# Patient Record
Sex: FEMALE | Race: WHITE | NOT HISPANIC OR LATINO | Employment: OTHER | URBAN - METROPOLITAN AREA
[De-identification: names, ages, dates, MRNs, and addresses within clinical notes are randomized per-mention and may not be internally consistent; named-entity substitution may affect disease eponyms.]

---

## 2018-09-24 ENCOUNTER — APPOINTMENT (OUTPATIENT)
Dept: LAB | Facility: CLINIC | Age: 73
End: 2018-09-24
Payer: COMMERCIAL

## 2018-09-24 ENCOUNTER — TRANSCRIBE ORDERS (OUTPATIENT)
Dept: LAB | Facility: CLINIC | Age: 73
End: 2018-09-24

## 2018-09-24 DIAGNOSIS — E78.5 HYPERLIPIDEMIA, UNSPECIFIED HYPERLIPIDEMIA TYPE: Primary | ICD-10-CM

## 2018-09-24 DIAGNOSIS — Z79.899 NEED FOR PROPHYLACTIC CHEMOTHERAPY: ICD-10-CM

## 2018-09-24 DIAGNOSIS — E55.9 AVITAMINOSIS D: ICD-10-CM

## 2018-09-24 DIAGNOSIS — E78.5 HYPERLIPIDEMIA, UNSPECIFIED HYPERLIPIDEMIA TYPE: ICD-10-CM

## 2018-09-24 DIAGNOSIS — Z11.59 SCREENING EXAMINATION FOR POLIOMYELITIS: ICD-10-CM

## 2018-09-24 DIAGNOSIS — I10 ESSENTIAL HYPERTENSION, MALIGNANT: ICD-10-CM

## 2018-09-24 LAB
25(OH)D3 SERPL-MCNC: 22.8 NG/ML (ref 30–100)
ALBUMIN SERPL BCP-MCNC: 4 G/DL (ref 3.5–5)
ALP SERPL-CCNC: 133 U/L (ref 46–116)
ALT SERPL W P-5'-P-CCNC: 25 U/L (ref 12–78)
ANION GAP SERPL CALCULATED.3IONS-SCNC: 9 MMOL/L (ref 4–13)
AST SERPL W P-5'-P-CCNC: 14 U/L (ref 5–45)
BACTERIA UR QL AUTO: ABNORMAL /HPF
BASOPHILS # BLD AUTO: 0.04 THOUSANDS/ΜL (ref 0–0.1)
BASOPHILS NFR BLD AUTO: 0 % (ref 0–1)
BILIRUB SERPL-MCNC: 0.3 MG/DL (ref 0.2–1)
BILIRUB UR QL STRIP: NEGATIVE
BUN SERPL-MCNC: 13 MG/DL (ref 5–25)
CALCIUM SERPL-MCNC: 9.7 MG/DL (ref 8.3–10.1)
CHLORIDE SERPL-SCNC: 99 MMOL/L (ref 100–108)
CHOLEST SERPL-MCNC: 217 MG/DL (ref 50–200)
CLARITY UR: CLEAR
CO2 SERPL-SCNC: 30 MMOL/L (ref 21–32)
COLOR UR: YELLOW
CREAT SERPL-MCNC: 0.84 MG/DL (ref 0.6–1.3)
EOSINOPHIL # BLD AUTO: 0.2 THOUSAND/ΜL (ref 0–0.61)
EOSINOPHIL NFR BLD AUTO: 2 % (ref 0–6)
ERYTHROCYTE [DISTWIDTH] IN BLOOD BY AUTOMATED COUNT: 14.5 % (ref 11.6–15.1)
GFR SERPL CREATININE-BSD FRML MDRD: 70 ML/MIN/1.73SQ M
GLUCOSE P FAST SERPL-MCNC: 125 MG/DL (ref 65–99)
GLUCOSE UR STRIP-MCNC: NEGATIVE MG/DL
HCT VFR BLD AUTO: 41.4 % (ref 34.8–46.1)
HDLC SERPL-MCNC: 70 MG/DL (ref 40–60)
HGB BLD-MCNC: 13.3 G/DL (ref 11.5–15.4)
HGB UR QL STRIP.AUTO: NEGATIVE
IMM GRANULOCYTES # BLD AUTO: 0.03 THOUSAND/UL (ref 0–0.2)
IMM GRANULOCYTES NFR BLD AUTO: 0 % (ref 0–2)
KETONES UR STRIP-MCNC: NEGATIVE MG/DL
LDLC SERPL CALC-MCNC: 127 MG/DL (ref 0–100)
LDLC SERPL DIRECT ASSAY-MCNC: 128 MG/DL (ref 0–100)
LEUKOCYTE ESTERASE UR QL STRIP: NEGATIVE
LYMPHOCYTES # BLD AUTO: 2.35 THOUSANDS/ΜL (ref 0.6–4.47)
LYMPHOCYTES NFR BLD AUTO: 23 % (ref 14–44)
MCH RBC QN AUTO: 28.3 PG (ref 26.8–34.3)
MCHC RBC AUTO-ENTMCNC: 32.1 G/DL (ref 31.4–37.4)
MCV RBC AUTO: 88 FL (ref 82–98)
MONOCYTES # BLD AUTO: 0.79 THOUSAND/ΜL (ref 0.17–1.22)
MONOCYTES NFR BLD AUTO: 8 % (ref 4–12)
NEUTROPHILS # BLD AUTO: 6.76 THOUSANDS/ΜL (ref 1.85–7.62)
NEUTS SEG NFR BLD AUTO: 67 % (ref 43–75)
NITRITE UR QL STRIP: NEGATIVE
NON-SQ EPI CELLS URNS QL MICRO: ABNORMAL /HPF
NONHDLC SERPL-MCNC: 147 MG/DL
NRBC BLD AUTO-RTO: 0 /100 WBCS
PH UR STRIP.AUTO: 6.5 [PH] (ref 4.5–8)
PLATELET # BLD AUTO: 402 THOUSANDS/UL (ref 149–390)
PMV BLD AUTO: 10.9 FL (ref 8.9–12.7)
POTASSIUM SERPL-SCNC: 3.7 MMOL/L (ref 3.5–5.3)
PROT SERPL-MCNC: 8.1 G/DL (ref 6.4–8.2)
PROT UR STRIP-MCNC: NEGATIVE MG/DL
RBC # BLD AUTO: 4.7 MILLION/UL (ref 3.81–5.12)
RBC #/AREA URNS AUTO: ABNORMAL /HPF
SODIUM SERPL-SCNC: 138 MMOL/L (ref 136–145)
SP GR UR STRIP.AUTO: <=1.005 (ref 1–1.03)
TRIGL SERPL-MCNC: 102 MG/DL
TSH SERPL DL<=0.05 MIU/L-ACNC: 1.34 UIU/ML (ref 0.36–3.74)
UROBILINOGEN UR QL STRIP.AUTO: 0.2 E.U./DL
WBC # BLD AUTO: 10.17 THOUSAND/UL (ref 4.31–10.16)
WBC #/AREA URNS AUTO: ABNORMAL /HPF

## 2018-09-24 PROCEDURE — 83721 ASSAY OF BLOOD LIPOPROTEIN: CPT

## 2018-09-24 PROCEDURE — 80053 COMPREHEN METABOLIC PANEL: CPT

## 2018-09-24 PROCEDURE — 81001 URINALYSIS AUTO W/SCOPE: CPT

## 2018-09-24 PROCEDURE — 36415 COLL VENOUS BLD VENIPUNCTURE: CPT

## 2018-09-24 PROCEDURE — 80061 LIPID PANEL: CPT

## 2018-09-24 PROCEDURE — 86803 HEPATITIS C AB TEST: CPT

## 2018-09-24 PROCEDURE — 84443 ASSAY THYROID STIM HORMONE: CPT

## 2018-09-24 PROCEDURE — 82306 VITAMIN D 25 HYDROXY: CPT

## 2018-09-24 PROCEDURE — 85025 COMPLETE CBC W/AUTO DIFF WBC: CPT

## 2018-09-25 LAB — HCV AB SER QL: NORMAL

## 2020-10-22 ENCOUNTER — TRANSCRIBE ORDERS (OUTPATIENT)
Dept: VASCULAR SURGERY | Facility: CLINIC | Age: 75
End: 2020-10-22

## 2020-10-22 DIAGNOSIS — I83.893 VARICOSE VEINS OF LEG WITH SWELLING, BILATERAL: Primary | ICD-10-CM

## 2020-12-02 ENCOUNTER — TELEPHONE (OUTPATIENT)
Dept: VASCULAR SURGERY | Facility: CLINIC | Age: 75
End: 2020-12-02

## 2020-12-09 ENCOUNTER — TELEPHONE (OUTPATIENT)
Dept: VASCULAR SURGERY | Facility: CLINIC | Age: 75
End: 2020-12-09

## 2020-12-21 ENCOUNTER — TELEPHONE (OUTPATIENT)
Dept: VASCULAR SURGERY | Facility: CLINIC | Age: 75
End: 2020-12-21

## 2020-12-22 ENCOUNTER — OFFICE VISIT (OUTPATIENT)
Dept: VASCULAR SURGERY | Facility: CLINIC | Age: 75
End: 2020-12-22
Payer: COMMERCIAL

## 2020-12-22 VITALS
SYSTOLIC BLOOD PRESSURE: 158 MMHG | WEIGHT: 240 LBS | BODY MASS INDEX: 44.16 KG/M2 | HEIGHT: 62 IN | HEART RATE: 99 BPM | DIASTOLIC BLOOD PRESSURE: 88 MMHG | TEMPERATURE: 99 F

## 2020-12-22 DIAGNOSIS — I83.893 VARICOSE VEINS OF LEG WITH SWELLING, BILATERAL: ICD-10-CM

## 2020-12-22 DIAGNOSIS — R60.0 BILATERAL EDEMA OF LOWER EXTREMITY: Primary | ICD-10-CM

## 2020-12-22 PROCEDURE — 99203 OFFICE O/P NEW LOW 30 MIN: CPT | Performed by: SURGERY

## 2020-12-22 RX ORDER — TIZANIDINE 4 MG/1
TABLET ORAL
COMMUNITY
End: 2022-07-30

## 2020-12-22 RX ORDER — SULFAMETHOXAZOLE AND TRIMETHOPRIM 800; 160 MG/1; MG/1
1 TABLET ORAL EVERY 12 HOURS SCHEDULED
Qty: 28 TABLET | Refills: 0 | Status: SHIPPED | OUTPATIENT
Start: 2020-12-22 | End: 2021-01-05

## 2020-12-22 RX ORDER — IBUPROFEN 600 MG/1
TABLET ORAL
COMMUNITY
End: 2021-01-14 | Stop reason: ALTCHOICE

## 2020-12-22 RX ORDER — ZOLPIDEM TARTRATE 10 MG/1
TABLET ORAL
COMMUNITY
End: 2021-12-16 | Stop reason: CLARIF

## 2020-12-22 RX ORDER — OXYCODONE HCL 20 MG/1
TABLET, FILM COATED, EXTENDED RELEASE ORAL
COMMUNITY
End: 2022-01-06 | Stop reason: SDUPTHER

## 2020-12-22 RX ORDER — FLUTICASONE PROPIONATE 50 MCG
SPRAY, SUSPENSION (ML) NASAL
COMMUNITY
End: 2022-01-06 | Stop reason: HOSPADM

## 2020-12-22 RX ORDER — AMLODIPINE BESYLATE 5 MG/1
TABLET ORAL
COMMUNITY
End: 2022-01-27 | Stop reason: ALTCHOICE

## 2020-12-22 RX ORDER — ALPRAZOLAM 0.25 MG/1
TABLET ORAL
COMMUNITY
End: 2022-01-10 | Stop reason: SDUPTHER

## 2020-12-22 RX ORDER — BUTALBITAL, ACETAMINOPHEN AND CAFFEINE 50; 325; 40 MG/1; MG/1; MG/1
TABLET ORAL
COMMUNITY
End: 2021-12-16

## 2020-12-22 RX ORDER — OXYCODONE AND ACETAMINOPHEN 10; 325 MG/1; MG/1
TABLET ORAL
COMMUNITY
End: 2022-01-06 | Stop reason: SDUPTHER

## 2020-12-22 RX ORDER — MULTIVIT-MIN/IRON/FOLIC ACID/K 18-600-40
CAPSULE ORAL
COMMUNITY
End: 2022-07-30

## 2020-12-22 RX ORDER — IRBESARTAN AND HYDROCHLOROTHIAZIDE 300; 12.5 MG/1; MG/1
TABLET, FILM COATED ORAL
COMMUNITY
End: 2021-11-17 | Stop reason: HOSPADM

## 2021-01-04 ENCOUNTER — TELEPHONE (OUTPATIENT)
Dept: VASCULAR SURGERY | Facility: CLINIC | Age: 76
End: 2021-01-04

## 2021-01-04 DIAGNOSIS — I83.893 VARICOSE VEINS OF LEG WITH SWELLING, BILATERAL: Primary | ICD-10-CM

## 2021-01-04 DIAGNOSIS — R60.0 BILATERAL EDEMA OF LOWER EXTREMITY: Primary | ICD-10-CM

## 2021-01-04 RX ORDER — CEPHALEXIN 500 MG/1
500 CAPSULE ORAL EVERY 12 HOURS SCHEDULED
Qty: 10 CAPSULE | Refills: 0 | Status: SHIPPED | OUTPATIENT
Start: 2021-01-04 | End: 2021-01-09

## 2021-01-04 NOTE — TELEPHONE ENCOUNTER
Pt's chart reviewed   Recommend discontinuing Bactrim, and can call in keflex 500mg BID x5 days as well as  bactroban 2% to apply to affected area TID x 1 week

## 2021-01-04 NOTE — TELEPHONE ENCOUNTER
Patient called re extreme nausea on Bactrim  She is taking with food and drinking plenty of water, but the nausea was so severe, she stopped taking the bactrim on 12/30 so she has approximately 6 days of medication left  Meanwhile , she admits the redness and rash was definitely improving on her legs , but she just could not tolerate the side effect of the nausea     Will send to [facility] to advise

## 2021-01-05 NOTE — TELEPHONE ENCOUNTER
Patient called back today  She went to pharmacy and picked up Keflex, but the Bactroban was sent over  Order placed in chart if you could please approve

## 2021-01-13 ENCOUNTER — TELEPHONE (OUTPATIENT)
Dept: VASCULAR SURGERY | Facility: CLINIC | Age: 76
End: 2021-01-13

## 2021-01-13 NOTE — TELEPHONE ENCOUNTER
COVID Pre-Visit Screening     1  Is this a family member screening? No  2  Have you traveled outside of your state in the past 2 weeks? No  3  Do you presently have a fever or flu-like symptoms? No  4  Do you have symptoms of an upper respiratory infection like runny nose, sore throat, or cough? No  5  Are you suffering from new headache that you have not had in the past?  No  6  Do you have/have you experienced any new shortness of breath recently? No  7  Do you have any new diarrhea, nausea or vomiting? No  8  Have you been in contact with anyone who has been sick or diagnosed with COVID-19? No  9  Do you have any new loss of taste or smell? No  10  Are you able to wear a mask without a valve for the entire visit?  Yes       Patient coming in with , no to all screening questions

## 2021-01-14 ENCOUNTER — HOSPITAL ENCOUNTER (OUTPATIENT)
Dept: NON INVASIVE DIAGNOSTICS | Facility: CLINIC | Age: 76
Discharge: HOME | End: 2021-01-14
Payer: COMMERCIAL

## 2021-01-14 ENCOUNTER — OFFICE VISIT (OUTPATIENT)
Dept: VASCULAR SURGERY | Facility: CLINIC | Age: 76
End: 2021-01-14
Payer: COMMERCIAL

## 2021-01-14 VITALS
TEMPERATURE: 98.6 F | DIASTOLIC BLOOD PRESSURE: 80 MMHG | HEART RATE: 82 BPM | BODY MASS INDEX: 43.9 KG/M2 | HEIGHT: 62 IN | SYSTOLIC BLOOD PRESSURE: 142 MMHG

## 2021-01-14 DIAGNOSIS — I87.2 CHRONIC VENOUS INSUFFICIENCY: Primary | ICD-10-CM

## 2021-01-14 DIAGNOSIS — R60.0 BILATERAL EDEMA OF LOWER EXTREMITY: ICD-10-CM

## 2021-01-14 PROCEDURE — 93970 EXTREMITY STUDY: CPT

## 2021-01-14 PROCEDURE — 99213 OFFICE O/P EST LOW 20 MIN: CPT | Performed by: SURGERY

## 2021-01-14 PROCEDURE — 93970 EXTREMITY STUDY: CPT | Performed by: SURGERY

## 2021-01-14 RX ORDER — SULFAMETHOXAZOLE AND TRIMETHOPRIM 800; 160 MG/1; MG/1
TABLET ORAL
COMMUNITY
End: 2021-01-14 | Stop reason: ALTCHOICE

## 2021-01-14 RX ORDER — CEPHALEXIN 500 MG/1
CAPSULE ORAL
COMMUNITY
End: 2021-01-14 | Stop reason: ALTCHOICE

## 2021-01-14 NOTE — PATIENT INSTRUCTIONS
Duplex imaging showed no evidence of deep vein thrombosis  She does have chronic venous insufficiency with recurrent cellulitis which is resolved at this time  I would like to start her on a venous compression pump for daily 1 hour treatments  And see her back in the office in 3 months  Plan:  Venous compression pump 1 hour a day at 30 millimeters of mercury  I am using Tubigrip to cover her skin

## 2021-01-14 NOTE — PROGRESS NOTES
Assessment/Plan:    Duplex imaging showed no evidence of deep vein thrombosis  She does have chronic venous insufficiency with recurrent cellulitis which is resolved at this time  I would like to start her on a venous compression pump for daily 1 hour treatments  And see her back in the office in 3 months  Plan:  Venous compression pump 1 hour a day at 30 millimeters of mercury  I am using Tubigrip to cover her skin  Diagnoses and all orders for this visit:    Chronic venous insufficiency  -     Pneumatic compression pumps    Other orders  -     Discontinue: cephalexin (KEFLEX) 500 mg capsule; cephalexin 500 mg capsule  -     Discontinue: sulfamethoxazole-trimethoprim (BACTRIM DS) 800-160 mg per tablet; sulfamethoxazole 800 mg-trimethoprim 160 mg tablet        Subjective:      Patient ID: Candy Gordon is a 77 y o  female  Patient w/ BLE swelling and weeping drainage, recently being treated for cellulitis  Bactrim completed  Drainage has now resolved  Presents in office today to review LEV and discuss further recommendations  HPI    The following portions of the patient's history were reviewed and updated as appropriate: allergies, current medications, past family history, past medical history, past social history, past surgical history and problem list     Review of Systems   Constitutional: Negative  HENT: Negative  Eyes: Negative  Respiratory: Negative  Cardiovascular: Positive for leg swelling  Gastrointestinal: Negative  Endocrine: Negative  Genitourinary: Negative  Musculoskeletal: Positive for gait problem and joint swelling  Skin: Positive for color change  Allergic/Immunologic: Negative  Hematological: Negative  Psychiatric/Behavioral: Negative  Objective: There were no vitals taken for this visit           Physical Exam  bilateral lower extremity with improved swelling decrease cellulitis and exudate, bilateral lower extremity swelling which is somewhat improved but will require a venous compression pump  Left leg: Ankle -27 centimeters,, calf -47 centimeters, thigh -65 centimeters  Right leg: Ankle -28 centimeters, calf -49 centimeters, thigh -66 centimeters  I have reviewed and made appropriate changes to the review of systems input by the medical assistant  Vitals:    01/14/21 1555   BP: 142/80   BP Location: Right arm   Patient Position: Sitting   Pulse: 82   Temp: 98 6 °F (37 °C)   TempSrc: Tympanic   Height: 5' 2" (1 575 m)       Patient Active Problem List   Diagnosis    Bilateral edema of lower extremity    Chronic venous insufficiency       Past Surgical History:   Procedure Laterality Date    KNEE CARTILAGE SURGERY      REPLACEMENT TOTAL KNEE BILATERAL         History reviewed  No pertinent family history      Social History     Socioeconomic History    Marital status: /Civil Union     Spouse name: Not on file    Number of children: Not on file    Years of education: Not on file    Highest education level: Not on file   Occupational History    Not on file   Social Needs    Financial resource strain: Not on file    Food insecurity     Worry: Not on file     Inability: Not on file    Transportation needs     Medical: Not on file     Non-medical: Not on file   Tobacco Use    Smoking status: Former Smoker    Smokeless tobacco: Never Used   Substance and Sexual Activity    Alcohol use: Not on file    Drug use: Not on file    Sexual activity: Not on file   Lifestyle    Physical activity     Days per week: Not on file     Minutes per session: Not on file    Stress: Not on file   Relationships    Social connections     Talks on phone: Not on file     Gets together: Not on file     Attends Christian service: Not on file     Active member of club or organization: Not on file     Attends meetings of clubs or organizations: Not on file     Relationship status: Not on file    Intimate partner violence Fear of current or ex partner: Not on file     Emotionally abused: Not on file     Physically abused: Not on file     Forced sexual activity: Not on file   Other Topics Concern    Not on file   Social History Narrative    Not on file       No Known Allergies      Current Outpatient Medications:     ALPRAZolam (XANAX) 0 25 mg tablet, alprazolam 0 25 mg tablet  TAKE 1 TABLET BY MOUTH TWICE A DAY AS NEEDED, Disp: , Rfl:     amLODIPine (NORVASC) 5 mg tablet, amlodipine 5 mg tablet  Take 1 tablet twice a day by oral route , Disp: , Rfl:     butalbital-acetaminophen-caffeine (FIORICET,ESGIC) -40 mg per tablet, butalbital-acetaminophen-caffeine 50 mg-325 mg-40 mg tablet  Take 1 tablet every 6 hours by oral route as needed  , Disp: , Rfl:     Diclofenac Sodium (Voltaren) 1 %, Voltaren 1 % topical gel, Disp: , Rfl:     fluticasone (FLONASE) 50 mcg/act nasal spray, fluticasone propionate 50 mcg/actuation nasal spray,suspension  Spray 1 spray every day by intranasal route , Disp: , Rfl:     hydrocortisone (WESTCORT) 0 2 % cream, , Disp: , Rfl:     irbesartan-hydrochlorothiazide (AVALIDE) 300-12 5 MG per tablet, irbesartan 300 mg-hydrochlorothiazide 12 5 mg tablet  TAKE ONE TABLET BY MOUTH ONCE DAILY, Disp: , Rfl:     oxyCODONE (OxyCONTIN) 20 mg 12 hr tablet, OxyContin 20 mg tablet,crush resistant,extended release  TAKE ONE TABLET BY MOUTH THREE TIMES DAILY AS NEEDED FOR PAIN, Disp: , Rfl:     oxyCODONE-acetaminophen (PERCOCET)  mg per tablet, oxycodone-acetaminophen 10 mg-325 mg tablet  TAKE ONE TABLET BY MOUTH EVERY 4 HOURS AS NEEDED FOR PAIN max DAILY AMOUNT SIX tablets, Disp: , Rfl:     tiZANidine (ZANAFLEX) 4 mg tablet, tizanidine 4 mg tablet  TAKE ONE TABLET BY MOUTH EVERY 8 HOURS AS NEEDED FOR spasm, Disp: , Rfl:     Vitamin D, Cholecalciferol, 25 MCG (1000 UT) TABS, Vitamin D3 25 mcg (1,000 unit) capsule  one PO QD, Disp: , Rfl:     mupirocin (BACTROBAN) 2 % ointment, Apply topically 3 (three) times a day For 1 week (Patient not taking: Reported on 1/14/2021), Disp: 22 g, Rfl: 0    zolpidem (AMBIEN) 10 mg tablet, zolpidem 10 mg tablet, Disp: , Rfl:

## 2021-01-14 NOTE — LETTER
January 14, 2021     [address] [facility] [address]    Patient: Izzy Boss   YOB: 1945   Date of Visit: 1/14/2021       Dear Dr Coats Lean:    Thank you for referring Hansen Jackelyn to me for evaluation  Below are my notes for this consultation  If you have questions, please do not hesitate to call me  I look forward to following your patient along with you           Sincerely,        Andrew Ford MD        CC: No Recipients

## 2021-01-29 ENCOUNTER — TELEPHONE (OUTPATIENT)
Dept: ADMINISTRATIVE | Facility: HOSPITAL | Age: 76
End: 2021-01-29

## 2021-01-29 NOTE — TELEPHONE ENCOUNTER
Received a message that patient had called and asked for me by name  Called her back and had to leave a voice mail

## 2021-01-29 NOTE — TELEPHONE ENCOUNTER
Spoke with Sharyle Mars at [facility], compression pumps do not need insurance auth  Customer care will be calling her to set up delivery  He tried to transfer me to one of the reps but I had to leave a voicemail with them as well  Instructed them to call the patient directly as soon as possible

## 2021-02-15 ENCOUNTER — IMMUNIZATIONS (OUTPATIENT)
Dept: FAMILY MEDICINE CLINIC | Facility: HOSPITAL | Age: 76
End: 2021-02-15

## 2021-02-15 DIAGNOSIS — Z23 ENCOUNTER FOR IMMUNIZATION: Primary | ICD-10-CM

## 2021-02-15 PROCEDURE — 0001A SARS-COV-2 / COVID-19 MRNA VACCINE (PFIZER-BIONTECH) 30 MCG: CPT

## 2021-02-15 PROCEDURE — 91300 SARS-COV-2 / COVID-19 MRNA VACCINE (PFIZER-BIONTECH) 30 MCG: CPT

## 2021-03-10 ENCOUNTER — IMMUNIZATIONS (OUTPATIENT)
Dept: FAMILY MEDICINE CLINIC | Facility: HOSPITAL | Age: 76
End: 2021-03-10

## 2021-03-10 DIAGNOSIS — Z23 ENCOUNTER FOR IMMUNIZATION: Primary | ICD-10-CM

## 2021-03-10 PROCEDURE — 91300 SARS-COV-2 / COVID-19 MRNA VACCINE (PFIZER-BIONTECH) 30 MCG: CPT

## 2021-03-10 PROCEDURE — 0002A SARS-COV-2 / COVID-19 MRNA VACCINE (PFIZER-BIONTECH) 30 MCG: CPT

## 2021-04-12 ENCOUNTER — TRANSCRIBE ORDERS (OUTPATIENT)
Dept: LAB | Facility: CLINIC | Age: 76
End: 2021-04-12

## 2021-04-12 ENCOUNTER — APPOINTMENT (OUTPATIENT)
Dept: LAB | Facility: CLINIC | Age: 76
End: 2021-04-12
Payer: COMMERCIAL

## 2021-04-12 DIAGNOSIS — I10 ESSENTIAL HYPERTENSION, MALIGNANT: ICD-10-CM

## 2021-04-12 DIAGNOSIS — R73.01 IMPAIRED FASTING GLUCOSE: Primary | ICD-10-CM

## 2021-04-12 DIAGNOSIS — R73.01 IMPAIRED FASTING GLUCOSE: ICD-10-CM

## 2021-04-12 DIAGNOSIS — Z79.899 ENCOUNTER FOR LONG-TERM (CURRENT) USE OF OTHER MEDICATIONS: ICD-10-CM

## 2021-04-12 DIAGNOSIS — R63.4 WEIGHT LOSS: ICD-10-CM

## 2021-04-12 DIAGNOSIS — E55.9 AVITAMINOSIS D: ICD-10-CM

## 2021-04-12 LAB
25(OH)D3 SERPL-MCNC: 26.3 NG/ML (ref 30–100)
ALBUMIN SERPL BCP-MCNC: 3.9 G/DL (ref 3.5–5)
ALP SERPL-CCNC: 126 U/L (ref 46–116)
ALT SERPL W P-5'-P-CCNC: 18 U/L (ref 12–78)
ANION GAP SERPL CALCULATED.3IONS-SCNC: 10 MMOL/L (ref 4–13)
AST SERPL W P-5'-P-CCNC: 13 U/L (ref 5–45)
BACTERIA UR QL AUTO: ABNORMAL /HPF
BASOPHILS # BLD AUTO: 0.04 THOUSANDS/ΜL (ref 0–0.1)
BASOPHILS NFR BLD AUTO: 1 % (ref 0–1)
BILIRUB SERPL-MCNC: 0.34 MG/DL (ref 0.2–1)
BILIRUB UR QL STRIP: NEGATIVE
BUN SERPL-MCNC: 19 MG/DL (ref 5–25)
CALCIUM SERPL-MCNC: 9.5 MG/DL (ref 8.3–10.1)
CHLORIDE SERPL-SCNC: 100 MMOL/L (ref 100–108)
CHOLEST SERPL-MCNC: 196 MG/DL (ref 50–200)
CLARITY UR: CLEAR
CO2 SERPL-SCNC: 31 MMOL/L (ref 21–32)
COLOR UR: YELLOW
CREAT SERPL-MCNC: 0.94 MG/DL (ref 0.6–1.3)
EOSINOPHIL # BLD AUTO: 0.34 THOUSAND/ΜL (ref 0–0.61)
EOSINOPHIL NFR BLD AUTO: 5 % (ref 0–6)
ERYTHROCYTE [DISTWIDTH] IN BLOOD BY AUTOMATED COUNT: 14.3 % (ref 11.6–15.1)
EST. AVERAGE GLUCOSE BLD GHB EST-MCNC: 123 MG/DL
GFR SERPL CREATININE-BSD FRML MDRD: 60 ML/MIN/1.73SQ M
GLUCOSE P FAST SERPL-MCNC: 113 MG/DL (ref 65–99)
GLUCOSE UR STRIP-MCNC: NEGATIVE MG/DL
HBA1C MFR BLD: 5.9 %
HCT VFR BLD AUTO: 38.3 % (ref 34.8–46.1)
HDLC SERPL-MCNC: 59 MG/DL
HGB BLD-MCNC: 11.9 G/DL (ref 11.5–15.4)
HGB UR QL STRIP.AUTO: NEGATIVE
IMM GRANULOCYTES # BLD AUTO: 0.02 THOUSAND/UL (ref 0–0.2)
IMM GRANULOCYTES NFR BLD AUTO: 0 % (ref 0–2)
KETONES UR STRIP-MCNC: NEGATIVE MG/DL
LDLC SERPL CALC-MCNC: 113 MG/DL (ref 0–100)
LDLC SERPL DIRECT ASSAY-MCNC: 109 MG/DL (ref 0–100)
LEUKOCYTE ESTERASE UR QL STRIP: NEGATIVE
LYMPHOCYTES # BLD AUTO: 1.91 THOUSANDS/ΜL (ref 0.6–4.47)
LYMPHOCYTES NFR BLD AUTO: 26 % (ref 14–44)
MCH RBC QN AUTO: 28.4 PG (ref 26.8–34.3)
MCHC RBC AUTO-ENTMCNC: 31.1 G/DL (ref 31.4–37.4)
MCV RBC AUTO: 91 FL (ref 82–98)
MONOCYTES # BLD AUTO: 0.69 THOUSAND/ΜL (ref 0.17–1.22)
MONOCYTES NFR BLD AUTO: 10 % (ref 4–12)
NEUTROPHILS # BLD AUTO: 4.27 THOUSANDS/ΜL (ref 1.85–7.62)
NEUTS SEG NFR BLD AUTO: 58 % (ref 43–75)
NITRITE UR QL STRIP: NEGATIVE
NON-SQ EPI CELLS URNS QL MICRO: ABNORMAL /HPF
NONHDLC SERPL-MCNC: 137 MG/DL
NRBC BLD AUTO-RTO: 0 /100 WBCS
PH UR STRIP.AUTO: 5.5 [PH]
PLATELET # BLD AUTO: 352 THOUSANDS/UL (ref 149–390)
PMV BLD AUTO: 10.4 FL (ref 8.9–12.7)
POTASSIUM SERPL-SCNC: 4.6 MMOL/L (ref 3.5–5.3)
PROT SERPL-MCNC: 7.5 G/DL (ref 6.4–8.2)
PROT UR STRIP-MCNC: NEGATIVE MG/DL
RBC # BLD AUTO: 4.19 MILLION/UL (ref 3.81–5.12)
RBC #/AREA URNS AUTO: ABNORMAL /HPF
SODIUM SERPL-SCNC: 141 MMOL/L (ref 136–145)
SP GR UR STRIP.AUTO: 1.02 (ref 1–1.03)
TRIGL SERPL-MCNC: 120 MG/DL
TSH SERPL DL<=0.05 MIU/L-ACNC: 3.02 UIU/ML (ref 0.36–3.74)
UROBILINOGEN UR QL STRIP.AUTO: 0.2 E.U./DL
WBC # BLD AUTO: 7.27 THOUSAND/UL (ref 4.31–10.16)
WBC #/AREA URNS AUTO: ABNORMAL /HPF

## 2021-04-12 PROCEDURE — 82306 VITAMIN D 25 HYDROXY: CPT

## 2021-04-12 PROCEDURE — 80061 LIPID PANEL: CPT

## 2021-04-12 PROCEDURE — 85025 COMPLETE CBC W/AUTO DIFF WBC: CPT

## 2021-04-12 PROCEDURE — 83721 ASSAY OF BLOOD LIPOPROTEIN: CPT

## 2021-04-12 PROCEDURE — 83036 HEMOGLOBIN GLYCOSYLATED A1C: CPT

## 2021-04-12 PROCEDURE — 80053 COMPREHEN METABOLIC PANEL: CPT

## 2021-04-12 PROCEDURE — 84443 ASSAY THYROID STIM HORMONE: CPT

## 2021-04-12 PROCEDURE — 81001 URINALYSIS AUTO W/SCOPE: CPT

## 2021-04-12 PROCEDURE — 36415 COLL VENOUS BLD VENIPUNCTURE: CPT

## 2021-11-12 ENCOUNTER — APPOINTMENT (EMERGENCY)
Dept: CT IMAGING | Facility: HOSPITAL | Age: 76
DRG: 292 | End: 2021-11-12
Payer: COMMERCIAL

## 2021-11-12 ENCOUNTER — HOSPITAL ENCOUNTER (INPATIENT)
Facility: HOSPITAL | Age: 76
LOS: 5 days | Discharge: HOME | DRG: 292 | End: 2021-11-17
Attending: EMERGENCY MEDICINE | Admitting: INTERNAL MEDICINE
Payer: COMMERCIAL

## 2021-11-12 ENCOUNTER — APPOINTMENT (EMERGENCY)
Dept: RADIOLOGY | Facility: HOSPITAL | Age: 76
DRG: 292 | End: 2021-11-12
Payer: COMMERCIAL

## 2021-11-12 DIAGNOSIS — R06.02 SHORTNESS OF BREATH: ICD-10-CM

## 2021-11-12 DIAGNOSIS — I50.31 ACUTE DIASTOLIC CHF (CONGESTIVE HEART FAILURE) (HCC): ICD-10-CM

## 2021-11-12 DIAGNOSIS — R21 RASH OF BACK: ICD-10-CM

## 2021-11-12 DIAGNOSIS — L53.9 ERYTHEMA OF LOWER EXTREMITY: ICD-10-CM

## 2021-11-12 DIAGNOSIS — R60.0 BILATERAL LOWER EXTREMITY EDEMA: ICD-10-CM

## 2021-11-12 DIAGNOSIS — I50.9 CHF (CONGESTIVE HEART FAILURE) (HCC): Primary | ICD-10-CM

## 2021-11-12 DIAGNOSIS — I10 HYPERTENSION: ICD-10-CM

## 2021-11-12 DIAGNOSIS — I48.91 NEW ONSET ATRIAL FIBRILLATION (HCC): ICD-10-CM

## 2021-11-12 PROBLEM — G89.29 CHRONIC LOW BACK PAIN WITH SCIATICA: Status: ACTIVE | Noted: 2021-11-12

## 2021-11-12 PROBLEM — J96.01 ACUTE RESPIRATORY FAILURE WITH HYPOXIA (HCC): Status: ACTIVE | Noted: 2021-11-12

## 2021-11-12 PROBLEM — I89.0 LYMPHEDEMA OF BOTH LOWER EXTREMITIES: Status: ACTIVE | Noted: 2020-12-22

## 2021-11-12 PROBLEM — M54.40 CHRONIC LOW BACK PAIN WITH SCIATICA: Status: ACTIVE | Noted: 2021-11-12

## 2021-11-12 LAB
2HR DELTA HS TROPONIN: 1 NG/L
4HR DELTA HS TROPONIN: -1 NG/L
ALBUMIN SERPL BCP-MCNC: 3.7 G/DL (ref 3.5–5)
ALP SERPL-CCNC: 128 U/L (ref 46–116)
ALT SERPL W P-5'-P-CCNC: 18 U/L (ref 12–78)
ANION GAP SERPL CALCULATED.3IONS-SCNC: 7 MMOL/L (ref 4–13)
APTT PPP: 28 SECONDS (ref 23–37)
AST SERPL W P-5'-P-CCNC: 17 U/L (ref 5–45)
BASOPHILS # BLD AUTO: 0.06 THOUSANDS/ΜL (ref 0–0.1)
BASOPHILS NFR BLD AUTO: 1 % (ref 0–1)
BILIRUB SERPL-MCNC: 0.2 MG/DL (ref 0.2–1)
BUN SERPL-MCNC: 9 MG/DL (ref 5–25)
CALCIUM SERPL-MCNC: 8.9 MG/DL (ref 8.3–10.1)
CARDIAC TROPONIN I PNL SERPL HS: 18 NG/L
CARDIAC TROPONIN I PNL SERPL HS: 19 NG/L
CARDIAC TROPONIN I PNL SERPL HS: 20 NG/L
CHLORIDE SERPL-SCNC: 100 MMOL/L (ref 100–108)
CO2 SERPL-SCNC: 33 MMOL/L (ref 21–32)
CREAT SERPL-MCNC: 0.85 MG/DL (ref 0.6–1.3)
D DIMER PPP FEU-MCNC: 2.76 UG/ML FEU
EOSINOPHIL # BLD AUTO: 1.16 THOUSAND/ΜL (ref 0–0.61)
EOSINOPHIL NFR BLD AUTO: 11 % (ref 0–6)
ERYTHROCYTE [DISTWIDTH] IN BLOOD BY AUTOMATED COUNT: 14.4 % (ref 11.6–15.1)
GFR SERPL CREATININE-BSD FRML MDRD: 67 ML/MIN/1.73SQ M
GLUCOSE SERPL-MCNC: 133 MG/DL (ref 65–140)
HCT VFR BLD AUTO: 39.3 % (ref 34.8–46.1)
HGB BLD-MCNC: 11.7 G/DL (ref 11.5–15.4)
IMM GRANULOCYTES # BLD AUTO: 0.05 THOUSAND/UL (ref 0–0.2)
IMM GRANULOCYTES NFR BLD AUTO: 1 % (ref 0–2)
INR PPP: 0.94 (ref 0.84–1.19)
LYMPHOCYTES # BLD AUTO: 1.41 THOUSANDS/ΜL (ref 0.6–4.47)
LYMPHOCYTES NFR BLD AUTO: 14 % (ref 14–44)
MAGNESIUM SERPL-MCNC: 2.2 MG/DL (ref 1.6–2.6)
MCH RBC QN AUTO: 27.9 PG (ref 26.8–34.3)
MCHC RBC AUTO-ENTMCNC: 29.8 G/DL (ref 31.4–37.4)
MCV RBC AUTO: 94 FL (ref 82–98)
MONOCYTES # BLD AUTO: 1.25 THOUSAND/ΜL (ref 0.17–1.22)
MONOCYTES NFR BLD AUTO: 12 % (ref 4–12)
NEUTROPHILS # BLD AUTO: 6.53 THOUSANDS/ΜL (ref 1.85–7.62)
NEUTS SEG NFR BLD AUTO: 61 % (ref 43–75)
NRBC BLD AUTO-RTO: 0 /100 WBCS
NT-PROBNP SERPL-MCNC: 473 PG/ML
PLATELET # BLD AUTO: 370 THOUSANDS/UL (ref 149–390)
PMV BLD AUTO: 10.8 FL (ref 8.9–12.7)
POTASSIUM SERPL-SCNC: 3.5 MMOL/L (ref 3.5–5.3)
PROT SERPL-MCNC: 7.4 G/DL (ref 6.4–8.2)
PROTHROMBIN TIME: 12.6 SECONDS (ref 11.6–14.5)
RBC # BLD AUTO: 4.2 MILLION/UL (ref 3.81–5.12)
SODIUM SERPL-SCNC: 140 MMOL/L (ref 136–145)
WBC # BLD AUTO: 10.46 THOUSAND/UL (ref 4.31–10.16)

## 2021-11-12 PROCEDURE — 71275 CT ANGIOGRAPHY CHEST: CPT

## 2021-11-12 PROCEDURE — 99285 EMERGENCY DEPT VISIT HI MDM: CPT | Performed by: PHYSICIAN ASSISTANT

## 2021-11-12 PROCEDURE — 80053 COMPREHEN METABOLIC PANEL: CPT | Performed by: PHYSICIAN ASSISTANT

## 2021-11-12 PROCEDURE — 84484 ASSAY OF TROPONIN QUANT: CPT | Performed by: PHYSICIAN ASSISTANT

## 2021-11-12 PROCEDURE — 93005 ELECTROCARDIOGRAM TRACING: CPT

## 2021-11-12 PROCEDURE — 99285 EMERGENCY DEPT VISIT HI MDM: CPT

## 2021-11-12 PROCEDURE — 85379 FIBRIN DEGRADATION QUANT: CPT | Performed by: EMERGENCY MEDICINE

## 2021-11-12 PROCEDURE — 85025 COMPLETE CBC W/AUTO DIFF WBC: CPT | Performed by: PHYSICIAN ASSISTANT

## 2021-11-12 PROCEDURE — 99223 1ST HOSP IP/OBS HIGH 75: CPT | Performed by: PHYSICIAN ASSISTANT

## 2021-11-12 PROCEDURE — 71045 X-RAY EXAM CHEST 1 VIEW: CPT

## 2021-11-12 PROCEDURE — 85610 PROTHROMBIN TIME: CPT | Performed by: PHYSICIAN ASSISTANT

## 2021-11-12 PROCEDURE — 36415 COLL VENOUS BLD VENIPUNCTURE: CPT | Performed by: PHYSICIAN ASSISTANT

## 2021-11-12 PROCEDURE — 85730 THROMBOPLASTIN TIME PARTIAL: CPT | Performed by: EMERGENCY MEDICINE

## 2021-11-12 PROCEDURE — 83735 ASSAY OF MAGNESIUM: CPT | Performed by: PHYSICIAN ASSISTANT

## 2021-11-12 PROCEDURE — 83880 ASSAY OF NATRIURETIC PEPTIDE: CPT | Performed by: PHYSICIAN ASSISTANT

## 2021-11-12 PROCEDURE — 96374 THER/PROPH/DIAG INJ IV PUSH: CPT

## 2021-11-12 PROCEDURE — 85730 THROMBOPLASTIN TIME PARTIAL: CPT | Performed by: PHYSICIAN ASSISTANT

## 2021-11-12 PROCEDURE — 85610 PROTHROMBIN TIME: CPT | Performed by: EMERGENCY MEDICINE

## 2021-11-12 RX ORDER — HEPARIN SODIUM 1000 [USP'U]/ML
2000 INJECTION, SOLUTION INTRAVENOUS; SUBCUTANEOUS
Status: DISCONTINUED | OUTPATIENT
Start: 2021-11-12 | End: 2021-11-17 | Stop reason: HOSPADM

## 2021-11-12 RX ORDER — LOSARTAN POTASSIUM 50 MG/1
100 TABLET ORAL DAILY
Status: DISCONTINUED | OUTPATIENT
Start: 2021-11-13 | End: 2021-11-17 | Stop reason: HOSPADM

## 2021-11-12 RX ORDER — HYDROCHLOROTHIAZIDE 12.5 MG/1
12.5 TABLET ORAL DAILY
Status: DISCONTINUED | OUTPATIENT
Start: 2021-11-13 | End: 2021-11-12

## 2021-11-12 RX ORDER — ACETAMINOPHEN 325 MG/1
650 TABLET ORAL EVERY 6 HOURS PRN
Status: DISCONTINUED | OUTPATIENT
Start: 2021-11-12 | End: 2021-11-17 | Stop reason: HOSPADM

## 2021-11-12 RX ORDER — LOSARTAN POTASSIUM 50 MG/1
100 TABLET ORAL ONCE
Status: COMPLETED | OUTPATIENT
Start: 2021-11-12 | End: 2021-11-12

## 2021-11-12 RX ORDER — FLUTICASONE PROPIONATE 50 MCG
1 SPRAY, SUSPENSION (ML) NASAL DAILY
Status: DISCONTINUED | OUTPATIENT
Start: 2021-11-13 | End: 2021-11-17 | Stop reason: HOSPADM

## 2021-11-12 RX ORDER — HEPARIN SODIUM 1000 [USP'U]/ML
4000 INJECTION, SOLUTION INTRAVENOUS; SUBCUTANEOUS ONCE
Status: COMPLETED | OUTPATIENT
Start: 2021-11-12 | End: 2021-11-13

## 2021-11-12 RX ORDER — HEPARIN SODIUM 1000 [USP'U]/ML
4000 INJECTION, SOLUTION INTRAVENOUS; SUBCUTANEOUS
Status: DISCONTINUED | OUTPATIENT
Start: 2021-11-12 | End: 2021-11-17 | Stop reason: HOSPADM

## 2021-11-12 RX ORDER — OXYCODONE HCL 20 MG/1
20 TABLET, FILM COATED, EXTENDED RELEASE ORAL EVERY 8 HOURS SCHEDULED
Status: DISCONTINUED | OUTPATIENT
Start: 2021-11-12 | End: 2021-11-17 | Stop reason: HOSPADM

## 2021-11-12 RX ORDER — TIZANIDINE 2 MG/1
4 TABLET ORAL EVERY 8 HOURS PRN
Status: DISCONTINUED | OUTPATIENT
Start: 2021-11-12 | End: 2021-11-17 | Stop reason: HOSPADM

## 2021-11-12 RX ORDER — AMLODIPINE BESYLATE 5 MG/1
10 TABLET ORAL ONCE
Status: COMPLETED | OUTPATIENT
Start: 2021-11-12 | End: 2021-11-12

## 2021-11-12 RX ORDER — CEFAZOLIN SODIUM 2 G/50ML
2000 SOLUTION INTRAVENOUS EVERY 8 HOURS
Status: DISCONTINUED | OUTPATIENT
Start: 2021-11-12 | End: 2021-11-17 | Stop reason: HOSPADM

## 2021-11-12 RX ORDER — FUROSEMIDE 10 MG/ML
40 INJECTION INTRAMUSCULAR; INTRAVENOUS 2 TIMES DAILY
Status: DISCONTINUED | OUTPATIENT
Start: 2021-11-13 | End: 2021-11-16

## 2021-11-12 RX ORDER — OXYCODONE HYDROCHLORIDE 10 MG/1
10 TABLET ORAL EVERY 6 HOURS PRN
Status: DISCONTINUED | OUTPATIENT
Start: 2021-11-12 | End: 2021-11-17 | Stop reason: HOSPADM

## 2021-11-12 RX ORDER — OXYCODONE HYDROCHLORIDE 10 MG/1
10 TABLET ORAL ONCE
Status: COMPLETED | OUTPATIENT
Start: 2021-11-12 | End: 2021-11-12

## 2021-11-12 RX ORDER — HEPARIN SODIUM 10000 [USP'U]/100ML
3-20 INJECTION, SOLUTION INTRAVENOUS
Status: DISCONTINUED | OUTPATIENT
Start: 2021-11-12 | End: 2021-11-17 | Stop reason: HOSPADM

## 2021-11-12 RX ORDER — AMLODIPINE BESYLATE 5 MG/1
5 TABLET ORAL DAILY
Status: DISCONTINUED | OUTPATIENT
Start: 2021-11-13 | End: 2021-11-14

## 2021-11-12 RX ORDER — FUROSEMIDE 10 MG/ML
20 INJECTION INTRAMUSCULAR; INTRAVENOUS ONCE
Status: COMPLETED | OUTPATIENT
Start: 2021-11-12 | End: 2021-11-12

## 2021-11-12 RX ADMIN — LOSARTAN POTASSIUM 100 MG: 50 TABLET, FILM COATED ORAL at 18:52

## 2021-11-12 RX ADMIN — FUROSEMIDE 20 MG: 10 INJECTION, SOLUTION INTRAVENOUS at 18:51

## 2021-11-12 RX ADMIN — AMLODIPINE BESYLATE 10 MG: 5 TABLET ORAL at 18:51

## 2021-11-12 RX ADMIN — IOHEXOL 85 ML: 350 INJECTION, SOLUTION INTRAVENOUS at 20:38

## 2021-11-12 RX ADMIN — NITROGLYCERIN 1 INCH: 20 OINTMENT TOPICAL at 18:52

## 2021-11-12 RX ADMIN — OXYCODONE HYDROCHLORIDE 10 MG: 10 TABLET ORAL at 18:51

## 2021-11-12 NOTE — ED PROVIDER NOTES
History  Chief Complaint   Patient presents with    Leg Swelling     pt states leg swelling getting worse a week ago,complains of SOB getting worse today has hx of edema was told by PCP to come to ER     Patient is a 71-year-old female with a past medical history of chronic lower extremity edema, venous insufficiency and hypertension, presenting to the ED for evaluation worsening lower extremity edema and shortness of breath  Patient states that she has chronic lower extremity edema that has been worsening over the past week  She has not been able to lay flat at night to sleep secondary to shortness of breath and has been sleeping in a recliner  She also reports worsening shortness of breath with exertion  Patient denies a history of CHF  She says that she was prescribed Lasix by her PCP a while ago but has not taken this for a long time because she does not like how frequently it makes her urinate  She denies any headache, dizziness, chest pain, paroxysmal nocturnal dyspnea or palpitations  She was told by her PCP to come to the ED for further evaluation  Prior to Admission Medications   Prescriptions Last Dose Informant Patient Reported? Taking? ALPRAZolam (XANAX) 0 25 mg tablet  Self Yes No   Sig: alprazolam 0 25 mg tablet   TAKE 1 TABLET BY MOUTH TWICE A DAY AS NEEDED   Diclofenac Sodium (Voltaren) 1 %  Self Yes No   Sig: Voltaren 1 % topical gel   Vitamin D, Cholecalciferol, 25 MCG (1000 UT) TABS  Self Yes No   Sig: Vitamin D3 25 mcg (1,000 unit) capsule   one PO QD   amLODIPine (NORVASC) 5 mg tablet  Self Yes No   Sig: amlodipine 5 mg tablet   Take 1 tablet twice a day by oral route  butalbital-acetaminophen-caffeine (FIORICET,ESGIC) -40 mg per tablet  Self Yes No   Sig: butalbital-acetaminophen-caffeine 50 mg-325 mg-40 mg tablet   Take 1 tablet every 6 hours by oral route as needed     fluticasone (FLONASE) 50 mcg/act nasal spray  Self Yes No   Sig: fluticasone propionate 50 mcg/actuation nasal spray,suspension   Spray 1 spray every day by intranasal route  hydrocortisone (WESTCORT) 0 2 % cream  Self Yes No   irbesartan-hydrochlorothiazide (AVALIDE) 300-12 5 MG per tablet  Self Yes No   Sig: irbesartan 300 mg-hydrochlorothiazide 12 5 mg tablet   TAKE ONE TABLET BY MOUTH ONCE DAILY   mupirocin (BACTROBAN) 2 % ointment  Self No No   Sig: Apply topically 3 (three) times a day For 1 week   Patient not taking: Reported on 1/14/2021   oxyCODONE (OxyCONTIN) 20 mg 12 hr tablet  Self Yes No   Sig: OxyContin 20 mg tablet,crush resistant,extended release   TAKE ONE TABLET BY MOUTH THREE TIMES DAILY AS NEEDED FOR PAIN   oxyCODONE-acetaminophen (PERCOCET)  mg per tablet  Self Yes No   Sig: oxycodone-acetaminophen 10 mg-325 mg tablet   TAKE ONE TABLET BY MOUTH EVERY 4 HOURS AS NEEDED FOR PAIN max DAILY AMOUNT SIX tablets   tiZANidine (ZANAFLEX) 4 mg tablet  Self Yes No   Sig: tizanidine 4 mg tablet   TAKE ONE TABLET BY MOUTH EVERY 8 HOURS AS NEEDED FOR spasm   zolpidem (AMBIEN) 10 mg tablet  Self Yes No   Sig: zolpidem 10 mg tablet      Facility-Administered Medications: None       Past Medical History:   Diagnosis Date    Arthritis     Cellulitis     Edema of both lower extremities due to peripheral venous insufficiency        Past Surgical History:   Procedure Laterality Date    KNEE CARTILAGE SURGERY      REPLACEMENT TOTAL KNEE BILATERAL         History reviewed  No pertinent family history  I have reviewed and agree with the history as documented  E-Cigarette/Vaping    E-Cigarette Use Never User      E-Cigarette/Vaping Substances     Social History     Tobacco Use    Smoking status: Former Smoker    Smokeless tobacco: Never Used   Vaping Use    Vaping Use: Never used   Substance Use Topics    Alcohol use: Not on file    Drug use: Not on file       Review of Systems   Constitutional: Negative for appetite change, chills, fatigue and fever     HENT: Negative for congestion, rhinorrhea, sinus pressure, sinus pain and sore throat  Eyes: Negative for photophobia and visual disturbance  Respiratory: Positive for shortness of breath  Negative for cough and wheezing  Cardiovascular: Positive for leg swelling  Negative for chest pain and palpitations  Gastrointestinal: Negative for abdominal pain, blood in stool, constipation, diarrhea, nausea and vomiting  Genitourinary: Negative for difficulty urinating, dysuria, flank pain, frequency, hematuria and urgency  Musculoskeletal: Negative for arthralgias, back pain, joint swelling, myalgias and neck pain  Skin: Negative for color change, pallor and rash  Neurological: Negative for dizziness, syncope, weakness, light-headedness and headaches  All other systems reviewed and are negative  Physical Exam  Physical Exam  Vitals and nursing note reviewed  Constitutional:       General: She is awake  Appearance: Normal appearance  She is well-developed  She is not toxic-appearing or diaphoretic  HENT:      Head: Normocephalic and atraumatic  Right Ear: External ear normal       Left Ear: External ear normal       Nose: Nose normal       Mouth/Throat:      Lips: Pink  Mouth: Mucous membranes are moist    Eyes:      General: Lids are normal  No scleral icterus  Conjunctiva/sclera: Conjunctivae normal       Pupils: Pupils are equal, round, and reactive to light  Cardiovascular:      Rate and Rhythm: Normal rate and regular rhythm  Pulses: Normal pulses  Radial pulses are 2+ on the right side and 2+ on the left side  Heart sounds: Normal heart sounds, S1 normal and S2 normal    Pulmonary:      Effort: Pulmonary effort is normal  No accessory muscle usage  Breath sounds: No stridor  Examination of the right-upper field reveals wheezing  Examination of the left-upper field reveals wheezing  Examination of the right-middle field reveals wheezing   Examination of the left-middle field reveals wheezing  Examination of the right-lower field reveals rales  Examination of the left-lower field reveals rales  Wheezing (end-expiratory) and rales present  No rhonchi  Abdominal:      General: Abdomen is flat  Bowel sounds are normal  There is no distension  Palpations: Abdomen is soft  Tenderness: There is no abdominal tenderness  There is no right CVA tenderness, left CVA tenderness, guarding or rebound  Musculoskeletal:      Cervical back: Full passive range of motion without pain and neck supple  No signs of trauma  No pain with movement  Right lower le+ Pitting Edema present  Left lower le+ Pitting Edema present  Lymphadenopathy:      Cervical: No cervical adenopathy  Skin:     General: Skin is warm and dry  Capillary Refill: Capillary refill takes less than 2 seconds  Coloration: Skin is not cyanotic, jaundiced or pale  Neurological:      Mental Status: She is alert and oriented to person, place, and time  GCS: GCS eye subscore is 4  GCS verbal subscore is 5  GCS motor subscore is 6  Gait: Gait normal    Psychiatric:         Mood and Affect: Mood normal          Speech: Speech normal          Behavior: Behavior is cooperative           Vital Signs  ED Triage Vitals   Temperature Pulse Respirations Blood Pressure SpO2   21 1648 21 1648 21 1648 21 1648 21 1648   98 3 °F (36 8 °C) 103 16 144/96 (!) 89 %      Temp Source Heart Rate Source Patient Position - Orthostatic VS BP Location FiO2 (%)   21 1648 21 1648 21 1648 21 1648 --   Oral Monitor Sitting Right arm       Pain Score       21 1830       No Pain           Vitals:    21 1648 21 1800 21 1830 21 1945   BP: 144/96 (!) 219/89 (!) 188/93 (!) 188/82   Pulse: 103 100 98 88   Patient Position - Orthostatic VS: Sitting Lying Lying Lying         Visual Acuity      ED Medications  Medications   oxyCODONE (ROXICODONE) immediate release tablet 10 mg (10 mg Oral Given 11/12/21 1851)   furosemide (LASIX) injection 20 mg (20 mg Intravenous Given 11/12/21 1851)   losartan (COZAAR) tablet 100 mg (100 mg Oral Given 11/12/21 1852)   nitroglycerin (NITRO-BID) 2 % TD ointment 1 inch (1 inch Topical Given 11/12/21 1852)   amLODIPine (NORVASC) tablet 10 mg (10 mg Oral Given 11/12/21 1851)   iohexol (OMNIPAQUE) 350 MG/ML injection (SINGLE-DOSE) 100 mL (85 mL Intravenous Given 11/12/21 2038)       Diagnostic Studies  Results Reviewed     Procedure Component Value Units Date/Time    HS Troponin I 2hr [[id number]] Collected: 11/12/21 2019    Lab Status: Final result Specimen: Blood from Arm, Right Updated: 11/12/21 2057     hs TnI 2hr 20 ng/L      Delta 2hr hsTnI 1 ng/L     D-dimer, quantitative [[id number]]  (Abnormal) Collected: 11/12/21 1932    Lab Status: Final result Specimen: Blood from Arm, Right Updated: 11/12/21 2020     D-Dimer, Quant 2 76 ug/ml FEU     D-dimer, quantitative [[id number]]     Lab Status: No result Specimen: Blood     HS Troponin I 4hr [[id number]]     Lab Status: No result Specimen: Blood     APTT [[id number]]  (Normal) Collected: 11/12/21 1932    Lab Status: Final result Specimen: Blood from Arm, Right Updated: 11/12/21 1959     PTT 28 seconds     Protime-INR [[id number]]  (Normal) Collected: 11/12/21 1932    Lab Status: Final result Specimen: Blood from Arm, Right Updated: 11/12/21 1959     Protime 12 6 seconds      INR 0 94    Magnesium [[id number]]  (Normal) Collected: 11/12/21 1825    Lab Status: Final result Specimen: Blood from Arm, Right Updated: 11/12/21 1929     Magnesium 2 2 mg/dL     HS Troponin 0hr (reflex protocol) [[id number]]  (Normal) Collected: 11/12/21 1825    Lab Status: Final result Specimen: Blood from Arm, Right Updated: 11/12/21 1929     hs TnI 0hr 19 ng/L     NT-BNP PRO [[id number]]  (Abnormal) Collected: 11/12/21 1825    Lab Status: Final result Specimen: Blood from Arm, Right Updated: 11/12/21 1929 NT-proBNP 473 pg/mL     Protime-INR [[id number]] Collected: 11/12/21 1825    Lab Status: Edited Result - FINAL Specimen: Blood from Arm, Right Updated: 11/12/21 1926    Narrative: The previously reported component Protime is no longer being reported  The previously reported component INR is no longer being reported  APTT [[id number]] Collected: 11/12/21 1825    Lab Status: Edited Result - FINAL Specimen: Blood from Arm, Right Updated: 11/12/21 1926    Narrative: The previously reported component aPTT is no longer being reported      Comprehensive metabolic panel [[id number]]  (Abnormal) Collected: 11/12/21 1825    Lab Status: Final result Specimen: Blood from Arm, Right Updated: 11/12/21 1922     Sodium 140 mmol/L      Potassium 3 5 mmol/L      Chloride 100 mmol/L      CO2 33 mmol/L      ANION GAP 7 mmol/L      BUN 9 mg/dL      Creatinine 0 85 mg/dL      Glucose 133 mg/dL      Calcium 8 9 mg/dL      AST 17 U/L      ALT 18 U/L      Alkaline Phosphatase 128 U/L      Total Protein 7 4 g/dL      Albumin 3 7 g/dL      Total Bilirubin 0 20 mg/dL      eGFR 67 ml/min/1 73sq m     Narrative:      [place] guidelines for Chronic Kidney Disease (CKD):     Stage 1 with normal or high GFR (GFR > 90 mL/min/1 73 square meters)    Stage 2 Mild CKD (GFR = 60-89 mL/min/1 73 square meters)    Stage 3A Moderate CKD (GFR = 45-59 mL/min/1 73 square meters)    Stage 3B Moderate CKD (GFR = 30-44 mL/min/1 73 square meters)    Stage 4 Severe CKD (GFR = 15-29 mL/min/1 73 square meters)    Stage 5 End Stage CKD (GFR <15 mL/min/1 73 square meters)  Note: GFR calculation is accurate only with a steady state creatinine    CBC and differential [[id number]]  (Abnormal) Collected: 11/12/21 1825    Lab Status: Final result Specimen: Blood from Arm, Right Updated: 11/12/21 1903     WBC 10 46 Thousand/uL      RBC 4 20 Million/uL      Hemoglobin 11 7 g/dL      Hematocrit 39 3 %      MCV 94 fL      MCH 27 9 pg MCHC 29 8 g/dL      RDW 14 4 %      MPV 10 8 fL      Platelets 284 Thousands/uL      nRBC 0 /100 WBCs      Neutrophils Relative 61 %      Immat GRANS % 1 %      Lymphocytes Relative 14 %      Monocytes Relative 12 %      Eosinophils Relative 11 %      Basophils Relative 1 %      Neutrophils Absolute 6 53 Thousands/µL      Immature Grans Absolute 0 05 Thousand/uL      Lymphocytes Absolute 1 41 Thousands/µL      Monocytes Absolute 1 25 Thousand/µL      Eosinophils Absolute 1 16 Thousand/µL      Basophils Absolute 0 06 Thousands/µL                  CTA ED chest PE study   Final Result by Francesco Caraballo MD (11/12 2115)         1  No central or segmental pulmonary emboli  There is suboptimal opacification of the subsegmental branches  2   Enlargement of the pulmonary trunk, and main right and left pulmonary arteries suggestive of pulmonary hypertension  Workstation performed: [id number]         XR chest 1 view portable    (Results Pending)              Procedures  ECG 12 Lead Documentation Only    Date/Time: 11/12/2021 6:13 PM  Performed by: Marychuy Skinner PA-C  Authorized by: Marychuy Skinner PA-C     Indications / Diagnosis:  Sob  ECG reviewed by me, the ED Provider: yes    Patient location:  ED  Previous ECG:     Previous ECG:  Compared to current    Comparison ECG info:  2/10/04    Similarity:  Changes noted    Comparison to cardiac monitor: Yes    Interpretation:     Interpretation: abnormal    Rate:     ECG rate:  93    ECG rate assessment: normal    Rhythm:     Rhythm: atrial fibrillation    Ectopy:     Ectopy: PVCs      PVCs:  Infrequent  QRS:     QRS axis:  Right  ST segments:     ST segments:  Normal  Comments:      No STEMI  Atrial fibrillation with occasional PVCs  Right axis deviation    QTc 411             ED Course       [id number] SCORE      Most Recent Value   [id number]    Age 2 Filed at: 11/12/2021 1812   Sex 1 Filed at: 11/12/2021 1812   CHF History 0 Filed at: 11/12/2021 1812   HTN History 1 Filed at: 11/12/2021 1812   Stroke or TIA Symptoms Previously 0 Filed at: 11/12/2021 1812   Vascular Disease History 0 Filed at: 11/12/2021 1812   Diabetes History 0 Filed at: 11/12/2021 1812   [id number] Score 4 Filed at: 11/12/2021 1812            HEART Risk Score      Most Recent Value   Heart Score Risk Calculator    History 0 Filed at: 11/12/2021 1812   ECG 1 Filed at: 11/12/2021 1812   Age 2 Filed at: 11/12/2021 1812   Risk Factors 2 Filed at: 11/12/2021 1812   Troponin 1 Filed at: 11/12/2021 1812   HEART Score 6 Filed at: 11/12/2021 [address] 78 for PE      Most Recent Value   PERC Rule for PE    Age >=50 1 Filed at: 11/12/2021 2125   HR >=100 1 Filed at: 11/12/2021 2125   O2 Sat on room air < 95% 1 Filed at: 11/12/2021 2125   History of PE or DVT 0 Filed at: 11/12/2021 2125   Recent trauma or surgery 0 Filed at: 11/12/2021 2125   Hemoptysis 0 Filed at: 11/12/2021 2125   Exogenous estrogen 0 Filed at: 11/12/2021 2125   Unilateral leg swelling 0 Filed at: 11/12/2021 2125   PERC Rule for PE Results 3 Filed at: 11/12/2021 2125              SBIRT 20yo+      Most Recent Value   SBIRT (25 yo +)    In order to provide better care to our patients, we are screening all of our patients for alcohol and drug use  Would it be okay to ask you these screening questions? Yes Filed at: 11/12/2021 1821   Initial Alcohol Screen: US AUDIT-C     1  How often do you have a drink containing alcohol? 0 Filed at: 11/12/2021 1821   2  How many drinks containing alcohol do you have on a typical day you are drinking? 0 Filed at: 11/12/2021 1821   3b  FEMALE Any Age, or MALE 65+: How often do you have 4 or more drinks on one occassion? 0 Filed at: 11/12/2021 1821   Audit-C Score 0 Filed at: 11/12/2021 1821   ANA: How many times in the past year have you    Used an illegal drug or used a prescription medication for non-medical reasons?  Never Filed at: 11/12/2021 3000 I-35' Criteria for PE      Most Recent Value   Wells' Criteria for PE    Clinical signs and symptoms of DVT 0 Filed at: 11/12/2021 2125   PE is primary diagnosis or equally likely 0 Filed at: 11/12/2021 2125   HR >100 1 5 Filed at: 11/12/2021 2125   Immobilization at least 3 days or Surgery in the previous 4 weeks 0 Filed at: 11/12/2021 2125   Previous, objectively diagnosed PE or DVT 0 Filed at: 11/12/2021 2125   Hemoptysis 0 Filed at: 11/12/2021 2125   Malignancy with treatment within 6 months or palliative 0 Filed at: 11/12/2021 2125   Raymond' Criteria Total 1 5 Filed at: 11/12/2021 2125                [facility]  Number of Diagnoses or Management Options  Bilateral lower extremity edema  CHF (congestive heart failure) (HCC)  Shortness of breath  Diagnosis management comments: Patient is a 71-year-old female with a past medical history of chronic lower extremity edema, venous insufficiency and hypertension, presenting to the ED for evaluation worsening lower extremity edema and shortness of breath  Chest x-ray shows mild bilateral pulmonary vascular congestion and patient has bilateral lower extremity edema on exam   BNP elevated  Patient given nitro paste and IV Lasix with significant improvement of symptoms  Patient found to be in new onset AFib but is rate controlled in the 90s  Initial O2 of 89-90% improved on 2 L of oxygen  CTA chest negative for PE  Discussed with slim and patient will be admitted for further evaluation and management         Amount and/or Complexity of Data Reviewed  Clinical lab tests: ordered and reviewed  Tests in the radiology section of CPT®: ordered and reviewed  Discuss the patient with other providers: yes (Dr Andres Mireles)    Patient Progress  Patient progress: stable        Disposition  Final diagnoses:   CHF (congestive heart failure) ([address] )   Bilateral lower extremity edema   Shortness of breath     Time reflects when diagnosis was documented in both MDM as applicable and the Disposition within this note     Time User Action Codes Description Comment    11/12/2021  9:25 PM Azucena Root [I50 9] CHF (congestive heart failure) ([facility] [address] )     11/12/2021  9:25 PM Jessy Wills [R60 0] Bilateral lower extremity edema     11/12/2021  9:25 PM Jessy Wills [R06 02] Shortness of breath     11/12/2021  9:26 PM Azucena Root [R09 89] Pulmonary congestion     11/12/2021  9:26 PM Angel Auguste [R09 89] Pulmonary congestion       ED Disposition     ED Disposition Condition Date/Time Comment    Admit Stable Fri Nov 12, 2021  9:39 PM Case was discussed with SHERRI and the patient's admission status was agreed to be Admission Status: inpatient status to the service of Dr Saeed Romero  Follow-up Information    None         Patient's Medications   Discharge Prescriptions    No medications on file     No discharge procedures on file      PDMP Review     None          ED Provider  Electronically Signed by           Gabi Reed PA-C  11/12/21 8743

## 2021-11-12 NOTE — Clinical Note
Case was discussed with SHERRI and the patient's admission status was agreed to be Admission Status: inpatient status to the service of Dr Bone

## 2021-11-13 ENCOUNTER — APPOINTMENT (INPATIENT)
Dept: VASCULAR ULTRASOUND | Facility: HOSPITAL | Age: 76
DRG: 292 | End: 2021-11-13
Payer: COMMERCIAL

## 2021-11-13 LAB
ANION GAP SERPL CALCULATED.3IONS-SCNC: 5 MMOL/L (ref 4–13)
APTT PPP: 30 SECONDS (ref 23–37)
APTT PPP: 40 SECONDS (ref 23–37)
APTT PPP: 65 SECONDS (ref 23–37)
APTT PPP: 69 SECONDS (ref 23–37)
ATRIAL RATE: 117 BPM
BASOPHILS # BLD AUTO: 0.04 THOUSANDS/ΜL (ref 0–0.1)
BASOPHILS NFR BLD AUTO: 1 % (ref 0–1)
BUN SERPL-MCNC: 7 MG/DL (ref 5–25)
CALCIUM SERPL-MCNC: 8.7 MG/DL (ref 8.3–10.1)
CHLORIDE SERPL-SCNC: 103 MMOL/L (ref 100–108)
CHOLEST SERPL-MCNC: 161 MG/DL (ref 50–200)
CO2 SERPL-SCNC: 32 MMOL/L (ref 21–32)
CREAT SERPL-MCNC: 0.72 MG/DL (ref 0.6–1.3)
EOSINOPHIL # BLD AUTO: 1.02 THOUSAND/ΜL (ref 0–0.61)
EOSINOPHIL NFR BLD AUTO: 12 % (ref 0–6)
ERYTHROCYTE [DISTWIDTH] IN BLOOD BY AUTOMATED COUNT: 14.3 % (ref 11.6–15.1)
ERYTHROCYTE [DISTWIDTH] IN BLOOD BY AUTOMATED COUNT: 14.4 % (ref 11.6–15.1)
GFR SERPL CREATININE-BSD FRML MDRD: 82 ML/MIN/1.73SQ M
GLUCOSE SERPL-MCNC: 117 MG/DL (ref 65–140)
HCT VFR BLD AUTO: 38.5 % (ref 34.8–46.1)
HCT VFR BLD AUTO: 39.4 % (ref 34.8–46.1)
HDLC SERPL-MCNC: 50 MG/DL
HGB BLD-MCNC: 11.4 G/DL (ref 11.5–15.4)
HGB BLD-MCNC: 11.7 G/DL (ref 11.5–15.4)
IMM GRANULOCYTES # BLD AUTO: 0.04 THOUSAND/UL (ref 0–0.2)
IMM GRANULOCYTES NFR BLD AUTO: 1 % (ref 0–2)
INR PPP: 1 (ref 0.84–1.19)
LDLC SERPL CALC-MCNC: 93 MG/DL (ref 0–100)
LYMPHOCYTES # BLD AUTO: 1.64 THOUSANDS/ΜL (ref 0.6–4.47)
LYMPHOCYTES NFR BLD AUTO: 19 % (ref 14–44)
MCH RBC QN AUTO: 27.7 PG (ref 26.8–34.3)
MCH RBC QN AUTO: 28 PG (ref 26.8–34.3)
MCHC RBC AUTO-ENTMCNC: 29.6 G/DL (ref 31.4–37.4)
MCHC RBC AUTO-ENTMCNC: 29.7 G/DL (ref 31.4–37.4)
MCV RBC AUTO: 93 FL (ref 82–98)
MCV RBC AUTO: 95 FL (ref 82–98)
MONOCYTES # BLD AUTO: 1.03 THOUSAND/ΜL (ref 0.17–1.22)
MONOCYTES NFR BLD AUTO: 12 % (ref 4–12)
NEUTROPHILS # BLD AUTO: 4.99 THOUSANDS/ΜL (ref 1.85–7.62)
NEUTS SEG NFR BLD AUTO: 55 % (ref 43–75)
NONHDLC SERPL-MCNC: 111 MG/DL
NRBC BLD AUTO-RTO: 0 /100 WBCS
PLATELET # BLD AUTO: 372 THOUSANDS/UL (ref 149–390)
PLATELET # BLD AUTO: 426 THOUSANDS/UL (ref 149–390)
PMV BLD AUTO: 10.5 FL (ref 8.9–12.7)
PMV BLD AUTO: 10.6 FL (ref 8.9–12.7)
POTASSIUM SERPL-SCNC: 3.7 MMOL/L (ref 3.5–5.3)
PROTHROMBIN TIME: 13.2 SECONDS (ref 11.6–14.5)
QRS AXIS: 115 DEGREES
QRSD INTERVAL: 62 MS
QT INTERVAL: 330 MS
QTC INTERVAL: 411 MS
RBC # BLD AUTO: 4.07 MILLION/UL (ref 3.81–5.12)
RBC # BLD AUTO: 4.23 MILLION/UL (ref 3.81–5.12)
SODIUM SERPL-SCNC: 140 MMOL/L (ref 136–145)
T WAVE AXIS: 88 DEGREES
TRIGL SERPL-MCNC: 89 MG/DL
TSH SERPL DL<=0.05 MIU/L-ACNC: 1.17 UIU/ML (ref 0.36–3.74)
VENTRICULAR RATE: 93 BPM
WBC # BLD AUTO: 10.16 THOUSAND/UL (ref 4.31–10.16)
WBC # BLD AUTO: 8.76 THOUSAND/UL (ref 4.31–10.16)

## 2021-11-13 PROCEDURE — 80061 LIPID PANEL: CPT | Performed by: PHYSICIAN ASSISTANT

## 2021-11-13 PROCEDURE — 93970 EXTREMITY STUDY: CPT | Performed by: SURGERY

## 2021-11-13 PROCEDURE — 84443 ASSAY THYROID STIM HORMONE: CPT | Performed by: PHYSICIAN ASSISTANT

## 2021-11-13 PROCEDURE — 85730 THROMBOPLASTIN TIME PARTIAL: CPT | Performed by: PHYSICIAN ASSISTANT

## 2021-11-13 PROCEDURE — 93010 ELECTROCARDIOGRAM REPORT: CPT | Performed by: INTERNAL MEDICINE

## 2021-11-13 PROCEDURE — 85025 COMPLETE CBC W/AUTO DIFF WBC: CPT | Performed by: PHYSICIAN ASSISTANT

## 2021-11-13 PROCEDURE — 99232 SBSQ HOSP IP/OBS MODERATE 35: CPT | Performed by: INTERNAL MEDICINE

## 2021-11-13 PROCEDURE — 93970 EXTREMITY STUDY: CPT

## 2021-11-13 PROCEDURE — 85610 PROTHROMBIN TIME: CPT | Performed by: PHYSICIAN ASSISTANT

## 2021-11-13 PROCEDURE — 80048 BASIC METABOLIC PNL TOTAL CA: CPT | Performed by: PHYSICIAN ASSISTANT

## 2021-11-13 PROCEDURE — 85027 COMPLETE CBC AUTOMATED: CPT | Performed by: PHYSICIAN ASSISTANT

## 2021-11-13 RX ORDER — ONDANSETRON 2 MG/ML
4 INJECTION INTRAMUSCULAR; INTRAVENOUS EVERY 4 HOURS PRN
Status: DISCONTINUED | OUTPATIENT
Start: 2021-11-13 | End: 2021-11-17 | Stop reason: HOSPADM

## 2021-11-13 RX ADMIN — CEFAZOLIN SODIUM 2000 MG: 2 SOLUTION INTRAVENOUS at 09:24

## 2021-11-13 RX ADMIN — FUROSEMIDE 40 MG: 10 INJECTION, SOLUTION INTRAMUSCULAR; INTRAVENOUS at 17:01

## 2021-11-13 RX ADMIN — CEFAZOLIN SODIUM 2000 MG: 2 SOLUTION INTRAVENOUS at 16:59

## 2021-11-13 RX ADMIN — FLUTICASONE PROPIONATE 1 SPRAY: 50 SPRAY, METERED NASAL at 09:24

## 2021-11-13 RX ADMIN — HEPARIN SODIUM 11.1 UNITS/KG/HR: 10000 INJECTION, SOLUTION INTRAVENOUS at 00:26

## 2021-11-13 RX ADMIN — AMLODIPINE BESYLATE 5 MG: 5 TABLET ORAL at 09:24

## 2021-11-13 RX ADMIN — ONDANSETRON 4 MG: 2 INJECTION INTRAMUSCULAR; INTRAVENOUS at 10:10

## 2021-11-13 RX ADMIN — FUROSEMIDE 40 MG: 10 INJECTION, SOLUTION INTRAMUSCULAR; INTRAVENOUS at 09:24

## 2021-11-13 RX ADMIN — HEPARIN SODIUM 4000 UNITS: 1000 INJECTION INTRAVENOUS; SUBCUTANEOUS at 09:23

## 2021-11-13 RX ADMIN — HEPARIN SODIUM 15 UNITS/KG/HR: 10000 INJECTION, SOLUTION INTRAVENOUS at 21:56

## 2021-11-13 RX ADMIN — LOSARTAN POTASSIUM 100 MG: 50 TABLET, FILM COATED ORAL at 09:24

## 2021-11-13 RX ADMIN — OXYCODONE HYDROCHLORIDE 20 MG: 20 TABLET, FILM COATED, EXTENDED RELEASE ORAL at 00:19

## 2021-11-13 RX ADMIN — CEFAZOLIN SODIUM 2000 MG: 2 SOLUTION INTRAVENOUS at 00:44

## 2021-11-13 RX ADMIN — HEPARIN SODIUM 4000 UNITS: 1000 INJECTION INTRAVENOUS; SUBCUTANEOUS at 00:26

## 2021-11-13 RX ADMIN — OXYCODONE HYDROCHLORIDE 20 MG: 20 TABLET, FILM COATED, EXTENDED RELEASE ORAL at 05:18

## 2021-11-13 RX ADMIN — OXYCODONE HYDROCHLORIDE 20 MG: 20 TABLET, FILM COATED, EXTENDED RELEASE ORAL at 16:59

## 2021-11-13 NOTE — ASSESSMENT & PLAN NOTE
Wt Readings from Last 3 Encounters:   11/13/21 109 kg (240 lb 4 8 oz)   12/22/20 109 kg (240 lb)     · Presented with SOB and increased lower extremity edema  · No prior history of CHF noted  No prior echo for review  · Suspected acute CHF in the setting of new onset a-fib and uncontrolled HTN  ·   · CXR with mild vascular congestion  CTA chest negative for PE, "enlargement of the pulmonary trunk, and main right and left pulmonary arteries suggestive of pulmonary hypertension "  · Cardiology consulted  · Obtain echocardiogram    · Received 1 dose of IV Lasix 20 mg in the ED  · Home dose of Lasix was 20 mg three times a week up until she discontinued taking 1 week ago  · Will continue IV Lasix at 40 mg BID  · Monitor intake/output and daily weights  · 2g Na diet and fluid restrict to 1500 mL/ day

## 2021-11-13 NOTE — ASSESSMENT & PLAN NOTE
· Mild, POA, with pulse ox 88-89% on RA on arrival    · In the setting of suspected acute CHF, new onset a-fib  · Diuresis as per above  · Pulse ox stable with 2L NC oxygen in place  · Does not use oxygen at baseline  · Wean O2 as tolerated  Ambulatory pulse ox prior to discharge

## 2021-11-13 NOTE — UTILIZATION REVIEW
Initial Clinical Review    Admission: Date/Time/Statement:   Admission Orders (From admission, onward)     Ordered        11/12/21 2137  Inpatient Admission  Once                      Orders Placed This Encounter   Procedures    Inpatient Admission     Standing Status:   Standing     Number of Occurrences:   1     Order Specific Question:   Level of Care     Answer:   Med Surg [16]     Order Specific Question:   Estimated length of stay     Answer:   More than 2 Midnights     Order Specific Question:   Certification     Answer:   I certify that inpatient services are medically necessary for this patient for a duration of greater than two midnights  See H&P and MD Progress Notes for additional information about the patient's course of treatment  ED Arrival Information     Expected Arrival Acuity    - 11/12/2021 15:46 Emergent         Means of arrival Escorted by Service Admission type    Wheelchair Spouse Hospitalist Emergency         Arrival complaint    LEG SWELLING        Chief Complaint   Patient presents with    Leg Swelling     pt states leg swelling getting worse a week ago,complains of SOB getting worse today has hx of edema was told by PCP to come to ER       Initial Presentation:  76 yo female 43 95 KG PMH chronic lower extremity edema, venous insufficiency and hypertensionto ED from home presents with leg swelling & SOB  Reports worsening LE edema over past wekk, inability to lay flat at night secondary to SOB resting in recliner  IN ED exam breath sounds w wheezing, rales  LE edema +2 bilat, CTA suggestive of pulmonary HTN, CXr CHF w cardiomegaly  Labs w elevated BNP  EKG reveals afib  POX in room air= 89% & NC 2L begun  Admit Inpatient due to acute CHF, acute respiratory failure w hypoxia, new onset atrial fib, HTN, chronic low back pain w sciatica     PLAN: consult cardiology, obtain ECHO, IV Lasix 40 BID, I/O, 2 GM NA diet, NC 2 L- no O2 use at baseline- ambulatory POX prior to DC, [id number]: 5- cont IV Heparin, PRN IV metoprolol if HR > 120, tele  PRN pain meds  Date: 11/13   Day 2:   Provider  ON 2 L NC, Cont w rales, shonda LE swelling, cont IV diuresis BID   Cont SOB, improving,  HR rhythm regular , obtain ECHO   11/14/2021 Cardiology Day 3  Acute HF: Acute heart failure, NYHA III, AHA class C, cont IV Lasix 40 mg BID, replete & monitor electrolytes, echo pending  suspect HF , POA atrial fib w RVR likely du eto vol overload, on IV Heparin   Has converted to NSR, [id number] is at least 4 would benefit from [facility]  Tele   Uncontrolled BPs discuss alternative therapies  Weaned to room air  ED Triage Vitals   Temperature Pulse Respirations Blood Pressure SpO2   11/12/21 1648 11/12/21 1648 11/12/21 1648 11/12/21 1648 11/12/21 1648   98 3 °F (36 8 °C) 103 16 144/96 (!) 89 %      Temp Source Heart Rate Source Patient Position - Orthostatic VS BP Location FiO2 (%)   11/12/21 1648 11/12/21 1648 11/12/21 1648 11/12/21 1648 --   Oral Monitor Sitting Right arm       Pain Score       11/12/21 1830       No Pain          Wt Readings from Last 1 Encounters:   11/14/21 114 kg (251 lb)     Additional Vital Signs:   Date/Time Temp Pulse Resp BP MAP (mmHg) SpO2 Calculated FIO2 (%) - Nasal Cannula Nasal Cannula O2 Flow Rate (L/min) O2 Device Patient Position - Orthostatic VS   11/14/21 1117 -- 70 -- 172/68 Abnormal  -- -- -- -- -- --   11/14/21 0802 98 2 °F (36 8 °C) 70 18 172/68 Abnormal  -- 94 % -- -- None (Room air) Lying   11/13/21 2100 98 4 °F (36 9 °C) 87 16 170/73 105 94 % 28 2 L/min Nasal cannula Lying       Date/Time Temp Pulse Resp BP MAP (mmHg) SpO2 Calculated FIO2 (%) - Nasal Cannula Nasal Cannula O2 Flow Rate (L/min) O2 Device Patient Position - Orthostatic VS   11/13/21 1416 97 9 °F (36 6 °C) 90 18 153/75 -- 97 % -- -- Nasal cannula Sitting   11/13/21 0800 -- -- -- -- -- -- -- -- Nasal cannula --   11/13/21 0724 97 8 °F (36 6 °C) 87 18 146/66 95 97 % -- -- None (Room air) Lying   11/13/21 0013 97 7 °F (36 5 °C) 89 18 155/72 104 90 % -- -- None (Room air) Sitting   11/13/21 0000 -- -- -- -- -- -- 28 2 L/min -- --   11/12/21 2335 97 9 °F (36 6 °C) 98 18 139/64 92 92 % 28 2 L/min Nasal cannula Lying   11/12/21 2230 -- 100 20 161/61 97 99 % 28 2 L/min None (Room air) Lying   11/12/21 2200 98 5 °F (36 9 °C) 88 18 144/71 101 98 % 28 2 L/min Nasal cannula Lying   11/12/21 1945 -- 88 18 188/82 Abnormal  118 98 % 28 2 L/min Nasal cannula Lying   11/12/21 1830 -- 98 -- 188/93 Abnormal  129 99 % 28 2 L/min Nasal cannula Lying   11/12/21 1821 -- -- -- -- -- -- -- -- None (Room air) --   11/12/21 1800 -- 100 22 219/89 Abnormal  128 93 % -- -- Nasal cannula Lying   11/12/21 1650 -- -- -- -- -- 90 % -- -- None (Room air) --   11/12/21 1648 98 3 °F (36 8 °C) 103 16 144/96 -- 89 % Abnormal  -- -- None (Room air) Sitting       Weights (last 14 days)    Date/Time Weight Weight Method Height   11/13/21 1319 109 kg (240 lb 4 8 oz) -- 5' 2" (1 575 m)   11/12/21 2200 109 kg (240 lb 4 8 oz) Bed scale 5' 2" (1 575 m)   11/12/21 1648 -- -- 5' 2" (1 575 m)       Pertinent Labs/Diagnostic Test Results:       Results from last 7 days   Lab Units 11/14/21  0504 11/13/21  0715 11/13/21  0012 11/12/21  1825 11/12/21  1825   WBC Thousand/uL 8 72 8 76 10 16   < > 10 46*   HEMOGLOBIN g/dL 10 7* 11 4* 11 7  --  11 7   HEMATOCRIT % 37 3 38 5 39 4  --  39 3   PLATELETS Thousands/uL 383 372 426*   < > 370   NEUTROS ABS Thousands/µL 4 84 4 99  --   --  6 53    < > = values in this interval not displayed           Results from last 7 days   Lab Units 11/14/21  0504 11/13/21  0746 11/12/21  1825   SODIUM mmol/L 139 140 140   POTASSIUM mmol/L 4 0 3 7 3 5   CHLORIDE mmol/L 101 103 100   CO2 mmol/L 35* 32 33*   ANION GAP mmol/L 3* 5 7   BUN mg/dL 6 7 9   CREATININE mg/dL 0 77 0 72 0 85   EGFR ml/min/1 73sq m 76 82 67   CALCIUM mg/dL 8 9 8 7 8 9   MAGNESIUM mg/dL  --   --  2 2     Results from last 7 days   Lab Units 11/12/21  1825   AST U/L 17   ALT U/L 18   ALK PHOS U/L 128*   TOTAL PROTEIN g/dL 7 4   ALBUMIN g/dL 3 7   TOTAL BILIRUBIN mg/dL 0 20         Results from last 7 days   Lab Units 11/14/21  0504 11/13/21  0746 11/12/21  1825   GLUCOSE RANDOM mg/dL 118 117 133             No results found for: BETA-HYDROXYBUTYRATE                   Results from last 7 days   Lab Units 11/12/21  2235 11/12/21  2019 11/12/21  1825   HS TNI 0HR ng/L  --   --  19   HS TNI 2HR ng/L  --  20  --    HSTNI D2 ng/L  --  1  --    HS TNI 4HR ng/L 18  --   --    HSTNI D4 ng/L -1  --   --      Results from last 7 days   Lab Units 11/12/21  1932   D-DIMER QUANTITATIVE ug/ml FEU 2 76*     Results from last 7 days   Lab Units 11/14/21  0504 11/13/21  2232 11/13/21  1604 11/13/21  0715 11/13/21  0012 11/12/21  1932 11/12/21  1932   PROTIME seconds  --   --   --   --  13 2  --  12 6   INR   --   --   --   --  1 00  --  0 94   PTT seconds 67* 65* 69*   < > 30   < > 28    < > = values in this interval not displayed  Results from last 7 days   Lab Units 11/13/21  0746   TSH 3RD GENERATON uIU/mL 1 168     Results from last 7 days   Lab Units 11/14/21  0504   PROCALCITONIN ng/ml <0 05                 Results from last 7 days   Lab Units 11/12/21  1825   NT-PRO BNP pg/mL 473*     CTA ED chest PE study   Final Result by, MD (11/12 2115)         1  No central or segmental pulmonary emboli  There is suboptimal opacification of the subsegmental branches  2   Enlargement of the pulmonary trunk, and main right and left pulmonary arteries suggestive of pulmonary hypertension  XR chest 1 view portable   Final Result by , MD (11/13 0803)      Cardiomegaly  Mild CHF  VAS lower limb venous duplex study, complete bilateral     CONCLUSION:  RIGHT LOWER LIMB:  No evidence of acute or chronic deep vein thrombosis  No evidence of superficial thrombophlebitis noted  Doppler evaluation shows a normal response to augmentation maneuvers    Popliteal, posterior tibial and anterior tibial arterial Doppler waveforms are  triphasic  LEFT LOWER LIMB:  No evidence of acute or chronic deep vein thrombosis  No evidence of superficial thrombophlebitis noted  Doppler evaluation shows a normal response to augmentation maneuvers  Popliteal, posterior tibial and anterior tibial arterial Doppler waveforms are  triphasic      ekg  Rhythm: atrial fibrillation    Ectopy:     Ectopy: PVCs      PVCs:  Infrequent  QRS:     QRS axis:  Right  ST segments:     ST segments:  Normal  Comments:      No STEMI  Atrial fibrillation with occasional PVCs  Right axis deviation    QTc 411    ED Treatment:   Medication Administration from 11/12/2021 1546 to 11/12/2021 2243       Date/Time Order Dose Route Action     11/12/2021 1851 oxyCODONE (ROXICODONE) immediate release tablet 10 mg 10 mg Oral Given     11/12/2021 1851 furosemide (LASIX) injection 20 mg 20 mg Intravenous Given     11/12/2021 1852 losartan (COZAAR) tablet 100 mg 100 mg Oral Given     11/12/2021 1852 nitroglycerin (NITRO-BID) 2 % TD ointment 1 inch 1 inch Topical Given     11/12/2021 1851 amLODIPine (NORVASC) tablet 10 mg 10 mg Oral Given        Past Medical History:   Diagnosis Date    Arthritis     Cellulitis     Edema of both lower extremities due to peripheral venous insufficiency          Admitting Diagnosis: Shortness of breath [R06 02]  CHF (congestive heart failure) (HCC) [I50 9]  Leg swelling [M79 89]  Bilateral lower extremity edema [R60 0]  Age/Sex: 77 y o  female  Admission Orders:  Tele  Cardiac diet 1500 ml fluid restrict   Echo  Daily wt  I/O  Up w assist  Scheduled Medications:  amLODIPine, 5 mg, Oral, Daily  cefazolin, 2,000 mg, Intravenous, Q8H  fluticasone, 1 spray, Each Nare, Daily  furosemide, 40 mg, Intravenous, BID  losartan, 100 mg, Oral, Daily  oxyCODONE, 20 mg, Oral, Q8H [facility]    Continuous IV Infusions:  heparin (porcine), 3-20 Units/kg/hr (Order-Specific), Intravenous, Titrated      PRN Meds:  acetaminophen, 650 mg, Oral, Q6H PRN  heparin (porcine), 2,000 Units, Intravenous, Q1H PRN  heparin (porcine), 4,000 Units, Intravenous, Q1H PRN  ondansetron, 4 mg, Intravenous, Q4H PRN  oxyCODONE, 10 mg, Oral, Q6H PRN  tiZANidine, 4 mg, Oral, Q8H PRN      IP CONSULT TO NUTRITION SERVICES  Network Utilization Review Department  ATTENTION: Please call with any questions or concerns to [phone] and carefully listen to the prompts so that you are directed to the right person  All voicemails are confidential   [place] [facility] all requests for admission clinical reviews, approved or denied determinations and any other requests to dedicated fax number below belonging to the campus where the patient is receiving treatment   List of dedicated fax numbers for the Facilities:  [address] DENIALS (Administrative/Medical Necessity) [phone]   [address] (Maternity/NICU/Pediatrics) [phone]   [address]  [address] [address] [place] [address] [address] [facility] [facility] [phone]

## 2021-11-13 NOTE — MALNUTRITION/BMI
This medical record reflects one or more clinical indicators suggestive of morbid obesity  BMI Findings:  Adult BMI Classifications: Morbid Obesity 40-44 9     Body mass index is 43 95 kg/m²  See Nutrition note dated 11/13/21 for additional details  Completed nutrition assessment is viewable in the nutrition documentation

## 2021-11-13 NOTE — ED NOTES
Per Lab, redraw for PTT and PT/INR required D/T specimen being clotted and results "accidentally" being released    Primary RN and MD updated     Olimpia Amezquita RN  11/12/21 6696

## 2021-11-13 NOTE — ASSESSMENT & PLAN NOTE
· Continue outpatient pain medication regimen with OxyContin 20 mg q8h (PDMP verified)  · Patient also prescribed prn Percocet  mg q4h prn; substitute prn oxycodone 10 mg and prn Tylenol while inpatient  · Continue prn tizanidine

## 2021-11-13 NOTE — ASSESSMENT & PLAN NOTE
· In the setting of suspected acute CHF  · Patient noted to be in atrial fibrillation on EKG on presentation  Uncertain of any prior history of a-fib but notes being told she had an abnormal heart rhythm several years ago  · [id number]: 5  · Started  IV heparin  · Cardiology consulted  · Pending Echo  · TSH wnl   · HR currently in the 90s  · Continue to monitor on telemetry     · PRN IV metoprolol if HR > 120 bpm

## 2021-11-13 NOTE — ASSESSMENT & PLAN NOTE
Wt Readings from Last 3 Encounters:   11/12/21 109 kg (240 lb 4 8 oz)   12/22/20 109 kg (240 lb)     · Presented with SOB and increased lower extremity edema  · No prior history of CHF noted  No prior echo for review  · Suspected acute CHF in the setting of new onset a-fib and uncontrolled HTN  ·   · CXR with mild vascular congestion  CTA chest negative for PE, "enlargement of the pulmonary trunk, and main right and left pulmonary arteries suggestive of pulmonary hypertension "  · Cardiology consulted  · Obtain echocardiogram    · Received 1 dose of IV Lasix 20 mg in the ED  · Home dose of Lasix was 20 mg three times a week up until she discontinued taking 1 week ago  · Will continue IV Lasix at 40 mg BID  · Monitor intake/output and daily weights  · 2g Na diet and fluid restrict to 1500 mL/ day

## 2021-11-13 NOTE — NURSING NOTE
Patient alert and oriented and able to make needs known, offered no complaints, scheduled pain relief given,  visiting at bedside, pt currently resting in bed with call bell in reach and bed alarmed and in lowest and locked position 
70

## 2021-11-13 NOTE — CASE MANAGEMENT
Case Management Assessment & Discharge Planning Note    Patient name Noelle Garcia  Location S /S -01 MRN [id number]  : 1945 Date 2021       Current Admission Date: 2021  Current Admission Diagnosis:Acute CHF (congestive heart failure) [facility])   Patient Active Problem List    Diagnosis Date Noted    [facility]    New onset atrial fibrillation [facility] ) 2021    Acute respiratory failure with hypoxia ([facility] ) 2021    Hypertension 2021    Erythema of lower extremity 2021    Chronic low back pain with sciatica 2021    Chronic venous insufficiency 2021    Bilateral lower extremity edema 2020      LOS (days): 1  Geometric Mean LOS (GMLOS) (days):   Days to GMLOS:     OBJECTIVE:    Risk of Unplanned Readmission Score: 11         Current admission status: Inpatient       Preferred Pharmacy:   [address] [place] [address]  Phone: [phone] Fax: [phone]    Primary Care Provider: Tomás Mejia MD    Primary Insurance: [address] [place] [address]  Secondary Insurance:     ASSESSMENT:  Active Health Care Agents    There are no active Health Care Agents on file  Patient Information  Admitted from[de-identified] Home  Mental Status: Alert  During Assessment patient was accompanied by: Spouse  Assessment information provided by[de-identified] Patient  Primary Caregiver: Self  Support Systems: Self,Spouse/significant other  Home entry access options   Select all that apply : Stairs  Number of steps to enter home : 2  Do the steps have railings?: Yes  Type of Current Residence: 2 story home  Upon entering residence, is there a bedroom on the main floor (no further steps)?: No  A bedroom is located on the following floor levels of residence (select all that apply):: 2nd Floor  Upon entering residence, is there a bathroom on the main floor (no further steps)?: Yes  Number of steps to 2nd floor from main floor: One Flight  Living Arrangements: Lives w/ Spouse/significant other    Activities of Daily Living Prior to Admission  Functional Status: Independent  Completes ADLs independently?: Yes  Ambulates independently?: Yes  Does patient use assisted devices?: Yes  Assisted Devices (DME) used: [facility]  Does patient currently own DME?: Yes  What DME does the patient currently own?: [employer]  Does patient have a history of Outpatient Therapy (PT/OT)?: No  Does the patient have a history of Short-Term Rehab?: No  Does patient have a history of HHC?: No  Does patient currently have [address]?: No                   Means of Transportation  Means of Transport to Appts[de-identified] Family transport  In the past 12 months, has lack of transportation kept you from medical appointments or from getting medications?: No  In the past 12 months, has lack of transportation kept you from meetings, work, or from getting things needed for daily living?: No  Was application for public transport provided?: No        DISCHARGE DETAILS:    Discharge planning discussed with[de-identified] patient  Freedom of Choice: Yes  Comments - Freedom of Choice: no hx of rehab or hhc                               Other Referral/Resources/Interventions Provided:  Referral Comments: patient admitted due to SOB, CHF, edema  Pt currently on IV lasix, IV abx, heparin drip  Met w/ pt and spouse at bedside to discuss assessment and d/c planning  Patient lives with spouse in town home, 2 steps to enter and 13 steps up to second floor  Patient is independent of ADLs and utilizes a cane as needed to ambulate, also owns walker  No hx of rehab or hhc, has attended out patient therapy for her knees  Confirmed PCP Elroy Salvador) and preferred pharmacy ([facility])  If d/c plan is to home, spouse would provide transport  Will continue to follow      Would you like to participate in our [address] service program?  : No - Declined                                        IMM Given (Date):: 11/12/21

## 2021-11-13 NOTE — ASSESSMENT & PLAN NOTE
· In the setting of suspected acute CHF  · Patient noted to be in atrial fibrillation on EKG on presentation  Uncertain of any prior history of a-fib but notes being told she had an abnormal heart rhythm several years ago  · [id number]: 5  · Start IV heparin  · Cardiology consulted  · Obtain echo  · Check TSH level  · HR currently in the 90s  · Continue to monitor on telemetry     · PRN IV metoprolol if HR > 120 bpm

## 2021-11-13 NOTE — ASSESSMENT & PLAN NOTE
· With hypertensive urgency POA  · SBP up to 219/89 on presentation  · BP has improved after receiving home medications which include amlodipine 10 mg and losartan 100 mg (substituted for irbesartan)  Also received nitro past and Lasix 20 mg IV while in the ED with most recent /82  · Continue to monitor BP closely  · Continue amlodipine and ARB  Hold HCTZ while receiving IV Lasix  · PRN metoprolol for SBP > 180

## 2021-11-13 NOTE — ASSESSMENT & PLAN NOTE
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

## 2021-11-13 NOTE — PROGRESS NOTES
[facility]  Progress Note - [facility] 1945, 77 y o  female MRN: [id number]  Unit/Bed#: S -01 Encounter: [id number]  Primary Care Provider: Donna Suarez MD   Date and time admitted to hospital: 11/12/2021  5:29 PM    * Acute CHF (congestive heart failure) ([address] )  Assessment & Plan  Wt Readings from Last 3 Encounters:   11/13/21 109 kg (240 lb 4 8 oz)   12/22/20 109 kg (240 lb)     · Presented with SOB and increased lower extremity edema  · No prior history of CHF noted  No prior echo for review  · Suspected acute CHF in the setting of new onset a-fib and uncontrolled HTN  ·   · CXR with mild vascular congestion  CTA chest negative for PE, "enlargement of the pulmonary trunk, and main right and left pulmonary arteries suggestive of pulmonary hypertension "  · Cardiology consulted  · Obtain echocardiogram    · Received 1 dose of IV Lasix 20 mg in the ED  · Home dose of Lasix was 20 mg three times a week up until she discontinued taking 1 week ago  · Will continue IV Lasix at 40 mg BID  · Monitor intake/output and daily weights  · 2g Na diet and fluid restrict to 1500 mL/ day  Acute respiratory failure with hypoxia (HCC)  Assessment & Plan  · Mild, POA, with pulse ox 88-89% on RA on arrival    · In the setting of suspected acute CHF, new onset a-fib  · Diuresis as per above  · Pulse ox stable with 2L NC oxygen in place  · Does not use oxygen at baseline  · Wean O2 as tolerated  Ambulatory pulse ox prior to discharge  Erythema of lower extremity  Assessment & Plan  · On exam, patient with significant erythema and edema of both lower extremities with fluid weeping from lateral aspect of right lower leg  · Patient with history of chronic bilateral lower extremity edema and chronic venous insufficiency   Per review of records, patient also has a history of recurrent cellulitis and 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  · Obtain venous dopplers  · Patient denies recent fevers/ chills and has only mild leukocytosis on presentation but given erythema, drainage started on IV Ancef for suspected cellulitis  · Likely primarily due to edema  Edema control with IV diuresis as per above  New onset atrial fibrillation [facility])  Assessment & Plan  · In the setting of suspected acute CHF  · Patient noted to be in atrial fibrillation on EKG on presentation  Uncertain of any prior history of a-fib but notes being told she had an abnormal heart rhythm several years ago  · [id number]: 5  · Started  IV heparin  · Cardiology consulted  · Pending Echo  · TSH wnl   · HR currently in the 90s  · Continue to monitor on telemetry  · PRN IV metoprolol if HR > 120 bpm     Chronic low back pain with sciatica  Assessment & Plan  · Continue outpatient pain medication regimen with OxyContin 20 mg q8h (PDMP verified)  · Patient also prescribed prn Percocet  mg q4h prn; substitute prn oxycodone 10 mg and prn Tylenol while inpatient  · Continue prn tizanidine  VTE Pharmacologic Prophylaxis:   Pharmacologic: Heparin Drip  Mechanical VTE Prophylaxis in Place: Yes    Patient Centered Rounds:     Discussions with Specialists or Other Care Team Provider:     Education and Discussions with Family / Patient:     Time Spent for Care: 20 minutes  More than 50% of total time spent on counseling and coordination of care as described above  Current Length of Stay: 1 day(s)    Current Patient Status: Inpatient   Certification Statement: The patient will continue to require additional inpatient hospital stay due to IV diuresis/Echo pending    Discharge Plan: > 48 hours when stable    Code Status: Level 1 - Full Code      Subjective:   Improved SOB   Denies chest pain     Objective:     Vitals:   Temp (24hrs), Av °F (36 7 °C), Min:97 7 °F (36 5 °C), Max:98 5 °F (36 9 °C)    Temp:  [97 7 °F (36 5 °C)-98 5 °F (36 9 °C)] 97 9 °F (36 6 °C)  HR:  [] 90  Resp:  [18-22] 18  BP: (139-219)/(61-93) 153/75  SpO2:  [90 %-99 %] 97 %  Body mass index is 43 95 kg/m²  Input and Output Summary (last 24 hours): Intake/Output Summary (Last 24 hours) at 11/13/2021 1715  Last data filed at 11/13/2021 1605  Gross per 24 hour   Intake 120 ml   Output 3250 ml   Net -3130 ml       Physical Exam:     Physical Exam  Constitutional:       General: She is not in acute distress  Appearance: She is not ill-appearing, toxic-appearing or diaphoretic  Eyes:      General:         Right eye: No discharge  Left eye: No discharge  Cardiovascular:      Rate and Rhythm: Rhythm irregular  Pulses: Normal pulses  Heart sounds: No murmur heard  Pulmonary:      Effort: Pulmonary effort is normal  No respiratory distress  Breath sounds: Rales present  No wheezing  Abdominal:      General: Abdomen is flat  Bowel sounds are normal  There is no distension  Palpations: Abdomen is soft  Tenderness: There is no abdominal tenderness  Musculoskeletal:         General: Swelling present  Skin:     General: Skin is warm  Neurological:      Mental Status: She is oriented to person, place, and time  Psychiatric:         Mood and Affect: Mood normal          Behavior: Behavior normal          Thought Content:  Thought content normal          Additional Data:     Labs:    Results from last 7 days   Lab Units 11/13/21  0715   WBC Thousand/uL 8 76   HEMOGLOBIN g/dL 11 4*   HEMATOCRIT % 38 5   PLATELETS Thousands/uL 372   NEUTROS PCT % 55   LYMPHS PCT % 19   MONOS PCT % 12   EOS PCT % 12*     Results from last 7 days   Lab Units 11/13/21  0746 11/12/21  1825 11/12/21  1825   POTASSIUM mmol/L 3 7   < > 3 5   CHLORIDE mmol/L 103   < > 100   CO2 mmol/L 32   < > 33*   BUN mg/dL 7   < > 9   CREATININE mg/dL 0 72   < > 0 85   CALCIUM mg/dL 8 7   < > 8 9   ALK PHOS U/L  --   --  128*   ALT U/L  --   --  18 AST U/L  --   --  17    < > = values in this interval not displayed  Results from last 7 days   Lab Units 11/13/21  0012   INR  1 00         Recent Cultures (last 7 days):           Last 24 Hours Medication List:   Current Facility-Administered Medications   Medication Dose Route Frequency Provider Last Rate    acetaminophen  650 mg Oral Q6H PRN Gerarda Pulse, PA-C      amLODIPine  5 mg Oral Daily Gerarda Pulse, PA-C      cefazolin  2,000 mg Intravenous Q8H Candida Arzate, PA-C 2,000 mg (11/13/21 1659)    fluticasone  1 spray Each Nare Daily Gerarda Pulse, PA-C      furosemide  40 mg Intravenous BID Gerarda Pulse, PA-C      heparin (porcine)  3-20 Units/kg/hr (Order-Specific) Intravenous Titrated Gerarda Pulse, PA-C 15 Units/kg/hr (11/13/21 0918)    heparin (porcine)  2,000 Units Intravenous Q1H PRN Gerarda Pulse, PA-C      heparin (porcine)  4,000 Units Intravenous Q1H PRN Gerarda Pulse, PA-C      losartan  100 mg Oral Daily Gerarda Pulse, PA-C      ondansetron  4 mg Intravenous Q4H PRN George Hale MD      oxyCODONE  20 mg Oral [facility] Gerarda Pulse, [place]      oxyCODONE  10 mg Oral Q6H PRN Gerarda Pulse, PA-C      tiZANidine  4 mg Oral Q8H PRN Gerarda Pulse, PA-C          Today, Patient Was Seen By: George Hale MD    ** Please Note: Dictation voice to text software may have been used in the creation of this document   **

## 2021-11-13 NOTE — ASSESSMENT & PLAN NOTE
· On exam, patient with significant erythema and edema of both lower extremities with fluid weeping from lateral aspect of right lower leg  · Patient with history of chronic bilateral lower extremity edema and chronic venous insufficiency  Per review of records, patient also has a history of recurrent cellulitis and 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  · Obtain venous dopplers  · Patient denies recent fevers/ chills and has only mild leukocytosis on presentation but given erythema, drainage will start on IV Ancef for suspected cellulitis  · Elevate extremities  · IV diuresis as per above

## 2021-11-13 NOTE — H&P
[facility]&P- [facility] 1945, 77 y o  female MRN: [id number]  Unit/Bed#: S -01 Encounter: [id number]  Primary Care Provider: Joseluis Saucedo MD   Date and time admitted to hospital: 11/12/2021  5:29 PM    * Acute CHF (congestive heart failure) ([address] )  Assessment & Plan  Wt Readings from Last 3 Encounters:   11/12/21 109 kg (240 lb 4 8 oz)   12/22/20 109 kg (240 lb)     · Presented with SOB and increased lower extremity edema  · No prior history of CHF noted  No prior echo for review  · Suspected acute CHF in the setting of new onset a-fib and uncontrolled HTN  ·   · CXR with mild vascular congestion  CTA chest negative for PE, "enlargement of the pulmonary trunk, and main right and left pulmonary arteries suggestive of pulmonary hypertension "  · Cardiology consulted  · Obtain echocardiogram    · Received 1 dose of IV Lasix 20 mg in the ED  · Home dose of Lasix was 20 mg three times a week up until she discontinued taking 1 week ago  · Will continue IV Lasix at 40 mg BID  · Monitor intake/output and daily weights  · 2g Na diet and fluid restrict to 1500 mL/ day  Acute respiratory failure with hypoxia (HCC)  Assessment & Plan  · Mild, POA, with pulse ox 88-89% on RA on arrival    · In the setting of suspected acute CHF, new onset a-fib  · Diuresis as per above  · Pulse ox stable with 2L NC oxygen in place  · Does not use oxygen at baseline  · Wean O2 as tolerated  Ambulatory pulse ox prior to discharge  New onset atrial fibrillation [facility])  Assessment & Plan  · In the setting of suspected acute CHF  · Patient noted to be in atrial fibrillation on EKG on presentation  Uncertain of any prior history of a-fib but notes being told she had an abnormal heart rhythm several years ago  · [id number]: 5  · Start IV heparin  · Cardiology consulted  · Obtain echo  · Check TSH level  · HR currently in the 90s     · Continue to monitor on telemetry  · PRN IV metoprolol if HR > 120 bpm     Erythema of lower extremity  Assessment & Plan  · On exam, patient with significant erythema and edema of both lower extremities with fluid weeping from lateral aspect of right lower leg  · Patient with history of chronic bilateral lower extremity edema and chronic venous insufficiency  Per review of records, patient also has a history of recurrent cellulitis and 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  · Obtain venous dopplers  · Patient denies recent fevers/ chills and has only mild leukocytosis on presentation but given erythema, drainage will start on IV Ancef for suspected cellulitis  · Elevate extremities  · IV diuresis as per above  Hypertension  Assessment & Plan  · With hypertensive urgency POA  · SBP up to 219/89 on presentation  · BP has improved after receiving home medications which include amlodipine 10 mg and losartan 100 mg (substituted for irbesartan)  Also received nitro past and Lasix 20 mg IV while in the ED with most recent /82  · Continue to monitor BP closely  · Continue amlodipine and ARB  Hold HCTZ while receiving IV Lasix  · PRN metoprolol for SBP > 180  Chronic low back pain with sciatica  Assessment & Plan  · Continue outpatient pain medication regimen with OxyContin 20 mg q8h (PDMP verified)  · Patient also prescribed prn Percocet  mg q4h prn; substitute prn oxycodone 10 mg and prn Tylenol while inpatient  · Continue prn tizanidine  VTE Pharmacologic Prophylaxis: VTE Score: 6 High Risk (Score >/= 5) - Pharmacological DVT Prophylaxis Ordered: heparin drip  Sequential Compression Devices Ordered  Code Status: Level 1 - Full Code   Discussion with family: Updated  () at bedside      Anticipated Length of Stay: Patient will be admitted on an inpatient basis with an anticipated length of stay of greater than 2 midnights secondary to CHF, new onset a-fib, need for cardiology eval, IV diuresis and echocardiogram       Total Time for Visit, including Counseling / Coordination of Care: 70 minutes Greater than 50% of this total time spent on direct patient counseling and coordination of care  Chief Complaint: SOB and lower extremity edema  History of Present Illness:  Jaylyn Bell is a 77 y o  female with a PMH of chronic lower extremity edema/ lymphedema, venous insufficiency, HTN, HLD, Sjogren's syndrome, chronic back pain, fibromylagia who presents with SOB and worsening lower extremity edema  Patient reports worsening bilateral lower extremity edema for the past 1 week  She also reports increased SOB over the past few days especially with exertion and admits to abdominal bloating  She reports that she believes that she has gained weight but is uncertain how much as she does not weight herself regularly  She notes that up until 1 week ago she had been taking Lasix 20 mg three times a week but stopped taking this because it caused her to urinate frequently  She denies recent fevers/ chills, chest pain, cough, abdominal pain, n/v/d or urinary complaints  She admits to noticing drainage from her RLE and reports that the erythema of her lower extremities comes and goes  Review of Systems:  Review of Systems   Constitutional: Positive for unexpected weight change (suspected)  Negative for chills and fever  Respiratory: Positive for shortness of breath  Negative for cough  Cardiovascular: Positive for leg swelling  Negative for chest pain and palpitations  Gastrointestinal: Negative for abdominal pain, blood in stool, constipation, diarrhea, nausea and vomiting  Genitourinary: Negative for difficulty urinating  Musculoskeletal: Positive for back pain (chronic)  Skin: Positive for color change and wound  Neurological: Positive for dizziness (occasional)  Negative for syncope     All other systems reviewed and are negative  Past Medical and Surgical History:   Past Medical History:   Diagnosis Date    Arthritis     Cellulitis     Edema of both lower extremities due to peripheral venous insufficiency        Past Surgical History:   Procedure Laterality Date    KNEE CARTILAGE SURGERY      REPLACEMENT TOTAL KNEE BILATERAL         Meds/Allergies:  Prior to Admission medications    Medication Sig Start Date End Date Taking? Authorizing Provider   ALPRAZolam Mayichris May) 0 25 mg tablet alprazolam 0 25 mg tablet   TAKE 1 TABLET BY MOUTH TWICE A DAY AS NEEDED    Historical Provider, MD   amLODIPine (NORVASC) 5 mg tablet amlodipine 5 mg tablet   Take 1 tablet twice a day by oral route  Historical Provider, MD   butalbital-acetaminophen-caffeine (FIORICET,ESGIC) -40 mg per tablet butalbital-acetaminophen-caffeine 50 mg-325 mg-40 mg tablet   Take 1 tablet every 6 hours by oral route as needed  Historical Provider, MD   Diclofenac Sodium (Voltaren) 1 % Voltaren 1 % topical gel    Historical Provider, MD   fluticasone (FLONASE) 50 mcg/act nasal spray fluticasone propionate 50 mcg/actuation nasal spray,suspension   Spray 1 spray every day by intranasal route      Historical Provider, MD   hydrocortisone (WESTCORT) 0 2 % cream  10/15/20   Historical Provider, MD   irbesartan-hydrochlorothiazide (AVALIDE) 300-12 5 MG per tablet irbesartan 300 mg-hydrochlorothiazide 12 5 mg tablet   TAKE ONE TABLET BY MOUTH ONCE DAILY    Historical Provider, MD   mupirocin (BACTROBAN) 2 % ointment Apply topically 3 (three) times a day For 1 week  Patient not taking: Reported on 1/14/2021 1/5/21   Tim Loredo PA-C   oxyCODONE (OxyCONTIN) 20 mg 12 hr tablet OxyContin 20 mg tablet,crush resistant,extended release   TAKE ONE TABLET BY MOUTH THREE TIMES DAILY AS NEEDED FOR PAIN    Historical Provider, MD   oxyCODONE-acetaminophen (PERCOCET)  mg per tablet oxycodone-acetaminophen 10 mg-325 mg tablet   TAKE ONE TABLET BY MOUTH EVERY 4 HOURS AS NEEDED FOR PAIN max DAILY AMOUNT SIX tablets    Historical Provider, MD   tiZANidine (ZANAFLEX) 4 mg tablet tizanidine 4 mg tablet   TAKE ONE TABLET BY MOUTH EVERY 8 HOURS AS NEEDED FOR spasm    Historical Provider, MD   Vitamin D, Cholecalciferol, 25 MCG (1000 UT) TABS Vitamin D3 25 mcg (1,000 unit) capsule   one PO QD    Historical Provider, MD   zolpidem (AMBIEN) 10 mg tablet zolpidem 10 mg tablet    Historical Provider, MD     I have reviewed home medications with a medical source (PCP, Pharmacy, other)  Allergies: No Known Allergies    Social History:  Marital Status: /Civil Union   Occupation:   Patient Pre-hospital Living Situation: Home  Patient Pre-hospital Level of Mobility: walks  Patient Pre-hospital Diet Restrictions:   Substance Use History:   Social History     Substance and Sexual Activity   Alcohol Use None     Social History     Tobacco Use   Smoking Status Former Smoker   Smokeless Tobacco Never Used     Social History     Substance and Sexual Activity   Drug Use Not on file       Family History:  History reviewed  No pertinent family history  Physical Exam:     Vitals:   Blood Pressure: 139/64 (11/12/21 2335)  Pulse: 98 (11/12/21 2335)  Temperature: 97 9 °F (36 6 °C) (11/12/21 2335)  Temp Source: Oral (11/12/21 2335)  Respirations: 18 (11/12/21 2335)  Height: 5' 2" (157 5 cm) (11/12/21 2200)  Weight - Scale: 109 kg (240 lb 4 8 oz) (11/12/21 2200)  SpO2: 92 % (11/12/21 2335)    Physical Exam  Vitals and nursing note reviewed  Constitutional:       General: She is not in acute distress  Appearance: She is obese  She is not diaphoretic  Interventions: Nasal cannula in place  HENT:      Head: Normocephalic and atraumatic  Eyes:      Conjunctiva/sclera: Conjunctivae normal    Cardiovascular:      Rate and Rhythm: Normal rate  Rhythm irregular  Pulmonary:      Effort: Pulmonary effort is normal  No respiratory distress        Breath sounds: Examination of the right-upper field reveals wheezing  Examination of the left-upper field reveals wheezing  Decreased breath sounds (throughout) and wheezing present  Abdominal:      General: Bowel sounds are normal  There is distension  Palpations: Abdomen is soft  Tenderness: There is no abdominal tenderness  Musculoskeletal:      Right lower leg: Edema (2-3+) present  Left lower leg: Edema (2-3+) present  Skin:     General: Skin is warm and dry  Coloration: Skin is not pale  Findings: Erythema (bilateral lower extremities with erythema extending from feet to upper thighs with multiple scabbed wounds on extremities  ) present  Neurological:      Mental Status: She is alert and oriented to person, place, and time  Psychiatric:         Mood and Affect: Mood normal           Additional Data:     Lab Results:  Results from last 7 days   Lab Units 11/12/21  1825   WBC Thousand/uL 10 46*   HEMOGLOBIN g/dL 11 7   HEMATOCRIT % 39 3   PLATELETS Thousands/uL 370   NEUTROS PCT % 61   LYMPHS PCT % 14   MONOS PCT % 12   EOS PCT % 11*     Results from last 7 days   Lab Units 11/12/21  1825   SODIUM mmol/L 140   POTASSIUM mmol/L 3 5   CHLORIDE mmol/L 100   CO2 mmol/L 33*   BUN mg/dL 9   CREATININE mg/dL 0 85   ANION GAP mmol/L 7   CALCIUM mg/dL 8 9   ALBUMIN g/dL 3 7   TOTAL BILIRUBIN mg/dL 0 20   ALK PHOS U/L 128*   ALT U/L 18   AST U/L 17   GLUCOSE RANDOM mg/dL 133     Results from last 7 days   Lab Units 11/12/21  1932   INR  0 94                   Imaging: Reviewed radiology reports from this admission including: chest CT scan  CTA ED chest PE study   Final Result by John Shirley MD (11/12 2115)         1  No central or segmental pulmonary emboli  There is suboptimal opacification of the subsegmental branches  2   Enlargement of the pulmonary trunk, and main right and left pulmonary arteries suggestive of pulmonary hypertension                    Workstation performed: [id number] XR chest 1 view portable    (Results Pending)   VAS lower limb venous duplex study, complete bilateral    (Results Pending)       EKG and Other Studies Reviewed on Admission:   · EKG: Atrial fibrillation  HR 93  occasional PVCs       ** Please Note: This note has been constructed using a voice recognition system   **

## 2021-11-14 ENCOUNTER — APPOINTMENT (INPATIENT)
Dept: NON INVASIVE DIAGNOSTICS | Facility: HOSPITAL | Age: 76
DRG: 292 | End: 2021-11-14
Payer: COMMERCIAL

## 2021-11-14 LAB
ANION GAP SERPL CALCULATED.3IONS-SCNC: 3 MMOL/L (ref 4–13)
AORTIC ROOT: 2.9 CM
APICAL FOUR CHAMBER EJECTION FRACTION: 51 %
APTT PPP: 67 SECONDS (ref 23–37)
ASCENDING AORTA: 3.5 CM
BASOPHILS # BLD AUTO: 0.06 THOUSANDS/ΜL (ref 0–0.1)
BASOPHILS NFR BLD AUTO: 1 % (ref 0–1)
BUN SERPL-MCNC: 6 MG/DL (ref 5–25)
CALCIUM SERPL-MCNC: 8.9 MG/DL (ref 8.3–10.1)
CHLORIDE SERPL-SCNC: 101 MMOL/L (ref 100–108)
CO2 SERPL-SCNC: 35 MMOL/L (ref 21–32)
CREAT SERPL-MCNC: 0.77 MG/DL (ref 0.6–1.3)
E WAVE DECELERATION TIME: 194 MS
EOSINOPHIL # BLD AUTO: 0.84 THOUSAND/ΜL (ref 0–0.61)
EOSINOPHIL NFR BLD AUTO: 10 % (ref 0–6)
ERYTHROCYTE [DISTWIDTH] IN BLOOD BY AUTOMATED COUNT: 14.4 % (ref 11.6–15.1)
FRACTIONAL SHORTENING: 43 % (ref 28–44)
GFR SERPL CREATININE-BSD FRML MDRD: 76 ML/MIN/1.73SQ M
GLUCOSE SERPL-MCNC: 118 MG/DL (ref 65–140)
HCT VFR BLD AUTO: 37.3 % (ref 34.8–46.1)
HGB BLD-MCNC: 10.7 G/DL (ref 11.5–15.4)
IMM GRANULOCYTES # BLD AUTO: 0.02 THOUSAND/UL (ref 0–0.2)
IMM GRANULOCYTES NFR BLD AUTO: 0 % (ref 0–2)
INTERVENTRICULAR SEPTUM IN DIASTOLE (PARASTERNAL SHORT AXIS VIEW): 1.3 CM
LEFT ATRIUM AREA SYSTOLE SINGLE PLANE A4C: 22.2 CM2
LEFT INTERNAL DIMENSION IN SYSTOLE: 2.6 CM (ref 2.1–4)
LEFT VENTRICULAR INTERNAL DIMENSION IN DIASTOLE: 4.6 CM (ref 7.3–10.88)
LEFT VENTRICULAR POSTERIOR WALL IN END DIASTOLE: 1.4 CM
LEFT VENTRICULAR STROKE VOLUME: 71 ML
LYMPHOCYTES # BLD AUTO: 1.94 THOUSANDS/ΜL (ref 0.6–4.47)
LYMPHOCYTES NFR BLD AUTO: 22 % (ref 14–44)
MCH RBC QN AUTO: 27 PG (ref 26.8–34.3)
MCHC RBC AUTO-ENTMCNC: 28.7 G/DL (ref 31.4–37.4)
MCV RBC AUTO: 94 FL (ref 82–98)
MONOCYTES # BLD AUTO: 1.02 THOUSAND/ΜL (ref 0.17–1.22)
MONOCYTES NFR BLD AUTO: 12 % (ref 4–12)
MV E'TISSUE VEL-SEP: 8 CM/S
MV PEAK A VEL: 1.1 M/S
MV PEAK E VEL: 92 CM/S
MV STENOSIS PRESSURE HALF TIME: 0 MS
NEUTROPHILS # BLD AUTO: 4.84 THOUSANDS/ΜL (ref 1.85–7.62)
NEUTS SEG NFR BLD AUTO: 55 % (ref 43–75)
NRBC BLD AUTO-RTO: 0 /100 WBCS
PLATELET # BLD AUTO: 383 THOUSANDS/UL (ref 149–390)
PMV BLD AUTO: 11.1 FL (ref 8.9–12.7)
POTASSIUM SERPL-SCNC: 4 MMOL/L (ref 3.5–5.3)
PROCALCITONIN SERPL-MCNC: <0.05 NG/ML
RBC # BLD AUTO: 3.97 MILLION/UL (ref 3.81–5.12)
RIGHT ATRIUM AREA SYSTOLE A4C: 13.8 CM2
RIGHT VENTRICLE ID DIMENSION: 4 CM
SL CV LV EF: 65
SL CV PED ECHO LEFT VENTRICLE DIASTOLIC VOLUME (MOD BIPLANE) 2D: 96 ML
SL CV PED ECHO LEFT VENTRICLE SYSTOLIC VOLUME (MOD BIPLANE) 2D: 25 ML
SODIUM SERPL-SCNC: 139 MMOL/L (ref 136–145)
TRICUSPID VALVE S': 0.9 CM/S
WBC # BLD AUTO: 8.72 THOUSAND/UL (ref 4.31–10.16)
Z-SCORE OF LEFT VENTRICULAR DIMENSION IN END SYSTOLE: -6.61

## 2021-11-14 PROCEDURE — 93306 TTE W/DOPPLER COMPLETE: CPT | Performed by: INTERNAL MEDICINE

## 2021-11-14 PROCEDURE — 85730 THROMBOPLASTIN TIME PARTIAL: CPT | Performed by: PHYSICIAN ASSISTANT

## 2021-11-14 PROCEDURE — 85025 COMPLETE CBC W/AUTO DIFF WBC: CPT | Performed by: INTERNAL MEDICINE

## 2021-11-14 PROCEDURE — 99232 SBSQ HOSP IP/OBS MODERATE 35: CPT | Performed by: INTERNAL MEDICINE

## 2021-11-14 PROCEDURE — 80048 BASIC METABOLIC PNL TOTAL CA: CPT | Performed by: INTERNAL MEDICINE

## 2021-11-14 PROCEDURE — 93306 TTE W/DOPPLER COMPLETE: CPT

## 2021-11-14 PROCEDURE — 84145 PROCALCITONIN (PCT): CPT | Performed by: INTERNAL MEDICINE

## 2021-11-14 PROCEDURE — 99223 1ST HOSP IP/OBS HIGH 75: CPT | Performed by: INTERNAL MEDICINE

## 2021-11-14 RX ORDER — POTASSIUM CHLORIDE 20 MEQ/1
40 TABLET, EXTENDED RELEASE ORAL DAILY
Status: DISCONTINUED | OUTPATIENT
Start: 2021-11-14 | End: 2021-11-17 | Stop reason: HOSPADM

## 2021-11-14 RX ORDER — DIPHENHYDRAMINE HCL 25 MG
25 TABLET ORAL ONCE
Status: COMPLETED | OUTPATIENT
Start: 2021-11-14 | End: 2021-11-14

## 2021-11-14 RX ORDER — DIPHENHYDRAMINE HCL 25 MG
25 TABLET ORAL EVERY 6 HOURS PRN
Status: DISCONTINUED | OUTPATIENT
Start: 2021-11-14 | End: 2021-11-17 | Stop reason: HOSPADM

## 2021-11-14 RX ADMIN — CEFAZOLIN SODIUM 2000 MG: 2 SOLUTION INTRAVENOUS at 00:00

## 2021-11-14 RX ADMIN — OXYCODONE HYDROCHLORIDE 10 MG: 10 TABLET ORAL at 14:57

## 2021-11-14 RX ADMIN — OXYCODONE HYDROCHLORIDE 20 MG: 20 TABLET, FILM COATED, EXTENDED RELEASE ORAL at 13:00

## 2021-11-14 RX ADMIN — LOSARTAN POTASSIUM 100 MG: 50 TABLET, FILM COATED ORAL at 08:10

## 2021-11-14 RX ADMIN — ACETAMINOPHEN 650 MG: 325 TABLET, FILM COATED ORAL at 12:59

## 2021-11-14 RX ADMIN — FUROSEMIDE 40 MG: 10 INJECTION, SOLUTION INTRAMUSCULAR; INTRAVENOUS at 17:24

## 2021-11-14 RX ADMIN — ACETAMINOPHEN 650 MG: 325 TABLET, FILM COATED ORAL at 21:13

## 2021-11-14 RX ADMIN — OXYCODONE HYDROCHLORIDE 20 MG: 20 TABLET, FILM COATED, EXTENDED RELEASE ORAL at 00:01

## 2021-11-14 RX ADMIN — TIZANIDINE 4 MG: 2 TABLET ORAL at 17:32

## 2021-11-14 RX ADMIN — CEFAZOLIN SODIUM 2000 MG: 2 SOLUTION INTRAVENOUS at 08:11

## 2021-11-14 RX ADMIN — DIPHENHYDRAMINE HCL 25 MG: 25 TABLET, COATED ORAL at 02:14

## 2021-11-14 RX ADMIN — OXYCODONE HYDROCHLORIDE 20 MG: 20 TABLET, FILM COATED, EXTENDED RELEASE ORAL at 08:11

## 2021-11-14 RX ADMIN — OXYCODONE HYDROCHLORIDE 20 MG: 20 TABLET, FILM COATED, EXTENDED RELEASE ORAL at 21:13

## 2021-11-14 RX ADMIN — POTASSIUM CHLORIDE 40 MEQ: 1500 TABLET, EXTENDED RELEASE ORAL at 14:47

## 2021-11-14 RX ADMIN — AMLODIPINE BESYLATE 5 MG: 5 TABLET ORAL at 08:11

## 2021-11-14 RX ADMIN — FUROSEMIDE 40 MG: 10 INJECTION, SOLUTION INTRAMUSCULAR; INTRAVENOUS at 08:11

## 2021-11-14 RX ADMIN — HEPARIN SODIUM 15 UNITS/KG/HR: 10000 INJECTION, SOLUTION INTRAVENOUS at 21:33

## 2021-11-14 RX ADMIN — FLUTICASONE PROPIONATE 1 SPRAY: 50 SPRAY, METERED NASAL at 08:11

## 2021-11-14 RX ADMIN — CEFAZOLIN SODIUM 2000 MG: 2 SOLUTION INTRAVENOUS at 14:47

## 2021-11-14 RX ADMIN — DIPHENHYDRAMINE HCL 25 MG: 25 TABLET, COATED ORAL at 21:13

## 2021-11-14 RX ADMIN — CEFAZOLIN SODIUM 2000 MG: 2 SOLUTION INTRAVENOUS at 23:40

## 2021-11-14 NOTE — CONSULTS
Consultation - Electrophysiology-Cardiology (EP)   Caroline Palm 77 y o  female MRN: [id number]  Unit/Bed#: S -01 Encounter: [id number]      Inpatient consult to Cardiology  Consult performed by: Quique Stewart PA-C  Consult ordered by: Rodney Wetzel MD          Assessment/Plan   Primary diagnosis:   1  Acute heart failure, NYHA III, AHA class C   * patient presented to [facility] ED on 11-12 due to concerns of LE edema, cough, weight gain and SOB  Cardiology consulted for further management of acute HF and new afib diagnosis   * initiated on IV lasix 40mg BID since admission, thus far is net negative 6 552L, she is lasix naive as she was not taking any diuretic pre hospitalization , continue this dose   * electrolytes WNL  ; due to rapid diuresis will supplement K+   * continue daily weights   * echo pending but suspect diastolic HF    2  Paroxysmal atrial fibrillation, symptomatic    * on admission she was in atrial fibrillation with CVR, she has since converted to NSR spontaneously    * likely in afib due to volume overload not afib causing volume overload, she is on IV heparin or AC, consider switching to PO agent at this time    * her [id number] is at least 4 (HTN, Age >76, female sex category) would benefit from continued [facility], [place], no contraindications that I can see reviewing her chart    * echo pending    * continue tele monitoring     3  HTN    * BP has been uncontrolled since admission   * on losartan and amlodipine, discuss alternative therapies to amlodipine and further anti HTN management with attending    * likely also contributing to #1 and #2     Secondary diagnosis:   1  Obesity   2  Sjoren's syndrome   3  Chronic venous insufficiency       Cardiac Studies/Imaging:     Imaging: I have personally reviewed pertinent reports  ECHO: No results found for this or any previous visit        CATH/STRESS TEST:  None       EKG:             History of Present Illness   Physician Requesting Consult: Judd Burroughs MD  Reason for Consult / Principal Problem: afib and CHF     HPI: Noelle Garcia is a 77y o  year old female with a history as above who presented to [facility] on 11-12 due to concerns of LE edema and SOB  Cardiology consulted for HF and afib management  For the past several months patient has had increasing LE edema with her legs almost weighing 40lbs each! This severely limited her mobility with her  having her set up on the second floor of their house so she did not have to use the stairs  She was evaluated by her PCP who prescribed lasix 20mg MWF however patient never began taking the medication  Over the past few weeks she noted a new intermittent dry cough, worsening LE edema and wheezing  Her  urged her to seek medical evaluation in the ED prompting visit two days ago  On arrival she was diagnosed with acute HF exacerbation as well as newly found atrial fibrillation with RVR  She was initiated with IV diuresis and cardiology consulted  Currently she is extremely concerned about her LE edema as her legs have never been this large  She does not recall having a diagnosis of atrial fibrillation in the past   admits her daily diet consists of fast food for lunch and microwave dinners  Review of Systems  ROS as noted above, otherwise 12 point review of systems was performed and is negative         Historical Information   Past Medical History:   Diagnosis Date    Arthritis     Cellulitis     Edema of both lower extremities due to peripheral venous insufficiency      Past Surgical History:   Procedure Laterality Date    KNEE CARTILAGE SURGERY      REPLACEMENT TOTAL KNEE BILATERAL       Social History     Substance and Sexual Activity   Alcohol Use Not Currently     Social History     Substance and Sexual Activity   Drug Use Never     Social History     Tobacco Use   Smoking Status Former Smoker   Smokeless Tobacco Never Used     Family History: non-contributory    Meds/Allergies   Hospital Medications:   Current Facility-Administered Medications   Medication Dose Route Frequency    acetaminophen (TYLENOL) tablet 650 mg  650 mg Oral Q6H PRN    amLODIPine (NORVASC) tablet 5 mg  5 mg Oral Daily    ceFAZolin (ANCEF) IVPB (premix in dextrose) 2,000 mg 50 mL  2,000 mg Intravenous Q8H    fluticasone (FLONASE) 50 mcg/act nasal spray 1 spray  1 spray Each Nare Daily    furosemide (LASIX) injection 40 mg  40 mg Intravenous BID    heparin (porcine) 25,000 units in 0 45% NaCl 250 mL infusion (premix)  3-20 Units/kg/hr (Order-Specific) Intravenous Titrated    heparin (porcine) injection 2,000 Units  2,000 Units Intravenous Q1H PRN    heparin (porcine) injection 4,000 Units  4,000 Units Intravenous Q1H PRN    losartan (COZAAR) tablet 100 mg  100 mg Oral Daily    ondansetron (ZOFRAN) injection 4 mg  4 mg Intravenous Q4H PRN    oxyCODONE (OxyCONTIN) 12 hr tablet 20 mg  20 mg Oral Q8H [facility]    oxyCODONE (ROXICODONE) immediate release tablet 10 mg  10 mg Oral Q6H PRN    tiZANidine (ZANAFLEX) tablet 4 mg  4 mg Oral Q8H PRN     Home Medications:   Medications Prior to Admission   Medication    ALPRAZolam (XANAX) 0 25 mg tablet    amLODIPine (NORVASC) 5 mg tablet    butalbital-acetaminophen-caffeine (FIORICET,ESGIC) -40 mg per tablet    Diclofenac Sodium (Voltaren) 1 %    fluticasone (FLONASE) 50 mcg/act nasal spray    hydrocortisone (WESTCORT) 0 2 % cream    irbesartan-hydrochlorothiazide (AVALIDE) 300-12 5 MG per tablet    mupirocin (BACTROBAN) 2 % ointment    oxyCODONE (OxyCONTIN) 20 mg 12 hr tablet    oxyCODONE-acetaminophen (PERCOCET)  mg per tablet    tiZANidine (ZANAFLEX) 4 mg tablet    Vitamin D, Cholecalciferol, 25 MCG (1000 UT) TABS    zolpidem (AMBIEN) 10 mg tablet       No Known Allergies    Objective   Vitals: Blood pressure (!) 172/68, pulse 70, temperature 98 2 °F (36 8 °C), temperature source Oral, resp   rate 18, height 5' 2" (1 575 m), weight 114 kg (251 lb), SpO2 94 %  Orthostatic Blood Pressures      Most Recent Value   Blood Pressure 172/68 filed at 2021 1117   Patient Position - Orthostatic VS Lying filed at 2021 0802            Intake/Output Summary (Last 24 hours) at 2021 1126  Last data filed at 2021 0911  Gross per 24 hour   Intake 300 ml   Output 3702 ml   Net -3402 ml       Invasive Devices  Report    Peripheral Intravenous Line            Peripheral IV 21 Right;Dorsal (posterior) Forearm 1 day    Peripheral IV 21 Right Antecubital 1 day                Physical Exam  Constitutional:       Appearance: She is well-developed  HENT:      Head: Normocephalic and atraumatic  Eyes:      Pupils: Pupils are equal, round, and reactive to light  Cardiovascular:      Rate and Rhythm: Normal rate and regular rhythm  Pulmonary:      Effort: Pulmonary effort is normal       Breath sounds: Examination of the right-middle field reveals wheezing  Examination of the left-middle field reveals wheezing  Examination of the right-lower field reveals wheezing  Examination of the left-lower field reveals wheezing  Wheezing present  Comments: Inspiratory wheezing   Abdominal:      General: Bowel sounds are normal       Palpations: Abdomen is soft  Musculoskeletal:         General: Normal range of motion  Cervical back: Normal range of motion and neck supple  Right lower le+ Edema present  Left lower le+ Edema present  Skin:     General: Skin is warm and dry  Neurological:      Mental Status: She is alert and oriented to person, place, and time  Lab Results: I have personally reviewed pertinent lab results      Results from last 7 days   Lab Units 21  0504 21  0715 21  0012   WBC Thousand/uL 8 72 8 76 10 16   HEMOGLOBIN g/dL 10 7* 11 4* 11 7   HEMATOCRIT % 37 3 38 5 39 4   PLATELETS Thousands/uL 383 372 426*     Results from last 7 days   Lab Units 11/14/21  0504 11/13/21  0746 11/12/21  1825   POTASSIUM mmol/L 4 0 3 7 3 5   CHLORIDE mmol/L 101 103 100   CO2 mmol/L 35* 32 33*   BUN mg/dL 6 7 9   CREATININE mg/dL 0 77 0 72 0 85   CALCIUM mg/dL 8 9 8 7 8 9     Results from last 7 days   Lab Units 11/14/21  0504 11/13/21  2232 11/13/21  1604 11/13/21  0715 11/13/21  0012 11/12/21  1932 11/12/21 1932   INR   --   --   --   --  1 00  --  0 94   PTT seconds 67* 65* 69*   < > 30   < > 28    < > = values in this interval not displayed       Results from last 7 days   Lab Units 11/12/21  1825   MAGNESIUM mg/dL 2 2

## 2021-11-14 NOTE — ASSESSMENT & PLAN NOTE
· In the setting of suspected acute CHF  · Patient noted to be in atrial fibrillation on EKG on presentation  Uncertain of any prior history of a-fib but notes being told she had an abnormal heart rhythm several years ago  · [id number]: 5  · Converted to sinus     · Will do Eliquis price check

## 2021-11-14 NOTE — PLAN OF CARE
Problem: Potential for Falls  Goal: Patient will remain free of falls  Description: INTERVENTIONS:  - Educate patient/family on patient safety including physical limitations  - Instruct patient to call for assistance with activity   - Consult OT/PT to assist with strengthening/mobility   - Keep Call bell within reach  - Keep bed low and locked with side rails adjusted as appropriate  - Keep care items and personal belongings within reach  - Initiate and maintain comfort rounds  - Make Fall Risk Sign visible to staff  - Offer Toileting every Hours, in advance of need  - Initiate/Maintain alarm  - Obtain necessary fall risk management equipment:   - Apply yellow socks and bracelet for high fall risk patients  - Consider moving patient to room near nurses station  Outcome: Progressing     Problem: MOBILITY - ADULT  Goal: Maintain or return to baseline ADL function  Description: INTERVENTIONS:  -  Assess patient's ability to carry out ADLs; assess patient's baseline for ADL function and identify physical deficits which impact ability to perform ADLs (bathing, care of mouth/teeth, toileting, grooming, dressing, etc )  - Assess/evaluate cause of self-care deficits   - Assess range of motion  - Assess patient's mobility; develop plan if impaired  - Assess patient's need for assistive devices and provide as appropriate  - Encourage maximum independence but intervene and supervise when necessary  - Involve family in performance of ADLs  - Assess for home care needs following discharge   - Consider OT consult to assist with ADL evaluation and planning for discharge  - Provide patient education as appropriate  Outcome: Progressing  Goal: Maintains/Returns to pre admission functional level  Description: INTERVENTIONS:  - Perform BMAT or MOVE assessment daily    - Set and communicate daily mobility goal to care team and patient/family/caregiver     - Collaborate with rehabilitation services on mobility goals if consulted  - Perform Range of Motion times a day  - Reposition patient every hours  - Dangle patient times a day  - Stand patient times a day  - Ambulate patient times a day  - Out of bed to chair times a day   - Out of bed for meals  times a day  - Out of bed for toileting  - Record patient progress and toleration of activity level   Outcome: Progressing     Problem: Prexisting or High Potential for Compromised Skin Integrity  Goal: Skin integrity is maintained or improved  Description: INTERVENTIONS:  - Identify patients at risk for skin breakdown  - Assess and monitor skin integrity  - Assess and monitor nutrition and hydration status  - Monitor labs   - Assess for incontinence   - Turn and reposition patient  - Assist with mobility/ambulation  - Relieve pressure over bony prominences  - Avoid friction and shearing  - Provide appropriate hygiene as needed including keeping skin clean and dry  - Evaluate need for skin moisturizer/barrier cream  - Collaborate with interdisciplinary team   - Patient/family teaching  - Consider wound care consult   Outcome: Progressing     Problem: Nutrition/Hydration-ADULT  Goal: Nutrient/Hydration intake appropriate for improving, restoring or maintaining nutritional needs  Description: Monitor and assess patient's nutrition/hydration status for malnutrition  Collaborate with interdisciplinary team and initiate plan and interventions as ordered  Monitor patient's weight and dietary intake as ordered or per policy  Utilize nutrition screening tool and intervene as necessary  Determine patient's food preferences and provide high-protein, high-caloric foods as appropriate       INTERVENTIONS:  - Monitor oral intake, urinary output, labs, and treatment plans  - Assess nutrition and hydration status and recommend course of action  - Evaluate amount of meals eaten  - Assist patient with eating if necessary   - Allow adequate time for meals  - Recommend/ encourage appropriate diets, oral nutritional supplements, and vitamin/mineral supplements  - Order, calculate, and assess calorie counts as needed  - Recommend, monitor, and adjust tube feedings and TPN/PPN based on assessed needs  - Assess need for intravenous fluids  - Provide specific nutrition/hydration education as appropriate  - Include patient/family/caregiver in decisions related to nutrition  Outcome: Progressing     Problem: PAIN - ADULT  Goal: Verbalizes/displays adequate comfort level or baseline comfort level  Description: Interventions:  - Encourage patient to monitor pain and request assistance  - Assess pain using appropriate pain scale  - Administer analgesics based on type and severity of pain and evaluate response  - Implement non-pharmacological measures as appropriate and evaluate response  - Consider cultural and social influences on pain and pain management  - Notify physician/advanced practitioner if interventions unsuccessful or patient reports new pain  Outcome: Progressing     Problem: INFECTION - ADULT  Goal: Absence or prevention of progression during hospitalization  Description: INTERVENTIONS:  - Assess and monitor for signs and symptoms of infection  - Monitor lab/diagnostic results  - Monitor all insertion sites, i e  indwelling lines, tubes, and drains  - Monitor endotracheal if appropriate and nasal secretions for changes in amount and color  - [place] appropriate cooling/warming therapies per order  - Administer medications as ordered  - Instruct and encourage patient and family to use good hand hygiene technique  - Identify and instruct in appropriate isolation precautions for identified infection/condition  Outcome: Progressing  Goal: Absence of fever/infection during neutropenic period  Description: INTERVENTIONS:  - Monitor WBC    Outcome: Progressing     Problem: SAFETY ADULT  Goal: Patient will remain free of falls  Description: INTERVENTIONS:  - Educate patient/family on patient safety including physical limitations  - Instruct patient to call for assistance with activity   - Consult OT/PT to assist with strengthening/mobility   - Keep Call bell within reach  - Keep bed low and locked with side rails adjusted as appropriate  - Keep care items and personal belongings within reach  - Initiate and maintain comfort rounds  - Make Fall Risk Sign visible to staff  - Offer Toileting every Hours, in advance of need  - Initiate/Maintain alarm  - Obtain necessary fall risk management equipment:   - Apply yellow socks and bracelet for high fall risk patients  - Consider moving patient to room near nurses station  Outcome: Progressing  Goal: Maintain or return to baseline ADL function  Description: INTERVENTIONS:  -  Assess patient's ability to carry out ADLs; assess patient's baseline for ADL function and identify physical deficits which impact ability to perform ADLs (bathing, care of mouth/teeth, toileting, grooming, dressing, etc )  - Assess/evaluate cause of self-care deficits   - Assess range of motion  - Assess patient's mobility; develop plan if impaired  - Assess patient's need for assistive devices and provide as appropriate  - Encourage maximum independence but intervene and supervise when necessary  - Involve family in performance of ADLs  - Assess for home care needs following discharge   - Consider OT consult to assist with ADL evaluation and planning for discharge  - Provide patient education as appropriate  Outcome: Progressing  Goal: Maintains/Returns to pre admission functional level  Description: INTERVENTIONS:  - Perform BMAT or MOVE assessment daily    - Set and communicate daily mobility goal to care team and patient/family/caregiver  - Collaborate with rehabilitation services on mobility goals if consulted  - Perform Range of Motion times a day  - Reposition patient every  hours    - Dangle patient times a day  - Stand patient  times a day  - Ambulate patient times a day  - Out of bed to chair  times a day   - Out of bed for meals times a day  - Out of bed for toileting  - Record patient progress and toleration of activity level   Outcome: Progressing     Problem: DISCHARGE PLANNING  Goal: Discharge to home or other facility with appropriate resources  Description: INTERVENTIONS:  - Identify barriers to discharge w/patient and caregiver  - Arrange for needed discharge resources and transportation as appropriate  - Identify discharge learning needs (meds, wound care, etc )  - Arrange for interpretive services to assist at discharge as needed  - Refer to Case Management Department for coordinating discharge planning if the patient needs post-hospital services based on physician/advanced practitioner order or complex needs related to functional status, cognitive ability, or social support system  Outcome: Progressing     Problem: Knowledge Deficit  Goal: Patient/family/caregiver demonstrates understanding of disease process, treatment plan, medications, and discharge instructions  Description: Complete learning assessment and assess knowledge base    Interventions:  - Provide teaching at level of understanding  - Provide teaching via preferred learning methods  Outcome: Progressing

## 2021-11-14 NOTE — PROGRESS NOTES
No changes in prior RN assessment  Pt currently offers no complaints  Will continue to closely monitor

## 2021-11-14 NOTE — ASSESSMENT & PLAN NOTE
Wt Readings from Last 3 Encounters:   11/14/21 114 kg (251 lb)   12/22/20 109 kg (240 lb)     · Presented with SOB and increased lower extremity edema  · No prior history of CHF noted  No prior echo for review  · Suspected acute CHF in the setting of new onset a-fib and uncontrolled HTN  ·   · CXR with mild vascular congestion  CTA chest negative for PE, "enlargement of the pulmonary trunk, and main right and left pulmonary arteries suggestive of pulmonary hypertension "  · Cardiology evaluation pending  · Echo   EF 65 percent Grade 1 abnormal relaxation  · Continue IV Lasix  Diuresing well    Monitor BMP and electrolytes

## 2021-11-14 NOTE — PROGRESS NOTES
[facility]  Progress Note - [facility] 1945, 77 y o  female MRN: [id number]  Unit/Bed#: S -01 Encounter: [id number]  Primary Care Provider: Sandra Vargas MD   Date and time admitted to hospital: 11/12/2021  5:29 PM    * Acute CHF (congestive heart failure) ([address] )  Assessment & Plan  Wt Readings from Last 3 Encounters:   11/14/21 114 kg (251 lb)   12/22/20 109 kg (240 lb)     · Presented with SOB and increased lower extremity edema  · No prior history of CHF noted  No prior echo for review  · Suspected acute CHF in the setting of new onset a-fib and uncontrolled HTN  ·   · CXR with mild vascular congestion  CTA chest negative for PE, "enlargement of the pulmonary trunk, and main right and left pulmonary arteries suggestive of pulmonary hypertension "  · Cardiology evaluation pending  · Echo   EF 65 percent Grade 1 abnormal relaxation  · Continue IV Lasix  Diuresing well  Monitor BMP and electrolytes    Acute respiratory failure with hypoxia (HCC)  Assessment & Plan  · Mild, POA, with pulse ox 88-89% on RA on arrival    · In the setting of suspected acute CHF, new onset a-fib  · Diuresis as per above  · Pulse ox stable with 2L NC oxygen in place  · Does not use oxygen at baseline  · Wean O2 as tolerated  Ambulatory pulse ox prior to discharge  Erythema of lower extremity  Assessment & Plan  · On exam, patient with significant erythema and edema of both lower extremities with fluid weeping from lateral aspect of right lower leg  · Patient with history of chronic bilateral lower extremity edema and chronic venous insufficiency  Per review of records, patient also has a history of recurrent cellulitis and 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     · Venous doppler negative for DVT   · Patient denies recent fevers/ chills and has only mild leukocytosis on presentation but given erythema, drainage started on IV Ancef for suspected cellulitis  · Likely primarily due to edema  Edema control with IV diuresis as per above  New onset atrial fibrillation [facility])  Assessment & Plan  · In the setting of suspected acute CHF  · Patient noted to be in atrial fibrillation on EKG on presentation  Uncertain of any prior history of a-fib but notes being told she had an abnormal heart rhythm several years ago  · [id number]: 5  · Converted to sinus  · Will do Eliquis price check       VTE Pharmacologic Prophylaxis:   Pharmacologic: Heparin Drip  Mechanical VTE Prophylaxis in Place: Yes    Patient Centered Rounds:   Discussions with Specialists or Other Care Team Provider: Card    Education and Discussions with Family / Patient: Spoke with Pt's     Time Spent for Care: 20 minutes  More than 50% of total time spent on counseling and coordination of care as described above  Current Length of Stay: 2 day(s)    Current Patient Status: Inpatient   Certification Statement: The patient will continue to require additional inpatient hospital stay due to requiring IV diuresis    Discharge Plan: When transitioned to PO meds    Code Status: Level 1 - Full Code      Subjective:   Mild headache this morning  Breathing has improved    Objective:     Vitals:   Temp (24hrs), Av 3 °F (36 8 °C), Min:98 2 °F (36 8 °C), Max:98 4 °F (36 9 °C)    Temp:  [98 2 °F (36 8 °C)-98 4 °F (36 9 °C)] 98 2 °F (36 8 °C)  HR:  [70-87] 70  Resp:  [16-18] 18  BP: (170-172)/(68-73) 172/68  SpO2:  [94 %] 94 %  Body mass index is 45 91 kg/m²  Input and Output Summary (last 24 hours): Intake/Output Summary (Last 24 hours) at 2021 1532  Last data filed at 2021 1517  Gross per 24 hour   Intake 880 ml   Output 5152 ml   Net -4272 ml       Physical Exam:     Physical Exam  Constitutional:       General: She is not in acute distress  Appearance: She is not ill-appearing, toxic-appearing or diaphoretic     Eyes: General:         Right eye: No discharge  Left eye: No discharge  Cardiovascular:      Rate and Rhythm: Normal rate  Rhythm irregular  Pulses: Normal pulses  Heart sounds: Murmur heard  Pulmonary:      Effort: Pulmonary effort is normal  No respiratory distress  Breath sounds: Rales present  No wheezing  Abdominal:      General: Abdomen is flat  Bowel sounds are normal  There is no distension  Palpations: Abdomen is soft  Musculoskeletal:         General: Swelling present  Skin:     General: Skin is warm  Neurological:      Mental Status: She is oriented to person, place, and time  Psychiatric:         Mood and Affect: Mood normal          Behavior: Behavior normal          Additional Data:     Labs:    Results from last 7 days   Lab Units 11/14/21  0504   WBC Thousand/uL 8 72   HEMOGLOBIN g/dL 10 7*   HEMATOCRIT % 37 3   PLATELETS Thousands/uL 383   NEUTROS PCT % 55   LYMPHS PCT % 22   MONOS PCT % 12   EOS PCT % 10*     Results from last 7 days   Lab Units 11/14/21  0504 11/13/21  0746 11/12/21  1825   POTASSIUM mmol/L 4 0   < > 3 5   CHLORIDE mmol/L 101   < > 100   CO2 mmol/L 35*   < > 33*   BUN mg/dL 6   < > 9   CREATININE mg/dL 0 77   < > 0 85   CALCIUM mg/dL 8 9   < > 8 9   ALK PHOS U/L  --   --  128*   ALT U/L  --   --  18   AST U/L  --   --  17    < > = values in this interval not displayed       Results from last 7 days   Lab Units 11/13/21  0012   INR  1 00       Recent Cultures (last 7 days):           Last 24 Hours Medication List:   Current Facility-Administered Medications   Medication Dose Route Frequency Provider Last Rate    acetaminophen  650 mg Oral Q6H PRN Leesa Smith PA-C      cefazolin  2,000 mg Intravenous Q8H aCndida Arzate PA-C 2,000 mg (11/14/21 1447)    fluticasone  1 spray Each Nare Daily Leesa Smith PA-C      furosemide  40 mg Intravenous BID Leesa Smith PA-C      heparin (porcine)  3-20 Units/kg/hr (Order-Specific) Intravenous Titrated Mariah Marte PA-C 15 Units/kg/hr (11/13/21 9780)    heparin (porcine)  2,000 Units Intravenous Q1H PRN Emogene SAM Marte-JAIR      heparin (porcine)  4,000 Units Intravenous Q1H PRN Mariah Marte PA-C      losartan  100 mg Oral Daily Mariah Marte PA-C      ondansetron  4 mg Intravenous Q4H PRN Jena Zhang MD      oxyCODONE  20 mg Oral [facility] Mariah Marte [place]      oxyCODONE  10 mg Oral Q6H PRN Mariah Marte PA-C      potassium chloride  40 mEq Oral Daily Alexander Valverde PA-C      tiZANidine  4 mg Oral Q8H PRN Mariah Marte PA-C          Today, Patient Was Seen By: Jena Zhang MD    ** Please Note: Dictation voice to text software may have been used in the creation of this document   **

## 2021-11-14 NOTE — PLAN OF CARE
Problem: Potential for Falls  Goal: Patient will remain free of falls  Description: INTERVENTIONS:  - Educate patient/family on patient safety including physical limitations  - Instruct patient to call for assistance with activity   - Consult OT/PT to assist with strengthening/mobility   - Keep Call bell within reach  - Keep bed low and locked with side rails adjusted as appropriate  - Keep care items and personal belongings within reach  - Initiate and maintain comfort rounds  - Make Fall Risk Sign visible to staff    - Apply yellow socks and bracelet for high fall risk patients  - Consider moving patient to room near nurses station  Outcome: Progressing     Problem: MOBILITY - ADULT  Goal: Maintain or return to baseline ADL function  Description: INTERVENTIONS:  -  Assess patient's ability to carry out ADLs; assess patient's baseline for ADL function and identify physical deficits which impact ability to perform ADLs (bathing, care of mouth/teeth, toileting, grooming, dressing, etc )  - Assess/evaluate cause of self-care deficits   - Assess range of motion  - Assess patient's mobility; develop plan if impaired  - Assess patient's need for assistive devices and provide as appropriate  - Encourage maximum independence but intervene and supervise when necessary  - Involve family in performance of ADLs  - Assess for home care needs following discharge   - Consider OT consult to assist with ADL evaluation and planning for discharge  - Provide patient education as appropriate  Outcome: Progressing  Goal: Maintains/Returns to pre admission functional level  Description: INTERVENTIONS:  - Perform BMAT or MOVE assessment daily    - Set and communicate daily mobility goal to care team and patient/family/caregiver     - Collaborate with rehabilitation services on mobility goals if consulted    - Out of bed for toileting  - Record patient progress and toleration of activity level   Outcome: Progressing     Problem: Prexisting or High Potential for Compromised Skin Integrity  Goal: Skin integrity is maintained or improved  Description: INTERVENTIONS:  - Identify patients at risk for skin breakdown  - Assess and monitor skin integrity  - Assess and monitor nutrition and hydration status  - Monitor labs   - Assess for incontinence   - Turn and reposition patient  - Assist with mobility/ambulation  - Relieve pressure over bony prominences  - Avoid friction and shearing  - Provide appropriate hygiene as needed including keeping skin clean and dry  - Evaluate need for skin moisturizer/barrier cream  - Collaborate with interdisciplinary team   - Patient/family teaching  - Consider wound care consult   Outcome: Progressing     Problem: Nutrition/Hydration-ADULT  Goal: Nutrient/Hydration intake appropriate for improving, restoring or maintaining nutritional needs  Description: Monitor and assess patient's nutrition/hydration status for malnutrition  Collaborate with interdisciplinary team and initiate plan and interventions as ordered  Monitor patient's weight and dietary intake as ordered or per policy  Utilize nutrition screening tool and intervene as necessary  Determine patient's food preferences and provide high-protein, high-caloric foods as appropriate       INTERVENTIONS:  - Monitor oral intake, urinary output, labs, and treatment plans  - Assess nutrition and hydration status and recommend course of action  - Evaluate amount of meals eaten  - Assist patient with eating if necessary   - Allow adequate time for meals  - Recommend/ encourage appropriate diets, oral nutritional supplements, and vitamin/mineral supplements  - Order, calculate, and assess calorie counts as needed  - Recommend, monitor, and adjust tube feedings and TPN/PPN based on assessed needs  - Assess need for intravenous fluids  - Provide specific nutrition/hydration education as appropriate  - Include patient/family/caregiver in decisions related to nutrition  Outcome: Progressing     Problem: PAIN - ADULT  Goal: Verbalizes/displays adequate comfort level or baseline comfort level  Description: Interventions:  - Encourage patient to monitor pain and request assistance  - Assess pain using appropriate pain scale  - Administer analgesics based on type and severity of pain and evaluate response  - Implement non-pharmacological measures as appropriate and evaluate response  - Consider cultural and social influences on pain and pain management  - Notify physician/advanced practitioner if interventions unsuccessful or patient reports new pain  Outcome: Progressing     Problem: INFECTION - ADULT  Goal: Absence or prevention of progression during hospitalization  Description: INTERVENTIONS:  - Assess and monitor for signs and symptoms of infection  - Monitor lab/diagnostic results  - Monitor all insertion sites, i e  indwelling lines, tubes, and drains  - Monitor endotracheal if appropriate and nasal secretions for changes in amount and color  - [place] appropriate cooling/warming therapies per order  - Administer medications as ordered  - Instruct and encourage patient and family to use good hand hygiene technique  - Identify and instruct in appropriate isolation precautions for identified infection/condition  Outcome: Progressing  Goal: Absence of fever/infection during neutropenic period  Description: INTERVENTIONS:  - Monitor WBC    Outcome: Progressing     Problem: SAFETY ADULT  Goal: Patient will remain free of falls  Description: INTERVENTIONS:  - Educate patient/family on patient safety including physical limitations  - Instruct patient to call for assistance with activity   - Consult OT/PT to assist with strengthening/mobility   - Keep Call bell within reach  - Keep bed low and locked with side rails adjusted as appropriate  - Keep care items and personal belongings within reach  - Initiate and maintain comfort rounds  - Make Fall Risk Sign visible to staff    - Apply yellow socks and bracelet for high fall risk patients  - Consider moving patient to room near nurses station  Outcome: Progressing  Goal: Maintain or return to baseline ADL function  Description: INTERVENTIONS:  -  Assess patient's ability to carry out ADLs; assess patient's baseline for ADL function and identify physical deficits which impact ability to perform ADLs (bathing, care of mouth/teeth, toileting, grooming, dressing, etc )  - Assess/evaluate cause of self-care deficits   - Assess range of motion  - Assess patient's mobility; develop plan if impaired  - Assess patient's need for assistive devices and provide as appropriate  - Encourage maximum independence but intervene and supervise when necessary  - Involve family in performance of ADLs  - Assess for home care needs following discharge   - Consider OT consult to assist with ADL evaluation and planning for discharge  - Provide patient education as appropriate  Outcome: Progressing  Goal: Maintains/Returns to pre admission functional level  Description: INTERVENTIONS:  - Perform BMAT or MOVE assessment daily    - Set and communicate daily mobility goal to care team and patient/family/caregiver     - Collaborate with rehabilitation services on mobility goals if consulted    - Out of bed for toileting  - Record patient progress and toleration of activity level   Outcome: Progressing     Problem: DISCHARGE PLANNING  Goal: Discharge to home or other facility with appropriate resources  Description: INTERVENTIONS:  - Identify barriers to discharge w/patient and caregiver  - Arrange for needed discharge resources and transportation as appropriate  - Identify discharge learning needs (meds, wound care, etc )  - Arrange for interpretive services to assist at discharge as needed  - Refer to Case Management Department for coordinating discharge planning if the patient needs post-hospital services based on physician/advanced practitioner order or complex needs related to functional status, cognitive ability, or social support system  Outcome: Progressing     Problem: Knowledge Deficit  Goal: Patient/family/caregiver demonstrates understanding of disease process, treatment plan, medications, and discharge instructions  Description: Complete learning assessment and assess knowledge base    Interventions:  - Provide teaching at level of understanding  - Provide teaching via preferred learning methods  Outcome: Progressing

## 2021-11-14 NOTE — ASSESSMENT & PLAN NOTE
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

## 2021-11-15 PROBLEM — I50.31 ACUTE DIASTOLIC CHF (CONGESTIVE HEART FAILURE) (HCC): Status: ACTIVE | Noted: 2021-11-12

## 2021-11-15 LAB
ANION GAP SERPL CALCULATED.3IONS-SCNC: 4 MMOL/L (ref 4–13)
APTT PPP: 73 SECONDS (ref 23–37)
BASOPHILS # BLD AUTO: 0.05 THOUSANDS/ΜL (ref 0–0.1)
BASOPHILS NFR BLD AUTO: 1 % (ref 0–1)
BUN SERPL-MCNC: 10 MG/DL (ref 5–25)
CALCIUM SERPL-MCNC: 9 MG/DL (ref 8.3–10.1)
CHLORIDE SERPL-SCNC: 100 MMOL/L (ref 100–108)
CO2 SERPL-SCNC: 34 MMOL/L (ref 21–32)
CREAT SERPL-MCNC: 0.82 MG/DL (ref 0.6–1.3)
EOSINOPHIL # BLD AUTO: 1.12 THOUSAND/ΜL (ref 0–0.61)
EOSINOPHIL NFR BLD AUTO: 14 % (ref 0–6)
ERYTHROCYTE [DISTWIDTH] IN BLOOD BY AUTOMATED COUNT: 14.4 % (ref 11.6–15.1)
GFR SERPL CREATININE-BSD FRML MDRD: 70 ML/MIN/1.73SQ M
GLUCOSE SERPL-MCNC: 118 MG/DL (ref 65–140)
HCT VFR BLD AUTO: 39.5 % (ref 34.8–46.1)
HGB BLD-MCNC: 11.6 G/DL (ref 11.5–15.4)
IMM GRANULOCYTES # BLD AUTO: 0.04 THOUSAND/UL (ref 0–0.2)
IMM GRANULOCYTES NFR BLD AUTO: 1 % (ref 0–2)
LYMPHOCYTES # BLD AUTO: 1.69 THOUSANDS/ΜL (ref 0.6–4.47)
LYMPHOCYTES NFR BLD AUTO: 21 % (ref 14–44)
MCH RBC QN AUTO: 27.2 PG (ref 26.8–34.3)
MCHC RBC AUTO-ENTMCNC: 29.4 G/DL (ref 31.4–37.4)
MCV RBC AUTO: 93 FL (ref 82–98)
MONOCYTES # BLD AUTO: 0.97 THOUSAND/ΜL (ref 0.17–1.22)
MONOCYTES NFR BLD AUTO: 12 % (ref 4–12)
NEUTROPHILS # BLD AUTO: 4.2 THOUSANDS/ΜL (ref 1.85–7.62)
NEUTS SEG NFR BLD AUTO: 51 % (ref 43–75)
NRBC BLD AUTO-RTO: 0 /100 WBCS
PLATELET # BLD AUTO: 391 THOUSANDS/UL (ref 149–390)
PMV BLD AUTO: 10.5 FL (ref 8.9–12.7)
POTASSIUM SERPL-SCNC: 3.8 MMOL/L (ref 3.5–5.3)
PROCALCITONIN SERPL-MCNC: <0.05 NG/ML
RBC # BLD AUTO: 4.26 MILLION/UL (ref 3.81–5.12)
SODIUM SERPL-SCNC: 138 MMOL/L (ref 136–145)
WBC # BLD AUTO: 8.07 THOUSAND/UL (ref 4.31–10.16)

## 2021-11-15 PROCEDURE — 99232 SBSQ HOSP IP/OBS MODERATE 35: CPT | Performed by: INTERNAL MEDICINE

## 2021-11-15 PROCEDURE — 85025 COMPLETE CBC W/AUTO DIFF WBC: CPT | Performed by: INTERNAL MEDICINE

## 2021-11-15 PROCEDURE — 80048 BASIC METABOLIC PNL TOTAL CA: CPT | Performed by: INTERNAL MEDICINE

## 2021-11-15 PROCEDURE — 84145 PROCALCITONIN (PCT): CPT | Performed by: INTERNAL MEDICINE

## 2021-11-15 PROCEDURE — 85730 THROMBOPLASTIN TIME PARTIAL: CPT | Performed by: INTERNAL MEDICINE

## 2021-11-15 RX ADMIN — CEFAZOLIN SODIUM 2000 MG: 2 SOLUTION INTRAVENOUS at 08:41

## 2021-11-15 RX ADMIN — DIPHENHYDRAMINE HCL 25 MG: 25 TABLET, COATED ORAL at 03:00

## 2021-11-15 RX ADMIN — FUROSEMIDE 40 MG: 10 INJECTION, SOLUTION INTRAMUSCULAR; INTRAVENOUS at 17:32

## 2021-11-15 RX ADMIN — HEPARIN SODIUM 15 UNITS/KG/HR: 10000 INJECTION, SOLUTION INTRAVENOUS at 15:13

## 2021-11-15 RX ADMIN — FUROSEMIDE 40 MG: 10 INJECTION, SOLUTION INTRAMUSCULAR; INTRAVENOUS at 08:41

## 2021-11-15 RX ADMIN — ACETAMINOPHEN 650 MG: 325 TABLET, FILM COATED ORAL at 19:49

## 2021-11-15 RX ADMIN — OXYCODONE HYDROCHLORIDE 20 MG: 20 TABLET, FILM COATED, EXTENDED RELEASE ORAL at 05:09

## 2021-11-15 RX ADMIN — CEFAZOLIN SODIUM 2000 MG: 2 SOLUTION INTRAVENOUS at 17:32

## 2021-11-15 RX ADMIN — OXYCODONE HYDROCHLORIDE 20 MG: 20 TABLET, FILM COATED, EXTENDED RELEASE ORAL at 15:00

## 2021-11-15 RX ADMIN — DIPHENHYDRAMINE HCL 25 MG: 25 TABLET, COATED ORAL at 17:34

## 2021-11-15 RX ADMIN — DIPHENHYDRAMINE HCL 25 MG: 25 TABLET, COATED ORAL at 09:52

## 2021-11-15 RX ADMIN — FLUTICASONE PROPIONATE 1 SPRAY: 50 SPRAY, METERED NASAL at 08:49

## 2021-11-15 RX ADMIN — LOSARTAN POTASSIUM 100 MG: 50 TABLET, FILM COATED ORAL at 08:41

## 2021-11-15 RX ADMIN — OXYCODONE HYDROCHLORIDE 20 MG: 20 TABLET, FILM COATED, EXTENDED RELEASE ORAL at 21:34

## 2021-11-15 RX ADMIN — POTASSIUM CHLORIDE 40 MEQ: 1500 TABLET, EXTENDED RELEASE ORAL at 08:41

## 2021-11-15 RX ADMIN — ACETAMINOPHEN 650 MG: 325 TABLET, FILM COATED ORAL at 08:48

## 2021-11-15 NOTE — ASSESSMENT & PLAN NOTE
· In the setting of suspected acute CHF  · Patient noted to be in atrial fibrillation on EKG on presentation  Uncertain of any prior history of a-fib but notes being told she had an abnormal heart rhythm several years ago  · [id number]: 5  · Converted to sinus  · TSH wnl  · Will do Eliquis price check today   May transition from heparin drip to PO AC afterwards

## 2021-11-15 NOTE — ASSESSMENT & PLAN NOTE
Wt Readings from Last 3 Encounters:   11/14/21 114 kg (251 lb)   12/22/20 109 kg (240 lb)     · Presented with SOB and increased lower extremity edema  · No prior history of CHF noted  No prior echo for review  · Suspected acute CHF in the setting of new onset a-fib and uncontrolled HTN  ·   · CXR with mild vascular congestion  CTA chest negative for PE, "enlargement of the pulmonary trunk, and main right and left pulmonary arteries suggestive of pulmonary hypertension "  · Echo   EF 65 percent Grade 1 abnormal relaxation  · Continue IV Lasix  Diuresing well    Monitor BMP and electrolytes  · Will request  dietary for CHF instruction

## 2021-11-15 NOTE — PLAN OF CARE
Problem: Potential for Falls  Goal: Patient will remain free of falls  Description: INTERVENTIONS:  - Educate patient/family on patient safety including physical limitations  - Instruct patient to call for assistance with activity   - Consult OT/PT to assist with strengthening/mobility   - Keep Call bell within reach  - Keep bed low and locked with side rails adjusted as appropriate  - Keep care items and personal belongings within reach  - Initiate and maintain comfort rounds  - Make Fall Risk Sign visible to staff  - Offer Toileting every 2 Hours, in advance of need  - Initiate/Maintain bed alarm  - Obtain necessary fall risk management equipment: bed alarm  - Apply yellow socks and bracelet for high fall risk patients  - Consider moving patient to room near nurses station  Outcome: Progressing     Problem: MOBILITY - ADULT  Goal: Maintain or return to baseline ADL function  Description: INTERVENTIONS:  -  Assess patient's ability to carry out ADLs; assess patient's baseline for ADL function and identify physical deficits which impact ability to perform ADLs (bathing, care of mouth/teeth, toileting, grooming, dressing, etc )  - Assess/evaluate cause of self-care deficits   - Assess range of motion  - Assess patient's mobility; develop plan if impaired  - Assess patient's need for assistive devices and provide as appropriate  - Encourage maximum independence but intervene and supervise when necessary  - Involve family in performance of ADLs  - Assess for home care needs following discharge   - Consider OT consult to assist with ADL evaluation and planning for discharge  - Provide patient education as appropriate  Outcome: Progressing  Goal: Maintains/Returns to pre admission functional level  Description: INTERVENTIONS:  - Perform BMAT or MOVE assessment daily    - Set and communicate daily mobility goal to care team and patient/family/caregiver     - Collaborate with rehabilitation services on mobility goals if consulted  - Perform Range of Motion 2 times a day  - Reposition patient every 2 hours  - Dangle patient 2 times a day  - Stand patient 2 times a day  - Ambulate patient 2 times a day  - Out of bed to chair 2 times a day   - Out of bed for meals 2 times a day  - Out of bed for toileting  - Record patient progress and toleration of activity level   Outcome: Progressing     Problem: Prexisting or High Potential for Compromised Skin Integrity  Goal: Skin integrity is maintained or improved  Description: INTERVENTIONS:  - Identify patients at risk for skin breakdown  - Assess and monitor skin integrity  - Assess and monitor nutrition and hydration status  - Monitor labs   - Assess for incontinence   - Turn and reposition patient  - Assist with mobility/ambulation  - Relieve pressure over bony prominences  - Avoid friction and shearing  - Provide appropriate hygiene as needed including keeping skin clean and dry  - Evaluate need for skin moisturizer/barrier cream  - Collaborate with interdisciplinary team   - Patient/family teaching  - Consider wound care consult   Outcome: Progressing     Problem: Nutrition/Hydration-ADULT  Goal: Nutrient/Hydration intake appropriate for improving, restoring or maintaining nutritional needs  Description: Monitor and assess patient's nutrition/hydration status for malnutrition  Collaborate with interdisciplinary team and initiate plan and interventions as ordered  Monitor patient's weight and dietary intake as ordered or per policy  Utilize nutrition screening tool and intervene as necessary  Determine patient's food preferences and provide high-protein, high-caloric foods as appropriate       INTERVENTIONS:  - Monitor oral intake, urinary output, labs, and treatment plans  - Assess nutrition and hydration status and recommend course of action  - Evaluate amount of meals eaten  - Assist patient with eating if necessary   - Allow adequate time for meals  - Recommend/ encourage appropriate diets, oral nutritional supplements, and vitamin/mineral supplements  - Order, calculate, and assess calorie counts as needed  - Recommend, monitor, and adjust tube feedings and TPN/PPN based on assessed needs  - Assess need for intravenous fluids  - Provide specific nutrition/hydration education as appropriate  - Include patient/family/caregiver in decisions related to nutrition  Outcome: Progressing     Problem: PAIN - ADULT  Goal: Verbalizes/displays adequate comfort level or baseline comfort level  Description: Interventions:  - Encourage patient to monitor pain and request assistance  - Assess pain using appropriate pain scale  - Administer analgesics based on type and severity of pain and evaluate response  - Implement non-pharmacological measures as appropriate and evaluate response  - Consider cultural and social influences on pain and pain management  - Notify physician/advanced practitioner if interventions unsuccessful or patient reports new pain  Outcome: Progressing

## 2021-11-15 NOTE — PLAN OF CARE
Problem: Potential for Falls  Goal: Patient will remain free of falls  Description: INTERVENTIONS:  - Educate patient/family on patient safety including physical limitations  - Instruct patient to call for assistance with activity   - Consult OT/PT to assist with strengthening/mobility   - Keep Call bell within reach  - Keep bed low and locked with side rails adjusted as appropriate  - Keep care items and personal belongings within reach  - Initiate and maintain comfort rounds  - Make Fall Risk Sign visible to staff  - Offer Toileting every  Hours, in advance of need  - Initiate/Maintain alarm  - Obtain necessary fall risk management equipment:   - Apply yellow socks and bracelet for high fall risk patients  - Consider moving patient to room near nurses station  Outcome: Progressing     Problem: MOBILITY - ADULT  Goal: Maintain or return to baseline ADL function  Description: INTERVENTIONS:  -  Assess patient's ability to carry out ADLs; assess patient's baseline for ADL function and identify physical deficits which impact ability to perform ADLs (bathing, care of mouth/teeth, toileting, grooming, dressing, etc )  - Assess/evaluate cause of self-care deficits   - Assess range of motion  - Assess patient's mobility; develop plan if impaired  - Assess patient's need for assistive devices and provide as appropriate  - Encourage maximum independence but intervene and supervise when necessary  - Involve family in performance of ADLs  - Assess for home care needs following discharge   - Consider OT consult to assist with ADL evaluation and planning for discharge  - Provide patient education as appropriate  Outcome: Progressing  Goal: Maintains/Returns to pre admission functional level  Description: INTERVENTIONS:  - Perform BMAT or MOVE assessment daily    - Set and communicate daily mobility goal to care team and patient/family/caregiver     - Collaborate with rehabilitation services on mobility goals if consulted  - Perform Range of Motion  times a day  - Reposition patient every  hours  - Dangle patient  times a day  - Stand patient  times a day  - Ambulate patient  times a day  - Out of bed to chair  times a day   - Out of bed for meals  times a day  - Out of bed for toileting  - Record patient progress and toleration of activity level   Outcome: Progressing     Problem: Prexisting or High Potential for Compromised Skin Integrity  Goal: Skin integrity is maintained or improved  Description: INTERVENTIONS:  - Identify patients at risk for skin breakdown  - Assess and monitor skin integrity  - Assess and monitor nutrition and hydration status  - Monitor labs   - Assess for incontinence   - Turn and reposition patient  - Assist with mobility/ambulation  - Relieve pressure over bony prominences  - Avoid friction and shearing  - Provide appropriate hygiene as needed including keeping skin clean and dry  - Evaluate need for skin moisturizer/barrier cream  - Collaborate with interdisciplinary team   - Patient/family teaching  - Consider wound care consult   Outcome: Progressing     Problem: Nutrition/Hydration-ADULT  Goal: Nutrient/Hydration intake appropriate for improving, restoring or maintaining nutritional needs  Description: Monitor and assess patient's nutrition/hydration status for malnutrition  Collaborate with interdisciplinary team and initiate plan and interventions as ordered  Monitor patient's weight and dietary intake as ordered or per policy  Utilize nutrition screening tool and intervene as necessary  Determine patient's food preferences and provide high-protein, high-caloric foods as appropriate       INTERVENTIONS:  - Monitor oral intake, urinary output, labs, and treatment plans  - Assess nutrition and hydration status and recommend course of action  - Evaluate amount of meals eaten  - Assist patient with eating if necessary   - Allow adequate time for meals  - Recommend/ encourage appropriate diets, oral nutritional supplements, and vitamin/mineral supplements  - Order, calculate, and assess calorie counts as needed  - Recommend, monitor, and adjust tube feedings and TPN/PPN based on assessed needs  - Assess need for intravenous fluids  - Provide specific nutrition/hydration education as appropriate  - Include patient/family/caregiver in decisions related to nutrition  Outcome: Progressing     Problem: PAIN - ADULT  Goal: Verbalizes/displays adequate comfort level or baseline comfort level  Description: Interventions:  - Encourage patient to monitor pain and request assistance  - Assess pain using appropriate pain scale  - Administer analgesics based on type and severity of pain and evaluate response  - Implement non-pharmacological measures as appropriate and evaluate response  - Consider cultural and social influences on pain and pain management  - Notify physician/advanced practitioner if interventions unsuccessful or patient reports new pain  Outcome: Progressing     Problem: INFECTION - ADULT  Goal: Absence or prevention of progression during hospitalization  Description: INTERVENTIONS:  - Assess and monitor for signs and symptoms of infection  - Monitor lab/diagnostic results  - Monitor all insertion sites, i e  indwelling lines, tubes, and drains  - Monitor endotracheal if appropriate and nasal secretions for changes in amount and color  - [place] appropriate cooling/warming therapies per order  - Administer medications as ordered  - Instruct and encourage patient and family to use good hand hygiene technique  - Identify and instruct in appropriate isolation precautions for identified infection/condition  Outcome: Progressing  Goal: Absence of fever/infection during neutropenic period  Description: INTERVENTIONS:  - Monitor WBC    Outcome: Progressing     Problem: SAFETY ADULT  Goal: Patient will remain free of falls  Description: INTERVENTIONS:  - Educate patient/family on patient safety including physical limitations  - Instruct patient to call for assistance with activity   - Consult OT/PT to assist with strengthening/mobility   - Keep Call bell within reach  - Keep bed low and locked with side rails adjusted as appropriate  - Keep care items and personal belongings within reach  - Initiate and maintain comfort rounds  - Make Fall Risk Sign visible to staff  - Offer Toileting every  Hours, in advance of need  - Initiate/Maintain alarm  - Obtain necessary fall risk management equipment:   - Apply yellow socks and bracelet for high fall risk patients  - Consider moving patient to room near nurses station  Outcome: Progressing  Goal: Maintain or return to baseline ADL function  Description: INTERVENTIONS:  -  Assess patient's ability to carry out ADLs; assess patient's baseline for ADL function and identify physical deficits which impact ability to perform ADLs (bathing, care of mouth/teeth, toileting, grooming, dressing, etc )  - Assess/evaluate cause of self-care deficits   - Assess range of motion  - Assess patient's mobility; develop plan if impaired  - Assess patient's need for assistive devices and provide as appropriate  - Encourage maximum independence but intervene and supervise when necessary  - Involve family in performance of ADLs  - Assess for home care needs following discharge   - Consider OT consult to assist with ADL evaluation and planning for discharge  - Provide patient education as appropriate  Outcome: Progressing  Goal: Maintains/Returns to pre admission functional level  Description: INTERVENTIONS:  - Perform BMAT or MOVE assessment daily    - Set and communicate daily mobility goal to care team and patient/family/caregiver  - Collaborate with rehabilitation services on mobility goals if consulted  - Perform Range of Motion  times a day  - Reposition patient every  hours    - Dangle patient  times a day  - Stand patient  times a day  - Ambulate patient  times a day  - Out of bed to chair times a day   - Out of bed for meals times a day  - Out of bed for toileting  - Record patient progress and toleration of activity level   Outcome: Progressing     Problem: DISCHARGE PLANNING  Goal: Discharge to home or other facility with appropriate resources  Description: INTERVENTIONS:  - Identify barriers to discharge w/patient and caregiver  - Arrange for needed discharge resources and transportation as appropriate  - Identify discharge learning needs (meds, wound care, etc )  - Arrange for interpretive services to assist at discharge as needed  - Refer to Case Management Department for coordinating discharge planning if the patient needs post-hospital services based on physician/advanced practitioner order or complex needs related to functional status, cognitive ability, or social support system  Outcome: Progressing     Problem: Knowledge Deficit  Goal: Patient/family/caregiver demonstrates understanding of disease process, treatment plan, medications, and discharge instructions  Description: Complete learning assessment and assess knowledge base    Interventions:  - Provide teaching at level of understanding  - Provide teaching via preferred learning methods  Outcome: Progressing

## 2021-11-15 NOTE — PROGRESS NOTES
Progress Note - Cardiology   Carito Arreola 77 y o  female MRN: [id number]  Unit/Bed#: S -01 Encounter: [id number]        Principal Problem:    Acute CHF (congestive heart failure) ([address] )  Active Problems:    New onset atrial fibrillation (HCC)    Acute respiratory failure with hypoxia (HCC)    Hypertension    Erythema of lower extremity    Chronic low back pain with sciatica      Assessment/Plan    1  Acute respiratory failure- suspect component of diastolic dysfunction  Patient with evidence of volume overload-likely multifactorial due to diastolic dysfunction/venous insufficiency/high sodium intake/ immobility    TTE-LVEF 65% with grade 1 diastolic dysfunction  Mild AI and mild MR mild TR  Lasix IV 40 mg b i d  24 hour net -27 30  Continue diuresis  CT of the chest-no central or segmental PE  CHF teaching, ongoing  2  Paroxysmal atrial fibrillation-symptomatic  New diagnosis   Presented with controlled heart rates spontaneously converted to sinus rhythm  In the he setting of volume overload  [id number]=5/5, currently on IV heparin  Recommend transition oral AC  3  HTN-labile  -170  Monitor with diuresis  2 g sodium restriction-patient needs dietary education  On losartan 100  PTA amlodipine 5 mg b i d  And HCTZ 12 5 on hold      Subjective/Objective   Chief Complaint/Subjective  Patient without complaints of chest pain or shortness of breath   at bedside  Reviewed sodium and fluid restriction          Vitals: /70 (BP Location: Left arm)   Pulse 93   Temp 98 7 °F (37 1 °C) (Oral)   Resp 18   Ht 5' 2" (1 575 m)   Wt 114 kg (251 lb)   SpO2 92%   BMI 45 91 kg/m²     Vitals:    11/14/21 0502 11/14/21 1117   Weight: 114 kg (251 lb 5 2 oz) 114 kg (251 lb)     Orthostatic Blood Pressures      Most Recent Value   Blood Pressure 170/70 filed at 11/15/2021 3796   Patient Position - Orthostatic VS Lying filed at 11/14/2021 2019            Intake/Output Summary (Last 24 hours) at 11/15/2021 1119  Last data filed at 11/15/2021 0509  Gross per 24 hour   Intake 720 ml   Output 3450 ml   Net -2730 ml       Invasive Devices  Report    Peripheral Intravenous Line            Peripheral IV 11/12/21 Right;Dorsal (posterior) Forearm 2 days    Peripheral IV 11/13/21 Right Antecubital 2 days                Current Facility-Administered Medications   Medication Dose Route Frequency    acetaminophen (TYLENOL) tablet 650 mg  650 mg Oral Q6H PRN    ceFAZolin (ANCEF) IVPB (premix in dextrose) 2,000 mg 50 mL  2,000 mg Intravenous Q8H    diphenhydrAMINE (BENADRYL) tablet 25 mg  25 mg Oral Q6H PRN    fluticasone (FLONASE) 50 mcg/act nasal spray 1 spray  1 spray Each Nare Daily    furosemide (LASIX) injection 40 mg  40 mg Intravenous BID    heparin (porcine) 25,000 units in 0 45% NaCl 250 mL infusion (premix)  3-20 Units/kg/hr (Order-Specific) Intravenous Titrated    heparin (porcine) injection 2,000 Units  2,000 Units Intravenous Q1H PRN    heparin (porcine) injection 4,000 Units  4,000 Units Intravenous Q1H PRN    losartan (COZAAR) tablet 100 mg  100 mg Oral Daily    ondansetron (ZOFRAN) injection 4 mg  4 mg Intravenous Q4H PRN    oxyCODONE (OxyCONTIN) 12 hr tablet 20 mg  20 mg Oral Q8H SHANNAN    oxyCODONE (ROXICODONE) immediate release tablet 10 mg  10 mg Oral Q6H PRN    potassium chloride (K-DUR,KLOR-CON) CR tablet 40 mEq  40 mEq Oral Daily    tiZANidine (ZANAFLEX) tablet 4 mg  4 mg Oral Q8H PRN         Physical Exam: /70 (BP Location: Left arm)   Pulse 93   Temp 98 7 °F (37 1 °C) (Oral)   Resp 18   Ht 5' 2" (1 575 m)   Wt 114 kg (251 lb)   SpO2 92%   BMI 45 91 kg/m²     General Appearance:    Alert, cooperative, no distress, appears stated age    Morbidly obese   Head:    Normocephalic, no scleral icterus   Eyes:    PERRL   Nose:   Nares normal, septum midline, no drainage    Throat:   Lips, mucosa, and tongue normal   Neck:   Supple, symmetrical, trachea midline, Lungs:     Clear to auscultation bilaterally, respirations unlabored        Heart:    Regular rate and rhythm, S1 and S2 normal, no murmur   Abdomen:     Soft, non-tender   Extremities:   Extremities normal, atraumatic, no cyanosis + edema          Skin:   Skin warm    Neurologic:   Alert and oriented to person place and time, no focal deficits                 Lab Results:   Recent Results (from the past 72 hour(s))   ECG 12 lead    Collection Time: 11/12/21  6:05 PM   Result Value Ref Range    Ventricular Rate 93 BPM    Atrial Rate 117 BPM    PA Interval  ms    QRSD Interval 62 ms    QT Interval 330 ms    QTC Interval 411 ms    P Axis  degrees    QRS Axis 115 degrees    T Wave Axis 88 degrees   CBC and differential    Collection Time: 11/12/21  6:25 PM   Result Value Ref Range    WBC 10 46 (H) 4 31 - 10 16 Thousand/uL    RBC 4 20 3 81 - 5 12 Million/uL    Hemoglobin 11 7 11 5 - 15 4 g/dL    Hematocrit 39 3 34 8 - 46 1 %    MCV 94 82 - 98 fL    MCH 27 9 26 8 - 34 3 pg    MCHC 29 8 (L) 31 4 - 37 4 g/dL    RDW 14 4 11 6 - 15 1 %    MPV 10 8 8 9 - 12 7 fL    Platelets 093 970 - 202 Thousands/uL    nRBC 0 /100 WBCs    Neutrophils Relative 61 43 - 75 %    Immat GRANS % 1 0 - 2 %    Lymphocytes Relative 14 14 - 44 %    Monocytes Relative 12 4 - 12 %    Eosinophils Relative 11 (H) 0 - 6 %    Basophils Relative 1 0 - 1 %    Neutrophils Absolute 6 53 1 85 - 7 62 Thousands/µL    Immature Grans Absolute 0 05 0 00 - 0 20 Thousand/uL    Lymphocytes Absolute 1 41 0 60 - 4 47 Thousands/µL    Monocytes Absolute 1 25 (H) 0 17 - 1 22 Thousand/µL    Eosinophils Absolute 1 16 (H) 0 00 - 0 61 Thousand/µL    Basophils Absolute 0 06 0 00 - 0 10 Thousands/µL   Comprehensive metabolic panel    Collection Time: 11/12/21  6:25 PM   Result Value Ref Range    Sodium 140 136 - 145 mmol/L    Potassium 3 5 3 5 - 5 3 mmol/L    Chloride 100 100 - 108 mmol/L    CO2 33 (H) 21 - 32 mmol/L    ANION GAP 7 4 - 13 mmol/L    BUN 9 5 - 25 mg/dL Creatinine 0 85 0 60 - 1 30 mg/dL    Glucose 133 65 - 140 mg/dL    Calcium 8 9 8 3 - 10 1 mg/dL    AST 17 5 - 45 U/L    ALT 18 12 - 78 U/L    Alkaline Phosphatase 128 (H) 46 - 116 U/L    Total Protein 7 4 6 4 - 8 2 g/dL    Albumin 3 7 3 5 - 5 0 g/dL    Total Bilirubin 0 20 0 20 - 1 00 mg/dL    eGFR 67 ml/min/1 73sq m   Magnesium    Collection Time: 11/12/21  6:25 PM   Result Value Ref Range    Magnesium 2 2 1 6 - 2 6 mg/dL   HS Troponin 0hr (reflex protocol)    Collection Time: 11/12/21  6:25 PM   Result Value Ref Range    hs TnI 0hr 19 "Refer to ACS Flowchart"- see link ng/L   NT-BNP PRO    Collection Time: 11/12/21  6:25 PM   Result Value Ref Range    NT-proBNP 473 (H) <450 pg/mL   APTT    Collection Time: 11/12/21  7:32 PM   Result Value Ref Range    PTT 28 23 - 37 seconds   Protime-INR    Collection Time: 11/12/21  7:32 PM   Result Value Ref Range    Protime 12 6 11 6 - 14 5 seconds    INR 0 94 0 84 - 1 19   D-dimer, quantitative    Collection Time: 11/12/21  7:32 PM   Result Value Ref Range    D-Dimer, Quant 2 76 (H) <0 50 ug/ml FEU   HS Troponin I 2hr    Collection Time: 11/12/21  8:19 PM   Result Value Ref Range    hs TnI 2hr 20 "Refer to ACS Flowchart"- see link ng/L    Delta 2hr hsTnI 1 ng/L   HS Troponin I 4hr    Collection Time: 11/12/21 10:35 PM   Result Value Ref Range    hs TnI 4hr 18 "Refer to ACS Flowchart"- see link ng/L    Delta 4hr hsTnI -1 ng/L   APTT six (6) hours after Heparin bolus/drip initiation or dosing change    Collection Time: 11/13/21 12:12 AM   Result Value Ref Range    PTT 30 23 - 37 seconds   CBC    Collection Time: 11/13/21 12:12 AM   Result Value Ref Range    WBC 10 16 4 31 - 10 16 Thousand/uL    RBC 4 23 3 81 - 5 12 Million/uL    Hemoglobin 11 7 11 5 - 15 4 g/dL    Hematocrit 39 4 34 8 - 46 1 %    MCV 93 82 - 98 fL    MCH 27 7 26 8 - 34 3 pg    MCHC 29 7 (L) 31 4 - 37 4 g/dL    RDW 14 3 11 6 - 15 1 %    Platelets 985 (H) 925 - 390 Thousands/uL    MPV 10 6 8 9 - 12 7 fL Protime-INR    Collection Time: 11/13/21 12:12 AM   Result Value Ref Range    Protime 13 2 11 6 - 14 5 seconds    INR 1 00 0 84 - 1 19   APTT six (6) hours after Heparin bolus/drip initiation or dosing change    Collection Time: 11/13/21  7:15 AM   Result Value Ref Range    PTT 40 (H) 23 - 37 seconds   CBC and differential    Collection Time: 11/13/21  7:15 AM   Result Value Ref Range    WBC 8 76 4 31 - 10 16 Thousand/uL    RBC 4 07 3 81 - 5 12 Million/uL    Hemoglobin 11 4 (L) 11 5 - 15 4 g/dL    Hematocrit 38 5 34 8 - 46 1 %    MCV 95 82 - 98 fL    MCH 28 0 26 8 - 34 3 pg    MCHC 29 6 (L) 31 4 - 37 4 g/dL    RDW 14 4 11 6 - 15 1 %    MPV 10 5 8 9 - 12 7 fL    Platelets 380 486 - 329 Thousands/uL    nRBC 0 /100 WBCs    Neutrophils Relative 55 43 - 75 %    Immat GRANS % 1 0 - 2 %    Lymphocytes Relative 19 14 - 44 %    Monocytes Relative 12 4 - 12 %    Eosinophils Relative 12 (H) 0 - 6 %    Basophils Relative 1 0 - 1 %    Neutrophils Absolute 4 99 1 85 - 7 62 Thousands/µL    Immature Grans Absolute 0 04 0 00 - 0 20 Thousand/uL    Lymphocytes Absolute 1 64 0 60 - 4 47 Thousands/µL    Monocytes Absolute 1 03 0 17 - 1 22 Thousand/µL    Eosinophils Absolute 1 02 (H) 0 00 - 0 61 Thousand/µL    Basophils Absolute 0 04 0 00 - 0 10 Thousands/µL   Basic metabolic panel    Collection Time: 11/13/21  7:46 AM   Result Value Ref Range    Sodium 140 136 - 145 mmol/L    Potassium 3 7 3 5 - 5 3 mmol/L    Chloride 103 100 - 108 mmol/L    CO2 32 21 - 32 mmol/L    ANION GAP 5 4 - 13 mmol/L    BUN 7 5 - 25 mg/dL    Creatinine 0 72 0 60 - 1 30 mg/dL    Glucose 117 65 - 140 mg/dL    Calcium 8 7 8 3 - 10 1 mg/dL    eGFR 82 ml/min/1 73sq m   TSH, 3rd generation    Collection Time: 11/13/21  7:46 AM   Result Value Ref Range    TSH 3RD GENERATON 1 168 0 358 - 3 740 uIU/mL   Lipid panel    Collection Time: 11/13/21  7:46 AM   Result Value Ref Range    Cholesterol 161 50 - 200 mg/dL    Triglycerides 89 <=150 mg/dL    HDL, Direct 50 >=40 mg/dL    LDL Calculated 93 0 - 100 mg/dL    Non-HDL-Chol (CHOL-HDL) 111 mg/dl   APTT six (6) hours after Heparin bolus/drip initiation or dosing change    Collection Time: 11/13/21  4:04 PM   Result Value Ref Range    PTT 69 (H) 23 - 37 seconds   APTT six (6) hours after Heparin bolus/drip initiation or dosing change    Collection Time: 11/13/21 10:32 PM   Result Value Ref Range    PTT 65 (H) 23 - 37 seconds   APTT six (6) hours after Heparin bolus/drip initiation or dosing change    Collection Time: 11/14/21  5:04 AM   Result Value Ref Range    PTT 67 (H) 23 - 37 seconds   Procalcitonin    Collection Time: 11/14/21  5:04 AM   Result Value Ref Range    Procalcitonin <0 05 <=0 25 ng/ml   CBC and differential    Collection Time: 11/14/21  5:04 AM   Result Value Ref Range    WBC 8 72 4 31 - 10 16 Thousand/uL    RBC 3 97 3 81 - 5 12 Million/uL    Hemoglobin 10 7 (L) 11 5 - 15 4 g/dL    Hematocrit 37 3 34 8 - 46 1 %    MCV 94 82 - 98 fL    MCH 27 0 26 8 - 34 3 pg    MCHC 28 7 (L) 31 4 - 37 4 g/dL    RDW 14 4 11 6 - 15 1 %    MPV 11 1 8 9 - 12 7 fL    Platelets 717 013 - 267 Thousands/uL    nRBC 0 /100 WBCs    Neutrophils Relative 55 43 - 75 %    Immat GRANS % 0 0 - 2 %    Lymphocytes Relative 22 14 - 44 %    Monocytes Relative 12 4 - 12 %    Eosinophils Relative 10 (H) 0 - 6 %    Basophils Relative 1 0 - 1 %    Neutrophils Absolute 4 84 1 85 - 7 62 Thousands/µL    Immature Grans Absolute 0 02 0 00 - 0 20 Thousand/uL    Lymphocytes Absolute 1 94 0 60 - 4 47 Thousands/µL    Monocytes Absolute 1 02 0 17 - 1 22 Thousand/µL    Eosinophils Absolute 0 84 (H) 0 00 - 0 61 Thousand/µL    Basophils Absolute 0 06 0 00 - 0 10 Thousands/µL   Basic metabolic panel    Collection Time: 11/14/21  5:04 AM   Result Value Ref Range    Sodium 139 136 - 145 mmol/L    Potassium 4 0 3 5 - 5 3 mmol/L    Chloride 101 100 - 108 mmol/L    CO2 35 (H) 21 - 32 mmol/L    ANION GAP 3 (L) 4 - 13 mmol/L    BUN 6 5 - 25 mg/dL    Creatinine 0 77 0 60 - 1 30 mg/dL    Glucose 118 65 - 140 mg/dL    Calcium 8 9 8 3 - 10 1 mg/dL    eGFR 76 ml/min/1 73sq m   Echo complete    Collection Time: 11/14/21 11:20 AM   Result Value Ref Range    TV S' 0 9 cm/s    LVPWd 1 40 cm    MV E' Tissue Velocity Septal 8 cm/s    IVSd 3 32 cm    LV DIASTOLIC VOLUME (MOD BIPLANE) 2D 96 mL    LEFT VENTRICLE SYSTOLIC VOLUME (MOD BIPLANE) 2D 25 mL    Left ventricular stroke volume (2D) 71 00 mL    A4C EF 51 %    LVIDd 4 60 7 30 - 10 88 cm    LVIDS 2 60 2 1 - 4 0 cm    FS 43 28 - 44 %    Asc Ao 3 5 cm    Ao root 2 90 cm    RVID d 4 0 cm    E wave deceleration time 194 ms    MV Peak E Kian 92 cm/s    MV Peak A Kian 1 1 m/s    ROBERTO A4C 22 2 cm2    RAA A4C 13 8 cm2    MV stenosis pressure 1/2 time 0 ms    ZLVIDS -6 61     LV EF 65    APTT    Collection Time: 11/15/21  6:09 AM   Result Value Ref Range    PTT 73 (H) 23 - 37 seconds   CBC and differential    Collection Time: 11/15/21  6:09 AM   Result Value Ref Range    WBC 8 07 4 31 - 10 16 Thousand/uL    RBC 4 26 3 81 - 5 12 Million/uL    Hemoglobin 11 6 11 5 - 15 4 g/dL    Hematocrit 39 5 34 8 - 46 1 %    MCV 93 82 - 98 fL    MCH 27 2 26 8 - 34 3 pg    MCHC 29 4 (L) 31 4 - 37 4 g/dL    RDW 14 4 11 6 - 15 1 %    MPV 10 5 8 9 - 12 7 fL    Platelets 345 (H) 815 - 390 Thousands/uL    nRBC 0 /100 WBCs    Neutrophils Relative 51 43 - 75 %    Immat GRANS % 1 0 - 2 %    Lymphocytes Relative 21 14 - 44 %    Monocytes Relative 12 4 - 12 %    Eosinophils Relative 14 (H) 0 - 6 %    Basophils Relative 1 0 - 1 %    Neutrophils Absolute 4 20 1 85 - 7 62 Thousands/µL    Immature Grans Absolute 0 04 0 00 - 0 20 Thousand/uL    Lymphocytes Absolute 1 69 0 60 - 4 47 Thousands/µL    Monocytes Absolute 0 97 0 17 - 1 22 Thousand/µL    Eosinophils Absolute 1 12 (H) 0 00 - 0 61 Thousand/µL    Basophils Absolute 0 05 0 00 - 0 10 Thousands/µL   Basic metabolic panel    Collection Time: 11/15/21  6:09 AM   Result Value Ref Range    Sodium 138 136 - 145 mmol/L    Potassium 3 8 3 5 - 5 3 mmol/L    Chloride 100 100 - 108 mmol/L    CO2 34 (H) 21 - 32 mmol/L    ANION GAP 4 4 - 13 mmol/L    BUN 10 5 - 25 mg/dL    Creatinine 0 82 0 60 - 1 30 mg/dL    Glucose 118 65 - 140 mg/dL    Calcium 9 0 8 3 - 10 1 mg/dL    eGFR 70 ml/min/1 73sq m     Left Ventricle Left ventricular cavity size is normal  Wall thickness is mildly increased  There is mild concentric hypertrophy  The left ventricular ejection fraction is 65%  Systolic function is normal   Wall motion is normal  Diastolic function is mildly abnormal, consistent with grade I (abnormal) relaxation  Right Ventricle Right ventricular cavity size is normal  Systolic function is normal  Wall thickness is normal    Left Atrium The atrium is mildly dilated  Right Atrium The atrium is normal in size  Aortic Valve The aortic valve is trileaflet  The leaflets are not thickened  The leaflets are not calcified  The leaflets exhibit normal mobility  There is mild regurgitation  There is no evidence of stenosis  Mitral Valve The mitral valve has normal structure and function  There is mild regurgitation  There is no evidence of stenosis  Tricuspid Valve Tricuspid valve structure is normal  There is mild regurgitation  There is no evidence of stenosis  Pulmonic Valve Pulmonic valve structure is normal  There is no evidence of regurgitation  There is no evidence of stenosis  Ascending Aorta The aortic root is normal in size  IVC/SVC The inferior vena cava is normal in size  Pericardium There is no pericardial effusion  The pericardium is normal in appearance  Imaging: I have personally reviewed pertinent reports  Tele- nsr  VTE Pharmacologic Prophylaxis: Heparin      Counseling / Coordination of Care  Total time spent today 20 minutes  Greater than 50% of total time was spent with the patient and / or family counseling and / or coordination of care

## 2021-11-15 NOTE — PROGRESS NOTES
[facility]  Progress Note - [facility] 1945, 77 y o  female MRN: [id number]  Unit/Bed#: S -01 Encounter: [id number]  Primary Care Provider: Jenne Councilman, MD   Date and time admitted to hospital: 11/12/2021  5:29 PM    * Acute diastolic CHF (congestive heart failure) ([address] )  Assessment & Plan  Wt Readings from Last 3 Encounters:   11/14/21 114 kg (251 lb)   12/22/20 109 kg (240 lb)     · Presented with SOB and increased lower extremity edema  · No prior history of CHF noted  No prior echo for review  · Suspected acute CHF in the setting of new onset a-fib and uncontrolled HTN  ·   · CXR with mild vascular congestion  CTA chest negative for PE, "enlargement of the pulmonary trunk, and main right and left pulmonary arteries suggestive of pulmonary hypertension "  · Echo   EF 65 percent Grade 1 abnormal relaxation  · Continue IV Lasix  Diuresing well  Monitor BMP and electrolytes  · Will request  dietary for CHF instruction    Acute respiratory failure with hypoxia (HCC)  Assessment & Plan  · Mild, POA, with pulse ox 88-89% on RA on arrival    · In the setting of suspected acute CHF, new onset a-fib  · Diuresis as per above  · Pulse ox stable with 2L NC oxygen in place  · Does not use oxygen at baseline  · Wean O2 as tolerated  Ambulatory pulse ox prior to discharge  New onset atrial fibrillation [facility])  Assessment & Plan  · In the setting of suspected acute CHF  · Patient noted to be in atrial fibrillation on EKG on presentation  Uncertain of any prior history of a-fib but notes being told she had an abnormal heart rhythm several years ago  · [id number]: 5  · Converted to sinus  · TSH wnl  · Will do Eliquis price check today   May transition from heparin drip to PO AC afterwards    Erythema of lower extremity  Assessment & Plan  · On exam, patient with significant erythema and edema of both lower extremities with fluid weeping from lateral aspect of right lower leg  · Patient with history of chronic bilateral lower extremity edema and chronic venous insufficiency  Per review of records, patient also has a history of recurrent cellulitis and 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  · Venous doppler negative for DVT   · Patient denies recent fevers/ chills and has only mild leukocytosis on presentation but given erythema, drainage started on IV Ancef day 3 for suspected cellulitis   · Likely primarily due to edema  Edema control with IV diuresis as per above  · Will refer to lymphedema clinic outpt  Pt agrees    Chronic low back pain with sciatica  Assessment & Plan  · Continue outpatient pain medication regimen with OxyContin 20 mg q8h (PDMP verified)  · Patient also prescribed prn Percocet  mg q4h prn; substitute prn oxycodone 10 mg and prn Tylenol while inpatient  · Continue prn tizanidine  VTE Pharmacologic Prophylaxis:   Pharmacologic: Heparin Drip  Mechanical VTE Prophylaxis in Place: Yes    Patient Centered Rounds:    Discussions with Specialists or Other Care Team Provider:     Education and Discussions with Family / Patient:     Time Spent for Care: 20 minutes  More than 50% of total time spent on counseling and coordination of care as described above  Current Length of Stay: 3 day(s)    Current Patient Status: Inpatient   Certification Statement: The patient will continue to require additional inpatient hospital stay due to Requring IV lasix    Discharge Plan: CM to do Eliquis price check  Home when transitions to Po meds    Code Status: Level 1 - Full Code      Subjective:   Pt seen and examined  Breathing better  Maintained in sinus       Objective:     Vitals:   Temp (24hrs), Av 1 °F (36 7 °C), Min:97 7 °F (36 5 °C), Max:98 7 °F (37 1 °C)    Temp:  [97 7 °F (36 5 °C)-98 7 °F (37 1 °C)] 98 7 °F (37 1 °C)  HR:  [77-93] 93  Resp:  [18] 18  BP: (118-170)/(56-70) 170/70  SpO2:  [90 %-97 %] 92 %  Body mass index is 45 91 kg/m²  Input and Output Summary (last 24 hours): Intake/Output Summary (Last 24 hours) at 11/15/2021 1336  Last data filed at 11/15/2021 0509  Gross per 24 hour   Intake 720 ml   Output 2350 ml   Net -1630 ml       Physical Exam:     Physical Exam  Constitutional:       General: She is not in acute distress  Appearance: She is not ill-appearing, toxic-appearing or diaphoretic  Eyes:      General:         Right eye: No discharge  Left eye: No discharge  Cardiovascular:      Rate and Rhythm: Normal rate and regular rhythm  Pulses: Normal pulses  Heart sounds: No murmur heard  Pulmonary:      Effort: Pulmonary effort is normal  No respiratory distress  Breath sounds: Rales present  No wheezing  Abdominal:      General: Bowel sounds are normal  There is no distension  Palpations: Abdomen is soft  Tenderness: There is no abdominal tenderness  There is no guarding  Musculoskeletal:         General: Swelling present  Skin:     General: Skin is warm  Neurological:      General: No focal deficit present  Mental Status: She is oriented to person, place, and time  Psychiatric:         Mood and Affect: Mood normal          Behavior: Behavior normal          Thought Content:  Thought content normal          Additional Data:     Labs:    Results from last 7 days   Lab Units 11/15/21  0609   WBC Thousand/uL 8 07   HEMOGLOBIN g/dL 11 6   HEMATOCRIT % 39 5   PLATELETS Thousands/uL 391*   NEUTROS PCT % 51   LYMPHS PCT % 21   MONOS PCT % 12   EOS PCT % 14*     Results from last 7 days   Lab Units 11/15/21  0609 11/13/21  0746 11/12/21  1825   POTASSIUM mmol/L 3 8   < > 3 5   CHLORIDE mmol/L 100   < > 100   CO2 mmol/L 34*   < > 33*   BUN mg/dL 10   < > 9   CREATININE mg/dL 0 82   < > 0 85   CALCIUM mg/dL 9 0   < > 8 9   ALK PHOS U/L  --   --  128*   ALT U/L  --   --  18   AST U/L  --   --  17 < > = values in this interval not displayed  Results from last 7 days   Lab Units 11/13/21  0012   INR  1 00         Recent Cultures (last 7 days):           Last 24 Hours Medication List:   Current Facility-Administered Medications   Medication Dose Route Frequency Provider Last Rate    acetaminophen  650 mg Oral Q6H PRN Brendolyn Holden, PA-C      cefazolin  2,000 mg Intravenous Q8H Candida Arzate, PA-C 2,000 mg (11/15/21 0841)    diphenhydrAMINE  25 mg Oral Q6H PRN Obey Bravo MD      fluticasone  1 spray Each Nare Daily Brendolyn Holden, PA-C      furosemide  40 mg Intravenous BID Brendolyn Holden, PA-C      heparin (porcine)  3-20 Units/kg/hr (Order-Specific) Intravenous Titrated Brendolyn Holden, PA-C 15 Units/kg/hr (11/15/21 0744)    heparin (porcine)  2,000 Units Intravenous Q1H PRN Brendolyn Holden, PA-C      heparin (porcine)  4,000 Units Intravenous Q1H PRN Brendolyn Holden, PA-C      losartan  100 mg Oral Daily Brendolyn Holden, PA-C      ondansetron  4 mg Intravenous Q4H PRN Mikhail Lundy MD      oxyCODONE  20 mg Oral [facility] DanielolyAnnetta Corley      oxyCODONE  10 mg Oral Q6H PRN Brendolyn Holden, PA-C      potassium chloride  40 mEq Oral Daily Alexander Valverde PA-C      tiZANidine  4 mg Oral Q8H PRN Brendolyshaunna Hatch PA-C          Today, Patient Was Seen By: Mikhail Lundy MD    ** Please Note: Dictation voice to text software may have been used in the creation of this document   **

## 2021-11-15 NOTE — QUICK NOTE
Spoke with CM regarding to price check  Eliquis $ 120 a month and Xarelto $190 per month both price prohibitive for the Pt  Will discuss with card for alternative [facility] options such as coumadin

## 2021-11-16 PROBLEM — R21 RASH OF BACK: Status: ACTIVE | Noted: 2021-11-16

## 2021-11-16 LAB
APTT PPP: 51 SECONDS (ref 23–37)
APTT PPP: 56 SECONDS (ref 23–37)
INR PPP: 0.96 (ref 0.84–1.19)
PROTHROMBIN TIME: 12.8 SECONDS (ref 11.6–14.5)

## 2021-11-16 PROCEDURE — 85730 THROMBOPLASTIN TIME PARTIAL: CPT | Performed by: INTERNAL MEDICINE

## 2021-11-16 PROCEDURE — 85610 PROTHROMBIN TIME: CPT | Performed by: INTERNAL MEDICINE

## 2021-11-16 PROCEDURE — 99232 SBSQ HOSP IP/OBS MODERATE 35: CPT | Performed by: INTERNAL MEDICINE

## 2021-11-16 RX ORDER — DIAPER,BRIEF,INFANT-TODD,DISP
EACH MISCELLANEOUS 4 TIMES DAILY PRN
Status: DISCONTINUED | OUTPATIENT
Start: 2021-11-16 | End: 2021-11-17 | Stop reason: HOSPADM

## 2021-11-16 RX ORDER — FUROSEMIDE 10 MG/ML
80 INJECTION INTRAMUSCULAR; INTRAVENOUS
Status: DISCONTINUED | OUTPATIENT
Start: 2021-11-16 | End: 2021-11-17

## 2021-11-16 RX ORDER — WARFARIN SODIUM 5 MG/1
10 TABLET ORAL
Status: DISCONTINUED | OUTPATIENT
Start: 2021-11-16 | End: 2021-11-17 | Stop reason: HOSPADM

## 2021-11-16 RX ORDER — FUROSEMIDE 40 MG/1
40 TABLET ORAL
Status: DISCONTINUED | OUTPATIENT
Start: 2021-11-16 | End: 2021-11-16

## 2021-11-16 RX ORDER — CARVEDILOL 3.12 MG/1
3.12 TABLET ORAL 2 TIMES DAILY WITH MEALS
Status: DISCONTINUED | OUTPATIENT
Start: 2021-11-16 | End: 2021-11-17

## 2021-11-16 RX ORDER — FUROSEMIDE 10 MG/ML
40 INJECTION INTRAMUSCULAR; INTRAVENOUS ONCE
Status: COMPLETED | OUTPATIENT
Start: 2021-11-16 | End: 2021-11-16

## 2021-11-16 RX ADMIN — HEPARIN SODIUM 18.89 UNITS/KG/HR: 10000 INJECTION, SOLUTION INTRAVENOUS at 09:58

## 2021-11-16 RX ADMIN — CEFAZOLIN SODIUM 2000 MG: 2 SOLUTION INTRAVENOUS at 14:50

## 2021-11-16 RX ADMIN — CEFAZOLIN SODIUM 2000 MG: 2 SOLUTION INTRAVENOUS at 00:50

## 2021-11-16 RX ADMIN — HEPARIN SODIUM 2000 UNITS: 1000 INJECTION INTRAVENOUS; SUBCUTANEOUS at 06:44

## 2021-11-16 RX ADMIN — OXYCODONE HYDROCHLORIDE 20 MG: 20 TABLET, FILM COATED, EXTENDED RELEASE ORAL at 14:50

## 2021-11-16 RX ADMIN — HYDROCORTISONE: 1 CREAM TOPICAL at 19:25

## 2021-11-16 RX ADMIN — HEPARIN SODIUM 2000 UNITS: 1000 INJECTION INTRAVENOUS; SUBCUTANEOUS at 14:16

## 2021-11-16 RX ADMIN — DIPHENHYDRAMINE HCL 25 MG: 25 TABLET, COATED ORAL at 04:49

## 2021-11-16 RX ADMIN — WARFARIN SODIUM 10 MG: 5 TABLET ORAL at 20:03

## 2021-11-16 RX ADMIN — FLUTICASONE PROPIONATE 1 SPRAY: 50 SPRAY, METERED NASAL at 09:36

## 2021-11-16 RX ADMIN — FUROSEMIDE 40 MG: 10 INJECTION, SOLUTION INTRAMUSCULAR; INTRAVENOUS at 09:35

## 2021-11-16 RX ADMIN — OXYCODONE HYDROCHLORIDE 20 MG: 20 TABLET, FILM COATED, EXTENDED RELEASE ORAL at 04:49

## 2021-11-16 RX ADMIN — CEFAZOLIN SODIUM 2000 MG: 2 SOLUTION INTRAVENOUS at 22:47

## 2021-11-16 RX ADMIN — CARVEDILOL 3.12 MG: 3.12 TABLET, FILM COATED ORAL at 17:44

## 2021-11-16 RX ADMIN — CEFAZOLIN SODIUM 2000 MG: 2 SOLUTION INTRAVENOUS at 09:36

## 2021-11-16 RX ADMIN — DIPHENHYDRAMINE HCL 25 MG: 25 TABLET, COATED ORAL at 17:44

## 2021-11-16 RX ADMIN — FUROSEMIDE 40 MG: 10 INJECTION, SOLUTION INTRAMUSCULAR; INTRAVENOUS at 12:47

## 2021-11-16 RX ADMIN — OXYCODONE HYDROCHLORIDE 20 MG: 20 TABLET, FILM COATED, EXTENDED RELEASE ORAL at 22:28

## 2021-11-16 RX ADMIN — DIPHENHYDRAMINE HCL 25 MG: 25 TABLET, COATED ORAL at 12:46

## 2021-11-16 RX ADMIN — LOSARTAN POTASSIUM 100 MG: 50 TABLET, FILM COATED ORAL at 09:35

## 2021-11-16 RX ADMIN — POTASSIUM CHLORIDE 40 MEQ: 1500 TABLET, EXTENDED RELEASE ORAL at 09:35

## 2021-11-16 RX ADMIN — FUROSEMIDE 80 MG: 10 INJECTION, SOLUTION INTRAVENOUS at 17:43

## 2021-11-16 NOTE — ASSESSMENT & PLAN NOTE
· Mild, POA, with pulse ox 88-89% on RA on arrival    · In the setting of suspected acute CHF, new onset a-fib  · Diuresis as per above

## 2021-11-16 NOTE — ASSESSMENT & PLAN NOTE
· With hypertensive urgency POA  · Now improved  · Continue Coreg 3 125 b i d , losartan 100 mg daily, Lasix 80 mg IV b i d  For now

## 2021-11-16 NOTE — ASSESSMENT & PLAN NOTE
· In the setting of suspected acute CHF  · Patient noted to be in atrial fibrillation on EKG on presentation  Uncertain of any prior history of a-fib but notes being told she had an abnormal heart rhythm several years ago  · [id number]: 5  · Converted to sinus  · TSH wnl  · Coreg 3 125 b i d  Viridiana Palacios · Patient unable to afford Eliquis or sore although  Discussed with patient  She is okay with being on Coumadin as she has been on many years ago  · Will start Coumadin  Will bridge with heparin since this is new for patient

## 2021-11-16 NOTE — PLAN OF CARE
Problem: Potential for Falls  Goal: Patient will remain free of falls  Description: INTERVENTIONS:  - Educate patient/family on patient safety including physical limitations  - Instruct patient to call for assistance with activity   - Consult OT/PT to assist with strengthening/mobility   - Keep Call bell within reach  - Keep bed low and locked with side rails adjusted as appropriate  - Keep care items and personal belongings within reach  - Initiate and maintain comfort rounds  - Make Fall Risk Sign visible to staff  - Offer Toileting every  Hours, in advance of need  - Initiate/Maintain alarm  - Obtain necessary fall risk management equipment:   - Apply yellow socks and bracelet for high fall risk patients  - Consider moving patient to room near nurses station  Outcome: Progressing     Problem: MOBILITY - ADULT  Goal: Maintain or return to baseline ADL function  Description: INTERVENTIONS:  -  Assess patient's ability to carry out ADLs; assess patient's baseline for ADL function and identify physical deficits which impact ability to perform ADLs (bathing, care of mouth/teeth, toileting, grooming, dressing, etc )  - Assess/evaluate cause of self-care deficits   - Assess range of motion  - Assess patient's mobility; develop plan if impaired  - Assess patient's need for assistive devices and provide as appropriate  - Encourage maximum independence but intervene and supervise when necessary  - Involve family in performance of ADLs  - Assess for home care needs following discharge   - Consider OT consult to assist with ADL evaluation and planning for discharge  - Provide patient education as appropriate  Outcome: Progressing  Goal: Maintains/Returns to pre admission functional level  Description: INTERVENTIONS:  - Perform BMAT or MOVE assessment daily    - Set and communicate daily mobility goal to care team and patient/family/caregiver     - Collaborate with rehabilitation services on mobility goals if consulted  - Perform Range of Motion  times a day  - Reposition patient every  hours  - Dangle patient  times a day  - Stand patient  times a day  - Ambulate patient  times a day  - Out of bed to chair  times a day   - Out of bed for meals  times a day  - Out of bed for toileting  - Record patient progress and toleration of activity level   Outcome: Progressing     Problem: Prexisting or High Potential for Compromised Skin Integrity  Goal: Skin integrity is maintained or improved  Description: INTERVENTIONS:  - Identify patients at risk for skin breakdown  - Assess and monitor skin integrity  - Assess and monitor nutrition and hydration status  - Monitor labs   - Assess for incontinence   - Turn and reposition patient  - Assist with mobility/ambulation  - Relieve pressure over bony prominences  - Avoid friction and shearing  - Provide appropriate hygiene as needed including keeping skin clean and dry  - Evaluate need for skin moisturizer/barrier cream  - Collaborate with interdisciplinary team   - Patient/family teaching  - Consider wound care consult   Outcome: Progressing     Problem: Nutrition/Hydration-ADULT  Goal: Nutrient/Hydration intake appropriate for improving, restoring or maintaining nutritional needs  Description: Monitor and assess patient's nutrition/hydration status for malnutrition  Collaborate with interdisciplinary team and initiate plan and interventions as ordered  Monitor patient's weight and dietary intake as ordered or per policy  Utilize nutrition screening tool and intervene as necessary  Determine patient's food preferences and provide high-protein, high-caloric foods as appropriate       INTERVENTIONS:  - Monitor oral intake, urinary output, labs, and treatment plans  - Assess nutrition and hydration status and recommend course of action  - Evaluate amount of meals eaten  - Assist patient with eating if necessary   - Allow adequate time for meals  - Recommend/ encourage appropriate diets, oral nutritional supplements, and vitamin/mineral supplements  - Order, calculate, and assess calorie counts as needed  - Recommend, monitor, and adjust tube feedings and TPN/PPN based on assessed needs  - Assess need for intravenous fluids  - Provide specific nutrition/hydration education as appropriate  - Include patient/family/caregiver in decisions related to nutrition  Outcome: Progressing     Problem: PAIN - ADULT  Goal: Verbalizes/displays adequate comfort level or baseline comfort level  Description: Interventions:  - Encourage patient to monitor pain and request assistance  - Assess pain using appropriate pain scale  - Administer analgesics based on type and severity of pain and evaluate response  - Implement non-pharmacological measures as appropriate and evaluate response  - Consider cultural and social influences on pain and pain management  - Notify physician/advanced practitioner if interventions unsuccessful or patient reports new pain  Outcome: Progressing     Problem: INFECTION - ADULT  Goal: Absence or prevention of progression during hospitalization  Description: INTERVENTIONS:  - Assess and monitor for signs and symptoms of infection  - Monitor lab/diagnostic results  - Monitor all insertion sites, i e  indwelling lines, tubes, and drains  - Monitor endotracheal if appropriate and nasal secretions for changes in amount and color  - [place] appropriate cooling/warming therapies per order  - Administer medications as ordered  - Instruct and encourage patient and family to use good hand hygiene technique  - Identify and instruct in appropriate isolation precautions for identified infection/condition  Outcome: Progressing  Goal: Absence of fever/infection during neutropenic period  Description: INTERVENTIONS:  - Monitor WBC    Outcome: Progressing     Problem: SAFETY ADULT  Goal: Patient will remain free of falls  Description: INTERVENTIONS:  - Educate patient/family on patient safety including physical limitations  - Instruct patient to call for assistance with activity   - Consult OT/PT to assist with strengthening/mobility   - Keep Call bell within reach  - Keep bed low and locked with side rails adjusted as appropriate  - Keep care items and personal belongings within reach  - Initiate and maintain comfort rounds  - Make Fall Risk Sign visible to staff  - Offer Toileting every  Hours, in advance of need  - Initiate/Maintain larm  - Obtain necessary fall risk management equipment: - Apply yellow socks and bracelet for high fall risk patients  - Consider moving patient to room near nurses station  Outcome: Progressing  Goal: Maintain or return to baseline ADL function  Description: INTERVENTIONS:  -  Assess patient's ability to carry out ADLs; assess patient's baseline for ADL function and identify physical deficits which impact ability to perform ADLs (bathing, care of mouth/teeth, toileting, grooming, dressing, etc )  - Assess/evaluate cause of self-care deficits   - Assess range of motion  - Assess patient's mobility; develop plan if impaired  - Assess patient's need for assistive devices and provide as appropriate  - Encourage maximum independence but intervene and supervise when necessary  - Involve family in performance of ADLs  - Assess for home care needs following discharge   - Consider OT consult to assist with ADL evaluation and planning for discharge  - Provide patient education as appropriate  Outcome: Progressing  Goal: Maintains/Returns to pre admission functional level  Description: INTERVENTIONS:  - Perform BMAT or MOVE assessment daily    - Set and communicate daily mobility goal to care team and patient/family/caregiver  - Collaborate with rehabilitation services on mobility goals if consulted  - Perform Range of Motion  times a day  - Reposition patient every  hours    - Dangle patient  times a day  - Stand patient  times a day  - Ambulate patient  times a day  - Out of bed to chair  times a day   - Out of bed for meals  times a day  - Out of bed for toileting  - Record patient progress and toleration of activity level   Outcome: Progressing     Problem: Knowledge Deficit  Goal: Patient/family/caregiver demonstrates understanding of disease process, treatment plan, medications, and discharge instructions  Description: Complete learning assessment and assess knowledge base    Interventions:  - Provide teaching at level of understanding  - Provide teaching via preferred learning methods  Outcome: Progressing     Problem: DISCHARGE PLANNING  Goal: Discharge to home or other facility with appropriate resources  Description: INTERVENTIONS:  - Identify barriers to discharge w/patient and caregiver  - Arrange for needed discharge resources and transportation as appropriate  - Identify discharge learning needs (meds, wound care, etc )  - Arrange for interpretive services to assist at discharge as needed  - Refer to Case Management Department for coordinating discharge planning if the patient needs post-hospital services based on physician/advanced practitioner order or complex needs related to functional status, cognitive ability, or social support system  Outcome: Progressing

## 2021-11-16 NOTE — PROGRESS NOTES
[facility]  [facility] 1945[facility] 77 [facility]  [facility] MRN: [id number]  Unit/Bed#: S -01 Encounter: [id number]  Primary Care Provider: Courtney Yin MD   Date and time admitted to hospital: 11/12/2021  5:29 PM    * Acute diastolic CHF (congestive heart failure) ([facility] [address] )  Assessment & Plan  Wt Readings from Last 3 Encounters:   11/14/21 114 kg (251 lb)   12/22/20 109 kg (240 lb)     · Presented with SOB and increased lower extremity edema  · No prior history of CHF noted  No prior echo for review  · Suspected acute CHF in the setting of new onset a-fib and uncontrolled HTN  ·   · CXR with mild vascular congestion  CTA chest negative for PE, "enlargement of the pulmonary trunk, and main right and left pulmonary arteries suggestive of pulmonary hypertension "  · Echo   EF 65 percent Grade 1 abnormal relaxation  · Continue IV Lasix  Diuresing well  Monitor BMP and electrolytes  Possibly switch to p o  Tomorrow    New onset atrial fibrillation [facility])  Assessment & Plan  · In the setting of suspected acute CHF  · Patient noted to be in atrial fibrillation on EKG on presentation  Uncertain of any prior history of a-fib but notes being told she had an abnormal heart rhythm several years ago  · [id number]: 5  · Converted to sinus  · TSH wnl  · Coreg 3 125 b i d  Rohan Muñoz · Patient unable to afford Eliquis or sore although  Discussed with patient  She is okay with being on Coumadin as she has been on many years ago  · Will start Coumadin  Will bridge with heparin since this is new for patient  Rash of back  Assessment & Plan  Patient said that she has been having intermittent itching for almost 2 weeks prior to her coming to the hospital   It has not gotten worse but she continues to have itching  She was taking p r n  Benadryl at home  Currently getting Benadryl but not helping much  Suspect most likely allergic dermatitis    Will order topical cortisone  Hypertension  Assessment & Plan  · With hypertensive urgency POA  · Now improved  · Continue Coreg 3 125 b i d , losartan 100 mg daily, Lasix 80 mg IV b i d  For now  Acute respiratory failure with hypoxia (HCC)  Assessment & Plan  · Mild, POA, with pulse ox 88-89% on RA on arrival    · In the setting of suspected acute CHF, new onset a-fib  · Diuresis as per above  VTE Pharmacologic Prophylaxis:   Pharmacologic: Heparin Drip  Mechanical VTE Prophylaxis in Place: Yes    Patient Centered Rounds: I have performed bedside rounds with nursing staff today  Discussions with Specialists or Other Care Team Provider:     Education and Discussions with Family / Patient: pt  She said she will call her     Time Spent for Care: 20 minutes  More than 50% of total time spent on counseling and coordination of care as described above  Current Length of Stay: 4 day(s)    Current Patient Status: Inpatient   Certification Statement: The patient will continue to require additional inpatient hospital stay due to above    Discharge Plan: 2-3 days    Code Status: Level 1 - Full Code      Subjective:   Pt seen and examined by me this morning  Pt complain of itching on her back  No other complaints  Objective:     Vitals:   Temp (24hrs), Av 4 °F (36 9 °C), Min:97 5 °F (36 4 °C), Max:99 3 °F (37 4 °C)    Temp:  [97 5 °F (36 4 °C)-99 3 °F (37 4 °C)] 99 3 °F (37 4 °C)  HR:  [80-86] 86  Resp:  [18] 18  BP: (140-177)/(76-88) 140/88  SpO2:  [92 %-96 %] 92 %  Body mass index is 45 91 kg/m²  Input and Output Summary (last 24 hours): Intake/Output Summary (Last 24 hours) at 2021 1651  Last data filed at 2021 1254  Gross per 24 hour   Intake 960 ml   Output 1550 ml   Net -590 ml       Physical Exam:     Physical Exam    Constitutional: Pt appears comfortable  Not in any acute distress  Cardiovascular: Normal rate, regular rhythm, normal heart sounds    No murmur heard   Pulmonary/Chest: Effort normal, air entry b/l equal  No respiratory distress  Pt has no wheezes or crackles  Abdominal: Soft  Non-distended, Non-tender  Bowel sounds are normal    Skin:  Faint macular rash on the back  Neurological: awake, alert  Moving all extremities spontaneously  Psychiatric: normal mood and affect  Additional Data:     Labs:    Results from last 7 days   Lab Units 11/15/21  0609   WBC Thousand/uL 8 07   HEMOGLOBIN g/dL 11 6   HEMATOCRIT % 39 5   PLATELETS Thousands/uL 391*   NEUTROS PCT % 51   LYMPHS PCT % 21   MONOS PCT % 12   EOS PCT % 14*     Results from last 7 days   Lab Units 11/15/21  0609 11/13/21  0746 11/12/21  1825   SODIUM mmol/L 138   < > 140   POTASSIUM mmol/L 3 8   < > 3 5   CHLORIDE mmol/L 100   < > 100   CO2 mmol/L 34*   < > 33*   BUN mg/dL 10   < > 9   CREATININE mg/dL 0 82   < > 0 85   ANION GAP mmol/L 4   < > 7   CALCIUM mg/dL 9 0   < > 8 9   ALBUMIN g/dL  --   --  3 7   TOTAL BILIRUBIN mg/dL  --   --  0 20   ALK PHOS U/L  --   --  128*   ALT U/L  --   --  18   AST U/L  --   --  17   GLUCOSE RANDOM mg/dL 118   < > 133    < > = values in this interval not displayed  Results from last 7 days   Lab Units 11/13/21  0012   INR  1 00             Results from last 7 days   Lab Units 11/15/21  0609 11/14/21  0504   PROCALCITONIN ng/ml <0 05 <0 05           * I Have Reviewed All Lab Data Listed Above  * Additional Pertinent Lab Tests Reviewed:  [address] 67 Admission Reviewed    Imaging:    Imaging Reports Reviewed Today Include:   Imaging Personally Reviewed by Myself Includes:      Recent Cultures (last 7 days):           Last 24 Hours Medication List:   Current Facility-Administered Medications   Medication Dose Route Frequency Provider Last Rate    acetaminophen  650 mg Oral Q6H PRN Talon Cunningham PA-C      carvedilol  3 125 mg Oral BID With Meals SHAHIDA Mota      cefazolin  2,000 mg Intravenous Q8H Candida Arzate PA-C 2,000 mg (11/16/21 1450)    diphenhydrAMINE  25 mg Oral Q6H PRN Karen Ramos MD      fluticasone  1 spray Each Nare Daily Wendy Hartman PA-C      furosemide  80 mg Intravenous BID (diuretic) Raquel Daigle MD      heparin (porcine)  3-20 Units/kg/hr (Order-Specific) Intravenous Titrated SAM Escobar-C 68 716 Units/kg/hr (11/16/21 0958)    heparin (porcine)  2,000 Units Intravenous Q1H PRN SAM Escobar-JAIR      heparin (porcine)  4,000 Units Intravenous Q1H PRN Wendy Hartman PA-C      hydrocortisone   Topical 4x Daily PRN Huy Soto MD      losartan  100 mg Oral Daily Wendy Hartman PA-C      ondansetron  4 mg Intravenous Q4H PRN Anurag Purvis MD      oxyCODONE  20 mg Oral [facility] Wendy Hartman [place]      oxyCODONE  10 mg Oral Q6H PRN Wendy Hartman PA-C      potassium chloride  40 mEq Oral Daily Alexander Tuttle [place]      tiZANidine  4 mg Oral Q8H PRN Wendy Hartman PA-C      warfarin  10 mg Oral Daily (warfarin) Huy Soto MD          Today, Patient Was Seen By: Huy Soto MD    ** Please Note: Dictation voice to text software may have been used in the creation of this document   **

## 2021-11-16 NOTE — ASSESSMENT & PLAN NOTE
Patient said that she has been having intermittent itching for almost 2 weeks prior to her coming to the hospital   It has not gotten worse but she continues to have itching  She was taking p r n  Benadryl at home  Currently getting Benadryl but not helping much  Suspect most likely allergic dermatitis  Will order topical cortisone

## 2021-11-16 NOTE — DISCHARGE INSTRUCTIONS
Take your medications as directed, and keep your follow up appointments  Adhere to a heart healthy lifestyle, maintaining a low sodium diet  Daily weight and record    If your weight increases 2-3 lbs in one day, or 5 lbs in 2 days, you are short of breath or have lower extremity swelling, please call Nurse Julio Cesar Rodriguez at  [place] [address] office  at [phone]

## 2021-11-16 NOTE — ASSESSMENT & PLAN NOTE
Wt Readings from Last 3 Encounters:   11/14/21 114 kg (251 lb)   12/22/20 109 kg (240 lb)     · Presented with SOB and increased lower extremity edema  · No prior history of CHF noted  No prior echo for review  · Suspected acute CHF in the setting of new onset a-fib and uncontrolled HTN  ·   · CXR with mild vascular congestion  CTA chest negative for PE, "enlargement of the pulmonary trunk, and main right and left pulmonary arteries suggestive of pulmonary hypertension "  · Echo   EF 65 percent Grade 1 abnormal relaxation  · Continue IV Lasix  Diuresing well  Monitor BMP and electrolytes  Possibly switch to p o   Tomorrow

## 2021-11-16 NOTE — PROGRESS NOTES
Progress Note - Cardiology   Lew Klein 77 y o  female MRN: [id number]  Unit/Bed#: S -01 Encounter: [id number]        Principal Problem:    Acute diastolic CHF (congestive heart failure) (HCC)  Active Problems:    New onset atrial fibrillation (HCC)    Acute respiratory failure with hypoxia (HCC)    Hypertension    Erythema of lower extremity    Chronic low back pain with sciatica           Assessment/Plan     1  Acute respiratory failure- suspect component of diastolic dysfunction  Patient with evidence of volume overload-likely multifactorial due to diastolic dysfunction/venous insufficiency/high sodium intake/ immobility      TTE-LVEF 65% with grade 1 diastolic dysfunction  Mild AI and mild MR mild TR  Lasix IV 40 mg b i d  24 hour net -1190   Patient and  want to go home tomorrow  Will transition to oral diuretic- lasix 40mg PO BID  CT of the chest-no central or segmental PE  CHF teaching, ongoing  Patient describes her breathing is significantly improved  Currently without her oxygen  Will check pulse ox and likely discontinue oxygen       2  Paroxysmal atrial fibrillation-symptomatic  New diagnosis   Presented with controlled heart rates spontaneously converted to sinus rhythm  In the he setting of volume overload  [id number]=9/5, currently on IV heparin  Recommend transition oral AC  SLIM price checking  Coumadin may be only affordable option       3  HTN-labile  -170 primarily on the higher end  Monitor with diuresis  2 g sodium restriction-patient needs dietary education  On losartan 100  PTA amlodipine 5 mg b i d  ( Presumably voiding due to edema ) And HCTZ 12 5 on hold  Will  Not resume HCTZ , will be on loop diuretic    Start carvedilol 3 125 b i d     4  Venous insufficiency/lymphedema-recommend outpatient vascular follow-up     Subjective/Objective   Chief Complaint/Subjective  Breathing feels back to normal   Has really not done much in the way of ambulation  No chest pain or pressure  She says her legs feel significantly improved        Vitals: BP (!) 177/76 (BP Location: Left arm)   Pulse 80   Temp 97 5 °F (36 4 °C) (Oral)   Resp 18   Ht 5' 2" (1 575 m)   Wt 114 kg (251 lb)   SpO2 96%   BMI 45 91 kg/m²     Vitals:    11/14/21 0502 11/14/21 1117   Weight: 114 kg (251 lb 5 2 oz) 114 kg (251 lb)     Orthostatic Blood Pressures      Most Recent Value   Blood Pressure 177/76 filed at 11/16/2021 0705   Patient Position - Orthostatic VS Sitting filed at 11/16/2021 0705            Intake/Output Summary (Last 24 hours) at 11/16/2021 1132  Last data filed at 11/15/2021 1949  Gross per 24 hour   Intake 360 ml   Output 1550 ml   Net -1190 ml       Invasive Devices  Report    Peripheral Intravenous Line            Peripheral IV 11/12/21 Right;Dorsal (posterior) Forearm 3 days    Peripheral IV 11/13/21 Right Antecubital 3 days          Drain            External Urinary Catheter -- days                Current Facility-Administered Medications   Medication Dose Route Frequency    acetaminophen (TYLENOL) tablet 650 mg  650 mg Oral Q6H PRN    ceFAZolin (ANCEF) IVPB (premix in dextrose) 2,000 mg 50 mL  2,000 mg Intravenous Q8H    diphenhydrAMINE (BENADRYL) tablet 25 mg  25 mg Oral Q6H PRN    fluticasone (FLONASE) 50 mcg/act nasal spray 1 spray  1 spray Each Nare Daily    furosemide (LASIX) injection 40 mg  40 mg Intravenous BID    heparin (porcine) 25,000 units in 0 45% NaCl 250 mL infusion (premix)  3-20 Units/kg/hr (Order-Specific) Intravenous Titrated    heparin (porcine) injection 2,000 Units  2,000 Units Intravenous Q1H PRN    heparin (porcine) injection 4,000 Units  4,000 Units Intravenous Q1H PRN    losartan (COZAAR) tablet 100 mg  100 mg Oral Daily    ondansetron (ZOFRAN) injection 4 mg  4 mg Intravenous Q4H PRN    oxyCODONE (OxyCONTIN) 12 hr tablet 20 mg  20 mg Oral Q8H [facility]    oxyCODONE (ROXICODONE) immediate release tablet 10 mg  10 mg Oral Q6H PRN    potassium chloride (K-DUR,KLOR-CON) CR tablet 40 mEq  40 mEq Oral Daily    tiZANidine (ZANAFLEX) tablet 4 mg  4 mg Oral Q8H PRN         Physical Exam: BP (!) 177/76 (BP Location: Left arm)   Pulse 80   Temp 97 5 °F (36 4 °C) (Oral)   Resp 18   Ht 5' 2" (1 575 m)   Wt 114 kg (251 lb)   SpO2 96%   BMI 45 91 kg/m²     General Appearance:    Alert, cooperative, no distress, appears stated age  Mildly obese   Head:    Normocephalic, no scleral icterus   Eyes:    PERRL   Nose:   Nares normal, septum midline, no drainage    Throat:   Lips, mucosa, and tongue normal   Neck:   Supple, symmetrical, trachea midline,              Lungs:     Clear to auscultation bilaterally, respirations unlabored        Heart:    Regular rate and rhythm, S1 and S2 normal, no murmur, rub   or gallop   Abdomen:     Soft, large     Extremities:   Extremities normal, atraumatic, no cyanosis , chronic but improved edema       Skin:   Skin warm   Neurologic:   Alert and oriented to person place and time, no focal deficits                 Lab Results:   Recent Results (from the past 72 hour(s))   APTT six (6) hours after Heparin bolus/drip initiation or dosing change    Collection Time: 11/13/21  4:04 PM   Result Value Ref Range    PTT 69 (H) 23 - 37 seconds   APTT six (6) hours after Heparin bolus/drip initiation or dosing change    Collection Time: 11/13/21 10:32 PM   Result Value Ref Range    PTT 65 (H) 23 - 37 seconds   APTT six (6) hours after Heparin bolus/drip initiation or dosing change    Collection Time: 11/14/21  5:04 AM   Result Value Ref Range    PTT 67 (H) 23 - 37 seconds   Procalcitonin    Collection Time: 11/14/21  5:04 AM   Result Value Ref Range    Procalcitonin <0 05 <=0 25 ng/ml   CBC and differential    Collection Time: 11/14/21  5:04 AM   Result Value Ref Range    WBC 8 72 4 31 - 10 16 Thousand/uL    RBC 3 97 3 81 - 5 12 Million/uL    Hemoglobin 10 7 (L) 11 5 - 15 4 g/dL    Hematocrit 37 3 34 8 - 46 1 %    MCV 94 82 - 98 fL    MCH 27 0 26 8 - 34 3 pg    MCHC 28 7 (L) 31 4 - 37 4 g/dL    RDW 14 4 11 6 - 15 1 %    MPV 11 1 8 9 - 12 7 fL    Platelets 508 162 - 004 Thousands/uL    nRBC 0 /100 WBCs    Neutrophils Relative 55 43 - 75 %    Immat GRANS % 0 0 - 2 %    Lymphocytes Relative 22 14 - 44 %    Monocytes Relative 12 4 - 12 %    Eosinophils Relative 10 (H) 0 - 6 %    Basophils Relative 1 0 - 1 %    Neutrophils Absolute 4 84 1 85 - 7 62 Thousands/µL    Immature Grans Absolute 0 02 0 00 - 0 20 Thousand/uL    Lymphocytes Absolute 1 94 0 60 - 4 47 Thousands/µL    Monocytes Absolute 1 02 0 17 - 1 22 Thousand/µL    Eosinophils Absolute 0 84 (H) 0 00 - 0 61 Thousand/µL    Basophils Absolute 0 06 0 00 - 0 10 Thousands/µL   Basic metabolic panel    Collection Time: 11/14/21  5:04 AM   Result Value Ref Range    Sodium 139 136 - 145 mmol/L    Potassium 4 0 3 5 - 5 3 mmol/L    Chloride 101 100 - 108 mmol/L    CO2 35 (H) 21 - 32 mmol/L    ANION GAP 3 (L) 4 - 13 mmol/L    BUN 6 5 - 25 mg/dL    Creatinine 0 77 0 60 - 1 30 mg/dL    Glucose 118 65 - 140 mg/dL    Calcium 8 9 8 3 - 10 1 mg/dL    eGFR 76 ml/min/1 73sq m   Echo complete    Collection Time: 11/14/21 11:20 AM   Result Value Ref Range    TV S' 0 9 cm/s    LVPWd 1 40 cm    MV E' Tissue Velocity Septal 8 cm/s    IVSd 9 16 cm    LV DIASTOLIC VOLUME (MOD BIPLANE) 2D 96 mL    LEFT VENTRICLE SYSTOLIC VOLUME (MOD BIPLANE) 2D 25 mL    Left ventricular stroke volume (2D) 71 00 mL    A4C EF 51 %    LVIDd 4 60 7 30 - 10 88 cm    LVIDS 2 60 2 1 - 4 0 cm    FS 43 28 - 44 %    Asc Ao 3 5 cm    Ao root 2 90 cm    RVID d 4 0 cm    E wave deceleration time 194 ms    MV Peak E Kian 92 cm/s    MV Peak A Kian 1 1 m/s    ROBERTO A4C 22 2 cm2    RAA A4C 13 8 cm2    MV stenosis pressure 1/2 time 0 ms    ZLVIDS -6 61     LV EF 65    Procalcitonin Reflex    Collection Time: 11/15/21  6:09 AM   Result Value Ref Range    Procalcitonin <0 05 <=0 25 ng/ml   APTT    Collection Time: 11/15/21  6:09 AM   Result Value Ref Range    PTT 73 (H) 23 - 37 seconds   CBC and differential    Collection Time: 11/15/21  6:09 AM   Result Value Ref Range    WBC 8 07 4 31 - 10 16 Thousand/uL    RBC 4 26 3 81 - 5 12 Million/uL    Hemoglobin 11 6 11 5 - 15 4 g/dL    Hematocrit 39 5 34 8 - 46 1 %    MCV 93 82 - 98 fL    MCH 27 2 26 8 - 34 3 pg    MCHC 29 4 (L) 31 4 - 37 4 g/dL    RDW 14 4 11 6 - 15 1 %    MPV 10 5 8 9 - 12 7 fL    Platelets 970 (H) 574 - 390 Thousands/uL    nRBC 0 /100 WBCs    Neutrophils Relative 51 43 - 75 %    Immat GRANS % 1 0 - 2 %    Lymphocytes Relative 21 14 - 44 %    Monocytes Relative 12 4 - 12 %    Eosinophils Relative 14 (H) 0 - 6 %    Basophils Relative 1 0 - 1 %    Neutrophils Absolute 4 20 1 85 - 7 62 Thousands/µL    Immature Grans Absolute 0 04 0 00 - 0 20 Thousand/uL    Lymphocytes Absolute 1 69 0 60 - 4 47 Thousands/µL    Monocytes Absolute 0 97 0 17 - 1 22 Thousand/µL    Eosinophils Absolute 1 12 (H) 0 00 - 0 61 Thousand/µL    Basophils Absolute 0 05 0 00 - 0 10 Thousands/µL   Basic metabolic panel    Collection Time: 11/15/21  6:09 AM   Result Value Ref Range    Sodium 138 136 - 145 mmol/L    Potassium 3 8 3 5 - 5 3 mmol/L    Chloride 100 100 - 108 mmol/L    CO2 34 (H) 21 - 32 mmol/L    ANION GAP 4 4 - 13 mmol/L    BUN 10 5 - 25 mg/dL    Creatinine 0 82 0 60 - 1 30 mg/dL    Glucose 118 65 - 140 mg/dL    Calcium 9 0 8 3 - 10 1 mg/dL    eGFR 70 ml/min/1 73sq m   APTT    Collection Time: 11/16/21  5:13 AM   Result Value Ref Range    PTT 51 (H) 23 - 37 seconds     Imaging: I have personally reviewed pertinent reports  Findings    Left Ventricle Left ventricular cavity size is normal  Wall thickness is mildly increased  There is mild concentric hypertrophy  The left ventricular ejection fraction is 65%  Systolic function is normal   Wall motion is normal  Diastolic function is mildly abnormal, consistent with grade I (abnormal) relaxation     Right Ventricle Right ventricular cavity size is normal  Systolic function is normal  Wall thickness is normal    Left Atrium The atrium is mildly dilated  Right Atrium The atrium is normal in size  Aortic Valve The aortic valve is trileaflet  The leaflets are not thickened  The leaflets are not calcified  The leaflets exhibit normal mobility  There is mild regurgitation  There is no evidence of stenosis  Mitral Valve The mitral valve has normal structure and function  There is mild regurgitation  There is no evidence of stenosis  Tricuspid Valve Tricuspid valve structure is normal  There is mild regurgitation  There is no evidence of stenosis  Pulmonic Valve Pulmonic valve structure is normal  There is no evidence of regurgitation  There is no evidence of stenosis  Ascending Aorta The aortic root is normal in size  IVC/SVC The inferior vena cava is normal in size  Pericardium There is no pericardial effusion  The pericardium is normal in appearance  Counseling / Coordination of Care  Total time spent today 30 minutes  Greater than 50% of total time was spent with the patient and / or family counseling and / or coordination of care

## 2021-11-17 ENCOUNTER — TELEPHONE (OUTPATIENT)
Dept: INTERNAL MEDICINE CLINIC | Facility: CLINIC | Age: 76
End: 2021-11-17

## 2021-11-17 VITALS
HEIGHT: 62 IN | RESPIRATION RATE: 20 BRPM | TEMPERATURE: 98.9 F | OXYGEN SATURATION: 91 % | SYSTOLIC BLOOD PRESSURE: 161 MMHG | HEART RATE: 78 BPM | BODY MASS INDEX: 46.05 KG/M2 | WEIGHT: 250.22 LBS | DIASTOLIC BLOOD PRESSURE: 73 MMHG

## 2021-11-17 LAB
ANION GAP SERPL CALCULATED.3IONS-SCNC: 6 MMOL/L (ref 4–13)
APTT PPP: 76 SECONDS (ref 23–37)
APTT PPP: 92 SECONDS (ref 23–37)
BUN SERPL-MCNC: 14 MG/DL (ref 5–25)
CALCIUM SERPL-MCNC: 9.7 MG/DL (ref 8.3–10.1)
CHLORIDE SERPL-SCNC: 95 MMOL/L (ref 100–108)
CO2 SERPL-SCNC: 35 MMOL/L (ref 21–32)
CREAT SERPL-MCNC: 1.06 MG/DL (ref 0.6–1.3)
GFR SERPL CREATININE-BSD FRML MDRD: 51 ML/MIN/1.73SQ M
GLUCOSE SERPL-MCNC: 149 MG/DL (ref 65–140)
INR PPP: 1.12 (ref 0.84–1.19)
POTASSIUM SERPL-SCNC: 3.8 MMOL/L (ref 3.5–5.3)
PROTHROMBIN TIME: 14.4 SECONDS (ref 11.6–14.5)
SODIUM SERPL-SCNC: 136 MMOL/L (ref 136–145)

## 2021-11-17 PROCEDURE — 99239 HOSP IP/OBS DSCHRG MGMT >30: CPT | Performed by: PHYSICIAN ASSISTANT

## 2021-11-17 PROCEDURE — 80048 BASIC METABOLIC PNL TOTAL CA: CPT | Performed by: INTERNAL MEDICINE

## 2021-11-17 PROCEDURE — 97163 PT EVAL HIGH COMPLEX 45 MIN: CPT

## 2021-11-17 PROCEDURE — 99232 SBSQ HOSP IP/OBS MODERATE 35: CPT | Performed by: INTERNAL MEDICINE

## 2021-11-17 PROCEDURE — 85730 THROMBOPLASTIN TIME PARTIAL: CPT | Performed by: INTERNAL MEDICINE

## 2021-11-17 PROCEDURE — 85610 PROTHROMBIN TIME: CPT | Performed by: INTERNAL MEDICINE

## 2021-11-17 RX ORDER — TORSEMIDE 20 MG/1
40 TABLET ORAL DAILY
Qty: 60 TABLET | Refills: 0 | Status: SHIPPED | OUTPATIENT
Start: 2021-11-18 | End: 2021-11-17

## 2021-11-17 RX ORDER — POTASSIUM CHLORIDE 20 MEQ/1
40 TABLET, EXTENDED RELEASE ORAL DAILY
Qty: 60 TABLET | Refills: 0 | Status: SHIPPED | OUTPATIENT
Start: 2021-11-18 | End: 2021-11-17

## 2021-11-17 RX ORDER — CEPHALEXIN 500 MG/1
500 CAPSULE ORAL EVERY 6 HOURS SCHEDULED
Qty: 12 CAPSULE | Refills: 0 | Status: SHIPPED | OUTPATIENT
Start: 2021-11-17 | End: 2021-11-17 | Stop reason: SDUPTHER

## 2021-11-17 RX ORDER — CARVEDILOL 6.25 MG/1
6.25 TABLET ORAL 2 TIMES DAILY WITH MEALS
Qty: 60 TABLET | Refills: 0 | Status: SHIPPED | OUTPATIENT
Start: 2021-11-17 | End: 2021-12-02

## 2021-11-17 RX ORDER — DIAPER,BRIEF,INFANT-TODD,DISP
EACH MISCELLANEOUS 4 TIMES DAILY PRN
Qty: 30 G | Refills: 0 | Status: SHIPPED | OUTPATIENT
Start: 2021-11-17 | End: 2022-02-12 | Stop reason: HOSPADM

## 2021-11-17 RX ORDER — LOSARTAN POTASSIUM 100 MG/1
100 TABLET ORAL DAILY
Qty: 30 TABLET | Refills: 0 | Status: SHIPPED | OUTPATIENT
Start: 2021-11-18 | End: 2022-01-27 | Stop reason: SDUPTHER

## 2021-11-17 RX ORDER — POTASSIUM CHLORIDE 20 MEQ/1
40 TABLET, EXTENDED RELEASE ORAL DAILY
Qty: 60 TABLET | Refills: 0 | Status: SHIPPED | OUTPATIENT
Start: 2021-11-18 | End: 2022-02-14

## 2021-11-17 RX ORDER — LOSARTAN POTASSIUM 100 MG/1
100 TABLET ORAL DAILY
Qty: 30 TABLET | Refills: 0 | Status: SHIPPED | OUTPATIENT
Start: 2021-11-18 | End: 2021-11-17

## 2021-11-17 RX ORDER — CARVEDILOL 6.25 MG/1
6.25 TABLET ORAL 2 TIMES DAILY WITH MEALS
Status: DISCONTINUED | OUTPATIENT
Start: 2021-11-17 | End: 2021-11-17 | Stop reason: HOSPADM

## 2021-11-17 RX ORDER — TORSEMIDE 20 MG/1
40 TABLET ORAL DAILY
Qty: 60 TABLET | Refills: 0 | Status: SHIPPED | OUTPATIENT
Start: 2021-11-18 | End: 2022-01-24 | Stop reason: SDUPTHER

## 2021-11-17 RX ORDER — CARVEDILOL 6.25 MG/1
6.25 TABLET ORAL 2 TIMES DAILY WITH MEALS
Qty: 60 TABLET | Refills: 0 | Status: SHIPPED | OUTPATIENT
Start: 2021-11-17 | End: 2021-11-17

## 2021-11-17 RX ORDER — WARFARIN SODIUM 5 MG/1
10 TABLET ORAL
Qty: 60 TABLET | Refills: 0 | Status: SHIPPED | OUTPATIENT
Start: 2021-11-17 | End: 2021-11-17

## 2021-11-17 RX ORDER — WARFARIN SODIUM 5 MG/1
10 TABLET ORAL
Qty: 60 TABLET | Refills: 0 | Status: ON HOLD | OUTPATIENT
Start: 2021-11-17 | End: 2022-01-06

## 2021-11-17 RX ORDER — CEPHALEXIN 500 MG/1
500 CAPSULE ORAL EVERY 6 HOURS SCHEDULED
Qty: 12 CAPSULE | Refills: 0 | Status: SHIPPED | OUTPATIENT
Start: 2021-11-17 | End: 2021-11-20

## 2021-11-17 RX ORDER — TORSEMIDE 20 MG/1
40 TABLET ORAL DAILY
Status: DISCONTINUED | OUTPATIENT
Start: 2021-11-18 | End: 2021-11-17 | Stop reason: HOSPADM

## 2021-11-17 RX ADMIN — FUROSEMIDE 80 MG: 10 INJECTION, SOLUTION INTRAVENOUS at 08:10

## 2021-11-17 RX ADMIN — HEPARIN SODIUM 17 UNITS/KG/HR: 10000 INJECTION, SOLUTION INTRAVENOUS at 04:06

## 2021-11-17 RX ADMIN — POTASSIUM CHLORIDE 40 MEQ: 1500 TABLET, EXTENDED RELEASE ORAL at 08:10

## 2021-11-17 RX ADMIN — CEFAZOLIN SODIUM 2000 MG: 2 SOLUTION INTRAVENOUS at 08:10

## 2021-11-17 RX ADMIN — LOSARTAN POTASSIUM 100 MG: 50 TABLET, FILM COATED ORAL at 08:09

## 2021-11-17 RX ADMIN — FLUTICASONE PROPIONATE 1 SPRAY: 50 SPRAY, METERED NASAL at 08:10

## 2021-11-17 RX ADMIN — OXYCODONE HYDROCHLORIDE 20 MG: 20 TABLET, FILM COATED, EXTENDED RELEASE ORAL at 05:12

## 2021-11-17 RX ADMIN — CARVEDILOL 3.12 MG: 3.12 TABLET, FILM COATED ORAL at 08:10

## 2021-11-17 NOTE — PROGRESS NOTES
[facility]  [facility] [place] [facility] 1945[facility] 77 [facility]  [facility] MRN: [id number]  Unit/Bed#: S -01 Encounter: [id number]  Primary Care Provider: Kaley Herrera MD   Date and time admitted to hospital: 11/12/2021  5:29 PM    Rash of back  Assessment & Plan  · Patient states she has been having intermittent itching for almost 3 weeks prior to her coming to the hospital  · It has not gotten worse but she continues to have itching  She was taking p r n  Benadryl at home  · Currently getting Benadryl but not helping much  · Suspect most likely allergic dermatitis  · Pt given hydrocortisone 1% cream which has decreased the itch      Chronic low back pain with sciatica  Assessment & Plan  · Continue outpatient pain medication regimen with OxyContin 20 mg q8h (PDMP verified)  · Patient also prescribed prn Percocet  mg q4h prn; substitute prn oxycodone 10 mg and prn Tylenol while inpatient  · Continue prn tizanidine  Hypertension  Assessment & Plan  · With hypertensive urgency POA  · Now improved  · Continue Coreg 6 25 b i d , losartan 100 mg daily, amlodipine 5 mg b i d  · Lasix 80 mg IV b i d  for now, will transition to PO 40 mg torsemide on discharge  · 2g sodium restriction diet    Acute respiratory failure with hypoxia (HCC)  Assessment & Plan  · Mild, POA, with pulse ox 88-89% on RA on arrival    · In the setting of suspected acute CHF, new onset a-fib  · Diuresis as per above  · Improved- 91% SpO2 on room air on 11/17/21      New onset atrial fibrillation [facility])  Assessment & Plan  · In the setting of suspected acute CHF  · Patient noted to be in atrial fibrillation on EKG on presentation  Uncertain of any prior history of a-fib but notes being told she had an abnormal heart rhythm several years ago  · [id number]: 5  · Converted to sinus  · TSH wnl  · Coreg 6 25mg BID  · Patient unable to afford Eliquis, started on Coumadin    Coumadin started, no strong indication for IV heparin bridging per Cardiology    * Acute diastolic CHF (congestive heart failure) (HCC)  Assessment & Plan  Wt Readings from Last 3 Encounters:   11/17/21 113 kg (250 lb 3 6 oz)   12/22/20 109 kg (240 lb)     · Presented with SOB and increased lower extremity edema   · No prior history of CHF noted  No prior echo for review   · Suspected acute CHF in the setting of new onset a-fib and uncontrolled HTN  ·  on 11/12/21  · CXR with mild vascular congestion  CTA chest negative for PE, "enlargement of the pulmonary trunk, and main right and left pulmonary arteries suggestive of pulmonary hypertension "  · Echo  EF 65 percent Grade 1 abnormal relaxation  · Continue IV Lasix  Diuresing well  Monitor BMP and electrolytes  Will transition to torsemide likely soon for discharge per Cardiology    Discharging Physician / Practitioner: Ashok Hashimoto  PCP: Luis Felipe Yanez MD  Admission Date:   Admission Orders (From admission, onward)     Ordered        11/12/21 2137  Inpatient Admission  Once                      Discharge Date: 11/17/21    Medical Problems             Resolved Problems  Date Reviewed: 11/12/2021    None                Consultations During Hospital Stay:  · Cardiology    Procedures Performed:   · ECG 12 Lead on 11/12/21: Atrial fibrillation with premature ventricular or aberrantly conducted complexes with right axis deviation  · Chest XR on 11/12/21: Cardiomediastinal silhouette appears enlarged, cardiomegaly with mild CHF  · CTA on 11/12/21: No central or segmental pulmonary emboli, there is suboptimal opacification of the subsegmental branches, enlargement of the pulmonary trunk, and main right and left pulmonary arteries suggestive of pulmonary hypertension  · VAS lower limb venous duplex study on 11/13/21: No evidence of acute or chronic deep vein thrombosis or superficial thrombophlebitis  · TTE on 11/14/21: Left ventricular ejection fraction is 65%   Systolic function is normal  Wall motion is normal  Diastolic function is mildly abnormal consistent with grade I relaxation  Wall thickness is mildly increased  There is mild concentric hypertrophy  Left atrium showed mild dilation, aortic valve, mitral valve and tricuspid valve showed mild regurgitation  ·     Significant Findings / Test Results:   · See above    Incidental Findings:   · See above    Test Results Pending at Discharge (will require follow up):   · ***     Outpatient Tests Requested:  · ***    Complications:  ***    Reason for Admission: SOB and lower extremity edema    Hospital Course:     Mitra Stanley is a 77 y o  female patient with a PMH significant for lower extremity edema/lymphedema, venous insufficiency, HTN, HLD, Sjogren's syndrome, chronic back pain and fibromyalgia who originally presented to the hospital on 11/12/2021 due to SOB and worsening lower extremity edema  She stopped taking her Lasix 20mg TID because it caused her to urinate frequently  She had drainage from her RLE and erythema upon admission  Her BNP was 476 on admission  {Complete this smartphrase if the patient is an inpatient and being discharged earlier than 2 midnights  If this does not apply, please delete this line  :[id number]}    Please see above list of diagnoses and related plan for additional information  Condition at Discharge: stable     Discharge Day Visit / Exam:     Subjective: MA is a 73year old female with a PMH significant for lower extremity edema/ly,phedema, venous insufficency, HTN, HLD, Sjogren's syndrome, chronic back pain and fibromyalgia on hospital day 5 admitted for leg swelling and SOB  Today patient feels well  She state her back pain is well controlled  She states that her SOB and cough are improving  She denies PND, dizziness, palpations, chest pain   She says the itching on the   Vitals: Blood Pressure: 161/73 (11/17/21 0700)  Pulse: 78 (11/17/21 0700)  Temperature: 98 9 °F (37 2 °C) (11/17/21 0700)  Temp Source: Oral (11/17/21 0700)  Respirations: 20 (11/17/21 0700)  Height: 5' 2" (157 5 cm) (11/14/21 1117)  Weight - Scale: 113 kg (250 lb 3 6 oz) (11/17/21 0416)  SpO2: 91 % (11/17/21 0700)  Exam:   Physical Exam  ( *** Be Sure to Include Physical Exam: Delete this entire line when you have entered your exam)  Discussion with Family: ***    Discharge instructions/Information to patient and family:   See after visit summary for information provided to patient and family  Provisions for Follow-Up Care:  See after visit summary for information related to follow-up care and any pertinent home health orders  Disposition:     {Disposition at Discharge:69448}    For Discharges to [facility]:   · {Affiliated SNF Contact List:[id number]} - {Affiliated SNF Phone Call:[phone]}    Planned Readmission: ***     Discharge Statement:  I spent *** minutes discharging the patient  This time was spent on the day of discharge  I had direct contact with the patient on the day of discharge  Greater than 50% of the total time was spent examining patient, answering all patient questions, arranging and discussing plan of care with patient as well as directly providing post-discharge instructions  Additional time then spent on discharge activities  Discharge Medications:  See after visit summary for reconciled discharge medications provided to patient and family        ** Please Note: This note has been constructed using a voice recognition system **    {[facility] progress note templates:87328}

## 2021-11-17 NOTE — PROGRESS NOTES
Progress Note - Cardiology   Jm Marinelli 77 y o  female MRN: [id number]  Unit/Bed#: S -01 Encounter: [id number]        Principal Problem:    Acute diastolic CHF (congestive heart failure) (HCC)  Active Problems:    New onset atrial fibrillation (HCC)    Acute respiratory failure with hypoxia (HCC)    Hypertension    Erythema of lower extremity    Chronic low back pain with sciatica    Rash of back        Assessment/Plan     1  Acute respiratory failure  Patient with evidence of volume overload-likely multifactorial due to diastolic dysfunction/venous insufficiency/high sodium intake/ immobility      TTE-LVEF 65% with grade 1 diastolic dysfunction   Mild AI and mild MR mild TR  Lasix IV 40 mg b i d  Increased to 80 b i d  And has had 2 doses-reports increased diuresis although not recorded as such  Will transition to torsemide for discharge, likely soon  No BMP x48 hours  Difficult stick  Finally able to get  Will check  CT of the chest-no central or segmental PE  CHF teaching, ongoing     On r/a      2  Paroxysmal atrial fibrillation-symptomatic  New diagnosis   Presented with controlled heart rates spontaneously converted to sinus rhythm   In the he setting of volume overload  [id number]=0/5, currently on IV heparin   Recommend transition oral AC  SLIM price checking  Coumadin started  Patient had converted to sinus rhythm thus no strong indication for IV heparin bridging     3  HTN-labile   -170  Monitor with diuresis     2 g sodium restriction-patient needs dietary education  On losartan 100  PTA amlodipine 5 mg b i d  ( Presumably voiding due to edema ) And HCTZ 12 5 on hold  Will  Not resume HCTZ , will be on loop diuretic  Started carvedilol 3 125 b i d , increase to 6 25mg BID     4  Venous insufficiency/lymphedema-recommend outpatient vascular follow-up    Subjective/Objective   Chief Complaint/Subjective  Patient without complaints of chest pain or shortness of breath  Repeat reports increased diuresis from yesterday        Vitals: /73 (BP Location: Left arm)   Pulse 78   Temp 98 9 °F (37 2 °C) (Oral)   Resp 20   Ht 5' 2" (1 575 m)   Wt 113 kg (250 lb 3 6 oz)   SpO2 91%   BMI 45 77 kg/m²     Vitals:    11/14/21 1117 11/17/21 0416   Weight: 114 kg (251 lb) 113 kg (250 lb 3 6 oz)     Orthostatic Blood Pressures      Most Recent Value   Blood Pressure 161/73 filed at 11/17/2021 0700   Patient Position - Orthostatic VS Sitting filed at 11/17/2021 0700            Intake/Output Summary (Last 24 hours) at 11/17/2021 1014  Last data filed at 11/17/2021 0700  Gross per 24 hour   Intake 600 ml   Output 1450 ml   Net -850 ml       Invasive Devices  Report    Peripheral Intravenous Line            Peripheral IV 11/12/21 Right;Dorsal (posterior) Forearm 4 days          Drain            External Urinary Catheter -- days                Current Facility-Administered Medications   Medication Dose Route Frequency    acetaminophen (TYLENOL) tablet 650 mg  650 mg Oral Q6H PRN    carvedilol (COREG) tablet 3 125 mg  3 125 mg Oral BID With Meals    ceFAZolin (ANCEF) IVPB (premix in dextrose) 2,000 mg 50 mL  2,000 mg Intravenous Q8H    diphenhydrAMINE (BENADRYL) tablet 25 mg  25 mg Oral Q6H PRN    fluticasone (FLONASE) 50 mcg/act nasal spray 1 spray  1 spray Each Nare Daily    furosemide (LASIX) injection 80 mg  80 mg Intravenous BID (diuretic)    heparin (porcine) 25,000 units in 0 45% NaCl 250 mL infusion (premix)  3-20 Units/kg/hr (Order-Specific) Intravenous Titrated    heparin (porcine) injection 2,000 Units  2,000 Units Intravenous Q1H PRN    heparin (porcine) injection 4,000 Units  4,000 Units Intravenous Q1H PRN    hydrocortisone 1 % cream   Topical 4x Daily PRN    losartan (COZAAR) tablet 100 mg  100 mg Oral Daily    ondansetron (ZOFRAN) injection 4 mg  4 mg Intravenous Q4H PRN    oxyCODONE (OxyCONTIN) 12 hr tablet 20 mg  20 mg Oral Q8H [facility]    oxyCODONE (ROXICODONE) immediate release tablet 10 mg  10 mg Oral Q6H PRN    potassium chloride (K-DUR,KLOR-CON) CR tablet 40 mEq  40 mEq Oral Daily    tiZANidine (ZANAFLEX) tablet 4 mg  4 mg Oral Q8H PRN    warfarin (COUMADIN) tablet 10 mg  10 mg Oral Daily (warfarin)         Physical Exam: /73 (BP Location: Left arm)   Pulse 78   Temp 98 9 °F (37 2 °C) (Oral)   Resp 20   Ht 5' 2" (1 575 m)   Wt 113 kg (250 lb 3 6 oz)   SpO2 91%   BMI 45 77 kg/m²     General Appearance:    Alert, cooperative, no distress, appears stated age   Head:    Normocephalic, no scleral icterus   Eyes:    PERRL   Nose:   Nares normal, septum midline, no drainage    Throat:   Lips, mucosa, and tongue normal   Neck:   Supple, symmetrical, trachea midline,              Lungs:     Clear to auscultation bilaterally, respirations unlabored   Chest Wall:    No tenderness or deformity    Heart:    Regular rate and rhythm, S1 and S2 normal, no murmur   Abdomen:     Soft, large    Extremities:   Extremities normal, atraumatic, no cyanosis , appears residual chronic edema       Skin:   Skin warm   Neurologic:   Alert and oriented to person place and time                   Lab Results:   Recent Results (from the past 72 hour(s))   Echo complete    Collection Time: 11/14/21 11:20 AM   Result Value Ref Range    TV S' 0 9 cm/s    LVPWd 1 40 cm    MV E' Tissue Velocity Septal 8 cm/s    IVSd 8 33 cm    LV DIASTOLIC VOLUME (MOD BIPLANE) 2D 96 mL    LEFT VENTRICLE SYSTOLIC VOLUME (MOD BIPLANE) 2D 25 mL    Left ventricular stroke volume (2D) 71 00 mL    A4C EF 51 %    LVIDd 4 60 7 30 - 10 88 cm    LVIDS 2 60 2 1 - 4 0 cm    FS 43 28 - 44 %    Asc Ao 3 5 cm    Ao root 2 90 cm    RVID d 4 0 cm    E wave deceleration time 194 ms    MV Peak E Kian 92 cm/s    MV Peak A Kian 1 1 m/s    ROBERTO A4C 22 2 cm2    RAA A4C 13 8 cm2    MV stenosis pressure 1/2 time 0 ms    ZLVIDS -6 61     LV EF 65    Procalcitonin Reflex    Collection Time: 11/15/21  6:09 AM   Result Value Ref Range Procalcitonin <0 05 <=0 25 ng/ml   APTT    Collection Time: 11/15/21  6:09 AM   Result Value Ref Range    PTT 73 (H) 23 - 37 seconds   CBC and differential    Collection Time: 11/15/21  6:09 AM   Result Value Ref Range    WBC 8 07 4 31 - 10 16 Thousand/uL    RBC 4 26 3 81 - 5 12 Million/uL    Hemoglobin 11 6 11 5 - 15 4 g/dL    Hematocrit 39 5 34 8 - 46 1 %    MCV 93 82 - 98 fL    MCH 27 2 26 8 - 34 3 pg    MCHC 29 4 (L) 31 4 - 37 4 g/dL    RDW 14 4 11 6 - 15 1 %    MPV 10 5 8 9 - 12 7 fL    Platelets 755 (H) 619 - 390 Thousands/uL    nRBC 0 /100 WBCs    Neutrophils Relative 51 43 - 75 %    Immat GRANS % 1 0 - 2 %    Lymphocytes Relative 21 14 - 44 %    Monocytes Relative 12 4 - 12 %    Eosinophils Relative 14 (H) 0 - 6 %    Basophils Relative 1 0 - 1 %    Neutrophils Absolute 4 20 1 85 - 7 62 Thousands/µL    Immature Grans Absolute 0 04 0 00 - 0 20 Thousand/uL    Lymphocytes Absolute 1 69 0 60 - 4 47 Thousands/µL    Monocytes Absolute 0 97 0 17 - 1 22 Thousand/µL    Eosinophils Absolute 1 12 (H) 0 00 - 0 61 Thousand/µL    Basophils Absolute 0 05 0 00 - 0 10 Thousands/µL   Basic metabolic panel    Collection Time: 11/15/21  6:09 AM   Result Value Ref Range    Sodium 138 136 - 145 mmol/L    Potassium 3 8 3 5 - 5 3 mmol/L    Chloride 100 100 - 108 mmol/L    CO2 34 (H) 21 - 32 mmol/L    ANION GAP 4 4 - 13 mmol/L    BUN 10 5 - 25 mg/dL    Creatinine 0 82 0 60 - 1 30 mg/dL    Glucose 118 65 - 140 mg/dL    Calcium 9 0 8 3 - 10 1 mg/dL    eGFR 70 ml/min/1 73sq m   APTT    Collection Time: 11/16/21  5:13 AM   Result Value Ref Range    PTT 51 (H) 23 - 37 seconds   APTT    Collection Time: 11/16/21  1:12 PM   Result Value Ref Range    PTT 56 (H) 23 - 37 seconds   STAT INR    Collection Time: 11/16/21  6:23 PM   Result Value Ref Range    Protime 12 8 11 6 - 14 5 seconds    INR 0 96 0 84 - 1 19   APTT    Collection Time: 11/16/21 11:23 PM   Result Value Ref Range    PTT 92 (H) 23 - 37 seconds     Imaging: I have personally reviewed pertinent reports  Counseling / Coordination of Care  Total time spent today 20 minutes  Greater than 50% of total time was spent with the patient and / or family counseling and / or coordination of care

## 2021-11-17 NOTE — ASSESSMENT & PLAN NOTE
· Mild, POA, with pulse ox 88-89% on RA on arrival    · In the setting of suspected acute CHF, new onset a-fib     · Diuresis as per above  · Improved- 91% SpO2 on room air on 11/17/21

## 2021-11-17 NOTE — PHYSICAL THERAPY NOTE
Physical Therapy Evaluation    Patient's Name: [facility]    Admitting Diagnosis  Shortness of breath [R06 02]  CHF (congestive heart failure) ([facility]) [I50 9]  Leg swelling [M79 89]  Bilateral lower extremity edema [R60 0]    Problem List  Patient Active Problem List   Diagnosis    Bilateral lower extremity edema    Chronic venous insufficiency    [facility] diastolic [facility]congestive [facility] failure) [facility] [address] )    [facility] onset [facility] fibrillation ([facility] )    Acute respiratory failure with hypoxia ([facility])    Hypertension    Erythema of lower extremity    Chronic low back pain with sciatica    Rash of back       Past Medical History  Past Medical History:   Diagnosis Date    Arthritis     Cellulitis     Edema of both lower extremities due to peripheral venous insufficiency        Past Surgical History  Past Surgical History:   Procedure Laterality Date    KNEE CARTILAGE SURGERY      REPLACEMENT TOTAL KNEE BILATERAL            11/17/21 0838   PT Last Visit   PT Visit Date 11/17/21   Note Type   Note type Evaluation   Pain Assessment   Pain Assessment Tool 0-10   Pain Score 8   Pain Location/Orientation Location: Back   Home Living   Type of Home House  ([facility] home)   Home Layout Two level;Bed/bath upstairs   Bathroom Shower/Tub Tub/shower unit   Bathroom Toilet Raised   Bathroom Equipment Grab bars in shower;Grab bars around toilet   [address] Cane;Walker   Prior Function   Level of [place] Needs assistance with IADLs   Lives With Spouse   Receives Help From Family   ADL Assistance Independent   IADLs Needs assistance   Falls in the last 6 months 0   Vocational Retired   Comments pt drives,    Restrictions/Precautions   Other Precautions Chair Alarm; Bed Alarm;Cognitive;Telemetry;Multiple lines   General   Family/Caregiver Present No   Cognition   Orientation Level Oriented X4   Following Commands Follows one step commands inconsistently   Comments pt ID by wristband name and    RLE Assessment   RLE Assessment WFL  (grossly 3+/5)   LLE Assessment   LLE Assessment WFL  (grossly 3+/5)   Bed Mobility   Additional Comments unable to assess pt OOB in recliner at start and end evaluation    Transfers   Sit to Stand 5  Supervision   Additional items Assist x 1; Increased time required;Armrests   Stand to Sit 5  Supervision   Additional items Assist x 1; Increased time required   Additional Comments 30 STS 6 repetitions   Ambulation/Elevation   Gait pattern Narrow AGUSTIN; Forward Flexion; Excessively slow; Short stride; Inconsistent devon  (increased forward head when walking)   Gait Assistance 5  Supervision   Additional items Assist x 1   Assistive Device Small base quad cane  (hurry cane)   Distance 50 ft x 2  (further distance defered by fatigue)   Stair Management Assistance Not tested  (pt defers due to fatigue)   Balance   Static Sitting Fair +   Static Standing Fair   Ambulatory Fair -  (hurry cane)   Activity Tolerance   Activity Tolerance Patient limited by fatigue   Nurse Made Aware cleared by RN prior to evaluation   Assessment   Prognosis Fair   Problem List Decreased strength;Decreased range of motion;Decreased endurance; Impaired balance;Decreased mobility; Decreased safety awareness;Decreased cognition;Obesity; Decreased skin integrity;Pain   Assessment Pt is a 77 y o  female seen for PT evaluation s/p admit to [address] [place] [address] on 2021  Pt was admitted with a primary dx of: shortness of breath, CHF, leg swelling, bilateral lower extremity edema  PT now consulted for assessment of mobility and d/c needs  Pt with Up with assistance orders  Pts current comorbidities effecting treatment include: BMI, chronic venous insufficiency, HUTN arthritis, history of bilateral total knee arthroplasty   Pts current clinical presentation is Unstable/ Unpredictable (high complexity) due to Ongoing medical management for primary dx, Decreased activity tolerance compared to baseline, Fall risk, Increased assistance needed from caregiver at current time, Cog status  Prior to admission, pt was indepdnent at home with her , however recently transitioned to using a RW and living on second floor  Upon evaluation, pt currently ; supervision for transfers and supervision for ambulation 50 ft w/ NBQC  Pt presents at PT eval functioning below baseline and currently w/ overall mobility deficits 2* to: BLE weakness, decreased ROM, impaired balance, decreased endurance, decreased activity tolerance compared to baseline, decreased functional mobility tolerance compared to baseline, decreased safety awareness, impaired judgement, fall risk, decreased cognition  Pt currently at a fall risk 2* to impairments listed above  Pt will continue to benefit from skilled acute PT interventions to address stated impairments; to maximize functional mobility; for ongoing pt/ family training; and DME needs  At conclusion of PT session chair alarm engaged, all needs in reach, RN notified of session findings/recommendations and pt returned back in recliner chair with phone and call bell within reach  Pt denies any further questions at this time  Recommend home with home health PT pending performance of stairs upon hospital D/C  Goals   Patient Goals to go home   STG Expiration Date 11/27/21   Short Term Goal #1 In 10 days pt will be able to: 1  Demonstrate ability to perform all aspects of bed mobility with indepdnently to increase functional independence  2  Perform functional transfers independently to facilitate safe return to previous living environment  3   Ambulate 100 ft with LRAD and independently with stable vitals to improve safety with household distances and reduce fall risk  4  Improve LE strength grades by 1 to increase ease of functional mobility with transfers and gait  5  Pt will demonstrate improved balance by one grade in order to decrease risk of falls   6  Climb 2 steps with supervision and unilateral HR to simulate entrance to home  7  Pt will improve Barthel Index Score to at least 80 for decreased caregiver burden and increased functional independence  PT Treatment Day 0   Plan   Treatment/Interventions Functional transfer training;LE strengthening/ROM; Therapeutic exercise;Patient/family training;Equipment eval/education; Bed mobility;Gait training;Spoke to nursing   PT Frequency Other (Comment)  (3-5x/wk)   Recommendation   PT Discharge Recommendation Home with home health rehabilitation  (vs post actue rehab pending stairs)   AM-PAC Basic Mobility Inpatient   Turning in Bed Without Bedrails 3   Lying on Back to Sitting on Edge of Flat Bed 3   Moving Bed to Chair 3   Standing Up From Chair 4   Walk in Room 3   Climb 3-5 Stairs 2   Basic Mobility Inpatient Raw Score 18   Basic Mobility Standardized Score 41 05   Barthel Index   Feeding 10   Bathing 5   Grooming Score 0   Dressing Score 5   Bladder Score 5   Bowels Score 10   Toilet Use Score 5   Transfers (Bed/Chair) Score 10   Mobility (Level Surface) Score 0   Stairs Score 5   Barthel Index Score 55   The patient's AM-PAC Basic Mobility Inpatient Short Form Raw Score is 18  A Raw score of greater than 16 suggests the patient may benefit from discharge to home  Please also refer to the recommendation of the Physical Therapist for safe discharge planning            Alan Akhtar, PT, DPT

## 2021-11-17 NOTE — ASSESSMENT & PLAN NOTE
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

## 2021-11-17 NOTE — CASE MANAGEMENT
Case Management Discharge Planning Note    Patient name Pineda Race  Location S /S -01 MRN [id number]  : 1945 Date 2021       Current Admission Date: 2021  Current Admission Diagnosis:Acute diastolic CHF (congestive heart failure) [facility])   Patient Active Problem List    Diagnosis Date Noted    Rash of back 2021    [facility] diastolic [facility]congestive heart failure) [facility] [address] ) 2021    New onset atrial fibrillation ([facility] ) 2021    Acute respiratory failure with hypoxia ([facility] [address] ) 2021    Hypertension 2021    Erythema of lower extremity 2021    Chronic low back pain with sciatica 2021    Chronic venous insufficiency 2021    Bilateral lower extremity edema 2020      LOS (days): 5  Geometric Mean LOS (GMLOS) (days): 3 80  Days to GMLOS:-0 8     OBJECTIVE:  Risk of Unplanned Readmission Score: 12         Current admission status: Inpatient   Preferred Pharmacy:   [address] [place] [address] [address] [facility]  [facility]  Phone: [phone] Fax: [phone] [facility], [address] [place] [address]  Phone: [phone] Fax: [phone]    Primary Care Provider: Luis Felipe Yanez MD    Primary Insurance: [address] [place] [address]  Secondary Insurance:     DISCHARGE DETAILS:    Discharge planning discussed with[de-identified] patient  Freedom of Choice: Yes  Comments - Freedom of Choice: Discussed PT recommendation for c   CM contacted family/caregiver?: Yes  Were Treatment Team discharge recommendations reviewed with patient/caregiver?: Yes  Did patient/caregiver verbalize understanding of patient care needs?: Yes  Were patient/caregiver advised of the risks associated with not following Treatment Team discharge recommendations?: Yes    Contacts  Patient Contacts: Moni Raymond  Relationship to Patient[de-identified] Family  Contact Method: Phone  Phone Number: [phone]  Reason/Outcome: Emergency [address]         Is the patient interested in Nel Coello at discharge?: No    DME Referral Provided  Referral made for DME?: No    Other Referral/Resources/Interventions Provided:  Interventions: HHC  Referral Comments: Patient admitted due to SOB, CHF, edema  Pt ready for d/c today  Discussed PT recommendation for hhc, patient refused as she relayed she feels she is well enough and both herself and spouse can meet all of her health needs  Contacted spouse via phone spouse confirmed he is able to assist pt with any needs she may have  IMM form also reviewed with patient, pt denies any concerns for d/c today, copy provided      Would you like to participate in our [address] [place] [address] service program?  : No - Declined    Treatment Team Recommendation: Home with [facility]  Discharge Destination Plan[de-identified] Home                                IMM Given (Date):: 11/17/21  IMM Given to[de-identified] Patient

## 2021-11-17 NOTE — ASSESSMENT & PLAN NOTE
· Patient states she has been having intermittent itching for almost 3 weeks prior to her coming to the hospital  · It has not gotten worse but she continues to have itching  She was taking p r n   Benadryl at home  · Currently getting Benadryl but not helping much  · Suspect most likely allergic dermatitis  · Pt given hydrocortisone 1% cream

## 2021-11-17 NOTE — ASSESSMENT & PLAN NOTE
· With hypertensive urgency POA  · Now improved    · Continue Coreg 6 25 b i d , losartan 100 mg daily, amlodipine 5 mg b i d   · Start torsemide 40 mg QD  · Stop HCTZ  · 2g sodium restriction diet

## 2021-11-17 NOTE — PLAN OF CARE
Problem: PHYSICAL THERAPY ADULT  Goal: Performs mobility at highest level of function for planned discharge setting  See evaluation for individualized goals  Description: Treatment/Interventions: Functional transfer training,LE strengthening/ROM,Therapeutic exercise,Patient/family training,Equipment eval/education,Bed mobility,Gait training,Spoke to nursing          See flowsheet documentation for full assessment, interventions and recommendations  Outcome: Progressing  Note: Prognosis: Fair  Problem List: Decreased strength,Decreased range of motion,Decreased endurance,Impaired balance,Decreased mobility,Decreased safety awareness,Decreased cognition,Obesity,Decreased skin integrity,Pain  Assessment: Pt is a 77 y o  female seen for PT evaluation s/p admit to [address] [place] [address] on 11/12/2021  Pt was admitted with a primary dx of: shortness of breath, CHF, leg swelling, bilateral lower extremity edema  PT now consulted for assessment of mobility and d/c needs  Pt with Up with assistance orders  Pts current comorbidities effecting treatment include: BMI, chronic venous insufficiency, HUTN arthritis, history of bilateral total knee arthroplasty  Pts current clinical presentation is Unstable/ Unpredictable (high complexity) due to Ongoing medical management for primary dx, Decreased activity tolerance compared to baseline, Fall risk, Increased assistance needed from caregiver at current time, Cog status  Prior to admission, pt was indepdnent at home with her , however recently transitioned to using a RW and living on second floor  Upon evaluation, pt currently ; supervision for transfers and supervision for ambulation 50 ft w/ NBQC   Pt presents at PT eval functioning below baseline and currently w/ overall mobility deficits 2* to: BLE weakness, decreased ROM, impaired balance, decreased endurance, decreased activity tolerance compared to baseline, decreased functional mobility tolerance compared to baseline, decreased safety awareness, impaired judgement, fall risk, decreased cognition  Pt currently at a fall risk 2* to impairments listed above  Pt will continue to benefit from skilled acute PT interventions to address stated impairments; to maximize functional mobility; for ongoing pt/ family training; and DME needs  At conclusion of PT session chair alarm engaged, all needs in reach, RN notified of session findings/recommendations and pt returned back in recliner chair with phone and call bell within reach  Pt denies any further questions at this time  Recommend home with home health PT pending performance of stairs upon hospital D/C  PT Discharge Recommendation: Home with home health rehabilitation (vs post actue rehab pending stairs)          See flowsheet documentation for full assessment

## 2021-11-17 NOTE — ASSESSMENT & PLAN NOTE
Wt Readings from Last 3 Encounters:   11/17/21 113 kg (250 lb 3 6 oz)   12/22/20 109 kg (240 lb)     · Presented with SOB and increased lower extremity edema   · No prior history of CHF noted  No prior echo for review   · Suspected acute CHF in the setting of new onset a-fib and uncontrolled HTN  ·  on 11/12/21  · CXR with mild vascular congestion   CTA chest negative for PE, "enlargement of the pulmonary trunk, and main right and left pulmonary arteries suggestive of pulmonary hypertension "  · Echo  EF 65 percent Grade 1 abnormal relaxation  · Was diuresed with IV Lasix 80 mg b i d , transition to torsemide 40 mg daily upon discharge  · Outpatient follow-up in 2 weeks with Cardiology

## 2021-11-17 NOTE — DISCHARGE SUMMARY
[facility]  Discharge- Lisa Pizano 1945, 77 y o  female MRN: [id number]  Unit/Bed#: S -01 Encounter: [id number]  Primary Care Provider: Earl Quinonez MD   Date and time admitted to hospital: 11/12/2021  5:29 PM    * Acute diastolic CHF (congestive heart failure) ([facility] [address] )  Assessment & Plan  Wt Readings from Last 3 Encounters:   11/17/21 113 kg (250 lb 3 6 oz)   12/22/20 109 kg (240 lb)     · Presented with SOB and increased lower extremity edema   · No prior history of CHF noted  No prior echo for review   · Suspected acute CHF in the setting of new onset a-fib and uncontrolled HTN  ·  on 11/12/21  · CXR with mild vascular congestion  CTA chest negative for PE, "enlargement of the pulmonary trunk, and main right and left pulmonary arteries suggestive of pulmonary hypertension "  · Echo  EF 65 percent Grade 1 abnormal relaxation  · Was diuresed with IV Lasix 80 mg b i d , transition to torsemide 40 mg daily upon discharge  · Outpatient follow-up in 2 weeks with Cardiology    Erythema of lower extremity  Assessment & Plan  · On exam, patient with significant erythema and edema of both lower extremities with fluid weeping from lateral aspect of right lower leg  · Patient with history of chronic bilateral lower extremity edema and chronic venous insufficiency  Per review of records, patient also has a history of recurrent cellulitis and 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  · Venous doppler negative for DVT   · Patient denies recent fevers/ chills and has only mild leukocytosis on presentation but given erythema, drainage started on IV Ancef day 4 for suspected cellulitis   · Likely primarily due to edema  Edema control with IV diuresis as per above  · Will refer to lymphedema clinic outpt   Pt agrees  · Keflex for 3 days to complete abx     New onset atrial fibrillation [facility])  Assessment & Plan  · In the setting of suspected acute CHF  · Patient noted to be in atrial fibrillation on EKG on presentation  Uncertain of any prior history of a-fib but notes being told she had an abnormal heart rhythm several years ago  · [id number]: 5  · Converted to sinus  · TSH wnl  · Coreg 6 25mg BID  · Patient unable to afford Eliquis, started on Coumadin  Coumadin started, no strong indication for IV heparin bridging per Cardiology  · INR check Friday, called PCP who will follow-up in office next week    Rash of back  Assessment & Plan  · Patient states she has been having intermittent itching for almost 3 weeks prior to her coming to the hospital  · It has not gotten worse but she continues to have itching  She was taking p r n  Benadryl at home  · Currently getting Benadryl but not helping much  · Suspect most likely allergic dermatitis  · Pt given hydrocortisone 1% cream    Chronic low back pain with sciatica  Assessment & Plan  · Continue outpatient pain medication regimen with OxyContin 20 mg q8h (PDMP verified)  · Patient also prescribed prn Percocet  mg q4h prn; substitute prn oxycodone 10 mg and prn Tylenol while inpatient  · Continue prn tizanidine  Hypertension  Assessment & Plan  · With hypertensive urgency POA  · Now improved  · Continue Coreg 6 25 b i d , losartan 100 mg daily, amlodipine 5 mg b i d   · Start torsemide 40 mg QD  · Stop HCTZ  · 2g sodium restriction diet    Acute respiratory failure with hypoxia (HCC)  Assessment & Plan  · Mild, POA, with pulse ox 88-89% on RA on arrival    · In the setting of suspected acute CHF, new onset a-fib     · Diuresis as per above  · Improved- 91% SpO2 on room air on 11/17/21    Discharging Physician / Practitioner: Waleska Louis PA-C  PCP: Jenne Councilman, MD  Admission Date: 11/12/2021      Admission Orders (From admission, onward)              Ordered          11/12/21 2137   Inpatient Admission  Once                          Discharge Date: 11/17/21         Medical Problems                        Resolved Problems  Date Reviewed: 11/12/2021           None                     Consultations During Hospital Stay:  · Cardiology     Procedures Performed:   · ECG 12 Lead on 11/12/21: Atrial fibrillation with premature ventricular or aberrantly conducted complexes with right axis deviation  · Chest XR on 11/12/21: Cardiomediastinal silhouette appears enlarged, cardiomegaly with mild CHF  · CTA on 11/12/21: No central or segmental pulmonary emboli, there is suboptimal opacification of the subsegmental branches, enlargement of the pulmonary trunk, and main right and left pulmonary arteries suggestive of pulmonary hypertension  · VAS lower limb venous duplex study on 11/13/21: No evidence of acute or chronic deep vein thrombosis or superficial thrombophlebitis  · TTE on 11/14/21: Left ventricular ejection fraction is 65%  Systolic function is normal  Wall motion is normal  Diastolic function is mildly abnormal consistent with grade I relaxation  Wall thickness is mildly increased  There is mild concentric hypertrophy  Left atrium showed mild dilation, aortic valve, mitral valve and tricuspid valve showed mild regurgitation  ·       Significant Findings / Test Results:   · See above     Incidental Findings:   · See above     Test Results Pending at Discharge (will require follow up): · None      Outpatient Tests Requested:  · PT/INR checks     Complications:  none     Reason for Admission: SOB and lower extremity edema     Hospital Course:      Prabhjot Edwards is a 77 y o  female patient with a PMH significant for lower extremity edema/lymphedema, venous insufficency, HTN, HLD, Sjogren's syndrome, chronic back pain and fibromyalgia who originally presented to the hospital on 11/12/2021 due to SOB and worsening lower extremity edema  She stopped taking her Lasix 20mg TID because it caused her to urinate frequently   She had drainage from her RLE and erythema upon admission  Her BNP was 476 on admission  ECHO on admission showed left ventricular ejection fraction of 65%  Her diastolic function was mildly abnormal consistent with grade I relaxation  ECG on admission showed atrial fibrillation which converted to sinus  CTA and VAS lower limb venous duplex studies WNL  She was placed on IV Lasix for diuresis and supplemental oxygen  Her urine output has been good  Her weight was 109kg (240lbs 4 8oz) on admission and 113kg (250lbs 3 6oz) today  She had episodes of hypertension in which she was on losartan and amlodipine and started on carvedilol  She was placed on Coumadin for stroke prevention and should f/u on INRs outpatient  She is cleared for home as per cardiology and is recommended outpatient vascular follow-up due to venous insufficiency/lymphedema and cardiology follow-up      Please see above list of diagnoses and related plan for additional information       Condition at Discharge: stable      Discharge Day Visit / Exam: 11/17/21     Subjective: Today patient feels well  She denies PND, dizziness, palpations, chest pain, SOB, and cough  She says the rash on her back is itching less with use of 1% hydrocortisone cream  Today, she got up and walked around for the first time and had a little bit of GONZALEZ, otherwise no complaints or overnight changes  Vitals: Blood Pressure: 161/73 (11/17/21 0700)  Pulse: 78 (11/17/21 0700)  Temperature: 98 9 °F (37 2 °C) (11/17/21 0700)  Temp Source: Oral (11/17/21 0700)  Respirations: 20 (11/17/21 0700)  Height: 5' 2" (157 5 cm) (11/14/21 1117)  Weight - Scale: 113 kg (250 lb 3 6 oz) (11/17/21 0416)  SpO2: 91 % (11/17/21 0700)  Exam:   Physical Exam  Vitals and nursing note reviewed  Constitutional:       General: She is not in acute distress  Appearance: Normal appearance  HENT:      Head: Normocephalic  Mouth/Throat:      Mouth: Mucous membranes are moist    Eyes:      General: No scleral icterus  Pupils: Pupils are equal, round, and reactive to light  Cardiovascular:      Rate and Rhythm: Normal rate and regular rhythm  Heart sounds: No murmur heard  Pulmonary:      Effort: Pulmonary effort is normal  No respiratory distress  Breath sounds: Normal breath sounds  No wheezing, rhonchi or rales  Abdominal:      General: Bowel sounds are normal  There is no distension  Palpations: Abdomen is soft  Tenderness: There is no abdominal tenderness  Musculoskeletal:         General: No swelling  Right lower leg: Edema present  Left lower leg: Edema present  Skin:     Capillary Refill: Capillary refill takes less than 2 seconds  Neurological:      General: No focal deficit present  Mental Status: She is alert and oriented to person, place, and time  Mental status is at baseline  Discussion with Family: Called      Discharge instructions/Information to patient and family:   See after visit summary for information provided to patient and family        Provisions for Follow-Up Care:  See after visit summary for information related to follow-up care and any pertinent home health orders        Disposition:      Home     For Discharges to [facility] SNF:   · Not Applicable to this Patient - Not Applicable to this Patient     Planned Readmission: none     Discharge Statement:  I spent 45 minutes discharging the patient  This time was spent on the day of discharge  I had direct contact with the patient on the day of discharge  Greater than 50% of the total time was spent examining patient, answering all patient questions, arranging and discussing plan of care with patient as well as directly providing post-discharge instructions    Additional time then spent on discharge activities      Discharge Medications:  See after visit summary for reconciled discharge medications provided to patient and family        ** Please Note: This note has been constructed using a voice recognition system **

## 2021-11-17 NOTE — DISCHARGE INSTR - AVS FIRST PAGE
Dear Pineda Landers,     It was our pleasure to care for you here at [facility] [employer]  It is our hope that we were always able to exceed the expected standards for your care during your stay  You were hospitalized due to CHF  You were cared for on the 3rd floor by Erick Gutiérrez PA-C under the service of Ethelyn Angelucci, MD with the [facility] who covers for your primary care physician (PCP), Luis Felipe Yanez MD, while you were hospitalized  If you have any questions or concerns related to this hospitalization, you may contact us at [phone]  For follow up as well as any medication refills, we recommend that you follow up with your primary care physician  A registered nurse will reach out to you by phone within a few days after your discharge to answer any additional questions that you may have after going home  However, at this time we provide for you here, the most important instructions / recommendations at discharge:     Notable Medication Adjustments -   Please start taking Coumadin, 10 mg daily  Please start taking Coreg 6 25 mg twice per day  Please stop taking hydrochlorothiazide and irbesartan, instead you will start losartan 100 mg daily and torsemide 40 mg daily  Testing Required after Discharge -   As we discussed you need repeat blood work on Friday or Saturday  I have contacted a primary care office who will schedule an appointment with you next week to resume blood work  Important follow up information -   Is important follow-up with Cardiology as below as well as your primary care provider  Other Instructions -   Return to the emergency department if you have lower extremity swelling, shortness of breath, chest pain, blood in her stool    Please review this entire after visit summary as additional general instructions including medication list, appointments, activity, diet, any pertinent wound care, and other additional recommendations from your care team that may be provided for you        Sincerely,     Olu Wall PA-C

## 2021-11-17 NOTE — ASSESSMENT & PLAN NOTE
· On exam, patient with significant erythema and edema of both lower extremities with fluid weeping from lateral aspect of right lower leg  · Patient with history of chronic bilateral lower extremity edema and chronic venous insufficiency  Per review of records, patient also has a history of recurrent cellulitis and 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  · Venous doppler negative for DVT   · Patient denies recent fevers/ chills and has only mild leukocytosis on presentation but given erythema, drainage started on IV Ancef day 4 for suspected cellulitis   · Likely primarily due to edema  Edema control with IV diuresis as per above  · Will refer to lymphedema clinic outpt   Pt agrees  · Keflex for 3 days to complete abx

## 2021-11-17 NOTE — ASSESSMENT & PLAN NOTE
· In the setting of suspected acute CHF  · Patient noted to be in atrial fibrillation on EKG on presentation  Uncertain of any prior history of a-fib but notes being told she had an abnormal heart rhythm several years ago  · [id number]: 5  · Converted to sinus  · TSH wnl  · Coreg 6 25mg BID  · Patient unable to afford Eliquis, started on Coumadin    Coumadin started, no strong indication for IV heparin bridging per Cardiology  · INR check Friday, called PCP who will follow-up in office next week

## 2021-11-18 NOTE — UTILIZATION REVIEW
Notification of Discharge   This is a Notification of Discharge from our facility [address]  Please be advised that this patient has been discharge from our facility  Below you will find the admission and discharge date and time including the patients disposition  UTILIZATION REVIEW CONTACT:  Deborah Gonzalez  Utilization   Network Utilization Review Department  Phone: [phone] x carefully listen to the prompts  All voicemails are confidential   Email: [email or web address]     PHYSICIAN ADVISORY SERVICES:  FOR VERP-LM-QPOG REVIEW - MEDICAL NECESSITY DENIAL  Phone: [phone]  Fax: [phone]  Email: [email or web address]     PRESENTATION DATE: 11/12/2021  5:29 PM  OBERVATION ADMISSION DATE:   INPATIENT ADMISSION DATE: 11/12/21  9:37 PM   DISCHARGE DATE: 11/17/2021  3:58 PM  DISPOSITION: Home with [place] with [address] [place] [address] Road INFORMATION:  Send all requests for admission clinical reviews, approved or denied determinations and any other requests to dedicated fax number below belonging to the campus where the patient is receiving treatment   List of dedicated fax numbers:  [address] DENIALS (Administrative/Medical Necessity) [phone]   [address] [place] [address] (Maternity/NICU/Pediatrics) [phone]   [place]  [phone]   [address] [phone]   [facility] [place]  [phone]   Dick Pérez [facility] [address] [place] [address] [phone]   [facility]  [phone]   [address] [facility] [address]  [address] [facility] [address] [facility] [phone]

## 2021-11-19 ENCOUNTER — APPOINTMENT (OUTPATIENT)
Dept: LAB | Facility: CLINIC | Age: 76
End: 2021-11-19
Payer: COMMERCIAL

## 2021-11-19 DIAGNOSIS — I50.9 CHF (CONGESTIVE HEART FAILURE) (HCC): ICD-10-CM

## 2021-11-19 DIAGNOSIS — I48.91 NEW ONSET ATRIAL FIBRILLATION (HCC): ICD-10-CM

## 2021-11-19 LAB
ANION GAP SERPL CALCULATED.3IONS-SCNC: 7 MMOL/L (ref 4–13)
BUN SERPL-MCNC: 36 MG/DL (ref 5–25)
CALCIUM SERPL-MCNC: 8.9 MG/DL (ref 8.3–10.1)
CHLORIDE SERPL-SCNC: 94 MMOL/L (ref 100–108)
CO2 SERPL-SCNC: 31 MMOL/L (ref 21–32)
CREAT SERPL-MCNC: 1.66 MG/DL (ref 0.6–1.3)
GFR SERPL CREATININE-BSD FRML MDRD: 30 ML/MIN/1.73SQ M
GLUCOSE SERPL-MCNC: 105 MG/DL (ref 65–140)
INR PPP: 2.38 (ref 0.84–1.19)
POTASSIUM SERPL-SCNC: 5 MMOL/L (ref 3.5–5.3)
PROTHROMBIN TIME: 25.6 SECONDS (ref 11.6–14.5)
SODIUM SERPL-SCNC: 132 MMOL/L (ref 136–145)

## 2021-11-19 PROCEDURE — 85610 PROTHROMBIN TIME: CPT

## 2021-11-19 PROCEDURE — 36415 COLL VENOUS BLD VENIPUNCTURE: CPT

## 2021-11-19 PROCEDURE — 80048 BASIC METABOLIC PNL TOTAL CA: CPT

## 2021-11-22 PROBLEM — N18.2 CKD (CHRONIC KIDNEY DISEASE) STAGE 2, GFR 60-89 ML/MIN: Status: ACTIVE | Noted: 2021-11-22

## 2021-11-22 PROBLEM — N17.9 AKI (ACUTE KIDNEY INJURY) (HCC): Status: ACTIVE | Noted: 2021-11-22

## 2021-11-22 PROBLEM — I50.33 ACUTE ON CHRONIC DIASTOLIC CHF (CONGESTIVE HEART FAILURE) (HCC): Status: ACTIVE | Noted: 2021-11-12

## 2021-12-02 ENCOUNTER — OFFICE VISIT (OUTPATIENT)
Dept: INTERNAL MEDICINE CLINIC | Facility: CLINIC | Age: 76
End: 2021-12-02
Payer: COMMERCIAL

## 2021-12-02 VITALS
HEART RATE: 92 BPM | WEIGHT: 260 LBS | BODY MASS INDEX: 47.84 KG/M2 | DIASTOLIC BLOOD PRESSURE: 84 MMHG | HEIGHT: 62 IN | TEMPERATURE: 98.8 F | OXYGEN SATURATION: 95 % | SYSTOLIC BLOOD PRESSURE: 160 MMHG

## 2021-12-02 DIAGNOSIS — I50.31 ACUTE DIASTOLIC CHF (CONGESTIVE HEART FAILURE) (HCC): ICD-10-CM

## 2021-12-02 DIAGNOSIS — I87.2 CHRONIC VENOUS INSUFFICIENCY: ICD-10-CM

## 2021-12-02 DIAGNOSIS — I10 ESSENTIAL HYPERTENSION: ICD-10-CM

## 2021-12-02 DIAGNOSIS — N18.2 CKD (CHRONIC KIDNEY DISEASE) STAGE 2, GFR 60-89 ML/MIN: ICD-10-CM

## 2021-12-02 DIAGNOSIS — L85.3 XEROSIS OF SKIN: ICD-10-CM

## 2021-12-02 DIAGNOSIS — I10 HYPERTENSION, UNSPECIFIED TYPE: ICD-10-CM

## 2021-12-02 DIAGNOSIS — N17.9 AKI (ACUTE KIDNEY INJURY) (HCC): Primary | ICD-10-CM

## 2021-12-02 DIAGNOSIS — I48.91 NEW ONSET ATRIAL FIBRILLATION (HCC): ICD-10-CM

## 2021-12-02 DIAGNOSIS — I48.0 PAF (PAROXYSMAL ATRIAL FIBRILLATION) (HCC): ICD-10-CM

## 2021-12-02 PROBLEM — Z13.31 SCREENING FOR DEPRESSION: Status: RESOLVED | Noted: 2020-09-10 | Resolved: 2021-12-02

## 2021-12-02 PROBLEM — M17.9 OSTEOARTHRITIS OF KNEE: Status: ACTIVE | Noted: 2021-12-02

## 2021-12-02 PROBLEM — Z13.31 SCREENING FOR DEPRESSION: Status: ACTIVE | Noted: 2020-09-10

## 2021-12-02 PROBLEM — M17.10 OSTEOARTHRITIS OF KNEE: Status: ACTIVE | Noted: 2021-12-02

## 2021-12-02 PROBLEM — J96.01 ACUTE RESPIRATORY FAILURE WITH HYPOXIA (HCC): Status: RESOLVED | Noted: 2021-11-12 | Resolved: 2021-12-02

## 2021-12-02 PROBLEM — L53.9 ERYTHEMA OF LOWER EXTREMITY: Status: RESOLVED | Noted: 2021-11-12 | Resolved: 2021-12-02

## 2021-12-02 PROBLEM — E66.9 OBESITY: Status: ACTIVE | Noted: 2021-12-02

## 2021-12-02 PROBLEM — E78.2 MIXED HYPERLIPIDEMIA: Status: ACTIVE | Noted: 2021-12-02

## 2021-12-02 PROCEDURE — 1101F PT FALLS ASSESS-DOCD LE1/YR: CPT | Performed by: INTERNAL MEDICINE

## 2021-12-02 PROCEDURE — 99214 OFFICE O/P EST MOD 30 MIN: CPT | Performed by: INTERNAL MEDICINE

## 2021-12-02 PROCEDURE — 3725F SCREEN DEPRESSION PERFORMED: CPT | Performed by: INTERNAL MEDICINE

## 2021-12-02 PROCEDURE — 1036F TOBACCO NON-USER: CPT | Performed by: INTERNAL MEDICINE

## 2021-12-02 PROCEDURE — 1160F RVW MEDS BY RX/DR IN RCRD: CPT | Performed by: INTERNAL MEDICINE

## 2021-12-02 RX ORDER — APIXABAN 5 MG/1
TABLET, FILM COATED ORAL
COMMUNITY
Start: 2021-11-15 | End: 2021-12-02 | Stop reason: ALTCHOICE

## 2021-12-02 RX ORDER — AMMONIUM LACTATE 12 G/100G
CREAM TOPICAL 2 TIMES DAILY
Qty: 385 G | Refills: 0 | Status: SHIPPED | OUTPATIENT
Start: 2021-12-02 | End: 2022-03-21 | Stop reason: SDUPTHER

## 2021-12-02 RX ORDER — OXYCODONE HCL 40 MG/1
TABLET, FILM COATED, EXTENDED RELEASE ORAL
COMMUNITY
End: 2021-12-02 | Stop reason: ALTCHOICE

## 2021-12-02 RX ORDER — CARVEDILOL 25 MG/1
25 TABLET ORAL 2 TIMES DAILY WITH MEALS
Qty: 60 TABLET | Refills: 0 | Status: SHIPPED | OUTPATIENT
Start: 2021-12-02 | End: 2022-01-27

## 2021-12-02 RX ORDER — ONDANSETRON 4 MG/1
TABLET, ORALLY DISINTEGRATING ORAL
COMMUNITY
Start: 2021-11-09 | End: 2022-02-12 | Stop reason: HOSPADM

## 2021-12-02 RX ORDER — BLOOD PRESSURE TEST KIT
KIT MISCELLANEOUS DAILY
Qty: 1 KIT | Refills: 0 | Status: SHIPPED | OUTPATIENT
Start: 2021-12-02 | End: 2022-07-06 | Stop reason: CLARIF

## 2021-12-02 RX ORDER — PENICILLIN V POTASSIUM 500 MG/1
TABLET ORAL
COMMUNITY
End: 2022-01-06 | Stop reason: HOSPADM

## 2021-12-03 ENCOUNTER — TELEPHONE (OUTPATIENT)
Dept: FAMILY MEDICINE CLINIC | Facility: CLINIC | Age: 76
End: 2021-12-03

## 2021-12-03 DIAGNOSIS — I48.91 NEW ONSET ATRIAL FIBRILLATION (HCC): Primary | ICD-10-CM

## 2021-12-04 ENCOUNTER — APPOINTMENT (OUTPATIENT)
Dept: LAB | Facility: CLINIC | Age: 76
End: 2021-12-04
Payer: COMMERCIAL

## 2021-12-04 DIAGNOSIS — I48.0 PAF (PAROXYSMAL ATRIAL FIBRILLATION) (HCC): ICD-10-CM

## 2021-12-04 DIAGNOSIS — N17.9 AKI (ACUTE KIDNEY INJURY) (HCC): ICD-10-CM

## 2021-12-04 LAB
ANION GAP SERPL CALCULATED.3IONS-SCNC: 6 MMOL/L (ref 4–13)
BUN SERPL-MCNC: 18 MG/DL (ref 5–25)
CALCIUM SERPL-MCNC: 9.2 MG/DL (ref 8.3–10.1)
CHLORIDE SERPL-SCNC: 99 MMOL/L (ref 100–108)
CO2 SERPL-SCNC: 32 MMOL/L (ref 21–32)
CREAT SERPL-MCNC: 1.03 MG/DL (ref 0.6–1.3)
GFR SERPL CREATININE-BSD FRML MDRD: 53 ML/MIN/1.73SQ M
GLUCOSE SERPL-MCNC: 130 MG/DL (ref 65–140)
INR PPP: 1.06 (ref 0.84–1.19)
POTASSIUM SERPL-SCNC: 3.6 MMOL/L (ref 3.5–5.3)
PROTHROMBIN TIME: 13.8 SECONDS (ref 11.6–14.5)
SODIUM SERPL-SCNC: 137 MMOL/L (ref 136–145)

## 2021-12-04 PROCEDURE — 36415 COLL VENOUS BLD VENIPUNCTURE: CPT

## 2021-12-04 PROCEDURE — 80048 BASIC METABOLIC PNL TOTAL CA: CPT

## 2021-12-04 PROCEDURE — 85610 PROTHROMBIN TIME: CPT

## 2021-12-06 ENCOUNTER — TELEPHONE (OUTPATIENT)
Dept: ICU | Facility: HOSPITAL | Age: 76
End: 2021-12-06

## 2021-12-06 ENCOUNTER — ANTICOAG VISIT (OUTPATIENT)
Dept: INTERNAL MEDICINE CLINIC | Facility: CLINIC | Age: 76
End: 2021-12-06

## 2021-12-06 DIAGNOSIS — R79.1 SUBTHERAPEUTIC INTERNATIONAL NORMALIZED RATIO (INR): ICD-10-CM

## 2021-12-06 DIAGNOSIS — I48.91 NEW ONSET ATRIAL FIBRILLATION (HCC): Primary | ICD-10-CM

## 2021-12-08 ENCOUNTER — APPOINTMENT (OUTPATIENT)
Dept: LAB | Facility: CLINIC | Age: 76
End: 2021-12-08
Payer: COMMERCIAL

## 2021-12-08 DIAGNOSIS — I48.91 NEW ONSET ATRIAL FIBRILLATION (HCC): ICD-10-CM

## 2021-12-08 DIAGNOSIS — R79.1 SUBTHERAPEUTIC INTERNATIONAL NORMALIZED RATIO (INR): ICD-10-CM

## 2021-12-08 LAB
INR PPP: 1.62 (ref 0.84–1.19)
PROTHROMBIN TIME: 19 SECONDS (ref 11.6–14.5)

## 2021-12-08 PROCEDURE — 85610 PROTHROMBIN TIME: CPT

## 2021-12-08 PROCEDURE — 36415 COLL VENOUS BLD VENIPUNCTURE: CPT

## 2021-12-09 ENCOUNTER — TELEPHONE (OUTPATIENT)
Dept: CCU | Facility: HOSPITAL | Age: 76
End: 2021-12-09

## 2021-12-09 DIAGNOSIS — I48.91 NEW ONSET ATRIAL FIBRILLATION (HCC): Primary | ICD-10-CM

## 2021-12-09 DIAGNOSIS — R79.1 SUBTHERAPEUTIC INTERNATIONAL NORMALIZED RATIO (INR): ICD-10-CM

## 2021-12-11 ENCOUNTER — APPOINTMENT (OUTPATIENT)
Dept: LAB | Facility: CLINIC | Age: 76
End: 2021-12-11
Payer: COMMERCIAL

## 2021-12-11 DIAGNOSIS — R79.1 SUBTHERAPEUTIC INTERNATIONAL NORMALIZED RATIO (INR): ICD-10-CM

## 2021-12-11 DIAGNOSIS — I48.91 NEW ONSET ATRIAL FIBRILLATION (HCC): ICD-10-CM

## 2021-12-11 LAB
INR PPP: 2.6 (ref 0.84–1.19)
PROTHROMBIN TIME: 27.4 SECONDS (ref 11.6–14.5)

## 2021-12-11 PROCEDURE — 85610 PROTHROMBIN TIME: CPT

## 2021-12-11 PROCEDURE — 36415 COLL VENOUS BLD VENIPUNCTURE: CPT

## 2021-12-13 ENCOUNTER — TELEPHONE (OUTPATIENT)
Dept: CCU | Facility: HOSPITAL | Age: 76
End: 2021-12-13

## 2021-12-13 ENCOUNTER — ANTICOAG VISIT (OUTPATIENT)
Dept: INTERNAL MEDICINE CLINIC | Facility: CLINIC | Age: 76
End: 2021-12-13

## 2021-12-14 PROBLEM — I50.31 ACUTE DIASTOLIC CHF (CONGESTIVE HEART FAILURE) (HCC): Status: RESOLVED | Noted: 2021-11-12 | Resolved: 2021-12-14

## 2021-12-16 ENCOUNTER — OFFICE VISIT (OUTPATIENT)
Dept: INTERNAL MEDICINE CLINIC | Facility: CLINIC | Age: 76
End: 2021-12-16
Payer: COMMERCIAL

## 2021-12-16 VITALS
SYSTOLIC BLOOD PRESSURE: 142 MMHG | WEIGHT: 264.2 LBS | BODY MASS INDEX: 48.62 KG/M2 | HEIGHT: 62 IN | DIASTOLIC BLOOD PRESSURE: 82 MMHG | OXYGEN SATURATION: 92 % | TEMPERATURE: 97.4 F | HEART RATE: 86 BPM

## 2021-12-16 DIAGNOSIS — Z00.00 MEDICARE ANNUAL WELLNESS VISIT, SUBSEQUENT: Primary | ICD-10-CM

## 2021-12-16 DIAGNOSIS — Z12.11 COLON CANCER SCREENING: ICD-10-CM

## 2021-12-16 DIAGNOSIS — Z13.820 SCREENING FOR OSTEOPOROSIS: ICD-10-CM

## 2021-12-16 DIAGNOSIS — E66.01 OBESITY, MORBID (HCC): ICD-10-CM

## 2021-12-16 PROCEDURE — 3008F BODY MASS INDEX DOCD: CPT | Performed by: INTERNAL MEDICINE

## 2021-12-16 PROCEDURE — G0439 PPPS, SUBSEQ VISIT: HCPCS | Performed by: INTERNAL MEDICINE

## 2021-12-16 RX ORDER — BUTALBITAL, ACETAMINOPHEN AND CAFFEINE 50; 325; 40 MG/1; MG/1; MG/1
TABLET ORAL
Qty: 30 TABLET | Status: CANCELLED | OUTPATIENT
Start: 2021-12-16

## 2021-12-20 ENCOUNTER — TELEPHONE (OUTPATIENT)
Dept: INTERNAL MEDICINE CLINIC | Facility: CLINIC | Age: 76
End: 2021-12-20

## 2021-12-20 DIAGNOSIS — I48.91 NEW ONSET ATRIAL FIBRILLATION (HCC): Primary | ICD-10-CM

## 2021-12-21 ENCOUNTER — TELEPHONE (OUTPATIENT)
Dept: LAB | Facility: HOSPITAL | Age: 76
End: 2021-12-21

## 2021-12-23 ENCOUNTER — APPOINTMENT (OUTPATIENT)
Dept: LAB | Facility: HOSPITAL | Age: 76
End: 2021-12-23
Attending: INTERNAL MEDICINE
Payer: COMMERCIAL

## 2021-12-23 ENCOUNTER — ANTICOAG VISIT (OUTPATIENT)
Dept: INTERNAL MEDICINE CLINIC | Facility: CLINIC | Age: 76
End: 2021-12-23

## 2021-12-23 ENCOUNTER — TELEPHONE (OUTPATIENT)
Dept: LAB | Facility: HOSPITAL | Age: 76
End: 2021-12-23

## 2021-12-23 LAB
INR PPP: 8.66 (ref 0.84–1.19)
PROTHROMBIN TIME: 66.5 SECONDS (ref 11.6–14.5)

## 2021-12-23 PROCEDURE — 85610 PROTHROMBIN TIME: CPT

## 2021-12-23 PROCEDURE — 36415 COLL VENOUS BLD VENIPUNCTURE: CPT

## 2021-12-23 NOTE — TELEPHONE ENCOUNTER
[place] lives in [place] which are scheduled with mobile lab on Thurs and Fri, unable to schedule patient for Monday   Patient will try to go to the lab

## 2021-12-27 ENCOUNTER — TELEPHONE (OUTPATIENT)
Dept: INTERNAL MEDICINE CLINIC | Facility: CLINIC | Age: 76
End: 2021-12-27

## 2021-12-27 NOTE — TELEPHONE ENCOUNTER
Karlo Esquivel has called the IM office in regards to [employer]  Wyline Phalen states Brain Anthony is having issues with walking  Wyline Phalen states she is unable to go to the bathroom and needs to be taken care of by Wyline Phalen often  Wyline Phalen states he has concerns he would like to speak with a doctor about  Informed Wyline Phalen a message can be sent to the provider pool to discuss his concerns, which a doctor would call to speak with him  Wyline Phalen stated he would prefer to speak to Dr Domi ramirez, stating he was the only provider to address the issue with her legs

## 2021-12-27 NOTE — TELEPHONE ENCOUNTER
Spoke to [employer]  and informed him to take Сергей Renee to the ER  Magda Face verbally stated he understood the information and would let her know

## 2021-12-27 NOTE — TELEPHONE ENCOUNTER
This patient needs to come into the office or go to the hospital   I am very concerned and it is not appropriate to address these concerns over the phone, she needs to be seen    Her INR was also over 8 when checked on 12/23 and it looks like she has not had repeat INR done so even further reason to come in to the office to be seen or go to the hospital

## 2021-12-28 ENCOUNTER — TELEPHONE (OUTPATIENT)
Dept: INTERNAL MEDICINE CLINIC | Facility: CLINIC | Age: 76
End: 2021-12-28

## 2021-12-28 DIAGNOSIS — N18.2 CKD (CHRONIC KIDNEY DISEASE) STAGE 2, GFR 60-89 ML/MIN: ICD-10-CM

## 2021-12-28 DIAGNOSIS — I48.91 NEW ONSET ATRIAL FIBRILLATION (HCC): Primary | ICD-10-CM

## 2021-12-28 DIAGNOSIS — M17.10 PRIMARY OSTEOARTHRITIS OF KNEE, UNSPECIFIED LATERALITY: ICD-10-CM

## 2021-12-28 DIAGNOSIS — I50.9 CONGESTIVE HEART FAILURE, UNSPECIFIED HF CHRONICITY, UNSPECIFIED HEART FAILURE TYPE (HCC): ICD-10-CM

## 2021-12-28 DIAGNOSIS — I87.2 CHRONIC VENOUS INSUFFICIENCY: ICD-10-CM

## 2021-12-28 NOTE — TELEPHONE ENCOUNTER
Unfortunately, I do not know this patient very well  I have only seen her once in the office as she just established at the practice  It is hard for me to make recommendations when I have not assessed her leg and back pain as this was not brought up at the last visit  I agree with your recommendation at this time that if as severe as  states, they should probably go to the ED for evaluation or they can make an appointment at our office so we can further evaluate these concerns  I do know that patient was eval by PT on last hospitalization and there was a recommendation for [facility] however patient declined  I could order [facility] if they are amenable as well

## 2021-12-28 NOTE — TELEPHONE ENCOUNTER
Patient  came to the office today and he is concerned wife unable to ambulate independently swelling is getting worse and worse  Last night it took 4o minutes to get in to move from  To the chair with her walker  Her condition is deteriorating according to the  she is also forgetting how to move needs increased verbal ques  She has a lot of back and leg pain which is worsening   I suggested ER due to uncontrolled pain with movement but they are reluctant given the current state of pandemic  I am going to reach out to social work to see if there is any option to get patient a different level of care possible rehab  She was seen at our office on 12/16  Dr Gena May please review and provide your thoughts   Thank you

## 2021-12-29 ENCOUNTER — APPOINTMENT (EMERGENCY)
Dept: RADIOLOGY | Facility: HOSPITAL | Age: 76
DRG: 291 | End: 2021-12-29
Payer: COMMERCIAL

## 2021-12-29 ENCOUNTER — TELEPHONE (OUTPATIENT)
Dept: LAB | Facility: HOSPITAL | Age: 76
End: 2021-12-29

## 2021-12-29 ENCOUNTER — HOSPITAL ENCOUNTER (INPATIENT)
Facility: HOSPITAL | Age: 76
LOS: 7 days | Discharge: NURSING, REHAB OR LONG-TERM CARE | DRG: 291 | End: 2022-01-06
Attending: EMERGENCY MEDICINE | Admitting: INTERNAL MEDICINE
Payer: COMMERCIAL

## 2021-12-29 DIAGNOSIS — F11.20 UNCOMPLICATED OPIOID DEPENDENCE (HCC): ICD-10-CM

## 2021-12-29 DIAGNOSIS — N17.9 AKI (ACUTE KIDNEY INJURY) (HCC): ICD-10-CM

## 2021-12-29 DIAGNOSIS — I50.9 CHF EXACERBATION (HCC): ICD-10-CM

## 2021-12-29 DIAGNOSIS — G89.29 CHRONIC MIDLINE LOW BACK PAIN WITHOUT SCIATICA: ICD-10-CM

## 2021-12-29 DIAGNOSIS — J96.01 ACUTE RESPIRATORY FAILURE WITH HYPOXIA (HCC): Primary | ICD-10-CM

## 2021-12-29 DIAGNOSIS — I48.91 NEW ONSET ATRIAL FIBRILLATION (HCC): ICD-10-CM

## 2021-12-29 DIAGNOSIS — L03.116 BILATERAL LOWER LEG CELLULITIS: ICD-10-CM

## 2021-12-29 DIAGNOSIS — M54.50 CHRONIC MIDLINE LOW BACK PAIN WITHOUT SCIATICA: ICD-10-CM

## 2021-12-29 DIAGNOSIS — L03.115 BILATERAL LOWER LEG CELLULITIS: ICD-10-CM

## 2021-12-29 LAB
2HR DELTA HS TROPONIN: 0 NG/L
ALBUMIN SERPL BCP-MCNC: 3.4 G/DL (ref 3.5–5)
ALP SERPL-CCNC: 128 U/L (ref 46–116)
ALT SERPL W P-5'-P-CCNC: 24 U/L (ref 12–78)
ANION GAP SERPL CALCULATED.3IONS-SCNC: 10 MMOL/L (ref 4–13)
AST SERPL W P-5'-P-CCNC: 29 U/L (ref 5–45)
BASE EXCESS BLDA CALC-SCNC: 3 MMOL/L (ref -2–3)
BASOPHILS # BLD AUTO: 0.02 THOUSANDS/ΜL (ref 0–0.1)
BASOPHILS NFR BLD AUTO: 0 % (ref 0–1)
BILIRUB SERPL-MCNC: 0.42 MG/DL (ref 0.2–1)
BUN SERPL-MCNC: 62 MG/DL (ref 5–25)
CALCIUM ALBUM COR SERPL-MCNC: 10.1 MG/DL (ref 8.3–10.1)
CALCIUM SERPL-MCNC: 9.6 MG/DL (ref 8.3–10.1)
CARDIAC TROPONIN I PNL SERPL HS: 3 NG/L
CARDIAC TROPONIN I PNL SERPL HS: 3 NG/L
CHLORIDE SERPL-SCNC: 97 MMOL/L (ref 100–108)
CO2 SERPL-SCNC: 27 MMOL/L (ref 21–32)
CREAT SERPL-MCNC: 1.97 MG/DL (ref 0.6–1.3)
D DIMER PPP FEU-MCNC: 0.66 UG/ML FEU
EOSINOPHIL # BLD AUTO: 0.53 THOUSAND/ΜL (ref 0–0.61)
EOSINOPHIL NFR BLD AUTO: 5 % (ref 0–6)
ERYTHROCYTE [DISTWIDTH] IN BLOOD BY AUTOMATED COUNT: 14.6 % (ref 11.6–15.1)
FIO2 GAS DIL.REBREATH: 50 L
FLUAV RNA RESP QL NAA+PROBE: NEGATIVE
FLUBV RNA RESP QL NAA+PROBE: NEGATIVE
GFR SERPL CREATININE-BSD FRML MDRD: 24 ML/MIN/1.73SQ M
GLUCOSE SERPL-MCNC: 122 MG/DL (ref 65–140)
GLUCOSE SERPL-MCNC: 129 MG/DL (ref 65–140)
HCO3 BLDA-SCNC: 26.8 MMOL/L (ref 22–28)
HCT VFR BLD AUTO: 31.9 % (ref 34.8–46.1)
HCT VFR BLD CALC: 31 % (ref 34.8–46.1)
HGB BLD-MCNC: 10 G/DL (ref 11.5–15.4)
HGB BLDA-MCNC: 10.5 G/DL (ref 11.5–15.4)
IMM GRANULOCYTES # BLD AUTO: 0.06 THOUSAND/UL (ref 0–0.2)
IMM GRANULOCYTES NFR BLD AUTO: 1 % (ref 0–2)
LYMPHOCYTES # BLD AUTO: 0.76 THOUSANDS/ΜL (ref 0.6–4.47)
LYMPHOCYTES NFR BLD AUTO: 7 % (ref 14–44)
MCH RBC QN AUTO: 27.5 PG (ref 26.8–34.3)
MCHC RBC AUTO-ENTMCNC: 31.3 G/DL (ref 31.4–37.4)
MCV RBC AUTO: 88 FL (ref 82–98)
MONOCYTES # BLD AUTO: 1.03 THOUSAND/ΜL (ref 0.17–1.22)
MONOCYTES NFR BLD AUTO: 9 % (ref 4–12)
NEUTROPHILS # BLD AUTO: 8.79 THOUSANDS/ΜL (ref 1.85–7.62)
NEUTS SEG NFR BLD AUTO: 78 % (ref 43–75)
NRBC BLD AUTO-RTO: 0 /100 WBCS
NT-PROBNP SERPL-MCNC: 1860 PG/ML
PCO2 BLD: 28 MMOL/L (ref 21–32)
PCO2 BLD: 38.3 MM HG (ref 36–44)
PH BLD: 7.45 [PH] (ref 7.35–7.45)
PLATELET # BLD AUTO: 331 THOUSANDS/UL (ref 149–390)
PMV BLD AUTO: 11.5 FL (ref 8.9–12.7)
PO2 BLD: 164 MM HG (ref 75–129)
POTASSIUM BLD-SCNC: 5.1 MMOL/L (ref 3.5–5.3)
POTASSIUM SERPL-SCNC: 5.2 MMOL/L (ref 3.5–5.3)
PROT SERPL-MCNC: 7.6 G/DL (ref 6.4–8.2)
RBC # BLD AUTO: 3.64 MILLION/UL (ref 3.81–5.12)
RSV RNA RESP QL NAA+PROBE: NEGATIVE
SAO2 % BLD FROM PO2: 100 % (ref 60–85)
SARS-COV-2 RNA RESP QL NAA+PROBE: NEGATIVE
SODIUM BLD-SCNC: 133 MMOL/L (ref 136–145)
SODIUM SERPL-SCNC: 134 MMOL/L (ref 136–145)
SPECIMEN SOURCE: ABNORMAL
WBC # BLD AUTO: 11.19 THOUSAND/UL (ref 4.31–10.16)

## 2021-12-29 PROCEDURE — 82803 BLOOD GASES ANY COMBINATION: CPT

## 2021-12-29 PROCEDURE — 84484 ASSAY OF TROPONIN QUANT: CPT | Performed by: EMERGENCY MEDICINE

## 2021-12-29 PROCEDURE — 93005 ELECTROCARDIOGRAM TRACING: CPT

## 2021-12-29 PROCEDURE — 84132 ASSAY OF SERUM POTASSIUM: CPT

## 2021-12-29 PROCEDURE — 71045 X-RAY EXAM CHEST 1 VIEW: CPT

## 2021-12-29 PROCEDURE — 85379 FIBRIN DEGRADATION QUANT: CPT | Performed by: EMERGENCY MEDICINE

## 2021-12-29 PROCEDURE — 85025 COMPLETE CBC W/AUTO DIFF WBC: CPT | Performed by: EMERGENCY MEDICINE

## 2021-12-29 PROCEDURE — 96376 TX/PRO/DX INJ SAME DRUG ADON: CPT

## 2021-12-29 PROCEDURE — 36415 COLL VENOUS BLD VENIPUNCTURE: CPT | Performed by: EMERGENCY MEDICINE

## 2021-12-29 PROCEDURE — 83735 ASSAY OF MAGNESIUM: CPT | Performed by: INTERNAL MEDICINE

## 2021-12-29 PROCEDURE — 96374 THER/PROPH/DIAG INJ IV PUSH: CPT

## 2021-12-29 PROCEDURE — 83880 ASSAY OF NATRIURETIC PEPTIDE: CPT | Performed by: EMERGENCY MEDICINE

## 2021-12-29 PROCEDURE — 94002 VENT MGMT INPAT INIT DAY: CPT

## 2021-12-29 PROCEDURE — 99285 EMERGENCY DEPT VISIT HI MDM: CPT

## 2021-12-29 PROCEDURE — 0241U HB NFCT DS VIR RESP RNA 4 TRGT: CPT | Performed by: EMERGENCY MEDICINE

## 2021-12-29 PROCEDURE — 82947 ASSAY GLUCOSE BLOOD QUANT: CPT

## 2021-12-29 PROCEDURE — 36600 WITHDRAWAL OF ARTERIAL BLOOD: CPT

## 2021-12-29 PROCEDURE — 84295 ASSAY OF SERUM SODIUM: CPT

## 2021-12-29 PROCEDURE — 84443 ASSAY THYROID STIM HORMONE: CPT | Performed by: INTERNAL MEDICINE

## 2021-12-29 PROCEDURE — 85014 HEMATOCRIT: CPT

## 2021-12-29 PROCEDURE — 99285 EMERGENCY DEPT VISIT HI MDM: CPT | Performed by: EMERGENCY MEDICINE

## 2021-12-29 PROCEDURE — 80053 COMPREHEN METABOLIC PANEL: CPT | Performed by: EMERGENCY MEDICINE

## 2021-12-29 PROCEDURE — 94760 N-INVAS EAR/PLS OXIMETRY 1: CPT

## 2021-12-29 RX ORDER — FUROSEMIDE 10 MG/ML
40 INJECTION INTRAMUSCULAR; INTRAVENOUS ONCE
Status: COMPLETED | OUTPATIENT
Start: 2021-12-29 | End: 2021-12-29

## 2021-12-29 RX ADMIN — FUROSEMIDE 40 MG: 10 INJECTION, SOLUTION INTRAMUSCULAR; INTRAVENOUS at 22:34

## 2021-12-29 RX ADMIN — FUROSEMIDE 40 MG: 10 INJECTION, SOLUTION INTRAMUSCULAR; INTRAVENOUS at 23:53

## 2021-12-29 NOTE — LETTER
Thank you for allowing us to participate in the care of your patient, Julissa Ma, who was hospitalized from 12/29/2021 through 1/6/2022 with the admitting diagnosis of exacerbation of diastolic congestive heart failure  Patient arrived with worsening shortness of breath and lower extremity edema  Patient was managed with IV Lasix diuresis, and O2 supplementation  During hospital course, INR level was noted to be supratherapeutic and home Coumadin was held  Over hospital course, patient's respiratory status and lower extremity swelling improved, and was transitioned to her home regimen of torsemide 40 mg daily which she will continue at discharge  Patient instructed to restart Coumadin on 01/08/2022 and perform INR level lab test on Monday 01/10/2022 for monitoring  If you have any additional questions or would like to discuss further, please feel free to contact me      Henry Ramos MD  [place] [address] Internal Medicine, Hospitalist  [phone]

## 2021-12-30 PROBLEM — N17.9 AKI (ACUTE KIDNEY INJURY) (HCC): Status: ACTIVE | Noted: 2021-12-30

## 2021-12-30 PROBLEM — G89.29 CHRONIC BACK PAIN: Status: ACTIVE | Noted: 2021-12-30

## 2021-12-30 PROBLEM — M54.9 CHRONIC BACK PAIN: Status: ACTIVE | Noted: 2021-12-30

## 2021-12-30 PROBLEM — I48.91 ATRIAL FIBRILLATION (HCC): Status: ACTIVE | Noted: 2021-12-30

## 2021-12-30 PROBLEM — I50.9 CHF EXACERBATION (HCC): Status: ACTIVE | Noted: 2021-12-30

## 2021-12-30 PROBLEM — L03.90 CELLULITIS: Status: ACTIVE | Noted: 2021-12-30

## 2021-12-30 LAB
ANION GAP SERPL CALCULATED.3IONS-SCNC: 10 MMOL/L (ref 4–13)
APTT PPP: 63 SECONDS (ref 23–37)
BUN SERPL-MCNC: 60 MG/DL (ref 5–25)
CALCIUM SERPL-MCNC: 9.5 MG/DL (ref 8.3–10.1)
CHLORIDE SERPL-SCNC: 99 MMOL/L (ref 100–108)
CO2 SERPL-SCNC: 28 MMOL/L (ref 21–32)
CREAT SERPL-MCNC: 1.67 MG/DL (ref 0.6–1.3)
ERYTHROCYTE [DISTWIDTH] IN BLOOD BY AUTOMATED COUNT: 14.6 % (ref 11.6–15.1)
GFR SERPL CREATININE-BSD FRML MDRD: 29 ML/MIN/1.73SQ M
GLUCOSE SERPL-MCNC: 122 MG/DL (ref 65–140)
HCT VFR BLD AUTO: 31.3 % (ref 34.8–46.1)
HGB BLD-MCNC: 9.5 G/DL (ref 11.5–15.4)
INR PPP: 2.45 (ref 0.84–1.19)
LACTATE SERPL-SCNC: 0.9 MMOL/L (ref 0.5–2)
MAGNESIUM SERPL-MCNC: 2.3 MG/DL (ref 1.6–2.6)
MCH RBC QN AUTO: 26.7 PG (ref 26.8–34.3)
MCHC RBC AUTO-ENTMCNC: 30.4 G/DL (ref 31.4–37.4)
MCV RBC AUTO: 88 FL (ref 82–98)
PLATELET # BLD AUTO: 343 THOUSANDS/UL (ref 149–390)
PMV BLD AUTO: 11.3 FL (ref 8.9–12.7)
POTASSIUM SERPL-SCNC: 4.6 MMOL/L (ref 3.5–5.3)
PROCALCITONIN SERPL-MCNC: <0.05 NG/ML
PROTHROMBIN TIME: 26.1 SECONDS (ref 11.6–14.5)
RBC # BLD AUTO: 3.56 MILLION/UL (ref 3.81–5.12)
SODIUM SERPL-SCNC: 137 MMOL/L (ref 136–145)
TSH SERPL DL<=0.05 MIU/L-ACNC: 1.69 UIU/ML (ref 0.36–3.74)
WBC # BLD AUTO: 9.68 THOUSAND/UL (ref 4.31–10.16)

## 2021-12-30 PROCEDURE — 94003 VENT MGMT INPAT SUBQ DAY: CPT

## 2021-12-30 PROCEDURE — 85027 COMPLETE CBC AUTOMATED: CPT | Performed by: INTERNAL MEDICINE

## 2021-12-30 PROCEDURE — 99222 1ST HOSP IP/OBS MODERATE 55: CPT | Performed by: INTERNAL MEDICINE

## 2021-12-30 PROCEDURE — 80048 BASIC METABOLIC PNL TOTAL CA: CPT | Performed by: INTERNAL MEDICINE

## 2021-12-30 PROCEDURE — 94760 N-INVAS EAR/PLS OXIMETRY 1: CPT

## 2021-12-30 PROCEDURE — 84145 PROCALCITONIN (PCT): CPT

## 2021-12-30 PROCEDURE — 36415 COLL VENOUS BLD VENIPUNCTURE: CPT | Performed by: EMERGENCY MEDICINE

## 2021-12-30 PROCEDURE — 87070 CULTURE OTHR SPECIMN AEROBIC: CPT

## 2021-12-30 PROCEDURE — 99223 1ST HOSP IP/OBS HIGH 75: CPT | Performed by: HOSPITALIST

## 2021-12-30 PROCEDURE — 83605 ASSAY OF LACTIC ACID: CPT | Performed by: EMERGENCY MEDICINE

## 2021-12-30 PROCEDURE — 87205 SMEAR GRAM STAIN: CPT

## 2021-12-30 PROCEDURE — 85610 PROTHROMBIN TIME: CPT | Performed by: EMERGENCY MEDICINE

## 2021-12-30 PROCEDURE — 87077 CULTURE AEROBIC IDENTIFY: CPT

## 2021-12-30 PROCEDURE — 87040 BLOOD CULTURE FOR BACTERIA: CPT | Performed by: EMERGENCY MEDICINE

## 2021-12-30 PROCEDURE — 87186 SC STD MICRODIL/AGAR DIL: CPT

## 2021-12-30 PROCEDURE — 85730 THROMBOPLASTIN TIME PARTIAL: CPT | Performed by: EMERGENCY MEDICINE

## 2021-12-30 PROCEDURE — 96375 TX/PRO/DX INJ NEW DRUG ADDON: CPT

## 2021-12-30 RX ORDER — AMLODIPINE BESYLATE 5 MG/1
5 TABLET ORAL DAILY
Status: CANCELLED | OUTPATIENT
Start: 2021-12-30

## 2021-12-30 RX ORDER — AMMONIUM LACTATE 12 G/100G
CREAM TOPICAL 2 TIMES DAILY
Status: CANCELLED | OUTPATIENT
Start: 2021-12-30

## 2021-12-30 RX ORDER — POTASSIUM CHLORIDE 20 MEQ/1
40 TABLET, EXTENDED RELEASE ORAL DAILY
Status: DISCONTINUED | OUTPATIENT
Start: 2021-12-30 | End: 2021-12-30

## 2021-12-30 RX ORDER — OXYCODONE HCL 20 MG/1
20 TABLET, FILM COATED, EXTENDED RELEASE ORAL EVERY 8 HOURS SCHEDULED
Status: CANCELLED | OUTPATIENT
Start: 2021-12-30

## 2021-12-30 RX ORDER — FUROSEMIDE 10 MG/ML
80 INJECTION INTRAMUSCULAR; INTRAVENOUS
Status: DISCONTINUED | OUTPATIENT
Start: 2021-12-30 | End: 2022-01-02

## 2021-12-30 RX ORDER — ALPRAZOLAM 0.25 MG/1
0.25 TABLET ORAL 2 TIMES DAILY PRN
Status: CANCELLED | OUTPATIENT
Start: 2021-12-30

## 2021-12-30 RX ORDER — SENNOSIDES 8.6 MG
1 TABLET ORAL DAILY
Status: DISCONTINUED | OUTPATIENT
Start: 2021-12-30 | End: 2022-01-06 | Stop reason: HOSPADM

## 2021-12-30 RX ORDER — OXYCODONE HYDROCHLORIDE AND ACETAMINOPHEN 5; 325 MG/1; MG/1
1 TABLET ORAL EVERY 4 HOURS PRN
Status: CANCELLED | OUTPATIENT
Start: 2021-12-30

## 2021-12-30 RX ORDER — AMLODIPINE BESYLATE 5 MG/1
5 TABLET ORAL 2 TIMES DAILY
Status: DISCONTINUED | OUTPATIENT
Start: 2021-12-30 | End: 2022-01-06 | Stop reason: HOSPADM

## 2021-12-30 RX ORDER — HEPARIN SODIUM 5000 [USP'U]/ML
5000 INJECTION, SOLUTION INTRAVENOUS; SUBCUTANEOUS EVERY 8 HOURS SCHEDULED
Status: DISCONTINUED | OUTPATIENT
Start: 2021-12-30 | End: 2021-12-30

## 2021-12-30 RX ORDER — CARVEDILOL 12.5 MG/1
25 TABLET ORAL 2 TIMES DAILY WITH MEALS
Status: CANCELLED | OUTPATIENT
Start: 2021-12-30

## 2021-12-30 RX ORDER — WARFARIN SODIUM 2.5 MG/1
5 TABLET ORAL
Status: COMPLETED | OUTPATIENT
Start: 2021-12-30 | End: 2021-12-30

## 2021-12-30 RX ORDER — ONDANSETRON 2 MG/ML
4 INJECTION INTRAMUSCULAR; INTRAVENOUS EVERY 6 HOURS PRN
Status: DISCONTINUED | OUTPATIENT
Start: 2021-12-30 | End: 2022-01-06 | Stop reason: HOSPADM

## 2021-12-30 RX ORDER — OXYCODONE HCL 20 MG/1
20 TABLET, FILM COATED, EXTENDED RELEASE ORAL EVERY 8 HOURS SCHEDULED
Status: DISCONTINUED | OUTPATIENT
Start: 2021-12-30 | End: 2022-01-06 | Stop reason: HOSPADM

## 2021-12-30 RX ORDER — ACETAMINOPHEN 325 MG/1
650 TABLET ORAL EVERY 6 HOURS PRN
Status: DISCONTINUED | OUTPATIENT
Start: 2021-12-30 | End: 2022-01-06 | Stop reason: HOSPADM

## 2021-12-30 RX ORDER — CEFAZOLIN SODIUM 2 G/50ML
2000 SOLUTION INTRAVENOUS EVERY 8 HOURS
Status: DISCONTINUED | OUTPATIENT
Start: 2021-12-30 | End: 2022-01-06

## 2021-12-30 RX ORDER — OXYCODONE HYDROCHLORIDE AND ACETAMINOPHEN 5; 325 MG/1; MG/1
1 TABLET ORAL EVERY 4 HOURS PRN
Status: DISCONTINUED | OUTPATIENT
Start: 2021-12-30 | End: 2022-01-06 | Stop reason: HOSPADM

## 2021-12-30 RX ORDER — CARVEDILOL 12.5 MG/1
25 TABLET ORAL 2 TIMES DAILY WITH MEALS
Status: DISCONTINUED | OUTPATIENT
Start: 2021-12-30 | End: 2022-01-06 | Stop reason: HOSPADM

## 2021-12-30 RX ADMIN — WARFARIN SODIUM 5 MG: 2.5 TABLET ORAL at 17:17

## 2021-12-30 RX ADMIN — STANDARDIZED SENNA CONCENTRATE 8.6 MG: 8.6 TABLET ORAL at 08:16

## 2021-12-30 RX ADMIN — CEFAZOLIN SODIUM 2000 MG: 2 SOLUTION INTRAVENOUS at 13:46

## 2021-12-30 RX ADMIN — FUROSEMIDE 80 MG: 10 INJECTION, SOLUTION INTRAMUSCULAR; INTRAVENOUS at 17:18

## 2021-12-30 RX ADMIN — CEFEPIME HYDROCHLORIDE 2000 MG: 2 INJECTION, POWDER, FOR SOLUTION INTRAVENOUS at 01:59

## 2021-12-30 RX ADMIN — CARVEDILOL 25 MG: 12.5 TABLET, FILM COATED ORAL at 08:15

## 2021-12-30 RX ADMIN — OXYCODONE HYDROCHLORIDE AND ACETAMINOPHEN 1 TABLET: 5; 325 TABLET ORAL at 11:22

## 2021-12-30 RX ADMIN — FUROSEMIDE 80 MG: 10 INJECTION, SOLUTION INTRAMUSCULAR; INTRAVENOUS at 08:21

## 2021-12-30 RX ADMIN — OXYCODONE HYDROCHLORIDE 20 MG: 20 TABLET, FILM COATED, EXTENDED RELEASE ORAL at 23:21

## 2021-12-30 RX ADMIN — CEFAZOLIN SODIUM 2000 MG: 2 SOLUTION INTRAVENOUS at 21:55

## 2021-12-30 RX ADMIN — CARVEDILOL 25 MG: 12.5 TABLET, FILM COATED ORAL at 17:17

## 2021-12-30 RX ADMIN — OXYCODONE HYDROCHLORIDE 20 MG: 20 TABLET, FILM COATED, EXTENDED RELEASE ORAL at 17:24

## 2021-12-30 RX ADMIN — OXYCODONE HYDROCHLORIDE 20 MG: 20 TABLET, FILM COATED, EXTENDED RELEASE ORAL at 09:35

## 2021-12-30 RX ADMIN — AMLODIPINE BESYLATE 5 MG: 5 TABLET ORAL at 08:15

## 2021-12-30 RX ADMIN — AMLODIPINE BESYLATE 5 MG: 5 TABLET ORAL at 21:55

## 2021-12-30 NOTE — ED PROVIDER NOTES
History  No chief complaint on file  HPI     67-year-old female presenting to the emergency department with worsening shortness of breath, bilateral leg swelling for the past several week  Past medical history significant for atrial fibrillation on Coumadin, hypertension, edema in lower extremities  Patient reports she has had swelling and shortness of breath since discharge from the hospital when she was admitted last month, however it has acutely gotten worse  Denies fever, headache, visual changes, neck pain, chest pain, nausea, vomiting, abdominal pain, urinary symptoms, changes in stool  Of note, patient's INR was found to be 8 66 on 12/23  Prior to Admission Medications   Prescriptions Last Dose Informant Patient Reported? Taking? ALPRAZolam (XANAX) 0 25 mg tablet  Self Yes No   Sig: alprazolam 0 25 mg tablet   TAKE 1 TABLET BY MOUTH TWICE A DAY AS NEEDED   Blood Pressure KIT  Self No No   Sig: Use daily   Diclofenac Sodium (Voltaren) 1 %  Self Yes No   Sig: Voltaren 1 % topical gel   Vitamin D, Cholecalciferol, 25 MCG (1000 UT) TABS  Self Yes No   Sig: Vitamin D3 25 mcg (1,000 unit) capsule   one PO QD   amLODIPine (NORVASC) 5 mg tablet  Self Yes No   Sig: amlodipine 5 mg tablet   Take 1 tablet twice a day by oral route  ammonium lactate (LAC-HYDRIN) 12 % cream  Self No No   Sig: Apply topically 2 (two) times a day   carvedilol (COREG) 25 mg tablet  Self No No   Sig: Take 1 tablet (25 mg total) by mouth 2 (two) times a day with meals   fluticasone (FLONASE) 50 mcg/act nasal spray  Self Yes No   Sig: fluticasone propionate 50 mcg/actuation nasal spray,suspension   Spray 1 spray every day by intranasal route     hydrocortisone 1 % cream  Self No No   Sig: Apply topically 4 (four) times a day as needed for rash   losartan (COZAAR) 100 MG tablet  Self No No   Sig: Take 1 tablet (100 mg total) by mouth daily   ondansetron (ZOFRAN-ODT) 4 mg disintegrating tablet  Self Yes No   oxyCODONE (OxyCONTIN) 20 mg 12 hr tablet  Self Yes No   Sig: OxyContin 20 mg tablet,crush resistant,extended release   TAKE ONE TABLET BY MOUTH THREE TIMES DAILY AS NEEDED FOR PAIN   oxyCODONE-acetaminophen (PERCOCET)  mg per tablet  Self Yes No   Sig: oxycodone-acetaminophen 10 mg-325 mg tablet   TAKE ONE TABLET BY MOUTH EVERY 4 HOURS AS NEEDED FOR PAIN max DAILY AMOUNT SIX tablets   penicillin V potassium (VEETID) 500 mg tablet  Self Yes No   Sig: penicillin V potassium 500 mg tablet   potassium chloride (K-DUR,KLOR-CON) 20 mEq tablet  Self No No   Sig: Take 2 tablets (40 mEq total) by mouth daily   tiZANidine (ZANAFLEX) 4 mg tablet  Self Yes No   Sig: tizanidine 4 mg tablet   TAKE ONE TABLET BY MOUTH EVERY 8 HOURS AS NEEDED FOR spasm   torsemide (DEMADEX) 20 mg tablet  Self No No   Sig: Take 2 tablets (40 mg total) by mouth daily   warfarin (COUMADIN) 5 mg tablet  Self No No   Sig: Take 2 tablets (10 mg total) by mouth daily      Facility-Administered Medications: None       Past Medical History:   Diagnosis Date    Arthritis     Cellulitis     Edema of both lower extremities due to peripheral venous insufficiency     Erythema of lower extremity 11/12/2021       Past Surgical History:   Procedure Laterality Date    KNEE CARTILAGE SURGERY      REPLACEMENT TOTAL KNEE BILATERAL         Family History   Problem Relation Age of Onset    Heart disease Mother      I have reviewed and agree with the history as documented  E-Cigarette/Vaping    E-Cigarette Use Never User      E-Cigarette/Vaping Substances     Social History     Tobacco Use    Smoking status: Former Smoker    Smokeless tobacco: Never Used   Vaping Use    Vaping Use: Never used   Substance Use Topics    Alcohol use: Not Currently    Drug use: Never       Review of Systems   Constitutional: Positive for fatigue  Negative for chills and fever  Respiratory: Positive for shortness of breath   Negative for apnea, cough, choking, chest tightness, wheezing and stridor  Cardiovascular: Positive for leg swelling  Negative for chest pain  Gastrointestinal: Negative for abdominal distention, abdominal pain, constipation, diarrhea, nausea, rectal pain and vomiting  Genitourinary: Negative for dysuria and hematuria  Musculoskeletal: Negative for back pain, gait problem and neck pain  Skin: Negative for color change, pallor, rash and wound  Neurological: Negative for dizziness, tremors, seizures, syncope, facial asymmetry, speech difficulty, weakness, light-headedness, numbness and headaches  Physical Exam  Physical Exam  Vitals and nursing note reviewed  Constitutional:       General: She is not in acute distress  Appearance: She is well-developed  She is ill-appearing  She is not toxic-appearing or diaphoretic  Comments: BMI > 30   HENT:      Head: Normocephalic and atraumatic  Right Ear: External ear normal       Left Ear: External ear normal       Nose: Nose normal    Eyes:      General:         Right eye: No discharge  Left eye: No discharge  Extraocular Movements: Extraocular movements intact  Conjunctiva/sclera: Conjunctivae normal       Pupils: Pupils are equal, round, and reactive to light  Cardiovascular:      Rate and Rhythm: Regular rhythm  Bradycardia present  Heart sounds: Normal heart sounds  No murmur heard  No friction rub  No gallop  Pulmonary:      Effort: Tachypnea and respiratory distress (SOB at rest) present  Breath sounds: Normal breath sounds  No stridor  No decreased breath sounds, wheezing, rhonchi or rales  Comments: Speaks 4-5 word sentences before becoming SOB  Chest:      Chest wall: No tenderness  Abdominal:      General: Bowel sounds are normal  There is no distension  Palpations: Abdomen is soft  There is no mass  Tenderness: There is no abdominal tenderness  There is no guarding or rebound  Hernia: No hernia is present     Musculoskeletal:         General: No tenderness or deformity  Normal range of motion  Cervical back: Normal range of motion and neck supple  Right lower leg: Edema present  Left lower leg: Edema present  Comments: Weeping bilateral lower extremities   Skin:     General: Skin is warm and dry  Capillary Refill: Capillary refill takes less than 2 seconds  Findings: Ecchymosis (Arms, legs) present  Neurological:      Mental Status: She is alert and oriented to person, place, and time  Vital Signs  ED Triage Vitals   Temp Pulse Resp BP SpO2   -- -- -- -- --      Temp src Heart Rate Source Patient Position - Orthostatic VS BP Location FiO2 (%)   -- -- -- -- --      Pain Score       --           There were no vitals filed for this visit  Visual Acuity      ED Medications  Medications - No data to display    Diagnostic Studies  Results Reviewed     Procedure Component Value Units Date/Time    COVID/FLU/RSV - 2 hour TAT [[id number]]     Lab Status: No result Specimen: Nares     CBC and differential [[id number]]     Lab Status: No result Specimen: Blood     Comprehensive metabolic panel [[id number]]     Lab Status: No result Specimen: Blood     D-Dimer [[id number]]     Lab Status: No result Specimen: Blood     NT-BNP PRO [[id number]]     Lab Status: No result Specimen: Blood     HS Troponin 0hr (reflex protocol) [[id number]]     Lab Status: No result Specimen: Blood                  XR chest 1 view portable    (Results Pending)              Procedures  Procedures         ED Course  ED Course as of 12/30/21 2000   Wed Dec 29, 2021   2105 SpO2(!): 80 %   2105 Respirations(!): 30   2105 Blood Pressure: 99/54   2105 Pulse: 55   2105 Temperature(!): 97 4 °F (36 3 °C)   2138 SpO2: 98 %  On O2     2247 ABG ordered  BiPAP started  Patient mentation has returned to slight lethargy  Patient is still able to converse, however is tired  Critical care to evaluate     2339 Critical care requests additional 40 mg IV Lasix and will re-evaluate to determine whether or not the patient [facility]   0001 Patient signed out to Dr Joe Majano for follow up critical reeval and final dispo  MDM    Disposition  Final diagnoses:   None     ED Disposition     None      Follow-up Information    None         Patient's Medications   Discharge Prescriptions    No medications on file       No discharge procedures on file      PDMP Review       Value Time User    PDMP Reviewed  Yes 11/12/2021 11:00 PM Colt Westbrook PA-C          ED Provider  Electronically Signed by           Alex Mills MD  12/30/21 2000

## 2021-12-30 NOTE — H&P
[facility]  [facility] 1945[facility] 77 [facility]  [facility] MRN: [id number]  Unit/Bed#: ED 14 Encounter: [id number]  Primary Care Provider: Micah Gooden MD   Date and time admitted to hospital: 12/29/2021  8:10 PM    * Acute respiratory failure with hypoxia ([facility] [address] )  Assessment & Plan  Patient's O2 saturation at 80% POA  Patient complains of worsening shortness of breath both at rest and on exertion  Not on oxygen at home  Recent hospitalization for similar complaint  Plan  -BiPAP ordered, however currently weaned to 4 L O2 nasal cannula  Continue to wean as tolerated  -maintain oxygen saturation at minimum 88%  -monitor vitals      CHF exacerbation [facility])  Assessment & Plan  Wt Readings from Last 3 Encounters:   12/29/21 122 kg (270 lb)   12/16/21 120 kg (264 lb 3 2 oz)   12/02/21 118 kg (260 lb)     BNP elevated at 1,860 POA  LEXY POA likely prerenal    Patient appears volume overloaded on exam   Chronic and worsening bilateral lower extremity edema  Dyspnea at rest and on exertion  Patient on torsemide 20 mg b i d  at home  Patient leads increasingly sedentary lifestyle which she attributes to her current state of health  Plan  -Lasix 80 mg b i d   -monitor I/O, monitor weight  -fluid restriction 1800 mL daily, salt restriction 2 g daily  -continue telemetry      Cellulitis of right lower extremity  Assessment & Plan  Green purulent discharge from posterior right lower extremity with erythema  Not painful  Patient believes infection of right lower extremity began roughly 1 week ago, but is unsure  Leukocytosis POA  One dose of IV cefepime provided in ED    Plan  -IV cefepime   -monitor right lower extremity  -wound culture ordered  -follow-up blood cultures    LE[facility] ([facility]) ([facility]  Assessment & Plan  Creatinine at 1 97 POA  Baseline appears to be around 1 0     Likely prerenal in setting of CHF exacerbation    Plan  -continue diuresis (see plans above)  -bladder scan  -monitor BMP    Atrial fibrillation [facility])  Assessment & Plan  Recently diagnosed atrial fibrillation on warfarin  Most recent INR supratherapeutic at 8 66  INR 2 45 POA  Patient on warfarin 5 mg b i d  At home  Patient instructed to stop warfarin in setting of supratherapeutic INR  Plan  -reintroduce warfarin 5 mg once daily  -repeat INR tomorrow    Chronic back pain  Assessment & Plan  Patient complains of chronic back pain  Because back pain, patient unable to lay flat so she sleeps in a recliner at home  Sees pain management  Plan  -continue home pain medications      VTE Pharmacologic Prophylaxis: VTE Score: 12 High Risk (Score >/= 5) - Pharmacological DVT Prophylaxis Ordered: warfarin (Coumadin)  Sequential Compression Devices Ordered  Code Status: Level 1 - Full Code   Discussion with family: Patient declined call to   Anticipated Length of Stay: Patient will be admitted on an inpatient basis with an anticipated length of stay of greater than 2 midnights secondary to Respiratory failure in setting of CHF exacerbation  Chief Complaint:  Worsening SOB    History of Present Illness:  Jacquelin Sifuentes is a 77 y o  female with a PMH of morbid obesity, chronic venous insufficiency, AFib on Coumadin, and lymphedema who presents with worsening shortness of breath and worsening bilateral lower extremity edema  Patient states that she recently has become short of breath over the past week, however did not want to seek medical attention  Patient was recently hospitalized for similar presentation  Patient is a poor historian, but notes that her shortness of breath has been gradually worsening and her bilateral lower extremity edema has been worsening as well  She does have chronic lymphedema, but does not follow up with lymphedema clinic  Patient attributes her current developed to her increasingly sedentary lifestyle    Patient states that due to her back pain and leg pain, she is experiencing gait dysfunction  Patient is unable to walk up/down stairs  She now is short of breath both at rest and on exertion  Patient's O2 saturation at 80% POA, initially started on BiPAP but weaned to 4 L O2 nasal cannula  During my encounter, patient was resting with slight shortness of breath on 4 L O2  She was exhibiting conversational dyspnea with O2 saturations decreasing to upper 80s  Based on recent notes, patient was noted to have a 10 lb weight gain since discharge from most recent hospitalization  Patient's  had been managing her medications, and she seems to be unfamiliar with most of the medications should be taking   urged her to seek medical attention  Review of Systems:  Review of Systems   Constitutional: Positive for activity change and fatigue  Negative for chills and fever  HENT: Negative for rhinorrhea, sore throat and trouble swallowing  Eyes: Negative for photophobia and visual disturbance  Respiratory: Positive for shortness of breath and wheezing  Negative for cough  Cardiovascular: Positive for leg swelling  Negative for chest pain and palpitations  Gastrointestinal: Negative for abdominal pain, constipation, diarrhea, nausea and vomiting  Genitourinary: Negative for dysuria and hematuria  Musculoskeletal: Positive for arthralgias, back pain and gait problem  Skin:        Swelling and erythema of bilateral lower extremities   Neurological: Negative for dizziness, weakness, light-headedness, numbness and headaches  Psychiatric/Behavioral: Negative for confusion and hallucinations         Past Medical and Surgical History:   Past Medical History:   Diagnosis Date    A-fib ([address] ) 12/29/2021    Arthritis     Cellulitis     Edema of both lower extremities due to peripheral venous insufficiency     Erythema of lower extremity 11/12/2021       Past Surgical History:   Procedure Laterality Date    KNEE CARTILAGE SURGERY      REPLACEMENT TOTAL KNEE BILATERAL         Meds/Allergies:  Prior to Admission medications    Medication Sig Start Date End Date Taking? Authorizing Provider   ALPRAZolam Murriel Agustin) 0 25 mg tablet alprazolam 0 25 mg tablet   TAKE 1 TABLET BY MOUTH TWICE A DAY AS NEEDED   Yes Historical Provider, MD   amLODIPine (NORVASC) 5 mg tablet amlodipine 5 mg tablet   Take 1 tablet twice a day by oral route  Yes Historical Provider, MD   ammonium lactate (LAC-HYDRIN) 12 % cream Apply topically 2 (two) times a day 12/2/21  Yes Antonio Esteban MD   carvedilol (COREG) 25 mg tablet Take 1 tablet (25 mg total) by mouth 2 (two) times a day with meals 12/2/21 1/1/22 Yes Antonio Esteban MD   Diclofenac Sodium (Voltaren) 1 % Voltaren 1 % topical gel   Yes Historical Provider, MD   hydrocortisone 1 % cream Apply topically 4 (four) times a day as needed for rash 11/17/21  Yes Faustino Razo PA-C   ondansetron (ZOFRAN-ODT) 4 mg disintegrating tablet  11/9/21  Yes Historical Provider, MD   oxyCODONE (OxyCONTIN) 20 mg 12 hr tablet OxyContin 20 mg tablet,crush resistant,extended release   TAKE ONE TABLET BY MOUTH THREE TIMES DAILY AS NEEDED FOR PAIN   Yes Historical Provider, MD   oxyCODONE-acetaminophen (PERCOCET)  mg per tablet oxycodone-acetaminophen 10 mg-325 mg tablet   TAKE ONE TABLET BY MOUTH EVERY 4 HOURS AS NEEDED FOR PAIN max DAILY AMOUNT SIX tablets   Yes Historical Provider, MD   tiZANidine (ZANAFLEX) 4 mg tablet tizanidine 4 mg tablet   TAKE ONE TABLET BY MOUTH EVERY 8 HOURS AS NEEDED FOR spasm   Yes Historical Provider, MD   Vitamin D, Cholecalciferol, 25 MCG (1000 UT) TABS Vitamin D3 25 mcg (1,000 unit) capsule   one PO QD   Yes Historical Provider, MD   Blood Pressure KIT Use daily 12/2/21   Antonio Esteban MD   fluticasone (FLONASE) 50 mcg/act nasal spray fluticasone propionate 50 mcg/actuation nasal spray,suspension   Spray 1 spray every day by intranasal route    Patient not taking: Reported on 12/30/2021    Historical Provider, MD   losartan (COZAAR) 100 MG tablet Take 1 tablet (100 mg total) by mouth daily 11/18/21 12/18/21  Asuncion Goltz, PA-C   penicillin V potassium (VEETID) 500 mg tablet penicillin V potassium 500 mg tablet  Patient not taking: Reported on 12/30/2021    Historical Provider, MD   potassium chloride (K-DUR,KLOR-CON) 20 mEq tablet Take 2 tablets (40 mEq total) by mouth daily 11/18/21 12/18/21  Asuncion Goltz, PA-C   torsemide [facility]) 20 mg tablet Take 2 tablets (40 mg total) by mouth daily 11/18/21 12/18/21  Asuncion Goltz, PA-C   warfarin (COUMADIN) 5 mg tablet Take 2 tablets (10 mg total) by mouth daily 11/17/21 12/17/21  Asuncion Goltz, PA-C     I have reviewed home medications with patient personally  Allergies: No Known Allergies    Social History:  Marital Status: /Civil Union   Occupation:  Unknown  Patient Pre-hospital Living Situation: Home  Patient Pre-hospital Level of Mobility: walks with cane  Patient Pre-hospital Diet Restrictions:  Unknown  Substance Use History:   Social History     Substance and Sexual Activity   Alcohol Use Not Currently     Social History     Tobacco Use   Smoking Status Former Smoker   Smokeless Tobacco Never Used     Social History     Substance and Sexual Activity   Drug Use Never       Family History:  Family History   Problem Relation Age of Onset    Heart disease Mother        Physical Exam:     Vitals:   Blood Pressure: 130/59 (12/30/21 0000)  Pulse: 60 (12/30/21 0000)  Temperature: (!) 97 4 °F (36 3 °C) (12/29/21 2035)  Temp Source: Oral (12/29/21 2035)  Respirations: (!) 24 (12/30/21 0000)  Height: 5' 2" (157 5 cm) (12/29/21 2035)  Weight - Scale: 122 kg (270 lb) (12/29/21 2035)  SpO2: 94 % (12/30/21 0001)    Physical Exam  Vitals reviewed  Constitutional:       General: She is in acute distress  Appearance: She is ill-appearing  She is not toxic-appearing  HENT:      Head: Normocephalic and atraumatic  Eyes:      Extraocular Movements: Extraocular movements intact  Conjunctiva/sclera: Conjunctivae normal    Cardiovascular:      Rate and Rhythm: Normal rate  Rhythm irregular  Pulses: Normal pulses  Heart sounds: Normal heart sounds  Pulmonary:      Effort: Respiratory distress present  Breath sounds: Wheezing (Bilateral basilar expiratory wheezes) present  No rales  Comments: On 4 L O2 nasal cannula   Abdominal:      General: Bowel sounds are normal       Palpations: Abdomen is soft  Musculoskeletal:      Right lower leg: Edema (Nonpitting lymphedema) present  Left lower leg: Edema (Nonpitting lymphedema) present  Skin:     Findings: Erythema (Bilateral lower extremities) present  Comments: Green purulent discharge from posterior aspect of right lower extremity   Neurological:      Mental Status: She is alert and oriented to person, place, and time  Mental status is at baseline     Psychiatric:         Mood and Affect: Mood normal          Behavior: Behavior normal           Additional Data:     Lab Results:  Results from last 7 days   Lab Units 12/29/21 2313 12/29/21 2040   WBC Thousand/uL  --  11 19*   HEMOGLOBIN g/dL  --  10 0*   I STAT HEMOGLOBIN g/dl 10 5*  --    HEMATOCRIT %  --  31 9*   HEMATOCRIT, ISTAT % 31*  --    PLATELETS Thousands/uL  --  331   NEUTROS PCT %  --  78*   LYMPHS PCT %  --  7*   MONOS PCT %  --  9   EOS PCT %  --  5     Results from last 7 days   Lab Units 12/29/21 2313 12/29/21 2040   SODIUM mmol/L  --  134*   POTASSIUM mmol/L  --  5 2   CHLORIDE mmol/L  --  97*   CO2 mmol/L  --  27   CO2, I-STAT mmol/L 28  --    BUN mg/dL  --  62*   CREATININE mg/dL  --  1 97*   ANION GAP mmol/L  --  10   CALCIUM mg/dL  --  9 6   ALBUMIN g/dL  --  3 4*   TOTAL BILIRUBIN mg/dL  --  0 42   ALK PHOS U/L  --  128*   ALT U/L  --  24   AST U/L  --  29   GLUCOSE RANDOM mg/dL  --  122     Results from last 7 days   Lab Units 12/30/21  0159   INR  2 45* Results from last 7 days   Lab Units 12/30/21  0159   LACTIC ACID mmol/L 0 9       Imaging: Reviewed radiology reports from this admission including: chest xray  XR chest 1 view portable    (Results Pending)       EKG and Other Studies Reviewed on Admission:   · EKG: Atrial fibrillation with heart rate in 50-60s  ** Please Note: This note has been constructed using a voice recognition system   **

## 2021-12-30 NOTE — CONSULTS
Consultation - Cardiology Team One  Shawna Trevino 77 y o  female MRN: [id number]  Unit/Bed#: ED 14 Encounter: [id number]    Inpatient consult to Cardiology  Consult performed by: SHAHIDA Cee  Consult ordered by: Nayeli Cleveland MD      Physician Requesting Consult: Nayeli Cleveland MD  Reason for Consult / Principal Problem: CHF       Assessment/ Plan    1  Acute on chronic diastolic heart failure  Pro BNP 1,860  Chest xray reviewed showing CHF   On furosemide 80 mg IV BID, consider titrating to TID  Will d/w attending   On torsemide 40 mg PO daily at home  Will need higher dose at discharge   Monitor I/Os  Daily weights   On 2 gram Na diet with 1800 ml fluid restriction     2  Acute hypoxic respiratory failure   O2 saturation was 80% on admission   Now on 6 L NC and O2 saturation 94%    3  LEXY   Creatinine 1 97 on admission   Creatinine 1 67 today   Baseline creatinine: 0 7-1 0    4  Paroxysmal atrial fibrillation   Reviewed telemetry and ECG  Patient in atrial fibrillation   Asymptomatic  No palpitations or chest pain   On coumadin for OAC   INR 2 45    5  Hypertension   BP stable: 122/63  On coreg 25 mg PO BID and amlodipine 5 mg PO BID       History of Present Illness   HPI: Shawna Trevino is a 77y o  year old female who has chronic diastolic heart failure, paroxysmal atrial fibrillation, hypertension, obesity, chronic venous insufficiency and Sjogren's syndrome  She is scheduled to [facility] up [facility]  She presents to [facility] ER 12/29/2021 with complaint of SOB and lower extremity edema  Pro BNP was 1,860 and chest xray showed CHF  She was started on IV diuretics  She was recently admitted 11/2021 with acute diastolic heart failure  She was diuresed with IV diuretics and discharged home on torsemide 40 mg PO daily  She reports she has been taking her medication as prescribed but since discharge she has been progressively filling up with fluid   She reports increased swelling in her legs, abdominal bloating, SOB and orthopnea  She denies fever or chills  Diet reviewed with patient  Last hospitalization it was noted she consumed fast food daily for lunch and frozen dinners in the evening  She reports she has abstained from fast food but continues to eat frozen dinners but tries to get ones with low Na  Patient lives at home with her   She denies tobacco or alcohol use  She lives a sedentary lifestyle and ambulates with a walker  Echocardiogram 11/14/2021 showed EF 63%, grade I diastolic dysfunction, LA mildly dilated, mild AI, mild TR and mild MR      EKG reviewed personally:  Atrial fibrillation with slow ventricular rate   Ventricular rate 52 bpm  QRSD 80 ms  QT interval 412 ms  QTc interval 383 ms     Telemetry reviewed personally:  Atrial fibrillation HR 70s    Review of Systems   Constitutional: Negative for chills and fever  HENT: Negative for congestion  Cardiovascular: Positive for dyspnea on exertion, leg swelling and orthopnea  Negative for chest pain and palpitations  Respiratory: Positive for shortness of breath  Musculoskeletal: Negative for falls  Gastrointestinal: Positive for bloating  Negative for nausea and vomiting  Neurological: Negative for dizziness and light-headedness  Psychiatric/Behavioral: Negative for altered mental status  All other systems reviewed and are negative      Historical Information   Past Medical History:   Diagnosis Date    A-fib ([address] ) 12/29/2021    Arthritis     Cellulitis     Edema of both lower extremities due to peripheral venous insufficiency     Erythema of lower extremity 11/12/2021     Past Surgical History:   Procedure Laterality Date    KNEE CARTILAGE SURGERY      REPLACEMENT TOTAL KNEE BILATERAL       Social History     Substance and Sexual Activity   Alcohol Use Not Currently     Social History     Substance and Sexual Activity   Drug Use Never     Social History     Tobacco Use   Smoking Status Former Smoker Smokeless Tobacco Never Used     Family History:   Family History   Problem Relation Age of Onset    Heart disease Mother        Meds/Allergies   all current active meds have been reviewed and current meds:   Current Facility-Administered Medications   Medication Dose Route Frequency    acetaminophen (TYLENOL) tablet 650 mg  650 mg Oral Q6H PRN    amLODIPine (NORVASC) tablet 5 mg  5 mg Oral BID    carvedilol (COREG) tablet 25 mg  25 mg Oral BID With Meals    furosemide (LASIX) injection 80 mg  80 mg Intravenous BID (diuretic)    ondansetron (ZOFRAN) injection 4 mg  4 mg Intravenous Q6H PRN    oxyCODONE (OxyCONTIN) 12 hr tablet 20 mg  20 mg Oral Q8H [address]    oxyCODONE-acetaminophen (PERCOCET) 5-325 mg per tablet 1 tablet  1 tablet Oral Q4H PRN    senna (SENOKOT) tablet 8 6 mg  1 tablet Oral Daily    warfarin (COUMADIN) tablet 5 mg  5 mg Oral Once (warfarin)     No Known Allergies    Objective   Vitals: Blood pressure 137/67, pulse 76, temperature (!) 97 4 °F (36 3 °C), temperature source Oral, resp  rate (!) 24, height 5' 2" (1 575 m), weight 122 kg (270 lb), SpO2 94 %  ,     Body mass index is 49 38 kg/m²  ,     Systolic (43JOQ), VHC:155 , Min:99 , OKO:995     Diastolic (48YTZ), OWC:25, Min:54, Max:70      Intake/Output Summary (Last 24 hours) at 12/30/2021 1132  Last data filed at 12/30/2021 0734  Gross per 24 hour   Intake --   Output 1200 ml   Net -1200 ml     Weight (last 2 days)     Date/Time Weight    12/29/21 2035 122 (270)        Invasive Devices  Report    Peripheral Intravenous Line            Peripheral IV 12/29/21 Dorsal (posterior); Left Forearm <1 day    Peripheral IV 12/30/21 Right Antecubital <1 day          Drain            External Urinary Catheter -- days              Physical Exam  Constitutional:       General: She is not in acute distress  Appearance: She is obese  HENT:      Head: Normocephalic        Mouth/Throat:      Mouth: Mucous membranes are moist    Cardiovascular:      Rate and Rhythm: Normal rate  Rhythm irregular  Pulses: Normal pulses  Heart sounds: No murmur heard  Pulmonary:      Effort: Pulmonary effort is normal  No respiratory distress  Abdominal:      General: Bowel sounds are normal       Palpations: Abdomen is soft  Musculoskeletal:         General: Swelling present  Normal range of motion  Cervical back: Neck supple  Comments: Bilateral LE edema + 2/3    Skin:     General: Skin is warm and dry  Capillary Refill: Capillary refill takes less than 2 seconds  Neurological:      General: No focal deficit present  Mental Status: She is alert and oriented to person, place, and time     Psychiatric:         Mood and Affect: Mood normal        LABORATORY RESULTS:      CBC with diff:   Results from last 7 days   Lab Units 12/30/21 0609 12/29/21 2313 12/29/21 2040   WBC Thousand/uL 9 68  --  11 19*   HEMOGLOBIN g/dL 9 5*  --  10 0*   I STAT HEMOGLOBIN g/dl  --  10 5*  --    HEMATOCRIT % 31 3*  --  31 9*   HEMATOCRIT, ISTAT %  --  31*  --    MCV fL 88  --  88   PLATELETS Thousands/uL 343  --  331   MCH pg 26 7*  --  27 5   MCHC g/dL 30 4*  --  31 3*   RDW % 14 6  --  14 6   MPV fL 11 3  --  11 5   NRBC AUTO /100 WBCs  --   --  0     CMP:  Results from last 7 days   Lab Units 12/30/21 0609 12/29/21 2313 12/29/21  2040   POTASSIUM mmol/L 4 6  --  5 2   CHLORIDE mmol/L 99*  --  97*   CO2 mmol/L 28  --  27   CO2, I-STAT mmol/L  --  28  --    BUN mg/dL 60*  --  62*   CREATININE mg/dL 1 67*  --  1 97*   GLUCOSE, ISTAT mg/dl  --  129  --    CALCIUM mg/dL 9 5  --  9 6   AST U/L  --   --  29   ALT U/L  --   --  24   ALK PHOS U/L  --   --  128*   EGFR ml/min/1 73sq m 29  --  24     BMP:  Results from last 7 days   Lab Units 12/30/21 0609 12/29/21 2313 12/29/21  2040   POTASSIUM mmol/L 4 6  --  5 2   CHLORIDE mmol/L 99*  --  97*   CO2 mmol/L 28  --  27   CO2, I-STAT mmol/L  --  28  --    BUN mg/dL 60*  --  62*   CREATININE mg/dL 1 67*  --  1 97* GLUCOSE, ISTAT mg/dl  --  129  --    CALCIUM mg/dL 9 5  --  9 6     Lab Results   Component Value Date    NTBNP 1,860 (H) 12/29/2021    NTBNP 473 (H) 11/12/2021     Results from last 7 days   Lab Units 12/29/21  2040   MAGNESIUM mg/dL 2 3       Results from last 7 days   Lab Units 12/29/21  2040   TSH 3RD GENERATON uIU/mL 1 693       Results from last 7 days   Lab Units 12/30/21  0159 12/29/21  2040   INR  2 45* 2 27*     Lipid Profile:   Lab Results   Component Value Date    CHOL 169 09/23/2014     Lab Results   Component Value Date    HDL 50 11/13/2021    HDL 59 04/12/2021    HDL 70 (H) 09/24/2018     Lab Results   Component Value Date    LDLCALC 93 11/13/2021    LDLCALC 113 (H) 04/12/2021    LDLCALC 127 (H) 09/24/2018     Lab Results   Component Value Date    TRIG 89 11/13/2021    TRIG 120 04/12/2021    TRIG 102 09/24/2018         Cardiac testing:   No results found for this or any previous visit  No results found for this or any previous visit  No valid procedures specified  No results found for this or any previous visit  Imaging: I have personally reviewed pertinent reports  XR chest 1 view portable    Result Date: 12/30/2021  Narrative: CHEST INDICATION:   severe SOB  COMPARISON:  November 12, 2021 EXAM PERFORMED/VIEWS:  XR CHEST PORTABLE FINDINGS: Heart shadow is enlarged but unchanged from prior exam  Low lung volumes  Silhouetting of the left diaphragm with increased opacity at the right lung base  The findings are consistent with low lung volumes and/or subsegmental atelectasis  Underlying pneumonia is not excluded  Osseous structures appear within normal limits for patient age  Impression: Bilateral lower lobe consolidations consistent with atelectasis and/or pneumonia  Workstation performed: [id number]     Thank you for allowing us to participate in this patient's care  Counseling / Coordination of Care  Total floor / unit time spent today 45 minutes    Greater than 50% of total time was spent with the patient and / or family counseling and / or coordination of care  A description of the counseling / coordination of care: Review of history, current assessment, development of a plan  Code Status: Level 1 - Full Code    ** Please Note: Dragon 360 Dictation voice to text software may have been used in the creation of this document   **

## 2021-12-30 NOTE — OCCUPATIONAL THERAPY NOTE
Occupational Therapy Cancellation     Patient Name: Prabhjot Edwards  HKIMG'R Date: 12/30/2021  Problem List  Principal Problem:    Acute respiratory failure with hypoxia ([facility] )  Active Problems:    CHF exacerbation ([facility] )    LEXY (acute kidney injury) ([facility] )    Atrial fibrillation ([facility] )    Cellulitis of right lower extremity    Chronic back pain    Past Medical History  Past Medical History:   Diagnosis Date    A-fib ([place] [address] ) 12/29/2021    Arthritis     Cellulitis     Edema of both lower extremities due to peripheral venous insufficiency     Erythema of lower extremity 11/12/2021     Past Surgical History  Past Surgical History:   Procedure Laterality Date    KNEE CARTILAGE SURGERY      REPLACEMENT TOTAL KNEE BILATERAL          12/30/21 1149   OT Last Visit   OT Visit Date 12/30/21  (Thursday)   Note Type   Note type Evaluation   Cancel Reasons Refusal   Additional Comments OT orders received and chart review completed  Matt w/ PTFrancisca  Pt declined participation due to pain and faitgue  PT reports that pt is agreeable to threapy returning tomorrow as appropriate   Will continue to follow   Ed Cosme, OTR/L

## 2021-12-30 NOTE — ED NOTES
Pt noted to be more tachypneic and more abdominal muscle use while sleeping  Respiratory called to place pt back on BiPAP  Pt alert & oriented on arousal  Provider, Laurie Jon made aware        Mikayla Zavala RN  12/30/21 3673

## 2021-12-30 NOTE — PHYSICAL THERAPY NOTE
Physical Therapy Cancellation Note       12/30/21 1132   PT Last Visit   PT Visit Date 12/30/21   Note Type   Note type Cancelled Session   Cancel Reasons Refusal   Additional Comments PT orders noted and chart reviewed  Pt currently declining all mobility due to pain and fatigue at this time  Reports she would be agreeable to PT coming back tomorrow  Will continue to follow and evaluate as appropriate        Bishop Calvo, PT,DPT

## 2021-12-30 NOTE — ED CARE HANDOFF
Emergency Department Sign Out Note        Sign out and transfer of care from Dr Lucia Salmeron  See Separate Emergency Department note  The patient, Makeda Serrano, was evaluated by the previous provider for acute respiratory failure  Workup Completed:  Labs, EKG, CXR    ED Course / Workup Pending (followup):                                    ED Course as of 12/30/21 0203   Thu Dec 30, 2021   0020 Reassessed patient  Breathing about 19-20 times per minute on BiPAP and seems more comfortable  Noticed active greenish/purulent discharge from her right medial lower leg  Both legs are significantly edematous, erythematous, warm, tender  Patient is unable to tell me how long she has had the discharge  The area was covered with multiple Band-Aids  Leg dressed  Plan antibiotics  4936 Patient taken off BiPAP, tolerating 4 L nasal cannula  Procedures  MDM        Disposition  Final diagnoses:   Acute respiratory failure with hypoxia ([address] )   Bilateral lower leg cellulitis   LEXY (acute kidney injury) ([facility] )     Time reflects when diagnosis was documented in both MDM as applicable and the Disposition within this note     Time User Action Codes Description Comment    12/30/2021 12:23 AM Zahra CAMACHO Add [J96 01] Acute respiratory failure with hypoxia ([address] )     12/30/2021 12:24 AM Eual Sessions Add [L03 116,  L03 115] Bilateral lower leg cellulitis     12/30/2021  1:54 AM Zahra CAMACHO Add [N17 9] LEXY (acute kidney injury) [facility])       ED Disposition     ED Disposition Condition Date/Time Comment    Admit Stable Thu Dec 30, 2021  2:01 AM Case was discussed with [facility] resident and the patient's admission status was agreed to be Admission Status: inpatient status to the service of Dr Carolee Castillo   Follow-up Information    None       Patient's Medications   Discharge Prescriptions    No medications on file     No discharge procedures on file         ED Provider  Electronically Signed by     Cory Stone Gila Verma MD  12/30/21 5351

## 2021-12-30 NOTE — QUICK NOTE
Progress Note - Triage Asssessment   Latrice Rodríguez 77 y o  female MRN: [id number]    Time Called ( Time): 5102  Date Called: 12/30/21  Room#: ED 15  Person requesting evaluation: Dr Juliette Mejia    Situation:    Patient with acute on chronic HFpEF on torsemide admitted with 2-3 progressive worsening shortness of breath  Denies CP, abd pain, N/V/D  CXR with evidence of volume overload  Patient admits to eating salty foods including soup  Was placed on BiPAP and given Lasix 40 mg  Patient on BiPAP < 5 min at time of initial CC eval     After additional lasix 40 mg and 2 hours on BiPAP patient now appears more comfortable  She is alert and able to speak full sentences  Placed patient on NC 4 L with SpO2 95%  Interventions:   BiPAP as needed  continue diuresis, telemetry  Triage Assessment:     Patient can be admitted to med-surg level of care    Recommendations discussed with Dr Anais Kumari

## 2021-12-31 LAB
ANION GAP SERPL CALCULATED.3IONS-SCNC: 5 MMOL/L (ref 4–13)
ATRIAL RATE: 192 BPM
BASOPHILS # BLD AUTO: 0.02 THOUSANDS/ΜL (ref 0–0.1)
BASOPHILS NFR BLD AUTO: 0 % (ref 0–1)
BUN SERPL-MCNC: 44 MG/DL (ref 5–25)
CALCIUM SERPL-MCNC: 9.3 MG/DL (ref 8.3–10.1)
CHLORIDE SERPL-SCNC: 101 MMOL/L (ref 100–108)
CO2 SERPL-SCNC: 35 MMOL/L (ref 21–32)
CREAT SERPL-MCNC: 1.23 MG/DL (ref 0.6–1.3)
EOSINOPHIL # BLD AUTO: 0.53 THOUSAND/ΜL (ref 0–0.61)
EOSINOPHIL NFR BLD AUTO: 6 % (ref 0–6)
ERYTHROCYTE [DISTWIDTH] IN BLOOD BY AUTOMATED COUNT: 14.9 % (ref 11.6–15.1)
GFR SERPL CREATININE-BSD FRML MDRD: 43 ML/MIN/1.73SQ M
GLUCOSE SERPL-MCNC: 106 MG/DL (ref 65–140)
HCT VFR BLD AUTO: 32 % (ref 34.8–46.1)
HGB BLD-MCNC: 9.8 G/DL (ref 11.5–15.4)
IMM GRANULOCYTES # BLD AUTO: 0.02 THOUSAND/UL (ref 0–0.2)
IMM GRANULOCYTES NFR BLD AUTO: 0 % (ref 0–2)
INR PPP: 2.68 (ref 0.84–1.19)
LYMPHOCYTES # BLD AUTO: 0.96 THOUSANDS/ΜL (ref 0.6–4.47)
LYMPHOCYTES NFR BLD AUTO: 10 % (ref 14–44)
MCH RBC QN AUTO: 27.1 PG (ref 26.8–34.3)
MCHC RBC AUTO-ENTMCNC: 30.6 G/DL (ref 31.4–37.4)
MCV RBC AUTO: 89 FL (ref 82–98)
MONOCYTES # BLD AUTO: 1.18 THOUSAND/ΜL (ref 0.17–1.22)
MONOCYTES NFR BLD AUTO: 13 % (ref 4–12)
NEUTROPHILS # BLD AUTO: 6.75 THOUSANDS/ΜL (ref 1.85–7.62)
NEUTS SEG NFR BLD AUTO: 71 % (ref 43–75)
NRBC BLD AUTO-RTO: 0 /100 WBCS
PLATELET # BLD AUTO: 353 THOUSANDS/UL (ref 149–390)
PMV BLD AUTO: 10.8 FL (ref 8.9–12.7)
POTASSIUM SERPL-SCNC: 3.8 MMOL/L (ref 3.5–5.3)
PROCALCITONIN SERPL-MCNC: <0.05 NG/ML
PROTHROMBIN TIME: 28 SECONDS (ref 11.6–14.5)
QRS AXIS: 88 DEGREES
QRSD INTERVAL: 80 MS
QT INTERVAL: 412 MS
QTC INTERVAL: 383 MS
RBC # BLD AUTO: 3.61 MILLION/UL (ref 3.81–5.12)
SODIUM SERPL-SCNC: 141 MMOL/L (ref 136–145)
T WAVE AXIS: 18 DEGREES
VENTRICULAR RATE: 52 BPM
WBC # BLD AUTO: 9.46 THOUSAND/UL (ref 4.31–10.16)

## 2021-12-31 PROCEDURE — 97163 PT EVAL HIGH COMPLEX 45 MIN: CPT

## 2021-12-31 PROCEDURE — 99232 SBSQ HOSP IP/OBS MODERATE 35: CPT | Performed by: HOSPITALIST

## 2021-12-31 PROCEDURE — 85025 COMPLETE CBC W/AUTO DIFF WBC: CPT

## 2021-12-31 PROCEDURE — 99232 SBSQ HOSP IP/OBS MODERATE 35: CPT | Performed by: INTERNAL MEDICINE

## 2021-12-31 PROCEDURE — 85610 PROTHROMBIN TIME: CPT

## 2021-12-31 PROCEDURE — 80048 BASIC METABOLIC PNL TOTAL CA: CPT

## 2021-12-31 PROCEDURE — 93010 ELECTROCARDIOGRAM REPORT: CPT | Performed by: INTERNAL MEDICINE

## 2021-12-31 PROCEDURE — 84145 PROCALCITONIN (PCT): CPT

## 2021-12-31 RX ORDER — POTASSIUM CHLORIDE 20 MEQ/1
40 TABLET, EXTENDED RELEASE ORAL ONCE
Status: COMPLETED | OUTPATIENT
Start: 2021-12-31 | End: 2021-12-31

## 2021-12-31 RX ORDER — WARFARIN SODIUM 5 MG/1
5 TABLET ORAL
Status: DISCONTINUED | OUTPATIENT
Start: 2021-12-31 | End: 2022-01-01

## 2021-12-31 RX ORDER — NYSTATIN 100000 [USP'U]/G
POWDER TOPICAL 2 TIMES DAILY
Status: DISCONTINUED | OUTPATIENT
Start: 2021-12-31 | End: 2022-01-06 | Stop reason: HOSPADM

## 2021-12-31 RX ADMIN — STANDARDIZED SENNA CONCENTRATE 8.6 MG: 8.6 TABLET ORAL at 08:53

## 2021-12-31 RX ADMIN — CARVEDILOL 25 MG: 12.5 TABLET, FILM COATED ORAL at 15:46

## 2021-12-31 RX ADMIN — AMLODIPINE BESYLATE 5 MG: 5 TABLET ORAL at 08:52

## 2021-12-31 RX ADMIN — CEFAZOLIN SODIUM 2000 MG: 2 SOLUTION INTRAVENOUS at 05:10

## 2021-12-31 RX ADMIN — OXYCODONE HYDROCHLORIDE 20 MG: 20 TABLET, FILM COATED, EXTENDED RELEASE ORAL at 21:44

## 2021-12-31 RX ADMIN — FUROSEMIDE 80 MG: 10 INJECTION, SOLUTION INTRAMUSCULAR; INTRAVENOUS at 15:46

## 2021-12-31 RX ADMIN — OXYCODONE HYDROCHLORIDE 20 MG: 20 TABLET, FILM COATED, EXTENDED RELEASE ORAL at 05:10

## 2021-12-31 RX ADMIN — POTASSIUM CHLORIDE 40 MEQ: 1500 TABLET, EXTENDED RELEASE ORAL at 16:17

## 2021-12-31 RX ADMIN — OXYCODONE HYDROCHLORIDE 20 MG: 20 TABLET, FILM COATED, EXTENDED RELEASE ORAL at 16:12

## 2021-12-31 RX ADMIN — WARFARIN SODIUM 5 MG: 5 TABLET ORAL at 18:00

## 2021-12-31 RX ADMIN — FUROSEMIDE 80 MG: 10 INJECTION, SOLUTION INTRAMUSCULAR; INTRAVENOUS at 08:52

## 2021-12-31 RX ADMIN — CEFAZOLIN SODIUM 2000 MG: 2 SOLUTION INTRAVENOUS at 21:44

## 2021-12-31 RX ADMIN — CEFAZOLIN SODIUM 2000 MG: 2 SOLUTION INTRAVENOUS at 16:13

## 2021-12-31 RX ADMIN — CARVEDILOL 25 MG: 12.5 TABLET, FILM COATED ORAL at 08:52

## 2021-12-31 NOTE — PROGRESS NOTES
General Cardiology Progress Note   Parth Cain 77 y o  female MRN: [id number]  Unit/Bed#: W -01 Encounter: [id number]      Assessment:  Principal Problem:    Acute respiratory failure with hypoxia ([facility] )  Active Problems:    CHF exacerbation (HCC)    LEXY (acute kidney injury) ([facility] )    Atrial fibrillation (HCC)    Cellulitis of right lower extremity    Chronic back pain      Impression:     Acute on chronic heart failure with preserved ejection fraction  o Due to dietary indiscretion  o Failed torsemide 40 mg p o  Daily  o 3 L urine output overnight  o No weights being assessed, he 1 month ago she was you to 160 lb, currently 270 family   Acute kidney injury/improving   Paroxysmal AFib-in atrial fibrillation currently, rate controlled    Plan:     Continue IV diuresis    Subjective:   Patient seen and examined  Short of breath, cough on occasion, leg swelling, a little bit better compared to yesterday    Review of Systems   Constitutional: Negative for diaphoresis, fever, malaise/fatigue and night sweats  Cardiovascular: Positive for leg swelling  Negative for chest pain, dyspnea on exertion, orthopnea, palpitations and syncope  Respiratory: Positive for cough and shortness of breath  Negative for sputum production and wheezing  Skin: Negative for itching and rash  Gastrointestinal: Negative for abdominal pain, change in bowel habit and diarrhea  Neurological: Negative for dizziness, focal weakness, light-headedness and weakness  Objective   Vitals: Blood pressure 94/68, pulse 72, temperature 98 2 °F (36 8 °C), resp  rate 18, height 5' 2" (1 575 m), weight 122 kg (270 lb), SpO2 92 %  , Body mass index is 49 38 kg/m² , I/O last 3 completed shifts:   In: 48 [IV Piggyback:50]  Out: 6465 [[facility]:7371]  I/O this shift:  In: 240 [P O :240]  Out: 800 [Urine:800]  Wt Readings from Last 3 Encounters:   12/29/21 122 kg (270 lb)   12/16/21 120 kg (264 lb 3 2 oz)   12/02/21 118 kg (260 lb) Intake/Output Summary (Last 24 hours) at 12/31/2021 1405  Last data filed at 12/31/2021 1005  Gross per 24 hour   Intake 290 ml   Output 2000 ml   Net -1710 ml     I/O last 3 completed shifts: In: 48 [IV Piggyback:50]  Out: 0 [Urine:3250]      Physical Exam  Constitutional:       General: She is not in acute distress  Appearance: She is not diaphoretic  HENT:      Head: Normocephalic  Eyes:      Conjunctiva/sclera: Conjunctivae normal    Neck:      Vascular: JVD present  Cardiovascular:      Rate and Rhythm: Normal rate and regular rhythm  Heart sounds: Normal heart sounds  No murmur heard  No gallop  Pulmonary:      Effort: Pulmonary effort is normal  No respiratory distress  Breath sounds: Wheezing and rales present  Abdominal:      General: Bowel sounds are normal  There is no distension  Palpations: Abdomen is soft  Tenderness: There is no abdominal tenderness  Musculoskeletal:         General: Normal range of motion  Cervical back: Normal range of motion and neck supple  Right lower leg: Edema present  Left lower leg: Edema present  Skin:     General: Skin is warm and dry  Neurological:      Mental Status: She is alert and oriented to person, place, and time           Meds/Allergies   No Known Allergies    Current Facility-Administered Medications:     acetaminophen (TYLENOL) tablet 650 mg, 650 mg, Oral, Q6H PRN, Jovani Flores MD    amLODIPine (NORVASC) tablet 5 mg, 5 mg, Oral, BID, Camryn Hughes, , 5 mg at 12/31/21 4511    carvedilol (COREG) tablet 25 mg, 25 mg, Oral, BID With Meals, Camryn Hughes DO, 25 mg at 12/31/21 1444    ceFAZolin (ANCEF) IVPB (premix in dextrose) 2,000 mg 50 mL, 2,000 mg, Intravenous, Q8H, Yosef Taylor MD, Last Rate: 100 mL/hr at 12/31/21 0510, 2,000 mg at 12/31/21 0510    furosemide (LASIX) injection 80 mg, 80 mg, Intravenous, BID (diuretic), Camryn Hughes DO, 80 mg at 12/31/21 7804   ondansetron (ZOFRAN) injection 4 mg, 4 mg, Intravenous, Q6H PRN, Demarco Benitez MD    oxyCODONE (OxyCONTIN) 12 hr tablet 20 mg, 20 mg, Oral, Q8H [facility], Anita Hughes DO, 20 mg at 12/31/21 0510    oxyCODONE-acetaminophen (PERCOCET) 5-325 mg per tablet 1 tablet, 1 tablet, Oral, Q4H PRN, Anita Hughes , 1 tablet at 12/30/21 1122    potassium chloride (K-DUR,KLOR-CON) CR tablet 40 mEq, 40 mEq, Oral, Once, Payam Hanna MD    [facility]) tablet 8 6 mg, 1 tablet, Oral, Daily, Demarco Benitez MD, 8 6 mg at 12/31/21 7143    warfarin (COUMADIN) tablet 5 mg, 5 mg, Oral, Daily (warfarin), Payam Hanna MD    Laboratory Results:        CBC with diff:   Results from last 7 days   Lab Units 12/31/21  0509 12/30/21  0609 12/29/21 2313 12/29/21  2040   WBC Thousand/uL 9 46 9 68  --  11 19*   HEMOGLOBIN g/dL 9 8* 9 5*  --  10 0*   I STAT HEMOGLOBIN g/dl  --   --  10 5*  --    HEMATOCRIT % 32 0* 31 3*  --  31 9*   HEMATOCRIT, ISTAT %  --   --  31*  --    MCV fL 89 88  --  88   PLATELETS Thousands/uL 353 343  --  331   MCH pg 27 1 26 7*  --  27 5   MCHC g/dL 30 6* 30 4*  --  31 3*   RDW % 14 9 14 6  --  14 6   MPV fL 10 8 11 3  --  11 5   NRBC AUTO /100 WBCs 0  --   --  0       CMP:  Results from last 7 days   Lab Units 12/31/21  0509 12/30/21  0609 12/29/21 2313 12/29/21  2040   SODIUM mmol/L 141 137  --  134*   POTASSIUM mmol/L 3 8 4 6  --  5 2   CHLORIDE mmol/L 101 99*  --  97*   CO2 mmol/L 35* 28  --  27   CO2, I-STAT mmol/L  --   --  28  --    BUN mg/dL 44* 60*  --  62*   CREATININE mg/dL 1 23 1 67*  --  1 97*   GLUCOSE, ISTAT mg/dl  --   --  129  --    CALCIUM mg/dL 9 3 9 5  --  9 6   AST U/L  --   --   --  29   ALT U/L  --   --   --  24   ALK PHOS U/L  --   --   --  128*   EGFR ml/min/1 73sq m 43 29  --  24       BMP:  Results from last 7 days   Lab Units 12/31/21  0509 12/30/21  0609 12/29/21  2313 12/29/21  2040   SODIUM mmol/L 141 137  --  134*   POTASSIUM mmol/L 3 8 4 6  --  5 2   CHLORIDE mmol/L 101 99*  --  97*   CO2 mmol/L 35* 28  --  27   CO2, I-STAT mmol/L  --   --  28  --    BUN mg/dL 44* 60*  --  62*   CREATININE mg/dL 1 23 1 67*  --  1 97*   GLUCOSE, ISTAT mg/dl  --   --  129  --    CALCIUM mg/dL 9 3 9 5  --  9 6       NT-proBNP:   Recent Labs     12/29/21 2040   NTBNP 1,860*        Magnesium:   Results from last 7 days   Lab Units 12/29/21  2040   MAGNESIUM mg/dL 2 3       Coags:   Results from last 7 days   Lab Units 12/31/21  0509 12/30/21  0159 12/29/21  2040   PTT seconds  --  63*  --    INR  2 68* 2 45* 2 27*       TSH:    Results from last 7 days   Lab Units 12/29/21 2040   TSH 3RD GENERATON uIU/mL 1 693       Hemoglobin A1C )      Lipid Profile:   Lab Results   Component Value Date    CHOL 169 09/23/2014     Lab Results   Component Value Date    HDL 50 11/13/2021    HDL 59 04/12/2021    HDL 70 (H) 09/24/2018     Lab Results   Component Value Date    LDLCALC 93 11/13/2021    LDLCALC 113 (H) 04/12/2021    LDLCALC 127 (H) 09/24/2018     Lab Results   Component Value Date    LDLDIRECT 109 (H) 04/12/2021    LDLDIRECT 128 (H) 09/24/2018    LDLDIRECT 95 09/23/2014     Lab Results   Component Value Date    TRIG 89 11/13/2021    TRIG 120 04/12/2021    TRIG 102 09/24/2018       Cardiac testing:   EKG personally reviewed by Compa Perez MD      No results found for this or any previous visit  No results found for this or any previous visit  No results found for this or any previous visit  No results found for this or any previous visit  No results found for this or any previous visit  No results found for this or any previous visit  Compa Perez MD    Portions of the record may have been created with voice recognition software  Occasional wrong word or "sound a like" substitutions may have occurred due to the inherent limitations of voice recognition software  Read the chart carefully and recognize, using context, where substitutions have occurred

## 2021-12-31 NOTE — WOUND OSTOMY CARE
Consult Note - Wound   Boby Winadipane 77 y o  female MRN: [id number]  Unit/Bed#: W -01 Encounter: [id number]        History and Present Illness:  Patient admitted with acute respiratory failure  Wound care consulted for RLE wound  Patient history significant for a-fib, chronic venous insufficiency, HTN, CKD stage 2, obesity and Sjogren's syndrome  Assessment Findings:   Patient OOB in the recliner with legs elevated, cushion in place beneath her  She states that she had seen a vascular doctor who ordered compression pumps for her lower leg edema which she had been using but has not used in "quite a while"  She was able to stand to the walker with minimal assistance  She has a purewick in place for urinary incontinence, she is not documented as incontinent of stool  1  Bilateral heels scaly and blanchable  2  Buttocks and sacrum blanchable and intact  3  Right and left lateral abdominal fold and right breast fold with satellite lesions and fungal odor  Cleansed and Maxorb placed in fold  Requested Nystatin powder order from provider  4  Right lower medial leg, partial thickness wound, scattered, scaly dried yellow center with scattered areas of beefy red dried skin  Moderate amount of serous drainage beneath her leg when assessed  Leg was cleansed, Xeroform, DSD, and ABD place on leg and wrapped with nirmal, patient tolerated well    Skin and Wound Care Plan:   1  Apply skin nourishing cream to the skin daily  2  Ehob offloading cushion to the chair when OOB  3  Elevate heels off of bed with pillows to offload  4  Apply Hydraguard to b/l heels, buttocks and sacrum BID and PRN  5  Turn patient Q 2hours  6  Cleanse right and left lower leg with soap and water, pat dry, apply Hydraguard to the lower legs and periwound area on the right leg, avoid wound bed   Place Xeroform on wound bed, cover with DSD and ABD pad, wrap with nirmal, change daily and PRN with drainage or dislodgement    Wounds:  Wound 12/31/21 Pretibial Right (Active)   Wound Image   12/31/21 1345   Wound Description Beefy red;Yellow 12/31/21 1345   Ольга-wound Assessment Dry;Scaly 12/31/21 1345   Wound Length (cm) 8 cm 12/31/21 1345   Wound Width (cm) 10 cm 12/31/21 1345   Wound Depth (cm) 0 1 cm 12/31/21 1345   Wound Surface Area (cm^2) 80 cm^2 12/31/21 1345   Wound Volume (cm^3) 8 cm^3 12/31/21 1345   Calculated Wound Volume (cm^3) 8 cm^3 12/31/21 1345   Tunneling 0 cm 12/31/21 1345   Undermining 0 12/31/21 1345   Drainage Amount Moderate 12/31/21 1345   Drainage Description Serous; Yellow 12/31/21 1345   Non-staged Wound Description Partial thickness 12/31/21 1345   Treatments Cleansed;Irrigation with NSS 12/31/21 1345   Dressing ABD;Dry dressing;Gauze; Xeroform 12/31/21 1345   Wound packed?  No 12/31/21 1345   Patient Tolerance Tolerated well 12/31/21 1345     Reviewed plan of care with primary RN  Recommendations written as orders  Wound care team to follow weekly while admitted  Questions or concerns Berna MORENON, RN, [facility], [facility]

## 2021-12-31 NOTE — PLAN OF CARE
Problem: PHYSICAL THERAPY ADULT  Goal: Performs mobility at highest level of function for planned discharge setting  See evaluation for individualized goals  Description: Treatment/Interventions: Functional transfer training,LE strengthening/ROM,Therapeutic exercise,Endurance training,Patient/family training,Equipment eval/education,Bed mobility,Gait training  Equipment Recommended: Eliz Jain       See flowsheet documentation for full assessment, interventions and recommendations  12/31/2021 0955 by Gatito Pinno PT  Note: Prognosis: Fair  Problem List: Decreased strength,Decreased endurance,Impaired balance,Decreased mobility,Decreased cognition,Impaired sensation,Obesity,Decreased skin integrity,Pain  Assessment: Prabhjot Edwards is a 77 y o  Female who [facility] on 12/29/2021 from home w/ c/o SOB, LE swelling and diagnosis of acute respiratory failure w/ hypoxia  Orders for PT eval and treat received, w/ activity orders of up and OOB as tolerated  Pt presents w/ comorbidities of arthritis, LE edema, a-fib, HTN and personal factors including: inaccessible home environment, living in 2 story house, mobilizing w/ assistive device, stair(s) to enter home, decreased cognition and inability to perform IADLs  At baseline, pt mobilizes independently w/ SPC, and reports 0 falls in the last 6 months  Upon evaluation, pt presents w/ the following deficits: weakness, altered sensation, edema of extremities, impaired balance, decreased endurance, pain limiting functional mobility and gait deviations  Pt requires Mod Ax1 for bed mobility, Min-Mod Ax1 for transfers, and Min Ax1 for gait   Pt's clinical presentation is unstable/unpredictable due to need for assist w/ all phases of mobility when usually mobilizing independently, pain impacting overall mobility status, need for supplemental oxygen in order to maintain oxygen saturation, need for input for task focus, need for input for mobility technique/safety, ongoing medical monitoring/management, and recent drastic decline in mobility compared to baseline  Pt is at an increased risk of falls due to LE weakness, impaired balance  Given the above findings, discharge recommendation is for Post-acute inpatient rehabilitation  During this admission, pt would benefit from continued skilled inpatient PT in the acute care setting in order to address the abovementioned deficits to maximize function and mobility before DC from acute care  Barriers to Discharge: Inaccessible home environment        PT Discharge Recommendation: Post acute rehabilitation services          See flowsheet documentation for full assessment

## 2021-12-31 NOTE — PROGRESS NOTES
[facility]  Progress Note [facility] 1945[facility] 77 [facility]  [facility] MRN: [id number]  Unit/Bed#: W -01 Encounter: [id number]  Primary Care Provider: Aris Galeazzi, MD   Date and time admitted to hospital: 12/29/2021  8:10 PM    * Acute respiratory failure with hypoxia (HCC)  Assessment & Plan  Patient's O2 saturation at 80% POA  Patient complains of worsening shortness of breath both at rest and on exertion  Not on oxygen at home  Recent hospitalization for similar complaint  Plan  -BiPAP ordered, however currently weaned to 4 L O2 nasal cannula  -Continue to wean as tolerated  -maintain oxygen saturation at minimum 88%  -monitor vitals      Cellulitis of right lower extremity  Assessment & Plan  Green purulent discharge from posterior right lower extremity with erythema  Not painful  Patient believes infection of right lower extremity began roughly 1 week ago, but is unsure  Leukocytosis POA  One dose of IV cefepime provided in ED    Plan  -IV cefazolin  -monitor right lower extremity  -follow-up blood/wound cultures    Atrial fibrillation [facility])  Assessment & Plan  Recently diagnosed atrial fibrillation on warfarin  Most recent INR supratherapeutic at 8 66  INR 2 45 POA  Patient on warfarin 5 mg b i d  At home  Patient instructed to stop warfarin in setting of supratherapeutic INR  Plan  -Warfarin 5 mg once daily  -Monitor INR     LEXY (acute kidney injury) ([facility] [address] )  Assessment & Plan  Creatinine at 1 97 POA  Baseline appears to be around 1 0  Likely prerenal in setting of CHF exacerbation    Plan  -continue diuresis as above    CHF exacerbation [facility])  Assessment & Plan  Wt Readings from Last 3 Encounters:   12/29/21 122 kg (270 lb)   12/16/21 120 kg (264 lb 3 2 oz)   12/02/21 118 kg (260 lb)     BNP elevated at 1,860 POA  LEXY POA likely prerenal    Patient appears volume overloaded on exam   Chronic and worsening bilateral lower extremity edema  Dyspnea at rest and on exertion  Patient on torsemide 20 mg b i d  at home  Patient leads increasingly sedentary lifestyle which she attributes to her current state of health  Plan  -Lasix 80 mg b i d   -monitor I/O, monitor weight  -fluid restriction 1800 mL daily, salt restriction 2 g daily  -continue telemetry      Chronic back pain  Assessment & Plan  Patient complains of chronic back pain  Because back pain, patient unable to lay flat so she sleeps in a recliner at home  Sees pain management  Plan  -Continue home pain medications        VTE Pharmacologic Prophylaxis: VTE Score: 12 High Risk (Score >/= 5) - Pharmacological DVT Prophylaxis Ordered: warfarin (Coumadin)  Sequential Compression Devices Ordered  Patient Centered Rounds: I performed bedside rounds with nursing staff today  Discussions with Specialists or Other Care Team Provider: cardiology    Education and Discussions with Family / Patient: Updated  () via phone  Current Length of Stay: 1 day(s)  Current Patient Status: Inpatient   Discharge Plan: TBD    Code Status: Level 1 - Full Code    Subjective:   No events overnight  Patient reports SOB but is able to converse comfortably  Reports good appetite  Currently denies other symptoms  No questions at this time  Objective:     Vitals:   Temp (24hrs), Av 8 °F (36 6 °C), Min:97 5 °F (36 4 °C), Max:98 2 °F (36 8 °C)    Temp:  [97 5 °F (36 4 °C)-98 2 °F (36 8 °C)] 98 2 °F (36 8 °C)  HR:  [62-76] 72  Resp:  [18-20] 18  BP: ()/(56-68) 94/68  SpO2:  [92 %-97 %] 92 %  Body mass index is 49 38 kg/m²  Input and Output Summary (last 24 hours): Intake/Output Summary (Last 24 hours) at 2021 1332  Last data filed at 2021 1005  Gross per 24 hour   Intake 290 ml   Output 2000 ml   Net -1710 ml       Physical Exam:   Physical Exam  Vitals reviewed  Constitutional:       General: She is not in acute distress       Appearance: She is not ill-appearing or toxic-appearing  HENT:      Head: Normocephalic and atraumatic  Eyes:      Extraocular Movements: Extraocular movements intact  Conjunctiva/sclera: Conjunctivae normal    Cardiovascular:      Rate and Rhythm: Normal rate  Rhythm irregular  Pulses: Normal pulses  Heart sounds: Normal heart sounds  Pulmonary:      Effort: No respiratory distress  Breath sounds: Wheezing (Bilateral basilar expiratory wheezes) present  No rales  Comments: On 4 L O2 nasal cannula, diminished breath sounds bilaterally   Abdominal:      General: Bowel sounds are normal       Palpations: Abdomen is soft  Musculoskeletal:      Right lower leg: Edema (Nonpitting lymphedema) present  Left lower leg: Edema (Nonpitting lymphedema) present  Skin:     Findings: Erythema (Bilateral lower extremities) present  Comments: Green purulent discharge from posterior aspect of right lower extremity   Neurological:      Mental Status: She is alert and oriented to person, place, and time  Mental status is at baseline     Psychiatric:         Mood and Affect: Mood normal          Behavior: Behavior normal           Additional Data:     Labs:  Results from last 7 days   Lab Units 12/31/21  0509   WBC Thousand/uL 9 46   HEMOGLOBIN g/dL 9 8*   HEMATOCRIT % 32 0*   PLATELETS Thousands/uL 353   NEUTROS PCT % 71   LYMPHS PCT % 10*   MONOS PCT % 13*   EOS PCT % 6     Results from last 7 days   Lab Units 12/31/21  0509 12/29/21  2313 12/29/21  2040   SODIUM mmol/L 141   < > 134*   POTASSIUM mmol/L 3 8   < > 5 2   CHLORIDE mmol/L 101   < > 97*   CO2 mmol/L 35*   < > 27   CO2, I-STAT   --    < >  --    BUN mg/dL 44*   < > 62*   CREATININE mg/dL 1 23   < > 1 97*   ANION GAP mmol/L 5   < > 10   CALCIUM mg/dL 9 3   < > 9 6   ALBUMIN g/dL  --   --  3 4*   TOTAL BILIRUBIN mg/dL  --   --  0 42   ALK PHOS U/L  --   --  128*   ALT U/L  --   --  24   AST U/L  --   --  29   GLUCOSE RANDOM mg/dL 106   < > 122    < > = values in this interval not displayed  Results from last 7 days   Lab Units 12/31/21  0509   INR  2 68*             Results from last 7 days   Lab Units 12/30/21  1440 12/30/21  0159   LACTIC ACID mmol/L  --  0 9   PROCALCITONIN ng/ml <0 05  --        Lines/Drains:  Invasive Devices  Report    Peripheral Intravenous Line            Peripheral IV 12/29/21 Dorsal (posterior); Left Forearm 1 day    Peripheral IV 12/30/21 Right Antecubital 1 day          Drain            External Urinary Catheter -- days                      Imaging: Reviewed radiology reports from this admission including: chest xray    Recent Cultures (last 7 days):   Results from last 7 days   Lab Units 12/30/21  0837 12/30/21  0159   BLOOD CULTURE   --  No Growth at 24 hrs  No Growth at 24 hrs     GRAM STAIN RESULT  No polys seen*  1+ Gram negative rods*  Rare Gram positive cocci in pairs*  --    WOUND CULTURE  3+ Growth of Proteus species*  3+ Growth of Enterococcus species*  3+ Growth of   --        Last 24 Hours Medication List:   Current Facility-Administered Medications   Medication Dose Route Frequency Provider Last Rate    acetaminophen  650 mg Oral Q6H PRN Eliud Goyal MD      amLODIPine  5 mg Oral BID Barr Meter Ellen, DO      carvedilol  25 mg Oral BID With Meals Barr Meter [place], DO      cefazolin  2,000 mg Intravenous Q8H Josee Del Rio MD 2,000 mg (12/31/21 0510)    furosemide  80 mg Intravenous BID (diuretic) Barr Meter Ellen, DO      ondansetron  4 mg Intravenous Q6H PRN Eliud Goyal MD      oxyCODONE  20 mg Oral [facility] [place], [place]      oxyCODONE-acetaminophen  1 tablet Oral Q4H PRN Barr Meter Ellen, DO      potassium chloride  40 mEq Oral Once Iliana Gallagher MD      senna  1 tablet Oral Daily Eliud Goyal MD      warfarin  5 mg Oral Daily (warfarin) Iliana Gallagher MD          Today, Patient Was Seen By: Iliana Gallagher MD    **Please Note: This note may have been constructed using a voice recognition system  **

## 2021-12-31 NOTE — UTILIZATION REVIEW
Initial Clinical Review    Admission: Date/Time/Statement:   Admission Orders (From admission, onward)     Ordered        12/30/21 0203  INPATIENT ADMISSION  Once                      Orders Placed This Encounter   Procedures    INPATIENT ADMISSION     Standing Status:   Standing     Number of Occurrences:   1     Order Specific Question:   Level of Care     Answer:   Med Surg [16]     Order Specific Question:   Estimated length of stay     Answer:   More than 2 Midnights     Order Specific Question:   Certification     Answer:   I certify that inpatient services are medically necessary for this patient for a duration of greater than two midnights  See H&P and MD Progress Notes for additional information about the patient's course of treatment  ED Arrival Information     Expected Arrival Acuity    - 12/29/2021 19:43 Emergent         Means of arrival Escorted by Service Admission type    Ambulance [facility] Emergency         Arrival complaint    Weakness        Chief Complaint   Patient presents with    Shortness of Breath     Pt c/o worsening SOB and bilateral lower leg swelling x2 weeks  Initial Presentation: this is a 77year old female from home to ED to ED via ems admitted inpatient due to  Acute on chronic CHF/Celluliits of RLE  Presented due to worsening shortness of breath, bilateral leg swelling starting since discharge in November  On exam BMI > 30  Bradycardia  Tachypnea, respiratory distress  4 to 5 word sentences then short of breath  Weeping bilateral LE extremities  Ecchymosis arms and legs  Sat 80% and placed on Bipap  Slight lethargy  NT-proBNP 1860  Bun 62  Creatinine 1 97 with baseline of 1  D dimer 0 66  Wbc 11 19  In the ED given IV Lasix twice   Consult cardiology, IV diuresis  Transition from cefepime to Ancef  Wean bipap as able  Fluid and salt restriction  Telemetry    Monitor bmp    12/30/21 per cardiology - patient has acute on chronic diastolic heart failure/LEXY/Cardiorenal syndrome  Seems to be improving with IV lasix and will continue  Atrial fib is controlled and continue beta blocker and warfarin  Date: 12/30/21    Day 2:  Patient fatigued, has pain left heel  Oxygen demands to 5 liters  With mobility sat to 73%  On exam crackling redness RLE  Lungs diminished breath sounds with expiratory wheezes  Diuresis, oxygen and antibiotics continue       ED Triage Vitals   Temperature Pulse Respirations Blood Pressure SpO2   12/29/21 2035 12/29/21 2035 12/29/21 2035 12/29/21 2035 12/29/21 2035   (!) 97 4 °F (36 3 °C) 55 (!) 30 99/54 (!) 80 %      Temp Source Heart Rate Source Patient Position - Orthostatic VS BP Location FiO2 (%)   12/29/21 2035 12/29/21 2035 12/29/21 2035 12/29/21 2035 12/30/21 0629   Oral Monitor Lying Right arm 45      Pain Score       12/30/21 0826       No Pain          Wt Readings from Last 1 Encounters:   12/29/21 122 kg (270 lb)     Additional Vital Signs:   12/31/21 10:05:28 98 2 °F (36 8 °C) 72 18 94/68 77 92 % -- -- -- -- -- --   12/30/21 21:53:19 97 5 °F (36 4 °C) 65 18 123/61 82 95 % -- -- -- -- -- --   12/30/21 17:53:58 97 6 °F (36 4 °C) 76 20 103/68 80 97 % -- 40 5 L/min Nasal cannula -- Lying   12/30/21 1733 -- 64 -- 140/56 -- 97 % -- 40 5 L/min Nasal cannula -- Lying   12/30/21 1700 -- 62 -- -- -- 97 % -- -- -- -- -- --   12/30/21 1342 -- 73 -- 124/57 -- 97 % -- 38 4 5 L/min Nasal cannula -- Lying   12/30/21 1255 -- -- -- -- -- 98 % -- 44 6 L/min Nasal cannula -- --   12/30/21 0842 -- -- -- -- -- 94 % -- 44 6 L/min Nasal cannula -- --   12/30/21 0826 -- 76 24 Abnormal  137/67 -- 97 % -- 44 6 L/min Nasal cannula -- Lying   12/30/21 0815 -- -- -- 137/67 -- -- -- -- -- -- -- --   12/30/21 0629 -- -- -- -- -- 95 % 45 -- -- BiPAP Full face mask --   12/30/21 0001 -- -- -- -- -- 94 % -- -- -- -- Full face mask --   12/30/21 0000 -- 60 24 Abnormal  130/59 84 97 % -- -- -- BiPAP -- Lying   12/29/21 2330 -- 56 24 Abnormal  135/60 87 97 % -- -- -- BiPAP -- Lying   12/29/21 2305 -- -- -- -- -- 98 % -- -- -- -- Face mask --   12/29/21 2230 -- 54 Abnormal  30 Abnormal  122/66 85 93 % -- 44 6 L/min Nasal cannula -- Lying     Pertinent Labs/Diagnostic Test Results:   12/29/21 CxR - Bilateral lower lobe consolidations consistent with atelectasis and/or pneumonia  12/29/21 ecg Atrial fibrillation with slow ventricular response  Low voltage QRS  Abnormal ECG  When compared with ECG of 12-NOV-2021 18:05,  Vent   rate has decreased BY  41 BPM  QT has shortened     Results from last 7 days   Lab Units 12/29/21 2040   SARS-COV-2  Negative     Results from last 7 days   Lab Units 12/31/21  0509 12/30/21  0609 12/29/21 2313 12/29/21 2040   WBC Thousand/uL 9 46 9 68  --  11 19*   HEMOGLOBIN g/dL 9 8* 9 5*  --  10 0*   I STAT HEMOGLOBIN g/dl  --   --  10 5*  --    HEMATOCRIT % 32 0* 31 3*  --  31 9*   HEMATOCRIT, ISTAT %  --   --  31*  --    PLATELETS Thousands/uL 353 343  --  331   NEUTROS ABS Thousands/µL 6 75  --   --  8 79*     Results from last 7 days   Lab Units 12/31/21  0509 12/30/21  0609 12/29/21  2313 12/29/21  2040   SODIUM mmol/L 141 137  --  134*   POTASSIUM mmol/L 3 8 4 6  --  5 2   CHLORIDE mmol/L 101 99*  --  97*   CO2 mmol/L 35* 28  --  27   CO2, I-STAT mmol/L  --   --  28  --    ANION GAP mmol/L 5 10  --  10   BUN mg/dL 44* 60*  --  62*   CREATININE mg/dL 1 23 1 67*  --  1 97*   EGFR ml/min/1 73sq m 43 29  --  24   CALCIUM mg/dL 9 3 9 5  --  9 6   MAGNESIUM mg/dL  --   --   --  2 3     Results from last 7 days   Lab Units 12/29/21 2040   AST U/L 29   ALT U/L 24   ALK PHOS U/L 128*   TOTAL PROTEIN g/dL 7 6   ALBUMIN g/dL 3 4*   TOTAL BILIRUBIN mg/dL 0 42     Results from last 7 days   Lab Units 12/31/21  0509 12/30/21  0609 12/29/21  2040   GLUCOSE RANDOM mg/dL 106 122 122     Results from last 7 days   Lab Units 12/29/21  2313   I STAT BASE EXC mmol/L 3   I STAT O2 SAT % 100*   ISTAT PH ART  7 454*   I STAT ART PCO2 mm HG 38 3   I STAT ART PO2 mm  0*   I STAT ART HCO3 mmol/L 26 8     Results from last 7 days   Lab Units 12/29/21  2233 12/29/21 2040   HS TNI 0HR ng/L  --  3   HS TNI 2HR ng/L 3  --    HSTNI D2 ng/L 0  --      Results from last 7 days   Lab Units 12/29/21  2040   D-DIMER QUANTITATIVE ug/ml FEU 0 66*     Results from last 7 days   Lab Units 12/31/21  0509 12/30/21  0159 12/29/21  2040   PROTIME seconds 28 0* 26 1* 24 7*   INR  2 68* 2 45* 2 27*   PTT seconds  --  63*  --      Results from last 7 days   Lab Units 12/29/21  2040   TSH 3RD GENERATON uIU/mL 1 693     Results from last 7 days   Lab Units 12/30/21  1440   PROCALCITONIN ng/ml <0 05     Results from last 7 days   Lab Units 12/30/21  0159   LACTIC ACID mmol/L 0 9     Results from last 7 days   Lab Units 12/29/21  2040   NT-PRO BNP pg/mL 1,860*     Results from last 7 days   Lab Units 12/29/21  2040   INFLUENZA A PCR  Negative   INFLUENZA B PCR  Negative   RSV PCR  Negative     Results from last 7 days   Lab Units 12/30/21  0837 12/30/21  0159   BLOOD CULTURE   --  No Growth at 24 hrs  No Growth at 24 hrs     GRAM STAIN RESULT  No polys seen*  1+ Gram negative rods*  Rare Gram positive cocci in pairs*  --        ED Treatment:   Medication Administration from 12/29/2021 1940 to 12/30/2021 1749       Date/Time Order Dose Route Action Comments     12/29/2021 2234 furosemide (LASIX) injection 40 mg 40 mg Intravenous Given      12/29/2021 2353 furosemide (LASIX) injection 40 mg 40 mg Intravenous Given      12/30/2021 0159 cefepime (MAXIPIME) 2 g/50 mL dextrose IVPB 2,000 mg Intravenous New Bag      12/30/2021 0816 senna (SENOKOT) tablet 8 6 mg 8 6 mg Oral Given      12/30/2021 0815 amLODIPine (NORVASC) tablet 5 mg 5 mg Oral Given      12/30/2021 1717 carvedilol (COREG) tablet 25 mg 25 mg Oral Given      12/30/2021 0815 carvedilol (COREG) tablet 25 mg 25 mg Oral Given      12/30/2021 1724 oxyCODONE (OxyCONTIN) 12 hr tablet 20 mg 20 mg Oral Given 12/30/2021 0935 oxyCODONE (OxyCONTIN) 12 hr tablet 20 mg 20 mg Oral Given      12/30/2021 1122 oxyCODONE-acetaminophen (PERCOCET) 5-325 mg per tablet 1 tablet 1 tablet Oral Given      12/30/2021 1717 warfarin (COUMADIN) tablet 5 mg 5 mg Oral Given      12/30/2021 1718 furosemide (LASIX) injection 80 mg 80 mg Intravenous Given      12/30/2021 0821 furosemide (LASIX) injection 80 mg 80 mg Intravenous Given      12/30/2021 1346 ceFAZolin (ANCEF) IVPB (premix in dextrose) 2,000 mg 50 mL 2,000 mg Intravenous New Bag         Past Medical History:   Diagnosis Date    A-fib ([place] [address] ) 12/29/2021    Arthritis     Cellulitis     Edema of both lower extremities due to peripheral venous insufficiency     Erythema of lower extremity 11/12/2021     Present on Admission:  **None**      Admitting Diagnosis: Weakness [R53 1]  CHF exacerbation ([facility]) [I50 9]  LEXY (acute kidney injury) ([facility] ) [N17 9]  Acute respiratory failure with hypoxia ([facility]) [J96 01]  Bilateral lower leg cellulitis [L03 116, L03 115]  Age/Sex: 77 y o  female  Admission Orders:  12/30/21 0203 inpatient   Scheduled Medications:  amLODIPine, 5 mg, Oral, BID  carvedilol, 25 mg, Oral, BID With Meals  cefazolin, 2,000 mg, Intravenous, Q8H  furosemide, 80 mg, Intravenous, BID (diuretic)  oxyCODONE, 20 mg, Oral, Q8H SHANNAN  potassium chloride, 40 mEq, Oral, Once  senna, 1 tablet, Oral, Daily  warfarin, 5 mg, Oral, Daily (warfarin)    Continuous IV Infusions: none      PRN Meds:  acetaminophen, 650 mg, Oral, Q6H PRN  ondansetron, 4 mg, Intravenous, Q6H PRN  oxyCODONE-acetaminophen, 1 tablet, Oral, Q4H PRN - used x 1 12/30    Titrate oxygen to maintain sat > 88%    IP CONSULT TO CARDIOLOGY    Network Utilization Review Department  ATTENTION: Please call with any questions or concerns to [phone] and carefully listen to the prompts so that you are directed to the right person   All voicemails are confidential   Eamon Posada all requests for admission clinical reviews, approved or denied determinations and any other requests to dedicated fax number below belonging to the campus where the patient is receiving treatment   List of dedicated fax numbers for the Facilities:  [address] [place] [address] DENIALS (Administrative/Medical Necessity) [phone]   [address] (Maternity/NICU/Pediatrics) [phone]   [address] [place] [address]  [address] [facility] [place] [address] [id number] [place] [address] [address] [facility] [place] [address] [phone]

## 2021-12-31 NOTE — PHYSICAL THERAPY NOTE
PHYSICAL THERAPY EVALUATION NOTE          Patient Name: Jaylyn Bell  XYUPC'V Date: 2021     AGE:   77 y o  Mrn:   [id number]  ADMIT DX:  Weakness [R53 1]  CHF exacerbation ([place] [address] ) [I50 9]  LEXY (acute kidney injury) ([place] [address] ) [N17 9]  Acute respiratory failure with hypoxia ([place] [address] ) [J96 01]  Bilateral lower leg cellulitis [L03 116, F91 523]    Past Medical History:   Diagnosis Date    A-fib ([place] [address] ) 2021    Arthritis     Cellulitis     Edema of both lower extremities due to peripheral venous insufficiency     Erythema of lower extremity 2021     Length Of Stay: 1  PHYSICAL THERAPY EVALUATION :   Patient's identity confirmed via 2 patient identifiers (full name and ) at start of session       21 0850   PT Last Visit   PT Visit Date 21   Note Type   Note type Evaluation   Pain Assessment   Pain Assessment Tool 0-10   Pain Score 4  (pre-mobility: 0/10; post mobility 4/10)   Pain Location/Orientation Orientation: Left  (heel)   Pain Onset/Description Onset: Ongoing;Frequency: Intermittent  (pt and spouse report approximately 8 months)   Effect of Pain on Daily Activities limits pt's tolerance to functional mobility   Hospital Pain Intervention(s) Repositioned; Ambulation/increased activity; Emotional support  (B/L heels offloaded, Resident notified)   Restrictions/Precautions   Weight Bearing Precautions Per Order No   Other Precautions Chair Alarm; Bed Alarm;O2;Fall Risk;Cognitive;Pain  (5L O2 via NC)   Home Living   Type of Home House  ([facility])   Home Layout Two level;Stairs to enter with rails;1/2 bath on main level  (3 GLORIA; 13 steps to 2nd floor)   Bathroom Shower/Tub Tub/shower unit   [employer] Grab bars in [address] [employer] bars; Walker;Cane   Prior Function   Level of [place] Independent with ADLs and functional mobility   Lives With Spouse   Receives Help From Family   ADL Assistance Independent IADLs Needs assistance   Falls in the last 6 months 0   Comments Pt reports recent use of SPC for ambulation  Pt sleeps in recliner at baseline   General   Additional Pertinent History Pt's SpO2 >88% prior to mobility, dropped to 73% during mobility to EOB, pt educated on breathing technique w/ O2 recovering to >88% w/in 30 seconds at EOB prior to further mobility; at end of session w/ pt OOB in chair: 89%   Family/Caregiver Present No   Cognition   Overall Cognitive Status Impaired   Arousal/Participation Cooperative   Orientation Level Oriented X4   Memory Decreased recall of recent events;Decreased recall of precautions   Following Commands Follows one step commands with increased time or repetition   Comments Pt ID via name and ; pt anxious throughout, requiring re-directing/focusing to task at hand   Subjective   Subjective "I was completely fine at home"   Strength RLE   R Ankle Dorsiflexion 3+/5   R Ankle Plantar Flexion 3+/5   Strength LLE   L Ankle Dorsiflexion 3+/5   L Ankle Plantar Flexion 3+/5   Vision-Basic Assessment   Current Vision Wears glasses all the time   Light Touch   RLE Light Touch Grossly intact   LLE Light Touch Impaired   LLE Light Touch Comments Pt reports chronic, intermittent pins/needles sensation to L foot   Bed Mobility   Supine to Sit 3  Moderate assistance   Additional items Assist x 1;HOB elevated; Bedrails; Increased time required;Verbal cues;LE management   Sit to Supine Unable to assess   Additional items   (pt OOB in recliner chair at end of session)   Additional Comments Pt able to maintain sitting balance at EOB w/ supervision  Breathing education provided prior to mobility   Transfers   Sit to Stand 3  Moderate assistance   Additional items Assist x 1; Increased time required;Verbal cues   Stand to Sit 4  Minimal assistance   Additional items Assist x 1; Armrests; Increased time required;Verbal cues   Additional Comments VC for hand placement   Ambulation/Elevation   Gait pattern Forward Flexion; Wide AGUSTIN; Decreased foot clearance; Short stride   Gait Assistance 4  Minimal assist   Additional items Assist x 1;Verbal cues   Assistive Device Rolling walker  (wide RW)   Distance 3'  (to recliner chair)   Balance   Static Sitting Fair   Static Standing Poor +  (w/ RW)   Dynamic Standing Poor +  (w/ RW)   Ambulatory Poor +  (w/ RW)   Endurance Deficit   Endurance Deficit Yes   Endurance Deficit Description pt w/ limited tolerance to functional mobility and physical activity   Activity Tolerance   Activity Tolerance Patient limited by fatigue;Patient limited by pain   Medical Staff Made Aware TT Resident Wyvonne Epley w/ updates of pt performance, O2, and reports of pain   Nurse Made Aware RN Mark   Assessment   Prognosis Fair   Problem List Decreased strength;Decreased endurance; Impaired balance;Decreased mobility; Decreased cognition; Impaired sensation;Obesity; Decreased skin integrity;Pain   Assessment Desi Krishna is a 77 y o  Female who [facility] on [facility]2021 from home w/ c/o SOB, LE swelling and diagnosis of acute respiratory failure w/ hypoxia  Orders for PT eval and treat received, w/ activity orders of up and OOB as tolerated  Pt presents w/ comorbidities of arthritis, LE edema, a-fib, HTN and personal factors including: inaccessible home environment, living in 2 story house, mobilizing w/ assistive device, stair(s) to enter home, decreased cognition and inability to perform IADLs  At baseline, pt mobilizes independently w/ SPC, and reports 0 falls in the last 6 months  Upon evaluation, pt presents w/ the following deficits: weakness, altered sensation, edema of extremities, impaired balance, decreased endurance, pain limiting functional mobility and gait deviations  Pt requires Mod Ax1 for bed mobility, Min-Mod Ax1 for transfers, and Min Ax1 for gait   Pt's clinical presentation is unstable/unpredictable due to need for assist w/ all phases of mobility when usually mobilizing independently, pain impacting overall mobility status, need for supplemental oxygen in order to maintain oxygen saturation, need for input for task focus, need for input for mobility technique/safety, ongoing medical monitoring/management, and recent drastic decline in mobility compared to baseline  Pt is at an increased risk of falls due to LE weakness, impaired balance  Given the above findings, discharge recommendation is for Post-acute inpatient rehabilitation  During this admission, pt would benefit from continued skilled inpatient PT in the acute care setting in order to address the abovementioned deficits to maximize function and mobility before DC from acute care  Barriers to Discharge Inaccessible home environment   Goals   Patient Goals Pt stated she wants to go home   STG Expiration Date 01/10/22   Short Term Goal #1 Pt will: perform bed mobility independently to decrease burden of care; perform transfers independently to increase OOB mobility; ambulate at least 75' w/ LRAD and Mod I to increase pt's ambulatory endurance; increase LE strength by 1 grade to increase pt's tolerance to physical activity; increase all balance ratings by 1 grade to decrease pt's risk of falls; PT to see for step negotiation   PT Treatment Day 0   Plan   Treatment/Interventions Functional transfer training;LE strengthening/ROM; Therapeutic exercise; Endurance training;Patient/family training;Equipment eval/education; Bed mobility;Gait training   PT Frequency 3-5x/wk   Recommendation   PT Discharge Recommendation Post acute rehabilitation services   Equipment Recommended [facility] Recommended Wheeled walker   Change/add to Wix?  No   AM-PAC Basic Mobility Inpatient   Turning in Bed Without Bedrails 2   Lying on Back to Sitting on Edge of Flat Bed 2   Moving Bed to Chair 2   Standing Up From Chair 2   Walk in Room 3   Climb 3-5 Stairs 1   Basic Mobility Inpatient Raw Score 12   Basic Mobility Standardized Score 32 23   Highest Level Of Mobility   -[facility] Goal 4: Move to chair/commode   [facility] Highest Level of Mobility 4: Move to chair/commode   -HL Goal Achieved Yes   End of Consult   Patient Position at End of Consult Bedside chair;Bed/Chair alarm activated; All needs within reach  (LE elevated w/ B/L heels offloaded)   End of Consult Comments SpO2: 89%       The patient's AM-PAC Basic Mobility Inpatient Short Form Raw Score is 12  A Raw score of less than or equal to 16 suggests the patient may benefit from discharge to post-acute rehabilitation services  Please also refer to the recommendation of the Physical Therapist for safe discharge planning      Pt would benefit from skilled inpatient PT during this admission in order to facilitate progress towards goals to maximize functional independence    DC rec: post acute rehab      Ilene Berg, PT, DPT  12/31/21

## 2021-12-31 NOTE — PLAN OF CARE
Problem: MOBILITY - ADULT  Goal: Maintain or return to baseline ADL function  Description: INTERVENTIONS:  -  Assess patient's ability to carry out ADLs; assess patient's baseline for ADL function and identify physical deficits which impact ability to perform ADLs (bathing, care of mouth/teeth, toileting, grooming, dressing, etc )  - Assess/evaluate cause of self-care deficits   - Assess range of motion  - Assess patient's mobility; develop plan if impaired  - Assess patient's need for assistive devices and provide as appropriate  - Encourage maximum independence but intervene and supervise when necessary  - Involve family in performance of ADLs  - Assess for home care needs following discharge   - Consider OT consult to assist with ADL evaluation and planning for discharge  - Provide patient education as appropriate  Outcome: Progressing  Goal: Maintains/Returns to pre admission functional level  Description: INTERVENTIONS:  - Perform BMAT or MOVE assessment daily    - Set and communicate daily mobility goal to care team and patient/family/caregiver  - Collaborate with rehabilitation services on mobility goals if consulted  - Perform Range of Motion  times a day  - Reposition patient every  hours    - Dangle patient  times a day  - Stand patient  times a day  - Ambulate patient times a day  - Out of bed to chair  times a day   - Out of bed for meals  times a day  - Out of bed for toileting  - Record patient progress and toleration of activity level   Outcome: Progressing     Problem: Potential for Falls  Goal: Patient will remain free of falls  Description: INTERVENTIONS:  - Educate patient/family on patient safety including physical limitations  - Instruct patient to call for assistance with activity   - Consult OT/PT to assist with strengthening/mobility   - Keep Call bell within reach  - Keep bed low and locked with side rails adjusted as appropriate  - Keep care items and personal belongings within reach  - Initiate and maintain comfort rounds  - Make Fall Risk Sign visible to staff  - Offer Toileting every  Hours, in advance of need  - Initiate/Maintain alarm  - Obtain necessary fall risk management equipment:   - Apply yellow socks and bracelet for high fall risk patients  - Consider moving patient to room near nurses station  Outcome: Progressing     Problem: Prexisting or High Potential for Compromised Skin Integrity  Goal: Skin integrity is maintained or improved  Description: INTERVENTIONS:  - Identify patients at risk for skin breakdown  - Assess and monitor skin integrity  - Assess and monitor nutrition and hydration status  - Monitor labs   - Assess for incontinence   - Turn and reposition patient  - Assist with mobility/ambulation  - Relieve pressure over bony prominences  - Avoid friction and shearing  - Provide appropriate hygiene as needed including keeping skin clean and dry  - Evaluate need for skin moisturizer/barrier cream  - Collaborate with interdisciplinary team   - Patient/family teaching  - Consider wound care consult   Outcome: Progressing     Problem: PAIN - ADULT  Goal: Verbalizes/displays adequate comfort level or baseline comfort level  Description: Interventions:  - Encourage patient to monitor pain and request assistance  - Assess pain using appropriate pain scale  - Administer analgesics based on type and severity of pain and evaluate response  - Implement non-pharmacological measures as appropriate and evaluate response  - Consider cultural and social influences on pain and pain management  - Notify physician/advanced practitioner if interventions unsuccessful or patient reports new pain  Outcome: Progressing     Problem: INFECTION - ADULT  Goal: Absence or prevention of progression during hospitalization  Description: INTERVENTIONS:  - Assess and monitor for signs and symptoms of infection  - Monitor lab/diagnostic results  - Monitor all insertion sites, i e  indwelling lines, tubes, and drains  - Monitor endotracheal if appropriate and nasal secretions for changes in amount and color  - [place] appropriate cooling/warming therapies per order  - Administer medications as ordered  - Instruct and encourage patient and family to use good hand hygiene technique  - Identify and instruct in appropriate isolation precautions for identified infection/condition  Outcome: Progressing  Goal: Absence of fever/infection during neutropenic period  Description: INTERVENTIONS:  - Monitor WBC    Outcome: Progressing     Problem: SAFETY ADULT  Goal: Maintain or return to baseline ADL function  Description: INTERVENTIONS:  -  Assess patient's ability to carry out ADLs; assess patient's baseline for ADL function and identify physical deficits which impact ability to perform ADLs (bathing, care of mouth/teeth, toileting, grooming, dressing, etc )  - Assess/evaluate cause of self-care deficits   - Assess range of motion  - Assess patient's mobility; develop plan if impaired  - Assess patient's need for assistive devices and provide as appropriate  - Encourage maximum independence but intervene and supervise when necessary  - Involve family in performance of ADLs  - Assess for home care needs following discharge   - Consider OT consult to assist with ADL evaluation and planning for discharge  - Provide patient education as appropriate  Outcome: Progressing  Goal: Maintains/Returns to pre admission functional level  Description: INTERVENTIONS:  - Perform BMAT or MOVE assessment daily    - Set and communicate daily mobility goal to care team and patient/family/caregiver  - Collaborate with rehabilitation services on mobility goals if consulted  - Perform Range of Motion  times a day  - Reposition patient every  hours    - Dangle patient  times a day  - Stand patient  times a day  - Ambulate patient  times a day  - Out of bed to chair  times a day   - Out of bed for meals  times a day  - Out of bed for toileting  - Record patient progress and toleration of activity level   Outcome: Progressing  Goal: Patient will remain free of falls  Description: INTERVENTIONS:  - Educate patient/family on patient safety including physical limitations  - Instruct patient to call for assistance with activity   - Consult OT/PT to assist with strengthening/mobility   - Keep Call bell within reach  - Keep bed low and locked with side rails adjusted as appropriate  - Keep care items and personal belongings within reach  - Initiate and maintain comfort rounds  - Make Fall Risk Sign visible to staff  - Offer Toileting every  Hours, in advance of need  - Initiate/Maintain alarm  - Obtain necessary fall risk management equipment:   - Apply yellow socks and bracelet for high fall risk patients  - Consider moving patient to room near nurses station  Outcome: Progressing     Problem: DISCHARGE PLANNING  Goal: Discharge to home or other facility with appropriate resources  Description: INTERVENTIONS:  - Identify barriers to discharge w/patient and caregiver  - Arrange for needed discharge resources and transportation as appropriate  - Identify discharge learning needs (meds, wound care, etc )  - Arrange for interpretive services to assist at discharge as needed  - Refer to Case Management Department for coordinating discharge planning if the patient needs post-hospital services based on physician/advanced practitioner order or complex needs related to functional status, cognitive ability, or social support system  Outcome: Progressing     Problem: Knowledge Deficit  Goal: Patient/family/caregiver demonstrates understanding of disease process, treatment plan, medications, and discharge instructions  Description: Complete learning assessment and assess knowledge base    Interventions:  - Provide teaching at level of understanding  - Provide teaching via preferred learning methods  Outcome: Progressing

## 2021-12-31 NOTE — CASE MANAGEMENT
Case Management Assessment & Discharge Planning Note    Patient name Bonny Dash  Location W MS 65/W -01 MRN [id number]  : 1945 Date 2021       Current Admission Date: 2021  Current Admission Diagnosis:Acute respiratory failure with hypoxia [facility])   Patient Active Problem List    Diagnosis Date Noted    CHF exacerbation ([address] ) 2021    LEXY (acute kidney injury) ([address] ) 2021    Atrial fibrillation ([address] ) 2021    Cellulitis of right lower extremity 2021    Chronic back pain 2021    Mixed hyperlipidemia 2021    Obesity 2021    Osteoarthritis of knee 2021     LXEY on CKD (chronic kidney disease) stage 2, GFR 60-89 ml/min 2021    Rash of back 2021    New onset atrial fibrillation ([address] ) 2021    Acute respiratory failure with hypoxia ([facility] ) 2021    Chronic low back pain with sciatica 2021    Chronic venous insufficiency 2021    Essential hypertension 2017    Sjogren's syndrome ([address] ) 2014      LOS (days): 1  Geometric Mean LOS (GMLOS) (days): 4 30  Days to GMLOS:2 9     OBJECTIVE:    Risk of Unplanned Readmission Score: 17         Current admission status: Inpatient       Preferred Pharmacy:   [address] [place] [address], [address] [facility] [address] [facility]  [address] [facility] [address]  Phone: [phone] Fax: [address], [address] [place] [address]  Phone: [phone] Fax: [phone]    Primary Care Provider: Melva Yancey MD    Primary Insurance: [facility]  Secondary Insurance:     ASSESSMENT:  [address] - Spouse   Primary Phone: [phone] (Mobile)  Home Phone: [phone]               Advance Directives  Does patient have a [address] [place] [address] Avenue?: Yes  Does patient have Advance Directives?: Yes  Advance Directives: Living will,Power of  for health care  Primary Contact: [address]         Readmission Root Cause  30 Day Readmission: No    Patient Information  Admitted from[de-identified] Home  Mental Status: Alert  During Assessment patient was accompanied by: Spouse  Assessment information provided by[de-identified] Patient,Spouse  Primary Caregiver: [facility] of Residence: [address] [place] [address] do you live in?: [place] entry access options   Select all that apply : Stairs  Number of steps to enter home : 2  Do the steps have railings?: Yes  Type of Current Residence: [address] [place] home  Upon entering residence, is there a bedroom on the main floor (no further steps)?: No  A bedroom is located on the following floor levels of residence (select all that apply):: 2nd Floor  Upon entering residence, is there a bathroom on the main floor (no further steps)?: Yes  Number of steps to 2nd floor from main floor: One Flight    Activities of Daily Living Prior to Admission  Functional Status: Independent  Completes ADLs independently?: No  Level of ADL dependence: Assistance  Ambulates independently?: No  Level of ambulatory dependence: Assistance  Does patient use assisted devices?: Yes  Assisted Devices (DME) used: Lynn Barajas  Does patient currently own DME?: Yes  What DME does the patient currently own?: Lynn Barajas  Does patient have a history of Outpatient Therapy (PT/OT)?: No  Does the patient have a history of Short-Term Rehab?: No  Does patient have a history of HHC?: No  Does patient currently have [address]?: No         Patient Information Continued  Income Source: Unemployed  Does patient have prescription coverage?: Yes  Does patient receive dialysis treatments?: No  Does patient have a history of substance abuse?: No  Does patient have a history of Mental Health Diagnosis?: No    PHQ 2/9 Screening   Reviewed PHQ 2/9 Depression Screening Score?: No    Means of Transportation  Means of Transport to Appts[de-identified] Family transport  In the past 12 months, has lack of transportation kept you from medical appointments or from getting medications?: No  In the past 12 months, has lack of transportation kept you from meetings, work, or from getting things needed for daily living?: No        DISCHARGE DETAILS:    Discharge planning discussed with[de-identified] Patient & [place]-spouse  Freedom of Choice: Yes  Comments - Freedom of Choice: CM discussed the recommendation of SNF with Pt and spouse  Anastasiya Montgomery reported being agreeable but the Pt reported not being sure if she wanted to go into a SNF  CM discussed freedom of choice, the referral process and financial responsibility of SNF  Pt reported being agreeable to a blanket SNF referral while she discuss it with her spouse  CM made the requested blanket referral   CM contacted family/caregiver?: Yes  Were Treatment Team discharge recommendations reviewed with patient/caregiver?: Yes  Did patient/caregiver verbalize understanding of patient care needs?: Yes  Were patient/caregiver advised of the risks associated with not following Treatment Team discharge recommendations?: Yes    Contacts  Patient Contacts: Anastasiya Montgomery  Relationship to Patient[de-identified] Family  Contact Method:  In Person  Reason/Outcome: Discharge [address]         Is the patient interested in [place] [address] at discharge?: No    DME Referral Provided  Referral made for DME?: No    Other Referral/Resources/Interventions Provided:  Interventions: Short Term Rehab  Referral Comments: [place] referral  made         Treatment Team Recommendation: Short Term Rehab  Discharge Destination Plan[de-identified] Short Term Rehab  Transport at Discharge : BLS Ambulance

## 2021-12-31 NOTE — DISCHARGE INSTR - OTHER ORDERS
Skin and Wound Care Plan:   1  Apply skin nourishing cream to the skin daily  2  Ehob offloading cushion to the chair when OOB  3  Elevate heels off of bed with pillows to offload  4  Apply Hydraguard to b/l heels, buttocks and sacrum BID and PRN  5  Turn patient Q 2hours  6  Cleanse right and left lower leg with soap and water, pat dry, apply Hydraguard to the lower legs and periwound area on the right leg, avoid wound bed   Place Xeroform on wound bed, cover with DSD and ABD pad, wrap with nirmal, change daily and PRN with drainage or dislodgement

## 2022-01-01 LAB
ANION GAP SERPL CALCULATED.3IONS-SCNC: 5 MMOL/L (ref 4–13)
BUN SERPL-MCNC: 23 MG/DL (ref 5–25)
CALCIUM SERPL-MCNC: 9 MG/DL (ref 8.3–10.1)
CHLORIDE SERPL-SCNC: 102 MMOL/L (ref 100–108)
CO2 SERPL-SCNC: 37 MMOL/L (ref 21–32)
CREAT SERPL-MCNC: 0.93 MG/DL (ref 0.6–1.3)
ERYTHROCYTE [DISTWIDTH] IN BLOOD BY AUTOMATED COUNT: 15.1 % (ref 11.6–15.1)
GFR SERPL CREATININE-BSD FRML MDRD: 60 ML/MIN/1.73SQ M
GLUCOSE SERPL-MCNC: 116 MG/DL (ref 65–140)
HCT VFR BLD AUTO: 33.3 % (ref 34.8–46.1)
HGB BLD-MCNC: 9.9 G/DL (ref 11.5–15.4)
INR PPP: 3.02 (ref 0.84–1.19)
MCH RBC QN AUTO: 26.6 PG (ref 26.8–34.3)
MCHC RBC AUTO-ENTMCNC: 29.7 G/DL (ref 31.4–37.4)
MCV RBC AUTO: 90 FL (ref 82–98)
PLATELET # BLD AUTO: 370 THOUSANDS/UL (ref 149–390)
PMV BLD AUTO: 10.6 FL (ref 8.9–12.7)
POTASSIUM SERPL-SCNC: 3.7 MMOL/L (ref 3.5–5.3)
PROTHROMBIN TIME: 30.7 SECONDS (ref 11.6–14.5)
RBC # BLD AUTO: 3.72 MILLION/UL (ref 3.81–5.12)
SODIUM SERPL-SCNC: 144 MMOL/L (ref 136–145)
WBC # BLD AUTO: 9.53 THOUSAND/UL (ref 4.31–10.16)

## 2022-01-01 PROCEDURE — 80048 BASIC METABOLIC PNL TOTAL CA: CPT

## 2022-01-01 PROCEDURE — 85610 PROTHROMBIN TIME: CPT

## 2022-01-01 PROCEDURE — 85027 COMPLETE CBC AUTOMATED: CPT

## 2022-01-01 PROCEDURE — 99232 SBSQ HOSP IP/OBS MODERATE 35: CPT | Performed by: INTERNAL MEDICINE

## 2022-01-01 PROCEDURE — 99232 SBSQ HOSP IP/OBS MODERATE 35: CPT | Performed by: HOSPITALIST

## 2022-01-01 PROCEDURE — 93005 ELECTROCARDIOGRAM TRACING: CPT

## 2022-01-01 RX ORDER — WARFARIN SODIUM 2.5 MG/1
2.5 TABLET ORAL
Status: DISCONTINUED | OUTPATIENT
Start: 2022-01-01 | End: 2022-01-02

## 2022-01-01 RX ADMIN — CEFAZOLIN SODIUM 2000 MG: 2 SOLUTION INTRAVENOUS at 13:06

## 2022-01-01 RX ADMIN — CEFAZOLIN SODIUM 2000 MG: 2 SOLUTION INTRAVENOUS at 22:17

## 2022-01-01 RX ADMIN — FUROSEMIDE 80 MG: 10 INJECTION, SOLUTION INTRAMUSCULAR; INTRAVENOUS at 15:55

## 2022-01-01 RX ADMIN — OXYCODONE HYDROCHLORIDE 20 MG: 20 TABLET, FILM COATED, EXTENDED RELEASE ORAL at 05:55

## 2022-01-01 RX ADMIN — STANDARDIZED SENNA CONCENTRATE 8.6 MG: 8.6 TABLET ORAL at 08:01

## 2022-01-01 RX ADMIN — OXYCODONE HYDROCHLORIDE AND ACETAMINOPHEN 1 TABLET: 5; 325 TABLET ORAL at 17:54

## 2022-01-01 RX ADMIN — NYSTATIN: 100000 POWDER TOPICAL at 08:02

## 2022-01-01 RX ADMIN — WARFARIN SODIUM 2.5 MG: 2.5 TABLET ORAL at 17:43

## 2022-01-01 RX ADMIN — AMLODIPINE BESYLATE 5 MG: 5 TABLET ORAL at 08:46

## 2022-01-01 RX ADMIN — CARVEDILOL 25 MG: 12.5 TABLET, FILM COATED ORAL at 15:55

## 2022-01-01 RX ADMIN — AMLODIPINE BESYLATE 5 MG: 5 TABLET ORAL at 22:17

## 2022-01-01 RX ADMIN — OXYCODONE HYDROCHLORIDE 20 MG: 20 TABLET, FILM COATED, EXTENDED RELEASE ORAL at 13:05

## 2022-01-01 RX ADMIN — CEFAZOLIN SODIUM 2000 MG: 2 SOLUTION INTRAVENOUS at 05:55

## 2022-01-01 RX ADMIN — NYSTATIN: 100000 POWDER TOPICAL at 17:43

## 2022-01-01 RX ADMIN — FUROSEMIDE 80 MG: 10 INJECTION, SOLUTION INTRAMUSCULAR; INTRAVENOUS at 07:58

## 2022-01-01 RX ADMIN — OXYCODONE HYDROCHLORIDE 20 MG: 20 TABLET, FILM COATED, EXTENDED RELEASE ORAL at 22:17

## 2022-01-01 RX ADMIN — CARVEDILOL 25 MG: 12.5 TABLET, FILM COATED ORAL at 07:58

## 2022-01-01 NOTE — PLAN OF CARE
Problem: MOBILITY - ADULT  Goal: Maintain or return to baseline ADL function  Description: INTERVENTIONS:  -  Assess patient's ability to carry out ADLs; assess patient's baseline for ADL function and identify physical deficits which impact ability to perform ADLs (bathing, care of mouth/teeth, toileting, grooming, dressing, etc )  - Assess/evaluate cause of self-care deficits   - Assess range of motion  - Assess patient's mobility; develop plan if impaired  - Assess patient's need for assistive devices and provide as appropriate  - Encourage maximum independence but intervene and supervise when necessary  - Involve family in performance of ADLs  - Assess for home care needs following discharge   - Consider OT consult to assist with ADL evaluation and planning for discharge  - Provide patient education as appropriate  Outcome: Progressing  Goal: Maintains/Returns to pre admission functional level  Description: INTERVENTIONS:  - Perform BMAT or MOVE assessment daily    - Set and communicate daily mobility goal to care team and patient/family/caregiver     - Out of bed for toileting  - Record patient progress and toleration of activity level   Outcome: Progressing     Problem: Potential for Falls  Goal: Patient will remain free of falls  Description: INTERVENTIONS:  - Educate patient/family on patient safety including physical limitations  - Instruct patient to call for assistance with activity   - Consult OT/PT to assist with strengthening/mobility   - Keep Call bell within reach  - Keep bed low and locked with side rails adjusted as appropriate  - Keep care items and personal belongings within reach  - Initiate and maintain comfort rounds  - Apply yellow socks and bracelet for high fall risk patients  - Consider moving patient to room near nurses station  Outcome: Progressing     Problem: Prexisting or High Potential for Compromised Skin Integrity  Goal: Skin integrity is maintained or improved  Description: INTERVENTIONS:  - Identify patients at risk for skin breakdown  - Assess and monitor skin integrity  - Assess and monitor nutrition and hydration status  - Monitor labs   - Assess for incontinence   - Turn and reposition patient  - Assist with mobility/ambulation  - Relieve pressure over bony prominences  - Avoid friction and shearing  - Provide appropriate hygiene as needed including keeping skin clean and dry  - Evaluate need for skin moisturizer/barrier cream  - Collaborate with interdisciplinary team   - Patient/family teaching  - Consider wound care consult   Outcome: Progressing     Problem: PAIN - ADULT  Goal: Verbalizes/displays adequate comfort level or baseline comfort level  Description: Interventions:  - Encourage patient to monitor pain and request assistance  - Assess pain using appropriate pain scale  - Administer analgesics based on type and severity of pain and evaluate response  - Implement non-pharmacological measures as appropriate and evaluate response  - Consider cultural and social influences on pain and pain management  - Notify physician/advanced practitioner if interventions unsuccessful or patient reports new pain  Outcome: Progressing     Problem: INFECTION - ADULT  Goal: Absence or prevention of progression during hospitalization  Description: INTERVENTIONS:  - Assess and monitor for signs and symptoms of infection  - Monitor lab/diagnostic results  - Monitor all insertion sites, i e  indwelling lines, tubes, and drains  - Monitor endotracheal if appropriate and nasal secretions for changes in amount and color  - Crawford appropriate cooling/warming therapies per order  - Administer medications as ordered  - Instruct and encourage patient and family to use good hand hygiene technique  - Identify and instruct in appropriate isolation precautions for identified infection/condition  Outcome: Progressing  Goal: Absence of fever/infection during neutropenic period  Description: INTERVENTIONS:  - Monitor WBC    Outcome: Progressing     Problem: SAFETY ADULT  Goal: Maintain or return to baseline ADL function  Description: INTERVENTIONS:  -  Assess patient's ability to carry out ADLs; assess patient's baseline for ADL function and identify physical deficits which impact ability to perform ADLs (bathing, care of mouth/teeth, toileting, grooming, dressing, etc )  - Assess/evaluate cause of self-care deficits   - Assess range of motion  - Assess patient's mobility; develop plan if impaired  - Assess patient's need for assistive devices and provide as appropriate  - Encourage maximum independence but intervene and supervise when necessary  - Involve family in performance of ADLs  - Assess for home care needs following discharge   - Consider OT consult to assist with ADL evaluation and planning for discharge  - Provide patient education as appropriate  Outcome: Progressing  Goal: Maintains/Returns to pre admission functional level  Description: INTERVENTIONS:  - Perform BMAT or MOVE assessment daily    - Set and communicate daily mobility goal to care team and patient/family/caregiver     - Out of bed for toileting  - Record patient progress and toleration of activity level   Outcome: Progressing  Goal: Patient will remain free of falls  Description: INTERVENTIONS:  - Educate patient/family on patient safety including physical limitations  - Instruct patient to call for assistance with activity   - Consult OT/PT to assist with strengthening/mobility   - Keep Call bell within reach  - Keep bed low and locked with side rails adjusted as appropriate  - Keep care items and personal belongings within reach  - Initiate and maintain comfort rounds  - Apply yellow socks and bracelet for high fall risk patients  - Consider moving patient to room near nurses station  Outcome: Progressing     Problem: DISCHARGE PLANNING  Goal: Discharge to home or other facility with appropriate resources  Description: INTERVENTIONS:  - Identify barriers to discharge w/patient and caregiver  - Arrange for needed discharge resources and transportation as appropriate  - Identify discharge learning needs (meds, wound care, etc )  - Arrange for interpretive services to assist at discharge as needed  - Refer to Case Management Department for coordinating discharge planning if the patient needs post-hospital services based on physician/advanced practitioner order or complex needs related to functional status, cognitive ability, or social support system  Outcome: Progressing     Problem: Knowledge Deficit  Goal: Patient/family/caregiver demonstrates understanding of disease process, treatment plan, medications, and discharge instructions  Description: Complete learning assessment and assess knowledge base  Interventions:  - Provide teaching at level of understanding  - Provide teaching via preferred learning methods  Outcome: Progressing     Problem: Nutrition/Hydration-ADULT  Goal: Nutrient/Hydration intake appropriate for improving, restoring or maintaining nutritional needs  Description: Monitor and assess patient's nutrition/hydration status for malnutrition  Collaborate with interdisciplinary team and initiate plan and interventions as ordered  Monitor patient's weight and dietary intake as ordered or per policy  Utilize nutrition screening tool and intervene as necessary  Determine patient's food preferences and provide high-protein, high-caloric foods as appropriate       INTERVENTIONS:  - Monitor oral intake, urinary output, labs, and treatment plans  - Assess nutrition and hydration status and recommend course of action  - Evaluate amount of meals eaten  - Assist patient with eating if necessary   - Allow adequate time for meals  - Recommend/ encourage appropriate diets, oral nutritional supplements, and vitamin/mineral supplements  - Order, calculate, and assess calorie counts as needed  - Recommend, monitor, and adjust tube feedings and TPN/PPN based on assessed needs  - Assess need for intravenous fluids  - Provide specific nutrition/hydration education as appropriate  - Include patient/family/caregiver in decisions related to nutrition  Outcome: Progressing

## 2022-01-01 NOTE — PROGRESS NOTES
General Cardiology Progress Note   Izell Race 77 y o  female MRN: [id number]  Unit/Bed#: W -01 Encounter: [id number]      Assessment:  Principal Problem:    Acute respiratory failure with hypoxia ([facility] )  Active Problems:    CHF exacerbation (HCC)    LEXY (acute kidney injury) ([facility] )    Atrial fibrillation (HCC)    Cellulitis of right lower extremity    Chronic back pain      Impression:     Acute on chronic heart failure with preserved ejection fraction  o Due to dietary indiscretion  o Failed torsemide 40 mg p o  Daily  o 3 3L UOP o/n  o 6 2L neg so far this hospital stay  o Weights are not being documented   Acute kidney injury  o Due to CHF - resolved, Creatinine 0 93 now   Paroxysmal AFib-in rate controlled afib    Plan:     Continue aggressive IV diuresis   Would defer to primary team for workup of possible stridor, she seems to have difficulty with expiration, , may still be a manifestation of volume overload, but seems possibly more symptomatic from extra may wheezing they can be explained by her volume overload   Will continue diuresing and reassess improvement   Lengthy discussion with patient and her  today about the need for rehab, that she is incapable of going home, that she is unable to climb the 13 steps in her home and basically became bed-bound in the last couple months, having spent almost a year on the up stairs floor for home because of worsening edema, dyspnea, never bring herself to medical attention   Overall insight is poor    Subjective:   Patient seen and examined  Occasional coughing, still significant expiratory wheezing her her notation, no chest pain, AFib rates are well controlled, blood pressure stable, diuresing really well, last 3 L yesterday    Review of Systems   Constitutional: Negative for diaphoresis, fever, malaise/fatigue and night sweats  HENT: Positive for stridor      Cardiovascular: Positive for dyspnea on exertion, leg swelling and orthopnea  Negative for chest pain, palpitations and syncope  Respiratory: Positive for shortness of breath and wheezing  Negative for cough and sputum production  Skin: Negative for itching and rash  Gastrointestinal: Negative for abdominal pain, change in bowel habit and diarrhea  Neurological: Negative for dizziness, focal weakness, light-headedness and weakness  All other systems reviewed and are negative  Objective   Vitals: Blood pressure 141/65, pulse 74, temperature 98 4 °F (36 9 °C), resp  rate 18, height 5' 2" (1 575 m), weight 122 kg (270 lb), SpO2 90 %  , Body mass index is 49 38 kg/m² , I/O last 3 completed shifts: In: 12 [P O :360; IV Piggyback:200]  Out: 4858 [Urine:4550]  I/O this shift: In: 770 [P O :720; IV Piggyback:50]  Out: 1000 [Urine:1000]  Wt Readings from Last 3 Encounters:   12/29/21 122 kg (270 lb)   12/16/21 120 kg (264 lb 3 2 oz)   12/02/21 118 kg (260 lb)       Intake/Output Summary (Last 24 hours) at 1/1/2022 1432  Last data filed at 1/1/2022 1400  Gross per 24 hour   Intake 1040 ml   Output 3550 ml   Net -2510 ml     I/O last 3 completed shifts: In: 12 [P O :360; IV Piggyback:200]  Out: [place] [Urine:4550]      Physical Exam  Vitals reviewed  Constitutional:       General: She is not in acute distress  Appearance: She is well-developed  She is obese  She is not diaphoretic  HENT:      Head: Normocephalic and atraumatic  Nose: Nose normal    Eyes:      General: No scleral icterus  Conjunctiva/sclera: Conjunctivae normal       Pupils: Pupils are equal, round, and reactive to light  Neck:      Thyroid: No thyromegaly  Vascular: JVD present  Cardiovascular:      Rate and Rhythm: Normal rate  Rhythm irregular  Heart sounds: Normal heart sounds  No murmur heard  No gallop  Pulmonary:      Effort: Pulmonary effort is normal  No respiratory distress  Breath sounds: Wheezing and rales present     Abdominal:      General: Bowel sounds are normal  There is no distension  Palpations: Abdomen is soft  Tenderness: There is no abdominal tenderness  Musculoskeletal:         General: No tenderness or deformity  Normal range of motion  Cervical back: Normal range of motion and neck supple  Right lower leg: Edema present  Left lower leg: Edema present  Skin:     General: Skin is warm and dry  Capillary Refill: Capillary refill takes less than 2 seconds  Findings: No erythema or rash  Neurological:      General: No focal deficit present  Mental Status: She is alert and oriented to person, place, and time  Cranial Nerves: No cranial nerve deficit        Coordination: Coordination normal    Psychiatric:         Behavior: Behavior normal          Judgment: Judgment normal          Meds/Allergies   No Known Allergies    Current Facility-Administered Medications:     acetaminophen (TYLENOL) tablet 650 mg, 650 mg, Oral, Q6H PRN, Alexa Trimble MD    amLODIPine (NORVASC) tablet 5 mg, 5 mg, Oral, BID, Tanda Puls Ellen, DO, 5 mg at 01/01/22 0846    carvedilol (COREG) tablet 25 mg, 25 mg, Oral, BID With Meals, Tanrich Hughes, DO, 25 mg at 01/01/22 0758    ceFAZolin (ANCEF) IVPB (premix in dextrose) 2,000 mg 50 mL, 2,000 mg, Intravenous, Q8H, Izabela Shukla MD, Stopped at 01/01/22 1336    furosemide (LASIX) injection 80 mg, 80 mg, Intravenous, BID (diuretic), Tanrich Hughes, DO, 80 mg at 01/01/22 7386    nystatin (MYCOSTATIN) powder, , Topical, BID, Gregorio High MD, Given at 01/01/22 0802    ondansetron (ZOFRAN) injection 4 mg, 4 mg, Intravenous, Q6H PRN, Alexa Trimble MD    oxyCODONE (OxyCONTIN) 12 hr tablet 20 mg, 20 mg, Oral, Q8H [address], Tanda Puls Ellen, DO, 20 mg at 01/01/22 1305    oxyCODONE-acetaminophen (PERCOCET) 5-325 mg per tablet 1 tablet, 1 tablet, Oral, Q4H PRN, Violet Hughes, DO, 1 tablet at 12/30/21 1122    senna (SENOKOT) tablet 8 6 mg, 1 tablet, Oral, Daily, Radha Murray MD, 8 6 mg at 01/01/22 0801    warfarin (COUMADIN) tablet 2 5 mg, 2 5 mg, Oral, Daily (warfarin), Judie Perez MD    Laboratory Results:        CBC with diff:   Results from last 7 days   Lab Units 01/01/22  0558 12/31/21  0509 12/30/21  0609 12/29/21  2313 12/29/21  2040   WBC Thousand/uL 9 53 9 46 9 68  --  11 19*   HEMOGLOBIN g/dL 9 9* 9 8* 9 5*  --  10 0*   I STAT HEMOGLOBIN g/dl  --   --   --  10 5*  --    HEMATOCRIT % 33 3* 32 0* 31 3*  --  31 9*   HEMATOCRIT, ISTAT %  --   --   --  31*  --    MCV fL 90 89 88  --  88   PLATELETS Thousands/uL 370 353 343  --  331   MCH pg 26 6* 27 1 26 7*  --  27 5   MCHC g/dL 29 7* 30 6* 30 4*  --  31 3*   RDW % 15 1 14 9 14 6  --  14 6   MPV fL 10 6 10 8 11 3  --  11 5   NRBC AUTO /100 WBCs  --  0  --   --  0       CMP:  Results from last 7 days   Lab Units 01/01/22  0558 12/31/21  0509 12/30/21  0609 12/29/21 2313 12/29/21  2040   SODIUM mmol/L 144 141 137  --  134*   POTASSIUM mmol/L 3 7 3 8 4 6  --  5 2   CHLORIDE mmol/L 102 101 99*  --  97*   CO2 mmol/L 37* 35* 28  --  27   CO2, I-STAT mmol/L  --   --   --  28  --    BUN mg/dL 23 44* 60*  --  62*   CREATININE mg/dL 0 93 1 23 1 67*  --  1 97*   GLUCOSE, ISTAT mg/dl  --   --   --  129  --    CALCIUM mg/dL 9 0 9 3 9 5  --  9 6   AST U/L  --   --   --   --  29   ALT U/L  --   --   --   --  24   ALK PHOS U/L  --   --   --   --  128*   EGFR ml/min/1 73sq m 60 43 29  --  24       BMP:  Results from last 7 days   Lab Units 01/01/22  0558 12/31/21  0509 12/30/21  0609 12/29/21  2313 12/29/21 2040   SODIUM mmol/L 144 141 137  --  134*   POTASSIUM mmol/L 3 7 3 8 4 6  --  5 2   CHLORIDE mmol/L 102 101 99*  --  97*   CO2 mmol/L 37* 35* 28  --  27   CO2, I-STAT mmol/L  --   --   --  28  --    BUN mg/dL 23 44* 60*  --  62*   CREATININE mg/dL 0 93 1 23 1 67*  --  1 97*   GLUCOSE, ISTAT mg/dl  --   --   --  129  --    CALCIUM mg/dL 9 0 9 3 9 5  --  9 6       NT-proBNP:   Recent Labs     12/29/21 2040   NTBNP 1,860* Magnesium:   Results from last 7 days   Lab Units 12/29/21 2040   MAGNESIUM mg/dL 2 3       Coags:   Results from last 7 days   Lab Units 01/01/22  0558 12/31/21  0509 12/30/21  0159 12/29/21 2040   PTT seconds  --   --  63*  --    INR  3 02* 2 68* 2 45* 2 27*       TSH:    Results from last 7 days   Lab Units 12/29/21 2040   TSH 3RD GENERATON uIU/mL 1 693       Hemoglobin A1C )      Lipid Profile:   Lab Results   Component Value Date    CHOL 169 09/23/2014     Lab Results   Component Value Date    HDL 50 11/13/2021    HDL 59 04/12/2021    HDL 70 (H) 09/24/2018     Lab Results   Component Value Date    LDLCALC 93 11/13/2021    LDLCALC 113 (H) 04/12/2021    LDLCALC 127 (H) 09/24/2018     Lab Results   Component Value Date    LDLDIRECT 109 (H) 04/12/2021    LDLDIRECT 128 (H) 09/24/2018    LDLDIRECT 95 09/23/2014     Lab Results   Component Value Date    TRIG 89 11/13/2021    TRIG 120 04/12/2021    TRIG 102 09/24/2018       Cardiac testing:   EKG personally reviewed by Stas García MD      No results found for this or any previous visit  No results found for this or any previous visit  No results found for this or any previous visit  No results found for this or any previous visit  No results found for this or any previous visit  No results found for this or any previous visit  Stas García MD    Portions of the record may have been created with voice recognition software  Occasional wrong word or "sound a like" substitutions may have occurred due to the inherent limitations of voice recognition software  Read the chart carefully and recognize, using context, where substitutions have occurred

## 2022-01-01 NOTE — PROGRESS NOTES
[facility]  Progress Note [facility] 1945[facility] 77 [facility]  [facility] MRN: [id number]  Unit/Bed#: W -01 Encounter: [id number]  Primary Care Provider: Krista Alonzo MD   Date and time admitted to hospital: 12/29/2021  8:10 PM    * Acute respiratory failure with hypoxia (HCC)  Assessment & Plan  Patient's O2 saturation at 80% POA  Patient complains of worsening shortness of breath both at rest and on exertion  Not on oxygen at home  Recent hospitalization for similar complaint  Plan  -BiPAP ordered, however currently weaned to 4 L O2 nasal cannula  -Continue to wean as tolerated  -maintain oxygen saturation at minimum 88%  -monitor vitals      Cellulitis of right lower extremity  Assessment & Plan  Green purulent discharge from posterior right lower extremity with erythema  Not painful  Patient believes infection of right lower extremity began roughly 1 week ago, but is unsure  Leukocytosis POA  One dose of IV cefepime provided in ED    Plan  -IV cefazolin  -monitor right lower extremity  -follow-up blood/wound cultures    Atrial fibrillation [facility])  Assessment & Plan  Recently diagnosed atrial fibrillation on warfarin  Most recent INR supratherapeutic at 8 66  INR 2 45 POA  Patient on warfarin 5 mg b i d  At home  Patient instructed to stop warfarin in setting of supratherapeutic INR  Lab Results   Component Value Date    INR 3 02 (H) 01/01/2022    INR 2 68 (H) 12/31/2021    INR 2 45 (H) 12/30/2021    PROTIME 30 7 (H) 01/01/2022    PROTIME 28 0 (H) 12/31/2021    PROTIME 26 1 (H) 12/30/2021       Plan  -Warfarin 5 mg once daily  -Monitor INR     LEXY (acute kidney injury) ([facility] [address] )  Assessment & Plan  Creatinine at 1 97 POA  Baseline appears to be around 1 0     Likely prerenal in setting of CHF exacerbation    Plan  -continue diuresis as above    CHF exacerbation (HCC)  Assessment & Plan  Wt Readings from Last 3 Encounters:   12/29/21 122 kg (270 lb) 21 120 kg (264 lb 3 2 oz)   21 118 kg (260 lb)     BNP elevated at 1,860 POA  LEXY POA likely prerenal    Patient appears volume overloaded on exam   Chronic and worsening bilateral lower extremity edema  Dyspnea at rest and on exertion  Patient on torsemide 20 mg b i d  at home  Patient leads increasingly sedentary lifestyle which she attributes to her current state of health  Plan  -Lasix 80 mg b i d   -monitor I/O, monitor weight  -fluid restriction 1800 mL daily, salt restriction 2 g daily  -continue telemetry            VTE Pharmacologic Prophylaxis: VTE Score: 12 High Risk (Score >/= 5) - Pharmacological DVT Prophylaxis Ordered: warfarin (Coumadin)  Sequential Compression Devices Ordered  Patient Centered Rounds: I performed bedside rounds with nursing staff today  Discussions with Specialists or Other Care Team Provider: Cardiology     Education and Discussions with Family / Patient: Updated  (daughter) via phone  Current Length of Stay: 2 day(s)  Current Patient Status: Inpatient   Discharge Plan: Anticipate discharge in 48-72 hrs to rehab facility  Code Status: Level 1 - Full Code    Subjective:   No acute events overnight  At 0930 this morning patient reported chest pain that subsequently migrated to her abdomen  Patient describes pain to be 'moving around' EKG showed a-fib with no acute ischemic changes  Currently down-titrating AC in setting of supra-therapeutic INR  Low clinical suspicion for ACS  Will continue to monitor closely  Objective:     Vitals:   Temp (24hrs), Av 3 °F (36 8 °C), Min:98 2 °F (36 8 °C), Max:98 4 °F (36 9 °C)    Temp:  [98 2 °F (36 8 °C)-98 4 °F (36 9 °C)] 98 4 °F (36 9 °C)  HR:  [64-74] 74  Resp:  [18-19] 18  BP: ()/(50-72) 141/65  SpO2:  [90 %-96 %] 90 %  Body mass index is 49 38 kg/m²  Input and Output Summary (last 24 hours):      Intake/Output Summary (Last 24 hours) at 2022 0858  Last data filed at 2022 0801  Gross per 24 hour   Intake 990 ml   Output 3950 ml   Net -2960 ml       Physical Exam:   Physical Exam  Vitals reviewed  Constitutional:       General: She is not in acute distress  Appearance: She is not ill-appearing or toxic-appearing  HENT:      Head: Normocephalic and atraumatic  Eyes:      Extraocular Movements: Extraocular movements intact  Conjunctiva/sclera: Conjunctivae normal    Cardiovascular:      Rate and Rhythm: Normal rate  Rhythm irregular  Pulses: Normal pulses  Heart sounds: Normal heart sounds  Pulmonary:      Effort: No respiratory distress  Breath sounds: Wheezing (Bilateral basilar expiratory wheezes) present  No rales  Comments: On 4 L O2 nasal cannula, diminished breath sounds bilaterally   Abdominal:      General: Bowel sounds are normal       Palpations: Abdomen is soft  Musculoskeletal:      Right lower leg: Edema (Nonpitting lymphedema) present  Left lower leg: Edema (Nonpitting lymphedema) present  Skin:     Findings: Erythema (Bilateral lower extremities) present  Comments: Green purulent discharge from posterior aspect of right lower extremity   Neurological:      Mental Status: She is alert and oriented to person, place, and time  Mental status is at baseline  Psychiatric:         Mood and Affect: Mood normal          Behavior: Behavior normal           Additional Data:     Labs:  Results from last 7 days   Lab Units 01/01/22  0558 12/31/21  0509 12/31/21  0509   WBC Thousand/uL 9 53   < > 9 46   HEMOGLOBIN g/dL 9 9*   < > 9 8*   HEMATOCRIT % 33 3*   < > 32 0*   PLATELETS Thousands/uL 370   < > 353   NEUTROS PCT %  --   --  71   LYMPHS PCT %  --   --  10*   MONOS PCT %  --   --  13*   EOS PCT %  --   --  6    < > = values in this interval not displayed       Results from last 7 days   Lab Units 01/01/22  0558 12/29/21  2313 12/29/21  2040   SODIUM mmol/L 144   < > 134*   POTASSIUM mmol/L 3 7   < > 5 2   CHLORIDE mmol/L 102   < > 97*   CO2 mmol/L 37*   < > 27   CO2, I-STAT   --    < >  --    BUN mg/dL 23   < > 62*   CREATININE mg/dL 0 93   < > 1 97*   ANION GAP mmol/L 5   < > 10   CALCIUM mg/dL 9 0   < > 9 6   ALBUMIN g/dL  --   --  3 4*   TOTAL BILIRUBIN mg/dL  --   --  0 42   ALK PHOS U/L  --   --  128*   ALT U/L  --   --  24   AST U/L  --   --  29   GLUCOSE RANDOM mg/dL 116   < > 122    < > = values in this interval not displayed  Results from last 7 days   Lab Units 01/01/22  0558   INR  3 02*             Results from last 7 days   Lab Units 12/31/21  0509 12/30/21  1440 12/30/21  0159   LACTIC ACID mmol/L  --   --  0 9   PROCALCITONIN ng/ml <0 05 <0 05  --        Lines/Drains:  Invasive Devices  Report    Peripheral Intravenous Line            Peripheral IV 12/29/21 Dorsal (posterior); Left Forearm 2 days          Drain            External Urinary Catheter -- days                      Imaging: Reviewed radiology reports from this admission including: xray(s)    Recent Cultures (last 7 days):   Results from last 7 days   Lab Units 12/30/21  0837 12/30/21  0159   BLOOD CULTURE   --  No Growth at 48 hrs  No Growth at 48 hrs     GRAM STAIN RESULT  No polys seen*  1+ Gram negative rods*  Rare Gram positive cocci in pairs*  --    WOUND CULTURE  3+ Growth of Proteus mirabilis*  3+ Growth of Enterococcus faecalis*  3+ Growth of   --        Last 24 Hours Medication List:   Current Facility-Administered Medications   Medication Dose Route Frequency Provider Last Rate    acetaminophen  650 mg Oral Q6H PRN Raina Chavez MD      amLODIPine  5 mg Oral BID Mariza Hughes DO      carvedilol  25 mg Oral BID With Meals Mariza Hughes DO      cefazolin  2,000 mg Intravenous Jyothi Petty MD Stopped (01/01/22 1872)    furosemide  80 mg Intravenous BID (diuretic) Rosanne Swift DO      nystatin   Topical BID Cass Adams MD      ondansetron  4 mg Intravenous Q6H PRN Raina Chavez MD      oxyCODONE  20 mg Oral Q8H [facility] Velna Harlingen, DO      oxyCODONE-acetaminophen  1 tablet Oral Q4H PRN Velna Harlingen, DO      senna  1 tablet Oral Daily Shaye Hayden MD      warfarin  5 mg Oral Daily (warfarin) Aster Parmar MD          Today, Patient Was Seen By: Aster Parmar MD    **Please Note: This note may have been constructed using a voice recognition system  **

## 2022-01-01 NOTE — PLAN OF CARE
Problem: MOBILITY - ADULT  Goal: Maintain or return to baseline ADL function  Description: INTERVENTIONS:  -  Assess patient's ability to carry out ADLs; assess patient's baseline for ADL function and identify physical deficits which impact ability to perform ADLs (bathing, care of mouth/teeth, toileting, grooming, dressing, etc )  - Assess/evaluate cause of self-care deficits   - Assess range of motion  - Assess patient's mobility; develop plan if impaired  - Assess patient's need for assistive devices and provide as appropriate  - Encourage maximum independence but intervene and supervise when necessary  - Involve family in performance of ADLs  - Assess for home care needs following discharge   - Consider OT consult to assist with ADL evaluation and planning for discharge  - Provide patient education as appropriate  Outcome: Progressing  Goal: Maintains/Returns to pre admission functional level  Description: INTERVENTIONS:  - Perform BMAT or MOVE assessment daily    - Set and communicate daily mobility goal to care team and patient/family/caregiver  - Collaborate with rehabilitation services on mobility goals if consulted  - Perform Range of Motion  times a day  - Reposition patient every  hours    - Dangle patient  times a day  - Stand patient  times a day  - Ambulate patient  times a day  - Out of bed to chair  times a day   - Out of bed for meals  times a day  - Out of bed for toileting  - Record patient progress and toleration of activity level   Outcome: Progressing     Problem: Potential for Falls  Goal: Patient will remain free of falls  Description: INTERVENTIONS:  - Educate patient/family on patient safety including physical limitations  - Instruct patient to call for assistance with activity   - Consult OT/PT to assist with strengthening/mobility   - Keep Call bell within reach  - Keep bed low and locked with side rails adjusted as appropriate  - Keep care items and personal belongings within reach  - Initiate and maintain comfort rounds  - Make Fall Risk Sign visible to staff  - Offer Toileting every  Hours, in advance of need  - Initiate/Maintain alarm  - Obtain necessary fall risk management equipment:   - Apply yellow socks and bracelet for high fall risk patients  - Consider moving patient to room near nurses station  Outcome: Progressing     Problem: Prexisting or High Potential for Compromised Skin Integrity  Goal: Skin integrity is maintained or improved  Description: INTERVENTIONS:  - Identify patients at risk for skin breakdown  - Assess and monitor skin integrity  - Assess and monitor nutrition and hydration status  - Monitor labs   - Assess for incontinence   - Turn and reposition patient  - Assist with mobility/ambulation  - Relieve pressure over bony prominences  - Avoid friction and shearing  - Provide appropriate hygiene as needed including keeping skin clean and dry  - Evaluate need for skin moisturizer/barrier cream  - Collaborate with interdisciplinary team   - Patient/family teaching  - Consider wound care consult   Outcome: Progressing     Problem: PAIN - ADULT  Goal: Verbalizes/displays adequate comfort level or baseline comfort level  Description: Interventions:  - Encourage patient to monitor pain and request assistance  - Assess pain using appropriate pain scale  - Administer analgesics based on type and severity of pain and evaluate response  - Implement non-pharmacological measures as appropriate and evaluate response  - Consider cultural and social influences on pain and pain management  - Notify physician/advanced practitioner if interventions unsuccessful or patient reports new pain  Outcome: Progressing     Problem: INFECTION - ADULT  Goal: Absence or prevention of progression during hospitalization  Description: INTERVENTIONS:  - Assess and monitor for signs and symptoms of infection  - Monitor lab/diagnostic results  - Monitor all insertion sites, i e  indwelling lines, tubes, and drains  - Monitor endotracheal if appropriate and nasal secretions for changes in amount and color  - [place] appropriate cooling/warming therapies per order  - Administer medications as ordered  - Instruct and encourage patient and family to use good hand hygiene technique  - Identify and instruct in appropriate isolation precautions for identified infection/condition  Outcome: Progressing  Goal: Absence of fever/infection during neutropenic period  Description: INTERVENTIONS:  - Monitor WBC    Outcome: Progressing     Problem: SAFETY ADULT  Goal: Maintain or return to baseline ADL function  Description: INTERVENTIONS:  -  Assess patient's ability to carry out ADLs; assess patient's baseline for ADL function and identify physical deficits which impact ability to perform ADLs (bathing, care of mouth/teeth, toileting, grooming, dressing, etc )  - Assess/evaluate cause of self-care deficits   - Assess range of motion  - Assess patient's mobility; develop plan if impaired  - Assess patient's need for assistive devices and provide as appropriate  - Encourage maximum independence but intervene and supervise when necessary  - Involve family in performance of ADLs  - Assess for home care needs following discharge   - Consider OT consult to assist with ADL evaluation and planning for discharge  - Provide patient education as appropriate  Outcome: Progressing  Goal: Maintains/Returns to pre admission functional level  Description: INTERVENTIONS:  - Perform BMAT or MOVE assessment daily    - Set and communicate daily mobility goal to care team and patient/family/caregiver  - Collaborate with rehabilitation services on mobility goals if consulted  - Perform Range of Motion  times a day  - Reposition patient every  hours    - Dangle patient  times a day  - Stand patient  times a day  - Ambulate patient  times a day  - Out of bed to chair  times a day   - Out of bed for meals  times a day  - Out of bed for toileting  - Record patient progress and toleration of activity level   Outcome: Progressing  Goal: Patient will remain free of falls  Description: INTERVENTIONS:  - Educate patient/family on patient safety including physical limitations  - Instruct patient to call for assistance with activity   - Consult OT/PT to assist with strengthening/mobility   - Keep Call bell within reach  - Keep bed low and locked with side rails adjusted as appropriate  - Keep care items and personal belongings within reach  - Initiate and maintain comfort rounds  - Make Fall Risk Sign visible to staff  - Offer Toileting every  Hours, in advance of need  - Initiate/Maintain alarm  - Obtain necessary fall risk management equipment  - Apply yellow socks and bracelet for high fall risk patients  - Consider moving patient to room near nurses station  Outcome: Progressing     Problem: DISCHARGE PLANNING  Goal: Discharge to home or other facility with appropriate resources  Description: INTERVENTIONS:  - Identify barriers to discharge w/patient and caregiver  - Arrange for needed discharge resources and transportation as appropriate  - Identify discharge learning needs (meds, wound care, etc )  - Arrange for interpretive services to assist at discharge as needed  - Refer to Case Management Department for coordinating discharge planning if the patient needs post-hospital services based on physician/advanced practitioner order or complex needs related to functional status, cognitive ability, or social support system  Outcome: Progressing     Problem: Knowledge Deficit  Goal: Patient/family/caregiver demonstrates understanding of disease process, treatment plan, medications, and discharge instructions  Description: Complete learning assessment and assess knowledge base    Interventions:  - Provide teaching at level of understanding  - Provide teaching via preferred learning methods  Outcome: Progressing     Problem: Nutrition/Hydration-ADULT  Goal: Nutrient/Hydration intake appropriate for improving, restoring or maintaining nutritional needs  Description: Monitor and assess patient's nutrition/hydration status for malnutrition  Collaborate with interdisciplinary team and initiate plan and interventions as ordered  Monitor patient's weight and dietary intake as ordered or per policy  Utilize nutrition screening tool and intervene as necessary  Determine patient's food preferences and provide high-protein, high-caloric foods as appropriate       INTERVENTIONS:  - Monitor oral intake, urinary output, labs, and treatment plans  - Assess nutrition and hydration status and recommend course of action  - Evaluate amount of meals eaten  - Assist patient with eating if necessary   - Allow adequate time for meals  - Recommend/ encourage appropriate diets, oral nutritional supplements, and vitamin/mineral supplements  - Order, calculate, and assess calorie counts as needed  - Recommend, monitor, and adjust tube feedings and TPN/PPN based on assessed needs  - Assess need for intravenous fluids  - Provide specific nutrition/hydration education as appropriate  - Include patient/family/caregiver in decisions related to nutrition  Outcome: Progressing

## 2022-01-02 LAB
ANION GAP SERPL CALCULATED.3IONS-SCNC: 2 MMOL/L (ref 4–13)
ATRIAL RATE: 441 BPM
BUN SERPL-MCNC: 14 MG/DL (ref 5–25)
CALCIUM SERPL-MCNC: 9 MG/DL (ref 8.3–10.1)
CHLORIDE SERPL-SCNC: 100 MMOL/L (ref 100–108)
CO2 SERPL-SCNC: 42 MMOL/L (ref 21–32)
CREAT SERPL-MCNC: 0.83 MG/DL (ref 0.6–1.3)
ERYTHROCYTE [DISTWIDTH] IN BLOOD BY AUTOMATED COUNT: 15 % (ref 11.6–15.1)
GFR SERPL CREATININE-BSD FRML MDRD: 69 ML/MIN/1.73SQ M
GLUCOSE SERPL-MCNC: 123 MG/DL (ref 65–140)
HCT VFR BLD AUTO: 33.7 % (ref 34.8–46.1)
HGB BLD-MCNC: 10.1 G/DL (ref 11.5–15.4)
INR PPP: 3.39 (ref 0.84–1.19)
MCH RBC QN AUTO: 27.4 PG (ref 26.8–34.3)
MCHC RBC AUTO-ENTMCNC: 30 G/DL (ref 31.4–37.4)
MCV RBC AUTO: 91 FL (ref 82–98)
PLATELET # BLD AUTO: 365 THOUSANDS/UL (ref 149–390)
PMV BLD AUTO: 10.5 FL (ref 8.9–12.7)
POTASSIUM SERPL-SCNC: 3.6 MMOL/L (ref 3.5–5.3)
PROTHROMBIN TIME: 33.5 SECONDS (ref 11.6–14.5)
QRS AXIS: 84 DEGREES
QRSD INTERVAL: 88 MS
QT INTERVAL: 402 MS
QTC INTERVAL: 454 MS
RBC # BLD AUTO: 3.69 MILLION/UL (ref 3.81–5.12)
SODIUM SERPL-SCNC: 144 MMOL/L (ref 136–145)
T WAVE AXIS: 43 DEGREES
VENTRICULAR RATE: 77 BPM
WBC # BLD AUTO: 9.77 THOUSAND/UL (ref 4.31–10.16)

## 2022-01-02 PROCEDURE — 80048 BASIC METABOLIC PNL TOTAL CA: CPT

## 2022-01-02 PROCEDURE — 99232 SBSQ HOSP IP/OBS MODERATE 35: CPT | Performed by: HOSPITALIST

## 2022-01-02 PROCEDURE — 99232 SBSQ HOSP IP/OBS MODERATE 35: CPT | Performed by: INTERNAL MEDICINE

## 2022-01-02 PROCEDURE — 85027 COMPLETE CBC AUTOMATED: CPT

## 2022-01-02 PROCEDURE — 93010 ELECTROCARDIOGRAM REPORT: CPT | Performed by: INTERNAL MEDICINE

## 2022-01-02 PROCEDURE — 85610 PROTHROMBIN TIME: CPT

## 2022-01-02 RX ORDER — ACETAZOLAMIDE 500 MG/5ML
500 INJECTION, POWDER, LYOPHILIZED, FOR SOLUTION INTRAVENOUS EVERY 6 HOURS SCHEDULED
Status: COMPLETED | OUTPATIENT
Start: 2022-01-02 | End: 2022-01-02

## 2022-01-02 RX ORDER — WARFARIN SODIUM 2.5 MG/1
2.5 TABLET ORAL
Status: DISCONTINUED | OUTPATIENT
Start: 2022-01-03 | End: 2022-01-03

## 2022-01-02 RX ADMIN — OXYCODONE HYDROCHLORIDE 20 MG: 20 TABLET, FILM COATED, EXTENDED RELEASE ORAL at 05:29

## 2022-01-02 RX ADMIN — WATER 5 ML: 1 INJECTION INTRAMUSCULAR; INTRAVENOUS; SUBCUTANEOUS at 19:02

## 2022-01-02 RX ADMIN — ACETAZOLAMIDE 500 MG: 500 INJECTION, POWDER, LYOPHILIZED, FOR SOLUTION INTRAVENOUS at 19:01

## 2022-01-02 RX ADMIN — FUROSEMIDE 80 MG: 10 INJECTION, SOLUTION INTRAMUSCULAR; INTRAVENOUS at 10:06

## 2022-01-02 RX ADMIN — CEFAZOLIN SODIUM 2000 MG: 2 SOLUTION INTRAVENOUS at 05:29

## 2022-01-02 RX ADMIN — NYSTATIN: 100000 POWDER TOPICAL at 10:04

## 2022-01-02 RX ADMIN — CEFAZOLIN SODIUM 2000 MG: 2 SOLUTION INTRAVENOUS at 14:14

## 2022-01-02 RX ADMIN — AMLODIPINE BESYLATE 5 MG: 5 TABLET ORAL at 21:39

## 2022-01-02 RX ADMIN — CEFAZOLIN SODIUM 2000 MG: 2 SOLUTION INTRAVENOUS at 21:39

## 2022-01-02 RX ADMIN — WATER: 1 INJECTION INTRAMUSCULAR; INTRAVENOUS; SUBCUTANEOUS at 14:18

## 2022-01-02 RX ADMIN — OXYCODONE HYDROCHLORIDE 20 MG: 20 TABLET, FILM COATED, EXTENDED RELEASE ORAL at 21:39

## 2022-01-02 RX ADMIN — OXYCODONE HYDROCHLORIDE 20 MG: 20 TABLET, FILM COATED, EXTENDED RELEASE ORAL at 14:13

## 2022-01-02 RX ADMIN — ACETAZOLAMIDE 500 MG: 500 INJECTION, POWDER, LYOPHILIZED, FOR SOLUTION INTRAVENOUS at 14:13

## 2022-01-02 RX ADMIN — AMLODIPINE BESYLATE 5 MG: 5 TABLET ORAL at 10:04

## 2022-01-02 RX ADMIN — NYSTATIN: 100000 POWDER TOPICAL at 19:02

## 2022-01-02 RX ADMIN — CARVEDILOL 25 MG: 12.5 TABLET, FILM COATED ORAL at 19:02

## 2022-01-02 RX ADMIN — CARVEDILOL 25 MG: 12.5 TABLET, FILM COATED ORAL at 10:06

## 2022-01-02 RX ADMIN — STANDARDIZED SENNA CONCENTRATE 8.6 MG: 8.6 TABLET ORAL at 10:04

## 2022-01-02 NOTE — PLAN OF CARE
Problem: MOBILITY - ADULT  Goal: Maintain or return to baseline ADL function  Description: INTERVENTIONS:  -  Assess patient's ability to carry out ADLs; assess patient's baseline for ADL function and identify physical deficits which impact ability to perform ADLs (bathing, care of mouth/teeth, toileting, grooming, dressing, etc )  - Assess/evaluate cause of self-care deficits   - Assess range of motion  - Assess patient's mobility; develop plan if impaired  - Assess patient's need for assistive devices and provide as appropriate  - Encourage maximum independence but intervene and supervise when necessary  - Involve family in performance of ADLs  - Assess for home care needs following discharge   - Consider OT consult to assist with ADL evaluation and planning for discharge  - Provide patient education as appropriate  Outcome: Progressing       Problem: Potential for Falls     Problem: SAFETY ADULT  Goal: Maintain or return to baseline ADL function  Description: INTERVENTIONS:  -  Assess patient's ability to carry out ADLs; assess patient's baseline for ADL function and identify physical deficits which impact ability to perform ADLs (bathing, care of mouth/teeth, toileting, grooming, dressing, etc )  - Assess/evaluate cause of self-care deficits   - Assess range of motion  - Assess patient's mobility; develop plan if impaired  - Assess patient's need for assistive devices and provide as appropriate  - Encourage maximum independence but intervene and supervise when necessary  - Involve family in performance of ADLs  - Assess for home care needs following discharge   - Consider OT consult to assist with ADL evaluation and planning for discharge  - Provide patient education as appropriate  Outcome: Progressing  Goal: Maintains/Returns to pre admission functional level  Description: INTERVENTIONS:  - Perform BMAT or MOVE assessment daily    - Set and communicate daily mobility goal to care team and patient/family/caregiver  - Collaborate with rehabilitation services on mobility goals if consulted  - Perform Range of Motion 2 times a day  - Reposition patient every 2 hours    - Dangle patient 5 times a day  - Stand patient 2 times a day  - Ambulate patient 2 times a day  - Out of bed to chair 2 times a day   - Out of bed for meals 3 times a day  - Out of bed for toileting  - Record patient progress and toleration of activity level   Outcome: Progressing  Goal: Patient will remain free of falls  Description: INTERVENTIONS:  - Educate patient/family on patient safety including physical limitations  - Instruct patient to call for assistance with activity   - Consult OT/PT to assist with strengthening/mobility   - Keep Call bell within reach  - Keep bed low and locked with side rails adjusted as appropriate  - Keep care items and personal belongings within reach  - Initiate and maintain comfort rounds  - Make Fall Risk Sign visible to staff  - Offer Toileting every 2 Hours, in advance of need  - Initiate/Maintain bed alarm  - Apply yellow socks and bracelet for high fall risk patients  - Consider moving patient to room near nurses station  Outcome: Progressing

## 2022-01-02 NOTE — PROGRESS NOTES
General Cardiology Progress Note   Izell Race 77 y o  female MRN: [id number]  Unit/Bed#: W -01 Encounter: [id number]      Assessment:  Principal Problem:    Acute respiratory failure with hypoxia ([facility] )  Active Problems:    CHF exacerbation (HCC)    LEXY (acute kidney injury) ([facility] )    Atrial fibrillation (HCC)    Cellulitis of right lower extremity    Chronic back pain      Impression:     Acute on chronic heart failure with preserved ejection fraction  o Due to dietary indiscretion  o Failed torsemide 40 mg p o  Daily  o 2 6L UOP o/n  o -7L overall, down 20lbs   Acute kidney injury  o Due to CHF  o Resolved, Creatinine down to 0 83   Paroxysmal AFib-in rate controlled afib   Expiratory wheeze  o Partially improving with diuresis but patient still appears ot have significantly prolonged expiratory phase that may be unrelated to volume overload    Plan:     Continue IV diuresis but due to contraction alkalosis, give 2 doses diamox today and hold lasix  No diuretic tomorrow until labs and exam re-evaluated   Follow BMP daily    Subjective:   Patient seen and examined  Slightly improved again, 2 5L UOP  Labs stable    Review of Systems   Constitutional: Negative for diaphoresis, fever, malaise/fatigue and night sweats  Cardiovascular: Positive for leg swelling  Negative for chest pain, dyspnea on exertion, orthopnea, palpitations and syncope  Respiratory: Positive for cough and shortness of breath  Negative for sputum production and wheezing  Skin: Negative for itching and rash  Gastrointestinal: Negative for abdominal pain, change in bowel habit and diarrhea  Neurological: Negative for dizziness, focal weakness, light-headedness and weakness  Objective   Vitals: Blood pressure 135/60, pulse (!) 53, temperature 98 2 °F (36 8 °C), resp  rate 18, height 5' 2" (1 575 m), weight 114 kg (251 lb 5 2 oz), SpO2 94 %  , Body mass index is 45 97 kg/m² , I/O last 3 completed shifts:   In: 5 [P O :1560; IV Piggyback:150]  Out: 4692 [Urine:3224]  No intake/output data recorded  Wt Readings from Last 3 Encounters:   01/02/22 114 kg (251 lb 5 2 oz)   12/16/21 120 kg (264 lb 3 2 oz)   12/02/21 118 kg (260 lb)       Intake/Output Summary (Last 24 hours) at 1/2/2022 0932  Last data filed at 1/1/2022 1805  Gross per 24 hour   Intake 890 ml   Output 2024 ml   Net -1134 ml     I/O last 3 completed shifts: In: 5 [P O :1560; IV Piggyback:150]  Out: 6386 [EBCGM:4743]      Physical Exam  Constitutional:       General: She is not in acute distress  Appearance: She is not diaphoretic  HENT:      Head: Normocephalic  Eyes:      Conjunctiva/sclera: Conjunctivae normal    Neck:      Vascular: No JVD  Cardiovascular:      Rate and Rhythm: Normal rate  Rhythm irregular  Heart sounds: Normal heart sounds  No murmur heard  No gallop  Pulmonary:      Effort: Pulmonary effort is normal  No respiratory distress  Breath sounds: Wheezing and rales present  Abdominal:      General: Bowel sounds are normal  There is no distension  Palpations: Abdomen is soft  Tenderness: There is no abdominal tenderness  Musculoskeletal:         General: Normal range of motion  Cervical back: Normal range of motion and neck supple  Right lower leg: Edema present  Left lower leg: Edema present  Skin:     General: Skin is warm and dry  Neurological:      Mental Status: She is alert and oriented to person, place, and time           Meds/Allergies   No Known Allergies    Current Facility-Administered Medications:     acetaminophen (TYLENOL) tablet 650 mg, 650 mg, Oral, Q6H PRN, Shan Yanes MD    amLODIPine (NORVASC) tablet 5 mg, 5 mg, Oral, BID, Payam Hughes DO, 5 mg at 01/01/22 2217    carvedilol (COREG) tablet 25 mg, 25 mg, Oral, BID With Meals, Payam Hughes DO, 25 mg at 01/01/22 1555    ceFAZolin (ANCEF) IVPB (premix in dextrose) 2,000 mg 50 mL, 2,000 mg, Intravenous, Q8H, Dustin Crawford MD, Last Rate: 100 mL/hr at 01/02/22 0529, 2,000 mg at 01/02/22 0529    furosemide (LASIX) injection 80 mg, 80 mg, Intravenous, BID (diuretic), Dakota Hughes DO, 80 mg at 01/01/22 1555    nystatin (MYCOSTATIN) powder, , Topical, BID, Paris Obrien MD, Given at 01/01/22 1743    ondansetron (ZOFRAN) injection 4 mg, 4 mg, Intravenous, Q6H PRN, Virgie Barroso MD    oxyCODONE (OxyCONTIN) 12 hr tablet 20 mg, 20 mg, Oral, Q8H [address], Dakota Hughes DO, 20 mg at 01/02/22 0529    oxyCODONE-acetaminophen (PERCOCET) 5-325 mg per tablet 1 tablet, 1 tablet, Oral, Q4H PRN, Dakota Hughes DO, 1 tablet at 01/01/22 1754    senna (SENOKOT) tablet 8 6 mg, 1 tablet, Oral, Daily, Virgie Barroso MD, 8 6 mg at 01/01/22 0801    warfarin (COUMADIN) tablet 2 5 mg, 2 5 mg, Oral, Daily (warfarin), Arvind Beltran MD, 2 5 mg at 01/01/22 1743    Laboratory Results:        CBC with diff:   Results from last 7 days   Lab Units 01/02/22  0553 01/01/22  0558 12/31/21  0509 12/30/21  0609 12/29/21  2313 12/29/21  2040   WBC Thousand/uL 9 77 9 53 9 46 9 68  --  11 19*   HEMOGLOBIN g/dL 10 1* 9 9* 9 8* 9 5*  --  10 0*   I STAT HEMOGLOBIN g/dl  --   --   --   --  10 5*  --    HEMATOCRIT % 33 7* 33 3* 32 0* 31 3*  --  31 9*   HEMATOCRIT, ISTAT %  --   --   --   --  31*  --    MCV fL 91 90 89 88  --  88   PLATELETS Thousands/uL 365 370 353 343  --  331   MCH pg 27 4 26 6* 27 1 26 7*  --  27 5   MCHC g/dL 30 0* 29 7* 30 6* 30 4*  --  31 3*   RDW % 15 0 15 1 14 9 14 6  --  14 6   MPV fL 10 5 10 6 10 8 11 3  --  11 5   NRBC AUTO /100 WBCs  --   --  0  --   --  0       CMP:  Results from last 7 days   Lab Units 01/02/22  0553 01/01/22  0558 12/31/21  0509 12/30/21  0609 12/29/21  2313 12/29/21  2040   SODIUM mmol/L 144 144 141 137  --  134*   POTASSIUM mmol/L 3 6 3 7 3 8 4 6  --  5 2   CHLORIDE mmol/L 100 102 101 99*  --  97*   CO2 mmol/L 42* 37* 35* 28  --  27   CO2, I-STAT mmol/L  --   --   --   --  28 --    BUN mg/dL 14 23 44* 60*  --  62*   CREATININE mg/dL 0 83 0 93 1 23 1 67*  --  1 97*   GLUCOSE, ISTAT mg/dl  --   --   --   --  129  --    CALCIUM mg/dL 9 0 9 0 9 3 9 5  --  9 6   AST U/L  --   --   --   --   --  29   ALT U/L  --   --   --   --   --  24   ALK PHOS U/L  --   --   --   --   --  128*   EGFR ml/min/1 73sq m 69 60 43 29  --  24       BMP:  Results from last 7 days   Lab Units 01/02/22  0553 01/01/22  0558 12/31/21  0509 12/30/21  0609 12/29/21  2313 12/29/21 2040   SODIUM mmol/L 144 144 141 137  --  134*   POTASSIUM mmol/L 3 6 3 7 3 8 4 6  --  5 2   CHLORIDE mmol/L 100 102 101 99*  --  97*   CO2 mmol/L 42* 37* 35* 28  --  27   CO2, I-STAT mmol/L  --   --   --   --  28  --    BUN mg/dL 14 23 44* 60*  --  62*   CREATININE mg/dL 0 83 0 93 1 23 1 67*  --  1 97*   GLUCOSE, ISTAT mg/dl  --   --   --   --  129  --    CALCIUM mg/dL 9 0 9 0 9 3 9 5  --  9 6       NT-proBNP:   No results for input(s): NTBNP in the last 72 hours       Magnesium:   Results from last 7 days   Lab Units 12/29/21 2040   MAGNESIUM mg/dL 2 3       Coags:   Results from last 7 days   Lab Units 01/02/22  0553 01/01/22  0558 12/31/21  0509 12/30/21  0159 12/29/21 2040   PTT seconds  --   --   --  63*  --    INR  3 39* 3 02* 2 68* 2 45* 2 27*       TSH:    Results from last 7 days   Lab Units 12/29/21 2040   TSH 3RD GENERATON uIU/mL 1 693       Hemoglobin A1C )      Lipid Profile:   Lab Results   Component Value Date    CHOL 169 09/23/2014     Lab Results   Component Value Date    HDL 50 11/13/2021    HDL 59 04/12/2021    HDL 70 (H) 09/24/2018     Lab Results   Component Value Date    LDLCALC 93 11/13/2021    LDLCALC 113 (H) 04/12/2021    LDLCALC 127 (H) 09/24/2018     Lab Results   Component Value Date    LDLDIRECT 109 (H) 04/12/2021    LDLDIRECT 128 (H) 09/24/2018    LDLDIRECT 95 09/23/2014     Lab Results   Component Value Date    TRIG 89 11/13/2021    TRIG 120 04/12/2021    TRIG 102 09/24/2018       Cardiac testing:   EKG personally reviewed by Inga Miranda MD      No results found for this or any previous visit  No results found for this or any previous visit  No results found for this or any previous visit  No results found for this or any previous visit  No results found for this or any previous visit  No results found for this or any previous visit  Inga Miranda MD    Portions of the record may have been created with voice recognition software  Occasional wrong word or "sound a like" substitutions may have occurred due to the inherent limitations of voice recognition software  Read the chart carefully and recognize, using context, where substitutions have occurred

## 2022-01-02 NOTE — PLAN OF CARE
Problem: MOBILITY - ADULT  Goal: Maintain or return to baseline ADL function  Description: INTERVENTIONS:  -  Assess patient's ability to carry out ADLs; assess patient's baseline for ADL function and identify physical deficits which impact ability to perform ADLs (bathing, care of mouth/teeth, toileting, grooming, dressing, etc )  - Assess/evaluate cause of self-care deficits   - Assess range of motion  - Assess patient's mobility; develop plan if impaired  - Assess patient's need for assistive devices and provide as appropriate  - Encourage maximum independence but intervene and supervise when necessary  - Involve family in performance of ADLs  - Assess for home care needs following discharge   - Consider OT consult to assist with ADL evaluation and planning for discharge  - Provide patient education as appropriate  Outcome: Progressing  Goal: Maintains/Returns to pre admission functional level  Description: INTERVENTIONS:  - Perform BMAT or MOVE assessment daily    - Set and communicate daily mobility goal to care team and patient/family/caregiver  - Collaborate with rehabilitation services on mobility goals if consulted  - Perform Range of Motion times a day  - Reposition patient every hours    - Dangle patient times a day  - Stand patient times a day  - Ambulate patient times a day  - Out of bed to chair times a day   - Out of bed for meals times a day  - Out of bed for toileting  - Record patient progress and toleration of activity level   Outcome: Progressing

## 2022-01-03 ENCOUNTER — APPOINTMENT (INPATIENT)
Dept: RADIOLOGY | Facility: HOSPITAL | Age: 77
DRG: 291 | End: 2022-01-03
Payer: COMMERCIAL

## 2022-01-03 PROBLEM — F11.20 OPIOID DEPENDENCE (HCC): Status: ACTIVE | Noted: 2022-01-03

## 2022-01-03 PROBLEM — E87.6 HYPOKALEMIA: Status: ACTIVE | Noted: 2022-01-03

## 2022-01-03 PROBLEM — N17.9 AKI (ACUTE KIDNEY INJURY) (HCC): Status: RESOLVED | Noted: 2021-12-30 | Resolved: 2022-01-03

## 2022-01-03 LAB
ALBUMIN SERPL BCP-MCNC: 3 G/DL (ref 3.5–5)
ALP SERPL-CCNC: 119 U/L (ref 46–116)
ALT SERPL W P-5'-P-CCNC: 14 U/L (ref 12–78)
ANION GAP SERPL CALCULATED.3IONS-SCNC: 4 MMOL/L (ref 4–13)
AST SERPL W P-5'-P-CCNC: 17 U/L (ref 5–45)
BACTERIA WND AEROBE CULT: ABNORMAL
BILIRUB SERPL-MCNC: 0.24 MG/DL (ref 0.2–1)
BUN SERPL-MCNC: 11 MG/DL (ref 5–25)
CALCIUM ALBUM COR SERPL-MCNC: 10.2 MG/DL (ref 8.3–10.1)
CALCIUM SERPL-MCNC: 9.4 MG/DL (ref 8.3–10.1)
CHLORIDE SERPL-SCNC: 100 MMOL/L (ref 100–108)
CO2 SERPL-SCNC: 39 MMOL/L (ref 21–32)
CREAT SERPL-MCNC: 0.79 MG/DL (ref 0.6–1.3)
ERYTHROCYTE [DISTWIDTH] IN BLOOD BY AUTOMATED COUNT: 14.8 % (ref 11.6–15.1)
GFR SERPL CREATININE-BSD FRML MDRD: 73 ML/MIN/1.73SQ M
GLUCOSE SERPL-MCNC: 117 MG/DL (ref 65–140)
GRAM STN SPEC: ABNORMAL
HCT VFR BLD AUTO: 35.5 % (ref 34.8–46.1)
HGB BLD-MCNC: 10.2 G/DL (ref 11.5–15.4)
INR PPP: 3.63 (ref 0.84–1.19)
MCH RBC QN AUTO: 26.4 PG (ref 26.8–34.3)
MCHC RBC AUTO-ENTMCNC: 28.7 G/DL (ref 31.4–37.4)
MCV RBC AUTO: 92 FL (ref 82–98)
PLATELET # BLD AUTO: 358 THOUSANDS/UL (ref 149–390)
PMV BLD AUTO: 10.4 FL (ref 8.9–12.7)
POTASSIUM SERPL-SCNC: 3 MMOL/L (ref 3.5–5.3)
POTASSIUM SERPL-SCNC: 3.2 MMOL/L (ref 3.5–5.3)
PROT SERPL-MCNC: 7.3 G/DL (ref 6.4–8.2)
PROTHROMBIN TIME: 35.3 SECONDS (ref 11.6–14.5)
RBC # BLD AUTO: 3.86 MILLION/UL (ref 3.81–5.12)
SODIUM SERPL-SCNC: 143 MMOL/L (ref 136–145)
WBC # BLD AUTO: 10.34 THOUSAND/UL (ref 4.31–10.16)

## 2022-01-03 PROCEDURE — 99232 SBSQ HOSP IP/OBS MODERATE 35: CPT | Performed by: INTERNAL MEDICINE

## 2022-01-03 PROCEDURE — 71045 X-RAY EXAM CHEST 1 VIEW: CPT

## 2022-01-03 PROCEDURE — 94760 N-INVAS EAR/PLS OXIMETRY 1: CPT

## 2022-01-03 PROCEDURE — 85027 COMPLETE CBC AUTOMATED: CPT

## 2022-01-03 PROCEDURE — 85610 PROTHROMBIN TIME: CPT

## 2022-01-03 PROCEDURE — 80053 COMPREHEN METABOLIC PANEL: CPT | Performed by: INTERNAL MEDICINE

## 2022-01-03 PROCEDURE — 97110 THERAPEUTIC EXERCISES: CPT

## 2022-01-03 PROCEDURE — 84132 ASSAY OF SERUM POTASSIUM: CPT | Performed by: NURSE PRACTITIONER

## 2022-01-03 PROCEDURE — 99232 SBSQ HOSP IP/OBS MODERATE 35: CPT | Performed by: HOSPITALIST

## 2022-01-03 PROCEDURE — 97116 GAIT TRAINING THERAPY: CPT

## 2022-01-03 PROCEDURE — 94640 AIRWAY INHALATION TREATMENT: CPT

## 2022-01-03 PROCEDURE — 97167 OT EVAL HIGH COMPLEX 60 MIN: CPT

## 2022-01-03 RX ORDER — POTASSIUM CHLORIDE 20 MEQ/1
40 TABLET, EXTENDED RELEASE ORAL ONCE
Status: COMPLETED | OUTPATIENT
Start: 2022-01-03 | End: 2022-01-03

## 2022-01-03 RX ORDER — LEVALBUTEROL 1.25 MG/.5ML
1.25 SOLUTION, CONCENTRATE RESPIRATORY (INHALATION) ONCE
Status: COMPLETED | OUTPATIENT
Start: 2022-01-03 | End: 2022-01-03

## 2022-01-03 RX ORDER — ALBUTEROL SULFATE 90 UG/1
2 AEROSOL, METERED RESPIRATORY (INHALATION) EVERY 4 HOURS PRN
Status: DISCONTINUED | OUTPATIENT
Start: 2022-01-03 | End: 2022-01-06 | Stop reason: HOSPADM

## 2022-01-03 RX ORDER — POTASSIUM CHLORIDE 20 MEQ/1
20 TABLET, EXTENDED RELEASE ORAL ONCE
Status: COMPLETED | OUTPATIENT
Start: 2022-01-03 | End: 2022-01-03

## 2022-01-03 RX ADMIN — OXYCODONE HYDROCHLORIDE 20 MG: 20 TABLET, FILM COATED, EXTENDED RELEASE ORAL at 05:20

## 2022-01-03 RX ADMIN — IPRATROPIUM BROMIDE 0.5 MG: 0.5 SOLUTION RESPIRATORY (INHALATION) at 13:02

## 2022-01-03 RX ADMIN — POTASSIUM CHLORIDE 40 MEQ: 1500 TABLET, EXTENDED RELEASE ORAL at 09:31

## 2022-01-03 RX ADMIN — OXYCODONE HYDROCHLORIDE 20 MG: 20 TABLET, FILM COATED, EXTENDED RELEASE ORAL at 21:07

## 2022-01-03 RX ADMIN — NYSTATIN 1 APPLICATION: 100000 POWDER TOPICAL at 18:21

## 2022-01-03 RX ADMIN — CEFAZOLIN SODIUM 2000 MG: 2 SOLUTION INTRAVENOUS at 12:41

## 2022-01-03 RX ADMIN — NYSTATIN: 100000 POWDER TOPICAL at 08:44

## 2022-01-03 RX ADMIN — AMLODIPINE BESYLATE 5 MG: 5 TABLET ORAL at 08:44

## 2022-01-03 RX ADMIN — CARVEDILOL 25 MG: 12.5 TABLET, FILM COATED ORAL at 08:44

## 2022-01-03 RX ADMIN — CEFAZOLIN SODIUM 2000 MG: 2 SOLUTION INTRAVENOUS at 05:17

## 2022-01-03 RX ADMIN — STANDARDIZED SENNA CONCENTRATE 8.6 MG: 8.6 TABLET ORAL at 08:44

## 2022-01-03 RX ADMIN — CARVEDILOL 25 MG: 12.5 TABLET, FILM COATED ORAL at 16:33

## 2022-01-03 RX ADMIN — LEVALBUTEROL HYDROCHLORIDE 1.25 MG: 1.25 SOLUTION, CONCENTRATE RESPIRATORY (INHALATION) at 13:02

## 2022-01-03 RX ADMIN — OXYCODONE HYDROCHLORIDE 20 MG: 20 TABLET, FILM COATED, EXTENDED RELEASE ORAL at 13:13

## 2022-01-03 RX ADMIN — OXYCODONE HYDROCHLORIDE AND ACETAMINOPHEN 1 TABLET: 5; 325 TABLET ORAL at 16:40

## 2022-01-03 RX ADMIN — POTASSIUM CHLORIDE 20 MEQ: 1500 TABLET, EXTENDED RELEASE ORAL at 12:41

## 2022-01-03 RX ADMIN — AMLODIPINE BESYLATE 5 MG: 5 TABLET ORAL at 21:07

## 2022-01-03 RX ADMIN — CEFAZOLIN SODIUM 2000 MG: 2 SOLUTION INTRAVENOUS at 21:07

## 2022-01-03 NOTE — PLAN OF CARE
Problem: MOBILITY - ADULT  Goal: Maintain or return to baseline ADL function  Description: INTERVENTIONS:  -  Assess patient's ability to carry out ADLs; assess patient's baseline for ADL function and identify physical deficits which impact ability to perform ADLs (bathing, care of mouth/teeth, toileting, grooming, dressing, etc )  - Assess/evaluate cause of self-care deficits   - Assess range of motion  - Assess patient's mobility; develop plan if impaired  - Assess patient's need for assistive devices and provide as appropriate  - Encourage maximum independence but intervene and supervise when necessary  - Involve family in performance of ADLs  - Assess for home care needs following discharge   - Consider OT consult to assist with ADL evaluation and planning for discharge  - Provide patient education as appropriate  Outcome: Progressing  Goal: Maintains/Returns to pre admission functional level  Description: INTERVENTIONS:  - Perform BMAT or MOVE assessment daily    - Set and communicate daily mobility goal to care team and patient/family/caregiver  - Collaborate with rehabilitation services on mobility goals if consulted  - Perform Range of Motion  times a day  - Reposition patient every  hours    - Dangle patient  times a day  - Stand patient  times a day  - Ambulate patient  times a day  - Out of bed to chair  times a day   - Out of bed for meals  times a day  - Out of bed for toileting  - Record patient progress and toleration of activity level   Outcome: Progressing     Problem: Potential for Falls  Goal: Patient will remain free of falls  Description: INTERVENTIONS:  -  Assess patient's ability to carry out ADLs; assess patient's baseline for ADL function and identify physical deficits which impact ability to perform ADLs (bathing, care of mouth/teeth, toileting, grooming, dressing, etc )  - Assess/evaluate cause of self-care deficits   - Assess range of motion  - Assess patient's mobility; develop plan if impaired  - Assess patient's need for assistive devices and provide as appropriate  - Encourage maximum independence but intervene and supervise when necessary  - Involve family in performance of ADLs  - Assess for home care needs following discharge   - Consider OT consult to assist with ADL evaluation and planning for discharge  - Provide patient education as appropriate  Outcome: Progressing     Problem: Prexisting or High Potential for Compromised Skin Integrity  Goal: Skin integrity is maintained or improved  Description: INTERVENTIONS:  - Identify patients at risk for skin breakdown  - Assess and monitor skin integrity  - Assess and monitor nutrition and hydration status  - Monitor labs   - Assess for incontinence   - Turn and reposition patient  - Assist with mobility/ambulation  - Relieve pressure over bony prominences  - Avoid friction and shearing  - Provide appropriate hygiene as needed including keeping skin clean and dry  - Evaluate need for skin moisturizer/barrier cream  - Collaborate with interdisciplinary team   - Patient/family teaching  - Consider wound care consult   Outcome: Progressing     Problem: PAIN - ADULT  Goal: Verbalizes/displays adequate comfort level or baseline comfort level  Description: Interventions:  - Encourage patient to monitor pain and request assistance  - Assess pain using appropriate pain scale  - Administer analgesics based on type and severity of pain and evaluate response  - Implement non-pharmacological measures as appropriate and evaluate response  - Consider cultural and social influences on pain and pain management  - Notify physician/advanced practitioner if interventions unsuccessful or patient reports new pain  Outcome: Progressing     Problem: INFECTION - ADULT  Goal: Absence or prevention of progression during hospitalization  Description: INTERVENTIONS:  - Assess and monitor for signs and symptoms of infection  - Monitor lab/diagnostic results  - Monitor all insertion sites, i e  indwelling lines, tubes, and drains  - Monitor endotracheal if appropriate and nasal secretions for changes in amount and color  - [place] appropriate cooling/warming therapies per order  - Administer medications as ordered  - Instruct and encourage patient and family to use good hand hygiene technique  - Identify and instruct in appropriate isolation precautions for identified infection/condition  Outcome: Progressing  Goal: Absence of fever/infection during neutropenic period  Description: INTERVENTIONS:  - Monitor WBC    Outcome: Progressing     Problem: SAFETY ADULT  Goal: Maintain or return to baseline ADL function  Description: INTERVENTIONS:  -  Assess patient's ability to carry out ADLs; assess patient's baseline for ADL function and identify physical deficits which impact ability to perform ADLs (bathing, care of mouth/teeth, toileting, grooming, dressing, etc )  - Assess/evaluate cause of self-care deficits   - Assess range of motion  - Assess patient's mobility; develop plan if impaired  - Assess patient's need for assistive devices and provide as appropriate  - Encourage maximum independence but intervene and supervise when necessary  - Involve family in performance of ADLs  - Assess for home care needs following discharge   - Consider OT consult to assist with ADL evaluation and planning for discharge  - Provide patient education as appropriate  Outcome: Progressing  Goal: Maintains/Returns to pre admission functional level  Description: INTERVENTIONS:  - Perform BMAT or MOVE assessment daily    - Set and communicate daily mobility goal to care team and patient/family/caregiver  - Collaborate with rehabilitation services on mobility goals if consulted  - Perform Range of Motion  times a day  - Reposition patient every  hours    - Dangle patient  times a day  - Stand patient  times a day  - Ambulate patient  times a day  - Out of bed to chair  times a day   - Out of bed for meals  times a day  - Out of bed for toileting  - Record patient progress and toleration of activity level   Outcome: Progressing  Goal: Patient will remain free of falls  Description: INTERVENTIONS:  -  Assess patient's ability to carry out ADLs; assess patient's baseline for ADL function and identify physical deficits which impact ability to perform ADLs (bathing, care of mouth/teeth, toileting, grooming, dressing, etc )  - Assess/evaluate cause of self-care deficits   - Assess range of motion  - Assess patient's mobility; develop plan if impaired  - Assess patient's need for assistive devices and provide as appropriate  - Encourage maximum independence but intervene and supervise when necessary  - Involve family in performance of ADLs  - Assess for home care needs following discharge   - Consider OT consult to assist with ADL evaluation and planning for discharge  - Provide patient education as appropriate  Outcome: Progressing     Problem: DISCHARGE PLANNING  Goal: Discharge to home or other facility with appropriate resources  Description: INTERVENTIONS:  - Identify barriers to discharge w/patient and caregiver  - Arrange for needed discharge resources and transportation as appropriate  - Identify discharge learning needs (meds, wound care, etc )  - Arrange for interpretive services to assist at discharge as needed  - Refer to Case Management Department for coordinating discharge planning if the patient needs post-hospital services based on physician/advanced practitioner order or complex needs related to functional status, cognitive ability, or social support system  Outcome: Progressing     Problem: Knowledge Deficit  Goal: Patient/family/caregiver demonstrates understanding of disease process, treatment plan, medications, and discharge instructions  Description: Complete learning assessment and assess knowledge base    Interventions:  - Provide teaching at level of understanding  - Provide teaching via preferred learning methods  Outcome: Progressing     Problem: Nutrition/Hydration-ADULT  Goal: Nutrient/Hydration intake appropriate for improving, restoring or maintaining nutritional needs  Description: Monitor and assess patient's nutrition/hydration status for malnutrition  Collaborate with interdisciplinary team and initiate plan and interventions as ordered  Monitor patient's weight and dietary intake as ordered or per policy  Utilize nutrition screening tool and intervene as necessary  Determine patient's food preferences and provide high-protein, high-caloric foods as appropriate       INTERVENTIONS:  - Monitor oral intake, urinary output, labs, and treatment plans  - Assess nutrition and hydration status and recommend course of action  - Evaluate amount of meals eaten  - Assist patient with eating if necessary   - Allow adequate time for meals  - Recommend/ encourage appropriate diets, oral nutritional supplements, and vitamin/mineral supplements  - Order, calculate, and assess calorie counts as needed  - Recommend, monitor, and adjust tube feedings and TPN/PPN based on assessed needs  - Assess need for intravenous fluids  - Provide specific nutrition/hydration education as appropriate  - Include patient/family/caregiver in decisions related to nutrition  Outcome: Progressing

## 2022-01-03 NOTE — CASE MANAGEMENT
Case Management Discharge Planning Note    Patient name Chad Vidal  Location W MS 65/W -01 MRN [id number]  : 1945 Date 1/3/2022       Current Admission Date: 2021  Current Admission Diagnosis:Acute respiratory failure with hypoxia [facility])   Patient Active Problem List    Diagnosis Date Noted    Opioid dependence due to Chronic back pain  2022    Hypokalemia 2022    CHF exacerbation ([address] ) 2021    Atrial fibrillation ([address] ) 2021    Cellulitis of right lower extremity 2021    Chronic back pain 2021    Mixed hyperlipidemia 2021    Obesity 2021    Osteoarthritis of knee 2021     LEXY on CKD (chronic kidney disease) stage 2, GFR 60-89 ml/min 2021    Rash of back 2021    New onset atrial fibrillation ([facility] ) 2021    Acute respiratory failure with hypoxia ([facility] ) 2021    Chronic low back pain with sciatica 2021    Chronic venous insufficiency 2021    Essential hypertension 2017    Sjogren's syndrome ([facility] ) 2014      LOS (days): 4  Geometric Mean LOS (GMLOS) (days): 4 30  Days to GMLOS:-0 2     OBJECTIVE:  Risk of Unplanned Readmission Score: 20      Current admission status: Inpatient   Preferred Pharmacy:   [address] [place], [place] [address]   [facility]  Phone: [phone] Fax: [facility], [address] [place] [address]  Phone: [phone] Fax: [phone]    Primary Care Provider: Micah Gooden MD    Primary Insurance: [address] [place] [address]  Secondary Insurance:     DISCHARGE DETAILS:    Discharge planning discussed with[de-identified] patient  Freedom of Choice: Yes     CM contacted family/caregiver?: Yes  Were Treatment Team discharge recommendations reviewed with patient/caregiver?: Yes  Did patient/caregiver verbalize understanding of patient care needs?: Yes  Were patient/caregiver advised of the risks associated with not following Treatment Team discharge recommendations?: Yes    [address]         Is the patient interested in [facility] at discharge?: No    DME Referral Provided  Referral made for DME?: No    Other Referral/Resources/Interventions Provided:  Referral Comments: CM sent updated blanket referrals to inpatient rehab facilities, waiting for options  Patient is Covid vaccinated    Treatment Team Recommendation: Short Term Rehab  Discharge Destination Plan[de-identified] Short Term Rehab     CM spoke with patient at the bedside  CM introduced self and role  CM reviewed with patient at the bedside per PT/OT, the recommendation at discharge is inpatient rehab  Patient spoke with her  re: inpatient rehab  Patient and  requesting inpatient rehab at discharge  Patient is Covid vaccinated  Patient does not wear home oxygen  Patient updated CM will f/u with referrals for rehab  CM will f/u with patient and  re:rehab options  All questions/concerns answered at this time  CM sent updated blanket referral  Waiting for rehab options

## 2022-01-03 NOTE — PHYSICAL THERAPY NOTE
PHYSICAL THERAPY TREATMENT NOTE          Patient Name: Candy Gordon  [id number] Date: 1/3/2022       Time: 9375-9278: 30 min; 2392-3091: 12 min  Total time: 42 min         22 0839   PT Last Visit   PT Visit Date 22   Note Type   Note Type Treatment   Pain Assessment   Pain Assessment Tool 0-10   Pain Score 6   Pain Location/Orientation Location: Back;Orientation: Lower   Pain Onset/Description Onset: Ongoing   Effect of Pain on Daily Activities limits pt's tolerance to functional mobility   Hospital Pain Intervention(s) Repositioned; Ambulation/increased activity   Restrictions/Precautions   Weight Bearing Precautions Per Order No   Other Precautions Cognitive; Chair Alarm; Bed Alarm;Multiple lines; Fall Risk;O2;Pain  (3L O2 via NC)   General   Chart Reviewed Yes   Additional Pertinent History Pt's SpO2 prior to mobility: 96%, dropped to 88%, 89% post amb trials; 91% at end of session   Response to Previous Treatment Patient with no complaints from previous session  Family/Caregiver Present No   Cognition   Overall Cognitive Status Impaired   Arousal/Participation Cooperative   Attention Within functional limits   Orientation Level Oriented to person;Oriented to place;Oriented to time;Disoriented to situation   Memory Decreased recall of recent events;Decreased short term memory;Decreased recall of precautions   Following Commands Follows one step commands with increased time or repetition   Comments Pt ID via name and ; pt agreeable to PT tx, requires re-directing throughout   Pt w/ limited insight into current deficits    Subjective   Subjective "I was fine before this"   Bed Mobility   Supine to Sit Unable to assess   Additional items   (Pt OOB in recliner chair upon arrival)   Sit to Supine Unable to assess   Additional items   (pt OOB in chair at end of session)   Transfers   Sit to Stand 4  Minimal assistance   Additional items Assist x 1; Armrests; Increased time required;Verbal cues   Stand to Sit 4  Minimal assistance   Additional items Assist x 1; Armrests; Increased time required;Verbal cues  (CGA)   Additional Comments VC for hand placement   Ambulation/Elevation   Gait pattern Forward Flexion; Wide AGUSTIN; Decreased foot clearance; Inconsistent devon; Short stride; Excessively slow   Gait Assistance 4  Minimal assist   Additional items Assist x 1;Verbal cues  (progressing to CGA)   Assistive Device Rolling walker  (wide RW)   Distance 6'+10'x2   Stair Management Assistance Not tested   Ambulation/Elevation Additional Comments Min Ax1 progressing to CGA   Balance   Static Sitting Fair +   Static Standing Fair -   Ambulatory Poor +   Endurance Deficit   Endurance Deficit Yes   Endurance Deficit Description pt w/ limited tolerance to functional mobility   Activity Tolerance   Activity Tolerance Patient limited by fatigue   Nurse Made Aware RN Manasa   Exercises   Hip Abduction Sitting;Bilateral;AAROM  (1x10)   Knee AROM [employer]; Bilateral  (1x10)   Ankle Pumps Sitting;AROM; Bilateral  (2x15)   Assessment   Prognosis Fair   Problem List Decreased strength;Decreased endurance; Impaired balance;Decreased mobility; Decreased cognition; Impaired sensation;Obesity; Decreased skin integrity;Pain   Assessment Pt seen today for PT intervention w/ pt agreeable to participate  Upon arrival, pt OOB in recliner chair  Pt performed multiple STS transfer trials in/out of recliner chair w/ Min Ax1 progressing to [facility]  Pt continues to require VC for hand placement  Pt progressed from [address] [place] [address] to [place] [address] for transfers  Compared to previous session, pt w/ increased ambulatory endurance  Pt ambulated multiple trials w/ RW progressing form Min Ax1 to [facility] for ambulatory balance  VC for RW management while negotiating turns  Pt able to perform seated BLE therapeutic exercises to increase pt's LE strength and overall tolerance to physical activity   Pt will continue to benefit from skilled PT intervention to increase pt's independence w/ functional mobility  DC rec: post acute rehab   Barriers to Discharge Inaccessible home environment   Goals   Patient Goals to be back to normal   STG Expiration Date 01/10/22   Short Term Goal #1 Pt will: perform bed mobility independently to decrease burden of care; perform transfers independently to increase OOB mobility; ambulate at least 75' w/ LRAD and Mod I to increase pt's ambulatory endurance; increase LE strength by 1 grade to increase pt's tolerance to physical activity; increase all balance ratings by 1 grade to decrease pt's risk of falls; PT to see for step negotiation   PT Treatment Day 1   Plan   Treatment/Interventions Functional transfer training;LE strengthening/ROM; Therapeutic exercise; Endurance training;Patient/family training;Equipment eval/education; Bed mobility;Gait training   Progress Progressing toward goals   PT Frequency 3-5x/wk   Recommendation   PT Discharge Recommendation Post acute rehabilitation services   Equipment Recommended [facility] Recommended Wheeled walker   Change/add to [facility]? No   AM-PAC Basic Mobility Inpatient   Turning in Bed Without Bedrails 2   Lying on Back to Sitting on Edge of Flat Bed 2   Moving Bed to Chair 3   Standing Up From Chair 3   Walk in Room 3   Climb 3-5 Stairs 1   Basic Mobility Inpatient Raw Score 14   Basic Mobility Standardized Score 35 55   Highest Level Of Mobility   -HL Goal 4: Move to chair/commode   -[facility] Highest Level of Mobility 6: Walk 10 steps or more   -HLM Goal Achieved Yes   End of Consult   Patient Position at End of Consult Seated edge of bed;Bed/Chair alarm activated; All needs within reach  (LE elevated, heels offloaded on pillow)       The patient's AM-PAC Basic Mobility Inpatient Short Form Raw Score is 14   A Raw score of less than or equal to 16 suggests the patient may benefit from discharge to post-acute rehabilitation services  Please also refer to the recommendation of the Physical Therapist for safe discharge planning      DC rec: post acute rehab      Anita Urbina PT, DPT  01/03/22

## 2022-01-03 NOTE — OCCUPATIONAL THERAPY NOTE
Occupational Therapy Evaluation     Patient Name: Chad OSEIU Date: 1/3/2022  Problem List  Principal Problem:    Acute respiratory failure with hypoxia ([facility] )  Active Problems:    CHF exacerbation ([facility] )    LEXY (acute kidney injury) ([facility] )    Atrial fibrillation ([facility] )    Cellulitis of right lower extremity    Chronic back pain    Past Medical History  Past Medical History:   Diagnosis Date    [facility] 12/29/2021    Arthritis     Cellulitis     Edema of both lower extremities due to peripheral venous insufficiency     Erythema of lower extremity 11/12/2021     Past Surgical History  Past Surgical History:   Procedure Laterality Date    KNEE CARTILAGE SURGERY      REPLACEMENT TOTAL KNEE BILATERAL               01/03/22 0807   OT Last Visit   OT Visit Date 01/03/22   Note Type   Note type Evaluation   Restrictions/Precautions   Other Precautions Cognitive; Chair Alarm; Bed Alarm;O2  (3L O2)   Pain Assessment   Pain Assessment Tool 0-10   Pain Score 4   Pain Location/Orientation Orientation: Right;Location: Foot   Home Living   Type of Home House  ([facility])   Home Layout Two level;1/2 bath on main level  (3 GLORIA)   Bathroom Shower/Tub Tub/shower unit   Bathroom Equipment Grab bars in shower   [address] Walker;Cane   Additional Comments no use of AD at baseline, intermittent use of SPC when needed   Prior Function   Lives With Albert Help From Family   ADL Assistance Independent   IADLs Needs assistance   Falls in the last 6 months 0   Vocational Retired   Comments sleeps in [facility] PTA pt living with  in [facility], pt (I) with ADLs and shares IADLs, (-)falls, (-)drives, no use of AD at baseline   Reciprocal Relationships supportive    Service to Others retired   Intrinsic Gratification likes to read, watch tv, talk to friends   ADL   Eating Assistance 7  Independent   Grooming Assistance 5  Supervision/Setup Grooming Deficit Setup   UB Bathing Assistance 5  Supervision/Setup   LB Bathing Assistance 3  Moderate Assistance   UB Dressing Assistance 5  Supervision/Setup    [address] 2  Maximal Assistance   LB Dressing Deficit Increased time to complete;Verbal cueing; Don/doff R sock; Don/doff L sock   Toileting Assistance  2  Maximal Assistance   Bed Mobility   Supine to Sit 3  Moderate assistance   Additional items Assist x 1; Increased time required;Verbal cues;LE management   Additional Comments OOB and in chair at end of session   Transfers   Sit to Stand 4  Minimal assistance   Additional items Assist x 1; Increased time required;Verbal cues   Stand to Sit 4  Minimal assistance   Additional items Assist x 1; Increased time required;Verbal cues   Additional Comments use of RW    Functional Mobility   Functional Mobility 4  Minimal assistance  (CGA)   Additional Comments Ax1, able to walk bed to chair, limited distance   Additional items Rolling walker   Balance   Static Sitting Good   Dynamic Sitting Fair +   Static Standing Poor +   Dynamic Standing Poor +   Ambulatory Poor +   Activity Tolerance   Activity Tolerance Patient tolerated treatment well;Patient limited by fatigue;Patient limited by pain   Medical Staff Made Aware KITTY ALLEN Assessment   RUE Assessment WFL   LUE Assessment   LUE Assessment WFL   Hand Function   Gross Motor Coordination Functional   Fine Motor Coordination Functional   Vision-Basic Assessment   Current Vision Wears glasses all the time   Vision - Complex Assessment   Acuity Able to read employee name badge without difficulty   Cognition   Overall Cognitive Status Impaired   Arousal/Participation Alert; Cooperative   Attention Within functional limits   Orientation Level Oriented X4   Memory Decreased short term memory   Following Commands Follows one step commands with increased time or repetition   Comments verbose and requiring min VC for redirection and attention to task  May benefit from formal cognitive assessment   Assessment   Limitation Decreased ADL status; Decreased Safe judgement during ADL;Decreased cognition;Decreased endurance;Decreased self-care trans;Decreased high-level ADLs   Prognosis Good   Assessment Pt is a 77 y o  female seen for OT evaluation s/p admission to [address] [place] [address] on 12/29/2021 due to SOB and LE edema  Pt diagnosed with Acute respiratory failure with hypoxia ([facility] )  Pt with 1 recent admissions in the last 2 months  Pt has a significant PMH impacting occupational performance including: CHF, LEXY, afib, cellulitis, chronic back pain  Pt with active OT evaluation and treatment orders and activity orders for Up and OOB as tolerated   PTA pt living with  in [facility], pt (I) with ADLs and shares IADLs, (-)falls, (-)drives, no use of AD at baseline  Pt is motivated to return to home with her   Personal and environmental factors supporting pt at time of IE include social support  Personal and environmental factors inhibiting engagement in occupations include lifestyle patterns, inaccessible home environment, inaccessible bathroom environment, difficulty completing ADLs and difficulty completing IADLs  During evaluation pt performed as is outlined above in flowsheet  Pt demonstrating good sitting balance, motivation to participate  Pt required VC for safety, VC for attention to task and PLB education  Standardized assessments used to assist in identifying performance deficits include AMPAC 6-Clicks and Barthel ADL Index  Performance deficits that affect the pts occupational performance during the initial evaluation include impaired balance, functional mobility, endurance, activity tolerance, functional standing tolerance and overall strength, attention to task, safety awareness and insight into deficits   Based on pts functional performance and deficits the following occupations will be addressed in OT treatments in order to maximize pts independence and overall occupational performance: grooming, bathing/showering, toileting and toilet hygiene, dressing, functional mobility and medication management  Goals are listed below  Upon discharge from acute care setting recommend d/c to post-acute rehabilitation services  This evaluation required an extensive review of medical and/or therapy records and additional review of physical, cognitive and psychosocial history related to functional performance  Based upon functional performance deficits and assessments, this evaluation has been identified as a high complexity evaluation  Goals   Patient Goals to go home   LTG Time Frame 10-14   Long Term Goal see goals listed below   Plan   Treatment Interventions ADL retraining;Functional transfer training; Endurance training;Cognitive reorientation;Patient/family training;Equipment evaluation/education; Compensatory technique education; Activityengagement; Energy conservation   Goal Expiration Date 01/13/22   OT Treatment Day 0   OT Frequency 3-5x/wk   Recommendation   OT Discharge Recommendation Post acute rehabilitation services   AM-PAC Daily Activity Inpatient   Lower Body Dressing 2   Bathing 3   Toileting 2   Upper Body Dressing 3   Grooming 3   Eating 4   Daily Activity Raw Score 17   Daily Activity Standardized Score (Calc for Raw Score >=11) 37 26   AM-PAC Applied Cognition Inpatient   Following a Speech/Presentation 3   Understanding Ordinary Conversation 4   Taking Medications 3   Remembering Where Things Are Placed or Put Away 2   Remembering List of 4-5 Errands 2   Taking Care of Complicated Tasks 2   Applied Cognition Raw Score 16   Applied Cognition Standardized Score 35 03   Barthel Index   Feeding 10   Bathing 0   Grooming Score 5   Dressing Score 5   Bladder Score 0   Bowels Score 10   Toilet Use Score 5   Transfers (Bed/Chair) Score 10   Mobility (Level Surface) Score 0   Stairs Score 0   Barthel Index Score 45 GOALS:       -Patient will be min A for bathing tasks while sitting at the sink in order to increase (I) with self care    -Patient will be mod I with UB dressing while sitting EOB in order to  increased (I) with self care    -Patient will be (S) with LB dressing while in sitting in order to increase (I) with self care    -Patient will be min A with all toileting tasks including clothing management and hygiene    -Patient will demonstrate (S) with bed mobility for ability to manage own comfort and initiate OOB tasks      -Patient will be (S) with functional mobility while retrieving items from closet/dresser in order to reduce caregiver burden    -Patient will demonstrate (S) with toilet transfer     -Patient will demonstrate standing for 3 min with mod I in order to increase active participation in functional activities    -Patient will engage in ongoing cognitive assessment in order to assist with safe discharge planning/recommendations     -Patient will follow multi-step instructions with no VC in order to safely complete functional tasks    -Patient will be mod I with medication management        The patient's raw score on the -PAC Daily Activity inpatient short form is 17, standardized score is 37 26, less than 39 4  Patients at this level are likely to benefit from discharge to post-acute rehabilitation services  Please refer to the recommendation of the Occupational Therapist for safe discharge planning        At the end of the session, all needs met and pt seated in bedside chair, chair alarm activated, LEs elevated  and call bell within reach    JANEEN Spivey/DENISE

## 2022-01-03 NOTE — ASSESSMENT & PLAN NOTE
Creatinine at 1 97 POA  Baseline appears to be around 1 0     Likely prerenal in setting of CHF exacerbation    Plan  -continue diuresis (see plans above)  -bladder scan  -monitor BMP
Creatinine at 1 97 POA  Baseline appears to be around 1 0     Likely prerenal in setting of CHF exacerbation    Plan  -continue diuresis as above
Creatinine at 1 97 POA  Baseline appears to be around 1 0  Likely prerenal in setting of CHF exacerbation  Resolved  Plan  - monitor a mari    BMP
Green purulent discharge from posterior right lower extremity with erythema  Not painful  Patient believes infection of right lower extremity began roughly 1 week ago, but is unsure     Leukocytosis POA  One dose of IV cefepime provided in ED    Plan  -IV cefazolin  -monitor right lower extremity  -follow-up blood/wound cultures
Green purulent discharge from posterior right lower extremity with erythema  Not painful  Patient believes infection of right lower extremity began roughly 1 week ago, but is unsure     Leukocytosis POA  One dose of IV cefepime provided in ED    Plan  -IV cefepime   -monitor right lower extremity  -wound culture ordered  -follow-up blood cultures
History of chronic back pain  Patient at home uses OxyContin 20 t i d  p r n  and Percocet  q4 p r n      Plan   - Continue home regimen with oxycodone scheduled   -referral sent for outpatient [facility]
POA, 5 2      Resolved    Plan   -continue 40 mEq b i d    -  BMP as outpatient for PCP
POA, Green purulent discharge from posterior right lower extremity with erythema  Not painful  Patient believes infection of right lower extremity began roughly 1 week ago, but is unsure     POA, Leukocytosis   One dose of IV cefepime provided in ED  Wound culture - Final result growing Proteus mirabilis, Enterococcus faecalis, Pseudomonas aeruginosa  Blood culture - final result negative after 5 days     Plan  -patient completed IV cefazolin total of 8 days antibiotic treatment during hospital stay  -monitor right lower extremity  -continue wound care -  follow wound care instructions
POA, O2 saturation at 80%   Patient complains of worsening shortness of breath both at rest and on exertion  Not on oxygen at home  Recent hospitalization for similar complaint  CXR -  B/L  Lower lobe consolidations,  Atelectasis VS PNA  Repeat CXR - persistent right-sided pneumonia  On eval, patient on room air at upper 90s  Ambulatory pulse ox resulted at 90% on room air      Plan  -follow-up with outpatient PCP
Patient complains of chronic back pain  Because back pain, patient unable to lay flat so she sleeps in a recliner at home  Sees pain management       Plan  -Continue home pain medications
Patient complains of chronic back pain  Patient unable to lay flat and sleeps on a recliner at home  Sees pain management  Home regimen includes OxyContin 20 t i d  p r n  and Percocet  q4 p r n  Jose Antonio Chavira     Plan  -Continue home pain medications   -referral sent for outpatient [facility] - patient to schedule appointment
Patient's O2 saturation at 80% POA  Patient complains of worsening shortness of breath both at rest and on exertion  Not on oxygen at home  Recent hospitalization for similar complaint  Plan  -BiPAP ordered, however currently weaned to 4 L O2 nasal cannula  -Continue to wean as tolerated     -maintain oxygen saturation at minimum 88%  -monitor vitals
Patient's O2 saturation at 80% POA  Patient complains of worsening shortness of breath both at rest and on exertion  Not on oxygen at home  Recent hospitalization for similar complaint  Plan  -BiPAP ordered, however currently weaned to 4 L O2 nasal cannula  Continue to wean as tolerated     -maintain oxygen saturation at minimum 88%  -monitor vitals
Recently diagnosed atrial fibrillation on warfarin  Most recent INR supratherapeutic at 8 66  INR 2 45 POA  Patient on warfarin 5 mg b i d  At home  Patient instructed to stop warfarin in setting of supratherapeutic INR         Plan  -Warfarin 5 mg once daily  -Monitor INR
Recently diagnosed atrial fibrillation on warfarin  Most recent INR supratherapeutic at 8 66  INR 2 45 POA  Patient on warfarin 5 mg b i d  At home  Patient instructed to stop warfarin in setting of supratherapeutic INR       Lab Results   Component Value Date    INR 3 02 (H) 01/01/2022    INR 2 68 (H) 12/31/2021    INR 2 45 (H) 12/30/2021    PROTIME 30 7 (H) 01/01/2022    PROTIME 28 0 (H) 12/31/2021    PROTIME 26 1 (H) 12/30/2021       Plan  -Held warfarin at 2 5 mg once today in setting of supra-therapeutic INR  -F/u AM INR, ordered to restart tomorrow  -Monitor INR
Recently diagnosed atrial fibrillation on warfarin  Most recent INR supratherapeutic at 8 66  INR 2 45 POA  Patient on warfarin 5 mg b i d  At home  Patient instructed to stop warfarin in setting of supratherapeutic INR       Lab Results   Component Value Date    INR 3 02 (H) 01/01/2022    INR 2 68 (H) 12/31/2021    INR 2 45 (H) 12/30/2021    PROTIME 30 7 (H) 01/01/2022    PROTIME 28 0 (H) 12/31/2021    PROTIME 26 1 (H) 12/30/2021       Plan  -Warfarin 5 mg once daily  -Monitor INR
Recently diagnosed atrial fibrillation on warfarin  Most recent INR supratherapeutic at 8 66  INR 2 45 POA  Patient on warfarin 5 mg b i d  At home  Patient instructed to stop warfarin in setting of supratherapeutic INR       Plan  -reintroduce warfarin 5 mg once daily  -repeat INR tomorrow
Recently diagnosed atrial fibrillation on warfarin  POA, INR 2 45   Patient on warfarin 10 mg daily at home  Patient instructed to stop warfarin in setting of supratherapeutic INR  Lab Results   Component Value Date    INR 2 67 (H) 01/06/2022    INR 2 63 (H) 01/05/2022    INR 3 09 (H) 01/04/2022    INR supratherapeutic  Goal is lower limit of 2-3  Improving       Plan  -Continue to hold warfarin for 2 days  -restart Coumadin on 01/08/2022   -F/u INR Monday 01/10/2022 - call PCP with results  -F/U PCP outpatient within 1 week
Wt Readings from Last 3 Encounters:   01/04/22 114 kg (251 lb 5 2 oz)   12/16/21 120 kg (264 lb 3 2 oz)   12/02/21 118 kg (260 lb)     BNP elevated at 1,860 POA  LEXY POA likely prerenal    Patient appears volume overloaded on exam   Chronic and worsening bilateral lower extremity edema  Dyspnea at rest and on exertion  Patient on torsemide 20 mg b i d  at home  Patient leads increasingly sedentary lifestyle which she attributes to her current state of health     Echo 11/04/2021 -  LVEF 65%, no regional wall abnormality, G1 DD    In setting of contraction alkalosis, 2 doses diamox received 1/2/2022 per cardiology    Plan   - continue Daily Torsemide 40 mg   - follow with outpatient PCP  - follow-up with outpatient cardiology  - continue fluid restriction 1800 mL daily, salt restriction 2 g daily at discharge
Wt Readings from Last 3 Encounters:   12/29/21 122 kg (270 lb)   12/16/21 120 kg (264 lb 3 2 oz)   12/02/21 118 kg (260 lb)     BNP elevated at 1,860 POA  LEXY POA likely prerenal    Patient appears volume overloaded on exam   Chronic and worsening bilateral lower extremity edema  Dyspnea at rest and on exertion  Patient on torsemide 20 mg b i d  at home  Patient leads increasingly sedentary lifestyle which she attributes to her current state of health       Plan  -Lasix 80 mg b i d   -In setting of contraction alkalosis, 2 doses diamox ordered for today per cardiology    -Hold diuretic tomorrow pending AM labs  -monitor I/O, monitor weight  -fluid restriction 1800 mL daily, salt restriction 2 g daily  -continue telemetry
Wt Readings from Last 3 Encounters:   12/29/21 122 kg (270 lb)   12/16/21 120 kg (264 lb 3 2 oz)   12/02/21 118 kg (260 lb)     BNP elevated at 1,860 POA  LEXY POA likely prerenal    Patient appears volume overloaded on exam   Chronic and worsening bilateral lower extremity edema  Dyspnea at rest and on exertion  Patient on torsemide 20 mg b i d  at home  Patient leads increasingly sedentary lifestyle which she attributes to her current state of health       Plan  -Lasix 80 mg b i d   -monitor I/O, monitor weight  -fluid restriction 1800 mL daily, salt restriction 2 g daily  -continue telemetry
Spouse

## 2022-01-03 NOTE — PROGRESS NOTES
Cardiology Progress Note - Team 1  Jake Rodríguez 77 y o  female MRN: [id number]  Unit/Bed#: W -01 Encounter: [id number]      Assessment/Plan:  1  Acute on chronic HFpEF  - secondary to dietary indescretion  - NT pro BNP 1860  - CXR 12/29 - B/L LL consolidations consistent with atelectasis or PNA; repeat pending  - echo 11/4/21 - LVEF 65%, no RWMA, grade 1 DD, Mild AR/MR//TR  - home diuretic regimen - torsemide 40 mg p o  daily which she has failed  - weight on admission 270 lb (stated) > 251 lb yesterday (bed scale); no am weight   - IV lasix currently on hold secondary to contraction alkalosis - now s/p 2 doses of diamox  - recommend continuing to hold loop diuretic and dose diamox this afternoon as patient is still volume up on exam - on 3L NC saturating 94% and above  - will check ABG in the morning  - currently net - 1 1L/24 hours - net -9L since admission  - continue carvedilol 25 mg po daily  - daily weights, strict I/Os, salt/fluid restriction    2  LEXY  - secondary to HF exacerbation  - creatinine on arrival 1 97 > 0 79; resolved  - continue to monitor urine output    3  Paroxysmal AF  - heart rates controlled in the 50s-60s  - on Coumadin for [facility] - on hold secondary to supratherapuetic INR (3 63)  - continue carvedilol for rate control    4  Hypertension  - last documented /60; stable  - continue amlodipine 5 mg po daily, carvedilol    5  Hypokalemia  - am K 3 0 s/p 60 meq po  - recheck K now   - maintain K > 4 - replete as necarssary    Subjective:   Patient seen and examined  No significant events overnight  Patient feels better overall  States that her legs feel better, but they are now painful to touch which is new for her  Patient denies wearing oxygen at home and is currently on 3L NC, but states that her breathing feels fine  She does complain of orthopnea and GONZALEZ  Objective:   Vitals: Blood pressure 128/60, pulse 59, temperature 97 7 °F (36 5 °C), resp   rate 17, height 5' 2" (1 575 m), weight 114 kg (251 lb 5 2 oz), SpO2 96 %  , Body mass index is 45 97 kg/m² ,   Orthostatic Blood Pressures      Most Recent Value   Blood Pressure 128/60 filed at 01/03/2022 0638   Patient Position - Orthostatic VS Lying filed at 01/01/2022 2052          Intake/Output Summary (Last 24 hours) at 1/3/2022 1159  Last data filed at 1/3/2022 1101  Gross per 24 hour   Intake 1636 ml   Output 2800 ml   Net -1164 ml     Physical Exam:  GEN: Hector Lockhart appears well, alert and oriented x 3, pleasant and cooperative   HEENT: pupils equal, round, and reactive to light; extraocular muscles intact  NECK: supple, no carotid bruits, no JVD  HEART: regular rhythm, normal S1 and S2, no murmurs, clicks, gallops or rubs   LUNGS: Diminished breath sounds bilateral bases; no wheezes, rales, or rhonchi, on 3 L NC  ABDOMEN: normal bowel sounds, soft, no tenderness, no distention, obese  EXTREMITIES: peripheral pulses normal; no clubbing, cyanosis, +1 b/l lower extremity edema/scailing/redness    Medications:    Current Facility-Administered Medications:     acetaminophen (TYLENOL) tablet 650 mg, 650 mg, Oral, Q6H PRN, Levy Karimi MD    albuterol (PROVENTIL HFA,VENTOLIN HFA) inhaler 2 puff, 2 puff, Inhalation, Q4H PRN, Messi Fernando MD    amLODIPine (NORVASC) tablet 5 mg, 5 mg, Oral, BID, Jg Bob Ellen, DO, 5 mg at 01/03/22 0844    carvedilol (COREG) tablet 25 mg, 25 mg, Oral, BID With Meals, Jg Bob Ellen, DO, 25 mg at 01/03/22 0844    ceFAZolin (ANCEF) IVPB (premix in dextrose) 2,000 mg 50 mL, 2,000 mg, Intravenous, Q8H, Lelo Heart MD, Stopped at 01/03/22 0198    ipratropium (ATROVENT) 0 02 % inhalation solution 0 5 mg, 0 5 mg, Nebulization, Once, Messi Fernando MD    [facility]) inhalation solution 1 25 mg, 1 25 mg, Nebulization, Once, Messi Fernando MD    nystatin (MYCOSTATIN) powder, , Topical, BID, Delmy Harrison MD, Given at 01/03/22 0839    ondansetron (ZOFRAN) injection 4 mg, 4 mg, Intravenous, Q6H PRN, Virgie Barroso MD    oxyCODONE (OxyCONTIN) 12 hr tablet 20 mg, 20 mg, Oral, Q8H [address], Dakota Hughes DO, 20 mg at 01/03/22 0520    oxyCODONE-acetaminophen (PERCOCET) 5-325 mg per tablet 1 tablet, 1 tablet, Oral, Q4H PRN, Dakota Hughes DO, 1 tablet at 01/01/22 1754    potassium chloride (K-DUR,KLOR-CON) CR tablet 20 mEq, 20 mEq, Oral, Once, Le Combs MD    [facility]) tablet 8 6 mg, 1 tablet, Oral, Daily, Virgie Barroso MD, 8 6 mg at 01/03/22 0844     Labs & Results:      Results from last 7 days   Lab Units 01/03/22  0534 01/02/22  0553 01/01/22  0558   WBC Thousand/uL 10 34* 9 77 9 53   HEMOGLOBIN g/dL 10 2* 10 1* 9 9*   HEMATOCRIT % 35 5 33 7* 33 3*   PLATELETS Thousands/uL 358 365 370         Results from last 7 days   Lab Units 01/03/22  0534 01/02/22  0553 01/01/22  0558 12/30/21  0609 12/29/21  2313 12/29/21  2040   POTASSIUM mmol/L 3 0* 3 6 3 7   < >  --  5 2   CHLORIDE mmol/L 100 100 102   < >  --  97*   CO2 mmol/L 39* 42* 37*   < >  --  27   CO2, I-STAT mmol/L  --   --   --   --  28  --    BUN mg/dL 11 14 23   < >  --  62*   CREATININE mg/dL 0 79 0 83 0 93   < >  --  1 97*   CALCIUM mg/dL 9 4 9 0 9 0   < >  --  9 6   ALK PHOS U/L 119*  --   --   --   --  128*   ALT U/L 14  --   --   --   --  24   AST U/L 17  --   --   --   --  29   GLUCOSE, ISTAT mg/dl  --   --   --   --  129  --     < > = values in this interval not displayed  Results from last 7 days   Lab Units 01/03/22  0534 01/02/22  0553 01/01/22  0558 12/31/21  0509 12/30/21  0159   INR  3 63* 3 39* 3 02*   < > 2 45*   PTT seconds  --   --   --   --  63*    < > = values in this interval not displayed       Results from last 7 days   Lab Units 12/29/21  2040   MAGNESIUM mg/dL 2 3

## 2022-01-03 NOTE — PLAN OF CARE
Problem: PHYSICAL THERAPY ADULT  Goal: Performs mobility at highest level of function for planned discharge setting  See evaluation for individualized goals  Description: Treatment/Interventions: Functional transfer training,LE strengthening/ROM,Therapeutic exercise,Endurance training,Patient/family training,Equipment eval/education,Bed mobility,Gait training  Equipment Recommended: Karlie Lockhart       See flowsheet documentation for full assessment, interventions and recommendations  Outcome: Progressing  Note: Prognosis: Fair  Problem List: Decreased strength,Decreased endurance,Impaired balance,Decreased mobility,Decreased cognition,Impaired sensation,Obesity,Decreased skin integrity,Pain  Assessment: Pt seen today for PT intervention w/ pt agreeable to participate  Upon arrival, pt OOB in recliner chair  Pt performed multiple STS transfer trials in/out of recliner chair w/ [facility]  Pt continues to require VC for hand placement  Pt progressed from [address] [place] [address] to [place] [address] for transfers  Compared to previous session, pt w/ increased ambulatory endurance  Pt ambulated multiple trials w/ RW progressing form Min Ax1 to CGA for ambulatory balance  VC for RW management while negotiating turns  Pt able to perform seated BLE therapeutic exercises to increase pt's LE strength and overall tolerance to physical activity  Pt will continue to benefit from skilled PT intervention to increase pt's independence w/ functional mobility  DC rec: post acute rehab  Barriers to Discharge: Inaccessible home environment        PT Discharge Recommendation: Post acute rehabilitation services          See flowsheet documentation for full assessment

## 2022-01-03 NOTE — PLAN OF CARE
Problem: OCCUPATIONAL THERAPY ADULT  Goal: Performs self-care activities at highest level of function for planned discharge setting  See evaluation for individualized goals  Description: Treatment Interventions: ADL retraining,Functional transfer training,Endurance training,Cognitive reorientation,Patient/family training,Equipment evaluation/education,Compensatory technique education,Activityengagement,Energy conservation          See flowsheet documentation for full assessment, interventions and recommendations  Outcome: Progressing  Note: Limitation: Decreased ADL status,Decreased Safe judgement during ADL,Decreased cognition,Decreased endurance,Decreased self-care trans,Decreased high-level ADLs  Prognosis: Good  Assessment: Pt is a 77 y o  female seen for OT evaluation s/p admission to [address] [place] [address] on 12/29/2021 due to SOB and LE edema  Pt diagnosed with Acute respiratory failure with hypoxia ([facility] [address] )  Pt with 1 recent admissions in the last 2 months  Pt has a significant PMH impacting occupational performance including: CHF, LEXY, afib, cellulitis, chronic back pain  Pt with active OT evaluation and treatment orders and activity orders for Up and OOB as tolerated   PTA pt living with  in [facility], pt (I) with ADLs and shares IADLs, (-)falls, (-)drives, no use of AD at baseline  Pt is motivated to return to home with her   Personal and environmental factors supporting pt at time of IE include social support  Personal and environmental factors inhibiting engagement in occupations include lifestyle patterns, inaccessible home environment, inaccessible bathroom environment, difficulty completing ADLs and difficulty completing IADLs  During evaluation pt performed as is outlined above in flowsheet  Pt demonstrating good sitting balance, motivation to participate  Pt required VC for safety, VC for attention to task and PLB education   Standardized assessments used to assist in identifying performance deficits include AMPAC 6-Clicks and Barthel ADL Index  Performance deficits that affect the pts occupational performance during the initial evaluation include impaired balance, functional mobility, endurance, activity tolerance, functional standing tolerance and overall strength, attention to task, safety awareness and insight into deficits  Based on pts functional performance and deficits the following occupations will be addressed in OT treatments in order to maximize pts independence and overall occupational performance: grooming, bathing/showering, toileting and toilet hygiene, dressing, functional mobility and medication management  Goals are listed below  Upon discharge from acute care setting recommend d/c to post-acute rehabilitation services  This evaluation required an extensive review of medical and/or therapy records and additional review of physical, cognitive and psychosocial history related to functional performance  Based upon functional performance deficits and assessments, this evaluation has been identified as a high complexity evaluation       OT Discharge Recommendation: Post acute rehabilitation services

## 2022-01-03 NOTE — PROGRESS NOTES
[facility]  Progress Note - [facility] [place] 1945, 77 y o  female MRN: [id number]  Unit/Bed#: W -Ashok Encounter: [id number]  Primary Care Provider: Krista Alonzo MD   Date and time admitted to hospital: 12/29/2021  8:10 PM    * Acute respiratory failure with hypoxia (HCC)  Assessment & Plan  POA, O2 saturation at 80%   Patient complains of worsening shortness of breath both at rest and on exertion  Not on oxygen at home  Recent hospitalization for similar complaint  CXR -  B/L  Lower lobe consolidations,  Atelectasis VS PNA  On eval, on 3 L NC sating at 85% during OT session  Plan  -Continue to wean as tolerated  -maintain oxygen saturation at minimum 88%  -monitor vitals  -DuoNeb Atrovent and Xopenex once today  -repeat CXR      Hypokalemia  Assessment & Plan  POA, 5 2  Today 3 0    Plan   - replete as needed  - repleted with 40 mEq in a m  and 20 mEq at noon  - a m  BMP    Opioid dependence due to Chronic back pain   Assessment & Plan  History of chronic back pain  Patient at home uses OxyContin 20 t i d  p r n  and Percocet  q4 p r n  Plan   - Continue home regimen with oxycodone scheduled    Chronic back pain  Assessment & Plan  Patient complains of chronic back pain  Patient unable to lay flat and sleeps on a recliner at home  Sees pain management  Home regimen includes OxyContin 20 t i d  p r n  and Percocet  q4 p r n  Tyrell Olivarez Plan  -Continue home pain medications -   With oxycodone scheduled    Cellulitis of right lower extremity  Assessment & Plan  POA, Green purulent discharge from posterior right lower extremity with erythema  Not painful  Patient believes infection of right lower extremity began roughly 1 week ago, but is unsure     POA, Leukocytosis   One dose of IV cefepime provided in ED  Wound culture - Final result growing Proteus mirabilis, Enterococcus faecalis, Pseudomonas aeruginosa  Blood culture - preliminary negative after 4 days     Plan  -IV cefazolin  -monitor right lower extremity  - a m  CBC  - wound care -  Daily cleaning with soap and water, hydr guard lower legs and periwound area of right leg  Covering with DSD and ABD pad, changing daily and p r n  with drainage or dislodgement  Atrial fibrillation [facility])  Assessment & Plan  Recently diagnosed atrial fibrillation on warfarin  POA, INR 2 45   Patient on warfarin 10 mg daily at home  Patient instructed to stop warfarin in setting of supratherapeutic INR  Lab Results   Component Value Date    INR 3 63 (H) 01/03/2022    INR 3 39 (H) 01/02/2022    INR 3 02 (H) 01/01/2022    INR supratherapeutic     Plan  -Continue to hold warfarin   -F/u AM INR     CHF exacerbation (HCC)  Assessment & Plan  Wt Readings from Last 3 Encounters:   01/02/22 114 kg (251 lb 5 2 oz)   12/16/21 120 kg (264 lb 3 2 oz)   12/02/21 118 kg (260 lb)     BNP elevated at 1,860 POA  LEXY POA likely prerenal    Patient appears volume overloaded on exam   Chronic and worsening bilateral lower extremity edema  Dyspnea at rest and on exertion  Patient on torsemide 20 mg b i d  at home  Patient leads increasingly sedentary lifestyle which she attributes to her current state of health  Echo 11/04/2021 -  LVEF 65%, no regional wall abnormality, G1 DD    In setting of contraction alkalosis, 2 doses diamox received 1/2/2022 per cardiology  Patient is net -9 L since admission    Plan  -Lasix currently held due to contraction alkalosis  - follow a m  labs  -monitor I/O, monitor weight  -fluid restriction 1800 mL daily, salt restriction 2 g daily    LEXY (acute kidney injury) (HCC)-resolved as of 1/3/2022  Assessment & Plan  Creatinine at 1 97 POA  Baseline appears to be around 1 0  Likely prerenal in setting of CHF exacerbation    Plan  - monitor a m  BMP         VTE Pharmacologic Prophylaxis: VTE Score: 12 High Risk (Score >/= 5) - Pharmacological DVT Prophylaxis Ordered: warfarin (Coumadin)  Sequential Compression Devices Ordered  Patient Centered Rounds: I performed bedside rounds with nursing staff today  Discussions with Specialists or Other Care Team Provider:  Cardiology    Education and Discussions with Family / Patient: Updated  () via phone  Current Length of Stay: 4 day(s)  Current Patient Status: Inpatient   Discharge Plan: Anticipate discharge in 24-48 hrs to rehab facility  Code Status: Level 1 - Full Code    Subjective:   Nursing team reported no events overnight  Patient reports right lower extremity pain, shortness of breath, cough is dry, and decreased appetite  Patient denied any headache, fever, abdominal pain  Patient was found on 3 L of oxygen and was saturating around 85  This was during occupational therapy session during which time she became more short of breath and saturation would decrease  Objective:     Vitals:   Temp (24hrs), Av 8 °F (36 6 °C), Min:97 7 °F (36 5 °C), Max:97 9 °F (36 6 °C)    Temp:  [97 7 °F (36 5 °C)-97 9 °F (36 6 °C)] 97 7 °F (36 5 °C)  HR:  [54-67] 59  Resp:  [17-18] 17  BP: (127-138)/(57-63) 128/60  SpO2:  [95 %-96 %] 96 %  Body mass index is 45 97 kg/m²  Input and Output Summary (last 24 hours): Intake/Output Summary (Last 24 hours) at 1/3/2022 1447  Last data filed at 1/3/2022 1306  Gross per 24 hour   Intake 1566 ml   Output 3000 ml   Net -1434 ml       Physical Exam:   Physical Exam  Vitals and nursing note reviewed  Constitutional:       General: She is not in acute distress  Appearance: She is morbidly obese  She is ill-appearing  She is not toxic-appearing or diaphoretic  HENT:      Head: Normocephalic and atraumatic  Mouth/Throat:      Mouth: Mucous membranes are moist       Pharynx: Oropharynx is clear  Eyes:      General: No scleral icterus  Conjunctiva/sclera: Conjunctivae normal       Pupils: Pupils are equal, round, and reactive to light     Cardiovascular:      Rate and Rhythm: Normal rate  Rhythm irregular  Pulses: Normal pulses  Heart sounds: Normal heart sounds  No murmur heard  No gallop  Pulmonary:      Effort: Pulmonary effort is normal  No respiratory distress  Breath sounds: Decreased breath sounds and wheezing (Bilateral basilar expiratory wheezes L>R ) present  No rales  Comments: On 3 L O2 nasal cannula  Abdominal:      General: Bowel sounds are normal  There is no distension  Palpations: Abdomen is soft  There is no mass  Tenderness: There is no abdominal tenderness  Musculoskeletal:         General: Normal range of motion  Cervical back: Normal range of motion and neck supple  Right lower leg: Edema (Nonpitting lymphedema) present  Left lower leg: Edema (Nonpitting lymphedema) present  Skin:     General: Skin is warm and dry  Capillary Refill: Capillary refill takes less than 2 seconds  Coloration: Skin is not jaundiced  Findings: Erythema (Bilateral lower extremities) present  Comments: Wound with gauze at posterior aspect of right lower extremity  Non-draining  Gauze sticking to wound  Tender to touch  Neurological:      General: No focal deficit present  Mental Status: She is alert and oriented to person, place, and time  Mental status is at baseline  Sensory: No sensory deficit  Psychiatric:         Mood and Affect: Mood normal          Behavior: Behavior normal          Thought Content: Thought content normal          Judgment: Judgment normal           Additional Data:     Labs:  Results from last 7 days   Lab Units 01/03/22  0534 01/01/22  0558 12/31/21  0509   WBC Thousand/uL 10 34*   < > 9 46   HEMOGLOBIN g/dL 10 2*   < > 9 8*   HEMATOCRIT % 35 5   < > 32 0*   PLATELETS Thousands/uL 358   < > 353   NEUTROS PCT %  --   --  71   LYMPHS PCT %  --   --  10*   MONOS PCT %  --   --  13*   EOS PCT %  --   --  6    < > = values in this interval not displayed       Results from last 7 days   Lab Units 01/03/22  0534   SODIUM mmol/L 143   POTASSIUM mmol/L 3 0*   CHLORIDE mmol/L 100   CO2 mmol/L 39*   BUN mg/dL 11   CREATININE mg/dL 0 79   ANION GAP mmol/L 4   CALCIUM mg/dL 9 4   ALBUMIN g/dL 3 0*   TOTAL BILIRUBIN mg/dL 0 24   ALK PHOS U/L 119*   ALT U/L 14   AST U/L 17   GLUCOSE RANDOM mg/dL 117     Results from last 7 days   Lab Units 01/03/22  0534   INR  3 63*             Results from last 7 days   Lab Units 12/31/21  0509 12/30/21  1440 12/30/21  0159   LACTIC ACID mmol/L  --   --  0 9   PROCALCITONIN ng/ml <0 05 <0 05  --        Lines/Drains:  Invasive Devices  Report    Peripheral Intravenous Line            Peripheral IV 01/02/22 Right;Ventral (anterior) Forearm 1 day          Drain            External Urinary Catheter -- days                      Imaging: Reviewed radiology reports from this admission including: chest xray    Recent Cultures (last 7 days):   Results from last 7 days   Lab Units 12/30/21  0837 12/30/21  0159   BLOOD CULTURE   --  No Growth After 4 Days  No Growth After 4 Days     GRAM STAIN RESULT  No polys seen*  1+ Gram negative rods*  Rare Gram positive cocci in pairs*  --    WOUND CULTURE  3+ Growth of Proteus mirabilis*  3+ Growth of Enterococcus faecalis*  2+ Growth of Pseudomonas aeruginosa*  3+ Growth of   --        Last 24 Hours Medication List:   Current Facility-Administered Medications   Medication Dose Route Frequency Provider Last Rate    acetaminophen  650 mg Oral Q6H PRN Danyel Elam MD      albuterol  2 puff Inhalation Q4H PRN Mo Lynne MD      amLODIPine  5 mg Oral BID Luke Hughes,       carvedilol  25 mg Oral BID With Meals Luke Hughes DO      cefazolin  2,000 mg Intravenous Q8H Juan Troy MD Stopped (01/03/22 1306)    nystatin   Topical BID Cortez Daniel MD      ondansetron  4 mg Intravenous Q6H PRN Danyel Elam MD      oxyCODONE  20 mg Oral [facility] Veto Fair, DO      oxyCODONE-acetaminophen 1 tablet Oral Q4H PRN DO Coretta Deal  1 tablet Oral Daily Kelley Palacio MD          Today, Patient Was Seen By: Jarad Aldana MD    **Please Note: This note may have been constructed using a voice recognition system  **

## 2022-01-03 NOTE — NURSING NOTE
Approached patient in order to assess pain, as patient called for pain medication  Asked patient for pain level, and then asked patient for baseline level of pain  Attempted to educate patient 3 different ways about baseline level of pain, patient still didn't understand the question  She expressed that as being "frustrated with the questioning  No one has ever questioned this before "      Went back as ordered in order to re-assess pain, pt stated "I am fine "  I advised patient that I need a number from 1-10, and patient stated, "not this again!"  Advised patient that pain is subjective and "fine" is not something that can be documented  Will continue to monitor

## 2022-01-04 LAB
ANION GAP SERPL CALCULATED.3IONS-SCNC: 8 MMOL/L (ref 4–13)
BACTERIA BLD CULT: NORMAL
BACTERIA BLD CULT: NORMAL
BUN SERPL-MCNC: 10 MG/DL (ref 5–25)
CALCIUM SERPL-MCNC: 9.5 MG/DL (ref 8.3–10.1)
CHLORIDE SERPL-SCNC: 102 MMOL/L (ref 100–108)
CO2 SERPL-SCNC: 33 MMOL/L (ref 21–32)
CREAT SERPL-MCNC: 0.76 MG/DL (ref 0.6–1.3)
ERYTHROCYTE [DISTWIDTH] IN BLOOD BY AUTOMATED COUNT: 14.8 % (ref 11.6–15.1)
GFR SERPL CREATININE-BSD FRML MDRD: 76 ML/MIN/1.73SQ M
GLUCOSE SERPL-MCNC: 105 MG/DL (ref 65–140)
HCT VFR BLD AUTO: 36.4 % (ref 34.8–46.1)
HGB BLD-MCNC: 10.5 G/DL (ref 11.5–15.4)
INR PPP: 3.09 (ref 0.84–1.19)
MAGNESIUM SERPL-MCNC: 2 MG/DL (ref 1.6–2.6)
MCH RBC QN AUTO: 26.4 PG (ref 26.8–34.3)
MCHC RBC AUTO-ENTMCNC: 28.8 G/DL (ref 31.4–37.4)
MCV RBC AUTO: 92 FL (ref 82–98)
PLATELET # BLD AUTO: 369 THOUSANDS/UL (ref 149–390)
PMV BLD AUTO: 10.6 FL (ref 8.9–12.7)
POTASSIUM SERPL-SCNC: 3.4 MMOL/L (ref 3.5–5.3)
PROTHROMBIN TIME: 31.2 SECONDS (ref 11.6–14.5)
RBC # BLD AUTO: 3.98 MILLION/UL (ref 3.81–5.12)
SODIUM SERPL-SCNC: 143 MMOL/L (ref 136–145)
WBC # BLD AUTO: 8.62 THOUSAND/UL (ref 4.31–10.16)

## 2022-01-04 PROCEDURE — 99232 SBSQ HOSP IP/OBS MODERATE 35: CPT | Performed by: HOSPITALIST

## 2022-01-04 PROCEDURE — 99231 SBSQ HOSP IP/OBS SF/LOW 25: CPT | Performed by: INTERNAL MEDICINE

## 2022-01-04 PROCEDURE — 80048 BASIC METABOLIC PNL TOTAL CA: CPT

## 2022-01-04 PROCEDURE — 85610 PROTHROMBIN TIME: CPT

## 2022-01-04 PROCEDURE — 85027 COMPLETE CBC AUTOMATED: CPT

## 2022-01-04 PROCEDURE — 83735 ASSAY OF MAGNESIUM: CPT

## 2022-01-04 RX ORDER — TORSEMIDE 20 MG/1
40 TABLET ORAL ONCE
Status: COMPLETED | OUTPATIENT
Start: 2022-01-04 | End: 2022-01-04

## 2022-01-04 RX ORDER — POTASSIUM CHLORIDE 20 MEQ/1
20 TABLET, EXTENDED RELEASE ORAL ONCE
Status: COMPLETED | OUTPATIENT
Start: 2022-01-04 | End: 2022-01-04

## 2022-01-04 RX ADMIN — CEFAZOLIN SODIUM 2000 MG: 2 SOLUTION INTRAVENOUS at 05:13

## 2022-01-04 RX ADMIN — AMLODIPINE BESYLATE 5 MG: 5 TABLET ORAL at 09:04

## 2022-01-04 RX ADMIN — OXYCODONE HYDROCHLORIDE 20 MG: 20 TABLET, FILM COATED, EXTENDED RELEASE ORAL at 14:34

## 2022-01-04 RX ADMIN — POTASSIUM CHLORIDE 20 MEQ: 1500 TABLET, EXTENDED RELEASE ORAL at 12:20

## 2022-01-04 RX ADMIN — CEFAZOLIN SODIUM 2000 MG: 2 SOLUTION INTRAVENOUS at 14:37

## 2022-01-04 RX ADMIN — TORSEMIDE 40 MG: 20 TABLET ORAL at 16:56

## 2022-01-04 RX ADMIN — NYSTATIN: 100000 POWDER TOPICAL at 09:03

## 2022-01-04 RX ADMIN — NYSTATIN: 100000 POWDER TOPICAL at 17:03

## 2022-01-04 RX ADMIN — AMLODIPINE BESYLATE 5 MG: 5 TABLET ORAL at 21:29

## 2022-01-04 RX ADMIN — CEFAZOLIN SODIUM 2000 MG: 2 SOLUTION INTRAVENOUS at 21:33

## 2022-01-04 RX ADMIN — CARVEDILOL 25 MG: 12.5 TABLET, FILM COATED ORAL at 16:56

## 2022-01-04 RX ADMIN — OXYCODONE HYDROCHLORIDE 20 MG: 20 TABLET, FILM COATED, EXTENDED RELEASE ORAL at 21:32

## 2022-01-04 RX ADMIN — CARVEDILOL 25 MG: 12.5 TABLET, FILM COATED ORAL at 09:04

## 2022-01-04 RX ADMIN — STANDARDIZED SENNA CONCENTRATE 8.6 MG: 8.6 TABLET ORAL at 09:04

## 2022-01-04 RX ADMIN — OXYCODONE HYDROCHLORIDE 20 MG: 20 TABLET, FILM COATED, EXTENDED RELEASE ORAL at 05:13

## 2022-01-04 NOTE — PLAN OF CARE
Problem: MOBILITY - ADULT  Goal: Maintain or return to baseline ADL function  Description: INTERVENTIONS:  -  Assess patient's ability to carry out ADLs; assess patient's baseline for ADL function and identify physical deficits which impact ability to perform ADLs (bathing, care of mouth/teeth, toileting, grooming, dressing, etc )  - Assess/evaluate cause of self-care deficits   - Assess range of motion  - Assess patient's mobility; develop plan if impaired  - Assess patient's need for assistive devices and provide as appropriate  - Encourage maximum independence but intervene and supervise when necessary  - Involve family in performance of ADLs  - Assess for home care needs following discharge   - Consider OT consult to assist with ADL evaluation and planning for discharge  - Provide patient education as appropriate  Outcome: Progressing  Goal: Maintains/Returns to pre admission functional level  Description: INTERVENTIONS:  - Perform BMAT or MOVE assessment daily    - Set and communicate daily mobility goal to care team and patient/family/caregiver  - Collaborate with rehabilitation services on mobility goals if consulted  - Perform Range of Motion 4 times a day  - Reposition patient every 2 hours    - Dangle patient 4 times a day  - Stand patient 4 times a day    - Out of bed to chair 3 times a day   - Out of bed for meals 3 times a day  - Out of bed for toileting  - Record patient progress and toleration of activity level   Outcome: Progressing     Problem: Potential for Falls  Goal: Patient will remain free of falls  Description: INTERVENTIONS:  -  Assess patient's ability to carry out ADLs; assess patient's baseline for ADL function and identify physical deficits which impact ability to perform ADLs (bathing, care of mouth/teeth, toileting, grooming, dressing, etc )  - Assess/evaluate cause of self-care deficits   - Assess range of motion  - Assess patient's mobility; develop plan if impaired  - Assess patient's need for assistive devices and provide as appropriate  - Encourage maximum independence but intervene and supervise when necessary  - Involve family in performance of ADLs  - Assess for home care needs following discharge   - Consider OT consult to assist with ADL evaluation and planning for discharge  - Provide patient education as appropriate  Outcome: Progressing     Problem: Prexisting or High Potential for Compromised Skin Integrity  Goal: Skin integrity is maintained or improved  Description: INTERVENTIONS:  - Identify patients at risk for skin breakdown  - Assess and monitor skin integrity  - Assess and monitor nutrition and hydration status  - Monitor labs   - Assess for incontinence   - Turn and reposition patient  - Assist with mobility/ambulation  - Relieve pressure over bony prominences  - Avoid friction and shearing  - Provide appropriate hygiene as needed including keeping skin clean and dry  - Evaluate need for skin moisturizer/barrier cream  - Collaborate with interdisciplinary team   - Patient/family teaching  - Consider wound care consult   Outcome: Progressing     Problem: PAIN - ADULT  Goal: Verbalizes/displays adequate comfort level or baseline comfort level  Description: Interventions:  - Encourage patient to monitor pain and request assistance  - Assess pain using appropriate pain scale  - Administer analgesics based on type and severity of pain and evaluate response  - Implement non-pharmacological measures as appropriate and evaluate response  - Consider cultural and social influences on pain and pain management  - Notify physician/advanced practitioner if interventions unsuccessful or patient reports new pain  Outcome: Progressing     Problem: INFECTION - ADULT  Goal: Absence or prevention of progression during hospitalization  Description: INTERVENTIONS:  - Assess and monitor for signs and symptoms of infection  - Monitor lab/diagnostic results  - Monitor all insertion sites, i e  indwelling lines, tubes, and drains  - Monitor endotracheal if appropriate and nasal secretions for changes in amount and color  - [place] appropriate cooling/warming therapies per order  - Administer medications as ordered  - Instruct and encourage patient and family to use good hand hygiene technique  - Identify and instruct in appropriate isolation precautions for identified infection/condition  Outcome: Progressing  Goal: Absence of fever/infection during neutropenic period  Description: INTERVENTIONS:  - Monitor WBC    Outcome: Progressing     Problem: SAFETY ADULT  Goal: Maintain or return to baseline ADL function  Description: INTERVENTIONS:  -  Assess patient's ability to carry out ADLs; assess patient's baseline for ADL function and identify physical deficits which impact ability to perform ADLs (bathing, care of mouth/teeth, toileting, grooming, dressing, etc )  - Assess/evaluate cause of self-care deficits   - Assess range of motion  - Assess patient's mobility; develop plan if impaired  - Assess patient's need for assistive devices and provide as appropriate  - Encourage maximum independence but intervene and supervise when necessary  - Involve family in performance of ADLs  - Assess for home care needs following discharge   - Consider OT consult to assist with ADL evaluation and planning for discharge  - Provide patient education as appropriate  Outcome: Progressing  Goal: Maintains/Returns to pre admission functional level  Description: INTERVENTIONS:  - Perform BMAT or MOVE assessment daily    - Set and communicate daily mobility goal to care team and patient/family/caregiver     - Collaborate with rehabilitation services on mobility goals if consulted    Outcome: Progressing  Goal: Patient will remain free of falls  Description: INTERVENTIONS:  -  Assess patient's ability to carry out ADLs; assess patient's baseline for ADL function and identify physical deficits which impact ability to perform ADLs (bathing, care of mouth/teeth, toileting, grooming, dressing, etc )  - Assess/evaluate cause of self-care deficits   - Assess range of motion  - Assess patient's mobility; develop plan if impaired  - Assess patient's need for assistive devices and provide as appropriate  - Encourage maximum independence but intervene and supervise when necessary  - Involve family in performance of ADLs  - Assess for home care needs following discharge   - Consider OT consult to assist with ADL evaluation and planning for discharge  - Provide patient education as appropriate  Outcome: Progressing     Problem: DISCHARGE PLANNING  Goal: Discharge to home or other facility with appropriate resources  Description: INTERVENTIONS:  - Identify barriers to discharge w/patient and caregiver  - Arrange for needed discharge resources and transportation as appropriate  - Identify discharge learning needs (meds, wound care, etc )  - Arrange for interpretive services to assist at discharge as needed  - Refer to Case Management Department for coordinating discharge planning if the patient needs post-hospital services based on physician/advanced practitioner order or complex needs related to functional status, cognitive ability, or social support system  Outcome: Progressing     Problem: Knowledge Deficit  Goal: Patient/family/caregiver demonstrates understanding of disease process, treatment plan, medications, and discharge instructions  Description: Complete learning assessment and assess knowledge base  Interventions:  - Provide teaching at level of understanding  - Provide teaching via preferred learning methods  Outcome: Progressing     Problem: Nutrition/Hydration-ADULT  Goal: Nutrient/Hydration intake appropriate for improving, restoring or maintaining nutritional needs  Description: Monitor and assess patient's nutrition/hydration status for malnutrition   Collaborate with interdisciplinary team and initiate plan and interventions as ordered  Monitor patient's weight and dietary intake as ordered or per policy  Utilize nutrition screening tool and intervene as necessary  Determine patient's food preferences and provide high-protein, high-caloric foods as appropriate       INTERVENTIONS:  - Monitor oral intake, urinary output, labs, and treatment plans  - Assess nutrition and hydration status and recommend course of action  - Evaluate amount of meals eaten  - Assist patient with eating if necessary   - Allow adequate time for meals  - Recommend/ encourage appropriate diets, oral nutritional supplements, and vitamin/mineral supplements  - Order, calculate, and assess calorie counts as needed  - Recommend, monitor, and adjust tube feedings and TPN/PPN based on assessed needs  - Assess need for intravenous fluids  - Provide specific nutrition/hydration education as appropriate  - Include patient/family/caregiver in decisions related to nutrition  Outcome: Progressing

## 2022-01-04 NOTE — NURSING NOTE
Patient called c/o intermittent dizziness, and generally "not feeling good "  Patient stated that this has happened "a couple of times," during this admission  Patient denies SOB or chest pain  Noteworthy to mention patient was given 1 Percocet 4 hours ago  Checked VS, stable currently  Offers no other complaints, as patient in bed with no risk of falls at this time  Sent TT to [facility] after hours to advise  Will continue to monitor

## 2022-01-04 NOTE — PROGRESS NOTES
[facility]  [facility] 1945[facility] 77 [facility]  female MRN: [id number]  Unit/Bed#: W -Ashok Encounter: [id number]  Primary Care Provider: Aris Galeazzi, MD   Date and time admitted to hospital: 12/29/2021  8:10 PM    * Acute respiratory failure with hypoxia (HCC)  Assessment & Plan  POA, O2 saturation at 80%   Patient complains of worsening shortness of breath both at rest and on exertion  Not on oxygen at home  Recent hospitalization for similar complaint  CXR -  B/L  Lower lobe consolidations,  Atelectasis VS PNA  On eval, on 3 L NC sating at 85% during OT session  Repeat CXR - persistent right-sided pneumonia    Plan  -Continue to wean as tolerated  -maintain oxygen saturation at minimum 88%  -monitor vitals      Hypokalemia  Assessment & Plan  POA, 5 2  Today 3 4    Plan   - replete as needed  - repleted 20 mEq p o   - a m  BMP    Opioid dependence due to Chronic back pain   Assessment & Plan  History of chronic back pain  Patient at home uses OxyContin 20 t i d  p r n  and Percocet  q4 p r n  Plan   - Continue home regimen with oxycodone scheduled     Chronic back pain  Assessment & Plan  Patient complains of chronic back pain  Patient unable to lay flat and sleeps on a recliner at home  Sees pain management  Home regimen includes OxyContin 20 t i d  p r n  and Percocet  q4 p r n  Tiffany Serum Plan  -Continue home pain medications -  With oxycodone scheduled    Cellulitis of right lower extremity  Assessment & Plan  POA, Green purulent discharge from posterior right lower extremity with erythema  Not painful  Patient believes infection of right lower extremity began roughly 1 week ago, but is unsure     POA, Leukocytosis   One dose of IV cefepime provided in ED  Wound culture - Final result growing Proteus mirabilis, Enterococcus faecalis, Pseudomonas aeruginosa  Blood culture - final result negative after 5 days     Plan  -IV cefazolin  -monitor right lower extremity  - a m  CBC  - wound care -  Daily cleaning with soap and water, hydr guard lower legs and periwound area of right leg  Covering with DSD and ABD pad, changing daily and p r n  with drainage or dislodgement  Atrial fibrillation [facility])  Assessment & Plan  Recently diagnosed atrial fibrillation on warfarin  POA, INR 2 45   Patient on warfarin 10 mg daily at home  Patient instructed to stop warfarin in setting of supratherapeutic INR  Lab Results   Component Value Date    INR 3 09 (H) 01/04/2022    INR 3 63 (H) 01/03/2022    INR 3 39 (H) 01/02/2022    INR supratherapeutic  Goal is lower limit of 2-3  Improving  Plan  -Continue to hold warfarin   -F/u AM INR     CHF exacerbation [facility])  Assessment & Plan  Wt Readings from Last 3 Encounters:   01/04/22 114 kg (251 lb 5 2 oz)   12/16/21 120 kg (264 lb 3 2 oz)   12/02/21 118 kg (260 lb)     BNP elevated at 1,860 POA  LEXY POA likely prerenal    Patient appears volume overloaded on exam   Chronic and worsening bilateral lower extremity edema  Dyspnea at rest and on exertion  Patient on torsemide 20 mg b i d  at home  Patient leads increasingly sedentary lifestyle which she attributes to her current state of health  Echo 11/04/2021 -  LVEF 65%, no regional wall abnormality, G1 DD    In setting of contraction alkalosis, 2 doses diamox received 1/2/2022 per cardiology    Plan  - cardiology recommendations appreciated  -trial 1 dose of torsemide today, reassess in a m  For continued torsemide 40 mg daily  - follow a m  labs  -monitor I/O, monitor weight  -fluid restriction 1800 mL daily, salt restriction 2 g daily    LEXY (acute kidney injury) (HCC)-resolved as of 1/3/2022  Assessment & Plan  Creatinine at 1 97 POA  Baseline appears to be around 1 0  Likely prerenal in setting of CHF exacerbation  Resolved  Plan  - monitor a m    BMP         VTE Pharmacologic Prophylaxis: VTE Score: 12 High Risk (Score >/= 5) - Pharmacological DVT Prophylaxis Ordered: warfarin (Coumadin)  Sequential Compression Devices Ordered  Patient Centered Rounds: I performed bedside rounds with nursing staff today  Discussions with Specialists or Other Care Team Provider:  Cardiology    Education and Discussions with Family / Patient: Updated  (daughter) at bedside  Current Length of Stay: 5 day(s)  Current Patient Status: Inpatient   Discharge Plan: Anticipate discharge in 24-48 hrs to rehab facility  Code Status: Level 1 - Full Code    Subjective:   Nursing team reported no events overnight  Patient reports improvement in her right lower extremity pain, shortness of breath, and dry cough  Patient denied any headache, fever, abdominal pain  Patient was found on 3 L of oxygen  Patient reports feeling overall improved  Objective:     Vitals:   Temp (24hrs), Av °F (36 7 °C), Min:97 6 °F (36 4 °C), Max:98 3 °F (36 8 °C)    Temp:  [97 6 °F (36 4 °C)-98 3 °F (36 8 °C)] 98 3 °F (36 8 °C)  HR:  [58-71] 71  Resp:  [16-17] 16  BP: (117-162)/(50-83) 117/50  SpO2:  [93 %-96 %] 96 %  Body mass index is 45 97 kg/m²  Input and Output Summary (last 24 hours): Intake/Output Summary (Last 24 hours) at 2022 1527  Last data filed at 2022 0900  Gross per 24 hour   Intake 230 ml   Output 450 ml   Net -220 ml       Physical Exam:   Physical Exam  Vitals and nursing note reviewed  Constitutional:       General: She is not in acute distress  Appearance: She is morbidly obese  She is ill-appearing  She is not toxic-appearing or diaphoretic  HENT:      Head: Normocephalic and atraumatic  Mouth/Throat:      Mouth: Mucous membranes are moist       Pharynx: Oropharynx is clear  Eyes:      General: No scleral icterus  Conjunctiva/sclera: Conjunctivae normal       Pupils: Pupils are equal, round, and reactive to light  Cardiovascular:      Rate and Rhythm: Normal rate  Rhythm irregular        Pulses: Normal pulses  Heart sounds: Normal heart sounds  No murmur heard  No gallop  Pulmonary:      Effort: Pulmonary effort is normal  No respiratory distress  Breath sounds: Decreased breath sounds and rales (At right base) present  No wheezing  Comments: On 3 L O2 nasal cannula  Abdominal:      General: Bowel sounds are normal  There is no distension  Palpations: Abdomen is soft  There is no mass  Tenderness: There is no abdominal tenderness  Musculoskeletal:         General: Tenderness ( over right LE posteriorly/wound area) present  Normal range of motion  Cervical back: Normal range of motion and neck supple  Right lower leg: Edema (Nonpitting lymphedema) present  Left lower leg: Edema (Nonpitting lymphedema) present  Skin:     General: Skin is warm and dry  Capillary Refill: Capillary refill takes less than 2 seconds  Coloration: Skin is not jaundiced  Findings: Erythema (Bilateral lower extremities) present  Comments: Wound with gauze at posterior aspect of right lower extremity  Non-draining  Neurological:      General: No focal deficit present  Mental Status: She is alert and oriented to person, place, and time  Mental status is at baseline  Sensory: No sensory deficit  Psychiatric:         Mood and Affect: Mood normal          Behavior: Behavior normal          Thought Content: Thought content normal          Judgment: Judgment normal           Additional Data:     Labs:  Results from last 7 days   Lab Units 01/04/22  0515 01/01/22  0558 12/31/21  0509   WBC Thousand/uL 8 62   < > 9 46   HEMOGLOBIN g/dL 10 5*   < > 9 8*   HEMATOCRIT % 36 4   < > 32 0*   PLATELETS Thousands/uL 369   < > 353   NEUTROS PCT %  --   --  71   LYMPHS PCT %  --   --  10*   MONOS PCT %  --   --  13*   EOS PCT %  --   --  6    < > = values in this interval not displayed       Results from last 7 days   Lab Units 01/04/22  0515 01/03/22  1431 01/03/22  0534   SODIUM mmol/L 143  --  143   POTASSIUM mmol/L 3 4*   < > 3 0*   CHLORIDE mmol/L 102  --  100   CO2 mmol/L 33*  --  39*   BUN mg/dL 10  --  11   CREATININE mg/dL 0 76  --  0 79   ANION GAP mmol/L 8  --  4   CALCIUM mg/dL 9 5  --  9 4   ALBUMIN g/dL  --   --  3 0*   TOTAL BILIRUBIN mg/dL  --   --  0 24   ALK PHOS U/L  --   --  119*   ALT U/L  --   --  14   AST U/L  --   --  17   GLUCOSE RANDOM mg/dL 105  --  117    < > = values in this interval not displayed  Results from last 7 days   Lab Units 01/04/22  0515   INR  3 09*             Results from last 7 days   Lab Units 12/31/21  0509 12/30/21  1440 12/30/21  0159   LACTIC ACID mmol/L  --   --  0 9   PROCALCITONIN ng/ml <0 05 <0 05  --        Lines/Drains:  Invasive Devices  Report    Peripheral Intravenous Line            Peripheral IV 01/02/22 Right;Ventral (anterior) Forearm 2 days          Drain            External Urinary Catheter 1 day                      Imaging: Reviewed radiology reports from this admission including: chest xray    Recent Cultures (last 7 days):   Results from last 7 days   Lab Units 12/30/21  0837 12/30/21  0159   BLOOD CULTURE   --  No Growth After 5 Days  No Growth After 5 Days     GRAM STAIN RESULT  No polys seen*  1+ Gram negative rods*  Rare Gram positive cocci in pairs*  --    WOUND CULTURE  3+ Growth of Proteus mirabilis*  3+ Growth of Enterococcus faecalis*  2+ Growth of Pseudomonas aeruginosa*  3+ Growth of   --        Last 24 Hours Medication List:   Current Facility-Administered Medications   Medication Dose Route Frequency Provider Last Rate    acetaminophen  650 mg Oral Q6H PRN Alexa Trimble MD      albuterol  2 puff Inhalation Q4H PRN Dewayne Marsh MD      amLODIPine  5 mg Oral BID Tanda Puls Ellen, DO      carvedilol  25 mg Oral BID With Meals Tanda Puls Yocha Dehe, DO      cefazolin  2,000 mg Intravenous Benjamin Sandoval MD 2,000 mg (01/04/22 8647)    nystatin   Topical BID MD Miryam Man ondansetron  4 mg Intravenous Q6H PRN Shaye Hayden MD      oxyCODONE  20 mg Oral [facility]      oxyCODONE-acetaminophen  1 tablet Oral Q4H PRN Velgagan Frontier DO      senna  1 tablet Oral Daily Shaye Hayden MD      torsemide  40 mg Oral Once SHAHIDA Lewis          Today, Patient Was Seen By: Harry Woody MD    **Please Note: This note may have been constructed using a voice recognition system  **

## 2022-01-05 LAB
ANION GAP SERPL CALCULATED.3IONS-SCNC: 7 MMOL/L (ref 4–13)
BUN SERPL-MCNC: 11 MG/DL (ref 5–25)
CALCIUM SERPL-MCNC: 9.5 MG/DL (ref 8.3–10.1)
CHLORIDE SERPL-SCNC: 102 MMOL/L (ref 100–108)
CO2 SERPL-SCNC: 34 MMOL/L (ref 21–32)
CREAT SERPL-MCNC: 0.81 MG/DL (ref 0.6–1.3)
ERYTHROCYTE [DISTWIDTH] IN BLOOD BY AUTOMATED COUNT: 14.6 % (ref 11.6–15.1)
FLUAV RNA RESP QL NAA+PROBE: NEGATIVE
FLUBV RNA RESP QL NAA+PROBE: NEGATIVE
GFR SERPL CREATININE-BSD FRML MDRD: 71 ML/MIN/1.73SQ M
GLUCOSE SERPL-MCNC: 109 MG/DL (ref 65–140)
HCT VFR BLD AUTO: 34.6 % (ref 34.8–46.1)
HGB BLD-MCNC: 10.3 G/DL (ref 11.5–15.4)
INR PPP: 2.63 (ref 0.84–1.19)
MCH RBC QN AUTO: 26.8 PG (ref 26.8–34.3)
MCHC RBC AUTO-ENTMCNC: 29.8 G/DL (ref 31.4–37.4)
MCV RBC AUTO: 90 FL (ref 82–98)
PLATELET # BLD AUTO: 351 THOUSANDS/UL (ref 149–390)
PMV BLD AUTO: 10.7 FL (ref 8.9–12.7)
POTASSIUM SERPL-SCNC: 3.2 MMOL/L (ref 3.5–5.3)
PROTHROMBIN TIME: 27.7 SECONDS (ref 11.6–14.5)
RBC # BLD AUTO: 3.84 MILLION/UL (ref 3.81–5.12)
RSV RNA RESP QL NAA+PROBE: NEGATIVE
SARS-COV-2 RNA RESP QL NAA+PROBE: NEGATIVE
SODIUM SERPL-SCNC: 143 MMOL/L (ref 136–145)
WBC # BLD AUTO: 9.76 THOUSAND/UL (ref 4.31–10.16)

## 2022-01-05 PROCEDURE — 99232 SBSQ HOSP IP/OBS MODERATE 35: CPT | Performed by: HOSPITALIST

## 2022-01-05 PROCEDURE — 85610 PROTHROMBIN TIME: CPT

## 2022-01-05 PROCEDURE — 80048 BASIC METABOLIC PNL TOTAL CA: CPT

## 2022-01-05 PROCEDURE — 97530 THERAPEUTIC ACTIVITIES: CPT

## 2022-01-05 PROCEDURE — 97110 THERAPEUTIC EXERCISES: CPT

## 2022-01-05 PROCEDURE — 85027 COMPLETE CBC AUTOMATED: CPT

## 2022-01-05 PROCEDURE — 97116 GAIT TRAINING THERAPY: CPT

## 2022-01-05 PROCEDURE — 99232 SBSQ HOSP IP/OBS MODERATE 35: CPT | Performed by: INTERNAL MEDICINE

## 2022-01-05 PROCEDURE — 0241U HB NFCT DS VIR RESP RNA 4 TRGT: CPT

## 2022-01-05 RX ORDER — POTASSIUM CHLORIDE 20 MEQ/1
40 TABLET, EXTENDED RELEASE ORAL ONCE
Status: COMPLETED | OUTPATIENT
Start: 2022-01-05 | End: 2022-01-05

## 2022-01-05 RX ORDER — TORSEMIDE 20 MG/1
40 TABLET ORAL DAILY
Status: DISCONTINUED | OUTPATIENT
Start: 2022-01-05 | End: 2022-01-06 | Stop reason: HOSPADM

## 2022-01-05 RX ORDER — POTASSIUM CHLORIDE 20 MEQ/1
40 TABLET, EXTENDED RELEASE ORAL 2 TIMES DAILY
Status: DISCONTINUED | OUTPATIENT
Start: 2022-01-05 | End: 2022-01-06 | Stop reason: HOSPADM

## 2022-01-05 RX ADMIN — CARVEDILOL 25 MG: 12.5 TABLET, FILM COATED ORAL at 09:44

## 2022-01-05 RX ADMIN — CEFAZOLIN SODIUM 2000 MG: 2 SOLUTION INTRAVENOUS at 22:06

## 2022-01-05 RX ADMIN — TORSEMIDE 40 MG: 20 TABLET ORAL at 09:47

## 2022-01-05 RX ADMIN — NYSTATIN: 100000 POWDER TOPICAL at 17:07

## 2022-01-05 RX ADMIN — NYSTATIN: 100000 POWDER TOPICAL at 09:45

## 2022-01-05 RX ADMIN — CEFAZOLIN SODIUM 2000 MG: 2 SOLUTION INTRAVENOUS at 05:38

## 2022-01-05 RX ADMIN — STANDARDIZED SENNA CONCENTRATE 8.6 MG: 8.6 TABLET ORAL at 09:45

## 2022-01-05 RX ADMIN — CARVEDILOL 25 MG: 12.5 TABLET, FILM COATED ORAL at 17:07

## 2022-01-05 RX ADMIN — OXYCODONE HYDROCHLORIDE 20 MG: 20 TABLET, FILM COATED, EXTENDED RELEASE ORAL at 05:37

## 2022-01-05 RX ADMIN — POTASSIUM CHLORIDE 40 MEQ: 1500 TABLET, EXTENDED RELEASE ORAL at 09:45

## 2022-01-05 RX ADMIN — OXYCODONE HYDROCHLORIDE 20 MG: 20 TABLET, FILM COATED, EXTENDED RELEASE ORAL at 22:06

## 2022-01-05 RX ADMIN — OXYCODONE HYDROCHLORIDE 20 MG: 20 TABLET, FILM COATED, EXTENDED RELEASE ORAL at 15:00

## 2022-01-05 RX ADMIN — AMLODIPINE BESYLATE 5 MG: 5 TABLET ORAL at 09:45

## 2022-01-05 RX ADMIN — CEFAZOLIN SODIUM 2000 MG: 2 SOLUTION INTRAVENOUS at 14:59

## 2022-01-05 RX ADMIN — OXYCODONE HYDROCHLORIDE AND ACETAMINOPHEN 1 TABLET: 5; 325 TABLET ORAL at 20:35

## 2022-01-05 RX ADMIN — AMLODIPINE BESYLATE 5 MG: 5 TABLET ORAL at 20:36

## 2022-01-05 RX ADMIN — POTASSIUM CHLORIDE 40 MEQ: 1500 TABLET, EXTENDED RELEASE ORAL at 17:07

## 2022-01-05 NOTE — CASE MANAGEMENT
Case Management Discharge Planning Note    Patient name Hector Sauer W MS 66/W -01 MRN [id number]  : 1945 Date 2022       Current Admission Date: 2021  Current Admission Diagnosis:Acute respiratory failure with hypoxia [facility])   Patient Active Problem List    Diagnosis Date Noted    Opioid dependence due to Chronic back pain  2022    Hypokalemia     Diastolic CHF exacerbation ([address] ) 2021    Atrial fibrillation ([address] ) 2021    Cellulitis of right lower extremity 2021    Chronic back pain 2021    Mixed hyperlipidemia 2021    Obesity 2021    Osteoarthritis of knee 2021     LEXY on CKD (chronic kidney disease) stage 2, GFR 60-89 ml/min 2021    Rash of back 2021    New onset atrial fibrillation ([address] ) 2021    Acute respiratory failure with hypoxia ([facility] ) 2021    Chronic low back pain with sciatica 2021    Chronic venous insufficiency 2021    Essential hypertension 2017    Sjogren's syndrome ([address] ) 2014      LOS (days): 6  Geometric Mean LOS (GMLOS) (days): 4 30  Days to GMLOS:-2 2     OBJECTIVE:  Risk of Unplanned Readmission Score: 23         Current admission status: Inpatient   Preferred Pharmacy:   [address] [place] [address], [address] [facility] [address] [facility]  [address]  Phone: [phone] Fax: [phone] [facility], [address] [place] [address]  Phone: [phone] Fax: [phone]    Primary Care Provider: Kelly Vila MD    Primary Insurance: [address] [facility] [address] [facility]  Secondary Insurance:     DISCHARGE DETAILS:    Discharge planning discussed with[de-identified] patient, daughter and   Freedom of Choice: Yes     CM contacted family/caregiver?: Yes  Were Treatment Team discharge recommendations reviewed with patient/caregiver?: Yes     Were patient/caregiver advised of the risks associated with not following Treatment Team discharge recommendations?: Yes    Contacts  Patient Contacts: daughter and   Relationship to Patient[de-identified] Family  Contact Method: In Person  Reason/Outcome: Continuity of [address]         Is the patient interested in [facility] at discharge?: No    DME Referral Provided  Referral made for DME?: No    Other Referral/Resources/Interventions Provided:  Interventions: Short Term Rehab  Referral Comments: [facility] is able to accept patient tomorrow 1/6 for inpatient rehab  Treatment Team Recommendation: Short Term Rehab  Discharge Destination Plan[de-identified] Short Term Rehab     CM updated by Opal Yip, facility is able to offer an inpatient rehab bed tomorrow 1/6/2020  CM spoke with patient's daughter and  at the bedside  Patient's family updated [facility] is able to offer an inpatient rehab bed tomorrow  Family requesting bed at rehab facility  Family requesting to notarize patient advanced directive paperwork at bedside  CM contacted Svetlana Amezquita in registration and will be up to room to speak with patient/family  CM updated S with plan of care  [facility] NPI [id number] Attending: Dr Gloria Best NPI [id number]  CM updated SLIM  Covid swab done, negative  CM sent task to CM support to initiate insurance authorization

## 2022-01-05 NOTE — PLAN OF CARE
Problem: Prexisting or High Potential for Compromised Skin Integrity  Goal: Skin integrity is maintained or improved  Description: INTERVENTIONS:  - Identify patients at risk for skin breakdown  - Assess and monitor skin integrity  - Assess and monitor nutrition and hydration status  - Monitor labs   - Assess for incontinence   - Turn and reposition patient  - Assist with mobility/ambulation  - Relieve pressure over bony prominences  - Avoid friction and shearing  - Provide appropriate hygiene as needed including keeping skin clean and dry  - Evaluate need for skin moisturizer/barrier cream  - Collaborate with interdisciplinary team   - Patient/family teaching  - Consider wound care consult   Outcome: Progressing     Problem: PAIN - ADULT  Goal: Verbalizes/displays adequate comfort level or baseline comfort level  Description: Interventions:  - Encourage patient to monitor pain and request assistance  - Assess pain using appropriate pain scale  - Administer analgesics based on type and severity of pain and evaluate response  - Implement non-pharmacological measures as appropriate and evaluate response  - Consider cultural and social influences on pain and pain management  - Notify physician/advanced practitioner if interventions unsuccessful or patient reports new pain  Outcome: Progressing     Problem: INFECTION - ADULT  Goal: Absence or prevention of progression during hospitalization  Description: INTERVENTIONS:  - Assess and monitor for signs and symptoms of infection  - Monitor lab/diagnostic results  - Monitor all insertion sites, i e  indwelling lines, tubes, and drains  - Monitor endotracheal if appropriate and nasal secretions for changes in amount and color  - [place] appropriate cooling/warming therapies per order  - Administer medications as ordered  - Instruct and encourage patient and family to use good hand hygiene technique  - Identify and instruct in appropriate isolation precautions for identified infection/condition  Outcome: Progressing

## 2022-01-05 NOTE — OCCUPATIONAL THERAPY NOTE
Occupational Therapy Progress Note     Patient Name: John Olmedo  [id number] Date: 1/5/2022  Problem List  Principal Problem:    Acute respiratory failure with hypoxia [facility])  Active Problems:    Diastolic CHF exacerbation (HCC)    Atrial fibrillation (HCC)    Cellulitis of right lower extremity    Chronic back pain    Opioid dependence due to Chronic back pain     Hypokalemia            01/05/22 1500   OT Last Visit   OT Visit Date 01/05/22   Note Type   Note Type Treatment   Restrictions/Precautions   Weight Bearing Precautions Per Order No   Other Precautions Chair Alarm; Bed Alarm;Telemetry;O2;Fall Risk;Fluid restriction   Lifestyle   Autonomy PTA pt living with  in [facility], pt (I) with ADLs and shares IADLs, (-)falls, (-)drives, no use of AD at baseline   Reciprocal Relationships supportive    Service to Others retired   Intrinsic Gratification likes to read, watch tv, talk to friends   Pain Assessment   Pain Assessment Tool 0-10   Pain Score 5   Pain Location/Orientation Location: Hip;Orientation: Left   ADL   Where Assessed Chair   Grooming Assistance 5  Supervision/Setup   Grooming Deficit Wash/dry hands   UB Dressing Assistance 3  Moderate Assistance   UB Dressing Deficit Thread RUE; Thread LUE;Pull around back   UB Dressing Comments Pt dons dressing gown wiith mod A   LB Dressing Assistance 2  Maximal Assistance   LB Dressing Deficit Don/doff R sock; Don/doff L sock   Toileting Assistance  4  Minimal Assistance   Toileting Deficit Steadying;Supervison/safety; Increased time to complete   Toileting Comments Pt ambulates to toilet and transfers with min A, urinates and wipes with supervision and assistance to steady and manage clothing  Bed Mobility   Additional Comments Pt recieved OOB to chair      Transfers   Sit to Stand 4  Minimal assistance   Additional items Assist x 1   Stand to Sit 4  Minimal assistance   Additional items Assist x 1   Stand pivot 4  Minimal assistance   Additional items Assist x 1   Toilet transfer 4  Minimal assistance   Additional items Assist x 1   Additional Comments Contact guard for ambulation  O2 drops to low 80s on room air with mobility  Once seated on toilet, recovers to 88%, after returning to chair and Onetha Arianna returned, pt sats 90%+   Functional Mobility   Functional Mobility 4  Minimal assistance   Additional Comments Ax1   Additional items Rolling walker   Cognition   Overall Cognitive Status [facility]   Arousal/Participation Alert; Responsive;Arousable   Attention Within functional limits   Orientation Level Oriented X4   Memory Within functional limits   Following Commands Follows all commands and directions without difficulty   Activity Tolerance   Activity Tolerance Patient limited by fatigue  (Need for O2)   Assessment   Assessment Pt seen this afternoon for treatment session to assess mobility and ADLs, pt limited by endurance deficit and global weakness, min A for mobility with a RW and min A for ADLs  Pt to benefit from continued OT to maximize independence in ADLs and functional mobility  Recommending short term rehab at [place]  Plan   Treatment Interventions ADL retraining;Functional transfer training;UE strengthening/ROM; Endurance training;Patient/family training;Equipment evaluation/education; Compensatory technique education;Continued evaluation   Goal Expiration Date 01/13/22   OT Treatment Day 1   OT Frequency 3-5x/wk   Recommendation   OT Discharge Recommendation Post acute rehabilitation services   AM-PAC Daily Activity Inpatient   Lower Body Dressing 2   Bathing 2   Toileting 3   Upper Body Dressing 3   Grooming 3   Eating 4   Daily Activity Raw Score 17   Daily Activity Standardized Score (Calc for Raw Score >=11) 37 26          MC Davis, OTR/L

## 2022-01-05 NOTE — CASE MANAGEMENT
Case Management Discharge Planning Note    Patient name Nathan MAHONEY MS 66/W -01 MRN [id number]  : 1945 Date 2022       Current Admission Date: 2021  Current Admission Diagnosis:Acute respiratory failure with hypoxia [facility])   Patient Active Problem List    Diagnosis Date Noted    Opioid dependence due to Chronic back pain  2022    Hypokalemia     Diastolic CHF exacerbation ([address] ) 2021    Atrial fibrillation ([facility] ) 2021    Cellulitis of right lower extremity 2021    Chronic back pain 2021    Mixed hyperlipidemia 2021    Obesity 2021    Osteoarthritis of knee 2021     LEXY on CKD (chronic kidney disease) stage 2, GFR 60-89 ml/min 2021    Rash of back 2021    New onset atrial fibrillation ([facility] ) 2021    Acute respiratory failure with hypoxia ([facility] ) 2021    Chronic low back pain with sciatica 2021    Chronic venous insufficiency 2021    Essential hypertension 2017    Sjogren's syndrome ([facility] ) 2014      LOS (days): 6  Geometric Mean LOS (GMLOS) (days): 4 30  Days to GMLOS:-2 2     OBJECTIVE:  Risk of Unplanned Readmission Score: 23         Current admission status: Inpatient   Preferred Pharmacy:   [address] [place] [address]  [address] [facility]  Phone: [phone] Fax: [phone] [facility], [address] [place] [address]  Phone: [phone] Fax: [phone]    Primary Care Provider: Tayo Farr MD    Primary Insurance: [facility]  Secondary Insurance:     DISCHARGE DETAILS:     [address] [place] [address] Number: SNF pending auth request submitted for [facility] Ref# [id number] via [facility] for [employer] NPI [id number] Attending: Dr Mahendra Good NPI [id number] Start of Care 2021 clinicals faxed to Detail Level: Zone [phone]

## 2022-01-05 NOTE — PROGRESS NOTES
Cardiology Progress Note - Team 1  Saúl Rodríguez 77 y o  female MRN: [id number]  Unit/Bed#: W -01 Encounter: [id number]      Assessment/Plan:  1  Acute on chronic HFpEF  - secondary to dietary indescretion  - NT pro BNP 1860  - CXR 1/3 - persistent R PNA  - echo 11/4/21 - LVEF 65%, no RWMA, grade 1 DD, Mild AR/MR//TR  - home diuretic regimen - torsemide 40 mg p o    - weight on admission 270 lb (stated) > 246 lb this morning (bed scale)  - IV lasix held secondary to contraction alkalosis on 1/2 - now s/p 2 doses of diamox  - torsemide 40 mg po x1 given last evening with adequate response - start daily dosing this morning   - remains on 3L NC saturating 96% and above - wean as tolerable  - currently net - 480mL/24 hours - net -6 8L since admission  - continue carvedilol 25 mg po daily  - daily weights, strict I/Os, salt/fluid restriction     2  LEXY  - secondary to HF exacerbation  - creatinine on arrival 1 97 > 0 81; resolved  - continue to monitor urine output     3  Paroxysmal AF  - heart rates controlled in the 60s  - on Coumadin for AC - AM INR 2 63  - continue carvedilol for rate control     4  Hypertension  - last documented /63; stable  - continue amlodipine 5 mg po daily, carvedilol     5  Hypokalemia  - am K 3 2 - replete with 40 meq po now; start standing BID dosing - recommend at discharge as well  - maintain K > 4    Subjective:   Patient seen and examined  No significant events overnight  Overall, patient is feeling better  She is about to work with PT, so she has yet to ambulate to assess how her breathing feels with movement  At rest, patient denies any SOB, CP, palpitations, or diaphoresis  Objective:   Vitals: Blood pressure 135/63, pulse 65, temperature 98 2 °F (36 8 °C), resp  rate 16, height 5' 2" (1 575 m), weight 112 kg (246 lb 14 6 oz), SpO2 94 %  , Body mass index is 45 16 kg/m² ,   Orthostatic Blood Pressures      Most Recent Value   Blood Pressure 135/63 filed at 01/05/2022 0747   Patient Position - Orthostatic VS Lying filed at 01/03/2022 2037          Intake/Output Summary (Last 24 hours) at 1/5/2022 0845  Last data filed at 1/5/2022 3930  Gross per 24 hour   Intake 520 ml   Output 1000 ml   Net -480 ml     Physical Exam:  GEN: Caroline Palm appears well, alert and oriented x 3, pleasant and cooperative   HEENT: pupils equal, round, and reactive to light; extraocular muscles intact  NECK: supple, no carotid bruits   HEART: regular rhythm, normal S1 and S2, no murmurs, clicks, gallops or rubs   LUNGS: Diminished breath sounds b/l bases; no wheezes, rales, or rhonchi, on 3 L NC  ABDOMEN: normal bowel sounds, soft, no tenderness, no distention, obese  EXTREMITIES: peripheral pulses normal; no clubbing, cyanosis, or edema, redness/dryness b/l lower extremities     Medications:    Current Facility-Administered Medications:     acetaminophen (TYLENOL) tablet 650 mg, 650 mg, Oral, Q6H PRN, Walt Herman MD    albuterol (PROVENTIL HFA,VENTOLIN HFA) inhaler 2 puff, 2 puff, Inhalation, Q4H PRN, Basia Sanz MD    amLODIPine (NORVASC) tablet 5 mg, 5 mg, Oral, BID, Kath Hughes DO, 5 mg at 01/04/22 2129    carvedilol (COREG) tablet 25 mg, 25 mg, Oral, BID With Meals, Kath Hughes DO, 25 mg at 01/04/22 1656    ceFAZolin (ANCEF) IVPB (premix in dextrose) 2,000 mg 50 mL, 2,000 mg, Intravenous, Q8H, Dianne Campos MD, Stopped at 01/05/22 0608    nystatin (MYCOSTATIN) powder, , Topical, BID, Otto Esquivel MD, Given at 01/04/22 1703    ondansetron (ZOFRAN) injection 4 mg, 4 mg, Intravenous, Q6H PRN, Walt Herman MD    oxyCODONE (OxyCONTIN) 12 hr tablet 20 mg, 20 mg, Oral, Q8H [address], Kath Hughes DO, 20 mg at 01/05/22 0537    oxyCODONE-acetaminophen (PERCOCET) 5-325 mg per tablet 1 tablet, 1 tablet, Oral, Q4H PRN, Kath Hughes DO, 1 tablet at 01/03/22 1640    potassium chloride (K-DUR,KLOR-CON) CR tablet 40 mEq, 40 mEq, Oral, Once, Amaury Best MD    [facility]) tablet 8 6 mg, 1 tablet, Oral, Daily, Danyel Elam MD, 8 6 mg at 01/04/22 0904     Labs & Results:      Results from last 7 days   Lab Units 01/05/22 0523 01/04/22  0515 01/03/22  0534   WBC Thousand/uL 9 76 8 62 10 34*   HEMOGLOBIN g/dL 10 3* 10 5* 10 2*   HEMATOCRIT % 34 6* 36 4 35 5   PLATELETS Thousands/uL 351 369 358         Results from last 7 days   Lab Units 01/05/22 0523 01/04/22 0515 01/03/22  1431 01/03/22  0534 01/03/22  0534 12/30/21  0609 12/29/21  2313 12/29/21 2040   POTASSIUM mmol/L 3 2* 3 4* 3 2*   < > 3 0*   < >  --  5 2   CHLORIDE mmol/L 102 102  --   --  100   < >  --  97*   CO2 mmol/L 34* 33*  --   --  39*   < >  --  27   CO2, I-STAT mmol/L  --   --   --   --   --   --  28  --    BUN mg/dL 11 10  --   --  11   < >  --  62*   CREATININE mg/dL 0 81 0 76  --   --  0 79   < >  --  1 97*   CALCIUM mg/dL 9 5 9 5  --   --  9 4   < >  --  9 6   ALK PHOS U/L  --   --   --   --  119*  --   --  128*   ALT U/L  --   --   --   --  14  --   --  24   AST U/L  --   --   --   --  17  --   --  29   GLUCOSE, ISTAT mg/dl  --   --   --   --   --   --  129  --     < > = values in this interval not displayed  Results from last 7 days   Lab Units 01/05/22 0523 01/04/22  0515 01/03/22  0534 12/31/21  0509 12/30/21  0159   INR  2 63* 3 09* 3 63*   < > 2 45*   PTT seconds  --   --   --   --  63*    < > = values in this interval not displayed       Results from last 7 days   Lab Units 01/04/22 0515 12/29/21  2040   MAGNESIUM mg/dL 2 0 2 3

## 2022-01-05 NOTE — PROGRESS NOTES
[facility]  [facility] [place] [facility] 1945, 77 [facility] o  female MRN: [id number]  Unit/Bed#: W -01 Encounter: [id number]  Primary Care Provider: Tristin Hawley MD   Date and time admitted to hospital: 12/29/2021  8:10 PM    * Acute respiratory failure with hypoxia (HCC)  Assessment & Plan  POA, O2 saturation at 80%   Patient complains of worsening shortness of breath both at rest and on exertion  Not on oxygen at home  Recent hospitalization for similar complaint  CXR -  B/L  Lower lobe consolidations,  Atelectasis VS PNA  Repeat CXR - persistent right-sided pneumonia  On eval, on 2 L NC sating at 95%    Plan  -Continue to wean as tolerated  -maintain oxygen saturation at minimum 88%  -monitor vitals  -Albuterol prn       Hypokalemia  Assessment & Plan  POA, 5 2  Today 3 2    Plan   - replete as needed  - repleted 40 mEq p o   -40 mEq b i d  scheduled  - a m  BMP    Opioid dependence due to Chronic back pain   Assessment & Plan  History of chronic back pain  Patient at home uses OxyContin 20 t i d  p r n  and Percocet  q4 p r n  Plan   - Continue home regimen with oxycodone scheduled      Chronic back pain  Assessment & Plan  Patient complains of chronic back pain  Patient unable to lay flat and sleeps on a recliner at home  Sees pain management  Home regimen includes OxyContin 20 t i d  p r n  and Percocet  q4 p r n  Shanice Esquivel Plan  -Continue home pain medications -  With oxycodone scheduled     Cellulitis of right lower extremity  Assessment & Plan  POA, Green purulent discharge from posterior right lower extremity with erythema  Not painful  Patient believes infection of right lower extremity began roughly 1 week ago, but is unsure     POA, Leukocytosis   One dose of IV cefepime provided in ED  Wound culture - Final result growing Proteus mirabilis, Enterococcus faecalis, Pseudomonas aeruginosa  Blood culture - final result negative after 5 days Plan  -IV cefazolin  -monitor right lower extremity  -a m  CBC  -wound care -  Daily cleaning with soap and water, hydr guard lower legs and periwound area of right leg  Covering with DSD and ABD pad, changing daily and p r n  with drainage or dislodgement  Atrial fibrillation [facility])  Assessment & Plan  Recently diagnosed atrial fibrillation on warfarin  POA, INR 2 45   Patient on warfarin 10 mg daily at home  Patient instructed to stop warfarin in setting of supratherapeutic INR  Lab Results   Component Value Date    INR 2 63 (H) 01/05/2022    INR 3 09 (H) 01/04/2022    INR 3 63 (H) 01/03/2022    INR supratherapeutic  Goal is lower limit of 2-3  Improving  Plan  -Continue to hold warfarin   -F/u AM INR     Diastolic CHF exacerbation [facility])  Assessment & Plan  Wt Readings from Last 3 Encounters:   01/04/22 114 kg (251 lb 5 2 oz)   12/16/21 120 kg (264 lb 3 2 oz)   12/02/21 118 kg (260 lb)     BNP elevated at 1,860 POA  LEXY POA likely prerenal    Patient appears volume overloaded on exam   Chronic and worsening bilateral lower extremity edema  Dyspnea at rest and on exertion  Patient on torsemide 20 mg b i d  at home  Patient leads increasingly sedentary lifestyle which she attributes to her current state of health  Echo 11/04/2021 -  LVEF 65%, no regional wall abnormality, G1 DD    In setting of contraction alkalosis, 2 doses diamox received 1/2/2022 per cardiology    Plan  - cardiology recommendations appreciated  - Daily  Torsemide 40 mg   - follow a m  labs  -monitor I/O, monitor weight  -fluid restriction 1800 mL daily, salt restriction 2 g daily    LEXY (acute kidney injury) (HCC)-resolved as of 1/3/2022  Assessment & Plan  Creatinine at 1 97 POA  Baseline appears to be around 1 0  Likely prerenal in setting of CHF exacerbation  Resolved  Plan  - monitor a m    BMP         VTE Pharmacologic Prophylaxis: VTE Score: 12 High Risk (Score >/= 5) - Pharmacological DVT Prophylaxis Contraindicated  Sequential Compression Devices Ordered  Patient Centered Rounds: I performed bedside rounds with nursing staff today  Discussions with Specialists or Other Care Team Provider:  Cardiology    Education and Discussions with Family / Patient: Updated  ( and daughter) via phone  Current Length of Stay: 6 day(s)  Current Patient Status: Inpatient   Discharge Plan: Anticipate discharge tomorrow to rehab facility  Code Status: Level 1 - Full Code    Subjective:   Nursing team reported that overnight patient required 3 L O2 because of sating in the [de-identified]  Patient reports improvement in her right lower extremity pain, dyspnea with exertion  Patient denied shortness of breath at rest, dry cough, headache, fever, abdominal pain  Patient was found on 3 L of oxygen  Patient reports feeling overall improved  Patient was found on 3 L, then during rounds weaned to 1L sating at mid 80s  Objective:     Vitals:   Temp (24hrs), Av 2 °F (36 8 °C), Min:98 °F (36 7 °C), Max:98 4 °F (36 9 °C)    Temp:  [98 °F (36 7 °C)-98 4 °F (36 9 °C)] 98 2 °F (36 8 °C)  HR:  [65-71] 65  Resp:  [16] 16  BP: (117-135)/(50-65) 135/63  SpO2:  [94 %-98 %] 94 %  Body mass index is 45 16 kg/m²  Input and Output Summary (last 24 hours): Intake/Output Summary (Last 24 hours) at 2022 1330  Last data filed at 2022 1152  Gross per 24 hour   Intake 580 ml   Output 1500 ml   Net -920 ml       Physical Exam:   Physical Exam  Vitals and nursing note reviewed  Constitutional:       General: She is not in acute distress  Appearance: She is morbidly obese  She is ill-appearing  She is not toxic-appearing or diaphoretic  HENT:      Head: Normocephalic and atraumatic  Mouth/Throat:      Mouth: Mucous membranes are moist       Pharynx: Oropharynx is clear  Eyes:      General: No scleral icterus       Conjunctiva/sclera: Conjunctivae normal       Pupils: Pupils are equal, round, and reactive to light  Cardiovascular:      Rate and Rhythm: Normal rate  Rhythm irregular  Pulses: Normal pulses  Heart sounds: Normal heart sounds  No murmur heard  No gallop  Pulmonary:      Effort: Pulmonary effort is normal  No respiratory distress  Breath sounds: Decreased breath sounds and rales (slightly at B/L bases) present  No wheezing  Comments: On 1 L O2 nasal cannula  Abdominal:      General: Bowel sounds are normal  There is no distension  Palpations: Abdomen is soft  There is no mass  Tenderness: There is no abdominal tenderness  Musculoskeletal:         General: Tenderness ( over right LE posteriorly/wound area) present  Normal range of motion  Cervical back: Normal range of motion and neck supple  Right lower leg: Edema (Nonpitting lymphedema) present  Left lower leg: Edema (Nonpitting lymphedema) present  Comments: B/L LE edema improved   Skin:     General: Skin is warm and dry  Capillary Refill: Capillary refill takes less than 2 seconds  Coloration: Skin is not jaundiced  Findings: Erythema (Bilateral lower extremities) present  Comments: Wound with gauze at posterior aspect of right lower extremity  Non-draining  Neurological:      General: No focal deficit present  Mental Status: She is alert and oriented to person, place, and time  Mental status is at baseline  Sensory: No sensory deficit  Psychiatric:         Mood and Affect: Mood normal          Behavior: Behavior normal          Thought Content:  Thought content normal          Judgment: Judgment normal           Additional Data:     Labs:  Results from last 7 days   Lab Units 01/05/22  0523 01/01/22  0558 12/31/21  0509   WBC Thousand/uL 9 76   < > 9 46   HEMOGLOBIN g/dL 10 3*   < > 9 8*   HEMATOCRIT % 34 6*   < > 32 0*   PLATELETS Thousands/uL 351   < > 353   NEUTROS PCT %  --   --  71   LYMPHS PCT %  --   --  10*   MONOS PCT %  --   --  13*   EOS PCT %  --   --  6    < > = values in this interval not displayed  Results from last 7 days   Lab Units 01/05/22  0523 01/03/22  1431 01/03/22  0534   SODIUM mmol/L 143   < > 143   POTASSIUM mmol/L 3 2*   < > 3 0*   CHLORIDE mmol/L 102   < > 100   CO2 mmol/L 34*   < > 39*   BUN mg/dL 11   < > 11   CREATININE mg/dL 0 81   < > 0 79   ANION GAP mmol/L 7   < > 4   CALCIUM mg/dL 9 5   < > 9 4   ALBUMIN g/dL  --   --  3 0*   TOTAL BILIRUBIN mg/dL  --   --  0 24   ALK PHOS U/L  --   --  119*   ALT U/L  --   --  14   AST U/L  --   --  17   GLUCOSE RANDOM mg/dL 109   < > 117    < > = values in this interval not displayed  Results from last 7 days   Lab Units 01/05/22  0523   INR  2 63*             Results from last 7 days   Lab Units 12/31/21  0509 12/30/21  1440 12/30/21  0159   LACTIC ACID mmol/L  --   --  0 9   PROCALCITONIN ng/ml <0 05 <0 05  --        Lines/Drains:  Invasive Devices  Report    Peripheral Intravenous Line            Peripheral IV 01/04/22 Dorsal (posterior); Left Hand <1 day          Drain            External Urinary Catheter 2 days                      Imaging: Reviewed radiology reports from this admission including: chest xray    Recent Cultures (last 7 days):   Results from last 7 days   Lab Units 12/30/21  0837 12/30/21  0159   BLOOD CULTURE   --  No Growth After 5 Days  No Growth After 5 Days     GRAM STAIN RESULT  No polys seen*  1+ Gram negative rods*  Rare Gram positive cocci in pairs*  --    WOUND CULTURE  3+ Growth of Proteus mirabilis*  3+ Growth of Enterococcus faecalis*  2+ Growth of Pseudomonas aeruginosa*  3+ Growth of   --        Last 24 Hours Medication List:   Current Facility-Administered Medications   Medication Dose Route Frequency Provider Last Rate    acetaminophen  650 mg Oral Q6H PRN Milagro Padilla MD      albuterol  2 puff Inhalation Q4H PRN Wojciech Reich MD      amLODIPine  5 mg Oral BID Mayur Hughes DO      carvedilol  25 mg Oral BID With Meals Karen Fitch DO      cefazolin  2,000 mg Intravenous [address] [place], MD Stopped (01/05/22 1555)    nystatin   Topical BID Gamal Cortez MD      ondansetron  4 mg Intravenous Q6H PRN Serge Tineo MD      oxyCODONE  20 mg Oral [facility]na Castillo [place]      oxyCODONE-acetaminophen  1 tablet Oral Q4H PRN Julianna Hughes DO      potassium chloride  40 mEq Oral BID SHAHIDA Mccarty      senna  1 tablet Oral Daily Serge Tineo MD      torsemide  40 mg Oral Daily SHAHIDA Mccarty          Today, Patient Was Seen By: Joseluis Carr MD    **Please Note: This note may have been constructed using a voice recognition system  **

## 2022-01-05 NOTE — PHYSICAL THERAPY NOTE
PHYSICAL THERAPY NOTE          Patient Name: Pineda Landers  UPSXS'D Date: 22 0850   PT Last Visit   PT Visit Date 22   Note Type   Note Type Treatment   Pain Assessment   Pain Assessment Tool 0-10   Pain Score No Pain   Restrictions/Precautions   Weight Bearing Precautions Per Order No   Other Precautions Chair Alarm; Bed Alarm;Telemetry;O2;Fall Risk;Fluid restriction   General   Chart Reviewed Yes   Additional Pertinent History Pt on 2L O2 at 95%  Family/Caregiver Present No   Cognition   Overall Cognitive Status WFL   Arousal/Participation Alert; Responsive;Arousable   Attention Within functional limits   Orientation Level Oriented X4   Memory Within functional limits   Following Commands Follows all commands and directions without difficulty   Comments ID via name and  on wrist band   Subjective   Subjective "I feel okay today  I havent eated my breakfast yet "   Bed Mobility   Rolling R 7  Independent   Additional items Assist x 1   Rolling L 7  Independent   Additional items Assist x 1   Supine to Sit 4  Minimal assistance   Additional items Assist x 1  (LE)   Additional Comments OOB  to end session in chair   Transfers   Sit to Stand 5  Supervision   Additional items Assist x 1   Stand to Sit 5  Supervision   Additional items Assist x 1   Stand pivot 5  Supervision   Additional items Assist x 1   Ambulation/Elevation   Gait pattern Improper Weight shift; Forward Flexion; Wide AGUSTIN; Short stride   Gait Assistance 5  Supervision   Additional items Assist x 1   Assistive Device Rolling walker   Distance 5' to chair   Balance   Static Sitting Fair +   Dynamic Sitting Fair +   Static Standing Fair   Dynamic Standing Fair   Ambulatory Fair -   Endurance Deficit   Endurance Deficit Yes   Activity Tolerance   Activity Tolerance Patient limited by fatigue   Medical Staff Made Aware KITTY Tristan   Nurse Made Aware KITTY Contreras   Exercises   Hip Flexion Supine;5 reps;AROM; Bilateral;Sitting;10 reps   Ankle Pumps Sitting;25 reps;AROM; Bilateral   Assessment   Prognosis Good   Problem List Decreased strength;Decreased endurance; Impaired balance;Decreased mobility; Decreased cognition; Impaired sensation;Obesity; Decreased skin integrity;Pain   Assessment Pt sitting in bed with breakfast to start session  Pt ID by name and  and plesant for session  Pt complainign she was wet and asking for help  Pt able to complete bed mobility with S  Pt able to tolerate standign for <5 min while pt got washed up  Pt amb 5'with RW and S to chair  completed seated ther ex with extended time due to fatigue  PT seated in recliner chair to end session with call bell in reach and all needs met  Pt will benefit from continued skilled PT to improve over all strength and endurence for further gait distance for a safe return to home  Barriers to Discharge Inaccessible home environment   Goals   Patient Goals I would like to go  home  STG Expiration Date 01/10/22   Short Term Goal #1 Pt will: perform bed mobility independently to decrease burden of care; perform transfers independently to increase OOB mobility; ambulate at least 75' w/ LRAD and Mod I to increase pt's ambulatory endurance; increase LE strength by 1 grade to increase pt's tolerance to physical activity; increase all balance ratings by 1 grade to decrease pt's risk of falls; PT to see for step negotiation   PT Treatment Day 2   Plan   Treatment/Interventions Functional transfer training;LE strengthening/ROM; Elevations; Therapeutic exercise; Endurance training;Bed mobility;Gait training   Progress Progressing toward goals   PT Frequency 3-5x/wk   Recommendation   PT Discharge Recommendation Post acute rehabilitation services   AM-PAC Basic Mobility Inpatient   Turning in Bed Without Bedrails 4   Lying on Back to Sitting on Edge of Flat Bed 3   Moving Bed to Chair 3   Standing Up From Chair 3   Walk in Room 3   Climb 3-5 Stairs 2   Basic Mobility Inpatient Raw Score 18   Basic Mobility Standardized Score 41 05   Highest Level Of Mobility   -HL Goal 6: Walk 10 steps or more   -[facility] Highest Level of Mobility 5: Stand (1 or more minutes)   -HL Goal Achieved No     Metta Million, PTA

## 2022-01-06 ENCOUNTER — TELEPHONE (OUTPATIENT)
Dept: OTHER | Facility: OTHER | Age: 77
End: 2022-01-06

## 2022-01-06 ENCOUNTER — TELEPHONE (OUTPATIENT)
Dept: FAMILY MEDICINE CLINIC | Facility: CLINIC | Age: 77
End: 2022-01-06

## 2022-01-06 VITALS
DIASTOLIC BLOOD PRESSURE: 58 MMHG | WEIGHT: 247.36 LBS | BODY MASS INDEX: 45.52 KG/M2 | OXYGEN SATURATION: 94 % | HEART RATE: 64 BPM | TEMPERATURE: 98.8 F | RESPIRATION RATE: 16 BRPM | SYSTOLIC BLOOD PRESSURE: 127 MMHG | HEIGHT: 62 IN

## 2022-01-06 DIAGNOSIS — M54.40 CHRONIC LOW BACK PAIN WITH SCIATICA, SCIATICA LATERALITY UNSPECIFIED, UNSPECIFIED BACK PAIN LATERALITY: Primary | ICD-10-CM

## 2022-01-06 DIAGNOSIS — G89.29 CHRONIC LOW BACK PAIN WITH SCIATICA, SCIATICA LATERALITY UNSPECIFIED, UNSPECIFIED BACK PAIN LATERALITY: Primary | ICD-10-CM

## 2022-01-06 PROBLEM — E87.6 HYPOKALEMIA: Status: RESOLVED | Noted: 2022-01-03 | Resolved: 2022-01-06

## 2022-01-06 LAB
ANION GAP SERPL CALCULATED.3IONS-SCNC: 7 MMOL/L (ref 4–13)
BUN SERPL-MCNC: 13 MG/DL (ref 5–25)
CALCIUM SERPL-MCNC: 9.2 MG/DL (ref 8.3–10.1)
CHLORIDE SERPL-SCNC: 101 MMOL/L (ref 100–108)
CO2 SERPL-SCNC: 34 MMOL/L (ref 21–32)
CREAT SERPL-MCNC: 0.85 MG/DL (ref 0.6–1.3)
ERYTHROCYTE [DISTWIDTH] IN BLOOD BY AUTOMATED COUNT: 14.7 % (ref 11.6–15.1)
GFR SERPL CREATININE-BSD FRML MDRD: 67 ML/MIN/1.73SQ M
GLUCOSE SERPL-MCNC: 113 MG/DL (ref 65–140)
HCT VFR BLD AUTO: 34.8 % (ref 34.8–46.1)
HGB BLD-MCNC: 10.7 G/DL (ref 11.5–15.4)
INR PPP: 2.67 (ref 0.84–1.19)
MCH RBC QN AUTO: 27.2 PG (ref 26.8–34.3)
MCHC RBC AUTO-ENTMCNC: 30.7 G/DL (ref 31.4–37.4)
MCV RBC AUTO: 89 FL (ref 82–98)
PLATELET # BLD AUTO: 325 THOUSANDS/UL (ref 149–390)
PMV BLD AUTO: 10.9 FL (ref 8.9–12.7)
POTASSIUM SERPL-SCNC: 3.5 MMOL/L (ref 3.5–5.3)
PROTHROMBIN TIME: 27.9 SECONDS (ref 11.6–14.5)
RBC # BLD AUTO: 3.93 MILLION/UL (ref 3.81–5.12)
SODIUM SERPL-SCNC: 142 MMOL/L (ref 136–145)
WBC # BLD AUTO: 8.93 THOUSAND/UL (ref 4.31–10.16)

## 2022-01-06 PROCEDURE — 85027 COMPLETE CBC AUTOMATED: CPT

## 2022-01-06 PROCEDURE — 85610 PROTHROMBIN TIME: CPT

## 2022-01-06 PROCEDURE — 99239 HOSP IP/OBS DSCHRG MGMT >30: CPT | Performed by: HOSPITALIST

## 2022-01-06 PROCEDURE — 80048 BASIC METABOLIC PNL TOTAL CA: CPT

## 2022-01-06 RX ORDER — OXYCODONE AND ACETAMINOPHEN 10; 325 MG/1; MG/1
1 TABLET ORAL EVERY 4 HOURS PRN
Qty: 4 TABLET | Refills: 0 | Status: SHIPPED | OUTPATIENT
Start: 2022-01-06 | End: 2022-04-21 | Stop reason: ALTCHOICE

## 2022-01-06 RX ORDER — WARFARIN SODIUM 5 MG/1
10 TABLET ORAL
Qty: 60 TABLET | Refills: 0 | Status: SHIPPED | OUTPATIENT
Start: 2022-01-08 | End: 2022-01-06 | Stop reason: HOSPADM

## 2022-01-06 RX ORDER — WARFARIN SODIUM 2.5 MG/1
2.5 TABLET ORAL
Qty: 60 TABLET | Refills: 0 | Status: SHIPPED | OUTPATIENT
Start: 2022-01-06 | End: 2022-02-12 | Stop reason: HOSPADM

## 2022-01-06 RX ORDER — OXYCODONE HCL 20 MG/1
20 TABLET, FILM COATED, EXTENDED RELEASE ORAL EVERY 12 HOURS SCHEDULED
Qty: 2 TABLET | Refills: 0 | Status: SHIPPED | OUTPATIENT
Start: 2022-01-06

## 2022-01-06 RX ADMIN — OXYCODONE HYDROCHLORIDE 20 MG: 20 TABLET, FILM COATED, EXTENDED RELEASE ORAL at 05:37

## 2022-01-06 RX ADMIN — CEFAZOLIN SODIUM 2000 MG: 2 SOLUTION INTRAVENOUS at 05:37

## 2022-01-06 RX ADMIN — CARVEDILOL 25 MG: 12.5 TABLET, FILM COATED ORAL at 09:27

## 2022-01-06 RX ADMIN — NYSTATIN: 100000 POWDER TOPICAL at 09:30

## 2022-01-06 RX ADMIN — AMLODIPINE BESYLATE 5 MG: 5 TABLET ORAL at 09:25

## 2022-01-06 RX ADMIN — TORSEMIDE 40 MG: 20 TABLET ORAL at 09:25

## 2022-01-06 RX ADMIN — OXYCODONE HYDROCHLORIDE 20 MG: 20 TABLET, FILM COATED, EXTENDED RELEASE ORAL at 13:48

## 2022-01-06 RX ADMIN — STANDARDIZED SENNA CONCENTRATE 8.6 MG: 8.6 TABLET ORAL at 09:25

## 2022-01-06 RX ADMIN — POTASSIUM CHLORIDE 40 MEQ: 1500 TABLET, EXTENDED RELEASE ORAL at 09:25

## 2022-01-06 NOTE — CASE MANAGEMENT
Case Management Discharge Planning Note    Patient name Chad Vidal  Location W MS 65/W -01 MRN [id number]  : 1945 Date 2022       Current Admission Date: 2021  Current Admission Diagnosis:Acute respiratory failure with hypoxia [facility])   Patient Active Problem List    Diagnosis Date Noted    Opioid dependence due to Chronic back pain  2022    Hypokalemia     Diastolic CHF exacerbation ([address] ) 2021    Atrial fibrillation ([address] ) 2021    Cellulitis of right lower extremity 2021    Chronic back pain 2021    Mixed hyperlipidemia 2021    Obesity 2021    Osteoarthritis of knee 2021     LEXY on CKD (chronic kidney disease) stage 2, GFR 60-89 ml/min 2021    Rash of back 2021    New onset atrial fibrillation ([address] ) 2021    Acute respiratory failure with hypoxia ([facility] ) 2021    Chronic low back pain with sciatica 2021    Chronic venous insufficiency 2021    Essential hypertension 2017    Sjogren's syndrome ([address] ) 2014      LOS (days): 7  Geometric Mean LOS (GMLOS) (days): 4 30  Days to GMLOS:-3     OBJECTIVE:  Risk of Unplanned Readmission Score: 23         Current admission status: Inpatient   Preferred Pharmacy:   [address] [place] [address], [address] [facility] [address] [address]  Phone: [phone] Fax: [facility] [facility], [address] [place] [place] [address]  Phone: [phone] Fax: [phone]    Primary Care Provider: Micah Gooden MD    Primary Insurance: [address] [place] [address] [facility]:     [facility] Number: Maximo Powell Approved auth# [id number] [facility]: 2022 NRD: 1/10/2022 Reviewer: William Baeza Phone# [phone] Fax clinicals to: [phone]

## 2022-01-06 NOTE — DISCHARGE INSTRUCTIONS
Heart Failure   WHAT YOU NEED TO KNOW:   Heart failure is a condition that does not allow your heart to fill or pump properly  Not enough oxygen in your blood gets to your organs and tissues  Fluid may not move through your body properly  Fluid builds up and causes swelling and trouble breathing  This is known as congestive heart failure  Heart failure may start in the left or right ventricle  Heart failure is often caused by damage or injury to your heart  The damage may be caused by other heart problems, diabetes, or high blood pressure  The damage may have also been caused by an infection  Heart failure is a long-term condition that tends to get worse over time  It is important to manage your health to improve your quality of life  DISCHARGE INSTRUCTIONS:   Call your local emergency number (911 in the [address] [facility] [facility]) if:   · You have any of the following signs of a heart attack:      ? Squeezing, pressure, or pain in your chest    ? You may  also have any of the following:     § Discomfort or pain in your back, neck, jaw, stomach, or arm    § Shortness of breath    § Nausea or vomiting    § Lightheadedness or a sudden cold sweat      Call your doctor if:   · Your heartbeat is fast, slow, or uneven all the time  · You have symptoms of worsening heart failure:      ? Shortness of breath at rest, at night, or that is getting worse in any way    ? Weight gain of 3 or more pounds (1 4 kg) in a day, or more than your healthcare provider says is okay    ? More swelling in your legs or ankles    ? Abdominal pain or swelling    ? More coughing    ? Loss of appetite    ? Feeling tired all the time    · You feel hopeless or depressed, or you have lost interest in things you used to enjoy  · You often feel worried or afraid  · You have questions or concerns about your condition or care  Medicines:   · Medicines  may be given to help regulate your heart rhythm and lower your blood pressure   You may also need medicines to help decrease extra fluids  Medicines, such as NSAIDs, may be stopped if they are causing your heart failure to become worse  Do not stop any of your medicines on your own  · Take your medicine as directed  Contact your healthcare provider if you think your medicine is not helping or if you have side effects  Tell him or her if you are allergic to any medicine  Keep a list of the medicines, vitamins, and herbs you take  Include the amounts, and when and why you take them  Bring the list or the pill bottles to follow-up visits  Carry your medicine list with you in case of an emergency  Go to cardiac rehab if directed:  Cardiac rehab is a program run by specialists who will help you safely strengthen your heart  In the program you will learn about exercise, relaxation, stress management, and heart-healthy nutrition  Manage swelling from extra fluid:   · Elevate (raise) your legs above the level of your heart  This will help with fluid that builds up in your legs or ankles  Elevate your legs as often as possible during the day  Prop your legs on pillows or blankets to keep them elevated comfortably  Try not to stand for long periods of time during the day  Move around to keep your blood circulating  · Limit sodium (salt)  Ask how much sodium you can have each day  Your healthcare provider may give you a limit, such as 2,300 milligrams (mg) a day  Your provider or a dietitian can teach you how to read food labels for the number of mg in a food  He or she can also help you find ways to have less salt  For example, if you add salt to food as you cook, do not add more at the table  · Drink liquids as directed  You may need to limit the amount of liquid you drink within 24 hours  Your healthcare provider will tell you how much liquid to have and which liquids are best for you  He or she may tell you to limit liquid to 1 5 to 2 liters in a day   He or she will also tell you how often to drink liquid throughout the day  · Weigh yourself every morning  Use the same scale, in the same spot  Do this after you use the bathroom, but before you eat or drink  Wear the same type of clothing each time  Write down your weight and call your healthcare provider if you have a sudden weight gain  Swelling and weight gain are signs of fluid buildup  Manage heart failure: Your quality of life may improve with treatment and the following:  · Do not smoke  Nicotine and other chemicals in cigarettes and cigars can cause lung and heart damage  Ask your healthcare provider for information if you currently smoke and need help to quit  E-cigarettes or smokeless tobacco still contain nicotine  Talk to your healthcare provider before you use these products  · Do not drink alcohol or use illegal drugs  Alcohol and drugs can increase your risk for high blood pressure, diabetes, and coronary artery disease  · Eat heart-healthy foods  Heart-healthy foods include fruits, vegetables, lean meat (such as beef, chicken, or pork), and low-fat dairy products  Fatty fish such as salmon and tuna are also heart healthy  Other heart-healthy foods include walnuts, whole-grain breads, beans, and cooked beans  Replace butter and margarine with heart-healthy oils such as olive oil or canola oil  Your provider or a dietitian can help you create heart-healthy meal plans  · Manage any chronic health conditions you have  These include high blood pressure, diabetes, obesity, high cholesterol, metabolic syndrome, and COPD  You will have fewer symptoms if you manage these health conditions  Follow your healthcare provider's recommendations and follow up with him or her regularly  · Maintain a healthy weight  Being overweight can increase your risk for high blood pressure, diabetes, and coronary artery disease  These conditions can make your symptoms worse  Ask your healthcare provider how much you should weigh   Ask him or her to help you create a weight loss plan if you are overweight  · Stay active  Activity can help keep your symptoms from getting worse  Walking is a type of physical activity that helps maintain your strength and improve your mood  Physical activity also helps you manage your weight  Work with your healthcare provider to create an exercise plan that is right for you  · Get vaccines as directed  The flu and pneumonia can be severe for a person who has heart failure  Vaccines protect you from these infections  Get a flu shot every year as soon as it is recommended, usually in September or October  You may also need the pneumonia vaccine  Your healthcare provider can tell you if you need other vaccines, and when to get them  Follow up with your doctor within 7 days and as directed: You may need to return for other tests  You may need home health care  A healthcare provider will monitor your vital signs, weight, and make sure your medicines are working  Write down your questions so you remember to ask them during your visits  Join a support group:  Heart failure can be difficult to manage  It may be helpful to talk with others who have heart failure  You may learn how to better manage your condition or get emotional support  For more information:  · [address]  [place] , [place]   Phone: [phone]  Web Address: https://[employer]/  [address] [facility] 2021 Information is for End User's use only and may not be sold, redistributed or otherwise used for commercial purposes  All illustrations and images included in CareNotes® are the copyrighted property of [employer]  or [address] [place], [place] [address]   The above information is an  only  It is not intended as medical advice for individual conditions or treatments   Talk to your doctor, nurse or pharmacist before following any medical regimen to see if it is safe and effective for you       Cellulitis   WHAT YOU NEED TO KNOW:   Cellulitis is a skin infection caused by bacteria  Cellulitis may go away on its own or you may need treatment  Your healthcare provider may draw a [employer] around the outside edges of your cellulitis  If your cellulitis spreads, your healthcare provider will see it outside of the [employer]  DISCHARGE INSTRUCTIONS:   Call 911 if:   · You have sudden trouble breathing or chest pain  Seek care immediately if:   · Your wound gets larger and more painful  · You feel a crackling under your skin when you touch it  · You have purple dots or bumps on your skin, or you see bleeding under your skin  · You have new swelling and pain in your legs  · The red, warm, swollen area gets larger  · You see red streaks coming from the infected area  Contact your healthcare provider if:   · You have a fever  · Your fever or pain does not go away or gets worse  · The area does not get smaller after 2 days of antibiotics  · Your skin is flaking or peeling off  · You have questions or concerns about your condition or care  Medicines:   · Antibiotics  help treat the bacterial infection  · NSAIDs , such as ibuprofen, help decrease swelling, pain, and fever  NSAIDs can cause stomach bleeding or kidney problems in certain people  If you take blood thinner medicine, always ask if NSAIDs are safe for you  Always read the medicine label and follow directions  Do not give these medicines to children under 10months of age without direction from your child's healthcare provider  · Acetaminophen  decreases pain and fever  It is available without a doctor's order  Ask how much to take and how often to take it  Follow directions  Read the labels of all other medicines you are using to see if they also contain acetaminophen, or ask your doctor or pharmacist  Acetaminophen can cause liver damage if not taken correctly   Do not use more than 4 grams (4,000 milligrams) total of acetaminophen in one day  · Take your medicine as directed  Contact your healthcare provider if you think your medicine is not helping or if you have side effects  Tell him or her if you are allergic to any medicine  Keep a list of the medicines, vitamins, and herbs you take  Include the amounts, and when and why you take them  Bring the list or the pill bottles to follow-up visits  Carry your medicine list with you in case of an emergency  Self-care:   · Elevate the area above the level of your heart  as often as you can  This will help decrease swelling and pain  Prop the area on pillows or blankets to keep it elevated comfortably  · Clean the area daily until the wound scabs over  Gently wash the area with soap and water  Pat dry  Use dressings as directed  · Place cool or warm, wet cloths on the area as directed  Use clean cloths and clean water  Leave it on the area until the cloth is room temperature  Pat the area dry with a clean, dry cloth  The cloths may help decrease pain  Prevent cellulitis:   · Do not scratch bug bites or areas of injury  You increase your risk for cellulitis by scratching these areas  · Do not share personal items, such as towels, clothing, and razors  · Clean exercise equipment  with germ-killing  before and after you use it  · Wash your hands often  Use soap and water  Wash your hands after you use the bathroom, change a child's diapers, or sneeze  Wash your hands before you prepare or eat food  Use lotion to prevent dry, cracked skin  · Wear pressure stockings as directed  You may be told to wear the stockings if you have peripheral edema  The stockings improve blood flow and decrease swelling  · Treat athlete's foot  This can help prevent the spread of a bacterial skin infection  Follow up with your healthcare provider within 3 days, or as directed:   Your healthcare provider will check if your cellulitis is getting better  You may need different medicine  Write down your questions so you remember to ask them during your visits  © Copyright [address] Information is for End User's use only and may not be sold, redistributed or otherwise used for commercial purposes  All illustrations and images included in CareNotes® are the copyrighted property of [employer] A M , Inc  or [facility] Irina Zambrano   The above information is an  only  It is not intended as medical advice for individual conditions or treatments  Talk to your doctor, nurse or pharmacist before following any medical regimen to see if it is safe and effective for you

## 2022-01-06 NOTE — DISCHARGE INSTR - AVS FIRST PAGE
Dear Bonny Dash,     It was our pleasure to care for you here at [facility]  It is our hope that we were always able to exceed the expected standards for your care during your stay  You were hospitalized due to exacerbation of diastolic congestive heart failure  You were cared for on the [facility] 4th floor by Kiran Leonard MD under the service of Duglas Bills MD with the [address] [place] [address] Internal Medicine Hospital[facility] who covers for your primary care physician (PCP), Melva Yancey MD, while you were hospitalized  If you have any questions or concerns related to this hospitalization, you may contact us at [phone]  For follow up as well as any medication refills, we recommend that you follow up with your primary care physician  A registered nurse will reach out to you by phone within a few days after your discharge to answer any additional questions that you may have after going home  However, at this time we provide for you here, the most important instructions / recommendations at discharge:     Notable Medication Adjustments -   Currently holding Coumadin due to supra therapeutic INR - goal is between 2-3 and closer to lower limit    Testing Required after Discharge -   Protime/INR level - to be performed next week on Monday on/10/2022 - call your PCP with INR level result  Important follow up information -   Restart Coumadin at 2 5 mg daily in 2 days (on 01/08/2022)  Other Instructions -   Continue diuretic treatment with torsemide 40 mg daily  Schedule a follow-up appointment with PCP for within 1 week  Schedule a follow-up appointment with Cardiology for within 1 week  Referrals sent for [address] [place] [address] outpatient pain management  Please review this entire after visit summary as additional general instructions including medication list, appointments, activity, diet, any pertinent wound care, and other additional recommendations from your care team that may be provided for you        Sincerely,     Ary Fuchs MD

## 2022-01-06 NOTE — UTILIZATION REVIEW
Continued Stay Review    Date: 1/6/22                          Current Patient Class: IP  Current Level of Care:  MS    HPI:76 y o  female initially admitted on 12/30/21 with acute on chronic CHF, cellulitis RLE  Pt on Bipap that was weaned to nc O2  Cardiology following with IV lasix diuresis  ,pt with good UOPand wt decreasing, monitoring creat  IV abx -Ancef  1/2 -pt with contraction alkalosis, give 2 doses diamox today and hold lasix  No diuretic tomorrow until labs and exam re-evaluated  Daily BMP  1/3-  Pt still has lower extremity edema but otherwise her volume status has significantly improved , will plan to resume oral diuretics once her alkalosis improves  Hold loop diuretics again today and reassess in the AM  Cardiology indicates plan for additional dose of Diamox later today  Repeat chest x-ray was obtained  Shows improved vascular congestion , persistent R pneumonia  Attempted dullness to percussion exam however patient's habitus made interpretation difficult  If O2 req't persists, despite diuresis would consider US and then possibly thoracentesis  1/4 -resume home torsemide  remains on 3L NC saturating 96% and above - wean as tolerable  currently net - 370mL/24 hours - net -9 4L since admission  Potassium low and repleted  Pt weak, tired  1/5- Overall net negative since admission  Weight is down to 246 lb, although by bed scale  Continue home torsemide 40 mg daily, had good response to torsemide yesterday Counseled on appropriate salt and fluid restrictions  Pt weaned from 3 L to 1 l o2 with sat mid 90's  rales bilateral bases  Continues with hypokalemia, start standing BID dosing of potassium  PT recommends post acute rehab for pt  Wound culture -Final result growing Proteus mirabilis, Enterococcus faecalis, Pseudomonas aeruginosa  Blood culture neg after 5 days  Wound care -  Daily cleaning with soap and water, hydr guard lower legs and periwound area of right leg    Covering with DSD and ABD pad, changing daily and p r n  with drainage or dislodgement  Pt remains on IV Ancef  Assessment/Plan:1/6  IV Ancef d/c'ed today  Potassium 3 5 today and creat stable  I/O = -11,1678 ml since admission  Wt 112 2 KG lnvbs=624 lbs 5 7 oz, bed scale  Pt off O2 with RA sat 90 % this am Pt remains on po torsemide 40 mg daily   Pt ordered home O2 eval  PT/OT recommending post acute rehab     Vital Signs:   Date/Time Temp Pulse Resp BP MAP (mmHg) SpO2 FiO2 (%) Calculated FIO2 (%) - Nasal Cannula Nasal Cannula O2 Flow Rate (L/min) O2 Device   01/06/22 08:01:01 98 2 °F (36 8 °C) 67 19 124/102 Abnormal  109 90 % -- -- -- --   01/05/22 20:55:12 97 4 °F (36 3 °C) Abnormal  64 17 137/68 91 91 % -- -- -- --   01/05/22 20:35:29 98 8 °F (37 1 °C) 63 19 123/64 84 93 % -- -- -- --   01/05/22 15:26:14 98 °F (36 7 °C) 65 17 125/50 75 91 % -- -- -- --   01/05/22 0955 -- -- -- -- -- -- 45 24 1 L/min Nasal cannula   01/05/22 07:47:08 98 2 °F (36 8 °C) 65 16 135/63 87 94 % -- -- -- --   01/04/22 23:24:01 98 4 °F (36 9 °C) 67 -- 121/57 78 98 % -- -- -- --   01/04/22 21:31:54 98 °F (36 7 °C) 69 -- 130/65 87 98 % -- -- -- --   01/04/22 2129 -- -- -- 130/65 -- -- -- -- -- --   01/04/22 1930 -- -- -- -- -- -- -- -- -- None (Room air)   01/04/22 14:29:08 98 3 °F (36 8 °C) 71 16 117/50 72 96 % -- -- -- --   01/04/22 0845 -- -- -- -- -- -- -- 32 3 L/min Nasal cannula   01/04/22 07:29:12 98 2 °F (36 8 °C) 65 17 126/83 97 96 % -- -- -- --       Pertinent Labs/Diagnostic Results:   1/3 CXR-Persistent right pneumonia          Results from last 7 days   Lab Units 01/05/22  1308   SARS-COV-2  Negative     Results from last 7 days   Lab Units 01/06/22  0548 01/05/22  0523 01/04/22  0515 01/03/22  0534 01/03/22  0534 01/02/22  0553 01/02/22  0553 01/01/22  0558 12/31/21  0509   WBC Thousand/uL 8 93 9 76 8 62   < > 10 34*   < > 9 77   < > 9 46   HEMOGLOBIN g/dL 10 7* 10 3* 10 5*  --  10 2*  --  10 1*   < > 9 8*   HEMATOCRIT % 34 8 34 6* 36 4  --  35 5 --  33 7*   < > 32 0*   PLATELETS Thousands/uL 325 351 369   < > 358   < > 365   < > 353   NEUTROS ABS Thousands/µL  --   --   --   --   --   --   --   --  6 75    < > = values in this interval not displayed  Results from last 7 days   Lab Units 01/06/22  0548 01/05/22  0523 01/04/22  0515 01/03/22  1431 01/03/22  0534 01/02/22  0553 01/02/22  0553   SODIUM mmol/L 142 143 143  --  143  --  144   POTASSIUM mmol/L 3 5 3 2* 3 4* 3 2* 3 0*   < > 3 6   CHLORIDE mmol/L 101 102 102  --  100  --  100   CO2 mmol/L 34* 34* 33*  --  39*  --  42*   ANION GAP mmol/L 7 7 8  --  4  --  2*   BUN mg/dL 13 11 10  --  11  --  14   CREATININE mg/dL 0 85 0 81 0 76  --  0 79  --  0 83   EGFR ml/min/1 73sq m 67 71 76  --  73  --  69   CALCIUM mg/dL 9 2 9 5 9 5  --  9 4  --  9 0   MAGNESIUM mg/dL  --   --  2 0  --   --   --   --     < > = values in this interval not displayed       Results from last 7 days   Lab Units 01/03/22  0534   AST U/L 17   ALT U/L 14   ALK PHOS U/L 119*   TOTAL PROTEIN g/dL 7 3   ALBUMIN g/dL 3 0*   TOTAL BILIRUBIN mg/dL 0 24         Results from last 7 days   Lab Units 01/06/22  0548 01/05/22  0523 01/04/22  0515 01/03/22  0534 01/02/22  0553 01/01/22  0558 12/31/21  0509   GLUCOSE RANDOM mg/dL 113 109 105 117 123 116 106               Results from last 7 days   Lab Units 01/06/22  0548 01/05/22  0523 01/04/22  0515   PROTIME seconds 27 9* 27 7* 31 2*   INR  2 67* 2 63* 3 09*         Results from last 7 days   Lab Units 12/31/21  0509 12/30/21  1440   PROCALCITONIN ng/ml <0 05 <0 05               Results from last 7 days   Lab Units 01/05/22  1308   INFLUENZA A PCR  Negative   INFLUENZA B PCR  Negative   RSV PCR  Negative               Medications:   Scheduled Medications:  amLODIPine, 5 mg, Oral, BID  carvedilol, 25 mg, Oral, BID With Meals  nystatin, , Topical, BID  oxyCODONE, 20 mg, Oral, Q8H SHANNAN  potassium chloride, 40 mEq, Oral, BID  senna, 1 tablet, Oral, Daily  torsemide, 40 mg, Oral, Daily  ceFAZolin (ANCEF) IVPB (premix in dextrose) 2,000 mg 50 mL  Dose: 2,000 mg  Freq: Every 8 hours Route: IV  Last Dose: 2,000 mg (01/06/22 0537)  Start: 12/30/21 1330 End: 01/06/22 1022  furosemide (LASIX) injection 80 mg  Dose: 80 mg  Freq: 2 times daily (diuretic) Route: IV  Start: 12/30/21 0800 End: 01/02/22 1100  Continuous IV Infusions:     PRN Meds:  acetaminophen, 650 mg, Oral, Q6H PRN  albuterol, 2 puff, Inhalation, Q4H PRN  ondansetron, 4 mg, Intravenous, Q6H PRN  oxyCODONE-acetaminophen, 1 tablet, Oral, Q4H PRN x1 1/5        Discharge Plan: D    Network Utilization Review Department  ATTENTION: Please call with any questions or concerns to [phone] and carefully listen to the prompts so that you are directed to the right person  All voicemails are confidential   Jessica Mars all requests for admission clinical reviews, approved or denied determinations and any other requests to dedicated fax number below belonging to the campus where the patient is receiving treatment   List of dedicated fax numbers for the Facilities:  [address] DENIALS (Administrative/Medical Necessity) [phone]   [address] (Maternity/NICU/Pediatrics) [phone]   [address] [place] [address]  [address] [facility] [place] [address] [address] [place] [address] [address] [facility] [phone]

## 2022-01-06 NOTE — CASE MANAGEMENT
Case Management Discharge Planning Note    Patient name Obdulia Becerra  Location W MS 65/W -01 MRN [id number]  : 1945 Date 2022       Current Admission Date: 2021  Current Admission Diagnosis:Acute respiratory failure with hypoxia [facility])   Patient Active Problem List    Diagnosis Date Noted    Opioid dependence due to Chronic back pain  2022    Hypokalemia     Diastolic CHF exacerbation ([address] ) 2021    Atrial fibrillation ([address] ) 2021    Cellulitis of right lower extremity 2021    Chronic back pain 2021    Mixed hyperlipidemia 2021    Obesity 2021    Osteoarthritis of knee 2021     LEXY on CKD (chronic kidney disease) stage 2, GFR 60-89 ml/min 2021    Rash of back 2021    New onset atrial fibrillation ([address] ) 2021    Acute respiratory failure with hypoxia ([facility] ) 2021    Chronic low back pain with sciatica 2021    Chronic venous insufficiency 2021    Essential hypertension 2017    Sjogren's syndrome ([address] ) 2014      LOS (days): 7  Geometric Mean LOS (GMLOS) (days): 4 30  Days to GMLOS:-3 1     OBJECTIVE:  Risk of Unplanned Readmission Score: 23         Current admission status: Inpatient   Preferred Pharmacy:   [address] [place] [address]  [address]  Phone: [phone] Fax: [phone] [facility], [address] [facility] [address] [address]  Phone: [phone] Fax: [phone]    Primary Care Provider: Polina Sosa MD    Primary Insurance: [address] [place] [address]  Secondary Insurance:     DISCHARGE DETAILS:    Discharge planning discussed with[de-identified] patient daughter and   Freedom of Choice: Yes     CM contacted family/caregiver?: Yes  Were Treatment Team discharge recommendations reviewed with patient/caregiver?: Yes  Did patient/caregiver verbalize understanding of patient care needs?: Yes  Were patient/caregiver advised of the risks associated with not following Treatment Team discharge recommendations?: Yes    Contacts  Patient Contacts: daughter and   Relationship to Patient[de-identified] Family  Contact Method: In Person  Reason/Outcome: Continuity of [address]         Is the patient interested in [facility] at discharge?: No    DME Referral Provided  Referral made for DME?: No    Other Referral/Resources/Interventions Provided:  Interventions: Short Term Rehab  Referral Comments: D/C to [facility] today, insurance authorization approved    Treatment Team Recommendation: Short Term Rehab  Discharge Destination Plan[de-identified] Short Term Rehab  Transport at Discharge : Wheelchair van  Dispatcher Contacted: Yes  Number/Name of Dispatcher: PACO     ETA of Transport (Date): 01/06/22     IMM Given (Date):: 01/06/22  IMM Given to[de-identified] Patient  Family notified[de-identified] Family notified of patient's Medicare rights at bedside, copy provided     Accepting Facility Name, [facility] : [address] [place] [address]  Receiving Facility/Agency Phone Number: [phone]  Facility/Agency Fax Number: [phone]      CM updated per [facility], patient's transport time is 1700 with [facility]     CM updated nursing, SLIM and receiving facility with transport time  Nursing updating family

## 2022-01-06 NOTE — CASE MANAGEMENT
Case Management Discharge Planning Note    Patient name Nini Powell  Location W MS 65/W -01 MRN [id number]  : 1945 Date 2022       Current Admission Date: 2021  Current Admission Diagnosis:Acute respiratory failure with hypoxia [facility])   Patient Active Problem List    Diagnosis Date Noted    Opioid dependence due to Chronic back pain  2022    Hypokalemia     Diastolic CHF exacerbation ([address] ) 2021    Atrial fibrillation ([address] ) 2021    Cellulitis of right lower extremity 2021    Chronic back pain 2021    Mixed hyperlipidemia 2021    Obesity 2021    Osteoarthritis of knee 2021     LEXY on CKD (chronic kidney disease) stage 2, GFR 60-89 ml/min 2021    Rash of back 2021    New onset atrial fibrillation ([address] ) 2021    Acute respiratory failure with hypoxia ([address] ) 2021    Chronic low back pain with sciatica 2021    Chronic venous insufficiency 2021    Essential hypertension 2017    Sjogren's syndrome ([address] ) 2014      LOS (days): 7  Geometric Mean LOS (GMLOS) (days): 4 30  Days to GMLOS:-3 1     OBJECTIVE:  Risk of Unplanned Readmission Score: 23         Current admission status: Inpatient   Preferred Pharmacy:   [address] [place] [address]  [facility]  Phone: [phone] Fax: 1657 [facility], [address] [facility] [address] [facility] [address]  [address] [address]  Phone: [phone] Fax: [phone]    Primary Care Provider: Srinivasa Bond MD    Primary Insurance: [address] [place] [address]  Secondary Insurance:     DISCHARGE DETAILS:    Discharge planning discussed with[de-identified] patient daughter and   Freedom of Choice: Yes     CM contacted family/caregiver?: Yes  Were Treatment Team discharge recommendations reviewed with patient/caregiver?: Yes  Did patient/caregiver verbalize understanding of patient care needs?: Yes  Were patient/caregiver advised of the risks associated with not following Treatment Team discharge recommendations?: Yes    Contacts  Patient Contacts: daughter and   Relationship to Patient[de-identified] Family  Contact Method: In Person  Reason/Outcome: Continuity of [address]         Is the patient interested in [facility] at discharge?: No    DME Referral Provided  Referral made for DME?: No    Other Referral/Resources/Interventions Provided:  Interventions: Short Term Rehab  Referral Comments: D/C to [facility] today, insurance authorization approved    Treatment Team Recommendation: Short Term Rehab  Discharge Destination Plan[de-identified] Short Term Rehab  Transport at Discharge : Wheelchair van  Dispatcher Contacted: Yes  Number/Name of Dispatcher: PACO     ETA of Transport (Date): 01/06/22     IMM Given (Date):: 01/06/22  IMM Given to[de-identified] Patient  Family notified[de-identified] Family notified of patient's Medicare rights at bedside, copy provided     CM updated patient's insurance authorization approved  Auth #[id number]  [facility] 1/6/2022  NRD 1/10/2022  Subha #  Fax clinicals to [phone] updated SLIM and receiving facility re:insurance authorization approved  S is able to accept today after 1600  CM spoke with patient and family at the bedside  Patient and family updated insurance authorization approved for inpatient rehab  Facility requesting transport after 1600  CM discussed wcv transport with family  Family updated [facility] is contracted with hospital with no cost to patient/family  Family requesting CM set up wcv with [facility]  CM reviewed with patient and family at the bedside re:IMM  Patient understands her Medicare rights  No other questions/concerns noted at this time  IMM reviewed with patient and caregiver, patient and caregiver agrees with discharge determination      CM sent referral to [facility]  Waiting for transport time  Requested 1600

## 2022-01-06 NOTE — DISCHARGE SUMMARY
[facility]  Discharge- Parth Cain 1945, 77 y o  female MRN: [id number]  Unit/Bed#: W -Ashok Encounter: [id number]  Primary Care Provider: Trey Arzate MD   Date and time admitted to hospital: 12/29/2021  8:10 PM    * Acute respiratory failure with hypoxia (HCC)  Assessment & Plan  POA, O2 saturation at 80%   Patient complains of worsening shortness of breath both at rest and on exertion  Not on oxygen at home  Recent hospitalization for similar complaint  CXR -  B/L  Lower lobe consolidations,  Atelectasis VS PNA  Repeat CXR - persistent right-sided pneumonia  On eval, patient on room air at upper 90s  Ambulatory pulse ox resulted at 90% on room air  Plan  -follow-up with outpatient PCP      Opioid dependence due to Chronic back pain   Assessment & Plan  History of chronic back pain  Patient at home uses OxyContin 20 t i d  p r n  and Percocet  q4 p r n  Plan   - Continue home regimen with oxycodone scheduled   -referral sent for outpatient [facility]     Chronic back pain  Assessment & Plan  Patient complains of chronic back pain  Patient unable to lay flat and sleeps on a recliner at home  Sees pain management  Home regimen includes OxyContin 20 t i d  p r n  and Percocet  q4 p r n  [facility] Plan  -Continue home pain medications   -referral sent for outpatient [facility] - patient to schedule appointment    Cellulitis of right lower extremity  Assessment & Plan  POA, Green purulent discharge from posterior right lower extremity with erythema  Not painful  Patient believes infection of right lower extremity began roughly 1 week ago, but is unsure     POA, Leukocytosis   One dose of IV cefepime provided in ED  Wound culture - Final result growing Proteus mirabilis, Enterococcus faecalis, Pseudomonas aeruginosa  Blood culture - final result negative after 5 days     Plan  -patient completed IV cefazolin total of 8 days antibiotic treatment during hospital stay  -monitor right lower extremity  -continue wound care -  follow wound care instructions      Atrial fibrillation [facility])  Assessment & Plan  Recently diagnosed atrial fibrillation on warfarin  POA, INR 2 45   Patient on warfarin 10 mg daily at home  Patient instructed to stop warfarin in setting of supratherapeutic INR  Lab Results   Component Value Date    INR 2 67 (H) 01/06/2022    INR 2 63 (H) 01/05/2022    INR 3 09 (H) 01/04/2022    INR supratherapeutic  Goal is lower limit of 2-3  Improving  Plan  -Continue to hold warfarin for 2 days  -restart Coumadin on 01/08/2022   -F/u INR Monday 01/10/2022 - call PCP with results  -F/U PCP outpatient within 1 week    Diastolic CHF exacerbation [facility])  Assessment & Plan  Wt Readings from Last 3 Encounters:   01/04/22 114 kg (251 lb 5 2 oz)   12/16/21 120 kg (264 lb 3 2 oz)   12/02/21 118 kg (260 lb)     BNP elevated at 1,860 POA  LEXY POA likely prerenal    Patient appears volume overloaded on exam   Chronic and worsening bilateral lower extremity edema  Dyspnea at rest and on exertion  Patient on torsemide 20 mg b i d  at home  Patient leads increasingly sedentary lifestyle which she attributes to her current state of health  Echo 11/04/2021 -  LVEF 65%, no regional wall abnormality, G1 DD    In setting of contraction alkalosis, 2 doses diamox received 1/2/2022 per cardiology    Plan   - continue Daily Torsemide 40 mg   - follow with outpatient PCP  - follow-up with outpatient cardiology  - continue fluid restriction 1800 mL daily, salt restriction 2 g daily at discharge    Hypokalemia-resolved as of 1/6/2022  Assessment & Plan  POA, 5 2  Resolved    Plan   -continue 40 mEq b i d    -  BMP as outpatient for PCP    LEXY (acute kidney injury) (HCC)-resolved as of 1/3/2022  Assessment & Plan  Creatinine at 1 97 POA  Baseline appears to be around 1 0     Likely prerenal in setting of CHF exacerbation  Resolved  Plan  - monitor a m  [facility]     Discharging Resident: Chaya Nevarez MD  Attending: García Albright MD  PCP: Jayant Dai MD  Admission Date: 12/29/2021  Discharge Date: 01/06/22    Disposition:      Short Term Rehab or SNF at [facility]     For Discharges to [facility] SNF:   · Not Applicable to this Patient - Not Applicable to this Patient    Reason for Admission:  Worsening shortness of breath and lower extremity edema secondary to exacerbation of diastolic CHF    Consultations During Hospital Stay:  · Cardiology    Procedures Performed:     · None    Medication Adjustments and Discharge Medications:  · Summary of Medication Adjustments made as a result of this hospitalization:  Coumadin as below  · Medication Dosing Tapers - Please refer to Discharge Medication List for details on any medication dosing tapers (if applicable to patient)  · Medications being temporarily held (include recommended restart time):    · Coumadin currently held  Patient instructed to restart Coumadin at no dose of 2 5 mg daily on 01/08/2022  · Discharge Medication List: See after visit summary for reconciled discharge medications  Wound Care Recommendations:  When applicable, please see wound care section of After Visit Summary  Diet Recommendations at Discharge:  Diet -        Diet Orders   (From admission, onward)             Start     Ordered    12/30/21 [facility] [facility] 100  Room Service  Once        Question:  Type of Service  Answer:  Room Service - Appropriate with Assistance    12/30/21 7507    12/30/21 0406  Diet Cardiovascular; Cardiac; Sodium 2 GM, Fluid Restriction 1800 ML  Diet effective now        References:    Nutrtion Support Algorithm Enteral vs  Parenteral   Question Answer Comment   Diet Type Cardiovascular    Cardiac Cardiac    Other Restriction(s): Sodium 2 GM    Other Restriction(s): Fluid Restriction 1800 ML    RD to adjust diet per protocol?  Yes 12/30/21 0406              Fluid Restriction - Continue Fluid Restriction as Listed Above at Discharge  Instructions for any Catheters / Lines Present at Discharge (including removal date, if applicable):  None     Significant Findings / Test Results:     XR chest portable    Result Date: 1/3/2022  Impression: Persistent right pneumonia  Workstation performed: [id number]     XR chest 1 view portable    Result Date: 12/30/2021  Impression: Bilateral lower lobe consolidations consistent with atelectasis and/or pneumonia  Workstation performed: [id number]       · No Chest XR results available for this patient  ·     Incidental Findings:   · none     Test Results Pending at Discharge (will require follow up): · None      Outpatient Tests Requested:  · INR level    Complications:  None    Hospital Course:     Isa High is a 77 y o  female patient who originally presented to the hospital on 12/29/2021 due to worsening shortness of breath and lower extremity swelling secondary to diastolic CHF exacerbation  Patient on arrival, required O2 supplementation initially between BiPAP and nasal cannula, then over hospital course requiring nasal cannula  Patient was managed with IV diuresis with Lasix, however patient developed contraction alkalosis and was monitored off of diuresis, with fluid restriction of 1800 mL per day and diet limited to 2 g of salt  Cardiology team was consulted for assistance in management  During hospital course, patient's INR was noted to be supratherapeutic and warfarin was held  Patient's contraction alkalosis improved and was trialed on home dose torsemide 40 mg to which patient responded appropriately  Over hospital course, patient's shortness of breath and lower extremity swelling improved and oxygen requirements decreased to room air  On evaluation, patient was stable and was cleared for discharge with continuation of torsemide daily      Condition at Discharge: stable Discharge Day Visit / Exam:     Subjective:  Nursing team reported no overnight events, and patient was on room air overnight  Patient denied shortness of breath, chest pain, cough, abdominal pain, and addressed noticing improvement in her lower extremity swelling  Patient was found on room air saturating at upper 90s  Vitals: Blood Pressure: 127/58 (01/06/22 1509)  Pulse: 64 (01/06/22 1557)  Temperature: 98 8 °F (37 1 °C) (01/06/22 1509)  Temp Source: Oral (01/06/22 1509)  Respirations: 16 (01/06/22 1509)  Height: 5' 2" (157 5 cm) (12/29/21 2035)  Weight - Scale: 112 kg (247 lb 5 7 oz) (01/06/22 0600)  SpO2: 94 % (01/06/22 1557)  Exam:   Physical Exam  Vitals and nursing note reviewed  Constitutional:       General: She is not in acute distress  Appearance: Normal appearance  She is morbidly obese  She is not ill-appearing or diaphoretic  HENT:      Head: Normocephalic and atraumatic  Mouth/Throat:      Mouth: Mucous membranes are moist       Pharynx: Oropharynx is clear  Eyes:      General: No scleral icterus  Conjunctiva/sclera: Conjunctivae normal    Cardiovascular:      Rate and Rhythm: Normal rate and regular rhythm  Pulses: Normal pulses  Heart sounds: Normal heart sounds  No murmur heard  No gallop  Pulmonary:      Effort: Pulmonary effort is normal  No respiratory distress  Breath sounds: Rales (Right base) present  No wheezing  Abdominal:      General: Bowel sounds are normal  There is no distension  Palpations: Abdomen is soft  There is no mass  Tenderness: There is no abdominal tenderness  Musculoskeletal:         General: No tenderness  Normal range of motion  Cervical back: Normal range of motion and neck supple  Right lower leg: Edema ( chronic lymphedema) present  Left lower leg: Edema ( chronic lymphedema) present  Right foot: No swelling  Left foot: No swelling  Skin:     General: Skin is warm and dry  Capillary Refill: Capillary refill takes less than 2 seconds  Coloration: Skin is not jaundiced  Findings: Wound ( right LE posteriorly - healing, covered with wound care) present  Neurological:      General: No focal deficit present  Mental Status: She is alert and oriented to person, place, and time  Mental status is at baseline  Sensory: No sensory deficit  Psychiatric:         Mood and Affect: Mood normal          Behavior: Behavior normal          Thought Content: Thought content normal          Judgment: Judgment normal          Discussion with Family:  Daughter Edwardo Gómez and  Zack Combs     Goals of Care Discussions:  · Code Status at Discharge: Level 1 - Full Code  · Were there any Goals of Care Discussions during Hospitalization?: No  · Results of any General Goals of Care Discussions:  N/a   · POLST Completed: No   · If POLST Completed, Summary of POLST Agreement Provided Here: n/a   · OK to Rehospitalize if Needed?  not discussed  Discharge instructions/Information to patient and family:   See after visit summary section titled Discharge Instructions for information provided to patient and family        Planned Readmission:  None      ** Please Note: This note has been constructed using a voice recognition system **

## 2022-01-07 NOTE — UTILIZATION REVIEW
Notification of Discharge   This is a Notification of Discharge from our facility [address]  Please be advised that this patient has been discharge from our facility  Below you will find the admission and discharge date and time including the patients disposition  UTILIZATION REVIEW CONTACT:  Dillon Martinez  Utilization   Network Utilization Review Department  Phone: [phone] x carefully listen to the prompts  All voicemails are confidential   Email: [email or web address]     PHYSICIAN ADVISORY SERVICES:  FOR YXLT-AR-ACLN REVIEW - MEDICAL NECESSITY DENIAL  Phone: [phone]  Fax: [phone]  Email: [email or web address]     PRESENTATION DATE: 12/29/2021  8:10 PM  OBERVATION ADMISSION DATE:   INPATIENT ADMISSION DATE: 12/30/21  2:03 AM   DISCHARGE DATE: 1/6/2022  5:30 PM  DISPOSITION: Non [facility] Non [facility]      IMPORTANT INFORMATION:  Send all requests for admission clinical reviews, approved or denied determinations and any other requests to dedicated fax number below belonging to the campus where the patient is receiving treatment   List of dedicated fax numbers:  [address] DENIALS (Administrative/Medical Necessity) [phone]   [address] [place] [address] (Maternity/NICU/Pediatrics) [phone]   [facility] [phone]   [address] [facility] [phone]   [address] [place] [address] [phone]   [facility] [address] [facility] [phone]   [facility]  [phone]   [address] [facility] [address]  [address] [facility] [address] [facility] [phone]

## 2022-01-10 ENCOUNTER — NURSING HOME VISIT (OUTPATIENT)
Dept: GERIATRICS | Facility: OTHER | Age: 77
End: 2022-01-10
Payer: COMMERCIAL

## 2022-01-10 DIAGNOSIS — I87.2 CHRONIC VENOUS INSUFFICIENCY: ICD-10-CM

## 2022-01-10 DIAGNOSIS — L03.119 CELLULITIS OF LOWER EXTREMITY, UNSPECIFIED LATERALITY: ICD-10-CM

## 2022-01-10 DIAGNOSIS — M35.00 SJOGREN'S SYNDROME, WITH UNSPECIFIED ORGAN INVOLVEMENT (HCC): ICD-10-CM

## 2022-01-10 DIAGNOSIS — G89.29 CHRONIC MIDLINE LOW BACK PAIN WITHOUT SCIATICA: ICD-10-CM

## 2022-01-10 DIAGNOSIS — J96.01 ACUTE RESPIRATORY FAILURE WITH HYPOXIA (HCC): ICD-10-CM

## 2022-01-10 DIAGNOSIS — I48.91 ATRIAL FIBRILLATION, UNSPECIFIED TYPE (HCC): ICD-10-CM

## 2022-01-10 DIAGNOSIS — M54.50 CHRONIC MIDLINE LOW BACK PAIN WITHOUT SCIATICA: ICD-10-CM

## 2022-01-10 DIAGNOSIS — I50.33 ACUTE ON CHRONIC DIASTOLIC CONGESTIVE HEART FAILURE (HCC): Primary | ICD-10-CM

## 2022-01-10 DIAGNOSIS — N18.2 CKD (CHRONIC KIDNEY DISEASE) STAGE 2, GFR 60-89 ML/MIN: ICD-10-CM

## 2022-01-10 DIAGNOSIS — I10 ESSENTIAL HYPERTENSION: ICD-10-CM

## 2022-01-10 DIAGNOSIS — F41.9 ANXIETY: ICD-10-CM

## 2022-01-10 PROCEDURE — 99306 1ST NF CARE HIGH MDM 50: CPT | Performed by: INTERNAL MEDICINE

## 2022-01-10 RX ORDER — MULTIVIT WITH MINERALS/LUTEIN
1000 TABLET ORAL DAILY
COMMUNITY
End: 2022-02-12 | Stop reason: HOSPADM

## 2022-01-10 RX ORDER — ALPRAZOLAM 0.25 MG/1
0.25 TABLET ORAL DAILY PRN
Qty: 14 TABLET | Refills: 0 | Status: SHIPPED | OUTPATIENT
Start: 2022-01-10 | End: 2022-04-21 | Stop reason: ALTCHOICE

## 2022-01-10 NOTE — ASSESSMENT & PLAN NOTE
Continue to taper down oxygen to maintain saturation above 90%  Patient nasal cannula oxygen rate has been increased since being at the facility as per daughter  Repeat CXR ordered and extra dose of torsemide ordered    Continue monitoring oxygen requirement   Hospital CXR as per report showed persistent right-sided pneumonia

## 2022-01-10 NOTE — PROGRESS NOTES
Cardiology  Heart Failure   Follow Up Office Visit Note     Marysol Schmitz   77 y o    female   MRN: [id number]  [address]  [address] [place] [id number] [phone]    PCP: Marquis Quentin MD  Cardiologist : will be Dr Kristin Moore    @ [facility]            Summary of Recommendations  -Low-sodium diet, Heart failure education as below  -diagnostic sleep study  She may benefit from this when she is released from rehab  -Referred to lymphedema Clinic/Dr Keven Jorge  She has been seen in the past  -today we focused on education  Her  tells me she is in rehab temporarily  They wewre provided education about atrial fibrillation, sleep apnea, heart failre   -wean oxygen, as able  Follow up will be scheduled with Dr Kristin Moore 6 weeks      Impression/plan  hypoxic respiratory failure secondary to acute on chronic HF with preserved EF and right-sided pneumonia, treated with antibiotics, adm 12/29-1/6/22  Atrial fibrillation, persistent  new onset 11/21  [id number]: 5  Anticoagulated with warfarin, goal INR 2-3 monitored by her PCP 1/7/22: INR 2 1   Eliquis was cost prohibitive  Outpatient sleep study recommended  TSH normal She spontaneously converted to sinus rhythm in the hospital   On carvedilol  Went back into AFib in December 2021 in the setting of acute decompensated heart failure  Sleep study recommended    EKG today:  AFib 63 beats per minute  Chronic diastolic heart failure, adm 11/12-12/7/21 AND  Newly diagnosed, in the setting of sodium dietary indiscretion, new onset atrial fibrillation and suboptimal hypertension control  Wt Readings from Last 3 Encounters:   01/13/22 106 kg (233 lb)   01/06/22 112 kg (247 lb 5 7 oz)   12/16/21 120 kg (264 lb 3 2 oz)     Wt Readings from Last 3 Encounters:   01/13/22 106 kg (233 lb)   01/06/22 112 kg (247 lb 5 7 oz)   12/16/21 120 kg (264 lb 3 2 oz)     --Beta-blocker:  Carvedilol 25 mg b i d   --Diuretic:  Torsemide 40 mg daily plus potassium 40 mEq daily  --ACE/ARB/ARNI:  Losartan 100 mg daily  --2 g sodium diet, 1800 cc fluid restriction  Daily weights, call weight gain 2-3 lb in 1 day or 5 lb in 5 days  Hypertension, essential  /62 On amlodipine 5 mg daily, carvedilol 25 mg b i d  losartan 100 mg daily  Previously on HCTZ this was discontinued in favor of torsemide 40 mg daily  Fmvnuv08/13/21: LDL 93 Non [id number]  ASCVD risk score 20 4%:  High risk  She may benefit from statin therapy  This can be addressed at a future visit  Obesity BMI 47  Chronic venous insufficiency Pneumatic compression pumps, per vascular  She is at the facility now and she has not been wearing this but when she goes home, this is recommended, if it is not currently feasible  Chronic back pain with opioid dependence  Cognitive decline as reported by her   This can be evaluated by primary care  Cardiac testing   TTE 11/14/21  EF 65%  Wall motion is normal  Grade 1 DD  Mild LVH  Mild LAE  Mild AI  Mild MR  Mild TR  HPI:   Cameron Aguirre is a 76 yo female with morbid obesity, chronic venous insufficiency and lymphedema  She has a history of recurrent cellulitis  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  Adm 11/12-11/17/21 (5d)  CC:  Increasing shortness of breath, increasing lower extremity edema  Found to be in AFib with RVR, new onset  Diagnosed and treated for acute diastolic heart failure secondary to rapid AFib in the setting of sodium dietary indiscretion-fast food, microwave meals  In the past she was prescribed Lasix 20 mg 3 times a week but she was not taking it  She was markedly volume overloaded with weeping lower extremities  An echocardiogram showed preserved LV function, normal wall motion  Mild LVH    Mild MR mild TR  CTA showed no evidence of PE, however there was enlargement of the pulmonary trunk the main right and left pulmonary artery suggestive of pulmonary hypertension  She diuresed with Lasix 80 mg IV b i d  Lower extremity Dopplers negative for DVT   Recommended referral to lymphedema Clinic as an outpatient  DCd on coreg  6 25 mg BID  Discharge weight :  250 lb? Admission weight was 240 lb? Discharge creatinine:  1 06  Discharge diuretics: Torsemide 40 mg daily      12/2/21   office visit with her PCP found to be in acute heart failure  Hypertensive  10 lb over discharge weight, at 260 lb  Carvedilol uptitrated  Decision to reassess her renal function before adjusting diuretics    1215/21  Pt canceled cardiology OV      12/23 INR greater than 8    12/28/21 chart notes reviewed  Gait dysfunction   Worsening lower extremity edema  Back pain  leg pain  Urged by Internal Medicine go to the ED  INR not repeated, as  It was requested    ADM 12/29-1/6/22  Chief complaint :worsening shortness of breath and lower extremity swelling   Was hypoxic satting 80% on arrival  Diagnosis: acute hypoxic respiratory failure secondary to acute on chronic HF with preserved EF and right-sided pneumonia, treated with antibiotics  Also with right lower extremity cellulitis treated with antibiotics  Diuresed with IV Lasix  Followed by Cardiology  Patient developed contraction alkalosis secondary to IV diuresis  But has resolved with Diamox  She is back on torsemide 40 mg   Discharged to acute rehab  Discharge weight :  247 lb  Discharge creatinine : 0 85  Discharge diuretic: Torsemide 40 mg daily plus potassium 40 mEq dailya  Discharged to [address] at [facility]     1/10/22: Weight at the facility 227 lb  O2 requirement, however higher, at 90%  Given an extra dose of torsemide 40 mg x 1  BMP ordered in 3 days  Chest x-ray ordered    1/13/22  She comes from the facility, [facility]  She is accompanied by her  who met her here today    He provided some extra history:  He has been concerned about his wife, as she appears to have memory issues  He also reports that he works 2:00 p m  to 10:00 p m , and she was in the room, with a microwave close by  She was consuming mostly microwave foods, extremely high sodium dietary intake  She was extremely sedentary given back issues and moved very little  Today he tells me she is significantly improved, she has lost nearly 50 lb, of fluid  Weights and labs are available for review  Wt Readings from Last 3 Encounters:   01/13/22 106 kg (233 lb)   01/06/22 112 kg (247 lb 5 7 oz)   12/16/21 120 kg (264 lb 3 2 oz)   She weighed 270 lb in December  Labs:  1/10/22 hemoglobin 10 6 hematocrit 33 2 platelet count 727I  Creatinine 0 8 BUN 22 potassium 4 0  ALT 10  On a no added salt diet, 1800 cc fluid restriction  O2 sat 98% on 1 L nasal cannula  /62  Today, she appears euvolemic  She does have chronic lower extremity lymphedema  She was observed falling asleep several times, as her  and I were talking  She has clear breath sounds but diminished, and heart sounds that are distant and irregular irregular but controlled  Agreeable to a sleep study and follow-up with vascular food for lymphedema/ venous compression pump follow-up  Education provided regarding salt restriction, daily weights, adherence to diet meds and office visits, etc     Follow-up with her cardiologist in 6 weeks  I have spent 40 minutes with Patient and family today in which greater than 50% of this time was spent in counseling/coordination of care regarding Intructions for management, Patient and family education, Importance of tx compliance and Risk factor reductions  Assessment  Diagnoses and all orders for this visit:    Hospital discharge follow-up    Atrial fibrillation, unspecified type [facility])  -     Diagnostic Sleep Study; Future  -     POCT ECG    Chronic diastolic heart failure (HCC)  -     Cancel: Basic metabolic panel;  Future  -     Cancel: Basic metabolic panel; Future    Chronic venous insufficiency    Essential hypertension  -     Diagnostic Sleep Study; Future    Mixed hyperlipidemia    Class 3 severe obesity due to excess calories with serious comorbidity and body mass index (BMI) of 45 0 to 49 9 in adult [facility])  -     Diagnostic Sleep Study; Future    Chronic anticoagulation  -     CBC and Platelet    Other orders  -     acetaminophen (TYLENOL) 325 mg tablet; Take 650 mg by mouth every 6 (six) hours as needed for mild pain  -     magnesium hydroxide (Milk of Magnesia) 400 mg/5 mL oral suspension; Take 30 mL by mouth daily as needed for constipation  -     zinc gluconate 50 mg tablet; Take 50 mg by mouth daily        Past Medical History:   Diagnosis Date    A-fib ([place] [address] ) 12/29/2021    Anxiety     Arthritis     Back pain     Cellulitis     Edema of both lower extremities due to peripheral venous insufficiency     Erythema of lower extremity 11/12/2021    Fibromyalgia     Hypertension     Sjogren syndrome, unspecified ([place] [address] )        Review of Systems   Constitutional: Positive for malaise/fatigue and weight loss  Negative for chills  Cardiovascular: Positive for dyspnea on exertion and leg swelling  Negative for chest pain, claudication, cyanosis, irregular heartbeat, near-syncope, orthopnea, palpitations, paroxysmal nocturnal dyspnea and syncope  Respiratory: Positive for shortness of breath, sleep disturbances due to breathing and snoring  Negative for cough  Gastrointestinal: Negative for heartburn and nausea  Neurological: Negative for dizziness, focal weakness, headaches, light-headedness and weakness  Psychiatric/Behavioral: Positive for memory loss  All other systems reviewed and are negative  No Known Allergies        Current Outpatient Medications:     acetaminophen (TYLENOL) 325 mg tablet, Take 650 mg by mouth every 6 (six) hours as needed for mild pain, Disp: , Rfl:     ALPRAZolam (XANAX) 0 25 mg tablet, Take 1 tablet (0 25 mg total) by mouth daily as needed for anxiety for up to 14 days, Disp: 14 tablet, Rfl: 0    amLODIPine (NORVASC) 5 mg tablet, amlodipine 5 mg tablet  Take 1 tablet twice a day by oral route , Disp: , Rfl:     ammonium lactate (LAC-HYDRIN) 12 % cream, Apply topically 2 (two) times a day, Disp: 385 g, Rfl: 0    Ascorbic Acid (vitamin C) 1000 MG tablet, Take 1,000 mg by mouth daily, Disp: , Rfl:     Blood Pressure KIT, Use daily, Disp: 1 kit, Rfl: 0    carvedilol (COREG) 25 mg tablet, Take 1 tablet (25 mg total) by mouth 2 (two) times a day with meals, Disp: 60 tablet, Rfl: 0    Diclofenac Sodium (Voltaren) 1 %, Voltaren 1 % topical gel, Disp: , Rfl:     hydrocortisone 1 % cream, Apply topically 4 (four) times a day as needed for rash, Disp: 30 g, Rfl: 0    losartan (COZAAR) 100 MG tablet, Take 1 tablet (100 mg total) by mouth daily, Disp: 30 tablet, Rfl: 0    magnesium hydroxide (Milk of Magnesia) 400 mg/5 mL oral suspension, Take 30 mL by mouth daily as needed for constipation, Disp: , Rfl:     ondansetron (ZOFRAN-ODT) 4 mg disintegrating tablet, , Disp: , Rfl:     oxyCODONE (OxyCONTIN) 20 mg 12 hr tablet, Take 1 tablet (20 mg total) by mouth every 12 (twelve) hours Max Daily Amount: 40 mg, Disp: 2 tablet, Rfl: 0    oxyCODONE-acetaminophen (PERCOCET)  mg per tablet, Take 1 tablet by mouth every 4 (four) hours as needed for moderate pain Max Daily Amount: 6 tablets, Disp: 4 tablet, Rfl: 0    potassium chloride (K-DUR,KLOR-CON) 20 mEq tablet, Take 2 tablets (40 mEq total) by mouth daily, Disp: 60 tablet, Rfl: 0    tiZANidine (ZANAFLEX) 4 mg tablet, tizanidine 4 mg tablet  TAKE ONE TABLET BY MOUTH EVERY 8 HOURS AS NEEDED FOR spasm, Disp: , Rfl:     torsemide (DEMADEX) 20 mg tablet, Take 2 tablets (40 mg total) by mouth daily, Disp: 60 tablet, Rfl: 0    Vitamin D, Cholecalciferol, 25 MCG (1000 UT) TABS, Vitamin D3 25 mcg (1,000 unit) capsule  one PO QD, Disp: , Rfl:     warfarin (Coumadin) 2 5 mg tablet, Take 1 tablet (2 5 mg total) by mouth daily, Disp: 60 tablet, Rfl: 0    zinc gluconate 50 mg tablet, Take 50 mg by mouth daily, Disp: , Rfl:     Social History     Socioeconomic History    Marital status: /Civil Union     Spouse name: Not on file    Number of children: Not on file    Years of education: Not on file    Highest education level: Not on file   Occupational History    Not on file   Tobacco Use    Smoking status: Former Smoker    Smokeless tobacco: Never Used   Vaping Use    Vaping Use: Never used   Substance and Sexual Activity    Alcohol use: Not Currently    Drug use: Never    Sexual activity: Not on file   Other Topics Concern    Not on file   Social History Narrative    Not on file     Social Determinants of Health     Financial Resource Strain: Not on file   Food Insecurity: Not on file   Transportation Needs: No Transportation Needs    Lack of Transportation (Medical): No    Lack of Transportation (Non-Medical): No   Physical Activity: Not on file   Stress: Not on file   Social Connections: Not on file   Intimate Partner Violence: Not on file   Housing Stability: Not on file       Family History   Problem Relation Age of Onset    Heart disease Mother     Cancer Father        Physical Exam  Vitals and nursing note reviewed  Constitutional:       General: She is not in acute distress  Appearance: She is not diaphoretic  Comments: She is in a wheelchair  She falls asleep easily during the visit  HENT:      Head: Normocephalic and atraumatic  Eyes:      Conjunctiva/sclera: Conjunctivae normal    Cardiovascular:      Rate and Rhythm: Normal rate and regular rhythm  Pulses: Intact distal pulses  Heart sounds: Normal heart sounds  Pulmonary:      Effort: Pulmonary effort is normal       Breath sounds: Normal breath sounds  Abdominal:      General: Bowel sounds are normal       Palpations: Abdomen is soft     Musculoskeletal:         General: Normal range of motion  Cervical back: Normal range of motion and neck supple  Right lower leg: Edema present  Left lower leg: Edema present  Skin:     General: Skin is warm and dry  Neurological:      Mental Status: She is alert  Mental status is at baseline  Vitals: Blood pressure 104/62, pulse 56, height 5' 2" (1 575 m), weight 106 kg (233 lb), SpO2 98 %  Wt Readings from Last 3 Encounters:   01/13/22 106 kg (233 lb)   01/06/22 112 kg (247 lb 5 7 oz)   12/16/21 120 kg (264 lb 3 2 oz)         Labs & Results:  Lab Results   Component Value Date    WBC 8 93 01/06/2022    HGB 10 7 (L) 01/06/2022    HCT 34 8 01/06/2022    MCV 89 01/06/2022     01/06/2022     No results found for: BNP  No components found for: CHEM    No results found for this or any previous visit  No results found for this or any previous visit  This note was completed in part utilizing m-[employer] fluency direct voice recognition software  Grammatical errors, random word insertion, spelling mistakes, and incomplete sentences may be an occasional consequence of the system secondary to software limitations, ambient noise and hardware issues  At the time of dictation, efforts were made to edit, clarify and /or correct errors  Please read the chart carefully and recognize, using context, where substitutions have occurred    If you have any questions or concerns about the context, text or information contained within the body of this dictation, please contact myself, the provider, for further clarification

## 2022-01-10 NOTE — ASSESSMENT & PLAN NOTE
Wt Readings from Last 3 Encounters:   01/06/22 112 kg (247 lb 5 7 oz)   12/16/21 120 kg (264 lb 3 2 oz)   12/02/21 118 kg (260 lb)     Weight at the facility is 227 8lbs   Report her oxygen requirement is more since admission  Ordered extra dose of torsemide 40mg po one time  Continue the scheduled torsemide   Continue monitoring weight   Continue to taper oxygen down and maintain saturation at 90%  Also ordered repeat CXR   Echo from the hospital report showed EF of 65% and grade 1 diastolic dysfunction  Continue fluid restriction 1800mL and no added salt diet   Repeat BMP in about 3-4 days

## 2022-01-10 NOTE — ASSESSMENT & PLAN NOTE
Had been taking xanax at home    reports uses as prn   Will start xanax back as he reports she is getting very anxious in the evening   Continue monitoring for worsening symptoms

## 2022-01-10 NOTE — PROGRESS NOTES
[facility] notes  SHORT TERM REHAB       NAME: London Mari  AGE: 77 y o  SEX: female    DATE OF ENCOUNTER: 1/10/2022    Assessment and Plan   Diastolic CHF exacerbation ([facility] [address] )  Wt Readings from Last 3 Encounters:   01/06/22 112 kg (247 lb 5 7 oz)   12/16/21 120 kg (264 lb 3 2 oz)   12/02/21 118 kg (260 lb)     Weight at the facility is 227 8lbs   Report her oxygen requirement is more since admission  Ordered extra dose of torsemide 40mg po one time  Continue the scheduled torsemide   Continue monitoring weight   Continue to taper oxygen down and maintain saturation at 90%  Also ordered repeat CXR   Echo from the hospital report showed EF of 65% and grade 1 diastolic dysfunction  Continue fluid restriction 1800mL and no added salt diet   Repeat BMP in about 3-4 days       LEXY on CKD (chronic kidney disease) stage 2, GFR 60-89 ml/min  Lab Results   Component Value Date    EGFR 67 01/06/2022    EGFR 71 01/05/2022    EGFR 76 01/04/2022    CREATININE 0 85 01/06/2022    CREATININE 0 81 01/05/2022    CREATININE 0 76 01/04/2022     Repeat BMP  Continue current meds    Acute respiratory failure with hypoxia (HCC)  Continue to taper down oxygen to maintain saturation above 90%  Patient nasal cannula oxygen rate has been increased since being at the facility as per daughter  Repeat CXR ordered and extra dose of torsemide ordered  Continue monitoring oxygen requirement   Hospital CXR as per report showed persistent right-sided pneumonia    Cellulitis of right lower extremity  Completed course of antibiotics  Wound culture report showed proteus mirabilis, enterococcus faecalis and pseudomonas aer    Continue monitoring lower extremities   Continue with wound care     Atrial fibrillation ([facility] [address] )  Recent diagnosis  Rate controlled  Continue coumadin  Continue monitoring INR      Anxiety  Had been taking xanax at home    reports uses as prn   Will start xanax back as he reports she is getting very anxious in the evening   Continue monitoring for worsening symptoms    Chronic back pain  Continue current home meds  Continue monitoring symptoms  Sleeps on the recliner at home  Chronic venous insufficiency  Continue current meds  Follows with vascular as per report     Essential hypertension  BP good range today  Continue current meds  Continue monitoring BP    Sjogren's syndrome ([facility] [address] )  Continue monitoring for worsening symptoms             Chief Complaint     No new complaints    History of Present Illness     76 yo female seen for admission to [facility] after a hospitalization  History obtained from from patient initially  As per patient she cannot really recall what happened but thinks she went to the hospital due to shortness of breath  She states she was managed for heart issue which she did not have before  Talked to  who reported she was short of breath, confused, and had leg swelling which resulted him into taking her to the hospital  In the hospital she was found to be in CHF  She was placed on BIPAP and given IV diuretics to get fluids out  She was found to have pneumonia and cellulitis which was treated with IV antibiotics  Her oxygenation improved enough to change her over to nasal cannula from BIPAP  She also became alkalosis and required medication adjustments  Reviewed patient xray reports and labs from hospital records  Answered 's and daughter's questions       PMHx     Past Medical History:   Diagnosis Date    A-fib ([facility] [address] ) 12/29/2021    Anxiety     Arthritis     Back pain     Cellulitis     Edema of both lower extremities due to peripheral venous insufficiency     Erythema of lower extremity 11/12/2021    Fibromyalgia     Hypertension     Sjogren syndrome, unspecified ([facility] [address] )      Past Surgical History:   Procedure Laterality Date    KNEE CARTILAGE SURGERY      REPLACEMENT TOTAL KNEE BILATERAL       Family History   Problem Relation Age of Onset    Heart disease Mother     Cancer Father      Social History     Socioeconomic History    Marital status: /Civil Union     Spouse name: None    Number of children: None    Years of education: None    Highest education level: None   Occupational History    None   Tobacco Use    Smoking status: Former Smoker    Smokeless tobacco: Never Used   Vaping Use    Vaping Use: Never used   Substance and Sexual Activity    Alcohol use: Not Currently    Drug use: Never    Sexual activity: None   Other Topics Concern    None   Social History Narrative    None     Social Determinants of Health     Financial Resource Strain: Not on file   Food Insecurity: Not on file   Transportation Needs: No Transportation Needs    Lack of Transportation (Medical): No    Lack of Transportation (Non-Medical): No   Physical Activity: Not on file   Stress: Not on file   Social Connections: Not on file   Intimate Partner Violence: Not on file   Housing Stability: Not on file     No Known Allergies    Review of Systems     Shortness of breath at times  Did have some nausea when taking all pills at once  Did have some diarrhea   All other review of system negative        Objective   Vital signs:  BP: 133/56  HR: 76  RR: 21  TEMP: 97 7F  SAT : 92% on 3L    Virtual exam with nursing present   PHYSICAL EXAM:  GENERAL: no acute distress  SKIN: no rash, no cyanosis, chronic venous statis changes of the legs  HEENT: normocephalic, atraumatic  LUNGS: expanded equally, no chest tenderness   HEART: no JVD, bilateral lower extremity edema   ABDOMEN: soft non tender non distended , obese  MUSCULOSKELETAL:  moves all extremities, ROM within normal  NEUROLOGY: awake, alert, Ox3, EOMI intact  PSYCH: cooperative, pleasant  Pertinent Laboratory/Diagnostic Studies:  Recent labs and diagnostic tests reviewed in nursing home EMR    Current Medications   Medications reviewed and signed off on nursing home EMR

## 2022-01-10 NOTE — ASSESSMENT & PLAN NOTE
Lab Results   Component Value Date    EGFR 67 01/06/2022    EGFR 71 01/05/2022    EGFR 76 01/04/2022    CREATININE 0 85 01/06/2022    CREATININE 0 81 01/05/2022    CREATININE 0 76 01/04/2022     Repeat BMP  Continue current meds

## 2022-01-10 NOTE — ASSESSMENT & PLAN NOTE
Completed course of antibiotics  Wound culture report showed proteus mirabilis, enterococcus faecalis and pseudomonas aer    Continue monitoring lower extremities   Continue with wound care

## 2022-01-12 ENCOUNTER — NURSING HOME VISIT (OUTPATIENT)
Dept: GERIATRICS | Facility: OTHER | Age: 77
End: 2022-01-12
Payer: COMMERCIAL

## 2022-01-12 DIAGNOSIS — M54.50 CHRONIC MIDLINE LOW BACK PAIN WITHOUT SCIATICA: Primary | ICD-10-CM

## 2022-01-12 DIAGNOSIS — G89.29 CHRONIC MIDLINE LOW BACK PAIN WITHOUT SCIATICA: Primary | ICD-10-CM

## 2022-01-12 DIAGNOSIS — I50.32 CHRONIC DIASTOLIC HEART FAILURE (HCC): ICD-10-CM

## 2022-01-12 DIAGNOSIS — L03.115 CELLULITIS OF RIGHT LOWER EXTREMITY: ICD-10-CM

## 2022-01-12 PROBLEM — I48.91 NEW ONSET ATRIAL FIBRILLATION (HCC): Status: RESOLVED | Noted: 2021-11-12 | Resolved: 2022-01-12

## 2022-01-12 PROBLEM — I50.9 CHF EXACERBATION (HCC): Status: RESOLVED | Noted: 2021-12-30 | Resolved: 2022-01-12

## 2022-01-12 PROCEDURE — 99309 SBSQ NF CARE MODERATE MDM 30: CPT | Performed by: NURSE PRACTITIONER

## 2022-01-12 NOTE — PROGRESS NOTES
[facility]  [address] [place] [address]  [id number]  [phone]  [facility] [place]   [address]  Progress Note        NAME: Marlyn Rodríguez  AGE: 77 y o  SEX: female  :  1945  DATE OF ENCOUNTER: 2022    Chief Complaint   Patient seen and examined for follow up on chronic conditions  History of Present Illness     Marlyn Mazariegos  Is a 77 y o female patient of [facility] with underlying medical conditions of Acute respiratory failure with hypoxia, atrial fibrillation, chronic diastolic heart failure, hypertension, chronic venous insufficiency, LEXY on CKD stage 2, anxiety, chronic back pain, pioid dependence due to chronic back pain, and obesity  The patient is being seen and examined today for follow-up on acute and chronic medical conditions  Upon examination, the patient is sitting in her recliner, alert, cooperative, and in no acute distress  She denies sob, chest pain, abdominal pain, fever, chills, nausea/vomiting, diarrhea/constipation, or dysuria  The patient reports she has a good appetite and is drinking an adequate amount of fluids  The patient's only complaint today is chronic pain in her back  Per PT/OT the patient's ambulatory status is 120 feet with RW and stand by assistance  The patient's mobility status is bed mobility and  Transfers require contact guard  The patient's ADL status is upper body moderate assist, lower body max assist, and stand by assist    The patient scored a 18/30 on the [facility] test       The following portions of the patient's history were reviewed and updated as appropriate: allergies, current medications, past family history, past medical history, past social history, past surgical history and problem list     Review of Systems     A review of systems was performed  All negative, except as per HPI      History     Past Medical History:   Diagnosis Date    A-fib [facility]) 2021    Anxiety     Arthritis     Back pain     Cellulitis  Edema of both lower extremities due to peripheral venous insufficiency     Edema of both lower extremities due to peripheral venous insufficiency     Erythema of lower extremity 11/12/2021    Fibromyalgia     Hypertension     Sjogren syndrome, unspecified ([facility] [address] )      Past Surgical History:   Procedure Laterality Date    KNEE CARTILAGE SURGERY      REPLACEMENT TOTAL KNEE BILATERAL       Family History   Problem Relation Age of Onset    Heart disease Mother     Cancer Father      Social History     Socioeconomic History    Marital status: /Civil Union     Spouse name: Not on file    Number of children: Not on file    Years of education: Not on file    Highest education level: Not on file   Occupational History    Not on file   Tobacco Use    Smoking status: Former Smoker    Smokeless tobacco: Never Used   Vaping Use    Vaping Use: Never used   Substance and Sexual Activity    Alcohol use: Not Currently    Drug use: Never    Sexual activity: Not on file   Other Topics Concern    Not on file   Social History Narrative    Not on file     Social Determinants of Health     Financial Resource Strain: Not on file   Food Insecurity: Not on file   Transportation Needs: No Transportation Needs    Lack of Transportation (Medical): No    Lack of Transportation (Non-Medical):  No   Physical Activity: Not on file   Stress: Not on file   Social Connections: Not on file   Intimate Partner Violence: Not on file   Housing Stability: Not on file     No Known Allergies    Objective     Vital Signs  BP: 126/61     HR:69 T:97 6    RR:20 O2Sat:94% W:229 3  General: NAD, Well Nourished, Well Developed  Oral: Oropharynx Moist and Clear  Neck: Supple, +ROM  CV: S1, S2, normal rate, regular rhythm, no murmur appreciated  Pulmonary: Lung sounds clear to air, no wheezing, rhonchi, rales  Abdominal:BS + x4 in all quadrants, soft, no mass, no tenderness  Extremities: BLE plus +2 edema, +ROM, +Strength  Skin: Warm, Dry, BLE flaky, no lesions, no rash, no erythema present, no ecchymosis present  Neurological: No focal deficits  Psych: Alert and oriented times 3, pleasant mood, no affect, good judgement    Pertinent Laboratory/Diagnostic Studies:  CBC WITH DIFF  HEMOGLOBIN 10 6 g/dL 11 5-14 5 L Final  HEMATOCRIT 33 2 % 35 0-43 0 L Final  WBC 8 3 thou/cmm 4 0-10 0 Final  RBC 3 96 mill/cmm 3 70-4 70 Final  PLATELET COUNT 025 thou/cmm 140-350 Final  MPV 10 4 fL 7 5-11 3 Final  MCV 84 fL  Final  MCH 26 9 pg 26 0-34 0 Final  MCHC 32 1 g/dL 32 0-37 0 Final  RDW 15 6 % 12 0-16 0 Final  DIFFERENTIAL TYPE AUTO Final  ABSOLUTE NEUT 5 3 thou/cmm 1 8-7 8 Final  ABSOLUTE LYMPH 1 5 thou/cmm 1 0-3 0 Final  ABSOLUTE MONO 1 0 thou/cmm 0 3-1 0 Final  ABSOLUTE EOS 0 4 thou/cmm 0 0-0 5 Final  ABSOLUTE BASO 0 1 thou/cmm 0 0-0 1 Final  NEUTROPHILS 63 % Final  LYMPHOCYTES 18 % Final  MONOCYTES 13 % Final  EOSINOPHILS 5 % Final  BASOPHILS 1 % Final  COMP METAB PANEL  GLUCOSE 120 mg/dL 65-99 H Final  BUN 22 mg/dL 7-25 Final  CREATININE 0 85 mg/dL 0 40-1 10 Final  SODIUM 139 mmol/L 135-145 Final  POTASSIUM 4 0 mmol/L 3 5-5 2 Final  CHLORIDE 100 mmol/L 100-109 Final  CARBON DIOXIDE 35 mmol/L 23-31 H Final  CALCIUM 9 6 mg/dL 8 5-10 1 Final  ALKALINE PHOSPHATASE 99 U/L  Final  ALBUMIN 3 1 g/dL 3 5-4 8 L Final  BILIRUBIN,TOTAL 0 5 mg/dL 0 2-1 0 Final  PROTEIN, TOTAL 6 5 g/dL 6 3-8 3 Final  AST 12 U/L <41 Final  ALT 10 U/L <56 Final  ANION GAP 4 3-11 Final  eGFR, NON  AM 67 >60 Final  eGFR,  AMER 78 >60 Final       Current Medications     Current Medications Reviewed and updated in Nursing Home EMR      Assessment and Plan     Chronic back pain  · Back pain is chronic  · Patient is dependent on opioids for pain  · Continue oxycodone and OxyContin regimen  · Continue PT OT services  · Follow-up with pain management    Chronic diastolic heart failure (HCC)  · Current wt 229 3  · Continue to monitor BMP   · Continue Torsemide 40 mg daily  · Continue CHARLOTTE diet  · Restrict fluids to 2000 mL daily            Edema of both lower extremities due to peripheral venous insufficiency  · Patient continues to have plus 2 ble edema  · Continue Compression Stockings/TubiGrip daily AM and off PM  · Encourage elevation while sitting and laying in bed  · Continue CHARLOTTE diet  · Continue Torsemide 40 mg daily      Cellulitis of right lower extremity  · S/p abx therapy no further areas of cellulitis noted  · Continue to monitor ble for s/s of infection  · Encourage elevation and compression stockings/tubigrips      [facility]  Geriatric Medicine  1/12/2022

## 2022-01-13 ENCOUNTER — OFFICE VISIT (OUTPATIENT)
Dept: CARDIOLOGY CLINIC | Facility: CLINIC | Age: 77
End: 2022-01-13
Payer: COMMERCIAL

## 2022-01-13 VITALS
HEART RATE: 56 BPM | WEIGHT: 233 LBS | SYSTOLIC BLOOD PRESSURE: 104 MMHG | BODY MASS INDEX: 42.88 KG/M2 | OXYGEN SATURATION: 98 % | DIASTOLIC BLOOD PRESSURE: 62 MMHG | HEIGHT: 62 IN

## 2022-01-13 DIAGNOSIS — E78.2 MIXED HYPERLIPIDEMIA: ICD-10-CM

## 2022-01-13 DIAGNOSIS — I87.2 CHRONIC VENOUS INSUFFICIENCY: ICD-10-CM

## 2022-01-13 DIAGNOSIS — Z09 HOSPITAL DISCHARGE FOLLOW-UP: Primary | ICD-10-CM

## 2022-01-13 DIAGNOSIS — Z79.01 CHRONIC ANTICOAGULATION: ICD-10-CM

## 2022-01-13 DIAGNOSIS — E66.01 CLASS 3 SEVERE OBESITY DUE TO EXCESS CALORIES WITH SERIOUS COMORBIDITY AND BODY MASS INDEX (BMI) OF 45.0 TO 49.9 IN ADULT (HCC): ICD-10-CM

## 2022-01-13 DIAGNOSIS — I48.91 ATRIAL FIBRILLATION, UNSPECIFIED TYPE (HCC): ICD-10-CM

## 2022-01-13 DIAGNOSIS — I50.32 CHRONIC DIASTOLIC HEART FAILURE (HCC): ICD-10-CM

## 2022-01-13 DIAGNOSIS — I10 ESSENTIAL HYPERTENSION: ICD-10-CM

## 2022-01-13 PROCEDURE — 99215 OFFICE O/P EST HI 40 MIN: CPT | Performed by: NURSE PRACTITIONER

## 2022-01-13 PROCEDURE — 93000 ELECTROCARDIOGRAM COMPLETE: CPT | Performed by: NURSE PRACTITIONER

## 2022-01-13 RX ORDER — ZINC GLUCONATE 50 MG
50 TABLET ORAL DAILY
COMMUNITY
End: 2022-02-12 | Stop reason: HOSPADM

## 2022-01-13 RX ORDER — ACETAMINOPHEN 325 MG/1
650 TABLET ORAL EVERY 6 HOURS PRN
COMMUNITY
End: 2022-08-03

## 2022-01-13 RX ORDER — MAGNESIUM HYDROXIDE 2400 MG/30ML
30 SUSPENSION ORAL DAILY PRN
COMMUNITY
End: 2022-02-12 | Stop reason: HOSPADM

## 2022-01-13 NOTE — LETTER
January 13, 2022     Lennox Pap, MD [id number]    Patient: Freddy Adam   YOB: 1945   Date of Visit: 1/13/2022       Dear Dr Beatriz Rich:    Thank you for referring Mirlande Armando to me for evaluation  Below are my notes for this consultation  If you have questions, please do not hesitate to call me  I look forward to following your patient along with you  Sincerely,        SHAHIDA Mcknight        CC: MD Jacqueline Velez, [facility]  1/13/2022 11:50 AM  Sign when Signing Visit  Cardiology  Heart Failure   Follow Up Office Visit Note     Freddy Adam   77 y o    female   MRN: [id number]  [address]  [address] [facility] [address] [address]  [phone]  [phone]    PCP: Lennox Pap, MD  Cardiologist : will be Dr Zahira Gonzalez    @ [employer]            Summary of Recommendations  -Low-sodium diet, Heart failure education as below  -diagnostic sleep study  She may benefit from this when she is released from rehab  -Referred to lymphedema Clinic/Dr Sandy Garcia  She has been seen in the past  -today we focused on education  Her  tells me she is in rehab temporarily  They wewre provided education about atrial fibrillation, sleep apnea, heart failre   -wean oxygen, as able  Follow up will be scheduled with Dr Zahira Gonzalez 6 weeks      Impression/plan  hypoxic respiratory failure secondary to acute on chronic HF with preserved EF and right-sided pneumonia, treated with antibiotics, adm 12/29-1/6/22  Atrial fibrillation, persistent  new onset 11/21  [id number]: 5  Anticoagulated with warfarin, goal INR 2-3 monitored by her PCP 1/7/22: INR 2 1   Eliquis was cost prohibitive  Outpatient sleep study recommended  TSH normal She spontaneously converted to sinus rhythm in the hospital   On carvedilol    Went back into AFib in December 2021 in the setting of acute decompensated heart failure  Sleep study recommended   EKG today:  AFib 63 beats per minute  Chronic diastolic heart failure, adm 11/12-12/7/21 AND  Newly diagnosed, in the setting of sodium dietary indiscretion, new onset atrial fibrillation and suboptimal hypertension control  Wt Readings from Last 3 Encounters:   01/13/22 106 kg (233 lb)   01/06/22 112 kg (247 lb 5 7 oz)   12/16/21 120 kg (264 lb 3 2 oz)     Wt Readings from Last 3 Encounters:   01/13/22 106 kg (233 lb)   01/06/22 112 kg (247 lb 5 7 oz)   12/16/21 120 kg (264 lb 3 2 oz)     --Beta-blocker:  Carvedilol 25 mg b i d   --Diuretic:  Torsemide 40 mg daily plus potassium 40 mEq daily  --ACE/ARB/ARNI:  Losartan 100 mg daily  --2 g sodium diet, 1800 cc fluid restriction  Daily weights, call weight gain 2-3 lb in 1 day or 5 lb in 5 days  Hypertension, essential  /62 On amlodipine 5 mg daily, carvedilol 25 mg b i d  losartan 100 mg daily  Previously on HCTZ this was discontinued in favor of torsemide 40 mg daily  Wtcwbp90/13/21: LDL 93 Non [id number]  ASCVD risk score 20 4%:  High risk  She may benefit from statin therapy  This can be addressed at a future visit  Obesity BMI 47  Chronic venous insufficiency Pneumatic compression pumps, per vascular  She is at the facility now and she has not been wearing this but when she goes home, this is recommended, if it is not currently feasible  Chronic back pain with opioid dependence  Cognitive decline as reported by her   This can be evaluated by primary care  Cardiac testing   TTE 11/14/21  EF 65%  Wall motion is normal  Grade 1 DD  Mild LVH  Mild LAE  Mild AI  Mild MR  Mild TR  HPI:   Eulalia Madrid is a 78 yo female with morbid obesity, chronic venous insufficiency and lymphedema  She has a history of recurrent cellulitis  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      Adm 11/12-11/17/21 (5d)  CC:  Increasing shortness of breath, increasing lower extremity edema  Found to be in AFib with RVR, new onset  Diagnosed and treated for acute diastolic heart failure secondary to rapid AFib in the setting of sodium dietary indiscretion-fast food, microwave meals  In the past she was prescribed Lasix 20 mg 3 times a week but she was not taking it  She was markedly volume overloaded with weeping lower extremities  An echocardiogram showed preserved LV function, normal wall motion  Mild LVH  Mild MR mild TR  CTA showed no evidence of PE, however there was enlargement of the pulmonary trunk the main right and left pulmonary artery suggestive of pulmonary hypertension  She diuresed with Lasix 80 mg IV b i d  Lower extremity Dopplers negative for DVT   Recommended referral to lymphedema Clinic as an outpatient  DCd on coreg  6 25 mg BID  Discharge weight :  250 lb? Admission weight was 240 lb? Discharge creatinine:  1 06  Discharge diuretics: Torsemide 40 mg daily      12/2/21   office visit with her PCP found to be in acute heart failure  Hypertensive  10 lb over discharge weight, at 260 lb  Carvedilol uptitrated  Decision to reassess her renal function before adjusting diuretics    1215/21  Pt canceled cardiology OV      12/23 INR greater than 8    12/28/21 chart notes reviewed  Gait dysfunction   Worsening lower extremity edema  Back pain  leg pain  Urged by Internal Medicine go to the ED  INR not repeated, as  It was requested    ADM 12/29-1/6/22  Chief complaint :worsening shortness of breath and lower extremity swelling   Was hypoxic satting 80% on arrival  Diagnosis: acute hypoxic respiratory failure secondary to acute on chronic HF with preserved EF and right-sided pneumonia, treated with antibiotics  Also with right lower extremity cellulitis treated with antibiotics  Diuresed with IV Lasix  Followed by Cardiology  Patient developed contraction alkalosis secondary to IV diuresis  But has resolved with Diamox    She is back on torsemide 40 mg   Discharged to acute rehab  Discharge weight :  247 lb  Discharge creatinine : 0 85  Discharge diuretic: Torsemide 40 mg daily plus potassium 40 mEq dailya  Discharged to [address] at [facility]     1/10/22: Weight at the facility 227 lb  O2 requirement, however higher, at 90%  Given an extra dose of torsemide 40 mg x 1  BMP ordered in 3 days  Chest x-ray ordered    1/13/22  She comes from the facility, [facility]  She is accompanied by her  who met her here today  He provided some extra history:  He has been concerned about his wife, as she appears to have memory issues  He also reports that he works 2:00 p m  to 10:00 p m , and she was in the room, with a microwave close by  She was consuming mostly microwave foods, extremely high sodium dietary intake  She was extremely sedentary given back issues and moved very little  Today he tells me she is significantly improved, she has lost nearly 50 lb, of fluid  Weights and labs are available for review  Wt Readings from Last 3 Encounters:   01/13/22 106 kg (233 lb)   01/06/22 112 kg (247 lb 5 7 oz)   12/16/21 120 kg (264 lb 3 2 oz)   She weighed 270 lb in December  Labs:  1/10/22 hemoglobin 10 6 hematocrit 33 2 platelet count 329N  Creatinine 0 8 BUN 22 potassium 4 0  ALT 10  On a no added salt diet, 1800 cc fluid restriction  O2 sat 98% on 1 L nasal cannula  /62  Today, she appears euvolemic  She does have chronic lower extremity lymphedema  She was observed falling asleep several times, as her  and I were talking  She has clear breath sounds but diminished, and heart sounds that are distant and irregular irregular but controlled  Agreeable to a sleep study and follow-up with vascular food for lymphedema/ venous compression pump follow-up  Education provided regarding salt restriction, daily weights, adherence to diet meds and office visits, etc     Follow-up with her cardiologist in 6 weeks        I have spent 40 minutes with Patient and family today in which greater than 50% of this time was spent in counseling/coordination of care regarding Intructions for management, Patient and family education, Importance of tx compliance and Risk factor reductions  Assessment  Diagnoses and all orders for this visit:    Hospital discharge follow-up    Atrial fibrillation, unspecified type [facility])  -     Diagnostic Sleep Study; Future  -     POCT ECG    Chronic diastolic heart failure (HCC)  -     Cancel: Basic metabolic panel; Future  -     Cancel: Basic metabolic panel; Future    Chronic venous insufficiency    Essential hypertension  -     Diagnostic Sleep Study; Future    Mixed hyperlipidemia    Class 3 severe obesity due to excess calories with serious comorbidity and body mass index (BMI) of 45 0 to 49 9 in adult [facility])  -     Diagnostic Sleep Study; Future    Chronic anticoagulation  -     CBC and Platelet    Other orders  -     acetaminophen (TYLENOL) 325 mg tablet; Take 650 mg by mouth every 6 (six) hours as needed for mild pain  -     magnesium hydroxide (Milk of Magnesia) 400 mg/5 mL oral suspension; Take 30 mL by mouth daily as needed for constipation  -     zinc gluconate 50 mg tablet; Take 50 mg by mouth daily        Past Medical History:   Diagnosis Date    A-fib ([facility] ) 12/29/2021    Anxiety     Arthritis     Back pain     Cellulitis     Edema of both lower extremities due to peripheral venous insufficiency     Erythema of lower extremity 11/12/2021    Fibromyalgia     Hypertension     Sjogren syndrome, unspecified ([place] [address] )        Review of Systems   Constitutional: Positive for malaise/fatigue and weight loss  Negative for chills  Cardiovascular: Positive for dyspnea on exertion and leg swelling  Negative for chest pain, claudication, cyanosis, irregular heartbeat, near-syncope, orthopnea, palpitations, paroxysmal nocturnal dyspnea and syncope     Respiratory: Positive for shortness of breath, sleep disturbances due to breathing and snoring  Negative for cough  Gastrointestinal: Negative for heartburn and nausea  Neurological: Negative for dizziness, focal weakness, headaches, light-headedness and weakness  All other systems reviewed and are negative  No Known Allergies        Current Outpatient Medications:     acetaminophen (TYLENOL) 325 mg tablet, Take 650 mg by mouth every 6 (six) hours as needed for mild pain, Disp: , Rfl:     ALPRAZolam (XANAX) 0 25 mg tablet, Take 1 tablet (0 25 mg total) by mouth daily as needed for anxiety for up to 14 days, Disp: 14 tablet, Rfl: 0    amLODIPine (NORVASC) 5 mg tablet, amlodipine 5 mg tablet  Take 1 tablet twice a day by oral route , Disp: , Rfl:     ammonium lactate (LAC-HYDRIN) 12 % cream, Apply topically 2 (two) times a day, Disp: 385 g, Rfl: 0    Ascorbic Acid (vitamin C) 1000 MG tablet, Take 1,000 mg by mouth daily, Disp: , Rfl:     Blood Pressure KIT, Use daily, Disp: 1 kit, Rfl: 0    carvedilol (COREG) 25 mg tablet, Take 1 tablet (25 mg total) by mouth 2 (two) times a day with meals, Disp: 60 tablet, Rfl: 0    Diclofenac Sodium (Voltaren) 1 %, Voltaren 1 % topical gel, Disp: , Rfl:     hydrocortisone 1 % cream, Apply topically 4 (four) times a day as needed for rash, Disp: 30 g, Rfl: 0    losartan (COZAAR) 100 MG tablet, Take 1 tablet (100 mg total) by mouth daily, Disp: 30 tablet, Rfl: 0    magnesium hydroxide (Milk of Magnesia) 400 mg/5 mL oral suspension, Take 30 mL by mouth daily as needed for constipation, Disp: , Rfl:     ondansetron (ZOFRAN-ODT) 4 mg disintegrating tablet, , Disp: , Rfl:     oxyCODONE (OxyCONTIN) 20 mg 12 hr tablet, Take 1 tablet (20 mg total) by mouth every 12 (twelve) hours Max Daily Amount: 40 mg, Disp: 2 tablet, Rfl: 0    oxyCODONE-acetaminophen (PERCOCET)  mg per tablet, Take 1 tablet by mouth every 4 (four) hours as needed for moderate pain Max Daily Amount: 6 tablets, Disp: 4 tablet, Rfl: 0    potassium chloride (K-DUR,KLOR-CON) 20 mEq tablet, Take 2 tablets (40 mEq total) by mouth daily, Disp: 60 tablet, Rfl: 0    tiZANidine (ZANAFLEX) 4 mg tablet, tizanidine 4 mg tablet  TAKE ONE TABLET BY MOUTH EVERY 8 HOURS AS NEEDED FOR spasm, Disp: , Rfl:     torsemide (DEMADEX) 20 mg tablet, Take 2 tablets (40 mg total) by mouth daily, Disp: 60 tablet, Rfl: 0    Vitamin D, Cholecalciferol, 25 MCG (1000 UT) TABS, Vitamin D3 25 mcg (1,000 unit) capsule  one PO QD, Disp: , Rfl:     warfarin (Coumadin) 2 5 mg tablet, Take 1 tablet (2 5 mg total) by mouth daily, Disp: 60 tablet, Rfl: 0    zinc gluconate 50 mg tablet, Take 50 mg by mouth daily, Disp: , Rfl:     Social History     Socioeconomic History    Marital status: /Civil Union     Spouse name: Not on file    Number of children: Not on file    Years of education: Not on file    Highest education level: Not on file   Occupational History    Not on file   Tobacco Use    Smoking status: Former Smoker    Smokeless tobacco: Never Used   Vaping Use    Vaping Use: Never used   Substance and Sexual Activity    Alcohol use: Not Currently    Drug use: Never    Sexual activity: Not on file   Other Topics Concern    Not on file   Social History Narrative    Not on file     Social Determinants of Health     Financial Resource Strain: Not on file   Food Insecurity: Not on file   Transportation Needs: No Transportation Needs    Lack of Transportation (Medical): No    Lack of Transportation (Non-Medical): No   Physical Activity: Not on file   Stress: Not on file   Social Connections: Not on file   Intimate Partner Violence: Not on file   Housing Stability: Not on file       Family History   Problem Relation Age of Onset    Heart disease Mother     Cancer Father        Physical Exam  Vitals and nursing note reviewed  Constitutional:       General: She is not in acute distress  Appearance: She is not diaphoretic  Comments: She is in a wheelchair    She falls asleep easily during the visit  HENT:      Head: Normocephalic and atraumatic  Eyes:      Conjunctiva/sclera: Conjunctivae normal    Cardiovascular:      Rate and Rhythm: Normal rate and regular rhythm  Pulses: Intact distal pulses  Heart sounds: Normal heart sounds  Pulmonary:      Effort: Pulmonary effort is normal       Breath sounds: Normal breath sounds  Abdominal:      General: Bowel sounds are normal       Palpations: Abdomen is soft  Musculoskeletal:         General: Normal range of motion  Cervical back: Normal range of motion and neck supple  Right lower leg: Edema present  Left lower leg: Edema present  Skin:     General: Skin is warm and dry  Neurological:      Mental Status: She is alert and oriented to person, place, and time  Vitals: Blood pressure 104/62, pulse 56, height 5' 2" (1 575 m), weight 106 kg (233 lb), SpO2 98 %  Wt Readings from Last 3 Encounters:   01/13/22 106 kg (233 lb)   01/06/22 112 kg (247 lb 5 7 oz)   12/16/21 120 kg (264 lb 3 2 oz)         Labs & Results:  Lab Results   Component Value Date    WBC 8 93 01/06/2022    HGB 10 7 (L) 01/06/2022    HCT 34 8 01/06/2022    MCV 89 01/06/2022     01/06/2022     No results found for: BNP  No components found for: CHEM    No results found for this or any previous visit  No results found for this or any previous visit  This note was completed in part utilizing m-[employer]arrable direct voice recognition software  Grammatical errors, random word insertion, spelling mistakes, and incomplete sentences may be an occasional consequence of the system secondary to software limitations, ambient noise and hardware issues  At the time of dictation, efforts were made to edit, clarify and /or correct errors  Please read the chart carefully and recognize, using context, where substitutions have occurred    If you have any questions or concerns about the context, text or information contained within the body of this dictation, please contact myself, the provider, for further clarification

## 2022-01-13 NOTE — PATIENT INSTRUCTIONS
Seasoning Without Salt   WHAT YOU NEED TO KNOW:   Seasoning foods without salt during cooking and eating can help decrease the amount of sodium in your diet  Sodium is found in salt and in many other foods  Limit sodium if you have high blood pressure and heart failure  You may also need to limit sodium if you have liver problems or kidney disease  Sodium makes your blood pressure rise if you have high blood pressure  If you have heart failure, eating too much sodium causes extra fluid to build up in your body  This extra body fluid may cause swelling, shortness of breath, or weight gain  People with other health conditions such as diabetes or heart disease may also need to make other diet changes  Ask your healthcare provider if you need to make other diet changes  DISCHARGE INSTRUCTIONS:   High-sodium seasonings and condiments to limit or avoid:   · Denzel sauce, soup, and other packaged sauce mixes  · Barbecue, taco, and steak sauce  · Dry salad dressing mixes  · Garlic, onion, and celery salt  · Imitation coffman bits  · Meat tenderizers and sauces  · Monosodium glutamate (MSG)  MSG may be found in [place] food, soy sauce, and oyster sauce  · Mustard, prepared horseradish sauce, and ketchup  · Pickle relish  · Salt, seasoned salt, kosher salt, and sea salt  · Soy, Worcestershire, and teriyaki sauces  Limit low sodium varieties because they still contain high amounts of sodium  · Tartar, fish, and cocktail sauce  Low-sodium herbs to use:   · Basil with eggs, fish and shellfish, beef, liver, veal, tomato sauce, soups, pasta, green salad, and vegetables  · Bay leaf with beef, white fish, soups, and tomato dishes  · Cilantro, chili powder, and cumin with egg dishes, [place] food, pork, fish, and rice  · Dill weed with breads, chicken, cooked fresh vegetables, cucumbers, fish or shellfish, potato salad, and soup      · Marjoram with beef, lamb, chicken, turkey, pasta, green salad, cream sauce, eggs, soups, and vegetables  · Parsley with stuffing, rice, egg salad, green salad, vegetable salad, baked beans, vegetables, soups, and tomato sauces  · Rosemary and thyme with veal, pork, beef, potatoes, cream or tomato sauce, soups, and vegetables  · Armen with chicken, turkey, fish, pork, veal, soups, onions, stuffing, tomato sauce, and vegetables  · Savory with beef, stuffing, chicken soup, green beans, poultry, red meats, and potatoes  · Tarragon with eggs, fish or shellfish, chicken, turkey, green salad, soups, sauces, and salad dressings  Low-sodium herb blends to use:   · Chili blend: mix black pepper, chili powder, cilantro, cumin, dry mustard, garlic powder, onion powder, oregano, and paprika  · [place] slaw blend: mix celery seed, dill weed, dried onion, sugar, and tarragon  · [facility] food blend:  mix basil, black pepper, garlic powder, ground red pepper, marjoram, oregano, savory, and thyme  · Onion herb blend:  mix basil, black pepper, cumin, dill weed, dried onion flakes, and garlic powder  Low-sodium spices to use:   · Cinnamon in custard and pudding, sweet breads, rolls, fruits, fruit salad, pork, pumpkin, winter squash, and sweet potatoes  · Cloves   in sweet breads, fruit, ham, pork, baked beans, tomatoes, winter squash, and sweet potatoes  · Jaramillo powder with beef, veal, chicken, turkey, and fish or potato soup  · Arianna with baked fish, carrots, pot roast, ham, chicken, turkey, rice, and fruit  · Mace in chicken soup, baked fruit desserts, carrots, cauliflower, custard, fruit jams, lamb, potatoes, and pumpkin  · Nutmeg in sweet breads, fruits, vegetables, and custard  Low-sodium seasonings to use:   · Chives in eggs, pasta, cream or potato soup, corn, potatoes, and salad dressing  · Garlic (minced, powdered, or freshly chopped) with shellfish, lamb, soup, dips and sauces, Italian dishes, meats, and poultry      · Lemon with chicken, fruit salads, grilled or baked fish, shellfish, spinach, and tossed salads  · Onion (dried, powdered, or freshly chopped) with beef, liver, egg salad, green salad, casseroles, pasta dishes, and stews  · Vinegar (such as balsamic, cider, flavored, red wine, or white) with cucumbers, cooked greens, potatoes, salad dressings, spinach, and seafood  How to use food labels to choose seasonings that are low in sodium:  Reading food labels is a good way to learn whether foods contain sodium and how much sodium they contain  The ingredient list on the food label will tell you if the seasoning or food contains sodium  The food contains sodium if an ingredient has Na (symbol for sodium), salt, soda, or sodium in its name  Food labels list the amount of sodium in the food in milligrams (mg)  Following are some words about sodium that may appear on a label and what they mean  Ask your healthcare provider for more information about how to read food labels  · Sodium free or salt free: Less than five mg in each serving  · Very low sodium:  Thirty-five (35) mg of sodium or less in each serving  · Low sodium:  One-hundred and forty (140) mg of sodium or less in each serving  · Reduced or less sodium: At least 25 percent less sodium in each serving  For example, a food may have 800 mg of sodium in each serving  The same food made with reduced sodium would contain 600 mg of sodium  · Light in sodium: Fifty (50) percent less sodium in each serving  For example, a food may have 500 mg of sodium in each serving  The same food that is light in sodium would have 250 mg of sodium  · Unsalted or no added salt: No extra salt is added  Other ways to decrease sodium:   · Check with your healthcare provider before using salt substitutes if you need to limit potassium in your diet  They may be too high in potassium for you to use safely  · Fast food and packaged foods are often high in sodium   Buy low salt or low sodium foods whenever possible  Eat homemade or fresh foods and meals to avoid getting too much sodium  Buy fresh vegetables, frozen vegetables or low sodium or no salt added canned vegetables  · Avoid regular canned soups or soups made from dry mixes  Buy low sodium soups or make your own at home without salt  Use low sodium broth, bouillon, or consommé  · Avoid canned, smoked, or processed poultry (chicken, turkey), fish, or meats  Limit cured meats such as coffman and ham      · Regular cheese contains a medium to high amount of salt  If you eat cheese, buy low sodium kinds as often as possible  Add only one third to one half the amount of cheese listed on recipes  © Copyright [address] 2021 Information is for End User's use only and may not be sold, redistributed or otherwise used for commercial purposes  All illustrations and images included in CareNotes® are the copyrighted property of A D A M , Inc  or [address] [place], [place] [address]malgorzata   The above information is an  only  It is not intended as medical advice for individual conditions or treatments  Talk to your doctor, nurse or pharmacist before following any medical regimen to see if it is safe and effective for you  Low-Sodium Diet   AMBULATORY CARE:   A low-sodium diet  limits foods that are high in sodium (salt)  You will need to follow a low-sodium diet if you have high blood pressure, kidney disease, or heart failure  You may also need to follow this diet if you have a condition that is causing your body to retain (hold) extra fluid  You may need to limit the amount of sodium you eat in a day to 1,500 to 2,000 mg  Ask your healthcare provider how much sodium you can have each day  How to use food labels to choose foods that are low in sodium:  Read food labels to find the amount of sodium they contain  The amount of sodium is listed in milligrams (mg)   The % Daily Value (DV) column tells you how much of your daily needs are met by 1 serving of the food for each nutrient listed  Choose foods that have less than 5% of the DV of sodium  These foods are considered low in sodium  Foods that have 20% or more of the DV of sodium are considered high in sodium  Some food labels may also list any of the following terms that tell you about the sodium content in the food:  · Sodium-free:  Less than 5 mg in each serving    · Very low sodium:  35 mg of sodium or less in each serving    · Low sodium:  140 mg of sodium or less in each serving    · Reduced sodium: At least 25% less sodium in each serving than the regular type    · Light in sodium:  50% less sodium in each serving    · Unsalted or no added salt:  No extra salt is added during processing (the food may still contain sodium)       Foods to avoid:  Salty foods are high in sodium  You should avoid the following:  · Processed foods:      ? Mixes for cornbread, biscuits, cake, and pudding     ? Instant foods, such as potatoes, cereals, noodles, and rice     ? Packaged foods, such as bread stuffing, rice and pasta mixes, snack dip mixes, and macaroni and cheese     ? Canned foods, such as canned vegetables, soups, broths, sauces, and vegetable or tomato juice    ? Snack foods, such as salted chips, popcorn, pretzels, pork rinds, salted crackers, and salted nuts    ? Frozen foods, such as dinners, entrees, vegetables with sauces, and breaded meats    ? Sauerkraut, pickled vegetables, and other foods prepared in brine    · Meats and cheeses:      ? Smoked or cured meat, such as corned beef, coffman, ham, hot dogs, and sausage    ? Canned meats or spreads, such as potted meats, sardines, anchovies, and imitation seafood    ? Deli or lunch meats, such as bologna, ham, turkey, and roast beef    ? Processed cheese, such as American cheese and cheese spreads    · Condiments, sauces, and seasonings:      ? Salt (¼ teaspoon of salt contains 575 mg of sodium)    ?  Seasonings made with salt, such as garlic salt, celery salt, onion salt, and seasoned salt    ? Regular soy sauce, barbecue sauce, teriyaki sauce, steak sauce, Worcestershire sauce, and most flavored vinegars    ? Canned gravy and mixes     ? Regular condiments, such as mustard, ketchup, and salad dressings    ? Pickles and olives    ? Meat tenderizers and monosodium glutamate (MSG)    Foods to include:  Read the food label to find the exact amount of sodium in each serving  · Bread and cereal:  Try to choose breads with less than 80 mg of sodium per serving  ? Bread, roll, zander, tortilla, or unsalted crackers  ? Ready-to-eat cereals with less than 5% DV of sodium (examples include shredded wheat and puffed rice)    ? Pasta    · Vegetables and fruits:      ? Unsalted fresh, frozen, or canned vegetables    ? Fresh, frozen, or canned fruits    ? Fruit juice    · Dairy:  One serving has about 150 mg of sodium  ? Milk, all types    ? Yogurt    ? Hard cheese, such as cheddar, Swiss, [employer], or mozzarella    · Meat and other protein foods:  Some raw meats may have added sodium  ? Plain meats, fish, and poultry     ? Eggs    · Other foods:      ? Homemade pudding    ? Unsalted nuts, popcorn, or pretzels    ? Unsalted butter or margarine    Ways to decrease sodium:   · Add spices and herbs to foods instead of salt during cooking  Use salt-free seasonings to add flavor to foods  Examples include onion powder, garlic powder, basil, casiano powder, paprika, and parsley  Try lemon or lime juice or vinegar to give foods a tart flavor  Use hot peppers, pepper, or cayenne pepper to add a spicy flavor  · Do not keep a salt shaker at your kitchen table  This may help keep you from adding salt to food at the table  A teaspoon of salt has 2,300 mg of sodium  It may take time to get used to enjoying the natural flavor of food instead of adding salt  Talk to your healthcare provider before you use salt substitutes   Some salt substitutes have a high amount of potassium and need to be avoided if you have kidney disease  · Choose low-sodium foods at restaurants  Meals from restaurants are often high in sodium  Some restaurants have nutrition information on the menu that tells you the amount of sodium in their foods  If possible, ask for your food to be prepared with less, or no salt  · Shop for unsalted or low-sodium foods and snacks at the grocery store  Examples include unsalted or low-sodium broths, soups, and canned vegetables  Choose fresh or frozen vegetables instead  Choose unsalted nuts or seeds or fresh fruits or vegetables as snacks  Read food labels and choose salt-free, very low-sodium, or low-sodium foods  © Copyright [employer] 2021 Information is for End User's use only and may not be sold, redistributed or otherwise used for commercial purposes  All illustrations and images included in CareNotes® are the copyrighted property of A [employer]  or [facility] Irina Zambrano   The above information is an  only  It is not intended as medical advice for individual conditions or treatments  Talk to your doctor, nurse or pharmacist before following any medical regimen to see if it is safe and effective for you

## 2022-01-16 NOTE — ASSESSMENT & PLAN NOTE
· S/p abx therapy no further areas of cellulitis noted  · Continue to monitor ble for s/s of infection  · Encourage elevation and compression stockings/tubigrips

## 2022-01-16 NOTE — ASSESSMENT & PLAN NOTE
· Current wt 229 3  · Continue to monitor BMP   · Continue Torsemide 40 mg daily  · Continue [place] diet  · Restrict fluids to 2000 mL daily

## 2022-01-16 NOTE — ASSESSMENT & PLAN NOTE
· Back pain is chronic  · Patient is dependent on opioids for pain  · Continue oxycodone and OxyContin regimen  · Continue PT OT services  · Follow-up with pain management

## 2022-01-16 NOTE — ASSESSMENT & PLAN NOTE
· Patient continues to have plus 2 ble edema  · Continue Compression Stockings/TubiGrip daily AM and off PM  · Encourage elevation while sitting and laying in bed  · Continue [place] diet  · Continue Torsemide 40 mg daily

## 2022-01-17 ENCOUNTER — NURSING HOME VISIT (OUTPATIENT)
Dept: GERIATRICS | Facility: OTHER | Age: 77
End: 2022-01-17
Payer: COMMERCIAL

## 2022-01-17 DIAGNOSIS — G89.29 CHRONIC MIDLINE LOW BACK PAIN WITHOUT SCIATICA: ICD-10-CM

## 2022-01-17 DIAGNOSIS — I87.2 CHRONIC VENOUS INSUFFICIENCY: ICD-10-CM

## 2022-01-17 DIAGNOSIS — J96.01 ACUTE RESPIRATORY FAILURE WITH HYPOXIA (HCC): Primary | ICD-10-CM

## 2022-01-17 DIAGNOSIS — I48.91 ATRIAL FIBRILLATION, UNSPECIFIED TYPE (HCC): ICD-10-CM

## 2022-01-17 DIAGNOSIS — M54.50 CHRONIC MIDLINE LOW BACK PAIN WITHOUT SCIATICA: ICD-10-CM

## 2022-01-17 PROCEDURE — 99309 SBSQ NF CARE MODERATE MDM 30: CPT | Performed by: NURSE PRACTITIONER

## 2022-01-17 NOTE — PROGRESS NOTES
[facility]  [address] [place] [address]  [phone]  [facility]   [address]  Progress Note        NAME: Abbie Rodríguez  AGE: 77 y o  SEX: female  :  1945  DATE OF ENCOUNTER: 2022    Chief Complaint   Patient seen and examined for follow up on chronic conditions  History of Present Illness     Abbie Stoner  Is a 77 y o female patient of [facility] with underlying medical conditions of Acute respiratory failure with hypoxia, atrial fibrillation, chronic diastolic heart failure, hypertension, chronic venous insufficiency, LEXY on CKD stage 2, anxiety, chronic back pain, pioid dependence due to chronic back pain, and obesity      The patient is being seen and examined today for follow-up on acute and chronic medical conditions  Upon examination, the patient is sitting in her recliner, alert, cooperative, and in no acute distress  She denies sob, chest pain, abdominal pain, fever, chills, nausea/vomiting, diarrhea/constipation, or dysuria  The patient reports she has a good appetite and is drinking an adequate amount of fluids  The patient's only complaint today is chronic pain in her back and swelling in her legs  She is noted to not be wearing her tubigrips as instructed during daytime hours      Per PT/OT the patient's ambulatory status is 120 feet with RW and stand by assistance  The patient's mobility status is bed mobility and  Transfers require contact guard  The patient's ADL status is upper body moderate assist, lower body max assist, and stand by assist    The patient scored a 18/30 on the [facility] test     The following portions of the patient's history were reviewed and updated as appropriate: allergies, current medications, past family history, past medical history, past social history, past surgical history and problem list     Review of Systems     A review of systems was performed  All negative, except as per HPI      History     Past Medical History:   Diagnosis Date    A-fib ([facility] ) 12/29/2021    Anxiety     Arthritis     Back pain     Cellulitis     Edema of both lower extremities due to peripheral venous insufficiency     Edema of both lower extremities due to peripheral venous insufficiency     Erythema of lower extremity 11/12/2021    Fibromyalgia     Hypertension     Sjogren syndrome, unspecified ([place] [id number] )      Past Surgical History:   Procedure Laterality Date    KNEE CARTILAGE SURGERY      REPLACEMENT TOTAL KNEE BILATERAL       Family History   Problem Relation Age of Onset    Heart disease Mother     Cancer Father      Social History     Socioeconomic History    Marital status: /Civil Union     Spouse name: Not on file    Number of children: Not on file    Years of education: Not on file    Highest education level: Not on file   Occupational History    Not on file   Tobacco Use    Smoking status: Former Smoker    Smokeless tobacco: Never Used   Vaping Use    Vaping Use: Never used   Substance and Sexual Activity    Alcohol use: Not Currently    Drug use: Never    Sexual activity: Not on file   Other Topics Concern    Not on file   Social History Narrative    Not on file     Social Determinants of Health     Financial Resource Strain: Not on file   Food Insecurity: Not on file   Transportation Needs: No Transportation Needs    Lack of Transportation (Medical): No    Lack of Transportation (Non-Medical):  No   Physical Activity: Not on file   Stress: Not on file   Social Connections: Not on file   Intimate Partner Violence: Not on file   Housing Stability: Not on file     No Known Allergies    Objective     Vital Signs  BP: 115/59     HR:67 T:97 5    RR:20 O2Sat:93% W:238  General: NAD, Well Nourished, Well Developed  Oral: Oropharynx Moist and Clear  Neck: Supple, +ROM  CV: S1, S2, normal rate, regular rhythm, no murmur appreciated  Pulmonary: Lung sounds clear to air, no wheezing, rhonchi, rales  Abdominal:BS + x4 in all quadrants, soft, no mass, no tenderness  Extremities: BLE plus +2 edema, +ROM, +Strength  Skin: Warm, Dry, BLE flaky, no lesions, no rash, no erythema present, no ecchymosis present  Neurological: No focal deficits  Psych: Alert and oriented times 3, pleasant mood, no affect, good judgement    Pertinent Laboratory/Diagnostic Studies:  CBC WITH DIFF  HEMOGLOBIN 11 0 g/dL 11 5-14 5 L Final  HEMATOCRIT 34 3 % 35 0-43 0 L Final  WBC 7 1 thou/cmm 4 0-10 0 Final  RBC 4 10 mill/cmm 3 70-4 70 Final  PLATELET COUNT 258 thou/cmm 140-350 Final  MPV 10 9 fL 7 5-11 3 Final  MCV 84 fL  Final  MCH 26 7 pg 26 0-34 0 Final  MCHC 32 0 g/dL 32 0-37 0 Final  RDW 15 8 % 12 0-16 0 Final  DIFFERENTIAL TYPE AUTO Final  ABSOLUTE NEUT 4 3 thou/cmm 1 8-7 8 Final  ABSOLUTE LYMPH 1 5 thou/cmm 1 0-3 0 Final  ABSOLUTE MONO 0 8 thou/cmm 0 3-1 0 Final  ABSOLUTE EOS 0 4 thou/cmm 0 0-0 5 Final  ABSOLUTE BASO 0 1 thou/cmm 0 0-0 1 Final  NEUTROPHILS 61 % Final  LYMPHOCYTES 20 % Final  MONOCYTES 12 % Final  EOSINOPHILS 6 % Final  BASOPHILS 1 % Final  COMP METAB PANEL  GLUCOSE 130 mg/dL 65-99 H Final  BUN 28 mg/dL 7-25 H Final  CREATININE 0 98 mg/dL 0 40-1 10 Final  SODIUM 139 mmol/L 135-145 Final  POTASSIUM 4 0 mmol/L 3 5-5 2 Final  CHLORIDE 98 mmol/L 100-109 L Final  CARBON DIOXIDE 34 mmol/L 23-31 H Final  CALCIUM 9 5 mg/dL 8 5-10 1 Final  ALKALINE PHOSPHATASE 108 U/L  Final  [facility] ([id number]) Order #: [id number] SourceKey: [id number]  ALKALINE PHOSPHATASE 108 U/L  Final  ALBUMIN 3 4 g/dL 3 5-4 8 L Final  BILIRUBIN,TOTAL 0 5 mg/dL 0 2-1 0 Final  PROTEIN, TOTAL 7 1 g/dL 6 3-8 3 Final  AST 13 U/L <41 Final  ALT 12 U/L <56 Final  ANION GAP 6 3-11 Final  eGFR, NON  AM 57 >60 L Final  eGFR,  AMER 66 >60 Final  PT WITH INR  PT 14 8 sec 12 0-14 6 H Final  PT INR 1 2 Final      Current Medications     Current Medications Reviewed and updated in Nursing Home EMR      Assessment and Plan     Acute respiratory failure with hypoxia ([facility] )  · Patient continues to require 1-2 liters of oxygen daily at rest and with activity  · Continue to trial titration    Atrial fibrillation ([facility] 75 )  · Patient currently on Coumadin regimen  · PT and INR are still and regulated, continue to monitor closely  · Continue to monitor for signs and symptoms of bleeding  · Educate patient on sources of vitamin K to avoid    Chronic back pain  · Continue current pain regimen  · Follow-up with pain management    Chronic venous insufficiency  · Encouraged daily use of Tubigrip on a m  off p m        [facility]  Geriatric Medicine  1/17/2022

## 2022-01-18 NOTE — ASSESSMENT & PLAN NOTE
· Patient continues to require 1-2 liters of oxygen daily at rest and with activity  · Continue to trial titration

## 2022-01-18 NOTE — ASSESSMENT & PLAN NOTE
· Patient currently on Coumadin regimen  · PT and INR are still and regulated, continue to monitor closely  · Continue to monitor for signs and symptoms of bleeding  · Educate patient on sources of vitamin K to avoid

## 2022-01-19 ENCOUNTER — NURSING HOME VISIT (OUTPATIENT)
Dept: GERIATRICS | Facility: OTHER | Age: 77
End: 2022-01-19
Payer: COMMERCIAL

## 2022-01-19 DIAGNOSIS — I87.2 CHRONIC VENOUS INSUFFICIENCY: ICD-10-CM

## 2022-01-19 DIAGNOSIS — I50.32 CHRONIC DIASTOLIC HEART FAILURE (HCC): Primary | ICD-10-CM

## 2022-01-19 DIAGNOSIS — M54.50 CHRONIC MIDLINE LOW BACK PAIN WITHOUT SCIATICA: ICD-10-CM

## 2022-01-19 DIAGNOSIS — I10 ESSENTIAL HYPERTENSION: ICD-10-CM

## 2022-01-19 DIAGNOSIS — G89.29 CHRONIC MIDLINE LOW BACK PAIN WITHOUT SCIATICA: ICD-10-CM

## 2022-01-19 DIAGNOSIS — M35.00 SJOGREN'S SYNDROME, WITH UNSPECIFIED ORGAN INVOLVEMENT (HCC): ICD-10-CM

## 2022-01-19 DIAGNOSIS — I48.91 ATRIAL FIBRILLATION, UNSPECIFIED TYPE (HCC): ICD-10-CM

## 2022-01-19 DIAGNOSIS — F41.9 ANXIETY: ICD-10-CM

## 2022-01-19 DIAGNOSIS — J96.01 ACUTE RESPIRATORY FAILURE WITH HYPOXIA (HCC): ICD-10-CM

## 2022-01-19 PROCEDURE — 99316 NF DSCHRG MGMT 30 MIN+: CPT | Performed by: NURSE PRACTITIONER

## 2022-01-19 NOTE — PROGRESS NOTES
[facility]  [address] [facility] [address]  ([address] [facility]) [place] [address]  Discharge Summary        NAME: Angelica Patel  AGE: 77 y o  SEX: female  :  1945  DATE OF ENCOUNTER: 2022    Chief Complaint   Patient seen and examined for follow up on chronic conditions  History of Present Illness     Colt Tobias Settler  Is a 77 y o female patient of [facility] with underlying medical conditions of Acute respiratory failure with hypoxia, atrial fibrillation, chronic diastolic heart failure, hypertension, chronic venous insufficiency, LEXY on CKD stage 2, anxiety, chronic back pain, pioid dependence due to chronic back pain, and obesity      The patient is being seen and examined today for discharge  Upon examination, the patient is sitting in her recliner, alert, cooperative, and in no acute distress  Her  is also present  She denies sob, chest pain, abdominal pain, fever, chills, nausea/vomiting, diarrhea/constipation, or dysuria  The patient reports she has a good appetite and is drinking an adequate amount of fluids  The patient's only complaint today is chronic pain in her back and swelling in her legs  She is noted to not be wearing her tubigrips as instructed  Once again I instructed her the importance of wearing these during the daytime hours and her  said he will be assisting her at home and making sure she is compliant  Her  also states they have venodyne boots and patient will also be using these when she returns home  Upon discharge, patient will be receiving home health services from [facility]: PT, OT, and nursing  Patient will also require an oxygen concentrator as she has failed to titrate off oxygen      Per PT/OT the patient's ambulatory status is 125 feet with RW  The patient's mobility status is bed mobility and transfers require contact guard    The patient's ADL status is upper body moderate independence, lower body supervision, and toileting stand by assist    The patient scored a 18/30 on the [facility] [place] [facility] test     Patient requires oxygen due to inability to ambulate, transfer or complete ADLs without maintaining oxygen saturation above 88% on room air  Patient gets short of breath and requires rest, oxygen via nasal cannula and recovery to complete these tasks  Below is the follow information:  O2 Level Sitting 93% with removal of O2 s/p 5 min  O2 Level following Activity 86% with removal of O2 s/p 10 min  O2 Level With O2 back on continuing activity 96% s/p 5/10 min check      The following portions of the patient's history were reviewed and updated as appropriate: allergies, current medications, past family history, past medical history, past social history, past surgical history and problem list     Review of Systems     A review of systems was performed  All negative, except as per HPI      History     Past Medical History:   Diagnosis Date    A-fib ([facility] ) 12/29/2021    Anxiety     Arthritis     Back pain     Cellulitis     Edema of both lower extremities due to peripheral venous insufficiency     Edema of both lower extremities due to peripheral venous insufficiency     Erythema of lower extremity 11/12/2021    Fibromyalgia     Hypertension     Sjogren syndrome, unspecified ([facility] )      Past Surgical History:   Procedure Laterality Date    KNEE CARTILAGE SURGERY      REPLACEMENT TOTAL KNEE BILATERAL       Family History   Problem Relation Age of Onset    Heart disease Mother     Cancer Father      Social History     Socioeconomic History    Marital status: /Civil Union     Spouse name: Not on file    Number of children: Not on file    Years of education: Not on file    Highest education level: Not on file   Occupational History    Not on file   Tobacco Use    Smoking status: Former Smoker    Smokeless tobacco: Never Used   Vaping Use    Vaping Use: Never used   Substance and Sexual Activity    Alcohol use: Not Currently    Drug use: Never    Sexual activity: Not on file   Other Topics Concern    Not on file   Social History Narrative    Not on file     Social Determinants of Health     Financial Resource Strain: Not on file   Food Insecurity: Not on file   Transportation Needs: No Transportation Needs    Lack of Transportation (Medical): No    Lack of Transportation (Non-Medical):  No   Physical Activity: Not on file   Stress: Not on file   Social Connections: Not on file   Intimate Partner Violence: Not on file   Housing Stability: Not on file     No Known Allergies    Objective     Vital Signs  BP: 118/46     HR:68   T:97 6     RR:20           O2Sat:93%      W:243 6  General: NAD, Well Nourished, Well Developed  Oral: Oropharynx Moist and Clear  Neck: Supple, +ROM  CV: S1, S2, normal rate, regular rhythm, no murmur appreciated  Pulmonary: Lung sounds clear to air, no wheezing, rhonchi, rales  Abdominal:BS + x4 in all quadrants, soft, no mass, no tenderness  Extremities: BLE plus +2 edema, +ROM, +Strength  Skin: Warm, Dry, BLE flaky, no lesions, no rash, no erythema present, no ecchymosis present  Neurological: No focal deficits  Psych: Alert and oriented times 3, pleasant mood, no affect, good judgement     Pertinent Laboratory/Diagnostic Studies:  CBC WITH DIFF  HEMOGLOBIN 11 0 g/dL 11 5-14 5 L Final  HEMATOCRIT 34 3 % 35 0-43 0 L Final  WBC 7 1 thou/cmm 4 0-10 0 Final  RBC 4 10 mill/cmm 3 70-4 70 Final  PLATELET COUNT 366 thou/cmm 140-350 Final  MPV 10 9 fL 7 5-11 3 Final  MCV 84 fL  Final  MCH 26 7 pg 26 0-34 0 Final  MCHC 32 0 g/dL 32 0-37 0 Final  RDW 15 8 % 12 0-16 0 Final  DIFFERENTIAL TYPE AUTO Final  ABSOLUTE NEUT 4 3 thou/cmm 1 8-7 8 Final  ABSOLUTE LYMPH 1 5 thou/cmm 1 0-3 0 Final  ABSOLUTE MONO 0 8 thou/cmm 0 3-1 0 Final  ABSOLUTE EOS 0 4 thou/cmm 0 0-0 5 Final  ABSOLUTE BASO 0 1 thou/cmm 0 0-0 1 Final  NEUTROPHILS 61 % Final  LYMPHOCYTES 20 % Final  MONOCYTES 12 % Final  EOSINOPHILS 6 % Final  BASOPHILS 1 % Final  COMP METAB PANEL  GLUCOSE 130 mg/dL 65-99 H Final  BUN 28 mg/dL 7-25 H Final  CREATININE 0 98 mg/dL 0 40-1 10 Final  SODIUM 139 mmol/L 135-145 Final  POTASSIUM 4 0 mmol/L 3 5-5 2 Final  CHLORIDE 98 mmol/L 100-109 L Final  CARBON DIOXIDE 34 mmol/L 23-31 H Final  CALCIUM 9 5 mg/dL 8 5-10 1 Final  ALKALINE PHOSPHATASE 108 U/L  Final  Ronel Keyes ([id number]) Order #: [id number] SourceKey: [id number]  ALKALINE PHOSPHATASE 108 U/L  Final  ALBUMIN 3 4 g/dL 3 5-4 8 L Final  BILIRUBIN,TOTAL 0 5 mg/dL 0 2-1 0 Final  PROTEIN, TOTAL 7 1 g/dL 6 3-8 3 Final  AST 13 U/L <41 Final  ALT 12 U/L <56 Final  ANION GAP 6 3-11 Final  eGFR, NON  AM 57 >60 L Final  eGFR,  AMER 66 >60 Final  PT WITH INR  PT 14 8 sec 12 0-14 6 H Final  PT INR 1 2 Final    Current Medications     Current Medications Reviewed and updated in Nursing Home EMR      Assessment and Plan     Chronic diastolic heart failure (HCC)  · Current wt 243 6  · Continue torsemide 40 mg daily  · Continue [place] diet  · Restrict fluids to 2000 mL daily  · Follow-up of Cardiology             LEXY on CKD (chronic kidney disease) stage 2, GFR 60-89 ml/min  · Last Creatinine 0 98 and Gfr 57  · Avoid nephrotoxins and NSAIDS  · Avoid hypotension  · Encourage PO fluid intake limited to 2000 mL daily  · Follow-up with Cardiology      Acute respiratory failure with hypoxia ([facility] )  · Patient continues to require oxygen 1-2 L via NC  · Will write a script for home oxygen and supplies  · Follow up with Cardiology    Atrial fibrillation ([facility] )  · Continue 6 mg of Coumadin through Monday 01/24/2022  · Monitor for signs and symptoms of bleeding  · Repeat PT INR 01/24/2022 with results to PCP for new Coumadin orders  · Follow-up with PCP    Anxiety  · Continue Xanax HS  · Follow-up with PCP    Chronic back pain  · Continue OxyContin oxycodone and Tylenol for pain  · Follow-up with pain management    Chronic venous insufficiency  · Continue use of Tubigrips on a m  Off p m  · May use venodynes at home  · Follow up with vascular    Essential hypertension  · Continue losartan, torsemide  · Follow-up with Cardiology    Sjogren's syndrome ([facility] )  · Stable at this time  · Follow-up with PCP      Discussion with patient/family and further instructions:  -Fall precautions  -Aspiration precautions  -Bleeding precautions  -Monitor for signs/symptoms of infection  -Medication list was reviewed and signed  -DME form was completed     Follow-up Recommendations: Please follow-up with your primary care physician within 7-10 days of discharge to review medication changes and current status  Problem List Follow-up Recommendations:  I have spent 40 minutes with Patient /Family today in which greater than 50% of this time was spent in counseling/coordination of care      [facility]  Geriatric Medicine  1/18/2022

## 2022-01-21 ENCOUNTER — TELEPHONE (OUTPATIENT)
Dept: SLEEP CENTER | Facility: CLINIC | Age: 77
End: 2022-01-21

## 2022-01-21 NOTE — ASSESSMENT & PLAN NOTE
· Last Creatinine 0 98 and Gfr 57  · Avoid nephrotoxins and NSAIDS  · Avoid hypotension  · Encourage PO fluid intake limited to 2000 mL daily  · Follow-up with Cardiology

## 2022-01-21 NOTE — ASSESSMENT & PLAN NOTE
· Current wt 243 6  · Continue torsemide 40 mg daily  · Continue [place] diet  · Restrict fluids to 2000 mL daily  · Follow-up of Cardiology

## 2022-01-21 NOTE — ASSESSMENT & PLAN NOTE
· Patient continues to require oxygen 1-2 L via NC  · Will write a script for home oxygen and supplies  · Follow up with Cardiology

## 2022-01-21 NOTE — ASSESSMENT & PLAN NOTE
· Continue use of Tubigrips on a m  Off p m    · May use venodynes at home  · Follow up with vascular

## 2022-01-21 NOTE — ASSESSMENT & PLAN NOTE
· Continue 6 mg of Coumadin through Monday 01/24/2022  · Monitor for signs and symptoms of bleeding  · Repeat PT INR 01/24/2022 with results to PCP for new Coumadin orders  · Follow-up with PCP

## 2022-01-21 NOTE — TELEPHONE ENCOUNTER
----- Message from Bishnu Sun MD sent at 1/18/2022 10:00 AM EST -----  Approved  ----- Message -----  From: Gabriele Wolff  Sent: 1/14/2022   7:59 AM EST  To: Sleep Medicine Raheel Provider    This sleep study needs approval      If approved please sign and return to clerical pool  If denied please include reasons why  Also provide alternative testing if warranted  Please sign and return to clerical pool

## 2022-01-24 DIAGNOSIS — I50.9 CHF (CONGESTIVE HEART FAILURE) (HCC): ICD-10-CM

## 2022-01-24 RX ORDER — TORSEMIDE 20 MG/1
40 TABLET ORAL DAILY
Qty: 180 TABLET | Refills: 1 | Status: SHIPPED | OUTPATIENT
Start: 2022-01-24 | End: 2022-01-27

## 2022-01-24 NOTE — TELEPHONE ENCOUNTER
Ayala Park has called the IM Office in regards to [employer]  Luca Castro has requested a refill of torsemide (DEMADEX) 20 mg tablet   Refill to be sent to the [facility] on [address] in [place], [place]

## 2022-01-25 ENCOUNTER — DOCUMENTATION (OUTPATIENT)
Dept: SOCIAL WORK | Facility: HOSPITAL | Age: 77
End: 2022-01-25

## 2022-01-25 ENCOUNTER — TELEPHONE (OUTPATIENT)
Dept: SURGERY | Facility: CLINIC | Age: 77
End: 2022-01-25

## 2022-01-25 NOTE — TELEPHONE ENCOUNTER
FRANKY Amezquita RN) called to report her INR - 2 0  Nellie's number is   Currently taking 2 5 mg a day

## 2022-01-26 NOTE — PROGRESS NOTES
Admission Report at [facility] has admitted your patient to [address] [place] [address] service with the following disciplines:      SN, PT, OT and MSW  This report is informational only, no responses is needed  Primary focus of home health care Hypoxia   Patient stated goals of care to get down the stairs  Anticipated visit pattern and next visit date  2w3  Significant clinical findings  Pt depressed  Request for additional disciplines MSW  Potential barriers to goal achievement Depression    Thank you for allowing us to participate in the care of your patient        Delon Gresham RN BSN [facility]ON

## 2022-01-27 ENCOUNTER — OFFICE VISIT (OUTPATIENT)
Dept: INTERNAL MEDICINE CLINIC | Facility: CLINIC | Age: 77
End: 2022-01-27
Payer: COMMERCIAL

## 2022-01-27 VITALS
WEIGHT: 253.4 LBS | DIASTOLIC BLOOD PRESSURE: 68 MMHG | SYSTOLIC BLOOD PRESSURE: 120 MMHG | BODY MASS INDEX: 46.63 KG/M2 | TEMPERATURE: 97.9 F | HEIGHT: 62 IN | OXYGEN SATURATION: 96 % | HEART RATE: 56 BPM

## 2022-01-27 DIAGNOSIS — I87.2 CHRONIC VENOUS INSUFFICIENCY: ICD-10-CM

## 2022-01-27 DIAGNOSIS — I10 HYPERTENSION: ICD-10-CM

## 2022-01-27 DIAGNOSIS — R41.3 MEMORY IMPAIRMENT: Primary | ICD-10-CM

## 2022-01-27 DIAGNOSIS — J96.01 ACUTE RESPIRATORY FAILURE WITH HYPOXIA (HCC): ICD-10-CM

## 2022-01-27 DIAGNOSIS — I50.9 CHF (CONGESTIVE HEART FAILURE) (HCC): ICD-10-CM

## 2022-01-27 DIAGNOSIS — I10 ESSENTIAL HYPERTENSION: ICD-10-CM

## 2022-01-27 DIAGNOSIS — I48.91 ATRIAL FIBRILLATION, UNSPECIFIED TYPE (HCC): ICD-10-CM

## 2022-01-27 DIAGNOSIS — I50.32 CHRONIC DIASTOLIC HEART FAILURE (HCC): ICD-10-CM

## 2022-01-27 DIAGNOSIS — N18.2 CKD (CHRONIC KIDNEY DISEASE) STAGE 2, GFR 60-89 ML/MIN: ICD-10-CM

## 2022-01-27 DIAGNOSIS — I48.0 PAF (PAROXYSMAL ATRIAL FIBRILLATION) (HCC): ICD-10-CM

## 2022-01-27 PROBLEM — L03.90 CELLULITIS: Status: RESOLVED | Noted: 2021-12-30 | Resolved: 2022-01-27

## 2022-01-27 PROBLEM — N18.9 CKD (CHRONIC KIDNEY DISEASE): Status: ACTIVE | Noted: 2021-11-22

## 2022-01-27 PROCEDURE — 3074F SYST BP LT 130 MM HG: CPT | Performed by: INTERNAL MEDICINE

## 2022-01-27 PROCEDURE — 1036F TOBACCO NON-USER: CPT | Performed by: INTERNAL MEDICINE

## 2022-01-27 PROCEDURE — 1160F RVW MEDS BY RX/DR IN RCRD: CPT | Performed by: INTERNAL MEDICINE

## 2022-01-27 PROCEDURE — 3078F DIAST BP <80 MM HG: CPT | Performed by: INTERNAL MEDICINE

## 2022-01-27 PROCEDURE — 99214 OFFICE O/P EST MOD 30 MIN: CPT | Performed by: INTERNAL MEDICINE

## 2022-01-27 RX ORDER — WARFARIN SODIUM 6 MG/1
6 TABLET ORAL
Qty: 90 TABLET | Refills: 0 | Status: SHIPPED | OUTPATIENT
Start: 2022-01-27 | End: 2022-02-12 | Stop reason: HOSPADM

## 2022-01-27 RX ORDER — CARVEDILOL 25 MG/1
25 TABLET ORAL 2 TIMES DAILY WITH MEALS
Qty: 180 TABLET | Refills: 1 | Status: SHIPPED | OUTPATIENT
Start: 2022-01-27 | End: 2022-07-28

## 2022-01-27 RX ORDER — LOSARTAN POTASSIUM 100 MG/1
100 TABLET ORAL DAILY
Qty: 90 TABLET | Refills: 0 | Status: SHIPPED | OUTPATIENT
Start: 2022-01-27 | End: 2022-03-21 | Stop reason: ALTCHOICE

## 2022-01-27 RX ORDER — TORSEMIDE 20 MG/1
TABLET ORAL
Qty: 180 TABLET | Refills: 1 | Status: SHIPPED | OUTPATIENT
Start: 2022-01-27 | End: 2022-03-21

## 2022-01-27 NOTE — ASSESSMENT & PLAN NOTE
Lab Results   Component Value Date    EGFR 67 01/06/2022    EGFR 71 01/05/2022    EGFR 76 01/04/2022    CREATININE 0 85 01/06/2022    CREATININE 0 81 01/05/2022    CREATININE 0 76 01/04/2022   · Renal fxn at baseline  Will repeat renal fxn in 1 week with change in dosing of diuretic therapy

## 2022-01-27 NOTE — ASSESSMENT & PLAN NOTE
Wt Readings from Last 3 Encounters:   01/27/22 115 kg (253 lb 6 4 oz)   01/13/22 106 kg (233 lb)   01/06/22 112 kg (247 lb 5 7 oz)       · Current weight noted to be 253lbs  Weight was 243 on DC from SNF   +10lb weight gain  · She reports compliance with diuretics however there was some confusion regarding fluid and sodium restrictions - discussed 1800ml fluid restriction and 2g Na restriction  DASH diet  · Currently on 20mg torsemide daily  Follows with cardiology  Will ask the patient to increase her dosing to twice daily on Monday, Wednesday, Friday and Sunday and can continue daily dosing on Tuesday, Thursday, Saturday  Patient took daily dose today  · Weigh yourself without clothing at the same time each day  Record your weight  Please call your doctor if you have a sudden weight gain of more than 2-3lbs (1-1 3kg) in 1-2 days or 5lbs (2 3kg) in a week  · Will send message to cardiology regarding diuretic adjustments

## 2022-01-27 NOTE — ASSESSMENT & PLAN NOTE
· INR of 2 reported to office staff drawn by mobile lab on 1/25  · Patient was discharged on 6mg daily of coumadin from SNF  · Continue Coumadin 6mg daily with repeat INR in 1 week

## 2022-01-27 NOTE — ASSESSMENT & PLAN NOTE
· 96% on RA, discharged with oxygen from SNF  Patient currently not on oxygen at rest saturating 96%  Performed 6 minute walk test in the office, patient desaturated to 85% on RA though rapidly recovered to 90% on 1L NC oxygen  · Recommend continue supplemental oxygen 1-2L, titrated to maintain sats >92%  · Unclear etiology  I do not appreciate significant crackles on exam to suggest pulmonary edema  Her LE edema is confounded also by venous stasis  She required oxygen also while she was noted to be euvolemic which is also not suggestive of volume overload  · CXR 1/3 with persistent R-sided pneumonia and bilateral atelectasis though patient does not clinically present with continued pneumonia  CTA in November with enlargement of the pulmonary trunk and main R and L pulmonary arteries suggestive of pulmonary HTN - I suspect due to underlying LAURNE/obesity hypoventilation syndrom  · Use of incentive spirometry encouraged  · Will refer to pulmonary medicine for further evaluation of hypoxia/possible pulm HTN

## 2022-01-27 NOTE — PROGRESS NOTES
INTERNAL MEDICINE FOLLOW-UP OFFICE VISIT  [facility] - [facility] INTERNAL MEDICINE HARSHA    NAME: Maria Rodríguez  AGE: 77 y o  SEX: female  : 1945     DATE: 2022     Assessment and Plan:     Problem List Items Addressed This Visit        Respiratory    Acute respiratory failure with hypoxia (HCC)     · 96% on RA, discharged with oxygen from SNF  Patient currently not on oxygen at rest saturating 96%  Performed 6 minute walk test in the office, patient desaturated to 85% on RA though rapidly recovered to 90% on 1L NC oxygen  · Recommend continue supplemental oxygen 1-2L, titrated to maintain sats >92%  · Unclear etiology  I do not appreciate significant crackles on exam to suggest pulmonary edema  Her LE edema is confounded also by venous stasis  She required oxygen also while she was noted to be euvolemic which is also not suggestive of volume overload  · CXR 1/3 with persistent R-sided pneumonia and bilateral atelectasis though patient does not clinically present with continued pneumonia  CTA in November with enlargement of the pulmonary trunk and main R and L pulmonary arteries suggestive of pulmonary HTN - I suspect due to underlying LAUREN/obesity hypoventilation syndrom  · Use of incentive spirometry encouraged  · Will refer to pulmonary medicine for further evaluation of hypoxia/possible pulm HTN  Relevant Orders    Ambulatory Referral to Pulmonology       Cardiovascular and Mediastinum    Chronic venous insufficiency     · Stressed compliance with pneumatic compression device  Will f/u with vascular surgery         Essential hypertension     · Controlled    Continue torsemide and carvedilol 25mg BID         Relevant Medications    torsemide (DEMADEX) 20 mg tablet    carvedilol (COREG) 25 mg tablet    losartan (COZAAR) 100 MG tablet    Atrial fibrillation (HCC)     · INR of 2 reported to office staff drawn by mobile lab on   · Patient was discharged on 6mg daily of coumadin from SNF  · Continue Coumadin 6mg daily with repeat INR in 1 week  Relevant Medications    carvedilol (COREG) 25 mg tablet    Chronic diastolic heart failure (HCC)     Wt Readings from Last 3 Encounters:   01/27/22 115 kg (253 lb 6 4 oz)   01/13/22 106 kg (233 lb)   01/06/22 112 kg (247 lb 5 7 oz)       · Current weight noted to be 253lbs  Weight was 243 on DC from SNF   +10lb weight gain  · She reports compliance with diuretics however there was some confusion regarding fluid and sodium restrictions - discussed 1800ml fluid restriction and 2g Na restriction  DASH diet  · Currently on 20mg torsemide daily  Follows with cardiology  Will ask the patient to increase her dosing to twice daily on Monday, Wednesday, Friday and Sunday and can continue daily dosing on Tuesday, Thursday, Saturday  Patient took daily dose today  · Weigh yourself without clothing at the same time each day  Record your weight  Please call your doctor if you have a sudden weight gain of more than 2-3lbs (1-1 3kg) in 1-2 days or 5lbs (2 3kg) in a week  · Will send message to cardiology regarding diuretic adjustments  Relevant Medications    carvedilol (COREG) 25 mg tablet    Other Relevant Orders    Basic metabolic panel       Genitourinary    CKD (chronic kidney disease) stage 2, GFR 60-89 ml/min     Lab Results   Component Value Date    EGFR 67 01/06/2022    EGFR 71 01/05/2022    EGFR 76 01/04/2022    CREATININE 0 85 01/06/2022    CREATININE 0 81 01/05/2022    CREATININE 0 76 01/04/2022 ·   Renal fxn at baseline  Will repeat renal fxn in 1 week with change in dosing of diuretic therapy  Relevant Medications    torsemide (DEMADEX) 20 mg tablet       Other    Memory impairment - Primary     · Check B12 and folate levels  TSH recently wnl             Relevant Orders    Ambulatory Referral to [facility]    Vitamin B12    Folate      Other Visit Diagnoses     CHF (congestive heart failure) (HCC)        Relevant Medications    torsemide (DEMADEX) 20 mg tablet    carvedilol (COREG) 25 mg tablet    PAF (paroxysmal atrial fibrillation) (HCC)        Relevant Medications    carvedilol (COREG) 25 mg tablet    warfarin (Coumadin) 6 mg tablet    Hypertension        Relevant Medications    torsemide (DEMADEX) 20 mg tablet    carvedilol (COREG) 25 mg tablet    losartan (COZAAR) 100 MG tablet          Return in about 4 weeks (around 2/24/2022)  Chief Complaint:     Chief Complaint   Patient presents with    Follow-up     hospitalization 12/29-01/06, rehab f/u, weight concerns, covid booster        History of Present Illness:     I had the pleasure of seeing Caren Win in the office today for follow-up  Patient is accompanied by her   She was recently hospitalized from 12/29 - 1/06 for acute on chronic diastolic CHF exacerbation  She was discharged back on her diuretic regimen of torsemide 40mg daily as it was though she had exacerbation due to non-compliance with dietary recommendations  She was discharged to post-acute rehab and continued to require 1L oxygen with ambulation despite appearing clinically euvolemic  Her INR was noted to be 2 reported via mobile lab testing to our office on 1/25  Patient has been taking only 2 5mg coumadin since being discharged from rehab though was receiving 6mg of coumadin at the rehab facility  She has noted approximate 10lb weight gain since 1/19/22  Reports compliance with diuretics though there was some confusion regarding Na and fluid restriction  She has also been wearing pneuamatic compression devices for the last 2 days consistently in the evening  Patient denies any chest pain  She has no cough or fevers  Feels that her dyspnea is at baseline and not any worse than baseline  Denies orthopnea  Her  relays to me concerns regarding patients memory    The patient's children and her  have noticed some labile mood swings as well as difficulty with short term working memory including for example using the remote inappropriately, not remembering names of family members  Her  notes that it is worse in the evening  Patient does not feel that she has a problem with memory leading to some altercations  She really did not want to discuss this topic any further though would be willing to look into it if others feel it is a problem  The following portions of the patient's history were reviewed and updated as appropriate: allergies, current medications, past family history, past medical history, past social history, past surgical history and problem list      Review of Systems:     Review of Systems   Constitutional: Positive for activity change, fatigue and unexpected weight change  Negative for fever  HENT: Negative for congestion  Eyes: Negative for visual disturbance  Respiratory: Negative for cough, shortness of breath and wheezing  Cardiovascular: Positive for leg swelling  Negative for chest pain  Gastrointestinal: Negative for abdominal distention, abdominal pain, blood in stool, constipation, diarrhea, nausea and vomiting  Genitourinary: Negative for difficulty urinating  Musculoskeletal: Positive for arthralgias, back pain, gait problem and myalgias  Neurological: Positive for weakness  Negative for dizziness, syncope, light-headedness and headaches          Problem List:     Patient Active Problem List   Diagnosis    Chronic venous insufficiency    Acute respiratory failure with hypoxia (HCC)    Essential hypertension    Chronic low back pain with sciatica    Rash of back    CKD (chronic kidney disease) stage 2, GFR 60-89 ml/min    Mixed hyperlipidemia    Obesity    Osteoarthritis of knee    Sjogren's syndrome ([address] )    Atrial fibrillation (HCC)    Chronic back pain    Opioid dependence due to Chronic back pain     Anxiety    Chronic diastolic heart failure ([address] )    Memory impairment Objective:     /68 (BP Location: Left arm, Patient Position: Sitting, Cuff Size: Standard)   Pulse 56   Temp 97 9 °F (36 6 °C) (Tympanic)   Ht 5' 2" (1 575 m)   Wt 115 kg (253 lb 6 4 oz)   SpO2 96%   BMI 46 35 kg/m²     Physical Exam  Vitals reviewed  Constitutional:       General: She is not in acute distress  Appearance: She is obese  She is not ill-appearing or toxic-appearing  Cardiovascular:      Rate and Rhythm: Bradycardia present  Pulmonary:      Effort: No respiratory distress  Breath sounds: No wheezing or rhonchi  Abdominal:      General: There is no distension  Palpations: There is no mass  Tenderness: There is no abdominal tenderness  Musculoskeletal:      Right lower leg: Edema present  Left lower leg: Edema present  Skin:     General: Skin is warm and dry  Neurological:      General: No focal deficit present  Mental Status: Mental status is at baseline  Pertinent Laboratory/Diagnostic Studies:    Laboratory Results: I have personally reviewed the pertinent laboratory results/reports     CBC:   Results from Last 12 Months   Lab Units 01/06/22  0548 01/01/22  0558 12/31/21  0509   WBC Thousand/uL 8 93   < > 9 46   RBC Million/uL 3 93   < > 3 61*   HEMOGLOBIN g/dL 10 7*   < > 9 8*   HEMATOCRIT % 34 8   < > 32 0*   MCV fL 89   < > 89   MCH pg 27 2   < > 27 1   MCHC g/dL 30 7*   < > 30 6*   RDW % 14 7   < > 14 9   MPV fL 10 9   < > 10 8   PLATELETS Thousands/uL 325   < > 353   NRBC AUTO /100 WBCs  --   --  0   NEUTROS PCT %  --   --  71   LYMPHS PCT %  --   --  10*   MONOS PCT %  --   --  13*   EOS PCT %  --   --  6   BASOS PCT %  --   --  0   NEUTROS ABS Thousands/µL  --   --  6 75   LYMPHS ABS Thousands/µL  --   --  0 96   MONOS ABS Thousand/µL  --   --  1 18   EOS ABS Thousand/µL  --   --  0 53    < > = values in this interval not displayed       Chemistry Profile:   Results from Last 12 Months   Lab Units 01/06/22  0548 01/05/22  4184 01/04/22  0515 01/03/22  1431 01/03/22  0534 12/30/21  0609 12/29/21  2313 11/12/21  1825 04/12/21  1156   POTASSIUM mmol/L 3 5   < > 3 4*   < > 3 0*   < >  --    < > 4 6   CHLORIDE mmol/L 101   < > 102  --  100   < >  --    < > 100   CO2 mmol/L 34*   < > 33*  --  39*   < >  --    < > 31   CO2, I-STAT mmol/L  --   --   --   --   --   --  28  --   --    BUN mg/dL 13   < > 10  --  11   < >  --    < > 19   CREATININE mg/dL 0 85   < > 0 76  --  0 79   < >  --    < > 0 94   GLUCOSE FASTING mg/dL  --   --   --   --   --   --   --   --  113*   GLUCOSE RANDOM mg/dL 113   < > 105  --  117   < >  --    < >  --    GLUCOSE, ISTAT mg/dl  --   --   --   --   --   --  129  --   --    CALCIUM mg/dL 9 2   < > 9 5  --  9 4   < >  --    < > 9 5   CORRECTED CALCIUM mg/dL  --   --   --   --  10 2*  --   --    < >  --    MAGNESIUM mg/dL  --   --  2 0  --   --   --   --    < >  --    AST U/L  --   --   --   --  17  --   --    < > 13   ALT U/L  --   --   --   --  14  --   --    < > 18   ALK PHOS U/L  --   --   --   --  119*  --   --    < > 126*   EGFR ml/min/1 73sq m 67   < > 76  --  73   < >  --    < > 60    < > = values in this interval not displayed  Radiology/Other Diagnostic Testing Results: I have personally reviewed pertinent reports        Joel Moss MD  [facility] INTERNAL MEDICINE [address] [place] [address]

## 2022-01-31 ENCOUNTER — TELEPHONE (OUTPATIENT)
Dept: INTERNAL MEDICINE CLINIC | Facility: CLINIC | Age: 77
End: 2022-01-31

## 2022-01-31 ENCOUNTER — TELEPHONE (OUTPATIENT)
Dept: LAB | Facility: HOSPITAL | Age: 77
End: 2022-01-31

## 2022-02-02 ENCOUNTER — TELEPHONE (OUTPATIENT)
Dept: INTERNAL MEDICINE CLINIC | Facility: CLINIC | Age: 77
End: 2022-02-02

## 2022-02-02 NOTE — TELEPHONE ENCOUNTER
fyiStart of care 1-, she had a fall and hurt back, and vna has been asked by patient to delay home care until the 2-8-2022

## 2022-02-04 ENCOUNTER — TELEPHONE (OUTPATIENT)
Dept: LAB | Facility: HOSPITAL | Age: 77
End: 2022-02-04

## 2022-02-04 ENCOUNTER — LAB (OUTPATIENT)
Dept: LAB | Facility: HOSPITAL | Age: 77
End: 2022-02-04
Payer: COMMERCIAL

## 2022-02-04 ENCOUNTER — TELEPHONE (OUTPATIENT)
Dept: INTERNAL MEDICINE CLINIC | Facility: CLINIC | Age: 77
End: 2022-02-04

## 2022-02-04 DIAGNOSIS — I50.32 CHRONIC DIASTOLIC HEART FAILURE (HCC): ICD-10-CM

## 2022-02-04 DIAGNOSIS — I48.91 NEW ONSET ATRIAL FIBRILLATION (HCC): ICD-10-CM

## 2022-02-04 DIAGNOSIS — R41.3 MEMORY IMPAIRMENT: ICD-10-CM

## 2022-02-04 LAB
ANION GAP SERPL CALCULATED.3IONS-SCNC: 3 MMOL/L (ref 4–13)
BUN SERPL-MCNC: 47 MG/DL (ref 5–25)
CALCIUM SERPL-MCNC: 9.9 MG/DL (ref 8.3–10.1)
CHLORIDE SERPL-SCNC: 96 MMOL/L (ref 100–108)
CO2 SERPL-SCNC: 32 MMOL/L (ref 21–32)
CREAT SERPL-MCNC: 1.77 MG/DL (ref 0.6–1.3)
ERYTHROCYTE [DISTWIDTH] IN BLOOD BY AUTOMATED COUNT: 15.9 % (ref 11.6–15.1)
FOLATE SERPL-MCNC: 4 NG/ML (ref 3.1–17.5)
GFR SERPL CREATININE-BSD FRML MDRD: 27 ML/MIN/1.73SQ M
GLUCOSE SERPL-MCNC: 96 MG/DL (ref 65–140)
HCT VFR BLD AUTO: 33.8 % (ref 34.8–46.1)
HGB BLD-MCNC: 10.1 G/DL (ref 11.5–15.4)
MCH RBC QN AUTO: 26.6 PG (ref 26.8–34.3)
MCHC RBC AUTO-ENTMCNC: 29.9 G/DL (ref 31.4–37.4)
MCV RBC AUTO: 89 FL (ref 82–98)
PLATELET # BLD AUTO: 273 THOUSANDS/UL (ref 149–390)
PMV BLD AUTO: 11.7 FL (ref 8.9–12.7)
POTASSIUM SERPL-SCNC: 5 MMOL/L (ref 3.5–5.3)
RBC # BLD AUTO: 3.8 MILLION/UL (ref 3.81–5.12)
SODIUM SERPL-SCNC: 131 MMOL/L (ref 136–145)
VIT B12 SERPL-MCNC: 392 PG/ML (ref 100–900)
WBC # BLD AUTO: 6.64 THOUSAND/UL (ref 4.31–10.16)

## 2022-02-04 PROCEDURE — 82607 VITAMIN B-12: CPT

## 2022-02-04 PROCEDURE — 85027 COMPLETE CBC AUTOMATED: CPT | Performed by: NURSE PRACTITIONER

## 2022-02-04 PROCEDURE — 80048 BASIC METABOLIC PNL TOTAL CA: CPT

## 2022-02-04 PROCEDURE — 82746 ASSAY OF FOLIC ACID SERUM: CPT

## 2022-02-04 NOTE — TELEPHONE ENCOUNTER
Lab has called with critical results pt INR is 8 7  Please review and make recommendations   Thank you

## 2022-02-04 NOTE — RESULT ENCOUNTER NOTE
Patient has significant LEXY again  I also saw that she has INR of >8 and Dr Krys Gutiérrez has her holding her coumadin and going for repeat testing  She is not tolerating twice daily dosing of torsemide  I would like her to drop her torsemide dosing back down to once daily and have a repeat BMP in 1 week  Does she continue to gain weight - weight was 253lbs on last visit on 1/27  Thank you

## 2022-02-07 ENCOUNTER — ANTICOAG VISIT (OUTPATIENT)
Dept: INTERNAL MEDICINE CLINIC | Facility: CLINIC | Age: 77
End: 2022-02-07

## 2022-02-08 ENCOUNTER — TELEPHONE (OUTPATIENT)
Dept: GERIATRICS | Age: 77
End: 2022-02-08

## 2022-02-08 NOTE — TELEPHONE ENCOUNTER
Vernon Barajas OT called our office to inform Dr Roach Levo that patient refused OT evaluation because she does not feel like she needs it

## 2022-02-10 ENCOUNTER — APPOINTMENT (EMERGENCY)
Dept: RADIOLOGY | Facility: HOSPITAL | Age: 77
DRG: 813 | End: 2022-02-10
Payer: COMMERCIAL

## 2022-02-10 ENCOUNTER — HOSPITAL ENCOUNTER (INPATIENT)
Facility: HOSPITAL | Age: 77
LOS: 2 days | Discharge: HOME | DRG: 813 | End: 2022-02-12
Attending: EMERGENCY MEDICINE | Admitting: INTERNAL MEDICINE
Payer: COMMERCIAL

## 2022-02-10 DIAGNOSIS — I48.11 LONGSTANDING PERSISTENT ATRIAL FIBRILLATION (HCC): ICD-10-CM

## 2022-02-10 DIAGNOSIS — R79.1 SUPRATHERAPEUTIC INR: ICD-10-CM

## 2022-02-10 DIAGNOSIS — I50.9 CHF (CONGESTIVE HEART FAILURE) (HCC): ICD-10-CM

## 2022-02-10 DIAGNOSIS — N17.9 AKI (ACUTE KIDNEY INJURY) (HCC): ICD-10-CM

## 2022-02-10 DIAGNOSIS — I50.9 ACUTE EXACERBATION OF CHF (CONGESTIVE HEART FAILURE) (HCC): ICD-10-CM

## 2022-02-10 DIAGNOSIS — D64.89 ANEMIA DUE TO OTHER CAUSE, NOT CLASSIFIED: ICD-10-CM

## 2022-02-10 DIAGNOSIS — I48.91 ATRIAL FIBRILLATION, UNSPECIFIED TYPE (HCC): ICD-10-CM

## 2022-02-10 DIAGNOSIS — I50.33 ACUTE ON CHRONIC DIASTOLIC HEART FAILURE (HCC): ICD-10-CM

## 2022-02-10 DIAGNOSIS — N93.9 VAGINAL BLEEDING: Primary | ICD-10-CM

## 2022-02-10 LAB
2HR DELTA HS TROPONIN: -1.9 NG/L
4HR DELTA HS TROPONIN: 0.1 NG/L
ABO GROUP BLD: NORMAL
ABO GROUP BLD: NORMAL
ALBUMIN SERPL BCP-MCNC: 3.6 G/DL (ref 3.5–5)
ALP SERPL-CCNC: 119 U/L (ref 46–116)
ALT SERPL W P-5'-P-CCNC: 24 U/L (ref 12–78)
ANION GAP SERPL CALCULATED.3IONS-SCNC: 8 MMOL/L (ref 4–13)
APTT PPP: 125 SECONDS (ref 23–37)
AST SERPL W P-5'-P-CCNC: 21 U/L (ref 5–45)
ATRIAL RATE: 340 BPM
BASOPHILS # BLD AUTO: 0.03 THOUSANDS/ΜL (ref 0–0.1)
BASOPHILS NFR BLD AUTO: 0 % (ref 0–1)
BILIRUB SERPL-MCNC: 0.49 MG/DL (ref 0.2–1)
BLD GP AB SCN SERPL QL: NEGATIVE
BUN SERPL-MCNC: 42 MG/DL (ref 5–25)
CALCIUM SERPL-MCNC: 9.4 MG/DL (ref 8.3–10.1)
CARDIAC TROPONIN I PNL SERPL HS: 3 NG/L
CARDIAC TROPONIN I PNL SERPL HS: 4.9 NG/L
CARDIAC TROPONIN I PNL SERPL HS: 5 NG/L
CHLORIDE SERPL-SCNC: 97 MMOL/L (ref 100–108)
CO2 SERPL-SCNC: 33 MMOL/L (ref 21–32)
CREAT SERPL-MCNC: 1.82 MG/DL (ref 0.6–1.3)
EOSINOPHIL # BLD AUTO: 0.54 THOUSAND/ΜL (ref 0–0.61)
EOSINOPHIL NFR BLD AUTO: 6 % (ref 0–6)
ERYTHROCYTE [DISTWIDTH] IN BLOOD BY AUTOMATED COUNT: 15.9 % (ref 11.6–15.1)
GFR SERPL CREATININE-BSD FRML MDRD: 26 ML/MIN/1.73SQ M
GLUCOSE SERPL-MCNC: 118 MG/DL (ref 65–140)
HCT VFR BLD AUTO: 31 % (ref 34.8–46.1)
HGB BLD-MCNC: 9.4 G/DL (ref 11.5–15.4)
HOLD SPECIMEN: NORMAL
IMM GRANULOCYTES # BLD AUTO: 0.05 THOUSAND/UL (ref 0–0.2)
IMM GRANULOCYTES NFR BLD AUTO: 1 % (ref 0–2)
INR PPP: 6.9 (ref 0.84–1.19)
LYMPHOCYTES # BLD AUTO: 0.9 THOUSANDS/ΜL (ref 0.6–4.47)
LYMPHOCYTES NFR BLD AUTO: 9 % (ref 14–44)
MCH RBC QN AUTO: 26.6 PG (ref 26.8–34.3)
MCHC RBC AUTO-ENTMCNC: 30.3 G/DL (ref 31.4–37.4)
MCV RBC AUTO: 88 FL (ref 82–98)
MONOCYTES # BLD AUTO: 0.99 THOUSAND/ΜL (ref 0.17–1.22)
MONOCYTES NFR BLD AUTO: 10 % (ref 4–12)
NEUTROPHILS # BLD AUTO: 7.32 THOUSANDS/ΜL (ref 1.85–7.62)
NEUTS SEG NFR BLD AUTO: 74 % (ref 43–75)
NRBC BLD AUTO-RTO: 0 /100 WBCS
NT-PROBNP SERPL-MCNC: 2903 PG/ML
PLATELET # BLD AUTO: 254 THOUSANDS/UL (ref 149–390)
PMV BLD AUTO: 11.4 FL (ref 8.9–12.7)
POTASSIUM SERPL-SCNC: 4.6 MMOL/L (ref 3.5–5.3)
PROT SERPL-MCNC: 7.4 G/DL (ref 6.4–8.2)
PROTHROMBIN TIME: 57.7 SECONDS (ref 11.6–14.5)
QRS AXIS: 84 DEGREES
QRSD INTERVAL: 88 MS
QT INTERVAL: 410 MS
QTC INTERVAL: 416 MS
RBC # BLD AUTO: 3.54 MILLION/UL (ref 3.81–5.12)
RH BLD: NEGATIVE
RH BLD: NEGATIVE
SODIUM SERPL-SCNC: 138 MMOL/L (ref 136–145)
SPECIMEN EXPIRATION DATE: NORMAL
T WAVE AXIS: 27 DEGREES
VENTRICULAR RATE: 62 BPM
WBC # BLD AUTO: 9.83 THOUSAND/UL (ref 4.31–10.16)

## 2022-02-10 PROCEDURE — 99221 1ST HOSP IP/OBS SF/LOW 40: CPT | Performed by: OBSTETRICS & GYNECOLOGY

## 2022-02-10 PROCEDURE — 86850 RBC ANTIBODY SCREEN: CPT | Performed by: EMERGENCY MEDICINE

## 2022-02-10 PROCEDURE — 99223 1ST HOSP IP/OBS HIGH 75: CPT | Performed by: INTERNAL MEDICINE

## 2022-02-10 PROCEDURE — 86901 BLOOD TYPING SEROLOGIC RH(D): CPT | Performed by: EMERGENCY MEDICINE

## 2022-02-10 PROCEDURE — 85025 COMPLETE CBC W/AUTO DIFF WBC: CPT | Performed by: EMERGENCY MEDICINE

## 2022-02-10 PROCEDURE — 80053 COMPREHEN METABOLIC PANEL: CPT | Performed by: EMERGENCY MEDICINE

## 2022-02-10 PROCEDURE — 84484 ASSAY OF TROPONIN QUANT: CPT | Performed by: EMERGENCY MEDICINE

## 2022-02-10 PROCEDURE — 86900 BLOOD TYPING SEROLOGIC ABO: CPT | Performed by: EMERGENCY MEDICINE

## 2022-02-10 PROCEDURE — 85730 THROMBOPLASTIN TIME PARTIAL: CPT | Performed by: EMERGENCY MEDICINE

## 2022-02-10 PROCEDURE — 93005 ELECTROCARDIOGRAM TRACING: CPT

## 2022-02-10 PROCEDURE — 36415 COLL VENOUS BLD VENIPUNCTURE: CPT

## 2022-02-10 PROCEDURE — 99285 EMERGENCY DEPT VISIT HI MDM: CPT | Performed by: EMERGENCY MEDICINE

## 2022-02-10 PROCEDURE — 85610 PROTHROMBIN TIME: CPT | Performed by: EMERGENCY MEDICINE

## 2022-02-10 PROCEDURE — 84484 ASSAY OF TROPONIN QUANT: CPT | Performed by: INTERNAL MEDICINE

## 2022-02-10 PROCEDURE — 99285 EMERGENCY DEPT VISIT HI MDM: CPT

## 2022-02-10 PROCEDURE — 83880 ASSAY OF NATRIURETIC PEPTIDE: CPT | Performed by: EMERGENCY MEDICINE

## 2022-02-10 PROCEDURE — 71045 X-RAY EXAM CHEST 1 VIEW: CPT

## 2022-02-10 PROCEDURE — 93010 ELECTROCARDIOGRAM REPORT: CPT | Performed by: INTERNAL MEDICINE

## 2022-02-10 RX ORDER — IPRATROPIUM BROMIDE AND ALBUTEROL SULFATE 2.5; .5 MG/3ML; MG/3ML
3 SOLUTION RESPIRATORY (INHALATION) EVERY 6 HOURS PRN
Status: DISCONTINUED | OUTPATIENT
Start: 2022-02-10 | End: 2022-02-12 | Stop reason: HOSPADM

## 2022-02-10 RX ORDER — PHYTONADIONE 5 MG/1
5 TABLET ORAL ONCE
Status: DISCONTINUED | OUTPATIENT
Start: 2022-02-10 | End: 2022-02-10

## 2022-02-10 RX ORDER — FUROSEMIDE 10 MG/ML
40 INJECTION INTRAMUSCULAR; INTRAVENOUS 2 TIMES DAILY
Status: DISCONTINUED | OUTPATIENT
Start: 2022-02-10 | End: 2022-02-11

## 2022-02-10 RX ORDER — CARVEDILOL 12.5 MG/1
25 TABLET ORAL 2 TIMES DAILY WITH MEALS
Status: DISCONTINUED | OUTPATIENT
Start: 2022-02-10 | End: 2022-02-12 | Stop reason: HOSPADM

## 2022-02-10 RX ORDER — ALPRAZOLAM 0.25 MG/1
0.25 TABLET ORAL 2 TIMES DAILY PRN
Status: DISCONTINUED | OUTPATIENT
Start: 2022-02-10 | End: 2022-02-12 | Stop reason: HOSPADM

## 2022-02-10 RX ORDER — OXYCODONE HCL 20 MG/1
20 TABLET, FILM COATED, EXTENDED RELEASE ORAL EVERY 12 HOURS SCHEDULED
Status: DISCONTINUED | OUTPATIENT
Start: 2022-02-10 | End: 2022-02-12 | Stop reason: HOSPADM

## 2022-02-10 RX ORDER — POTASSIUM CHLORIDE 20 MEQ/1
40 TABLET, EXTENDED RELEASE ORAL DAILY
Status: DISCONTINUED | OUTPATIENT
Start: 2022-02-11 | End: 2022-02-12 | Stop reason: HOSPADM

## 2022-02-10 RX ORDER — ONDANSETRON 2 MG/ML
4 INJECTION INTRAMUSCULAR; INTRAVENOUS EVERY 6 HOURS PRN
Status: DISCONTINUED | OUTPATIENT
Start: 2022-02-10 | End: 2022-02-12 | Stop reason: HOSPADM

## 2022-02-10 RX ORDER — SENNOSIDES 8.6 MG
1 TABLET ORAL DAILY
Status: DISCONTINUED | OUTPATIENT
Start: 2022-02-11 | End: 2022-02-12 | Stop reason: HOSPADM

## 2022-02-10 RX ORDER — FUROSEMIDE 10 MG/ML
60 INJECTION INTRAMUSCULAR; INTRAVENOUS ONCE
Status: COMPLETED | OUTPATIENT
Start: 2022-02-10 | End: 2022-02-10

## 2022-02-10 RX ORDER — POTASSIUM CHLORIDE 20 MEQ/1
40 TABLET, EXTENDED RELEASE ORAL DAILY
Qty: 60 TABLET | Refills: 2 | OUTPATIENT
Start: 2022-02-10 | End: 2022-03-12

## 2022-02-10 RX ORDER — OXYCODONE HYDROCHLORIDE AND ACETAMINOPHEN 5; 325 MG/1; MG/1
2 TABLET ORAL EVERY 4 HOURS PRN
Status: DISCONTINUED | OUTPATIENT
Start: 2022-02-10 | End: 2022-02-12 | Stop reason: HOSPADM

## 2022-02-10 RX ORDER — MELATONIN
1000 DAILY
Status: DISCONTINUED | OUTPATIENT
Start: 2022-02-11 | End: 2022-02-12 | Stop reason: HOSPADM

## 2022-02-10 RX ADMIN — CARVEDILOL 25 MG: 12.5 TABLET, FILM COATED ORAL at 19:31

## 2022-02-10 RX ADMIN — FUROSEMIDE 40 MG: 10 INJECTION, SOLUTION INTRAMUSCULAR; INTRAVENOUS at 18:43

## 2022-02-10 RX ADMIN — ALPRAZOLAM 0.25 MG: 0.25 TABLET ORAL at 15:43

## 2022-02-10 RX ADMIN — ALPRAZOLAM 0.25 MG: 0.25 TABLET ORAL at 18:41

## 2022-02-10 RX ADMIN — OXYCODONE HYDROCHLORIDE AND ACETAMINOPHEN 2 TABLET: 5; 325 TABLET ORAL at 18:40

## 2022-02-10 RX ADMIN — OXYCODONE HYDROCHLORIDE 20 MG: 20 TABLET, FILM COATED, EXTENDED RELEASE ORAL at 22:15

## 2022-02-10 RX ADMIN — FUROSEMIDE 60 MG: 10 INJECTION, SOLUTION INTRAMUSCULAR; INTRAVENOUS at 14:34

## 2022-02-10 RX ADMIN — IPRATROPIUM BROMIDE AND ALBUTEROL SULFATE 3 ML: 2.5; .5 SOLUTION RESPIRATORY (INHALATION) at 18:41

## 2022-02-10 NOTE — CONSULTS
Consultation - Gynecology  Brii Hernandez 77 y o  female MRN: [id number]  Unit/Bed#: ED 25 Encounter: [id number]      Inpatient consult to Obstetrics / Gynecology  Consult performed by: Kathy Hernandez MD  Consult ordered by: Keith Urbina MD        Assessment/Plan     Assessment:  Patient is a 76 yo with atrial fibrillation and admitted with CHF exacerbation with new onset vaginal bleeding following an accidental consumption of extra Coumadin  Her INR is 6 9 on admission from 8 7 previously and hemoglobin is 9 4 from baseline 10 5  Given that she has never had any vaginal bleeding prior to this episode, bleeding is less likely due to endometrial cancer  However given her age and risk factors of BMI 45, will plan to collect and endometrial biopsy when able  Plan:    Vaginal bleeding   Continue to monitor her bleeding with pad counts  Plan for speculum exam and EMB inpatient when patient has clinically improved   Will follow up AM labs  Rest of treatment per primary team       Chief Complaint   Patient presents with    Vaginal Bleeding     PT presents to ED with vaginal bleed that started this morning, also c/o weakness  After triage,  reports that he gave her "coumadin instead of another medication over a week ago, was stopped, and now needs another INR test"       History of Present Illness   Physician Requesting Consult: Keith Urbina MD  Reason for Consult / Principal Problem: Vaginal bleeding   Subspeciality: General GYN  HPI: Brii Hernandez is a 77 y o  female admitted with a CHF exacerbation, who presents with sudden onset of vaginal bleeding this morning  For the past week, her  accidentally gave her an increased dose of her Coumadin and decreased dose of her CHF medication  Today, she woke up and had a significant amount of bleeding that has since decreased  She denies any abdominal or pelvic pain or cramping   Prior to this episode, she has no had any vaginal bleeding since she went through menopause in her late 42's  She has not been to a gynecologist in many years, however reports that she never had any abnormal pap smears or gyn abnormalities  Her obstetric history is significant for one vaginal and one  delivery  Review of Systems  See above       Historical Information   Past Medical History:   Diagnosis Date    A-fib ([email or web address] ) 2021    Anxiety     Arthritis     Back pain     Cellulitis     [facility]est[facility]) [facility])     Edema of both lower extremities due to peripheral venous insufficiency     Edema of both lower extremities due to peripheral venous insufficiency     Erythema of lower extremity 2021    Fibromyalgia     Hypertension     Sjogren syndrome, unspecified ([place] [address] )      Past Surgical History:   Procedure Laterality Date    KNEE CARTILAGE SURGERY      REPLACEMENT TOTAL KNEE BILATERAL       OB History   No obstetric history on file       Family History   Problem Relation Age of Onset    Heart disease Mother     Cancer Father      Social History   Social History     Substance and Sexual Activity   Alcohol Use Not Currently     Social History     Substance and Sexual Activity   Drug Use Never     Social History     Tobacco Use   Smoking Status Former Smoker    Types: Cigarettes   Smokeless Tobacco Never Used       Meds/Allergies   Current Facility-Administered Medications   Medication Dose Route Frequency    ALPRAZolam (XANAX) tablet 0 25 mg  0 25 mg Oral BID PRN    carvedilol (COREG) tablet 25 mg  25 mg Oral BID With Meals    [START ON 2022] cholecalciferol (VITAMIN D3) tablet 1,000 Units  1,000 Units Oral Daily    Diclofenac Sodium (VOLTAREN) 1 % topical gel 2 g  2 g Topical 4x Daily    furosemide (LASIX) injection 40 mg  40 mg Intravenous BID    ondansetron (ZOFRAN) injection 4 mg  4 mg Intravenous Q6H PRN    oxyCODONE (OxyCONTIN) 12 hr tablet 20 mg  20 mg Oral Q12H [address]    oxyCODONE-acetaminophen (PERCOCET) 5-325 mg per tablet 2 tablet  2 tablet Oral Q4H PRN    [START ON 2/11/2022] potassium chloride (K-DUR,KLOR-CON) CR tablet 40 mEq  40 mEq Oral Daily    [START ON 2/11/2022] senna (SENOKOT) tablet 8 6 mg  1 tablet Oral Daily         No Known Allergies    Objective   Vitals: Blood pressure 130/60, pulse 70, temperature 97 5 °F (36 4 °C), resp  rate (!) 25, height 5' 2" (1 575 m), weight 118 kg (259 lb 11 2 oz), SpO2 97 %  Body mass index is 47 5 kg/m²  Intake/Output Summary (Last 24 hours) at 2/10/2022 1830  Last data filed at 2/10/2022 1801  Gross per 24 hour   Intake --   Output 850 ml   Net -850 ml       Invasive Devices  Report    Peripheral Intravenous Line            Peripheral IV 02/10/22 Right Antecubital <1 day          Drain            External Urinary Catheter 38 days                Physical Exam  Constitutional:       General: She is not in acute distress  Appearance: She is obese  Comments: Patient sitting up in bed with pillows    HENT:      Head: Normocephalic and atraumatic  Pulmonary:      Effort: Respiratory distress present  Breath sounds: No stridor  No wheezing  Comments: Visibly difficulty breathing and completing sentences due to respiratory distress   Genitourinary:     Comments: Pad visualized with signs of old bleeding; no active bleeding   Musculoskeletal:      Right lower leg: Edema present  Left lower leg: Edema present  Skin:     General: Skin is warm and dry  Neurological:      General: No focal deficit present  Mental Status: She is alert and oriented to person, place, and time  Psychiatric:         Mood and Affect: Mood normal          Thought Content:  Thought content normal          Lab Results:   Recent Results (from the past 24 hour(s))   CBC and differential    Collection Time: 02/10/22 10:32 AM   Result Value Ref Range    WBC 9 83 4 31 - 10 16 Thousand/uL    RBC 3 54 (L) 3 81 - 5 12 Million/uL    Hemoglobin 9 4 (L) 11 5 - 15 4 g/dL Hematocrit 31 0 (L) 34 8 - 46 1 %    MCV 88 82 - 98 fL    MCH 26 6 (L) 26 8 - 34 3 pg    MCHC 30 3 (L) 31 4 - 37 4 g/dL    RDW 15 9 (H) 11 6 - 15 1 %    MPV 11 4 8 9 - 12 7 fL    Platelets 184 727 - 913 Thousands/uL    nRBC 0 /100 WBCs    Neutrophils Relative 74 43 - 75 %    Immat GRANS % 1 0 - 2 %    Lymphocytes Relative 9 (L) 14 - 44 %    Monocytes Relative 10 4 - 12 %    Eosinophils Relative 6 0 - 6 %    Basophils Relative 0 0 - 1 %    Neutrophils Absolute 7 32 1 85 - 7 62 Thousands/µL    Immature Grans Absolute 0 05 0 00 - 0 20 Thousand/uL    Lymphocytes Absolute 0 90 0 60 - 4 47 Thousands/µL    Monocytes Absolute 0 99 0 17 - 1 22 Thousand/µL    Eosinophils Absolute 0 54 0 00 - 0 61 Thousand/µL    Basophils Absolute 0 03 0 00 - 0 10 Thousands/µL   Comprehensive metabolic panel    Collection Time: 02/10/22 10:32 AM   Result Value Ref Range    Sodium 138 136 - 145 mmol/L    Potassium 4 6 3 5 - 5 3 mmol/L    Chloride 97 (L) 100 - 108 mmol/L    CO2 33 (H) 21 - 32 mmol/L    ANION GAP 8 4 - 13 mmol/L    BUN 42 (H) 5 - 25 mg/dL    Creatinine 1 82 (H) 0 60 - 1 30 mg/dL    Glucose 118 65 - 140 mg/dL    Calcium 9 4 8 3 - 10 1 mg/dL    AST 21 5 - 45 U/L    ALT 24 12 - 78 U/L    Alkaline Phosphatase 119 (H) 46 - 116 U/L    Total Protein 7 4 6 4 - 8 2 g/dL    Albumin 3 6 3 5 - 5 0 g/dL    Total Bilirubin 0 49 0 20 - 1 00 mg/dL    eGFR 26 ml/min/1 73sq m   Green / Black tube on hold    Collection Time: 02/10/22 10:32 AM   Result Value Ref Range    Extra Tube Hold for add-ons      Protime-INR    Collection Time: 02/10/22 10:32 AM   Result Value Ref Range    Protime 57 7 (H) 11 6 - 14 5 seconds    INR 6 90 (HH) 0 84 - 1 19   APTT    Collection Time: 02/10/22 10:32 AM   Result Value Ref Range     (HH) 23 - 37 seconds   NT-BNP PRO    Collection Time: 02/10/22 10:32 AM   Result Value Ref Range    NT-proBNP 2,903 (H) <450 pg/mL   HS Troponin 0hr (reflex protocol)    Collection Time: 02/10/22 10:32 AM   Result Value Ref Range hs TnI 0hr 4 9 "Refer to ACS Flowchart"- see link ng/L   Type and screen    Collection Time: 02/10/22 12:07 PM   Result Value Ref Range    ABO Grouping O     Rh Factor Negative     Antibody Screen Negative     Specimen Expiration Date [id number]    ECG 12 lead    Collection Time: 02/10/22 12:09 PM   Result Value Ref Range    Ventricular Rate 62 BPM    Atrial Rate 340 BPM    SC Interval  ms    QRSD Interval 88 ms    QT Interval 410 ms    QTC Interval 416 ms    P Axis  degrees    QRS Axis 84 degrees    T Wave Axis 27 degrees   ABORh Recheck - Contact Blood Bank Prior to Collection    Collection Time: 02/10/22  2:33 PM   Result Value Ref Range    ABO Grouping O     Rh Factor Negative    HS Troponin I 2hr    Collection Time: 02/10/22  2:33 PM   Result Value Ref Range    hs TnI 2hr 3 "Refer to ACS Flowchart"- see link ng/L    Delta 2hr hsTnI -1 9 <20 ng/L   HS Troponin I 4hr    Collection Time: 02/10/22  5:24 PM   Result Value Ref Range    hs TnI 4hr 5 "Refer to ACS Flowchart"- see link ng/L    Delta 4hr hsTnI 0 1 <20 ng/L       Imaging:    CXR 2/10/22  IMPRESSION:     Right midlung and bibasilar opacities, still present but somewhat improved from radiographs of 1/3/2022, may represent residual or persistent pneumonia  Continued follow-up recommended  Counseling / Coordination of Care  Total floor / unit time spent today30 minutes  minutes  Greater than 50% of total time was spent with the patient and / or family counseling and / or coordination of care       Justice Honeycutt MD  2/10/2022  6:30 PM

## 2022-02-10 NOTE — H&P
[facility]  [facility] 1945, 77 [facility]  [facility] MRN: [id number]  Unit/Bed#: ED 18 Encounter: [id number]  Primary Care Provider: Dionicio Rowland MD   Date and time admitted to hospital: 2/10/2022 11:30 AM    Supratherapeutic INR  Assessment & Plan  INR of 6 9 on admission  Due to patient and has been confusing her dose adjustments  Will hold Coumadin  Monitor INR    Vaginal bleeding  Assessment & Plan  Likely due to supratherapeutic INR  Ob/gyn consulted in the ED  Appreciate their input  Hemoglobin remained stable    Anxiety  Assessment & Plan  Continue Xanax p r n      Opioid dependence due to Chronic back pain   Assessment & Plan  Will continue home oxycodone    Atrial fibrillation (HCC)  Assessment & Plan  Rate controlled  Continue Coreg  Coumadin being held due to supratherapeutic INR  Maintain INR 2-3    Acute renal failure superimposed on stage 2 chronic kidney disease [facility])  Assessment & Plan  Lab Results   Component Value Date    EGFR 26 02/10/2022    EGFR 27 02/04/2022    EGFR 67 01/06/2022    CREATININE 1 82 (H) 02/10/2022    CREATININE 1 77 (H) 02/04/2022    CREATININE 0 85 01/06/2022   Likely cardiorenal, baseline creatinine around 1, was 1 8 on admission  Will continue with diuresis as above  Monitor BUN/creatinine  Avoid nephrotoxic agents and hypotension    Essential hypertension  Assessment & Plan  Controlled  Continue Coreg  Losartan held due to LEXY    Acute respiratory failure with hypoxia [facility])  Assessment & Plan  Patient noted to have O2 sat of 81% on room air on arrival  Likely due to CHF exacerbation, continue to diurese  Wean oxygen as tolerated, maintain O2 sat greater than 90%    * Acute on chronic diastolic heart failure (HCC)  Assessment & Plan  Wt Readings from Last 3 Encounters:   02/10/22 118 kg (259 lb 11 2 oz)   01/27/22 115 kg (253 lb 6 4 oz)   01/13/22 106 kg (233 lb)     Patient hypervolemic on admission, likely due to accidentally lowering her dose of diuretic  Will hold home torsemide and start on Lasix IV 40 mg b i d   Monitor daily weights, ins and outs, 2 g sodium restricted diet  Echocardiogram November 2021 shows ejection fraction of 65% with grade 1 diastolic dysfunction    VTE Pharmacologic Prophylaxis: VTE Score: 6 High Risk (Score >/= 5) - Pharmacological DVT Prophylaxis Contraindicated  Sequential Compression Devices Ordered  Code Status:  Full code  Discussion with family: Updated  () at bedside  Anticipated Length of Stay: Patient will be admitted on an inpatient basis with an anticipated length of stay of greater than 2 midnights secondary to IV diuresis and monitor of hemoglobin with supratherapeutic INR  Total Time for Visit, including Counseling / Coordination of Care: 60 minutes Greater than 50% of this total time spent on direct patient counseling and coordination of care  Chief Complaint:  Shortness of breath    History of Present Illness:  Briana Gerber is a 77 y o  female with a PMH of diastolic CHF, AFib who presents with shortness of breath  Patient recently had her torsemide and Coumadin dosage is adjusted by her PCP due to elevated INR level and and weight gain  Her  manages her medications and seems to have confused instructions on dosages  Therefore, she has received increased Coumadin and less torsemide, resulting in supratherapeutic INR and pulmonary edema  This morning she awoke and noted she had blood in her underpants and noticed she had vaginal bleeding  She states this is the 1st time this has happened  She denies any lightheadedness, nausea, vomiting, chest pain  In the ED, her INR was noted to be 6 9 and she required increased oxygen supplementation  Upon my examination patient felt very short of breath and was anxious  Review of Systems:  Review of Systems   Constitutional: Negative for activity change, chills, diaphoresis, fatigue and fever     HENT: Negative for congestion, rhinorrhea, sinus pressure and trouble swallowing  Eyes: Negative  Respiratory: Positive for chest tightness and shortness of breath  Negative for cough  Cardiovascular: Positive for leg swelling  Negative for chest pain and palpitations  Gastrointestinal: Negative for abdominal distention, abdominal pain, constipation, diarrhea, nausea and vomiting  Endocrine: Negative  Genitourinary: Positive for vaginal bleeding  Negative for difficulty urinating, frequency, hematuria and urgency  Musculoskeletal: Negative for back pain, joint swelling and neck pain  Skin: Negative  Allergic/Immunologic: Negative  Neurological: Negative for dizziness, syncope, weakness, light-headedness and headaches  Hematological: Negative  Psychiatric/Behavioral: Negative  All other systems reviewed and are negative  Past Medical and Surgical History:   Past Medical History:   Diagnosis Date    A-fib ([place] [id number] ) 12/29/2021    Anxiety     Arthritis     Back pain     Cellulitis     [facility]     Edema of both lower extremities due to peripheral venous insufficiency     Edema of both lower extremities due to peripheral venous insufficiency     Erythema of lower extremity 11/12/2021    Fibromyalgia     Hypertension     Sjogren syndrome, unspecified ([place] [id number] )        Past Surgical History:   Procedure Laterality Date    KNEE CARTILAGE SURGERY      REPLACEMENT TOTAL KNEE BILATERAL         Meds/Allergies:  Prior to Admission medications    Medication Sig Start Date End Date Taking?  Authorizing Provider   ALPRAZolam Bernardino Lions) 0 25 mg tablet Take 1 tablet (0 25 mg total) by mouth daily as needed for anxiety for up to 14 days 1/10/22 2/10/22 Yes Sydnie Fink MD   ammonium lactate (LAC-HYDRIN) 12 % cream Apply topically 2 (two) times a day 12/2/21  Yes Latesha Street MD   carvedilol (COREG) 25 mg tablet Take 1 tablet (25 mg total) by mouth 2 (two) times a day with meals 1/27/22 7/26/22 Yes Rena Leonard MD   Diclofenac Sodium (Voltaren) 1 % Voltaren 1 % topical gel   Yes Historical Provider, MD   hydrocortisone 1 % cream Apply topically 4 (four) times a day as needed for rash 11/17/21  Yes Yakov Kern PA-C   losartan (COZAAR) 100 MG tablet Take 1 tablet (100 mg total) by mouth daily 1/27/22 2/26/22 Yes Rena Leonard MD   ondansetron (ZOFRAN-ODT) 4 mg disintegrating tablet  11/9/21  Yes Historical Provider, MD   oxyCODONE (OxyCONTIN) 20 mg 12 hr tablet Take 1 tablet (20 mg total) by mouth every 12 (twelve) hours Max Daily Amount: 40 mg 1/6/22  Yes SHAHIDA Lopez   oxyCODONE-acetaminophen (PERCOCET)  mg per tablet Take 1 tablet by mouth every 4 (four) hours as needed for moderate pain Max Daily Amount: 6 tablets 1/6/22  Yes SHAHIDA Lopez   potassium chloride (K-DUR,KLOR-CON) 20 mEq tablet Take 2 tablets (40 mEq total) by mouth daily 11/18/21  Yes Yakov Kern PA-C   tiZANidine (ZANAFLEX) 4 mg tablet tizanidine 4 mg tablet   TAKE ONE TABLET BY MOUTH EVERY 8 HOURS AS NEEDED FOR spasm   Yes Historical Provider, MD   torsemide (DEMADEX) 20 mg tablet Take 2 tablets daily On Tuesday, Thursday, and Saturday    Take 2 tablets twice daily Monday, Wednesday, Friday, Sunday 1/27/22  Yes Rena Leonard MD   Vitamin D, Cholecalciferol, 25 MCG (1000 UT) TABS Vitamin D3 25 mcg (1,000 unit) capsule   one PO QD   Yes Historical Provider, MD   warfarin (Coumadin) 6 mg tablet Take 1 tablet (6 mg total) by mouth daily 1/27/22  Yes Rena Leonard MD   acetaminophen (TYLENOL) 325 mg tablet Take 650 mg by mouth every 6 (six) hours as needed for mild pain  Patient not taking: Reported on 1/27/2022     Historical Provider, MD   Ascorbic Acid (vitamin C) 1000 MG tablet Take 1,000 mg by mouth daily  Patient not taking: Reported on 2/10/2022     Historical Provider, MD   Blood Pressure KIT Use daily 12/2/21   Hellen Perez MD   magnesium hydroxide (Milk of Magnesia) 400 mg/5 mL oral suspension Take 30 mL by mouth daily as needed for constipation  Patient not taking: Reported on 2/10/2022     Historical Provider, MD   warfarin (Coumadin) 2 5 mg tablet Take 1 tablet (2 5 mg total) by mouth daily 1/6/22 3/7/22  Tegan Che MD   zinc gluconate 50 mg tablet Take 50 mg by mouth daily  Patient not taking: Reported on 2/10/2022     Historical Provider, MD     I have reviewed home medications with patient family member  Allergies: No Known Allergies    Social History:  Marital Status: /Civil Union   Occupation:  Does not work  Patient Pre-hospital Living Situation: Home  Patient Pre-hospital Level of Mobility: walks  Patient Pre-hospital Diet Restrictions:  Sodium restricted  Substance Use History:   Social History     Substance and Sexual Activity   Alcohol Use Not Currently     Social History     Tobacco Use   Smoking Status Former Smoker    Types: Cigarettes   Smokeless Tobacco Never Used     Social History     Substance and Sexual Activity   Drug Use Never       Family History:  Family History   Problem Relation Age of Onset    Heart disease Mother     Cancer Father        Physical Exam:     Vitals:   Blood Pressure: 152/87 (02/10/22 1442)  Pulse: 71 (02/10/22 1442)  Temperature: 97 5 °F (36 4 °C) (02/10/22 1026)  Respirations: 20 (02/10/22 1442)  SpO2: 99 % (02/10/22 1442)    Physical Exam  Vitals and nursing note reviewed  Constitutional:       Appearance: She is obese  She is ill-appearing  HENT:      Head: Normocephalic and atraumatic  Right Ear: External ear normal       Left Ear: External ear normal       Nose: Nose normal       Mouth/Throat:      Mouth: Mucous membranes are moist       Pharynx: Oropharynx is clear  Eyes:      Conjunctiva/sclera: Conjunctivae normal       Pupils: Pupils are equal, round, and reactive to light  Cardiovascular:      Rate and Rhythm: Normal rate and regular rhythm  Pulses: Normal pulses  Heart sounds: Normal heart sounds  Pulmonary:      Effort: Pulmonary effort is normal       Breath sounds: Rales present  Comments: On 4 L nasal cannula  Abdominal:      General: Abdomen is flat  Bowel sounds are normal       Palpations: Abdomen is soft  Musculoskeletal:         General: No tenderness  Cervical back: Neck supple  No muscular tenderness  Comments: 2+ BLE edema with chronic venous stasis skin changes   Skin:     General: Skin is warm and dry  Capillary Refill: Capillary refill takes less than 2 seconds  Neurological:      General: No focal deficit present  Mental Status: She is alert and oriented to person, place, and time  Mental status is at baseline  Psychiatric:         Behavior: Behavior normal          Thought Content: Thought content normal          Judgment: Judgment normal       Comments: Anxious          Additional Data:     Lab Results:  Results from last 7 days   Lab Units 02/10/22  1032   WBC Thousand/uL 9 83   HEMOGLOBIN g/dL 9 4*   HEMATOCRIT % 31 0*   PLATELETS Thousands/uL 254   NEUTROS PCT % 74   LYMPHS PCT % 9*   MONOS PCT % 10   EOS PCT % 6     Results from last 7 days   Lab Units 02/10/22  1032   SODIUM mmol/L 138   POTASSIUM mmol/L 4 6   CHLORIDE mmol/L 97*   CO2 mmol/L 33*   BUN mg/dL 42*   CREATININE mg/dL 1 82*   ANION GAP mmol/L 8   CALCIUM mg/dL 9 4   ALBUMIN g/dL 3 6   TOTAL BILIRUBIN mg/dL 0 49   ALK PHOS U/L 119*   ALT U/L 24   AST U/L 21   GLUCOSE RANDOM mg/dL 118     Results from last 7 days   Lab Units 02/10/22  1032   INR  6 90*                   Imaging: Reviewed radiology reports from this admission including: chest xray  XR chest 1 view portable    (Results Pending)       EKG and Other Studies Reviewed on Admission:   · EKG: NSR  HR 62     ** Please Note: This note has been constructed using a voice recognition system   **

## 2022-02-10 NOTE — ASSESSMENT & PLAN NOTE
Patient noted to have O2 sat of 81% on room air on arrival  Likely due to CHF exacerbation, continue to diurese  Wean oxygen as tolerated, maintain O2 sat greater than 90%

## 2022-02-10 NOTE — ASSESSMENT & PLAN NOTE
Wt Readings from Last 3 Encounters:   02/10/22 118 kg (259 lb 11 2 oz)   01/27/22 115 kg (253 lb 6 4 oz)   01/13/22 106 kg (233 lb)     Patient hypervolemic on admission, likely due to accidentally lowering her dose of diuretic  Will hold home torsemide and start on Lasix IV 40 mg b i d   Monitor daily weights, ins and outs, 2 g sodium restricted diet  Echocardiogram November 2021 shows ejection fraction of 65% with grade 1 diastolic dysfunction

## 2022-02-10 NOTE — ASSESSMENT & PLAN NOTE
Likely due to supratherapeutic INR  Ob/gyn consulted in the ED  Appreciate their input  Hemoglobin remained stable

## 2022-02-10 NOTE — TELEPHONE ENCOUNTER
She likely needs to be on potassium supplementation with ongoing diuretic treatment but given last K level was 5 on 2/4, we may need to alter her potassium dosage  She has a repeat BMP tomorrow that was ordered by Dr Caraballo Flick  We will wait on the results before making any changes to her treatment regimen   Thank you

## 2022-02-10 NOTE — ASSESSMENT & PLAN NOTE
Lab Results   Component Value Date    EGFR 26 02/10/2022    EGFR 27 02/04/2022    EGFR 67 01/06/2022    CREATININE 1 82 (H) 02/10/2022    CREATININE 1 77 (H) 02/04/2022    CREATININE 0 85 01/06/2022   Likely cardiorenal, baseline creatinine around 1, was 1 8 on admission  Will continue with diuresis as above  Monitor BUN/creatinine  Avoid nephrotoxic agents and hypotension

## 2022-02-10 NOTE — ASSESSMENT & PLAN NOTE
INR of 6 9 on admission  Due to patient and has been confusing her dose adjustments  Will hold Coumadin  Monitor INR

## 2022-02-11 PROBLEM — R58 BLEEDING: Status: ACTIVE | Noted: 2022-02-10

## 2022-02-11 LAB
ANION GAP SERPL CALCULATED.3IONS-SCNC: 6 MMOL/L (ref 4–13)
BUN SERPL-MCNC: 34 MG/DL (ref 5–25)
CALCIUM SERPL-MCNC: 9.5 MG/DL (ref 8.3–10.1)
CHLORIDE SERPL-SCNC: 101 MMOL/L (ref 100–108)
CO2 SERPL-SCNC: 33 MMOL/L (ref 21–32)
CREAT SERPL-MCNC: 1.3 MG/DL (ref 0.6–1.3)
ERYTHROCYTE [DISTWIDTH] IN BLOOD BY AUTOMATED COUNT: 15.9 % (ref 11.6–15.1)
GFR SERPL CREATININE-BSD FRML MDRD: 40 ML/MIN/1.73SQ M
GLUCOSE SERPL-MCNC: 121 MG/DL (ref 65–140)
HCT VFR BLD AUTO: 32.1 % (ref 34.8–46.1)
HGB BLD-MCNC: 9.5 G/DL (ref 11.5–15.4)
INR PPP: 5.41 (ref 0.84–1.19)
MAGNESIUM SERPL-MCNC: 2 MG/DL (ref 1.6–2.6)
MCH RBC QN AUTO: 25.8 PG (ref 26.8–34.3)
MCHC RBC AUTO-ENTMCNC: 29.6 G/DL (ref 31.4–37.4)
MCV RBC AUTO: 87 FL (ref 82–98)
PLATELET # BLD AUTO: 293 THOUSANDS/UL (ref 149–390)
PMV BLD AUTO: 11.5 FL (ref 8.9–12.7)
POTASSIUM SERPL-SCNC: 3.7 MMOL/L (ref 3.5–5.3)
PROTHROMBIN TIME: 47.9 SECONDS (ref 11.6–14.5)
RBC # BLD AUTO: 3.68 MILLION/UL (ref 3.81–5.12)
SODIUM SERPL-SCNC: 140 MMOL/L (ref 136–145)
WBC # BLD AUTO: 10.03 THOUSAND/UL (ref 4.31–10.16)

## 2022-02-11 PROCEDURE — 80048 BASIC METABOLIC PNL TOTAL CA: CPT | Performed by: INTERNAL MEDICINE

## 2022-02-11 PROCEDURE — 85027 COMPLETE CBC AUTOMATED: CPT | Performed by: INTERNAL MEDICINE

## 2022-02-11 PROCEDURE — 85610 PROTHROMBIN TIME: CPT | Performed by: INTERNAL MEDICINE

## 2022-02-11 PROCEDURE — 99232 SBSQ HOSP IP/OBS MODERATE 35: CPT | Performed by: PHYSICIAN ASSISTANT

## 2022-02-11 PROCEDURE — 99231 SBSQ HOSP IP/OBS SF/LOW 25: CPT | Performed by: OBSTETRICS & GYNECOLOGY

## 2022-02-11 PROCEDURE — 83735 ASSAY OF MAGNESIUM: CPT | Performed by: INTERNAL MEDICINE

## 2022-02-11 PROCEDURE — 99232 SBSQ HOSP IP/OBS MODERATE 35: CPT | Performed by: INTERNAL MEDICINE

## 2022-02-11 RX ORDER — FUROSEMIDE 10 MG/ML
40 INJECTION INTRAMUSCULAR; INTRAVENOUS ONCE
Status: COMPLETED | OUTPATIENT
Start: 2022-02-11 | End: 2022-02-11

## 2022-02-11 RX ORDER — PHYTONADIONE 5 MG/1
2.5 TABLET ORAL ONCE
Status: COMPLETED | OUTPATIENT
Start: 2022-02-11 | End: 2022-02-11

## 2022-02-11 RX ORDER — FUROSEMIDE 10 MG/ML
60 INJECTION INTRAMUSCULAR; INTRAVENOUS 2 TIMES DAILY
Status: DISCONTINUED | OUTPATIENT
Start: 2022-02-11 | End: 2022-02-12 | Stop reason: HOSPADM

## 2022-02-11 RX ORDER — ACETAMINOPHEN 325 MG/1
975 TABLET ORAL ONCE
Status: DISCONTINUED | OUTPATIENT
Start: 2022-02-11 | End: 2022-02-12 | Stop reason: HOSPADM

## 2022-02-11 RX ADMIN — CARVEDILOL 25 MG: 12.5 TABLET, FILM COATED ORAL at 08:49

## 2022-02-11 RX ADMIN — PHYTONADIONE 2.5 MG: 5 TABLET ORAL at 14:57

## 2022-02-11 RX ADMIN — CARVEDILOL 25 MG: 12.5 TABLET, FILM COATED ORAL at 15:38

## 2022-02-11 RX ADMIN — OXYCODONE HYDROCHLORIDE 20 MG: 20 TABLET, FILM COATED, EXTENDED RELEASE ORAL at 08:45

## 2022-02-11 RX ADMIN — OXYCODONE HYDROCHLORIDE 20 MG: 20 TABLET, FILM COATED, EXTENDED RELEASE ORAL at 22:01

## 2022-02-11 RX ADMIN — OXYCODONE HYDROCHLORIDE AND ACETAMINOPHEN 2 TABLET: 5; 325 TABLET ORAL at 00:54

## 2022-02-11 RX ADMIN — FUROSEMIDE 40 MG: 10 INJECTION, SOLUTION INTRAMUSCULAR; INTRAVENOUS at 08:45

## 2022-02-11 RX ADMIN — OXYCODONE HYDROCHLORIDE AND ACETAMINOPHEN 2 TABLET: 5; 325 TABLET ORAL at 20:07

## 2022-02-11 RX ADMIN — PHYTONADIONE 2.5 MG: 5 TABLET ORAL at 12:13

## 2022-02-11 RX ADMIN — FUROSEMIDE 60 MG: 10 INJECTION, SOLUTION INTRAMUSCULAR; INTRAVENOUS at 17:30

## 2022-02-11 RX ADMIN — OXYCODONE HYDROCHLORIDE AND ACETAMINOPHEN 2 TABLET: 5; 325 TABLET ORAL at 05:04

## 2022-02-11 RX ADMIN — POTASSIUM CHLORIDE 40 MEQ: 1500 TABLET, EXTENDED RELEASE ORAL at 08:45

## 2022-02-11 RX ADMIN — Medication 1000 UNITS: at 08:45

## 2022-02-11 RX ADMIN — ALPRAZOLAM 0.25 MG: 0.25 TABLET ORAL at 05:05

## 2022-02-11 RX ADMIN — FUROSEMIDE 40 MG: 10 INJECTION, SOLUTION INTRAMUSCULAR; INTRAVENOUS at 12:08

## 2022-02-11 RX ADMIN — OXYCODONE HYDROCHLORIDE AND ACETAMINOPHEN 2 TABLET: 5; 325 TABLET ORAL at 14:58

## 2022-02-11 NOTE — ED PROVIDER NOTES
History  Chief Complaint   Patient presents with    Vaginal Bleeding     PT presents to ED with vaginal bleed that started this morning, also c/o weakness  After triage,  reports that he gave her "coumadin instead of another medication over a week ago, was stopped, and now needs another INR test"       History provided by:  Patient   used: No    Vaginal Bleeding  Associated symptoms: no abdominal pain, no dizziness, no dysuria, no fever and no nausea      Pt is a 77 /yo female presenting to ED with sob and vaginal bleeding  For last week has been taking extra dose of coumadin and missing lasix dose  This has been an accident  No f/c  No cp  No abd pain  Has had vaginal bleeding starting today  No trauma  mdm like chf and bleeding due to elevated inr  Unable to tolerate vaginal exam currently  Will give iv lasix  Admit to hospital for chf  Ob consult pending      Prior to Admission Medications   Prescriptions Last Dose Informant Patient Reported? Taking?    ALPRAZolam (XANAX) 0 25 mg tablet  Self No Yes   Sig: Take 1 tablet (0 25 mg total) by mouth daily as needed for anxiety for up to 14 days   Ascorbic Acid (vitamin C) 1000 MG tablet Not Taking at Unknown time Self Yes No   Sig: Take 1,000 mg by mouth daily   Patient not taking: Reported on 2/10/2022    Blood Pressure KIT  Self No No   Sig: Use daily   Diclofenac Sodium (Voltaren) 1 %  Self Yes Yes   Sig: Voltaren 1 % topical gel   Vitamin D, Cholecalciferol, 25 MCG (1000 UT) TABS 2/9/2022 at Unknown time Self Yes Yes   Sig: Vitamin D3 25 mcg (1,000 unit) capsule   one PO QD   acetaminophen (TYLENOL) 325 mg tablet Not Taking at Unknown time Self Yes No   Sig: Take 650 mg by mouth every 6 (six) hours as needed for mild pain   Patient not taking: Reported on 1/27/2022    ammonium lactate (LAC-HYDRIN) 12 % cream  Self No Yes   Sig: Apply topically 2 (two) times a day   carvedilol (COREG) 25 mg tablet 2/9/2022 at Unknown time  No Yes Sig: Take 1 tablet (25 mg total) by mouth 2 (two) times a day with meals   hydrocortisone 1 % cream  Self No Yes   Sig: Apply topically 4 (four) times a day as needed for rash   losartan (COZAAR) 100 MG tablet 2/9/2022 at Unknown time  No Yes   Sig: Take 1 tablet (100 mg total) by mouth daily   magnesium hydroxide (Milk of Magnesia) 400 mg/5 mL oral suspension Not Taking at Unknown time Self Yes No   Sig: Take 30 mL by mouth daily as needed for constipation   Patient not taking: Reported on 2/10/2022    ondansetron (ZOFRAN-ODT) 4 mg disintegrating tablet  Self Yes Yes   oxyCODONE (OxyCONTIN) 20 mg 12 hr tablet 2/9/2022 at Unknown time Self No Yes   Sig: Take 1 tablet (20 mg total) by mouth every 12 (twelve) hours Max Daily Amount: 40 mg   oxyCODONE-acetaminophen (PERCOCET)  mg per tablet 2/9/2022 at Unknown time Self No Yes   Sig: Take 1 tablet by mouth every 4 (four) hours as needed for moderate pain Max Daily Amount: 6 tablets   potassium chloride (K-DUR,KLOR-CON) 20 mEq tablet 2/9/2022 at Unknown time Self No Yes   Sig: Take 2 tablets (40 mEq total) by mouth daily   tiZANidine (ZANAFLEX) 4 mg tablet 2/9/2022 at Unknown time Self Yes Yes   Sig: tizanidine 4 mg tablet   TAKE ONE TABLET BY MOUTH EVERY 8 HOURS AS NEEDED FOR spasm   torsemide (DEMADEX) 20 mg tablet 2/9/2022 at Unknown time  No Yes   Sig: Take 2 tablets daily On Tuesday, Thursday, and Saturday    Take 2 tablets twice daily Monday, Wednesday, Friday, Sunday   warfarin (Coumadin) 2 5 mg tablet  Self No No   Sig: Take 1 tablet (2 5 mg total) by mouth daily   warfarin (Coumadin) 6 mg tablet 2/9/2022 at Unknown time  No Yes   Sig: Take 1 tablet (6 mg total) by mouth daily   zinc gluconate 50 mg tablet Not Taking at Unknown time Self Yes No   Sig: Take 50 mg by mouth daily   Patient not taking: Reported on 2/10/2022       Facility-Administered Medications: None       Past Medical History:   Diagnosis Date    A-fib [facility]) 12/29/2021    Anxiety     Arthritis     Back pain     Cellulitis     CHF [facility]congestive heart failure) ([facility])     Edema of both lower extremities due to peripheral venous insufficiency     Edema of both lower extremities due to peripheral venous insufficiency     Erythema of lower extremity 11/12/2021    Fibromyalgia     Hypertension     Sjogren syndrome, unspecified ([facility] [address] )        Past Surgical History:   Procedure Laterality Date    KNEE CARTILAGE SURGERY      REPLACEMENT TOTAL KNEE BILATERAL         Family History   Problem Relation Age of Onset    Heart disease Mother     Cancer Father      I have reviewed and agree with the history as documented  E-Cigarette/Vaping    E-Cigarette Use Never User      E-Cigarette/Vaping Substances     Social History     Tobacco Use    Smoking status: Former Smoker     Types: Cigarettes    Smokeless tobacco: Never Used   Vaping Use    Vaping Use: Never used   Substance Use Topics    Alcohol use: Not Currently    Drug use: Never       Review of Systems   Constitutional: Negative for chills, diaphoresis and fever  HENT: Negative for congestion and sore throat  Respiratory: Positive for shortness of breath  Negative for cough, wheezing and stridor  Cardiovascular: Negative for chest pain, palpitations and leg swelling  Gastrointestinal: Negative for abdominal pain, blood in stool, diarrhea, nausea and vomiting  Genitourinary: Positive for vaginal bleeding  Negative for dysuria, frequency and urgency  Musculoskeletal: Negative for neck pain and neck stiffness  Skin: Negative for pallor and rash  Neurological: Negative for dizziness, syncope, weakness, light-headedness and headaches  All other systems reviewed and are negative  Physical Exam  Physical Exam  Vitals reviewed  Constitutional:       Appearance: She is well-developed  She is obese  HENT:      Head: Normocephalic and atraumatic  Eyes:      Extraocular Movements: Extraocular movements intact        Pupils: Pupils are equal, round, and reactive to light  Cardiovascular:      Rate and Rhythm: Normal rate and regular rhythm  Heart sounds: Normal heart sounds  Pulmonary:      Effort: Pulmonary effort is normal  No respiratory distress  Comments: crackles  Abdominal:      General: Bowel sounds are normal       Palpations: Abdomen is soft  Tenderness: There is no abdominal tenderness  Musculoskeletal:         General: No swelling or tenderness  Normal range of motion  Cervical back: Neck supple  Skin:     General: Skin is warm and dry  Capillary Refill: Capillary refill takes less than 2 seconds  Neurological:      General: No focal deficit present  Mental Status: She is alert and oriented to person, place, and time           Vital Signs  ED Triage Vitals   Temperature Pulse Respirations Blood Pressure SpO2   02/10/22 1026 02/10/22 1026 02/10/22 1026 02/10/22 1026 02/10/22 1026   97 5 °F (36 4 °C) 60 18 123/57 91 %      Temp src Heart Rate Source Patient Position - Orthostatic VS BP Location FiO2 (%)   -- 02/10/22 1145 02/10/22 1326 02/10/22 1326 --    Monitor Lying Left arm       Pain Score       02/10/22 1726       No Pain           Vitals:    02/10/22 1326 02/10/22 1442 02/10/22 1726 02/10/22 1800   BP: 152/61 152/87 150/65 130/60   Pulse: 64 71 74 70   Patient Position - Orthostatic VS: Lying Sitting Sitting Sitting         Visual Acuity      ED Medications  Medications   ALPRAZolam (XANAX) tablet 0 25 mg (0 25 mg Oral Given 2/10/22 1841)   carvedilol (COREG) tablet 25 mg (25 mg Oral Given 2/10/22 1931)   Diclofenac Sodium (VOLTAREN) 1 % topical gel 2 g (2 g Topical Not Given 2/10/22 1845)   oxyCODONE (OxyCONTIN) 12 hr tablet 20 mg (has no administration in time range)   oxyCODONE-acetaminophen (PERCOCET) 5-325 mg per tablet 2 tablet (2 tablets Oral Given 2/10/22 1840)   potassium chloride (K-DUR,KLOR-CON) CR tablet 40 mEq (has no administration in time range)   cholecalciferol (VITAMIN D3) tablet 1,000 Units (has no administration in time range)   ondansetron (ZOFRAN) injection 4 mg (has no administration in time range)   senna (SENOKOT) tablet 8 6 mg (has no administration in time range)   furosemide (LASIX) injection 40 mg (40 mg Intravenous Given 2/10/22 1843)   ipratropium-albuterol (DUO-NEB) 0 5-2 5 mg/3 mL inhalation solution 3 mL (3 mL Nebulization Given 2/10/22 1841)   furosemide (LASIX) injection 60 mg (60 mg Intravenous Given 2/10/22 1434)       Diagnostic Studies  Results Reviewed     Procedure Component Value Units Date/Time    HS Troponin I 4hr [[id number]]  (Normal) Collected: 02/10/22 1724    Lab Status: Final result Specimen: Blood from Arm, Right Updated: 02/10/22 1758     hs TnI 4hr 5 ng/L      Delta 4hr hsTnI 0 1 ng/L     HS Troponin I 2hr [[id number]]  (Normal) Collected: 02/10/22 1433    Lab Status: Final result Specimen: Blood from Arm, Right Updated: 02/10/22 1519     hs TnI 2hr 3 ng/L      Delta 2hr hsTnI -1 9 ng/L     Protime-INR [[id number]]  (Abnormal) Collected: 02/10/22 1032    Lab Status: Final result Specimen: Blood from Arm, Right Updated: 02/10/22 1343     Protime 57 7 seconds      INR 6 90    Narrative:      Verified by repeat  APTT [[id number]]  (Abnormal) Collected: 02/10/22 1032    Lab Status: Final result Specimen: Blood from Arm, Right Updated: 02/10/22 1343      seconds     Narrative:      Verified by repeat  HS Troponin 0hr (reflex protocol) [[id number]]  (Normal) Collected: 02/10/22 1032    Lab Status: Final result Specimen: Blood from Arm, Right Updated: 02/10/22 1336     hs TnI 0hr 4 9 ng/L     NT-BNP PRO [[id number]]  (Abnormal) Collected: 02/10/22 1032    Lab Status: Final result Specimen: Blood from Arm, Right Updated: 02/10/22 1237     NT-proBNP 2,903 pg/mL     [place] draw [[id number]] Collected: 02/10/22 1032    Lab Status: In process Specimen: Blood from Arm, Right Updated: 02/10/22 1202    Narrative:       The following orders were created for panel order [place] draw  Procedure                               Abnormality         Status                     ---------                               -----------         ------                     Belkis [place] Top on IOOR[[id number]]                           Final result               Green / Black tube on MBHP[[id number]]                       Final result               Lavender Top 7ml on HBTT[[id number]]                         In process                   Please view results for these tests on the individual orders      Comprehensive metabolic panel [[id number]]  (Abnormal) Collected: 02/10/22 1032    Lab Status: Final result Specimen: Blood from Arm, Right Updated: 02/10/22 1105     Sodium 138 mmol/L      Potassium 4 6 mmol/L      Chloride 97 mmol/L      CO2 33 mmol/L      ANION GAP 8 mmol/L      BUN 42 mg/dL      Creatinine 1 82 mg/dL      Glucose 118 mg/dL      Calcium 9 4 mg/dL      AST 21 U/L      ALT 24 U/L      Alkaline Phosphatase 119 U/L      Total Protein 7 4 g/dL      Albumin 3 6 g/dL      Total Bilirubin 0 49 mg/dL      eGFR 26 ml/min/1 73sq m     Narrative:      [place] guidelines for Chronic Kidney Disease (CKD):     Stage 1 with normal or high GFR (GFR > 90 mL/min/1 73 square meters)    Stage 2 Mild CKD (GFR = 60-89 mL/min/1 73 square meters)    Stage 3A Moderate CKD (GFR = 45-59 mL/min/1 73 square meters)    Stage 3B Moderate CKD (GFR = 30-44 mL/min/1 73 square meters)    Stage 4 Severe CKD (GFR = 15-29 mL/min/1 73 square meters)    Stage 5 End Stage CKD (GFR <15 mL/min/1 73 square meters)  Note: GFR calculation is accurate only with a steady state creatinine    CBC and differential [[id number]]  (Abnormal) Collected: 02/10/22 1032    Lab Status: Final result Specimen: Blood from Arm, Right Updated: 02/10/22 1045     WBC 9 83 Thousand/uL      RBC 3 54 Million/uL      Hemoglobin 9 4 g/dL      Hematocrit 31 0 %      MCV 88 fL      MCH 26 6 pg      MCHC 30 3 g/dL      RDW 15 9 %      MPV 11 4 fL      Platelets 174 Thousands/uL      nRBC 0 /100 WBCs      Neutrophils Relative 74 %      Immat GRANS % 1 %      Lymphocytes Relative 9 %      Monocytes Relative 10 %      Eosinophils Relative 6 %      Basophils Relative 0 %      Neutrophils Absolute 7 32 Thousands/µL      Immature Grans Absolute 0 05 Thousand/uL      Lymphocytes Absolute 0 90 Thousands/µL      Monocytes Absolute 0 99 Thousand/µL      Eosinophils Absolute 0 54 Thousand/µL      Basophils Absolute 0 03 Thousands/µL                  XR chest 1 view portable   Final Result by Meagan Pyle DO (02/10 1604)      Right midlung and bibasilar opacities, still present but somewhat improved from radiographs of 1/3/2022, may represent residual or persistent pneumonia  Continued follow-up recommended                    Workstation performed: [id number]                    Procedures  Procedures         ED Course  ED Course as of 02/10/22 1939   Thu Feb 10, 2022   1215 Called 2nd time to get labs added on    1224 Ecg shows rate                                              MDM    Disposition  Final diagnoses:   Vaginal bleeding   Acute exacerbation of CHF (congestive heart failure) ([facility])   LEXY (acute kidney injury) ([facility] )   Supratherapeutic INR     Time reflects when diagnosis was documented in both MDM as applicable and the Disposition within this note     Time User Action Codes Description Comment    2/10/2022  1:54 PM Tadevosyan, Rachell Add [N93 9] Vaginal bleeding     2/10/2022  1:54 PM Tadevosyan, Rachell Add [I50 9] Acute exacerbation of CHF (congestive heart failure) ([facility] )     2/10/2022  1:54 PM Tadevosyan, Rachell Add [N17 9] LEXY (acute kidney injury) ([facility] )     2/10/2022  1:54 PM Tadevosyan, Rachell Add [R79 1] Supratherapeutic INR     2/10/2022  3:54 PM Juliet Polka Add [I50 33] Acute on chronic diastolic heart failure [facility])       ED Disposition     ED Disposition Condition Date/Time Comment    Admit Stable Thu Feb 10, 2022  1:54 PM Case was discussed with Dr Dominga Cunningham and the patient's admission status was agreed to be Admission Status: inpatient status to the service of Dr Dominga Cunningham   Follow-up Information    None         Patient's Medications   Discharge Prescriptions    No medications on file       No discharge procedures on file      PDMP Review       Value Time User    PDMP Reviewed  Yes 1/10/2022 11:19 AM Awilda Lozano MD          ED Provider  Electronically Signed by           Lilliam Christie MD  02/10/22 [id number]

## 2022-02-11 NOTE — PROGRESS NOTES
Gynecology Progress note   Briana Gerber 77 y o  female MRN: [id number]  Unit/Bed#: S -01 Encounter: [id number]    A/P: Patient is a 78 yo with admitted with CHF exacerbation with initial concern for vaginal bleeding following an accidental consumption of extra Coumadin  Suspected vaginal bleeding  · Per nursing, there were two small, pinhole incisions below her pannus that were bleeding significantly  Once this area was covered with a dressing, no further bleeding was noted   · Hemoglobin stable this AM at 9 5, INR 5 41 from 6 9 on admission   · Will continue to monitor throughout the day, however if truly no vaginal bleeding, inpatient speculum exam will not be warranted         Rest of care per primary team       Subjective:    Briana Gerber was sleeping  Will return later to discuss symptoms  /69 (BP Location: Left arm)   Pulse 77   Temp 98 °F (36 7 °C) (Oral)   Resp 18   Ht 5' 2" (1 575 m)   Wt 118 kg (259 lb 11 2 oz)   SpO2 99%   BMI 47 50 kg/m²     I/O last 3 completed shifts:  In: -   Out: 850 [Urine:850]  I/O this shift: In: 480 [P O :480]  Out: 850 [Urine:850]    Lab Results   Component Value Date    WBC 10 03 02/11/2022    HGB 9 5 (L) 02/11/2022    HCT 32 1 (L) 02/11/2022    MCV 87 02/11/2022     02/11/2022       Lab Results   Component Value Date    GLUCOSE 129 12/29/2021    CALCIUM 9 5 02/11/2022     09/03/2014    K 3 7 02/11/2022    CO2 33 (H) 02/11/2022     02/11/2022    BUN 34 (H) 02/11/2022    CREATININE 1 30 02/11/2022       No results found for: POCGLU      Physical Exam  Constitutional:       Appearance: She is not ill-appearing or toxic-appearing  Comments: Patient sleeping soundly upright in her recliner    HENT:      Head: Normocephalic and atraumatic  Pulmonary:      Effort: No respiratory distress  Breath sounds: No stridor        Comments: Nasal cannula in place          Idris Tariq MD  2/11/2022  6:28 AM

## 2022-02-11 NOTE — PLAN OF CARE
Problem: Prexisting or High Potential for Compromised Skin Integrity  Goal: Skin integrity is maintained or improved  Description: INTERVENTIONS:  - Identify patients at risk for skin breakdown  - Assess and monitor skin integrity  - Assess and monitor nutrition and hydration status  - Monitor labs   - Assess for incontinence   - Turn and reposition patient  - Assist with mobility/ambulation  - Relieve pressure over bony prominences  - Avoid friction and shearing  - Provide appropriate hygiene as needed including keeping skin clean and dry  - Evaluate need for skin moisturizer/barrier cream  - Collaborate with interdisciplinary team   - Patient/family teaching  - Consider wound care consult   Outcome: Progressing     Problem: MOBILITY - ADULT  Goal: Maintain or return to baseline ADL function  Description: INTERVENTIONS:  -  Assess patient's ability to carry out ADLs; assess patient's baseline for ADL function and identify physical deficits which impact ability to perform ADLs (bathing, care of mouth/teeth, toileting, grooming, dressing, etc )  - Assess/evaluate cause of self-care deficits   - Assess range of motion  - Assess patient's mobility; develop plan if impaired  - Assess patient's need for assistive devices and provide as appropriate  - Encourage maximum independence but intervene and supervise when necessary  - Involve family in performance of ADLs  - Assess for home care needs following discharge   - Consider OT consult to assist with ADL evaluation and planning for discharge  - Provide patient education as appropriate  Outcome: Progressing  Goal: Maintains/Returns to pre admission functional level  Description: INTERVENTIONS:  - Perform BMAT or MOVE assessment daily    - Set and communicate daily mobility goal to care team and patient/family/caregiver  - Collaborate with rehabilitation services on mobility goals if consulted  - Perform Range of Motion  times a day    - Reposition patient every hours   - Dangle patient  times a day  - Stand patient  times a day  - Ambulate patient  times a day  - Out of bed to chair  times a day   - Out of bed for meals times a day  - Out of bed for toileting  - Record patient progress and toleration of activity level   Outcome: Progressing

## 2022-02-11 NOTE — QUICK NOTE
Please note per query patient has Coagulopathy due to Coumadin with INR-6 9 on admission resulting in  spontaneous bleeding from superficial suprapubic wound  Coumadin on hold and Vit-K  given

## 2022-02-11 NOTE — UTILIZATION REVIEW
Initial Clinical Review    Admission: Date/Time/Statement:   Admission Orders (From admission, onward)     Ordered        02/10/22 1355  Inpatient Admission  Once                      Orders Placed This Encounter   Procedures    Inpatient Admission     Standing Status:   Standing     Number of Occurrences:   1     Order Specific Question:   Level of Care     Answer:   Med Surg [16]     Order Specific Question:   Estimated length of stay     Answer:   More than 2 Midnights     Order Specific Question:   Certification     Answer:   I certify that inpatient services are medically necessary for this patient for a duration of greater than two midnights  See H&P and MD Progress Notes for additional information about the patient's course of treatment  ED Arrival Information     Expected Arrival Acuity    - 2/10/2022 09:31 Urgent         Means of arrival Escorted by Service Admission type    Wheelchair Spouse Hospitalist Urgent         Arrival complaint    Vaginal bleeding        Chief Complaint   Patient presents with    Vaginal Bleeding     PT presents to ED with vaginal bleed that started this morning, also c/o weakness  After triage,  reports that he gave her "coumadin instead of another medication over a week ago, was stopped, and now needs another INR test"       Initial Presentation: 77year old female to the ED from home with complaints of vaginal bleeding, has been taking extra coumadin accidentally at home  Admitted to inpatient for supratherapeutic INR, acute respiratory failure with hypoxia, acute on chronic CHF  H/O CHf, afib with recent change in torsemide, and coumadin dosages   mixed the meds up and she has been getting more coumadin and less torsemide accidentally  Arrives short of breath with crackles heard in lungs, pulse ox 81% on room air  Placed on NC  Given IV lasix and nebulizer in the ED  INR 8 7 on arrival  HOld coumadin  OB/BYN consult  OB/GYN:  Vaginal bleeding    Plan for speculum exam when clinically improved  Hg 9 4 from baseline 10 5  Continue to monitor  Has not had any vaginal bleeding since menopause  Date: 2/11   Day 2: INitially thought to have vaginal bleeding, however noted that she was bleeding from under her pannus  Give low dose justa K  Discontinue coumadin  Increase Lasix IV  Still visibly dyspneic with wheezing and rales, bilateral lower extremity edema  OB/GYN:  Per nursing, there were 2 small pinhole in incisions below her pannus which were bleeding  COvered with dressing  No further bleeding  Question whether she was vaginal bleeding  Monitor and speculum exam may not be needed       ED Triage Vitals   Temperature Pulse Respirations Blood Pressure SpO2   02/10/22 1026 02/10/22 1026 02/10/22 1026 02/10/22 1026 02/10/22 1026   97 5 °F (36 4 °C) 60 18 123/57 91 %      Temp Source Heart Rate Source Patient Position - Orthostatic VS BP Location FiO2 (%)   02/11/22 0118 02/10/22 1145 02/10/22 1326 02/10/22 1326 --   Oral Monitor Lying Left arm       Pain Score       02/10/22 1726       No Pain          Wt Readings from Last 1 Encounters:   02/10/22 118 kg (259 lb 11 2 oz)     Additional Vital Signs:   Time Temp Pulse Resp BP MAP (mmHg) SpO2 Calculated FIO2 (%) - Nasal Cannula Nasal Cannula O2 Flow Rate (L/min) O2 Device Patient Position - Orthostatic VS   02/11/22 0800 -- -- -- -- -- -- 36 4 L/min Nasal cannula --   02/11/22 0741 97 8 °F (36 6 °C) 73 18 159/63 90 98 % -- -- Nasal cannula Lying   02/11/22 0118 98 °F (36 7 °C) 77 18 138/69 95 99 % -- -- Nasal cannula Lying   02/10/22 2200 -- 58 22 127/64 -- 99 % 36 4 L/min Nasal cannula --   02/10/22 1800 -- 70 25 Abnormal  130/60 86 97 % -- -- None (Room air) Sitting   02/10/22 1726 -- 74 25 Abnormal  150/65 -- 96 % 36 4 L/min -- Sitting   02/10/22 1442 -- 71 20 152/87 -- 99 % 36 4 L/min Nasal cannula Sitting   02/10/22 1326 -- 64 20 152/61 88 98 % 36 4 L/min Nasal cannula Lying   02/10/22 1145 -- 66 -- -- -- 100 % 36 4 L/min Nasal cannula --   02/10/22 1134 -- -- -- -- -- 81 % Abnormal    -- -- None (Room air) --   02/10/22 1026 97 5 °F (36 4 °C) 60 18 123/57 -- 91 % --      /Output Summary (Last 24 hours) at 2/11/2022 1218  Last data filed at 2/10/2022 2301      Gross per 24 hour   Intake 480 ml   Output 1700 ml   Net -1220 ml         Pertinent Labs/Diagnostic Test Results:   2/10 CXR:  Right midlung and bibasilar opacities, still present but somewhat improved from radiographs of 1/3/2022, may represent residual or persistent pneumonia   Continued follow-up recommended         Results from last 7 days   Lab Units 02/11/22  0458 02/10/22  1032 02/04/22  1328   WBC Thousand/uL 10 03 9 83 6 64   HEMOGLOBIN g/dL 9 5* 9 4* 10 1*   HEMATOCRIT % 32 1* 31 0* 33 8*   PLATELETS Thousands/uL 293 254 273   NEUTROS ABS Thousands/µL  --  7 32  --          Results from last 7 days   Lab Units 02/11/22  0458 02/10/22  1032 02/04/22  1328   SODIUM mmol/L 140 138 131*   POTASSIUM mmol/L 3 7 4 6 5 0   CHLORIDE mmol/L 101 97* 96*   CO2 mmol/L 33* 33* 32   ANION GAP mmol/L 6 8 3*   BUN mg/dL 34* 42* 47*   CREATININE mg/dL 1 30 1 82* 1 77*   EGFR ml/min/1 73sq m 40 26 27   CALCIUM mg/dL 9 5 9 4 9 9     Results from last 7 days   Lab Units 02/10/22  1032   AST U/L 21   ALT U/L 24   ALK PHOS U/L 119*   TOTAL PROTEIN g/dL 7 4   ALBUMIN g/dL 3 6   TOTAL BILIRUBIN mg/dL 0 49         Results from last 7 days   Lab Units 02/11/22  0458 02/10/22  1032 02/04/22  1328   GLUCOSE RANDOM mg/dL 121 118 96       Results from last 7 days   Lab Units 02/10/22  1724 02/10/22  1433 02/10/22  1032   HS TNI 0HR ng/L  --   --  4 9   HS TNI 2HR ng/L  --  3  --    HSTNI D2 ng/L  --  -1 9  --    HS TNI 4HR ng/L 5  --   --    HSTNI D4 ng/L 0 1  --   --          Results from last 7 days   Lab Units 02/11/22  0458 02/10/22  1032 02/04/22  1328   PROTIME seconds 47 9* 57 7* 66 7*   INR  5 41* 6 90* 8 70*   PTT seconds  --  125*  --      Results from last 7 days   Lab Units 02/10/22  1032   NT-PRO BNP pg/mL 2,903*     ED Treatment:   Medication Administration from 02/10/2022 0931 to 02/11/2022 0040       Date/Time Order Dose Route Action     02/10/2022 1434 furosemide (LASIX) injection 60 mg 60 mg Intravenous Given     02/10/2022 1841 ALPRAZolam (XANAX) tablet 0 25 mg 0 25 mg Oral Given     02/10/2022 1543 ALPRAZolam (XANAX) tablet 0 25 mg 0 25 mg Oral Given     02/10/2022 1931 carvedilol (COREG) tablet 25 mg 25 mg Oral Given     02/10/2022 2215 oxyCODONE (OxyCONTIN) 12 hr tablet 20 mg 20 mg Oral Given     02/10/2022 1840 oxyCODONE-acetaminophen (PERCOCET) 5-325 mg per tablet 2 tablet 2 tablet Oral Given     02/10/2022 1843 furosemide (LASIX) injection 40 mg 40 mg Intravenous Given     02/10/2022 1841 ipratropium-albuterol (DUO-NEB) 0 5-2 5 mg/3 mL inhalation solution 3 mL 3 mL Nebulization Given        Past Medical History:   Diagnosis Date    A-fib ([place] [address] ) 12/29/2021    Anxiety     Arthritis     Back pain     Cellulitis     [facility]     Edema of both lower extremities due to peripheral venous insufficiency     Edema of both lower extremities due to peripheral venous insufficiency     Erythema of lower extremity 11/12/2021    Fibromyalgia     Hypertension     Sjogren syndrome, unspecified ([place] [address] )        Admitting Diagnosis: Acute on chronic diastolic heart failure ([facility]) [I50 33]  Vaginal bleeding [N93 9]  Acute exacerbation of CHF (congestive heart failure) ([facility]) [I50 9]  LEXY (acute kidney injury) ([facility] ) [N17 9]  Supratherapeutic INR [R79 1]  Age/Sex: 77 y o  female  Admission Orders:  I/O  UP as tolerated  PT/INR  Scheduled Medications:  carvedilol, 25 mg, Oral, BID With Meals  cholecalciferol, 1,000 Units, Oral, Daily  Diclofenac Sodium, 2 g, Topical, 4x Daily  furosemide, 40 mg, Intravenous, BID  oxyCODONE, 20 mg, Oral, Q12H [facility]  potassium chloride, 40 mEq, Oral, Daily  senna, 1 tablet, Oral, Daily      Continuous IV Infusions:     PRN Meds: ALPRAZolam, 0 25 mg, Oral, BID PRN  ipratropium-albuterol, 3 mL, Nebulization, Q6H PRN  ondansetron, 4 mg, Intravenous, Q6H PRN  oxyCODONE-acetaminophen, 2 tablet, Oral, Q4H PRN        IP CONSULT TO OB GYN  IP CONSULT TO NUTRITION SERVICES    Network Utilization Review Department  ATTENTION: Please call with any questions or concerns to [phone] and carefully listen to the prompts so that you are directed to the right person  All voicemails are confidential   Lillian Tilley all requests for admission clinical reviews, approved or denied determinations and any other requests to dedicated fax number below belonging to the campus where the patient is receiving treatment   List of dedicated fax numbers for the Facilities:  [address] DENIALS (Administrative/Medical Necessity) [phone]   [address] (Maternity/NICU/Pediatrics) [phone]   [address]  [address] [facility] [place] [address] [address] [place] [address] [address] [facility] [facility] [phone]

## 2022-02-11 NOTE — ASSESSMENT & PLAN NOTE
· INR of 6 9 on admission with associated bleeding  INR 5 41 this morning  Given ongoing bleeding would provide low-dose of vitamin K   Continue to trend INR  · Moving forward Coumadin will be permanently discontinued, see note below

## 2022-02-11 NOTE — ASSESSMENT & PLAN NOTE
Lab Results   Component Value Date    EGFR 40 02/11/2022    EGFR 26 02/10/2022    EGFR 27 02/04/2022    CREATININE 1 30 02/11/2022    CREATININE 1 82 (H) 02/10/2022    CREATININE 1 77 (H) 02/04/2022   · Likely cardiorenal, baseline creatinine around 1, was 1 82 on admission  Improved to 1 3 today  · Will continue with diuresis as above    · Recheck in am

## 2022-02-11 NOTE — PROGRESS NOTES
[facility]  Progress Note - Seth Parker 1945, 77 y o  female MRN: [id number]  Unit/Bed#: S -01 Encounter: [id number]  Primary Care Provider: Jud Johnson MD   Date and time admitted to hospital: 2/10/2022 11:30 AM    * Spontaneous bleeding from superficial suprapubic wound  Assessment & Plan  · Initially this was thought to be vaginal bleeding as she found blood in her underwear however upon closer inspection it appeared that she actually had 2 pinpoint holes in the suprapubic area under her pannus that were bleeding, likely spontaneously due to her supratherapeutic INR as she denies any trauma  · As such, no vaginal exam is necessary  · Hemoglobin remaining stable--continue to monitor  Supratherapeutic INR  Assessment & Plan  · INR of 6 9 on admission with associated bleeding  INR 5 41 this morning  Given ongoing bleeding would provide low-dose of vitamin K  Continue to trend INR  · Moving forward Coumadin will be permanently discontinued, see note below      Acute on chronic diastolic heart failure (HCC)  Assessment & Plan  · Wt Readings from Last 3 Encounters:   02/10/22 118 kg (259 lb 11 2 oz)   01/27/22 115 kg (253 lb 6 4 oz)   01/13/22 106 kg (233 lb)     · Patient hypervolemic on admission with significant weight gain, leg edema and dyspnea with hypoxic respiratory failure, likely due to  accidentally giving her wrong dose of diuretic  · Will continue to hold home torsemide dose and increase IV lasix to 60 mg IV BID, give additional dose of 40 IV now as well  · Monitor daily weights, ins and outs, 2 g sodium restricted diet  · Echocardiogram November 2021 shows ejection fraction of 65% with grade 1 diastolic dysfunction    Atrial fibrillation (HCC)  Assessment & Plan  · Rate controlled--Continue Coreg  · Coumadin was being held due to supratherapeutic INR and bleeding    I spoke directly with patient's  as I noted in the past Master has been offered by [facility] as an alternative and patient's  asked me to recheck the price on this  After speaking with their pharmacy and noting that it was $45 they wish to proceed with changing her over to Eliquis when appropriate (INR <2) and permanently discontinuing Coumadin    Acute renal failure superimposed on stage 2/3 chronic kidney disease [facility])  Assessment & Plan  Lab Results   Component Value Date    EGFR 40 02/11/2022    EGFR 26 02/10/2022    EGFR 27 02/04/2022    CREATININE 1 30 02/11/2022    CREATININE 1 82 (H) 02/10/2022    CREATININE 1 77 (H) 02/04/2022   · Likely cardiorenal, baseline creatinine around 1, was 1 82 on admission  Improved to 1 3 today  · Will continue with diuresis as above  · Recheck in am    Acute respiratory failure with hypoxia ([facility] )  Assessment & Plan  · Patient noted to have O2 sat of 81% on room air on arrival  Currently 85% on RA and still visibly dyspneic  · Likely due to CHF exacerbation, continue to diurese  · Wean oxygen as tolerated, maintain O2 sat greater than 90%  Note that patient reports having oxygen at home "as needed"    Essential hypertension  Assessment & Plan  · Controlled-Continue Coreg  · Losartan held due to LEXY    Obesity  Assessment & Plan  · BMI 47 50    Opioid dependence due to Chronic back pain   Assessment & Plan  · Will continue home oxycodone      VTE Pharmacologic Prophylaxis: VTE Score: 6 held in the setting of bleeding    Patient Centered Rounds:  Spoke with RN  Discussions with Specialists or Other Care Team Provider:  Spoke with case management; at 's request I also contacted their pharmacy to get a price check on Eliquis    Education and Discussions with Family / Patient:  Spoke with patient's  over the phone      Current Length of Stay: 1 day(s)  Current Patient Status: Inpatient   Discharge Plan:  Anticipate discharge home tomorrow if improvement in volume status    Code Status: Level 1 - Full Code    Subjective:   Patient's nurse reports to me that overnight she was not found have any evidence of vaginal bleeding but that in fact a very tiny pinpoint spot of bleeding was noted in her suprapubic region  A dressing was placed in this area and upon my evaluation I noted that it was fairly saturated with blood again  Patient denies any trauma to this area and is unaware of how this occurred  She reported to me that she was very eager to go home today though she admits that she is still having shortness of breath and wheezing, as well as leg swelling  She does tell me that at home she is compliant with using her lymphedema pumps and wraps    Objective:     Vitals:   Temp (24hrs), Av 9 °F (36 6 °C), Min:97 8 °F (36 6 °C), Max:98 °F (36 7 °C)    Temp:  [97 8 °F (36 6 °C)-98 °F (36 7 °C)] 97 8 °F (36 6 °C)  HR:  [58-77] 73  Resp:  [18-25] 18  BP: (127-159)/(60-87) 159/63  SpO2:  [96 %-99 %] 98 %  Body mass index is 47 5 kg/m²  Input and Output Summary (last 24 hours): Intake/Output Summary (Last 24 hours) at 2022 1218  Last data filed at 2/10/2022 2301  Gross per 24 hour   Intake 480 ml   Output 1700 ml   Net -1220 ml       Physical Exam:   Physical Exam  Vitals reviewed  Constitutional:       General: She is not in acute distress  Appearance: She is obese  She is not ill-appearing, toxic-appearing or diaphoretic  Comments: Sitting up in bedside chair   HENT:      Head: Normocephalic and atraumatic  Eyes:      General: No scleral icterus  Right eye: No discharge  Left eye: No discharge  Conjunctiva/sclera: Conjunctivae normal    Cardiovascular:      Rate and Rhythm: Normal rate and regular rhythm  Heart sounds: No murmur heard  No friction rub  No gallop  Comments: Ha diagnosis of lymphedema since last year  Pulmonary:      Effort: Pulmonary effort is normal       Breath sounds: No stridor  Wheezing and rales present  No rhonchi        Comments: Noted that patient's O2 sat on room air was 85%  She was visibly dyspneic with conversation and had mild rales plus slight expiratory wheezes  Abdominal:      General: Bowel sounds are normal       Tenderness: There is no abdominal tenderness  Musculoskeletal:      Right lower leg: Edema present  Left lower leg: Edema present  Comments: Bilateral lower extremity chronic venous stasis changes with lymphedema/bilateral lower extremity edema   Skin:     General: Skin is warm and dry  Coloration: Skin is not jaundiced or pale  Findings: No bruising, erythema, lesion or rash  Comments: Pinpoint area of bleeding noted in suprapubic under her pannus   Neurological:      General: No focal deficit present  Mental Status: She is alert  Mental status is at baseline  Comments: Awake alert interactive she is a good historian with no evidence of confusion appreciated   Psychiatric:         Mood and Affect: Mood normal          Thought Content: Thought content normal           Additional Data:     Labs:  Results from last 7 days   Lab Units 02/11/22  0458 02/10/22  1032 02/10/22  1032   WBC Thousand/uL 10 03   < > 9 83   HEMOGLOBIN g/dL 9 5*   < > 9 4*   HEMATOCRIT % 32 1*   < > 31 0*   PLATELETS Thousands/uL 293   < > 254   NEUTROS PCT %  --   --  74   LYMPHS PCT %  --   --  9*   MONOS PCT %  --   --  10   EOS PCT %  --   --  6    < > = values in this interval not displayed  Results from last 7 days   Lab Units 02/11/22  0458 02/10/22  1032 02/10/22  1032   SODIUM mmol/L 140   < > 138   POTASSIUM mmol/L 3 7   < > 4 6   CHLORIDE mmol/L 101   < > 97*   CO2 mmol/L 33*   < > 33*   BUN mg/dL 34*   < > 42*   CREATININE mg/dL 1 30   < > 1 82*   ANION GAP mmol/L 6   < > 8   CALCIUM mg/dL 9 5   < > 9 4   ALBUMIN g/dL  --   --  3 6   TOTAL BILIRUBIN mg/dL  --   --  0 49   ALK PHOS U/L  --   --  119*   ALT U/L  --   --  24   AST U/L  --   --  21   GLUCOSE RANDOM mg/dL 121   < > 118    < > = values in this interval not displayed  Results from last 7 days   Lab Units 02/11/22  0458   INR  5 41*                   Lines/Drains:  Invasive Devices  Report    Peripheral Intravenous Line            Peripheral IV 02/10/22 Right Antecubital 1 day          Drain            External Urinary Catheter 39 days                Imaging: Reviewed radiology reports from this admission including: chest xray    Recent Cultures (last 7 days):         Last 24 Hours Medication List:   Current Facility-Administered Medications   Medication Dose Route Frequency Provider Last Rate    ALPRAZolam  0 25 mg Oral BID PRN Marco Luke MD      carvedilol  25 mg Oral BID With Meals Marco Luke MD      cholecalciferol  1,000 Units Oral Daily Marco Luke MD      Diclofenac Sodium  2 g Topical 4x Daily Marco Luke MD      furosemide  60 mg Intravenous BID Jessy Lowery PA-C      ipratropium-albuterol  3 mL Nebulization Q6H PRN Marco Luke MD      ondansetron  4 mg Intravenous Q6H PRN Marco Luke MD      oxyCODONE  20 mg Oral Q12H [address] Marco Luke MD      oxyCODONE-acetaminophen  2 tablet Oral Q4H PRN Marco Luke MD      potassium chloride  40 mEq Oral Daily Marco Luke MD      senna  1 tablet Oral Daily Marco Luke MD          Today, Patient Was Seen By: Obie Prader, PA-C    **Please Note: This note may have been constructed using a voice recognition system  **

## 2022-02-11 NOTE — ASSESSMENT & PLAN NOTE
· Wt Readings from Last 3 Encounters:   02/10/22 118 kg (259 lb 11 2 oz)   01/27/22 115 kg (253 lb 6 4 oz)   01/13/22 106 kg (233 lb)     · Patient hypervolemic on admission with significant weight gain, leg edema and dyspnea with hypoxic respiratory failure, likely due to  accidentally giving her wrong dose of diuretic  · Will continue to hold home torsemide dose and increase IV lasix to 60 mg IV BID, give additional dose of 40 IV now as well  · Monitor daily weights, ins and outs, 2 g sodium restricted diet  · Echocardiogram November 2021 shows ejection fraction of 65% with grade 1 diastolic dysfunction

## 2022-02-11 NOTE — CASE MANAGEMENT
Case Management Assessment & Discharge Planning Note    Patient name Mark Goodpasture Location S /S -62 MRN [id number]  : 1945 Date 2022       Current Admission Date: 2/10/2022  Current Admission Diagnosis:Spontaneous bleeding from superficial suprapubic wound   Patient Active Problem List    Diagnosis Date Noted    Spontaneous bleeding from superficial suprapubic wound 02/10/2022    Supratherapeutic INR 02/10/2022    Memory impairment 2022    Acute on chronic diastolic heart failure ([address] ) 2022    Anxiety     Opioid dependence due to Chronic back pain  2022    LEXY (acute kidney injury) ([address] ) 2021    Atrial fibrillation ([address] ) 2021    Chronic back pain 2021    Mixed hyperlipidemia 2021    Obesity 2021    Osteoarthritis of knee 2021    Acute renal failure superimposed on stage 2/3 chronic kidney disease ([address] ) 2021    Rash of back 2021    Acute respiratory failure with hypoxia ([address] ) 2021    Chronic low back pain with sciatica 2021    Chronic venous insufficiency 2021    Essential hypertension 2017    Sjogren's syndrome ([address] ) 2014      LOS (days): 1  Geometric Mean LOS (GMLOS) (days): 3 80  Days to GMLOS:2 8     OBJECTIVE:    Risk of Unplanned Readmission Score: 28         Current admission status: Inpatient       Preferred Pharmacy:   [place] [id number]  Phone: [phone] Fax: [phone]    Primary Care Provider: King Sundar MD    Primary Insurance: [address] [place] [address]  Secondary Insurance:     ASSESSMENT:  [address] - Spouse   Primary Phone: [phone] (Mobile)  Home Phone: [phone]               Advance Directives  Does patient have a [address] [place] [address]?: Yes  Does patient have Advance Directives?: Yes  Advance Directives: Living will  Primary Contact: [place] - Spouse              Patient Information  Admitted from[de-identified] Home  Mental Status: Other (Comment) (Tried to reach pt by room phone, pt did not answer, called spouse )  During Assessment patient was accompanied by: Other-Comment (Called spouse, who was in the cafeteria at [facility])  Assessment information provided by[de-identified] Spouse  Primary Caregiver: Self  Caregiver's Name[de-identified] Jesus Manuel Eli - spouse  Caregiver's Relationship to Patient[de-identified] Family Member  Caregiver's Telephone Number[de-identified] [phone]  Support Systems: Self,Spouse/significant other  [place] of Residence: [address] [place] [address] do you live in?: [facility] access options   Select all that apply : Stairs  Number of steps to enter home : 2  Do the steps have railings?: Yes  Type of Current Residence: 2 story home  Upon entering residence, is there a bedroom on the main floor (no further steps)?: No  A bedroom is located on the following floor levels of residence (select all that apply):: 2nd Floor  Upon entering residence, is there a bathroom on the main floor (no further steps)?: No  Indicate which floors of current residence have a bathroom (select all the apply):: 2nd Floor  Number of steps to 2nd floor from main floor: One Flight  In the last 12 months, was there a time when you were not able to pay the mortgage or rent on time?: No  In the last 12 months, was there a time when you did not have a steady place to sleep or slept in a shelter (including now)?: No  Homeless/housing insecurity resource given?: N/A  Living Arrangements: Lives w/ Spouse/significant other    Activities of Daily Living Prior to Admission  Functional Status: Independent  Completes ADLs independently?: Yes  Ambulates independently?: Yes  Does patient use assisted devices?: Yes  Assisted Devices (DME) used: Conrad Ybarra  Does patient currently own DME?: Yes  What DME does the patient currently own?: Bedside Commode,Shower Chair,Straight Cane,Walker,Wheelchair  Does patient have a history of Outpatient Therapy (PT/OT)?: No  Does the patient have a history of Short-Term Rehab?: Yes ([facility])  Does patient have a history of HHC?: Yes (Spouse believes it was [facility])  Does patient currently have [address]?: Yes    Current Home Health Care  Type of Current Home Care Services: Home OT,Home PT,,Home health aide  Current Home Health Agency[de-identified] [facility] (Spouse is not sure, but believes it is [facility])  [facility] Provider[de-identified] PCP    Patient Information Continued  Income Source: Pension/long term  Does patient have prescription coverage?: Yes  Within the past 12 months, you worried that your food would run out before you got the money to buy more : Never true  Within the past 12 months, the food you bought just didnt last and you didnt have money to get more : Never true  Food insecurity resource given?: N/A  Does patient have a history of substance abuse?: No  Does patient have a history of Mental Health Diagnosis?: No         Means of Transportation  Means of Transport to Appts[de-identified] Family transport  In the past 12 months, has lack of transportation kept you from medical appointments or from getting medications?: No (Pt has missed appointments d/t health, or not being able to walk, but not d/t transportation )  In the past 12 months, has lack of transportation kept you from meetings, work, or from getting things needed for daily living?: No  Was application for public transport provided?: N/A        DISCHARGE DETAILS:    Discharge planning discussed with[de-identified] Spouse  Freedom of Choice: Yes  Comments - Freedom of Choice: Spouse understands no recommendations have been made at this time, but if STR is recommended he would like for his wife to return to [address] [place] [address]; if [address] is recommended, spouse would like for PADMINI to continue the care of his wife    CM contacted family/caregiver?: Yes             Contacts  Patient Contacts: Spouse  Relationship to Patient[de-identified] Family  Contact Method: Phone  Phone Number: [phone]  Reason/Outcome: Continuity of [address]                   Would you like to participate in our [address] service program?  : No - Declined

## 2022-02-11 NOTE — ED NOTES
Pt assisted to recliner  Requesting to sleep in recline  Purewick in place  Pt denies complaints at this time        Krys Jules, KITTY  02/10/22 0013

## 2022-02-11 NOTE — PLAN OF CARE
Problem: Prexisting or High Potential for Compromised Skin Integrity  Goal: Skin integrity is maintained or improved  Description: INTERVENTIONS:  - Identify patients at risk for skin breakdown  - Assess and monitor skin integrity  - Assess and monitor nutrition and hydration status  - Monitor labs   - Assess for incontinence   - Turn and reposition patient  - Assist with mobility/ambulation  - Relieve pressure over bony prominences  - Avoid friction and shearing  - Provide appropriate hygiene as needed including keeping skin clean and dry  - Evaluate need for skin moisturizer/barrier cream  - Collaborate with interdisciplinary team   - Patient/family teaching  - Consider wound care consult   2/11/2022 0804 by Sulaiman Bolton RN  Outcome: Progressing  2/11/2022 0804 by Sulaiman Bolton RN  Outcome: Progressing     Problem: MOBILITY - ADULT  Goal: Maintain or return to baseline ADL function  Description: INTERVENTIONS:  -  Assess patient's ability to carry out ADLs; assess patient's baseline for ADL function and identify physical deficits which impact ability to perform ADLs (bathing, care of mouth/teeth, toileting, grooming, dressing, etc )  - Assess/evaluate cause of self-care deficits   - Assess range of motion  - Assess patient's mobility; develop plan if impaired  - Assess patient's need for assistive devices and provide as appropriate  - Encourage maximum independence but intervene and supervise when necessary  - Involve family in performance of ADLs  - Assess for home care needs following discharge   - Consider OT consult to assist with ADL evaluation and planning for discharge  - Provide patient education as appropriate  2/11/2022 0804 by Sulaiman Bolton RN  Outcome: Progressing  2/11/2022 0804 by Sulaiman Bolton RN  Outcome: Progressing  Goal: Maintains/Returns to pre admission functional level  Description: INTERVENTIONS:  - Perform BMAT or MOVE assessment daily    - Set and communicate daily mobility goal to care team and patient/family/caregiver  - Collaborate with rehabilitation services on mobility goals if consulted  - Perform Range of Motion  times a day  - Reposition patient every  hours    - Dangle patient  times a day  - Stand patient  times a day  - Ambulate patient  times a day  - Out of bed to chair  times a day   - Out of bed for meals  times a day  - Out of bed for toileting  - Record patient progress and toleration of activity level   2/11/2022 0804 by Karen Price RN  Outcome: Progressing  2/11/2022 0804 by Karen Price RN  Outcome: Progressing     Problem: Potential for Falls  Goal: Patient will remain free of falls  Description: INTERVENTIONS:  - Educate patient/family on patient safety including physical limitations  - Instruct patient to call for assistance with activity   - Consult OT/PT to assist with strengthening/mobility   - Keep Call bell within reach  - Keep bed low and locked with side rails adjusted as appropriate  - Keep care items and personal belongings within reach  - Initiate and maintain comfort rounds  - Make Fall Risk Sign visible to staff  - Offer Toileting every  Hours, in advance of need  - Initiate/Maintain alarm  - Obtain necessary fall risk management equipment:   - Apply yellow socks and bracelet for high fall risk patients  - Consider moving patient to room near nurses station  2/11/2022 0804 by Karen Price RN  Outcome: Progressing  2/11/2022 0804 by Karen Price RN  Outcome: Progressing     Problem: CARDIOVASCULAR - ADULT  Goal: Maintains optimal cardiac output and hemodynamic stability  Description: INTERVENTIONS:  - Monitor I/O, vital signs and rhythm  - Monitor for S/S and trends of decreased cardiac output  - Administer and titrate ordered vasoactive medications to optimize hemodynamic stability  - Assess quality of pulses, skin color and temperature  - Assess for signs of decreased coronary artery perfusion  - Instruct patient to report change in severity of symptoms  Outcome: Progressing  Goal: Absence of cardiac dysrhythmias or at baseline rhythm  Description: INTERVENTIONS:  - Continuous cardiac monitoring, vital signs, obtain 12 lead EKG if ordered  - Administer antiarrhythmic and heart rate control medications as ordered  - Monitor electrolytes and administer replacement therapy as ordered  Outcome: Progressing     Problem: RESPIRATORY - ADULT  Goal: Achieves optimal ventilation and oxygenation  Description: INTERVENTIONS:  - Assess for changes in respiratory status  - Assess for changes in mentation and behavior  - Position to facilitate oxygenation and minimize respiratory effort  - Oxygen administered by appropriate delivery if ordered  - Initiate smoking cessation education as indicated  - Encourage broncho-pulmonary hygiene including cough, deep breathe, Incentive Spirometry  - Assess the need for suctioning and aspirate as needed  - Assess and instruct to report SOB or any respiratory difficulty  - Respiratory Therapy support as indicated  Outcome: Progressing     Problem: METABOLIC, FLUID AND ELECTROLYTES - ADULT  Goal: Electrolytes maintained within normal limits  Description: INTERVENTIONS:  - Monitor labs and assess patient for signs and symptoms of electrolyte imbalances  - Administer electrolyte replacement as ordered  - Monitor response to electrolyte replacements, including repeat lab results as appropriate  - Instruct patient on fluid and nutrition as appropriate  Outcome: Progressing  Goal: Fluid balance maintained  Description: INTERVENTIONS:  - Monitor labs   - Monitor I/O and WT  - Instruct patient on fluid and nutrition as appropriate  - Assess for signs & symptoms of volume excess or deficit  Outcome: Progressing     Problem: SKIN/TISSUE INTEGRITY - ADULT  Goal: Skin Integrity remains intact(Skin Breakdown Prevention)  Description: Assess:  -Perform Fabio assessment every   -Clean and moisturize skin every   -Inspect skin when repositioning, toileting, and assisting with ADLS  -Assess under medical devices such as  every   -Assess extremities for adequate circulation and sensation     Bed Management:  -Have minimal linens on bed & keep smooth, unwrinkled  -Change linens as needed when moist or perspiring  -Avoid sitting or lying in one position for more than  hours while in bed  -Keep HOB at degrees     Toileting:  -Offer bedside commode  -Assess for incontinence every   -Use incontinent care products after each incontinent episode such as     Activity:  -Mobilize patient  times a day  -Encourage activity and walks on unit  -Encourage or provide ROM exercises   -Turn and reposition patient every  Hours  -Use appropriate equipment to lift or move patient in bed  -Instruct/ Assist with weight shifting every  when out of bed in chair  -Consider limitation of chair time  hour intervals    Skin Care:  -Avoid use of baby powder, tape, friction and shearing, hot water or constrictive clothing  -Relieve pressure over bony prominences using   -Do not massage red bony areas    Next Steps:  -Teach patient strategies to minimize risks such as    -Consider consults to  interdisciplinary teams such as   Outcome: Progressing     Problem: HEMATOLOGIC - ADULT  Goal: Maintains hematologic stability  Description: INTERVENTIONS  - Assess for signs and symptoms of bleeding or hemorrhage  - Monitor labs  - Administer supportive blood products/factors as ordered and appropriate  Outcome: Progressing

## 2022-02-11 NOTE — ASSESSMENT & PLAN NOTE
· Patient noted to have O2 sat of 81% on room air on arrival  Currently 85% on RA and still visibly dyspneic  · Likely due to CHF exacerbation, continue to diurese  · Wean oxygen as tolerated, maintain O2 sat greater than 90%   Note that patient reports having oxygen at home "as needed"

## 2022-02-11 NOTE — ASSESSMENT & PLAN NOTE
· Initially this was thought to be vaginal bleeding as she found blood in her underwear however upon closer inspection it appeared that she actually had 2 pinpoint holes in the suprapubic area under her pannus that were bleeding, likely spontaneously due to her supratherapeutic INR as she denies any trauma  · As such, no vaginal exam is necessary  · Hemoglobin remaining stable--continue to monitor

## 2022-02-12 VITALS
OXYGEN SATURATION: 94 % | RESPIRATION RATE: 18 BRPM | HEART RATE: 75 BPM | DIASTOLIC BLOOD PRESSURE: 72 MMHG | SYSTOLIC BLOOD PRESSURE: 142 MMHG | HEIGHT: 62 IN | TEMPERATURE: 97.8 F | WEIGHT: 237 LBS | BODY MASS INDEX: 43.61 KG/M2

## 2022-02-12 LAB
ANION GAP SERPL CALCULATED.3IONS-SCNC: 7 MMOL/L (ref 4–13)
BASOPHILS # BLD AUTO: 0.03 THOUSANDS/ΜL (ref 0–0.1)
BASOPHILS NFR BLD AUTO: 0 % (ref 0–1)
BUN SERPL-MCNC: 21 MG/DL (ref 5–25)
CALCIUM SERPL-MCNC: 9.7 MG/DL (ref 8.3–10.1)
CHLORIDE SERPL-SCNC: 100 MMOL/L (ref 100–108)
CO2 SERPL-SCNC: 36 MMOL/L (ref 21–32)
CREAT SERPL-MCNC: 1.1 MG/DL (ref 0.6–1.3)
EOSINOPHIL # BLD AUTO: 0.51 THOUSAND/ΜL (ref 0–0.61)
EOSINOPHIL NFR BLD AUTO: 6 % (ref 0–6)
ERYTHROCYTE [DISTWIDTH] IN BLOOD BY AUTOMATED COUNT: 15.9 % (ref 11.6–15.1)
GFR SERPL CREATININE-BSD FRML MDRD: 49 ML/MIN/1.73SQ M
GLUCOSE SERPL-MCNC: 114 MG/DL (ref 65–140)
HCT VFR BLD AUTO: 33.1 % (ref 34.8–46.1)
HGB BLD-MCNC: 9.6 G/DL (ref 11.5–15.4)
IMM GRANULOCYTES # BLD AUTO: 0.03 THOUSAND/UL (ref 0–0.2)
IMM GRANULOCYTES NFR BLD AUTO: 0 % (ref 0–2)
INR PPP: 2.22 (ref 0.84–1.19)
LYMPHOCYTES # BLD AUTO: 1.13 THOUSANDS/ΜL (ref 0.6–4.47)
LYMPHOCYTES NFR BLD AUTO: 14 % (ref 14–44)
MCH RBC QN AUTO: 26.1 PG (ref 26.8–34.3)
MCHC RBC AUTO-ENTMCNC: 29 G/DL (ref 31.4–37.4)
MCV RBC AUTO: 90 FL (ref 82–98)
MONOCYTES # BLD AUTO: 1.05 THOUSAND/ΜL (ref 0.17–1.22)
MONOCYTES NFR BLD AUTO: 13 % (ref 4–12)
NEUTROPHILS # BLD AUTO: 5.27 THOUSANDS/ΜL (ref 1.85–7.62)
NEUTS SEG NFR BLD AUTO: 67 % (ref 43–75)
NRBC BLD AUTO-RTO: 0 /100 WBCS
PLATELET # BLD AUTO: 298 THOUSANDS/UL (ref 149–390)
PMV BLD AUTO: 11.8 FL (ref 8.9–12.7)
POTASSIUM SERPL-SCNC: 3.6 MMOL/L (ref 3.5–5.3)
PROTHROMBIN TIME: 24.2 SECONDS (ref 11.6–14.5)
RBC # BLD AUTO: 3.68 MILLION/UL (ref 3.81–5.12)
SODIUM SERPL-SCNC: 143 MMOL/L (ref 136–145)
WBC # BLD AUTO: 8.02 THOUSAND/UL (ref 4.31–10.16)

## 2022-02-12 PROCEDURE — 82728 ASSAY OF FERRITIN: CPT

## 2022-02-12 PROCEDURE — 99238 HOSP IP/OBS DSCHRG MGMT 30/<: CPT | Performed by: INTERNAL MEDICINE

## 2022-02-12 PROCEDURE — 80048 BASIC METABOLIC PNL TOTAL CA: CPT | Performed by: PHYSICIAN ASSISTANT

## 2022-02-12 PROCEDURE — 83550 IRON BINDING TEST: CPT

## 2022-02-12 PROCEDURE — 83540 ASSAY OF IRON: CPT

## 2022-02-12 PROCEDURE — 85025 COMPLETE CBC W/AUTO DIFF WBC: CPT | Performed by: PHYSICIAN ASSISTANT

## 2022-02-12 PROCEDURE — 85610 PROTHROMBIN TIME: CPT | Performed by: INTERNAL MEDICINE

## 2022-02-12 RX ADMIN — OXYCODONE HYDROCHLORIDE 20 MG: 20 TABLET, FILM COATED, EXTENDED RELEASE ORAL at 09:07

## 2022-02-12 RX ADMIN — OXYCODONE HYDROCHLORIDE AND ACETAMINOPHEN 2 TABLET: 5; 325 TABLET ORAL at 07:51

## 2022-02-12 RX ADMIN — POTASSIUM CHLORIDE 40 MEQ: 1500 TABLET, EXTENDED RELEASE ORAL at 09:07

## 2022-02-12 RX ADMIN — FUROSEMIDE 60 MG: 10 INJECTION, SOLUTION INTRAMUSCULAR; INTRAVENOUS at 09:11

## 2022-02-12 RX ADMIN — CARVEDILOL 25 MG: 12.5 TABLET, FILM COATED ORAL at 07:51

## 2022-02-12 RX ADMIN — SENNOSIDES 8.6 MG: 8.6 TABLET, FILM COATED ORAL at 09:07

## 2022-02-12 RX ADMIN — OXYCODONE HYDROCHLORIDE AND ACETAMINOPHEN 2 TABLET: 5; 325 TABLET ORAL at 02:40

## 2022-02-12 NOTE — DISCHARGE INSTRUCTIONS
A-fib (Atrial Fibrillation)   WHAT YOU NEED TO KNOW:   A-fib may come and go, or it may be a long-term condition  A-fib can cause blood clots, stroke, or heart failure  These conditions may become life-threatening  It is important to treat and manage A-fib to help prevent a blood clot, stroke, or heart failure  DISCHARGE INSTRUCTIONS:   Call your local emergency number (911 in the [facility]) or have someone call if:   · You have any of the following signs of a heart attack:      ? Squeezing, pressure, or pain in your chest    ? You may  also have any of the following:     § Discomfort or pain in your back, neck, jaw, stomach, or arm    § Shortness of breath    § Nausea or vomiting    § Lightheadedness or a sudden cold sweat    · You have any of the following signs of a stroke:      ? Numbness or drooping on one side of your face     ? Weakness in an arm or leg    ? Confusion or difficulty speaking    ? Dizziness, a severe headache, or vision loss    Call your doctor or cardiologist if:   · Your arm or leg feels warm, tender, and painful  It may look swollen and red  · Your heart rate is more than 110 beats per minute  · You have new or worsening swelling in your legs, feet, ankles, or abdomen  · You are short of breath, even at rest     · You have questions or concerns about your condition or care  Medicines: You may need any of the following:  · Heart medicines  help control your heart rate or rhythm  You may need more than one medicine to treat your symptoms  · Blood thinners  help prevent blood clots  Clots can cause strokes, heart attacks, and death  The following are general safety guidelines to follow while you are taking a blood thinner:    ? Watch for bleeding and bruising while you take blood thinners  Watch for bleeding from your gums or nose  Watch for blood in your urine and bowel movements  Use a soft washcloth on your skin, and a soft toothbrush to brush your teeth   This can keep your skin and gums from bleeding  If you shave, use an electric shaver  Do not play contact sports  ? Tell your dentist and other healthcare providers that you take a blood thinner  Wear a bracelet or necklace that says you take this medicine  ? Do not start or stop any other medicines unless your healthcare provider tells you to  Many medicines cannot be used with blood thinners  ? Take your blood thinner exactly as prescribed by your healthcare provider  Do not skip does or take less than prescribed  Tell your provider right away if you forget to take your blood thinner, or if you take too much  ? Warfarin  is a blood thinner that you may need to take  The following are things you should be aware of if you take warfarin:     § Foods and medicines can affect the amount of warfarin in your blood  Do not make major changes to your diet while you take warfarin  Warfarin works best when you eat about the same amount of vitamin K every day  Vitamin K is found in green leafy vegetables and certain other foods  Ask for more information about what to eat when you are taking warfarin  § You will need to see your healthcare provider for follow-up visits when you are on warfarin  You will need regular blood tests  These tests are used to decide how much medicine you need  · Antiplatelets , such as aspirin, help prevent blood clots  Take your antiplatelet medicine exactly as directed  These medicines make it more likely for you to bleed or bruise  If you are told to take aspirin, do not take acetaminophen or ibuprofen instead  · Take your medicine as directed  Contact your healthcare provider if you think your medicine is not helping or if you have side effects  Tell him or her if you are allergic to any medicine  Keep a list of the medicines, vitamins, and herbs you take  Include the amounts, and when and why you take them  Bring the list or the pill bottles to follow-up visits   Carry your medicine list with you in case of an emergency  Manage A-fib:   · Know your target heart rate  Learn how to check your pulse and monitor your heart rate  · Know the risks if you choose to drink alcohol  Alcohol can increase your risk for A-fib or make A-fib harder to manage  Ask your healthcare provider if it is okay for you to drink any alcohol  He or she can help you set limits for the number of drinks you have in 24 hours and in a week  A drink of alcohol is 12 ounces of beer, 5 ounces of wine, or 1½ ounces of liquor  · Do not smoke  Nicotine can cause heart damage and make it more difficult to manage your A-fib  Do not use e-cigarettes or smokeless tobacco in place of cigarettes or to help you quit  They still contain nicotine  Ask your healthcare provider for information if you currently smoke and need help quitting  · Eat heart-healthy foods  Heart healthy foods will help keep your cholesterol low  These include fruits, vegetables, whole-grain breads, low-fat dairy products, beans, lean meats, and fish  Replace butter and margarine with heart-healthy oils such as olive oil and canola oil  · Maintain a healthy weight  Ask your healthcare provider what a healthy weight is for you  Ask him or her to help you create a safe weight loss plan if you are overweight  Even a small goal of a 10% weight loss can improve your heart health  · Get regular physical activity  Physical activity helps improve your heart health  Get at least 150 minutes of moderate aerobic physical activity each week  Your healthcare provider can help you create an activity plan  · Manage other health conditions  This includes high blood pressure or cholesterol, sleep apnea, diabetes, and other heart conditions  Take medicine as directed and follow your treatment plan  Your healthcare provider may need to change a medicine you are taking if it is causing your A-fib   Do not  stop taking any medicine unless directed by your provider  Follow up with your doctor or cardiologist as directed: You will need regular blood tests and monitoring  Write down your questions so you remember to ask them during your visits  © Copyright [employer] 2021 Information is for End User's use only and may not be sold, redistributed or otherwise used for commercial purposes  All illustrations and images included in CareNotes® are the copyrighted property of A D A M , Inc  or [facility] Irina Zambrano   The above information is an  only  It is not intended as medical advice for individual conditions or treatments  Talk to your doctor, nurse or pharmacist before following any medical regimen to see if it is safe and effective for you

## 2022-02-12 NOTE — ASSESSMENT & PLAN NOTE
· INR of 6 9 on admission with associated bleeding  INR 5 41 the following day  Given ongoing bleeding  provided low-dose of vitamin K  INR 2 22 today  · Moving forward Coumadin will be permanently discontinued   apixaban on discharge

## 2022-02-12 NOTE — ASSESSMENT & PLAN NOTE
Wt Readings from Last 3 Encounters:   02/12/22 108 kg (237 lb)   01/27/22 115 kg (253 lb 6 4 oz)   01/13/22 106 kg (233 lb)     · Patient hypervolemic on admission with significant weight gain, leg edema and dyspnea with hypoxic respiratory failure, likely due to  accidentally giving her wrong dose of diuretic  · Tolerated IV diuresis, cont home torsemide dosing on discharge  · Monitor daily weights, ins and outs, 2 g sodium restricted diet  · Echocardiogram November 2021 shows ejection fraction of 65% with grade 1 diastolic dysfunction

## 2022-02-12 NOTE — ASSESSMENT & PLAN NOTE
· Rate controlled--Continue Coreg  · Coumadin was being held due to supratherapeutic INR and bleeding  conversation with patient's  and noted in the past Eliquis has been offered by [facility] as an alternative and patient's  asked me to recheck the price on this    After speaking with their pharmacy and noting that it was $45 they wish to proceed with changing her over to Eliquis when appropriate (INR <2) and permanently discontinuing Coumadin

## 2022-02-12 NOTE — DISCHARGE SUMMARY
[facility]  Discharge- London Cord 1945, 77 y o  female MRN: [id number]  Unit/Bed#: S -01 Encounter: [id number]  Primary Care Provider: Stephy Mcduffie MD   Date and time admitted to hospital: 2/10/2022 11:30 AM    Supratherapeutic INR  Assessment & Plan  · INR of 6 9 on admission with associated bleeding  INR 5 41 the following day  Given ongoing bleeding  provided low-dose of vitamin K  INR 2 22 today  · Moving forward Coumadin will be permanently discontinued  apixaban on discharge      Acute on chronic diastolic heart failure (HCC)  Assessment & Plan  Wt Readings from Last 3 Encounters:   02/12/22 108 kg (237 lb)   01/27/22 115 kg (253 lb 6 4 oz)   01/13/22 106 kg (233 lb)     · Patient hypervolemic on admission with significant weight gain, leg edema and dyspnea with hypoxic respiratory failure, likely due to  accidentally giving her wrong dose of diuretic  · Tolerated IV diuresis, cont home torsemide dosing on discharge  · Monitor daily weights, ins and outs, 2 g sodium restricted diet  · Echocardiogram November 2021 shows ejection fraction of 65% with grade 1 diastolic dysfunction    Opioid dependence due to Chronic back pain   Assessment & Plan  · Will continue home oxycodone    Atrial fibrillation (HCC)  Assessment & Plan  · Rate controlled--Continue Coreg  · Coumadin was being held due to supratherapeutic INR and bleeding  conversation with patient's  and noted in the past Eliquis has been offered by [facility] as an alternative and patient's  asked me to recheck the price on this    After speaking with their pharmacy and noting that it was $45 they wish to proceed with changing her over to Eliquis when appropriate (INR <2) and permanently discontinuing Coumadin    Acute renal failure superimposed on stage 2/3 chronic kidney disease [facility])  Assessment & Plan  Lab Results   Component Value Date    EGFR 49 02/12/2022    EGFR 40 02/11/2022    EGFR 26 02/10/2022    CREATININE 1 10 02/12/2022    CREATININE 1 30 02/11/2022    CREATININE 1 82 (H) 02/10/2022   · Likely cardiorenal, baseline creatinine around 1, was 1 82 on admission  Improved to 1 3 today  · Will continue with diuresis as above  · Recheck in am    * Spontaneous bleeding from superficial suprapubic wound  Assessment & Plan  · Initially this was thought to be vaginal bleeding as she found blood in her underwear however upon closer inspection it appeared that she actually had 2 pinpoint holes in the suprapubic area under her pannus that were bleeding, likely spontaneously due to her supratherapeutic INR as she denies any trauma  · As such, no vaginal exam is necessary  · Hemoglobin remaining stable--continue to monitor  Medical Problems             Resolved Problems  Date Reviewed: 2/11/2022    None              Discharging Physician / Practitioner: Chris Dejesus MD  PCP: Tiki Alford MD  Admission Date:   Admission Orders (From admission, onward)     Ordered        02/10/22 1355  Inpatient Admission  Once                      Discharge Date: 02/12/22    Consultations During Hospital Stay:  · none    Procedures Performed:   · none    Significant Findings / Test Results:   · Elevated INR    Incidental Findings:   · none    Test Results Pending at Discharge (will require follow up):   · none     Outpatient Tests Requested:  · CMP in two weeks    Complications:  none    Reason for Admission: spontaneous bleeding and elevated INR    Hospital Course:   Erika Hollis is a 77 y o  female patient who originally presented to the hospital on 2/10/2022 due to elevated INR and bleeding  Please see above list of diagnoses and related plan for additional information  Condition at Discharge: fair    Discharge Day Visit / Exam:   * Please refer to separate progress note for these details *    Discussion with Family: Patient declined call to        Discharge instructions/Information to patient and family:   See after visit summary for information provided to patient and family  Provisions for Follow-Up Care:  See after visit summary for information related to follow-up care and any pertinent home health orders  Disposition:   Home       Discharge Statement:  I spent 30 minutes discharging the patient  This time was spent on the day of discharge  I had direct contact with the patient on the day of discharge  Greater than 50% of the total time was spent examining patient, answering all patient questions, arranging and discussing plan of care with patient as well as directly providing post-discharge instructions  Additional time then spent on discharge activities  Discharge Medications:  See after visit summary for reconciled discharge medications provided to patient and/or family

## 2022-02-12 NOTE — DISCHARGE INSTR - AVS FIRST PAGE
Dear Azucena Tanner,     It was our pleasure to care for you here at [facility]  It is our hope that we were always able to exceed the expected standards for your care during your stay  You were hospitalized due to ***  You were cared for on the *** floor under the service of Sahara Driver MD with the [facility] who covers for your primary care physician (PCP), Rosemary Hanna MD, while you were hospitalized  If you have any questions or concerns related to this hospitalization, you may contact us at [phone]  For follow up as well as medication refills, we recommend that you follow up with your primary care physician  A registered nurse will reach out to you by phone within a few days after your discharge to answer any additional questions that you may have after going home  However, at this time we provide for you here, the most important instructions / recommendations at discharge:     Notable Medication Adjustments -   Start eliquis tomorrow  Discontinue warfarin  Testing Required after Discharge -   Blood work ordered in two weeks (CMP)  Important follow up information -   Check in with your PCP office this week  Other Instructions -   Take good care  Please review this entire after visit summary as additional general instructions including medication list, appointments, activity, diet, any pertinent wound care, and other additional recommendations from your care team that may be provided for you        Sincerely,     Sahara Driver MD

## 2022-02-14 ENCOUNTER — TELEPHONE (OUTPATIENT)
Dept: INTERNAL MEDICINE CLINIC | Facility: CLINIC | Age: 77
End: 2022-02-14

## 2022-02-14 ENCOUNTER — TRANSITIONAL CARE MANAGEMENT (OUTPATIENT)
Dept: INTERNAL MEDICINE CLINIC | Facility: CLINIC | Age: 77
End: 2022-02-14

## 2022-02-14 ENCOUNTER — RA CDI HCC (OUTPATIENT)
Dept: OTHER | Facility: HOSPITAL | Age: 77
End: 2022-02-14

## 2022-02-14 PROBLEM — D64.89 OTHER SPECIFIED ANEMIAS: Status: ACTIVE | Noted: 2022-02-14

## 2022-02-14 PROBLEM — D50.9 MICROCYTIC ANEMIA: Status: ACTIVE | Noted: 2022-02-14

## 2022-02-14 LAB
FERRITIN SERPL-MCNC: 60 NG/ML (ref 8–388)
IRON SATN MFR SERPL: 15 % (ref 15–50)
IRON SERPL-MCNC: 51 UG/DL (ref 50–170)
TIBC SERPL-MCNC: 343 UG/DL (ref 250–450)

## 2022-02-14 NOTE — UTILIZATION REVIEW
Notification of Discharge   This is a Notification of Discharge from our facility [address] [place] [address]  Please be advised that this patient has been discharge from our facility  Below you will find the admission and discharge date and time including the patients disposition  UTILIZATION REVIEW CONTACT:  Ryan Finn  Utilization   Network Utilization Review Department  Phone: [phone] x carefully listen to the prompts  All voicemails are confidential   Email: [email or web address]     PHYSICIAN ADVISORY SERVICES:  FOR [id number] REVIEW - MEDICAL NECESSITY DENIAL  Phone: [phone]  Fax: [phone]  Email: [email or web address]     PRESENTATION DATE: 2/10/2022 11:30 AM  OBERVATION ADMISSION DATE:   INPATIENT ADMISSION DATE: 2/10/22  1:55 PM   DISCHARGE DATE: 2/12/2022 12:21 PM  DISPOSITION: Home/Self Care Home/Self Care      IMPORTANT INFORMATION:  Send all requests for admission clinical reviews, approved or denied determinations and any other requests to dedicated fax number below belonging to the campus where the patient is receiving treatment   List of dedicated fax numbers:  [address] DENIALS (Administrative/Medical Necessity) [phone]   [address] (Maternity/NICU/Pediatrics) [phone]   Shaina Peacock [phone]   [address] [phone]   [address] [place] [address] [phone]   2000 [facility] [address] [place] [address],[address] [phone]   [facility]  [phone]   220[address] [facility], [facility]  [address] [facility] [address] [facility] [phone]

## 2022-02-14 NOTE — TELEPHONE ENCOUNTER
Markie Irvin was discharged from [facility] Saturday, she needs tcm, she has appt 2-,we can move sooner if needed, she also needs refill on potassium, note in chart states we were waiting for lab results form 2-4-2022 and will change accordingly, but she was admitted to hospital

## 2022-02-14 NOTE — TELEPHONE ENCOUNTER
Called to make TCM appointment  L/m to call back to schedule TCM appointment and to answer Tcm questions

## 2022-02-14 NOTE — PROGRESS NOTES
I13 0    [facility] 75  coding opportunities             Chart Reviewed * (Number of) Inbasklizbeth suggestions sent to Provider: 1                  Patients insurance company: [facility] (Medicare Advantage and Commercial)

## 2022-02-15 ENCOUNTER — TELEPHONE (OUTPATIENT)
Dept: LAB | Facility: HOSPITAL | Age: 77
End: 2022-02-15

## 2022-02-15 DIAGNOSIS — I48.91 ATRIAL FIBRILLATION, UNSPECIFIED TYPE (HCC): Primary | ICD-10-CM

## 2022-02-15 NOTE — TELEPHONE ENCOUNTER
Sorry - ignore my last message  She should get one more INR on 2/17 as her last INR was >2 - I will place order for this  If her INR is normal, she will not require further INR testing since she is not on coumadin anymore  Thanks

## 2022-02-15 NOTE — TELEPHONE ENCOUNTER
Patient scheduled INR for 17 Feb with mobile lab  Please provide updated active orders for INR blood work, thank you

## 2022-02-17 ENCOUNTER — TRANSITIONAL CARE MANAGEMENT (OUTPATIENT)
Dept: INTERNAL MEDICINE CLINIC | Facility: CLINIC | Age: 77
End: 2022-02-17

## 2022-02-18 ENCOUNTER — TRANSITIONAL CARE MANAGEMENT (OUTPATIENT)
Dept: INTERNAL MEDICINE CLINIC | Facility: CLINIC | Age: 77
End: 2022-02-18

## 2022-02-18 ENCOUNTER — APPOINTMENT (OUTPATIENT)
Dept: LAB | Facility: HOSPITAL | Age: 77
End: 2022-02-18
Attending: INTERNAL MEDICINE
Payer: COMMERCIAL

## 2022-02-18 DIAGNOSIS — I48.91 ATRIAL FIBRILLATION, UNSPECIFIED TYPE (HCC): ICD-10-CM

## 2022-02-18 DIAGNOSIS — N17.9 AKI (ACUTE KIDNEY INJURY) (HCC): ICD-10-CM

## 2022-02-18 LAB
ALBUMIN SERPL BCP-MCNC: 3.4 G/DL (ref 3.5–5)
ALP SERPL-CCNC: 108 U/L (ref 46–116)
ALT SERPL W P-5'-P-CCNC: 18 U/L (ref 12–78)
ANION GAP SERPL CALCULATED.3IONS-SCNC: 5 MMOL/L (ref 4–13)
AST SERPL W P-5'-P-CCNC: 13 U/L (ref 5–45)
BILIRUB SERPL-MCNC: 0.4 MG/DL (ref 0.2–1)
BUN SERPL-MCNC: 34 MG/DL (ref 5–25)
CALCIUM ALBUM COR SERPL-MCNC: 10.1 MG/DL (ref 8.3–10.1)
CALCIUM SERPL-MCNC: 9.6 MG/DL (ref 8.3–10.1)
CHLORIDE SERPL-SCNC: 98 MMOL/L (ref 100–108)
CO2 SERPL-SCNC: 33 MMOL/L (ref 21–32)
CREAT SERPL-MCNC: 1.31 MG/DL (ref 0.6–1.3)
GFR SERPL CREATININE-BSD FRML MDRD: 39 ML/MIN/1.73SQ M
GLUCOSE SERPL-MCNC: 130 MG/DL (ref 65–140)
INR PPP: 1.31 (ref 0.84–1.19)
POTASSIUM SERPL-SCNC: 4 MMOL/L (ref 3.5–5.3)
PROT SERPL-MCNC: 7.2 G/DL (ref 6.4–8.2)
PROTHROMBIN TIME: 15.7 SECONDS (ref 11.6–14.5)
SODIUM SERPL-SCNC: 136 MMOL/L (ref 136–145)

## 2022-02-18 PROCEDURE — 80053 COMPREHEN METABOLIC PANEL: CPT

## 2022-02-18 PROCEDURE — 85610 PROTHROMBIN TIME: CPT

## 2022-02-18 PROCEDURE — 36415 COLL VENOUS BLD VENIPUNCTURE: CPT

## 2022-02-23 ENCOUNTER — TELEPHONE (OUTPATIENT)
Dept: INTERNAL MEDICINE CLINIC | Facility: CLINIC | Age: 77
End: 2022-02-23

## 2022-02-23 PROBLEM — R21 RASH OF BACK: Status: RESOLVED | Noted: 2021-11-16 | Resolved: 2022-02-23

## 2022-02-23 PROBLEM — R93.89 ABNORMAL CXR: Status: ACTIVE | Noted: 2022-02-23

## 2022-02-23 PROBLEM — N17.9 AKI (ACUTE KIDNEY INJURY) (HCC): Status: RESOLVED | Noted: 2021-12-30 | Resolved: 2022-02-23

## 2022-02-23 PROBLEM — J96.01 ACUTE RESPIRATORY FAILURE WITH HYPOXIA (HCC): Status: RESOLVED | Noted: 2021-11-12 | Resolved: 2022-02-23

## 2022-02-23 PROBLEM — R58 BLEEDING: Status: RESOLVED | Noted: 2022-02-10 | Resolved: 2022-02-23

## 2022-02-23 PROBLEM — R79.1 SUPRATHERAPEUTIC INR: Status: RESOLVED | Noted: 2022-02-10 | Resolved: 2022-02-23

## 2022-02-23 PROBLEM — R73.03 PREDIABETES: Status: ACTIVE | Noted: 2022-02-23

## 2022-02-23 NOTE — TELEPHONE ENCOUNTER
Sharon Tay has called the [facility] office in regards to Luther appointment today, 02/23/2022, at 4:00PM  Yumiko Gale stated Dia Cardoza is currently feeling unwell and is unable to make her appointment  Yumiko Gale wished to reschedule for another time with Dr Sandra Baca  Looked into Dr Alysia Brenner schedule to see next available appointment times, which is the week of 03/14/2022  Asked Yumiko Gale if Alvaresnay Cardoza would be able to see another doctor earlier  Yumiko Gale had then made an appointment for 03/17/2022, and stated he would speak with Dia Cardoza to make an appointment with another provider if she is agreeable  Yumiko Gale ended the call soon after

## 2022-02-23 NOTE — TELEPHONE ENCOUNTER
I called to [facility] and he said Tanna Benitez was just feeling a bit tired today  He states her 02 sat was 94 % pulse 67 and weight was 243 yesterday  He feels her leg swelling is a bit better but she still has a great deal of discomfort in her legs   She is supposed to see Vascular tomorrow and he is trying to be sure she gets in for that appointment tomorrow  They has rescheduled an appt with you on 3/17 and I did instruct him to call sooner for follow up if weight starts increasing again  Haim Manifold Please review I told him if you had more to discuss you or I would call him back   Thank you

## 2022-02-24 NOTE — TELEPHONE ENCOUNTER
Thank you Rosalina Mckay  As long as her weights are stable and it does not appear that she is gaining weight, she can keep her appt for 3/17  He should notify the office if she experiences weight gain of more than 2-3lbs (1-1 3kg) in 1-2 days or 5lbs (2 3kg) in a week and should be seen sooner  Thank you

## 2022-02-25 NOTE — TELEPHONE ENCOUNTER
I spoke to the patient  and he is aware of the need for return soon will continue to monitor weight  I wanted to share and will add to the visit in March he asked about removing the O2 and I thought we may want to do a 6 min walk with her at that time   I will add to the schedule   As a reminder

## 2022-03-03 ENCOUNTER — TELEPHONE (OUTPATIENT)
Dept: PULMONOLOGY | Facility: CLINIC | Age: 77
End: 2022-03-03

## 2022-03-03 ENCOUNTER — OFFICE VISIT (OUTPATIENT)
Dept: CARDIOLOGY CLINIC | Facility: CLINIC | Age: 77
End: 2022-03-03
Payer: COMMERCIAL

## 2022-03-03 VITALS
DIASTOLIC BLOOD PRESSURE: 60 MMHG | OXYGEN SATURATION: 92 % | BODY MASS INDEX: 44.72 KG/M2 | WEIGHT: 243 LBS | HEART RATE: 81 BPM | HEIGHT: 62 IN | SYSTOLIC BLOOD PRESSURE: 104 MMHG

## 2022-03-03 DIAGNOSIS — I50.32 CHRONIC DIASTOLIC HEART FAILURE (HCC): ICD-10-CM

## 2022-03-03 DIAGNOSIS — E78.2 MIXED HYPERLIPIDEMIA: ICD-10-CM

## 2022-03-03 DIAGNOSIS — I10 ESSENTIAL HYPERTENSION: ICD-10-CM

## 2022-03-03 DIAGNOSIS — I48.19 PERSISTENT ATRIAL FIBRILLATION (HCC): Primary | ICD-10-CM

## 2022-03-03 PROCEDURE — 99214 OFFICE O/P EST MOD 30 MIN: CPT | Performed by: INTERNAL MEDICINE

## 2022-03-03 PROCEDURE — 1111F DSCHRG MED/CURRENT MED MERGE: CPT | Performed by: INTERNAL MEDICINE

## 2022-03-03 PROCEDURE — 93000 ELECTROCARDIOGRAM COMPLETE: CPT | Performed by: INTERNAL MEDICINE

## 2022-03-03 NOTE — TELEPHONE ENCOUNTER
called and LM about an appointment with Dr Steen Factor  Called  back and LM for him to call me on my direct line

## 2022-03-03 NOTE — PROGRESS NOTES
Cardiology Follow Up    Alonso Rodríguez  1945  [id number]  [facility]  [address]  [address]  [place] [address] [phone]    1  Persistent atrial fibrillation (HCC)  POCT ECG   2  Chronic diastolic heart failure (HCC)  Basic metabolic panel   3  Essential hypertension     4  Mixed hyperlipidemia         Discussion/Summary:    1  Chronic diastolic CHF - She remains volume overloaded but her weight has improved significantly since her 1st hospitalization  I am going to keep her on torsemide 40 mg daily, but I have asked her to take both pills together  This may help the amount of urinating she has into the evening  We went over following a low-sodium diet and she should watch her weight daily  Blood work will be followed closely  2   Persistent atrial fibrillation - Her heart rates are controlled  She has transition from warfarin to Eliquis for stroke prevention  She is tolerating this well  We will see her back for follow-up in 3 months  Interval History:    Mrs Chapin Bella comes in for follow-up given her history of atrial fibrillation, diastolic and right-sided CHF along with risk factors for cardiovascular disease  I met her during hospitalization in November in which she presented with volume overload and CHF  She always had some degree of lower extremity edema but this was worsening recently  She had not been on loop diuretic therapy, just taking HCTZ at that time for hypertension  She was on a few days of IV diuretics and we transition her over to torsemide 40 mg daily  Amlodipine was also stop for hypertension  Echocardiogram showed normal LV systolic function without significant valve disease  She has a lot of right-sided CHF and lymphedema, contributed to by on diagnosis obstructive sleep apnea    During that hospitalization she was seen to be in atrial fibrillation, but did go back to sinus rhythm  Markie Irvin then went back to the hospital in late December with volume overload and CHF  She required again several days of IV diuretics and then transition back to torsemide 40 mg daily  She remains volume overloaded chronically, but her weight is down close to 40 lb since the original admission  She was found to be back in atrial fibrillation as well and this has persisted  Markie Irvin also has issues with fatigue, shortness of breath and difficulties with her gait  She continues to tried to work on this as she loses weight from a volume standpoint  Her blood pressure has been under good control  She denies chest pain or any symptoms of angina  She is not having any orthopnea but never lies flat because of her back          Patient Active Problem List   Diagnosis    Chronic venous insufficiency    Essential hypertension    Chronic low back pain with sciatica    Acute renal failure superimposed on stage 2/3 chronic kidney disease (HCC)    Mixed hyperlipidemia    Obesity    Osteoarthritis of knee    Sjogren's syndrome ([facility] )    Atrial fibrillation ([place] [address] )    Opioid dependence due to Chronic back pain     Anxiety    Chronic diastolic heart failure ([facility])    Memory impairment    Other specified anemias    Prediabetes    Abnormal CXR     Past Medical History:   Diagnosis Date    A-fib ([place] [address] ) 12/29/2021    Anxiety     Arthritis     Back pain     Cellulitis     CHF (congestive heart failure) ([facility])     Edema of both lower extremities due to peripheral venous insufficiency     Edema of both lower extremities due to peripheral venous insufficiency     Erythema of lower extremity 11/12/2021    Fibromyalgia     Hypertension     Sjogren syndrome, unspecified ([place] [address] )      Social History     Socioeconomic History    Marital status: /Civil Union     Spouse name: Not on file    Number of children: Not on file    Years of education: Not on file    Highest education level: Not on file   Occupational History    Not on file   Tobacco Use    Smoking status: Former Smoker     Types: Cigarettes    Smokeless tobacco: Never Used   Vaping Use    Vaping Use: Never used   Substance and Sexual Activity    Alcohol use: Not Currently    Drug use: Never    Sexual activity: Not on file   Other Topics Concern    Not on file   Social History Narrative    Not on file     Social Determinants of Health     Financial Resource Strain: Not on file   Food Insecurity: No Food Insecurity    Worried About Running Out of Food in the Last Year: Never true    [address] in the Last Year: Never true   Transportation Needs: No Transportation Needs    Lack of Transportation (Medical): No    Lack of Transportation (Non-Medical):  No   Physical Activity: Not on file   Stress: Not on file   Social Connections: Not on file   Intimate Partner Violence: Not on file   Housing Stability: Unknown    Unable to Pay for Housing in the Last Year: No    Number of Places Lived in the Last Year: Not on file    Unstable Housing in the Last Year: No      Family History   Problem Relation Age of Onset    Heart disease Mother     Cancer Father      Past Surgical History:   Procedure Laterality Date    KNEE CARTILAGE SURGERY      REPLACEMENT TOTAL KNEE BILATERAL         Current Outpatient Medications:     ALPRAZolam (XANAX) 0 25 mg tablet, Take 1 tablet (0 25 mg total) by mouth daily as needed for anxiety for up to 14 days, Disp: 14 tablet, Rfl: 0    ammonium lactate (LAC-HYDRIN) 12 % cream, Apply topically 2 (two) times a day, Disp: 385 g, Rfl: 0    apixaban (Eliquis) 5 mg, Take 1 tablet (5 mg total) by mouth 2 (two) times a day, Disp: 60 tablet, Rfl: 0    carvedilol (COREG) 25 mg tablet, Take 1 tablet (25 mg total) by mouth 2 (two) times a day with meals, Disp: 180 tablet, Rfl: 1    Diclofenac Sodium (Voltaren) 1 %, Voltaren 1 % topical gel, Disp: , Rfl:     losartan (COZAAR) 100 MG tablet, Take 1 tablet (100 mg total) by mouth daily, Disp: 90 tablet, Rfl: 0    oxyCODONE (OxyCONTIN) 20 mg 12 hr tablet, Take 1 tablet (20 mg total) by mouth every 12 (twelve) hours Max Daily Amount: 40 mg, Disp: 2 tablet, Rfl: 0    oxyCODONE-acetaminophen (PERCOCET)  mg per tablet, Take 1 tablet by mouth every 4 (four) hours as needed for moderate pain Max Daily Amount: 6 tablets, Disp: 4 tablet, Rfl: 0    potassium chloride (K-DUR,KLOR-CON) 20 mEq tablet, Take 2 tablets (40 mEq total) by mouth daily, Disp: 180 tablet, Rfl: 1    tiZANidine (ZANAFLEX) 4 mg tablet, tizanidine 4 mg tablet  TAKE ONE TABLET BY MOUTH EVERY 8 HOURS AS NEEDED FOR spasm, Disp: , Rfl:     torsemide (DEMADEX) 20 mg tablet, Take 2 tablets daily On Tuesday, Thursday, and Saturday    Take 2 tablets twice daily Monday, Wednesday, Friday, Sunday (Patient taking differently: Take 2 tablets daily, once in AM once in PM ), Disp: 180 tablet, Rfl: 1    Vitamin D, Cholecalciferol, 25 MCG (1000 UT) TABS, Vitamin D3 25 mcg (1,000 unit) capsule  one PO QD, Disp: , Rfl:     acetaminophen (TYLENOL) 325 mg tablet, Take 650 mg by mouth every 6 (six) hours as needed for mild pain (Patient not taking: Reported on 1/27/2022 ), Disp: , Rfl:     Blood Pressure KIT, Use daily (Patient not taking: Reported on 3/3/2022 ), Disp: 1 kit, Rfl: 0  No Known Allergies    Labs:  Lab Results   Component Value Date     09/03/2014    K 4 0 02/18/2022    K 3 6 09/03/2014    CL 98 (L) 02/18/2022     09/03/2014    CO2 33 (H) 02/18/2022    CO2 28 12/29/2021    BUN 34 (H) 02/18/2022    BUN 11 09/03/2014    CREATININE 1 31 (H) 02/18/2022    CREATININE 0 81 09/03/2014    GLUCOSE 129 12/29/2021    GLUCOSE 103 09/03/2014    CALCIUM 9 6 02/18/2022    CALCIUM 9 5 09/03/2014     Lab Results   Component Value Date    WBC 8 02 02/12/2022    WBC 7 10 09/23/2014    HGB 9 6 (L) 02/12/2022    HGB 11 3 (L) 09/23/2014    HCT 33 1 (L) 02/12/2022    HCT 37 4 09/23/2014    MCV 90 02/12/2022    MCV 91 09/23/2014     02/12/2022     09/23/2014     Lab Results   Component Value Date    CHOL 169 09/23/2014    TRIG 89 11/13/2021    TRIG 85 09/23/2014    HDL 50 11/13/2021    HDL 64 09/23/2014    LDLDIRECT 109 (H) 04/12/2021    LDLDIRECT 95 09/23/2014     Imaging:  ECG obtained today demonstrates atrial fibrillation, rightward axis, low voltage and nonspecific ST changes  ECHO:    Left Ventricle: Left ventricular cavity size is normal  The left ventricular ejection fraction is 65%  Systolic function is normal  Wall motion is normal  Diastolic function is mildly abnormal, consistent with grade I (abnormal) relaxation  Wall thickness is mildly increased  There is mild concentric hypertrophy    Left Atrium: The atrium is mildly dilated    Aortic Valve: There is mild regurgitation    Mitral Valve: There is mild regurgitation    Tricuspid Valve: There is mild regurgitation  XR chest 1 view portable  Result Date: 2/10/2022  Narrative: CHEST INDICATION:   sob  COMPARISON:  Multiple priors most recently 1/3/2022 EXAM PERFORMED/VIEWS:  XR CHEST PORTABLE FINDINGS: Cardiac silhouette is enlarged  Right midlung consolidation is decreased but present  Hazy bibasilar opacities  No pneumothorax  Osseous structures appear within normal limits for patient age  Impression: Right midlung and bibasilar opacities, still present but somewhat improved from radiographs of 1/3/2022, may represent residual or persistent pneumonia  Continued follow-up recommended  Workstation performed: [id number]       Review of Systems:  Review of Systems   Constitutional: Positive for fatigue  HENT: Negative  Eyes: Negative  Respiratory: Positive for shortness of breath  Cardiovascular: Positive for leg swelling  Gastrointestinal: Negative  Musculoskeletal: Positive for arthralgias and gait problem  Skin: Negative  Allergic/Immunologic: Negative  Hematological: Negative  Psychiatric/Behavioral: Negative  All other systems reviewed and are negative  Vitals:    03/03/22 1446   BP: 104/60   Pulse: 81   SpO2: 92%   Weight: 110 kg (243 lb)   Height: 5' 2" (1 575 m)     Physical Exam:  Physical Exam  Vitals and nursing note reviewed  Constitutional:       Appearance: She is well-developed  HENT:      Head: Normocephalic and atraumatic  Eyes:      General: No scleral icterus  Right eye: No discharge  Left eye: No discharge  Pupils: Pupils are equal, round, and reactive to light  Neck:      Thyroid: No thyromegaly  Vascular: No JVD  Cardiovascular:      Rate and Rhythm: Normal rate and regular rhythm  No extrasystoles are present  Pulses: Normal pulses  No decreased pulses  Heart sounds: S1 normal and S2 normal  Heart sounds are distant  No murmur heard  No friction rub  No gallop  Pulmonary:      Effort: Pulmonary effort is normal  No respiratory distress  Breath sounds: Normal breath sounds  No wheezing or rales  Abdominal:      General: Bowel sounds are normal  There is no distension  Palpations: Abdomen is soft  Tenderness: There is no abdominal tenderness  Musculoskeletal:         General: No tenderness or deformity  Normal range of motion  Cervical back: Normal range of motion and neck supple  Right lower leg: 3+ Pitting Edema present  Left lower leg: 3+ Pitting Edema present  Skin:     General: Skin is warm and dry  Findings: No rash  Neurological:      Mental Status: She is alert and oriented to person, place, and time  Cranial Nerves: No cranial nerve deficit  Psychiatric:         Thought Content: Thought content normal          Judgment: Judgment normal        Counseling / Coordination of Care  Total office time spent today 25 minutes  Greater than 50% of total time was spent with the patient and / or family counseling and / or coordination of care

## 2022-03-03 NOTE — PATIENT INSTRUCTIONS
Low-Sodium Diet   AMBULATORY CARE:   A low-sodium diet  limits foods that are high in sodium (salt)  You will need to follow a low-sodium diet if you have high blood pressure, kidney disease, or heart failure  You may also need to follow this diet if you have a condition that is causing your body to retain (hold) extra fluid  You may need to limit the amount of sodium you eat in a day to 1,500 to 2,000 mg  Ask your healthcare provider how much sodium you can have each day  How to use food labels to choose foods that are low in sodium:  Read food labels to find the amount of sodium they contain  The amount of sodium is listed in milligrams (mg)  The % Daily Value (DV) column tells you how much of your daily needs are met by 1 serving of the food for each nutrient listed  Choose foods that have less than 5% of the DV of sodium  These foods are considered low in sodium  Foods that have 20% or more of the DV of sodium are considered high in sodium  Some food labels may also list any of the following terms that tell you about the sodium content in the food:  · Sodium-free:  Less than 5 mg in each serving    · Very low sodium:  35 mg of sodium or less in each serving    · Low sodium:  140 mg of sodium or less in each serving    · Reduced sodium: At least 25% less sodium in each serving than the regular type    · Light in sodium:  50% less sodium in each serving    · Unsalted or no added salt:  No extra salt is added during processing (the food may still contain sodium)       Foods to avoid:  Salty foods are high in sodium  You should avoid the following:  · Processed foods:      ? Mixes for cornbread, biscuits, cake, and pudding     ? Instant foods, such as potatoes, cereals, noodles, and rice     ? Packaged foods, such as bread stuffing, rice and pasta mixes, snack dip mixes, and macaroni and cheese     ? Canned foods, such as canned vegetables, soups, broths, sauces, and vegetable or tomato juice    ?  Snack foods, such as salted chips, popcorn, pretzels, pork rinds, salted crackers, and salted nuts    ? Frozen foods, such as dinners, entrees, vegetables with sauces, and breaded meats    ? Sauerkraut, pickled vegetables, and other foods prepared in brine    · Meats and cheeses:      ? Smoked or cured meat, such as corned beef, coffman, ham, hot dogs, and sausage    ? Canned meats or spreads, such as potted meats, sardines, anchovies, and imitation seafood    ? Deli or lunch meats, such as bologna, ham, turkey, and roast beef    ? Processed cheese, such as American cheese and cheese spreads    · Condiments, sauces, and seasonings:      ? Salt (¼ teaspoon of salt contains 575 mg of sodium)    ? Seasonings made with salt, such as garlic salt, celery salt, onion salt, and seasoned salt    ? Regular soy sauce, barbecue sauce, teriyaki sauce, steak sauce, Worcestershire sauce, and most flavored vinegars    ? Canned gravy and mixes     ? Regular condiments, such as mustard, ketchup, and salad dressings    ? Pickles and olives    ? Meat tenderizers and monosodium glutamate (MSG)    Foods to include:  Read the food label to find the exact amount of sodium in each serving  · Bread and cereal:  Try to choose breads with less than 80 mg of sodium per serving  ? Bread, roll, zander, tortilla, or unsalted crackers  ? Ready-to-eat cereals with less than 5% DV of sodium (examples include shredded wheat and puffed rice)    ? Pasta    · Vegetables and fruits:      ? Unsalted fresh, frozen, or canned vegetables    ? Fresh, frozen, or canned fruits    ? Fruit juice    · Dairy:  One serving has about 150 mg of sodium  ? Milk, all types    ? Yogurt    ? Hard cheese, such as cheddar, Swiss, [employer], or mozzarella    · Meat and other protein foods:  Some raw meats may have added sodium  ? Plain meats, fish, and poultry     ? Eggs    · Other foods:      ? Homemade pudding    ? Unsalted nuts, popcorn, or pretzels    ?  Unsalted butter or margarine    Ways to decrease sodium:   · Add spices and herbs to foods instead of salt during cooking  Use salt-free seasonings to add flavor to foods  Examples include onion powder, garlic powder, basil, casiano powder, paprika, and parsley  Try lemon or lime juice or vinegar to give foods a tart flavor  Use hot peppers, pepper, or cayenne pepper to add a spicy flavor  · Do not keep a salt shaker at your kitchen table  This may help keep you from adding salt to food at the table  A teaspoon of salt has 2,300 mg of sodium  It may take time to get used to enjoying the natural flavor of food instead of adding salt  Talk to your healthcare provider before you use salt substitutes  Some salt substitutes have a high amount of potassium and need to be avoided if you have kidney disease  · Choose low-sodium foods at restaurants  Meals from restaurants are often high in sodium  Some restaurants have nutrition information on the menu that tells you the amount of sodium in their foods  If possible, ask for your food to be prepared with less, or no salt  · Shop for unsalted or low-sodium foods and snacks at the grocery store  Examples include unsalted or low-sodium broths, soups, and canned vegetables  Choose fresh or frozen vegetables instead  Choose unsalted nuts or seeds or fresh fruits or vegetables as snacks  Read food labels and choose salt-free, very low-sodium, or low-sodium foods  © Copyright [employer] 2022 Information is for End User's use only and may not be sold, redistributed or otherwise used for commercial purposes  All illustrations and images included in CareNotes® are the copyrighted property of A D A M , Inc  or Andi Zambrano   The above information is an  only  It is not intended as medical advice for individual conditions or treatments  Talk to your doctor, nurse or pharmacist before following any medical regimen to see if it is safe and effective for you

## 2022-03-04 ENCOUNTER — RA CDI HCC (OUTPATIENT)
Dept: OTHER | Facility: HOSPITAL | Age: 77
End: 2022-03-04

## 2022-03-04 NOTE — TELEPHONE ENCOUNTER
called back and we spoke he stated he will keep the appointment with dr Rose Crooks and was confused who he made the appointment

## 2022-03-04 NOTE — PROGRESS NOTES
I13 0    [facility]  coding opportunities             Chart Reviewed * (Number of) Inbasklizbeth suggestions sent to Provider: 1                  Patients insurance company: [facility] (Medicare Advantage and Commercial)

## 2022-03-10 ENCOUNTER — OFFICE VISIT (OUTPATIENT)
Dept: VASCULAR SURGERY | Facility: CLINIC | Age: 77
End: 2022-03-10
Payer: COMMERCIAL

## 2022-03-10 VITALS
OXYGEN SATURATION: 90 % | HEIGHT: 62 IN | WEIGHT: 243 LBS | HEART RATE: 84 BPM | DIASTOLIC BLOOD PRESSURE: 82 MMHG | BODY MASS INDEX: 44.72 KG/M2 | TEMPERATURE: 96.9 F | SYSTOLIC BLOOD PRESSURE: 122 MMHG

## 2022-03-10 DIAGNOSIS — I87.2 CHRONIC VENOUS INSUFFICIENCY: ICD-10-CM

## 2022-03-10 DIAGNOSIS — I50.32 CHRONIC DIASTOLIC HEART FAILURE (HCC): Primary | ICD-10-CM

## 2022-03-10 PROCEDURE — 99213 OFFICE O/P EST LOW 20 MIN: CPT | Performed by: SURGERY

## 2022-03-10 NOTE — PROGRESS NOTES
Assessment/Plan:    Presents with chronic leg swelling mostly due to chronic diastolic heart failure  She complains of some discomfort in bilateral lower extremities and also has a diagnosis of lumbosacral pathology/spinal stenosis  Recent venous Doppler showed no evidence of deep vein thrombosis and otherwise normal peripheral arterial circulation  Plan:  She will continue to use her venous compression pumps as needed  Can be as frequent as daily for 1 hour or less frequency as needed  Should she develop no open sore or redness and pain with increased swelling she should call our office immediately at that time for further evaluation and recommendations       Diagnoses and all orders for this visit:    Chronic diastolic heart failure ([address] )    Chronic venous insufficiency        Subjective:      Patient ID: Aki Franklin is a 77 y o  female  Pt is here to rev LEV done on 11/13/21  Pt has chronic venous insufficiency  Pt reports  There is no improvement from venous pump  Pt denies weeping or open wounds  Pt is currently on Eliquis  HPI    The following portions of the patient's history were reviewed and updated as appropriate: allergies, current medications, past family history, past medical history, past social history, past surgical history and problem list     Review of Systems   Constitutional: Negative  HENT: Negative  Eyes: Negative  Respiratory: Positive for shortness of breath  Cardiovascular: Positive for leg swelling  Gastrointestinal: Negative  Endocrine: Negative  Genitourinary: Negative  Musculoskeletal: Positive for gait problem  Skin: Negative  Allergic/Immunologic: Negative  Hematological: Negative  Psychiatric/Behavioral: Negative  Objective: There were no vitals taken for this visit  Physical Exam      Oriented x3 no evidence of clinical depression      Eyes:  Sclera non-icteric    Skin: normal without evidence of inflammation    Neck is supple carotid pulses equal bilaterally no bruits heard    Chest lungs decreased breath sounds, heart regular rhythm  Bilateral leg chronic edema no evidence of cellulitis or lymphangitis, no oozing or open sores  Neurological exam intact cranial nerves 2-12 grossly intact no gross motor sensory deficits detected  Imaging viewed and reviewed with Patient    I have reviewed and made appropriate changes to the review of systems input by the medical assistant      Vitals:    03/10/22 1533   BP: 122/82   BP Location: Left arm   Patient Position: Sitting   Cuff Size: Standard   Pulse: 84   Temp: (!) 96 9 °F (36 1 °C)   TempSrc: Tympanic   SpO2: 90%   Weight: 110 kg (243 lb)   Height: 5' 2" (1 575 m)       Patient Active Problem List   Diagnosis    Chronic venous insufficiency    Essential hypertension    Chronic low back pain with sciatica    Acute renal failure superimposed on stage 2/3 chronic kidney disease (HCC)    Mixed hyperlipidemia    Obesity    Osteoarthritis of knee    Sjogren's syndrome (HCC)    Atrial fibrillation (HCC)    Opioid dependence due to Chronic back pain     Anxiety    Chronic diastolic heart failure (HCC)    Memory impairment    Other specified anemias    Prediabetes    Abnormal CXR       Past Surgical History:   Procedure Laterality Date    KNEE CARTILAGE SURGERY      REPLACEMENT TOTAL KNEE BILATERAL         Family History   Problem Relation Age of Onset    Heart disease Mother     Cancer Father        Social History     Socioeconomic History    Marital status: /Civil Union     Spouse name: Not on file    Number of children: Not on file    Years of education: Not on file    Highest education level: Not on file   Occupational History    Not on file   Tobacco Use    Smoking status: Former Smoker     Types: Cigarettes    Smokeless tobacco: Never Used   Vaping Use    Vaping Use: Never used   Substance and Sexual Activity    Alcohol use: Not Currently    Drug use: Never    Sexual activity: Not on file   Other Topics Concern    Not on file   Social History Narrative    Not on file     Social Determinants of Health     Financial Resource Strain: Not on file   Food Insecurity: No Food Insecurity    Worried About Running Out of Food in the Last Year: Never true    Sindy of Food in the Last Year: Never true   Transportation Needs: No Transportation Needs    Lack of Transportation (Medical): No    Lack of Transportation (Non-Medical):  No   Physical Activity: Not on file   Stress: Not on file   Social Connections: Not on file   Intimate Partner Violence: Not on file   Housing Stability: Unknown    Unable to Pay for Housing in the Last Year: No    Number of Places Lived in the Last Year: Not on file    Unstable Housing in the Last Year: No       No Known Allergies      Current Outpatient Medications:     acetaminophen (TYLENOL) 325 mg tablet, Take 650 mg by mouth every 6 (six) hours as needed for mild pain  , Disp: , Rfl:     ALPRAZolam (XANAX) 0 25 mg tablet, Take 1 tablet (0 25 mg total) by mouth daily as needed for anxiety for up to 14 days, Disp: 14 tablet, Rfl: 0    ammonium lactate (LAC-HYDRIN) 12 % cream, Apply topically 2 (two) times a day, Disp: 385 g, Rfl: 0    apixaban (Eliquis) 5 mg, Take 1 tablet (5 mg total) by mouth 2 (two) times a day, Disp: 60 tablet, Rfl: 0    Blood Pressure KIT, Use daily, Disp: 1 kit, Rfl: 0    carvedilol (COREG) 25 mg tablet, Take 1 tablet (25 mg total) by mouth 2 (two) times a day with meals, Disp: 180 tablet, Rfl: 1    Diclofenac Sodium (Voltaren) 1 %, Voltaren 1 % topical gel, Disp: , Rfl:     losartan (COZAAR) 100 MG tablet, Take 1 tablet (100 mg total) by mouth daily, Disp: 90 tablet, Rfl: 0    oxyCODONE (OxyCONTIN) 20 mg 12 hr tablet, Take 1 tablet (20 mg total) by mouth every 12 (twelve) hours Max Daily Amount: 40 mg, Disp: 2 tablet, Rfl: 0    oxyCODONE-acetaminophen (PERCOCET)  mg per tablet, Take 1 tablet by mouth every 4 (four) hours as needed for moderate pain Max Daily Amount: 6 tablets, Disp: 4 tablet, Rfl: 0    potassium chloride (K-DUR,KLOR-CON) 20 mEq tablet, Take 2 tablets (40 mEq total) by mouth daily, Disp: 180 tablet, Rfl: 1    tiZANidine (ZANAFLEX) 4 mg tablet, tizanidine 4 mg tablet  TAKE ONE TABLET BY MOUTH EVERY 8 HOURS AS NEEDED FOR spasm, Disp: , Rfl:     torsemide (DEMADEX) 20 mg tablet, Take 2 tablets daily On Tuesday, Thursday, and Saturday    Take 2 tablets twice daily Monday, Wednesday, Friday, Sunday (Patient taking differently: Take 2 tablets daily, once in AM once in PM ), Disp: 180 tablet, Rfl: 1    Vitamin D, Cholecalciferol, 25 MCG (1000 UT) TABS, Vitamin D3 25 mcg (1,000 unit) capsule  one PO QD, Disp: , Rfl:

## 2022-03-10 NOTE — LETTER
March 10, 2022     [phone]    Patient: Elke Gilmore   YOB: 1945   Date of Visit: 3/10/2022       Dear Dr Valarie Solis:    Thank you for referring Jessie Fitch to me for evaluation  Below are my notes for this consultation  If you have questions, please do not hesitate to call me  I look forward to following your patient along with you           Sincerely,        Xiomara Cardenas MD        CC: Referral Self

## 2022-03-10 NOTE — ASSESSMENT & PLAN NOTE
Wt Readings from Last 3 Encounters:   03/10/22 110 kg (243 lb)   03/03/22 110 kg (243 lb)   02/12/22 108 kg (237 lb)         Presents with chronic leg swelling mostly due to chronic diastolic heart failure  She complains of some discomfort in bilateral lower extremities and also has a diagnosis of lumbosacral pathology/spinal stenosis  Recent venous Doppler showed no evidence of deep vein thrombosis and otherwise normal peripheral arterial circulation  Plan:  She will continue to use her venous compression pumps as needed  Can be as frequent as daily for 1 hour or less frequency as needed    Should she develop no open sore or redness and pain with increased swelling she should call our office immediately at that time for further evaluation and recommendations

## 2022-03-10 NOTE — PATIENT INSTRUCTIONS
Presents with chronic leg swelling mostly due to chronic diastolic heart failure  She complains of some discomfort in bilateral lower extremities and also has a diagnosis of lumbosacral pathology/spinal stenosis  Recent venous Doppler showed no evidence of deep vein thrombosis and otherwise normal peripheral arterial circulation  Plan:  She will continue to use her venous compression pumps as needed  Can be as frequent as daily for 1 hour or less frequency as needed    Should she develop no open sore or redness and pain with increased swelling she should call our office immediately at that time for further evaluation and recommendations

## 2022-03-21 ENCOUNTER — OFFICE VISIT (OUTPATIENT)
Dept: INTERNAL MEDICINE CLINIC | Facility: CLINIC | Age: 77
End: 2022-03-21
Payer: COMMERCIAL

## 2022-03-21 VITALS
WEIGHT: 240 LBS | BODY MASS INDEX: 44.16 KG/M2 | SYSTOLIC BLOOD PRESSURE: 142 MMHG | TEMPERATURE: 98.4 F | HEIGHT: 62 IN | OXYGEN SATURATION: 92 % | HEART RATE: 88 BPM | DIASTOLIC BLOOD PRESSURE: 90 MMHG

## 2022-03-21 DIAGNOSIS — I50.32 CHRONIC DIASTOLIC HEART FAILURE (HCC): Primary | ICD-10-CM

## 2022-03-21 DIAGNOSIS — L85.3 XEROSIS OF SKIN: ICD-10-CM

## 2022-03-21 DIAGNOSIS — G89.29 CHRONIC LOW BACK PAIN WITH SCIATICA, SCIATICA LATERALITY UNSPECIFIED, UNSPECIFIED BACK PAIN LATERALITY: ICD-10-CM

## 2022-03-21 DIAGNOSIS — J30.2 SEASONAL ALLERGIES: ICD-10-CM

## 2022-03-21 DIAGNOSIS — I50.9 CHF (CONGESTIVE HEART FAILURE) (HCC): ICD-10-CM

## 2022-03-21 DIAGNOSIS — N18.2 ACUTE RENAL FAILURE SUPERIMPOSED ON STAGE 2 CHRONIC KIDNEY DISEASE, UNSPECIFIED ACUTE RENAL FAILURE TYPE (HCC): ICD-10-CM

## 2022-03-21 DIAGNOSIS — I10 ESSENTIAL HYPERTENSION: ICD-10-CM

## 2022-03-21 DIAGNOSIS — R93.89 ABNORMAL CXR: ICD-10-CM

## 2022-03-21 DIAGNOSIS — N17.9 ACUTE RENAL FAILURE SUPERIMPOSED ON STAGE 2 CHRONIC KIDNEY DISEASE, UNSPECIFIED ACUTE RENAL FAILURE TYPE (HCC): ICD-10-CM

## 2022-03-21 DIAGNOSIS — M54.40 CHRONIC LOW BACK PAIN WITH SCIATICA, SCIATICA LATERALITY UNSPECIFIED, UNSPECIFIED BACK PAIN LATERALITY: ICD-10-CM

## 2022-03-21 DIAGNOSIS — I48.19 PERSISTENT ATRIAL FIBRILLATION (HCC): ICD-10-CM

## 2022-03-21 DIAGNOSIS — Z12.31 ENCOUNTER FOR SCREENING MAMMOGRAM FOR MALIGNANT NEOPLASM OF BREAST: ICD-10-CM

## 2022-03-21 PROCEDURE — 99214 OFFICE O/P EST MOD 30 MIN: CPT | Performed by: INTERNAL MEDICINE

## 2022-03-21 PROCEDURE — 3077F SYST BP >= 140 MM HG: CPT | Performed by: INTERNAL MEDICINE

## 2022-03-21 PROCEDURE — 1160F RVW MEDS BY RX/DR IN RCRD: CPT | Performed by: INTERNAL MEDICINE

## 2022-03-21 PROCEDURE — 3080F DIAST BP >= 90 MM HG: CPT | Performed by: INTERNAL MEDICINE

## 2022-03-21 PROCEDURE — 1036F TOBACCO NON-USER: CPT | Performed by: INTERNAL MEDICINE

## 2022-03-21 RX ORDER — TORSEMIDE 20 MG/1
40 TABLET ORAL DAILY
Qty: 180 TABLET | Refills: 0 | Status: ON HOLD
Start: 2022-03-21 | End: 2022-07-29 | Stop reason: SDUPTHER

## 2022-03-21 RX ORDER — FLUTICASONE PROPIONATE 50 MCG
1 SPRAY, SUSPENSION (ML) NASAL DAILY
Qty: 9.9 ML | Refills: 1 | Status: SHIPPED | OUTPATIENT
Start: 2022-03-21 | End: 2022-08-03

## 2022-03-21 RX ORDER — LIDOCAINE 50 MG/G
PATCH TOPICAL DAILY PRN
COMMUNITY
Start: 2022-03-20

## 2022-03-21 RX ORDER — AMMONIUM LACTATE 12 G/100G
CREAM TOPICAL 2 TIMES DAILY
Qty: 385 G | Refills: 0 | Status: SHIPPED | OUTPATIENT
Start: 2022-03-21 | End: 2022-05-19 | Stop reason: SDUPTHER

## 2022-03-21 NOTE — PATIENT INSTRUCTIONS
Heart Failure   WHAT YOU NEED TO KNOW:   Heart failure is a condition that does not allow your heart to fill or pump properly  Not enough oxygen in your blood gets to your organs and tissues  Fluid may not move through your body properly  Fluid builds up and causes swelling and trouble breathing  This is known as congestive heart failure  Heart failure may start in the left or right ventricle  Heart failure is often caused by damage or injury to your heart  The damage may be caused by other heart problems, diabetes, or high blood pressure  The damage may have also been caused by an infection  Heart failure is a long-term condition that tends to get worse over time  It is important to manage your health to improve your quality of life  DISCHARGE INSTRUCTIONS:   Call your local emergency number (911 in the [address] [facility]) if:   · You have any of the following signs of a heart attack:      ? Squeezing, pressure, or pain in your chest    ? You may  also have any of the following:     § Discomfort or pain in your back, neck, jaw, stomach, or arm    § Shortness of breath    § Nausea or vomiting    § Lightheadedness or a sudden cold sweat      Call your doctor if:   · Your heartbeat is fast, slow, or uneven all the time  · You have symptoms of worsening heart failure:      ? Shortness of breath at rest, at night, or that is getting worse in any way    ? Weight gain of 3 or more pounds (1 4 kg) in a day, or more than your healthcare provider says is okay    ? More swelling in your legs or ankles    ? Abdominal pain or swelling    ? More coughing    ? Loss of appetite    ? Feeling tired all the time    · You feel hopeless or depressed, or you have lost interest in things you used to enjoy  · You often feel worried or afraid  · You have questions or concerns about your condition or care  Medicines:   · Medicines  may be needed to help regulate your heart rhythm   You may also need medicine to lower your blood pressure, and to decrease extra fluids  · Take your medicine as directed  Contact your healthcare provider if you think your medicine is not helping or if you have side effects  Tell him of her if you are allergic to any medicine  Keep a list of the medicines, vitamins, and herbs you take  Include the amounts, and when and why you take them  Bring the list or the pill bottles to follow-up visits  Carry your medicine list with you in case of an emergency  Go to cardiac rehab if directed:  Cardiac rehab is a program run by specialists who will help you safely strengthen your heart  The program includes exercise, relaxation, stress management, and heart-healthy nutrition  Manage swelling from extra fluid:   · Elevate (raise) your legs above the level of your heart  This will help with fluid that builds up in your legs or ankles  Elevate your legs as often as possible during the day  Prop your legs on pillows or blankets to keep them elevated comfortably  Try not to stand for long periods of time during the day  Move around to keep your blood circulating  · Limit sodium (salt)  Ask how much sodium you can have each day  Your healthcare provider may give you a limit, such as 2,300 milligrams (mg) a day  Your provider or a dietitian can teach you how to read food labels for the number of mg in a food  He or she can also help you find ways to have less salt  For example, if you add salt to food as you cook, do not add more at the table  · Drink liquids as directed  You may need to limit the amount of liquid you drink within 24 hours  Your healthcare provider will tell you how much liquid to have and which liquids are best for you  He or she may tell you to limit liquid to 1 5 to 2 liters in a day  He or she will also tell you how often to drink liquid throughout the day  · Weigh yourself every morning  Use the same scale, in the same spot   Do this after you use the bathroom, but before you eat or drink  Wear the same type of clothing each time  Write down your weight and call your healthcare provider if you have a sudden weight gain  Swelling and weight gain are signs of fluid buildup  Manage heart failure: Your quality of life may improve with treatment and the following:  · Do not smoke  Nicotine and other chemicals in cigarettes and cigars can cause lung and heart damage  Ask your healthcare provider for information if you currently smoke and need help to quit  E-cigarettes or smokeless tobacco still contain nicotine  Talk to your healthcare provider before you use these products  · Do not drink alcohol or use illegal drugs  Alcohol and drugs can increase your risk for high blood pressure, diabetes, and coronary artery disease  · Eat heart-healthy foods  Heart-healthy foods include fruits, vegetables, lean meat (such as beef, chicken, or pork), and low-fat dairy products  Fatty fish such as salmon and tuna are also heart healthy  Other heart-healthy foods include walnuts, whole-grain breads, beans, and cooked beans  Replace butter and margarine with heart-healthy oils such as olive oil or canola oil  Your provider or a dietitian can help you create heart-healthy meal plans  · Manage any chronic health conditions you have  These include high blood pressure, diabetes, obesity, high cholesterol, metabolic syndrome, and COPD  You will have fewer symptoms if you manage these health conditions  Follow your healthcare provider's recommendations and follow up with him or her regularly  · Maintain a healthy weight  Being overweight can increase your risk for high blood pressure, diabetes, and coronary artery disease  These conditions can make your symptoms worse  Ask your healthcare provider how much you should weigh  Ask him or her to help you create a weight loss plan if you are overweight  · Stay active  Activity can help keep your symptoms from getting worse   Walking is a type of physical activity that helps maintain your strength and improve your mood  Physical activity also helps you manage your weight  Work with your healthcare provider to create an exercise plan that is right for you  · Get vaccines as directed  The flu and pneumonia can be severe for a person who has heart failure  Vaccines protect you from these infections  Get a flu shot every year as soon as it is recommended, usually in September or October  You may also need the pneumonia vaccine  Your healthcare provider can tell you if you need other vaccines, and when to get them  Follow up with your doctor within 7 days or as directed: You may need to return for other tests  You may need home health care  A healthcare provider will monitor your vital signs, weight, and make sure your medicines are working  Write down your questions so you remember to ask them during your visits  Join a support group:  Heart failure can be difficult to manage  It may be helpful to talk with others who have heart failure  You may learn how to better manage your condition or get emotional support  For more information:  · [address]  [place] , [place]   Phone: [phone]  Web Address: https://[employer]  [address] [place] 2022 Information is for End User's use only and may not be sold, redistributed or otherwise used for commercial purposes  All illustrations and images included in CareNotes® are the copyrighted property of [employer] Inc  or [facility] Irina Zambrano   The above information is an  only  It is not intended as medical advice for individual conditions or treatments  Talk to your doctor, nurse or pharmacist before following any medical regimen to see if it is safe and effective for you  Weigh yourself without clothing at the same time each day  Record your weight    Please call your doctor if you have a sudden weight gain of more than 2-3lbs (1-1 3kg) in 1-2 days or 5lbs (2 3kg) in a week

## 2022-03-21 NOTE — PROGRESS NOTES
INTERNAL MEDICINE INITIAL OFFICE VISIT  [facility] - [facility] INTERNAL MEDICINE [facility]    NAME: Pineda Rodríguez  AGE: 77 y o  SEX: female  : 1945     DATE: 3/21/2022     Assessment and Plan:     Problem List Items Addressed This Visit        Cardiovascular and Mediastinum    Essential hypertension     · Elevated on office visit today, ordered home device monitoring, will revisit following home BP log  · Continue carvedilol   · No longer taking losartan   · Pt reports afrin therapy, counseled on avoidence of medication          Relevant Medications    torsemide (DEMADEX) 20 mg tablet    Blood Pressure Monitoring (Blood Pressure Kit) EULALIA    Atrial fibrillation (HCC)     · Rate controlled on carvedilol   · AC on eliquis          Chronic diastolic heart failure (HCC) - Primary     Wt Readings from Last 3 Encounters:   22 109 kg (240 lb)   03/10/22 110 kg (243 lb)   22 110 kg (243 lb)     · Continue with TEDs and SCDs at home  · Continue with torsemide current dosing, may take full dose in the morning instead of splitting the dose as patient complains of persistent urination   · Continuing to lose weight, though is not yet euvolemic  · Patient instruct to call us or her cardiologist with sudden weight gain of more than 2-3lbs (1-1 3kg) in 1-2 days or 5lbs (2 3kg) in a week                    Nervous and Auditory    Chronic low back pain with sciatica     · Follows with pain management, continue oxycodone for pain management  · Continue with lidocaine patches and voltaren, avoid NSAIDs   · Offered referral to PT which the patient declines at this time, will revisit at later date             Musculoskeletal and Integument    Xerosis of skin     · Continue with ammonium lactate lotion, may use twice daily          Relevant Medications    lidocaine (LIDODERM) 5 %    ammonium lactate (LAC-HYDRIN) 12 % cream       Genitourinary    Acute renal failure superimposed on stage 2/3 chronic kidney disease [facility])     Lab Results   Component Value Date    EGFR 39 02/18/2022    EGFR 49 02/12/2022    EGFR 40 02/11/2022    CREATININE 1 31 (H) 02/18/2022    CREATININE 1 10 02/12/2022    CREATININE 1 30 02/11/2022     · Likely cardiorenal, continue with diuresis   · Avoid NSAIDs   · Repeat renal fxn is pending for next month         Relevant Medications    torsemide (DEMADEX) 20 mg tablet       Other    Abnormal CXR     · Will order CXR for f/u evaluation of persistent bilateral opacities  If persistent on repeat follow-up, will need further evaluation with CT chest/possible bronchoscopy or PET CT          Relevant Orders    XR chest pa & lateral    Encounter for screening mammogram for malignant neoplasm of breast     · Pt would like to continue with mammogram screenings at this time, will order          Relevant Orders    Mammo screening bilateral w 3d & cad    Seasonal allergies     · Counseled patient on avoidance of afrin, will order Flonase for nasal congestion          Relevant Medications    fluticasone (FLONASE) 50 mcg/act nasal spray      Other Visit Diagnoses     CHF (congestive heart failure) ([facility] [address] )        Relevant Medications    torsemide (DEMADEX) 20 mg tablet          Return in about 4 weeks (around 4/18/2022) for Next scheduled follow up  Chief Complaint:     Chief Complaint   Patient presents with    Follow-up     SOB, torsemide discussion, sleep apnea test refusal      History of Present Illness:     I had the pleasure of seeing Markie Irvin today for a 4 week f/u visit  She is accompanied by her  today and has concerns with her Torsemide, frequent urination as well as persistent SOB  She notes precipitating events to her SOB as rushing to get ready and while she is talking  She denies CP at this time  She is continuing to watch her salt and fluid intake and denies any recent change in diet   During her recent Cardiology visit her cardiologist increased her Torsemide dose and she has been consistently losing weight  She feels that her R leg swelling has gone down since then  Her cardiologist had also wanted her to do a sleep study which she was admittedly against, however this could be contributing to her SOB and was encouraged  Upon speaking with her  he has noticed at times while she is sleeping she becomes apneic  She reports she has had difficulty sleeping for many years  The following portions of the patient's history were reviewed and updated as appropriate: allergies, current medications, past family history, past medical history, past social history, past surgical history and problem list      Review of Systems:     Review of Systems   Constitutional: Negative  HENT: Negative  Eyes: Positive for discharge  Negative for pain, redness and itching  Respiratory: Positive for shortness of breath  Negative for cough and chest tightness  Cardiovascular: Positive for leg swelling  Negative for chest pain and palpitations  Gastrointestinal: Negative  Genitourinary: Positive for frequency  Neurological: Negative  Psychiatric/Behavioral: Negative  Past Medical History:     Past Medical History:   Diagnosis Date    A-fib ([place] [id number] ) 12/29/2021    Anxiety     Arthritis     Back pain     Cellulitis     [facility]     Edema of both lower extremities due to peripheral venous insufficiency     Edema of both lower extremities due to peripheral venous insufficiency     Erythema of lower extremity 11/12/2021    Fibromyalgia     Hypertension     Sjogren syndrome, unspecified ([facility] )         Past Surgical History:     Past Surgical History:   Procedure Laterality Date    KNEE CARTILAGE SURGERY      REPLACEMENT TOTAL KNEE BILATERAL          Social History:   She reports that she has quit smoking  Her smoking use included cigarettes  She has never used smokeless tobacco  She reports previous alcohol use   She reports that she does not use drugs       Family History:     Family History   Problem Relation Age of Onset    Heart disease Mother     Cancer Father         Current Medications:     Current Outpatient Medications:     acetaminophen (TYLENOL) 325 mg tablet, Take 650 mg by mouth every 6 (six) hours as needed for mild pain  , Disp: , Rfl:     ammonium lactate (LAC-HYDRIN) 12 % cream, Apply topically 2 (two) times a day, Disp: 385 g, Rfl: 0    apixaban (Eliquis) 5 mg, Take 1 tablet (5 mg total) by mouth 2 (two) times a day, Disp: 60 tablet, Rfl: 0    Blood Pressure KIT, Use daily, Disp: 1 kit, Rfl: 0    carvedilol (COREG) 25 mg tablet, Take 1 tablet (25 mg total) by mouth 2 (two) times a day with meals, Disp: 180 tablet, Rfl: 1    Diclofenac Sodium (Voltaren) 1 %, Voltaren 1 % topical gel, Disp: , Rfl:     lidocaine (LIDODERM) 5 %, , Disp: , Rfl:     oxyCODONE (OxyCONTIN) 20 mg 12 hr tablet, Take 1 tablet (20 mg total) by mouth every 12 (twelve) hours Max Daily Amount: 40 mg, Disp: 2 tablet, Rfl: 0    oxyCODONE-acetaminophen (PERCOCET)  mg per tablet, Take 1 tablet by mouth every 4 (four) hours as needed for moderate pain Max Daily Amount: 6 tablets, Disp: 4 tablet, Rfl: 0    potassium chloride (K-DUR,KLOR-CON) 20 mEq tablet, Take 2 tablets (40 mEq total) by mouth daily, Disp: 180 tablet, Rfl: 1    tiZANidine (ZANAFLEX) 4 mg tablet, tizanidine 4 mg tablet  TAKE ONE TABLET BY MOUTH EVERY 8 HOURS AS NEEDED FOR spasm, Disp: , Rfl:     torsemide (DEMADEX) 20 mg tablet, Take 2 tablets (40 mg total) by mouth daily, Disp: 180 tablet, Rfl: 0    Vitamin D, Cholecalciferol, 25 MCG (1000 UT) TABS, Vitamin D3 25 mcg (1,000 unit) capsule  one PO QD, Disp: , Rfl:     ALPRAZolam (XANAX) 0 25 mg tablet, Take 1 tablet (0 25 mg total) by mouth daily as needed for anxiety for up to 14 days, Disp: 14 tablet, Rfl: 0    Blood Pressure Monitoring (Blood Pressure Kit) EULALIA, Use 1 Device in the morning, Disp: 1 each, Rfl: 0    fluticasone (FLONASE) 50 mcg/act nasal spray, 1 spray into each nostril daily, Disp: 9 9 mL, Rfl: 1     Allergies:   No Known Allergies     Physical Exam:     /90 (BP Location: Left arm, Patient Position: Sitting, Cuff Size: Standard)   Pulse 88   Temp 98 4 °F (36 9 °C) (Tympanic)   Ht 5' 2" (1 575 m)   Wt 109 kg (240 lb)   SpO2 92%   BMI 43 90 kg/m²     Physical Exam  Constitutional:       General: She is not in acute distress  Appearance: Normal appearance  She is obese  She is not ill-appearing or diaphoretic  Eyes:      General:         Right eye: Discharge present  Left eye: Discharge present  Cardiovascular:      Rate and Rhythm: Normal rate  Rhythm irregular  Pulses: Normal pulses  Heart sounds: Normal heart sounds  Pulmonary:      Breath sounds: Examination of the right-lower field reveals decreased breath sounds  Examination of the left-lower field reveals decreased breath sounds  Decreased breath sounds present  Comments: Dyspnea at rest   Abdominal:      General: Bowel sounds are normal       Palpations: Abdomen is soft  Tenderness: There is no abdominal tenderness  There is no guarding  Musculoskeletal:         General: Swelling present  Cervical back: Normal range of motion  Skin:     General: Skin is warm and dry  Neurological:      General: No focal deficit present  Mental Status: She is alert and oriented to person, place, and time  Psychiatric:         Mood and Affect: Mood normal          Behavior: Behavior normal          Thought Content: Thought content normal          Judgment: Judgment normal           Data:     Laboratory Results: I have personally reviewed the pertinent laboratory results/reports   Radiology/Other Diagnostic Testing Results: I have personally reviewed pertinent reports        Rosibel Guevara MD  [facility] INTERNAL MEDICINE [facility]

## 2022-03-21 NOTE — ASSESSMENT & PLAN NOTE
Lab Results   Component Value Date    EGFR 39 02/18/2022    EGFR 49 02/12/2022    EGFR 40 02/11/2022    CREATININE 1 31 (H) 02/18/2022    CREATININE 1 10 02/12/2022    CREATININE 1 30 02/11/2022     · Likely cardiorenal, continue with diuresis   · Avoid NSAIDs   · Repeat renal fxn is pending for next month
Wt Readings from Last 3 Encounters:   03/21/22 109 kg (240 lb)   03/10/22 110 kg (243 lb)   03/03/22 110 kg (243 lb)     · Continue with TEDs and SCDs at home  · Continue with torsemide current dosing, may take full dose in the morning instead of splitting the dose as patient complains of persistent urination   · Continuing to lose weight, though is not yet euvolemic  · Patient instruct to call us or her cardiologist with sudden weight gain of more than 2-3lbs (1-1 3kg) in 1-2 days or 5lbs (2 3kg) in a week 
· Continue with ammonium lactate lotion, may use twice daily
· Counseled patient on avoidance of afrin, will order Flonase for nasal congestion
· Elevated on office visit today, ordered home device monitoring, will revisit following home BP log  · Continue carvedilol   · No longer taking losartan   · Pt reports afrin therapy, counseled on avoidence of medication
· Follows with pain management, continue oxycodone for pain management  · Continue with lidocaine patches and voltaren, avoid NSAIDs   · Offered referral to PT which the patient declines at this time, will revisit at later date
· Pt would like to continue with mammogram screenings at this time, will order
· Rate controlled on carvedilol   · AC on eliquis
· Will order CXR for f/u evaluation of persistent bilateral opacities    If persistent on repeat follow-up, will need further evaluation with CT chest/possible bronchoscopy or PET CT
[FreeTextEntry1] : \par \par No chest pain, dyspnea, palpitations.\par \par Limited walking with cane.\par \par Is off statin at this time.

## 2022-03-22 ENCOUNTER — TELEPHONE (OUTPATIENT)
Dept: INTERNAL MEDICINE CLINIC | Facility: CLINIC | Age: 77
End: 2022-03-22

## 2022-03-22 DIAGNOSIS — R11.2 NAUSEA AND VOMITING, INTRACTABILITY OF VOMITING NOT SPECIFIED, UNSPECIFIED VOMITING TYPE: Primary | ICD-10-CM

## 2022-03-22 RX ORDER — ONDANSETRON 4 MG/1
4 TABLET, ORALLY DISINTEGRATING ORAL EVERY 6 HOURS PRN
Qty: 20 TABLET | Refills: 0 | Status: SHIPPED | OUTPATIENT
Start: 2022-03-22 | End: 2022-05-19 | Stop reason: ALTCHOICE

## 2022-03-22 NOTE — TELEPHONE ENCOUNTER
Denise Valentine has called the IM [employer] in regards to [employer]  Kostaspamela Ayesha stated after Kya's appointment with the IM Office yesterday, 03/21/22, she had been experiencing nausea, alongside pain in her left leg  Joshua Hodge had stated Jaclyn Hamman was unable to sleep last night, as well as not eating or taking her medications  Joshua Hodge stated he tried to take Jaclyn Hamman to the ER, yet she does not wish to go  Informed Joshua Hodge it would be best to call 911 to have an ambulance take her to the hospital  Joshua Hodge was thankful for the information and ended the call

## 2022-03-22 NOTE — TELEPHONE ENCOUNTER
Rodo Barajas has called the IM [employer] in regards to [employer]  Millicent Redmond is requesting nausea medication for Darrol Nail

## 2022-03-22 NOTE — TELEPHONE ENCOUNTER
I agree with patient going to ER for evaluation  I am not sure if patient has cellulitis, infection of the skin of her leg which may be causing the pain  I can prescribe antinausea medicine, though still feel that she needs to be properly evaluated  Will send zofran to her pharmacy  Can she do a virtual appt with video this afternoon with one of the resident's    If she doesn't have video capability I do not think there would by any utility of doing a virtual

## 2022-03-25 ENCOUNTER — TELEPHONE (OUTPATIENT)
Dept: INTERNAL MEDICINE CLINIC | Facility: CLINIC | Age: 77
End: 2022-03-25

## 2022-03-25 NOTE — TELEPHONE ENCOUNTER
Pt s  is here and she still has pain in body and still is nausea, and not eating much, lost 6 pounds since Monday  She has also not taken her medications since Monday as well  She had chicken noodle soup and the nausea   She doesn't want to go to er, but I told him I strongly advise him to take her

## 2022-03-25 NOTE — TELEPHONE ENCOUNTER
I called her to let her know and she said that maybe she will go  I stressed to her the importance of going to get checked out

## 2022-03-25 NOTE — TELEPHONE ENCOUNTER
Patient needs to go to the hospital for blood work, imaging and better evaluation, she was already prescribed medication this week, since no improvement she needs to go to the ER  Thank you!

## 2022-04-11 ENCOUNTER — TELEPHONE (OUTPATIENT)
Dept: INTERNAL MEDICINE CLINIC | Facility: CLINIC | Age: 77
End: 2022-04-11

## 2022-04-11 ENCOUNTER — TELEPHONE (OUTPATIENT)
Dept: GERIATRICS | Age: 77
End: 2022-04-11

## 2022-04-11 DIAGNOSIS — G89.29 CHRONIC LOW BACK PAIN WITH SCIATICA, SCIATICA LATERALITY UNSPECIFIED, UNSPECIFIED BACK PAIN LATERALITY: Primary | ICD-10-CM

## 2022-04-11 DIAGNOSIS — M54.40 CHRONIC LOW BACK PAIN WITH SCIATICA, SCIATICA LATERALITY UNSPECIFIED, UNSPECIFIED BACK PAIN LATERALITY: Primary | ICD-10-CM

## 2022-04-11 NOTE — TELEPHONE ENCOUNTER
Pt is getting a chair lift put in and she needs a walker can we order this for her  [id number] [facility] pharmacy

## 2022-04-11 NOTE — TELEPHONE ENCOUNTER
Left message for patient or care giver to call back so we can complete Katelyn Assessment intake form  Patient will also need a care conference appt for the [facility]

## 2022-04-12 NOTE — TELEPHONE ENCOUNTER
Left message for emergency contact Noa Giang to call back and confirm appt and we need to do Katelyn intake

## 2022-04-19 ENCOUNTER — HOSPITAL ENCOUNTER (OUTPATIENT)
Dept: RADIOLOGY | Facility: HOSPITAL | Age: 77
Discharge: HOME | End: 2022-04-19
Attending: INTERNAL MEDICINE
Payer: COMMERCIAL

## 2022-04-19 DIAGNOSIS — R93.89 ABNORMAL CXR: ICD-10-CM

## 2022-04-19 PROCEDURE — 71046 X-RAY EXAM CHEST 2 VIEWS: CPT

## 2022-04-20 ENCOUNTER — TELEPHONE (OUTPATIENT)
Dept: INTERNAL MEDICINE CLINIC | Facility: CLINIC | Age: 77
End: 2022-04-20

## 2022-04-20 NOTE — TELEPHONE ENCOUNTER
Indra Rivera has faxed documents regarding Carlo Patient walker  Documents have been signed, by Dr Milan Zaragoza, faxed back, and into patient's chart  Will place physical copy in faxed bin in nurse work station

## 2022-04-21 ENCOUNTER — OFFICE VISIT (OUTPATIENT)
Dept: INTERNAL MEDICINE CLINIC | Facility: CLINIC | Age: 77
End: 2022-04-21
Payer: COMMERCIAL

## 2022-04-21 VITALS
BODY MASS INDEX: 44.05 KG/M2 | SYSTOLIC BLOOD PRESSURE: 160 MMHG | DIASTOLIC BLOOD PRESSURE: 92 MMHG | HEART RATE: 79 BPM | OXYGEN SATURATION: 94 % | WEIGHT: 239.4 LBS | HEIGHT: 62 IN | TEMPERATURE: 98.6 F

## 2022-04-21 DIAGNOSIS — R41.3 MEMORY IMPAIRMENT: ICD-10-CM

## 2022-04-21 DIAGNOSIS — I10 ESSENTIAL HYPERTENSION: Primary | ICD-10-CM

## 2022-04-21 DIAGNOSIS — I50.32 CHRONIC DIASTOLIC HEART FAILURE (HCC): ICD-10-CM

## 2022-04-21 DIAGNOSIS — N18.2 ACUTE RENAL FAILURE SUPERIMPOSED ON STAGE 2 CHRONIC KIDNEY DISEASE, UNSPECIFIED ACUTE RENAL FAILURE TYPE (HCC): ICD-10-CM

## 2022-04-21 DIAGNOSIS — R73.03 PREDIABETES: ICD-10-CM

## 2022-04-21 DIAGNOSIS — N17.9 ACUTE RENAL FAILURE SUPERIMPOSED ON STAGE 2 CHRONIC KIDNEY DISEASE, UNSPECIFIED ACUTE RENAL FAILURE TYPE (HCC): ICD-10-CM

## 2022-04-21 DIAGNOSIS — I48.11 LONGSTANDING PERSISTENT ATRIAL FIBRILLATION (HCC): ICD-10-CM

## 2022-04-21 DIAGNOSIS — R93.89 ABNORMAL CXR: ICD-10-CM

## 2022-04-21 DIAGNOSIS — E78.2 MIXED HYPERLIPIDEMIA: ICD-10-CM

## 2022-04-21 DIAGNOSIS — I87.2 CHRONIC VENOUS INSUFFICIENCY: ICD-10-CM

## 2022-04-21 LAB — SL AMB POCT HEMOGLOBIN AIC: 6.3 (ref ?–6.5)

## 2022-04-21 PROCEDURE — 3077F SYST BP >= 140 MM HG: CPT | Performed by: INTERNAL MEDICINE

## 2022-04-21 PROCEDURE — 99213 OFFICE O/P EST LOW 20 MIN: CPT | Performed by: INTERNAL MEDICINE

## 2022-04-21 PROCEDURE — 3080F DIAST BP >= 90 MM HG: CPT | Performed by: INTERNAL MEDICINE

## 2022-04-21 PROCEDURE — 83036 HEMOGLOBIN GLYCOSYLATED A1C: CPT | Performed by: INTERNAL MEDICINE

## 2022-04-21 RX ORDER — HYDROCHLOROTHIAZIDE 25 MG/1
25 TABLET ORAL DAILY
Qty: 30 TABLET | Refills: 0 | Status: SHIPPED | OUTPATIENT
Start: 2022-04-21 | End: 2022-05-17

## 2022-04-21 NOTE — ASSESSMENT & PLAN NOTE
Lab Results   Component Value Date    EGFR 39 02/18/2022    EGFR 49 02/12/2022    EGFR 40 02/11/2022    CREATININE 1 31 (H) 02/18/2022    CREATININE 1 10 02/12/2022    CREATININE 1 30 02/11/2022     · Repeat BMP in 1 week

## 2022-04-21 NOTE — ASSESSMENT & PLAN NOTE
Wt Readings from Last 3 Encounters:   04/21/22 109 kg (239 lb 6 4 oz)   03/21/22 109 kg (240 lb)   03/10/22 110 kg (243 lb)       Weight is down a little from Mid March (4 lb weight loss), relatively stable over the last month though  She does have massive LE edema, though I suspect most of this is venous stasis rather than from her heart failure  Edema is non-pitting  Continue torsemide 40mg once daily  Start HCTZ 25mg daily  Check Pro-BNP levels to help better assess volume status  2g Na restriction/1800ml fluid restriction  Repeat BMP in 1 week to reassess renal fxn  Patient did not obtain labs ordered earlier this month

## 2022-04-21 NOTE — ASSESSMENT & PLAN NOTE
· Elevated 160/92  Was above goal with SBP 140s at her last visit  · Amlodipine stopped prior as it was felt to be possibly contributing to LE edema    · Start HCTZ 25mg daily  · Continue Torsemide 40mg daily  · Continue  Coreg 25mg BID

## 2022-04-21 NOTE — ASSESSMENT & PLAN NOTE
· A1c 6 3 from 5 9 one year ago  · Continue  To promote dietary and lifestyle modification  Carbohydrate restriction and weight loss

## 2022-04-21 NOTE — ASSESSMENT & PLAN NOTE
· Notes improvement in LE edema with her pneumatic compression device  Was advised by vascular sx to do 1 hr a few times a week  Will have patient use daily  Continue leg elevation

## 2022-04-21 NOTE — PATIENT INSTRUCTIONS
· Reschedule your mammogram  · Reschedule follow-up with pulmonary medicine  · Schedule 3-month follow-up with cardiology for June  · Call Back Geriatric Medicine  to schedule consultation appointment    · Go for blood work

## 2022-04-21 NOTE — PROGRESS NOTES
INTERNAL MEDICINE FOLLOW-UP OFFICE VISIT  [facility] INTERNAL MEDICINE [place]    NAME: Veronica Rodríguez  AGE: 77 y o  SEX: female  : 1945     DATE: 2022     Assessment and Plan:     Problem List Items Addressed This Visit        Cardiovascular and Mediastinum    Chronic venous insufficiency     · Notes improvement in LE edema with her pneumatic compression device  Was advised by vascular sx to do 1 hr a few times a week  Will have patient use daily  Continue leg elevation  Essential hypertension - Primary     · Elevated 160/92  Was above goal with SBP 140s at her last visit  · Amlodipine stopped prior as it was felt to be possibly contributing to LE edema  · Start HCTZ 25mg daily  · Continue Torsemide 40mg daily  · Continue  Coreg 25mg BID         Relevant Medications    hydrochlorothiazide (HYDRODIURIL) 25 mg tablet    Atrial fibrillation (HCC)    Relevant Medications    apixaban (Eliquis) 5 mg    Chronic diastolic heart failure (HCC)     Wt Readings from Last 3 Encounters:   22 109 kg (239 lb 6 4 oz)   22 109 kg (240 lb)   03/10/22 110 kg (243 lb)       Weight is down a little from Mid March (4 lb weight loss), relatively stable over the last month though  She does have massive LE edema, though I suspect most of this is venous stasis rather than from her heart failure  Edema is non-pitting  Continue torsemide 40mg once daily  Start HCTZ 25mg daily  Check Pro-BNP levels to help better assess volume status  2g Na restriction/1800ml fluid restriction  Repeat BMP in 1 week to reassess renal fxn  Patient did not obtain labs ordered earlier this month               Relevant Orders    NT-BNP PRO       Genitourinary    Acute renal failure superimposed on stage 2/3 chronic kidney disease [facility])     Lab Results   Component Value Date    EGFR 39 2022    EGFR 49 2022    EGFR 40 2022    CREATININE 1 31 (H) 2022    CREATININE 1 10 02/12/2022    CREATININE 1 30 02/11/2022     · Repeat BMP in 1 week         Relevant Medications    hydrochlorothiazide (HYDRODIURIL) 25 mg tablet       Other    Mixed hyperlipidemia    Relevant Orders    Lipid Panel with Direct LDL reflex    Memory impairment     · Will have office staff reschedule intake with [employer] [facility]         Prediabetes     · A1c 6 3 from 5 9 one year ago  · Continue  To promote dietary and lifestyle modification  Carbohydrate restriction and weight loss  Relevant Orders    POCT hemoglobin A1c (Completed)    Abnormal CXR     · Repeat CXR pending offical read  Infiltrates look much improved  No follow-ups on file  Chief Complaint:     Chief Complaint   Patient presents with    Follow-up     4 week f/u        History of Present Illness:     Ms Chico Rouse is here today accompanied by her significant other  Patient complains of 7/10 LLE burning pain noted in her posterior buttocks and thigh  This is acute on chronic and she follows with [facility]  Planned for hip injection  She reports compliance with her medications  Notes improvement in urination in the evening with once daily torsemide dosing  Her weight is fairly stable  Lost 1 lb since her last visit  She denies any CP or SOB  Has not worn her oxygen since discharge  She had an appointment scheduled with pulmonary medicine for LAUREN eval last month however cancelled this visit  Patient also cancelled Geriatric appt for concerns for cognitive decline  She states she had "forgot" why she was seeing them   continues to report poor short-term memory  Patient exhibits frustration over these concerns  Has difficulty verbalizing her frustrations        The following portions of the patient's history were reviewed and updated as appropriate: allergies, current medications, past family history, past medical history, past social history, past surgical history and problem list      Review of Systems:     Review of Systems   Constitutional: Negative for activity change, chills, fatigue and fever  HENT: Negative for congestion  Eyes: Negative for visual disturbance  Respiratory: Negative for cough, shortness of breath and wheezing  Cardiovascular: Positive for leg swelling  Negative for chest pain and palpitations  Gastrointestinal: Negative for abdominal pain, constipation, diarrhea, nausea and vomiting  Musculoskeletal: Positive for arthralgias, back pain, gait problem and myalgias  Skin: Positive for color change  Neurological: Negative for dizziness, seizures, syncope, weakness and headaches  Psychiatric/Behavioral: Positive for confusion and decreased concentration  Negative for suicidal ideas  The patient is nervous/anxious  Problem List:     Patient Active Problem List   Diagnosis    Chronic venous insufficiency    Essential hypertension    Chronic low back pain with sciatica    Acute renal failure superimposed on stage 2/3 chronic kidney disease (HCC)    Mixed hyperlipidemia    Obesity    Osteoarthritis of knee    [facility]'s [facility]    Atrial fibrillation ([facility]    Opioid dependence due to Chronic back pain     Anxiety    Chronic diastolic heart failure (HCC)    Memory impairment    Other specified anemias    Prediabetes    Abnormal CXR    Encounter for screening mammogram for malignant neoplasm of breast    Seasonal allergies    Xerosis of skin        Objective:     /92 (BP Location: Right arm, Patient Position: Sitting, Cuff Size: Standard)   Pulse 79   Temp 98 6 °F (37 °C) (Tympanic)   Ht 5' 2" (1 575 m)   Wt 109 kg (239 lb 6 4 oz)   SpO2 94%   BMI 43 79 kg/m²     Physical Exam  Constitutional:       General: She is not in acute distress  Appearance: She is obese  She is not ill-appearing or toxic-appearing  Cardiovascular:      Rate and Rhythm: Normal rate  Rhythm irregular        Heart sounds: No murmur heard       Pulmonary:      Effort: No respiratory distress  Breath sounds: No wheezing, rhonchi or rales  Abdominal:      General: There is no distension  Palpations: There is no mass  Tenderness: There is no abdominal tenderness  There is no guarding  Musculoskeletal:         General: Swelling (2+ bilateral LE edema, non-pitting) present  Skin:     General: Skin is warm and dry  Pertinent Laboratory/Diagnostic Studies:    Laboratory Results: I have personally reviewed the pertinent laboratory results/reports     CBC:   Results from Last 12 Months   Lab Units 02/12/22  0501   WBC Thousand/uL 8 02   RBC Million/uL 3 68*   HEMOGLOBIN g/dL 9 6*   HEMATOCRIT % 33 1*   MCV fL 90   MCH pg 26 1*   MCHC g/dL 29 0*   RDW % 15 9*   MPV fL 11 8   PLATELETS Thousands/uL 298   NRBC AUTO /100 WBCs 0   NEUTROS PCT % 67   LYMPHS PCT % 14   MONOS PCT % 13*   EOS PCT % 6   BASOS PCT % 0   NEUTROS ABS Thousands/µL 5 27   LYMPHS ABS Thousands/µL 1 13   MONOS ABS Thousand/µL 1 05   EOS ABS Thousand/µL 0 51     Chemistry Profile:   Results from Last 12 Months   Lab Units 02/18/22  1000 02/12/22  0501 02/11/22 2000 12/30/21  0609 12/29/21  2313   POTASSIUM mmol/L 4 0   < >  --    < >  --    CHLORIDE mmol/L 98*   < >  --    < >  --    CO2 mmol/L 33*   < >  --    < >  --    CO2, I-STAT mmol/L  --   --   --   --  28   BUN mg/dL 34*   < >  --    < >  --    CREATININE mg/dL 1 31*   < >  --    < >  --    GLUCOSE RANDOM mg/dL 130   < >  --    < >  --    GLUCOSE, ISTAT mg/dl  --   --   --   --  129   CALCIUM mg/dL 9 6   < >  --    < >  --    CORRECTED CALCIUM mg/dL 10 1  --   --    < >  --    MAGNESIUM mg/dL  --   --  2 0   < >  --    AST U/L 13  --   --    < >  --    ALT U/L 18  --   --    < >  --    ALK PHOS U/L 108  --   --    < >  --    EGFR ml/min/1 73sq m 39   < >  --    < >  --     < > = values in this interval not displayed         Radiology/Other Diagnostic Testing Results: I have personally reviewed pertinent reports        Elaine Tamez MD  [facility] INTERNAL MEDICINE [place]

## 2022-04-26 ENCOUNTER — TELEPHONE (OUTPATIENT)
Dept: INTERNAL MEDICINE CLINIC | Facility: CLINIC | Age: 77
End: 2022-04-26

## 2022-04-26 ENCOUNTER — TELEMEDICINE (OUTPATIENT)
Dept: INTERNAL MEDICINE CLINIC | Facility: CLINIC | Age: 77
End: 2022-04-26
Payer: COMMERCIAL

## 2022-04-26 DIAGNOSIS — K13.70 MOUTH LESION: Primary | ICD-10-CM

## 2022-04-26 PROCEDURE — 1036F TOBACCO NON-USER: CPT | Performed by: INTERNAL MEDICINE

## 2022-04-26 PROCEDURE — 99213 OFFICE O/P EST LOW 20 MIN: CPT | Performed by: INTERNAL MEDICINE

## 2022-04-26 PROCEDURE — 1160F RVW MEDS BY RX/DR IN RCRD: CPT | Performed by: INTERNAL MEDICINE

## 2022-04-26 RX ORDER — PENICILLIN V POTASSIUM 500 MG/1
500 TABLET ORAL EVERY 8 HOURS SCHEDULED
Qty: 21 TABLET | Refills: 0 | Status: SHIPPED | OUTPATIENT
Start: 2022-04-26 | End: 2022-05-03

## 2022-04-26 NOTE — RESULT ENCOUNTER NOTE
Can you please let the patient know that I reviewed the results of her follow-up CXR  Previous abnormal findings showing a lung opacity has resolved  She has not signs of infections  She does have some enlargement of her pulmonary artery which can be seen in pulmonary hypertension  This is probably from underling obstructed sleep apnea that is not being treated  I see she has an appointment with pulmonogy next month and I would like her to keep this appointment  Thank you      Dr Anamaria Bosch

## 2022-04-26 NOTE — TELEPHONE ENCOUNTER
Attempted to call patient regarding penicillin prescription for  lump in her mouth   Left voicemail for call back regarding request

## 2022-04-26 NOTE — ASSESSMENT & PLAN NOTE
Blister/sore  No ulceration noted  Dentition okay  No Cracked teeth noted  Systemic signs of illness  · Penicillin V 500 Q 8 x 7 days   · If not improved in 3 days have asked patient to call back

## 2022-04-26 NOTE — TELEPHONE ENCOUNTER
Juan Ferguson has called the IM Office in regards to the missed phone call  Stated they would like to be called back at the home phone number, [phone]

## 2022-04-26 NOTE — TELEPHONE ENCOUNTER
Ashwini Elaine has called the [place] [address] office in regards to [employer]  Anastasiya Montgomery has stated Rolanda Collins has a lump in her mouth  Donald Vargas stated for the past few days Rolandadick Collins has a lump in her mouth that has started to become uncomfortable  Anastasiya Montgomery has stated in the past Kya's dentist office would prescribe penicillin for similar issues in the past  Anastasiya Montgomery stated when he had called the dentist office, they would not prescribe penicillin due to Kya's cardiovascular history  Anastasiya Montgomery would like to know if the IM Office would be able to prescribe medication for the lump

## 2022-04-28 ENCOUNTER — RA CDI HCC (OUTPATIENT)
Dept: OTHER | Facility: HOSPITAL | Age: 77
End: 2022-04-28

## 2022-04-28 NOTE — PROGRESS NOTES
I13 0, E66 01  [facility]  coding opportunities          Chart Reviewed number of suggestions sent to Provider: 1     Patients Insurance     Medicare Insurance: [id number] [employer] [facility] Medicare Advantage        (added e66 01 4/28)

## 2022-04-29 ENCOUNTER — TELEPHONE (OUTPATIENT)
Dept: INTERNAL MEDICINE CLINIC | Facility: CLINIC | Age: 77
End: 2022-04-29

## 2022-04-30 ENCOUNTER — LAB (OUTPATIENT)
Dept: LAB | Facility: CLINIC | Age: 77
End: 2022-04-30
Payer: COMMERCIAL

## 2022-04-30 DIAGNOSIS — E78.2 MIXED HYPERLIPIDEMIA: ICD-10-CM

## 2022-04-30 DIAGNOSIS — I50.32 CHRONIC DIASTOLIC HEART FAILURE (HCC): ICD-10-CM

## 2022-04-30 LAB
ANION GAP SERPL CALCULATED.3IONS-SCNC: 8 MMOL/L (ref 4–13)
BUN SERPL-MCNC: 40 MG/DL (ref 5–25)
CALCIUM SERPL-MCNC: 9.4 MG/DL (ref 8.3–10.1)
CHLORIDE SERPL-SCNC: 98 MMOL/L (ref 100–108)
CHOLEST SERPL-MCNC: 142 MG/DL
CO2 SERPL-SCNC: 33 MMOL/L (ref 21–32)
CREAT SERPL-MCNC: 1.75 MG/DL (ref 0.6–1.3)
GFR SERPL CREATININE-BSD FRML MDRD: 28 ML/MIN/1.73SQ M
GLUCOSE P FAST SERPL-MCNC: 111 MG/DL (ref 65–99)
HDLC SERPL-MCNC: 54 MG/DL
LDLC SERPL CALC-MCNC: 76 MG/DL (ref 0–100)
NT-PROBNP SERPL-MCNC: 1615 PG/ML
POTASSIUM SERPL-SCNC: 4.2 MMOL/L (ref 3.5–5.3)
SODIUM SERPL-SCNC: 139 MMOL/L (ref 136–145)
TRIGL SERPL-MCNC: 60 MG/DL

## 2022-04-30 PROCEDURE — 80061 LIPID PANEL: CPT

## 2022-04-30 PROCEDURE — 36415 COLL VENOUS BLD VENIPUNCTURE: CPT

## 2022-04-30 PROCEDURE — 83880 ASSAY OF NATRIURETIC PEPTIDE: CPT

## 2022-04-30 PROCEDURE — 80048 BASIC METABOLIC PNL TOTAL CA: CPT

## 2022-05-05 DIAGNOSIS — N18.2 ACUTE RENAL FAILURE SUPERIMPOSED ON STAGE 2 CHRONIC KIDNEY DISEASE, UNSPECIFIED ACUTE RENAL FAILURE TYPE (HCC): Primary | ICD-10-CM

## 2022-05-05 DIAGNOSIS — N17.9 ACUTE RENAL FAILURE SUPERIMPOSED ON STAGE 2 CHRONIC KIDNEY DISEASE, UNSPECIFIED ACUTE RENAL FAILURE TYPE (HCC): Primary | ICD-10-CM

## 2022-05-05 NOTE — RESULT ENCOUNTER NOTE
Can you please inform the patient that I received the results of her recent blood work  Her lipid panel looks good, cholesterol levels are acceptable  Her NT-proBNP level is decreasing which is good from a volume standpoint  I would recommend that she continue current dosing of diuretics as prescribed unless she is continuing to retain fluid/gain weight  Her kidney function is a little higher than last time and I would expect this with the diuretics  WE may need to accept a higher baseline renal function to keep fluid off of her  I would recommend a repeat BMP in 1 week to reassess her renal function to make sure it is not still rising  Thank you      Dr Anamaria Bosch

## 2022-05-09 ENCOUNTER — TELEPHONE (OUTPATIENT)
Dept: INTERNAL MEDICINE CLINIC | Facility: CLINIC | Age: 77
End: 2022-05-09

## 2022-05-18 ENCOUNTER — APPOINTMENT (OUTPATIENT)
Dept: LAB | Facility: CLINIC | Age: 77
End: 2022-05-18
Payer: COMMERCIAL

## 2022-05-18 DIAGNOSIS — N17.9 ACUTE RENAL FAILURE SUPERIMPOSED ON STAGE 2 CHRONIC KIDNEY DISEASE, UNSPECIFIED ACUTE RENAL FAILURE TYPE (HCC): ICD-10-CM

## 2022-05-18 DIAGNOSIS — N18.2 ACUTE RENAL FAILURE SUPERIMPOSED ON STAGE 2 CHRONIC KIDNEY DISEASE, UNSPECIFIED ACUTE RENAL FAILURE TYPE (HCC): ICD-10-CM

## 2022-05-18 LAB
ANION GAP SERPL CALCULATED.3IONS-SCNC: 7 MMOL/L (ref 4–13)
BUN SERPL-MCNC: 41 MG/DL (ref 5–25)
CALCIUM SERPL-MCNC: 9.4 MG/DL (ref 8.4–10.2)
CHLORIDE SERPL-SCNC: 96 MMOL/L (ref 96–108)
CO2 SERPL-SCNC: 35 MMOL/L (ref 21–32)
CREAT SERPL-MCNC: 1.22 MG/DL (ref 0.6–1.3)
GFR SERPL CREATININE-BSD FRML MDRD: 43 ML/MIN/1.73SQ M
GLUCOSE P FAST SERPL-MCNC: 119 MG/DL (ref 65–99)
POTASSIUM SERPL-SCNC: 3.6 MMOL/L (ref 3.5–5.3)
SODIUM SERPL-SCNC: 138 MMOL/L (ref 135–147)

## 2022-05-18 PROCEDURE — 36415 COLL VENOUS BLD VENIPUNCTURE: CPT

## 2022-05-18 PROCEDURE — 80048 BASIC METABOLIC PNL TOTAL CA: CPT

## 2022-05-19 ENCOUNTER — OFFICE VISIT (OUTPATIENT)
Dept: INTERNAL MEDICINE CLINIC | Facility: CLINIC | Age: 77
End: 2022-05-19
Payer: COMMERCIAL

## 2022-05-19 VITALS
OXYGEN SATURATION: 94 % | RESPIRATION RATE: 20 BRPM | BODY MASS INDEX: 42.69 KG/M2 | HEART RATE: 90 BPM | TEMPERATURE: 98.6 F | SYSTOLIC BLOOD PRESSURE: 142 MMHG | DIASTOLIC BLOOD PRESSURE: 80 MMHG | WEIGHT: 233.4 LBS

## 2022-05-19 DIAGNOSIS — L85.3 XEROSIS OF SKIN: ICD-10-CM

## 2022-05-19 DIAGNOSIS — L50.9 URTICARIA: ICD-10-CM

## 2022-05-19 DIAGNOSIS — I48.19 PERSISTENT ATRIAL FIBRILLATION (HCC): ICD-10-CM

## 2022-05-19 DIAGNOSIS — I10 ESSENTIAL HYPERTENSION: ICD-10-CM

## 2022-05-19 DIAGNOSIS — I50.32 CHRONIC DIASTOLIC HEART FAILURE (HCC): Primary | ICD-10-CM

## 2022-05-19 PROCEDURE — 3077F SYST BP >= 140 MM HG: CPT | Performed by: INTERNAL MEDICINE

## 2022-05-19 PROCEDURE — 3079F DIAST BP 80-89 MM HG: CPT | Performed by: INTERNAL MEDICINE

## 2022-05-19 PROCEDURE — 99214 OFFICE O/P EST MOD 30 MIN: CPT | Performed by: INTERNAL MEDICINE

## 2022-05-19 PROCEDURE — 1036F TOBACCO NON-USER: CPT | Performed by: INTERNAL MEDICINE

## 2022-05-19 PROCEDURE — 1160F RVW MEDS BY RX/DR IN RCRD: CPT | Performed by: INTERNAL MEDICINE

## 2022-05-19 RX ORDER — AMMONIUM LACTATE 12 G/100G
CREAM TOPICAL 2 TIMES DAILY
Qty: 385 G | Refills: 0 | Status: SHIPPED | OUTPATIENT
Start: 2022-05-19 | End: 2022-07-30

## 2022-05-19 RX ORDER — HYDROCHLOROTHIAZIDE 25 MG/1
25 TABLET ORAL DAILY
Qty: 90 TABLET | Refills: 1 | Status: SHIPPED | OUTPATIENT
Start: 2022-05-19 | End: 2022-07-30

## 2022-05-19 RX ORDER — CETIRIZINE HYDROCHLORIDE 10 MG/1
10 TABLET ORAL DAILY
Qty: 30 TABLET | Refills: 0 | Status: SHIPPED | OUTPATIENT
Start: 2022-05-19 | End: 2022-08-03

## 2022-05-19 RX ORDER — LOSARTAN POTASSIUM 100 MG/1
100 TABLET ORAL DAILY
Qty: 90 TABLET | Refills: 1 | Status: SHIPPED | OUTPATIENT
Start: 2022-05-19

## 2022-05-19 RX ORDER — OXYCODONE AND ACETAMINOPHEN 10; 325 MG/1; MG/1
TABLET ORAL
COMMUNITY
Start: 2022-04-27 | End: 2022-07-30

## 2022-05-19 NOTE — PATIENT INSTRUCTIONS
Maintain 1800 ml fluid restriction a day and 2g sodium restriction  Weigh yourself without clothing at the same time each day  Record your weight  Please call your doctor if you have a sudden weight gain of more than 2-3lbs (1-1 3kg) in 1-2 days or 5lbs (2 3kg) in a week  Apply Amlactin (lac-hydrin) once daily after shower to lower legs  Apply hydrocortisone cream twice a day to chest area only  Take cetirizine 10mg daily     You can stop taking hydrocortisone cream and cetirizine when rash goes away

## 2022-05-20 ENCOUNTER — TELEPHONE (OUTPATIENT)
Dept: INTERNAL MEDICINE CLINIC | Facility: CLINIC | Age: 77
End: 2022-05-20

## 2022-05-20 NOTE — TELEPHONE ENCOUNTER
Pt  called was not sure of meds if any changes occurred at visit specifically any change or discontinuation of losarten or torsemide  I told him to keep on what she was as s I did not see any changes but will have a provider review   Thank you

## 2022-05-22 NOTE — ASSESSMENT & PLAN NOTE
Wt Readings from Last 3 Encounters:   05/19/22 106 kg (233 lb 6 4 oz)   04/21/22 109 kg (239 lb 6 4 oz)   03/21/22 109 kg (240 lb)     -6lbs from last visit on 4/2, there is improvement in her LE edema  No orthopnea or PND  Renal fxn is stable on HCTZ and torsemide  Continue torsemide 40mg daily and HCTZ 25mg daily  2g Na and 1500-1800ml fluid restriction  Weigh yourself without clothing at the same time each day  Record your weight  Please call your doctor if you have a sudden weight gain of more than 2-3lbs (1-1 3kg) in 1-2 days or 5lbs (2 3kg) in a week

## 2022-05-22 NOTE — ASSESSMENT & PLAN NOTE
· Continue Coreg 25mg BID for rate control  AC with Eliquis not affordable  Office staff will be looking into medication assistance program   Were able to procure 2 week supply for time being  · Patient would not be a good candidate for coumadin - she was on [facility] with coumadin prior and developed bleeding from supratherapeutic INR

## 2022-05-22 NOTE — PROGRESS NOTES
INTERNAL MEDICINE FOLLOW-UP OFFICE VISIT  [facility] INTERNAL MEDICINE [place]    NAME: Chucho Rodríguez  AGE: 77 y o  SEX: female  : 1945     DATE: 2022     Assessment and Plan:     Problem List Items Addressed This Visit        Cardiovascular and Mediastinum    Essential hypertension     · /90  · Continue Losartan 100mg daily, HCTZ 25mg daily, Norvasc 5mg BID, Coreg 25mg BID, and torsemide 40mg daily           Relevant Medications    hydrochlorothiazide (HYDRODIURIL) 25 mg tablet    losartan (COZAAR) 100 MG tablet    Atrial fibrillation (HCC)     · Continue Coreg 25mg BID for rate control  AC with Eliquis not affordable  Office staff will be looking into medication assistance program   Were able to procure 2 week supply for time being  · Patient would not be a good candidate for coumadin - she was on [facility] with coumadin prior and developed bleeding from supratherapeutic INR  Chronic diastolic heart failure (HCC) - Primary     Wt Readings from Last 3 Encounters:   22 106 kg (233 lb 6 4 oz)   22 109 kg (239 lb 6 4 oz)   22 109 kg (240 lb)     -6lbs from last visit on , there is improvement in her LE edema  No orthopnea or PND  Renal fxn is stable on HCTZ and torsemide  Continue torsemide 40mg daily and HCTZ 25mg daily  2g Na and 1500-1800ml fluid restriction  Weigh yourself without clothing at the same time each day  Record your weight  Please call your doctor if you have a sudden weight gain of more than 2-3lbs (1-1 3kg) in 1-2 days or 5lbs (2 3kg) in a week  Musculoskeletal and Integument    Xerosis of skin    Relevant Medications    ammonium lactate (LAC-HYDRIN) 12 % cream    hydrocortisone 2 5 % cream    Urticaria    Relevant Medications    ammonium lactate (LAC-HYDRIN) 12 % cream    cetirizine (ZyrTEC) 10 mg tablet    hydrocortisone 2 5 % cream          Return in about 1 month (around 2022)  Chief Complaint:     No chief complaint on file  History of Present Illness:     Ms Sadaf Matthews was seen in follow-up today  She is accompanied by her   Patient is doing well since her last visit last month  She maintains compliance with her medication regimen as well as dietary restrictions  She has lost an additional 6lbs and feels that she continues to urinate well with her diuretic regimen  She denies any orthopnea or PND  No chest pains  She did develop an urticarial rash on her breast and upper back, has trialed OTC 1% hydrocortisone without relief  No recent changes in detergent, body lotions, or perfumes  No associated allergies  No fevers  The following portions of the patient's history were reviewed and updated as appropriate: allergies, current medications, past family history, past medical history, past social history, past surgical history and problem list      Review of Systems:     Review of Systems   Constitutional: Positive for fatigue  Negative for activity change, chills and fever  HENT: Negative for congestion  Respiratory: Negative for cough, shortness of breath and wheezing  Cardiovascular: Positive for leg swelling  Negative for chest pain and palpitations  Gastrointestinal: Negative for blood in stool, diarrhea, nausea and vomiting  Genitourinary: Negative for difficulty urinating  Musculoskeletal: Positive for arthralgias, back pain, gait problem and myalgias  Skin: Positive for rash  Neurological: Negative for dizziness, seizures, syncope, weakness and headaches          Problem List:     Patient Active Problem List   Diagnosis    Chronic venous insufficiency    Essential hypertension    Chronic low back pain with sciatica    Acute renal failure superimposed on stage 2/3 chronic kidney disease ([facility]    Mixed hyperlipidemia    Obesity    Osteoarthritis of knee    Sjogren's syndrome ([facility]    [facility]    Opioid dependence due to Chronic back pain     Anxiety    Chronic diastolic heart failure (HCC)    Memory impairment    Other specified anemias    Prediabetes    Abnormal CXR    Encounter for screening mammogram for malignant neoplasm of breast    Seasonal allergies    Xerosis of skin    Mouth lesion    Urticaria        Objective:     /80 (BP Location: Right arm, Patient Position: Sitting, Cuff Size: Large)   Pulse 90   Temp 98 6 °F (37 °C)   Resp 20   Wt 106 kg (233 lb 6 4 oz)   SpO2 94%   BMI 42 69 kg/m²     Physical Exam  Vitals reviewed  Constitutional:       General: She is not in acute distress  Appearance: She is obese  She is not ill-appearing, toxic-appearing or diaphoretic  Cardiovascular:      Rate and Rhythm: Normal rate  Rhythm irregular  Heart sounds: Murmur heard  Pulmonary:      Effort: No respiratory distress  Breath sounds: No wheezing, rhonchi or rales  Abdominal:      General: There is no distension  Palpations: There is no mass  Tenderness: There is no abdominal tenderness  There is no guarding  Musculoskeletal:         General: Swelling present  Right lower leg: Edema present  Left lower leg: Edema present  Skin:     General: Skin is warm and dry  Findings: Rash (on breast and upper back) present  No erythema  Neurological:      Mental Status: Mental status is at baseline           Pertinent Laboratory/Diagnostic Studies:    Laboratory Results: I have personally reviewed the pertinent laboratory results/reports     CBC:   Results from Last 12 Months   Lab Units 02/12/22  0501   WBC Thousand/uL 8 02   RBC Million/uL 3 68*   HEMOGLOBIN g/dL 9 6*   HEMATOCRIT % 33 1*   MCV fL 90   MCH pg 26 1*   MCHC g/dL 29 0*   RDW % 15 9*   MPV fL 11 8   PLATELETS Thousands/uL 298   NRBC AUTO /100 WBCs 0   NEUTROS PCT % 67   LYMPHS PCT % 14   MONOS PCT % 13*   EOS PCT % 6   BASOS PCT % 0   NEUTROS ABS Thousands/µL 5 27   LYMPHS ABS Thousands/µL 1 13 MONOS ABS Thousand/µL 1 05   EOS ABS Thousand/µL 0 51     Chemistry Profile:   Results from Last 12 Months   Lab Units 05/18/22  1307 04/30/22  1036 02/18/22  1000 02/12/22  0501 02/11/22 2000 12/30/21  0609 12/29/21  2313   POTASSIUM mmol/L 3 6   < > 4 0   < >  --    < >  --    CHLORIDE mmol/L 96   < > 98*   < >  --    < >  --    CO2 mmol/L 35*   < > 33*   < >  --    < >  --    CO2, I-STAT mmol/L  --   --   --   --   --   --  28   BUN mg/dL 41*   < > 34*   < >  --    < >  --    CREATININE mg/dL 1 22   < > 1 31*   < >  --    < >  --    GLUCOSE FASTING mg/dL 119*   < >  --   --   --   --   --    GLUCOSE RANDOM mg/dL  --   --  130   < >  --    < >  --    GLUCOSE, ISTAT mg/dl  --   --   --   --   --   --  129   CALCIUM mg/dL 9 4   < > 9 6   < >  --    < >  --    CORRECTED CALCIUM mg/dL  --   --  10 1  --   --    < >  --    MAGNESIUM mg/dL  --   --   --   --  2 0   < >  --    AST U/L  --   --  13  --   --    < >  --    ALT U/L  --   --  18  --   --    < >  --    ALK PHOS U/L  --   --  108  --   --    < >  --    EGFR ml/min/1 73sq m 43   < > 39   < >  --    < >  --     < > = values in this interval not displayed  Radiology/Other Diagnostic Testing Results: I have personally reviewed pertinent reports        Kelly Vila MD  [facility] INTERNAL MEDICINE [place]

## 2022-05-22 NOTE — ASSESSMENT & PLAN NOTE
· /90  · Continue Losartan 100mg daily, HCTZ 25mg daily, Norvasc 5mg BID, Coreg 25mg BID, and torsemide 40mg daily

## 2022-05-23 ENCOUNTER — TELEPHONE (OUTPATIENT)
Dept: PULMONOLOGY | Facility: CLINIC | Age: 77
End: 2022-05-23

## 2022-05-24 NOTE — TELEPHONE ENCOUNTER
called back and I told him about medications, he understood and asked about eliquis  He also asked if [facility]   [facility] also called we can fax the d/c to [phone]

## 2022-05-25 NOTE — TELEPHONE ENCOUNTER
I did a walk with this patient in the office she was only able to walk for 4 minutes she started with a sat of 94 and finished with the low being 89 percent  I am not sure if we are ok to discontiunue the oxygen  Please review Thank you  They said she does not use it at home and would like to have it discontinued   Thank you

## 2022-05-26 NOTE — TELEPHONE ENCOUNTER
I spoke with patient's   Patient has been maintaining O2 saturations above 90% at rest and on ambulation and has not used Oxygen supplements for the last few months  It is ok to discontinue the O2 tank  I advised patient and her  to keep monitoring O2 saturations and to reach out to our office if it drops below 90%

## 2022-06-01 DIAGNOSIS — I48.11 LONGSTANDING PERSISTENT ATRIAL FIBRILLATION (HCC): ICD-10-CM

## 2022-06-08 ENCOUNTER — TELEMEDICINE (OUTPATIENT)
Dept: INTERNAL MEDICINE CLINIC | Facility: CLINIC | Age: 77
End: 2022-06-08
Payer: COMMERCIAL

## 2022-06-08 DIAGNOSIS — G89.29 CHRONIC LOW BACK PAIN WITH SCIATICA, SCIATICA LATERALITY UNSPECIFIED, UNSPECIFIED BACK PAIN LATERALITY: Primary | ICD-10-CM

## 2022-06-08 DIAGNOSIS — M54.40 CHRONIC LOW BACK PAIN WITH SCIATICA, SCIATICA LATERALITY UNSPECIFIED, UNSPECIFIED BACK PAIN LATERALITY: Primary | ICD-10-CM

## 2022-06-08 DIAGNOSIS — I50.32 CHRONIC DIASTOLIC HEART FAILURE (HCC): ICD-10-CM

## 2022-06-08 DIAGNOSIS — I48.19 PERSISTENT ATRIAL FIBRILLATION (HCC): ICD-10-CM

## 2022-06-08 DIAGNOSIS — I10 ESSENTIAL HYPERTENSION: ICD-10-CM

## 2022-06-08 PROCEDURE — 1160F RVW MEDS BY RX/DR IN RCRD: CPT | Performed by: INTERNAL MEDICINE

## 2022-06-08 PROCEDURE — 99213 OFFICE O/P EST LOW 20 MIN: CPT | Performed by: INTERNAL MEDICINE

## 2022-06-08 PROCEDURE — 1036F TOBACCO NON-USER: CPT | Performed by: INTERNAL MEDICINE

## 2022-06-08 NOTE — ASSESSMENT & PLAN NOTE
· Patient reports medication compliance  · Continue Losartan 100 mg daily, HCTZ 25 mg daily, Norvasc 5 mg daily, Coreg 25 mg BID, and Torsemide 40 mg daily

## 2022-06-08 NOTE — PROGRESS NOTES
Virtual Regular Visit    Verification of patient location:    Patient is located in the following state in which I hold an active license [place]      Assessment/Plan:    Problem List Items Addressed This Visit        Cardiovascular and Mediastinum    Essential hypertension     · Patient reports medication compliance  · Continue Losartan 100 mg daily, HCTZ 25 mg daily, Norvasc 5 mg daily, Coreg 25 mg BID, and Torsemide 40 mg daily  Atrial fibrillation ([facility] )     · Patient denies any palpitations recently  · Continue Coreg for rate control  · Continue Eliquis  · Patient reports they are having trouble affording the Eliquis  Will look into medication assistance program            Chronic diastolic heart failure ([facility] )     Wt Readings from Last 3 Encounters:   05/19/22 106 kg (233 lb 6 4 oz)   04/21/22 109 kg (239 lb 6 4 oz)   03/21/22 109 kg (240 lb)     · Patient denies current shortness of breath  · She reports losing weight since starting the Torsemide  · She is compliant with her medications  · Plan:  · Continue Torsemide 40 mg daily and HCTZ 25 mg daily  · Sodium and fluid restriction                  Nervous and Auditory    Chronic low back pain with sciatica - Primary     · Patient reports worsening lower extremity pain, especially on her feet  She states the pain is coming from her lower back and is currently interfering with ambulation  · Referral to comprehensive spine program was offered, however patient declined  · Patient will follow with pain medicine next month  · Follow up with us in 4 weeks  If pain persists, consider starting Gabapentin or Lyrica if patient is agreeable  BMI Counseling: There is no height or weight on file to calculate BMI  Follow-up plan was not completed due to elderly patient (73 years old) where weight reduction/weight gain would complicate underlying health condition such as: illness or physical disability           Reason for visit is Chief Complaint   Patient presents with    Virtual Regular Visit        Encounter provider Shashi Montero MD    Provider located at [address] [facility] Sarah Rose [address] [place] [address]  [phone]      Recent Visits  No visits were found meeting these conditions  Showing recent visits within past 7 days and meeting all other requirements  Today's Visits  Date Type Provider Dept   06/08/22 Matilda Roper MD  Internal Med [facility]   Showing today's visits and meeting all other requirements  Future Appointments  No visits were found meeting these conditions  Showing future appointments within next 150 days and meeting all other requirements       The patient was identified by name and date of birth  Lisa Pizano was informed that this is a telemedicine visit and that the visit is being conducted through Telephone  My office door was closed  No one else was in the room  She acknowledged consent and understanding of privacy and security of the video platform  The patient has agreed to participate and understands they can discontinue the visit at any time  Patient is aware this is a billable service  Subjective  Lisa Pizano is a 77 y o  female       HPI   Patient is a 77year old female with a PMHx of HTN, A-fib on Eliquis, HFpEF, chronic back pain, lymphedema of lower extremities, was contacted for telemedicine visit  Patient states she is overall doing "okay", however she has been experiencing worsening pain in her lower extremities which she attributes to "the spinal problems and not the lymphedema"  She has an appointment with pain medicine next month  She reports being complaint with all her medications         Past Medical History:   Diagnosis Date    A-fib [facility]) 12/29/2021    Anxiety     Arthritis     Back pain     Cellulitis     CHF (congestive heart failure) (HCC)     Edema of both lower extremities due to peripheral venous insufficiency     Edema of both lower extremities due to peripheral venous insufficiency     Erythema of lower extremity 11/12/2021    Fibromyalgia     Hypertension     Sjogren syndrome, unspecified ([facility] )        Past Surgical History:   Procedure Laterality Date    KNEE CARTILAGE SURGERY      REPLACEMENT TOTAL KNEE BILATERAL         Current Outpatient Medications   Medication Sig Dispense Refill    acetaminophen (TYLENOL) 325 mg tablet Take 650 mg by mouth every 6 (six) hours as needed for mild pain   (Patient not taking: Reported on 4/21/2022 )      ammonium lactate (LAC-HYDRIN) 12 % cream Apply topically 2 (two) times a day 385 g 0    apixaban (Eliquis) 5 mg Take 1 tablet (5 mg total) by mouth 2 (two) times a day 60 tablet 0    Blood Pressure KIT Use daily (Patient not taking: Reported on 4/21/2022 ) 1 kit 0    Blood Pressure Monitoring (Blood Pressure Kit) EULALIA Use 1 Device in the morning (Patient not taking: Reported on 4/21/2022 ) 1 each 0    carvedilol (COREG) 25 mg tablet Take 1 tablet (25 mg total) by mouth 2 (two) times a day with meals 180 tablet 1    cetirizine (ZyrTEC) 10 mg tablet Take 1 tablet (10 mg total) by mouth in the morning  30 tablet 0    Diclofenac Sodium (Voltaren) 1 % Voltaren 1 % topical gel      fluticasone (FLONASE) 50 mcg/act nasal spray 1 spray into each nostril daily 9 9 mL 1    hydrochlorothiazide (HYDRODIURIL) 25 mg tablet Take 1 tablet (25 mg total) by mouth in the morning  90 tablet 1    hydrocortisone 2 5 % cream Apply topically 2 (two) times a day 30 g 0    lidocaine (LIDODERM) 5 %       losartan (COZAAR) 100 MG tablet Take 1 tablet (100 mg total) by mouth in the morning   90 tablet 1    oxyCODONE (OxyCONTIN) 20 mg 12 hr tablet Take 1 tablet (20 mg total) by mouth every 12 (twelve) hours Max Daily Amount: 40 mg 2 tablet 0    oxyCODONE-acetaminophen (PERCOCET)  mg per tablet       potassium chloride (K-DUR,KLOR-CON) 20 mEq tablet Take 2 tablets (40 mEq total) by mouth daily 180 tablet 1    tiZANidine (ZANAFLEX) 4 mg tablet tizanidine 4 mg tablet   TAKE ONE TABLET BY MOUTH EVERY 8 HOURS AS NEEDED FOR spasm      torsemide (DEMADEX) 20 mg tablet Take 2 tablets (40 mg total) by mouth daily 180 tablet 0    Vitamin D, Cholecalciferol, 25 MCG (1000 UT) TABS Vitamin D3 25 mcg (1,000 unit) capsule   one PO QD       No current facility-administered medications for this visit  No Known Allergies    Review of Systems   Constitutional: Positive for activity change  Negative for diaphoresis and fever  HENT: Negative  Respiratory: Negative for cough, shortness of breath, wheezing and stridor  Cardiovascular: Positive for leg swelling (chronic  stable  )  Negative for chest pain and palpitations  Gastrointestinal: Negative for abdominal pain, constipation, diarrhea, nausea and vomiting  Musculoskeletal: Positive for arthralgias, back pain, gait problem and myalgias  Neurological: Negative for dizziness and light-headedness  Video Exam    There were no vitals filed for this visit  Physical Exam  Constitutional:       General: She is not in acute distress  Pulmonary:      Comments: Patient able to speak in full sentences  No evidence of respiratory distress during virtual visit  Neurological:      General: No focal deficit present  Mental Status: She is alert and oriented to person, place, and time  Psychiatric:         Mood and Affect: Mood normal             I spent 10 minutes directly with the patient during this visit    VIRTUAL VISIT Marco Antonio 38 verbally agrees to participate in [employer]  Pt is aware that [employer] could be limited without vital signs or the ability to perform a full hands-on physical Alecia Caal understands she or the provider may request at any time to terminate the video visit and request the patient to seek care or treatment in person

## 2022-06-08 NOTE — ASSESSMENT & PLAN NOTE
· Patient reports worsening lower extremity pain, especially on her feet  She states the pain is coming from her lower back and is currently interfering with ambulation  · Referral to comprehensive spine program was offered, however patient declined  · Patient will follow with pain medicine next month  · Follow up with us in 4 weeks  If pain persists, consider starting Gabapentin or Lyrica if patient is agreeable

## 2022-06-08 NOTE — ASSESSMENT & PLAN NOTE
Wt Readings from Last 3 Encounters:   05/19/22 106 kg (233 lb 6 4 oz)   04/21/22 109 kg (239 lb 6 4 oz)   03/21/22 109 kg (240 lb)     · Patient denies current shortness of breath  · She reports losing weight since starting the Torsemide  · She is compliant with her medications  · Plan:  · Continue Torsemide 40 mg daily and HCTZ 25 mg daily    · Sodium and fluid restriction

## 2022-06-08 NOTE — ASSESSMENT & PLAN NOTE
· Patient denies any palpitations recently  · Continue Coreg for rate control  · Continue Eliquis  · Patient reports they are having trouble affording the Eliquis   Will look into medication assistance program

## 2022-07-06 DIAGNOSIS — I48.11 LONGSTANDING PERSISTENT ATRIAL FIBRILLATION (HCC): ICD-10-CM

## 2022-07-11 ENCOUNTER — TELEPHONE (OUTPATIENT)
Dept: CARDIOLOGY CLINIC | Facility: CLINIC | Age: 77
End: 2022-07-11

## 2022-07-11 NOTE — TELEPHONE ENCOUNTER
Patient scheduled for a Transforaminal Epidural Steroid Injection with [facility]    Please advise if ok to hold 2 days prior

## 2022-07-11 NOTE — TELEPHONE ENCOUNTER
Yes it is fine for her to hold the Eliquis for 2 days prior to the procedure, as requested by the surgeon  Thank you

## 2022-07-20 ENCOUNTER — TELEPHONE (OUTPATIENT)
Dept: INTERNAL MEDICINE CLINIC | Facility: CLINIC | Age: 77
End: 2022-07-20

## 2022-07-20 NOTE — TELEPHONE ENCOUNTER
came in very concerned about isaura, he states her ,memory is getting worse, she has gained 20 pounds, she is not watching what she is eating , sodium diet, legs are swollen, difficulty breathing she fell last nite had to call ambulance to pick her up, he is concerned he is going to find her dead  was wondering if she might be able to get a home health aide  Also she has only this week left for her eliquis , can we get her more from company?

## 2022-07-20 NOTE — TELEPHONE ENCOUNTER
Patient needs to come to the office for an evaluation if all that is happening to her, we can help her with all the above but a visit in the office needs to be done to document all the issues she is having and place appropriate referrals as well as optimizing her treatment

## 2022-07-28 ENCOUNTER — APPOINTMENT (EMERGENCY)
Dept: RADIOLOGY | Facility: HOSPITAL | Age: 77
DRG: 291 | End: 2022-07-28
Payer: COMMERCIAL

## 2022-07-28 ENCOUNTER — HOSPITAL ENCOUNTER (INPATIENT)
Facility: HOSPITAL | Age: 77
LOS: 2 days | Discharge: HOME | DRG: 291 | End: 2022-07-30
Attending: EMERGENCY MEDICINE | Admitting: INTERNAL MEDICINE
Payer: COMMERCIAL

## 2022-07-28 DIAGNOSIS — I50.9 CHF (CONGESTIVE HEART FAILURE) (HCC): ICD-10-CM

## 2022-07-28 DIAGNOSIS — R41.3 MEMORY IMPAIRMENT: ICD-10-CM

## 2022-07-28 DIAGNOSIS — I87.2 CHRONIC VENOUS INSUFFICIENCY: ICD-10-CM

## 2022-07-28 DIAGNOSIS — L03.116 BILATERAL CELLULITIS OF LOWER LEG: Primary | ICD-10-CM

## 2022-07-28 DIAGNOSIS — R74.8 ELEVATED CK: ICD-10-CM

## 2022-07-28 DIAGNOSIS — L03.115 BILATERAL CELLULITIS OF LOWER LEG: Primary | ICD-10-CM

## 2022-07-28 DIAGNOSIS — R60.0 BILATERAL LOWER EXTREMITY EDEMA: ICD-10-CM

## 2022-07-28 DIAGNOSIS — L85.3 XEROSIS OF SKIN: ICD-10-CM

## 2022-07-28 DIAGNOSIS — R26.2 AMBULATORY DYSFUNCTION: ICD-10-CM

## 2022-07-28 LAB
2HR DELTA HS TROPONIN: 0 NG/L
4HR DELTA HS TROPONIN: -1 NG/L
ALBUMIN SERPL BCP-MCNC: 3.8 G/DL (ref 3.5–5)
ALP SERPL-CCNC: 76 U/L (ref 34–104)
ALT SERPL W P-5'-P-CCNC: 18 U/L (ref 7–52)
ANION GAP SERPL CALCULATED.3IONS-SCNC: 8 MMOL/L (ref 4–13)
APTT PPP: 29 SECONDS (ref 23–37)
AST SERPL W P-5'-P-CCNC: 38 U/L (ref 13–39)
ATRIAL RATE: 416 BPM
BASOPHILS # BLD AUTO: 0.05 THOUSANDS/ΜL (ref 0–0.1)
BASOPHILS NFR BLD AUTO: 1 % (ref 0–1)
BILIRUB SERPL-MCNC: 0.66 MG/DL (ref 0.2–1)
BNP SERPL-MCNC: 125 PG/ML (ref 0–100)
BUN SERPL-MCNC: 15 MG/DL (ref 5–25)
CALCIUM SERPL-MCNC: 9.7 MG/DL (ref 8.4–10.2)
CARDIAC TROPONIN I PNL SERPL HS: 12 NG/L
CARDIAC TROPONIN I PNL SERPL HS: 13 NG/L
CARDIAC TROPONIN I PNL SERPL HS: 13 NG/L
CHLORIDE SERPL-SCNC: 100 MMOL/L (ref 96–108)
CK MB SERPL-MCNC: 14.7 NG/ML (ref 0.6–6.3)
CK MB SERPL-MCNC: 2 % (ref 0–2.5)
CK SERPL-CCNC: 734 U/L (ref 26–192)
CO2 SERPL-SCNC: 31 MMOL/L (ref 21–32)
CREAT SERPL-MCNC: 0.89 MG/DL (ref 0.6–1.3)
EOSINOPHIL # BLD AUTO: 0.4 THOUSAND/ΜL (ref 0–0.61)
EOSINOPHIL NFR BLD AUTO: 4 % (ref 0–6)
ERYTHROCYTE [DISTWIDTH] IN BLOOD BY AUTOMATED COUNT: 16 % (ref 11.6–15.1)
GFR SERPL CREATININE-BSD FRML MDRD: 63 ML/MIN/1.73SQ M
GLUCOSE SERPL-MCNC: 144 MG/DL (ref 65–140)
HCT VFR BLD AUTO: 33.8 % (ref 34.8–46.1)
HGB BLD-MCNC: 10.4 G/DL (ref 11.5–15.4)
IMM GRANULOCYTES # BLD AUTO: 0.05 THOUSAND/UL (ref 0–0.2)
IMM GRANULOCYTES NFR BLD AUTO: 1 % (ref 0–2)
INR PPP: 1.13 (ref 0.84–1.19)
LACTATE SERPL-SCNC: 0.9 MMOL/L (ref 0.5–2)
LYMPHOCYTES # BLD AUTO: 1.55 THOUSANDS/ΜL (ref 0.6–4.47)
LYMPHOCYTES NFR BLD AUTO: 15 % (ref 14–44)
MCH RBC QN AUTO: 27.8 PG (ref 26.8–34.3)
MCHC RBC AUTO-ENTMCNC: 30.8 G/DL (ref 31.4–37.4)
MCV RBC AUTO: 90 FL (ref 82–98)
MONOCYTES # BLD AUTO: 0.99 THOUSAND/ΜL (ref 0.17–1.22)
MONOCYTES NFR BLD AUTO: 10 % (ref 4–12)
NEUTROPHILS # BLD AUTO: 7.07 THOUSANDS/ΜL (ref 1.85–7.62)
NEUTS SEG NFR BLD AUTO: 69 % (ref 43–75)
NRBC BLD AUTO-RTO: 0 /100 WBCS
PLATELET # BLD AUTO: 355 THOUSANDS/UL (ref 149–390)
PMV BLD AUTO: 10.7 FL (ref 8.9–12.7)
POTASSIUM SERPL-SCNC: 3.4 MMOL/L (ref 3.5–5.3)
PROT SERPL-MCNC: 6.8 G/DL (ref 6.4–8.4)
PROTHROMBIN TIME: 14.5 SECONDS (ref 11.6–14.5)
QRS AXIS: 58 DEGREES
QRSD INTERVAL: 96 MS
QT INTERVAL: 428 MS
QTC INTERVAL: 430 MS
RBC # BLD AUTO: 3.74 MILLION/UL (ref 3.81–5.12)
SODIUM SERPL-SCNC: 139 MMOL/L (ref 135–147)
T WAVE AXIS: 23 DEGREES
TSH SERPL DL<=0.05 MIU/L-ACNC: 1.11 UIU/ML (ref 0.45–4.5)
VENTRICULAR RATE: 61 BPM
WBC # BLD AUTO: 10.11 THOUSAND/UL (ref 4.31–10.16)

## 2022-07-28 PROCEDURE — 82553 CREATINE MB FRACTION: CPT | Performed by: EMERGENCY MEDICINE

## 2022-07-28 PROCEDURE — 87040 BLOOD CULTURE FOR BACTERIA: CPT | Performed by: EMERGENCY MEDICINE

## 2022-07-28 PROCEDURE — 85730 THROMBOPLASTIN TIME PARTIAL: CPT | Performed by: EMERGENCY MEDICINE

## 2022-07-28 PROCEDURE — 96375 TX/PRO/DX INJ NEW DRUG ADDON: CPT

## 2022-07-28 PROCEDURE — 83605 ASSAY OF LACTIC ACID: CPT | Performed by: EMERGENCY MEDICINE

## 2022-07-28 PROCEDURE — 93010 ELECTROCARDIOGRAM REPORT: CPT | Performed by: INTERNAL MEDICINE

## 2022-07-28 PROCEDURE — 73620 X-RAY EXAM OF FOOT: CPT

## 2022-07-28 PROCEDURE — 99285 EMERGENCY DEPT VISIT HI MDM: CPT

## 2022-07-28 PROCEDURE — 99223 1ST HOSP IP/OBS HIGH 75: CPT | Performed by: INTERNAL MEDICINE

## 2022-07-28 PROCEDURE — 99285 EMERGENCY DEPT VISIT HI MDM: CPT | Performed by: EMERGENCY MEDICINE

## 2022-07-28 PROCEDURE — 71045 X-RAY EXAM CHEST 1 VIEW: CPT

## 2022-07-28 PROCEDURE — 93005 ELECTROCARDIOGRAM TRACING: CPT

## 2022-07-28 PROCEDURE — 72170 X-RAY EXAM OF PELVIS: CPT

## 2022-07-28 PROCEDURE — 84443 ASSAY THYROID STIM HORMONE: CPT | Performed by: EMERGENCY MEDICINE

## 2022-07-28 PROCEDURE — 82550 ASSAY OF CK (CPK): CPT | Performed by: EMERGENCY MEDICINE

## 2022-07-28 PROCEDURE — 96365 THER/PROPH/DIAG IV INF INIT: CPT

## 2022-07-28 PROCEDURE — 85025 COMPLETE CBC W/AUTO DIFF WBC: CPT | Performed by: EMERGENCY MEDICINE

## 2022-07-28 PROCEDURE — 80053 COMPREHEN METABOLIC PANEL: CPT | Performed by: EMERGENCY MEDICINE

## 2022-07-28 PROCEDURE — 36415 COLL VENOUS BLD VENIPUNCTURE: CPT | Performed by: EMERGENCY MEDICINE

## 2022-07-28 PROCEDURE — 84484 ASSAY OF TROPONIN QUANT: CPT | Performed by: EMERGENCY MEDICINE

## 2022-07-28 PROCEDURE — 83880 ASSAY OF NATRIURETIC PEPTIDE: CPT | Performed by: EMERGENCY MEDICINE

## 2022-07-28 PROCEDURE — 73590 X-RAY EXAM OF LOWER LEG: CPT

## 2022-07-28 PROCEDURE — 85610 PROTHROMBIN TIME: CPT | Performed by: EMERGENCY MEDICINE

## 2022-07-28 RX ORDER — LORATADINE 10 MG/1
5 TABLET ORAL DAILY
Refills: 0 | Status: DISCONTINUED | OUTPATIENT
Start: 2022-07-28 | End: 2022-07-30 | Stop reason: HOSPADM

## 2022-07-28 RX ORDER — CARVEDILOL 12.5 MG/1
25 TABLET ORAL 2 TIMES DAILY WITH MEALS
Status: DISCONTINUED | OUTPATIENT
Start: 2022-07-28 | End: 2022-07-30 | Stop reason: HOSPADM

## 2022-07-28 RX ORDER — OXYCODONE HCL 20 MG/1
20 TABLET, FILM COATED, EXTENDED RELEASE ORAL EVERY 8 HOURS SCHEDULED
Status: DISCONTINUED | OUTPATIENT
Start: 2022-07-28 | End: 2022-07-30 | Stop reason: HOSPADM

## 2022-07-28 RX ORDER — OXYCODONE HYDROCHLORIDE 5 MG/1
5 TABLET ORAL EVERY 4 HOURS PRN
Status: DISCONTINUED | OUTPATIENT
Start: 2022-07-28 | End: 2022-07-30 | Stop reason: HOSPADM

## 2022-07-28 RX ORDER — LOSARTAN POTASSIUM 50 MG/1
100 TABLET ORAL DAILY
Status: DISCONTINUED | OUTPATIENT
Start: 2022-07-28 | End: 2022-07-30 | Stop reason: HOSPADM

## 2022-07-28 RX ORDER — HYDROCHLOROTHIAZIDE 25 MG/1
25 TABLET ORAL DAILY
Status: DISCONTINUED | OUTPATIENT
Start: 2022-07-28 | End: 2022-07-28 | Stop reason: SDUPTHER

## 2022-07-28 RX ORDER — ACETAMINOPHEN 325 MG/1
650 TABLET ORAL EVERY 6 HOURS PRN
Status: DISCONTINUED | OUTPATIENT
Start: 2022-07-28 | End: 2022-07-30 | Stop reason: HOSPADM

## 2022-07-28 RX ORDER — OXYCODONE HYDROCHLORIDE AND ACETAMINOPHEN 5; 325 MG/1; MG/1
2 TABLET ORAL ONCE
Status: COMPLETED | OUTPATIENT
Start: 2022-07-28 | End: 2022-07-28

## 2022-07-28 RX ORDER — OXYCODONE HCL 20 MG/1
20 TABLET, FILM COATED, EXTENDED RELEASE ORAL ONCE
Status: COMPLETED | OUTPATIENT
Start: 2022-07-28 | End: 2022-07-28

## 2022-07-28 RX ORDER — FUROSEMIDE 10 MG/ML
80 INJECTION INTRAMUSCULAR; INTRAVENOUS
Status: DISCONTINUED | OUTPATIENT
Start: 2022-07-28 | End: 2022-07-30

## 2022-07-28 RX ORDER — OXYCODONE HYDROCHLORIDE 5 MG/1
2.5 TABLET ORAL EVERY 4 HOURS PRN
Status: DISCONTINUED | OUTPATIENT
Start: 2022-07-28 | End: 2022-07-30 | Stop reason: HOSPADM

## 2022-07-28 RX ORDER — FLUTICASONE PROPIONATE 50 MCG
1 SPRAY, SUSPENSION (ML) NASAL DAILY
Status: DISCONTINUED | OUTPATIENT
Start: 2022-07-28 | End: 2022-07-30 | Stop reason: HOSPADM

## 2022-07-28 RX ORDER — POTASSIUM CHLORIDE 20 MEQ/1
40 TABLET, EXTENDED RELEASE ORAL DAILY
Status: DISCONTINUED | OUTPATIENT
Start: 2022-07-28 | End: 2022-07-30 | Stop reason: HOSPADM

## 2022-07-28 RX ADMIN — APIXABAN 5 MG: 5 TABLET, FILM COATED ORAL at 17:07

## 2022-07-28 RX ADMIN — OXYCODONE HYDROCHLORIDE AND ACETAMINOPHEN 2 TABLET: 5; 325 TABLET ORAL at 09:04

## 2022-07-28 RX ADMIN — POTASSIUM CHLORIDE 40 MEQ: 1500 TABLET, EXTENDED RELEASE ORAL at 12:34

## 2022-07-28 RX ADMIN — OXYCODONE HYDROCHLORIDE 20 MG: 20 TABLET, FILM COATED, EXTENDED RELEASE ORAL at 09:04

## 2022-07-28 RX ADMIN — VANCOMYCIN HYDROCHLORIDE 1500 MG: 5 INJECTION, POWDER, LYOPHILIZED, FOR SOLUTION INTRAVENOUS at 06:49

## 2022-07-28 RX ADMIN — CARVEDILOL 25 MG: 12.5 TABLET, FILM COATED ORAL at 17:07

## 2022-07-28 RX ADMIN — LOSARTAN POTASSIUM 100 MG: 50 TABLET, FILM COATED ORAL at 12:34

## 2022-07-28 RX ADMIN — FUROSEMIDE 80 MG: 10 INJECTION, SOLUTION INTRAMUSCULAR; INTRAVENOUS at 17:08

## 2022-07-28 RX ADMIN — APIXABAN 5 MG: 5 TABLET, FILM COATED ORAL at 12:34

## 2022-07-28 RX ADMIN — SODIUM CHLORIDE 3 G: 9 INJECTION, SOLUTION INTRAVENOUS at 06:01

## 2022-07-28 RX ADMIN — OXYCODONE HYDROCHLORIDE 20 MG: 20 TABLET, FILM COATED, EXTENDED RELEASE ORAL at 21:30

## 2022-07-28 NOTE — ASSESSMENT & PLAN NOTE
Patient slid off the chair at home  Was unable to get by herself   also could not get her up  Most likely due to bilateral lower extremity worsening edema, weakness  PT OT evaluation

## 2022-07-28 NOTE — SEPSIS NOTE
Sepsis Note   Sofia Rodríguez 77 y o  female MRN: [id number]  Unit/Bed#: ED 11 Encounter: [id number]       qSOFA     Row Name 07/28/22 5351 07/28/22 0601 07/28/22 0600 07/28/22 0557 07/28/22 0555    Altered mental status GCS < 15 0 -- -- 0 0    Respiratory Rate > / =22 -- -- -- -- --    Systolic BP < / =641 -- 0 0 -- --    Q Sofa Score 0 0 0 0 0    Row Name 07/28/22 0525 07/28/22 0524             Altered mental status GCS < 15 -- --       Respiratory Rate > / =22 -- 0       Systolic BP < / =568 0 --       Q Sofa Score 0 --                  Initial Sepsis Screening     Row Name 07/28/22 0644                Is the patient's history suggestive of a new or worsening infection? Yes (Proceed)  -AO        Suspected source of infection suspect infection, source unknown  -AO        Are two or more of the following signs & symptoms of infection both present and new to the patient? No  -AO        Indicate SIRS criteria --        If the answer is yes to both questions, suspicion of sepsis is present --        If severe sepsis is present AND tissue hypoperfusion perists in the hour after fluid resuscitation or lactate > 4, the patient meets criteria for SEPTIC SHOCK --        Are any of the following organ dysfunction criteria present within 6 hours of suspected infection and SIRS criteria that are NOT considered to be chronic conditions?  --        Organ dysfunction --        Date of presentation of severe sepsis --        Time of presentation of severe sepsis --        Tissue hypoperfusion persists in the hour after crystalloid fluid administration, evidenced, by either: --        Was hypotension present within one hour of the conclusion of crystalloid fluid administration? --        Date of presentation of septic shock --        Time of presentation of septic shock --              User Key  (r) = Recorded By, (t) = Taken By, (c) = Cosigned By    [address] Name Provider Hyacinth Cranker, MD Physician

## 2022-07-28 NOTE — H&P
[facility] 1945, 77 y o  female MRN: [id number]  Unit/Bed#: W -01 Encounter: [id number]  Primary Care Provider: Rosibel Guevara MD   Date and time admitted to hospital: 7/28/2022  5:19 AM    * Fall and Ambulatory dysfunction  Assessment & Plan  Patient slid off the chair at home  Was unable to get by herself   also could not get her up  Most likely due to bilateral lower extremity worsening edema, weakness  PT OT evaluation  Acute on chronic diastolic heart failure (HCC)  Assessment & Plan  Wt Readings from Last 3 Encounters:   07/28/22 109 kg (239 lb 10 2 oz)   05/19/22 106 kg (233 lb 6 4 oz)   04/21/22 109 kg (239 lb 6 4 oz)       Patient does look volume overloaded on exam   Reports progressively worsening bilateral lower extremity edema and mild erythema over last few months to weeks  Also reports increasing shortness of breath and intermittent wheezing for last few weeks which she attributes to allergies  Said she did not take her diuretic for last 2 days as she was scheduled for procedure with pain management  Prior to that reports compliance  No other signs or symptoms of infection at this point  Patient was started on antibiotics in ER because of concern for cellulitis  Low suspicion for cellulitis at this point  Lower extremity edema most likely because of venous congestion  So will hold off on antibiotics for now  Hold patient's torsemide  Start the patient on IV Lasix 80 b i d  Suspect patient might need 24-48 hours of IV diuretics and then transitioned back to her oral torsemide which might have to be increased  A-fib [facility])  Assessment & Plan  Chronic, rate controlled  Continue Coreg  Eliquis for anticoagulation    Essential hypertension  Assessment & Plan  Blood pressure stable  Continue carvedilol, losartan and hydrochlorothiazide          VTE Prophylaxis: Apixaban (Eliquis)  / sequential compression device   Code Status: full  POLST: POLST form is not discussed and not completed at this time  Discussion with family:  at bedside    Anticipated Length of Stay:  Patient will be admitted on an Inpatient basis with an anticipated length of stay of  > 2 midnights  Justification for Hospital Stay: above    Total Time for Visit, including Counseling / Coordination of Care: 60 minutes  Greater than 50% of this total time spent on direct patient counseling and coordination of care  Chief Complaint:   Fall from chair, in ambulated get up due to bilateral lower extremity weakness    History of Present Illness:    Mark Goodpasture is a 77 y o  female who presents after Fall from chair, in ambulated get up due to bilateral lower extremity weakness  Patient said she slid off while trying to get out of the chair  Denies any loss of consciousness or other trauma  She has had progressive worsening bilateral lower extremity edema over last few months  For last 2 days she stopped taking her diuretics as she was scheduled for procedure with pain management and she had difficulty getting out of the chair because of pain  She has also noticed increasing shortness of breath and intermittent wheezing over last 1-2 weeks which she attributes to allergies  Review of Systems:    Review of Systems   Constitutional: Negative for chills, fever and unexpected weight change  HENT: Negative for congestion and sore throat  Respiratory: Positive for shortness of breath  Negative for cough  Cardiovascular: Negative for chest pain and palpitations  Gastrointestinal: Negative for abdominal pain, nausea and vomiting  Genitourinary: Negative for difficulty urinating, dysuria, flank pain, frequency and urgency  Musculoskeletal: Positive for back pain  Negative for arthralgias and myalgias  Skin: Positive for color change  Negative for wound  Neurological: Negative for dizziness and light-headedness  Psychiatric/Behavioral: Negative for agitation, behavioral problems and confusion  Past Medical and Surgical History:     Past Medical History:   Diagnosis Date    A-fib ([facility] ) 12/29/2021    Anxiety     Arthritis     Back pain     Cellulitis     [facility]     Edema of both lower extremities due to peripheral venous insufficiency     Edema of both lower extremities due to peripheral venous insufficiency     Erythema of lower extremity 11/12/2021    Fibromyalgia     Hypertension     Sjogren syndrome, unspecified ([facility] )        Past Surgical History:   Procedure Laterality Date    KNEE CARTILAGE SURGERY      REPLACEMENT TOTAL KNEE BILATERAL         Meds/Allergies:    Prior to Admission medications    Medication Sig Start Date End Date Taking? Authorizing Provider   apixaban (Eliquis) 5 mg Take 1 tablet (5 mg total) by mouth 2 (two) times a day 7/6/22 8/5/22 Yes Rena Leonard MD   carvedilol (COREG) 25 mg tablet Take 1 tablet (25 mg total) by mouth 2 (two) times a day with meals 1/27/22 7/28/22 Yes Rena Leonard MD   cetirizine (ZyrTEC) 10 mg tablet Take 1 tablet (10 mg total) by mouth in the morning  5/19/22  Yes Rena Leonadr MD   hydrochlorothiazide (HYDRODIURIL) 25 mg tablet Take 1 tablet (25 mg total) by mouth in the morning  5/19/22  Yes Rena Leonard MD   hydrocortisone 2 5 % cream Apply topically 2 (two) times a day  Patient taking differently: Apply topically 2 (two) times a day as needed 5/19/22  Yes Rena Leonard MD   lidocaine (LIDODERM) 5 % daily as needed 3/20/22  Yes Historical Provider, MD   losartan (COZAAR) 100 MG tablet Take 1 tablet (100 mg total) by mouth in the morning   5/19/22  Yes Rena Leonard MD   potassium chloride (K-DUR,KLOR-CON) 20 mEq tablet Take 2 tablets (40 mEq total) by mouth daily 2/14/22  Yes Rena Leonard MD   torsemide [facility]) 20 mg tablet Take 2 tablets (40 mg total) by mouth daily  Patient taking differently: Take 40 mg by mouth daily Take two tablets a day tues, thurs, sat  Take two tablets twice a day Monday, wed, Myrl Lennert, domenica 3/21/22  Yes Rosibel Guevara MD   acetaminophen (TYLENOL) 325 mg tablet Take 650 mg by mouth every 6 (six) hours as needed for mild pain    Patient not taking: Reported on 4/21/2022     Historical Provider, MD   ammonium lactate (LAC-HYDRIN) 12 % cream Apply topically 2 (two) times a day  Patient not taking: Reported on 7/28/2022 5/19/22   Rosibel Guevara MD   Blood Pressure Monitoring (Blood Pressure Kit) EULALIA Use 1 Device in the morning  Patient not taking: No sig reported 3/21/22   Rosibel Guevara MD   Diclofenac Sodium (VOLTAREN) 1 % Voltaren 1 % topical gel  Patient not taking: Reported on 7/28/2022    Historical Provider, MD   fluticasone [facility]) 50 mcg/act nasal spray 1 spray into each nostril daily 3/21/22   Rosibel Guevara MD   oxyCODONE (OxyCONTIN) 20 mg 12 hr tablet Take 1 tablet (20 mg total) by mouth every 12 (twelve) hours Max Daily Amount: 40 mg  Patient not taking: Reported on 7/28/2022 1/6/22   SHAHIDA Holden   oxyCODONE-acetaminophen (PERCOCET)  mg per tablet  4/27/22   Historical Provider, MD   tiZANidine (ZANAFLEX) 4 mg tablet tizanidine 4 mg tablet   TAKE ONE TABLET BY MOUTH EVERY 8 HOURS AS NEEDED FOR spasm  Patient not taking: Reported on 7/28/2022    Historical Provider, MD   Vitamin D, Cholecalciferol, 25 MCG (1000 UT) TABS Vitamin D3 25 mcg (1,000 unit) capsule   one PO QD  Patient not taking: Reported on 7/28/2022    Historical Provider, MD     I have reviewed home medications with patient personally      Allergies: No Known Allergies    Social History:     Marital Status: /Civil Union   Occupation:   Patient Pre-hospital Living Situation:   Patient Pre-hospital Level of Mobility:   Patient Pre-hospital Diet Restrictions:   Substance Use History:   Social History     Substance and Sexual Activity   Alcohol Use Not Currently     Social History     Tobacco Use Smoking Status Former Smoker    Types: Cigarettes   Smokeless Tobacco Never Used     Social History     Substance and Sexual Activity   Drug Use Never       Family History:    Family History   Problem Relation Age of Onset    Heart disease Mother     Cancer Father        Physical Exam:     Vitals:   Blood Pressure: 119/77 (07/28/22 0952)  Pulse: 94 (07/28/22 0900)  Temperature: 98 1 °F (36 7 °C) (07/28/22 0952)  Temp Source: Oral (07/28/22 0952)  Respirations: 18 (07/28/22 0952)  Height: 5' 2" (157 5 cm) (07/28/22 0525)  Weight - Scale: 109 kg (239 lb 10 2 oz) (07/28/22 0525)  SpO2: 94 % (07/28/22 0900)    Physical Exam    Constitutional: Pt appears comfortable  Not in any acute distress  HENT:   Head: Normocephalic and atraumatic  Eyes: EOM are normal    Neck: Neck supple  Cardiovascular: Normal rate, regular rhythm, normal heart sounds  No murmur heard  Bilateral lower extremity edema with mild erythema bilaterally  No open wounds  Superficial skin blisters with clear fluid discharge noted on posterior right leg  Pulmonary/Chest: Effort normal, air entry decreased in bilateral lower lobes  No respiratory distress  Pt has no wheezes or crackles  Abdominal: Soft  Non-distended, Non-tender  Bowel sounds are normal    Neurological: awake, alert  Psychiatric: normal mood and affect  Additional Data:     Lab Results: I have personally reviewed pertinent reports        Results from last 7 days   Lab Units 07/28/22  0540   WBC Thousand/uL 10 11   HEMOGLOBIN g/dL 10 4*   HEMATOCRIT % 33 8*   PLATELETS Thousands/uL 355   NEUTROS PCT % 69   LYMPHS PCT % 15   MONOS PCT % 10   EOS PCT % 4     Results from last 7 days   Lab Units 07/28/22  0540   SODIUM mmol/L 139   POTASSIUM mmol/L 3 4*   CHLORIDE mmol/L 100   CO2 mmol/L 31   BUN mg/dL 15   CREATININE mg/dL 0 89   ANION GAP mmol/L 8   CALCIUM mg/dL 9 7   ALBUMIN g/dL 3 8   TOTAL BILIRUBIN mg/dL 0 66   ALK PHOS U/L 76   ALT U/L 18   AST U/L 38   GLUCOSE RANDOM mg/dL 144*     Results from last 7 days   Lab Units 07/28/22  0540   INR  1 13             Results from last 7 days   Lab Units 07/28/22  0540   LACTIC ACID mmol/L 0 9       Imaging: I have personally reviewed pertinent reports  XR chest 1 view portable   ED Interpretation by Vlad Frazier MD (07/28 0645)   Elevated R hemidiaphragm, cardiomegaly read by me  Final Result by Gabriel Gruber MD (07/28 5723)      Mild pulmonary venous congestion with trace right effusion  No acute displaced fracture  Workstation performed: [id number]         XR pelvis ap only 1 or 2 views   ED Interpretation by Vlad Frazier MD (07/28 2230)   DJD and no fx or dislocation read by me  Final Result by Tania Colunga MD (07/28 0436)      No acute osseous abnormality  Workstation performed: [id number]         XR tibia fibula 2 views LEFT   ED Interpretation by Vlad Frazier MD (07/28 2218)   S/p TKR; no bony abnormality read by me  Final Result by Tania Colunga MD (07/28 2096)      No acute osseous abnormality  Workstation performed: [id number]         XR tibia fibula 2 views RIGHT   ED Interpretation by Vlad Frazier MD (07/28 8578)   S/p TKR; no bony abnormality read by me  Final Result by Tania Colunga MD (07/28 6384)      No acute osseous abnormality  Workstation performed: [id number]         XR foot 2 vw left   ED Interpretation by Vlad Frazier MD (07/28 1762)   KARMAD and no fx or dislocation read by me  Final Result by Tania Colunga MD (07/28 5529)      No acute osseous abnormality  Workstation performed: [id number]             EKG, Pathology, and Other Studies Reviewed on Admission:   · EKG:     Allscripts / Epic Records Reviewed: Yes     ** Please Note: This note has been constructed using a voice recognition system   **

## 2022-07-28 NOTE — PLAN OF CARE
Problem: Potential for Falls  Goal: Patient will remain free of falls  Description: INTERVENTIONS:  - Educate patient/family on patient safety including physical limitations  - Instruct patient to call for assistance with activity   - Consult OT/PT to assist with strengthening/mobility   - Keep Call bell within reach  - Keep bed low and locked with side rails adjusted as appropriate  - Keep care items and personal belongings within reach  - Initiate and maintain comfort rounds  - Make Fall Risk Sign visible to staff  - Offer Toileting every 2 Hours, in advance of need  - Initiate/Maintain Bed alarm  - Obtain necessary fall risk management equipment: Alarms  - Apply yellow socks and bracelet for high fall risk patients  - Consider moving patient to room near nurses station  Outcome: Progressing     Problem: MOBILITY - ADULT  Goal: Maintain or return to baseline ADL function  Description: INTERVENTIONS:  -  Assess patient's ability to carry out ADLs; assess patient's baseline for ADL function and identify physical deficits which impact ability to perform ADLs (bathing, care of mouth/teeth, toileting, grooming, dressing, etc )  - Assess/evaluate cause of self-care deficits   - Assess range of motion  - Assess patient's mobility; develop plan if impaired  - Assess patient's need for assistive devices and provide as appropriate  - Encourage maximum independence but intervene and supervise when necessary  - Involve family in performance of ADLs  - Assess for home care needs following discharge   - Consider OT consult to assist with ADL evaluation and planning for discharge  - Provide patient education as appropriate  Outcome: Progressing  Goal: Maintains/Returns to pre admission functional level  Description: INTERVENTIONS:  - Perform BMAT or MOVE assessment daily    - Set and communicate daily mobility goal to care team and patient/family/caregiver     - Collaborate with rehabilitation services on mobility goals if consulted  - Perform Range of Motion 4 times a day  - Reposition patient every 2 hours    - Dangle patient 3 times a day  - Stand patient 3 times a day  - Ambulate patient 3 times a day  - Out of bed to chair 3 times a day   - Out of bed for meals 3 times a day  - Out of bed for toileting  - Record patient progress and toleration of activity level   Outcome: Progressing     Problem: Prexisting or High Potential for Compromised Skin Integrity  Goal: Skin integrity is maintained or improved  Description: INTERVENTIONS:  - Identify patients at risk for skin breakdown  - Assess and monitor skin integrity  - Assess and monitor nutrition and hydration status  - Monitor labs   - Assess for incontinence   - Turn and reposition patient  - Assist with mobility/ambulation  - Relieve pressure over bony prominences  - Avoid friction and shearing  - Provide appropriate hygiene as needed including keeping skin clean and dry  - Evaluate need for skin moisturizer/barrier cream  - Collaborate with interdisciplinary team   - Patient/family teaching  - Consider wound care consult   Outcome: Progressing

## 2022-07-28 NOTE — ASSESSMENT & PLAN NOTE
Wt Readings from Last 3 Encounters:   07/28/22 109 kg (239 lb 10 2 oz)   05/19/22 106 kg (233 lb 6 4 oz)   04/21/22 109 kg (239 lb 6 4 oz)       Patient does look volume overloaded on exam   Reports progressively worsening bilateral lower extremity edema and mild erythema over last few months to weeks  Also reports increasing shortness of breath and intermittent wheezing for last few weeks which she attributes to allergies  Said she did not take her diuretic for last 2 days as she was scheduled for procedure with pain management  Prior to that reports compliance  No other signs or symptoms of infection at this point  Patient was started on antibiotics in ER because of concern for cellulitis  Low suspicion for cellulitis at this point  Lower extremity edema most likely because of venous congestion  So will hold off on antibiotics for now  Hold patient's torsemide  Start the patient on IV Lasix 80 b i d  Suspect patient might need 24-48 hours of IV diuretics and then transitioned back to her oral torsemide which might have to be increased

## 2022-07-28 NOTE — ED PROVIDER NOTES
History  Chief Complaint   Patient presents with    Fall     Pt slid off chair at home, landed on behind, was unable to get her back onto her feet    Leg Swelling     Reports bilateral leg swelling, has not been taking diuretic     Patient is a 77year old female who slid off her chair at home and fell onto her buttocks this AM  States she has not taken her diuretic for past few days  No head injury or LOC  No weakness  No chest pain or sob  No N/V/D  Patient is on eliquis  Was last seen in this ED on 2/10/22 for bleeding from superficial suprapubic wound  [facility] website checked on this patient and last Rxs filled were on 7/25/22 for oxycontin and percocet for 30 days each  Last Tdap was in 2014 as per Epic  (+) leg swelling chronically  No GI bleeding  (+) left foot pain  History provided by:  Patient and EMS personnel   used: No    Fall  Associated symptoms: no chest pain, no nausea and no vomiting        Prior to Admission Medications   Prescriptions Last Dose Informant Patient Reported? Taking?    Blood Pressure Monitoring (Blood Pressure Kit) EULALIA Not Taking at Unknown time Spouse/Significant Other No No   Sig: Use 1 Device in the morning   Patient not taking: No sig reported   Diclofenac Sodium (VOLTAREN) 1 % Not Taking at Unknown time Spouse/Significant Other Yes No   Sig: Voltaren 1 % topical gel   Patient not taking: Reported on 7/28/2022   Vitamin D, Cholecalciferol, 25 MCG (1000 UT) TABS Not Taking at Unknown time Spouse/Significant Other Yes No   Sig: Vitamin D3 25 mcg (1,000 unit) capsule   one PO QD   Patient not taking: Reported on 7/28/2022   acetaminophen (TYLENOL) 325 mg tablet  Spouse/Significant Other Yes No   Sig: Take 650 mg by mouth every 6 (six) hours as needed for mild pain     Patient not taking: Reported on 4/21/2022    ammonium lactate (LAC-HYDRIN) 12 % cream Not Taking at Unknown time  No No   Sig: Apply topically 2 (two) times a day   Patient not taking: Reported on 2022   apixaban (Eliquis) 5 mg   No Yes   Sig: Take 1 tablet (5 mg total) by mouth 2 (two) times a day   carvedilol (COREG) 25 mg tablet  Spouse/Significant Other No Yes   Sig: Take 1 tablet (25 mg total) by mouth 2 (two) times a day with meals   cetirizine (ZyrTEC) 10 mg tablet   No Yes   Sig: Take 1 tablet (10 mg total) by mouth in the morning  fluticasone (FLONASE) 50 mcg/act nasal spray  Spouse/Significant Other No No   Si spray into each nostril daily   hydrochlorothiazide (HYDRODIURIL) 25 mg tablet   No Yes   Sig: Take 1 tablet (25 mg total) by mouth in the morning  hydrocortisone 2 5 % cream   No Yes   Sig: Apply topically 2 (two) times a day   Patient taking differently: Apply topically 2 (two) times a day as needed   lidocaine (LIDODERM) 5 %  Spouse/Significant Other Yes Yes   Sig: daily as needed   losartan (COZAAR) 100 MG tablet   No Yes   Sig: Take 1 tablet (100 mg total) by mouth in the morning  oxyCODONE (OxyCONTIN) 20 mg 12 hr tablet Not Taking at Unknown time Spouse/Significant Other No No   Sig: Take 1 tablet (20 mg total) by mouth every 12 (twelve) hours Max Daily Amount: 40 mg   Patient not taking: Reported on 2022   oxyCODONE-acetaminophen (PERCOCET)  mg per tablet Not Taking at Unknown time  Yes No   Patient not taking: Reported on 2022   potassium chloride (K-DUR,KLOR-CON) 20 mEq tablet  Spouse/Significant Other No Yes   Sig: Take 2 tablets (40 mEq total) by mouth daily   tiZANidine (ZANAFLEX) 4 mg tablet Not Taking at Unknown time Spouse/Significant Other Yes No   Sig: tizanidine 4 mg tablet   TAKE ONE TABLET BY MOUTH EVERY 8 HOURS AS NEEDED FOR spasm   Patient not taking: Reported on 2022   torsemide (DEMADEX) 20 mg tablet  Spouse/Significant Other No Yes   Sig: Take 2 tablets (40 mg total) by mouth daily   Patient taking differently: Take 40 mg by mouth daily Take two tablets a day tues, thurs, sat   Take two tablets twice a day Monday, wed, fri, sunday      Facility-Administered Medications: None       Past Medical History:   Diagnosis Date    A-fib ([place] [address] ) 12/29/2021    Anxiety     Arthritis     Back pain     Cellulitis     [facility]     Edema of both lower extremities due to peripheral venous insufficiency     Edema of both lower extremities due to peripheral venous insufficiency     Erythema of lower extremity 11/12/2021    Fibromyalgia     Hypertension     Sjogren syndrome, unspecified ([place] [address] )        Past Surgical History:   Procedure Laterality Date    KNEE CARTILAGE SURGERY      REPLACEMENT TOTAL KNEE BILATERAL         Family History   Problem Relation Age of Onset    Heart disease Mother     Cancer Father      I have reviewed and agree with the history as documented  E-Cigarette/Vaping    E-Cigarette Use Never User      E-Cigarette/Vaping Substances     Social History     Tobacco Use    Smoking status: Former Smoker     Types: Cigarettes    Smokeless tobacco: Never Used   Vaping Use    Vaping Use: Never used   Substance Use Topics    Alcohol use: Not Currently    Drug use: Never       Review of Systems   Constitutional: Negative for fever  Respiratory: Negative for shortness of breath  Cardiovascular: Positive for leg swelling (chronic)  Negative for chest pain  Gastrointestinal: Negative for blood in stool, diarrhea, nausea and vomiting  Musculoskeletal:        Left foot pain   Neurological: Negative for weakness  All other systems reviewed and are negative  Physical Exam  Physical Exam  Vitals and nursing note reviewed  Constitutional:       General: She is in acute distress (moderate)  HENT:      Head: Normocephalic and atraumatic  Mouth/Throat:      Mouth: Mucous membranes are moist    Eyes:      General: No scleral icterus  Extraocular Movements: Extraocular movements intact  Pupils: Pupils are equal, round, and reactive to light     Cardiovascular:      Rate and Rhythm: Normal rate  Rhythm irregular  Heart sounds: Normal heart sounds  No murmur heard  Pulmonary:      Effort: Pulmonary effort is normal  No respiratory distress  Breath sounds: Normal breath sounds  No stridor  No wheezing, rhonchi or rales  Abdominal:      General: Bowel sounds are normal       Palpations: Abdomen is soft  Tenderness: There is no abdominal tenderness  Musculoskeletal:         General: Tenderness (left foot) present  No deformity  Cervical back: Normal range of motion and neck supple  Right lower leg: Edema (large) present  Left lower leg: Edema (large) present  Skin:     General: Skin is warm and dry  Findings: Erythema (bilateral LE) present  Neurological:      General: No focal deficit present  Mental Status: She is alert and oriented to person, place, and time     Psychiatric:         Mood and Affect: Mood normal          Vital Signs  ED Triage Vitals   Temperature Pulse Respirations Blood Pressure SpO2   07/28/22 0525 07/28/22 0525 07/28/22 0524 07/28/22 0525 07/28/22 0525   97 5 °F (36 4 °C) 65 20 103/59 95 %      Temp Source Heart Rate Source Patient Position - Orthostatic VS BP Location FiO2 (%)   07/28/22 0525 07/28/22 0524 07/28/22 0525 07/28/22 0525 --   Oral Monitor Sitting Right arm       Pain Score       07/28/22 0525       10 - Worst Possible Pain           Vitals:    07/28/22 0525 07/28/22 0600 07/28/22 0601   BP: 103/59 102/51 102/51   Pulse: 65 64    Patient Position - Orthostatic VS: Sitting           Visual Acuity      ED Medications  Medications   vancomycin (VANCOCIN) 1500 mg in sodium chloride 0 9% 250 mL IVPB (1,500 mg Intravenous New Bag 7/28/22 0649)   ampicillin-sulbactam (UNASYN) 3 g in sodium chloride 0 9 % 100 mL IVPB (0 g Intravenous Stopped 7/28/22 0651)       Diagnostic Studies  Results Reviewed     Procedure Component Value Units Date/Time    B-Type Natriuretic Peptide(BNP) AN, CA, EA [facility] Only [[id number]]  (Abnormal) Collected: 07/28/22 0540    Lab Status: Final result Specimen: Blood from Arm, Left Updated: 07/28/22 0636      pg/mL     TSH, 3rd generation with Free T4 reflex [[id number]]  (Normal) Collected: 07/28/22 0540    Lab Status: Final result Specimen: Blood from Arm, Left Updated: 07/28/22 7158     TSH 3RD GENERATON 1 114 uIU/mL     Narrative:      Patients undergoing fluorescein dye angiography may retain small amounts of fluorescein in the body for 48-72 hours post procedure  Samples containing fluorescein can produce falsely depressed TSH values  If the patient had this procedure,a specimen should be resubmitted post fluorescein clearance        HS Troponin I 2hr [[id number]]     Lab Status: No result Specimen: Blood     HS Troponin 0hr (reflex protocol) [[id number]]  (Normal) Collected: 07/28/22 0540    Lab Status: Final result Specimen: Blood from Arm, Left Updated: 07/28/22 0629     hs TnI 0hr 13 ng/L     Comprehensive metabolic panel [[id number]]  (Abnormal) Collected: 07/28/22 0540    Lab Status: Final result Specimen: Blood from Arm, Left Updated: 07/28/22 7280     Sodium 139 mmol/L      Potassium 3 4 mmol/L      Chloride 100 mmol/L      CO2 31 mmol/L      ANION GAP 8 mmol/L      BUN 15 mg/dL      Creatinine 0 89 mg/dL      Glucose 144 mg/dL      Calcium 9 7 mg/dL      AST 38 U/L      ALT 18 U/L      Alkaline Phosphatase 76 U/L      Total Protein 6 8 g/dL      Albumin 3 8 g/dL      Total Bilirubin 0 66 mg/dL      eGFR 63 ml/min/1 73sq m     Narrative:      [facility] guidelines for Chronic Kidney Disease (CKD):     Stage 1 with normal or high GFR (GFR > 90 mL/min/1 73 square meters)    Stage 2 Mild CKD (GFR = 60-89 mL/min/1 73 square meters)    Stage 3A Moderate CKD (GFR = 45-59 mL/min/1 73 square meters)    Stage 3B Moderate CKD (GFR = 30-44 mL/min/1 73 square meters)    Stage 4 Severe CKD (GFR = 15-29 mL/min/1 73 square meters)    Stage 5 End Stage CKD (GFR <15 mL/min/1 73 square meters)  Note: GFR calculation is accurate only with a steady state creatinine    CK Total with Reflex CKMB [[id number]]  (Abnormal) Collected: 07/28/22 0540    Lab Status: Final result Specimen: Blood from Arm, Left Updated: 07/28/22 0627     Total  U/L     Lactic acid [[id number]]  (Normal) Collected: 07/28/22 0540    Lab Status: Final result Specimen: Blood from Arm, Left Updated: 07/28/22 9437     LACTIC ACID 0 9 mmol/L     Narrative:      Result may be elevated if tourniquet was used during collection  CKMB [[id number]] Collected: 07/28/22 0540    Lab Status: In process Specimen: Blood from Arm, Left Updated: 07/28/22 0627    Protime-INR [[id number]]  (Normal) Collected: 07/28/22 0540    Lab Status: Final result Specimen: Blood from Arm, Left Updated: 07/28/22 0608     Protime 14 5 seconds      INR 1 13    APTT [[id number]]  (Normal) Collected: 07/28/22 0540    Lab Status: Final result Specimen: Blood from Arm, Left Updated: 07/28/22 0608     PTT 29 seconds     CBC and differential [[id number]]  (Abnormal) Collected: 07/28/22 0540    Lab Status: Final result Specimen: Blood from Arm, Left Updated: 07/28/22 0607     WBC 10 11 Thousand/uL      RBC 3 74 Million/uL      Hemoglobin 10 4 g/dL      Hematocrit 33 8 %      MCV 90 fL      MCH 27 8 pg      MCHC 30 8 g/dL      RDW 16 0 %      MPV 10 7 fL      Platelets 440 Thousands/uL      nRBC 0 /100 WBCs      Neutrophils Relative 69 %      Immat GRANS % 1 %      Lymphocytes Relative 15 %      Monocytes Relative 10 %      Eosinophils Relative 4 %      Basophils Relative 1 %      Neutrophils Absolute 7 07 Thousands/µL      Immature Grans Absolute 0 05 Thousand/uL      Lymphocytes Absolute 1 55 Thousands/µL      Monocytes Absolute 0 99 Thousand/µL      Eosinophils Absolute 0 40 Thousand/µL      Basophils Absolute 0 05 Thousands/µL     Blood culture #1 [[id number]] Collected: 07/28/22 0542    Lab Status:  In process Specimen: Blood from Arm, Right Updated: 07/28/22 0550    Blood culture #2 [[id number]] Collected: 07/28/22 0540    Lab Status: In process Specimen: Blood from Arm, Left Updated: 07/28/22 0550                 XR chest 1 view portable   ED Interpretation by Teri Solomon MD (07/28 0676)   Elevated R hemidiaphragm, cardiomegaly read by me  XR pelvis ap only 1 or 2 views   ED Interpretation by Teri Solomon MD (07/28 0602)   DJD and no fx or dislocation read by me  XR tibia fibula 2 views LEFT   ED Interpretation by Teri Solomon MD (07/28 6902)   S/p TKR; no bony abnormality read by me  XR tibia fibula 2 views RIGHT   ED Interpretation by Teri Solomon MD (07/28 0793)   S/p TKR; no bony abnormality read by me  XR foot 2 vw left   ED Interpretation by Teri Solomon MD (07/28 6385)   DJD and no fx or dislocation read by me  Procedures  ECG 12 Lead Documentation Only    Date/Time: 7/28/2022 5:35 AM  Performed by: Teri Solomon MD  Authorized by: Teri Solomon MD     Indications / Diagnosis:  Leg swelling  ECG reviewed by me, the ED Provider: yes    Patient location:  ED  Previous ECG:     Previous ECG:  Compared to current    Comparison ECG info:  2/10/22    Similarity:  No change  Quality:     Tracing quality:  Limited by artifact  Rate:     ECG rate:  61    ECG rate assessment: normal    Rhythm:     Rhythm: atrial fibrillation    Ectopy:     Ectopy: none    QRS:     QRS axis:  Normal    QRS intervals:  Normal  ST segments:     ST segments:  Non-specific  T waves:     T waves: non-specific    Other findings:     Other findings: poor R wave progression    Comments:      I do not agree with computer reading of cannot rule out anterior infarct  ED Course  ED Course as of 07/28/22 0654   Thu Jul 28, 2022   1250 Labs and x-rays d/w patient and  with patient's permission   states patient has been unable to ambulate at home                HEART Risk Score    Flowsheet Row Most Recent Value   Heart Score Risk Calculator    History 1 Filed at: 07/28/2022 0643   ECG 1 Filed at: 07/28/2022 3980   Age 2 Filed at: 07/28/2022 4630   Risk Factors 1 Filed at: 07/28/2022 0643   Troponin 1 Filed at: 07/28/2022 4709   HEART Score 6 Filed at: 07/28/2022 9036                     Initial Sepsis Screening     Row Name 07/28/22 0644                Is the patient's history suggestive of a new or worsening infection? Yes (Proceed)  -AO        Suspected source of infection suspect infection, source unknown  -AO        Are two or more of the following signs & symptoms of infection both present and new to the patient? No  -AO        Indicate SIRS criteria --        If the answer is yes to both questions, suspicion of sepsis is present --        If severe sepsis is present AND tissue hypoperfusion perists in the hour after fluid resuscitation or lactate > 4, the patient meets criteria for SEPTIC SHOCK --        Are any of the following organ dysfunction criteria present within 6 hours of suspected infection and SIRS criteria that are NOT considered to be chronic conditions? --        Organ dysfunction --        Date of presentation of severe sepsis --        Time of presentation of severe sepsis --        Tissue hypoperfusion persists in the hour after crystalloid fluid administration, evidenced, by either: --        Was hypotension present within one hour of the conclusion of crystalloid fluid administration? --        Date of presentation of septic shock --        Time of presentation of septic shock --              User Key  (r) = Recorded By, (t) = Taken By, (c) = Cosigned By    [address] Name Provider Kia Romero MD Physician                SBIRT 22yo+    Flowsheet Row Most Recent Value   SBIRT (25 yo +)    In order to provide better care to our patients, we are screening all of our patients for alcohol and drug use   Would it be okay to ask you these screening questions? Yes Filed at: 07/28/2022 0556   Initial Alcohol Screen: US AUDIT-C     1  How often do you have a drink containing alcohol? 0 Filed at: 07/28/2022 0556   2  How many drinks containing alcohol do you have on a typical day you are drinking? 0 Filed at: 07/28/2022 0556   3a  Male UNDER 65: How often do you have five or more drinks on one occasion? 0 Filed at: 07/28/2022 0556   3b  FEMALE Any Age, or MALE 65+: How often do you have 4 or more drinks on one occassion? 0 Filed at: 07/28/2022 0556   Audit-C Score 0 Filed at: 07/28/2022 6815   ANA: How many times in the past year have you    Used an illegal drug or used a prescription medication for non-medical reasons? Never Filed at: 07/28/2022 0556                    MDM  Number of Diagnoses or Management Options  Diagnosis management comments: DDx including but not limited to: metabolic abnormality, renal failure, thyroid disease, CHF, cellulitis, osteomyelitis; doubt rhabdomyolysis, necrotizing fasciitis, abscess, intracranial process, fracture, DVT since patient is on eliquis          Amount and/or Complexity of Data Reviewed  Clinical lab tests: ordered and reviewed  Tests in the radiology section of CPT®: ordered and reviewed  Decide to obtain previous medical records or to obtain history from someone other than the patient: yes  Obtain history from someone other than the patient: yes  Review and summarize past medical records: yes  Discuss the patient with other providers: yes  Independent visualization of images, tracings, or specimens: yes        Disposition  Final diagnoses:   Bilateral lower extremity edema   Bilateral cellulitis of lower leg   Elevated CK   Ambulatory dysfunction     Time reflects when diagnosis was documented in both MDM as applicable and the Disposition within this note     Time User Action Codes Description Comment    7/28/2022  6:27 AM Patti Boss Add [R60 0] Bilateral lower extremity edema     7/28/2022 6: 1440 [facility] [X05 436,  Z41 120] Bilateral cellulitis of lower leg     7/28/2022  6:28 AM Nicolás Mcdonough Modify [R60 0] Bilateral lower extremity edema     7/28/2022  6:28 AM Nicolás Mcdonough Modify [N49 056,  O89 611] Bilateral cellulitis of lower leg     7/28/2022  6:28 AM Nicolás Sharonda Add [R74 8] Elevated CK     7/28/2022  6:50 AM Nicolás Jerome Add [R26 2] Ambulatory dysfunction       ED Disposition     ED Disposition   Admit    Condition   Stable    Date/Time   Thu Jul 28, 2022  6:53 AM    Comment   Case was discussed with SHERRI FREEMAN  and the patient's admission status was agreed to be Admission Status: inpatient status to the service of Dr Razia Durham   Follow-up Information    None         Patient's Medications   Discharge Prescriptions    No medications on file       No discharge procedures on file      PDMP Review       Value Time User    PDMP Reviewed  Yes 7/28/2022  5:24 AM David Pa MD          ED Provider  Electronically Signed by           David Pa MD  07/28/22 5037

## 2022-07-29 DIAGNOSIS — I50.9 CHF (CONGESTIVE HEART FAILURE) (HCC): ICD-10-CM

## 2022-07-29 LAB
ANION GAP SERPL CALCULATED.3IONS-SCNC: 8 MMOL/L (ref 4–13)
BUN SERPL-MCNC: 12 MG/DL (ref 5–25)
CALCIUM SERPL-MCNC: 9.4 MG/DL (ref 8.4–10.2)
CHLORIDE SERPL-SCNC: 102 MMOL/L (ref 96–108)
CO2 SERPL-SCNC: 30 MMOL/L (ref 21–32)
CREAT SERPL-MCNC: 0.73 MG/DL (ref 0.6–1.3)
GFR SERPL CREATININE-BSD FRML MDRD: 80 ML/MIN/1.73SQ M
GLUCOSE SERPL-MCNC: 104 MG/DL (ref 65–140)
POTASSIUM SERPL-SCNC: 4 MMOL/L (ref 3.5–5.3)
SODIUM SERPL-SCNC: 140 MMOL/L (ref 135–147)

## 2022-07-29 PROCEDURE — 97163 PT EVAL HIGH COMPLEX 45 MIN: CPT

## 2022-07-29 PROCEDURE — 97130 THER IVNTJ EA ADDL 15 MIN: CPT

## 2022-07-29 PROCEDURE — 99222 1ST HOSP IP/OBS MODERATE 55: CPT | Performed by: INTERNAL MEDICINE

## 2022-07-29 PROCEDURE — 99232 SBSQ HOSP IP/OBS MODERATE 35: CPT | Performed by: PHYSICIAN ASSISTANT

## 2022-07-29 PROCEDURE — 80048 BASIC METABOLIC PNL TOTAL CA: CPT | Performed by: INTERNAL MEDICINE

## 2022-07-29 PROCEDURE — 99221 1ST HOSP IP/OBS SF/LOW 40: CPT | Performed by: PODIATRIST

## 2022-07-29 PROCEDURE — 97129 THER IVNTJ 1ST 15 MIN: CPT

## 2022-07-29 PROCEDURE — 97167 OT EVAL HIGH COMPLEX 60 MIN: CPT

## 2022-07-29 RX ORDER — TORSEMIDE 20 MG/1
40 TABLET ORAL DAILY
Qty: 180 TABLET | Refills: 1 | Status: SHIPPED | OUTPATIENT
Start: 2022-07-29 | End: 2022-07-30 | Stop reason: SDUPTHER

## 2022-07-29 RX ADMIN — APIXABAN 5 MG: 5 TABLET, FILM COATED ORAL at 17:28

## 2022-07-29 RX ADMIN — FUROSEMIDE 80 MG: 10 INJECTION, SOLUTION INTRAMUSCULAR; INTRAVENOUS at 09:00

## 2022-07-29 RX ADMIN — OXYCODONE HYDROCHLORIDE 2.5 MG: 5 TABLET ORAL at 10:11

## 2022-07-29 RX ADMIN — LORATADINE 5 MG: 10 TABLET ORAL at 08:35

## 2022-07-29 RX ADMIN — CARVEDILOL 25 MG: 12.5 TABLET, FILM COATED ORAL at 08:30

## 2022-07-29 RX ADMIN — CARVEDILOL 25 MG: 12.5 TABLET, FILM COATED ORAL at 16:12

## 2022-07-29 RX ADMIN — FUROSEMIDE 80 MG: 10 INJECTION, SOLUTION INTRAMUSCULAR; INTRAVENOUS at 16:14

## 2022-07-29 RX ADMIN — OXYCODONE HYDROCHLORIDE 20 MG: 20 TABLET, FILM COATED, EXTENDED RELEASE ORAL at 06:24

## 2022-07-29 RX ADMIN — OXYCODONE HYDROCHLORIDE 20 MG: 20 TABLET, FILM COATED, EXTENDED RELEASE ORAL at 21:39

## 2022-07-29 RX ADMIN — APIXABAN 5 MG: 5 TABLET, FILM COATED ORAL at 08:35

## 2022-07-29 RX ADMIN — OXYCODONE HYDROCHLORIDE 20 MG: 20 TABLET, FILM COATED, EXTENDED RELEASE ORAL at 14:08

## 2022-07-29 RX ADMIN — POTASSIUM CHLORIDE 40 MEQ: 1500 TABLET, EXTENDED RELEASE ORAL at 08:35

## 2022-07-29 RX ADMIN — LOSARTAN POTASSIUM 100 MG: 50 TABLET, FILM COATED ORAL at 08:35

## 2022-07-29 NOTE — ASSESSMENT & PLAN NOTE
· Patient slid off the chair at home  Was unable to get by herself   also could not get her up  · Most likely due to bilateral lower extremity worsening edema, weakness  · PT OT evaluation

## 2022-07-29 NOTE — ASSESSMENT & PLAN NOTE
· Patient has been more forgetful of late at home per her     · Scored 11/30 on the [facility]  · Geriatrics eval today

## 2022-07-29 NOTE — CONSULTS
Consultation - Mark Goodpasture 77 y o  female MRN: [id number]    Unit/Bed#: W -01 Encounter: [id number]      Assessment/Plan     Assessment:  Xerosis B/L Lower Extremities  Low grade cellulitis RLE  Healing friction blister plantar left heel    Plan:  - Recommend daily application of ammonium lactate lotion to areas of dry skin B/L LE's  Advised to wear more supportive shoes and addition of a sock layer to reduce friction   - Recommend a 10 day course of PO cephalexin for low grade RLE cellulitis  - If discharge is imminent, recommend follow up with podiatry within 7-10 days after discharge to follow-up on RLE cellulitis  She is also interested in routine foot care on an outpatient basis  History of Present Illness     HPI: Mark Goodpasture is a 77y o  year old female who presents with severe, dry scaling skin to both feet and legs  She is seen sitting in a chair, resting in NAD  She notes some pain in the bottom of her left heel, as well as her lower right leg  She states the left heel pain has been present for a while, the right lower leg pain is new  She was admitted to [facility] [place] [facility] S/P fall from a chair and losing consciousness  Review of Systems   Constitutional: Negative for chills and fever  HENT: Negative for ear pain and sore throat  Eyes: Negative for pain and visual disturbance  Respiratory: Negative for cough and shortness of breath  Cardiovascular: Positive for leg swelling  Negative for chest pain and palpitations  Gastrointestinal: Negative for abdominal pain and vomiting  Musculoskeletal: Negative for arthralgias and back pain  Skin: Negative for color change and rash  Neurological: Negative for seizures and syncope  Psychiatric/Behavioral: Negative  All other systems reviewed and are negative        Historical Information   Past Medical History:   Diagnosis Date    A-fib [facility]) 12/29/2021    Anxiety     Arthritis     Back pain     Cellulitis     CHF (congestive heart failure) (HCC)     Edema of both lower extremities due to peripheral venous insufficiency     Edema of both lower extremities due to peripheral venous insufficiency     Erythema of lower extremity 11/12/2021    Fibromyalgia     Hypertension     Sjogren syndrome, unspecified ([facility] [address] )      Past Surgical History:   Procedure Laterality Date    KNEE CARTILAGE SURGERY      REPLACEMENT TOTAL KNEE BILATERAL       Social History   Social History     Substance and Sexual Activity   Alcohol Use Not Currently     Social History     Substance and Sexual Activity   Drug Use Never     Social History     Tobacco Use   Smoking Status Former Smoker    Types: Cigarettes   Smokeless Tobacco Never Used     E-Cigarette/Vaping    E-Cigarette Use Never User      E-Cigarette/Vaping Substances      Family History: non-contributory    Meds/Allergies   all current active meds have been reviewed  No Known Allergies    Objective   Vitals: Blood pressure 124/50, pulse 94, temperature (!) 97 4 °F (36 3 °C), resp  rate 16, height 5' 2" (1 575 m), weight 109 kg (239 lb 10 2 oz), SpO2 95 %  Intake/Output Summary (Last 24 hours) at 7/29/2022 1744  Last data filed at 7/29/2022 1400  Gross per 24 hour   Intake 300 ml   Output 600 ml   Net -300 ml     Invasive Devices  Report    Peripheral Intravenous Line  Duration           Peripheral IV 07/28/22 Left Antecubital 1 day                Physical Exam  Vitals reviewed  HENT:      Head: Normocephalic and atraumatic  Cardiovascular:      Pulses:           Dorsalis pedis pulses are 1+ on the right side and 1+ on the left side  Posterior tibial pulses are 1+ on the right side and 1+ on the left side  Pulmonary:      Effort: Pulmonary effort is normal    Musculoskeletal:      Right lower leg: Edema present  Left lower leg: Edema present  Right foot: Decreased range of motion  Left foot: Decreased range of motion     Feet:      Right foot:      Skin integrity: Dry skin present  Toenail Condition: Right toenails are abnormally thick and long  Fungal disease present  Left foot:      Skin integrity: Blister and dry skin present  Toenail Condition: Left toenails are abnormally thick and long  Fungal disease present  Comments: (+) B/L LE edema with dry, scaling skin to feet and legs; there is a healing, non-infected friction blister to the plantar heel most likely secondary to ambulating in her worn bedroom slippers  (+) there is mild erythema, edema, and calor to the lower right leg consistent with early, low grade cellulitis; the affected area is tender to palpation     (+) pedal nails are clinically mycotic x 10; no IDS macerations  Skin:     General: Skin is warm  Capillary Refill: Capillary refill takes less than 2 seconds  Neurological:      General: No focal deficit present  Mental Status: She is alert and oriented to person, place, and time  Psychiatric:         Mood and Affect: Mood normal          Behavior: Behavior normal          Thought Content: Thought content normal          Judgment: Judgment normal          Lab Results: I have personally reviewed pertinent reports  Imaging Studies: I have personally reviewed pertinent reports  EKG, Pathology, and Other Studies: I have personally reviewed pertinent reports      VTE Prophylaxis: Sequential compression device (Venodyne)

## 2022-07-29 NOTE — ASSESSMENT & PLAN NOTE
· PDMP reviewed  · Oxycontin ER 20 mg every 8 hours  · Has script for percocet 10 mg but reports she takes only 5 mg of this at a time  · While inpatient, will continue with oxycodone 2 5, 5 mg as needed

## 2022-07-29 NOTE — UTILIZATION REVIEW
Initial Clinical Review    Admission: Date/Time/Statement:   Admission Orders (From admission, onward)     Ordered        07/28/22 0654  INPATIENT ADMISSION  Once                      Orders Placed This Encounter   Procedures    INPATIENT ADMISSION     Standing Status:   Standing     Number of Occurrences:   1     Order Specific Question:   Level of Care     Answer:   Med Surg [16]     Order Specific Question:   Estimated length of stay     Answer:   More than 2 Midnights     Order Specific Question:   Certification     Answer:   I certify that inpatient services are medically necessary for this patient for a duration of greater than two midnights  See H&P and MD Progress Notes for additional information about the patient's course of treatment  ED Arrival Information     Expected   -    Arrival   7/28/2022 05:19    Acuity   Urgent            Means of arrival   Ambulance    Escorted by   [facility]    Service   Hospitalist    Admission type   Urgent            Arrival complaint   edema           Chief Complaint   Patient presents with   Chaitanya Daubs Fall     Pt slid off chair at home, landed on behind, was unable to get her back onto her feet    Leg Swelling     Reports bilateral leg swelling, has not been taking diuretic       Initial Presentation: 77 y o  female with hx A fib on Eliquis, HTN , chronic back painwho presents to ED from home via EMS with fall from chair while trying to get up 2/2 leg weakness/pain  Denies LOC or head strike   Reports progressively worsening BLE edema x several months , increasing SOB and intermittent wheezing over 1-2 weeks  On exam, decreased air entry bilat lower lobes,on RA,  pt awake and alert  BLE edema with mild erythema, superficial blisters with clear fluid drainage on posterior R leg   (Pt didn't take diuretic x 2 days 2/2 procedure with pain mgmt  ) GCS 15   Labs Hgb 10 4, K 3 4,    CXR shows mild pulm venous congestion, trace R effusion    XR BLE and pelvis show nothing acute   Pt given IV abx, po analgesics in ED  Pt admitted as Inpatient with fall, ambulatory dysfunction, acute on chronic diastolic heart failure   Plan - PT/OT eval, hold home torsemide, start IV lasix 80 mg BID, monitor off abx-low suspicion cellulitis BLE, Continue home coreg and Eliquis  Continue carvedilol, losartan   HCTZ on hold   Wt Readings from Last 3 Encounters:   07/28/22 109 kg (239 lb 10 2 oz)   05/19/22 106 kg (233 lb 6 4 oz)   04/21/22 109 kg (239 lb 6 4 oz)       Date:7/29   Day 2:  PT recommends post acute rehab  Per PT-requires min-mod Ax1 for transfers and min Ax1 w/ RW for ambulation  Clinical presentation is unstable/unpredictable due to need for assist w/ all phases of mobility when usually mobilizing independently, tolerance to only 25' feet of ambulation, pain impacting overall mobility status, need for input for task focus and mobility technique, recent drastic decline in mobility compared to baseline and recent history of falls  Medicine-Pt reports her legs are improving, has BLE edema, +2 pitting per nursing   Has pain L leg-ft, knee, on scheduled Oxycontin and prn analgesics   Chronic erythema BLE, denies SOB   Pt continues on Lasix 80- mg IV BID  May need to increase home torsemide to 40 mg BID  Pt forgetful at home per bernardusband, scored 11/30 on MoCa  Await gerontology eval   Per nursing, oriented to person and place this am   GCS 14  Gerontology consult-geriatric pain mgmt  PT/OT   Do not re prescribe HCTZ  Delirium monitoring  Pt able to communicate well and answer questions  Pt states she was nervous during MoCa -consider reassess in a few days        ED Triage Vitals   Temperature Pulse Respirations Blood Pressure SpO2   07/28/22 0525 07/28/22 0525 07/28/22 0524 07/28/22 0525 07/28/22 0525   97 5 °F (36 4 °C) 65 20 103/59 95 %      Temp Source Heart Rate Source Patient Position - Orthostatic VS BP Location FiO2 (%)   07/28/22 4577 07/28/22 0524 07/28/22 6495 07/28/22 0525 --   Oral Monitor Sitting Right arm       Pain Score       07/28/22 0525       10 - Worst Possible Pain          Wt Readings from Last 1 Encounters:   07/28/22 109 kg (239 lb 10 2 oz)     Additional Vital Signs:   Date/Time Temp Pulse Resp BP MAP (mmHg) SpO2 Calculated FIO2 (%) - Nasal Cannula Nasal Cannula O2 Flow Rate (L/min)   07/29/22 06:57:19 99 5 °F (37 5 °C) 93 16 159/69 99 94 % 24 1 L/min nc   07/28/22 21:32:07 -- 89 17 153/59 90 99 % -- --   07/28/22 17:07:17 -- 78 -- 153/54 87 93 % -- --   07/28/22 15:27:30 98 2 °F (36 8 °C) 85 18 143/72 96 94 % -- --   07/28/22 12:37:35 -- 85 -- 136/71 93 95 % -- --   07/28/22 1045 -- -- -- -- -- 94 % -- --   07/28/22 0952 98 1 °F (36 7 °C) -- 18 119/77 -- -- -- --   07/28/22 0900 -- 94 -- 137/60 85 94 % -- --   07/28/22 0800 -- 64 -- 117/69 85 96 % -- --   07/28/22 0601 -- -- -- 102/51 -- -- -- --     Date and Time Eye Opening Best Verbal Response Best Motor Response Erna Coma Scale Score   07/28/22 2320 4 5 6 15   07/28/22 1045 4 5 6 15   07/28/22 0609 4 5 6 15   07/28/22 0557 4 5 6 15   07/28/22 0555 4 5 6 15       Pertinent Labs/Diagnostic Test Results:   7/28 ECG-A fib, V rate 61  XR chest 1 view portable   ED Interpretation by Nehal Barnard MD (07/28 0689)   Elevated R hemidiaphragm, cardiomegaly read by me  Final Result by Lily Madrid MD (07/28 6257)      Mild pulmonary venous congestion with trace right effusion  No acute displaced fracture  Workstation performed: [id number]         XR pelvis ap only 1 or 2 views   ED Interpretation by Nehal Barnard MD (07/28 6233)   DJD and no fx or dislocation read by me  Final Result by Jack Cm MD (07/28 7765)      No acute osseous abnormality  Workstation performed: [id number]         XR tibia fibula 2 views LEFT   ED Interpretation by Nehal Barnard MD (07/28 1837)   S/p TKR; no bony abnormality read by me         Final Result by Kellie Ewing MD (07/28 0187)      No acute osseous abnormality  Workstation performed: [id number]         XR tibia fibula 2 views RIGHT   ED Interpretation by Roberto Farris MD (07/28 6715)   S/p TKR; no bony abnormality read by me  Final Result by Kellie Ewing MD (07/28 6841)      No acute osseous abnormality  Workstation performed: [id number]         XR foot 2 vw left   ED Interpretation by Roberto Farris MD (07/28 6389)   DJD and no fx or dislocation read by me  Final Result by Kellie Ewing MD (07/28 3644)      No acute osseous abnormality              Workstation performed: [id number]               Results from last 7 days   Lab Units 07/28/22  0540   WBC Thousand/uL 10 11   HEMOGLOBIN g/dL 10 4*   HEMATOCRIT % 33 8*   PLATELETS Thousands/uL 355   NEUTROS ABS Thousands/µL 7 07         Results from last 7 days   Lab Units 07/29/22  0515 07/28/22  0540   SODIUM mmol/L 140 139   POTASSIUM mmol/L 4 0 3 4*   CHLORIDE mmol/L 102 100   CO2 mmol/L 30 31   ANION GAP mmol/L 8 8   BUN mg/dL 12 15   CREATININE mg/dL 0 73 0 89   EGFR ml/min/1 73sq m 80 63   CALCIUM mg/dL 9 4 9 7     Results from last 7 days   Lab Units 07/28/22  0540   AST U/L 38   ALT U/L 18   ALK PHOS U/L 76   TOTAL PROTEIN g/dL 6 8   ALBUMIN g/dL 3 8   TOTAL BILIRUBIN mg/dL 0 66         Results from last 7 days   Lab Units 07/29/22  0515 07/28/22  0540   GLUCOSE RANDOM mg/dL 104 144*               Results from last 7 days   Lab Units 07/28/22  0540   CK TOTAL U/L 734*   CK MB INDEX % 2 0   CK MB ng/mL 14 7*     Results from last 7 days   Lab Units 07/28/22  1049 07/28/22  0820 07/28/22  0540   HS TNI 0HR ng/L  --   --  13   HS TNI 2HR ng/L  --  13  --    HSTNI D2 ng/L  --  0  --    HS TNI 4HR ng/L 12  --   --    HSTNI D4 ng/L -1  --   --          Results from last 7 days   Lab Units 07/28/22  0540   PROTIME seconds 14 5   INR  1 13   PTT seconds 29     Results from last 7 days   Lab Units 07/28/22  0540   TSH 3RD GENERATON uIU/mL 1  114         Results from last 7 days   Lab Units 07/28/22  0540   LACTIC ACID mmol/L 0 9             Results from last 7 days   Lab Units 07/28/22  0540   BNP pg/mL 125*               Results from last 7 days   Lab Units 07/28/22  0542 07/28/22  0540   BLOOD CULTURE  Received in Microbiology Lab  Culture in Progress  Received in Microbiology Lab  Culture in Progress                 ED Treatment:   Medication Administration from 07/28/2022 0519 to 07/28/2022 4530       Date/Time Order Dose Route Action     07/28/2022 0601 ampicillin-sulbactam (UNASYN) 3 g in sodium chloride 0 9 % 100 mL IVPB 3 g Intravenous New Bag     07/28/2022 0649 vancomycin (VANCOCIN) 1500 mg in sodium chloride 0 9% 250 mL IVPB 1,500 mg Intravenous New Bag     07/28/2022 0904 oxyCODONE (OxyCONTIN) 12 hr tablet 20 mg 20 mg Oral Given     07/28/2022 0904 oxyCODONE-acetaminophen (PERCOCET) 5-325 mg per tablet 2 tablet 2 tablet Oral Given        Past Medical History:   Diagnosis Date    [facility]fib ([facility] ) 12/29/2021    Anxiety     Arthritis     Back pain     Cellulitis     [facility]) [facility])     Edema of both lower extremities due to peripheral venous insufficiency     Edema of both lower extremities due to peripheral venous insufficiency     Erythema of lower extremity 11/12/2021    Fibromyalgia     Hypertension     Sjogren syndrome, unspecified ([facility] )      Present on Admission:   Essential hypertension   Acute on chronic diastolic heart failure (HCC)   Chronic low back pain with sciatica      Admitting Diagnosis: Leg swelling [M79 89]  Elevated CK [R74 8]  Bilateral lower extremity edema [R60 0]  Bilateral cellulitis of lower leg [L03 116, L03 115]  Ambulatory dysfunction [R26 2]  Age/Sex: 77 y o  female  Admission Orders:  Scheduled Medications:  apixaban, 5 mg, Oral, BID  carvedilol, 25 mg, Oral, BID With Meals  fluticasone, 1 spray, Nasal, Daily  furosemide, 80 mg, Intravenous, BID (diuretic)  loratadine, 5 mg, Oral, Daily  losartan, 100 mg, Oral, Daily  oxyCODONE, 20 mg, Oral, Q8H SHANNAN  potassium chloride, 40 mEq, Oral, Daily      Continuous IV Infusions:     PRN Meds:  acetaminophen, 650 mg, Oral, Q6H PRN  oxyCODONE, 2 5 mg, Oral, Q4H PRN x1 7/29   Or  oxyCODONE, 5 mg, Oral, Q4H PRN    SCD  UP w/ assist    IP CONSULT TO GERONTOLOGY    Network Utilization Review Department  ATTENTION: Please call with any questions or concerns to [phone] and carefully listen to the prompts so that you are directed to the right person  All voicemails are confidential   Ofe Mclean all requests for admission clinical reviews, approved or denied determinations and any other requests to dedicated fax number below belonging to the campus where the patient is receiving treatment   List of dedicated fax numbers for the Facilities:  [address] DENIALS (Administrative/Medical Necessity) [phone]   [address] (Maternity/NICU/Pediatrics) [phone]   [address]  [address] Medical [place] [address] [address] [place] [address] [address] [facility] [place] [address] [phone]

## 2022-07-29 NOTE — PLAN OF CARE
Problem: OCCUPATIONAL THERAPY ADULT  Goal: Performs self-care activities at highest level of function for planned discharge setting  See evaluation for individualized goals  Description: Treatment Interventions: ADL retraining, Functional transfer training, Endurance training, Cognitive reorientation, Patient/family training, Equipment evaluation/education, Compensatory technique education, Energy conservation, Activityengagement          See flowsheet documentation for full assessment, interventions and recommendations  Note: Limitation: Decreased ADL status, Decreased Safe judgement during ADL, Decreased endurance, Decreased cognition, Decreased self-care trans, Decreased high-level ADLs  Prognosis: Good  Assessment: Pt is a 77 y o  female seen for OT evaluation s/p admission to [address] [place] [address] on 7/28/2022 due to fall  Pt diagnosed with Ambulatory dysfunction  Pt has a significant PMH impacting occupational performance including: HTN, chronic low back pain with sciatica, A-fib, CHF, memory impairment, & anxiety  Pt with one recent admission in the last 6 months  Pt with active OT evaluation and treatment orders and activity orders for Up with assistance  Prior to completing the IE, an extensive review of medical and/or therapy records and physical, cognitive, and psychosocial information related to pt functional performance was completed  The pt is considered a high complexity evaluation due to assessments used during IE and observed and listed functional performance deficits  PTA, pt and  report that pt is (A) with ADLs & IADLs, (+) use of AD at baseline, & (-) driving  Pt is motivated to return to home with   Personal and environmental factors supporting pt at time of IE include accessible home environment   Personal and environmental factors inhibiting engagement in occupations include advanced age, current habits and behavioral patterns, limited social support, difficulty completing ADLs and difficulty completing IADLs  During evaluation pt performed as is outlined above in flowsheet  Pt required VC for safety, VC for attention to task and education on use of RW to continue engaging in tasks  Standardized assessments used to assist in identifying performance deficits include AMPAC 6-Clicks, Barthel ADL Index and MoCA  Performance deficits that affect the pts occupational performance during the initial evaluation include impaired balance, functional mobility, endurance, activity tolerance, forward functional reach, functional standing tolerance and overall strength, attention to task, safety awareness, insight into deficits, orientation and problem solving, emotional regulation  Based on pts functional performance and deficits the following occupations will be addressed in OT treatments in order to maximize pts independence and overall occupational performance: grooming, bathing/showering, toileting and toilet hygiene, dressing and functional mobility  Upon discharge from acute care setting, OT recommendation on d/c to Short Term Rehab  Pt goals are listed below       OT Discharge Recommendation: Post acute rehabilitation services

## 2022-07-29 NOTE — PROGRESS NOTES
[facility]  Progress Note - [facility] 1945, 77 y o  female MRN: [id number]  Unit/Bed#: W -01 Encounter: [id number]  Primary Care Provider: Stephy Mcduffie MD   Date and time admitted to hospital: 7/28/2022  5:19 AM    * Fall and Ambulatory dysfunction  Assessment & Plan  · Patient slid off the chair at home  Was unable to get by herself   also could not get her up  · Most likely due to bilateral lower extremity worsening edema, weakness  · PT OT evaluation  Acute on chronic diastolic heart failure (HCC)  Assessment & Plan  Wt Readings from Last 3 Encounters:   07/28/22 109 kg (239 lb 10 2 oz)   05/19/22 106 kg (233 lb 6 4 oz)   04/21/22 109 kg (239 lb 6 4 oz)     · Patient does look volume overloaded on exam   Reports progressively worsening bilateral lower extremity edema and mild erythema over last few months to weeks  Also reports increasing shortness of breath and intermittent wheezing for last few weeks which she attributes to allergies  · Said she did not take her diuretic 2 days prior to admission as she was scheduled for procedure with pain management  Prior to that reports compliance  · No other signs or symptoms of infection at this point  Patient was started on antibiotics in ER because of concern for cellulitis  Low suspicion for cellulitis at this point  Lower extremity edema most likely because of venous congestion  So will hold off on antibiotics for now  · Hold patient's torsemide  Continue IV Lasix 80 b i d  Mary Kate Irving · Suspect we will need to increase her torsemide to 40 mg b i d  Every day    Memory impairment  Assessment & Plan  · Patient has been more forgetful of late at home per her   · Scored 11/30 on the MoCA  · Geriatrics eval today    A-fib [facility])  Assessment & Plan  · Chronic, rate controlled  · Continue Coreg    · Eliquis for anticoagulation    Chronic low back pain with sciatica  Assessment & Plan  · PDMP reviewed  · Oxycontin ER 20 mg every 8 hours  · Has script for percocet 10 mg but reports she takes only 5 mg of this at a time  · While inpatient, will continue with oxycodone 2 5, 5 mg as needed  Essential hypertension  Assessment & Plan  · Blood pressure stable  · Continue carvedilol, losartan and hydrochlorothiazide  VTE Pharmacologic Prophylaxis: VTE Score: 6 High Risk (Score >/= 5) - Pharmacological DVT Prophylaxis Ordered: apixaban (Eliquis)  Sequential Compression Devices Ordered  Patient Centered Rounds: I performed bedside rounds with nursing staff today  Discussions with Specialists or Other Care Team Provider: cm, nursing    Education and Discussions with Family / Patient: Updated  () via phone  Time Spent for Care: 30 minutes  More than 50% of total time spent on counseling and coordination of care as described above  Current Length of Stay: 1 day(s)  Current Patient Status: Inpatient   Certification Statement: The patient will continue to require additional inpatient hospital stay due to awaiting rehab palcement, IV diuretcs for CHF  Discharge Plan: Anticipate discharge in 24-48 hrs to rehab facility  Code Status: Level 1 - Full Code    Subjective:   No overnight events per nursing  Patient reports her legs are improving  Denies any shortness of breath presently  Objective:     Vitals:   Temp (24hrs), Av 9 °F (37 2 °C), Min:98 2 °F (36 8 °C), Max:99 5 °F (37 5 °C)    Temp:  [98 2 °F (36 8 °C)-99 5 °F (37 5 °C)] 99 5 °F (37 5 °C)  HR:  [78-93] 93  Resp:  [16-18] 16  BP: (136-159)/(54-72) 159/69  SpO2:  [93 %-99 %] 94 %  Body mass index is 43 83 kg/m²  Input and Output Summary (last 24 hours): Intake/Output Summary (Last 24 hours) at 2022 0956  Last data filed at 2022 0700  Gross per 24 hour   Intake 60 ml   Output 500 ml   Net -440 ml       Physical Exam:   Physical Exam  Vitals and nursing note reviewed     Constitutional: General: She is not in acute distress  Appearance: Normal appearance  HENT:      Head: Normocephalic  Mouth/Throat:      Mouth: Mucous membranes are moist    Eyes:      General: No scleral icterus  Pupils: Pupils are equal, round, and reactive to light  Cardiovascular:      Rate and Rhythm: Normal rate and regular rhythm  Heart sounds: No murmur heard  Pulmonary:      Effort: Pulmonary effort is normal  No respiratory distress  Breath sounds: Normal breath sounds  No wheezing, rhonchi or rales  Abdominal:      General: Bowel sounds are normal  There is no distension  Palpations: Abdomen is soft  Tenderness: There is no abdominal tenderness  Musculoskeletal:         General: No swelling  Right lower leg: Edema present  Left lower leg: Edema present  Skin:     Capillary Refill: Capillary refill takes less than 2 seconds  Findings: Erythema (b/l LE, chronic) present  Neurological:      General: No focal deficit present  Mental Status: She is alert and oriented to person, place, and time  Mental status is at baseline        Comments: Impaired short term memory         Additional Data:     Labs:  Results from last 7 days   Lab Units 07/28/22  0540   WBC Thousand/uL 10 11   HEMOGLOBIN g/dL 10 4*   HEMATOCRIT % 33 8*   PLATELETS Thousands/uL 355   NEUTROS PCT % 69   LYMPHS PCT % 15   MONOS PCT % 10   EOS PCT % 4     Results from last 7 days   Lab Units 07/29/22  0515 07/28/22  0540   SODIUM mmol/L 140 139   POTASSIUM mmol/L 4 0 3 4*   CHLORIDE mmol/L 102 100   CO2 mmol/L 30 31   BUN mg/dL 12 15   CREATININE mg/dL 0 73 0 89   ANION GAP mmol/L 8 8   CALCIUM mg/dL 9 4 9 7   ALBUMIN g/dL  --  3 8   TOTAL BILIRUBIN mg/dL  --  0 66   ALK PHOS U/L  --  76   ALT U/L  --  18   AST U/L  --  38   GLUCOSE RANDOM mg/dL 104 144*     Results from last 7 days   Lab Units 07/28/22  0540   INR  1 13             Results from last 7 days   Lab Units 07/28/22  0540   LACTIC ACID mmol/L 0 9       Lines/Drains:  Invasive Devices  Report    Peripheral Intravenous Line  Duration           Peripheral IV 07/28/22 Left Antecubital 1 day                      Imaging: No pertinent imaging reviewed  Recent Cultures (last 7 days):   Results from last 7 days   Lab Units 07/28/22  0542 07/28/22  0540   BLOOD CULTURE  Received in Microbiology Lab  Culture in Progress  Received in Microbiology Lab  Culture in Progress  Last 24 Hours Medication List:   Current Facility-Administered Medications   Medication Dose Route Frequency Provider Last Rate    acetaminophen  650 mg Oral Q6H PRN Aguilar Davalos MD      apixaban  5 mg Oral BID Aguilar Davalos MD      carvedilol  25 mg Oral BID With Meals Aguilar Davalos MD      fluticasone  1 spray Nasal Daily Aguilar Davalos MD      furosemide  80 mg Intravenous BID (diuretic) Aguilar Davalos MD      loratadine  5 mg Oral Daily Aguilar Davalos MD      losartan  100 mg Oral Daily Aguilar Davalos MD      oxyCODONE  20 mg Oral [facility] [place], [address]      oxyCODONE  2 5 mg Oral Q4H PRN [place], CRNP      Or    oxyCODONE  5 mg Oral Q4H PRN [place], CRNP      potassium chloride  40 mEq Oral Daily Aguilar Davalos MD          Today, Patient Was Seen By: Lexus Guillen PA-C    **Please Note: This note may have been constructed using a voice recognition system  **

## 2022-07-29 NOTE — CONSULTS
Consultation - Jimmie Rangel [address] 77 y o  female MRN: [id number]  Unit/Bed#: W -01 Encounter: [id number]        Inpatient consult to Gerontology  Consult performed by: Bassam Caldwell MD  Consult ordered by: Dominick Saul PA-C            Assessment/Plan     1 -Fall and ambulatory dysfunction- patient will receive physical and occupational therapy pain management following guidelines per geriatric protocol   -continue pain control per Geriatric pain protocol:  Tylenol 975mg Q8H scheduled  Roxicodone 2 5mg Q4H PRN moderate pain  Roxicodone 5mg Q4H PRN severe pain  Dilaudid 0 2mg Q2H PRN  -continue adjuncts such as Gabapentin 100mg HS and lidocaine patch topically  -encourage addition of non-pharmacologic pain treatment including ice and frequent repositioning  -recommend  bowel regimen to prevent and treat constipation due to increased risk with acute pain and opiate pain medications    2 -Acute on chronic diastolic heart failure   -patient has worsening bilateral lower extremity edema and chronic venous insufficiency  -patient has been taken off of hydrochlorothiazide, please do not re-prescribe  -continue torsemide 20mg 2 tablets by mouth once daily on Tuesday, Thursday, Saturday, 2 tablets twice per day on Monday, Wednesday, Friday, Sunday     3 -Atrial fibrillation  -chronic, rate controlled   -continue Eliquis 5mg b i d    -continue carvedilol 25mg b i d     4 -Essential hypertension  -blood pressure well-controlled   129/60 at 2:30 p m    -continue carvedilol 25mg b i d , losartan 100mg Qd     5 -Chronic low back pain with sciatica  -continue oxycodone 20 mg Q8H     5 -Chronic venous insufficiency, onychomycosis  -bilateral lower extremity erythema, edema, xerosis  -podiatry consult for foot debridement    6 -Sleep apnea  -cardiology and pulmonology referral     7 -Memory impairment  -per , patient has been more forgetful   -MoCA assessment score of 11 today, but patient is able to communicate well and answer questions appropriately   -patient denies feeling depressed or having been prescribed antidepressants in the past   -patient reports feeling nervous during the MoCA  Consider reassessment in a few days  8 -mild cognitive impairment puts patient at higher risk of developing delirium  Would recommend following delirium precautions per geriatric protocol     Delirium precautions  -Patient is high risk of delirium due to cognitive impairment   -Initiate delirium precautions  -maintain normal sleep/wake cycle  -minimize overnight interruptions, group overnight vitals/labs/nursing checks as possible  -dim lights, close blinds and turn off tv to minimize stimulation and encourage sleep environment in evenings  -ensure that pain is well controlled  consider Tylenol 975mg Q8H scheduled if not already ordered   -monitor for fecal and urinary retention which may precipitate delirium  -encourage early mobilization and ambulation  -provide frequent reorientation and redirection  -encourage family and friends at the bedside to help help calm patient if anxious  -avoid medications which may precipitate or worsen delirium such as tramadol, benzodiazepine, anticholinergics, and benadryl  -encourage hydration and nutrition   -redirect unwanted behaviors as first line, avoid physical restraints, use chemical restraint only if all other attempts have been unsuccessful, would consider Seroquel 25 mg, monitor for orthostatic hypotension and QTc prolongation with repeat dosing, recommend lowest dose possible for shortest duration possible     Recommendations  1 -pain management following pain management protocol per geriatrics    2 -please do not re-prescribe hydrochlorothiazide    3 -delirium precautions following geriatric delirium protocol    4 -torsemide has been reordered by her primary care physician Dr Remy Ferrer         History of Present Illness   Physician Requesting Consult: Horacio Nicole, MD  Reason for Consult / Principal Problem: Marlin Malta from a chair   Hx and PE limited by: None   Additional history obtained from:       HPI: Haley Hart is a 77y o  year old female who presents to [address] [place] [address] ED after sustaining a fall from a chair while trying to get out of the chair  Per patient, she was in her chair, close to end of it, and slid off   couldn't get her up  Patient reports that she drifted in and out of consciousness and is uncertain how long she was down for  Next thing she remembers is seeing "a bunch of people"  She does not believe she hit her head  Patient has history of two prior falls  First fall was in the bathroom and she did not hurt herself  She does not recall what happened during the second fall  Patient has history of acute on chronic diastolic heart failure and has experienced progressive worsening of bilateral lower extremity edema over the last few months  Patient has other comorbidities including obesity, heart failure, Sjogren's syndrome, and fibromyalgia  Patient's initial blood work on July 28, 2022 revealed slightly low hemoglobin of 10 4 with a slightly low hematocrit of 33 8  Platelet was within normal range at 355,000  Protime was with normal range at 14 5 and INR was within normal range at 1 13  PTT was within normal range at 29  CMP revealed sodium of 139 and chloride of 100, with slightly low potassium at 3 4 and elevated glucose at 144  Creatinine was 0 89 with a GFR of 63, making her stage II CKD  TSH was within normal range at 1 114  CK was elevated at 734 and BNP was elevated at 125  Initial troponin was 13 and troponin at 2 and 4 hours were normal   Blood culture #1 and 2 were both negative at 24 hours  Lactic acid was within normal range at 0 9  CK-MB was elevated at 14 7  Blood work on July 29, 2022 revealed sodium of 140, potassium of 4 0, chloride of 102, creatinine of 0 73, glucose of 104    GFR increased to 80 from prior day, which is still within stage II CKD  Chest X-ray showed mild cardiomegaly and mild pulmonary venous congestion with trace right effusion  X-ray of the pelvis showed moderate right and severe left hip degenerative changes with joint space narrowing, sclerosis, and subchondral lucencies, but no acute fracture or hip dislocation  X-rays of the left and right tibia and fibula and the right root showed no acute fracture, dislocation, lytic or blastic osseous lesion  12-lead electrocardiogram performed revealed atrial fibrillation  Review of Systems   HENT: Negative for hearing loss  Eyes:        Wears glasses (bifocals)   Respiratory: Negative for shortness of breath  Cardiovascular: Negative for chest pain  Gastrointestinal: Negative for constipation, diarrhea and nausea  Genitourinary: Negative for enuresis  Musculoskeletal: Positive for arthralgias  History of fibromyalgia   Neurological: Negative for speech difficulty and headaches  Memory/Cognitive screenin on MoCA  Per patient, no problem remembering things  Per patient,  has not noted any memory problems with the patient  Able to state correct month, day, year  Mobility:  Needs glider to go up and down stairs safely  Walker for stability  Sometimes uses cane  Falls:  Brought her into ED for current admission   Assistive Devices:  Glider, walker, cane   Fraility: No  Nutrition/weight loss/grocery shopping/meal preparation: Good appetite   Vision impairment: Needs vision checked  Hearing impairment: Good  Incontinence: No   Today, after medication, had some urinary and fecal incontinence where she felt the need to rush to the bathroom  Delirium: Cognitive issues concerning for increased delirium risk   Polypharmacy: Patient's  brought in the patient's current medications, which include:  Torsemide: 2 tablets by mouth daily on Tuesday, Thursday, Saturday, 2 tablets twice per day on Monday, Wednesday, Friday, Sunday  There was a lot of confusion regarding torsemide because pharmacy had orders to stop medication in March  Due to confusion over medication instructions, the patient's  has been administering 2 tablets of Torsemide daily to the patient  Patient and  have been counseled on torsemide administration instructions  Patient was on two diuretics, hydrochlorothiazide and torsemide, so we stopped the patient's hydrochlorothiazide and plan to continue torsemide  Other medications include: Eliquis 5 mg one tablet twice daily, Carvedilol 25 mg one tablet daily, Potassium Chloride 20 mEq 2 tablets daily, Hydrochlorothiazide 25 mg one tablet in the morning, and Losartan 100 mg one tablet in the morning  No current facility-administered medications on file prior to encounter  Current Outpatient Medications on File Prior to Encounter   Medication Sig Dispense Refill    apixaban (Eliquis) 5 mg Take 1 tablet (5 mg total) by mouth 2 (two) times a day 60 tablet 2    carvedilol (COREG) 25 mg tablet Take 1 tablet (25 mg total) by mouth 2 (two) times a day with meals 180 tablet 1    cetirizine (ZyrTEC) 10 mg tablet Take 1 tablet (10 mg total) by mouth in the morning  30 tablet 0    hydrochlorothiazide (HYDRODIURIL) 25 mg tablet Take 1 tablet (25 mg total) by mouth in the morning  90 tablet 1    hydrocortisone 2 5 % cream Apply topically 2 (two) times a day (Patient taking differently: Apply topically 2 (two) times a day as needed) 30 g 0    lidocaine (LIDODERM) 5 % daily as needed      losartan (COZAAR) 100 MG tablet Take 1 tablet (100 mg total) by mouth in the morning  90 tablet 1    potassium chloride (K-DUR,KLOR-CON) 20 mEq tablet Take 2 tablets (40 mEq total) by mouth daily 180 tablet 1    torsemide (DEMADEX) 20 mg tablet Take 2 tablets (40 mg total) by mouth daily (Patient taking differently: Take 40 mg by mouth daily Take two tablets a day tues, thurs, sat  Take two tablets twice a day Monday, wed, fri, sunday) 180 tablet 0    acetaminophen (TYLENOL) 325 mg tablet Take 650 mg by mouth every 6 (six) hours as needed for mild pain   (Patient not taking: Reported on 4/21/2022 )      ammonium lactate (LAC-HYDRIN) 12 % cream Apply topically 2 (two) times a day (Patient not taking: Reported on 7/28/2022) 385 g 0    Blood Pressure Monitoring (Blood Pressure Kit) EULALIA Use 1 Device in the morning (Patient not taking: No sig reported) 1 each 0    Diclofenac Sodium (VOLTAREN) 1 % Voltaren 1 % topical gel (Patient not taking: Reported on 7/28/2022)      fluticasone (FLONASE) 50 mcg/act nasal spray 1 spray into each nostril daily 9 9 mL 1    oxyCODONE (OxyCONTIN) 20 mg 12 hr tablet Take 1 tablet (20 mg total) by mouth every 12 (twelve) hours Max Daily Amount: 40 mg (Patient not taking: Reported on 7/28/2022) 2 tablet 0    oxyCODONE-acetaminophen (PERCOCET)  mg per tablet  (Patient not taking: Reported on 7/28/2022)      tiZANidine (ZANAFLEX) 4 mg tablet tizanidine 4 mg tablet   TAKE ONE TABLET BY MOUTH EVERY 8 HOURS AS NEEDED FOR spasm (Patient not taking: Reported on 7/28/2022)      Vitamin D, Cholecalciferol, 25 MCG (1000 UT) TABS Vitamin D3 25 mcg (1,000 unit) capsule   one PO QD (Patient not taking: Reported on 7/28/2022)         Patients primary residence: Home Lives with: ([place])  iADL's:    has always handled finances  Recently gave up driving  Uses telephone by herself   has always done grocery shopping   Housekeeping is currently done by       ADL's:    Able to clean, dress, and feed herself   Unable to put shoes on by herself  Capable of cooking if she had to  Requires usage of cane and walker       Historical Information     Past medical history:   Past Medical History:   Diagnosis Date    [facility]fib ([facility] ) 12/29/2021    Anxiety     Arthritis     Back pain     Cellulitis     CHF (congestive heart failure) ([facility] )     Edema of both lower extremities due to peripheral venous insufficiency     Edema of both lower extremities due to peripheral venous insufficiency     Erythema of lower extremity 11/12/2021    Fibromyalgia     Hypertension     Sjogren syndrome, unspecified (HCC)      Past surgical history:   Past Surgical History:   Procedure Laterality Date    KNEE CARTILAGE SURGERY      REPLACEMENT TOTAL KNEE BILATERAL       Social history:  Social History     Socioeconomic History    Marital status: /Civil Union     Spouse name: Not on file    Number of children: Not on file    Years of education: Not on file    Highest education level: Not on file   Occupational History    Not on file   Tobacco Use    Smoking status: Former Smoker     Types: Cigarettes    Smokeless tobacco: Never Used   Vaping Use    Vaping Use: Never used   Substance and Sexual Activity    Alcohol use: Not Currently    Drug use: Never    Sexual activity: Not on file   Other Topics Concern    Not on file   Social History Narrative    Not on file     Social Determinants of Health     Financial Resource Strain: Not on file   Food Insecurity: No Food Insecurity    Worried About Running Out of Food in the Last Year: Never true    Sindy of Food in the Last Year: Never true   Transportation Needs: No Transportation Needs    Lack of Transportation (Medical): No    Lack of Transportation (Non-Medical): No   Physical Activity: Not on file   Stress: Not on file   Social Connections: Not on file   Intimate Partner Violence: Not on file   Housing Stability: Unknown    Unable to Pay for Housing in the Last Year: No    Number of Places Lived in the Last Year: Not on file    Unstable Housing in the Last Year: No       Family history:   Family History   Problem Relation Age of Onset    Heart disease Mother     Cancer Father        Meds/Allergies   All current active meds have been reviewed         No Known Allergies    Objective   Vitals:    07/29/22 9842 BP: 159/69   Pulse: 93   Resp: 16   Temp: 99 5 °F (37 5 °C)   SpO2: 94%       Intake/Output Summary (Last 24 hours) at 7/29/2022 1044  Last data filed at 7/29/2022 0700  Gross per 24 hour   Intake 60 ml   Output 500 ml   Net -440 ml     Invasive Devices  Report    Peripheral Intravenous Line  Duration           Peripheral IV 07/28/22 Left Antecubital 1 day                Physical Exam  Constitutional:       Appearance: She is obese  HENT:      Head: Normocephalic and atraumatic  Eyes:      Conjunctiva/sclera: Conjunctivae normal    Cardiovascular:      Rate and Rhythm: Normal rate and regular rhythm  Pulses: Normal pulses  Heart sounds: Normal heart sounds  Pulmonary:      Effort: Pulmonary effort is normal  No respiratory distress  Breath sounds: Normal breath sounds  No wheezing  Abdominal:      General: Bowel sounds are normal  There is no distension  Palpations: There is no mass  Tenderness: There is no abdominal tenderness  There is no guarding or rebound  Hernia: No hernia is present  Musculoskeletal:      Right lower leg: Edema present  Left lower leg: Edema present  Comments: Bilateral lower extremity erythema, lymphedema, xerosis chronic venous stasis, onychomicosis   Neurological:      Mental Status: She is alert  Psychiatric:         Mood and Affect: Mood normal          Behavior: Behavior normal          Lab Results:   I have personally reviewed pertinent lab and imaging results       VTE Prophylaxis: Eliquis     Code Status: Level 1 - Full Code  Advance Directive and Living Will: Yes     Power of :    POLST:      Family and Social Support:   Living Arrangements: Lives w/ Spouse/significant other  Support Systems: Spouse/significant other  Assistance Needed: Family support  Type of Current Residence: Private residence  [address]: No      I, Trini Donnelly 62 medical student, was with Dr Ardeth Burkitt for the evaluation of the patient and documentation of chart review

## 2022-07-29 NOTE — CASE MANAGEMENT
Case Management Assessment & Discharge Planning Note    Patient name Camilo MAHONEY /W -50 MRN [id number]  : 1945 Date 2022       Current Admission Date: 2022  Current Admission Diagnosis:Fall and Ambulatory dysfunction   Patient Active Problem List    Diagnosis Date Noted    Bilateral lower extremity edema 2022    Fall and Ambulatory dysfunction 2022    Urticaria 2022    Mouth lesion 2022    Encounter for screening mammogram for malignant neoplasm of breast 2022    Seasonal allergies 2022    Xerosis of skin 2022    Prediabetes 2022    Abnormal CXR 2022    Other specified anemias 2022    Memory impairment 2022    Acute on chronic diastolic heart failure ([facility] ) 2022    Anxiety     Opioid dependence due to Chronic back pain  2022    A-fib ([facility] ) 2021    Mixed hyperlipidemia 2021    Obesity 2021    Osteoarthritis of knee 2021    Acute renal failure superimposed on stage 2/3 chronic kidney disease ([facility] ) 2021    Chronic low back pain with sciatica 2021    Chronic venous insufficiency 2021    Essential hypertension 2017    Sjogren's syndrome ([facility] ) 2014      LOS (days): 1  Geometric Mean LOS (GMLOS) (days): 2 20  Days to GMLOS:0 8     OBJECTIVE:    Risk of Unplanned Readmission Score: 22 04         Current admission status: Inpatient       Preferred Pharmacy:   [address]  Phone: [phone] Fax: [phone]    Primary Care Provider: Rosibel Guevara MD    Primary Insurance: [address] [place] [address]  Secondary Insurance:     ASSESSMENT:  [facility]      Primary Phone: [phone] (Mobile)  Home Phone: [phone]                              Patient Information  Admitted from[de-identified] Home  Mental Status: Alert  During Assessment patient was accompanied by: Spouse  Assessment information provided by[de-identified] Patient, Spouse  Primary Caregiver: Spouse  Caregiver's Name[de-identified] Jaceymario Andrade, spouse  Caregiver's Relationship to Patient[de-identified] Family Member  Support Systems: Family members, Spouse/significant other  [place] of Residence: [address] [place] [address] do you live in?: [facility] entry access options   Select all that apply : Stairs  Number of steps to enter home : 2  Type of Current Residence: [address] home  In the last 12 months, was there a time when you were not able to pay the mortgage or rent on time?: No  In the last 12 months, how many places have you lived?: 1  In the last 12 months, was there a time when you did not have a steady place to sleep or slept in a shelter (including now)?: No  Homeless/housing insecurity resource given?: N/A  Living Arrangements: Lives w/ Spouse/significant other    Activities of Daily Living Prior to Admission  Functional Status: Assistance (only with getting shoes/socks on)  Completes ADLs independently?: Yes  Ambulates independently?: Yes  Does patient use assisted devices?: Yes  Assisted Devices (DME) used: Bruce Shepherd Chair/[place]  Does patient currently own DME?: Yes  What DME does the patient currently own?: Stair Chair/[place], Walker  Does patient have a history of Outpatient Therapy (PT/OT)?: No  Does the patient have a history of Short-Term Rehab?: Yes [employer])  Does patient have a history of HHC?: Yes  Does patient currently have [facility]?: No         Patient Information Continued  Does patient have prescription coverage?: Yes  Within the past 12 months, you worried that your food would run out before you got the money to buy more : Never true  Within the past 12 months, the food you bought just didn't last and you didn't have money to get more : Never true  Food insecurity resource given?: N/A  Does patient receive dialysis treatments?: No  Does patient have a history of substance abuse?: No  Does patient have a history of Mental Health Diagnosis?: No         Means of Transportation  Means of Transport to Appts[de-identified] Family transport  In the past 12 months, has lack of transportation kept you from medical appointments or from getting medications?: No  In the past 12 months, has lack of transportation kept you from meetings, work, or from getting things needed for daily living?: No  Was application for public transport provided?: N/A        DISCHARGE DETAILS:    Discharge planning discussed with[de-identified] patient and spouse, Wyline Phalen, at bedside  Weed of Choice: Yes (re: rehab and home care services)  Comments - Freedom of Choice: patient and spouse both declining any services at discharge  CM contacted family/caregiver?: Yes (spouse at bedside)  Were Treatment Team discharge recommendations reviewed with patient/caregiver?: Yes  Did patient/caregiver verbalize understanding of patient care needs?: Yes  Were patient/caregiver advised of the risks associated with not following Treatment Team discharge recommendations?: Yes    Contacts  Patient Contacts: Wyline Phalen, spouse  Relationship to Patient[de-identified] Family  Contact Method: In Person  Reason/Outcome: Continuity of Care, Discharge Planning, Emergency Contact, Referral    Requested [address] [facility] [address]         Is the patient interested in [address] at discharge?: No         Other Referral/Resources/Interventions Provided:  Interventions: DME  Referral Comments: Patient admitted following fall at home  PT/OT jesus manuelals complete and recommending rehab  MOCA test done by OT, and patient scored poor  Geriatrics consulted and following  Met with patient and spouse, Wyline Phalen, at bedside to discuss discharge plan  Both aware of recommendation for STR prior to returning home, but patient adamant she does not want to go to rehab again and spouse confirms that he would be able to care for her at home   Both aware of anticipated d/c for tomorrow - discussed home care services as rehab being declined, but spouse also declines offer for home care to be arranged  Spouse reports that patient has had home care in the past, and he knows what exercises she needs to do  Aware that if patient returns home and he decides home care would be helpful, he can contact PCP (Dr Wojciech Peterson) to discuss orders for same  At baseline, patient is independent with ADLs and ambulation  Spouse assists with getting patient's shoes/socks on, but otherwise she does not need any help  Spouse reports home is 2-stories, but the have a stairglide to the second floor  Patient is able to ambulate throughout the house without assistance  Patient has history of home o2 through [facility], but spouse reports that home o2 equipment has been returned as patient had no longer required it  Patient has been on o2 since admission, and walking from bathroom to hospital bed, sats were only at 88-89% on o2  Discussed possible need for home o2 eval with PA; will follow for possible home o2 need  Patient's spouse states they do have a pulse ox at home and have been monitoring patient's sats  Spouse will transport home once medically stable  Will continue to follow      Would you like to participate in our [address] service program?  : No - Declined    Treatment Team Recommendation: Short Term Rehab, SNF  Discharge Destination Plan[de-identified] Home  Transport at Discharge : Family                             IMM Given (Date):: 07/28/22

## 2022-07-29 NOTE — MALNUTRITION/BMI
This medical record reflects one or more clinical indicators suggestive of malnutrition and/or morbid obesity  Malnutrition Findings:                                 BMI Findings:  Adult BMI Classifications: Morbid Obesity 40-44 9        Body mass index is 43 83 kg/m²  See Nutrition note dated 7/29/2022 for additional details  Completed nutrition assessment is viewable in the nutrition documentation

## 2022-07-29 NOTE — ASSESSMENT & PLAN NOTE
Wt Readings from Last 3 Encounters:   07/28/22 109 kg (239 lb 10 2 oz)   05/19/22 106 kg (233 lb 6 4 oz)   04/21/22 109 kg (239 lb 6 4 oz)     · Patient does look volume overloaded on exam   Reports progressively worsening bilateral lower extremity edema and mild erythema over last few months to weeks  Also reports increasing shortness of breath and intermittent wheezing for last few weeks which she attributes to allergies  · Said she did not take her diuretic 2 days prior to admission as she was scheduled for procedure with pain management  Prior to that reports compliance  · No other signs or symptoms of infection at this point  Patient was started on antibiotics in ER because of concern for cellulitis  Low suspicion for cellulitis at this point  Lower extremity edema most likely because of venous congestion  So will hold off on antibiotics for now  · Hold patient's torsemide  Continue IV Lasix 80 b i d  Brittnee Nayak · Suspect we will need to increase her torsemide to 40 mg b i d   Every day

## 2022-07-29 NOTE — OCCUPATIONAL THERAPY NOTE
Occupational Therapy Evaluation + Cognitive Evaluation     Patient Name: Art Dominguez  [id number] Date: 7/29/2022  Problem List  Principal Problem:    Fall and Ambulatory dysfunction  Active Problems:    Essential hypertension    Chronic low back pain with sciatica    A-fib ([facility] )    Acute on chronic diastolic heart failure ([facility] )    Memory impairment    Past Medical History  Past Medical History:   Diagnosis Date    [facility] 12/29/2021    Anxiety     Arthritis     Back pain     Cellulitis     CH[facility]estive [facility])     Edema of both lower extremities due to peripheral venous insufficiency     Edema of both lower extremities due to peripheral venous insufficiency     Erythema of lower extremity 11/12/2021    Fibromyalgia     Hypertension     Sjogren syndrome, unspecified ([place] [address] )      Past Surgical History  Past Surgical History:   Procedure Laterality Date    KNEE CARTILAGE SURGERY      REPLACEMENT TOTAL KNEE BILATERAL          07/29/22 0850   OT Last Visit   OT Visit Date 07/29/22   Note Type   Note type Evaluation   Restrictions/Precautions   Weight Bearing Precautions Per Order No   Other Precautions Cognitive; Chair Alarm; Bed Alarm;Multiple lines; Fall Risk;O2;Pain  (IV, Masimo)   Pain Assessment   Pain Assessment Tool FLACC   Pain Location/Orientation Orientation: Left; Location: Foot  (heel)   Effect of Pain on Daily Activities limits pts activity tolerance   Hospital Pain Intervention(s) Repositioned; Ambulation/increased activity   Pain Rating: FLACC (Rest) - Face 0   Pain Rating: FLACC (Rest) - Legs 0   Pain Rating: FLACC (Rest) - Activity 0   Pain Rating: FLACC (Rest) - Cry 0   Pain Rating: FLACC (Rest) - Consolability 0   Score: FLACC (Rest) 0   Pain Rating: FLACC (Activity) - Face 0   Pain Rating: FLACC (Activity) - Legs 0   Pain Rating: FLACC (Activity) - Activity 0   Pain Rating: FLACC (Activity) - Cry 1   Pain Rating: FLACC (Activity) - Consolability 1   Score: FLACC (Activity) 2   Home Living   Type of Home House  (town house)   Home Layout Stairs to enter with rails  (2 [address]OR[address], 13 steps to 2nd floor with stair lift)   Bathroom Shower/Tub Tub/shower unit   Bathroom Toilet Standard   Bathroom Equipment Grab bars in shower; Shower chair   [address] glide;Walker;Cane;Grab bars   Additional Comments pt &  reporting social hx, as pt is sometimes a poor historian   Prior Function   Lives With Spouse  (, works 2-10 during day, pt alone during these hours)   Receives Help From Family   ADL Assistance Needs assistance  ((A) with donning shoes)   IADLs Needs assistance   Falls in the last 6 months 1 to 4  (two falls from chair, one fall reason for admission)   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[place] [address])   Comments  reports that pt has been using RW lately since last d/c from rehab in Feb 2022   Lifestyle   Autonomy pt and  report that pt is (A) with ADLs & IADLs, (+) use of AD at baseline, & (-) driving   Reciprocal Relationships , daughter   Service to Others retired   Intrinsic Gratification watching tv, shopping   Subjective   Subjective "I didn't even know I had trouble with my memory"   ADL   Eating Assistance 5  Supervision/Setup   Grooming Assistance 5  Supervision/Setup  (washing hands at sink)   Grooming Deficit Setup;Verbal cueing;Supervision/safety; Increased time to complete   UB Bathing Assistance 5  Supervision/Setup   LB Bathing Assistance 2  Maximal Assistance   UB Dressing Assistance 5  Supervision/Setup   LB Dressing Assistance 1  Total Assistance  (doffing shoes/donning socks)   LB Dressing Deficit Setup;Verbal cueing;Supervision/safety; Increased time to complete; Don/doff R sock; Don/doff L sock; Don/doff R shoe;Don/doff L shoe   Toileting Assistance  3  Moderate Assistance   Toileting Deficit Setup;Verbal cueing;Supervison/safety; Increased time to complete;Clothing management down Additional Comments While unable to assess eating, UB bathing, LB bathing and UB dressing at time of evaluation, with use of clinical reasoning, pt's performance throughout evaluation indicates that pt may be able to perform these tasks at the levels listed above   Bed Mobility   Supine to Sit Unable to assess   Sit to Supine Unable to assess   Additional Comments pt seated OOB in recliner at beginning and end of session   Transfers   Sit to Stand 4  Minimal assistance   Additional items Assist x 1; Armrests; Increased time required;Verbal cues   Stand to Sit 4  Minimal assistance   Additional items Assist x 1; Armrests; Increased time required;Verbal cues   Toilet transfer 3  Moderate assistance   Additional items Assist x 1; Increased time required;Verbal cues;Standard toilet  (VCs for grab bar use)   Additional Comments w/ RW, VCs for safety, attention to task, body positioning, and hand placement on grab bars/RW   Functional Mobility   Functional Mobility 4  Minimal assistance   Additional Comments Ax1, pt moving from chair > BR > chair, progressing to CGA while moving from BR > chair, max VCs for attention to task and safety   Additional items Rolling walker   Balance   Static Sitting Fair   Dynamic Sitting Fair -   Static Standing Poor +  (w/ RW)   Dynamic Standing Poor +  (w/ RW)   Ambulatory Poor +  (w/ RW)   Activity Tolerance   Activity Tolerance Patient limited by fatigue;Patient limited by pain   Medical Staff Made Aware Partial care coordination with PT Alexandrea   Nurse Made Aware KITTY ALLEN Assessment   RUE Assessment WFL   LUE Assessment   LUE Assessment WFL   Hand Function   Gross Motor Coordination Functional   Fine Motor Coordination Functional   Vision-Basic Assessment   Current Vision Wears glasses all the time   Cognition   Overall Cognitive Status (S)  Impaired   Arousal/Participation Alert; Cooperative   Attention Attends with cues to redirect   Orientation Level Oriented to person;Oriented to place; Disoriented to time;Disoriented to situation  (pt aware she is in hospital)   Memory Decreased short term memory;Decreased recall of recent events;Decreased recall of precautions   Following Commands Follows one step commands with increased time or repetition   Comments (S)  pt pleasant and cooperative, pt requiring max VCs for attention to task, safety, and problem solving, emotional support to continue engaging in tasks, pt completing MoCA cognitive assessment scoring  which is indicitave of dementia   Cognition Assessment Tools MOCA    Pt completed [place] Cognitive Assessment (MOCA) version 8 1  Pt scored overall   indicating a moderate cognitive deficit  Visuospatial/executive: 2 Namin3 Memory: first trial: , 2nd trial  (worth no points) Attention:  3/6 Language: 1/3 Abstraction: 02 Delayed recall: 0 Orientation:  3/6  Memory Index Score (MIS) 4/15  MoCA Certified Rater ID: [id number]  During assessment pt requiring redirection, emotional support, and repetition of directions  Score 11   Assessment   Limitation Decreased ADL status; Decreased Safe judgement during ADL;Decreased endurance;Decreased cognition;Decreased self-care trans;Decreased high-level ADLs   Prognosis Good   Assessment Pt is a 77 y o  female seen for OT evaluation s/p admission to [address] [place] [address] on 2022 due to fall  Pt diagnosed with Ambulatory dysfunction  Pt has a significant PMH impacting occupational performance including: HTN, chronic low back pain with sciatica, A-fib, CHF, memory impairment, & anxiety  Pt with one recent admission in the last 6 months  Pt with active OT evaluation and treatment orders and activity orders for Up with assistance  Prior to completing the IE, an extensive review of medical and/or therapy records and physical, cognitive, and psychosocial information related to pt functional performance was completed   The pt is considered a high complexity evaluation due to assessments used during IE and observed and listed functional performance deficits  PTA, pt and  report that pt is (A) with ADLs & IADLs, (+) use of AD at baseline, & (-) driving  Pt is motivated to return to home with   Personal and environmental factors supporting pt at time of IE include accessible home environment  Personal and environmental factors inhibiting engagement in occupations include advanced age, current habits and behavioral patterns, limited social support, difficulty completing ADLs and difficulty completing IADLs  During evaluation pt performed as is outlined above in flowsheet  Pt required VC for safety, VC for attention to task and education on use of RW to continue engaging in tasks  Standardized assessments used to assist in identifying performance deficits include AMPAC 6-Clicks, Barthel ADL Index and MoCA  Performance deficits that affect the pts occupational performance during the initial evaluation include impaired balance, functional mobility, endurance, activity tolerance, forward functional reach, functional standing tolerance and overall strength, attention to task, safety awareness, insight into deficits, orientation and problem solving, emotional regulation  Based on pts functional performance and deficits the following occupations will be addressed in OT treatments in order to maximize pts independence and overall occupational performance: grooming, bathing/showering, toileting and toilet hygiene, dressing and functional mobility  Upon discharge from acute care setting, OT recommendation on d/c to Short Term Rehab  Pt goals are listed below  Goals   Patient Goals to get stronger so she can go out to dinner with her    LTG Time Frame 10-14   Long Term Goal Refer to goals below   Plan   Treatment Interventions ADL retraining;Functional transfer training; Endurance training;Cognitive reorientation;Patient/family training;Equipment evaluation/education; Compensatory technique education; Energy conservation; Activityengagement   Goal Expiration Date 08/08/22   OT Treatment Day 1   OT Frequency 3-5x/wk   Additional Treatment Session   Start Time 3533   End Time 0945   Treatment Assessment Pt seen on this date for skilled OT treatment session  At beginning of session, pt seated in bedside recliner  Pt agreeable to receiving OT services and cooperative throughout session  Areas addressed during tx session included assessing cognitive functioning and possible cognitive deficits  Pt completing MoCA cognitive assessment and scoring 11/30 which is indicative of dementia  Pt experiencing most difficulty with visuospatial, executive functioning, language, & STM sections of assessment  Pt requiring repetition of directions, VCs for attention to task, VCs for problem solving, & emotional support to participate in tasks throughout duration of assessment  Pt continues to be limited by decreased STM, attention to task, and emotional regulation  Pt provided with education on occupational therapy and cognitive strategies  Throughout admission, pt would continue to benefit from skilled OT services to help address remaining deficits  OT recommendation for d/c continues to be PAR when medically stable     Additional Treatment Day 1   Recommendation   OT Discharge Recommendation Post acute rehabilitation services   AM-PAC Daily Activity Inpatient   Lower Body Dressing 1   Bathing 2   Toileting 2   Upper Body Dressing 3   Grooming 3   Eating 3   Daily Activity Raw Score 14   Daily Activity Standardized Score (Calc for Raw Score >=11) 33 39   AM-PAC Applied Cognition Inpatient   Following a Speech/Presentation 3   Understanding Ordinary Conversation 3   Taking Medications 2   Remembering Where Things Are Placed or Put Away 2   Remembering List of 4-5 Errands 2   Taking Care of Complicated Tasks 1   Applied Cognition Raw Score 13   Applied Cognition Standardized Score 30 46   Barthel Index   Feeding 5   Bathing 0   Grooming Score 0   Dressing Score 5   Bladder Score 10   Bowels Score 10   Toilet Use Score 5   Transfers (Bed/Chair) Score 10   Mobility (Level Surface) Score 0   Stairs Score 0   Barthel Index Score 45     Goals: Goals are developed to assist pt in reaching goal to go home with her     -Patient will be Min A with toileting tasks with use of AD/AE as needed to decrease caregiver burden     -Patient will be Mod I for UB bathing and dressing with use of AD/AE as needed to increase (I) with ADLs  -Patient will complete LB dressing with Max A and use of AD/AE as needed to improve active ADL participation    -Caregiver/family will provide client with appropriate VCs during functional tasks to facilitate pt's I in ADLs/IADLs     -Patient will complete grooming tasks with no more than 5 VCs to compensate for STM deficits     -Patient will attend to functional activity for 5 min without the need for redirection to improve activity tolerance     -Patient will transfer from bed to chair with use of Mod I to improve activity tolerance     -Patient will demonstrate learned safety precautions/body mechanics to improve functional mobility with use of AD as needed     -Patient will remain standing at sink to complete grooming tasks Mod I for 5 min to increase activity tolerance  The patient's raw score on the AM-PAC Daily Activity inpatient short form is 14, standardized score is 33 39, less than 39 4  Patients at this level are likely to benefit from discharge to post-acute rehabilitation services  Please refer to the recommendation of the Occupational Therapist for safe discharge planning  This session, pt required and most appropriately benefited from partial skilled OT/PT co-eval due to significant regression from functional baseline and decreased activity tolerance  OT and PT goals were addressed separately as seen in documentation       At the end of the session, all needs met and pt seated in bedside chair, chair alarm activated, LEs elevated , and call bell within reach    Ofelia Johnson, OTS

## 2022-07-29 NOTE — PLAN OF CARE
Problem: PHYSICAL THERAPY ADULT  Goal: Performs mobility at highest level of function for planned discharge setting  See evaluation for individualized goals  Description: Treatment/Interventions: Functional transfer training, LE strengthening/ROM, Elevations, Therapeutic exercise, Endurance training, Cognitive reorientation, Patient/family training, Equipment eval/education, Bed mobility, Gait training  Equipment Recommended: Joni Stubbs       See flowsheet documentation for full assessment, interventions and recommendations  7/29/2022 1312 by Nancy Finn PT  Note: Prognosis: Fair  Problem List: Decreased strength, Decreased endurance, Impaired balance, Decreased mobility, Decreased cognition, Decreased safety awareness, Obesity, Orthopedic restrictions, Pain  Assessment: Aki Franklin is a 77 y o  Female who [facility] on 7/28/2022 due to fall and leg swelling and diagnosis of fall and ambulatory dysfunction  Orders for PT eval and treat received, w/ activity orders of up w/ assist  Comorbidities affecting pt at time of evaluation include: anxiety, chronic back pain, CHF, HTN  Personal factors affecting DC include: inaccessible home environment, lives in 2 story house, ambulating w/ assistive device, stairs to enter home, inability to ambulate household distances, inability to navigate level surfaces w/o external assistance, decreased cognition, limited home support, positive fall history, inability to perform IADLs and inability to perform ADLs  PTA pt was mobilizing independently w/ RW, and w/ 2 fall(s) in the previous 6 months  Upon evaluation, pt presents w/ the following deficits: weakness, impaired skin integrity, edema of extremities, impaired balance, decreased endurance, pain limiting functional mobility and gait deviations  Pt currently requires min-mod Ax1 for transfers and min Ax1 w/ RW for ambulation   Pt's clinical presentation is unstable/unpredictable due to need for assist w/ all phases of mobility when usually mobilizing independently, tolerance to only 25' feet of ambulation, pain impacting overall mobility status, need for input for task focus and mobility technique, recent drastic decline in mobility compared to baseline and recent history of falls  Pt is at an increased risk of falls due to impaired cognition, impaired balance, LE weakness  From a PT/mobility standpoint, given the above findings, DC recommendation is: Post-acute inpatient rehabilitation  During current admission, pt will benefit from continued skilled inpatient PT in the acute care setting in order to address the above deficits and to maximize function and mobility prior to DC from acute care  Barriers to Discharge: Inaccessible home environment, Decreased caregiver support     PT Discharge Recommendation: Post acute rehabilitation services    See flowsheet documentation for full assessment

## 2022-07-29 NOTE — PHYSICAL THERAPY NOTE
PHYSICAL THERAPY EVALUATION NOTE          Patient Name: Yuliana Hall  NPVGX'N Date: 2022      AGE:   77 y o  Mrn:   [id number]  ADMIT DX:  Leg swelling [M79 89]  Elevated CK [R74 8]  Bilateral lower extremity edema [R60 0]  Bilateral cellulitis of lower leg [L03 116, X52 792]  Ambulatory dysfunction [R26 2]    Past Medical History:  Past Medical History:   Diagnosis Date    A-fib ([place] [address] ) 2021    Anxiety     Arthritis     Back pain     Cellulitis     CHF (congestive heart failure) ([facility])     Edema of both lower extremities due to peripheral venous insufficiency     Edema of both lower extremities due to peripheral venous insufficiency     Erythema of lower extremity 2021    Fibromyalgia     Hypertension     Sjogren syndrome, unspecified ([place] [address] )        Past Surgical History:  Past Surgical History:   Procedure Laterality Date    KNEE CARTILAGE SURGERY      REPLACEMENT TOTAL KNEE BILATERAL       Length Of Stay: 1        PHYSICAL THERAPY EVALUATION:    Patient's identity confirmed via 2 patient identifiers (full name and ) at start of session       22 0916   PT Last Visit   PT Visit Date 22   Note Type   Note type Evaluation   Pain Assessment   Pain Assessment Tool FLACC   Pain Location/Orientation Orientation: Left; Location: Foot  (heel)   Pain Onset/Description Onset: Ongoing  (for a few months PTA per pt)   Effect of Pain on Daily Activities limits pt's ambulatory tolerance   Hospital Pain Intervention(s) Repositioned; Ambulation/increased activity   Pain Rating: FLACC (Rest) - Face 0   Pain Rating: FLACC (Rest) - Legs 0   Pain Rating: FLACC (Rest) - Activity 0   Pain Rating: FLACC (Rest) - Cry 0   Pain Rating: FLACC (Rest) - Consolability 0   Score: FLACC (Rest) 0   Pain Rating: FLACC (Activity) - Face 0   Pain Rating: FLACC (Activity) - Legs 0   Pain Rating: FLACC (Activity) - Activity 0   Pain Rating: FLACC (Activity) - Cry 1   Pain Rating: FLACC (Activity) - Consolability 1   Score: FLACC (Activity) 2   Restrictions/Precautions   Weight Bearing Precautions Per Order No   Other Precautions Cognitive; Chair Alarm; Bed Alarm;O2;Fall Risk;Pain   Home Living   Type of [address] [place] [address] Two level;Stairs to enter with rails  (2 GLORIA, stairglide to 2nd floor)   Bathroom Shower/Tub Tub/shower unit   Bathroom Toilet Standard   Bathroom Equipment Grab bars in shower; Shower chair   [address] Walker;Cane;Stair glide;Grab bars  (RW, [facility])   Additional Comments Pt lives w/  in a 2 level house w/ 2 GLORIA and stair-glide to 2nd floor bedroom/bathroom   Prior Function   Lives With Spouse  ( works 2-10pm, pt home alone during those hours)   Receives Help From Family   ADL Assistance Needs assistance   IADLs Needs assistance   Falls in the last 6 months 1 to 4  (2 falls out of chair, reason for admission)   Comments PTA, pt ambulating independently w/ RW (since DC from [address] in February), required assist w/ ADLS, IADLs  2 recent falls out of chair   General   Family/Caregiver Present Yes  (pt's )   Cognition   Overall Cognitive Status Impaired   Arousal/Participation Cooperative   Orientation Level Oriented to person;Oriented to place; Disoriented to time;Disoriented to situation  (aware in hospital)   Memory Decreased short term memory;Decreased recall of recent events;Decreased recall of precautions   Following Commands Follows one step commands without difficulty   Comments Pt ID via name and ; pt agreeable to PT eval and mobility   Strength RLE   RLE Overall Strength 3/5  (grossly assessed w/ functional mobility)   Strength LLE   LLE Overall Strength 3/5  (grossly assessed w/ functional mobility)   Bed Mobility   Supine to Sit Unable to assess   Sit to Supine Unable to assess   Additional Comments pt OOB in chair upon arrival and returned to recliner chair at end of session   Transfers Sit to Stand 4  Minimal assistance   Additional items Assist x 1; Armrests; Increased time required;Verbal cues   Stand to Sit 4  Minimal assistance   Additional items Assist x 1; Armrests; Increased time required;Verbal cues   Toilet transfer 3  Moderate assistance   Additional items Assist x 1; Increased time required;Standard toilet  (VC for use of grab bar)   Additional Comments VC for hand placement   Ambulation/Elevation   Gait pattern Improper Weight shift; Wide AGUSTIN; Decreased foot clearance; Short stride; Excessively slow   Gait Assistance 4  Minimal assist   Additional items Assist x 1;Verbal cues  (progressing to CGA/steadying assist)   Assistive Device Rolling walker   Distance 25'x2   Balance   Static Sitting Fair   Dynamic Sitting Fair -   Static Standing Fair -  (w/ RW)   Dynamic Standing Poor +   Ambulatory Poor +  (w/ RW)   Endurance Deficit   Endurance Deficit Yes   Endurance Deficit Description limited ambulatory endurance and decreased overall tolerance to functional mobility   Activity Tolerance   Activity Tolerance Patient limited by fatigue;Patient limited by pain   Medical Staff [place] coordination w/ OT KEL Saleh due to pt's medical complexity, regression from mobility baseline, and cognitive/safety awareness deficits requiring two skilled clinicians, individual items assessed and goals addressed; HENRIETTA Cano   Nurse Made Aware RN Luly   Assessment   Prognosis Fair   Problem List Decreased strength;Decreased endurance; Impaired balance;Decreased mobility; Decreased cognition;Decreased safety awareness; Obesity;Orthopedic restrictions;Pain   Assessment Anurag Romero is a 77 y o  Female who presents to [address] [place] [address] on 7/28/2022 due to fall and leg swelling and diagnosis of fall and ambulatory dysfunction  Orders for PT eval and treat received, w/ activity orders of up w/ assist  Comorbidities affecting pt at time of evaluation include: anxiety, chronic back pain, CHF, HTN   Personal factors affecting DC include: inaccessible home environment, lives in 2 story house, ambulating w/ assistive device, stairs to enter home, inability to ambulate household distances, inability to navigate level surfaces w/o external assistance, decreased cognition, limited home support, positive fall history, inability to perform IADLs and inability to perform ADLs  PTA pt was mobilizing independently w/ RW, and w/ 2 fall(s) in the previous 6 months  Upon evaluation, pt presents w/ the following deficits: weakness, impaired skin integrity, edema of extremities, impaired balance, decreased endurance, pain limiting functional mobility and gait deviations  Pt currently requires min-mod Ax1 for transfers and min Ax1 w/ RW for ambulation  Pt's clinical presentation is unstable/unpredictable due to need for assist w/ all phases of mobility when usually mobilizing independently, tolerance to only 25' feet of ambulation, pain impacting overall mobility status, need for input for task focus and mobility technique, recent drastic decline in mobility compared to baseline and recent history of falls  Pt is at an increased risk of falls due to impaired cognition, impaired balance, LE weakness  From a PT/mobility standpoint, given the above findings, DC recommendation is: Post-acute inpatient rehabilitation  During current admission, pt will benefit from continued skilled inpatient PT in the acute care setting in order to address the above deficits and to maximize function and mobility prior to DC from acute care     Barriers to Discharge Inaccessible home environment;Decreased caregiver support   Goals   Patient Goals to get better   STG Expiration Date 08/08/22   Short Term Goal #1 Pt will: perform bed mobility w/ mod I to decrease pt's burden of care and increase pt's independence w/ repositioning in bed; perform transfers w/ mod I to increase pt's OOB mobility; ambulate at least 250' w/ LRAD and mod I to increase pt's ambulatory endurance/tolerance; negotiate 2 steps w/ UE support and supervision to facilitate pt returning to previous living environment; increase all balance ratings by at least 1 grade to decrease pt's risk of falls   PT Treatment Day 0   Plan   Treatment/Interventions Functional transfer training;LE strengthening/ROM; Elevations; Therapeutic exercise; Endurance training;Cognitive reorientation;Patient/family training;Equipment eval/education; Bed mobility;Gait training   PT Frequency 3-5x/wk   Recommendation   PT Discharge Recommendation Post acute rehabilitation services   Equipment Recommended [facility] Recommended Wheeled walker   Change/add to "[employer]? No   Additional Comments DC rec: post acute rehab; pt may progress to HHPT pending progress w/ functional mobility and increased support/supervision upon DC   AM-PAC Basic Mobility Inpatient   Turning in Bed Without Bedrails 2   Lying on Back to Sitting on Edge of Flat Bed 2   Moving Bed to Chair 3   Standing Up From Chair 3   Walk in Room 3   Climb 3-5 Stairs 1   Basic Mobility Inpatient Raw Score 14   Basic Mobility Standardized Score 35 55   Highest Level Of Mobility   -[facility] Goal 4: Move to chair/commode   -[facility] Achieved 7: Walk 25 feet or more   End of Consult   Patient Position at End of Consult Bedside chair;Bed/Chair alarm activated; All needs within reach  (LE elevated)       The patient's AM-PAC Basic Mobility Inpatient Short Form Raw Score is 14  A Raw score of less than or equal to 16 suggests the patient may benefit from discharge to post-acute rehabilitation services  Please also refer to the recommendation of the Physical Therapist for safe discharge planning      Pt will benefit from skilled inpatient PT during this admission in order to facilitate progress towards goals and to maximize functional independence prior to [address] rec: post acute rehab        Miesha Bloom, PT, DPT  07/29/22

## 2022-07-30 VITALS
RESPIRATION RATE: 16 BRPM | BODY MASS INDEX: 44.1 KG/M2 | OXYGEN SATURATION: 94 % | HEART RATE: 82 BPM | SYSTOLIC BLOOD PRESSURE: 158 MMHG | DIASTOLIC BLOOD PRESSURE: 111 MMHG | HEIGHT: 62 IN | WEIGHT: 239.64 LBS | TEMPERATURE: 98.2 F

## 2022-07-30 PROBLEM — L03.119 CELLULITIS OF LOWER EXTREMITY: Status: ACTIVE | Noted: 2022-07-30

## 2022-07-30 LAB
ANION GAP SERPL CALCULATED.3IONS-SCNC: 7 MMOL/L (ref 4–13)
BUN SERPL-MCNC: 13 MG/DL (ref 5–25)
CALCIUM SERPL-MCNC: 9.1 MG/DL (ref 8.4–10.2)
CHLORIDE SERPL-SCNC: 100 MMOL/L (ref 96–108)
CO2 SERPL-SCNC: 34 MMOL/L (ref 21–32)
CREAT SERPL-MCNC: 0.94 MG/DL (ref 0.6–1.3)
GFR SERPL CREATININE-BSD FRML MDRD: 59 ML/MIN/1.73SQ M
GLUCOSE SERPL-MCNC: 111 MG/DL (ref 65–140)
POTASSIUM SERPL-SCNC: 3.6 MMOL/L (ref 3.5–5.3)
SODIUM SERPL-SCNC: 141 MMOL/L (ref 135–147)

## 2022-07-30 PROCEDURE — 80048 BASIC METABOLIC PNL TOTAL CA: CPT | Performed by: PHYSICIAN ASSISTANT

## 2022-07-30 PROCEDURE — 99239 HOSP IP/OBS DSCHRG MGMT >30: CPT | Performed by: PHYSICIAN ASSISTANT

## 2022-07-30 RX ORDER — TORSEMIDE 20 MG/1
40 TABLET ORAL DAILY
Status: DISCONTINUED | OUTPATIENT
Start: 2022-07-31 | End: 2022-07-30 | Stop reason: HOSPADM

## 2022-07-30 RX ORDER — DIPHENHYDRAMINE HCL 25 MG
12.5 TABLET ORAL EVERY 6 HOURS PRN
Status: DISCONTINUED | OUTPATIENT
Start: 2022-07-30 | End: 2022-07-30 | Stop reason: HOSPADM

## 2022-07-30 RX ORDER — TORSEMIDE 20 MG/1
40 TABLET ORAL DAILY
Qty: 180 TABLET | Refills: 0 | Status: ON HOLD | OUTPATIENT
Start: 2022-07-30 | End: 2022-10-10 | Stop reason: SDUPTHER

## 2022-07-30 RX ORDER — CEPHALEXIN 500 MG/1
500 CAPSULE ORAL EVERY 6 HOURS SCHEDULED
Qty: 40 CAPSULE | Refills: 0 | Status: SHIPPED | OUTPATIENT
Start: 2022-07-30 | End: 2022-08-03

## 2022-07-30 RX ADMIN — CARVEDILOL 25 MG: 12.5 TABLET, FILM COATED ORAL at 08:30

## 2022-07-30 RX ADMIN — LORATADINE 5 MG: 10 TABLET ORAL at 08:33

## 2022-07-30 RX ADMIN — OXYCODONE HYDROCHLORIDE 20 MG: 20 TABLET, FILM COATED, EXTENDED RELEASE ORAL at 05:35

## 2022-07-30 RX ADMIN — LOSARTAN POTASSIUM 100 MG: 50 TABLET, FILM COATED ORAL at 08:33

## 2022-07-30 RX ADMIN — POTASSIUM CHLORIDE 40 MEQ: 1500 TABLET, EXTENDED RELEASE ORAL at 08:33

## 2022-07-30 RX ADMIN — APIXABAN 5 MG: 5 TABLET, FILM COATED ORAL at 08:33

## 2022-07-30 RX ADMIN — DIPHENHYDRAMINE HCL 12.5 MG: 25 TABLET, COATED ORAL at 05:35

## 2022-07-30 RX ADMIN — FUROSEMIDE 80 MG: 10 INJECTION, SOLUTION INTRAMUSCULAR; INTRAVENOUS at 08:40

## 2022-07-30 NOTE — NURSING NOTE
Pt continues to be very itchy all over, despite receiving 12 5mg of benadryl PO  I wiped down the patients back and shoulders and applied a lotion to see if that would help with the itching  I will continue to monitor

## 2022-07-30 NOTE — DISCHARGE SUMMARY
[facility]  Discharge- Reather Alberto 1945, 77 y o  female MRN: [id number]  Unit/Bed#: W -01 Encounter: [id number]  Primary Care Provider: Subha Ying MD   Date and time admitted to hospital: 7/28/2022  5:19 AM    * Fall and Ambulatory dysfunction  Assessment & Plan  · Patient slid off the chair at home  Was unable to get by herself   also could not get her up  · Most likely due to bilateral lower extremity worsening edema, weakness  · PT OT rec for rehab, patient refusing   wants to take her home  [facility]  Acute on chronic diastolic heart failure [facility])  Assessment & Plan  Wt Readings from Last 3 Encounters:   07/28/22 109 kg (239 lb 10 2 oz)   05/19/22 106 kg (233 lb 6 4 oz)   04/21/22 109 kg (239 lb 6 4 oz)     · Patient did look volume overloaded on exam on admission  Reports progressively worsening bilateral lower extremity edema and mild erythema over last few months to weeks  · Said she did not take her diuretic 2 days prior to admission as she was scheduled for procedure with pain management  Prior to that reports compliance  · Responded well to Lasix 80 mg IV BID  · Transition to torsemide 40 mg every day  Cellulitis of lower extremity  Assessment & Plan  · Was seen in consultation by Podiatry  Recommending short course of p o  Keflex  · Will follow-up as an outpatient with Podiatry and Wound Care  Memory impairment  Assessment & Plan  · Patient has been more forgetful of late at home per her   · Scored 11/30 on the [facility]  · Geriatrics eval appreciated    A-fib [facility])  Assessment & Plan  · Chronic, rate controlled  · Continue Coreg  · Eliquis for anticoagulation    Chronic low back pain with sciatica  Assessment & Plan  · PDMP reviewed  · Oxycontin ER 20 mg every 8 hours  · Has script for percocet 10 mg but reports she takes only 5 mg of this at a time       Essential hypertension  Assessment & Plan  · Blood pressure stable  · Continue carvedilol, losartan  · Would dc HCTZ  Medical Problems             Resolved Problems  Date Reviewed: 7/30/2022   None               Discharging Physician / Practitioner: Marty Coughlin PA-C  PCP: Joel Moss MD  Admission Date:   Admission Orders (From admission, onward)     Ordered        07/28/22 0654  INPATIENT ADMISSION  Once                      Discharge Date: 07/30/22    Consultations During Hospital Stay:  · Geriatrics  · Podiatry     Procedures Performed:   · none    Significant Findings / Test Results:   · As above    Incidental Findings:   · none     Test Results Pending at Discharge (will require follow up): · None      Outpatient Tests Requested:  · none    Complications:  none    Reason for Admission: LE swelling, fall, weakness    Hospital Course:   Mariangel Alfaro is a 77 y o  female patient who originally presented to the hospital on 7/28/2022 due to fall at home  She also complained of lower extremity swelling and erythema  She was brought into the ER by her   Reports it is from her  are that she has been increasingly confused as of late  She had a fall at home off of her chair  Legs have been more swollen as she had been holding her torsemide in preparation for procedure prior to hospital admission  Upon admission was found to have bilateral lower extremity swelling and erythema  Got 1 dose of IV antibiotics in the ER  She was aggressively diuresed with IV Lasix 80 mg b i d  And did show adequate response to diuretics as evidenced by improvement of her lower extremity edema  She was seen in consultation by Podiatry who recommended a short course of p o  Keflex  She was seen by Physical and Occupational therapy who did recommend rehab for the patient  She declined this  Her  also declined this and we decided that she would come home with home health care set up  We increased her torsemide to 40 mg every single day  We also discontinued her hydrochlorothiazide  She will follow-up with her PCP on 08/03 for further management and monitoring of her CHF  Please see above list of diagnoses and related plan for additional information  Condition at Discharge: stable    Discharge Day Visit / Exam:   Subjective:  Patient reports improvement of her leg swelling  Denies pain  No overnight events per nursing  Reports she wants to go home and does not want to go to rehab  Vitals: Blood Pressure: (!) 158/111 (07/30/22 0837)  Pulse: 82 (07/30/22 0837)  Temperature: 98 2 °F (36 8 °C) (07/29/22 2015)  Temp Source: Oral (07/29/22 2015)  Respirations: 16 (07/29/22 2015)  Height: 5' 2" (157 5 cm) (07/28/22 0525)  Weight - Scale: 109 kg (239 lb 10 2 oz) (07/28/22 0525)  SpO2: 94 % (07/30/22 0837)  Exam:   Physical Exam  Vitals and nursing note reviewed  Constitutional:       General: She is not in acute distress  Appearance: Normal appearance  HENT:      Head: Normocephalic  Mouth/Throat:      Mouth: Mucous membranes are moist    Eyes:      General: No scleral icterus  Pupils: Pupils are equal, round, and reactive to light  Cardiovascular:      Rate and Rhythm: Normal rate and regular rhythm  Heart sounds: No murmur heard  Pulmonary:      Effort: Pulmonary effort is normal  No respiratory distress  Breath sounds: Normal breath sounds  No wheezing, rhonchi or rales  Abdominal:      General: Bowel sounds are normal  There is no distension  Palpations: Abdomen is soft  Tenderness: There is no abdominal tenderness  Musculoskeletal:         General: No swelling  Right lower leg: Edema present  Left lower leg: Edema present  Skin:     Capillary Refill: Capillary refill takes less than 2 seconds  Findings: Erythema (b/l LE, chronic) present  Neurological:      General: No focal deficit present  Mental Status: She is alert and oriented to person, place, and time   Mental status is at baseline  Comments: Impaired short term memory          Discussion with Family: Updated  () via phone  Discharge instructions/Information to patient and family:   See after visit summary for information provided to patient and family  Provisions for Follow-Up Care:  See after visit summary for information related to follow-up care and any pertinent home health orders  Disposition:   Home with VNA Services (Reminder: Complete face to face encounter)    Planned Readmission: no     Discharge Statement:  I spent 45 minutes discharging the patient  This time was spent on the day of discharge  I had direct contact with the patient on the day of discharge  Greater than 50% of the total time was spent examining patient, answering all patient questions, arranging and discussing plan of care with patient as well as directly providing post-discharge instructions  Additional time then spent on discharge activities  Discharge Medications:  See after visit summary for reconciled discharge medications provided to patient and/or family        **Please Note: This note may have been constructed using a voice recognition system**

## 2022-07-30 NOTE — ASSESSMENT & PLAN NOTE
· PDMP reviewed  · Oxycontin ER 20 mg every 8 hours  · Has script for percocet 10 mg but reports she takes only 5 mg of this at a time

## 2022-07-30 NOTE — DISCHARGE INSTR - AVS FIRST PAGE
Dear Art Dominguez,     It was our pleasure to care for you here at [facility]  It is our hope that we were always able to exceed the expected standards for your care during your stay  You were hospitalized due to CHF  You were cared for on the 4th floor by Martinez Aguilar PA-C under the service of Cindy Guadarrama MD with the [facility] who covers for your primary care physician (PCP), Subha Ying MD, while you were hospitalized  If you have any questions or concerns related to this hospitalization, you may contact us at [phone]  For follow up as well as any medication refills, we recommend that you follow up with your primary care physician  A registered nurse will reach out to you by phone within a few days after your discharge to answer any additional questions that you may have after going home  However, at this time we provide for you here, the most important instructions / recommendations at discharge:     Notable Medication Adjustments -   Take torsemide 40 mg every single day  Stop taking your HCTZ  Take Keflex (antibiotic) as directed  Testing Required after Discharge -   None  Important follow up information -   Please follow up with your PCP and podiatry  Other Instructions -   Return to ED if your symptoms recur  Please review this entire after visit summary as additional general instructions including medication list, appointments, activity, diet, any pertinent wound care, and other additional recommendations from your care team that may be provided for you        Sincerely,     Martinez Aguilar PA-C

## 2022-07-30 NOTE — ASSESSMENT & PLAN NOTE
· Patient has been more forgetful of late at home per her     · Scored 11/30 on the [facility]  · Geriatrics eval appreciated

## 2022-07-30 NOTE — ASSESSMENT & PLAN NOTE
· Patient slid off the chair at home  Was unable to get by herself   also could not get her up  · Most likely due to bilateral lower extremity worsening edema, weakness  · PT OT rec for rehab, patient refusing   wants to take her home  Will set up [facility]

## 2022-07-30 NOTE — ASSESSMENT & PLAN NOTE
· Was seen in consultation by Podiatry  Recommending short course of p o  Keflex  · Will follow-up as an outpatient with Podiatry and Wound Care

## 2022-07-30 NOTE — CONSULTS
Consult Note - Wound   Leafy Frederick Rodríguez 77 y o  female MRN: [id number]  Unit/Bed#: W -01 Encounter: [id number]      Assessment/Plan:   Wound care nurse consult for bilateral lower extremities  Podiatry has been consulted  I will defer care to podiatry and will sign off  Please re-consult if skin erosion develops over a bony prominence

## 2022-07-30 NOTE — ASSESSMENT & PLAN NOTE
Wt Readings from Last 3 Encounters:   07/28/22 109 kg (239 lb 10 2 oz)   05/19/22 106 kg (233 lb 6 4 oz)   04/21/22 109 kg (239 lb 6 4 oz)     · Patient did look volume overloaded on exam on admission  Reports progressively worsening bilateral lower extremity edema and mild erythema over last few months to weeks  · Said she did not take her diuretic 2 days prior to admission as she was scheduled for procedure with pain management  Prior to that reports compliance  · Responded well to Lasix 80 mg IV BID  · Transition to torsemide 40 mg every day

## 2022-08-01 ENCOUNTER — TELEPHONE (OUTPATIENT)
Dept: GERIATRICS | Age: 77
End: 2022-08-01

## 2022-08-01 NOTE — TELEPHONE ENCOUNTER
[facility]  [address] [address], [address] [facility], [facility] [address]    [address] [place] [address],[address]  [place], [address] [place] [address] [place]    [phone]    Telephone Intake: Geriatric Assessment     Referral source: Dr Kaz Cardona, PCP     Caller who is scheduling/relationship to pt: spouse, Mathew May "Mali Rodríguez  Caller's phone number: [phone]    Reason for referral: Patient concerns , Family member concerns and Provider concerns regarding memory concerns and behavior changes/concerns  If there are behavioral concerns, is the pt prescribed medications to manage these? no   If so, how many? none   Has the patient ever had an inpatient psychiatric hospitalization? No,   If the patient is prescribed medications for behavior management or has a history of psychiatric hospitalization, please DO NOT schedule  Please route to provider for review first    What is the goal of the visit? initial assessment & diagnosis; resource; address balance issues  Has the patient been seen by a Neurologist or Geriatrician? Yes, Dr Kierra Caldwell during recent hospital stay  Has the patient ever been diagnosed with dementia? No      Preferred language? English  Highest education level? Some College  Does the patient wear glasses? Yes   Does the patient use hearing aids? No     Is there a living will/healthcare POA in place/If so, who? Yes , Proxy, spouse Ilene Wong    Does the pt/caregiver have access for a virtual visit (computer/smart phone with audio/video)? Yes     Caller was informed: Please make sure the pt is accompanied by someone who knows them well / caregiver / family member to participate in this appointment  Who will accompany the pt (name and relationship)?  Ilene Wong, spouse  Phone number of person accompanying pt: [phone]  If a pt plans to attend alone, please route a message to the assigned provider for approval      Office packet mailed out to: [address] [facility] [id number]    Added to wait list for sooner appointments? Yes     NOTE FOR : If the pt was recently hospitalized, please route intake to assigned provider for chart review

## 2022-08-01 NOTE — UTILIZATION REVIEW
Notification of Discharge   This is a Notification of Discharge from our facility [address]  Please be advised that this patient has been discharge from our facility  Below you will find the admission and discharge date and time including the patients disposition  UTILIZATION REVIEW CONTACT:  Dorie Burton  Utilization   Network Utilization Review Department  Phone: [phone] x carefully listen to the prompts  All voicemails are confidential   Email: [email or web address]     PHYSICIAN ADVISORY SERVICES:  FOR [id number] REVIEW - MEDICAL NECESSITY DENIAL  Phone: [phone]  Fax: [phone]  Email: [email or web address]     PRESENTATION DATE: 7/28/2022  5:19 AM  OBERVATION ADMISSION DATE:   INPATIENT ADMISSION DATE: 7/28/22  6:54 AM   DISCHARGE DATE: 7/30/2022 10:48 AM  DISPOSITION: Home/Self Care Home/Self Care      IMPORTANT INFORMATION:  Send all requests for admission clinical reviews, approved or denied determinations and any other requests to dedicated fax number below belonging to the campus where the patient is receiving treatment   List of dedicated fax numbers:  [address] DENIALS (Administrative/Medical Necessity) [phone]   [address] (Maternity/NICU/Pediatrics) [phone]   [facility] [phone]   [address] [facility] [phone]   [address] [place] [address] [phone]   2000 [facility] [address] [place] [address],[address] [phone]   [facility]  [phone]   [address] [place] [address]  [address] [facility] 1000 [facility] [phone]

## 2022-08-02 LAB
BACTERIA BLD CULT: NORMAL
BACTERIA BLD CULT: NORMAL

## 2022-08-03 ENCOUNTER — OFFICE VISIT (OUTPATIENT)
Dept: INTERNAL MEDICINE CLINIC | Facility: CLINIC | Age: 77
End: 2022-08-03
Payer: COMMERCIAL

## 2022-08-03 VITALS
BODY MASS INDEX: 41.88 KG/M2 | SYSTOLIC BLOOD PRESSURE: 132 MMHG | HEART RATE: 67 BPM | HEIGHT: 62 IN | WEIGHT: 227.6 LBS | TEMPERATURE: 98.6 F | OXYGEN SATURATION: 95 % | DIASTOLIC BLOOD PRESSURE: 80 MMHG

## 2022-08-03 DIAGNOSIS — I48.19 PERSISTENT ATRIAL FIBRILLATION (HCC): ICD-10-CM

## 2022-08-03 DIAGNOSIS — Z12.11 COLON CANCER SCREENING: ICD-10-CM

## 2022-08-03 DIAGNOSIS — L85.3 XEROSIS OF SKIN: ICD-10-CM

## 2022-08-03 DIAGNOSIS — Z91.89 AT RISK FOR OBSTRUCTIVE SLEEP APNEA: ICD-10-CM

## 2022-08-03 DIAGNOSIS — I50.33 ACUTE ON CHRONIC DIASTOLIC HEART FAILURE (HCC): ICD-10-CM

## 2022-08-03 DIAGNOSIS — I10 ESSENTIAL HYPERTENSION: Primary | ICD-10-CM

## 2022-08-03 DIAGNOSIS — R73.03 PREDIABETES: ICD-10-CM

## 2022-08-03 DIAGNOSIS — R41.3 MEMORY IMPAIRMENT: ICD-10-CM

## 2022-08-03 DIAGNOSIS — Z13.820 OSTEOPOROSIS SCREENING: ICD-10-CM

## 2022-08-03 DIAGNOSIS — Z12.31 ENCOUNTER FOR SCREENING MAMMOGRAM FOR MALIGNANT NEOPLASM OF BREAST: ICD-10-CM

## 2022-08-03 DIAGNOSIS — N18.30 STAGE 3 CHRONIC KIDNEY DISEASE, UNSPECIFIED WHETHER STAGE 3A OR 3B CKD (HCC): ICD-10-CM

## 2022-08-03 DIAGNOSIS — L03.115 CELLULITIS OF RIGHT LOWER EXTREMITY: ICD-10-CM

## 2022-08-03 PROBLEM — L50.9 URTICARIA: Status: RESOLVED | Noted: 2022-05-19 | Resolved: 2022-08-03

## 2022-08-03 PROBLEM — I89.0 LYMPHEDEMA: Status: ACTIVE | Noted: 2022-08-03

## 2022-08-03 PROCEDURE — 99214 OFFICE O/P EST MOD 30 MIN: CPT | Performed by: INTERNAL MEDICINE

## 2022-08-03 PROCEDURE — 1160F RVW MEDS BY RX/DR IN RCRD: CPT | Performed by: INTERNAL MEDICINE

## 2022-08-03 PROCEDURE — 1111F DSCHRG MED/CURRENT MED MERGE: CPT | Performed by: INTERNAL MEDICINE

## 2022-08-03 RX ORDER — AMMONIUM LACTATE 12 G/100G
CREAM TOPICAL DAILY
Qty: 385 G | Refills: 0 | Status: SHIPPED | OUTPATIENT
Start: 2022-08-03 | End: 2022-09-07

## 2022-08-03 NOTE — PROGRESS NOTES
INTERNAL MEDICINE FOLLOW-UP OFFICE VISIT  [facility] INTERNAL MEDICINE HARSHA    NAME: Haresh Rdoríguez  AGE: 77 y o  SEX: female  : 1945     DATE: 8/3/2022     Assessment and Plan:     1  Essential hypertension  Assessment & Plan:  · Acceptable  Continue Coreg 25mg BID, losartan 100mg daily      2  Persistent atrial fibrillation (HCC)  Assessment & Plan:  · Continue AC with Eliquis  Rate controlled on Coreg 25mg BID      3  Acute on chronic diastolic heart failure (HCC)  Assessment & Plan:  Wt Readings from Last 3 Encounters:   22 103 kg (227 lb 9 6 oz)   22 109 kg (239 lb 10 2 oz)   22 106 kg (233 lb 6 4 oz)     With 2 pound weight gain since yesterday, I have asked patient to take additional 20mg of torsemide this evening for a total of 40 milligrams this evening she has already taken 20 milligrams this morning, and can continue 20 milligrams twice a day thereafter  Patient instructions -  Weigh yourself without clothing at the same time each day  Record your weight  Please call your doctor if you have a sudden weight gain of more than 2-3lbs (1-1 3kg) in 1-2 days or 5lbs (2 3kg) in a week  Follow-up with cardiology as scheduled  Continue 2 grams sodium/1800 milliliter fluid restriction            4  Memory impairment  Assessment & Plan:  · MOCA 11 in the hospital   Patient will be seeing gerontology        5  Encounter for screening mammogram for malignant neoplasm of breast  -     Mammo screening bilateral w 3d & cad; Future; Expected date: 2022    6  Cellulitis of right lower extremity  -     Ambulatory Referral to Podiatry; Future    7   Stage 3 chronic kidney disease, unspecified whether stage 3a or 3b CKD [facility])  Assessment & Plan:  Lab Results   Component Value Date    EGFR 59 2022    EGFR 80 2022    EGFR 63 2022    CREATININE 0 94 2022    CREATININE 0 73 2022    CREATININE 0 89 2022   · Renal function stable on discharge  Will repeat BMP to assess renal function and electrolytes in 1 week given change in diuretics  Orders:  -     Basic metabolic panel; Future; Expected date: 08/10/2022    8  At risk for obstructive sleep apnea  -     Ambulatory Referral to Pulmonology; Future    9  Xerosis of skin  Assessment & Plan:  · Continue Amlactin daily    Orders:  -     ammonium lactate (LAC-HYDRIN) 12 % cream; Apply topically in the morning    10  Colon cancer screening  -     Occult Bloood,Fecal Immunochemical; Future    11  Osteoporosis screening  -     DXA bone density spine hip and pelvis; Future; Expected date: 08/03/2022    12  Prediabetes  Assessment & Plan:  · A1c acceptable  Continue diet and lifestyle modification        Return in about 4 weeks (around 8/31/2022) for Recheck  Chief Complaint:     Chief Complaint   Patient presents with    Follow-up     Swelling and pain in both legs        History of Present Illness:     I had the pleasure of seeing Ms Rodríguez in the office today for follow-up  Patient was accompanied by her  to appointment  Was recently hospitalized at [address] [place] [address] for acute on chronic diastolic congestive heart failure  She was hospitalized from 7/28/22-7/30/22 and received IV diuresis  Her diuretics were adjusted on discharge in she has been taking torsemide 20 milligrams in the a m  and 20 milligrams in the evening  Her last recorded weight in the hospital was on 07/28 and was 239 pounds  Her  states that yesterday she was 225 pounds, and today in the office she is 2 pounds higher than yesterday at 227 pounds  A report compliance with diet as well as diuretic therapy  She denies any significant dyspnea on exertion or chest pains  She is not using her compression pump for her comorbid lymphedema        The following portions of the patient's history were reviewed and updated as appropriate: allergies, current medications, past family history, past medical history, past social history, past surgical history and problem list      Review of Systems:     Review of Systems   Constitutional: Negative for appetite change, fatigue and fever  Respiratory: Negative for cough and shortness of breath  Cardiovascular: Positive for leg swelling  Negative for chest pain and palpitations  Gastrointestinal: Negative for constipation, diarrhea, nausea and vomiting  Musculoskeletal: Positive for arthralgias, back pain, gait problem and myalgias  Skin: Positive for color change  Neurological: Negative for dizziness, tremors, seizures, syncope, weakness and headaches  Psychiatric/Behavioral: Positive for confusion and decreased concentration  Negative for behavioral problems and suicidal ideas  The patient is nervous/anxious  Problem List:     Patient Active Problem List   Diagnosis    Chronic venous insufficiency    Essential hypertension    Chronic low back pain with sciatica    Stage 3 chronic kidney disease (HCC)    Mixed hyperlipidemia    Obesity    Osteoarthritis of knee    Sjogren's syndrome ([facility] [address] )    A-fib (HCC)    Opioid dependence due to Chronic back pain     Anxiety    Acute on chronic diastolic heart failure (HCC)    Memory impairment    Other specified anemias    Prediabetes    Abnormal CXR    Encounter for screening mammogram for malignant neoplasm of breast    Seasonal allergies    Xerosis of skin    Cellulitis of lower extremity    At risk for obstructive sleep apnea    Colon cancer screening    Osteoporosis screening    Lymphedema        Objective:     /80 (BP Location: Left arm, Patient Position: Sitting, Cuff Size: Standard)   Pulse 67   Temp 98 6 °F (37 °C) (Tympanic)   Ht 5' 2" (1 575 m)   Wt 103 kg (227 lb 9 6 oz)   SpO2 95%   BMI 41 63 kg/m²     Physical Exam  Vitals reviewed  Constitutional:       General: She is not in acute distress  Appearance: She is obese   She is not ill-appearing or toxic-appearing  Cardiovascular:      Rate and Rhythm: Normal rate  Rhythm irregular  Heart sounds: No murmur heard  Pulmonary:      Effort: No respiratory distress  Breath sounds: No wheezing, rhonchi or rales  Abdominal:      General: There is no distension  Palpations: There is no mass  Tenderness: There is no abdominal tenderness  Musculoskeletal:         General: Swelling and tenderness present  Comments: Mild erythema RLE with some weeping  No purulence or warmth   Skin:     General: Skin is dry  Findings: Erythema present  Comments: Xerosis bilateral LE   Neurological:      Mental Status: Mental status is at baseline           Pertinent Laboratory/Diagnostic Studies:      Mady Connell MD  [facility] INTERNAL MEDICINE [facility]

## 2022-08-03 NOTE — PATIENT INSTRUCTIONS
Weigh yourself without clothing at the same time each day  Record your weight  Please call your doctor if you have a sudden weight gain of more than 2-3lbs (1-1 3kg) in 1-2 days or 5lbs (2 3kg) in a week  Go to your cardiology and senior care appointments as scheduled  Schedule your mammogram    Schedule your appointment with sleep medicine to be evaluated for LAUREN    Schedule your appointment with podiatry for ongoing foot care  Continue amlactin as directed        Schedule your DEXA scan for osteoporosis    Do FIT testing for colorectal cancer screening    Recommend you sign up via [employer] to get your COVID booster this fall

## 2022-08-03 NOTE — ASSESSMENT & PLAN NOTE
Wt Readings from Last 3 Encounters:   08/03/22 103 kg (227 lb 9 6 oz)   07/28/22 109 kg (239 lb 10 2 oz)   05/19/22 106 kg (233 lb 6 4 oz)     With 2 pound weight gain since yesterday, I have asked patient to take additional 20mg of torsemide this evening for a total of 40 milligrams this evening she has already taken 20 milligrams this morning, and can continue 20 milligrams twice a day thereafter  Patient instructions -  Weigh yourself without clothing at the same time each day  Record your weight  Please call your doctor if you have a sudden weight gain of more than 2-3lbs (1-1 3kg) in 1-2 days or 5lbs (2 3kg) in a week        Follow-up with cardiology as scheduled  Continue 2 grams sodium/1800 milliliter fluid restriction

## 2022-08-03 NOTE — ASSESSMENT & PLAN NOTE
Lab Results   Component Value Date    EGFR 59 07/30/2022    EGFR 80 07/29/2022    EGFR 63 07/28/2022    CREATININE 0 94 07/30/2022    CREATININE 0 73 07/29/2022    CREATININE 0 89 07/28/2022   · Renal function stable on discharge  Will repeat BMP to assess renal function and electrolytes in 1 week given change in diuretics

## 2022-08-03 NOTE — ASSESSMENT & PLAN NOTE
· This is also contributing to patient's lower extremity edema, not all heart failure  · Would recommend that the patient restart using her compression pumps at home at least 3 times weekly  Additionally, recommend compression with ACE wraps, historically unable to tolerate compression stockings, daily when up and out of bed    Viewed in detail with patient's  on verbalized understanding  · Will reassess in a few weeks

## 2022-08-04 ENCOUNTER — RA CDI HCC (OUTPATIENT)
Dept: OTHER | Facility: HOSPITAL | Age: 77
End: 2022-08-04

## 2022-08-04 NOTE — PROGRESS NOTES
I13 0, e66 01 previous suggestions  [facility]  coding opportunities       Chart reviewed, no opportunity found:   [address]        Patients Insurance     Medicare Insurance: [employer]

## 2022-08-11 ENCOUNTER — TELEPHONE (OUTPATIENT)
Dept: GERIATRICS | Age: 77
End: 2022-08-11

## 2022-08-13 ENCOUNTER — LAB (OUTPATIENT)
Dept: LAB | Facility: CLINIC | Age: 77
End: 2022-08-13
Payer: COMMERCIAL

## 2022-08-13 DIAGNOSIS — N18.30 STAGE 3 CHRONIC KIDNEY DISEASE, UNSPECIFIED WHETHER STAGE 3A OR 3B CKD (HCC): ICD-10-CM

## 2022-08-13 LAB
ANION GAP SERPL CALCULATED.3IONS-SCNC: 6 MMOL/L (ref 4–13)
BUN SERPL-MCNC: 31 MG/DL (ref 5–25)
CALCIUM SERPL-MCNC: 9.7 MG/DL (ref 8.4–10.2)
CHLORIDE SERPL-SCNC: 99 MMOL/L (ref 96–108)
CO2 SERPL-SCNC: 32 MMOL/L (ref 21–32)
CREAT SERPL-MCNC: 1.19 MG/DL (ref 0.6–1.3)
GFR SERPL CREATININE-BSD FRML MDRD: 44 ML/MIN/1.73SQ M
GLUCOSE P FAST SERPL-MCNC: 125 MG/DL (ref 65–99)
POTASSIUM SERPL-SCNC: 4.4 MMOL/L (ref 3.5–5.3)
SODIUM SERPL-SCNC: 137 MMOL/L (ref 135–147)

## 2022-08-13 PROCEDURE — 36415 COLL VENOUS BLD VENIPUNCTURE: CPT

## 2022-08-13 PROCEDURE — 80048 BASIC METABOLIC PNL TOTAL CA: CPT

## 2022-08-16 DIAGNOSIS — L03.115 BILATERAL CELLULITIS OF LOWER LEG: ICD-10-CM

## 2022-08-16 DIAGNOSIS — L03.116 BILATERAL CELLULITIS OF LOWER LEG: ICD-10-CM

## 2022-08-16 DIAGNOSIS — I50.33 ACUTE ON CHRONIC DIASTOLIC HEART FAILURE (HCC): Primary | ICD-10-CM

## 2022-08-16 DIAGNOSIS — N18.30 STAGE 3 CHRONIC KIDNEY DISEASE, UNSPECIFIED WHETHER STAGE 3A OR 3B CKD (HCC): ICD-10-CM

## 2022-08-16 DIAGNOSIS — R60.0 BILATERAL LOWER EXTREMITY EDEMA: ICD-10-CM

## 2022-08-16 NOTE — RESULT ENCOUNTER NOTE
I entered an order for home care will follow up with them tomorrow to see when they can go out Santino Louise was accepting of home care at this time as it is getting very difficult for him to manage this fluid concern with Arnoldo London  I did tell him to use ace wraps but he can't even touch her legs

## 2022-08-18 ENCOUNTER — OFFICE VISIT (OUTPATIENT)
Dept: PODIATRY | Facility: CLINIC | Age: 77
End: 2022-08-18
Payer: COMMERCIAL

## 2022-08-18 ENCOUNTER — HOME HEALTH ADMISSION (OUTPATIENT)
Dept: HOME HEALTH SERVICES | Facility: HOME HEALTHCARE | Age: 77
End: 2022-08-18

## 2022-08-18 VITALS
WEIGHT: 227 LBS | SYSTOLIC BLOOD PRESSURE: 162 MMHG | HEART RATE: 85 BPM | BODY MASS INDEX: 41.52 KG/M2 | DIASTOLIC BLOOD PRESSURE: 70 MMHG

## 2022-08-18 DIAGNOSIS — L03.115 CELLULITIS OF RIGHT LOWER EXTREMITY: ICD-10-CM

## 2022-08-18 PROCEDURE — 99212 OFFICE O/P EST SF 10 MIN: CPT | Performed by: PODIATRIST

## 2022-08-18 PROCEDURE — 1160F RVW MEDS BY RX/DR IN RCRD: CPT | Performed by: PODIATRIST

## 2022-08-18 PROCEDURE — 3078F DIAST BP <80 MM HG: CPT | Performed by: PODIATRIST

## 2022-08-18 PROCEDURE — 3077F SYST BP >= 140 MM HG: CPT | Performed by: PODIATRIST

## 2022-08-18 NOTE — PROGRESS NOTES
Assessment/Plan:     -The pedal nails were sharply debrided x 10 with sterile nail clippers to patient tolerance  - Recommended daily skin hydration with a good skin lotion or cream   - She will follow-up in 2-3 months for routine foot care  Diagnoses and all orders for this visit:    Cellulitis of right lower extremity  -     Ambulatory Referral to Podiatry          Subjective:     Patient ID: Ang Fiore is a 77 y o  female  The patient is known to me from her prior hospitalization at [address] [place] [address], for which she was evaluated for a heel ulcer  She presents now for management of elongated and tender pedal toenails  She is unable to reach her feet and due to the thickness of the nails she is unable to cut them and neither is her   Review of Systems   Constitutional: Negative for chills and fever  HENT: Negative for ear pain and sore throat  Eyes: Negative for pain and visual disturbance  Respiratory: Negative for cough and shortness of breath  Cardiovascular: Positive for leg swelling  Negative for chest pain and palpitations  Gastrointestinal: Negative for abdominal pain and vomiting  Genitourinary: Negative for dysuria and hematuria  Musculoskeletal: Negative for arthralgias and back pain  Skin: Negative for color change and rash  Neurological: Negative for seizures and syncope  Psychiatric/Behavioral: Negative  All other systems reviewed and are negative  Objective:     Physical Exam  Constitutional:       Appearance: Normal appearance  HENT:      Head: Normocephalic and atraumatic  Nose: Nose normal    Cardiovascular:      Pulses:           Dorsalis pedis pulses are 1+ on the right side and 1+ on the left side  Posterior tibial pulses are 0 on the right side and 0 on the left side  Pulmonary:      Effort: Pulmonary effort is normal    Feet:      Right foot:      Skin integrity: Dry skin present        Toenail Condition: Right toenails are abnormally thick and long  Fungal disease present  Left foot:      Skin integrity: Dry skin present  Toenail Condition: Left toenails are abnormally thick and long  Fungal disease present  Comments: There is +3 pitting edema to the lower extremities bilaterally; there is dry scaling skin throughout the lower legs and feet; there are no open lesions to either foot or leg; the pedal nails are clinically mycotic thickened and dystrophic and the hallucal nails are tender to palpation bilaterally  Skin:     General: Skin is warm  Capillary Refill: Capillary refill takes less than 2 seconds  Neurological:      General: No focal deficit present  Mental Status: She is alert and oriented to person, place, and time  Psychiatric:         Mood and Affect: Mood normal          Behavior: Behavior normal          Thought Content:  Thought content normal

## 2022-08-19 ENCOUNTER — HOME CARE VISIT (OUTPATIENT)
Dept: HOME HEALTH SERVICES | Facility: HOME HEALTHCARE | Age: 77
End: 2022-08-19

## 2022-08-19 NOTE — CASE COMMUNICATION
Called pt to set up 8/20 SN visit  Spoke with  Dakota and he stated Tuesday would be a better day for pt and  for SN to come visit  This weekend will not work for couple

## 2022-08-20 ENCOUNTER — APPOINTMENT (INPATIENT)
Dept: CT IMAGING | Facility: HOSPITAL | Age: 77
DRG: 480 | End: 2022-08-20
Payer: COMMERCIAL

## 2022-08-20 ENCOUNTER — APPOINTMENT (OUTPATIENT)
Dept: RADIOLOGY | Facility: HOSPITAL | Age: 77
DRG: 480 | End: 2022-08-20
Payer: COMMERCIAL

## 2022-08-20 ENCOUNTER — ANESTHESIA EVENT (INPATIENT)
Dept: ANESTHESIOLOGY | Facility: HOSPITAL | Age: 77
DRG: 480 | End: 2022-08-20
Payer: COMMERCIAL

## 2022-08-20 ENCOUNTER — ANESTHESIA (INPATIENT)
Dept: ANESTHESIOLOGY | Facility: HOSPITAL | Age: 77
DRG: 480 | End: 2022-08-20
Payer: COMMERCIAL

## 2022-08-20 ENCOUNTER — APPOINTMENT (OUTPATIENT)
Dept: SURGERY | Facility: HOSPITAL | Age: 77
DRG: 480 | End: 2022-08-20
Payer: COMMERCIAL

## 2022-08-20 ENCOUNTER — HOSPITAL ENCOUNTER (INPATIENT)
Facility: HOSPITAL | Age: 77
LOS: 10 days | Discharge: NURSING, REHAB OR LONG-TERM CARE | DRG: 480 | End: 2022-08-30
Attending: EMERGENCY MEDICINE | Admitting: SURGERY
Payer: COMMERCIAL

## 2022-08-20 DIAGNOSIS — K92.1 MELENA: ICD-10-CM

## 2022-08-20 DIAGNOSIS — S72.002A CLOSED FRACTURE OF PROXIMAL END OF LEFT FEMUR, INITIAL ENCOUNTER (HCC): ICD-10-CM

## 2022-08-20 DIAGNOSIS — S72.402A CLOSED FRACTURE OF LEFT DISTAL FEMUR (HCC): Primary | ICD-10-CM

## 2022-08-20 DIAGNOSIS — M97.12XA PERIPROSTHETIC FRACTURE AROUND INTERNAL PROSTHETIC LEFT KNEE JOINT, INITIAL ENCOUNTER: ICD-10-CM

## 2022-08-20 DIAGNOSIS — L03.119 CELLULITIS OF LOWER EXTREMITY, UNSPECIFIED LATERALITY: ICD-10-CM

## 2022-08-20 PROBLEM — G89.11 ACUTE PAIN DUE TO TRAUMA: Status: ACTIVE | Noted: 2022-08-20
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
## 2022-08-20 LAB
ABO GROUP BLD: NORMAL
ALBUMIN SERPL BCP-MCNC: 3.7 G/DL (ref 3.5–5)
ALP SERPL-CCNC: 81 U/L (ref 34–104)
ALT SERPL W P-5'-P-CCNC: 7 U/L (ref 7–52)
ANION GAP SERPL CALCULATED.3IONS-SCNC: 7 MMOL/L (ref 4–13)
APTT PPP: 33 SECONDS (ref 23–37)
AST SERPL W P-5'-P-CCNC: 11 U/L (ref 13–39)
BASOPHILS # BLD AUTO: 0.03 THOUSANDS/ΜL (ref 0–0.1)
BASOPHILS NFR BLD AUTO: 0 % (ref 0–1)
BILIRUB SERPL-MCNC: 0.58 MG/DL (ref 0.2–1)
BLD GP AB SCN SERPL QL: NEGATIVE
BUN SERPL-MCNC: 48 MG/DL (ref 5–25)
CALCIUM SERPL-MCNC: 9.3 MG/DL (ref 8.4–10.2)
CHLORIDE SERPL-SCNC: 100 MMOL/L (ref 96–108)
CO2 SERPL-SCNC: 33 MMOL/L (ref 21–32)
CREAT SERPL-MCNC: 1.03 MG/DL (ref 0.6–1.3)
EOSINOPHIL # BLD AUTO: 0.25 THOUSAND/ΜL (ref 0–0.61)
EOSINOPHIL NFR BLD AUTO: 3 % (ref 0–6)
ERYTHROCYTE [DISTWIDTH] IN BLOOD BY AUTOMATED COUNT: 14.9 % (ref 11.6–15.1)
GFR SERPL CREATININE-BSD FRML MDRD: 53 ML/MIN/1.73SQ M
GLUCOSE SERPL-MCNC: 121 MG/DL (ref 65–140)
HCT VFR BLD AUTO: 23 % (ref 34.8–46.1)
HCT VFR BLD AUTO: 24.4 % (ref 34.8–46.1)
HCT VFR BLD AUTO: 25 % (ref 34.8–46.1)
HCT VFR BLD AUTO: 27.5 % (ref 34.8–46.1)
HGB BLD-MCNC: 7.1 G/DL (ref 11.5–15.4)
HGB BLD-MCNC: 7.5 G/DL (ref 11.5–15.4)
HGB BLD-MCNC: 7.8 G/DL (ref 11.5–15.4)
HGB BLD-MCNC: 8.4 G/DL (ref 11.5–15.4)
IMM GRANULOCYTES # BLD AUTO: 0.05 THOUSAND/UL (ref 0–0.2)
IMM GRANULOCYTES NFR BLD AUTO: 1 % (ref 0–2)
INR PPP: 1.31 (ref 0.84–1.19)
LACTATE SERPL-SCNC: 0.7 MMOL/L (ref 0.5–2)
LYMPHOCYTES # BLD AUTO: 2.02 THOUSANDS/ΜL (ref 0.6–4.47)
LYMPHOCYTES NFR BLD AUTO: 21 % (ref 14–44)
MCH RBC QN AUTO: 28 PG (ref 26.8–34.3)
MCHC RBC AUTO-ENTMCNC: 30.5 G/DL (ref 31.4–37.4)
MCV RBC AUTO: 92 FL (ref 82–98)
MONOCYTES # BLD AUTO: 0.97 THOUSAND/ΜL (ref 0.17–1.22)
MONOCYTES NFR BLD AUTO: 10 % (ref 4–12)
NEUTROPHILS # BLD AUTO: 6.29 THOUSANDS/ΜL (ref 1.85–7.62)
NEUTS SEG NFR BLD AUTO: 65 % (ref 43–75)
NRBC BLD AUTO-RTO: 0 /100 WBCS
PLATELET # BLD AUTO: 334 THOUSANDS/UL (ref 149–390)
PMV BLD AUTO: 10.9 FL (ref 8.9–12.7)
POTASSIUM SERPL-SCNC: 3.5 MMOL/L (ref 3.5–5.3)
PROCALCITONIN SERPL-MCNC: 0.07 NG/ML
PROT SERPL-MCNC: 6.4 G/DL (ref 6.4–8.4)
PROTHROMBIN TIME: 16.5 SECONDS (ref 11.6–14.5)
RBC # BLD AUTO: 3 MILLION/UL (ref 3.81–5.12)
RH BLD: NEGATIVE
SODIUM SERPL-SCNC: 140 MMOL/L (ref 135–147)
SPECIMEN EXPIRATION DATE: NORMAL
WBC # BLD AUTO: 9.61 THOUSAND/UL (ref 4.31–10.16)

## 2022-08-20 PROCEDURE — 83605 ASSAY OF LACTIC ACID: CPT | Performed by: NURSE PRACTITIONER

## 2022-08-20 PROCEDURE — 72170 X-RAY EXAM OF PELVIS: CPT

## 2022-08-20 PROCEDURE — 73701 CT LOWER EXTREMITY W/DYE: CPT

## 2022-08-20 PROCEDURE — 86901 BLOOD TYPING SEROLOGIC RH(D): CPT

## 2022-08-20 PROCEDURE — C9113 INJ PANTOPRAZOLE SODIUM, VIA: HCPCS

## 2022-08-20 PROCEDURE — 99285 EMERGENCY DEPT VISIT HI MDM: CPT

## 2022-08-20 PROCEDURE — 73560 X-RAY EXAM OF KNEE 1 OR 2: CPT

## 2022-08-20 PROCEDURE — 73564 X-RAY EXAM KNEE 4 OR MORE: CPT

## 2022-08-20 PROCEDURE — 99285 EMERGENCY DEPT VISIT HI MDM: CPT | Performed by: EMERGENCY MEDICINE

## 2022-08-20 PROCEDURE — 85018 HEMOGLOBIN: CPT | Performed by: NURSE PRACTITIONER

## 2022-08-20 PROCEDURE — 86923 COMPATIBILITY TEST ELECTRIC: CPT

## 2022-08-20 PROCEDURE — 93005 ELECTROCARDIOGRAM TRACING: CPT

## 2022-08-20 PROCEDURE — 85025 COMPLETE CBC W/AUTO DIFF WBC: CPT

## 2022-08-20 PROCEDURE — 82272 OCCULT BLD FECES 1-3 TESTS: CPT

## 2022-08-20 PROCEDURE — 71045 X-RAY EXAM CHEST 1 VIEW: CPT

## 2022-08-20 PROCEDURE — 99152 MOD SED SAME PHYS/QHP 5/>YRS: CPT | Performed by: EMERGENCY MEDICINE

## 2022-08-20 PROCEDURE — 73552 X-RAY EXAM OF FEMUR 2/>: CPT

## 2022-08-20 PROCEDURE — NC001 PR NO CHARGE: Performed by: PHYSICIAN ASSISTANT

## 2022-08-20 PROCEDURE — G1004 CDSM NDSC: HCPCS

## 2022-08-20 PROCEDURE — 80053 COMPREHEN METABOLIC PANEL: CPT

## 2022-08-20 PROCEDURE — 85014 HEMATOCRIT: CPT | Performed by: NURSE PRACTITIONER

## 2022-08-20 PROCEDURE — 36415 COLL VENOUS BLD VENIPUNCTURE: CPT

## 2022-08-20 PROCEDURE — 85730 THROMBOPLASTIN TIME PARTIAL: CPT

## 2022-08-20 PROCEDURE — 99222 1ST HOSP IP/OBS MODERATE 55: CPT | Performed by: SURGERY

## 2022-08-20 PROCEDURE — 85610 PROTHROMBIN TIME: CPT

## 2022-08-20 PROCEDURE — 86900 BLOOD TYPING SEROLOGIC ABO: CPT

## 2022-08-20 PROCEDURE — 99223 1ST HOSP IP/OBS HIGH 75: CPT | Performed by: ORTHOPAEDIC SURGERY

## 2022-08-20 PROCEDURE — 85014 HEMATOCRIT: CPT | Performed by: PHYSICIAN ASSISTANT

## 2022-08-20 PROCEDURE — 96365 THER/PROPH/DIAG IV INF INIT: CPT

## 2022-08-20 PROCEDURE — 96376 TX/PRO/DX INJ SAME DRUG ADON: CPT

## 2022-08-20 PROCEDURE — 96375 TX/PRO/DX INJ NEW DRUG ADDON: CPT

## 2022-08-20 PROCEDURE — 99223 1ST HOSP IP/OBS HIGH 75: CPT | Performed by: INTERNAL MEDICINE

## 2022-08-20 PROCEDURE — 85018 HEMOGLOBIN: CPT | Performed by: PHYSICIAN ASSISTANT

## 2022-08-20 PROCEDURE — 90715 TDAP VACCINE 7 YRS/> IM: CPT | Performed by: NURSE PRACTITIONER

## 2022-08-20 PROCEDURE — 84145 PROCALCITONIN (PCT): CPT | Performed by: NURSE PRACTITIONER

## 2022-08-20 PROCEDURE — 99222 1ST HOSP IP/OBS MODERATE 55: CPT | Performed by: INTERNAL MEDICINE

## 2022-08-20 PROCEDURE — 86850 RBC ANTIBODY SCREEN: CPT

## 2022-08-20 PROCEDURE — 70450 CT HEAD/BRAIN W/O DYE: CPT

## 2022-08-20 RX ORDER — HEPARIN SODIUM 5000 [USP'U]/ML
5000 INJECTION, SOLUTION INTRAVENOUS; SUBCUTANEOUS EVERY 8 HOURS SCHEDULED
Status: DISCONTINUED | OUTPATIENT
Start: 2022-08-20 | End: 2022-08-20

## 2022-08-20 RX ORDER — GABAPENTIN 100 MG/1
100 CAPSULE ORAL 3 TIMES DAILY
Status: DISCONTINUED | OUTPATIENT
Start: 2022-08-20 | End: 2022-08-24

## 2022-08-20 RX ORDER — HYDROMORPHONE HCL/PF 1 MG/ML
0.5 SYRINGE (ML) INJECTION ONCE
Status: COMPLETED | OUTPATIENT
Start: 2022-08-20 | End: 2022-08-20

## 2022-08-20 RX ORDER — PROPOFOL 10 MG/ML
50 INJECTION, EMULSION INTRAVENOUS ONCE
Status: DISCONTINUED | OUTPATIENT
Start: 2022-08-20 | End: 2022-08-20

## 2022-08-20 RX ORDER — MIDAZOLAM HYDROCHLORIDE 2 MG/2ML
INJECTION, SOLUTION INTRAMUSCULAR; INTRAVENOUS AS NEEDED
Status: DISCONTINUED | OUTPATIENT
Start: 2022-08-20 | End: 2022-08-23 | Stop reason: HOSPADM

## 2022-08-20 RX ORDER — PROPOFOL 10 MG/ML
50 INJECTION, EMULSION INTRAVENOUS ONCE
Status: COMPLETED | OUTPATIENT
Start: 2022-08-20 | End: 2022-08-20

## 2022-08-20 RX ORDER — HYDROMORPHONE HCL/PF 1 MG/ML
0.5 SYRINGE (ML) INJECTION
Status: DISCONTINUED | OUTPATIENT
Start: 2022-08-20 | End: 2022-08-22

## 2022-08-20 RX ORDER — METHOCARBAMOL 500 MG/1
500 TABLET, FILM COATED ORAL EVERY 6 HOURS SCHEDULED
Status: DISCONTINUED | OUTPATIENT
Start: 2022-08-20 | End: 2022-08-21

## 2022-08-20 RX ORDER — CEFTRIAXONE 2 G/50ML
2000 INJECTION, SOLUTION INTRAVENOUS EVERY 24 HOURS
Status: DISCONTINUED | OUTPATIENT
Start: 2022-08-20 | End: 2022-08-21

## 2022-08-20 RX ORDER — OXYCODONE HYDROCHLORIDE 5 MG/1
5 TABLET ORAL
Status: DISCONTINUED | OUTPATIENT
Start: 2022-08-20 | End: 2022-08-24

## 2022-08-20 RX ORDER — SODIUM CHLORIDE, SODIUM GLUCONATE, SODIUM ACETATE, POTASSIUM CHLORIDE, MAGNESIUM CHLORIDE, SODIUM PHOSPHATE, DIBASIC, AND POTASSIUM PHOSPHATE .53; .5; .37; .037; .03; .012; .00082 G/100ML; G/100ML; G/100ML; G/100ML; G/100ML; G/100ML; G/100ML
75 INJECTION, SOLUTION INTRAVENOUS CONTINUOUS
Status: DISCONTINUED | OUTPATIENT
Start: 2022-08-20 | End: 2022-08-23

## 2022-08-20 RX ORDER — ACETAMINOPHEN 325 MG/1
975 TABLET ORAL EVERY 8 HOURS SCHEDULED
Status: DISCONTINUED | OUTPATIENT
Start: 2022-08-20 | End: 2022-08-30 | Stop reason: HOSPADM

## 2022-08-20 RX ORDER — FENTANYL CITRATE 50 UG/ML
50 INJECTION, SOLUTION INTRAMUSCULAR; INTRAVENOUS ONCE
Status: COMPLETED | OUTPATIENT
Start: 2022-08-20 | End: 2022-08-20

## 2022-08-20 RX ORDER — FENTANYL CITRATE 50 UG/ML
INJECTION, SOLUTION INTRAMUSCULAR; INTRAVENOUS
Status: COMPLETED
Start: 2022-08-20 | End: 2022-08-20

## 2022-08-20 RX ORDER — POTASSIUM CHLORIDE 20 MEQ/1
40 TABLET, EXTENDED RELEASE ORAL DAILY
Status: DISCONTINUED | OUTPATIENT
Start: 2022-08-20 | End: 2022-08-21

## 2022-08-20 RX ORDER — PROPOFOL 10 MG/ML
50 INJECTION, EMULSION INTRAVENOUS ONCE
Status: DISCONTINUED | OUTPATIENT
Start: 2022-08-20 | End: 2022-08-20 | Stop reason: SDUPTHER

## 2022-08-20 RX ORDER — OXYCODONE HYDROCHLORIDE 10 MG/1
10 TABLET ORAL EVERY 4 HOURS PRN
Status: DISCONTINUED | OUTPATIENT
Start: 2022-08-20 | End: 2022-08-24

## 2022-08-20 RX ORDER — FENTANYL CITRATE 50 UG/ML
25 INJECTION, SOLUTION INTRAMUSCULAR; INTRAVENOUS ONCE
Status: COMPLETED | OUTPATIENT
Start: 2022-08-20 | End: 2022-08-20

## 2022-08-20 RX ORDER — LOSARTAN POTASSIUM 50 MG/1
100 TABLET ORAL DAILY
Status: DISCONTINUED | OUTPATIENT
Start: 2022-08-20 | End: 2022-08-30 | Stop reason: HOSPADM

## 2022-08-20 RX ORDER — CARVEDILOL 12.5 MG/1
25 TABLET ORAL 2 TIMES DAILY WITH MEALS
Status: DISCONTINUED | OUTPATIENT
Start: 2022-08-20 | End: 2022-08-30 | Stop reason: HOSPADM

## 2022-08-20 RX ORDER — FUROSEMIDE 10 MG/ML
80 INJECTION INTRAMUSCULAR; INTRAVENOUS DAILY
Status: DISCONTINUED | OUTPATIENT
Start: 2022-08-20 | End: 2022-08-24

## 2022-08-20 RX ORDER — PROPOFOL 10 MG/ML
INJECTION, EMULSION INTRAVENOUS AS NEEDED
Status: DISCONTINUED | OUTPATIENT
Start: 2022-08-20 | End: 2022-08-23 | Stop reason: HOSPADM

## 2022-08-20 RX ORDER — ROPIVACAINE HYDROCHLORIDE 5 MG/ML
INJECTION, SOLUTION EPIDURAL; INFILTRATION; PERINEURAL
Status: COMPLETED | OUTPATIENT
Start: 2022-08-20 | End: 2022-08-20

## 2022-08-20 RX ORDER — FENTANYL CITRATE 50 UG/ML
INJECTION, SOLUTION INTRAMUSCULAR; INTRAVENOUS AS NEEDED
Status: DISCONTINUED | OUTPATIENT
Start: 2022-08-20 | End: 2022-08-23 | Stop reason: HOSPADM

## 2022-08-20 RX ORDER — ONDANSETRON 2 MG/ML
4 INJECTION INTRAMUSCULAR; INTRAVENOUS EVERY 4 HOURS PRN
Status: DISCONTINUED | OUTPATIENT
Start: 2022-08-20 | End: 2022-08-30 | Stop reason: HOSPADM

## 2022-08-20 RX ORDER — LIDOCAINE 50 MG/G
1 PATCH TOPICAL DAILY
Status: DISCONTINUED | OUTPATIENT
Start: 2022-08-20 | End: 2022-08-30 | Stop reason: HOSPADM

## 2022-08-20 RX ORDER — AMMONIUM LACTATE 12 G/100G
CREAM TOPICAL DAILY
Status: DISCONTINUED | OUTPATIENT
Start: 2022-08-20 | End: 2022-08-30 | Stop reason: HOSPADM

## 2022-08-20 RX ADMIN — FENTANYL CITRATE 50 MCG: 50 INJECTION, SOLUTION INTRAMUSCULAR; INTRAVENOUS at 14:55

## 2022-08-20 RX ADMIN — IOHEXOL 85 ML: 350 INJECTION, SOLUTION INTRAVENOUS at 10:55

## 2022-08-20 RX ADMIN — ACETAMINOPHEN 975 MG: 325 TABLET, FILM COATED ORAL at 08:18

## 2022-08-20 RX ADMIN — OXYCODONE HYDROCHLORIDE 10 MG: 10 TABLET ORAL at 21:00

## 2022-08-20 RX ADMIN — SODIUM CHLORIDE 8 MG/HR: 9 INJECTION, SOLUTION INTRAVENOUS at 07:06

## 2022-08-20 RX ADMIN — SODIUM CHLORIDE, SODIUM GLUCONATE, SODIUM ACETATE, POTASSIUM CHLORIDE, MAGNESIUM CHLORIDE, SODIUM PHOSPHATE, DIBASIC, AND POTASSIUM PHOSPHATE 75 ML/HR: .53; .5; .37; .037; .03; .012; .00082 INJECTION, SOLUTION INTRAVENOUS at 09:05

## 2022-08-20 RX ADMIN — FENTANYL CITRATE 50 MCG: 50 INJECTION INTRAMUSCULAR; INTRAVENOUS at 09:48

## 2022-08-20 RX ADMIN — SODIUM CHLORIDE, SODIUM GLUCONATE, SODIUM ACETATE, POTASSIUM CHLORIDE, MAGNESIUM CHLORIDE, SODIUM PHOSPHATE, DIBASIC, AND POTASSIUM PHOSPHATE 75 ML/HR: .53; .5; .37; .037; .03; .012; .00082 INJECTION, SOLUTION INTRAVENOUS at 17:37

## 2022-08-20 RX ADMIN — MIDAZOLAM HYDROCHLORIDE 1 MG: 1 INJECTION, SOLUTION INTRAMUSCULAR; INTRAVENOUS at 14:55

## 2022-08-20 RX ADMIN — GABAPENTIN 100 MG: 100 CAPSULE ORAL at 08:18

## 2022-08-20 RX ADMIN — METHOCARBAMOL 500 MG: 500 TABLET ORAL at 08:18

## 2022-08-20 RX ADMIN — FUROSEMIDE 80 MG: 10 INJECTION, SOLUTION INTRAMUSCULAR; INTRAVENOUS at 15:18

## 2022-08-20 RX ADMIN — OXYCODONE HYDROCHLORIDE 10 MG: 10 TABLET ORAL at 17:46

## 2022-08-20 RX ADMIN — PROPOFOL 50 MG: 10 INJECTION, EMULSION INTRAVENOUS at 09:33

## 2022-08-20 RX ADMIN — FENTANYL CITRATE 25 MCG: 50 INJECTION INTRAMUSCULAR; INTRAVENOUS at 09:22

## 2022-08-20 RX ADMIN — METHOCARBAMOL 500 MG: 500 TABLET ORAL at 23:00

## 2022-08-20 RX ADMIN — HYDROMORPHONE HYDROCHLORIDE 0.5 MG: 1 INJECTION, SOLUTION INTRAMUSCULAR; INTRAVENOUS; SUBCUTANEOUS at 20:10

## 2022-08-20 RX ADMIN — HYDROMORPHONE HYDROCHLORIDE 0.5 MG: 1 INJECTION, SOLUTION INTRAMUSCULAR; INTRAVENOUS; SUBCUTANEOUS at 07:40

## 2022-08-20 RX ADMIN — GABAPENTIN 100 MG: 100 CAPSULE ORAL at 21:00

## 2022-08-20 RX ADMIN — TETANUS TOXOID, REDUCED DIPHTHERIA TOXOID AND ACELLULAR PERTUSSIS VACCINE, ADSORBED 0.5 ML: 5; 2.5; 8; 8; 2.5 SUSPENSION INTRAMUSCULAR at 17:50

## 2022-08-20 RX ADMIN — ACETAMINOPHEN 975 MG: 325 TABLET, FILM COATED ORAL at 21:00

## 2022-08-20 RX ADMIN — PROPOFOL 50 MG: 10 INJECTION, EMULSION INTRAVENOUS at 09:24

## 2022-08-20 RX ADMIN — PROPOFOL 50 MG: 10 INJECTION, EMULSION INTRAVENOUS at 09:38

## 2022-08-20 RX ADMIN — PROPOFOL 50 MG: 10 INJECTION, EMULSION INTRAVENOUS at 14:28

## 2022-08-20 RX ADMIN — CEFTRIAXONE 2000 MG: 2 INJECTION, SOLUTION INTRAVENOUS at 08:23

## 2022-08-20 RX ADMIN — FENTANYL CITRATE 25 MCG: 50 INJECTION, SOLUTION INTRAMUSCULAR; INTRAVENOUS at 10:00

## 2022-08-20 RX ADMIN — ROPIVACAINE HYDROCHLORIDE 30 ML: 5 INJECTION, SOLUTION EPIDURAL; INFILTRATION; PERINEURAL at 14:01

## 2022-08-20 RX ADMIN — CARVEDILOL 25 MG: 12.5 TABLET, FILM COATED ORAL at 17:47

## 2022-08-20 RX ADMIN — PROPOFOL 120 MG: 10 INJECTION, EMULSION INTRAVENOUS at 14:21

## 2022-08-20 RX ADMIN — GABAPENTIN 100 MG: 100 CAPSULE ORAL at 17:47

## 2022-08-20 RX ADMIN — FENTANYL CITRATE 50 MCG: 50 INJECTION, SOLUTION INTRAMUSCULAR; INTRAVENOUS at 14:58

## 2022-08-20 RX ADMIN — Medication 2000 UNITS: at 08:57

## 2022-08-20 RX ADMIN — PROPOFOL 30 MG: 10 INJECTION, EMULSION INTRAVENOUS at 14:31

## 2022-08-20 RX ADMIN — SODIUM CHLORIDE 8 MG/HR: 9 INJECTION, SOLUTION INTRAVENOUS at 17:38

## 2022-08-20 RX ADMIN — HYDROMORPHONE HYDROCHLORIDE 0.5 MG: 1 INJECTION, SOLUTION INTRAMUSCULAR; INTRAVENOUS; SUBCUTANEOUS at 06:10

## 2022-08-20 RX ADMIN — LIDOCAINE 5% 1 PATCH: 700 PATCH TOPICAL at 08:27

## 2022-08-20 RX ADMIN — FENTANYL CITRATE 25 MCG: 50 INJECTION INTRAMUSCULAR; INTRAVENOUS at 10:00

## 2022-08-20 RX ADMIN — FENTANYL CITRATE 50 MCG: 50 INJECTION INTRAMUSCULAR; INTRAVENOUS at 09:42

## 2022-08-20 RX ADMIN — METHOCARBAMOL 500 MG: 500 TABLET ORAL at 17:47

## 2022-08-20 RX ADMIN — FENTANYL CITRATE 50 MCG: 50 INJECTION INTRAMUSCULAR; INTRAVENOUS at 09:32

## 2022-08-20 NOTE — ED NOTES
Provider at bedside       Aaron Jin RN  08/20/22 8680
Provider at bedside       Radha Garcia RN  08/20/22 1330
Attending Attestation (For Attendings USE Only)...

## 2022-08-20 NOTE — ED PROVIDER NOTES
History  Chief Complaint   Patient presents with    Fall     PT states she was attempting to use the bathroom and fell on the bathroom floor  Pt denies HS / LOC, c/o L leg pain  Pt also states she is having dark tarry stools that began today  Cali Fredrick is a 78 yo F with a PMHx of afib on Elliquis, chronic venous insufficiency, CKD, cellulitis of LLE on 8/18 without abx presenting to the ED due to mechanical fall while trying to use the bathroom  Patient states she fell directly on her left knee causing pain  States she has also been having dark, black colored stools which started today  No hematemesis  Otherwise feels in her normal state of health  Denies chest pain, dizziness, shortness of breath, head strike, LOC  Prior to Admission Medications   Prescriptions Last Dose Informant Patient Reported? Taking?   ammonium lactate (LAC-HYDRIN) 12 % cream   No No   Sig: Apply topically in the morning   apixaban (Eliquis) 5 mg  Spouse/Significant Other No No   Sig: Take 1 tablet (5 mg total) by mouth 2 (two) times a day   carvedilol (COREG) 25 mg tablet  Spouse/Significant Other No No   Sig: Take 1 tablet (25 mg total) by mouth 2 (two) times a day with meals   hydrocortisone 2 5 % cream  Spouse/Significant Other No No   Sig: Apply topically 2 (two) times a day   Patient taking differently: Apply topically 2 (two) times a day as needed   lidocaine (LIDODERM) 5 %  Spouse/Significant Other Yes No   Sig: daily as needed   losartan (COZAAR) 100 MG tablet  Spouse/Significant Other No No   Sig: Take 1 tablet (100 mg total) by mouth in the morning     oxyCODONE (OxyCONTIN) 20 mg 12 hr tablet   No No   Sig: Take 1 tablet (20 mg total) by mouth every 12 (twelve) hours Max Daily Amount: 40 mg   potassium chloride (K-DUR,KLOR-CON) 20 mEq tablet  Spouse/Significant Other No No   Sig: Take 2 tablets (40 mEq total) by mouth daily   torsemide (DEMADEX) 20 mg tablet  Spouse/Significant Other No No   Sig: Take 2 tablets (40 mg total) by mouth daily      Facility-Administered Medications: None       Past Medical History:   Diagnosis Date    A-fib ([place] [address] ) 12/29/2021    Anxiety     Arthritis     Back pain     Cellulitis     [facility]     Edema of both lower extremities due to peripheral venous insufficiency     Edema of both lower extremities due to peripheral venous insufficiency     Erythema of lower extremity 11/12/2021    Fibromyalgia     Hypertension     Sjogren syndrome, unspecified ([place] [address] )        Past Surgical History:   Procedure Laterality Date    KNEE CARTILAGE SURGERY      REPLACEMENT TOTAL KNEE BILATERAL         Family History   Problem Relation Age of Onset    Heart disease Mother     Cancer Father      I have reviewed and agree with the history as documented  E-Cigarette/Vaping    E-Cigarette Use Never User      E-Cigarette/Vaping Substances     Social History     Tobacco Use    Smoking status: Former Smoker     Types: Cigarettes    Smokeless tobacco: Never Used   Vaping Use    Vaping Use: Never used   Substance Use Topics    Alcohol use: Not Currently    Drug use: Never        Review of Systems   Constitutional: Negative for chills and fever  HENT: Negative for ear pain and sore throat  Eyes: Negative for pain and visual disturbance  Respiratory: Negative for cough and shortness of breath  Cardiovascular: Negative for chest pain and palpitations  Gastrointestinal: Positive for diarrhea  Negative for abdominal pain and vomiting  Dark stools  Genitourinary: Negative for dysuria and hematuria  Musculoskeletal: Negative for arthralgias and back pain  Left knee pain   Skin: Negative for color change and rash  Neurological: Negative for seizures and syncope  All other systems reviewed and are negative        Physical Exam  ED Triage Vitals   Temperature Pulse Respirations Blood Pressure SpO2   08/20/22 0623 08/20/22 0601 08/20/22 0601 08/20/22 0601 08/20/22 0601   98 5 °F (36 9 °C) 83 20 (!) 182/80 98 %      Temp Source Heart Rate Source Patient Position - Orthostatic VS BP Location FiO2 (%)   08/20/22 0623 08/20/22 0601 08/20/22 0601 08/20/22 0601 --   Oral Monitor Lying Right arm       Pain Score       08/20/22 0601       10 - Worst Possible Pain             Orthostatic Vital Signs  Vitals:    08/20/22 0601 08/20/22 0730 08/20/22 0800   BP: (!) 182/80 120/87 (!) 181/79   Pulse: 83 101 98   Patient Position - Orthostatic VS: Lying Lying Lying       Physical Exam  Vitals and nursing note reviewed  Exam conducted with a chaperone present  Constitutional:       General: She is in acute distress  Appearance: She is well-developed  HENT:      Head: Normocephalic and atraumatic  Mouth/Throat:      Mouth: Mucous membranes are moist       Pharynx: Oropharynx is clear  Eyes:      Conjunctiva/sclera: Conjunctivae normal       Pupils: Pupils are equal, round, and reactive to light  Cardiovascular:      Rate and Rhythm: Normal rate and regular rhythm  Pulses: Normal pulses  Radial pulses are 2+ on the right side and 2+ on the left side  Heart sounds: Normal heart sounds  No murmur heard  Comments: Bilateral 3+ pitting edema  Chronic venous insufficiency of lower extremities  Cellulitis of lower extremities  Pulmonary:      Effort: Pulmonary effort is normal  No respiratory distress  Breath sounds: Normal breath sounds and air entry  Abdominal:      General: Abdomen is flat  Palpations: Abdomen is soft  Tenderness: There is no abdominal tenderness  Genitourinary:     Rectum: Guaiac result positive  Comments: Black stools  Heme-positive  Musculoskeletal:      Cervical back: Neck supple  Right lower leg: 3+ Edema present  Left lower leg: 3+ Edema present  Comments: Left lower extremity shortened, externally rotated  Visible deformity above left knee  Left lateral hip tenderness   Decreased range of motion due to pain  Normal sensations, good pulses  Skin:     General: Skin is warm and dry  Capillary Refill: Capillary refill takes less than 2 seconds  Neurological:      Mental Status: She is alert           ED Medications  Medications   pantoprazole (PROTONIX) 80 mg in sodium chloride 0 9 % 100 mL infusion (8 mg/hr Intravenous New Bag 8/20/22 0706)   methocarbamol (ROBAXIN) tablet 500 mg (500 mg Oral Given 8/20/22 0818)   ondansetron (ZOFRAN) injection 4 mg (has no administration in time range)   gabapentin (NEURONTIN) capsule 100 mg (100 mg Oral Given 8/20/22 0818)   acetaminophen (TYLENOL) tablet 975 mg (975 mg Oral Given 8/20/22 0818)   lidocaine (LIDODERM) 5 % patch 1 patch (has no administration in time range)   oxyCODONE (ROXICODONE) IR tablet 5 mg (has no administration in time range)   HYDROmorphone (DILAUDID) injection 0 5 mg (has no administration in time range)   oxyCODONE (ROXICODONE) immediate release tablet 10 mg (has no administration in time range)   cefTRIAXone (ROCEPHIN) IVPB (premix in dextrose) 2,000 mg 50 mL (2,000 mg Intravenous New Bag 8/20/22 0823)   prothrombin complex concentrate (human) (Kcentra) 2,000 Units (has no administration in time range)   HYDROmorphone (DILAUDID) injection 0 5 mg (0 5 mg Intravenous Given 8/20/22 0610)   HYDROmorphone (DILAUDID) injection 0 5 mg (0 5 mg Intravenous Given 8/20/22 0740)       Diagnostic Studies  Results Reviewed     Procedure Component Value Units Date/Time    Hemoglobin and hematocrit, blood [[id number]]     Lab Status: No result Specimen: Blood     Comprehensive metabolic panel [[id number]]  (Abnormal) Collected: 08/20/22 0623    Lab Status: Final result Specimen: Blood from Arm, Left Updated: 08/20/22 1184     Sodium 140 mmol/L      Potassium 3 5 mmol/L      Chloride 100 mmol/L      CO2 33 mmol/L      ANION GAP 7 mmol/L      BUN 48 mg/dL      Creatinine 1 03 mg/dL      Glucose 121 mg/dL      Calcium 9 3 mg/dL      AST 11 U/L ALT 7 U/L      Alkaline Phosphatase 81 U/L      Total Protein 6 4 g/dL      Albumin 3 7 g/dL      Total Bilirubin 0 58 mg/dL      eGFR 53 ml/min/1 73sq m     Narrative:      [place] guidelines for Chronic Kidney Disease (CKD):     Stage 1 with normal or high GFR (GFR > 90 mL/min/1 73 square meters)    Stage 2 Mild CKD (GFR = 60-89 mL/min/1 73 square meters)    Stage 3A Moderate CKD (GFR = 45-59 mL/min/1 73 square meters)    Stage 3B Moderate CKD (GFR = 30-44 mL/min/1 73 square meters)    Stage 4 Severe CKD (GFR = 15-29 mL/min/1 73 square meters)    Stage 5 End Stage CKD (GFR <15 mL/min/1 73 square meters)  Note: GFR calculation is accurate only with a steady state creatinine    APTT [[id number]]  (Normal) Collected: 08/20/22 0623    Lab Status: Final result Specimen: Blood from Arm, Left Updated: 08/20/22 0641     PTT 33 seconds     Protime-INR [[id number]]  (Abnormal) Collected: 08/20/22 0623    Lab Status: Final result Specimen: Blood from Arm, Left Updated: 08/20/22 0641     Protime 16 5 seconds      INR 1 31    CBC and differential [[id number]]  (Abnormal) Collected: 08/20/22 0623    Lab Status: Final result Specimen: Blood from Arm, Left Updated: 08/20/22 0629     WBC 9 61 Thousand/uL      RBC 3 00 Million/uL      Hemoglobin 8 4 g/dL      Hematocrit 27 5 %      MCV 92 fL      MCH 28 0 pg      MCHC 30 5 g/dL      RDW 14 9 %      MPV 10 9 fL      Platelets 887 Thousands/uL      nRBC 0 /100 WBCs      Neutrophils Relative 65 %      Immat GRANS % 1 %      Lymphocytes Relative 21 %      Monocytes Relative 10 %      Eosinophils Relative 3 %      Basophils Relative 0 %      Neutrophils Absolute 6 29 Thousands/µL      Immature Grans Absolute 0 05 Thousand/uL      Lymphocytes Absolute 2 02 Thousands/µL      Monocytes Absolute 0 97 Thousand/µL      Eosinophils Absolute 0 25 Thousand/µL      Basophils Absolute 0 03 Thousands/µL                  XR femur 2 views LEFT   ED Interpretation by Luly Reaves MD (08/20 0549)   L distal femur fracture with posterior dislocation of prosthesis      XR knee 4+ views left injury   ED Interpretation by Luly Reaves MD (08/20 7148)   Distal femur fracture with posterior dislocation of prosthesis  XR pelvis ap only 1 or 2 vw   ED Interpretation by Luly Reaves MD (08/20 7753)   No fractures or dislocations  XR chest 1 view portable   ED Interpretation by Luly Reaves MD (08/20 6333)   No acute cardiopulmonary disease, when compared to CXR from 07/28/2022, no acute changes  CT head wo contrast    (Results Pending)   CT lower extremity w contrast left    (Results Pending)         Procedures  Procedures      ED Course  ED Course as of 08/20/22 0824   Sat Aug 20, 2022   0632 Hemoglobin(!): 8 4  Compared to 10 4 from 3 weeks ago   0646 POCT INR(!): 1 31   0646 PTT wnl   0712 Patient updated about imaging and labs and need for trauma consult and admission  Pt understands/agrees   0715 Trauma attending and Trauma AP texted; they will be down to evaluate the patient                                       MDM  Number of Diagnoses or Management Options  Cellulitis of lower extremity, unspecified laterality  Closed fracture of left distal femur ([address] )  Closed fracture of proximal end of left femur, initial encounter ([facility] [address] )  Melena  Diagnosis management comments: Soon 12year-old female with mechanical trip and fall causing left knee pain, left leg was shortened, externally rotated with visible deformity above left knee  Patient has also had melena which started today  Hb drop of 10 4-->8 4 in 3 weeks  X-ray of left femur revealed distal femur fracture w/ posterior dislocation of prosthesis  Trauma was consulted, patient will be admitted to Trauma as primary service with plans for Medicine and GI consult  Trauma added CT head as pt told trauma she may have hit her head   Denied head strike when I asked therefore I did not order head CT at the time of ED arrival        Amount and/or Complexity of Data Reviewed  Clinical lab tests: ordered and reviewed  Tests in the radiology section of CPT®: ordered and reviewed  Tests in the medicine section of CPT®: ordered and reviewed  Review and summarize past medical records: yes  Discuss the patient with other providers: yes  Independent visualization of images, tracings, or specimens: yes    Risk of Complications, Morbidity, and/or Mortality  Presenting problems: moderate  Diagnostic procedures: moderate  Management options: moderate    Patient Progress  Patient progress: stable      Disposition  Final diagnoses:   Closed fracture of left distal femur ([facility] )   Closed fracture of proximal end of left femur, initial encounter ([place] [address] )   Melena   Cellulitis of lower extremity, unspecified laterality     Time reflects when diagnosis was documented in both MDM as applicable and the Disposition within this note     Time User Action Codes Description Comment    8/20/2022  7:20 AM Eugenio Ordoñez Add [S72 402A] Closed fracture of left distal femur ([place] [address] )     8/20/2022  7:40 AM Nehal Rosa Add [S72 002A] Closed fracture of proximal end of left femur, initial encounter ([place] [address] )     8/20/2022  7:48 AM Eugenio Pimenteltan Add [K92 1] Melena     8/20/2022  8:08 AM Nehal Rosa Add [L03 [id number]] Cellulitis of lower extremity, unspecified laterality       ED Disposition     ED Disposition   Admit    Condition   Stable    Date/Time   Sat Aug 20, 2022  7:47 AM    Comment   Case was discussed with trauma AP and the patient's admission status was agreed to be Admission Status: inpatient status to the service of Dr Jose Luis Canas   Follow-up Information    None         Patient's Medications   Discharge Prescriptions    No medications on file     No discharge procedures on file      PDMP Review       Value Time User    PDMP Reviewed  Yes 8/20/2022  6:07 AM Yecenia Reilly MD           ED Provider  Attending physically available and evaluated Chris Rodríguez I managed the patient along with the ED Attending      Electronically Signed by         Yanni Roberson MD  08/20/22 82 Christy Hernadez MD  08/20/22 0199

## 2022-08-20 NOTE — ASSESSMENT & PLAN NOTE
Mechanical fall  Possibly secondary to slipping on melanotic stool  PT OT as tolerated when cleared by surgery

## 2022-08-20 NOTE — ASSESSMENT & PLAN NOTE
Patient had a mechanical fall while going to the bathroom  Patient has been having some melanotic stools over the last 24 hours and slipped on it  She landed on her left knee  Patient has a history of bilateral knee replacements also  Patient has not been able to after the fall  Patient noted to have a left femur fracture  Patient under trauma service and will be considered for surgical intervention  However patient takes Eliquis and has now received Kcentra in the meanwhile  Management as per Trauma Service

## 2022-08-20 NOTE — CONSULTS
Orthopedics   Melania Yadira Rodríguez 77 y o  female MRN: [id number]  Unit/Bed#: AN XRAY      Chief Complaint:   left knee pain    HPI:   77 y  o female status post mechanical fall complaining of left knee pain and inability to bear weight  Pain is made worse with motion or contact to the area  Pain is 10/10 currently  Patient has a hx of bilateral TKA done by Dr Jennifer Chandler in 2002  Denies Headstrike, denies LOC, is on Eliquis as Blood thinners last dose yesterday morning  PMH significant for lymphedema atrial fibrillation CHF also has history of melena and recent anemia       Review Of Systems:   · Skin:  Lymphedema and venous stasis dermatitis in the bilateral lower legs  · Neuro: See HPI  · Musculoskeletal: See HPI  · 14 point review of systems negative except as stated above     Past Medical History:   Past Medical History:   Diagnosis Date    A-fib ([facility] ) 12/29/2021    Anxiety     Arthritis     Back pain     Cellulitis     [facility]     Edema of both lower extremities due to peripheral venous insufficiency     Edema of both lower extremities due to peripheral venous insufficiency     Erythema of lower extremity 11/12/2021    Fibromyalgia     Hypertension     Sjogren syndrome, unspecified ([facility] )        Past Surgical History:   Past Surgical History:   Procedure Laterality Date    KNEE CARTILAGE SURGERY      REPLACEMENT TOTAL KNEE BILATERAL         Family History:  Family history reviewed and non-contributory  Family History   Problem Relation Age of Onset    Heart disease Mother     Cancer Father        Social History:  Social History     Socioeconomic History    Marital status: /Civil Union     Spouse name: None    Number of children: None    Years of education: None    Highest education level: None   Occupational History    None   Tobacco Use    Smoking status: Former Smoker     Types: Cigarettes    Smokeless tobacco: Never Used   Vaping Use    Vaping Use: Never used   Substance and Sexual Activity    Alcohol use: Not Currently    Drug use: Never    Sexual activity: None   Other Topics Concern    None   Social History Narrative    None     Social Determinants of Health     Financial Resource Strain: Not on file   Food Insecurity: No Food Insecurity    Worried About Running Out of Food in the Last Year: Never true    Sindy of Food in the Last Year: Never true   Transportation Needs: No Transportation Needs    Lack of Transportation (Medical): No    Lack of Transportation (Non-Medical):  No   Physical Activity: Not on file   Stress: Not on file   Social Connections: Not on file   Intimate Partner Violence: Not on file   Housing Stability: Low Risk     Unable to Pay for Housing in the Last Year: No    Number of Places Lived in the Last Year: 1    Unstable Housing in the Last Year: No       Allergies:   No Known Allergies        Labs:  0   Lab Value Date/Time    HCT 27 5 (L) 08/20/2022 0623    HCT 33 8 (L) 07/28/2022 0540    HCT 33 1 (L) 02/12/2022 0501    HCT 37 4 09/23/2014 1339    HCT 33 6 (L) 09/03/2014 1146    HGB 8 4 (L) 08/20/2022 0623    HGB 10 4 (L) 07/28/2022 0540    HGB 9 6 (L) 02/12/2022 0501    HGB 11 3 (L) 09/23/2014 1339    HGB 10 8 (L) 09/03/2014 1146    INR 1 31 (H) 08/20/2022 0623    WBC 9 61 08/20/2022 0623    WBC 10 11 07/28/2022 0540    WBC 8 02 02/12/2022 0501    WBC 7 10 09/23/2014 1339    WBC 6 13 09/03/2014 1146       Meds:    Current Facility-Administered Medications:     acetaminophen (TYLENOL) tablet 975 mg, 975 mg, Oral, Q8H [address], Eather Re, SOFÍANP, 975 mg at 08/20/22 0818    cefTRIAXone (ROCEPHIN) IVPB (premix in dextrose) 2,000 mg 50 mL, 2,000 mg, Intravenous, Q24H, SOFÍA LopesNP, Last Rate: 100 mL/hr at 08/20/22 0823, 2,000 mg at 08/20/22 3465    gabapentin (NEURONTIN) capsule 100 mg, 100 mg, Oral, TID, SHAHIDA Lopes, 100 mg at 08/20/22 0818    HYDROmorphone (DILAUDID) injection 0 5 mg, 0 5 mg, Intravenous, Q3H PRN, Javier Starch, CRNP    lidocaine (LIDODERM) 5 % patch 1 patch, 1 patch, Topical, Daily, Javier Starch, CRNP, 1 patch at 08/20/22 0827    methocarbamol (ROBAXIN) tablet 500 mg, 500 mg, Oral, Q6H [address], Javier Starch, CRNP, 500 mg at 08/20/22 0818    multi-electrolyte (PLASMALYTE-A/ISOLYTE-S PH 7 4) IV solution, 75 mL/hr, Intravenous, Continuous, Javier Starch, CRNP    ondansetron [facility]) injection 4 mg, 4 mg, Intravenous, Q4H PRN, Javier Starch, CRNP    oxyCODONE (ROXICODONE) immediate release tablet 10 mg, 10 mg, Oral, Q4H PRN, Javier Starch, CRNP    oxyCODONE (ROXICODONE) IR tablet 5 mg, 5 mg, Oral, Q3H PRN, Javier Starch, CRNP    pantoprazole (PROTONIX) 80 mg in sodium chloride 0 9 % 100 mL infusion, 8 mg/hr, Intravenous, Continuous, Salvador Armendariz MD, Last Rate: 10 mL/hr at 08/20/22 0706, 8 mg/hr at 08/20/22 0706    prothrombin complex concentrate (human) (Kcentra) 2,000 Units, 2,000 Units, Intravenous, Once, Javier Starch, CRNP    Current Outpatient Medications:     ammonium lactate (LAC-HYDRIN) 12 % cream, Apply topically in the morning, Disp: 385 g, Rfl: 0    apixaban (Eliquis) 5 mg, Take 1 tablet (5 mg total) by mouth 2 (two) times a day, Disp: 60 tablet, Rfl: 2    carvedilol (COREG) 25 mg tablet, Take 1 tablet (25 mg total) by mouth 2 (two) times a day with meals, Disp: 180 tablet, Rfl: 1    hydrocortisone 2 5 % cream, Apply topically 2 (two) times a day (Patient taking differently: Apply topically 2 (two) times a day as needed), Disp: 30 g, Rfl: 0    lidocaine (LIDODERM) 5 %, daily as needed, Disp: , Rfl:     losartan (COZAAR) 100 MG tablet, Take 1 tablet (100 mg total) by mouth in the morning , Disp: 90 tablet, Rfl: 1    oxyCODONE (OxyCONTIN) 20 mg 12 hr tablet, Take 1 tablet (20 mg total) by mouth every 12 (twelve) hours Max Daily Amount: 40 mg, Disp: 2 tablet, Rfl: 0    potassium chloride (K-DUR,KLOR-CON) 20 mEq tablet, Take 2 tablets (40 mEq total) by mouth daily, Disp: 180 tablet, Rfl: 1    torsemide (DEMADEX) 20 mg tablet, Take 2 tablets (40 mg total) by mouth daily, Disp: 180 tablet, Rfl: 0    Blood Culture:   Lab Results   Component Value Date    BLOODCX No Growth After 5 Days  07/28/2022       Wound Culture:   Lab Results   Component Value Date    WOUNDCULT 3+ Growth of Proteus mirabilis (A) 12/30/2021    WOUNDCULT 3+ Growth of Enterococcus faecalis (A) 12/30/2021    WOUNDCULT 2+ Growth of Pseudomonas aeruginosa (A) 12/30/2021    WOUNDCULT 3+ Growth of  12/30/2021       Ins and Outs:  No intake/output data recorded  Physical Exam:   BP (!) 181/79   Pulse 98   Temp 98 5 °F (36 9 °C) (Oral)   Resp 18   SpO2 94%   Gen: No acute distress, resting comfortably in bed  HEENT: Eyes clear, moist mucus membranes, hearing intact  Respiratory: No audible wheezing or stridor  Cardiovascular: Well Perfused peripherally, 2+ distal pulse  Abdomen: nondistended, no peritoneal signs  Musculoskeletal: left lower extremity  · Skin intact around the knee, there is some ecchymosis distal thigh anteriorly, slight visible deformity  · Tender to palpation over entire knee   · ROM not assessed 2/2 known fracture  · Sensation intact L3-S1  · Positive ankle dorsi/plantar flexion, EHL/FHL  · 2+ DP pulse    Radiology:   I personally reviewed the films  X-rays AP/Lateral views left knee shows displaced distal femur periprosthetic metaphyseal fracture that is shortened with the distal fragment posterior to the proximal femoral shaft  After reduction attempt x-rays were obtained revealing no success with reduction    _*_*_*_*_*_*_*_*_*_*_*_*_*_*_*_*_*_*_*_*_*_*_*_*_*_*_*_*_*_*_*_*_*_*_*_*_*_*_*_*_*    Assessment:  77 y  o female status post mechanical fall with left distal femur periprosthetic fracture    Plan:   · Non weight bearing left lower extremity in knee immobilizer  · Fracture reduction was attempted in the ED under sedation this was unsuccessful  · To OR for operative fixation of left distal femur fracture pending medical optimization and review of prior surgical records  · Analgesics for pain  · Pre op labs in ED  · There is no height or weight on file to calculate BMI  morbidly obese  Recommend behavior modifications, nutrition and physical activity    · Dispo: Ortho will follow    Ramos Quintana PA-C

## 2022-08-20 NOTE — PROGRESS NOTES
[facility]  Progress Note - Victor M Carpio 1945, 77 y o  female MRN: [id number]  Unit/Bed#: S -01 Encounter: [id number]  Primary Care Provider: Violet Trimble MD   Date and time admitted to hospital: 8/20/2022  5:58 AM    Acute pain due to trauma  Assessment & Plan  · In the setting of fibromyalgia  · APS consulted- peripheral block    Melena  Assessment & Plan  · Multiple melenotic stool over the past 24 hours  · GI consult-peptic ulcer disease versus AVMs with malignancy less likely less likely malignancy  · Started on Protonix drip  · Will reverse Eliquis with KCentra  · Q4 H&H hemoglobin currently 7 1, patient currently hemodynamically stable; will transfuse if patient becomes hemodynamically unstable or drops further under 7 0 hemoglobin  · Continue NPO status for scheduled egd    Fall  Assessment & Plan  · Mechanical   · Injuries listed below  · CT Head ordered--patient unsure if she hit her head, having head pain  Cellulitis of lower extremity  Assessment & Plan  · Bilateral LE cellulitis  · Started on Rocepin  · Continue to monitor for S/S on sepsis--lactic acid 0 7, procal ordered  · Medicine consulted    Anemia  Assessment & Plan  Likely in the setting of gi bleed; melenic stools  Currently on Protonix pump  Hb 7 1 currently from baseline of Hb 10 4  Hb/Hct scheduled for 0000 8/21  Delivering 1 unit of PRBC and will reassess am Hb/Hct    A-fib ([address] )  Assessment & Plan  · Hold Eliquis in the setting of GI bleed  · Continue hold Coreg    * Fracture of proximal end of left femur ([address] )  Assessment & Plan  · Secondary to fall  · Seen on XR with significant displacement--will obtain CTA LLE due to displacement and concern for vascular injury  · Ortho consulted  · NPO for possible OR-- reversing eliquis with KCentra  · Multimodail pain regimen--APS consulted  · DVT Prop--hold in the setting of GI bleed  · PT/OT when indicated         TERTIARY TRAUMA SURVEY NOTE    Prophylaxis: Reason for no pharmacologic prophylaxis Currently being held in the setting of possible GI bleed    Disposition:  Currently requiring med/surg treatment    Code status:  Level 1 - Full Code    Consultants:  Internal Medicine, GI, Orthopedics, gerontology      SUBJECTIVE:     Transfer from: home  Mechanism of Injury:Fall    Chief Complaint:  Left leg pain    HPI/Last 24 hour events: "Yuliana Hall is a 77 y o  female with history of atrial fibrillation that she takes Eliquis for, presenting today for evaluation after she suffered a mechanical fall  Patient states that she was walking to the bathroom because she has been having several melanotic stools over the past 24 hours, and slipped  She landed on her left knee  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  Since that accident she has been unable to ambulate  She notes severe pain in her left lower extremity  She denies any numbness or tingling  She does believe that she struck her head  No loss of consciousness  She denies any abdominal pain, nausea, vomiting  She denies any recent fevers or chills " via SHAHIDA Cuevas    Patient had no acute events overnight  Patient brought down to the PACU for acute pain for femoral nerve block, and repeat reduction of left distal femur fracture, subsequent x-ray was obtained by Orthopedics denoting unchanged position of fracture, patient replaced in knee immobilizer, and requested nonweightbearing for left lower extremity  Possible surgical intervention tomorrow by Orthopedics  H&H arc will be monitored by trauma as primary service, and will transfuse as necessary  Patient reports normal appetite and normal dietary intake      Active medications:           Current Facility-Administered Medications:     acetaminophen (TYLENOL) tablet 975 mg, 975 mg, Oral, Q8H [facility], 975 mg at 08/20/22 2100    ammonium lactate (LAC-HYDRIN) 12 % cream, , Topical, Daily    carvedilol (COREG) tablet 25 mg, 25 mg, Oral, BID With Meals, 25 mg at 08/20/22 1747    cefTRIAXone (ROCEPHIN) IVPB (premix in dextrose) 2,000 mg 50 mL, 2,000 mg, Intravenous, Q24H, Stopped at 08/20/22 0858    furosemide (LASIX) injection 80 mg, 80 mg, Intravenous, Daily, 80 mg at 08/20/22 1518    gabapentin (NEURONTIN) capsule 100 mg, 100 mg, Oral, TID, 100 mg at 08/20/22 2100    HYDROmorphone (DILAUDID) injection 0 5 mg, 0 5 mg, Intravenous, Q3H PRN, 0 5 mg at 08/20/22 2010    lidocaine (LIDODERM) 5 % patch 1 patch, 1 patch, Topical, Daily, 1 patch at 08/20/22 0827    losartan (COZAAR) tablet 100 mg, 100 mg, Oral, Daily    methocarbamol (ROBAXIN) tablet 500 mg, 500 mg, Oral, Q6H [address], 500 mg at 08/20/22 2300    multi-electrolyte (PLASMALYTE-A/ISOLYTE-S PH 7 4) IV solution, 75 mL/hr, Intravenous, Continuous, 75 mL/hr at 08/20/22 1737    ondansetron (ZOFRAN) injection 4 mg, 4 mg, Intravenous, Q4H PRN    oxyCODONE (ROXICODONE) immediate release tablet 10 mg, 10 mg, Oral, Q4H PRN, 10 mg at 08/20/22 2100    oxyCODONE (ROXICODONE) IR tablet 5 mg, 5 mg, Oral, Q3H PRN    pantoprazole (PROTONIX) 80 mg in sodium chloride 0 9 % 100 mL infusion, 8 mg/hr, Intravenous, Continuous, 8 mg/hr at 08/20/22 1738    potassium chloride (K-DUR,KLOR-CON) CR tablet 40 mEq, 40 mEq, Oral, Daily    ropivacaine (NAROPIN) 0 2% in elastomeric infiltration pump (ON-Q* PUMP), 10 mL/hr, Infiltration, Continuous    Facility-Administered Medications Ordered in Other Encounters:     fentanyl citrate (PF) 100 MCG/2ML, , Intravenous, PRN, 50 mcg at 08/20/22 1458    midazolam (VERSED) injection, , Intravenous, PRN, 1 mg at 08/20/22 1455    propofol (DIPRIVAN) 200 MG/20ML bolus injection, , Intravenous, PRN, 30 mg at 08/20/22 1431      OBJECTIVE:     Vitals:   Vitals:    08/21/22 0047   BP: 126/56   Pulse: 77   Resp: 16   Temp: 98 3 °F (36 8 °C)   SpO2: 99%       Physical Exam:   GENERAL APPEARANCE:  Comfortable  NEURO:  Alert and oriented x3, no new focal deficits  HEENT:  EOM intact, no pain on EOM, oropharynx clear and patent  CV:  Regular rate, irregular rhythm  LUNGS:  Clear to auscultation bilaterally  GI:  Soft nondistended, no tenderness, normal bowel sounds  :  Urinary catheter; draining  MSK:  Upper extremities 4/5 bilaterally, right lower extremity 4/5, left lower extremity, left hip 3/5, left knee 30/wrap, left ankle 4/5, all aforementioned extremities neurovascularly intact  SKIN:  Warm and well perfused      1  Before the illness or injury that brought you to the Emergency, did you need someone to help you on a regular basis? 0=No   2  Since the illness or injury that brought you to the Emergency, have you needed more help than usual to take care of yourself? 1=Yes   3  Have you been hospitalized for one or more nights during the past 6 months (excluding a stay in the Emergency Department)? 0=No   4  In general, do you see well? 1=No   5  In general, do you have serious problems with your memory? 0=No   6  Do you take more than three different medications everyday? 1=Yes   TOTAL   3     Did you order a geriatric consult if the score was 2 or greater?: yes     I/O:   I/O       08/18 0701  08/19 0700 08/19 0701  08/20 0700 08/20 0701 08/21 0700    Urine   4400    Total Output   4400    Net   -4400               Invasive Devices: Invasive Devices  Report    Peripheral Intravenous Line  Duration           Peripheral IV 08/20/22 Left Antecubital <1 day    Peripheral IV 08/20/22 Right Antecubital <1 day          Epidural Line  Duration           Nerve Block Catheter 08/20/22 <1 day          Drain  Duration           Urethral Catheter 18 Fr  <1 day              Imaging:   CT head wo contrast    Result Date: 8/20/2022  Impression: No intracranial hemorrhage or calvarial fracture   Very mild, chronic microangiopathy Workstation performed: [id number]     Labs:   CBC:   Lab Results   Component Value Date    WBC 9 61 08/20/2022    HGB 7 1 (L) 08/20/2022    HCT 23 0 (L) 08/20/2022    MCV 92 08/20/2022     08/20/2022    MCH 28 0 08/20/2022    MCHC 30 5 (L) 08/20/2022    RDW 14 9 08/20/2022    MPV 10 9 08/20/2022    NRBC 0 08/20/2022     CMP:   Lab Results   Component Value Date     08/20/2022    CO2 33 (H) 08/20/2022    BUN 48 (H) 08/20/2022    CREATININE 1 03 08/20/2022    CALCIUM 9 3 08/20/2022    AST 11 (L) 08/20/2022    ALT 7 08/20/2022    ALKPHOS 81 08/20/2022    EGFR 53 08/20/2022

## 2022-08-20 NOTE — ASSESSMENT & PLAN NOTE
· Bilateral LE cellulitis  · Started on Rocepin     · Continue to monitor for S/S on sepsis--lactic and procal ordered  · Medicine consulted

## 2022-08-20 NOTE — ASSESSMENT & PLAN NOTE
· Bilateral LE cellulitis  · Started on Rocepin     · Continue to monitor for S/S on sepsis--lactic acid 0 7, procal ordered  · Medicine consulted

## 2022-08-20 NOTE — ASSESSMENT & PLAN NOTE
· Secondary to fall  · Seen on XR with significant displacement--will obtain CTA LLE due to displacement and concern for vascular injury  · Ortho consulted  · NPO for possible OR-- reversing eliquis with KCentra  · Multimodail pain regimen--APS consulted  · DVT Prop--hold in the setting of GI bleed  · PT/OT when indicated

## 2022-08-20 NOTE — ED PROCEDURE NOTE
Procedure  Pre-Procedural Sedation  Performed by: Monika Cook MD  Authorized by: Monika Cook MD     Consent:     Consent obtained:  Verbal and written    Consent given by:  Patient    Risks discussed:  Inadequate sedation, nausea, vomiting, respiratory compromise necessitating ventilatory assistance and intubation, prolonged sedation necessitating reversal, prolonged hypoxia resulting in organ damage, dysrhythmia and allergic reaction    Alternatives discussed:  Analgesia without sedation, anxiolysis and regional anesthesia  [place] protocol:     Procedure explained and questions answered to patient or proxy's satisfaction: yes      Relevant documents present and verified: yes      Test results available and properly labeled: yes      Radiology Images displayed and confirmed    If images not available, report reviewed: yes      Required blood products, implants, devices, and special equipment available: yes      Site/side marked: yes      Immediately prior to procedure a time out was called: yes      Patient identity confirmation method:  Verbally with patient, arm band and hospital-assigned identification number  Indications:     Sedation purpose:  Dislocation reduction    Intended level of sedation:  Moderate (conscious sedation)  Pre-sedation assessment:     NPO status caution: unable to specify NPO status      ASA classification: class 3 - patient with severe systemic disease      Neck mobility: normal      Mouth opening:  3 or more finger widths    Thyromental distance:  3 finger widths    Mallampati score:  III - soft palate, base of uvula visible    Pre-sedation assessments completed and reviewed: airway patency, cardiovascular function, hydration status, mental status, nausea/vomiting, pain level and respiratory function      History of difficult intubation: no      Pre-sedation assessment completed:  8/20/2022 9:15 AM  Procedural Sedation    Date/Time: 8/20/2022 9:57 AM  Performed by: Zack Pizarro Landry Morocho MD  Authorized by: Peter Cook MD     Immediate pre-procedure details:     Reassessment: Patient reassessed immediately prior to procedure      Reviewed: vital signs, relevant labs/tests and NPO status      Verified: bag valve mask available, emergency equipment available, intubation equipment available, IV patency confirmed, oxygen available and suction available    Procedure details (see MAR for exact dosages):     Sedation start time:  8/20/2022 9:21 AM    Preoxygenation:  Nasal cannula    Sedation:  Propofol    Analgesia:  Fentanyl    Intra-procedure monitoring:  Blood pressure monitoring, cardiac monitor, continuous pulse oximetry, continuous capnometry, frequent LOC assessments and frequent vital sign checks    Intra-procedure events: none      Intra-procedure management:  Supplemental oxygen and fluid bolus    Sedation end time:  8/20/2022 10:13 AM    Total sedation time (minutes):  52  Post-procedure details:     Post-sedation assessment completed:  8/20/2022 9:57 AM    Attendance: Constant attendance by certified staff until patient recovered      Recovery: Patient returned to pre-procedure baseline      Post-sedation assessments completed and reviewed: airway patency, cardiovascular function, hydration status, mental status, nausea/vomiting, pain level and respiratory function      Patient is stable for discharge or admission: yes      Patient tolerance: Tolerated well, no immediate complications  Comments:      Fentanyl followed by Diprivan was given                       Peter Cook MD  08/20/22 1591

## 2022-08-20 NOTE — ED PROCEDURE NOTE
PROCEDURE  ECG 12 Lead Documentation Only    Date/Time: 8/20/2022 8:30 AM  Performed by: Margaret Koo MD  Authorized by: Margaret Koo MD     Indications / Diagnosis:  Gi bleed  ECG reviewed by me, the ED Provider: yes    Patient location:  ED  Previous ECG:     Previous ECG:  Compared to current    Comparison ECG info:  7/28/22    Similarity:  Changes noted (RAD and low voltage now)  Quality:     Tracing quality:  Limited by artifact  Rate:     ECG rate:  98    ECG rate assessment: normal    Rhythm:     Rhythm: atrial fibrillation    Ectopy:     Ectopy: none    QRS:     QRS axis:  Right  Conduction:     Conduction normal: low voltage      ST segments:     ST segments:  Normal  T waves:     T waves: normal           Margaret Koo MD  08/20/22 5773

## 2022-08-20 NOTE — ASSESSMENT & PLAN NOTE
Patient has been having melanotic stools over the last 24 hours  She takes Eliquis for atrial fibrillation  Currently Eliquis is on hold  Patient received Kcentra  Hemoglobin is down to 7 4 from baseline of around 10 4  GI is consulted  Patient is NPO and on Protonix drip

## 2022-08-20 NOTE — H&P
[facility]  H&P- [place] 1945, 77 y o  female MRN: [id number]  Unit/Bed#: S -01 Encounter: [id number]  Primary Care Provider: Tu Gomez MD   Date and time admitted to hospital: 8/20/2022  5:58 AM    [address]  · Mechanical   · Injuries listed below  · CT Head ordered--patient unsure if she hit her head, having head pain  Fracture of proximal end of left femur ([address] )  Assessment & Plan  · Secondary to fall  · Seen on XR with significant displacement--will obtain CTA LLE due to displacement and concern for vascular injury  · Ortho consulted  · NPO for possible OR-- reversing eliquis with KCentra  · Multimodail pain regimen--APS consulted  · DVT Prop--hold in the setting of GI bleed  · PT/OT when indicated  Melena  Assessment & Plan  · Multiple melenotic stool over the past 24 hours  · Started on Protonix drip  · Will reverse Eliquis with KCentra  · Q4 H&H  · GI consulted  · Continue NPO status  · IVF    Cellulitis of lower extremity  Assessment & Plan  · Bilateral LE cellulitis  · Started on Rocepin  · Continue to monitor for S/S on sepsis--lactic and procal ordered  · Medicine consulted    Acute pain due to trauma  Assessment & Plan  · In the setting of fibromyalgia  · APS consulted    A-fib [facility])  Assessment & Plan  · Hold Eliquis in the setting of GI bleed  · Continue hold Coreg      Trauma Alert: Evaluation; trauma team notified at [id number] via text   Model of Arrival: Ambulance    Trauma Team: Attending Ayla Salter and Navdeep Dunaway  Consultants:     Orthopedics: [id number] - STAT consult; notified at [facility] via text; Other: {routine consult; Epic consult order placed; History of Present Illness     Chief Complaint:  Left knee pain  Mechanism:Fall     HPI:    Seth Parker is a 77 y o  female with history of atrial fibrillation that she takes Eliquis for, presenting today for evaluation after she suffered a mechanical fall    Patient states that she was walking to the bathroom because she has been having several melanotic stools over the past 24 hours, and slipped  She landed on her left knee  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  Since that accident she has been unable to ambulate  She notes severe pain in her left lower extremity  She denies any numbness or tingling  She does believe that she struck her head  No loss of consciousness  She denies any abdominal pain, nausea, vomiting  She denies any recent fevers or chills  Review of Systems   Constitutional: Negative  Negative for chills and fever  HENT: Negative  Eyes: Negative  Respiratory: Negative  Cardiovascular: Negative  Gastrointestinal: Negative for abdominal pain, constipation, nausea and vomiting  Anal bleeding: Dark semi solid stool  Endocrine: Negative  Genitourinary: Negative  Musculoskeletal: Positive for arthralgias ( left knee pain and upper thigh pain) and myalgias ( left upper thigh pain)  Negative for back pain, gait problem, neck pain and neck stiffness  Skin: Positive for color change ( redness in lower extremities)  Allergic/Immunologic: Negative  Neurological: Positive for headaches ( left-sided headache)  Negative for dizziness, syncope, speech difficulty, weakness and light-headedness  Hematological: Negative  Psychiatric/Behavioral: Negative  12-point, complete review of systems was reviewed and negative except as stated above       Historical Information     Past Medical History:   Diagnosis Date    A-fib ([facility] ) 12/29/2021    Anxiety     Arthritis     Back pain     Cellulitis     CHF (congestive heart failure) (HCC)     Edema of both lower extremities due to peripheral venous insufficiency     Edema of both lower extremities due to peripheral venous insufficiency     Erythema of lower extremity 11/12/2021    Fibromyalgia     Hypertension     Sjogren syndrome, unspecified ([facility] )      Past Surgical History:   Procedure Laterality Date    KNEE CARTILAGE SURGERY      REPLACEMENT TOTAL KNEE BILATERAL          Social History     Tobacco Use    Smoking status: Former Smoker     Types: Cigarettes    Smokeless tobacco: Never Used   Vaping Use    Vaping Use: Never used   Substance Use Topics    Alcohol use: Not Currently    Drug use: Never     Immunization History   Administered Date(s) Administered    COVID-19 PFIZER VACCINE 0 3 ML IM 02/15/2021, 03/10/2021    INFLUENZA 11/11/2016, 12/29/2017    Pneumococcal Conjugate 13-Valent 11/17/2016    Pneumococcal Polysaccharide PPV23 04/09/2014    Tdap 07/20/2014     Last Tetanus: 2014  Family History: Non-contributory    1  Before the illness or injury that brought you to the Emergency, did you need someone to help you on a regular basis? 1=Yes   2  Since the illness or injury that brought you to the Emergency, have you needed more help than usual to take care of yourself? 1=Yes   3  Have you been hospitalized for one or more nights during the past 6 months (excluding a stay in the Emergency Department)? 1=Yes   4  In general, do you see well? 0=Yes   5  In general, do you have serious problems with your memory? 0=No   6  Do you take more than three different medications everyday?  1=Yes   TOTAL   4     Did you order a geriatric consult if the score was 2 or greater?: yes     Meds/Allergies   all current active meds have been reviewed   No Known Allergies    Objective   Initial Vitals:   Temperature: 98 5 °F (36 9 °C) (08/20/22 0623)  Pulse: 83 (08/20/22 0601)  Respirations: 20 (08/20/22 0601)  Blood Pressure: (!) 182/80 (08/20/22 0601)    Primary Survey:   Airway:        Status: patent;        Pre-hospital Interventions: none        Hospital Interventions: none  Breathing:        Pre-hospital Interventions: none       Effort: normal       Right breath sounds: normal       Left breath sounds: normal  Circulation:        Rhythm: regular       Rate: regular   Right Pulses Left Pulses    R radial: 2+  R femoral: 2+  R pedal: 2+     L radial: 2+  L femoral: 2+  L pedal: 2+       Disability:        GCS: Eye: 4; Verbal: 5 Motor: 6 Total: 15       Right Pupil: 3 mm;  round;  reactive         Left Pupil:  3 mm;  round;  reactive      R Motor Strength L Motor Strength    R : 5/5  R dorsiflex: 5/5  R plantarflex: 5/5 L : 5/5  L dorsiflex: 5/5  L plantarflex: 5/5        Sensory:  No sensory deficit  Exposure:       Completed: Yes      Secondary Survey:  Physical Exam  Constitutional:       General: She is in acute distress  Appearance: Normal appearance  She is ill-appearing  HENT:      Head: Normocephalic  Comments: Tenderness on right side of head  No obvious deformities or lacerations  Right Ear: External ear normal       Left Ear: External ear normal       Nose: Nose normal       Mouth/Throat:      Mouth: Mucous membranes are moist       Pharynx: Oropharynx is clear  Eyes:      Extraocular Movements: Extraocular movements intact  Pupils: Pupils are equal, round, and reactive to light  Cardiovascular:      Rate and Rhythm: Normal rate and regular rhythm  Pulses: Normal pulses  Heart sounds: Normal heart sounds  No murmur heard  No gallop  Pulmonary:      Effort: Pulmonary effort is normal  No respiratory distress  Breath sounds: Normal breath sounds  No stridor  No wheezing, rhonchi or rales  Chest:      Chest wall: No tenderness  Abdominal:      General: Abdomen is flat  Bowel sounds are normal  There is no distension  Palpations: Abdomen is soft  Tenderness: There is no abdominal tenderness  There is no guarding  Genitourinary:     Comments: Nurse, Bina Corona, was present during my evaluation  No obvious hemorrhoids  Patient did have black liquid stool present in her rectum  Musculoskeletal:      Cervical back: Normal range of motion and neck supple  No rigidity or tenderness        Comments: Right thigh and proximal knee are severely tender  Knee effusion noted, unable to range right knee  Otherwise extremities are nontender and display full range of motion  No C, T, L-spine tenderness  No step-offs  Skin:     General: Skin is warm  Capillary Refill: Capillary refill takes less than 2 seconds  Neurological:      General: No focal deficit present  Mental Status: She is alert and oriented to person, place, and time  GCS: GCS eye subscore is 4  GCS verbal subscore is 5  GCS motor subscore is 6  Sensory: Sensory deficit present  Psychiatric:         Mood and Affect: Mood normal          Speech: Speech normal          Cognition and Memory: Cognition and memory normal          Invasive Devices  Report    Peripheral Intravenous Line  Duration           Peripheral IV 08/20/22 Left Antecubital <1 day    Peripheral IV 08/20/22 Right Antecubital <1 day              Lab Results:   Results: I have personally reviewed all pertinent laboratory/tests results, BMP/CMP:   Lab Results   Component Value Date    SODIUM 140 08/20/2022    K 3 5 08/20/2022     08/20/2022    CO2 33 (H) 08/20/2022    BUN 48 (H) 08/20/2022    CREATININE 1 03 08/20/2022    CALCIUM 9 3 08/20/2022    AST 11 (L) 08/20/2022    ALT 7 08/20/2022    ALKPHOS 81 08/20/2022    EGFR 53 08/20/2022    and CBC:   Lab Results   Component Value Date    WBC 9 61 08/20/2022    HGB 7 5 (L) 08/20/2022    HCT 24 4 (L) 08/20/2022    MCV 92 08/20/2022     08/20/2022    MCH 28 0 08/20/2022    MCHC 30 5 (L) 08/20/2022    RDW 14 9 08/20/2022    MPV 10 9 08/20/2022    NRBC 0 08/20/2022       Imaging Results: I have personally reviewed pertinent reports      Chest Xray(s): N/A   FAST exam(s): N/A   CT Scan(s): pending   Additional Xray(s): N/A     Other Studies: none    Code Status: Level 1 - Full Code  Advance Directive and Living Will: Yes    Power of :    POLST:    I have spent 25 minutes with Patient and family today in which greater than 50% of this time was spent in counseling/coordination of care regarding Diagnostic results, Prognosis, Risks and benefits of tx options, Intructions for management, Patient and family education, Importance of tx compliance, Risk factor reductions and Impressions

## 2022-08-20 NOTE — PLAN OF CARE
Problem: Potential for Falls  Goal: Patient will remain free of falls  Description: INTERVENTIONS:  - Educate patient/family on patient safety including physical limitations  - Instruct patient to call for assistance with activity   - Consult OT/PT to assist with strengthening/mobility   - Keep Call bell within reach  - Keep bed low and locked with side rails adjusted as appropriate  - Keep care items and personal belongings within reach  - Initiate and maintain comfort rounds  - Make Fall Risk Sign visible to staff    - Apply yellow socks and bracelet for high fall risk patients  - Consider moving patient to room near nurses station  Outcome: Progressing     Problem: MOBILITY - ADULT  Goal: Maintain or return to baseline ADL function  Description: INTERVENTIONS:  -  Assess patient's ability to carry out ADLs; assess patient's baseline for ADL function and identify physical deficits which impact ability to perform ADLs (bathing, care of mouth/teeth, toileting, grooming, dressing, etc )  - Assess/evaluate cause of self-care deficits   - Assess range of motion  - Assess patient's mobility; develop plan if impaired  - Assess patient's need for assistive devices and provide as appropriate  - Encourage maximum independence but intervene and supervise when necessary  - Involve family in performance of ADLs  - Assess for home care needs following discharge   - Consider OT consult to assist with ADL evaluation and planning for discharge  - Provide patient education as appropriate  Outcome: Progressing  Goal: Maintains/Returns to pre admission functional level  Description: INTERVENTIONS:  - Perform BMAT or MOVE assessment daily    - Set and communicate daily mobility goal to care team and patient/family/caregiver     - Collaborate with rehabilitation services on mobility goals if consulted    - Out of bed for toileting  - Record patient progress and toleration of activity level   Outcome: Progressing

## 2022-08-20 NOTE — ANESTHESIA PROCEDURE NOTES
Peripheral Block    Patient location during procedure: holding area  Start time: 8/20/2022 2:01 PM  Reason for block: at surgeon's request and post-op pain management  Staffing  Performed: Anesthesiologist   Anesthesiologist: Rupa Langford MD  Preanesthetic Checklist  Completed: patient identified, IV checked, site marked, risks and benefits discussed, surgical consent, monitors and equipment checked, pre-op evaluation and timeout performed  Peripheral Block  Patient position: supine  Prep: ChloraPrep  Patient monitoring: continuous pulse ox and frequent blood pressure checks  Block type: femoral  Laterality: left  Injection technique: catheter and single-shot  Procedures: ultrasound guided, Ultrasound guidance required for the procedure to increase accuracy and safety of medication placement and decrease risk of complications  Ultrasound permanent image savedropivacaine (NAROPIN) 0 5 % - Perineural   30 mL - 8/20/2022 2:01:00 PM  Needle  Needle type: Tuohy   Needle gauge: 18G  Needle localization: ultrasound guidance  Needle insertion depth: 6 cm  Catheter type: open end  Catheter size: 20g  Catheter at skin depth: 6 cm  Test dose: negative  Assessment  Injection assessment: incremental injection, local visualized surrounding nerve on ultrasound, no paresthesia on injection and negative aspiration for heme  Paresthesia pain: none  Heart rate change: no  Slow fractionated injection: yes  Post-procedure:  adhesive bandage applied, pressure dressing applied, secured with tape, site cleaned and sterile dressing applied  patient tolerated the procedure well with no immediate complications  Additional Notes  Initial block with 20G, 4in stimuplex needle  Ropi 0 5% 20 mL injected  Once nerve lifted off deep investing fascia, 18G Tuohy inserted with 20G catheter overthreaded through this needle  Pulled back until catheter tip visualized resting beneath the medial portion of the femoral nerve   Additional 10 mL of Ropi 0 5% utilize for further spread and confirmation of catheter placement  Final placement ensured to be not immediately adjacent to artery given known anticoagulated status of patient  Negative aspiration for heme  Secured in place with statlock, 2x tegarderm, and multiple rounds of 2in clear tape given catheter investment under pannus

## 2022-08-20 NOTE — ED ATTENDING ATTESTATION
8/20/2022  Javier MATHUR MD, saw and evaluated the patient  I have discussed the patient with the resident/non-physician practitioner and agree with the resident's/non-physician practitioner's findings, Plan of Care, and MDM as documented in the resident's/non-physician practitioner's note, except where noted  All available labs and Radiology studies were reviewed  I was present for key portions of any procedure(s) performed by the resident/non-physician practitioner and I was immediately available to provide assistance  At this point I agree with the current assessment done in the Emergency Department  I have conducted an independent evaluation of this patient a history and physical is as follows:  Patient is a 77year old female who fell this AM and has been having diarrhea (melena)  No N/V  No head injury  Patient is on eliquis  No LOC  No abdominal pain  Was last seen in this ED on 7/28/22 for fall and ambulatory dysfunction  Has had prior left knee surgery and TKR  (+) left hip and knee pain  [facility] website checked on this patient and last Rxs filled were on 7/25/22 for oxycontin and percocet for 30 day supplies each  NCAT  Mucous membranes somewhat moist  Somewhat pallor and pale conjunctiva  Lungs clear  Heart irregularly irregular without murmur  Abdomen soft and nontender  Good bowel sounds  (+) left hip tenderness and left superior knee tenderness with ecchymosis and deformity  NVI  (+) large edema bilateral LEs  (+) melena  Differential diagnosis including but not limited to: upper GI bleed, esophageal varices, gastritis, PUD, diverticulosis, AVM, hemorrhoids, anemia, coagulopathy, liver disease, tumor, anal fissure  DDx including but not limited to: Doubt intracranial injury, concussion, cervical injury, intrathoracic injury, intraabdominal injury; extremity injury--fracture, dislocation, strain, sprain, contusion    Will check labs, x-rays, EKG and will give protonix drip and IV narcotic pain medication       ED Course         Critical Care Time  Procedures

## 2022-08-20 NOTE — ASSESSMENT & PLAN NOTE
· Multiple melenotic stool over the past 24 hours  · GI consult-peptic ulcer disease versus AVMs with malignancy less likely less likely malignancy  · Started on Protonix drip  · Will reverse Eliquis with KCentra  · Q4 H&H hemoglobin currently 7 1, patient currently hemodynamically stable; will transfuse if patient becomes hemodynamically unstable or drops further under 7 0 hemoglobin  · Continue NPO status for scheduled egd

## 2022-08-20 NOTE — ASSESSMENT & PLAN NOTE
Patient has mild erythema of the bilateral extremity which seems to be chronic  This does not look to be cellulitis  This is chronic venous stasis changes and chronic lymphedema  Patient will be monitored closely  We could apply Lac-Hydrin cream to bilateral extremity  We will start patient on Lasix 80 mg IV daily

## 2022-08-20 NOTE — CONSULTS
Eb Eduardo 1945, 77 y o  female MRN: [id number]  Unit/Bed#: S -01 Encounter: [id number]  Primary Care Provider: Tejal Oneal MD   Date and time admitted to hospital: 8/20/2022  5:58 AM    Consults    * Fracture of proximal end of left femur [facility])  Assessment & Plan  Patient had a mechanical fall while going to the bathroom  Patient has been having some melanotic stools over the last 24 hours and slipped on it  She landed on her left knee  Patient has a history of bilateral knee replacements also  Patient has not been able to after the fall  Patient noted to have a left femur fracture  Patient under trauma service and will be considered for surgical intervention  However patient takes Eliquis and has now received Kcentra in the meanwhile  Management as per Trauma Service  Melena  Assessment & Plan  Patient has been having melanotic stools over the last 24 hours  She takes Eliquis for atrial fibrillation  Currently Eliquis is on hold  Patient received Kcentra  Hemoglobin is down to 7 4 from baseline of around 10 4  GI is consulted  Patient is NPO and on Protonix drip  Acute pain due to trauma  Assessment & Plan  Pain control as per Trauma Service  Fall  Assessment & Plan  Mechanical fall  Possibly secondary to slipping on melanotic stool  PT OT as tolerated when cleared by surgery  A-fib [facility])  Assessment & Plan  Continue patient on carvedilol, however Eliquis on hold  Cellulitis of lower extremity  Assessment & Plan  Patient has mild erythema of the bilateral extremity which seems to be chronic  This does not look to be cellulitis  This is chronic venous stasis changes and chronic lymphedema  Patient will be monitored closely  We could apply Lac-Hydrin cream to bilateral extremity  We will start patient on Lasix 80 mg IV daily      Chronic venous insufficiency  Assessment & Plan  Continue care with Lac-Hydrin cream       Chronic diastolic heart failure (HCC)  Assessment & Plan  Wt Readings from Last 3 Encounters:   08/18/22 103 kg (227 lb)   08/03/22 103 kg (227 lb 9 6 oz)   07/28/22 109 kg (239 lb 10 2 oz)     Patient has chronic diastolic congestive heart failure, currently will treat the patient with IV Lasix and monitor vitals closely  Continue patient on carvedilol  Inpatient Medical Consultation - [facility] Internal Medicine    Patient Information: Victor M Carpio 77 y o  female MRN: [id number]  Unit/Bed#: S -01 Encounter: [id number]    PCP: Violet Trimble MD  Date of Admission:  8/20/2022  Date of Consultation: 08/20/22  Requesting Physician: Germania Martinez DO    Reason For Consultation:     Medical Management  History of Present Illness:    Victor M Carpio is a 77 y o  female who is originally admitted to the trauma surgery service  on 8/20/2022 due to fall and left femur fracture  We are consulted for medical management  78-year-old female with a history of atrial fibrillation on Eliquis, CHF, lower extremity edema, fibromyalgia, hypertension, and Sjogren's disease who presented to the hospital secondary to a mechanical fall resulting in a left femur fracture  Patient also was having melena at the time of evaluation  She denies having any lightheadedness or dizziness at the time of her fall  She reports that she believes that her walker slipped  She denies any significant abdominal pain at this time  Denies any nausea or vomiting  Reports her last dose of Eliquis was yesterday but does not sure if it was in the morning or in the evening  Denies any recent excessive NSAID use  She denies ever having melena like this in the past   Denies having an endoscopy in the past   She reports having a colonoscopy in the past which was likely done with Dr Paige Barthel however we do not have record of this and she states this was several years ago    Denies any unexplained weight loss     Review of Systems:  A thorough 10 point review of system was done which is negative for other than that mentioned in the HPI  Past Medical and Surgical History:     Past Medical History:   Diagnosis Date    A-fib ([place] [address] ) 12/29/2021    Anxiety     Arthritis     Back pain     Cellulitis     [facility]     Edema of both lower extremities due to peripheral venous insufficiency     Edema of both lower extremities due to peripheral venous insufficiency     Erythema of lower extremity 11/12/2021    Fibromyalgia     Hypertension     Sjogren syndrome, unspecified ([place] [address] )        Past Surgical History:   Procedure Laterality Date    KNEE CARTILAGE SURGERY      REPLACEMENT TOTAL KNEE BILATERAL         Meds/Allergies:    PTA meds:   Prior to Admission Medications   Prescriptions Last Dose Informant Patient Reported? Taking?   ammonium lactate (LAC-HYDRIN) 12 % cream   No No   Sig: Apply topically in the morning   apixaban (Eliquis) 5 mg  Spouse/Significant Other No No   Sig: Take 1 tablet (5 mg total) by mouth 2 (two) times a day   carvedilol (COREG) 25 mg tablet  Spouse/Significant Other No No   Sig: Take 1 tablet (25 mg total) by mouth 2 (two) times a day with meals   hydrocortisone 2 5 % cream  Spouse/Significant Other No No   Sig: Apply topically 2 (two) times a day   Patient taking differently: Apply topically 2 (two) times a day as needed   lidocaine (LIDODERM) 5 %  Spouse/Significant Other Yes No   Sig: daily as needed   losartan (COZAAR) 100 MG tablet  Spouse/Significant Other No No   Sig: Take 1 tablet (100 mg total) by mouth in the morning     oxyCODONE (OxyCONTIN) 20 mg 12 hr tablet   No No   Sig: Take 1 tablet (20 mg total) by mouth every 12 (twelve) hours Max Daily Amount: 40 mg   potassium chloride (K-DUR,KLOR-CON) 20 mEq tablet  Spouse/Significant Other No No   Sig: Take 2 tablets (40 mEq total) by mouth daily   torsemide (DEMADEX) 20 mg tablet  Spouse/Significant Other No No   Sig: Take 2 tablets (40 mg total) by mouth daily      Facility-Administered Medications: None       Allergies: No Known Allergies  History:     Marital Status: /Civil Union    Substance Use History:   Social History     Substance and Sexual Activity   Alcohol Use Not Currently     Social History     Tobacco Use   Smoking Status Former Smoker    Types: Cigarettes   Smokeless Tobacco Never Used     Social History     Substance and Sexual Activity   Drug Use Never       Family History:    Family History   Problem Relation Age of Onset    Heart disease Mother     Cancer Father        Physical Exam:     Vitals:   Blood Pressure: 143/67 (08/20/22 1142)  Pulse: (!) 110 (08/20/22 1142)  Temperature: 99 6 °F (37 6 °C) (08/20/22 1142)  Temp Source: Oral (08/20/22 5038)  Respirations: 18 (08/20/22 1142)  SpO2: 94 % (08/20/22 1142)    Physical Exam  General Exam: Alert and Oriented x 3, NAD, morbidly obese  Eyes: DIONTE  Neck: Supple  CVS: S1, S2 [place], irregularly irregular heart rhythm and tachycardic  R/S: Clear to auscultate anteriorly  Difficult to auscultate secondary body habitus  Abd: Soft, NT, ND, BS+ve  Extremities and skin exam:  bilateral lower extremity chronic venous stasis changes noted and chronic dermatitis related to venous stasis  Some scaling of the skin as well and mild erythema bilaterally  Nontender  Some nonpitting edema noted bilateral lower extremity up to mid shin and minimal pitting edema in the ankle region bilaterally  Left lower extremity immobilized  CNS: No acute FND  Moves all 4 extremities  Psych: Co-operative, Not agitated  Lab and Imaging Data Results reviewed by me     Results from last 7 days   Lab Units 08/20/22  0955 08/20/22  0623   WBC Thousand/uL  --  9 61   HEMOGLOBIN g/dL 7 5* 8 4*   HEMATOCRIT % 24 4* 27 5*   PLATELETS Thousands/uL  --  334   NEUTROS PCT %  --  65   LYMPHS PCT %  --  21   MONOS PCT %  --  10   EOS PCT %  --  3     Results from last 7 days   Lab Units 08/20/22  0623   POTASSIUM mmol/L 3 5   CHLORIDE mmol/L 100   CO2 mmol/L 33*   BUN mg/dL 48*   CREATININE mg/dL 1 03   CALCIUM mg/dL 9 3   ALK PHOS U/L 81   ALT U/L 7   AST U/L 11*     Results from last 7 days   Lab Units 08/20/22  0623   INR  1 31*     CT head wo contrast   Final Result by Khang Salmeron MD (08/20 1117)      No intracranial hemorrhage or calvarial fracture  Very mild, chronic microangiopathy                  Workstation performed: [id number]         XR femur 2 views LEFT   ED Interpretation by Staci Bee MD (08/20 5963)   L distal femur fracture with posterior dislocation of prosthesis      XR knee 4+ views left injury   ED Interpretation by Staci Bee MD (08/20 4905)   Distal femur fracture with posterior dislocation of prosthesis  XR pelvis ap only 1 or 2 vw   ED Interpretation by Staci Bee MD (08/20 5725)   No fractures or dislocations  XR chest 1 view portable   ED Interpretation by Staci Bee MD (08/20 9489)   No acute cardiopulmonary disease, when compared to CXR from 07/28/2022, no acute changes  CT lower extremity w contrast left    (Results Pending)   XR knee 1 or 2 vw left    (Results Pending)       Hospital Problem List:     Principal Problem:    Fracture of proximal end of left femur [facility])  Active Problems:    Melena    Fall    Acute pain due to trauma    A-fib ([facility] [address] )    Cellulitis of lower extremity    Chronic venous insufficiency    Chronic diastolic heart failure (HCC)      Assessment/Plan:    VTE Prophylaxis: RX contraindicated due to: GI Bleed    / foot pump applied     Counseling / Coordination of Care Time: 1 hour  Greater than 50% of total time spent on patient counseling and coordination of care  Collaboration of Care: Were Recommendations Directly Discussed with Primary Treatment Team? - Yes     ** Please Note: "This note has been constructed using a voice recognition system  Therefore there may be syntax, spelling, and/or grammatical errors   Please call if you have any questions  "**

## 2022-08-20 NOTE — ASSESSMENT & PLAN NOTE
· Mechanical   · Injuries listed below  · CT Head ordered--patient unsure if she hit her head, having head pain

## 2022-08-20 NOTE — ASSESSMENT & PLAN NOTE
Wt Readings from Last 3 Encounters:   08/18/22 103 kg (227 lb)   08/03/22 103 kg (227 lb 9 6 oz)   07/28/22 109 kg (239 lb 10 2 oz)     Patient has chronic diastolic congestive heart failure, currently will treat the patient with IV Lasix and monitor vitals closely  Continue patient on carvedilol

## 2022-08-20 NOTE — ASSESSMENT & PLAN NOTE
· Multiple melenotic stool over the past 24 hours  · Started on Protonix drip  · Will reverse Eliquis with KCentra  · Q4 H&H  · GI consulted  · Continue NPO status  · IVF

## 2022-08-21 ENCOUNTER — APPOINTMENT (OUTPATIENT)
Dept: PERIOP | Facility: HOSPITAL | Age: 77
DRG: 480 | End: 2022-08-21
Payer: COMMERCIAL

## 2022-08-21 ENCOUNTER — ANESTHESIA (INPATIENT)
Dept: PERIOP | Facility: HOSPITAL | Age: 77
DRG: 480 | End: 2022-08-21
Payer: COMMERCIAL

## 2022-08-21 ENCOUNTER — ANESTHESIA EVENT (INPATIENT)
Dept: PERIOP | Facility: HOSPITAL | Age: 77
DRG: 480 | End: 2022-08-21
Payer: COMMERCIAL

## 2022-08-21 PROBLEM — D64.9 ANEMIA: Status: ACTIVE | Noted: 2022-02-14

## 2022-08-21 PROBLEM — D62 ACUTE ON CHRONIC BLOOD LOSS ANEMIA: Status: ACTIVE | Noted: 2022-02-14

## 2022-08-21 LAB
ABO GROUP BLD BPU: NORMAL
ANION GAP SERPL CALCULATED.3IONS-SCNC: 6 MMOL/L (ref 4–13)
ANION GAP SERPL CALCULATED.3IONS-SCNC: 7 MMOL/L (ref 4–13)
BASOPHILS # BLD AUTO: 0.04 THOUSANDS/ΜL (ref 0–0.1)
BASOPHILS # BLD AUTO: 0.04 THOUSANDS/ΜL (ref 0–0.1)
BASOPHILS NFR BLD AUTO: 0 % (ref 0–1)
BASOPHILS NFR BLD AUTO: 1 % (ref 0–1)
BPU ID: NORMAL
BUN SERPL-MCNC: 32 MG/DL (ref 5–25)
BUN SERPL-MCNC: 35 MG/DL (ref 5–25)
CALCIUM SERPL-MCNC: 8.4 MG/DL (ref 8.4–10.2)
CALCIUM SERPL-MCNC: 8.6 MG/DL (ref 8.4–10.2)
CHLORIDE SERPL-SCNC: 103 MMOL/L (ref 96–108)
CHLORIDE SERPL-SCNC: 105 MMOL/L (ref 96–108)
CO2 SERPL-SCNC: 33 MMOL/L (ref 21–32)
CO2 SERPL-SCNC: 34 MMOL/L (ref 21–32)
CREAT SERPL-MCNC: 1 MG/DL (ref 0.6–1.3)
CREAT SERPL-MCNC: 1.04 MG/DL (ref 0.6–1.3)
CROSSMATCH: NORMAL
EOSINOPHIL # BLD AUTO: 0.12 THOUSAND/ΜL (ref 0–0.61)
EOSINOPHIL # BLD AUTO: 0.17 THOUSAND/ΜL (ref 0–0.61)
EOSINOPHIL NFR BLD AUTO: 1 % (ref 0–6)
EOSINOPHIL NFR BLD AUTO: 2 % (ref 0–6)
ERYTHROCYTE [DISTWIDTH] IN BLOOD BY AUTOMATED COUNT: 14.7 % (ref 11.6–15.1)
ERYTHROCYTE [DISTWIDTH] IN BLOOD BY AUTOMATED COUNT: 14.9 % (ref 11.6–15.1)
GFR SERPL CREATININE-BSD FRML MDRD: 52 ML/MIN/1.73SQ M
GFR SERPL CREATININE-BSD FRML MDRD: 55 ML/MIN/1.73SQ M
GLUCOSE SERPL-MCNC: 123 MG/DL (ref 65–140)
GLUCOSE SERPL-MCNC: 124 MG/DL (ref 65–140)
GLUCOSE SERPL-MCNC: 145 MG/DL (ref 65–140)
HCT VFR BLD AUTO: 21.3 % (ref 34.8–46.1)
HCT VFR BLD AUTO: 24.7 % (ref 34.8–46.1)
HCT VFR BLD AUTO: 25.3 % (ref 34.8–46.1)
HCT VFR BLD AUTO: 26 % (ref 34.8–46.1)
HGB BLD-MCNC: 6.6 G/DL (ref 11.5–15.4)
HGB BLD-MCNC: 7.9 G/DL (ref 11.5–15.4)
HGB BLD-MCNC: 8 G/DL (ref 11.5–15.4)
HGB BLD-MCNC: 8.4 G/DL (ref 11.5–15.4)
IMM GRANULOCYTES # BLD AUTO: 0.04 THOUSAND/UL (ref 0–0.2)
IMM GRANULOCYTES # BLD AUTO: 0.05 THOUSAND/UL (ref 0–0.2)
IMM GRANULOCYTES NFR BLD AUTO: 0 % (ref 0–2)
IMM GRANULOCYTES NFR BLD AUTO: 1 % (ref 0–2)
INR PPP: 1.16 (ref 0.84–1.19)
LYMPHOCYTES # BLD AUTO: 1.16 THOUSANDS/ΜL (ref 0.6–4.47)
LYMPHOCYTES # BLD AUTO: 1.42 THOUSANDS/ΜL (ref 0.6–4.47)
LYMPHOCYTES NFR BLD AUTO: 13 % (ref 14–44)
LYMPHOCYTES NFR BLD AUTO: 17 % (ref 14–44)
MCH RBC QN AUTO: 28.5 PG (ref 26.8–34.3)
MCH RBC QN AUTO: 29.1 PG (ref 26.8–34.3)
MCHC RBC AUTO-ENTMCNC: 31.2 G/DL (ref 31.4–37.4)
MCHC RBC AUTO-ENTMCNC: 32.4 G/DL (ref 31.4–37.4)
MCV RBC AUTO: 90 FL (ref 82–98)
MCV RBC AUTO: 91 FL (ref 82–98)
MONOCYTES # BLD AUTO: 0.72 THOUSAND/ΜL (ref 0.17–1.22)
MONOCYTES # BLD AUTO: 0.9 THOUSAND/ΜL (ref 0.17–1.22)
MONOCYTES NFR BLD AUTO: 11 % (ref 4–12)
MONOCYTES NFR BLD AUTO: 8 % (ref 4–12)
NEUTROPHILS # BLD AUTO: 5.86 THOUSANDS/ΜL (ref 1.85–7.62)
NEUTROPHILS # BLD AUTO: 7.23 THOUSANDS/ΜL (ref 1.85–7.62)
NEUTS SEG NFR BLD AUTO: 68 % (ref 43–75)
NEUTS SEG NFR BLD AUTO: 78 % (ref 43–75)
NRBC BLD AUTO-RTO: 0 /100 WBCS
NRBC BLD AUTO-RTO: 0 /100 WBCS
PLATELET # BLD AUTO: 268 THOUSANDS/UL (ref 149–390)
PLATELET # BLD AUTO: 285 THOUSANDS/UL (ref 149–390)
PMV BLD AUTO: 10.6 FL (ref 8.9–12.7)
PMV BLD AUTO: 11.2 FL (ref 8.9–12.7)
POTASSIUM SERPL-SCNC: 3.2 MMOL/L (ref 3.5–5.3)
POTASSIUM SERPL-SCNC: 3.3 MMOL/L (ref 3.5–5.3)
PROTHROMBIN TIME: 15 SECONDS (ref 11.6–14.5)
RBC # BLD AUTO: 2.75 MILLION/UL (ref 3.81–5.12)
RBC # BLD AUTO: 2.77 MILLION/UL (ref 3.81–5.12)
SODIUM SERPL-SCNC: 143 MMOL/L (ref 135–147)
SODIUM SERPL-SCNC: 145 MMOL/L (ref 135–147)
UNIT DISPENSE STATUS: NORMAL
UNIT PRODUCT CODE: NORMAL
UNIT PRODUCT VOLUME: 230 ML
UNIT RH: NORMAL
WBC # BLD AUTO: 8.44 THOUSAND/UL (ref 4.31–10.16)
WBC # BLD AUTO: 9.31 THOUSAND/UL (ref 4.31–10.16)

## 2022-08-21 PROCEDURE — 85025 COMPLETE CBC W/AUTO DIFF WBC: CPT | Performed by: PHYSICIAN ASSISTANT

## 2022-08-21 PROCEDURE — C9113 INJ PANTOPRAZOLE SODIUM, VIA: HCPCS

## 2022-08-21 PROCEDURE — 88305 TISSUE EXAM BY PATHOLOGIST: CPT | Performed by: PATHOLOGY

## 2022-08-21 PROCEDURE — 80048 BASIC METABOLIC PNL TOTAL CA: CPT | Performed by: NURSE PRACTITIONER

## 2022-08-21 PROCEDURE — NC001 PR NO CHARGE: Performed by: PHYSICIAN ASSISTANT

## 2022-08-21 PROCEDURE — 85610 PROTHROMBIN TIME: CPT | Performed by: NURSE PRACTITIONER

## 2022-08-21 PROCEDURE — 99233 SBSQ HOSP IP/OBS HIGH 50: CPT | Performed by: SURGERY

## 2022-08-21 PROCEDURE — 85025 COMPLETE CBC W/AUTO DIFF WBC: CPT | Performed by: NURSE PRACTITIONER

## 2022-08-21 PROCEDURE — 82948 REAGENT STRIP/BLOOD GLUCOSE: CPT

## 2022-08-21 PROCEDURE — 99232 SBSQ HOSP IP/OBS MODERATE 35: CPT | Performed by: INTERNAL MEDICINE

## 2022-08-21 PROCEDURE — 0DB68ZX EXCISION OF STOMACH, VIA NATURAL OR ARTIFICIAL OPENING ENDOSCOPIC, DIAGNOSTIC: ICD-10-PCS | Performed by: INTERNAL MEDICINE

## 2022-08-21 PROCEDURE — 85014 HEMATOCRIT: CPT | Performed by: PHYSICIAN ASSISTANT

## 2022-08-21 PROCEDURE — C9113 INJ PANTOPRAZOLE SODIUM, VIA: HCPCS | Performed by: NURSE PRACTITIONER

## 2022-08-21 PROCEDURE — 99233 SBSQ HOSP IP/OBS HIGH 50: CPT | Performed by: ANESTHESIOLOGY

## 2022-08-21 PROCEDURE — 43239 EGD BIOPSY SINGLE/MULTIPLE: CPT | Performed by: INTERNAL MEDICINE

## 2022-08-21 PROCEDURE — 80048 BASIC METABOLIC PNL TOTAL CA: CPT | Performed by: PHYSICIAN ASSISTANT

## 2022-08-21 PROCEDURE — 85018 HEMOGLOBIN: CPT | Performed by: PHYSICIAN ASSISTANT

## 2022-08-21 RX ORDER — PROMETHAZINE HYDROCHLORIDE 25 MG/ML
12.5 INJECTION, SOLUTION INTRAMUSCULAR; INTRAVENOUS ONCE AS NEEDED
Status: DISCONTINUED | OUTPATIENT
Start: 2022-08-21 | End: 2022-08-21 | Stop reason: HOSPADM

## 2022-08-21 RX ORDER — ONDANSETRON 2 MG/ML
4 INJECTION INTRAMUSCULAR; INTRAVENOUS ONCE AS NEEDED
Status: DISCONTINUED | OUTPATIENT
Start: 2022-08-21 | End: 2022-08-22 | Stop reason: HOSPADM

## 2022-08-21 RX ORDER — PROPOFOL 10 MG/ML
INJECTION, EMULSION INTRAVENOUS CONTINUOUS PRN
Status: DISCONTINUED | OUTPATIENT
Start: 2022-08-21 | End: 2022-08-21

## 2022-08-21 RX ORDER — ALBUTEROL SULFATE 2.5 MG/3ML
2.5 SOLUTION RESPIRATORY (INHALATION) ONCE AS NEEDED
Status: DISCONTINUED | OUTPATIENT
Start: 2022-08-21 | End: 2022-08-21 | Stop reason: HOSPADM

## 2022-08-21 RX ORDER — PANTOPRAZOLE SODIUM 40 MG/10ML
40 INJECTION, POWDER, LYOPHILIZED, FOR SOLUTION INTRAVENOUS EVERY 12 HOURS SCHEDULED
Status: DISCONTINUED | OUTPATIENT
Start: 2022-08-21 | End: 2022-08-23

## 2022-08-21 RX ORDER — EPINEPHRINE 0.1 MG/ML
SYRINGE (ML) INJECTION CODE/TRAUMA/SEDATION MEDICATION
Status: COMPLETED | OUTPATIENT
Start: 2022-08-21 | End: 2022-08-21

## 2022-08-21 RX ORDER — POLYETHYLENE GLYCOL 3350 17 G/17G
17 POWDER, FOR SOLUTION ORAL DAILY PRN
Status: DISCONTINUED | OUTPATIENT
Start: 2022-08-21 | End: 2022-08-23

## 2022-08-21 RX ORDER — ONDANSETRON 2 MG/ML
4 INJECTION INTRAMUSCULAR; INTRAVENOUS ONCE AS NEEDED
Status: DISCONTINUED | OUTPATIENT
Start: 2022-08-21 | End: 2022-08-21 | Stop reason: HOSPADM

## 2022-08-21 RX ORDER — POTASSIUM CHLORIDE 20 MEQ/1
40 TABLET, EXTENDED RELEASE ORAL ONCE
Status: COMPLETED | OUTPATIENT
Start: 2022-08-21 | End: 2022-08-21

## 2022-08-21 RX ORDER — FENTANYL CITRATE/PF 50 MCG/ML
25 SYRINGE (ML) INJECTION
Status: DISCONTINUED | OUTPATIENT
Start: 2022-08-21 | End: 2022-08-22 | Stop reason: HOSPADM

## 2022-08-21 RX ORDER — FENTANYL CITRATE/PF 50 MCG/ML
25 SYRINGE (ML) INJECTION
Status: DISCONTINUED | OUTPATIENT
Start: 2022-08-21 | End: 2022-08-21 | Stop reason: HOSPADM

## 2022-08-21 RX ORDER — CEFAZOLIN SODIUM 2 G/50ML
2000 SOLUTION INTRAVENOUS EVERY 8 HOURS
Status: DISCONTINUED | OUTPATIENT
Start: 2022-08-21 | End: 2022-08-21

## 2022-08-21 RX ORDER — METHOCARBAMOL 750 MG/1
750 TABLET, FILM COATED ORAL EVERY 6 HOURS SCHEDULED
Status: DISCONTINUED | OUTPATIENT
Start: 2022-08-21 | End: 2022-08-23

## 2022-08-21 RX ORDER — METOCLOPRAMIDE HYDROCHLORIDE 5 MG/ML
10 INJECTION INTRAMUSCULAR; INTRAVENOUS ONCE AS NEEDED
Status: DISCONTINUED | OUTPATIENT
Start: 2022-08-21 | End: 2022-08-22 | Stop reason: HOSPADM

## 2022-08-21 RX ORDER — LIDOCAINE HYDROCHLORIDE 10 MG/ML
INJECTION, SOLUTION EPIDURAL; INFILTRATION; INTRACAUDAL; PERINEURAL AS NEEDED
Status: DISCONTINUED | OUTPATIENT
Start: 2022-08-21 | End: 2022-08-21

## 2022-08-21 RX ORDER — HYDROMORPHONE HCL/PF 1 MG/ML
0.5 SYRINGE (ML) INJECTION
Status: DISCONTINUED | OUTPATIENT
Start: 2022-08-21 | End: 2022-08-21 | Stop reason: HOSPADM

## 2022-08-21 RX ORDER — HEPARIN SODIUM 5000 [USP'U]/ML
5000 INJECTION, SOLUTION INTRAVENOUS; SUBCUTANEOUS EVERY 8 HOURS SCHEDULED
Status: DISCONTINUED | OUTPATIENT
Start: 2022-08-21 | End: 2022-08-22

## 2022-08-21 RX ORDER — HYDROMORPHONE HCL IN WATER/PF 6 MG/30 ML
0.2 PATIENT CONTROLLED ANALGESIA SYRINGE INTRAVENOUS
Status: DISCONTINUED | OUTPATIENT
Start: 2022-08-21 | End: 2022-08-22 | Stop reason: HOSPADM

## 2022-08-21 RX ORDER — HYDROMORPHONE HCL/PF 1 MG/ML
SYRINGE (ML) INJECTION AS NEEDED
Status: DISCONTINUED | OUTPATIENT
Start: 2022-08-21 | End: 2022-08-21

## 2022-08-21 RX ORDER — SODIUM CHLORIDE, SODIUM LACTATE, POTASSIUM CHLORIDE, CALCIUM CHLORIDE 600; 310; 30; 20 MG/100ML; MG/100ML; MG/100ML; MG/100ML
INJECTION, SOLUTION INTRAVENOUS CONTINUOUS PRN
Status: DISCONTINUED | OUTPATIENT
Start: 2022-08-21 | End: 2022-08-21

## 2022-08-21 RX ADMIN — TOPICAL ANESTHETIC 1 SPRAY: 200 SPRAY DENTAL; PERIODONTAL at 09:56

## 2022-08-21 RX ADMIN — PANTOPRAZOLE SODIUM 40 MG: 40 INJECTION, POWDER, FOR SOLUTION INTRAVENOUS at 11:12

## 2022-08-21 RX ADMIN — METHOCARBAMOL 500 MG: 500 TABLET ORAL at 05:00

## 2022-08-21 RX ADMIN — OXYCODONE HYDROCHLORIDE 10 MG: 10 TABLET ORAL at 09:06

## 2022-08-21 RX ADMIN — FUROSEMIDE 80 MG: 10 INJECTION, SOLUTION INTRAMUSCULAR; INTRAVENOUS at 09:07

## 2022-08-21 RX ADMIN — SODIUM CHLORIDE, SODIUM LACTATE, POTASSIUM CHLORIDE, AND CALCIUM CHLORIDE: .6; .31; .03; .02 INJECTION, SOLUTION INTRAVENOUS at 09:55

## 2022-08-21 RX ADMIN — METHOCARBAMOL 750 MG: 750 TABLET ORAL at 11:17

## 2022-08-21 RX ADMIN — POTASSIUM CHLORIDE 40 MEQ: 1500 TABLET, EXTENDED RELEASE ORAL at 11:17

## 2022-08-21 RX ADMIN — ACETAMINOPHEN 975 MG: 325 TABLET, FILM COATED ORAL at 05:00

## 2022-08-21 RX ADMIN — HEPARIN SODIUM 5000 UNITS: 5000 INJECTION INTRAVENOUS; SUBCUTANEOUS at 22:09

## 2022-08-21 RX ADMIN — LIDOCAINE 5% 1 PATCH: 700 PATCH TOPICAL at 11:17

## 2022-08-21 RX ADMIN — OXYCODONE HYDROCHLORIDE 10 MG: 10 TABLET ORAL at 13:04

## 2022-08-21 RX ADMIN — PANTOPRAZOLE SODIUM 40 MG: 40 INJECTION, POWDER, FOR SOLUTION INTRAVENOUS at 22:09

## 2022-08-21 RX ADMIN — METHOCARBAMOL 750 MG: 750 TABLET ORAL at 17:34

## 2022-08-21 RX ADMIN — HYDROMORPHONE HYDROCHLORIDE 0.5 MG: 1 INJECTION, SOLUTION INTRAMUSCULAR; INTRAVENOUS; SUBCUTANEOUS at 16:01

## 2022-08-21 RX ADMIN — OXYCODONE HYDROCHLORIDE 10 MG: 10 TABLET ORAL at 03:30

## 2022-08-21 RX ADMIN — SODIUM CHLORIDE, SODIUM GLUCONATE, SODIUM ACETATE, POTASSIUM CHLORIDE, MAGNESIUM CHLORIDE, SODIUM PHOSPHATE, DIBASIC, AND POTASSIUM PHOSPHATE 75 ML/HR: .53; .5; .37; .037; .03; .012; .00082 INJECTION, SOLUTION INTRAVENOUS at 11:24

## 2022-08-21 RX ADMIN — PROPOFOL 80 MCG/KG/MIN: 10 INJECTION, EMULSION INTRAVENOUS at 10:01

## 2022-08-21 RX ADMIN — HYDROMORPHONE HYDROCHLORIDE 0.5 MG: 1 INJECTION, SOLUTION INTRAMUSCULAR; INTRAVENOUS; SUBCUTANEOUS at 04:07

## 2022-08-21 RX ADMIN — OXYCODONE HYDROCHLORIDE 10 MG: 10 TABLET ORAL at 21:56

## 2022-08-21 RX ADMIN — Medication 10 MG: at 10:04

## 2022-08-21 RX ADMIN — HYDROMORPHONE HYDROCHLORIDE 0.5 MG: 1 INJECTION, SOLUTION INTRAMUSCULAR; INTRAVENOUS; SUBCUTANEOUS at 10:09

## 2022-08-21 RX ADMIN — GABAPENTIN 100 MG: 100 CAPSULE ORAL at 21:55

## 2022-08-21 RX ADMIN — HYDROMORPHONE HYDROCHLORIDE 0.5 MG: 1 INJECTION, SOLUTION INTRAMUSCULAR; INTRAVENOUS; SUBCUTANEOUS at 19:35

## 2022-08-21 RX ADMIN — ONDANSETRON 4 MG: 2 INJECTION INTRAMUSCULAR; INTRAVENOUS at 10:09

## 2022-08-21 RX ADMIN — Medication 1 APPLICATION: at 11:19

## 2022-08-21 RX ADMIN — OXYCODONE HYDROCHLORIDE 10 MG: 10 TABLET ORAL at 17:33

## 2022-08-21 RX ADMIN — ACETAMINOPHEN 975 MG: 325 TABLET, FILM COATED ORAL at 21:55

## 2022-08-21 RX ADMIN — LIDOCAINE HYDROCHLORIDE 50 MG: 10 INJECTION, SOLUTION EPIDURAL; INFILTRATION; INTRACAUDAL at 10:01

## 2022-08-21 RX ADMIN — HEPARIN SODIUM 5000 UNITS: 5000 INJECTION INTRAVENOUS; SUBCUTANEOUS at 17:34

## 2022-08-21 RX ADMIN — HYDROMORPHONE HYDROCHLORIDE 0.5 MG: 1 INJECTION, SOLUTION INTRAMUSCULAR; INTRAVENOUS; SUBCUTANEOUS at 11:12

## 2022-08-21 RX ADMIN — GABAPENTIN 100 MG: 100 CAPSULE ORAL at 17:34

## 2022-08-21 RX ADMIN — ACETAMINOPHEN 975 MG: 325 TABLET, FILM COATED ORAL at 13:05

## 2022-08-21 RX ADMIN — CARVEDILOL 25 MG: 12.5 TABLET, FILM COATED ORAL at 17:34

## 2022-08-21 RX ADMIN — POTASSIUM CHLORIDE 40 MEQ: 1500 TABLET, EXTENDED RELEASE ORAL at 11:18

## 2022-08-21 RX ADMIN — SODIUM CHLORIDE 8 MG/HR: 9 INJECTION, SOLUTION INTRAVENOUS at 04:22

## 2022-08-21 RX ADMIN — Medication 20 MG: at 10:03

## 2022-08-21 RX ADMIN — EPINEPHRINE 0.02 MG: 0.1 INJECTION, SOLUTION ENDOTRACHEAL; INTRACARDIAC; INTRAVENOUS at 10:10

## 2022-08-21 NOTE — PLAN OF CARE
Problem: Potential for Falls  Goal: Patient will remain free of falls  Description: INTERVENTIONS:  - Educate patient/family on patient safety including physical limitations  - Instruct patient to call for assistance with activity   - Consult OT/PT to assist with strengthening/mobility   - Keep Call bell within reach  - Keep bed low and locked with side rails adjusted as appropriate  - Keep care items and personal belongings within reach  - Initiate and maintain comfort rounds  - Make Fall Risk Sign visible to staff  - Offer Toileting every 2 Hours, in advance of need  - Initiate/Maintain bed alarm  - Obtain necessary fall risk management equipment: bed alarm  - Apply yellow socks and bracelet for high fall risk patients  - Consider moving patient to room near nurses station  8/20/2022 2201 by Leela Marcus  Outcome: Progressing  8/20/2022 2159 by Leela Marcus  Outcome: Progressing     Problem: Prexisting or High Potential for Compromised Skin Integrity  Goal: Skin integrity is maintained or improved  Description: INTERVENTIONS:  - Identify patients at risk for skin breakdown  - Assess and monitor skin integrity  - Assess and monitor nutrition and hydration status  - Monitor labs   - Assess for incontinence   - Turn and reposition patient  - Assist with mobility/ambulation  - Relieve pressure over bony prominences  - Avoid friction and shearing  - Provide appropriate hygiene as needed including keeping skin clean and dry  - Evaluate need for skin moisturizer/barrier cream  - Collaborate with interdisciplinary team   - Patient/family teaching  - Consider wound care consult   8/20/2022 2201 by Leela Marcus  Outcome: Progressing  8/20/2022 2159 by Leela Marcus  Outcome: Progressing

## 2022-08-21 NOTE — PROGRESS NOTES
Orthopedics   Haresh Brian Rodríguez 77 y o  female MRN: [id number]  Unit/Bed#: S -01      Subjective:  77 y  o female seen and evaluated this morning  She notes intermittent pain and spasm in the left leg  She denies any numbness or tingling left lower extremity  No chest pain or shortness of breath      Labs:  0   Lab Value Date/Time    HCT 26 0 (L) 08/21/2022 1403    HCT 25 3 (L) 08/21/2022 0806    HCT 24 7 (L) 08/21/2022 0449    HCT 37 4 09/23/2014 1339    HCT 33 6 (L) 09/03/2014 1146    HGB 8 4 (L) 08/21/2022 1403    HGB 7 9 (L) 08/21/2022 0806    HGB 8 0 (L) 08/21/2022 0449    HGB 11 3 (L) 09/23/2014 1339    HGB 10 8 (L) 09/03/2014 1146    INR 1 16 08/21/2022 0449    WBC 9 31 08/21/2022 0806    WBC 8 44 08/21/2022 0449    WBC 9 61 08/20/2022 0623    WBC 7 10 09/23/2014 1339    WBC 6 13 09/03/2014 1146       Meds:    Current Facility-Administered Medications:     acetaminophen (TYLENOL) tablet 975 mg, 975 mg, Oral, Q8H [address], SHAHIDA Garcia, 975 mg at 08/21/22 1305    ammonium lactate (LAC-HYDRIN) 12 % cream, , Topical, Daily, Minal Saini MD, 1 application at 72/70/36 1119    carvedilol (COREG) tablet 25 mg, 25 mg, Oral, BID With Meals, SHAHIDA Garcia, 25 mg at 08/20/22 1747    fentaNYL (SUBLIMAZE) injection 25 mcg, 25 mcg, Intravenous, Q5 Min PRN, Elham Orta CRNA    furosemide (LASIX) injection 80 mg, 80 mg, Intravenous, Daily, Willa Mauricio MD, 80 mg at 08/21/22 0907    gabapentin (NEURONTIN) capsule 100 mg, 100 mg, Oral, TID, SHAHIDA Garcia, 100 mg at 08/20/22 2100    HYDROmorphone (DILAUDID) injection 0 5 mg, 0 5 mg, Intravenous, Q3H PRN, SHAHIDA Garcia, 0 5 mg at 08/21/22 1112    HYDROmorphone HCl (DILAUDID) injection 0 2 mg, 0 2 mg, Intravenous, Q5 Min PRN, Elham Orta CRNA    lidocaine (LIDODERM) 5 % patch 1 patch, 1 patch, Topical, Daily, SHAHIDA Garcia, 1 patch at 08/21/22 1117    losartan (COZAAR) tablet 100 mg, 100 mg, Oral, Daily, Willa Mohsen Syed MD    methocarbamol (ROBAXIN) tablet 750 mg, 750 mg, Oral, Q6H [address], Roseann Akanksha, CRNP, 750 mg at 08/21/22 1117    metoclopramide (REGLAN) injection 10 mg, 10 mg, Intravenous, Once PRN, Susanna De Leon CRNA    multi-electrolyte (PLASMALYTE-A/ISOLYTE-S PH 7 4) IV solution, 75 mL/hr, Intravenous, Continuous, Roseann Akanksha, CRNP, Last Rate: 75 mL/hr at 08/21/22 1124, 75 mL/hr at 08/21/22 1124    ondansetron (ZOFRAN) injection 4 mg, 4 mg, Intravenous, Q4H PRN, Roseann Akanksha, CRNP, 4 mg at 08/21/22 1009    ondansetron (ZOFRAN) injection 4 mg, 4 mg, Intravenous, Once PRN, Susanna De Leon CRNA    oxyCODONE (ROXICODONE) immediate release tablet 10 mg, 10 mg, Oral, Q4H PRN, Roseann Akanksha, CRNP, 10 mg at 08/21/22 1304    oxyCODONE (ROXICODONE) IR tablet 5 mg, 5 mg, Oral, Q3H PRN, Roseann Akanksha, CRNP    pantoprazole (PROTONIX) injection 40 mg, 40 mg, Intravenous, Q12H [address], Roseann Akanksha, CRNP, 40 mg at 08/21/22 1112    polyethylene glycol (MIRALAX) packet 17 g, 17 g, Oral, Daily PRN, Len Graham PA-C    potassium chloride (K-DUR,KLOR-CON) CR tablet 40 mEq, 40 mEq, Oral, Daily, Willa Syed MD, 40 mEq at 08/21/22 1117    ropivacaine (NAROPIN) 0 2% in elastomeric infiltration pump (ON-Q* PUMP), 10 mL/hr, Infiltration, Continuous, Gaudencio Esteban MD    Facility-Administered Medications Ordered in Other Encounters:     fentanyl citrate (PF) 100 MCG/2ML, , Intravenous, PRN, Gaudencio Esteban MD, 50 mcg at 08/20/22 1458    midazolam (VERSED) injection, , Intravenous, PRN, Gaudencio Esteban MD, 1 mg at 08/20/22 1455    propofol (DIPRIVAN) 200 MG/20ML bolus injection, , Intravenous, PRN, Gaudencio Esteban MD, 30 mg at 08/20/22 1431    Blood Culture:   Lab Results   Component Value Date    BLOODCX No Growth After 5 Days   07/28/2022       Wound Culture:   Lab Results   Component Value Date    WOUNDCULT 3+ Growth of Proteus mirabilis (A) 12/30/2021    WOUNDCULT 3+ Growth of Enterococcus faecalis (A) 12/30/2021    WOUNDCULT 2+ Growth of Pseudomonas aeruginosa (A) 12/30/2021    WOUNDCULT 3+ Growth of  12/30/2021       Ins and Outs:  I/O last 24 hours: In: 530 [I V :300; Blood:230]  Out: 7937 [Urine:5950]          Physical Exam:  Vitals:    08/21/22 1311   BP:    Pulse: 85   Resp: 18   Temp: 98 3 °F (36 8 °C)   SpO2: 100%     left Lower Extremity extremity:  · Skin intact, ecchymosis over distal anterior thigh  · Knee immobilizers in place  · Sensation intact light touch L3 through S1  · Positive ankle dorsiflexion plantar flexion, EHL/FHL  · 2+ DP pulse    Assessment: 76 y  o female with left distal femur periprosthetic fracture    Plan:  · Nonweightbearing left lower extremity in knee immobilizer  · NPO after midnight for possible OR tomorrow for operative fixation of left distal femur fracture  · DVT prophylaxis - please hold small morning in anticipation of significant blood loss if surgical intervention occurs  · Analgesics per primary team  · PT/OT  · Dispo: Ortho will follow    Vanna Serna PA-C

## 2022-08-21 NOTE — PROGRESS NOTES
[facility]  Progress Note - [facility] 1945, 77 y o  female MRN: [id number]  Unit/Bed#: S -01 Encounter: [id number]  Primary Care Provider: Destinee Tanner MD   Date and time admitted to hospital: 8/20/2022  5:58 AM    * Fracture of proximal end of left femur [facility])  Assessment & Plan  Patient had a mechanical fall while going to the bathroom  Patient has been having some melanotic stools over the last 24 hours and slipped on it  She landed on her left knee  Underlying h/o bilateral knee replacements also  Admission imaging consistent with left femur fracture  Evaluated by ortho team, medical optimization prior to proceed with fixation   Will continue Pain management   Monitor for any signs of overt GI bleed, EGD today   PT/OT eval once clinical stability     Melena  Assessment & Plan  Patient has been having melanotic stools over the last 24 hours  She takes Eliquis for atrial fibrillation  Currently Eliquis is on hold  S/p Kcentra  Placed on Protonix gtt  Hgb stable at 8 range, no melanotic stools reported overnight  Plan  GI on board, EGD today , follow results  protonix gtt dc'd , continue protonix 40 IV bid  Close Hgb watch      Acute on chronic blood loss anemia  Assessment & Plan  Baseline hgb 9-10  Currently 7 9  Possibly secondary to GI bleed  Will follow EGD results  Monitor cbc , if hgb < 7, may consider blood transfusion     Cellulitis of lower extremity  Assessment & Plan  Patient has mild erythema of the bilateral extremity which seems to be chronic  Erythema is symmetric, skin its significant dry and scaly , she only reports tenderness in left extremity, has remained afebrile, wbc wnl , very low likelihood of  cellulitis  At this point I suspect chronic venous stasis changes and chronic lymphedema      Will continue topical moisturizers  Continue diuretics  Will monitor off of antibiotics    Acute pain due to trauma  Assessment & Plan  Pain control as per Trauma Service  Fall  Assessment & Plan  Mechanical fall  PT OT once clinical stability, patient may benefit of post acute rehab    Chronic diastolic heart failure [facility])  Assessment & Plan  Wt Readings from Last 3 Encounters:   22 103 kg (227 lb)   22 103 kg (227 lb 9 6 oz)   22 109 kg (239 lb 10 2 oz)     Patient has chronic diastolic congestive heart failure  Most recent echo (2021) : 65%  Systolic function is normal  Wall motion is normal  G1DD  Doesn't appear to be on acute exacerbation  Continue lasix and carvedilol  Monitor electrolytes while on diuretic therapy  Monitor urine output         A-fib (HCC)  Assessment & Plan  Continue patient on carvedilol, however Eliquis on hold  Chronic venous insufficiency  Assessment & Plan  Continue care with Lac-Hydrin cream           VTE Pharmacologic Prophylaxis:     held due to GI bleed     Mechanical VTE Prophylaxis in Place: Yes    Patient Centered Rounds: Nursing    Discussions with Specialists or Other Care Team Provider: attending physician    Education and Discussions with Family / Patient: called patients , left a voicemail for a call back    Current Length of Stay: 1 day(s)    Current Patient Status: Inpatient     Discharge Plan / Estimated Discharge Date: Not yet established    Code Status: Level 1 - Full Code      Subjective:   No overnight events, AOx3 during my encounter, resting in bed, rated left lower extremity pain as a 7/10, denied any chest pain, sob, GI disturbances, no melanotic bm reported during my eval     Objective:     Vitals:   Temp (24hrs), Av 2 °F (36 8 °C), Min:97 °F (36 1 °C), Max:99 3 °F (37 4 °C)    Temp:  [97 °F (36 1 °C)-99 3 °F (37 4 °C)] 99 1 °F (37 3 °C)  HR:  [] 95  Resp:  [13-18] 18  BP: ()/(31-99) 144/65  SpO2:  [90 %-100 %] 95 %  There is no height or weight on file to calculate BMI  Input and Output Summary (last 24 hours):        Intake/Output Summary (Last 24 hours) at 8/21/2022 1151  Last data filed at 8/21/2022 1015  Gross per 24 hour   Intake 530 ml   Output 5950 ml   Net -5420 ml       Physical Exam:     Physical Exam  Vitals and nursing note reviewed  Constitutional:       General: She is not in acute distress  Appearance: She is not toxic-appearing  HENT:      Head: Normocephalic and atraumatic  Right Ear: Tympanic membrane normal  There is no impacted cerumen  Left Ear: Tympanic membrane normal  There is no impacted cerumen  Nose: Nose normal  No congestion or rhinorrhea  Mouth/Throat:      Mouth: Mucous membranes are moist       Pharynx: Oropharynx is clear  No oropharyngeal exudate or posterior oropharyngeal erythema  Eyes:      Extraocular Movements: Extraocular movements intact  Conjunctiva/sclera: Conjunctivae normal       Pupils: Pupils are equal, round, and reactive to light  Cardiovascular:      Rate and Rhythm: Normal rate  Pulses: Normal pulses  Heart sounds: No murmur heard  No gallop  Pulmonary:      Effort: Pulmonary effort is normal  No respiratory distress  Breath sounds: No wheezing or rales  Chest:      Chest wall: No tenderness  Abdominal:      General: Bowel sounds are normal  There is no distension  Palpations: Abdomen is soft  Tenderness: There is no abdominal tenderness  Musculoskeletal:      Cervical back: Normal range of motion  No rigidity  Comments: Bilateral lower extremities erythema, edema, skin dryness and scaly, left lower extremity with immobilizer, distal pulses intact, sensation preserved, decreased ROM 2/2 pain   Lymphadenopathy:      Cervical: No cervical adenopathy  Skin:     General: Skin is warm  Capillary Refill: Capillary refill takes 2 to 3 seconds  Neurological:      Mental Status: She is alert and oriented to person, place, and time     Psychiatric:         Mood and Affect: Mood normal          Behavior: Behavior normal  Thought Content: Thought content normal          Judgment: Judgment normal           Additional Data:     Labs:  Results from last 7 days   Lab Units 08/21/22  0806   WBC Thousand/uL 9 31   HEMOGLOBIN g/dL 7 9*   HEMATOCRIT % 25 3*   PLATELETS Thousands/uL 285   NEUTROS PCT % 78*   LYMPHS PCT % 13*   MONOS PCT % 8   EOS PCT % 1     Results from last 7 days   Lab Units 08/21/22  0806 08/21/22  0449 08/20/22  0623   SODIUM mmol/L 145   < > 140   POTASSIUM mmol/L 3 2*   < > 3 5   CHLORIDE mmol/L 105   < > 100   CO2 mmol/L 33*   < > 33*   BUN mg/dL 32*   < > 48*   CREATININE mg/dL 1 00   < > 1 03   ANION GAP mmol/L 7   < > 7   CALCIUM mg/dL 8 4   < > 9 3   ALBUMIN g/dL  --   --  3 7   TOTAL BILIRUBIN mg/dL  --   --  0 58   ALK PHOS U/L  --   --  81   ALT U/L  --   --  7   AST U/L  --   --  11*   GLUCOSE RANDOM mg/dL 124   < > 121    < > = values in this interval not displayed  Results from last 7 days   Lab Units 08/21/22  0449   INR  1 16             Results from last 7 days   Lab Units 08/20/22  0955   LACTIC ACID mmol/L 0 7   PROCALCITONIN ng/ml 0 07       Imaging: No pertinent imaging reviewed      Recent Cultures (last 7 days):           Lines/Drains:  Invasive Devices  Report    Peripheral Intravenous Line  Duration           Peripheral IV 08/20/22 Left Antecubital 1 day    Peripheral IV 08/20/22 Right Antecubital 1 day          Epidural Line  Duration           Nerve Block Catheter 08/20/22 <1 day          Drain  Duration           Urethral Catheter 18 Fr  <1 day                Telemetry:        Last 24 Hours Medication List:   Current Facility-Administered Medications   Medication Dose Route Frequency Provider Last Rate    acetaminophen  975 mg Oral Q8H [address] SHAHIDA Nelson      ammonium lactate   Topical Daily Ara Paredes MD      carvedilol  25 mg Oral BID With Meals SHAHIDA Nelson      fentaNYL  25 mcg Intravenous Q5 Min PRN Opal JULIA Up      furosemide  80 mg Intravenous Daily Martha Loera MD      gabapentin  100 mg Oral TID Junethierry Galvan, CRCYNDI      HYDROmorphone  0 5 mg Intravenous Q3H PRN Junethierry Galvan, SHAHIDA      HYDROmorphone  0 2 mg Intravenous Q5 Min PRN Garcia Rizo CRNA      lidocaine  1 patch Topical Daily Junella Cole, CRNP      losartan  100 mg Oral Daily Willa Rothman MD      methocarbamol  750 mg Oral [facility] Junella Cole, CRNP      metoclopramide  10 mg Intravenous Once PRN Garcia Rizo CRNA      multi-electrolyte  75 mL/hr Intravenous Continuous Junethierry Galvan, CRNP 75 mL/hr (08/21/22 1124)    ondansetron  4 mg Intravenous Q4H PRN Diana Galvan, CRCYNDI      ondansetron  4 mg Intravenous Once PRN Garcia Rizo CRNA      oxyCODONE  10 mg Oral Q4H PRN Junethierry Galvan, CRNP      oxyCODONE  5 mg Oral Q3H PRN Junethierry Galvan, CRNP      pantoprazole  40 mg Intravenous Q12H [address] Junella Cole, CRNP      polyethylene glycol  17 g Oral Daily PRN Neha Ardon PA-C      potassium chloride  40 mEq Oral Daily Martha Loera MD      ropivacaine  10 mL/hr Infiltration Continuous Cuca Rosa MD       Facility-Administered Medications Ordered in Other Encounters   Medication Dose Route Frequency Provider Last Rate    fentanyl citrate (PF)   Intravenous PRN Cuca Rosa MD      midazolam   Intravenous PRN Cuca Rosa MD      propofol   Intravenous PRN Cuca Rosa MD          Today, Patient Was Seen By: Sangita Garcia MD    ** Please Note: This note has been constructed using a voice recognition system       Nutrition Assessment and Intervention:     Reviewed food recall journal      Physical Activity Assessment and Intervention:    Activity journal reviewed      Emotional and Mental Well-being, Sleep, Connectedness Assessment and Intervention:    Sleep/stress assessment performed      Tobacco and Toxic Substance Assessment and Intervention:     Tobacco use screening performed    Alcohol and drug use screening performed

## 2022-08-21 NOTE — UTILIZATION REVIEW
Initial Clinical Review    Admission: Date/Time/Statement:   Admission Orders (From admission, onward)     Ordered        08/20/22 0740  Inpatient Admission  Once                      Orders Placed This Encounter   Procedures    Inpatient Admission     Standing Status:   Standing     Number of Occurrences:   1     Order Specific Question:   Level of Care     Answer:   Med Surg [16]     Order Specific Question:   Estimated length of stay     Answer:   More than 2 Midnights     Order Specific Question:   Certification     Answer:   I certify that inpatient services are medically necessary for this patient for a duration of greater than two midnights  See H&P and MD Progress Notes for additional information about the patient's course of treatment  ED Arrival Information     Expected   -    Arrival   8/20/2022 05:57    Acuity   Urgent            Means of arrival   Ambulance    Escorted by   [facility]    Service   Hospitalist    Admission type   Urgent            Arrival complaint   fall           Chief Complaint   Patient presents with   [facility] Fall     PT states she was attempting to use the bathroom and fell on the bathroom floor  Pt denies HS / LOC, c/o L leg pain  Pt also states she is having dark tarry stools that began today  Initial Presentation: 77 y o  female from home to ED via ems admitted inpatient due to Fall/Fracture of proximal end of left femur/Melena/Cellulitis of bilateral LE/acute pain  PMH of atrial fib on Eliquis, CKD  Presented due to pain in left leg starting after fall on day of arrival   On day of arrival, started with dark black stool  On exam: Bilateral 3+ pitting edema  Chronic venous insuffiencey of lower extremities  Cellulitis of lower extremities  Black stool, guaiac +  LLE shortened and externally rotated  Decreased ROM due to pain  INR 1 31   H&H 8 4/27 5 with previous hgb of 10 4 3 weeks ago     Xray left femur showed distal femur fracture with posterior dislocation of prosthesis  In the ED given multiple doses of IV analgesia , Protonix gtt, Kcentra, started on ceftriaxone  Plan is trend H&H, consult GI  Hold Eliquis  Continue antibiotics  Multimodal pain regimen  Procedure 8/20/22- dislocation reduction  To PACU for femoral nerve block  Attempted repeat reduction of left distal femur fracture  Xray showed unchanged position of fracture  Placed in knee immobilizer  8/20/22 per orthopedics -  Patient with fall with left distal femur periprosthetic fracture  Will need operative intervention after medical optimization  Knee immobilizer  Bed rest   Pain control  8/20/22 per GI - Patient with acute gi blood loss anemia in setting of anticoagulation  Differential is peptic ulcer disease vs AVM  egd tomorrow  Continue IV Protonix  Date: 8/21/22    Day 2: has pain in LLE  On exam: Upper extremities 4/5 neurovascularly intact, left lower extremity 4/5 neurovascularly intact, right lower extremity, right hip 3/5, right knee 3/5, right ankle in splint  H&h 8/24 7  To continue ceftriaxone, Protonix gtt, transfuse unit PRBC  Eliquis on hold  8/21/22 per acute pain - patient with distal femur periprosthetic fracture with displacement  Attempts at reducing fracture unsuccessful  Today feels pain worse  No epidural due to Eliquis  Suspect pain will be uncontrolled until operative fixation but needs GI work up  Increase methocarbamol  8/21/22 per medicine - patient with acute on chronic blood loss anemia, baseline hgb 9 to 10  Transfuse for < 7  For lower extremity edema and erythema, suspect chronic due to venous stasis changes and lymphedema  Doubt cellulitis and monitor off antibiotics  For history of CHF, continue lasix and carvedilol  Continue Carvedilol for atrial fib and Eliquis on hold       Procedure 8/21/22 - The esophagus appeared normal   · Single cratered, benign-appearing ulcer in the antrum with flat pigmented spot (Vish IIC); performed cold forceps biopsy; injected 3 mL of epinephrine to address bleeding; hemostasis achieved  · 2 small, superficial, round ulcers in the incisura and antrum with clean base (Vish III)  · The duodenal bulb and 2nd part of the duodenum appeared normal  Plan: Await pathology results  Transition to IV PPI BID  Clear liquid diet x 24 hours, and advance as tolerated  Monitor stool output, hemoglobin, transfuse as needed  Repeat EGD in three months to confirm ulcer healing  Resume Eliquis in 24-48 hours pending clinical course     ED Triage Vitals   Temperature Pulse Respirations Blood Pressure SpO2   08/20/22 0623 08/20/22 0601 08/20/22 0601 08/20/22 0601 08/20/22 0601   98 5 °F (36 9 °C) 83 20 (!) 182/80 98 %      Temp Source Heart Rate Source Patient Position - Orthostatic VS BP Location FiO2 (%)   08/20/22 0623 08/20/22 0601 08/20/22 0601 08/20/22 0601 --   Oral Monitor Lying Right arm       Pain Score       08/20/22 0601       10 - Worst Possible Pain          Wt Readings from Last 1 Encounters:   08/18/22 103 kg (227 lb)     Additional Vital Signs:   08/21/22 11:18:58 99 1 °F (37 3 °C) 95 18 144/65 91 95 % -- -- -- -- -- --   08/21/22 10:58:53 99 3 °F (37 4 °C) 90 16 139/71 94 90 % -- -- -- None (Room air) -- Lying   08/21/22 0941 97 9 °F (36 6 °C) 100  18 149/63 -- 94 % -- -- -- None (Room air) -- --   Pulse: afib at 08/21/22 0941   08/21/22 09:03:01 98 °F (36 7 °C) 90 18 175/99 Abnormal  129 92 % -- -- -- None (Room air) -- Lying   08/21/22 04:12:22 98 3 °F (36 8 °C) 96 -- 157/71 100 97 % 28 -- 2 L/min Nasal cannula -- --   08/21/22 02:50:25 98 6 °F (37 °C) 78 17 131/52 78 98 % -- -- -- -- -- --   08/21/22 00:05:16 98 4 °F (36 9 °C) 82 -- --  -- 100 % -- -- -- -- -- --      08/20/22 2304 -- -- -- 126/68  -- -- -- -- -- -- -- Lying      Nasal Cannula O2 Flow Rate (L/min): for comfort at 08/20/22 2235   08/20/22 22:12:50 -- 80 16 108/43 Abnormal  65 98 % -- -- -- -- -- Lying   08/20/22 21:00:16 -- 78 -- 104/44 Abnormal  64 Abnormal  96 % -- -- -- -- -- --   08/20/22 17:42:44 -- 106 Abnormal  -- 133/64 87 98 % -- -- -- -- -- --   08/20/22 1456 97 °F (36 1 °C) Abnormal  98 16 117/54 -- 98 % -- -- -- None (Room air) -- Lying   08/20/22 1400 98 5 °F (36 9 °C) 82 16 178/74 Abnormal  -- 98 % 44 -- 6 L/min Simple mask -- Lying   08/20/22 1353 98 2 °F (36 8 °C) 102 18 169/82 -- 92 % -- -- -- None (Room air) -- Lying   08/20/22 11:42:21 99 6 °F (37 6 °C) 110 Abnormal  18 143/67 92 94 % -- -- -- -- -- --   08/20/22 1000 -- 107 Abnormal  18 147/67 96 99 % 28 -- 2 L/min Nasal cannula -- --   08/20/22 09:20:32 -- 101 18 175/72 Abnormal  -- 100 % 28 -- 2 L/min Nasal cannula -- Lying   08/20/22 0800 -- 98 18 181/79 Abnormal  113 94 % --            Pertinent Labs/Diagnostic Test Results:   CT head wo contrast   Final Result by Khang Salmeron MD (08/20 1117)      No intracranial hemorrhage or calvarial fracture  Very mild, chronic microangiopathy                  Workstation performed: [id number]         CT lower extremity w contrast left   Final Result by Soco Barrios DO (08/21 0919)      Displaced, comminuted distal femoral periprosthetic fracture as above  Severe left hip osteoarthritis  Diffuse subcutaneous edema/cellulitis throughout the left lower extremity  Correlate with clinical findings  Workstation performed: [id number]         XR knee 1 or 2 vw left   Final Result by Francine Bertrand MD (08/20 0710)   No change comminuted posteriorly displaced distal femoral fracture  Workstation performed: [id number]         XR femur 2 views LEFT   ED Interpretation by Staci Bee MD (08/20 4487)   L distal femur fracture with posterior dislocation of prosthesis      Final Result by Francine Bertrand MD (08/20 1926)      Displaced comminuted distal femoral fracture  This finding is noted in the ED preliminary report              Workstation performed: [id number]         XR knee 4+ views left injury   ED Interpretation by Peter Cook MD (08/20 3578)   Distal femur fracture with posterior dislocation of prosthesis  Final Result by Lorelei Campa MD (08/20 1932)      Comminuted displaced distal femoral fracture  This is also reported with the femoral images  Workstation performed: [id number]         XR pelvis ap only 1 or 2 vw   ED Interpretation by Peter Cook MD (08/20 8359)   No fractures or dislocations  Final Result by Lorelei Campa MD (08/20 1936)      No acute osseous abnormality  Workstation performed: [id number]         XR chest 1 view portable   ED Interpretation by Peter Cook MD (08/20 4176)   No acute cardiopulmonary disease, when compared to CXR from 07/28/2022, no acute changes  Final Result by Jason Espino MD (08/20 1198)      No acute cardiopulmonary disease                    Workstation performed: [id number]         XR knee 1 or 2 vw left    (Results Pending)     8/20/22 ecg - Previous ECG:  Compared to current     Comparison ECG info:  7/28/22     Similarity: Sindy noted (RAD and low voltage now)   Quality:     Tracing quality:  Limited by artifact   Rate:     ECG rate:  98     ECG rate assessment: normal     Rhythm:     Rhythm: atrial fibrillation     Ectopy:     Ectopy: none     QRS:     QRS axis:  Right   Conduction:     Conduction normal: low voltage      ST segments:     ST segments:  Normal   T waves:     T waves: normal    Results from last 7 days   Lab Units 08/21/22  0806 08/21/22  0449 08/20/22  2347 08/20/22  1838 08/20/22  1341 08/20/22  0955 08/20/22  0623   WBC Thousand/uL 9 31 8 44  --   --   --   --  9 61   HEMOGLOBIN g/dL 7 9* 8 0* 6 6* 7 1* 7 8*   < > 8 4*   HEMATOCRIT % 25 3* 24 7* 21 3* 23 0* 25 0*   < > 27 5*   PLATELETS Thousands/uL 285 268  --   --   --   --  334   NEUTROS ABS Thousands/µL 7 23 5 86  --   --   --   --  6 29    < > = values in this interval not displayed       Results from last 7 days   Lab Units 08/21/22  0806 08/21/22  0449 08/20/22  0623   SODIUM mmol/L 145 143 140   POTASSIUM mmol/L 3 2* 3 3* 3 5   CHLORIDE mmol/L 105 103 100   CO2 mmol/L 33* 34* 33*   ANION GAP mmol/L 7 6 7   BUN mg/dL 32* 35* 48*   CREATININE mg/dL 1 00 1 04 1 03   EGFR ml/min/1 73sq m 55 52 53   CALCIUM mg/dL 8 4 8 6 9 3     Results from last 7 days   Lab Units 08/20/22  0623   AST U/L 11*   ALT U/L 7   ALK PHOS U/L 81   TOTAL PROTEIN g/dL 6 4   ALBUMIN g/dL 3 7   TOTAL BILIRUBIN mg/dL 0 58     Results from last 7 days   Lab Units 08/21/22  0806 08/21/22  0449 08/20/22  0623   GLUCOSE RANDOM mg/dL 124 123 121     Results from last 7 days   Lab Units 08/21/22  0449 08/20/22  0623   PROTIME seconds 15 0* 16 5*   INR  1 16 1 31*   PTT seconds  --  33     Results from last 7 days   Lab Units 08/20/22  0955   PROCALCITONIN ng/ml 0 07     Results from last 7 days   Lab Units 08/20/22  0955   LACTIC ACID mmol/L 0 7       ED Treatment:   Medication Administration from 08/20/2022 0557 to 08/20/2022 1112       Date/Time Order Dose Route Action Comments     08/20/2022 0610 HYDROmorphone (DILAUDID) injection 0 5 mg 0 5 mg Intravenous Given      08/20/2022 0706 pantoprazole (PROTONIX) 80 mg in sodium chloride 0 9 % 100 mL infusion 8 mg/hr Intravenous New Bag      08/20/2022 0740 HYDROmorphone (DILAUDID) injection 0 5 mg 0 5 mg Intravenous Given      08/20/2022 0818 methocarbamol (ROBAXIN) tablet 500 mg 500 mg Oral Given      08/20/2022 0818 gabapentin (NEURONTIN) capsule 100 mg 100 mg Oral Given      08/20/2022 0818 acetaminophen (TYLENOL) tablet 975 mg 975 mg Oral Given      08/20/2022 0827 lidocaine (LIDODERM) 5 % patch 1 patch 1 patch Topical Medication Applied left knee     08/20/2022 0823 cefTRIAXone (ROCEPHIN) IVPB (premix in dextrose) 2,000 mg 50 mL 2,000 mg Intravenous New Bag      08/20/2022 0857 prothrombin complex concentrate (human) (Kcentra) 2,000 Units 2,000 Units Intravenous Given      08/20/2022 0905 multi-electrolyte (PLASMALYTE-A/ISOLYTE-S PH 7 4) IV solution 75 mL/hr Intravenous New Bag      08/20/2022 0922 fentanyl citrate (PF) 100 MCG/2ML 25 mcg 25 mcg Intravenous Given by Other resident     08/20/2022 0924 propofol (DIPRIVAN) 200 MG/20ML bolus injection 50 mg 50 mg Intravenous Given by Other resident     08/20/2022 0933 propofol (DIPRIVAN) 200 MG/20ML bolus injection 50 mg 50 mg Intravenous Given      08/20/2022 1000 fentanyl citrate (PF) 100 MCG/2ML 25 mcg 25 mcg Intravenous Given      08/20/2022 0948 fentanyl citrate (PF) 100 MCG/2ML 50 mcg 50 mcg Intravenous Given      08/20/2022 0942 fentanyl citrate (PF) 100 MCG/2ML 50 mcg 50 mcg Intravenous Given      08/20/2022 0932 fentanyl citrate (PF) 100 MCG/2ML 50 mcg 50 mcg Intravenous Given      08/20/2022 0938 propofol (DIPRIVAN) 200 MG/20ML bolus injection 50 mg 50 mg Intravenous Given         Past Medical History:   Diagnosis Date    A-fib ([place] [address] ) 12/29/2021    Anxiety     Arthritis     Back pain     Cellulitis     CHF [facility]congestive [facility] failure) [facility])     Edema of both lower extremities due to peripheral venous insufficiency     Edema of both lower extremities due to peripheral venous insufficiency     Erythema of lower extremity 11/12/2021    Fibromyalgia     Hypertension     Sjogren syndrome, unspecified ([facility] )      Present on Admission:   Cellulitis of lower extremity   A-fib ([facility])   Chronic venous insufficiency   Chronic diastolic heart failure ([facility])      Admitting Diagnosis: Melena [K92 1]  Leg pain [M79 606]  Closed fracture of left distal femur ([facility]) [S72 402A]  Closed fracture of proximal end of left femur, initial encounter ([facility] ) [S72 002A]  Cellulitis of lower extremity, unspecified laterality [H74 425]  Age/Sex: 77 y o  female  Admission Orders:  8/20/22 0740 0740 inpatient   Scheduled Medications:  acetaminophen, 975 mg, Oral, Q8H [address]  ammonium lactate, , Topical, Daily  carvedilol, 25 mg, Oral, BID With Meals  furosemide, 80 mg, Intravenous, Daily  gabapentin, 100 mg, Oral, TID  lidocaine, 1 patch, Topical, Daily  losartan, 100 mg, Oral, Daily  methocarbamol, 750 mg, Oral, Q6H SHANNAN  pantoprazole, 40 mg, Intravenous, Q12H [address]  potassium chloride, 40 mEq, Oral, Daily    fentanyl citrate (PF) 100 MCG/2ML 25 mcg  Dose: 25 mcg  Freq: Once Route: IV  Indications of Use: PERIPROCEDURAL SEDATION,PROCEDURAL PAIN  Start: 08/20/22 1000 End: 08/20/22 1000    Continuous IV Infusions:  multi-electrolyte, 75 mL/hr, Intravenous, Continuous  ropivacaine, 10 mL/hr, Infiltration, Continuous    pantoprazole (PROTONIX) 80 mg in sodium chloride 0 9 % 100 mL infusion  Rate: 10 mL/hr Dose: 8 mg/hr  Freq: Continuous Route: IV  Last Dose: Stopped (08/21/22 1055)  Start: 08/20/22 0630 End: 08/21/22 1031    PRN Meds:  fentaNYL, 25 mcg, Intravenous, Q5 Min PRN  HYDROmorphone, 0 5 mg, Intravenous, Q3H PRN - used x 1 8/20, x 2 8/21/22   HYDROmorphone, 0 2 mg, Intravenous, Q5 Min PRN  metoclopramide, 10 mg, Intravenous, Once PRN  ondansetron, 4 mg, Intravenous, Q4H PRN - used x 1 8/21/22  ondansetron, 4 mg, Intravenous, Once PRN  oxyCODONE, 10 mg, Oral, Q4H PRN - used x 2 8/20, x 2 8/21/22   oxyCODONE, 5 mg, Oral, Q3H PRN  polyethylene glycol, 17 g, Oral, Daily PRN    Continuous pulse oximetry, monitoring  Knee immobilizer  8/20/22 transfuse leukoreduced RBC  Urinary catheter  8/21/22 clears     IP CONSULT TO TRAUMA SURGERY  IP CONSULT TO ORTHOPEDIC SURGERY  IP CONSULT TO ACUTE PAIN SERVICE  IP CONSULT TO GASTROENTEROLOGY  IP CONSULT TO INTERNAL MEDICINE  IP CONSULT TO GERONTOLOGY    Network Utilization Review Department  ATTENTION: Please call with any questions or concerns to [phone] and carefully listen to the prompts so that you are directed to the right person   All voicemails are confidential   Lynette Kussmaul all requests for admission clinical reviews, approved or denied determinations and any other requests to dedicated fax number below belonging to the campus where the patient is receiving treatment   List of dedicated fax numbers for the Facilities:  [address] [place] [address] DENIALS (Administrative/Medical Necessity) [phone]   [address]  (Maternity/NICU/Pediatrics) [phone]   [address]  [address] [address] [place] [address] [address] [facility] [place] [address] [phone]

## 2022-08-21 NOTE — ASSESSMENT & PLAN NOTE
Likely in the setting of gi bleed; melenic stools  Currently on Protonix pump  Hb 7 1 currently from baseline of Hb 10 4  Hb/Hct scheduled for 0000 8/21  Delivering 1 unit of PRBC and will reassess am Hb/Hct

## 2022-08-21 NOTE — ASSESSMENT & PLAN NOTE
Patient had a mechanical fall while going to the bathroom  Patient has been having some melanotic stools over the last 24 hours and slipped on it  She landed on her left knee  Underlying h/o bilateral knee replacements also  Admission imaging consistent with left femur fracture    Evaluated by ortho team, medical optimization prior to proceed with fixation   Will continue Pain management   Monitor for any signs of overt GI bleed, EGD today   PT/OT eval once clinical stability

## 2022-08-21 NOTE — QUICK NOTE
Qi Demarco NOTE - Deterioration Index  [place] 77 y o  female MRN: [id number]  Unit/Bed#: S -01 Encounter: [id number]    Date Paged: 22  Time Paged: [phone]  Room #: PACU  Deterioration index score at time of page: 60 05  %    Patient is immediately Post op in PACU  Please contact critical care via [employer] with any questions or concerns  Vitals:   Vitals:    22 0941 22 1020 22 1030 22 1045   BP: 149/63 123/58 121/60 133/62   BP Location:       Pulse: 100 92 89 94   Resp: 18 13 18 18   Temp: 97 9 °F (36 6 °C) 98 °F (36 7 °C) 98 5 °F (36 9 °C) 98 3 °F (36 8 °C)   TempSrc: Temporal      SpO2: 94% 97% 96% 94%       Respiratory:  SpO2: SpO2: 94 %, SpO2 Activity: SpO2 Activity: At Rest, SpO2 Device: O2 Device: None (Room air)  Nasal Cannula O2 Flow Rate (L/min): 2 L/min    Temperature: Temp (24hrs), Av 2 °F (36 8 °C), Min:97 °F (36 1 °C), Max:99 6 °F (37 6 °C)  Current: Temperature: 98 3 °F (36 8 °C)    Labs:   Results from last 7 days   Lab Units 22  0806 22  0449 22  2347 22  0955 22  0623   WBC Thousand/uL 9 31 8 44  --   --  9 61   HEMOGLOBIN g/dL 7 9* 8 0* 6 6*   < > 8 4*   HEMATOCRIT % 25 3* 24 7* 21 3*   < > 27 5*   PLATELETS Thousands/uL 285 268  --   --  334   NEUTROS PCT % 78* 68  --   --  65   MONOS PCT % 8 11  --   --  10    < > = values in this interval not displayed       Results from last 7 days   Lab Units 22  0806 22  0449 22  0623   SODIUM mmol/L 145 143 140   POTASSIUM mmol/L 3 2* 3 3* 3 5   CHLORIDE mmol/L 105 103 100   CO2 mmol/L 33* 34* 33*   BUN mg/dL 32* 35* 48*   CREATININE mg/dL 1 00 1 04 1 03   CALCIUM mg/dL 8 4 8 6 9 3   ALK PHOS U/L  --   --  81   ALT U/L  --   --  7   AST U/L  --   --  11*         Results from last 7 days   Lab Units 22  0955   LACTIC ACID mmol/L 0 7         Results from last 7 days   Lab Units 22  0955   PROCALCITONIN ng/ml 0 07       Code Status: Level 1 - Full Code   This patient's Deterioration Index score indicates a Moderate Risk  Please increase your vital sign monitoring using the order below  The patient's current deterioration index score is: 60 05    Predictive Model Details          60 (High)  Factor Value    Calculated 8/21/2022 10:45 26% [place] coma scale 12    Deterioration Index Model 21 Age 77years old     19% Supplemental oxygen Simple mask     15% Neurological exam Lethargic     5% Sodium 145 mmol/L     4% Hematocrit abnormal (25 3 %)     4% Respiratory rate 18     2% WBC count 9 31 Thousand/uL     2% Potassium abnormal (3 2 mmol/L)     2% BUN abnormal (32 mg/dL)     1% Systolic 391     1% Pulse 89     0% Pulse oximetry 96 %     0% Temperature 98 5 °F (36 9 °C)

## 2022-08-21 NOTE — ANESTHESIA PREPROCEDURE EVALUATION
Procedure:  EGD    Relevant Problems   CARDIO   (+) A-fib (HCC)   (+) Essential hypertension   (+) Mixed hyperlipidemia      /RENAL   (+) Stage 3 chronic kidney disease (HCC)      HEMATOLOGY   (+) Anemia      MUSCULOSKELETAL   (+) Chronic low back pain with sciatica   (+) Osteoarthritis of knee      NEURO/PSYCH   (+) Anxiety   (+) Chronic low back pain with sciatica      Digestive   (+) Melena      Musculoskeletal and Integument   (+) Fracture of proximal end of left femur [facility])        Physical Exam    Airway      TM Distance: >3 FB  Neck ROM: full     Dental       Cardiovascular  Cardiovascular exam normal    Pulmonary  Pulmonary exam normal     Other Findings        Anesthesia Plan  ASA Score- 3     Anesthesia Type- IV sedation with anesthesia with ASA Monitors  Additional Monitors:   Airway Plan:           Plan Factors-Exercise tolerance (METS): >4 METS  Chart reviewed  EKG reviewed  Imaging results reviewed  Existing labs reviewed  Patient summary reviewed  Induction- intravenous  Postoperative Plan- Plan for postoperative opioid use  Planned trial extubation    Informed Consent- Anesthetic plan and risks discussed with patient  I personally reviewed this patient with the CRNA  Discussed and agreed on the Anesthesia Plan with the CRNA  Jorden Best

## 2022-08-21 NOTE — ASSESSMENT & PLAN NOTE
Patient has mild erythema of the bilateral extremity which seems to be chronic  Erythema is symmetric, skin its significant dry and scaly , she only reports tenderness in left extremity, has remained afebrile, wbc wnl , very low likelihood of  cellulitis  At this point I suspect chronic venous stasis changes and chronic lymphedema      Will continue topical moisturizers  Continue diuretics  Will monitor off of antibiotics

## 2022-08-21 NOTE — PROGRESS NOTES
[facility]  Progress Note - Corrine Melo 1945, 77 y o  female MRN: [id number]  Unit/Bed#: S -01 Encounter: [id number]  Primary Care Provider: Rena Leonard MD   Date and time admitted to hospital: 8/20/2022  5:58 AM    Acute pain due to trauma  Assessment & Plan  · In the setting of fibromyalgia  · APS consulted- peripheral block    Melena  Assessment & Plan  · Multiple melenotic stool over the past 24 hours  · GI consult-peptic ulcer disease versus AVMs with malignancy less likely less likely malignancy  · Started on Protonix drip  · Will reverse Eliquis with KCentra  · Q4 H&H hemoglobin currently 7 1, patient currently hemodynamically stable; will transfuse if patient becomes hemodynamically unstable or drops further under 7 0 hemoglobin  · Continue NPO status for scheduled egd    Fall  Assessment & Plan  · Mechanical   · Injuries listed below  · CT Head ordered--patient unsure if she hit her head, having head pain  Cellulitis of lower extremity  Assessment & Plan  · Bilateral LE cellulitis  · Started on Rocepin  · Continue to monitor for S/S on sepsis--lactic acid 0 7, procal ordered  · Medicine consulted    Anemia  Assessment & Plan  Likely in the setting of gi bleed; melenic stools  Currently on Protonix pump  Hb 7 1 currently from baseline of Hb 10 4  Hb/Hct scheduled for 0000 8/21  Delivering 1 unit of PRBC and will reassess am Hb/Hct    A-fib ([facility] [address] )  Assessment & Plan  · Hold Eliquis in the setting of GI bleed  · Continue hold Coreg    * Fracture of proximal end of left femur ([address] )  Assessment & Plan  · Secondary to fall  · Seen on XR with significant displacement--will obtain CTA LLE due to displacement and concern for vascular injury  · Ortho consulted  · NPO for possible OR-- reversing eliquis with KCentra  · Multimodail pain regimen--APS consulted  · DVT Prop--hold in the setting of GI bleed  · PT/OT when indicated         TERTIARY TRAUMA SURVEY NOTE    Prophylaxis: Reason for no pharmacologic prophylaxis downtrending hb    Disposition: stable and still requiring med/surg    Code status:  Level 1 - Full Code    Consultants: internal medicine, GI, ortho, Podiatry, Wound Care, geriatrics,      SUBJECTIVE:     Transfer from: home  Mechanism of Injury:Fall    Chief Complaint: left leg pain    HPI/Last 24 hour events: "Sonia Paris is a 77 y o  female with history of atrial fibrillation that she takes Eliquis for, presenting today for evaluation after she suffered a mechanical fall  Patient states that she was walking to the bathroom because she has been having several melanotic stools over the past 24 hours, and slipped  She landed on her left knee  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  Since that accident she has been unable to ambulate  She notes severe pain in her left lower extremity  She denies any numbness or tingling  She does believe that she struck her head  No loss of consciousness  She denies any abdominal pain, nausea, vomiting    She denies any recent fevers or chills " via SHAHIDA Ferrer    Active medications:           Current Facility-Administered Medications:     acetaminophen (TYLENOL) tablet 975 mg, 975 mg, Oral, Q8H [address], 975 mg at 08/21/22 0500    ammonium lactate (LAC-HYDRIN) 12 % cream, , Topical, Daily    carvedilol (COREG) tablet 25 mg, 25 mg, Oral, BID With Meals, 25 mg at 08/20/22 1747    cefTRIAXone (ROCEPHIN) IVPB (premix in dextrose) 2,000 mg 50 mL, 2,000 mg, Intravenous, Q24H, Stopped at 08/20/22 0858    furosemide (LASIX) injection 80 mg, 80 mg, Intravenous, Daily, 80 mg at 08/20/22 1518    gabapentin (NEURONTIN) capsule 100 mg, 100 mg, Oral, TID, 100 mg at 08/20/22 2100    HYDROmorphone (DILAUDID) injection 0 5 mg, 0 5 mg, Intravenous, Q3H PRN, 0 5 mg at 08/21/22 0407    lidocaine (LIDODERM) 5 % patch 1 patch, 1 patch, Topical, Daily, 1 patch at 08/20/22 0827    losartan (COZAAR) tablet 100 mg, 100 mg, Oral, Daily    methocarbamol (ROBAXIN) tablet 500 mg, 500 mg, Oral, Q6H SHANNAN, 500 mg at 08/21/22 0500    multi-electrolyte (PLASMALYTE-A/ISOLYTE-S PH 7 4) IV solution, 75 mL/hr, Intravenous, Continuous, Held at 08/21/22 0000    ondansetron (ZOFRAN) injection 4 mg, 4 mg, Intravenous, Q4H PRN    oxyCODONE (ROXICODONE) immediate release tablet 10 mg, 10 mg, Oral, Q4H PRN, 10 mg at 08/21/22 0330    oxyCODONE (ROXICODONE) IR tablet 5 mg, 5 mg, Oral, Q3H PRN    pantoprazole (PROTONIX) 80 mg in sodium chloride 0 9 % 100 mL infusion, 8 mg/hr, Intravenous, Continuous, 8 mg/hr at 08/21/22 0422    polyethylene glycol (MIRALAX) packet 17 g, 17 g, Oral, Daily PRN    potassium chloride (K-DUR,KLOR-CON) CR tablet 40 mEq, 40 mEq, Oral, Daily    ropivacaine (NAROPIN) 0 2% in elastomeric infiltration pump (ON-Q* PUMP), 10 mL/hr, Infiltration, Continuous    Facility-Administered Medications Ordered in Other Encounters:     fentanyl citrate (PF) 100 MCG/2ML, , Intravenous, PRN, 50 mcg at 08/20/22 1458    midazolam (VERSED) injection, , Intravenous, PRN, 1 mg at 08/20/22 1455    propofol (DIPRIVAN) 200 MG/20ML bolus injection, , Intravenous, PRN, 30 mg at 08/20/22 1431      OBJECTIVE:     Vitals:   Vitals:    08/21/22 0415   BP:    Pulse:    Resp:    Temp:    SpO2: 97%       Physical Exam:   GENERAL APPEARANCE:  Comfortable  NEURO:  Alert and oriented x3, no new focal deficits  HEENT:  EOM intact, no pain on EOM, oropharynx clear and patent  CV:  Irregular rhythm, regular rate  LUNGS:  Clear to auscultation bilaterally  GI:  Soft nontender, no distension  :  Voiding  MSK:  Upper extremities 4/5 neurovascularly intact, left lower extremity 4/5 neurovascularly intact, right lower extremity, right hip 3/5, right knee 3/5, right ankle in splint  SKIN:  Warm well perfused      1  Before the illness or injury that brought you to the Emergency, did you need someone to help you on a regular basis? 0=No   2  Since the illness or injury that brought you to the Emergency, have you needed more help than usual to take care of yourself? 0=No   3  Have you been hospitalized for one or more nights during the past 6 months (excluding a stay in the Emergency Department)? 1=Yes   4  In general, do you see well? 1=No   5  In general, do you have serious problems with your memory? 0=No   6  Do you take more than three different medications everyday? 1=Yes   TOTAL   3     Did you order a geriatric consult if the score was 2 or greater?: yes                I/O:   I/O       08/19 0701 08/20 0700 08/20 0701 08/21 0700    Blood  230    Total Intake  230    Urine  5400    Stool  0    Total Output  5400    Net  -5170          Unmeasured Stool Occurrence  0 x          Invasive Devices: Invasive Devices  Report    Peripheral Intravenous Line  Duration           Peripheral IV 08/20/22 Left Antecubital <1 day    Peripheral IV 08/20/22 Right Antecubital <1 day          Epidural Line  Duration           Nerve Block Catheter 08/20/22 <1 day          Drain  Duration           Urethral Catheter 18 Fr  <1 day                  Imaging:   XR chest 1 view portable    Result Date: 8/20/2022  Impression: No acute cardiopulmonary disease  Workstation performed: [id number]     XR femur 2 views LEFT    Result Date: 8/20/2022  Impression: Displaced comminuted distal femoral fracture  This finding is noted in the ED preliminary report  Workstation performed: [id number]     XR knee 1 or 2 vw left    Result Date: 8/20/2022  Impression: No change comminuted posteriorly displaced distal femoral fracture  Workstation performed: [id number]     XR knee 4+ views left injury    Result Date: 8/20/2022  Impression: Comminuted displaced distal femoral fracture  This is also reported with the femoral images  Workstation performed: [id number]     CT head wo contrast    Result Date: 8/20/2022  Impression: No intracranial hemorrhage or calvarial fracture   Very mild, chronic microangiopathy Workstation performed: [id number]     XR pelvis ap only 1 or 2 vw    Result Date: 8/20/2022  Impression: No acute osseous abnormality   Workstation performed: [id number]       Labs:   CBC:   Lab Results   Component Value Date    WBC 8 44 08/21/2022    HGB 8 0 (L) 08/21/2022    HCT 24 7 (L) 08/21/2022    MCV 90 08/21/2022     08/21/2022    MCH 29 1 08/21/2022    MCHC 32 4 08/21/2022    RDW 14 7 08/21/2022    MPV 10 6 08/21/2022    NRBC 0 08/21/2022     CMP:   Lab Results   Component Value Date     08/21/2022    CO2 34 (H) 08/21/2022    BUN 35 (H) 08/21/2022    CREATININE 1 04 08/21/2022    CALCIUM 8 6 08/21/2022    AST 11 (L) 08/20/2022    ALT 7 08/20/2022    ALKPHOS 81 08/20/2022    EGFR 52 08/21/2022     Coagulation:   Lab Results   Component Value Date    INR 1 16 08/21/2022

## 2022-08-21 NOTE — QUICK NOTE
was contacted over the phone, the risks/benefits/procedure details were described to him, and he was agreeable, and gave consent for the procedure as well

## 2022-08-21 NOTE — ASSESSMENT & PLAN NOTE
Wt Readings from Last 3 Encounters:   08/18/22 103 kg (227 lb)   08/03/22 103 kg (227 lb 9 6 oz)   07/28/22 109 kg (239 lb 10 2 oz)     Patient has chronic diastolic congestive heart failure  Most recent echo (11/2021) : 65%   Systolic function is normal  Wall motion is normal  G1DD  Doesn't appear to be on acute exacerbation  Continue lasix and carvedilol  Monitor electrolytes while on diuretic therapy  Monitor urine output

## 2022-08-21 NOTE — QUICK NOTE
Postop check    Subjective:  My pains not bad  Patient describes doing well postoperatively  She states that she feels like her pain has been under control since coming up from surgery  She denies any feelings of nausea, vomiting, abdominal pain, shortness of breath, difficulty breathing     She denies any new complaints  Objective:  General:  No acute distress  Neuro:  GCS 15  Light touch sensation intact  HEENT:  Normocephalic, atraumatic  Neck supple  Cardiac:  Regular rate and rhythm  Lungs:  Clear to auscultation, bilaterally  On room air  Abdomen:  Soft, nontender  Pelvis:  Stable  MSK:  Right lower extremity is in knee immobilizer  Patient is able to move right foot and ankle  All other extremities display no deformities  Skin:  Warm, dry  Assessment: This is a 78-year-old female with history of recent melanotic stool and right distal femur fracture  She was taken for an EGD today for assessment of melanotic stool where they found 3 ulcerations, 1 of which biopsy was completed and it was injected with epinephrine to achieve hemostasis  She has been doing well  Patient is currently hemodynamically stable and recovering appropriately      Plan:  · Repeat hemoglobin  · Advance diet to clear liquids  · Change Protonix to b i d   · Continue to monitor vital signs  · Will discuss with Orthopedics regarding fixation of right femur fracture  · DVT prophylaxis:  Heparin

## 2022-08-21 NOTE — ASSESSMENT & PLAN NOTE
Baseline hgb 9-10  Currently 7 9  Possibly secondary to GI bleed  Will follow EGD results  Monitor cbc , if hgb < 7, may consider blood transfusion

## 2022-08-21 NOTE — ASSESSMENT & PLAN NOTE
Patient has been having melanotic stools over the last 24 hours  She takes Eliquis for atrial fibrillation  Currently Eliquis is on hold  S/p Kcentra  Placed on Protonix gtt  Hgb stable at 8 range, no melanotic stools reported overnight      Plan  GI on board, EGD today , follow results  protonix gtt dc'd , continue protonix 40 IV bid  Close Hgb watch

## 2022-08-21 NOTE — ANESTHESIA POSTPROCEDURE EVALUATION
Post-Op Assessment Note    CV Status:  Stable  Pain Score: 0    Pain management: adequate     Mental Status:  Alert and sleepy   Hydration Status:  Euvolemic   PONV Controlled:  Controlled   Airway Patency:  Patent      Post Op Vitals Reviewed: Yes      Staff: CRNA         No complications documented      BP   149/63   Temp      Pulse  89   Resp   12   SpO2  96 Was the patient seen in the last year in this department? Yes    Does patient have an active prescription for medications requested? Yes    Received Request Via: Pharmacy    Office Visit on 10/28/2021   Component Date Value   • POC Urine Pregnancy Test 10/28/2021 negative    • Internal Control Positive 10/28/2021 Positive    • Internal Control Negative 10/28/2021 Negative    Hospital Outpatient Visit on 05/07/2021   Component Date Value   • 17 Alpha Hydroxyprogest 05/07/2021 148.13    • Testosterone,Total 05/07/2021 41    • Prolactin 05/07/2021 24.60    • WBC 05/07/2021 6.8    • RBC 05/07/2021 4.52    • Hemoglobin 05/07/2021 13.5    • Hematocrit 05/07/2021 41.0    • MCV 05/07/2021 90.7    • MCH 05/07/2021 29.9    • MCHC 05/07/2021 32.9*   • RDW 05/07/2021 39.4    • Platelet Count 05/07/2021 276    • MPV 05/07/2021 10.4    • Neutrophils-Polys 05/07/2021 40.40*   • Lymphocytes 05/07/2021 49.10*   • Monocytes 05/07/2021 9.00    • Eosinophils 05/07/2021 0.60    • Basophils 05/07/2021 0.90    • Immature Granulocytes 05/07/2021 0.00    • Nucleated RBC 05/07/2021 0.00    • Neutrophils (Absolute) 05/07/2021 2.74    • Lymphs (Absolute) 05/07/2021 3.33    • Monos (Absolute) 05/07/2021 0.61    • Eos (Absolute) 05/07/2021 0.04    • Baso (Absolute) 05/07/2021 0.06*   • Immature Granulocytes (a* 05/07/2021 0.00    • NRBC (Absolute) 05/07/2021 0.00    • T3 05/07/2021 139.0    • TSH 05/07/2021 2.580    • Free T-4 05/07/2021 1.21    ]

## 2022-08-21 NOTE — PROGRESS NOTES
Progress Note - Acute Pain Service    Jacquelineshaunna Schmitz 77 y o  female MRN: [id number]  Unit/Bed#: S -01 Encounter: [id number]      Assessment:   Principal Problem:    Fracture of proximal end of left femur (HCC)  Active Problems:    Chronic venous insufficiency    A-fib (HCC)    Chronic diastolic heart failure (HCC)    Anemia    Cellulitis of lower extremity    Fall    Melena    Acute pain due to trauma    78 yo female s/p mechanical fall admitted 8/20 with distal femur periprosthetic fracture with displacement  She had a femoral catheter placed yesterday and another attempt at reducing the fracture, but this was unsuccessful  Also, unfortunately, the femoral catheter fell out shortly thereafter  She doesn't recall the procedure yesterday or the attempt at reduction  Her pain this AM is worse than ever  Her night wasn't good  Plan:   1  I will not replace femoral catheter because despite the catheter being removed, she likely still has residual function of the initial local block  And her pain is still worse than ever  I suspect that the sciatic nerve component is causing her considerable discomfort  Due to her body habitus and positioning issues, I don't think a sciatic block would be possible  2  The patient isn't a candidate for an epidural due to Eliquis use  3  The patient's pain will likely never be controlled until operative fixation and reduction is achieved  However, she continues to bleed, likely from a GI source  4  Consider increasing methocarbamol to 750 mg q6h scheduled  5  Continue other therapies      APS will continue to follow; please contact APS ( [phone]) with any further questions    Pain History  Current pain location(s): left leg  Pain Scale:   10/10  Quality: throbbing, seizing  24 hour history: see above    Opioid requirement previous 24 hours: 2 doses 0 5 mg dilaudid, 3 doses 10 mg oxycodone, 200 mcg fentanyl    Meds/Allergies   all current active meds have been reviewed    No Known Allergies    Objective     Temp:  [97 °F (36 1 °C)-99 6 °F (37 6 °C)] 98 3 °F (36 8 °C)  HR:  [] 96  Resp:  [12-25] 17  BP: ()/(31-87) 157/71    Physical Exam  Constitutional:       General: She is in acute distress  Appearance: She is obese  HENT:      Head: Normocephalic and atraumatic  Nose: Nose normal       Mouth/Throat:      Mouth: Mucous membranes are dry  Eyes:      Conjunctiva/sclera: Conjunctivae normal    Pulmonary:      Effort: Pulmonary effort is normal    Musculoskeletal:         General: Tenderness, deformity and signs of injury present  Cervical back: Normal range of motion  Skin:     General: Skin is warm and dry  Neurological:      General: No focal deficit present  Mental Status: She is alert  Mental status is at baseline  Psychiatric:         Mood and Affect: Mood normal          Behavior: Behavior normal          Thought Content: Thought content normal          Judgment: Judgment normal          Lab Results:   Results from last 7 days   Lab Units 08/21/22  0449   WBC Thousand/uL 8 44   HEMOGLOBIN g/dL 8 0*   HEMATOCRIT % 24 7*   PLATELETS Thousands/uL 268      Results from last 7 days   Lab Units 08/21/22  0449 08/20/22  0623   POTASSIUM mmol/L 3 3* 3 5   CHLORIDE mmol/L 103 100   CO2 mmol/L 34* 33*   BUN mg/dL 35* 48*   CREATININE mg/dL 1 04 1 03   CALCIUM mg/dL 8 6 9 3   ALK PHOS U/L  --  81   ALT U/L  --  7   AST U/L  --  11*       Imaging Studies: I have personally reviewed pertinent reports  EKG, Pathology, and Other Studies: I have personally reviewed pertinent reports            Christian Logan MD

## 2022-08-22 ENCOUNTER — APPOINTMENT (OUTPATIENT)
Dept: RADIOLOGY | Facility: HOSPITAL | Age: 77
DRG: 480 | End: 2022-08-22
Payer: COMMERCIAL

## 2022-08-22 ENCOUNTER — ANESTHESIA (INPATIENT)
Dept: PERIOP | Facility: HOSPITAL | Age: 77
DRG: 480 | End: 2022-08-22
Payer: COMMERCIAL

## 2022-08-22 ENCOUNTER — ANESTHESIA EVENT (INPATIENT)
Dept: PERIOP | Facility: HOSPITAL | Age: 77
DRG: 480 | End: 2022-08-22
Payer: COMMERCIAL

## 2022-08-22 ENCOUNTER — TELEPHONE (OUTPATIENT)
Dept: INTERNAL MEDICINE CLINIC | Facility: CLINIC | Age: 77
End: 2022-08-22

## 2022-08-22 ENCOUNTER — HOME CARE VISIT (OUTPATIENT)
Dept: HOME HEALTH SERVICES | Facility: HOME HEALTHCARE | Age: 77
End: 2022-08-22

## 2022-08-22 PROBLEM — I87.2 VENOUS STASIS DERMATITIS OF BOTH LOWER EXTREMITIES: Status: ACTIVE | Noted: 2022-07-30

## 2022-08-22 LAB
ANION GAP SERPL CALCULATED.3IONS-SCNC: 5 MMOL/L (ref 4–13)
ATRIAL RATE: 93 BPM
BASE EXCESS BLDA CALC-SCNC: 11 MMOL/L (ref -2–3)
BASOPHILS # BLD AUTO: 0.03 THOUSANDS/ΜL (ref 0–0.1)
BASOPHILS NFR BLD AUTO: 0 % (ref 0–1)
BUN SERPL-MCNC: 17 MG/DL (ref 5–25)
CA-I BLD-SCNC: 1.1 MMOL/L (ref 1.12–1.32)
CALCIUM SERPL-MCNC: 8.6 MG/DL (ref 8.4–10.2)
CHLORIDE SERPL-SCNC: 102 MMOL/L (ref 96–108)
CO2 SERPL-SCNC: 36 MMOL/L (ref 21–32)
CREAT SERPL-MCNC: 0.88 MG/DL (ref 0.6–1.3)
EOSINOPHIL # BLD AUTO: 0.21 THOUSAND/ΜL (ref 0–0.61)
EOSINOPHIL NFR BLD AUTO: 2 % (ref 0–6)
ERYTHROCYTE [DISTWIDTH] IN BLOOD BY AUTOMATED COUNT: 15 % (ref 11.6–15.1)
GFR SERPL CREATININE-BSD FRML MDRD: 64 ML/MIN/1.73SQ M
GLUCOSE SERPL-MCNC: 107 MG/DL (ref 65–140)
GLUCOSE SERPL-MCNC: 121 MG/DL (ref 65–140)
HCO3 BLDA-SCNC: 34.8 MMOL/L (ref 24–30)
HCT VFR BLD AUTO: 23.9 % (ref 34.8–46.1)
HCT VFR BLD CALC: 26 % (ref 34.8–46.1)
HGB BLD-MCNC: 7.6 G/DL (ref 11.5–15.4)
HGB BLD-MCNC: 8.6 G/DL (ref 11.5–15.4)
HGB BLDA-MCNC: 8.8 G/DL (ref 11.5–15.4)
IMM GRANULOCYTES # BLD AUTO: 0.04 THOUSAND/UL (ref 0–0.2)
IMM GRANULOCYTES NFR BLD AUTO: 0 % (ref 0–2)
LYMPHOCYTES # BLD AUTO: 1.51 THOUSANDS/ΜL (ref 0.6–4.47)
LYMPHOCYTES NFR BLD AUTO: 16 % (ref 14–44)
MCH RBC QN AUTO: 29.7 PG (ref 26.8–34.3)
MCHC RBC AUTO-ENTMCNC: 31.8 G/DL (ref 31.4–37.4)
MCV RBC AUTO: 93 FL (ref 82–98)
MONOCYTES # BLD AUTO: 0.84 THOUSAND/ΜL (ref 0.17–1.22)
MONOCYTES NFR BLD AUTO: 9 % (ref 4–12)
NEUTROPHILS # BLD AUTO: 6.55 THOUSANDS/ΜL (ref 1.85–7.62)
NEUTS SEG NFR BLD AUTO: 73 % (ref 43–75)
NRBC BLD AUTO-RTO: 0 /100 WBCS
PCO2 BLD: 36 MMOL/L (ref 21–32)
PCO2 BLD: 40.1 MM HG (ref 42–50)
PH BLD: 7.55 [PH] (ref 7.3–7.4)
PLATELET # BLD AUTO: 267 THOUSANDS/UL (ref 149–390)
PMV BLD AUTO: 10.5 FL (ref 8.9–12.7)
PO2 BLD: 102 MM HG (ref 35–45)
POTASSIUM BLD-SCNC: 3.1 MMOL/L (ref 3.5–5.3)
POTASSIUM SERPL-SCNC: 3.4 MMOL/L (ref 3.5–5.3)
QRS AXIS: 94 DEGREES
QRSD INTERVAL: 86 MS
QT INTERVAL: 330 MS
QTC INTERVAL: 421 MS
RBC # BLD AUTO: 2.56 MILLION/UL (ref 3.81–5.12)
SAO2 % BLD FROM PO2: 99 % (ref 60–85)
SODIUM BLD-SCNC: 140 MMOL/L (ref 136–145)
SODIUM SERPL-SCNC: 143 MMOL/L (ref 135–147)
SPECIMEN SOURCE: ABNORMAL
T WAVE AXIS: -6 DEGREES
VENTRICULAR RATE: 98 BPM
WBC # BLD AUTO: 9.18 THOUSAND/UL (ref 4.31–10.16)

## 2022-08-22 PROCEDURE — 99232 SBSQ HOSP IP/OBS MODERATE 35: CPT | Performed by: INTERNAL MEDICINE

## 2022-08-22 PROCEDURE — C1713 ANCHOR/SCREW BN/BN,TIS/BN: HCPCS | Performed by: ORTHOPAEDIC SURGERY

## 2022-08-22 PROCEDURE — 27511 TREATMENT OF THIGH FRACTURE: CPT | Performed by: ORTHOPAEDIC SURGERY

## 2022-08-22 PROCEDURE — 80048 BASIC METABOLIC PNL TOTAL CA: CPT | Performed by: INTERNAL MEDICINE

## 2022-08-22 PROCEDURE — 85025 COMPLETE CBC W/AUTO DIFF WBC: CPT | Performed by: INTERNAL MEDICINE

## 2022-08-22 PROCEDURE — C1769 GUIDE WIRE: HCPCS | Performed by: ORTHOPAEDIC SURGERY

## 2022-08-22 PROCEDURE — 93010 ELECTROCARDIOGRAM REPORT: CPT | Performed by: INTERNAL MEDICINE

## 2022-08-22 PROCEDURE — 99233 SBSQ HOSP IP/OBS HIGH 50: CPT | Performed by: STUDENT IN AN ORGANIZED HEALTH CARE EDUCATION/TRAINING PROGRAM

## 2022-08-22 PROCEDURE — 85018 HEMOGLOBIN: CPT | Performed by: PHYSICIAN ASSISTANT

## 2022-08-22 PROCEDURE — 84132 ASSAY OF SERUM POTASSIUM: CPT

## 2022-08-22 PROCEDURE — 73552 X-RAY EXAM OF FEMUR 2/>: CPT

## 2022-08-22 PROCEDURE — P9016 RBC LEUKOCYTES REDUCED: HCPCS

## 2022-08-22 PROCEDURE — 84295 ASSAY OF SERUM SODIUM: CPT

## 2022-08-22 PROCEDURE — 82947 ASSAY GLUCOSE BLOOD QUANT: CPT

## 2022-08-22 PROCEDURE — C9113 INJ PANTOPRAZOLE SODIUM, VIA: HCPCS | Performed by: PHYSICIAN ASSISTANT

## 2022-08-22 PROCEDURE — 0QHC06Z INSERTION OF INTRAMEDULLARY INTERNAL FIXATION DEVICE INTO LEFT LOWER FEMUR, OPEN APPROACH: ICD-10-PCS | Performed by: ORTHOPAEDIC SURGERY

## 2022-08-22 PROCEDURE — 82803 BLOOD GASES ANY COMBINATION: CPT

## 2022-08-22 PROCEDURE — 85014 HEMATOCRIT: CPT

## 2022-08-22 PROCEDURE — C9113 INJ PANTOPRAZOLE SODIUM, VIA: HCPCS | Performed by: NURSE PRACTITIONER

## 2022-08-22 PROCEDURE — NC001 PR NO CHARGE: Performed by: ORTHOPAEDIC SURGERY

## 2022-08-22 PROCEDURE — 82330 ASSAY OF CALCIUM: CPT

## 2022-08-22 DEVICE — LOCKING SCREW, FULLY THREADED: Type: IMPLANTABLE DEVICE | Site: FEMUR | Status: FUNCTIONAL

## 2022-08-22 DEVICE — END CAP, SCN
Type: IMPLANTABLE DEVICE | Site: FEMUR | Status: FUNCTIONAL
Brand: T2

## 2022-08-22 DEVICE — SUPRACONDYLAR NAIL
Type: IMPLANTABLE DEVICE | Site: FEMUR | Status: FUNCTIONAL
Brand: T2

## 2022-08-22 RX ORDER — MAGNESIUM HYDROXIDE 1200 MG/15ML
LIQUID ORAL AS NEEDED
Status: DISCONTINUED | OUTPATIENT
Start: 2022-08-22 | End: 2022-08-22 | Stop reason: HOSPADM

## 2022-08-22 RX ORDER — DOCUSATE SODIUM 100 MG/1
100 CAPSULE, LIQUID FILLED ORAL 2 TIMES DAILY
Status: DISCONTINUED | OUTPATIENT
Start: 2022-08-22 | End: 2022-08-25

## 2022-08-22 RX ORDER — SUCRALFATE 1 G/1
1 TABLET ORAL
Status: DISCONTINUED | OUTPATIENT
Start: 2022-08-22 | End: 2022-08-30 | Stop reason: HOSPADM

## 2022-08-22 RX ORDER — ROCURONIUM BROMIDE 10 MG/ML
INJECTION, SOLUTION INTRAVENOUS AS NEEDED
Status: DISCONTINUED | OUTPATIENT
Start: 2022-08-22 | End: 2022-08-22

## 2022-08-22 RX ORDER — CEFAZOLIN SODIUM 2 G/50ML
2000 SOLUTION INTRAVENOUS EVERY 8 HOURS
Status: COMPLETED | OUTPATIENT
Start: 2022-08-22 | End: 2022-08-23

## 2022-08-22 RX ORDER — ROPIVACAINE HYDROCHLORIDE 5 MG/ML
INJECTION, SOLUTION EPIDURAL; INFILTRATION; PERINEURAL
Status: COMPLETED | OUTPATIENT
Start: 2022-08-22 | End: 2022-08-22

## 2022-08-22 RX ORDER — LORAZEPAM 2 MG/ML
0.5 INJECTION INTRAMUSCULAR ONCE
Status: COMPLETED | OUTPATIENT
Start: 2022-08-22 | End: 2022-08-22

## 2022-08-22 RX ORDER — POTASSIUM CHLORIDE 14.9 MG/ML
20 INJECTION INTRAVENOUS ONCE
Status: COMPLETED | OUTPATIENT
Start: 2022-08-22 | End: 2022-08-22

## 2022-08-22 RX ORDER — HYDROMORPHONE HCL/PF 1 MG/ML
0.5 SYRINGE (ML) INJECTION EVERY 4 HOURS PRN
Status: DISCONTINUED | OUTPATIENT
Start: 2022-08-22 | End: 2022-08-29

## 2022-08-22 RX ORDER — HEPARIN SODIUM 5000 [USP'U]/ML
5000 INJECTION, SOLUTION INTRAVENOUS; SUBCUTANEOUS EVERY 8 HOURS SCHEDULED
Status: DISCONTINUED | OUTPATIENT
Start: 2022-08-22 | End: 2022-08-23

## 2022-08-22 RX ORDER — HEPARIN SODIUM 5000 [USP'U]/ML
5000 INJECTION, SOLUTION INTRAVENOUS; SUBCUTANEOUS EVERY 8 HOURS SCHEDULED
Status: DISCONTINUED | OUTPATIENT
Start: 2022-08-22 | End: 2022-08-22

## 2022-08-22 RX ORDER — DEXAMETHASONE SODIUM PHOSPHATE 10 MG/ML
INJECTION, SOLUTION INTRAMUSCULAR; INTRAVENOUS AS NEEDED
Status: DISCONTINUED | OUTPATIENT
Start: 2022-08-22 | End: 2022-08-22

## 2022-08-22 RX ORDER — CEFAZOLIN SODIUM 2 G/50ML
SOLUTION INTRAVENOUS AS NEEDED
Status: DISCONTINUED | OUTPATIENT
Start: 2022-08-22 | End: 2022-08-22

## 2022-08-22 RX ORDER — POTASSIUM CHLORIDE 20 MEQ/1
40 TABLET, EXTENDED RELEASE ORAL ONCE
Status: DISCONTINUED | OUTPATIENT
Start: 2022-08-22 | End: 2022-08-24

## 2022-08-22 RX ORDER — SODIUM CHLORIDE 9 MG/ML
INJECTION, SOLUTION INTRAVENOUS CONTINUOUS PRN
Status: DISCONTINUED | OUTPATIENT
Start: 2022-08-22 | End: 2022-08-22

## 2022-08-22 RX ORDER — TRANEXAMIC ACID 100 MG/ML
INJECTION, SOLUTION INTRAVENOUS AS NEEDED
Status: DISCONTINUED | OUTPATIENT
Start: 2022-08-22 | End: 2022-08-22

## 2022-08-22 RX ORDER — POTASSIUM CHLORIDE 14.9 MG/ML
INJECTION INTRAVENOUS CONTINUOUS PRN
Status: DISCONTINUED | OUTPATIENT
Start: 2022-08-22 | End: 2022-08-22

## 2022-08-22 RX ORDER — PROPOFOL 10 MG/ML
INJECTION, EMULSION INTRAVENOUS AS NEEDED
Status: DISCONTINUED | OUTPATIENT
Start: 2022-08-22 | End: 2022-08-22

## 2022-08-22 RX ORDER — LIDOCAINE HYDROCHLORIDE 10 MG/ML
INJECTION, SOLUTION EPIDURAL; INFILTRATION; INTRACAUDAL; PERINEURAL AS NEEDED
Status: DISCONTINUED | OUTPATIENT
Start: 2022-08-22 | End: 2022-08-22

## 2022-08-22 RX ORDER — FENTANYL CITRATE 50 UG/ML
INJECTION, SOLUTION INTRAMUSCULAR; INTRAVENOUS AS NEEDED
Status: DISCONTINUED | OUTPATIENT
Start: 2022-08-22 | End: 2022-08-22

## 2022-08-22 RX ADMIN — FENTANYL CITRATE 25 MCG: 50 INJECTION INTRAMUSCULAR; INTRAVENOUS at 17:42

## 2022-08-22 RX ADMIN — SODIUM CHLORIDE: 0.9 INJECTION, SOLUTION INTRAVENOUS at 14:45

## 2022-08-22 RX ADMIN — HYDROMORPHONE HYDROCHLORIDE 0.5 MG: 1 INJECTION, SOLUTION INTRAMUSCULAR; INTRAVENOUS; SUBCUTANEOUS at 05:13

## 2022-08-22 RX ADMIN — ACETAMINOPHEN 975 MG: 325 TABLET, FILM COATED ORAL at 21:48

## 2022-08-22 RX ADMIN — POTASSIUM CHLORIDE: 14.9 INJECTION, SOLUTION INTRAVENOUS at 15:58

## 2022-08-22 RX ADMIN — HEPARIN SODIUM 5000 UNITS: 5000 INJECTION INTRAVENOUS; SUBCUTANEOUS at 05:12

## 2022-08-22 RX ADMIN — OXYCODONE HYDROCHLORIDE 10 MG: 10 TABLET ORAL at 03:25

## 2022-08-22 RX ADMIN — PROPOFOL 100 MG: 10 INJECTION, EMULSION INTRAVENOUS at 14:47

## 2022-08-22 RX ADMIN — ROCURONIUM BROMIDE 50 MG: 10 INJECTION, SOLUTION INTRAVENOUS at 14:47

## 2022-08-22 RX ADMIN — GABAPENTIN 100 MG: 100 CAPSULE ORAL at 07:51

## 2022-08-22 RX ADMIN — OXYCODONE HYDROCHLORIDE 10 MG: 10 TABLET ORAL at 12:04

## 2022-08-22 RX ADMIN — LORAZEPAM 0.5 MG: 2 INJECTION INTRAMUSCULAR; INTRAVENOUS at 17:34

## 2022-08-22 RX ADMIN — ROCURONIUM BROMIDE 15 MG: 10 INJECTION, SOLUTION INTRAVENOUS at 16:15

## 2022-08-22 RX ADMIN — OXYCODONE HYDROCHLORIDE 10 MG: 10 TABLET ORAL at 07:49

## 2022-08-22 RX ADMIN — TRANEXAMIC ACID 1 G: 1 INJECTION, SOLUTION INTRAVENOUS at 15:16

## 2022-08-22 RX ADMIN — FENTANYL CITRATE 50 MCG: 50 INJECTION INTRAMUSCULAR; INTRAVENOUS at 14:22

## 2022-08-22 RX ADMIN — LORAZEPAM 0.5 MG: 2 INJECTION INTRAMUSCULAR; INTRAVENOUS at 17:48

## 2022-08-22 RX ADMIN — FENTANYL CITRATE 25 MCG: 50 INJECTION INTRAMUSCULAR; INTRAVENOUS at 17:15

## 2022-08-22 RX ADMIN — METHOCARBAMOL 750 MG: 750 TABLET ORAL at 12:04

## 2022-08-22 RX ADMIN — ROCURONIUM BROMIDE 15 MG: 10 INJECTION, SOLUTION INTRAVENOUS at 15:28

## 2022-08-22 RX ADMIN — SUCRALFATE 1 G: 1 TABLET ORAL at 21:48

## 2022-08-22 RX ADMIN — FENTANYL CITRATE 25 MCG: 50 INJECTION INTRAMUSCULAR; INTRAVENOUS at 17:10

## 2022-08-22 RX ADMIN — FENTANYL CITRATE 50 MCG: 50 INJECTION INTRAMUSCULAR; INTRAVENOUS at 15:26

## 2022-08-22 RX ADMIN — SODIUM CHLORIDE, SODIUM GLUCONATE, SODIUM ACETATE, POTASSIUM CHLORIDE, MAGNESIUM CHLORIDE, SODIUM PHOSPHATE, DIBASIC, AND POTASSIUM PHOSPHATE 75 ML/HR: .53; .5; .37; .037; .03; .012; .00082 INJECTION, SOLUTION INTRAVENOUS at 19:26

## 2022-08-22 RX ADMIN — PANTOPRAZOLE SODIUM 40 MG: 40 INJECTION, POWDER, FOR SOLUTION INTRAVENOUS at 07:51

## 2022-08-22 RX ADMIN — SUGAMMADEX 200 MG: 100 INJECTION, SOLUTION INTRAVENOUS at 16:48

## 2022-08-22 RX ADMIN — DEXAMETHASONE SODIUM PHOSPHATE 10 MG: 10 INJECTION, SOLUTION INTRAMUSCULAR; INTRAVENOUS at 14:47

## 2022-08-22 RX ADMIN — LIDOCAINE HYDROCHLORIDE 50 MG: 10 INJECTION, SOLUTION EPIDURAL; INFILTRATION; INTRACAUDAL at 14:47

## 2022-08-22 RX ADMIN — FUROSEMIDE 80 MG: 10 INJECTION, SOLUTION INTRAMUSCULAR; INTRAVENOUS at 07:50

## 2022-08-22 RX ADMIN — HYDROMORPHONE HYDROCHLORIDE 0.5 MG: 1 INJECTION, SOLUTION INTRAMUSCULAR; INTRAVENOUS; SUBCUTANEOUS at 10:14

## 2022-08-22 RX ADMIN — CEFAZOLIN SODIUM 2000 MG: 2 SOLUTION INTRAVENOUS at 21:44

## 2022-08-22 RX ADMIN — LORAZEPAM 0.5 MG: 2 INJECTION INTRAMUSCULAR; INTRAVENOUS at 19:07

## 2022-08-22 RX ADMIN — HEPARIN SODIUM 5000 UNITS: 5000 INJECTION INTRAVENOUS; SUBCUTANEOUS at 23:33

## 2022-08-22 RX ADMIN — METHOCARBAMOL 750 MG: 750 TABLET ORAL at 01:24

## 2022-08-22 RX ADMIN — LORAZEPAM 0.5 MG: 2 INJECTION INTRAMUSCULAR; INTRAVENOUS at 20:12

## 2022-08-22 RX ADMIN — PANTOPRAZOLE SODIUM 40 MG: 40 INJECTION, POWDER, FOR SOLUTION INTRAVENOUS at 20:12

## 2022-08-22 RX ADMIN — PHENYLEPHRINE HYDROCHLORIDE 20 MCG/MIN: 10 INJECTION INTRAVENOUS at 15:00

## 2022-08-22 RX ADMIN — GABAPENTIN 100 MG: 100 CAPSULE ORAL at 20:12

## 2022-08-22 RX ADMIN — ONDANSETRON 4 MG: 2 INJECTION INTRAMUSCULAR; INTRAVENOUS at 16:59

## 2022-08-22 RX ADMIN — POTASSIUM CHLORIDE 20 MEQ: 14.9 INJECTION, SOLUTION INTRAVENOUS at 10:13

## 2022-08-22 RX ADMIN — CARVEDILOL 25 MG: 12.5 TABLET, FILM COATED ORAL at 07:49

## 2022-08-22 RX ADMIN — CEFAZOLIN SODIUM 2000 MG: 2 SOLUTION INTRAVENOUS at 14:47

## 2022-08-22 RX ADMIN — SODIUM CHLORIDE, SODIUM GLUCONATE, SODIUM ACETATE, POTASSIUM CHLORIDE, MAGNESIUM CHLORIDE, SODIUM PHOSPHATE, DIBASIC, AND POTASSIUM PHOSPHATE 75 ML/HR: .53; .5; .37; .037; .03; .012; .00082 INJECTION, SOLUTION INTRAVENOUS at 05:29

## 2022-08-22 RX ADMIN — FENTANYL CITRATE 25 MCG: 50 INJECTION INTRAMUSCULAR; INTRAVENOUS at 17:19

## 2022-08-22 RX ADMIN — ACETAMINOPHEN 975 MG: 325 TABLET, FILM COATED ORAL at 05:12

## 2022-08-22 RX ADMIN — METHOCARBAMOL 750 MG: 750 TABLET ORAL at 05:12

## 2022-08-22 RX ADMIN — LOSARTAN POTASSIUM 100 MG: 50 TABLET, FILM COATED ORAL at 07:50

## 2022-08-22 RX ADMIN — ROPIVACAINE HYDROCHLORIDE 25 ML: 5 INJECTION, SOLUTION EPIDURAL; INFILTRATION; PERINEURAL at 14:05

## 2022-08-22 RX ADMIN — FENTANYL CITRATE 50 MCG: 50 INJECTION INTRAMUSCULAR; INTRAVENOUS at 14:23

## 2022-08-22 RX ADMIN — HYDROMORPHONE HYDROCHLORIDE 0.5 MG: 1 INJECTION, SOLUTION INTRAMUSCULAR; INTRAVENOUS; SUBCUTANEOUS at 01:22

## 2022-08-22 NOTE — OP NOTE
OPERATIVE REPORT  PATIENT NAME: Summer Roberson    :  1945  MRN: [id number]  Pt Location: AN OR ROOM 04    SURGERY DATE: 2022    Surgeon(s) and Role:     * Earl Arce MD - Primary     * Gonzaol Morris PA-C - Assisting     * Zoë Springer MD - Assisting    Preop Diagnosis:  Closed fracture of left distal femur ([address] ) [S72 402A]  Periprosthetic fracture around internal prosthetic left knee joint, initial encounter [M97 12XA]    Post-Op Diagnosis Codes:     * Closed fracture of left distal femur ([address] ) [S72 402A]     * Periprosthetic fracture around internal prosthetic left knee joint, initial encounter [M97 12XA]    Procedure(s) (LRB):  LEFT RETROGRADE IM SHAN (Left)    Specimen(s):  * No specimens in log *    Estimated Blood Loss:   150 mL    Drains:  Urethral Catheter 18 Fr  (Active)   Reasons to continue Urinary Catheter  Post-operative urological requirements 22 1016   Goal for Removal Voiding trial when ambulation improves 22 1016   Site Assessment Clean;Skin intact 22 1016   Campos Care Done 22 1016   Collection Container Standard drainage bag 22 1016   Securement Method Securing device (Describe) 22 1016   Output (mL) 450 mL 22 1205   Number of days: 2       Anesthesia Type:   Choice    Operative Indications:  Closed fracture of left distal femur (HCC) [S72 402A]  Periprosthetic fracture around internal prosthetic left knee joint, initial encounter [I46 86KI]    Operative Findings:  Stable prosthesis  Fixed with 12 x 320mm retrograde femoral nail ([email or web address])    Complications:   None    Procedure and Technique: In brief, patient is a 67-year-old female status post fall with left periprosthetic distal femur fracture  Due to the displaced nature of the fracture, nonunion/malunion risk, and in order to promote early mobility, patient was indicated for operative fixation    Risks and benefits were discussed with the patient including but not limited to:  Blood loss, infection, blood clot, damage to surrounding structures, pain, limp, loss of function, need for further surgery, prosthetic joint infection  Informed consent was obtained  Patient was brought to the operating room and underwent general anesthesia  She was placed supine on the flat top table  She was prepped and draped in usual sterile fashion  Time-out was performed confirming correct patient, location, and surgical procedure    Patient's prior anterior knee scar was opened up, exposing the capsule  The medial parapatellar capsulotomy was performed, exposing the implant  The fracture was then reduced utilizing axial traction and a Newell to manipulate the fracture fragments  A guide pin was placed in the center of the femoral box up past the fracture site  Opening reamers then used to open up the distal aspect of the femur  A ball-tipped guidewire was then passed up proximal to the lesser troch  The appropriate nail length was measured, and the femoral canal was sequentially reamed to accommodate a 12 x 320 mm retrograde femoral nail  The nail was then passed over the guidewire into appropriate position  Four distal interlocking screws were then placed utilizing the aiming arm  Two proximal interlocking screws were then placed utilizing perfect [place] technique  AP and lateral x-rays were then taken confirming appropriate reduction of the fracture and placement of hardware  Live fluoroscopy images confirmed that the fracture was stable throughout knee range of motion and varus/valgus stress on the extremity  All incisions were then copiously irrigated  The arthrotomy was closed with 1 Vicryl, skin was closed with 2-0 Vicryl and staples  Sterile dressing was applied  Patient was awakened from anesthesia and taken to PACU in stable condition  Postoperatively patient will be made weight-bearing as tolerated to the left lower extremity    She may resume her Eliquis on postop day 1 which will be sufficient for DVT prophylaxis from an orthopedic standpoint  She will follow up in the office in 2 weeks for staple removal and x-rays of the left femur  Dr Quirino Veloz was present for the entire procedure       Patient Disposition:  PACU       SIGNATURE: Angus Landa MD  DATE: August 22, 2022  TIME: 5:20 PM

## 2022-08-22 NOTE — PROGRESS NOTES
[facility]  Progress Note [facility] 1945[facility] 77 [facility]  [facility] MRN: [id number]  Unit/Bed#: OR [place] Encounter: [id number]  Primary Care Provider: Rosemary Hanna MD   Date and time admitted to hospital: 8/20/2022  5:58 AM    Melena  Assessment & Plan  Patient has been having melanotic stools over the last 24 hours  She takes Eliquis for atrial fibrillation  Currently Eliquis is on hold  S/p Kcentra  Placed on Protonix gtt  no melanotic stools reported overnight  Plan  GI on board,  continue protonix 40 IV bid  Close Hgb watch      * Fracture of proximal end of left femur [facility])  Assessment & Plan  Patient had a mechanical fall while going to the bathroom  Patient has been having some melanotic stools over the last 24 hours and slipped on it  She landed on her left knee  Underlying h/o bilateral knee replacements also  Admission imaging consistent with left femur fracture  Evaluated by ortho team, Planing the fixation today  Will continue Pain management   PT/OT eval once clinical stability     Acute on chronic blood loss anemia  Overview  Multiple melenotic stool over the past 24 hours on admission  Reversed with Kcentra on admission due to history of Eliquis use associated with atrial fibrillation  8/21- EGD- Single cratered, benign-appearing ulcer in the antrum with flat pigmented spot (Vish IIC); performed cold forceps biopsy; injected 3 mL of epinephrine to address bleeding; hemostasis achieved  2 small, superficial, round ulcers in the incisura and antrum with clean base (Vish III)  Baseline hgb 9-10  Currently 7 6  Possibly secondary to GI bleed      Assessment & Plan  pRBC transfused today  Monitor Hb      Acute pain due to trauma  Assessment & Plan  Dilaudid, hydromorphone PRN  Fall  Assessment & Plan  Mechanical fall      PT OT once clinical stability, patient may benefit of post acute rehab    Venous stasis dermatitis of both lower extremities  Assessment & Plan  Patient has mild erythema of the bilateral extremity which seems to be chronic  Erythema is symmetric, skin its significant dry and scaly , she only reports tenderness in left extremity, has remained afebrile, wbc wnl , very low likelihood of  cellulitis  At this point I suspect chronic venous stasis changes and chronic lymphedema  Will continue topical moisturizers  Continue diuretics  Will monitor off of antibiotics    Chronic diastolic heart failure [facility])  Assessment & Plan  Wt Readings from Last 3 Encounters:   22 103 kg (227 lb)   22 103 kg (227 lb 9 6 oz)   22 109 kg (239 lb 10 2 oz)     Patient has chronic diastolic congestive heart failure  Most recent echo (2021) : 65%  Systolic function is normal  Wall motion is normal  G1DD  Doesn't appear to be on acute exacerbation  Continue lasix and carvedilol  Monitor electrolytes while on diuretic therapy  Monitor urine output         A-fib (HCC)  Assessment & Plan  Continue patient on carvedilol, however Eliquis on hold  Chronic venous insufficiency  Assessment & Plan  Continue care with Lac-Hydrin cream           VTE Pharmacologic Prophylaxis:     held due to GI bleed     Mechanical VTE Prophylaxis in Place: Yes    Patient Centered Rounds: Nursing    Discussions with Specialists or Other Care Team Provider: attending physician    Current Length of Stay: 2 day(s)    Current Patient Status: Inpatient     Discharge Plan / Estimated Discharge Date: Not yet established    Code Status: Level 1 - Full Code      Subjective:   Patient complains of pain in the left leg  No bowel movements today  No nausea, vomiting, fever or chills  No bleeding per rectum       Objective:     Vitals:   Temp (24hrs), Av 1 °F (36 7 °C), Min:96 5 °F (35 8 °C), Max:99 °F (37 2 °C)    Temp:  [96 5 °F (35 8 °C)-99 °F (37 2 °C)] 96 5 °F (35 8 °C)  HR:  [64-89] 82  Resp:  [16-20] 20  BP: (119-142)/(52-71) 132/61  SpO2:  [85 %-100 %] 92 %  There is no height or weight on file to calculate BMI  Input and Output Summary (last 24 hours): Intake/Output Summary (Last 24 hours) at 8/22/2022 1423  Last data filed at 8/22/2022 1205  Gross per 24 hour   Intake 1000 ml   Output 5450 ml   Net -4450 ml       Physical Exam:     Physical Exam  Vitals and nursing note reviewed  Constitutional:       General: She is not in acute distress  Appearance: She is not toxic-appearing  HENT:      Head: Normocephalic and atraumatic  Right Ear: Tympanic membrane normal  There is no impacted cerumen  Left Ear: Tympanic membrane normal  There is no impacted cerumen  Nose: Nose normal  No congestion or rhinorrhea  Mouth/Throat:      Mouth: Mucous membranes are moist       Pharynx: Oropharynx is clear  No oropharyngeal exudate or posterior oropharyngeal erythema  Eyes:      Extraocular Movements: Extraocular movements intact  Conjunctiva/sclera: Conjunctivae normal       Pupils: Pupils are equal, round, and reactive to light  Cardiovascular:      Rate and Rhythm: Normal rate  Pulses: Normal pulses  Heart sounds: No murmur heard  No gallop  Pulmonary:      Effort: Pulmonary effort is normal  No respiratory distress  Breath sounds: No wheezing or rales  Chest:      Chest wall: No tenderness  Abdominal:      General: Bowel sounds are normal  There is no distension  Palpations: Abdomen is soft  Tenderness: There is no abdominal tenderness  Musculoskeletal:      Cervical back: Normal range of motion  No rigidity  Comments: Bilateral lower extremities erythema, edema, skin dryness and scaly, left lower extremity with immobilizer, distal pulses intact, sensation preserved, decreased ROM 2/2 pain   Lymphadenopathy:      Cervical: No cervical adenopathy  Skin:     General: Skin is warm  Capillary Refill: Capillary refill takes 2 to 3 seconds     Neurological:      Mental Status: She is alert and oriented to person, place, and time  Psychiatric:         Mood and Affect: Mood normal          Behavior: Behavior normal          Thought Content: Thought content normal          Judgment: Judgment normal           Additional Data:     Labs:  Results from last 7 days   Lab Units 08/22/22  0526   WBC Thousand/uL 9 18   HEMOGLOBIN g/dL 7 6*   HEMATOCRIT % 23 9*   PLATELETS Thousands/uL 267   NEUTROS PCT % 73   LYMPHS PCT % 16   MONOS PCT % 9   EOS PCT % 2     Results from last 7 days   Lab Units 08/22/22  0526 08/21/22  0449 08/20/22  0623   SODIUM mmol/L 143   < > 140   POTASSIUM mmol/L 3 4*   < > 3 5   CHLORIDE mmol/L 102   < > 100   CO2 mmol/L 36*   < > 33*   BUN mg/dL 17   < > 48*   CREATININE mg/dL 0 88   < > 1 03   ANION GAP mmol/L 5   < > 7   CALCIUM mg/dL 8 6   < > 9 3   ALBUMIN g/dL  --   --  3 7   TOTAL BILIRUBIN mg/dL  --   --  0 58   ALK PHOS U/L  --   --  81   ALT U/L  --   --  7   AST U/L  --   --  11*   GLUCOSE RANDOM mg/dL 107   < > 121    < > = values in this interval not displayed  Results from last 7 days   Lab Units 08/21/22  0449   INR  1 16     Results from last 7 days   Lab Units 08/21/22  1116   POC GLUCOSE mg/dl 145*         Results from last 7 days   Lab Units 08/20/22  0955   LACTIC ACID mmol/L 0 7   PROCALCITONIN ng/ml 0 07       Imaging: No pertinent imaging reviewed      Recent Cultures (last 7 days):           Lines/Drains:  Invasive Devices  Report    Peripheral Intravenous Line  Duration           Peripheral IV 08/20/22 Left Antecubital 2 days    Peripheral IV 08/20/22 Right Antecubital 2 days          Drain  Duration           Urethral Catheter 18 Fr  1 day                Telemetry:        Last 24 Hours Medication List:   Current Facility-Administered Medications   Medication Dose Route Frequency Provider Last Rate    [MAR Hold] acetaminophen  975 mg Oral [facility] SHAHIDA Julio      [facility] Hold] ammonium lactate   Topical Daily Luís Mariscal MD      [facility] Hold] carvedilol  25 mg Oral BID With Meals Javier Starch, CRNP      [facility] Hold] docusate sodium  100 mg Oral BID Javier Starch, CRNP      fentaNYL  25 mcg Intravenous Q5 Min PRN Barby Marquez CRNA      [facility] Hold] furosemide  80 mg Intravenous Daily Yana Saavedra MD      [facility] Hold] gabapentin  100 mg Oral TID Javier Starch, CRNP      [facility] Hold] heparin (porcine)  5,000 Units Subcutaneous [facility] Javier Starch, CRNP      [facility] Hold] HYDROmorphone  0 5 mg Intravenous Q4H PRN Javier Starch, CRNP      HYDROmorphone  0 2 mg Intravenous Q5 Min PRN Barby Marquez CRNA      [facility] Hold] lidocaine  1 patch Topical Daily Javier Starch, CRNP      [facility] Hold] losartan  100 mg Oral Daily Yana Saavedra MD      [facility] Hold] methocarbamol  750 mg Oral [facility] Javier Starch, CRNP      metoclopramide  10 mg Intravenous Once PRN Barby Marquez CRNA      multi-electrolyte  75 mL/hr Intravenous Continuous Javier Starch, CRNP 75 mL/hr (08/22/22 0529)   [facility] Concepcion [Z[facility] Hold] ondansetron  4 mg Intravenous Q4H PRN Javier Starch, CRNP      ondansetron  4 mg Intravenous Once PRN Barby Marquez CRNA      [facility] Hold] oxyCODONE  10 mg Oral Q4H PRN Javier Starch, CRNP      [facility] Hold] oxyCODONE  5 mg Oral Q3H PRN Javier Starch, CRCYNDI      [facility] Hold] pantoprazole  40 mg Intravenous Q12H Glenn Luke, CRNP      [facility] Hold] polyethylene glycol  17 g Oral Daily PRN Leatha Garcia PA-C      [facility] Hold] potassium chloride  40 mEq Oral Once Frieda Riley MD      ropivacaine  10 mL/hr Infiltration Continuous Tanikashira Romeroh, MD      [facility] Hold] sucralfate  1 g Oral 4x Daily (AC & HS) Javier Starch, CRNP       Facility-Administered Medications Ordered in Other Encounters   Medication Dose Route Frequency Provider Last Rate    fentanyl citrate (PF)   Intravenous PRN Tanika Champion MD      midazolam   Intravenous PRN Tanika Champion MD      propofol   Intravenous PRN Tanika Champion MD          Today, Patient Was Seen By: Henry Barton MD    ** Please Note: This note has been constructed using a voice recognition system       Nutrition Assessment and Intervention:     Reviewed food recall journal      Physical Activity Assessment and Intervention:    Activity journal reviewed      Emotional and Mental Well-being, Sleep, Connectedness Assessment and Intervention:    Sleep/stress assessment performed      Tobacco and Toxic Substance Assessment and Intervention:     Tobacco use screening performed    Alcohol and drug use screening performed

## 2022-08-22 NOTE — PROGRESS NOTES
Progress Note - Alonso Rodríguez 77 y o  female MRN: [id number]    Unit/Bed#: S -01 Encounter: [id number]    Assessment and Plan:     1  Acute anemia with melena  2  Gastric ulcer  Patient underwent urgent EGD yesterday with single crater ulcer at the antrum status post epi  Vital signs stable today  No bowel movement  Hemoglobin with mild decreased to 7 6, previously 8 4  BUN has resolved and is currently 17  Patient denies any abdominal pain nausea or vomiting      -continue PPI IV b i d   -patient will be going forth repeating procedure this afternoon therefore is NPO   -continue to monitor stool output   -can resume Eliquis 24-48 hours after procedure if hemoglobin continues to be stable with no signs of GI bleeding   -avoid NSAIDs   -discussed with patient and  repeating endoscopy in 3 months to confirm healing of ulcer     -monitor hemoglobin and transfuse as needed per primary team   -please reach out to GI for any signs of GI bleeding, drops in hemoglobin, melena, coffee-ground emesis, etc     ----------------------------------------------------------------------------------------------------------------    Subjective:     Patient reports she is doing well  No bowel movement  Denies any abdominal pain  No nausea or vomiting  Reports left leg pain and spasm  Objective:     Vitals: Blood pressure 131/66, pulse 78, temperature 98 1 °F (36 7 °C), resp  rate 16, SpO2 98 %  ,There is no height or weight on file to calculate BMI  Intake/Output Summary (Last 24 hours) at 8/22/2022 1011  Last data filed at 8/22/2022 0630  Gross per 24 hour   Intake 1300 ml   Output 3000 ml   Net -1700 ml       Physical Exam:     General Appearance: Alert, appears stated age and cooperative  In mild distress due to spasm of her leg during conversation    Lungs: Clear to auscultation bilaterally, no rales or rhonchi, no labored breathing/accessory muscle use  Heart: Regular rate and rhythm, S1, S2 normal, no murmur, click, rub or gallop  Abdomen: Soft, non-tender, non-distended; bowel sounds normal; no masses or no organomegaly  Benign abdomen  Extremities: No cyanosis, clubbing, or edema    Invasive Devices  Report    Peripheral Intravenous Line  Duration           Peripheral IV 08/20/22 Left Antecubital 2 days    Peripheral IV 08/20/22 Right Antecubital 2 days          Drain  Duration           Urethral Catheter 18 Fr  1 day                Lab Results:  Results from last 7 days   Lab Units 08/22/22  0526   WBC Thousand/uL 9 18   HEMOGLOBIN g/dL 7 6*   HEMATOCRIT % 23 9*   PLATELETS Thousands/uL 267   NEUTROS PCT % 73   LYMPHS PCT % 16   MONOS PCT % 9   EOS PCT % 2     Results from last 7 days   Lab Units 08/22/22  0526 08/21/22  0449 08/20/22  0623   POTASSIUM mmol/L 3 4*   < > 3 5   CHLORIDE mmol/L 102   < > 100   CO2 mmol/L 36*   < > 33*   BUN mg/dL 17   < > 48*   CREATININE mg/dL 0 88   < > 1 03   CALCIUM mg/dL 8 6   < > 9 3   ALK PHOS U/L  --   --  81   ALT U/L  --   --  7   AST U/L  --   --  11*    < > = values in this interval not displayed  Invalid input(s): BILI  Results from last 7 days   Lab Units 08/21/22  0449   INR  1 16           Imaging Studies: I have personally reviewed pertinent imaging studies      EGD    Result Date: 8/21/2022  Impression: The esophagus appeared normal  Single cratered, benign-appearing ulcer in the antrum with flat pigmented spot (Vish IIC); performed cold forceps biopsy; injected 3 mL of epinephrine to address bleeding; hemostasis achieved 2 small, superficial, round ulcers in the incisura and antrum with clean base (Vish III) The duodenal bulb and 2nd part of the duodenum appeared normal  RECOMMENDATION: Await pathology results Transition to IV PPI BID Clear liquid diet x 24 hours, and advance as tolerated Monitor stool output, hemoglobin, transfuse as needed Repeat EGD in three months to confirm ulcer healing Resume Eliquis in 24-48 hours pending clinical course Return to floor Results discussed with patient's   Mikey Perez MD     XR chest 1 view portable    Result Date: 8/20/2022  Impression: No acute cardiopulmonary disease  Workstation performed: [id number]     XR femur 2 views LEFT    Result Date: 8/20/2022  Impression: Displaced comminuted distal femoral fracture  This finding is noted in the ED preliminary report  Workstation performed: [id number]     XR knee 1 or 2 vw left    Result Date: 8/20/2022  Impression: No change comminuted posteriorly displaced distal femoral fracture  Workstation performed: [id number]     XR knee 4+ views left injury    Result Date: 8/20/2022  Impression: Comminuted displaced distal femoral fracture  This is also reported with the femoral images  Workstation performed: [id number]     CT head wo contrast    Result Date: 8/20/2022  Impression: No intracranial hemorrhage or calvarial fracture  Very mild, chronic microangiopathy Workstation performed: [id number]     XR pelvis ap only 1 or 2 vw    Result Date: 8/20/2022  Impression: No acute osseous abnormality  Workstation performed: [id number]     CT lower extremity w contrast left    Result Date: 8/21/2022  Impression: Displaced, comminuted distal femoral periprosthetic fracture as above  Severe left hip osteoarthritis  Diffuse subcutaneous edema/cellulitis throughout the left lower extremity  Correlate with clinical findings   Workstation performed: [id number]

## 2022-08-22 NOTE — ASSESSMENT & PLAN NOTE
Wt Readings from Last 3 Encounters:   08/18/22 103 kg (227 lb)   08/03/22 103 kg (227 lb 9 6 oz)   07/28/22 109 kg (239 lb 10 2 oz)     Patient has chronic diastolic congestive heart failure  Most recent echo (11/2021) : 65%   Systolic function is normal  Wall motion is normal  G1DD  Doesn't appear to be on acute exacerbation    Plan  · Continue carvedilol  · Oral Torsemide 40 mg  · Monitor electrolytes while on diuretic therapy  · Monitor urine output

## 2022-08-22 NOTE — ASSESSMENT & PLAN NOTE
Patient has been having melanotic stools over the last 24 hours  She takes Eliquis for atrial fibrillation  Currently Eliquis is on hold  S/p Kcentra  Placed on Protonix gtt  no melanotic stools reported overnight      Plan  · Resolved  · Continue protonix 40 IV bid  · Close Hgb watch

## 2022-08-22 NOTE — ASSESSMENT & PLAN NOTE
· Multiple melenotic stool over the past 24 hours  · Reversed with Kcentra on admission due to history of Eliquis use associated with atrial fibrillation  · GI consulted and note appreciated  · 8/21- EGD- Single cratered, benign-appearing ulcer in the antrum with flat pigmented spot (Vish IIC); performed cold forceps biopsy; injected 3 mL of epinephrine to address bleeding; hemostasis achieved  2 small, superficial, round ulcers in the incisura and antrum with clean base (Vish III)  · Protonix changed to b i d  IV  May changed to PO after OR today  · Hemoglobin is currently stable at 7 6  Patient will be transfused with 1 unit of PRBCs today for preop optimization  · Obtain postop labs    · Patient may be restarted on clear liquid diet postoperatively and advanced per GI recommendations  · Started on Carafate  · Avoid NSAIDs

## 2022-08-22 NOTE — ANESTHESIA PROCEDURE NOTES
Peripheral Block    Patient location during procedure: holding area  Reason for block: at surgeon's request and post-op pain management  Staffing  Performed: Anesthesiologist   Anesthesiologist: Howie Henry MD  Preanesthetic Checklist  Completed: patient identified, IV checked, site marked, risks and benefits discussed, surgical consent, monitors and equipment checked, pre-op evaluation and timeout performed  Peripheral Block  Patient position: supine  Prep: ChloraPrep  Patient monitoring: continuous pulse ox and frequent blood pressure checks  Block type: femoral  Laterality: left  Injection technique: single-shot  Procedures: ultrasound guided, Ultrasound guidance required for the procedure to increase accuracy and safety of medication placement and decrease risk of complications  Ultrasound permanent image savedropivacaine (NAROPIN) 0 5 % - Perineural   25 mL - 8/22/2022 2:05:00 PM  Needle  Needle gauge: 20g  Needle length: 4in    Needle localization: ultrasound guidance  Test dose: negative  Assessment  Injection assessment: incremental injection, local visualized surrounding nerve on ultrasound, no paresthesia on injection and negative aspiration for heme  Paresthesia pain: none  Heart rate change: no  Slow fractionated injection: yes  Post-procedure:  site cleaned  patient tolerated the procedure well with no immediate complications  Additional Notes  With dexamethasone 4 mg perineural

## 2022-08-22 NOTE — PROGRESS NOTES
[facility]  [facility] 1945[facility] 77 [facility]  [facility] MRN: [id number]  Unit/Bed#: S -01 Encounter: [id number]  Primary Care Provider: Dionicio Rowland MD   Date and time admitted to hospital: 8/20/2022  5:58 AM    [address]  · Mechanical   · Injuries listed below  · CT Head ordered--patient unsure if she hit her head, having head pain  * Fracture of proximal end of left femur ([address] )  Assessment & Plan  · Secondary to fall  · XR in CT show distal left femur fracture--no vascular injury on CTA  · Ortho consulted and note appreciated  · NPO for possible OR today  · Multimodail pain regimen--APS consulted and note appreciated  · DVT Prop:  Heparin-plan to restart Eliquis on 08/23-24  · PT/OT when indicated  Melena  Assessment & Plan  · Multiple melenotic stool over the past 24 hours  · Reversed with Kcentra on admission due to history of Eliquis use associated with atrial fibrillation  · GI consulted and note appreciated  · 8/21- EGD- Single cratered, benign-appearing ulcer in the antrum with flat pigmented spot (Vish IIC); performed cold forceps biopsy; injected 3 mL of epinephrine to address bleeding; hemostasis achieved  2 small, superficial, round ulcers in the incisura and antrum with clean base (Vish III)  · Protonix changed to b i d  IV  May changed to PO after OR today  · Hemoglobin is currently stable at 7 6  Patient will be transfused with 1 unit of PRBCs today for preop optimization  · Obtain postop labs    · Patient may be restarted on clear liquid diet postoperatively and advanced per GI recommendations  · Started on Carafate    Venous stasis dermatitis of both lower extremities  Assessment & Plan  · Was originally concern for cellulitis due to swelling, warmth, and erythema in bilateral lower extremities   · Patient received 2 days of Rocephin, which was subsequently discontinued per medicine's recommendations  · Lactic and procalcitonin were normal   WBC count is stable  · Continue use of  ammonium lactate lotion daily  · Legs look much improved today, less erythematous and less swelling  No excessive warmth  · Medicine consulted note appreciated  Acute pain due to trauma  Assessment & Plan  · In the setting of fibromyalgia  · APS consulted and note appreciated  · Continue on multimodal pain regimen  Pain is currently controlled on my assessment  · Will reassess postoperatively  Acute on chronic blood loss anemia  Assessment & Plan  Likely in the setting of gi bleed; melenic stools s/p EGD with ulcer injection for hemostasis  8/21- 1 unit PRBCs  8/22-1 unit PRBCs for hemoglobin of 7 6--preoperative for optimization  Hemodynamically stable  Continue to monitor  Chronic diastolic heart failure (HCC)  Assessment & Plan  Wt Readings from Last 3 Encounters:   08/18/22 103 kg (227 lb)   08/03/22 103 kg (227 lb 9 6 oz)   07/28/22 109 kg (239 lb 10 2 oz)       · Continue home carvedilol  · I&O      A-fib (HCC)  Assessment & Plan  · Hold Eliquis in the setting of GI bleed  · Continue hold Coreg  · Plan to restart Eliquis 08/23-24 (cleared by GI)          Disposition:  Continue current level of care  Anticipate rehab needs  SUBJECTIVE:  Chief Complaint:  I feel pretty good    Subjective:  Patient describes feeling generally well this morning  She continues to have spasm in her left upper leg, but notes it is not as severe as previous days  She does express anxiety associated with going to the OR today  She denies any abdominal pain, nausea, vomiting  She denies any numbness, tingling in her extremities  She denies any other complaints      OBJECTIVE:   Vitals:   Temp:  [97 °F (36 1 °C)-99 °F (37 2 °C)] 98 5 °F (36 9 °C)  HR:  [] 75  Resp:  [16-18] 18  BP: (119-158)/(52-73) 119/52    Intake/Output:  I/O       08/20 0701 08/21 0700 08/21 0701 08/22 0700 08/22 0701 08/23 0700    I V   1300     Blood 230      Total Intake 230 1300     Urine 5400 3550 2000    Stool 0      Total Output 5400 3550 2000    Net -1751 -2250 -2000           Unmeasured Stool Occurrence 0 x           Nutrition: Diet NPO; Sips with meds  GI Proph/Bowel Reg:  Colace  VTE Prophylaxis:Sequential compression device (Venodyne)  and Heparin     Physical Exam:   GENERAL APPEARANCE:  No acute distress  NEURO:  GCS is 15  Light touch sensation intact throughout  HEENT:  Normocephalic, atraumatic  Neck is supple  CV:  Irregular rate, +2 radial dorsalis pedis pulses, bilaterally  LUNGS:  Clear to auscultation, bilaterally  On room air  Saturating 97%  No wheezing, rhonchi, or rales  GI:  Abdomen is soft nontender  Bowel sounds are present  :  Campos in place  MSK:  Left lower extremity is in knee immobilizer  Patient displays the ability to move her left ankle and toes  No other extremities display any deformities  SKIN:  Warm, dry  Invasive Devices  Report    Peripheral Intravenous Line  Duration           Peripheral IV 08/20/22 Left Antecubital 2 days    Peripheral IV 08/20/22 Right Antecubital 2 days          Drain  Duration           Urethral Catheter 18 Fr  1 day                      Lab Results:   Results: I have personally reviewed all pertinent laboratory/tests results, BMP/CMP:   Lab Results   Component Value Date    SODIUM 143 08/22/2022    K 3 4 (L) 08/22/2022     08/22/2022    CO2 36 (H) 08/22/2022    BUN 17 08/22/2022    CREATININE 0 88 08/22/2022    CALCIUM 8 6 08/22/2022    EGFR 64 08/22/2022    and CBC:   Lab Results   Component Value Date    WBC 9 18 08/22/2022    HGB 7 6 (L) 08/22/2022    HCT 23 9 (L) 08/22/2022    MCV 93 08/22/2022     08/22/2022    MCH 29 7 08/22/2022    MCHC 31 8 08/22/2022    RDW 15 0 08/22/2022    MPV 10 5 08/22/2022    NRBC 0 08/22/2022     Imaging/EKG Studies: I have personally reviewed pertinent reports       Other Studies: none

## 2022-08-22 NOTE — PROGRESS NOTES
Progress Note - Orthopedics   Haresh Rodríguez 77 y o  female MRN: [id number]  Unit/Bed#: S -01      Subjective:    77 y  o female seen and examined at the bedside  Complains of diffuse left sided leg spasms/discomfort  No acute events  Pain controlled   Denies fevers chills, CP, SOB    Labs:  0   Lab Value Date/Time    HCT 23 9 (L) 08/22/2022 0526    HCT 26 0 (L) 08/21/2022 1403    HCT 25 3 (L) 08/21/2022 0806    HCT 37 4 09/23/2014 1339    HCT 33 6 (L) 09/03/2014 1146    HGB 7 6 (L) 08/22/2022 0526    HGB 8 4 (L) 08/21/2022 1403    HGB 7 9 (L) 08/21/2022 0806    HGB 11 3 (L) 09/23/2014 1339    HGB 10 8 (L) 09/03/2014 1146    INR 1 16 08/21/2022 0449    WBC 9 18 08/22/2022 0526    WBC 9 31 08/21/2022 0806    WBC 8 44 08/21/2022 0449    WBC 7 10 09/23/2014 1339    WBC 6 13 09/03/2014 1146       Meds:    Current Facility-Administered Medications:     acetaminophen (TYLENOL) tablet 975 mg, 975 mg, Oral, Q8H [facility], SHAHIDA Garcia, 975 mg at 08/22/22 5494    ammonium lactate (LAC-HYDRIN) 12 % cream, , Topical, Daily, Minal Saini MD, 1 application at 72/53/47 1119    carvedilol (COREG) tablet 25 mg, 25 mg, Oral, BID With Meals, SHAHIDA Garcia, 25 mg at 08/22/22 0749    fentaNYL (SUBLIMAZE) injection 25 mcg, 25 mcg, Intravenous, Q5 Min PRN, Elham Orta CRNA    furosemide (LASIX) injection 80 mg, 80 mg, Intravenous, Daily, Willa Mauricio MD, 80 mg at 08/22/22 0750    gabapentin (NEURONTIN) capsule 100 mg, 100 mg, Oral, TID, SHAHIDA Garcia, 100 mg at 08/22/22 0751    HYDROmorphone (DILAUDID) injection 0 5 mg, 0 5 mg, Intravenous, Q3H PRN, SHAHIDA Garcia, 0 5 mg at 08/22/22 8320    HYDROmorphone HCl (DILAUDID) injection 0 2 mg, 0 2 mg, Intravenous, Q5 Min PRN, Elham Orta CRNA    lidocaine (LIDODERM) 5 % patch 1 patch, 1 patch, Topical, Daily, SHAHIDA Garcia, 1 patch at 08/21/22 1117    losartan (COZAAR) tablet 100 mg, 100 mg, Oral, Daily, Minal Saini MD, 100 mg at 08/22/22 0750    methocarbamol (ROBAXIN) tablet 750 mg, 750 mg, Oral, Q6H [address], SHAHIDA Emerson, 750 mg at 08/22/22 5811    metoclopramide (REGLAN) injection 10 mg, 10 mg, Intravenous, Once PRN, Billy Arabia, CRNA    multi-electrolyte (PLASMALYTE-A/ISOLYTE-S PH 7 4) IV solution, 75 mL/hr, Intravenous, Continuous, SHAHIDA Emerson, Last Rate: 75 mL/hr at 08/22/22 0529, 75 mL/hr at 08/22/22 0529    [facility]et[facility]) injection 4 mg, 4 mg, Intravenous, Q4H PRN, SHAHIDA Emerson, 4 mg at 08/21/22 1009    ondansetron (ZOFRAN) injection 4 mg, 4 mg, Intravenous, Once PRN, Billy Arabia, CRNA    oxyCODONE (ROXICODONE) immediate release tablet 10 mg, 10 mg, Oral, Q4H PRN, SHAHIDA Emerson, 10 mg at 08/22/22 0749    oxyCODONE (ROXICODONE) IR tablet 5 mg, 5 mg, Oral, Q3H PRN, SHAHIDA Emerson    pantoprazole (PROTONIX) injection 40 mg, 40 mg, Intravenous, Q12H [address], SHAHIDA Emerson, 40 mg at 08/22/22 0751    polyethylene glycol (MIRALAX) packet 17 g, 17 g, Oral, Daily PRN, Umm Andrews PA-C    potassium chloride (K-DUR,KLOR-CON) CR tablet 40 mEq, 40 mEq, Oral, Once, Johan Boss MD    potassium chloride 20 mEq IVPB (premix), 20 mEq, Intravenous, Once, SHAHIDA Emerson    ropivacaine (NAROPIN) 0 2% in elastomeric infiltration pump (ON-Q* PUMP), 10 mL/hr, Infiltration, Continuous, Rachell Dhillon MD    Facility-Administered Medications Ordered in Other Encounters:     fentanyl citrate (PF) 100 MCG/2ML, , Intravenous, PRN, Rachell Dhillon MD, 50 mcg at 08/20/22 1458    midazolam (VERSED) injection, , Intravenous, PRN, Rachell Dhillon MD, 1 mg at 08/20/22 1455    propofol (DIPRIVAN) 200 MG/20ML bolus injection, , Intravenous, PRN, Rachell Dhillon MD, 30 mg at 08/20/22 1431    Blood Culture:   Lab Results   Component Value Date    BLOODCX No Growth After 5 Days   07/28/2022       Wound Culture:   Lab Results   Component Value Date WOUNDCULT 3+ Growth of Proteus mirabilis (A) 12/30/2021    WOUNDCULT 3+ Growth of Enterococcus faecalis (A) 12/30/2021    WOUNDCULT 2+ Growth of Pseudomonas aeruginosa (A) 12/30/2021    WOUNDCULT 3+ Growth of  12/30/2021       Ins and Outs:  I/O last 24 hours: In: 1300 [I V :1300]  Out: 3550 [Urine:3550]          Physical:  Vitals:    08/22/22 0718   BP: 131/66   Pulse: 78   Resp: 16   Temp: 98 1 °F (36 7 °C)   SpO2: 98%     Musculoskeletal: left Lower Extremity  · Skin intact, ecchymosis over distal anterior thigh  KI in place  · Sensation intact L3-S1  · TTP throughout distal upper leg  · Positive ankle dorsiflexion/plantarflexion  ·  2+ radial pulse    Assessment:    76 y  o female with left distal femur periprosthetic fracture  Plan:  · Non weight bearing to the left lower extremity in knee immobilizer  · NPO  · OR planned for today for operative fixation  · Discussed with trauma  Cleared for OR  Will be given additional PRBCs pre operatively  Type and screen completed  · Consent obtained, on file in OR     · Pain control per primary team    · Dispo: Ortho will follow    Syd Celaya PA-C

## 2022-08-22 NOTE — ASSESSMENT & PLAN NOTE
Wt Readings from Last 3 Encounters:   08/18/22 103 kg (227 lb)   08/03/22 103 kg (227 lb 9 6 oz)   07/28/22 109 kg (239 lb 10 2 oz)       · Continue home carvedilol  · I&O

## 2022-08-22 NOTE — ASSESSMENT & PLAN NOTE
· Patient had a mechanical fall while going to the bathroom  Patient has been having some melanotic stools over the last 24 hours and slipped on it  She landed on her left knee  · Underlying h/o bilateral knee replacements also  · Admission imaging consistent with left femur fracture     · POD 5 left retrograde intramedullary zoë  · Venous duplex normal  · Will continue Pain management per Acute Pain Service recs  · PT/OT: post acute rehab

## 2022-08-22 NOTE — ASSESSMENT & PLAN NOTE
· In the setting of fibromyalgia  · APS consulted and note appreciated  · Continue on multimodal pain regimen  Pain is currently controlled on my assessment  · Will reassess postoperatively

## 2022-08-22 NOTE — ANESTHESIA POSTPROCEDURE EVALUATION
Post-Op Assessment Note    CV Status:  Stable  Pain Score: 0    Pain management: adequate     Mental Status:  Alert and awake   Hydration Status:  Euvolemic   PONV Controlled:  Controlled   Airway Patency:  Patent      Post Op Vitals Reviewed: Yes      Staff: CRNA         No complications documented      BP   98/57   Temp 97 5 °F (36 4 °C) (08/22/22 1714)    Pulse 91 (08/22/22 1714)   Resp 18 (08/22/22 1714)    SpO2 96 % (08/22/22 1714)

## 2022-08-22 NOTE — ASSESSMENT & PLAN NOTE
· Patient has mild erythema of the bilateral extremity which seems to be chronic  Erythema is symmetric, skin its significant dry and scaly , she only reports tenderness in left extremity, has remained afebrile, wbc wnl , very low likelihood of  cellulitis  · Patient complained of left calf pain which radiates upward, on examination left lower leg looks swollen compared to right side     · Will continue topical moisturizers  · Venous doppler normal

## 2022-08-22 NOTE — ASSESSMENT & PLAN NOTE
· Secondary to fall  · XR in CT show distal left femur fracture--no vascular injury on CTA  · Ortho consulted and note appreciated  · NPO for possible OR today  · Multimodail pain regimen--APS consulted and note appreciated  · DVT Prop:  Heparin-plan to restart Eliquis on 08/23-24  · PT/OT when indicated

## 2022-08-22 NOTE — ASSESSMENT & PLAN NOTE
· Hold Eliquis in the setting of GI bleed  · Continue hold Coreg  · Plan to restart Eliquis 08/23-24 (cleared by GI)

## 2022-08-22 NOTE — PROGRESS NOTES
Progress Note - Acute Pain Service    Manfred Mccollum 77 y o  female MRN: [id number]  Unit/Bed#: S -01 Encounter: [id number]      Assessment:   Principal Problem:    Fracture of proximal end of left femur (HCC)  Active Problems:    Chronic venous insufficiency    A-fib (HCC)    Chronic diastolic heart failure (HCC)    Acute on chronic blood loss anemia    Cellulitis of lower extremity    Fall    Melena    Acute pain due to trauma    Manfred Mccollum is a 77 y o  female  S/p mechanical fall admitted 8/20 with distal femur periprosthetic fracture with displacement  S/p unsuccessful attempt at reducing the fracture  S/p femoral nerve block catheter which has inadvertently been removed  For ORIF distal femur scheduled 8/22  Patient seen on rounds with her  at bedside  She states pain is OK at rest  She describes muscle spasms that cause her pain   states she slept most of the day/night yesterday  She is scheduled for ORIF tentatively this afternoon  Plan:     - Surgery scheduled for today tentatively this afternoon    - Recommend standard oral opioid regimen with oxycodone 5 mg/10 mg PO q4hrs PRN for moderate/severe pain  - Recommend Dilaudid 0 5 mg IV q4hrs PRN for breakthrough pain    Multimodal analgesia:  - Tylenol 975 mg PO q8hrs standing  - Gabapentin 100 mg PO TID  - Robaxin 750 mg PO q8hrs standing (incresed yesterday)    Bowel Regimen:  - Docusate (Colace) 100 mg PO twice daily  - Senna 1 tablet PO qhs    APS will continue to follow  Please contact Acute Pain Service - [facility] via [employer] from [phone] with additional questions or concerns  See Carlito or Shea for additional contacts and after hours information  Pain History  Current pain location(s): LLE  Pain Scale:  Mild at rest, moderate to severe with spasm/movement  Quality: spasm  24 hour history: S/p EGD yesterday  Femoral nerve block catheter inadvertently removed  Pain improved but still reports muscle spasms  Tentatively for ORIF today  Opioid requirement previous 24 hours: 0 5mg dilaudid x6 doses  Oxycodone 10mg x6 doses    Meds/Allergies   all current active meds have been reviewed    No Known Allergies    Objective     Temp:  [97 °F (36 1 °C)-99 3 °F (37 4 °C)] 98 1 °F (36 7 °C)  HR:  [] 78  Resp:  [13-18] 16  BP: (121-175)/(56-99) 131/66    Physical Exam  Constitutional:       General: She is not in acute distress  Appearance: Normal appearance  Eyes:      Extraocular Movements: Extraocular movements intact  Conjunctiva/sclera: Conjunctivae normal    Cardiovascular:      Rate and Rhythm: Normal rate and regular rhythm  Pulmonary:      Effort: Pulmonary effort is normal  No respiratory distress  Abdominal:      General: Abdomen is flat  There is no distension  Musculoskeletal:         General: Tenderness and signs of injury present  Cervical back: Normal range of motion and neck supple  Skin:     General: Skin is warm and dry  Neurological:      General: No focal deficit present  Mental Status: She is alert and oriented to person, place, and time  Psychiatric:         Mood and Affect: Mood normal          Behavior: Behavior normal          Lab Results:   Results from last 7 days   Lab Units 08/22/22  0526   WBC Thousand/uL 9 18   HEMOGLOBIN g/dL 7 6*   HEMATOCRIT % 23 9*   PLATELETS Thousands/uL 267      Results from last 7 days   Lab Units 08/22/22  0526 08/21/22  0449 08/20/22  0623   POTASSIUM mmol/L 3 4*   < > 3 5   CHLORIDE mmol/L 102   < > 100   CO2 mmol/L 36*   < > 33*   BUN mg/dL 17   < > 48*   CREATININE mg/dL 0 88   < > 1 03   CALCIUM mg/dL 8 6   < > 9 3   ALK PHOS U/L  --   --  81   ALT U/L  --   --  7   AST U/L  --   --  11*    < > = values in this interval not displayed  Please note that the APS provides consultative services regarding pain management only    With the exception of ketamine and epidural infusions and except when indicated, final decisions regarding starting or changing doses of analgesic medications are at the discretion of the consulting service  Off hours consultation and/or medication management is generally not available      Swapna De Leon MD  Acute Pain Service

## 2022-08-22 NOTE — ASSESSMENT & PLAN NOTE
· Was originally concern for cellulitis due to swelling, warmth, and erythema in bilateral lower extremities   · Patient received 2 days of Rocephin, which was subsequently discontinued per medicine's recommendations  · Lactic and procalcitonin were normal   WBC count is stable  · Continue use of  ammonium lactate lotion daily  · Legs look much improved today, less erythematous and less swelling  No excessive warmth  · Medicine consulted note appreciated

## 2022-08-22 NOTE — ASSESSMENT & PLAN NOTE
Likely in the setting of gi bleed; melenic stools s/p EGD with ulcer injection for hemostasis  8/21- 1 unit PRBCs  8/22-1 unit PRBCs for hemoglobin of 7 6--preoperative for optimization  Hemodynamically stable  Continue to monitor

## 2022-08-22 NOTE — TELEPHONE ENCOUNTER
Francis Gregory has called the  [facility] office in regards to [employer]  Eddie Sanderson wished to inform Kya's PCP, Dr Juan C العراقي, of her current state  [facility] Sep states Herman Barnes had broken her leg on Saturday, 08/20/22, and is scheduled for surgery today  Eddie Sanderson wished to inform the  office

## 2022-08-22 NOTE — ANESTHESIA PREPROCEDURE EVALUATION
Procedure:  OPERATIVE REDUCTION AND FIXATION LEFT DISTAL FEMUR (Left Leg Upper)    Relevant Problems   CARDIO   (+) A-fib (HCC)   (+) Essential hypertension   (+) Mixed hyperlipidemia      /RENAL   (+) Stage 3 chronic kidney disease (HCC)      HEMATOLOGY   (+) Acute on chronic blood loss anemia      MUSCULOSKELETAL   (+) Chronic low back pain with sciatica   (+) Osteoarthritis of knee      NEURO/PSYCH   (+) Anxiety   (+) Chronic low back pain with sciatica      Digestive   (+) Melena      Musculoskeletal and Integument   (+) Fracture of proximal end of left femur (HCC)      Other   (+) Acute pain due to trauma        Physical Exam    Airway      TM Distance: >3 FB  Neck ROM: full     Dental       Cardiovascular      Pulmonary      Other Findings        Anesthesia Plan  ASA Score- 3     Anesthesia Type- general with ASA Monitors  Additional Monitors: arterial line  Airway Plan: ETT  Plan Factors-Exercise tolerance (METS): <4 METS  Chart reviewed  EKG reviewed  Imaging results reviewed  Existing labs reviewed  Patient summary reviewed  Induction- intravenous  Postoperative Plan- Plan for postoperative opioid use  Planned trial extubation    Informed Consent- Anesthetic plan and risks discussed with patient  I personally reviewed this patient with the CRNA  Discussed and agreed on the Anesthesia Plan with the CRNA  Geoff Bullock

## 2022-08-23 ENCOUNTER — TELEPHONE (OUTPATIENT)
Dept: GERIATRICS | Age: 77
End: 2022-08-23

## 2022-08-23 ENCOUNTER — APPOINTMENT (INPATIENT)
Dept: VASCULAR ULTRASOUND | Facility: HOSPITAL | Age: 77
DRG: 480 | End: 2022-08-23
Payer: COMMERCIAL

## 2022-08-23 LAB
ABO GROUP BLD BPU: NORMAL
ABO GROUP BLD: NORMAL
ALBUMIN SERPL BCP-MCNC: 3.3 G/DL (ref 3.5–5)
ALP SERPL-CCNC: 60 U/L (ref 34–104)
ALT SERPL W P-5'-P-CCNC: 6 U/L (ref 7–52)
ANION GAP SERPL CALCULATED.3IONS-SCNC: 7 MMOL/L (ref 4–13)
AST SERPL W P-5'-P-CCNC: 16 U/L (ref 13–39)
BILIRUB SERPL-MCNC: 0.44 MG/DL (ref 0.2–1)
BLD GP AB SCN SERPL QL: NEGATIVE
BPU ID: NORMAL
BUN SERPL-MCNC: 13 MG/DL (ref 5–25)
CALCIUM ALBUM COR SERPL-MCNC: 9.1 MG/DL (ref 8.3–10.1)
CALCIUM SERPL-MCNC: 8.5 MG/DL (ref 8.4–10.2)
CHLORIDE SERPL-SCNC: 102 MMOL/L (ref 96–108)
CO2 SERPL-SCNC: 32 MMOL/L (ref 21–32)
CREAT SERPL-MCNC: 0.82 MG/DL (ref 0.6–1.3)
CROSSMATCH: NORMAL
ERYTHROCYTE [DISTWIDTH] IN BLOOD BY AUTOMATED COUNT: 14.9 % (ref 11.6–15.1)
GFR SERPL CREATININE-BSD FRML MDRD: 70 ML/MIN/1.73SQ M
GLUCOSE SERPL-MCNC: 152 MG/DL (ref 65–140)
HCT VFR BLD AUTO: 26.7 % (ref 34.8–46.1)
HGB BLD-MCNC: 8.3 G/DL (ref 11.5–15.4)
HGB BLD-MCNC: 8.5 G/DL (ref 11.5–15.4)
HGB BLD-MCNC: 8.7 G/DL (ref 11.5–15.4)
HGB BLD-MCNC: 9.1 G/DL (ref 11.5–15.4)
MCH RBC QN AUTO: 29.1 PG (ref 26.8–34.3)
MCHC RBC AUTO-ENTMCNC: 31.1 G/DL (ref 31.4–37.4)
MCV RBC AUTO: 94 FL (ref 82–98)
PLATELET # BLD AUTO: 267 THOUSANDS/UL (ref 149–390)
PMV BLD AUTO: 10.9 FL (ref 8.9–12.7)
POTASSIUM SERPL-SCNC: 3.9 MMOL/L (ref 3.5–5.3)
PROT SERPL-MCNC: 5.9 G/DL (ref 6.4–8.4)
RBC # BLD AUTO: 2.85 MILLION/UL (ref 3.81–5.12)
RH BLD: NEGATIVE
SODIUM SERPL-SCNC: 141 MMOL/L (ref 135–147)
SPECIMEN EXPIRATION DATE: NORMAL
UNIT DISPENSE STATUS: NORMAL
UNIT PRODUCT CODE: NORMAL
UNIT PRODUCT VOLUME: 350 ML
UNIT RH: NORMAL
WBC # BLD AUTO: 12.14 THOUSAND/UL (ref 4.31–10.16)

## 2022-08-23 PROCEDURE — 99024 POSTOP FOLLOW-UP VISIT: CPT | Performed by: PHYSICIAN ASSISTANT

## 2022-08-23 PROCEDURE — 86901 BLOOD TYPING SEROLOGIC RH(D): CPT | Performed by: INTERNAL MEDICINE

## 2022-08-23 PROCEDURE — 97530 THERAPEUTIC ACTIVITIES: CPT

## 2022-08-23 PROCEDURE — 97163 PT EVAL HIGH COMPLEX 45 MIN: CPT

## 2022-08-23 PROCEDURE — 86850 RBC ANTIBODY SCREEN: CPT | Performed by: INTERNAL MEDICINE

## 2022-08-23 PROCEDURE — 85027 COMPLETE CBC AUTOMATED: CPT | Performed by: PHYSICIAN ASSISTANT

## 2022-08-23 PROCEDURE — 99223 1ST HOSP IP/OBS HIGH 75: CPT | Performed by: NURSE PRACTITIONER

## 2022-08-23 PROCEDURE — 85018 HEMOGLOBIN: CPT | Performed by: PHYSICIAN ASSISTANT

## 2022-08-23 PROCEDURE — 97167 OT EVAL HIGH COMPLEX 60 MIN: CPT

## 2022-08-23 PROCEDURE — 99232 SBSQ HOSP IP/OBS MODERATE 35: CPT | Performed by: INTERNAL MEDICINE

## 2022-08-23 PROCEDURE — 93970 EXTREMITY STUDY: CPT

## 2022-08-23 PROCEDURE — 80053 COMPREHEN METABOLIC PANEL: CPT | Performed by: PHYSICIAN ASSISTANT

## 2022-08-23 PROCEDURE — 97535 SELF CARE MNGMENT TRAINING: CPT

## 2022-08-23 PROCEDURE — 99232 SBSQ HOSP IP/OBS MODERATE 35: CPT | Performed by: SURGERY

## 2022-08-23 PROCEDURE — 86900 BLOOD TYPING SEROLOGIC ABO: CPT | Performed by: INTERNAL MEDICINE

## 2022-08-23 PROCEDURE — C9113 INJ PANTOPRAZOLE SODIUM, VIA: HCPCS | Performed by: PHYSICIAN ASSISTANT

## 2022-08-23 RX ORDER — POLYETHYLENE GLYCOL 3350 17 G/17G
17 POWDER, FOR SOLUTION ORAL DAILY
Status: DISCONTINUED | OUTPATIENT
Start: 2022-08-23 | End: 2022-08-30 | Stop reason: HOSPADM

## 2022-08-23 RX ORDER — DIAZEPAM 5 MG/1
5 TABLET ORAL EVERY 6 HOURS PRN
Status: DISCONTINUED | OUTPATIENT
Start: 2022-08-23 | End: 2022-08-26

## 2022-08-23 RX ORDER — PANTOPRAZOLE SODIUM 40 MG/1
40 TABLET, DELAYED RELEASE ORAL
Status: DISCONTINUED | OUTPATIENT
Start: 2022-08-23 | End: 2022-08-30 | Stop reason: HOSPADM

## 2022-08-23 RX ADMIN — OXYCODONE HYDROCHLORIDE 10 MG: 10 TABLET ORAL at 22:15

## 2022-08-23 RX ADMIN — GABAPENTIN 100 MG: 100 CAPSULE ORAL at 22:14

## 2022-08-23 RX ADMIN — PANTOPRAZOLE SODIUM 40 MG: 40 TABLET, DELAYED RELEASE ORAL at 16:20

## 2022-08-23 RX ADMIN — GABAPENTIN 100 MG: 100 CAPSULE ORAL at 08:07

## 2022-08-23 RX ADMIN — CARVEDILOL 25 MG: 12.5 TABLET, FILM COATED ORAL at 16:20

## 2022-08-23 RX ADMIN — METHOCARBAMOL 750 MG: 750 TABLET ORAL at 12:00

## 2022-08-23 RX ADMIN — FUROSEMIDE 80 MG: 10 INJECTION, SOLUTION INTRAMUSCULAR; INTRAVENOUS at 08:07

## 2022-08-23 RX ADMIN — METHOCARBAMOL 750 MG: 750 TABLET ORAL at 17:17

## 2022-08-23 RX ADMIN — DOCUSATE SODIUM 100 MG: 100 CAPSULE, LIQUID FILLED ORAL at 08:07

## 2022-08-23 RX ADMIN — ACETAMINOPHEN 975 MG: 325 TABLET, FILM COATED ORAL at 13:18

## 2022-08-23 RX ADMIN — DOCUSATE SODIUM 100 MG: 100 CAPSULE, LIQUID FILLED ORAL at 17:17

## 2022-08-23 RX ADMIN — ACETAMINOPHEN 975 MG: 325 TABLET, FILM COATED ORAL at 22:14

## 2022-08-23 RX ADMIN — CARVEDILOL 25 MG: 12.5 TABLET, FILM COATED ORAL at 08:07

## 2022-08-23 RX ADMIN — OXYCODONE HYDROCHLORIDE 10 MG: 10 TABLET ORAL at 12:00

## 2022-08-23 RX ADMIN — SUCRALFATE 1 G: 1 TABLET ORAL at 16:20

## 2022-08-23 RX ADMIN — SUCRALFATE 1 G: 1 TABLET ORAL at 12:01

## 2022-08-23 RX ADMIN — HYDROMORPHONE HYDROCHLORIDE 0.5 MG: 1 INJECTION, SOLUTION INTRAMUSCULAR; INTRAVENOUS; SUBCUTANEOUS at 10:22

## 2022-08-23 RX ADMIN — HEPARIN SODIUM 5000 UNITS: 5000 INJECTION INTRAVENOUS; SUBCUTANEOUS at 06:16

## 2022-08-23 RX ADMIN — DIAZEPAM 5 MG: 5 TABLET ORAL at 22:15

## 2022-08-23 RX ADMIN — HYDROMORPHONE HYDROCHLORIDE 0.5 MG: 1 INJECTION, SOLUTION INTRAMUSCULAR; INTRAVENOUS; SUBCUTANEOUS at 14:27

## 2022-08-23 RX ADMIN — APIXABAN 5 MG: 5 TABLET, FILM COATED ORAL at 17:17

## 2022-08-23 RX ADMIN — ACETAMINOPHEN 975 MG: 325 TABLET, FILM COATED ORAL at 06:16

## 2022-08-23 RX ADMIN — HYDROMORPHONE HYDROCHLORIDE 0.5 MG: 1 INJECTION, SOLUTION INTRAMUSCULAR; INTRAVENOUS; SUBCUTANEOUS at 19:05

## 2022-08-23 RX ADMIN — CEFAZOLIN SODIUM 2000 MG: 2 SOLUTION INTRAVENOUS at 06:16

## 2022-08-23 RX ADMIN — METHOCARBAMOL 750 MG: 750 TABLET ORAL at 06:16

## 2022-08-23 RX ADMIN — POLYETHYLENE GLYCOL 3350 17 G: 17 POWDER, FOR SOLUTION ORAL at 16:20

## 2022-08-23 RX ADMIN — OXYCODONE HYDROCHLORIDE 10 MG: 10 TABLET ORAL at 16:20

## 2022-08-23 RX ADMIN — GABAPENTIN 100 MG: 100 CAPSULE ORAL at 16:20

## 2022-08-23 RX ADMIN — PANTOPRAZOLE SODIUM 40 MG: 40 INJECTION, POWDER, FOR SOLUTION INTRAVENOUS at 08:07

## 2022-08-23 RX ADMIN — SUCRALFATE 1 G: 1 TABLET ORAL at 06:16

## 2022-08-23 RX ADMIN — LOSARTAN POTASSIUM 100 MG: 50 TABLET, FILM COATED ORAL at 08:07

## 2022-08-23 RX ADMIN — OXYCODONE HYDROCHLORIDE 10 MG: 10 TABLET ORAL at 08:06

## 2022-08-23 RX ADMIN — SUCRALFATE 1 G: 1 TABLET ORAL at 22:14

## 2022-08-23 RX ADMIN — SODIUM CHLORIDE, SODIUM GLUCONATE, SODIUM ACETATE, POTASSIUM CHLORIDE, MAGNESIUM CHLORIDE, SODIUM PHOSPHATE, DIBASIC, AND POTASSIUM PHOSPHATE 75 ML/HR: .53; .5; .37; .037; .03; .012; .00082 INJECTION, SOLUTION INTRAVENOUS at 09:46

## 2022-08-23 RX ADMIN — Medication: at 08:16

## 2022-08-23 RX ADMIN — HYDROMORPHONE HYDROCHLORIDE 0.5 MG: 1 INJECTION, SOLUTION INTRAMUSCULAR; INTRAVENOUS; SUBCUTANEOUS at 03:37

## 2022-08-23 RX ADMIN — APIXABAN 5 MG: 5 TABLET, FILM COATED ORAL at 13:18

## 2022-08-23 RX ADMIN — OXYCODONE HYDROCHLORIDE 10 MG: 10 TABLET ORAL at 01:20

## 2022-08-23 NOTE — PROGRESS NOTES
Peripheral Nerve Block Follow-up Note - Acute Pain Service    Erika Hollis 77 y o  female MRN: [id number]  Unit/Bed#: S -01 Encounter: [id number]      Assessment:   Principal Problem:    Fracture of proximal end of left femur ([address] )  Active Problems:    Chronic venous insufficiency    A-fib (HCC)    Chronic diastolic heart failure (HCC)    Acute on chronic blood loss anemia    Venous stasis dermatitis of both lower extremities    Fall    Melena    Acute pain due to trauma    Erika Hollis is a 77y o  year old female S/p mechanical fall admitted 8/20 with distal femur periprosthetic fracture with displacement  S/p unsuccessful attempt at reducing the fracture  S/p femoral nerve block catheter which has inadvertently been removed  S/p ORIF femur with ap-op femoral nerve block on 8/22  Patient seen on rounds today, she is lying in bed eating breakfast   is at bedside  Both are very thankful for the wonderful care they are receiving  Patient is somewhat confused and groggy after evening surgery yesterday, per the   She reports muscle spasms in her LLE and a headache  We discussed options for multimodal analgesia and goals of care including tolerable pain control and working with PT/OT      Plan:     - Left femoral block is functioning appropriately with expected sensory deficits  - Recommend standard oral opioid regimen with oxycodone 5 mg/10 mg PO q4hrs PRN for moderate/severe pain  - Recommend decrease dilaudid to 0 2 mg IV q4hrs PRN for breakthrough pain    - Multimodal analgesia with:  - Tylenol 975 mg PO q8hrs standing, also can be used for headache  - Recommend decrease Gabapentin to qhs only from TID to help decrease risk of confusion (not listed as a home med)  - Robaxin 750 mg PO q8hrs  - If muscle spasms persist post-op, consider switch to valium (if mental status allows) or tinazidine, a home medication per the   - Lidocaine patches to affected areas 12 hours on, 12 hours off    APS will continue to follow  Please contact Acute Pain Service - [facility] via Secure[employer] from [phone] with additional questions or concerns  See Carlito or Shea for additional contacts and after hours information  Pain History  Current pain location(s): LLE  Pain Scale:   Reported at 10/10 consistantly  Quality: spasm  24 hour history: POD 1 s/p ORIF left femur    Opioid requirement previous 24 hours: oxycodone 10mg x2 doses, dilaudid 0 5mg x1 dose    Meds/Allergies   all current active meds have been reviewed    No Known Allergies    Objective     Temp:  [96 5 °F (35 8 °C)-99 3 °F (37 4 °C)] 98 5 °F (36 9 °C)  HR:  [] 87  Resp:  [18-22] 18  BP: ()/(52-73) 143/73    Physical Exam  Constitutional:       General: She is not in acute distress  Appearance: Normal appearance  Eyes:      Extraocular Movements: Extraocular movements intact  Conjunctiva/sclera: Conjunctivae normal    Cardiovascular:      Rate and Rhythm: Normal rate and regular rhythm  Pulmonary:      Effort: Pulmonary effort is normal    Abdominal:      General: Abdomen is flat  Tenderness: There is no abdominal tenderness  Musculoskeletal:         General: Normal range of motion  Cervical back: Normal range of motion and neck supple  Comments: LLE dressing in place   Skin:     General: Skin is warm  Neurological:      General: No focal deficit present  Mental Status: She is alert  Cranial Nerves: No cranial nerve deficit  Sensory: No sensory deficit  Psychiatric:         Mood and Affect: Mood normal          Thought Content:  Thought content normal            Lab Results:   Results from last 7 days   Lab Units 08/23/22  0745   WBC Thousand/uL 12 14*   HEMOGLOBIN g/dL 8 3*  8 5*   HEMATOCRIT % 26 7*   PLATELETS Thousands/uL 267      Results from last 7 days   Lab Units 08/23/22  0745 08/22/22  1556   POTASSIUM mmol/L 3 9  --    CHLORIDE mmol/L 102  --    CO2 mmol/L 32  -- CO2, I-STAT mmol/L  --  36*   BUN mg/dL 13  --    CREATININE mg/dL 0 82  --    CALCIUM mg/dL 8 5  --    ALK PHOS U/L 60  --    ALT U/L 6*  --    AST U/L 16  --    GLUCOSE, ISTAT mg/dl  --  121       Please note that the APS provides consultative services regarding pain management only  With the exception of ketamine, peripheral nerve catheters, and epidural infusions (and except when indicated), final decisions regarding starting or changing doses of analgesic medications are at the discretion of the consulting service  Off hours consultation and/or medication management is generally not available      Everardo Chew MD  Acute Pain Service

## 2022-08-23 NOTE — OCCUPATIONAL THERAPY NOTE
Occupational Therapy Evaluation + Treatment     Patient Name: Demetria Burroughs  [facility]'M Date: 8/23/2022  Problem List  Principal Problem:    Fracture of proximal end of left femur [facility])  Active Problems:    Chronic venous insufficiency    A-fib ([facility])    Chronic diastolic heart failure ([facility])    Acute on chronic blood loss anemia    Venous stasis dermatitis of both lower extremities    Fall    Melena    Acute pain due to trauma    Past Medical History  Past Medical History:   Diagnosis Date    A-fib ([facility] ) 12/29/2021    Anxiety     Arthritis     Back pain     Cellulitis     CHF (congestive heart failure) ([facility])     Edema of both lower extremities due to peripheral venous insufficiency     Edema of both lower extremities due to peripheral venous insufficiency     Erythema of lower extremity 11/12/2021    Fibromyalgia     Hypertension     Sjogren syndrome, unspecified ([place] [id number] )      Past Surgical History  Past Surgical History:   Procedure Laterality Date    KNEE CARTILAGE SURGERY      DE OPEN RX FEMUR FX+INTRAMED SHAN Left 8/22/2022    Procedure: LEFT RETROGRADE IM SHAN;  Surgeon: Eros Winchester MD;  Location: [facility];  Service: Orthopedics    REPLACEMENT TOTAL KNEE BILATERAL             08/23/22 1014   OT Last Visit   OT Visit Date 08/23/22   Note Type   Note type Evaluation   Restrictions/Precautions   Weight Bearing Precautions Per Order Yes   LLE Weight Bearing Per Order (S)  WBAT   Other Precautions Cognitive; Chair Alarm; Bed Alarm;WBS;Multiple lines;O2;Fall Risk;Pain  (Catheter, IV, O2)   Pain Assessment   Pain Assessment Tool FLACC   Pain Score   ("I don't know how much", pt 10/10 pain with movement)   Pain Location/Orientation Orientation: Left; Location: Knee   Pain Onset/Description Onset: Gradual;Descriptor: Burning  (Worsens when moving LLE and flexing knee)   Effect of Pain on Daily Activities Limits activity tolerance and functional mobility   Hospital Pain Intervention(s) Repositioned; Ambulation/increased activity; Emotional support   Pain Rating: FLACC (Rest) - Face 1   Pain Rating: FLACC (Rest) - Legs 0   Pain Rating: FLACC (Rest) - Activity 0   Pain Rating: FLACC (Rest) - Cry 0   Pain Rating: FLACC (Rest) - Consolability 0   Score: FLACC (Rest) 1   Pain Rating: FLACC (Activity) - Face 1   Pain Rating: FLACC (Activity) - Legs 1   Pain Rating: FLACC (Activity) - Activity 1   Pain Rating: FLACC (Activity) - Cry 1   Pain Rating: FLACC (Activity) - Consolability 1   Score: FLACC (Activity) 5   Home Living   Type of Home House  ([facility])   Home Layout Two level;Stairs to enter with rails  (2 GLORIA, stair glide to 2nd floor)   Bathroom Shower/Tub Tub/shower unit   Bathroom Toilet Standard   Bathroom Equipment Grab bars in shower; Shower chair   Bathroom Accessibility Accessible   Home Equipment Walker;Cane;Stair glide;Grab bars   Additional Comments Pt reports using RW at baseline   Prior Function   Lives With Spouse  (, works from 2-10 during the day, pt is home alone during these hours)   Receives Help From [address]  ( (A) with donning shoes)   IADLs Needs assistance   Falls in the last 6 months 1 to 4  ([facility], three overall recent falls, one fall reason for admission)   Vocational Retired  (Worked in medical buildings)   Comments Pt reports that fall occurred due to RW moving forward   Lifestyle   Autonomy Pt reports living in [facility] with  and being (I) with most ADLs and receiving help PRN, (A) for IADLs, (+) falls, & (+) use of RW at baseline   Reciprocal Relationships    Service to Others Retired   Intrinsic Gratification Spending time with friends, reading   Subjective   Subjective "I'm not that old girls"   ADL   Eating Assistance 7  Independent   Grooming Assistance 5  Supervision/Setup  (Washing face)   Grooming Deficit Setup;Supervision/safety;Verbal cueing   UB Bathing Assistance 5  Supervision/Setup  (Completed while sitting EOB)   UB Bathing Deficit Setup;Verbal cueing;Supervision/safety; Increased time to complete  ((A) with washing back)   LB Bathing Assistance 4  Minimal Assistance  ((A) with washing foot)   LB Bathing Deficit Setup;Verbal cueing;Supervision/safety; Increased time to complete  (Pt able to wash LLE while seated EOB)   UB Dressing Assistance 5  Supervision/Setup  (Keener and donning gown)   UB Dressing Deficit Setup;Verbal cueing;Supervision/safety; Increased time to complete   LB Dressing Assistance 1  Total Assistance  (Donning socks)   LB Dressing Deficit Setup;Supervision/safety;Verbal cueing; Increased time to complete; Don/doff R sock; Don/doff L sock   Toileting Assistance  3  Moderate Assistance   Additional Comments While unable to assess eating and toileting at time of evaluation, with use of clinical reasoning, pt's performance throughout evaluation indicates that pt may be able to perform these tasks at the levels listed above   Bed Mobility   Supine to Sit 2  Maximal assistance   Additional items Assist x 2;Bedrails; Increased time required;Verbal cues;LE management   Sit to Supine Unable to assess   Additional Comments Pt seated OOB in recliner at end of session   Transfers   Sit to Stand 2  Maximal assistance   Additional items Assist x 2;Bedrails; Increased time required;Verbal cues   Stand to Sit 2  Maximal assistance   Additional items Assist x 2; Increased time required;Verbal cues   Additional Comments Pt attempting sit > stand from EOB with RW and Max Ax2 but unable to sustain, pt then attempting sit > stand with HHA but unable to achieve full standing position, pt then completing one sit > stand transfer from EOB with quick move to chair, VCs for safety, body positioning, hand placement on quick movement, and encouragement   Functional Mobility   Additional Comments Pt unable to progress at this time, will continue to assess   Balance   Static Sitting Fair -   Dynamic Sitting Poor +   Static Standing Zero  (Ax2)   Ambulatory Zero  (Ax2)   Activity Tolerance   Activity Tolerance Patient limited by fatigue;Patient limited by pain   Medical Staff Made Aware Care coordination with PT Shantelle   Nurse Made Aware Spoke to KITTY ALLEN Assessment   RUE Assessment WFL   LUE Assessment   LUE Assessment WFL   Hand Function   Gross Motor Coordination Functional   Fine Motor Coordination Functional   Vision-Basic Assessment   Current Vision Wears glasses all the time   Cognition   Overall Cognitive Status Impaired   Arousal/Participation Alert; Cooperative   Attention Attends with cues to redirect   Orientation Level Oriented to person;Oriented to place   Memory Decreased short term memory;Decreased recall of recent events;Decreased recall of precautions   Following Commands Follows one step commands with increased time or repetition   Comments Pt identified by name and  at beginning of session, pt anxious throughout session and fearful of falling, required VCs for attention to task and insight into deficits, max encouragement to participate in session   Assessment   Limitation Decreased ADL status; Decreased Safe judgement during ADL;Decreased cognition;Decreased endurance;Decreased self-care trans;Decreased high-level ADLs   Prognosis Good   Assessment Pt is a 77 y o  female seen for OT evaluation s/p admission to [facility] on 2022 due to fall  Pt diagnosed with Fracture of proximal end of left femur ([address] )  Pt has a significant PMH impacting occupational performance including: A-fib, chronic venous insufficiency, CHF, falls, melena, HTN, CKD, sjogren's syndrome, osteoarthritis of knee, & memory impairment  Pt with two recent admissions in the last 6 months  Pt with active OT evaluation and treatment orders and activity orders for Up with assistance   Prior to completing the IE, an extensive review of medical and/or therapy records and physical, cognitive, and psychosocial information related to pt functional performance was completed  The pt is considered a high complexity evaluation due to assessments used during IE and observed and listed functional performance deficits  PTA, pt reports living in [facility] with  and being (I) with most ADLs and receiving help PRN, (A) for IADLs, (+) falls, & (+) use of RW at baseline  Pt is motivated to return to home  Personal and environmental factors supporting pt at time of IE include accessible home environment  Personal and environmental factors inhibiting engagement in occupations include advanced age, current habits and behavioral patterns, limited social support, difficulty completing ADLs and difficulty completing IADLs  During evaluation pt performed as is outlined above in flowsheet  Pt required VC for safety, VC for attention to task and education on LB dressing, quick move to continue engaging in tasks  Standardized assessments used to assist in identifying performance deficits include AMPAC 6-Clicks and Barthel ADL Index  Performance deficits that affect the pts occupational performance during the initial evaluation include impaired balance, functional mobility, endurance, activity tolerance, trunk control, functional standing tolerance and overall strength, attention to task, safety awareness, insight into deficits and problem solving  Based on pts functional performance and deficits the following occupations will be addressed in OT treatments in order to maximize pts independence and overall occupational performance: grooming, bathing/showering, toileting and toilet hygiene, dressing and functional mobility  Upon discharge from acute care setting, OT recommendation on d/c to Short Term Rehab  Pt goals are listed below     Goals   Patient Goals To go home and not go back to rehab   LTG Time Frame 10-14   Long Term Goal Refer to goals below   Plan   Treatment Interventions ADL retraining;Functional transfer training; Endurance training;Cognitive reorientation;Patient/family training;Equipment evaluation/education; Compensatory technique education; Energy conservation; Activityengagement   Goal Expiration Date 09/02/22   OT Treatment Day 1   OT Frequency 3-5x/wk   Additional Treatment Session   Start Time 0950   End Time 1000   Treatment Assessment Pt seen on this date for skilled OT treatment session  At beginning of session, pt sitting EOB  Pt agreeable to receiving OT services and cooperative throughout session  Areas addressed during tx session included LB bathing and dressing  Pt able to complete LLE LB bathing with Min Ax1 to wash feet while seated EOB  Pt stating that at baseline, her  helps her shweta shoes and socks PRN and that she mostly wears slippers which she can shweta (I)  Pt educated on LB dressing techniques and verbalizing understanding while seated EOB  Pt requiring VCs for safety, body positioning, and max encouragement to participate in tasks throughout duration of session  Pt with progressing/improved activity tolerance and participation in ADLs  Pt continues to be limited by increased pain, decreased endurance, and fear of falling  Throughout admission, pt would continue to benefit from skilled OT services to help address remaining deficits  OT recommendation for d/c continues to be PAR when medically stable     Additional Treatment Day 1   Recommendation   OT Discharge Recommendation Post acute rehabilitation services   AM-PAC Daily Activity Inpatient   Lower Body Dressing 1   Bathing 3   Toileting 2   Upper Body Dressing 3   Grooming 3   Eating 4   Daily Activity Raw Score 16   Daily Activity Standardized Score (Calc for Raw Score >=11) 35 96   AM-PAC Applied Cognition Inpatient   Following a Speech/Presentation 3   Understanding Ordinary Conversation 3   Taking Medications 2   Remembering Where Things Are Placed or Put Away 2   Remembering List of 4-5 Errands 1   Taking Care of Complicated Tasks 1   Applied Cognition Raw Score 12   Applied Cognition Standardized Score 28 82   Barthel Index   Feeding 10   Bathing 0   Grooming Score 0   Dressing Score 5   Bladder Score 10   Bowels Score 10   Toilet Use Score 5   Transfers (Bed/Chair) Score 5   Mobility (Level Surface) Score 0   Stairs Score 0   Barthel Index Score 45      Goals: Goals are developed to assist pt in reaching goal to return home     -Patient will be supervision with toileting tasks with use of AD/AE as needed to decrease caregiver burden     -Patient will be Mod I for UB bathing and dressing with AD/AE as needed to increase (I) with ADLs  -Patient will complete LB dressing with Mod A and AD/AE as needed to improve active ADL participation     -Patient will complete LB bathing with supervision and AD/AE as needed to improve (I) with ADLS  -Patient will attend to functional tasks with no more than 3 VCs for up to 5 minutes in order to improve activity tolerance     -Caregiver/family will provide client with appropriate VCs during functional tasks to facilitate pt's I in ADLs/IADLs     -Patient will complete grooming tasks with no more than 3 VCs to compensate for STM deficits     -Patient will transfer from bed to chair with Max Ax1 and with use of AD to improve activity tolerance     -Patient will demonstrate learned safety precautions/body mechanics to improve safety with functional transfers     -Patient will be Mod Ax1 for bed mobility tasks to improve engagement in ADLs  -Patient will increase standing tolerance to 3 min to improve endurance for participation in functional activities     -Patient will improve OOB tolerance to 4-5 hours per day to increase endurance  The patient's raw score on the -PAC Daily Activity inpatient short form is 16, standardized score is 35 96, less than 39 4  Patients at this level are likely to benefit from discharge to post-acute rehabilitation services   Please refer to the recommendation of the Occupational Therapist for safe discharge planning  This session, pt required and most appropriately benefited from skilled OT/PT co-eval due to extensive physical assistance of SKILLED therapists, significant regression from functional baseline, and decreased activity tolerance  OT and PT goals were addressed separately as seen in documentation       At the end of the session, all needs met and pt seated in bedside chair, chair alarm activated, LEs elevated , and call bell within reach    KEL Bhakta

## 2022-08-23 NOTE — PLAN OF CARE
Problem: OCCUPATIONAL THERAPY ADULT  Goal: Performs self-care activities at highest level of function for planned discharge setting  See evaluation for individualized goals  Description: Treatment Interventions: ADL retraining, Functional transfer training, Endurance training, Cognitive reorientation, Patient/family training, Equipment evaluation/education, Compensatory technique education, Energy conservation, Activityengagement          See flowsheet documentation for full assessment, interventions and recommendations  Note: Limitation: Decreased ADL status, Decreased Safe judgement during ADL, Decreased cognition, Decreased endurance, Decreased self-care trans, Decreased high-level ADLs  Prognosis: Good  Assessment: Pt is a 77 y o  female seen for OT evaluation s/p admission to [address] [place] [address] on 8/20/2022 due to fall  Pt diagnosed with Fracture of proximal end of left femur ([address] )  Pt has a significant PMH impacting occupational performance including: A-fib, chronic venous insufficiency, CHF, falls, melena, HTN, CKD, sjogren's syndrome, osteoarthritis of knee, & memory impairment  Pt with two recent admissions in the last 6 months  Pt with active OT evaluation and treatment orders and activity orders for Up with assistance  Prior to completing the IE, an extensive review of medical and/or therapy records and physical, cognitive, and psychosocial information related to pt functional performance was completed  The pt is considered a high complexity evaluation due to assessments used during IE and observed and listed functional performance deficits  PTA, pt reports living in [facility] with  and being (I) with most ADLs and receiving help PRN, (A) for IADLs, (+) falls, & (+) use of RW at baseline  Pt is motivated to return to home  Personal and environmental factors supporting pt at time of IE include accessible home environment   Personal and environmental factors inhibiting engagement in occupations include advanced age, current habits and behavioral patterns, limited social support, difficulty completing ADLs and difficulty completing IADLs  During evaluation pt performed as is outlined above in flowsheet  Pt required VC for safety, VC for attention to task and education on LB dressing, quick move to continue engaging in tasks  Standardized assessments used to assist in identifying performance deficits include AMPAC 6-Clicks and Barthel ADL Index  Performance deficits that affect the pts occupational performance during the initial evaluation include impaired balance, functional mobility, endurance, activity tolerance, trunk control, functional standing tolerance and overall strength, attention to task, safety awareness, insight into deficits and problem solving  Based on pts functional performance and deficits the following occupations will be addressed in OT treatments in order to maximize pts independence and overall occupational performance: grooming, bathing/showering, toileting and toilet hygiene, dressing and functional mobility  Upon discharge from acute care setting, OT recommendation on d/c to Short Term Rehab  Pt goals are listed below       OT Discharge Recommendation: Post acute rehabilitation services

## 2022-08-23 NOTE — TELEPHONE ENCOUNTER
Left message for pt's spouse to return call  Pt's care conference on 1/11/23 needs to be rescheduled

## 2022-08-23 NOTE — PROGRESS NOTES
[facility]  Progress Note - [facility] 1945, 77 y o  female MRN: [id number]  Unit/Bed#: S -01 Encounter: [id number]  Primary Care Provider: Destinee Tanner MD   Date and time admitted to hospital: 8/20/2022  5:58 AM    Melena  Assessment & Plan  Patient has been having melanotic stools over the last 24 hours  She takes Eliquis for atrial fibrillation  Currently Eliquis is on hold  S/p Kcentra  Placed on Protonix gtt  no melanotic stools reported overnight  Plan  GI on board,  DC protonix 40 IV bid  Start liquid Esomeprazole  Close Hgb watch      * Fracture of proximal end of left femur [facility])  Assessment & Plan  Patient had a mechanical fall while going to the bathroom  Patient has been having some melanotic stools over the last 24 hours and slipped on it  She landed on her left knee  Underlying h/o bilateral knee replacements also  Admission imaging consistent with left femur fracture  Evaluated by ortho team, Surgery was done  Will continue Pain management   PT/OT eval once clinical stability     Patient complained of left calf pain, Planning a venous duplex tomorrow to rule out DVT    Acute on chronic blood loss anemia  Assessment & Plan  1 U of pRBC transfused   Current Hb 9 1  Monitor Hb      Acute pain due to trauma  Assessment & Plan  Dilaudid, hydromorphone PRN  Fall  Assessment & Plan  Mechanical fall  PT OT once clinical stability, patient may benefit of post acute rehab    Venous stasis dermatitis of both lower extremities  Assessment & Plan  Patient has mild erythema of the bilateral extremity which seems to be chronic  Erythema is symmetric, skin its significant dry and scaly , she only reports tenderness in left extremity, has remained afebrile, wbc wnl , very low likelihood of  cellulitis  Patient complained of left calf pain which radiates upward, on examination left lower leg looks swollen compared to right side     Will continue topical moisturizers  Venous doppler    Chronic diastolic heart failure (HCC)  Assessment & Plan  Wt Readings from Last 3 Encounters:   22 103 kg (227 lb)   22 103 kg (227 lb 9 6 oz)   22 109 kg (239 lb 10 2 oz)     Patient has chronic diastolic congestive heart failure  Most recent echo (2021) : 65%  Systolic function is normal  Wall motion is normal  G1DD  Doesn't appear to be on acute exacerbation  Continue lasix and carvedilol  Monitor electrolytes while on diuretic therapy  Monitor urine output         A-fib (HCC)  Assessment & Plan  Continue patient on carvedilol, however Eliquis on hold  Chronic venous insufficiency  Assessment & Plan  Continue care with Lac-Hydrin cream           VTE Pharmacologic Prophylaxis:   VTE Score: 10 held due to GI bleed     Mechanical VTE Prophylaxis in Place: Yes    Patient Centered Rounds: Nursing    Discussions with Specialists or Other Care Team Provider: attending physician    Current Length of Stay: 3 day(s)    Current Patient Status: Inpatient     Discharge Plan / Estimated Discharge Date: Not yet established    Code Status: Level 1 - Full Code     Talked to patient's  at bedside  Subjective:   Patient complains of pain in the left leg which starts from calf and moving upward  No bowel movements in few days  No fever, chills, nausea or vomiting  No SOB  Hb is 9 1  Objective:     Vitals:   Temp (24hrs), Av 4 °F (36 9 °C), Min:97 8 °F (36 6 °C), Max:99 3 °F (37 4 °C)    Temp:  [97 8 °F (36 6 °C)-99 3 °F (37 4 °C)] 97 9 °F (36 6 °C)  HR:  [] 87  Resp:  [18] 18  BP: (109-146)/(61-73) 145/61  SpO2:  [94 %-100 %] 98 %  There is no height or weight on file to calculate BMI  Input and Output Summary (last 24 hours):        Intake/Output Summary (Last 24 hours) at 2022 1932  Last data filed at 2022 1404  Gross per 24 hour   Intake 350 ml   Output 3925 ml   Net -3575 ml       Physical Exam:     Physical Exam  Vitals and nursing note reviewed  Constitutional:       General: She is not in acute distress  Appearance: She is not toxic-appearing  HENT:      Head: Normocephalic and atraumatic  Right Ear: Tympanic membrane normal  There is no impacted cerumen  Left Ear: Tympanic membrane normal  There is no impacted cerumen  Nose: Nose normal  No congestion or rhinorrhea  Mouth/Throat:      Mouth: Mucous membranes are moist       Pharynx: Oropharynx is clear  No oropharyngeal exudate or posterior oropharyngeal erythema  Eyes:      Extraocular Movements: Extraocular movements intact  Conjunctiva/sclera: Conjunctivae normal       Pupils: Pupils are equal, round, and reactive to light  Cardiovascular:      Rate and Rhythm: Normal rate  Pulses: Normal pulses  Heart sounds: No murmur heard  No gallop  Pulmonary:      Effort: Pulmonary effort is normal  No respiratory distress  Breath sounds: No wheezing or rales  Chest:      Chest wall: No tenderness  Abdominal:      General: Bowel sounds are normal  There is no distension  Palpations: Abdomen is soft  Tenderness: There is no abdominal tenderness  Musculoskeletal:      Cervical back: Normal range of motion  No rigidity  Comments: Bilateral lower extremities erythema, edema, skin dryness and scaly, left lower extremity with immobilizer, distal pulses intact, sensation preserved, decreased ROM 2/2 pain   Lymphadenopathy:      Cervical: No cervical adenopathy  Skin:     General: Skin is warm  Capillary Refill: Capillary refill takes 2 to 3 seconds  Neurological:      Mental Status: She is alert and oriented to person, place, and time  Psychiatric:         Mood and Affect: Mood normal          Behavior: Behavior normal          Thought Content:  Thought content normal          Judgment: Judgment normal           Additional Data:     Labs:  Results from last 7 days   Lab Units 08/23/22  1815 08/23/22  1210 08/23/22  0745 08/22/22  1556 08/22/22  0526   WBC Thousand/uL  --   --  12 14*  --  9 18   HEMOGLOBIN g/dL 8 7*   < > 8 3*  8 5*   < > 7 6*   I STAT HEMOGLOBIN   --   --   --    < >  --    HEMATOCRIT %  --   --  26 7*  --  23 9*   HEMATOCRIT, ISTAT   --   --   --    < >  --    PLATELETS Thousands/uL  --   --  267  --  267   NEUTROS PCT %  --   --   --   --  73   LYMPHS PCT %  --   --   --   --  16   MONOS PCT %  --   --   --   --  9   EOS PCT %  --   --   --   --  2    < > = values in this interval not displayed  Results from last 7 days   Lab Units 08/23/22  0745   SODIUM mmol/L 141   POTASSIUM mmol/L 3 9   CHLORIDE mmol/L 102   CO2 mmol/L 32   BUN mg/dL 13   CREATININE mg/dL 0 82   ANION GAP mmol/L 7   CALCIUM mg/dL 8 5   ALBUMIN g/dL 3 3*   TOTAL BILIRUBIN mg/dL 0 44   ALK PHOS U/L 60   ALT U/L 6*   AST U/L 16   GLUCOSE RANDOM mg/dL 152*     Results from last 7 days   Lab Units 08/21/22  0449   INR  1 16     Results from last 7 days   Lab Units 08/21/22  1116   POC GLUCOSE mg/dl 145*         Results from last 7 days   Lab Units 08/20/22  0955   LACTIC ACID mmol/L 0 7   PROCALCITONIN ng/ml 0 07       Imaging: No pertinent imaging reviewed      Recent Cultures (last 7 days):           Lines/Drains:  Invasive Devices  Report    Peripheral Intravenous Line  Duration           Peripheral IV 08/22/22 Right Arm 1 day          Drain  Duration           Urethral Catheter 18 Fr  3 days                Telemetry:        Last 24 Hours Medication List:   Current Facility-Administered Medications   Medication Dose Route Frequency Provider Last Rate    acetaminophen  975 mg Oral [facility] Emily Piña PA-C      ammonium lactate   Topical Daily Emily Piña PA-C      apixaban  5 mg Oral BID SHAHIDA Dye      carvedilol  25 mg Oral BID With Meals Emily Piña PA-C      diazepam  5 mg Oral Q6H PRN Elsie Clamp LukievRYAN trevino      docusate sodium  100 mg Oral BID Emily Piña PA-C      furosemide 80 mg Intravenous Daily Jon Martinez PA-C      gabapentin  100 mg Oral TID Jon Martinez PA-C      HYDROmorphone  0 5 mg Intravenous Q4H PRN Jon Martinez PA-C      lidocaine  1 patch Topical Daily Jon Martinez PA-C      losartan  100 mg Oral Daily Jon Martinez PA-C      ondansetron  4 mg Intravenous Q4H PRN Jon Martinez PA-C      oxyCODONE  10 mg Oral Q4H PRN Jon Martinez PA-C      oxyCODONE  5 mg Oral Q3H PRN Jon Martinez PA-C      pantoprazole  40 mg Oral BID AC SHAHIDA Dye      polyethylene glycol  17 g Oral Daily Ramses Weeks MD      potassium chloride  40 mEq Oral Once Jon Martinez PA-C      sucralfate  1 g Oral 4x Daily (AC & HS) Jon Martinez PA-C       Facility-Administered Medications Ordered in Other Encounters   Medication Dose Route Frequency Provider Last Rate    fentanyl citrate (PF)   Intravenous PRN Gabrielle Phillips MD      midazolam   Intravenous PRN Gabrielle Phillips MD      propofol   Intravenous PRN Gabrielle Phillips MD          Today, Patient Was Seen By: Srinivasa Lafleur MD    ** Please Note: This note has been constructed using a voice recognition system       Nutrition Assessment and Intervention:     Reviewed food recall journal      Physical Activity Assessment and Intervention:    Activity journal reviewed      Emotional and Mental Well-being, Sleep, Connectedness Assessment and Intervention:    Sleep/stress assessment performed      Tobacco and Toxic Substance Assessment and Intervention:     Tobacco use screening performed    Alcohol and drug use screening performed

## 2022-08-23 NOTE — ASSESSMENT & PLAN NOTE
· Secondary to fall  · XR in CT show distal left femur fracture--no vascular injury on CTA  · Ortho left retrograde IM zoë today 08/22/2022  · Multimodail pain regimen--APS consulted; peripheral block today  · DVT Prop:  Eliquis  · PT/OT when indicated

## 2022-08-23 NOTE — ASSESSMENT & PLAN NOTE
· In the setting of fibromyalgia  · APS with peripheral block, APS currently following  · Continue on multimodal pain regimen    Pain is currently controlled

## 2022-08-23 NOTE — ASSESSMENT & PLAN NOTE
· In the setting of fibromyalgia  · APS consulted note appreciated-unable to complete peripheral block today  Multimodal pain regimen was increased to oxy 10s/15 with breakthrough hydromorphone and ketamine drip

## 2022-08-23 NOTE — PHYSICAL THERAPY NOTE
Erika Hollis is a 77 y o  Female who presents to [facility] on 8/20/2022 from home s/p fall and diagnosis of fracture of proximal end of L femur  Pt is now POD1 s/p ORIF  Orders for PT eval and treat received, w/ activity orders of WBAT L LE and fall precautions  Pt presents w/ comorbidities of Afib, CHF, HTN, CKD Stage 3, LBP  At baseline, pt mobilizes independently w/ RW, and reports 2 falls in the last 6 months  Upon evaluation, pt presents w/ the following deficits: weakness, impaired balance, decreased endurance, pain limiting functional mobility and impaired cognition  Upon eval, pt requires max A x 2 for bed mobility, max A x 2 for transfers    Pt's clinical presentation is unstable/unpredictable due to need for assist w/ all phases of mobility when usually mobilizing independently, pain impacting overall mobility status, need for supplemental oxygen in order to maintain oxygen saturation, need for input for task focus and mobility technique, recent drastic decline in mobility compared to baseline and recent history of falls  Patient is at an increased risk of falls due to physical and cognitive deficits  Given the above findings, discharge recommendation is for Post-acute inpatient rehabilitation  During this admission, pt would benefit from continued skilled inpatient PT in the acute care setting in order to address the abovementioned deficits to maximize function and mobility before DC from acute care

## 2022-08-23 NOTE — ASSESSMENT & PLAN NOTE
· Original concern a bilateral lower extremity cellulitis, status post IV antibiotics, normal procalcitonin,  · Slim consultation with low likelihood of cellulitic infection, procalcitonin and lactic acid normal, will continue to monitor off of antibiotics  · Stable WBC  · Continue use of  ammonium lactate lotion daily  · Lower extremities bilaterally with no worsening qualitative evidence of infection, no worsening erythema or swelling

## 2022-08-23 NOTE — PROGRESS NOTES
[facility]  Progress Note - Mark Goodpasture 1945, 77 y o  female MRN: [id number]  Unit/Bed#: S -01 Encounter: [id number]  Primary Care Provider: King Sundar MD   Date and time admitted to hospital: 8/20/2022  5:58 AM    Acute pain due to trauma  Assessment & Plan  · In the setting of fibromyalgia  · APS with peripheral block, APS currently following  · Continue on multimodal pain regimen  Pain is currently controlled    Fall  Assessment & Plan  · Non syncopal fall  · Injuries listed below  · CT Head ordered--patient unsure if she hit her head, having head pain  Acute on chronic blood loss anemia  Assessment & Plan  Likely in the setting of gi bleed; melenic stools s/p EGD with ulcer injection for hemostasis  8/21 - 1 unit PRBCs  8/22 - 1 unit PRBCs for hemoglobin of 7 6--preoperative for optimization  Hemoglobin currently 8 6  Hemodynamically stable  Continue to monitor/trend H/H    Chronic diastolic heart failure (HCC)  Assessment & Plan  Wt Readings from Last 3 Encounters:   08/18/22 103 kg (227 lb)   08/03/22 103 kg (227 lb 9 6 oz)   07/28/22 109 kg (239 lb 10 2 oz)     · Continue home carvedilol  · I&O    A-fib (HCC)  Assessment & Plan  · Hold Eliquis in the setting of GI bleed  · Continue hold Coreg  · Plan to restart Eliquis 08/23; cleared by GI    * Fracture of proximal end of left femur [facility])  Assessment & Plan  · Secondary to fall  · XR in CT show distal left femur fracture--no vascular injury on CTA  · Ortho left retrograde IM zoë today 08/22/2022  · Multimodail pain regimen--APS consulted; peripheral block today  · DVT Prop:  Heparin-plan to restart Eliquis on 08/23-24  · PT/OT when indicated       Disposition:  Stable    SUBJECTIVE:  Chief Complaint:  Left leg pain    Subjective: "I, am feeling better "    OBJECTIVE:   Vitals:   Temp:  [96 5 °F (35 8 °C)-99 3 °F (37 4 °C)] 99 3 °F (37 4 °C)  HR:  [] 78  Resp:  [16-22] 22  BP: ()/(52-72) 109/62    Intake/Output:  I/O       08/21 0701  08/22 0700 08/22 0701  08/23 0700    I V  1300 1000    IV Piggyback  100    Total Intake 1300 1100    Urine 3550 3385    Blood  150    Total Output 3550 3535    Net -3986 -9370               Nutrition: Diet Regular; Regular House  GI Proph/Bowel Reg:  Colace  VTE Prophylaxis:Heparin     Physical Exam:   GENERAL APPEARANCE:  Comfortable  NEURO:  Alert and oriented x3, no focal deficits  HEENT:  EOM intact no pain on EOM, oropharynx clear and patent  CV:  Regular rate, irregular rhythm; history of atrial fib  LUNGS:  Clear to auscultation bilaterally  GI:  Soft thin nondistended, no tenderness, normal bowel sounds  :  Voiding  MSK:  Upper extremity 4/5 neurovascularly intact, right lower extremity 4/5 neurovascular tach, left lower extremity, hip 4/5, left knee 4/5, left ankle 4/5  SKIN:  Warm well perfused    Invasive Devices  Report    Peripheral Intravenous Line  Duration           Peripheral IV 08/20/22 Left Antecubital 2 days    Peripheral IV 08/20/22 Right Antecubital 2 days    Peripheral IV 08/22/22 Right Arm <1 day          Drain  Duration           Urethral Catheter 18 Fr  2 days                      Lab Results:   BMP/CMP:   Lab Results   Component Value Date    SODIUM 143 08/22/2022    K 3 4 (L) 08/22/2022     08/22/2022    CO2 36 (H) 08/22/2022    BUN 17 08/22/2022    CREATININE 0 88 08/22/2022    GLUCOSE 121 08/22/2022    CALCIUM 8 6 08/22/2022    EGFR 64 08/22/2022    and CBC:   Lab Results   Component Value Date    WBC 9 18 08/22/2022    HGB 8 6 (L) 08/22/2022    HCT 26 (L) 08/22/2022    MCV 93 08/22/2022     08/22/2022    MCH 29 7 08/22/2022    MCHC 31 8 08/22/2022    RDW 15 0 08/22/2022    MPV 10 5 08/22/2022    NRBC 0 08/22/2022     Imaging/EKG Studies: I have personally reviewed pertinent reports       Other Studies:  None

## 2022-08-23 NOTE — ASSESSMENT & PLAN NOTE
Likely in the setting of gi bleed; melenic stools s/p EGD with ulcer injection for hemostasis  8/21 - 1 unit PRBCs  8/22 - 1 unit PRBCs for hemoglobin of 7 6--preoperative for optimization    Hemoglobin currently stable at 8 7  Hemodynamically stable  Continue to monitor/trend H/H

## 2022-08-23 NOTE — PROGRESS NOTES
Progress Note - Orthopedics   [place] 77 y o  female MRN: [id number]  Unit/Bed#: S -01      Subjective:    77 y  o female Patient seen and examined at the bedside   at bedside  Complains of some muscle spasms in the left leg  No acute events  Pain overall controlled   Denies fevers chills, CP, SOB    Labs:  0   Lab Value Date/Time    HCT 26 7 (L) 08/23/2022 0745    HCT 26 (L) 08/22/2022 1556    HCT 23 9 (L) 08/22/2022 0526    HCT 26 0 (L) 08/21/2022 1403    HCT 37 4 09/23/2014 1339    HCT 33 6 (L) 09/03/2014 1146    HGB 8 5 (L) 08/23/2022 0745    HGB 8 3 (L) 08/23/2022 0745    HGB 8 6 (L) 08/22/2022 2134    HGB 11 3 (L) 09/23/2014 1339    HGB 10 8 (L) 09/03/2014 1146    INR 1 16 08/21/2022 0449    WBC 12 14 (H) 08/23/2022 0745    WBC 9 18 08/22/2022 0526    WBC 9 31 08/21/2022 0806    WBC 7 10 09/23/2014 1339    WBC 6 13 09/03/2014 1146       Meds:    Current Facility-Administered Medications:     acetaminophen (TYLENOL) tablet 975 mg, 975 mg, Oral, Q8H [address], Maxene Olp, PA-C, 975 mg at 08/23/22 0616    ammonium lactate (LAC-HYDRIN) 12 % cream, , Topical, Daily, Maxene Olp, PA-C, Given at 08/23/22 0816    carvedilol (COREG) tablet 25 mg, 25 mg, Oral, BID With Meals, Maxene Olp, PA-C, 25 mg at 08/23/22 0807    docusate sodium (COLACE) capsule 100 mg, 100 mg, Oral, BID, Maxene Olp, PA-C, 100 mg at 08/23/22 0807    furosemide (LASIX) injection 80 mg, 80 mg, Intravenous, Daily, Maxene Olp, PA-C, 80 mg at 08/23/22 9071    gabapentin (NEURONTIN) capsule 100 mg, 100 mg, Oral, TID, Maxene Olp, PA-C, 100 mg at 08/23/22 8783    heparin (porcine) subcutaneous injection 5,000 Units, 5,000 Units, Subcutaneous, Q8H [address], Maxene Olp, PA-C, 5,000 Units at 08/23/22 0616    HYDROmorphone (DILAUDID) injection 0 5 mg, 0 5 mg, Intravenous, Q4H PRN, Maxene Olp, PA-C, 0 5 mg at 08/23/22 0337    lidocaine (LIDODERM) 5 % patch 1 patch, 1 patch, Topical, Daily, Mellissa Bettina, PA-C, 1 patch at 08/21/22 1117    losartan (COZAAR) tablet 100 mg, 100 mg, Oral, Daily, Mellissa Bettina, PA-C, 100 mg at 08/23/22 4350    methocarbamol (ROBAXIN) tablet 750 mg, 750 mg, Oral, Q6H [facility], Raynesford Bladen, PA-C, 750 mg at 08/23/22 0616    multi-electrolyte (PLASMALYTE-A/ISOLYTE-S PH 7 4) IV solution, 75 mL/hr, Intravenous, Continuous, Raynesford Bladen, PA-C, Last Rate: 75 mL/hr at 08/22/22 1926, 75 mL/hr at 08/22/22 1926    ondansetron [facility]) injection 4 mg, 4 mg, Intravenous, Q4H PRN, Mellissa Bladen, PA-C, 4 mg at 08/22/22 1659    oxyCODONE (ROXICODONE) immediate release tablet 10 mg, 10 mg, Oral, Q4H PRN, Mellissa Bladen, PA-C, 10 mg at 08/23/22 0806    oxyCODONE (ROXICODONE) IR tablet 5 mg, 5 mg, Oral, Q3H PRN, Raynesford Bettina, PA-C    pantoprazole (PROTONIX) injection 40 mg, 40 mg, Intravenous, Q12H [facility], Mellissa Bladen, PA-C, 40 mg at 08/23/22 0807    polyethylene glycol (MIRALAX) packet 17 g, 17 g, Oral, Daily PRN, Mellissa Bladen, PA-C    potassium chloride (K-DUR,KLOR-CON) CR tablet 40 mEq, 40 mEq, Oral, Once, Raynesford Bladen, PA-C    sucralfate (CARAFATE) tablet 1 g, 1 g, Oral, 4x Daily (AC & HS), Mellissa Bettina, PA-C, 1 g at 08/23/22 1517    Facility-Administered Medications Ordered in Other Encounters:     fentanyl citrate (PF) 100 MCG/2ML, , Intravenous, PRN, Bernice Grullon MD, 50 mcg at 08/20/22 1458    midazolam (VERSED) injection, , Intravenous, PRN, Bernice Grullon MD, 1 mg at 08/20/22 1455    propofol (DIPRIVAN) 200 MG/20ML bolus injection, , Intravenous, PRN, Bernice Grullon MD, 30 mg at 08/20/22 1431    Blood Culture:   Lab Results   Component Value Date    BLOODCX No Growth After 5 Days   07/28/2022       Wound Culture:   Lab Results   Component Value Date    WOUNDCULT 3+ Growth of Proteus mirabilis (A) 12/30/2021    WOUNDCULT 3+ Growth of Enterococcus faecalis (A) 12/30/2021    WOUNDCULT 2+ Growth of Pseudomonas aeruginosa (A) 12/30/2021 WOUNDCULT 3+ Growth of  12/30/2021       Ins and Outs:  I/O last 24 hours: In: 9610 [I V :1000; Blood:350; IV Piggyback:100]  Out: 5303 [UVQQW:3110; Blood:150]          Physical:  Vitals:    08/23/22 0714   BP: 143/73   Pulse: 87   Resp: 18   Temp: 98 5 °F (36 9 °C)   SpO2: 99%     Musculoskeletal: left Lower Extremity  · Dressing C/D/I without strikethrough  · Mild tenderness over the upper thigh  Thigh and calf are soft and compressible  · SILT s/s/sp/dp/t  +fhl/ehl, +ankle dorsi/plantar flexion  · 2+ DP pulse    Assessment:    77 y  o female POD 1 left retrograde intramedullary zoë  Intraoperative findings of stable prosthesis  Plan:  · WBAT to the left lower extremity  · Hgb 8 5 today  · PT/OT  · Pain control per primary   · DVT ppx: ok to resume eliquis from   · Dispo: Ortho will follow   · Will need follow up with Dr Delmer Santiago upon discharge       Jen Holbrook PA-C

## 2022-08-23 NOTE — CASE MANAGEMENT
Case Management Assessment & Discharge Planning Note    Patient name Yuliana TAYLOR /S -01 MRN [id number]  : 1945 Date 2022       Current Admission Date: 2022  Current Admission Diagnosis:Fracture of proximal end of left femur [facility])   Patient Active Problem List    Diagnosis Date Noted    Fall 2022    Fracture of proximal end of left femur ([facility] ) 2022    Melena 2022    Acute pain due to trauma 2022    At risk for obstructive sleep apnea 2022    Colon cancer screening 2022    Osteoporosis screening 2022    Lymphedema 2022    Venous stasis dermatitis of both lower extremities 2022    Encounter for screening mammogram for malignant neoplasm of breast 2022    Seasonal allergies 2022    Xerosis of skin 2022    Prediabetes 2022    Abnormal CXR 2022    Acute on chronic blood loss anemia 2022    Memory impairment 2022    Chronic diastolic heart failure ([facility] ) 2022    Anxiety     Opioid dependence due to Chronic back pain  2022    A-fib ([facility] ) 2021    Mixed hyperlipidemia 2021    Obesity 2021    Osteoarthritis of knee 2021    Stage 3 chronic kidney disease ([facility] ) 2021    Chronic low back pain with sciatica 2021    Chronic venous insufficiency 2021    Essential hypertension 2017    Sjogren's syndrome ([facility] ) 2014      LOS (days): 3  Geometric Mean LOS (GMLOS) (days): 6 20  Days to GMLOS:2 9     OBJECTIVE:  [facility]  of Unplanned Readmission Score: 27         Current admission status: Inpatient       Preferred Pharmacy:   [address] [place] [address] Drive, [address] [place] [address]  Phone: [phone] Fax: [phone]    Primary Care Provider: Kendrick Temple MD    Primary Insurance: [address] [place] [address]  Secondary Insurance: ASSESSMENT:  Active Health Care Proxies     Jame Rodríguez 26 - Spouse   Primary Phone: [phone] (Mobile)  Home Phone: [phone]                              Patient Information  Admitted from[de-identified] Home  Mental Status: Alert  During Assessment patient was accompanied by: Spouse  Assessment information provided by[de-identified] Spouse  Primary Caregiver: Self  Support Systems: Self, Spouse/significant other, Family members  Home entry access options   Select all that apply : Stairs  Number of steps to enter home : 2  Do the steps have railings?: Yes  Type of Current Residence: 2 story home  Upon entering residence, is there a bedroom on the main floor (no further steps)?: No  A bedroom is located on the following floor levels of residence (select all that apply):: 2nd Floor  Upon entering residence, is there a bathroom on the main floor (no further steps)?: No  Indicate which floors of current residence have a bathroom (select all the apply):: 2nd Floor  Number of steps to 2nd floor from main floor: One Flight  In the last 12 months, was there a time when you were not able to pay the mortgage or rent on time?: No  In the last 12 months, was there a time when you did not have a steady place to sleep or slept in a shelter (including now)?: No  Homeless/housing insecurity resource given?: N/A  Living Arrangements: Lives w/ Spouse/significant other    Activities of Daily Living Prior to Admission  Functional Status: Independent  Completes ADLs independently?: Yes  Ambulates independently?: Yes  Does patient use assisted devices?: Yes  Assisted Devices (DME) used: Merlin Sovereign  Does patient currently own DME?: Yes  What DME does the patient currently own?: Shower Chair, Wheelchair, Jeanne Cowden  Does patient have a history of Outpatient Therapy (PT/OT)?: Yes  Does the patient have a history of Short-Term Rehab?: Yes [facility])  Does patient have a history of HHC?: Yes  Does patient currently have [facility]?: No         Patient Information Continued  Income Source: Unknown  Does patient have prescription coverage?: Yes  Within the past 12 months, you worried that your food would run out before you got the money to buy more : Never true  Within the past 12 months, the food you bought just didn't last and you didn't have money to get more : Never true  Food insecurity resource given?: N/A  Does patient receive dialysis treatments?: No  Does patient have a history of substance abuse?: No  Does patient have a history of Mental Health Diagnosis?: No         Means of Transportation  Means of Transport to Appts[de-identified] Drives Self  In the past 12 months, has lack of transportation kept you from medical appointments or from getting medications?: No  In the past 12 months, has lack of transportation kept you from meetings, work, or from getting things needed for daily living?: No  Was application for public transport provided?: N/A        DISCHARGE DETAILS:    Discharge planning discussed with[de-identified] Patient's   Freedom of Choice: Yes  Comments - Freedom of Choice: Cm called to speak with patient, but she was sleeping according to her   Per  he would be in agreement with rehab  He would preferr referrals to [address] [place] [address] and [address] place referrals and await results    CM contacted family/caregiver?: Yes  Were Treatment Team discharge recommendations reviewed with patient/caregiver?: Yes  Did patient/caregiver verbalize understanding of patient care needs?: Yes       Contacts  Patient Contacts: Kathy Quiles, spouse  Relationship to Patient[de-identified] Family  Contact Method: Phone  Phone Number: [phone]  Reason/Outcome: Continuity of Care, Discharge Planning, Emergency Contact, Referral    [facility]         Is the patient interested in [facility] at discharge?: No    DME Referral Provided  Referral made for DME?: No    Other Referral/Resources/Interventions Provided:  Referral Comments: Patient's  in agreement with referrals to [facility] facilities  Referrals placed           Treatment Team Recommendation: SNF  Discharge Destination Plan[de-identified] SNF

## 2022-08-23 NOTE — PLAN OF CARE
Problem: PHYSICAL THERAPY ADULT  Goal: Performs mobility at highest level of function for planned discharge setting  See evaluation for individualized goals  Description: Treatment/Interventions: Functional transfer training, LE strengthening/ROM, Therapeutic exercise, Endurance training, Cognitive reorientation, Bed mobility, Patient/family training, Equipment eval/education, Gait training  Equipment Recommended: Tatum Mcclain       See flowsheet documentation for full assessment, interventions and recommendations  8/23/2022 1458 by Hannah Sosa, PT  Note:    Problem List: Decreased strength, Decreased endurance, Impaired balance, Decreased mobility, Decreased cognition, Impaired vision, Orthopedic restrictions, Pain  Assessment: Briana Gerber is a 77 y o  Female who [facility]2022 from home s/p fall and diagnosis of fracture of proximal end of L femur  Pt is now POD1 s/p ORIF  Orders for PT eval and treat received, w/ activity orders of WBAT L LE and fall precautions  Pt presents w/ comorbidities of Afib, CHF, HTN, CKD Stage 3, LBP  At baseline, pt mobilizes independently w/ RW, and reports 2 falls in the last 6 months  Upon evaluation, pt presents w/ the following deficits: weakness, impaired balance, decreased endurance, pain limiting functional mobility and impaired cognition  Upon eval, pt requires max A x 2 for bed mobility, max A x 2 for transfers    Pt's clinical presentation is unstable/unpredictable due to need for assist w/ all phases of mobility when usually mobilizing independently, pain impacting overall mobility status, need for supplemental oxygen in order to maintain oxygen saturation, need for input for task focus and mobility technique, recent drastic decline in mobility compared to baseline and recent history of falls  Patient is at an increased risk of falls due to physical and cognitive deficits   Given the above findings, discharge recommendation is for Post-acute inpatient rehabilitation  During this admission, pt would benefit from continued skilled inpatient PT in the acute care setting in order to address the abovementioned deficits to maximize function and mobility before DC from acute care  PT Discharge Recommendation: Post acute rehabilitation services    See flowsheet documentation for full assessment

## 2022-08-23 NOTE — QUICK NOTE
Status post left retrograde IM zoë left for preop diagnosis of closed fracture of left distal femur    Bandages dry and intact, no bleeding or drainage noted    General-comfortable  HEENT-EOM intact cannot pain on EOM, oropharynx clear and patent  Neuro-alert and oriented x3, no new focal deficits  Cardiac-irregular rhythm, regular rate-patient denies history of atrial fibrillation  Pulmonary-clear to auscultation bilaterally  GI- no distension, no tenderness, normal bowel sounds  Musculoskeletal-moves all extremities; neurovascularly intact  Skin-warm and well perfused      Patient reports pain well controlled

## 2022-08-23 NOTE — ASSESSMENT & PLAN NOTE
· Non syncopal fall  · Injuries listed below  · CT Head ordered--patient unsure if she hit her head, having head pain

## 2022-08-23 NOTE — NURSING NOTE
Pt found to be lethargic and oriented only to self  Pt is otherwise intact neurologically  Trauma AP notified  No new orders at this time  Will continue to monitor

## 2022-08-23 NOTE — CONSULTS
Consultation - Geriatric Medicine   Ang Fiore 77 y o  female MRN: [id number]  Unit/Bed#: S -01 Encounter: [id number]      Assessment/Plan   Fall  S/p mechanical fall at home  Found to have fracture of proximal end of left femur  Fall precautions  Out of bed as tolerated  PT/OT consulted    Fracture of proximal end of left femur  Secondary to mechanical fall at home  Postop day 1 from ORIF with ap op femoral nerve block on 08/22/2022  Recommend geriatric pain protocol, including scheduled Tylenol and lidocaine patches  Is also on p r n  Oxycodone 5 or 10 mg for moderate or severe pain and breakthrough Dilaudid  Is also taking Robaxin 750 Q 8 for spasms and gabapentin 100 mg t i d   Acute pain services following, appreciate input  Further management per Orthopedics    Melena  GI was consulted  Underwent EGD with single crater ulcer at the antrum s/p epi  Is on Protonix b i d  Continue to monitor hemoglobins, most recent hemoglobin 9 1  Management per GI and medical    AFib  History of AFib  Currently rate controlled and asymptomatic  Is on Coreg 25 mg b i d  Eliquis held in setting of melena  Is currently on subQ heparin  Management per medical    Chronic back pain  Stable  Previously was on oxycodone 20 mg q 12  APS following    Frailty  Clinical Frail Scale: 6 moderately frail  Total protein 5 9 and albumin 3 3  Encourage adequate hydration and nutrition, including protein in meals  OOB as tolerated  PT/OT consulted  Encourage early and frequent mobilization    Insomnia  Chronic per patient  First-line treatment is behavior modification  Maintain sleep-wake cycle, avoid nighttime interruptions  Avoid caffeine throughout the day  Avoid napping throughout the day  Encourage physical activity throughout the day  Would recommend trialing melatonin 3 mg q h s      Cognitive impairment  Received 11/30 on her last [place] during last hospitalization  Will have follow-up outpatient at Geriatrics office  Patient is alert oriented x3, forgetful at times  Able to say days a week backwards  At risk for delirium due to cognitive impairment  Recommend delirium precautions  Maintain sleep-wake cycle, avoid nighttime interruptions  Provide adequate pain control  Avoid urinary retention and constipation  Provide frequent and early mobilization  Provide frequent redirection and reorientation as needed  Avoid medications that may worsen or precipitate delirium such as tramadol, benzodiazepines, anticholinergics, and Benadryl  Redirect unwanted behaviors as first-line therapy, avoid physical restraints as able to  Continue to monitor    Ambulatory dysfunction  At a baseline ambulates with RW  PT/OT following  Fall precautions  Out of bed as tolerated  Encourage early and frequent mobilization  Encourage adequate hydration and nutrition  Provide adequate pain management   Goal is to return home with   Continue with PT/OT for continued strength and balance training       Home medication review  Ammonium Lactate 12 % Topical Daily    Coreg 25mg bid    Apixaban 5 mg Oral 2 times daily    Hydrocortisone 2 5 % Topical 2 times daily   Patient taking differently:  Apply topically 2 (two) times a day as needed    Lidocaine 5 % Daily PRN    Losartan Potassium 100 mg Oral Daily    oxyCODONE HCl 20 mg Oral Every 12 hours scheduled    Potassium Chloride Vicenta ER 20 mEq 40 mEq Oral Daily    Torsemide 40 mg Oral Daily    Personally confirmed with PCP appointment with Dr Lakisha Cohn and patient  History of Present Illness   Physician Requesting Consult: Bisi Kang DO  Reason for Consult / Principal Problem:  Closed fracture of left distal femur  Hx and PE limited by: none  Additional history obtained from: Chart review and patient evaluation      HPI: Ang Fiore is a 77y o  year old female who presents from home status post mechanical fall  She is ambulating to the bathroom and slipped and fell onto her left knee  Since the accident she was unable to ambulate  She had worsening pain in her left lower extremity  She is found to have fracture of proximal on the left femur  Her comorbidities include chronic back pain, AFib, hypertension, and ambulatory dysfunction  Patient lives at home with her   She ambulates with rolling walker  She denies any other recent falls  She is independent for ADLs  She admits to having chronic issues with insomnia  She denies any issues with anxiety or depression  She denies any issues with appetite or weight loss  She denies any bowel or bladder incontinence or leakage  She no longer drives  Her  manages the finances  Her  helps assist her with her medications  She helps with the cooking and cleaning along with her   She has 13 stairs in the house, but does have a stair lift  Upon examination patient was out of bed in the chair, resting  she appeared comfortable and was in no acute distress  Did make complaints of some pain in her hip and muscle spasm  She had trouble sleeping last night  Her appetite has been stable  Per nursing no acute concerns or issues at this time  Inpatient consult to Gerontology  Consult performed by: SHAHIDA Villasenor  Consult ordered by: Xavi Preston PA-C          Review of Systems   Constitutional: Positive for activity change and fatigue  Negative for appetite change, chills and fever  HENT: Negative for ear pain and sore throat  Eyes: Negative for pain and visual disturbance  Respiratory: Negative for cough and shortness of breath  Cardiovascular: Negative for chest pain and palpitations  Gastrointestinal: Negative for abdominal pain, constipation, diarrhea, nausea and vomiting  Genitourinary: Negative for difficulty urinating, dysuria, frequency, hematuria and urgency  Musculoskeletal: Positive for arthralgias, back pain and gait problem  Skin: Negative for color change and rash  Neurological: Positive for weakness  Negative for dizziness, seizures, syncope, light-headedness and headaches  Psychiatric/Behavioral: Positive for sleep disturbance  Negative for confusion and dysphoric mood  The patient is not nervous/anxious          Historical Information   Past Medical History:   Diagnosis Date    A-fib ([place] [address] ) 12/29/2021    Anxiety     Arthritis     Back pain     Cellulitis     [facility]     Edema of both lower extremities due to peripheral venous insufficiency     Edema of both lower extremities due to peripheral venous insufficiency     Erythema of lower extremity 11/12/2021    Fibromyalgia     Hypertension     Sjogren syndrome, unspecified ([place] [address] )      Past Surgical History:   Procedure Laterality Date    KNEE CARTILAGE SURGERY      AL OPEN RX FEMUR FX+INTRAMED SHAN Left 8/22/2022    Procedure: LEFT RETROGRADE IM SHAN;  Surgeon: Miracle Taveras MD;  Location: [facility];  Service: Orthopedics    REPLACEMENT TOTAL KNEE BILATERAL       Social History   Social History     Substance and Sexual Activity   Alcohol Use Not Currently     Social History     Substance and Sexual Activity   Drug Use Never     Social History     Tobacco Use   Smoking Status Former Smoker    Types: Cigarettes   Smokeless Tobacco Never Used     Family History:   Family History   Problem Relation Age of Onset    Heart disease Mother     Cancer Father        Meds/Allergies   current meds:   Current Facility-Administered Medications   Medication Dose Route Frequency    acetaminophen (TYLENOL) tablet 975 mg  975 mg Oral Q8H [address]    ammonium lactate (LAC-HYDRIN) 12 % cream   Topical Daily    carvedilol (COREG) tablet 25 mg  25 mg Oral BID With Meals    docusate sodium (COLACE) capsule 100 mg  100 mg Oral BID    furosemide (LASIX) injection 80 mg  80 mg Intravenous Daily    gabapentin (NEURONTIN) capsule 100 mg  100 mg Oral TID    heparin (porcine) subcutaneous injection 5,000 Units  5,000 Units Subcutaneous Q8H [facility]    HYDROmorphone (DILAUDID) injection 0 5 mg  0 5 mg Intravenous Q4H PRN    lidocaine (LIDODERM) 5 % patch 1 patch  1 patch Topical Daily    losartan (COZAAR) tablet 100 mg  100 mg Oral Daily    methocarbamol (ROBAXIN) tablet 750 mg  750 mg Oral Q6H [facility]    ondansetron (ZOFRAN) injection 4 mg  4 mg Intravenous Q4H PRN    oxyCODONE (ROXICODONE) immediate release tablet 10 mg  10 mg Oral Q4H PRN    oxyCODONE (ROXICODONE) IR tablet 5 mg  5 mg Oral Q3H PRN    pantoprazole (PROTONIX) injection 40 mg  40 mg Intravenous Q12H [facility]    polyethylene glycol (MIRALAX) packet 17 g  17 g Oral Daily PRN    potassium chloride (K-DUR,KLOR-CON) CR tablet 40 mEq  40 mEq Oral Once    sucralfate (CARAFATE) tablet 1 g  1 g Oral 4x Daily (AC & HS)     Facility-Administered Medications Ordered in Other Encounters   Medication Dose Route Frequency    fentanyl citrate (PF) 100 MCG/2ML   Intravenous PRN    midazolam (VERSED) injection   Intravenous PRN    propofol (DIPRIVAN) 200 MG/20ML bolus injection   Intravenous PRN       No Known Allergies    Objective     Intake/Output Summary (Last 24 hours) at 8/23/2022 1223  Last data filed at 8/23/2022 0747  Gross per 24 hour   Intake 1450 ml   Output 2335 ml   Net -885 ml     Invasive Devices  Report    Peripheral Intravenous Line  Duration           Peripheral IV 08/20/22 Left Antecubital 3 days    Peripheral IV 08/22/22 Right Arm <1 day          Drain  Duration           Urethral Catheter 18 Fr  2 days                Physical Exam  Vitals reviewed  Constitutional:       General: She is not in acute distress  Appearance: She is well-developed  She is not ill-appearing  HENT:      Head: Normocephalic and atraumatic  Mouth/Throat:      Mouth: Mucous membranes are dry  Pharynx: No oropharyngeal exudate or posterior oropharyngeal erythema  Cardiovascular:      Rate and Rhythm: Normal rate  Rhythm irregular        Heart sounds: No murmur heard  Pulmonary:      Effort: Pulmonary effort is normal  No respiratory distress  Breath sounds: Normal breath sounds  Abdominal:      General: There is no distension  Palpations: Abdomen is soft  Tenderness: There is no abdominal tenderness  Musculoskeletal:      Right lower leg: Edema present  Left lower leg: Edema present  Skin:     General: Skin is warm and dry  Coloration: Skin is not pale  Neurological:      General: No focal deficit present  Mental Status: She is alert and oriented to person, place, and time  Mental status is at baseline  Cranial Nerves: No cranial nerve deficit  Motor: Weakness present  Gait: Gait abnormal       Comments: Confused at [facility]         Lab Results:   I have personally reviewed pertinent lab results including the following:  -CBC, CMP    I have personally reviewed the following imaging study reports in PACS:  -x-ray of knees, x-ray femur, CT head    Therapies:   PT: consulted  OT: consulted    VTE Prophylaxis: Heparin    Code Status: Level 1 - Full Code  Advance Directive and Living Will: Yes  not on file  Power of :    POLST:  no    Family and Social Support:   She lives at home with her  in a condo  She is independent for ADLs  She does not require any assistance in the house    Goals of Care:  Return home with     Please note:  Voice-recognition software may have been used in the preparation of this document  Occasional wrong word or "sound-alike" substitutions may have occurred due to the inherent limitations of voice recognition software  Interpretation should be guided by context

## 2022-08-23 NOTE — ASSESSMENT & PLAN NOTE
Likely in the setting of gi bleed; melenic stools s/p EGD with ulcer injection for hemostasis  8/21 - 1 unit PRBCs  8/22 - 1 unit PRBCs for hemoglobin of 7 6--preoperative for optimization    Hemoglobin currently 8 6  Hemodynamically stable  Continue to monitor/trend H/H

## 2022-08-23 NOTE — DISCHARGE INSTRUCTIONS
Discharge Instructions - Elizabeth Ashish 77 y o  female MRN: [id number]  Unit/Bed#: S -01    Weight Bearing Status:                                           Weight bearing as tolerated to the left lower extremity  DVT prophylaxis  As directed for at least 28 days post operatively  Pain:  Continue analgesics as directed    Dressing Instructions:   Please keep clean, dry and intact until follow up     Appt Instructions: If you do not have your appointment, please call the clinic at [phone]     Contact the office sooner if you experience any increased numbness/tingling in the extremities

## 2022-08-23 NOTE — PHYSICAL THERAPY NOTE
PHYSICAL THERAPY EVALUATION NOTE    Patient Name: Haely HANDLEYJRAHEL Date: 2022    AGE:   77 y o  Mrn:   [id number]  ADMIT DX:  Melena [K92 1]  Leg pain [M79 606]  Closed fracture of left distal femur ([facility]) [S72 402A]  Closed fracture of proximal end of left femur, initial encounter ([place] [address] ) [S72 002A]  Cellulitis of lower extremity, unspecified laterality [H91 893]    Past Medical History:   Diagnosis Date    A-fib ([place] [address] ) 2021    Anxiety     Arthritis     Back pain     Cellulitis     CHF (congestive heart failure) ([facility])     Edema of both lower extremities due to peripheral venous insufficiency     Edema of both lower extremities due to peripheral venous insufficiency     Erythema of lower extremity 2021    Fibromyalgia     Hypertension     Sjogren syndrome, unspecified ([place] [address] )      Length Of Stay: 3  PHYSICAL THERAPY EVALUATION :   Patient's identity confirmed via 2 patient identifiers (full name and ) at start of session       22 0938   PT Last Visit   PT Visit Date 22   Note Type   Note type Evaluation  (& Treatment)   Pain Assessment   Pain Assessment Tool FLACC   Pain Score   ("i dont know what to give it"; 10/10 as session goes on)   Pain Location/Orientation Orientation: Left; Location: Knee   Pain Onset/Description Onset: Gradual;Descriptor: Burning  (worsens w/ WB and flexion)   Effect of Pain on Daily Activities limits activity tolerance and quality of movement   Patient's Stated Pain Goal No pain   Hospital Pain Intervention(s) Medication (See MAR); Repositioned; Ambulation/increased activity; Emotional support  (Pre-medicated by KITTY Duncan)   Pain Rating: FLACC (Rest) - Face 1   Pain Rating: FLACC (Rest) - Legs 0   Pain Rating: FLACC (Rest) - Activity 0   Pain Rating: FLACC (Rest) - Cry 0   Pain Rating: FLACC (Rest) - Consolability 0   Score: FLACC (Rest) 1   Pain Rating: FLACC (Activity) - Face 1   Pain Rating: FLACC (Activity) - Legs 1   Pain Rating: FLACC (Activity) - Activity 1   Pain Rating: FLACC (Activity) - Cry 1   Pain Rating: FLACC (Activity) - Consolability 1   Score: FLACC (Activity) 5   Restrictions/Precautions   Weight Bearing Precautions Per Order Yes   LLE Weight Bearing Per Order WBAT   Other Precautions Cognitive; Chair Alarm; Bed Alarm;WBS;Multiple lines;O2;Fall Risk;Pain  (3L NC O2, IV pole)   Home Living   Type of Home House  ([facility])   Home Layout Two level;Stairs to enter with rails  (2 GLORIA, stairglide to 2nd floor)   Bathroom Shower/Tub Tub/shower unit   Bathroom Toilet Standard   Home Equipment Walker;Cane;Stair glide;Grab bars   Additional Comments Pt reports using RW for functional mobility at baseline   Prior Function   Level of [place] Independent with ADLs and functional mobility   Lives With Spouse  (Dakota works 2p-10p daily and pt is home alone during these hours)   Receives Help From Family   ADL Assistance Independent   IADLs Needs assistance   Falls in the last 6 months 1 to 4  (2-3)   Vocational Retired   General   Additional Pertinent History Pt is POD1 s/p ORIF of L femur   Family/Caregiver Present No   Cognition   Overall Cognitive Status Impaired   Attention Attends with cues to redirect   Orientation Level Oriented to person;Oriented to place;Oriented to situation   Memory Decreased short term memory;Decreased recall of precautions   Following Commands Follows one step commands with increased time or repetition   Comments Pt pleasantly confused   Anxious during session but is able to challenge self to participate in functional mobility tasks w/ external motivation fromtherapist   Subjective   Subjective "You have great skin"   RLE Assessment   RLE Assessment WFL   Strength RLE   RLE Overall Strength 3+/5   LLE Assessment   LLE Assessment WFL   Strength LLE   LLE Overall Strength 3-/5  (limited due to pain)   Vision-Basic Assessment   Current Vision Wears glasses all the time   Bed Mobility   Supine to Sit 2  Maximal assistance   Additional items Assist x 2; Increased time required;Verbal cues;LE management   Sit to Supine Unable to assess   Additional Comments Pt sitting EOB at end of eval w/ OT Christine present   Transfers   Sit to Stand 2  Maximal assistance   Additional items Assist x 2; Increased time required;Verbal cues   Stand to Sit 2  Maximal assistance   Additional items Assist x 2; Increased time required;Verbal cues   Additional Comments Pt attempted STS x 2 from EOB, initially w/ RW and then attempted w/ B HHA  Pt is able to achieve 50% standing for both attempts, but c/o knee pain and fear of falling  Balance   Static Sitting Fair -   Static Standing Zero  (Ax2)   Activity Tolerance   Activity Tolerance Patient limited by pain; Patient limited by fatigue   Medical Staff [place] coordination w/ OT Flora King   Nurse Made Aware Spoke to RN Dakota   Assessment   Problem List Decreased strength;Decreased endurance; Impaired balance;Decreased mobility; Decreased cognition; Impaired vision;Orthopedic restrictions;Pain   Assessment Dayan Hicks is a 77 y o  Female who [facility] on 8/20/2022 from home s/p fall and diagnosis of fracture of proximal end of L femur  Pt is now POD1 s/p ORIF  Orders for PT eval and treat received, w/ activity orders of WBAT L LE and fall precautions  Pt presents w/ comorbidities of Afib, CHF, HTN, CKD Stage 3, LBP  At baseline, pt mobilizes independently w/ RW, and reports 2 falls in the last 6 months  Upon evaluation, pt presents w/ the following deficits: weakness, impaired balance, decreased endurance, pain limiting functional mobility and impaired cognition  Upon eval, pt requires max A x 2 for bed mobility, max A x 2 for transfers    Pt's clinical presentation is unstable/unpredictable due to need for assist w/ all phases of mobility when usually mobilizing independently, pain impacting overall mobility status, need for supplemental oxygen in order to maintain oxygen saturation, need for input for task focus and mobility technique, recent drastic decline in mobility compared to baseline and recent history of falls  Patient is at an increased risk of falls due to physical and cognitive deficits  Given the above findings, discharge recommendation is for Post-acute inpatient rehabilitation  During this admission, pt would benefit from continued skilled inpatient PT in the acute care setting in order to address the abovementioned deficits to maximize function and mobility before DC from acute care  Goals   Patient Goals to go home   STG Expiration Date 09/02/22   Short Term Goal #1 Patient will: Increase bilateral LE strength 1/2 grade to facilitate independent mobility, Perform all bed mobility tasks w/ max A x1 to decrease fall risk factors, Perform all transfers w/ max A x1 to improve independence, Ambulate at least 20 ft  with roller walker w/ max A x1 w/o LOB, Increase all balance 1/2 grade to decrease risk for falls, Complete exercise program independently, Tolerate 3 hr OOB to faciliate upright tolerance and PT to assess stairs when appropriate   PT Treatment Day 0   Plan   Treatment/Interventions Functional transfer training;LE strengthening/ROM; Therapeutic exercise; Endurance training;Cognitive reorientation; Bed mobility; Patient/family training;Equipment eval/education;Gait training   PT Frequency 3-5x/wk   Recommendation   PT Discharge Recommendation Post acute rehabilitation services   Equipment Recommended Walker   Additional Comments Recommend RN staff uses Giovana Pillow for OOB transfers   Ld [place] [address]   Turning in Bed Without Bedrails 2   Lying on Back to Sitting on Edge of Flat Bed 1   Moving Bed to Chair 1   Standing Up From Chair 1   Walk in Room 1   Climb 3-5 Stairs 1   Basic Mobility Inpatient Raw Score 7   Turning Head Towards Sound 4   Follow Simple Instructions 3   Low Function Basic Mobility Raw Score 14   Low Function Basic Mobility Standardized Score 22 01   Highest Level Of Mobility   -[facility] Goal 2: Bed activities/Dependent transfer   -[facility] Achieved 4: Move to chair/commode   Barthel Index   Feeding 10   Bathing 0   Grooming Score 0   Dressing Score 5   Bladder Score 10   Bowels Score 10   Toilet Use Score 5   Transfers (Bed/Chair) Score 5   Mobility (Level Surface) Score 0   Stairs Score 0   Barthel Index Score 45   Additional Treatment Session   Start Time 1000   End Time 1015   Treatment Assessment Pt seated EOB  She is c/o increased knee pain, but determined to get OOB  Introduced pt to [employer] via visual and verbal demonstration  Pt is able to utilize quickmove for transfer w/ max A x 2  Pt seated OOB in recliner chair at end of session, reports improvement in pain w/ L knee elevated and extended   Equipment Use Chirag Katz   Additional Treatment Day 1   End of Consult   Patient Position at End of Consult Bedside chair;Bed/Chair alarm activated; All needs within reach         The patient's AM-PAC Basic Mobility Inpatient Short Form Raw Score is 7, Standardized Score is    A standardized score less than 38 32 (raw score of 16) suggests the patient may benefit from discharge to post-acute rehabilitation services which may not coincide with above PT recommendations  However please refer to therapist recommendation for discharge planning given other factors that may influence destination      Pt would benefit from skilled inpatient PT during this admission in order to facilitate progress towards goals to maximize functional independence    Bruce Mcneal PT

## 2022-08-23 NOTE — ASSESSMENT & PLAN NOTE
· Hold Eliquis in the setting of GI bleed  · Continue hold Coreg  · Plan to restart Eliquis 08/23; cleared by GI

## 2022-08-24 ENCOUNTER — ANESTHESIA (INPATIENT)
Dept: ANESTHESIOLOGY | Facility: HOSPITAL | Age: 77
DRG: 480 | End: 2022-08-24
Payer: COMMERCIAL

## 2022-08-24 ENCOUNTER — ANESTHESIA EVENT (INPATIENT)
Dept: ANESTHESIOLOGY | Facility: HOSPITAL | Age: 77
DRG: 480 | End: 2022-08-24
Payer: COMMERCIAL

## 2022-08-24 ENCOUNTER — APPOINTMENT (OUTPATIENT)
Dept: SURGERY | Facility: HOSPITAL | Age: 77
DRG: 480 | End: 2022-08-24
Payer: COMMERCIAL

## 2022-08-24 LAB
ABO GROUP BLD BPU: NORMAL
ANION GAP SERPL CALCULATED.3IONS-SCNC: 6 MMOL/L (ref 4–13)
BASOPHILS # BLD AUTO: 0.03 THOUSANDS/ΜL (ref 0–0.1)
BASOPHILS NFR BLD AUTO: 0 % (ref 0–1)
BPU ID: NORMAL
BUN SERPL-MCNC: 15 MG/DL (ref 5–25)
CALCIUM SERPL-MCNC: 8.7 MG/DL (ref 8.4–10.2)
CHLORIDE SERPL-SCNC: 98 MMOL/L (ref 96–108)
CO2 SERPL-SCNC: 34 MMOL/L (ref 21–32)
CREAT SERPL-MCNC: 0.72 MG/DL (ref 0.6–1.3)
CROSSMATCH: NORMAL
EOSINOPHIL # BLD AUTO: 0.03 THOUSAND/ΜL (ref 0–0.61)
EOSINOPHIL NFR BLD AUTO: 0 % (ref 0–6)
ERYTHROCYTE [DISTWIDTH] IN BLOOD BY AUTOMATED COUNT: 15.1 % (ref 11.6–15.1)
GFR SERPL CREATININE-BSD FRML MDRD: 82 ML/MIN/1.73SQ M
GLUCOSE SERPL-MCNC: 114 MG/DL (ref 65–140)
HCT VFR BLD AUTO: 27.4 % (ref 34.8–46.1)
HGB BLD-MCNC: 8.7 G/DL (ref 11.5–15.4)
IMM GRANULOCYTES # BLD AUTO: 0.08 THOUSAND/UL (ref 0–0.2)
IMM GRANULOCYTES NFR BLD AUTO: 1 % (ref 0–2)
LYMPHOCYTES # BLD AUTO: 2.15 THOUSANDS/ΜL (ref 0.6–4.47)
LYMPHOCYTES NFR BLD AUTO: 17 % (ref 14–44)
MCH RBC QN AUTO: 29.6 PG (ref 26.8–34.3)
MCHC RBC AUTO-ENTMCNC: 31.8 G/DL (ref 31.4–37.4)
MCV RBC AUTO: 93 FL (ref 82–98)
MONOCYTES # BLD AUTO: 1.25 THOUSAND/ΜL (ref 0.17–1.22)
MONOCYTES NFR BLD AUTO: 10 % (ref 4–12)
NEUTROPHILS # BLD AUTO: 8.8 THOUSANDS/ΜL (ref 1.85–7.62)
NEUTS SEG NFR BLD AUTO: 72 % (ref 43–75)
NRBC BLD AUTO-RTO: 0 /100 WBCS
PLATELET # BLD AUTO: 316 THOUSANDS/UL (ref 149–390)
PMV BLD AUTO: 10.6 FL (ref 8.9–12.7)
POTASSIUM SERPL-SCNC: 3.2 MMOL/L (ref 3.5–5.3)
RBC # BLD AUTO: 2.94 MILLION/UL (ref 3.81–5.12)
SODIUM SERPL-SCNC: 138 MMOL/L (ref 135–147)
UNIT DISPENSE STATUS: NORMAL
UNIT PRODUCT CODE: NORMAL
UNIT PRODUCT VOLUME: 350 ML
UNIT RH: NORMAL
WBC # BLD AUTO: 12.34 THOUSAND/UL (ref 4.31–10.16)

## 2022-08-24 PROCEDURE — 93970 EXTREMITY STUDY: CPT | Performed by: SURGERY

## 2022-08-24 PROCEDURE — 85025 COMPLETE CBC W/AUTO DIFF WBC: CPT | Performed by: SURGERY

## 2022-08-24 PROCEDURE — 80048 BASIC METABOLIC PNL TOTAL CA: CPT | Performed by: SURGERY

## 2022-08-24 PROCEDURE — 99232 SBSQ HOSP IP/OBS MODERATE 35: CPT | Performed by: SURGERY

## 2022-08-24 PROCEDURE — 99024 POSTOP FOLLOW-UP VISIT: CPT | Performed by: PHYSICIAN ASSISTANT

## 2022-08-24 RX ORDER — FENTANYL CITRATE 50 UG/ML
INJECTION, SOLUTION INTRAMUSCULAR; INTRAVENOUS AS NEEDED
Status: DISCONTINUED | OUTPATIENT
Start: 2022-08-24 | End: 2022-08-24 | Stop reason: HOSPADM

## 2022-08-24 RX ORDER — POTASSIUM CHLORIDE 14.9 MG/ML
20 INJECTION INTRAVENOUS ONCE
Status: DISCONTINUED | OUTPATIENT
Start: 2022-08-24 | End: 2022-08-24

## 2022-08-24 RX ORDER — TORSEMIDE 20 MG/1
40 TABLET ORAL DAILY
Status: DISCONTINUED | OUTPATIENT
Start: 2022-08-24 | End: 2022-08-30 | Stop reason: HOSPADM

## 2022-08-24 RX ORDER — OXYCODONE HYDROCHLORIDE 10 MG/1
10 TABLET ORAL
Status: DISCONTINUED | OUTPATIENT
Start: 2022-08-24 | End: 2022-08-30 | Stop reason: HOSPADM

## 2022-08-24 RX ORDER — POTASSIUM CHLORIDE 20 MEQ/1
40 TABLET, EXTENDED RELEASE ORAL ONCE
Status: COMPLETED | OUTPATIENT
Start: 2022-08-24 | End: 2022-08-24

## 2022-08-24 RX ADMIN — POTASSIUM CHLORIDE 20 MEQ: 14.9 INJECTION, SOLUTION INTRAVENOUS at 09:16

## 2022-08-24 RX ADMIN — PANTOPRAZOLE SODIUM 40 MG: 40 TABLET, DELAYED RELEASE ORAL at 05:36

## 2022-08-24 RX ADMIN — FENTANYL CITRATE 100 MCG: 50 INJECTION, SOLUTION INTRAMUSCULAR; INTRAVENOUS at 14:08

## 2022-08-24 RX ADMIN — OXYCODONE HYDROCHLORIDE 10 MG: 10 TABLET ORAL at 02:31

## 2022-08-24 RX ADMIN — DOCUSATE SODIUM 100 MG: 100 CAPSULE, LIQUID FILLED ORAL at 09:15

## 2022-08-24 RX ADMIN — OXYCODONE HYDROCHLORIDE 15 MG: 10 TABLET ORAL at 21:24

## 2022-08-24 RX ADMIN — SUCRALFATE 1 G: 1 TABLET ORAL at 21:24

## 2022-08-24 RX ADMIN — OXYCODONE HYDROCHLORIDE 10 MG: 10 TABLET ORAL at 09:14

## 2022-08-24 RX ADMIN — OXYCODONE HYDROCHLORIDE 15 MG: 10 TABLET ORAL at 16:14

## 2022-08-24 RX ADMIN — SUCRALFATE 1 G: 1 TABLET ORAL at 05:36

## 2022-08-24 RX ADMIN — POTASSIUM CHLORIDE 40 MEQ: 1500 TABLET, EXTENDED RELEASE ORAL at 09:15

## 2022-08-24 RX ADMIN — DIAZEPAM 5 MG: 5 TABLET ORAL at 04:45

## 2022-08-24 RX ADMIN — APIXABAN 5 MG: 5 TABLET, FILM COATED ORAL at 09:15

## 2022-08-24 RX ADMIN — CARVEDILOL 25 MG: 12.5 TABLET, FILM COATED ORAL at 09:14

## 2022-08-24 RX ADMIN — LOSARTAN POTASSIUM 100 MG: 50 TABLET, FILM COATED ORAL at 09:15

## 2022-08-24 RX ADMIN — Medication: at 09:16

## 2022-08-24 RX ADMIN — SUCRALFATE 1 G: 1 TABLET ORAL at 16:13

## 2022-08-24 RX ADMIN — PANTOPRAZOLE SODIUM 40 MG: 40 TABLET, DELAYED RELEASE ORAL at 16:14

## 2022-08-24 RX ADMIN — POTASSIUM CHLORIDE 40 MEQ: 1500 TABLET, EXTENDED RELEASE ORAL at 11:37

## 2022-08-24 RX ADMIN — HYDROMORPHONE HYDROCHLORIDE 0.5 MG: 1 INJECTION, SOLUTION INTRAMUSCULAR; INTRAVENOUS; SUBCUTANEOUS at 05:35

## 2022-08-24 RX ADMIN — ACETAMINOPHEN 975 MG: 325 TABLET, FILM COATED ORAL at 21:24

## 2022-08-24 RX ADMIN — ACETAMINOPHEN 975 MG: 325 TABLET, FILM COATED ORAL at 04:45

## 2022-08-24 RX ADMIN — GABAPENTIN 100 MG: 100 CAPSULE ORAL at 09:15

## 2022-08-24 RX ADMIN — POLYETHYLENE GLYCOL 3350 17 G: 17 POWDER, FOR SOLUTION ORAL at 09:15

## 2022-08-24 RX ADMIN — FUROSEMIDE 80 MG: 10 INJECTION, SOLUTION INTRAMUSCULAR; INTRAVENOUS at 09:15

## 2022-08-24 RX ADMIN — LIDOCAINE 5% 1 PATCH: 700 PATCH TOPICAL at 09:15

## 2022-08-24 RX ADMIN — APIXABAN 5 MG: 5 TABLET, FILM COATED ORAL at 18:37

## 2022-08-24 RX ADMIN — CARVEDILOL 25 MG: 12.5 TABLET, FILM COATED ORAL at 16:14

## 2022-08-24 RX ADMIN — SODIUM CHLORIDE 0.1 MG/KG/HR: 0.9 INJECTION, SOLUTION INTRAVENOUS at 15:39

## 2022-08-24 RX ADMIN — DOCUSATE SODIUM 100 MG: 100 CAPSULE, LIQUID FILLED ORAL at 18:37

## 2022-08-24 RX ADMIN — SUCRALFATE 1 G: 1 TABLET ORAL at 11:37

## 2022-08-24 NOTE — ANESTHESIA PROCEDURE NOTES
Peripheral Block    Patient location during procedure: holding area  Reason for block: at surgeon's request and post-op pain management  Staffing  Performed: Anesthesiologist   Anesthesiologist: Baudilio Ashton MD  Preanesthetic Checklist  Completed: patient identified, IV checked, site marked, risks and benefits discussed, surgical consent, monitors and equipment checked, pre-op evaluation and timeout performed  Peripheral Block  Patient position: supine  Prep: ChloraPrep  Patient monitoring: continuous pulse ox, frequent blood pressure checks and heart rate  Block type: femoral  Injection technique: catheter  Procedures: ultrasound guided, Ultrasound guidance required for the procedure to increase accuracy and safety of medication placement and decrease risk of complications  Ultrasound permanent image saved  Needle  Needle type: Tuohy   Needle gauge: 18G  Needle length: 9cm  Needle localization: ultrasound guidance  Catheter type: closed end  Catheter size: 20G    Test dose: negative  Assessment  Injection assessment: incremental injection, local visualized surrounding nerve on ultrasound, negative aspiration for heme and no paresthesia on injection  Paresthesia pain: none  patient tolerated the procedure well with no immediate complications  Additional Notes  FEMORAL CATHETER PROCEDURE ABORTED

## 2022-08-24 NOTE — PROGRESS NOTES
[facility]  Progress Note - Anurag Romero 1945, 77 y o  female MRN: [id number]  Unit/Bed#: S -01 Encounter: [id number]  Primary Care Provider: Bandar Pruitt MD   Date and time admitted to hospital: 8/20/2022  5:58 AM    [address]  · Reported as mechanical fall  · Injuries listed below  · PT/OT    * Fracture of proximal end of left femur [facility])  Assessment & Plan  · Secondary to fall  · XR in CT show distal left femur fracture--no vascular injury on CTA  · Ortho left retrograde IM zoë today 08/22/2022  · Multimodail pain regimen--APS consulted; peripheral block today  · DVT Prop:  Eliquis  · PT/OT when indicated  Melena  Assessment & Plan  · Multiple melenotic stool over the past 24 hours  · Reversed with Kcentra on admission due to history of Eliquis use associated with atrial fibrillation  · GI consulted and note appreciated  · 8/21- EGD- Single cratered, benign-appearing ulcer in the antrum with flat pigmented spot ([facility]); performed cold forceps biopsy; injected 3 mL of epinephrine to address bleeding; hemostasis achieved  2 small, superficial, round ulcers in the incisura and antrum with clean base ([facility] III)  · Protonix changed to b i d  IV  May changed to PO after OR today  · Hemoglobin is currently stable at 7 6  Patient will be transfused with 1 unit of PRBCs today for preop optimization  · Obtain postop labs    · Patient may be restarted on clear liquid diet postoperatively and advanced per GI recommendations  · Continue Carafate  · Avoid NSAIDs    Venous stasis dermatitis of both lower extremities  Assessment & Plan  · Original concern a bilateral lower extremity cellulitis, status post IV antibiotics, normal procalcitonin,  · Slim consultation with low likelihood of cellulitic infection, procalcitonin and lactic acid normal, will continue to monitor off of antibiotics  · Stable WBC  · Continue use of  ammonium lactate lotion daily  · Lower extremities bilaterally with no worsening qualitative evidence of infection, no worsening erythema or swelling    Acute pain due to trauma  Assessment & Plan  · In the setting of fibromyalgia  · APS consulted note appreciated-unable to complete peripheral block today  Multimodal pain regimen was increased to oxy 10s/15 with breakthrough hydromorphone and ketamine drip  Acute on chronic blood loss anemia  Assessment & Plan  Likely in the setting of gi bleed; melenic stools s/p EGD with ulcer injection for hemostasis  8/21 - 1 unit PRBCs  8/22 - 1 unit PRBCs for hemoglobin of 7 6--preoperative for optimization  Hemoglobin currently stable at 8 7  Hemodynamically stable  Continue to monitor/trend H/H    Chronic diastolic heart failure (HCC)  Assessment & Plan  Wt Readings from Last 3 Encounters:   08/18/22 103 kg (227 lb)   08/03/22 103 kg (227 lb 9 6 oz)   07/28/22 109 kg (239 lb 10 2 oz)     · Continue home carvedilol  · I&O    A-fib (HCC)  Assessment & Plan  · Continue coreg an Eliquis          Disposition:  Continue current level of care, until better pain control is managed  Case management consult for disposition  SUBJECTIVE:  Chief Complaint:  I am still having pain    Subjective:  Patient describes still having pain in her left lower extremity  She states it is not as bad as it was before surgery but it still spasms that times  She continues to tolerate her diet  She denies any feelings of nausea, vomiting  She states that she has not had any additional light bowel movements  We discussed removing her Campos catheter today  Patient denies any other complaints today  OBJECTIVE:   Vitals:   Temp:  [96 4 °F (35 8 °C)-98 8 °F (37 1 °C)] 98 1 °F (36 7 °C)  HR:  [58-85] 72  Resp:  [16-28] 18  BP: ()/(46-83) 120/49    Intake/Output:  I/O       08/22 0701  08/23 0700 08/23 0701  08/24 0700 08/24 0701 08/25 0700    P  O   200     I V  1000      Blood  350     IV Piggyback 100 Total Intake 1100 550     Urine 3635 3975 1250    Blood 150      Total Output 3785 3975 1250    Net -2685 -3425 -1250                Nutrition: Diet Regular; Regular House  GI Proph/Bowel Reg:  Colace  VTE Prophylaxis:Sequential compression device (Venodyne)  Eliquis    Physical Exam:   GENERAL APPEARANCE:  No acute distress  NEURO:  GCS 15  Light touch sensation intact throughout  HEENT:  Normocephalic, atraumatic  Neck supple  CV: RRR, +2 radial dorsalis pedis pulses, bilaterally  LUNGS:  Clear to auscultation, bilaterally  On room air  Chest wall is nontender  No orthopnea  No tachypnea  GI:  Abdomen is soft and nontender  :  Pelvis stable  MSK:  Left lower extremities in Ace bandage  Patient is able to move her toes and ankles, bilaterally  Upper extremities display no deformities  SKIN:  Warm, dry  Invasive Devices  Report    Peripheral Intravenous Line  Duration           Peripheral IV 08/22/22 Right Arm 2 days          Epidural Line  Duration           Nerve Block Catheter 08/24/22 <1 day                      Lab Results:   Results: I have personally reviewed all pertinent laboratory/tests results, BMP/CMP:   Lab Results   Component Value Date    SODIUM 138 08/24/2022    K 3 2 (L) 08/24/2022    CL 98 08/24/2022    CO2 34 (H) 08/24/2022    BUN 15 08/24/2022    CREATININE 0 72 08/24/2022    CALCIUM 8 7 08/24/2022    EGFR 82 08/24/2022    and CBC:   Lab Results   Component Value Date    WBC 12 34 (H) 08/24/2022    HGB 8 7 (L) 08/24/2022    HCT 27 4 (L) 08/24/2022    MCV 93 08/24/2022     08/24/2022    MCH 29 6 08/24/2022    MCHC 31 8 08/24/2022    RDW 15 1 08/24/2022    MPV 10 6 08/24/2022    NRBC 0 08/24/2022     Imaging/EKG Studies: I have personally reviewed pertinent reports       Other Studies:  None

## 2022-08-24 NOTE — PROGRESS NOTES
Progress Note - Orthopedics   Andre Rodríguez 77 y o  female MRN: [id number]  Unit/Bed#: AN VASCULAR LAB      Subjective:    77 y  o female Patient seen and examined at the bedside   at bedside  Complains of significantly more leg pain today  Anesthesia at bedside    Denies fevers chills, CP, SOB    Labs:  0   Lab Value Date/Time    HCT 27 4 (L) 08/24/2022 0455    HCT 26 7 (L) 08/23/2022 0745    HCT 26 (L) 08/22/2022 1556    HCT 23 9 (L) 08/22/2022 0526    HCT 37 4 09/23/2014 1339    HCT 33 6 (L) 09/03/2014 1146    HGB 8 7 (L) 08/24/2022 0455    HGB 8 7 (L) 08/23/2022 1815    HGB 9 1 (L) 08/23/2022 1210    HGB 11 3 (L) 09/23/2014 1339    HGB 10 8 (L) 09/03/2014 1146    INR 1 16 08/21/2022 0449    WBC 12 34 (H) 08/24/2022 0455    WBC 12 14 (H) 08/23/2022 0745    WBC 9 18 08/22/2022 0526    WBC 7 10 09/23/2014 1339    WBC 6 13 09/03/2014 1146       Meds:    Current Facility-Administered Medications:     acetaminophen (TYLENOL) tablet 975 mg, 975 mg, Oral, Q8H [address], Ludwin Bone PA-C, 975 mg at 08/24/22 0445    ammonium lactate (LAC-HYDRIN) 12 % cream, , Topical, Daily, Ludwin Bone PA-C, Given at 08/24/22 0916    apixaban (ELIQUIS) tablet 5 mg, 5 mg, Oral, BID, SHAHIDA Dye, 5 mg at 08/24/22 0915    carvedilol (COREG) tablet 25 mg, 25 mg, Oral, BID With Meals, Ludwin Bone PA-C, 25 mg at 08/24/22 0914    diazepam (VALIUM) tablet 5 mg, 5 mg, Oral, Q6H PRN, Sherman Valle PA-C, 5 mg at 08/24/22 0445    docusate sodium (COLACE) capsule 100 mg, 100 mg, Oral, BID, Ludwin Bone PA-C, 100 mg at 08/24/22 0915    gabapentin (NEURONTIN) capsule 100 mg, 100 mg, Oral, TID, SAM Guillaume-JAIR, 100 mg at 08/24/22 0915    HYDROmorphone (DILAUDID) injection 0 5 mg, 0 5 mg, Intravenous, Q4H PRN, Ludwin Bone PA-C, 0 5 mg at 08/24/22 0535    lidocaine (LIDODERM) 5 % patch 1 patch, 1 patch, Topical, Daily, Ludwin Bone PA-C, 1 patch at 08/24/22 0915    losartan (COZAAR) tablet 100 mg, 100 mg, Oral, Daily, Charles Dura, PA-C, 100 mg at 08/24/22 0915    ondansetron [facility]) injection 4 mg, 4 mg, Intravenous, Q4H PRN, Charles Dura, PA-C, 4 mg at 08/22/22 1659    oxyCODONE (ROXICODONE) immediate release tablet 10 mg, 10 mg, Oral, Q4H PRN, Charles Dura, PA-C, 10 mg at 08/24/22 0914    oxyCODONE (ROXICODONE) IR tablet 5 mg, 5 mg, Oral, Q3H PRN, Charles Dura, PA-C    pantoprazole (PROTONIX) EC tablet 40 mg, 40 mg, Oral, BID AC, SHAHIDA Dye, 40 mg at 08/24/22 0536    polyethylene glycol (MIRALAX) packet 17 g, 17 g, Oral, Daily, Meliton Velasquez MD, 17 g at 08/24/22 0915    sucralfate (CARAFATE) tablet 1 g, 1 g, Oral, 4x Daily (AC & HS), Charles Dura, PA-C, 1 g at 08/24/22 1137    torsemide (DEMADEX) tablet 40 mg, 40 mg, Oral, Daily, Milo Hinojosa MD    Blood Culture:   Lab Results   Component Value Date    BLOODCX No Growth After 5 Days  07/28/2022       Wound Culture:   Lab Results   Component Value Date    WOUNDCULT 3+ Growth of Proteus mirabilis (A) 12/30/2021    WOUNDCULT 3+ Growth of Enterococcus faecalis (A) 12/30/2021    WOUNDCULT 2+ Growth of Pseudomonas aeruginosa (A) 12/30/2021    WOUNDCULT 3+ Growth of  12/30/2021       Ins and Outs:  I/O last 24 hours: In: 550 [P O :200; Blood:350]  Out: 3394 [Urine:5225]          Physical:  Vitals:    08/24/22 1055   BP: 128/54   Pulse: 58   Resp:    Temp: 98 3 °F (36 8 °C)   SpO2: 97%     Musculoskeletal: left Lower Extremity  · Dressing C/D/I without strikethrough  · Mild tenderness over the upper thigh  Thigh and calf are soft and compressible  · SILT s/s/sp/dp/t  +fhl/ehl, +ankle dorsi/plantar flexion  · 2+ DP pulse    Assessment:    77 y  o female POD 2 left retrograde intramedullary zoë  Intraoperative findings of stable prosthesis  Plan:  · WBAT to the left lower extremity  · Hgb 8 7 today     · PT/OT  · Pain control per primary   · DVT ppx: ok to resume eliquis from   · Dispo: Ortho will follow   · Will need follow up with Dr Gara Hodgkins upon discharge       Jen Abel PA-C

## 2022-08-24 NOTE — PLAN OF CARE
Problem: Potential for Falls  Goal: Patient will remain free of falls  Description: INTERVENTIONS:  - Educate patient/family on patient safety including physical limitations  - Instruct patient to call for assistance with activity   - Consult OT/PT to assist with strengthening/mobility   - Keep Call bell within reach  - Keep bed low and locked with side rails adjusted as appropriate  - Keep care items and personal belongings within reach  - Initiate and maintain comfort rounds  - Make Fall Risk Sign visible to staff  - Initiate/Maintain bed/chair alarm  - Obtain necessary fall risk management equipment  - Apply yellow socks and bracelet for high fall risk patients  - Consider moving patient to room near nurses station  Outcome: Progressing     Problem: MOBILITY - ADULT  Goal: Maintain or return to baseline ADL function  Description: INTERVENTIONS:  -  Assess patient's ability to carry out ADLs; assess patient's baseline for ADL function and identify physical deficits which impact ability to perform ADLs (bathing, care of mouth/teeth, toileting, grooming, dressing, etc )  - Assess/evaluate cause of self-care deficits   - Assess range of motion  - Assess patient's mobility; develop plan if impaired  - Assess patient's need for assistive devices and provide as appropriate  - Encourage maximum independence but intervene and supervise when necessary  - Involve family in performance of ADLs  - Assess for home care needs following discharge   - Consider OT consult to assist with ADL evaluation and planning for discharge  - Provide patient education as appropriate  Outcome: Progressing  Goal: Maintains/Returns to pre admission functional level  Description: INTERVENTIONS:  - Perform BMAT or MOVE assessment daily    - Set and communicate daily mobility goal to care team and patient/family/caregiver     - Collaborate with rehabilitation services on mobility goals if consulted  - Ambulate patient 2 times a day  - Out of bed for meals 3 times a day  - Out of bed for toileting  - Record patient progress and toleration of activity level   Outcome: Progressing     Problem: Prexisting or High Potential for Compromised Skin Integrity  Goal: Skin integrity is maintained or improved  Description: INTERVENTIONS:  - Identify patients at risk for skin breakdown  - Assess and monitor skin integrity  - Assess and monitor nutrition and hydration status  - Monitor labs   - Assess for incontinence   - Turn and reposition patient  - Assist with mobility/ambulation  - Relieve pressure over bony prominences  - Avoid friction and shearing  - Provide appropriate hygiene as needed including keeping skin clean and dry  - Evaluate need for skin moisturizer/barrier cream  - Collaborate with interdisciplinary team   - Patient/family teaching  - Consider wound care consult   Outcome: Progressing     Problem: Nutrition/Hydration-ADULT  Goal: Nutrient/Hydration intake appropriate for improving, restoring or maintaining nutritional needs  Description: Monitor and assess patient's nutrition/hydration status for malnutrition  Collaborate with interdisciplinary team and initiate plan and interventions as ordered  Monitor patient's weight and dietary intake as ordered or per policy  Utilize nutrition screening tool and intervene as necessary  Determine patient's food preferences and provide high-protein, high-caloric foods as appropriate       INTERVENTIONS:  - Monitor oral intake, urinary output, labs, and treatment plans  - Assess nutrition and hydration status and recommend course of action  - Evaluate amount of meals eaten  - Assist patient with eating if necessary   - Allow adequate time for meals  - Recommend/ encourage appropriate diets, oral nutritional supplements, and vitamin/mineral supplements  - Order, calculate, and assess calorie counts as needed  - Recommend, monitor, and adjust tube feedings and TPN/PPN based on assessed needs  - Assess need for intravenous fluids  - Provide specific nutrition/hydration education as appropriate  - Include patient/family/caregiver in decisions related to nutrition  Outcome: Progressing

## 2022-08-24 NOTE — ASSESSMENT & PLAN NOTE
· Multiple melenotic stool over the past 24 hours  · Reversed with Kcentra on admission due to history of Eliquis use associated with atrial fibrillation  · GI consulted and note appreciated  · 8/21- EGD- Single cratered, benign-appearing ulcer in the antrum with flat pigmented spot (Vish IIC); performed cold forceps biopsy; injected 3 mL of epinephrine to address bleeding; hemostasis achieved  2 small, superficial, round ulcers in the incisura and antrum with clean base (Vish III)  · Protonix changed to b i d  IV  May changed to PO after OR today  · Hemoglobin is currently stable at 7 6  Patient will be transfused with 1 unit of PRBCs today for preop optimization  · Obtain postop labs    · Patient may be restarted on clear liquid diet postoperatively and advanced per GI recommendations  · Continue Carafate  · Avoid NSAIDs

## 2022-08-24 NOTE — PROGRESS NOTES
Progress Note - Acute Pain Service    Brii Hernandez 77 y o  female MRN: [id number]  Unit/Bed#: S -01 Encounter: [id number]      Assessment:   Principal Problem:    Fracture of proximal end of left femur ([address] )  Active Problems:    Chronic venous insufficiency    A-fib (HCC)    Chronic diastolic heart failure (HCC)    Acute on chronic blood loss anemia    Venous stasis dermatitis of both lower extremities    Fall    Melena    Acute pain due to trauma    Brii Hernandez is a 77 y o  female with PMHx of A-fib on eliquis, morbid obesity, chronic opioid dependence (PDMP confirmed, takes percocet 10-325mg q6hr PRN), and HFpEF who initially presented with left proximal femur fracture after a mechanical fall  She had a left femoral catheter placed on 8/20 but was inadvertently removed  Subsequently, she had a left femur ORIF with perioperative femoral single-shot block for post-operative analgesia on 8/22  APS consulted for post-operative pain management  Upon bedside evaluation, Alvares Nani reports severe left leg pain  She notes that the pain is constant/severe and associated with left knee "muscle spasms" which is a chronic issue for her  She has not been able to get OOB or sleep well overnight due to the pain  She is somewhat less confused today but couldn't tell me which analgesic modality helps her the best  Denies opioid-induced side effects including nausea/vomiting/itching/constipation       Plan:   - Left femoral catheter unsuccessful due poor acoustic windows  - Will increase PO oxycodone to 10/15mg q4hr PRN given likely opioid tolerance from chronic opioid dependence  - Will start IV ketamine @0 1mg/kg/hr for analgesic support  - Continue IV dilaudid 0 5mg q4hr PRN for breakthrough    Multimodal analgesia:  - - Tylenol 975 mg PO q8hrs standing  - Lidocaine patches to affected areas 12 hours on, 12 hours off  - Avoid NSAIDs due to potential CV side-effects given HF history  - PO valium 5mg q6hr PRN for muscle spasms/anxiety    Bowel Regimen:  - Docusate (Colace) 100 mg PO twice daily    APS will continue to follow  Please contact Acute Pain Service - [facility] via [employer] from [phone] with additional questions or concerns  See Carlito or Shea for additional contacts and after hours information  Pain History  Current pain location(s): Left distal thigh, left knee  Pain Scale:   10/10  Quality: Sharp, "tight/spasm-like"  24 hour history: See above    Opioid requirement previous 24 hours: IV dilaudid 2mg, PO oxycodone 60mg    Meds/Allergies   all current active meds have been reviewed    No Known Allergies    Objective     Temp:  [98 3 °F (36 8 °C)-98 8 °F (37 1 °C)] 98 3 °F (36 8 °C)  HR:  [58-84] 58  Resp:  [16-18] 16  BP: (120-175)/(47-83) 128/54    Physical Exam  Constitutional:       Appearance: She is obese  HENT:      Head: Normocephalic  Mouth/Throat:      Mouth: Mucous membranes are moist    Eyes:      Pupils: Pupils are equal, round, and reactive to light  Cardiovascular:      Rate and Rhythm: Normal rate  Pulses: Normal pulses  Pulmonary:      Effort: Pulmonary effort is normal    Abdominal:      Palpations: Abdomen is soft  Musculoskeletal:         General: Swelling present  Comments: Left leg/knee wrapped in bandages   Skin:     General: Skin is dry  Neurological:      General: No focal deficit present  Mental Status: She is alert     Psychiatric:         Mood and Affect: Mood normal          Lab Results:   Results from last 7 days   Lab Units 08/24/22  0455   WBC Thousand/uL 12 34*   HEMOGLOBIN g/dL 8 7*   HEMATOCRIT % 27 4*   PLATELETS Thousands/uL 316      Results from last 7 days   Lab Units 08/24/22  0455 08/23/22  0745 08/22/22  1556   POTASSIUM mmol/L 3 2* 3 9  --    CHLORIDE mmol/L 98 102  --    CO2 mmol/L 34* 32  --    CO2, I-STAT mmol/L  --   --  36*   BUN mg/dL 15 13  --    CREATININE mg/dL 0 72 0 82  --    CALCIUM mg/dL 8 7 8 5  --    ALK PHOS U/L  -- 60  --    ALT U/L  --  6*  --    AST U/L  --  16  --    GLUCOSE, ISTAT mg/dl  --   --  121       Imaging Studies: I have personally reviewed pertinent reports  EKG, Pathology, and Other Studies: I have personally reviewed pertinent reports  Counseling / Coordination of Care  Total floor / unit time spent today 20 minutes  Greater than 50% of total time was spent with the patient and / or family counseling and / or coordination of care  A description of the counseling / coordination of care:     Please note that the APS provides consultative services regarding pain management only  With the exception of ketamine and epidural infusions and except when indicated, final decisions regarding starting or changing doses of analgesic medications are at the discretion of the consulting service  Off hours consultation and/or medication management is generally not available      Naheed Parekh MD  Acute Pain Service

## 2022-08-24 NOTE — PROGRESS NOTES
[facility]  Progress Note - Corrine Melo 1945, 77 y o  female MRN: [id number]  Unit/Bed#: S -01 Encounter: [id number]  Primary Care Provider: Rena Leonard MD   Date and time admitted to hospital: 8/20/2022  5:58 AM    Acute on chronic blood loss anemia  Assessment & Plan  1 U of pRBC transfused   Current Hb 8 7  Monitor Hb      * Fracture of proximal end of left femur [facility])  Assessment & Plan  Patient had a mechanical fall while going to the bathroom  Patient has been having some melanotic stools over the last 24 hours and slipped on it  She landed on her left knee  Underlying h/o bilateral knee replacements also  Admission imaging consistent with left femur fracture  Evaluated by ortho team, Surgery was done  Will continue Pain management   PT/OT eval once clinical stability     Patient complained of left calf pain, Venous duplex normal      Melena  Assessment & Plan  Patient has been having melanotic stools over the last 24 hours  She takes Eliquis for atrial fibrillation  Currently Eliquis is on hold  S/p Kcentra  Placed on Protonix gtt  no melanotic stools reported overnight  Plan  GI on board,  Continue protonix 40 IV bid  Close Hgb watch      Acute pain due to trauma  Assessment & Plan  Dilaudid, hydromorphone PRN  Fall  Assessment & Plan  Mechanical fall  PT OT once clinical stability, patient may benefit of post acute rehab    Venous stasis dermatitis of both lower extremities  Assessment & Plan  Patient has mild erythema of the bilateral extremity which seems to be chronic  Erythema is symmetric, skin its significant dry and scaly , she only reports tenderness in left extremity, has remained afebrile, wbc wnl , very low likelihood of  cellulitis  Patient complained of left calf pain which radiates upward, on examination left lower leg looks swollen compared to right side     Will continue topical moisturizers  Venous doppler normal    Chronic diastolic heart failure [facility])  Assessment & Plan  Wt Readings from Last 3 Encounters:   22 103 kg (227 lb)   22 103 kg (227 lb 9 6 oz)   22 109 kg (239 lb 10 2 oz)     Patient has chronic diastolic congestive heart failure  Most recent echo (2021) : 65%  Systolic function is normal  Wall motion is normal  G1DD  Doesn't appear to be on acute exacerbation    Plan  Continue carvedilol  Discontinue IV diuretics, Start oral Torsemide 40 mg  Monitor electrolytes while on diuretic therapy  Monitor urine output         A-fib (HCC)  Assessment & Plan  Continue patient on carvedilol,   Restart Eliquis  Chronic venous insufficiency  Assessment & Plan  Continue care with Lac-Hydrin cream           VTE Pharmacologic Prophylaxis:   VTE Score: 10 held due to GI bleed     Mechanical VTE Prophylaxis in Place: Yes    Patient Centered Rounds: Nursing    Discussions with Specialists or Other Care Team Provider: attending physician    Current Length of Stay: 4 day(s)    Current Patient Status: Inpatient     Discharge Plan / Estimated Discharge Date: Not yet established     Contacted  via phone and updated  Code Status: Level 1 - Full Code         Subjective:   Patient complains of pain in the left leg around her knee joint  No bowel movements in few days  No fever, chills, nausea or vomiting, cough  No SOB  Hb is 8 7 stable  UOP: 3900 ml  Creatinine, potasium normal      Objective:     Vitals:   Temp (24hrs), Av 5 °F (36 9 °C), Min:97 9 °F (36 6 °C), Max:98 8 °F (37 1 °C)    Temp:  [97 9 °F (36 6 °C)-98 8 °F (37 1 °C)] 98 6 °F (37 °C)  HR:  [64-84] 77  Resp:  [16-18] 16  BP: (120-175)/(47-83) 160/83  SpO2:  [92 %-100 %] 92 %  There is no height or weight on file to calculate BMI  Input and Output Summary (last 24 hours):        Intake/Output Summary (Last 24 hours) at 2022 1028  Last data filed at 2022 1024  Gross per 24 hour   Intake 200 ml   Output 4225 ml   Net -4025 ml Physical Exam:     Physical Exam  Vitals and nursing note reviewed  Constitutional:       General: She is not in acute distress  Appearance: She is not toxic-appearing  HENT:      Head: Normocephalic and atraumatic  Right Ear: Tympanic membrane normal  There is no impacted cerumen  Left Ear: Tympanic membrane normal  There is no impacted cerumen  Nose: Nose normal  No congestion or rhinorrhea  Mouth/Throat:      Mouth: Mucous membranes are moist       Pharynx: Oropharynx is clear  No oropharyngeal exudate or posterior oropharyngeal erythema  Eyes:      Extraocular Movements: Extraocular movements intact  Conjunctiva/sclera: Conjunctivae normal       Pupils: Pupils are equal, round, and reactive to light  Cardiovascular:      Rate and Rhythm: Normal rate  Pulses: Normal pulses  Heart sounds: No murmur heard  No gallop  Pulmonary:      Effort: Pulmonary effort is normal  No respiratory distress  Breath sounds: No wheezing or rales  Chest:      Chest wall: No tenderness  Abdominal:      General: Bowel sounds are normal  There is no distension  Palpations: Abdomen is soft  Tenderness: There is no abdominal tenderness  Musculoskeletal:      Cervical back: Normal range of motion  No rigidity  Comments: Bilateral lower extremities erythema, edema, skin dryness and scaly, left lower extremity with immobilizer, distal pulses intact, sensation preserved, decreased ROM 2/2 pain   Lymphadenopathy:      Cervical: No cervical adenopathy  Skin:     General: Skin is warm  Capillary Refill: Capillary refill takes less than 2 seconds  Neurological:      Mental Status: She is alert and oriented to person, place, and time  Psychiatric:         Mood and Affect: Mood normal          Behavior: Behavior normal          Thought Content:  Thought content normal          Judgment: Judgment normal           Additional Data:     Labs:  Results from last 7 days   Lab Units 08/24/22  0455   WBC Thousand/uL 12 34*   HEMOGLOBIN g/dL 8 7*   HEMATOCRIT % 27 4*   PLATELETS Thousands/uL 316   NEUTROS PCT % 72   LYMPHS PCT % 17   MONOS PCT % 10   EOS PCT % 0     Results from last 7 days   Lab Units 08/24/22  0455 08/23/22  0745   SODIUM mmol/L 138 141   POTASSIUM mmol/L 3 2* 3 9   CHLORIDE mmol/L 98 102   CO2 mmol/L 34* 32   BUN mg/dL 15 13   CREATININE mg/dL 0 72 0 82   ANION GAP mmol/L 6 7   CALCIUM mg/dL 8 7 8 5   ALBUMIN g/dL  --  3 3*   TOTAL BILIRUBIN mg/dL  --  0 44   ALK PHOS U/L  --  60   ALT U/L  --  6*   AST U/L  --  16   GLUCOSE RANDOM mg/dL 114 152*     Results from last 7 days   Lab Units 08/21/22  0449   INR  1 16     Results from last 7 days   Lab Units 08/21/22  1116   POC GLUCOSE mg/dl 145*         Results from last 7 days   Lab Units 08/20/22  0955   LACTIC ACID mmol/L 0 7   PROCALCITONIN ng/ml 0 07       Imaging: No pertinent imaging reviewed      Recent Cultures (last 7 days):           Lines/Drains:  Invasive Devices  Report    Peripheral Intravenous Line  Duration           Peripheral IV 08/22/22 Right Arm 1 day          Drain  Duration           Urethral Catheter 18 Fr  3 days                Telemetry:        Last 24 Hours Medication List:   Current Facility-Administered Medications   Medication Dose Route Frequency Provider Last Rate    acetaminophen  975 mg Oral [facility] Emily Piña PA-C      ammonium lactate   Topical Daily Emily Piña PA-C      apixaban  5 mg Oral BID SHAHIDA Dye      carvedilol  25 mg Oral BID With Meals Emily Piña PA-C      diazepam  5 mg Oral Q6H PRN Elsie Clamp Lukievics, RYAN      docusate sodium  100 mg Oral BID Emily Piña PA-C      gabapentin  100 mg Oral TID Emily Piña PA-C      HYDROmorphone  0 5 mg Intravenous Q4H PRN Emily Piña PA-C      lidocaine  1 patch Topical Daily Emily Piña PA-C      losartan  100 mg Oral Daily Emily Piña PA-C     Concha Leisure ondansetron  4 mg Intravenous Q4H PRN SAM Cutler-JAIR      oxyCODONE  10 mg Oral Q4H PRN Tato Casillas, PA-C      oxyCODONE  5 mg Oral Q3H PRN Tato Casillas, PA-C      pantoprazole  40 mg Oral BID AC SHAHIDA Dye      polyethylene glycol  17 g Oral Daily Bruno Miles MD      potassium chloride  20 mEq Intravenous Once SHAHIDA Miles 20 mEq (08/24/22 0916)    sucralfate  1 g Oral 4x Daily (AC & HS) SAM Cutler-JAIR      torsemide  40 mg Oral Daily Corey Dugan MD          Today, Patient Was Seen By: Henry Barton MD    ** Please Note: This note has been constructed using a voice recognition system       Nutrition Assessment and Intervention:     Reviewed food recall journal      Physical Activity Assessment and Intervention:    Activity journal reviewed      Emotional and Mental Well-being, Sleep, Connectedness Assessment and Intervention:    Sleep/stress assessment performed      Tobacco and Toxic Substance Assessment and Intervention:     Tobacco use screening performed    Alcohol and drug use screening performed

## 2022-08-25 PROBLEM — D62 ACUTE ON CHRONIC BLOOD LOSS ANEMIA: Status: RESOLVED | Noted: 2022-02-14 | Resolved: 2022-08-25

## 2022-08-25 LAB
ANION GAP SERPL CALCULATED.3IONS-SCNC: 6 MMOL/L (ref 4–13)
BUN SERPL-MCNC: 15 MG/DL (ref 5–25)
CALCIUM SERPL-MCNC: 8.8 MG/DL (ref 8.4–10.2)
CHLORIDE SERPL-SCNC: 99 MMOL/L (ref 96–108)
CO2 SERPL-SCNC: 34 MMOL/L (ref 21–32)
CREAT SERPL-MCNC: 0.88 MG/DL (ref 0.6–1.3)
ERYTHROCYTE [DISTWIDTH] IN BLOOD BY AUTOMATED COUNT: 15.2 % (ref 11.6–15.1)
GFR SERPL CREATININE-BSD FRML MDRD: 64 ML/MIN/1.73SQ M
GLUCOSE SERPL-MCNC: 138 MG/DL (ref 65–140)
HCT VFR BLD AUTO: 29.3 % (ref 34.8–46.1)
HGB BLD-MCNC: 9.2 G/DL (ref 11.5–15.4)
MAGNESIUM SERPL-MCNC: 2 MG/DL (ref 1.9–2.7)
MCH RBC QN AUTO: 29.9 PG (ref 26.8–34.3)
MCHC RBC AUTO-ENTMCNC: 31.4 G/DL (ref 31.4–37.4)
MCV RBC AUTO: 95 FL (ref 82–98)
PLATELET # BLD AUTO: 352 THOUSANDS/UL (ref 149–390)
PMV BLD AUTO: 10.3 FL (ref 8.9–12.7)
POTASSIUM SERPL-SCNC: 3.8 MMOL/L (ref 3.5–5.3)
RBC # BLD AUTO: 3.08 MILLION/UL (ref 3.81–5.12)
SODIUM SERPL-SCNC: 139 MMOL/L (ref 135–147)
WBC # BLD AUTO: 10.3 THOUSAND/UL (ref 4.31–10.16)

## 2022-08-25 PROCEDURE — 99233 SBSQ HOSP IP/OBS HIGH 50: CPT | Performed by: SURGERY

## 2022-08-25 PROCEDURE — 85027 COMPLETE CBC AUTOMATED: CPT | Performed by: INTERNAL MEDICINE

## 2022-08-25 PROCEDURE — 99232 SBSQ HOSP IP/OBS MODERATE 35: CPT | Performed by: NURSE PRACTITIONER

## 2022-08-25 PROCEDURE — 99024 POSTOP FOLLOW-UP VISIT: CPT | Performed by: PHYSICIAN ASSISTANT

## 2022-08-25 PROCEDURE — 97530 THERAPEUTIC ACTIVITIES: CPT

## 2022-08-25 PROCEDURE — 80048 BASIC METABOLIC PNL TOTAL CA: CPT | Performed by: NURSE PRACTITIONER

## 2022-08-25 PROCEDURE — 97110 THERAPEUTIC EXERCISES: CPT

## 2022-08-25 PROCEDURE — 83735 ASSAY OF MAGNESIUM: CPT | Performed by: NURSE PRACTITIONER

## 2022-08-25 RX ORDER — AMOXICILLIN 250 MG
1 CAPSULE ORAL 2 TIMES DAILY
Status: DISCONTINUED | OUTPATIENT
Start: 2022-08-25 | End: 2022-08-29

## 2022-08-25 RX ADMIN — SENNOSIDES AND DOCUSATE SODIUM 1 TABLET: 8.6; 5 TABLET ORAL at 13:10

## 2022-08-25 RX ADMIN — TORSEMIDE 40 MG: 20 TABLET ORAL at 09:16

## 2022-08-25 RX ADMIN — SUCRALFATE 1 G: 1 TABLET ORAL at 22:28

## 2022-08-25 RX ADMIN — LIDOCAINE 5% 1 PATCH: 700 PATCH TOPICAL at 09:14

## 2022-08-25 RX ADMIN — ACETAMINOPHEN 975 MG: 325 TABLET, FILM COATED ORAL at 04:50

## 2022-08-25 RX ADMIN — SENNOSIDES AND DOCUSATE SODIUM 1 TABLET: 8.6; 5 TABLET ORAL at 17:06

## 2022-08-25 RX ADMIN — Medication: at 09:21

## 2022-08-25 RX ADMIN — OXYCODONE HYDROCHLORIDE 15 MG: 10 TABLET ORAL at 04:50

## 2022-08-25 RX ADMIN — ACETAMINOPHEN 975 MG: 325 TABLET, FILM COATED ORAL at 22:28

## 2022-08-25 RX ADMIN — OXYCODONE HYDROCHLORIDE 15 MG: 10 TABLET ORAL at 22:27

## 2022-08-25 RX ADMIN — ACETAMINOPHEN 975 MG: 325 TABLET, FILM COATED ORAL at 06:01

## 2022-08-25 RX ADMIN — DOCUSATE SODIUM 100 MG: 100 CAPSULE, LIQUID FILLED ORAL at 09:15

## 2022-08-25 RX ADMIN — APIXABAN 5 MG: 5 TABLET, FILM COATED ORAL at 09:16

## 2022-08-25 RX ADMIN — PANTOPRAZOLE SODIUM 40 MG: 40 TABLET, DELAYED RELEASE ORAL at 04:50

## 2022-08-25 RX ADMIN — CARVEDILOL 25 MG: 12.5 TABLET, FILM COATED ORAL at 09:16

## 2022-08-25 RX ADMIN — LOSARTAN POTASSIUM 100 MG: 50 TABLET, FILM COATED ORAL at 09:15

## 2022-08-25 RX ADMIN — SUCRALFATE 1 G: 1 TABLET ORAL at 17:06

## 2022-08-25 RX ADMIN — SUCRALFATE 1 G: 1 TABLET ORAL at 04:50

## 2022-08-25 RX ADMIN — ACETAMINOPHEN 975 MG: 325 TABLET, FILM COATED ORAL at 13:11

## 2022-08-25 RX ADMIN — SUCRALFATE 1 G: 1 TABLET ORAL at 06:03

## 2022-08-25 RX ADMIN — POLYETHYLENE GLYCOL 3350 17 G: 17 POWDER, FOR SOLUTION ORAL at 09:15

## 2022-08-25 RX ADMIN — PANTOPRAZOLE SODIUM 40 MG: 40 TABLET, DELAYED RELEASE ORAL at 17:06

## 2022-08-25 RX ADMIN — APIXABAN 5 MG: 5 TABLET, FILM COATED ORAL at 17:07

## 2022-08-25 RX ADMIN — OXYCODONE HYDROCHLORIDE 10 MG: 10 TABLET ORAL at 14:45

## 2022-08-25 RX ADMIN — PANTOPRAZOLE SODIUM 40 MG: 40 TABLET, DELAYED RELEASE ORAL at 06:02

## 2022-08-25 RX ADMIN — HYDROMORPHONE HYDROCHLORIDE 0.5 MG: 1 INJECTION, SOLUTION INTRAMUSCULAR; INTRAVENOUS; SUBCUTANEOUS at 17:12

## 2022-08-25 RX ADMIN — SODIUM CHLORIDE 0.1 MG/KG/HR: 0.9 INJECTION, SOLUTION INTRAVENOUS at 15:58

## 2022-08-25 RX ADMIN — CARVEDILOL 25 MG: 12.5 TABLET, FILM COATED ORAL at 17:06

## 2022-08-25 RX ADMIN — SUCRALFATE 1 G: 1 TABLET ORAL at 13:10

## 2022-08-25 NOTE — ASSESSMENT & PLAN NOTE
· In the setting of fibromyalgia  · Continue current pain regimen  APS note appreciated  · Oxycodone 5/10  · Ketamine drip  · Dilaudid p r n    · Valium

## 2022-08-25 NOTE — PROGRESS NOTES
Progress Note - Orthopedics   Srini Adriane Rodríguez 77 y o  female MRN: [id number]  Unit/Bed#: S -01      Subjective:    77 y  o female Patient seen and examined at the bedside  In better spirits today  Still with complaints of left leg spasms  Appears more comfortable   Denies fevers chills, CP, SOB    Labs:  0   Lab Value Date/Time    HCT 29 3 (L) 08/25/2022 0714    HCT 27 4 (L) 08/24/2022 0455    HCT 26 7 (L) 08/23/2022 0745    HCT 37 4 09/23/2014 1339    HCT 33 6 (L) 09/03/2014 1146    HGB 9 2 (L) 08/25/2022 0714    HGB 8 7 (L) 08/24/2022 0455    HGB 8 7 (L) 08/23/2022 1815    HGB 11 3 (L) 09/23/2014 1339    HGB 10 8 (L) 09/03/2014 1146    INR 1 16 08/21/2022 0449    WBC 10 30 (H) 08/25/2022 0714    WBC 12 34 (H) 08/24/2022 0455    WBC 12 14 (H) 08/23/2022 0745    WBC 7 10 09/23/2014 1339    WBC 6 13 09/03/2014 1146       Meds:    Current Facility-Administered Medications:     acetaminophen (TYLENOL) tablet 975 mg, 975 mg, Oral, Q8H [facility], Gonzalo Morris PA-C, 975 mg at 08/25/22 0601    ammonium lactate (LAC-HYDRIN) 12 % cream, , Topical, Daily, Gonzalo Morris PA-C, Given at 08/24/22 0916    apixaban (ELIQUIS) tablet 5 mg, 5 mg, Oral, BID, SHAHIDA Dye, 5 mg at 08/24/22 1837    carvedilol (COREG) tablet 25 mg, 25 mg, Oral, BID With Meals, Gonzalo Morris PA-C, 25 mg at 08/24/22 1614    diazepam (VALIUM) tablet 5 mg, 5 mg, Oral, Q6H PRN, Fermín Valle PA-C, 5 mg at 08/24/22 0445    docusate sodium (COLACE) capsule 100 mg, 100 mg, Oral, BID, Gonzalo Morris PA-C, 100 mg at 08/24/22 1837    HYDROmorphone (DILAUDID) injection 0 5 mg, 0 5 mg, Intravenous, Q4H PRN, Gonzalo Morris PA-C, 0 5 mg at 08/24/22 0535    ketamine 250 mg (STANDARD CONCENTRATION) IV in sodium chloride 0 9% 250 mL, 0 1 mg/kg/hr, Intravenous, Continuous, Bright Komal Bridget Dan MD, Last Rate: 10 3 mL/hr at 08/25/22 0630, 0 1 mg/kg/hr at 08/25/22 0630    lidocaine (LIDODERM) 5 % patch 1 patch, 1 patch, Topical, Daily, Acosta Higgins PA-C, 1 patch at 08/24/22 0915    losartan (COZAAR) tablet 100 mg, 100 mg, Oral, Daily, SAM Horn-JAIR, 100 mg at 08/24/22 0915    ondansetron (ZOFRAN) injection 4 mg, 4 mg, Intravenous, Q4H PRN, SAM Horn-C, 4 mg at 08/22/22 1659    oxyCODONE (ROXICODONE) immediate release tablet 10 mg, 10 mg, Oral, Q3H PRN, Sonido Martin MD    oxyCODONE (ROXICODONE) IR tablet 15 mg, 15 mg, Oral, Q4H PRN, Sonido Reddy MD, 15 mg at 08/25/22 0450    pantoprazole (PROTONIX) EC tablet 40 mg, 40 mg, Oral, BID AC, Margie Muir, SHAHIDA, 40 mg at 08/25/22 0602    polyethylene glycol (MIRALAX) packet 17 g, 17 g, Oral, Daily, Gena Hooper MD, 17 g at 08/24/22 0915    sucralfate (CARAFATE) tablet 1 g, 1 g, Oral, 4x Daily (AC & HS), Acosta Higgins PA-C, 1 g at 08/25/22 0603    torsemide (DEMADEX) tablet 40 mg, 40 mg, Oral, Daily, Luis Felipe Mcgregor MD    Blood Culture:   Lab Results   Component Value Date    BLOODCX No Growth After 5 Days  07/28/2022       Wound Culture:   Lab Results   Component Value Date    WOUNDCULT 3+ Growth of Proteus mirabilis (A) 12/30/2021    WOUNDCULT 3+ Growth of Enterococcus faecalis (A) 12/30/2021    WOUNDCULT 2+ Growth of Pseudomonas aeruginosa (A) 12/30/2021    WOUNDCULT 3+ Growth of  12/30/2021       Ins and Outs:  I/O last 24 hours: In: 153 [I V :153]  Out: 1800 [Urine:1800]          Physical:  Vitals:    08/25/22 0733   BP: 153/69   Pulse: 69   Resp: 18   Temp: 97 6 °F (36 4 °C)   SpO2: 99%     Musculoskeletal: left Lower Extremity  · Dressing over the knee C/D/I without strikethrough  Proximal dressings have come loose, taken down, staples intact  Recovered with gauze/tegaderm dressings  · Mild tenderness over the upper thigh  Thigh and calf are soft and compressible  · SILT s/s/sp/dp/t  +fhl/ehl, +ankle dorsi/plantar flexion  · 2+ DP pulse    Assessment:    77 y  o female POD 3 left retrograde intramedullary zoë  Intraoperative findings of stable prosthesis  Plan:  · WBAT to the left lower extremity  · Hgb 9 2 today  · PT/OT  · Pain control per primary   · DVT ppx: ok to resume eliquis    · Dispo: Ortho will follow   · Will need follow up with Dr Mahnaz Qiu upon discharge       Jen Chapman Junior, PA-C

## 2022-08-25 NOTE — ASSESSMENT & PLAN NOTE
Likely in the setting of gi bleed; melenic stools s/p EGD with ulcer injection for hemostasis  8/21 - 1 unit PRBCs  8/22 - 1 unit PRBCs for hemoglobin of 7 6--preoperative for optimization    Hemoglobin currently stable at 9 2  Hemodynamically stable  Continue to monitor/trend H/H

## 2022-08-25 NOTE — PROGRESS NOTES
[facility]  Progress Note - Riley Certain 1945, 77 y o  female MRN: [id number]  Unit/Bed#: S -01 Encounter: [id number]  Primary Care Provider: Katerina Perez MD   Date and time admitted to hospital: 8/20/2022  5:58 AM    [address]  · Reported as mechanical fall  · Injuries listed below  · PT/OT    * Fracture of proximal end of left femur [facility])  Assessment & Plan  · Secondary to fall  · XR in CT show distal left femur fracture--no vascular injury on CTA  · Ortho left retrograde IM zoë today 08/22/2022  · Multimodail pain regimen--APS consulted; peripheral block today  · DVT Prop:  Eliquis  · PT/OT when indicated  Melena  Assessment & Plan  · Multiple melenotic stool over the past 24 hours  · Reversed with Kcentra on admission due to history of Eliquis use associated with atrial fibrillation  · GI consulted and note appreciated  · 8/21- EGD- Single cratered, benign-appearing ulcer in the antrum with flat pigmented spot (Vish IIC); performed cold forceps biopsy; injected 3 mL of epinephrine to address bleeding; hemostasis achieved  2 small, superficial, round ulcers in the incisura and antrum with clean base (Vish III)  · Protonix changed to b i d  IV  May changed to PO after OR today  · Hemoglobin is currently stable at 7 6  Patient will be transfused with 1 unit of PRBCs today for preop optimization  · Obtain postop labs    · Patient may be restarted on clear liquid diet postoperatively and advanced per GI recommendations  · Continue Carafate  · Avoid NSAIDs    Venous stasis dermatitis of both lower extremities  Assessment & Plan  · Original concern a bilateral lower extremity cellulitis, status post IV antibiotics, normal procalcitonin,  · Slim consultation with low likelihood of cellulitic infection, procalcitonin and lactic acid normal, will continue to monitor off of antibiotics  · Stable WBC  · Continue use of  ammonium lactate lotion Patient informed: In order to keep our patient and team members safe, we are screening all patients prior to their appointments.    Has the patient or a household member tested positive for COVID-19 in the past 14 days?  No    Has the patient or household member been tested for COVID-19 and are waiting for results?  No    Does the patient OR patient's household members have the following?  No Temperature:Fever ?100.0°F or ?37.8°C?    No Respiratory Symptoms: New or worsening cough, sinus congestion, runny nose, shortness of breath or sore throat.   No GI Symptoms: New onset of nausea, vomiting or diarrhea   No Miscellaneous: New onset of chills, repeated shaking with chills, muscle pain, headache, or loss of taste or smell.      Yes  I have reviewed with the patient that if any of the above screening questions/symptoms change they are to immediately contact the office and will be considered for virtual visit.    Yes I have reviewed the visitor policy with the patient. (1 essential chaperone only if necessary. Exception: well baby visits, siblings allowed if no  available)     Will the patient require an essential chaperone for this appointment?   NO, Patient is aware of current visitor policy    Yes  I have reminded patients with access to patient portal to please check in when they arrive for their appointment using the [employer].    Yes I have reminded patient that they will be screened upon arrival. Patient was reminded to wear their own cloth mask to appointment if at all possible (if chaperone approved, they should also come with cloth mask)    I have noted the negative/positive screen and name of essential chaperone (if needed) in the appointment line.     daily  · Lower extremities bilaterally with no worsening qualitative evidence of infection, no worsening erythema or swelling    Acute pain due to trauma  Assessment & Plan  · In the setting of fibromyalgia  · Continue current pain regimen  APS note appreciated  · Oxycodone 5/10  · Ketamine drip  · Dilaudid p r n  · Valium    Acute on chronic blood loss anemia  Assessment & Plan  Likely in the setting of gi bleed; melenic stools s/p EGD with ulcer injection for hemostasis  8/21 - 1 unit PRBCs  8/22 - 1 unit PRBCs for hemoglobin of 7 6--preoperative for optimization  Hemoglobin currently stable at 9 2  Hemodynamically stable  Continue to monitor/trend H/H    Chronic diastolic heart failure (HCC)  Assessment & Plan  Wt Readings from Last 3 Encounters:   08/18/22 103 kg (227 lb)   08/03/22 103 kg (227 lb 9 6 oz)   07/28/22 109 kg (239 lb 10 2 oz)     · Continue home carvedilol  · I&O    A-fib (HCC)  Assessment & Plan  · Continue coreg an Eliquis          Disposition:  Continue current level of care, pending pain control with oral regimen    SUBJECTIVE:  Chief Complaint:  My pain is much better      Subjective:  Patient describes her pain being much improved since starting ketamine  She continues to have some spasm periodically but overall it is controlled  She denies any shortness of breath or difficulty breathing  I encouraged patient to continue to use incentive spirometry  She denies any abdominal pain  She confirms that she has been tolerating her diet  No recent bowel movements  OBJECTIVE:   Vitals:   Temp:  [96 4 °F (35 8 °C)-98 9 °F (37 2 °C)] 97 6 °F (36 4 °C)  HR:  [58-85] 69  Resp:  [18-28] 18  BP: ()/(44-69) 153/69    Intake/Output:  I/O       08/23 0701 08/24 0700 08/24 0701 08/25 0700 08/25 0701 08/26 0700    P  O  200      I V   153     Blood 350      IV Piggyback       Total Intake 550 153     Urine 3975 1800     Blood       Total Output 3975 1800     Net -7419 -3074 Nutrition: Diet Regular; Regular House  GI Proph/Bowel Reg:  Colace  VTE Prophylaxis:Sequential compression device (Venodyne)  Eliquis    Physical Exam:   GENERAL APPEARANCE:  No acute distress  NEURO:  GCS is 15  Light touch sensation intact throughout  HEENT:  Normocephalic, atraumatic  Neck supple  CV: RRR, +2 radial dorsalis pedis pulses, bilaterally  LUNGS:  Clear to auscultation, bilaterally  On room air  No orthopnea  No tachypnea  GI:  Abdomen is soft nontender  Bowel sounds are present  :  Pelvis is stable  MSK:  Limited range of motion of left lower extremity  Mild tenderness on palpation left lateral thigh  All other extremities display full range of motion  SKIN:  Bilateral lower extremities are thickened--currently moisturize--minimal redness  Surgical site on left lateral leg is clean, dry, intact  Invasive Devices  Report    Peripheral Intravenous Line  Duration           Peripheral IV 08/22/22 Right Arm 2 days          Epidural Line  Duration           Nerve Block Catheter 08/24/22 <1 day                      Lab Results:   Results: I have personally reviewed all pertinent laboratory/tests results, BMP/CMP: No results found for: SODIUM, K, CL, CO2, ANIONGAP, BUN, CREATININE, GLUCOSE, CALCIUM, AST, ALT, ALKPHOS, PROT, BILITOT, EGFR and CBC:   Lab Results   Component Value Date    WBC 10 30 (H) 08/25/2022    HGB 9 2 (L) 08/25/2022    HCT 29 3 (L) 08/25/2022    MCV 95 08/25/2022     08/25/2022    MCH 29 9 08/25/2022    MCHC 31 4 08/25/2022    RDW 15 2 (H) 08/25/2022    MPV 10 3 08/25/2022     Imaging/EKG Studies: I have personally reviewed pertinent reports       Other Studies: none

## 2022-08-25 NOTE — PHYSICAL THERAPY NOTE
PHYSICAL THERAPY NOTE          Patient Name: Camilo Villar  FESCT'T Date: 8/25/2022 08/25/22 1131   PT Last Visit   PT Visit Date 08/25/22   Note Type   Note Type Treatment   Pain Assessment   Pain Assessment Tool 0-10   Pain Score 10 - Worst Possible Pain   Pain Location/Orientation Orientation: Left; Location: Leg;Orientation: Lower; Location: Back   Pain Onset/Description Onset: Ongoing   Effect of Pain on Daily Activities limited functional mobility and activity tolerance   Patient's Stated Pain Goal No pain   Hospital Pain Intervention(s) Repositioned; Ambulation/increased activity; Elevated; Emotional support; Rest   Multiple Pain Sites Yes   Pain Rating: FLACC (Rest) - Face 1   Pain Rating: FLACC (Rest) - Legs 0   Pain Rating: FLACC (Rest) - Activity 0   Pain Rating: FLACC (Rest) - Cry 0   Pain Rating: FLACC (Rest) - Consolability 0   Score: FLACC (Rest) 1   Pain Rating: FLACC (Activity) - Face 1   Pain Rating: FLACC (Activity) - Legs 1   Pain Rating: FLACC (Activity) - Activity 1   Pain Rating: FLACC (Activity) - Cry 1   Pain Rating: FLACC (Activity) - Consolability 1   Score: FLACC (Activity) 5   Restrictions/Precautions   Weight Bearing Precautions Per Order Yes   LLE Weight Bearing Per Order (S)  WBAT   Other Precautions Cognitive; Chair Alarm; Bed Alarm;WBS;Fall Risk;Pain;Multiple lines;O2   General   Chart Reviewed Yes   Response to Previous Treatment Patient with no complaints from previous session  Family/Caregiver Present No   Cognition   Overall Cognitive Status Impaired   Arousal/Participation Alert; Responsive; Cooperative   Attention Attends with cues to redirect   Orientation Level Oriented to person;Oriented to place   Memory Decreased short term memory;Decreased recall of precautions   Following Commands Follows one step commands with increased time or repetition   Comments pt identified by name and ZDOB Subjective   Subjective pt was agreeable to participate in PT intervention   Bed Mobility   Rolling R 3  Moderate assistance   Additional items Assist x 1;HOB elevated; Increased time required;Verbal cues;LE management; Other  (trunk management)   Supine to Sit 2  Maximal assistance   Additional items Assist x 2;HOB elevated; Bedrails; Increased time required;Verbal cues;LE management; Other  (trunk management)   Sit to Supine Unable to assess   Additional Comments pt seated OOB in the recliner post tx session   Transfers   Sit to Stand 2  Maximal assistance   Additional items Assist x 1; Increased time required;Verbal cues; Other  (quick move utilized in todays tx session in order to assist with transfering to recliner)   Stand to Sit 2  Maximal assistance   Additional items Assist x 1; Armrests; Increased time required;Verbal cues; Other  (quick move)   Additional Comments (S)  pt required quick move in todays tx session in order to compleet transfer from EOb to recliner as pt was unable to stand with RW in todays tx session   Ambulation/Elevation   Gait pattern Not appropriate   Balance   Static Sitting Fair -   Dynamic Sitting Poor +   Static Standing Zero  (pt required assistance of quick move in order for pt to stand for 45 seconds)   Ambulatory Zero   Activity Tolerance   Activity Tolerance Patient limited by fatigue;Patient limited by pain   Nurse Made Aware Spoke to RN   Exercises   Quad Sets Supine;10 reps;AROM; Left   Hip Abduction Supine;10 reps;AAROM; Left   Hip Adduction Supine;10 reps;AAROM; Left   Assessment   Problem List Decreased strength;Decreased endurance; Impaired balance;Decreased mobility; Decreased cognition; Impaired vision;Orthopedic restrictions;Pain   Assessment pt began tx session lying supine in the bed and was agreeable to participate in pt intervention   pt required mod Ax1 to roll to her right side with LE and trunk management in todasy tx session in order to clean pt up as pt was on bed pan on arrival to pt room  pt required max Ax1 to complete a supine<>sit EOB transfer with LE and trunk management  While seated EOB pt required a therapeutic seated rest break due to slight dizziness and increase pain  pt was unable to clear buttocks when attempting to stand to RW from EOB  Quick move was utilized in todays tx session inorder to get pt OOB and into recliner  pt required max Ax1 in order to complete  quick move  pt continues to demonstrated limited standing tolerance  post tx session pt was safely placed in recliner with call bell and all pt needs met  PCA present throughout tx session and was going to bath pt post PT intervention  Continue to recommend post acute rehab services at the time of D/C in order to maximize pt functional independence and safety with all OOB mobility when appropriate   Goals   Patient Goals to go home   STG Expiration Date 09/02/22   PT Treatment Day 1   Plan   Treatment/Interventions Functional transfer training;LE strengthening/ROM; Therapeutic exercise; Endurance training;Cognitive reorientation;Patient/family training;Equipment eval/education; Bed mobility;Gait training   PT Frequency 3-5x/wk   Recommendation   PT Discharge Recommendation Post acute rehabilitation services   Equipment Recommended Walker   Additional Comments recommend quick move for assistance to transfer pt [address] [facility] [place]   Turning in Bed Without Bedrails 2   Lying on Back to Sitting on Edge of Flat Bed 1   Moving Bed to Chair 1   Standing Up From Chair 1   Walk in Room 1   Climb 3-5 Stairs 1   Basic Mobility Inpatient Raw Score 7   Turning Head Towards Sound 4   Follow Simple Instructions 3   Low Function Basic Mobility Raw Score 14   Low Function Basic Mobility Standardized Score 22 01   Highest Level Of Mobility   [facility] Goal 2: Bed activities/Dependent transfer   Education   Education Provided Other  (bed mobility and functional transfers)   End of Consult   Patient Position at End of Consult Bedside chair;Bed/Chair alarm activated; All needs within reach   The patient's AM-PAC Basic Mobility Inpatient Short Form Raw Score is 7  A Raw score of less than or equal to 16 suggests the patient may benefit from discharge to post-acute rehabilitation services  Please also refer to the recommendation of the Physical Therapist for safe discharge planning       Julissa Mcclure PTA

## 2022-08-25 NOTE — PROGRESS NOTES
Progress Note - Geriatric Medicine   Azucena Tanner 77 y o  female MRN: [id number]  Unit/Bed#: S -01 Encounter: [id number]      Assessment/Plan:  Fracture proximal end of left femur  Secondary to mechanical fall at home  Postop day 3 from [facility] with ap op femoral nerve block on 08/22/2022  Recommend geriatric pain protocol, including scheduled Tylenol and lidocaine patches  Is also on p r n  Oxycodone 10 or 15 mg for moderate or severe pain and breakthrough Dilaudid  Patient also has p r n  Valium 5 mg q 6 p r n  For muscle spasms  Acute pain services following, appreciate input  Peripheral block was attempted yesterday, it was unable to be completed  Further management per Orthopedics    Chronic back pain  Currently stable  Was taking OxyContin q 12 at home  Is currently on p r n  Oxycodone tender 15 mg for acute pain  APS following    Insomnia  Chronic  First-line treatment is behavior modification  Maintain sleep-wake cycle, avoid nighttime interruptions  Avoid caffeine throughout the day  Avoid napping throughout the day  Encourage physical activity throughout the day  Would recommend trying melatonin 3 mg q h s      Cognitive impairment  Most recent Spencer received 11/30 during hospitalization last month  Patient is alert oriented x3, forgetful at times  At risk for delirium due to cognitive impairment  Recommend delirium precautions  Maintain sleep-wake cycle, avoid nighttime interruptions  Provide adequate pain control  Avoid urinary retention and constipation  Provide frequent and early mobilization  Provide frequent redirection and reorientation as needed  Avoid medications that may worsen or precipitate delirium such as tramadol, benzodiazepines, anticholinergics, and Benadryl  Redirect unwanted behaviors as first-line therapy, avoid physical restraints as able to  Will have outpatient follow-up with geriatrics office    Ambulatory dysfunction  At a baseline ambulates with RW  PT/OT following  Fall precautions  Out of bed as tolerated  Encourage early and frequent mobilization  Encourage adequate hydration and nutrition  Provide adequate pain management   Goal is to return home with   Continue with PT/OT for continued strength and balance training       Subjective:   Patient is being seen for a geriatric follow-up  Upon examination patient was OOB in the chair, resting  She was very anxious on exam   Could not tell me what exactly was wrong  Did complain of feeling weak she was unable to urinate, ask nursing to check bladder scan  Pain is better controlled today  She slept okay last night  Her appetite is slightly decreased  Per nursing no acute concerns or issues at this time  Review of Systems   Constitutional: Positive for activity change and fatigue  Negative for appetite change, chills and fever  HENT: Negative for ear pain and sore throat  Eyes: Negative for pain and visual disturbance  Respiratory: Negative for cough and shortness of breath  Cardiovascular: Negative for chest pain and palpitations  Gastrointestinal: Negative for abdominal pain, constipation, diarrhea, nausea and vomiting  Genitourinary: Negative for difficulty urinating, dysuria, frequency, hematuria and urgency  Musculoskeletal: Positive for arthralgias, back pain and gait problem  Skin: Negative for color change and rash  Neurological: Positive for weakness  Negative for dizziness, seizures, syncope, light-headedness and headaches  Psychiatric/Behavioral: Positive for sleep disturbance  Negative for confusion and dysphoric mood  The patient is nervous/anxious  Objective:     Vitals: Blood pressure 153/69, pulse 69, temperature 97 6 °F (36 4 °C), resp  rate 18, SpO2 99 %  ,There is no height or weight on file to calculate BMI        Intake/Output Summary (Last 24 hours) at 8/25/2022 1125  Last data filed at 8/25/2022 0630  Gross per 24 hour   Intake 152 96 ml   Output 550 ml Net -397 04 ml       Current Medications: Reviewed    Physical Exam:   Physical Exam  Vitals reviewed  Constitutional:       General: She is not in acute distress  Appearance: She is well-developed  She is obese  She is not ill-appearing  HENT:      Head: Normocephalic and atraumatic  Mouth/Throat:      Mouth: Mucous membranes are dry  Pharynx: No oropharyngeal exudate or posterior oropharyngeal erythema  Cardiovascular:      Rate and Rhythm: Normal rate  Rhythm irregular  Heart sounds: No murmur heard  Pulmonary:      Effort: Pulmonary effort is normal  No respiratory distress  Breath sounds: Normal breath sounds  Abdominal:      General: Bowel sounds are normal  There is no distension  Palpations: Abdomen is soft  Tenderness: There is no abdominal tenderness  Musculoskeletal:         General: Tenderness present  Right lower leg: Edema present  Left lower leg: Edema present  Skin:     General: Skin is warm and dry  Coloration: Skin is not pale  Neurological:      General: No focal deficit present  Mental Status: She is alert and oriented to person, place, and time  Mental status is at baseline  Cranial Nerves: No cranial nerve deficit  Motor: Weakness present  Gait: Gait abnormal       Comments: Confused at times   Psychiatric:         Mood and Affect: Mood is anxious  Invasive Devices  Report    Peripheral Intravenous Line  Duration           Peripheral IV 08/22/22 Right Arm 2 days          Epidural Line  Duration           Nerve Block Catheter 08/24/22 <1 day                Lab, Imaging and other studies: I have personally reviewed pertinent reports  Please note:  Voice-recognition software may have been used in the preparation of this document  Occasional wrong word or "sound-alike" substitutions may have occurred due to the inherent limitations of voice recognition software    Interpretation should be guided by context

## 2022-08-25 NOTE — PROGRESS NOTES
[facility]  Progress Note - Yuliana Hall 1945, 77 y o  female MRN: [id number]  Unit/Bed#: S -01 Encounter: [id number]  Primary Care Provider: Kendrick Temple MD   Date and time admitted to hospital: 8/20/2022  5:58 AM    Acute on chronic blood loss anemia  Assessment & Plan  1 U of pRBC transfused   Current Hb 9 2  Monitor Hb      * Fracture of proximal end of left femur [facility])  Assessment & Plan  Patient had a mechanical fall while going to the bathroom  Patient has been having some melanotic stools over the last 24 hours and slipped on it  She landed on her left knee  Underlying h/o bilateral knee replacements also  Admission imaging consistent with left femur fracture  Evaluated by ortho team, Surgery was done  Will continue Pain management   PT/OT eval once clinical stability   Patient complained of left calf pain, Venous duplex normal      Melena  Assessment & Plan  Patient has been having melanotic stools over the last 24 hours  She takes Eliquis for atrial fibrillation  Currently Eliquis is on hold  S/p Kcentra  Placed on Protonix gtt  no melanotic stools reported overnight  Plan  Resolved  Continue protonix 40 IV bid  Close Hgb watch      Acute pain due to trauma  Assessment & Plan  Continue current pain medications  Fall  Assessment & Plan  Mechanical fall  PT OT once clinical stability, patient may benefit of post acute rehab    Venous stasis dermatitis of both lower extremities  Assessment & Plan  Patient has mild erythema of the bilateral extremity which seems to be chronic  Erythema is symmetric, skin its significant dry and scaly , she only reports tenderness in left extremity, has remained afebrile, wbc wnl , very low likelihood of  cellulitis  Patient complained of left calf pain which radiates upward, on examination left lower leg looks swollen compared to right side     Will continue topical moisturizers  Venous doppler normal    Chronic diastolic heart failure [facility])  Assessment & Plan  Wt Readings from Last 3 Encounters:   22 103 kg (227 lb)   22 103 kg (227 lb 9 6 oz)   22 109 kg (239 lb 10 2 oz)     Patient has chronic diastolic congestive heart failure  Most recent echo (2021) : 65%  Systolic function is normal  Wall motion is normal  G1DD  Doesn't appear to be on acute exacerbation    Plan  Continue carvedilol  Discontinue IV diuretics, Start oral Torsemide 40 mg  Monitor electrolytes while on diuretic therapy  Monitor urine output         A-fib (HCC)  Assessment & Plan  Continue patient on carvedilol,   Restart Eliquis  Chronic venous insufficiency  Assessment & Plan  Continue care with Lac-Hydrin cream           VTE Pharmacologic Prophylaxis:   VTE Score: 10 held due to GI bleed     Mechanical VTE Prophylaxis in Place: Yes    Patient Centered Rounds: Nursing    Discussions with Specialists or Other Care Team Provider: attending physician    Current Length of Stay: 5 day(s)    Current Patient Status: Inpatient     Discharge Plan / Estimated Discharge Date: Not yet established     Talked to  at bedsite  Code Status: Level 1 - Full Code         Subjective:   Pain has improved after Ketamine drip  No fever, chills, nausea or vomiting, cough  No SOB  Hb is 9 2 stable  UOP: 1800 ml  Creatinine, potasium normal      Objective:     Vitals:   Temp (24hrs), Av 9 °F (36 6 °C), Min:96 4 °F (35 8 °C), Max:98 9 °F (37 2 °C)    Temp:  [96 4 °F (35 8 °C)-98 9 °F (37 2 °C)] 98 5 °F (36 9 °C)  HR:  [69-85] 73  Resp:  [18-28] 18  BP: ()/(44-69) 125/59  SpO2:  [93 %-99 %] 93 %  There is no height or weight on file to calculate BMI  Input and Output Summary (last 24 hours): Intake/Output Summary (Last 24 hours) at 2022 1211  Last data filed at 2022 0630  Gross per 24 hour   Intake 152 96 ml   Output 550 ml   Net -397 04 ml       Physical Exam:     Physical Exam  Vitals and nursing note reviewed  Constitutional:       General: She is not in acute distress  Appearance: She is not toxic-appearing  HENT:      Head: Normocephalic and atraumatic  Right Ear: Tympanic membrane normal  There is no impacted cerumen  Left Ear: Tympanic membrane normal  There is no impacted cerumen  Nose: Nose normal  No congestion or rhinorrhea  Mouth/Throat:      Mouth: Mucous membranes are moist       Pharynx: Oropharynx is clear  No oropharyngeal exudate or posterior oropharyngeal erythema  Eyes:      Extraocular Movements: Extraocular movements intact  Conjunctiva/sclera: Conjunctivae normal       Pupils: Pupils are equal, round, and reactive to light  Cardiovascular:      Rate and Rhythm: Normal rate  Pulses: Normal pulses  Heart sounds: No murmur heard  No gallop  Pulmonary:      Effort: Pulmonary effort is normal  No respiratory distress  Breath sounds: No wheezing or rales  Chest:      Chest wall: No tenderness  Abdominal:      General: Bowel sounds are normal  There is no distension  Palpations: Abdomen is soft  Tenderness: There is no abdominal tenderness  Musculoskeletal:      Cervical back: Normal range of motion  No rigidity  Comments: Bilateral lower extremities erythema, edema, skin dryness and scaly, left lower extremity with immobilizer, distal pulses intact, sensation preserved, decreased ROM 2/2 pain   Lymphadenopathy:      Cervical: No cervical adenopathy  Skin:     General: Skin is warm  Capillary Refill: Capillary refill takes less than 2 seconds  Neurological:      Mental Status: She is alert and oriented to person, place, and time  Psychiatric:         Mood and Affect: Mood normal          Behavior: Behavior normal          Thought Content:  Thought content normal          Judgment: Judgment normal           Additional Data:     Labs:  Results from last 7 days   Lab Units 08/25/22  0714 08/24/22  0455   WBC Thousand/uL 10 30* 12 34*   HEMOGLOBIN g/dL 9 2* 8 7*   HEMATOCRIT % 29 3* 27 4*   PLATELETS Thousands/uL 352 316   NEUTROS PCT %  --  72   LYMPHS PCT %  --  17   MONOS PCT %  --  10   EOS PCT %  --  0     Results from last 7 days   Lab Units 08/25/22  1015 08/24/22  0455 08/23/22  0745   SODIUM mmol/L 139   < > 141   POTASSIUM mmol/L 3 8   < > 3 9   CHLORIDE mmol/L 99   < > 102   CO2 mmol/L 34*   < > 32   BUN mg/dL 15   < > 13   CREATININE mg/dL 0 88   < > 0 82   ANION GAP mmol/L 6   < > 7   CALCIUM mg/dL 8 8   < > 8 5   ALBUMIN g/dL  --   --  3 3*   TOTAL BILIRUBIN mg/dL  --   --  0 44   ALK PHOS U/L  --   --  60   ALT U/L  --   --  6*   AST U/L  --   --  16   GLUCOSE RANDOM mg/dL 138   < > 152*    < > = values in this interval not displayed  Results from last 7 days   Lab Units 08/21/22  0449   INR  1 16     Results from last 7 days   Lab Units 08/21/22  1116   POC GLUCOSE mg/dl 145*         Results from last 7 days   Lab Units 08/20/22  0955   LACTIC ACID mmol/L 0 7   PROCALCITONIN ng/ml 0 07       Imaging: No pertinent imaging reviewed      Recent Cultures (last 7 days):           Lines/Drains:  Invasive Devices  Report    Peripheral Intravenous Line  Duration           Peripheral IV 08/22/22 Right Arm 2 days          Epidural Line  Duration           Nerve Block Catheter 08/24/22 <1 day                Telemetry:        Last 24 Hours Medication List:   Current Facility-Administered Medications   Medication Dose Route Frequency Provider Last Rate    acetaminophen  975 mg Oral [facility] Darren White PA-C      ammonium lactate   Topical Daily Darren White PA-C      apixaban  5 mg Oral BID SHAHIDA Dye      carvedilol  25 mg Oral BID With Meals Darren White PA-C      diazepam  5 mg Oral Q6H PRN Towjose manuel Knapp PA-C      HYDROmorphone  0 5 mg Intravenous Q4H PRN Darren White PA-C      ketamine  0 1 mg/kg/hr Intravenous Continuous Bright Kwaku Salinas MD 0 1 mg/kg/hr (08/25/22 0630)   Cecil Dunbar lidocaine  1 patch Topical Daily SAM Heath-C      losartan  100 mg Oral Daily Agarwal SAM Ryan-JAIR      ondansetron  4 mg Intravenous Q4H PRN SAM Heath-JAIR      oxyCODONE  10 mg Oral Q3H PRN Sonido Lacrhanna Rock MD      oxyCODONE  15 mg Oral Q4H PRN Bright Lacrhanna Rock MD      pantoprazole  40 mg Oral BID AC Margie Szoke, CRNP      polyethylene glycol  17 g Oral Daily Elaine Man, CRNP      senna-docusate sodium  1 tablet Oral BID Elaine Man, CRNP      sucralfate  1 g Oral 4x Daily (AC & HS) Agarwal SAM Ryan-C      torsemide  40 mg Oral Daily Laureen Hartman MD          Today, Patient Was Seen By: Jeannine Cosby MD    ** Please Note: This note has been constructed using a voice recognition system       Nutrition Assessment and Intervention:     Reviewed food recall journal      Physical Activity Assessment and Intervention:    Activity journal reviewed      Emotional and Mental Well-being, Sleep, Connectedness Assessment and Intervention:    Sleep/stress assessment performed      Tobacco and Toxic Substance Assessment and Intervention:     Tobacco use screening performed    Alcohol and drug use screening performed

## 2022-08-25 NOTE — PLAN OF CARE
Problem: PHYSICAL THERAPY ADULT  Goal: Performs mobility at highest level of function for planned discharge setting  See evaluation for individualized goals  Description: Treatment/Interventions: Functional transfer training, LE strengthening/ROM, Therapeutic exercise, Endurance training, Cognitive reorientation, Bed mobility, Patient/family training, Equipment eval/education, Gait training  Equipment Recommended: Addi Lal       See flowsheet documentation for full assessment, interventions and recommendations  Outcome: Not Progressing  Note:    Problem List: Decreased strength, Decreased endurance, Impaired balance, Decreased mobility, Decreased cognition, Impaired vision, Orthopedic restrictions, Pain  Assessment: pt began tx session lying supine in the bed and was agreeable to participate in pt intervention  pt required mod Ax1 to roll to her right side with LE and trunk management in todasy tx session in order to clean pt up as pt was on bed pan on arrival to pt room  pt required max Ax1 to complete a supine<>sit EOB transfer with LE and trunk management  While seated EOB pt required a therapeutic seated rest break due to slight dizziness and increase pain  pt was unable to clear buttocks when attempting to stand to RW from EOB  Quick move was utilized in todays tx session inorder to get pt OOB and into recliner  pt required max Ax1 in order to complete  quick move  pt continues to demonstrated limited standing tolerance  post tx session pt was safely placed in recliner with call bell and all pt needs met  PCA present throughout tx session and was going to bath pt post PT intervention  Continue to recommend post acute rehab services at the time of D/C in order to maximize pt functional independence and safety with all OOB mobility when appropriate        PT Discharge Recommendation: Post acute rehabilitation services    See flowsheet documentation for full assessment

## 2022-08-25 NOTE — PROGRESS NOTES
Progress Note - Acute Pain Service    Oren Alpers 77 y o  female MRN: [id number]  Unit/Bed#: S -01 Encounter: [id number]      Assessment:   Principal Problem:    Fracture of proximal end of left femur ([address] )  Active Problems:    Chronic venous insufficiency    A-fib (HCC)    Chronic diastolic heart failure (HCC)    Acute on chronic blood loss anemia    Venous stasis dermatitis of both lower extremities    Fall    Melena    Acute pain due to trauma    Oren Alpers is a 77 y o  female  with PMHx of A-fib on eliquis, morbid obesity, chronic opioid dependence (PDMP confirmed, takes percocet 10-325mg q6hr PRN), and HFpEF who initially presented with left proximal femur fracture after a mechanical fall  She had a left femoral catheter placed on 8/20 but was inadvertently removed  Subsequently, she had a left femur ORIF with perioperative femoral single-shot block for post-operative analgesia on 8/22  Due to increased pain needs, a left femoral catheter was attempted on 8/24 but aborted due to poor acoustic windows  Instead, a ketamine infusion was started (8/24-) with significant improvement in Kya's pain  APS consulted for post-operative pain control  Upon bedside evaluation, Rose Stoll was eating breakfast in bed  She notes significant improvement in her pan after ketamine infusion was started  Denies any psychomimetic issues  Still reports some left knee spasms but tolerable on current MMA  Denies opioid-induced side effects including nausea/vomiting/itching/constipation  Has not required IV opioids since 8/24  Awaiting flatus or BM  Voiding urine  Goal to get OOB today      Plan:   - Continue IV ketamine gtt @0 1mg/kg/hr for analgesic support (8/24-)  - Continue PO oxycodone 10/15mg q4hr PRN for moderate-severe pain, IV dilaudid 0 5mg q4hr PRN for breakthrough    Multimodal analgesia:  - Tylenol 975 mg PO q8hrs standing  - Lidocaine patches to affected areas 12 hours on, 12 hours off  - Avoid NSAIDs due to potential CV side-effects given HF history  - PO valium 5mg q6hr PRN for muscle spasms/anxiety    Bowel Regimen:  - Docusate (Colace) 100 mg PO twice daily     APS will continue to follow  Please contact Acute Pain Service - [facility] via [employer] from [phone] with additional questions or concerns  See Carlito or Shea for additional contacts and after hours information  Pain History  Current pain location(s): Left thigh, left knee  Pain Scale:   Still reports 8-10/10 but incongruent with clinical exam  Quality: Spasm-like, achy  24 hour history: See above    Opioid requirement previous 24 hours: PO oxycodone 55mg    Meds/Allergies   all current active meds have been reviewed    No Known Allergies    Objective     Temp:  [96 4 °F (35 8 °C)-98 9 °F (37 2 °C)] 97 6 °F (36 4 °C)  HR:  [58-85] 69  Resp:  [18-28] 18  BP: ()/(44-69) 153/69    Physical Exam  Constitutional:       Appearance: She is obese  She is ill-appearing  HENT:      Head: Normocephalic  Mouth/Throat:      Mouth: Mucous membranes are dry  Eyes:      Pupils: Pupils are equal, round, and reactive to light  Cardiovascular:      Rate and Rhythm: Normal rate  Pulses: Normal pulses  Pulmonary:      Effort: Pulmonary effort is normal    Abdominal:      Palpations: Abdomen is soft  Musculoskeletal:         General: Swelling present  Comments: LLE ttp   Skin:     General: Skin is dry  Neurological:      Mental Status: She is alert     Psychiatric:         Mood and Affect: Mood normal          Lab Results:   Results from last 7 days   Lab Units 08/25/22  0714   WBC Thousand/uL 10 30*   HEMOGLOBIN g/dL 9 2*   HEMATOCRIT % 29 3*   PLATELETS Thousands/uL 352      Results from last 7 days   Lab Units 08/24/22  0455 08/23/22  0745 08/22/22  1556   POTASSIUM mmol/L 3 2* 3 9  --    CHLORIDE mmol/L 98 102  --    CO2 mmol/L 34* 32  --    CO2, I-STAT mmol/L  --   --  36*   BUN mg/dL 15 13  --    CREATININE mg/dL 0 72 0 82 --    CALCIUM mg/dL 8 7 8 5  --    ALK PHOS U/L  --  60  --    ALT U/L  --  6*  --    AST U/L  --  16  --    GLUCOSE, ISTAT mg/dl  --   --  121       Imaging Studies: I have personally reviewed pertinent reports  EKG, Pathology, and Other Studies: I have personally reviewed pertinent reports  Counseling / Coordination of Care  Total floor / unit time spent today 20 minutes  Greater than 50% of total time was spent with the patient and / or family counseling and / or coordination of care  Please note that the APS provides consultative services regarding pain management only  With the exception of ketamine and epidural infusions and except when indicated, final decisions regarding starting or changing doses of analgesic medications are at the discretion of the consulting service  Off hours consultation and/or medication management is generally not available      Patrick Mckeon MD  Acute Pain Service

## 2022-08-26 LAB
ALBUMIN SERPL BCP-MCNC: 3.3 G/DL (ref 3.5–5)
ALP SERPL-CCNC: 119 U/L (ref 34–104)
ALT SERPL W P-5'-P-CCNC: 21 U/L (ref 7–52)
ANION GAP SERPL CALCULATED.3IONS-SCNC: 7 MMOL/L (ref 4–13)
AST SERPL W P-5'-P-CCNC: 23 U/L (ref 13–39)
BILIRUB SERPL-MCNC: 0.55 MG/DL (ref 0.2–1)
BUN SERPL-MCNC: 19 MG/DL (ref 5–25)
CALCIUM ALBUM COR SERPL-MCNC: 9.4 MG/DL (ref 8.3–10.1)
CALCIUM SERPL-MCNC: 8.8 MG/DL (ref 8.4–10.2)
CHLORIDE SERPL-SCNC: 98 MMOL/L (ref 96–108)
CO2 SERPL-SCNC: 32 MMOL/L (ref 21–32)
CREAT SERPL-MCNC: 0.71 MG/DL (ref 0.6–1.3)
ERYTHROCYTE [DISTWIDTH] IN BLOOD BY AUTOMATED COUNT: 15.3 % (ref 11.6–15.1)
GFR SERPL CREATININE-BSD FRML MDRD: 83 ML/MIN/1.73SQ M
GLUCOSE SERPL-MCNC: 126 MG/DL (ref 65–140)
HCT VFR BLD AUTO: 29.6 % (ref 34.8–46.1)
HGB BLD-MCNC: 9.5 G/DL (ref 11.5–15.4)
MCH RBC QN AUTO: 30 PG (ref 26.8–34.3)
MCHC RBC AUTO-ENTMCNC: 32.1 G/DL (ref 31.4–37.4)
MCV RBC AUTO: 93 FL (ref 82–98)
PLATELET # BLD AUTO: 389 THOUSANDS/UL (ref 149–390)
PMV BLD AUTO: 10.6 FL (ref 8.9–12.7)
POTASSIUM SERPL-SCNC: 3.5 MMOL/L (ref 3.5–5.3)
PROT SERPL-MCNC: 6.1 G/DL (ref 6.4–8.4)
RBC # BLD AUTO: 3.17 MILLION/UL (ref 3.81–5.12)
SODIUM SERPL-SCNC: 137 MMOL/L (ref 135–147)
WBC # BLD AUTO: 10.09 THOUSAND/UL (ref 4.31–10.16)

## 2022-08-26 PROCEDURE — 99024 POSTOP FOLLOW-UP VISIT: CPT | Performed by: PHYSICIAN ASSISTANT

## 2022-08-26 PROCEDURE — 99232 SBSQ HOSP IP/OBS MODERATE 35: CPT | Performed by: INTERNAL MEDICINE

## 2022-08-26 PROCEDURE — 80053 COMPREHEN METABOLIC PANEL: CPT | Performed by: INTERNAL MEDICINE

## 2022-08-26 PROCEDURE — 99232 SBSQ HOSP IP/OBS MODERATE 35: CPT | Performed by: SURGERY

## 2022-08-26 PROCEDURE — 85027 COMPLETE CBC AUTOMATED: CPT | Performed by: INTERNAL MEDICINE

## 2022-08-26 RX ORDER — DIAZEPAM 5 MG/1
5 TABLET ORAL EVERY 6 HOURS SCHEDULED
Status: DISCONTINUED | OUTPATIENT
Start: 2022-08-26 | End: 2022-08-30 | Stop reason: HOSPADM

## 2022-08-26 RX ADMIN — PANTOPRAZOLE SODIUM 40 MG: 40 TABLET, DELAYED RELEASE ORAL at 06:46

## 2022-08-26 RX ADMIN — SODIUM CHLORIDE 0.05 MG/KG/HR: 0.9 INJECTION, SOLUTION INTRAVENOUS at 20:30

## 2022-08-26 RX ADMIN — SUCRALFATE 1 G: 1 TABLET ORAL at 06:46

## 2022-08-26 RX ADMIN — OXYCODONE HYDROCHLORIDE 10 MG: 10 TABLET ORAL at 19:19

## 2022-08-26 RX ADMIN — APIXABAN 5 MG: 5 TABLET, FILM COATED ORAL at 08:21

## 2022-08-26 RX ADMIN — DIAZEPAM 5 MG: 5 TABLET ORAL at 17:07

## 2022-08-26 RX ADMIN — SUCRALFATE 1 G: 1 TABLET ORAL at 21:47

## 2022-08-26 RX ADMIN — CARVEDILOL 25 MG: 12.5 TABLET, FILM COATED ORAL at 08:21

## 2022-08-26 RX ADMIN — LIDOCAINE 5% 1 PATCH: 700 PATCH TOPICAL at 08:20

## 2022-08-26 RX ADMIN — ACETAMINOPHEN 975 MG: 325 TABLET, FILM COATED ORAL at 21:47

## 2022-08-26 RX ADMIN — LOSARTAN POTASSIUM 100 MG: 50 TABLET, FILM COATED ORAL at 08:20

## 2022-08-26 RX ADMIN — APIXABAN 5 MG: 5 TABLET, FILM COATED ORAL at 17:07

## 2022-08-26 RX ADMIN — SENNOSIDES AND DOCUSATE SODIUM 1 TABLET: 8.6; 5 TABLET ORAL at 08:21

## 2022-08-26 RX ADMIN — HYDROMORPHONE HYDROCHLORIDE 0.5 MG: 1 INJECTION, SOLUTION INTRAMUSCULAR; INTRAVENOUS; SUBCUTANEOUS at 21:47

## 2022-08-26 RX ADMIN — SENNOSIDES AND DOCUSATE SODIUM 1 TABLET: 8.6; 5 TABLET ORAL at 17:06

## 2022-08-26 RX ADMIN — OXYCODONE HYDROCHLORIDE 15 MG: 10 TABLET ORAL at 03:52

## 2022-08-26 RX ADMIN — DIAZEPAM 5 MG: 5 TABLET ORAL at 11:32

## 2022-08-26 RX ADMIN — TORSEMIDE 40 MG: 20 TABLET ORAL at 08:20

## 2022-08-26 RX ADMIN — SUCRALFATE 1 G: 1 TABLET ORAL at 11:32

## 2022-08-26 RX ADMIN — ACETAMINOPHEN 975 MG: 325 TABLET, FILM COATED ORAL at 14:42

## 2022-08-26 RX ADMIN — SUCRALFATE 1 G: 1 TABLET ORAL at 17:07

## 2022-08-26 RX ADMIN — PANTOPRAZOLE SODIUM 40 MG: 40 TABLET, DELAYED RELEASE ORAL at 17:07

## 2022-08-26 RX ADMIN — POLYETHYLENE GLYCOL 3350 17 G: 17 POWDER, FOR SOLUTION ORAL at 08:20

## 2022-08-26 RX ADMIN — Medication: at 08:20

## 2022-08-26 RX ADMIN — ACETAMINOPHEN 975 MG: 325 TABLET, FILM COATED ORAL at 05:41

## 2022-08-26 RX ADMIN — CARVEDILOL 25 MG: 12.5 TABLET, FILM COATED ORAL at 17:07

## 2022-08-26 RX ADMIN — OXYCODONE HYDROCHLORIDE 15 MG: 10 TABLET ORAL at 09:58

## 2022-08-26 RX ADMIN — DIAZEPAM 5 MG: 5 TABLET ORAL at 23:27

## 2022-08-26 NOTE — ASSESSMENT & PLAN NOTE
· Multiple melenotic stool, reversed with Kcentra on admission due to history of Eliquis use associated with atrial fibrillation  · GI consulted and note appreciated  · 8/21- EGD- Single cratered, benign-appearing ulcer in the antrum with flat pigmented spot ([facility]ish I[facility]); performed cold forceps biopsy; injected 3 mL of epinephrine to address bleeding; hemostasis achieved  2 small, superficial, round ulcers in the incisura and antrum with clean base (Vish III)  · Protonix changed to b i d  · Hemoglobin is currently stable   Eliquis restarted  · Tolerating a regular diet  · Continue Carafate  · Avoid NSAIDs

## 2022-08-26 NOTE — ASSESSMENT & PLAN NOTE
· In the setting of fibromyalgia  · Continue current pain regimen  APS note appreciated  · Oxycodone 10/15  · Ketamine drip  · Dilaudid p r n    · Valium

## 2022-08-26 NOTE — ASSESSMENT & PLAN NOTE
Wt Readings from Last 3 Encounters:   08/18/22 103 kg (227 lb)   08/03/22 103 kg (227 lb 9 6 oz)   07/28/22 109 kg (239 lb 10 2 oz)     · Continue home carvedilol, torsemide 40 mg  · Appreciate Internal Medicine consult and recommendations  · I&O

## 2022-08-26 NOTE — ASSESSMENT & PLAN NOTE
· Original concern a bilateral lower extremity cellulitis, status post IV antibiotics  · Slim consultation with low likelihood of cellulitic infection, procalcitonin and lactic acid normal, will continue to monitor off of antibiotics  · Stable WBC  · Continue use of  ammonium lactate lotion daily  · Lower extremities bilaterally with no worsening qualitative evidence of infection, no worsening erythema or swelling

## 2022-08-26 NOTE — PROGRESS NOTES
Progress Note - Acute Pain Service    Mariangel Alfaro 77 y o  female MRN: [id number]  Unit/Bed#: S -01 Encounter: [id number]      Assessment:   Principal Problem:    Fracture of proximal end of left femur ([address] )  Active Problems:    Chronic venous insufficiency    A-fib (HCC)    Chronic diastolic heart failure (HCC)    Venous stasis dermatitis of both lower extremities    Fall    Melena    Acute pain due to trauma    Mariangel Alfaro is a 77 y o  female  with PMHx of A-fib on eliquis, morbid obesity, chronic opioid dependence (PDMP confirmed, takes percocet 10-325mg q6hr PRN), and HFpEF who initially presented with left proximal femur fracture after a mechanical fall  She had a left femoral catheter placed on 8/20 but was inadvertently removed  Subsequently, she had a left femur ORIF with perioperative femoral single-shot block for post-operative analgesia on 8/22  Due to increased pain needs, a left femoral catheter was attempted on 8/24 but aborted due to poor acoustic windows  Instead, a ketamine infusion was started (8/24-) with significant improvement in Kya's pain  APS consulted for post-operative pain control  Patient seen this AM on rounds  She tolerated breakfast  She is seen at bedside with her   She reports episodic muscle spasms that are painful  She reports minimal pain in between spasms  Denies psychomimetic side effects of the ketamine such as vivid dreams, hallucinations  She had one episode of forgetfulness in the evening when she forgot her sons name for a brief moment  Discussed case with primary team who are hopeful for a Monday discharge to rehab facility  Plan:     - Wean off ketamine for goal of Monday discharge (started 8/24)  Dose decreased to 0 05mg/kg/hr today  Consider d/c tomorrow      - Continue PO oxycodone 10/15mg q4hr PRN for moderate-severe pain  - Recommend decrease IV dilaudid to 0 2mg q4hrs PRN breakthrough to decrease risk of sedation/forgetfullness    Multimodal analgesia:  - Tylenol 975 mg PO q8hrs standing  - Lidocaine patches to affected areas 12 hours on, 12 hours off  - Avoid NSAIDs due to potential CV side-effects given HF history  - Valium 5mg PO q6hr PRN for muscle spasms/anxiety   -The last charted dose of valium was 5am on 8/24  Consider ordered valium for muscle spasms PRN    Bowel Regimen:  - Docusate (Colace) 100 mg PO twice daily    APS will continue to follow  Please contact Acute Pain Service - [facility] via [employer] from [phone] with additional questions or concerns  See TigerText or Shea for additional contacts and after hours information  Pain History  Current pain location(s): LLE  Pain Scale: Mod-severe but incongruent with clinical evaluation  Quality: spasms  24 hour history: Nerve block attempted yesterday but aborted due to poor visualization under ultrasound  Patient denies side effects of ketamine  Muscle spasms persist     Opioid requirement previous 24 hours: Oxycodone 15mg x2 doses, 10mg x1 dose  Dilaudid 0 5mg x1 dose    Meds/Allergies   all current active meds have been reviewed    No Known Allergies    Objective     Temp:  [97 7 °F (36 5 °C)-98 6 °F (37 °C)] 98 1 °F (36 7 °C)  HR:  [67-77] 69  Resp:  [18] 18  BP: (125-147)/() 147/73    Physical Exam  Constitutional:       General: She is not in acute distress  Appearance: Normal appearance  Eyes:      Extraocular Movements: Extraocular movements intact  Conjunctiva/sclera: Conjunctivae normal    Cardiovascular:      Rate and Rhythm: Normal rate and regular rhythm  Pulmonary:      Effort: Pulmonary effort is normal  No respiratory distress  Abdominal:      General: Abdomen is flat  There is no distension  Musculoskeletal:         General: Normal range of motion  Cervical back: Normal range of motion and neck supple  Skin:     General: Skin is warm and dry  Neurological:      General: No focal deficit present        Mental Status: She is alert and oriented to person, place, and time  Psychiatric:         Mood and Affect: Mood normal          Behavior: Behavior normal          Lab Results:   Results from last 7 days   Lab Units 08/26/22  0545   WBC Thousand/uL 10 09   HEMOGLOBIN g/dL 9 5*   HEMATOCRIT % 29 6*   PLATELETS Thousands/uL 389      Results from last 7 days   Lab Units 08/26/22  0545 08/23/22  0745 08/22/22  1556   POTASSIUM mmol/L 3 5   < >  --    CHLORIDE mmol/L 98   < >  --    CO2 mmol/L 32   < >  --    CO2, I-STAT mmol/L  --   --  36*   BUN mg/dL 19   < >  --    CREATININE mg/dL 0 71   < >  --    CALCIUM mg/dL 8 8   < >  --    ALK PHOS U/L 119*   < >  --    ALT U/L 21   < >  --    AST U/L 23   < >  --    GLUCOSE, ISTAT mg/dl  --   --  121    < > = values in this interval not displayed  Please note that the APS provides consultative services regarding pain management only  With the exception of ketamine and epidural infusions and except when indicated, final decisions regarding starting or changing doses of analgesic medications are at the discretion of the consulting service  Off hours consultation and/or medication management is generally not available      Maryann Larson MD  Acute Pain Service

## 2022-08-26 NOTE — PROGRESS NOTES
[facility]  Progress Note - Victor M Carpio 1945, 77 y o  female MRN: [id number]  Unit/Bed#: S -01 Encounter: [id number]  Primary Care Provider: Violet Trimble MD   Date and time admitted to hospital: 8/20/2022  5:58 AM    Melena  Assessment & Plan  Patient has been having melanotic stools over the last 24 hours  She takes Eliquis for atrial fibrillation  Currently Eliquis is on hold  S/p Kcentra  Placed on Protonix gtt  no melanotic stools reported overnight  Plan  Resolved  Continue protonix 40 IV bid  Close Hgb watch      * Fracture of proximal end of left femur [facility])  Assessment & Plan  Patient had a mechanical fall while going to the bathroom  Patient has been having some melanotic stools over the last 24 hours and slipped on it  She landed on her left knee  Underlying h/o bilateral knee replacements also  Admission imaging consistent with left femur fracture  Evaluated by ortho team, Surgery was done  Will continue Pain management   PT/OT eval once clinical stability   Patient complained of left calf pain, Venous duplex normal      Acute on chronic blood loss anemia-resolved as of 8/25/2022  Assessment & Plan  1 U of pRBC transfused   Current Hb 9 2  Monitor Hb      Acute pain due to trauma  Assessment & Plan  Continue current pain medications  Fall  Assessment & Plan  Mechanical fall  PT OT once clinical stability, patient may benefit of post acute rehab    Venous stasis dermatitis of both lower extremities  Assessment & Plan  Patient has mild erythema of the bilateral extremity which seems to be chronic  Erythema is symmetric, skin its significant dry and scaly , she only reports tenderness in left extremity, has remained afebrile, wbc wnl , very low likelihood of  cellulitis  Patient complained of left calf pain which radiates upward, on examination left lower leg looks swollen compared to right side     Will continue topical moisturizers  Venous doppler normal    Chronic diastolic heart failure [facility])  Assessment & Plan  Wt Readings from Last 3 Encounters:   22 103 kg (227 lb)   22 103 kg (227 lb 9 6 oz)   22 109 kg (239 lb 10 2 oz)     Patient has chronic diastolic congestive heart failure  Most recent echo (2021) : 65%  Systolic function is normal  Wall motion is normal  G1DD  Doesn't appear to be on acute exacerbation    Plan  Continue carvedilol  Discontinue IV diuretics, Start oral Torsemide 40 mg  Monitor electrolytes while on diuretic therapy  Monitor urine output         A-fib (HCC)  Assessment & Plan  Continue patient on carvedilol,   Restart Eliquis  Chronic venous insufficiency  Assessment & Plan  Continue care with Lac-Hydrin cream           VTE Pharmacologic Prophylaxis:   VTE Score: 10 On apixaban    Mechanical VTE Prophylaxis in Place: Yes    Patient Centered Rounds: Nursing    Discussions with Specialists or Other Care Team Provider: attending physician    Current Length of Stay: 6 day(s)    Current Patient Status: Inpatient     Discharge Plan / Estimated Discharge Date: Not yet established     Talked to  at [facility]  Code Status: Level 1 - Full Code         Subjective:   Complained of muscle spasms of fractured leg  But pain has improved  No fever, chills, nausea or vomiting, cough  No SOB  Hb is 9 5 stable  UOP: 1050 ml  Creatinine, potasium normal      Objective:     Vitals:   Temp (24hrs), Av 3 °F (36 8 °C), Min:97 7 °F (36 5 °C), Max:98 6 °F (37 °C)    Temp:  [97 7 °F (36 5 °C)-98 6 °F (37 °C)] 98 1 °F (36 7 °C)  HR:  [67-77] 69  Resp:  [18] 18  BP: (125-147)/() 147/73  SpO2:  [92 %-96 %] 94 %  There is no height or weight on file to calculate BMI  Input and Output Summary (last 24 hours):        Intake/Output Summary (Last 24 hours) at 2022 0854  Last data filed at 2022 0545  Gross per 24 hour   Intake --   Output 1050 ml   Net -1050 ml       Physical Exam:     Physical Exam  Vitals and nursing note reviewed  Constitutional:       General: She is not in acute distress  Appearance: She is not toxic-appearing  HENT:      Head: Normocephalic and atraumatic  Right Ear: Tympanic membrane normal  There is no impacted cerumen  Left Ear: Tympanic membrane normal  There is no impacted cerumen  Nose: Nose normal  No congestion or rhinorrhea  Mouth/Throat:      Mouth: Mucous membranes are moist       Pharynx: Oropharynx is clear  No oropharyngeal exudate or posterior oropharyngeal erythema  Eyes:      Extraocular Movements: Extraocular movements intact  Conjunctiva/sclera: Conjunctivae normal       Pupils: Pupils are equal, round, and reactive to light  Cardiovascular:      Rate and Rhythm: Normal rate  Pulses: Normal pulses  Heart sounds: No murmur heard  No gallop  Pulmonary:      Effort: Pulmonary effort is normal  No respiratory distress  Breath sounds: No wheezing or rales  Chest:      Chest wall: No tenderness  Abdominal:      General: Bowel sounds are normal  There is no distension  Palpations: Abdomen is soft  Tenderness: There is no abdominal tenderness  Musculoskeletal:      Cervical back: Normal range of motion  No rigidity  Comments: Bilateral lower extremities erythema, edema, skin dryness and scaly, left lower extremity with immobilizer, distal pulses intact, sensation preserved, decreased ROM 2/2 pain   Lymphadenopathy:      Cervical: No cervical adenopathy  Skin:     General: Skin is warm  Capillary Refill: Capillary refill takes less than 2 seconds  Neurological:      Mental Status: She is alert and oriented to person, place, and time  Psychiatric:         Mood and Affect: Mood normal          Behavior: Behavior normal          Thought Content:  Thought content normal          Judgment: Judgment normal           Additional Data:     Labs:  Results from last 7 days   Lab Units 08/26/22  0545 08/25/22  0714 08/24/22  0455   WBC Thousand/uL 10 09   < > 12 34*   HEMOGLOBIN g/dL 9 5*   < > 8 7*   HEMATOCRIT % 29 6*   < > 27 4*   PLATELETS Thousands/uL 389   < > 316   NEUTROS PCT %  --   --  72   LYMPHS PCT %  --   --  17   MONOS PCT %  --   --  10   EOS PCT %  --   --  0    < > = values in this interval not displayed  Results from last 7 days   Lab Units 08/26/22  0545   SODIUM mmol/L 137   POTASSIUM mmol/L 3 5   CHLORIDE mmol/L 98   CO2 mmol/L 32   BUN mg/dL 19   CREATININE mg/dL 0 71   ANION GAP mmol/L 7   CALCIUM mg/dL 8 8   ALBUMIN g/dL 3 3*   TOTAL BILIRUBIN mg/dL 0 55   ALK PHOS U/L 119*   ALT U/L 21   AST U/L 23   GLUCOSE RANDOM mg/dL 126     Results from last 7 days   Lab Units 08/21/22  0449   INR  1 16     Results from last 7 days   Lab Units 08/21/22  1116   POC GLUCOSE mg/dl 145*         Results from last 7 days   Lab Units 08/20/22  0955   LACTIC ACID mmol/L 0 7   PROCALCITONIN ng/ml 0 07       Imaging: No pertinent imaging reviewed      Recent Cultures (last 7 days):           Lines/Drains:  Invasive Devices  Report    Peripheral Intravenous Line  Duration           Peripheral IV 08/26/22 Left Antecubital <1 day          Epidural Line  Duration           Nerve Block Catheter 08/24/22 1 day                Telemetry:        Last 24 Hours Medication List:   Current Facility-Administered Medications   Medication Dose Route Frequency Provider Last Rate    acetaminophen  975 mg Oral [address] Lety Villela PA-C      ammonium lactate   Topical Daily Alison Villela PA-C      apixaban  5 mg Oral BID SHAHIDA Dye      carvedilol  25 mg Oral BID With Meals Alison Villela PA-C      diazepam  5 mg Oral Q6H PRN Romulo Jerome PA-C      HYDROmorphone  0 5 mg Intravenous Q4H PRN Alison Villela PA-C      ketamine  0 1 mg/kg/hr Intravenous Continuous Sonido Guerra MD 0 1 mg/kg/hr (08/26/22 0306)    lidocaine  1 patch Topical Daily Alison Villela PA-C  losartan  100 mg Oral Daily Stefano Ryan PA-C      ondansetron  4 mg Intravenous Q4H PRN Stefano Ryan PA-C      oxyCODONE  10 mg Oral Q3H PRN Sonido Rock MD      oxyCODONE  15 mg Oral Q4H PRN Sonido Rock MD      pantoprazole  40 mg Oral BID AC Margie Muir, CRNP      polyethylene glycol  17 g Oral Daily Elaine Man, CRNP      senna-docusate sodium  1 tablet Oral BID Elaine Man, CRNP      sucralfate  1 g Oral 4x Daily (AC & HS) Stefano Ryan PA-C      torsemide  40 mg Oral Daily Laureen Hartman MD          Today, Patient Was Seen By: Jeannine Cosby MD    ** Please Note: This note has been constructed using a voice recognition system       Nutrition Assessment and Intervention:     Reviewed food recall journal      Physical Activity Assessment and Intervention:    Activity journal reviewed      Emotional and Mental Well-being, Sleep, Connectedness Assessment and Intervention:    Sleep/stress assessment performed      Tobacco and Toxic Substance Assessment and Intervention:     Tobacco use screening performed    Alcohol and drug use screening performed

## 2022-08-26 NOTE — ASSESSMENT & PLAN NOTE
· Secondary to fall  · XR in CT show distal left femur fracture--no vascular injury on CTA  · Appreciate Orthopedic surgery consult and recommendations  · Ortho left retrograde IM zoë 08/22/2022  · Maintain WBAT LLE  · Multimodail pain regimen  · Appreciate APS consult and recommendations  · 8/24 Ketamine gtt initiated  · DVT Prop:  Eliquis  · PT/OT when indicated

## 2022-08-26 NOTE — PROGRESS NOTES
[facility]  [facility] 1945[facility] 77 [facility]  female MRN: [id number]  Unit/Bed#: S -01 Encounter: [id number]  Primary Care Provider: Norris Eden MD   Date and time admitted to hospital: 8/20/2022  5:58 AM    Acute pain due to trauma  Assessment & Plan  · In the setting of fibromyalgia  · Continue current pain regimen  APS note appreciated  · Oxycodone 10/15  · Ketamine drip  · Dilaudid p r n  · Valium    Melena  Assessment & Plan  · Multiple melenotic stool, reversed with Kcentra on admission due to history of Eliquis use associated with atrial fibrillation  · GI consulted and note appreciated  · 8/21- EGD- Single cratered, benign-appearing ulcer in the antrum with flat pigmented spot (Vish IIC); performed cold forceps biopsy; injected 3 mL of epinephrine to address bleeding; hemostasis achieved  2 small, superficial, round ulcers in the incisura and antrum with clean base (Vish III)  · Protonix changed to b i d  · Hemoglobin is currently stable   Eliquis restarted  · Tolerating a regular diet  · Continue Carafate  · Avoid NSAIDs    Fall  Assessment & Plan  · Reported as mechanical fall  · Injuries listed below  · PT/OT    Venous stasis dermatitis of both lower extremities  Assessment & Plan  · Original concern a bilateral lower extremity cellulitis, status post IV antibiotics  · Slim consultation with low likelihood of cellulitic infection, procalcitonin and lactic acid normal, will continue to monitor off of antibiotics  · Stable WBC  · Continue use of  ammonium lactate lotion daily  · Lower extremities bilaterally with no worsening qualitative evidence of infection, no worsening erythema or swelling    Chronic diastolic heart failure (HCC)  Assessment & Plan  Wt Readings from Last 3 Encounters:   08/18/22 103 kg (227 lb)   08/03/22 103 kg (227 lb 9 6 oz)   07/28/22 109 kg (239 lb 10 2 oz)     · Continue home carvedilol, torsemide 40 mg  · Appreciate Internal Medicine consult and recommendations  · I&O    A-fib ([address] )  Assessment & Plan  · Continue coreg and Eliquis  · Rate controlled and anticoagulated    * Fracture of proximal end of left femur (HCC)  Assessment & Plan  · Secondary to fall  · XR in CT show distal left femur fracture--no vascular injury on CTA  · Appreciate Orthopedic surgery consult and recommendations  · Ortho left retrograde IM zoë 08/22/2022  · Maintain WBAT LLE  · Multimodail pain regimen  · Appreciate APS consult and recommendations  · 8/24 Ketamine gtt initiated  · DVT Prop:  Eliquis  · PT/OT when indicated  Acute on chronic blood loss anemia-resolved as of 8/25/2022  Assessment & Plan  · Likely in the setting of gi bleed; melenic stools s/p EGD with ulcer injection for hemostasis  · 8/21 - 1 unit PRBCs  · 8/22 - 1 unit PRBCs for hemoglobin of 7 6--preoperative for optimization  · Hemoglobin currently stable at 9 2  · Hemodynamically stable  · Continue to monitor/trend H/H  · Eliquis restarted  · Resolved          Disposition:  Continue med surge level of care, disposition to rehab pending pain control  SUBJECTIVE:  Chief Complaint: "Everything is sore"    Subjective:  Patient reports that everything is sore, however the pain regimen she is on is finally helping her pain  She winces any time I touch her  Compartments are soft  She denies any headaches, chest pain, shortness of breath, abdominal pain      OBJECTIVE:   Vitals:   Temp:  [97 6 °F (36 4 °C)-98 6 °F (37 °C)] 97 7 °F (36 5 °C)  HR:  [67-77] 77  Resp:  [18] 18  BP: (125-153)/() 147/71    Intake/Output:  I/O       08/24 0701  08/25 0700 08/25 0701 08/26 0700    I V  153     Total Intake 153     Urine 1800 650    Total Output 1800 650    Net -6343 -628               Nutrition: Diet Regular; Regular House  GI Proph/Bowel Reg:  Senokot S  VTE Prophylaxis:Sequential compression device (Venodyne)  and Eliquis    Physical Exam:   GENERAL APPEARANCE: Patient in no acute distress  HEENT: NCAT; PERRL, EOMs intact; Mucous membranes moist  CV: Regular rate and rhythm; no murmur/gallops/rubs appreciated  CHEST / LUNGS: Clear to auscultation; no wheezes/rales/rhonci  ABD: NABS; soft; non-distended; non-tender  :  Campos removed, voiding spontaneously  EXT:  Left lower extremity surgical dressing clean, dry, intact, compartments soft; +2 pulses bilaterally upper & lower extremities; bilateral lymphedema and venous stasis  NEURO: GCS 15; no focal neurologic deficits; neurovascularly intact  SKIN: Warm, dry and well perfused; no rash; no jaundice  Invasive Devices  Report    Peripheral Intravenous Line  Duration           Peripheral IV 08/26/22 Left Antecubital <1 day          Epidural Line  Duration           Nerve Block Catheter 08/24/22 1 day                      Lab Results: Results: I have personally reviewed all pertinent laboratory/tests results  Imaging/EKG Studies: I have personally reviewed pertinent reports  see below  Other Studies:   VAS lower limb venous duplex study, complete bilateral   Final Result by Gabriel Kaiser MD (08/24 1700)      XR femur 2 vw left   Final Result by Flavia Carpenter MD (08/23 8934)      Fluoroscopic guidance provided for procedure guidance  Please refer to the separate procedure notes for additional details  Workstation performed: [id number]         XR knee 1 or 2 vw left   Final Result by Dale Woods MD (08/23 0249)      No interval change in posterior displaced comminuted distal femoral fracture  Workstation performed: [id number]         CT head wo contrast   Final Result by Fareed Van MD (08/20 7602)      No intracranial hemorrhage or calvarial fracture     Very mild, chronic microangiopathy                  Workstation performed: [id number]         CT lower extremity w contrast left   Final Result by Daniela Hatch DO (08/21 9844)      Displaced, comminuted distal femoral periprosthetic fracture as above  Severe left hip osteoarthritis  Diffuse subcutaneous edema/cellulitis throughout the left lower extremity  Correlate with clinical findings  Workstation performed: [id number]         XR knee 1 or 2 vw left   Final Result by Asmita Kebede MD (08/20 4082)   No change comminuted posteriorly displaced distal femoral fracture  Workstation performed: [id number]         XR femur 2 views LEFT   ED Interpretation by Krista Dickson MD (08/20 0275)   L distal femur fracture with posterior dislocation of prosthesis      Final Result by Asmita Kebede MD (08/20 1926)      Displaced comminuted distal femoral fracture  This finding is noted in the ED preliminary report  Workstation performed: [id number]         XR knee 4+ views left injury   ED Interpretation by Krista Dickson MD (08/20 5036)   Distal femur fracture with posterior dislocation of prosthesis  Final Result by Asmita Kebede MD (08/20 1932)      Comminuted displaced distal femoral fracture  This is also reported with the femoral images  Workstation performed: [id number]         XR pelvis ap only 1 or 2 vw   ED Interpretation by Krista Dickson MD (08/20 4147)   No fractures or dislocations  Final Result by Asmita Kebede MD (08/20 1936)      No acute osseous abnormality  Workstation performed: [id number]         XR chest 1 view portable   ED Interpretation by Krista Dickson MD (08/20 2625)   No acute cardiopulmonary disease, when compared to CXR from 07/28/2022, no acute changes  Final Result by Eric Alejandro MD (08/20 3388)      No acute cardiopulmonary disease                    Workstation performed: [id number]

## 2022-08-26 NOTE — PROGRESS NOTES
Progress Note - Orthopedics   Alfonzo Rodríguez 77 y o  female MRN: [id number]  Unit/Bed#: S -01      Subjective:    77 y  o female Patient seen and examined at the bedside   at bedside  Still with complaints of left leg spasms  Has seen pain service this morning   Denies fevers chills, CP, SOB    Labs:  0   Lab Value Date/Time    HCT 29 6 (L) 08/26/2022 0545    HCT 29 3 (L) 08/25/2022 0714    HCT 27 4 (L) 08/24/2022 0455    HCT 37 4 09/23/2014 1339    HCT 33 6 (L) 09/03/2014 1146    HGB 9 5 (L) 08/26/2022 0545    HGB 9 2 (L) 08/25/2022 0714    HGB 8 7 (L) 08/24/2022 0455    HGB 11 3 (L) 09/23/2014 1339    HGB 10 8 (L) 09/03/2014 1146    INR 1 16 08/21/2022 0449    WBC 10 09 08/26/2022 0545    WBC 10 30 (H) 08/25/2022 0714    WBC 12 34 (H) 08/24/2022 0455    WBC 7 10 09/23/2014 1339    WBC 6 13 09/03/2014 1146       Meds:    Current Facility-Administered Medications:     acetaminophen (TYLENOL) tablet 975 mg, 975 mg, Oral, Q8H [address], Xavi Mohan PA-C, 975 mg at 08/26/22 0541    ammonium lactate (LAC-HYDRIN) 12 % cream, , Topical, Daily, Xavi Preston PA-C, Given at 08/26/22 0820    apixaban (ELIQUIS) tablet 5 mg, 5 mg, Oral, BID, SHAHIDA Dye, 5 mg at 08/26/22 1444    carvedilol (COREG) tablet 25 mg, 25 mg, Oral, BID With Meals, Xaiv Preston PA-C, 25 mg at 08/26/22 4281    diazepam (VALIUM) tablet 5 mg, 5 mg, Oral, Q6H PRN, SAM Marroquin-C, 5 mg at 08/24/22 0445    HYDROmorphone (DILAUDID) injection 0 5 mg, 0 5 mg, Intravenous, Q4H PRN, Xaiv Preston PA-C, 0 5 mg at 08/25/22 1712    ketamine 250 mg (STANDARD CONCENTRATION) IV in sodium chloride 0 9% 250 mL, 0 05 mg/kg/hr, Intravenous, Continuous, Kari Currie MD, Last Rate: 5 2 mL/hr at 08/26/22 0953, 0 05 mg/kg/hr at 08/26/22 0953    lidocaine (LIDODERM) 5 % patch 1 patch, 1 patch, Topical, Daily, Xavi Preston PA-C, 1 patch at 08/26/22 0820    losartan (COZAAR) tablet 100 mg, 100 mg, Oral, Daily, Christie Amado PA-C, 100 mg at 08/26/22 0820    ondansetron [facility]) injection 4 mg, 4 mg, Intravenous, Q4H PRN, Christie Amado PA-C, 4 mg at 08/22/22 1659    oxyCODONE (ROXICODONE) immediate release tablet 10 mg, 10 mg, Oral, Q3H PRN, Sonido Acevedo MD, 10 mg at 08/25/22 1445    oxyCODONE (ROXICODONE) IR tablet 15 mg, 15 mg, Oral, Q4H PRN, Sonido Acevedo MD, 15 mg at 08/26/22 0958    pantoprazole (PROTONIX) EC tablet 40 mg, 40 mg, Oral, BID AC, SHAHIDA Dye, 40 mg at 08/26/22 0646    polyethylene glycol (MIRALAX) packet 17 g, 17 g, Oral, Daily, SHAHIDA Perez, 17 g at 08/26/22 0820    senna-docusate sodium (SENOKOT S) 8 6-50 mg per tablet 1 tablet, 1 tablet, Oral, BID, Kya Siu CRCYNDI, 1 tablet at 08/26/22 5474    sucralfate (CARAFATE) tablet 1 g, 1 g, Oral, 4x Daily (AC & HS), Christie Amado PA-C, 1 g at 08/26/22 0646    torsemide (DEMADEX) tablet 40 mg, 40 mg, Oral, Daily, Russell Baum MD, 40 mg at 08/26/22 0820    Blood Culture:   Lab Results   Component Value Date    BLOODCX No Growth After 5 Days  07/28/2022       Wound Culture:   Lab Results   Component Value Date    WOUNDCULT 3+ Growth of Proteus mirabilis (A) 12/30/2021    WOUNDCULT 3+ Growth of Enterococcus faecalis (A) 12/30/2021    WOUNDCULT 2+ Growth of Pseudomonas aeruginosa (A) 12/30/2021    WOUNDCULT 3+ Growth of  12/30/2021       Ins and Outs:  I/O last 24 hours: In: -   Out: 1050 [Urine:1050]          Physical:  Vitals:    08/26/22 0717   BP: 147/73   Pulse: 69   Resp: 18   Temp: 98 1 °F (36 7 °C)   SpO2: 94%     Musculoskeletal: left Lower Extremity  · Dressing over the knee C/D/I without strikethrough  · Mild tenderness over the upper thigh  Thigh and calf are soft and compressible  · Distal bilateral lower extremities with mottled skin/venous stasis  · SILT s/s/sp/dp/t  +fhl/ehl, +ankle dorsi/plantar flexion  · 2+ DP pulse    Assessment:    77 y  o female POD 4 left retrograde intramedullary zoë  Intraoperative findings of stable prosthesis  Plan:  · WBAT to the left lower extremity  · Hgb 9 5 today  · PT/OT  · Pain control per primary   · DVT ppx: ok to resume eliquis    · Dispo: Ortho will follow   · Will need follow up with Dr Arnaldo Claire upon discharge       Jen Meade PA-C

## 2022-08-26 NOTE — ASSESSMENT & PLAN NOTE
· Likely in the setting of gi bleed; melenic stools s/p EGD with ulcer injection for hemostasis  · 8/21 - 1 unit PRBCs  · 8/22 - 1 unit PRBCs for hemoglobin of 7 6--preoperative for optimization    · Hemoglobin currently stable at 9 2  · Hemodynamically stable  · Continue to monitor/trend H/H  · Eliquis restarted  · Resolved

## 2022-08-27 LAB
ANION GAP SERPL CALCULATED.3IONS-SCNC: 8 MMOL/L (ref 4–13)
BASOPHILS # BLD AUTO: 0.04 THOUSANDS/ΜL (ref 0–0.1)
BASOPHILS NFR BLD AUTO: 0 % (ref 0–1)
BUN SERPL-MCNC: 21 MG/DL (ref 5–25)
CALCIUM SERPL-MCNC: 8.9 MG/DL (ref 8.4–10.2)
CHLORIDE SERPL-SCNC: 97 MMOL/L (ref 96–108)
CO2 SERPL-SCNC: 32 MMOL/L (ref 21–32)
CREAT SERPL-MCNC: 0.77 MG/DL (ref 0.6–1.3)
EOSINOPHIL # BLD AUTO: 0.4 THOUSAND/ΜL (ref 0–0.61)
EOSINOPHIL NFR BLD AUTO: 4 % (ref 0–6)
ERYTHROCYTE [DISTWIDTH] IN BLOOD BY AUTOMATED COUNT: 15.2 % (ref 11.6–15.1)
GFR SERPL CREATININE-BSD FRML MDRD: 75 ML/MIN/1.73SQ M
GLUCOSE SERPL-MCNC: 127 MG/DL (ref 65–140)
HCT VFR BLD AUTO: 31.1 % (ref 34.8–46.1)
HGB BLD-MCNC: 9.8 G/DL (ref 11.5–15.4)
IMM GRANULOCYTES # BLD AUTO: 0.08 THOUSAND/UL (ref 0–0.2)
IMM GRANULOCYTES NFR BLD AUTO: 1 % (ref 0–2)
LYMPHOCYTES # BLD AUTO: 2.45 THOUSANDS/ΜL (ref 0.6–4.47)
LYMPHOCYTES NFR BLD AUTO: 26 % (ref 14–44)
MCH RBC QN AUTO: 29 PG (ref 26.8–34.3)
MCHC RBC AUTO-ENTMCNC: 31.5 G/DL (ref 31.4–37.4)
MCV RBC AUTO: 92 FL (ref 82–98)
MONOCYTES # BLD AUTO: 0.85 THOUSAND/ΜL (ref 0.17–1.22)
MONOCYTES NFR BLD AUTO: 9 % (ref 4–12)
NEUTROPHILS # BLD AUTO: 5.48 THOUSANDS/ΜL (ref 1.85–7.62)
NEUTS SEG NFR BLD AUTO: 60 % (ref 43–75)
NRBC BLD AUTO-RTO: 0 /100 WBCS
PLATELET # BLD AUTO: 441 THOUSANDS/UL (ref 149–390)
PMV BLD AUTO: 10.4 FL (ref 8.9–12.7)
POTASSIUM SERPL-SCNC: 3.2 MMOL/L (ref 3.5–5.3)
RBC # BLD AUTO: 3.38 MILLION/UL (ref 3.81–5.12)
SODIUM SERPL-SCNC: 137 MMOL/L (ref 135–147)
WBC # BLD AUTO: 9.3 THOUSAND/UL (ref 4.31–10.16)

## 2022-08-27 PROCEDURE — 99232 SBSQ HOSP IP/OBS MODERATE 35: CPT | Performed by: INTERNAL MEDICINE

## 2022-08-27 PROCEDURE — 85025 COMPLETE CBC W/AUTO DIFF WBC: CPT | Performed by: PHYSICIAN ASSISTANT

## 2022-08-27 PROCEDURE — 80048 BASIC METABOLIC PNL TOTAL CA: CPT | Performed by: PHYSICIAN ASSISTANT

## 2022-08-27 PROCEDURE — 99232 SBSQ HOSP IP/OBS MODERATE 35: CPT | Performed by: SURGERY

## 2022-08-27 PROCEDURE — 99232 SBSQ HOSP IP/OBS MODERATE 35: CPT | Performed by: ANESTHESIOLOGY

## 2022-08-27 PROCEDURE — 99024 POSTOP FOLLOW-UP VISIT: CPT | Performed by: PHYSICIAN ASSISTANT

## 2022-08-27 RX ORDER — POTASSIUM CHLORIDE 20 MEQ/1
40 TABLET, EXTENDED RELEASE ORAL ONCE
Status: COMPLETED | OUTPATIENT
Start: 2022-08-27 | End: 2022-08-27

## 2022-08-27 RX ADMIN — Medication: at 09:07

## 2022-08-27 RX ADMIN — OXYCODONE HYDROCHLORIDE 10 MG: 10 TABLET ORAL at 04:40

## 2022-08-27 RX ADMIN — SUCRALFATE 1 G: 1 TABLET ORAL at 21:01

## 2022-08-27 RX ADMIN — CARVEDILOL 25 MG: 12.5 TABLET, FILM COATED ORAL at 09:02

## 2022-08-27 RX ADMIN — OXYCODONE HYDROCHLORIDE 10 MG: 10 TABLET ORAL at 21:01

## 2022-08-27 RX ADMIN — OXYCODONE HYDROCHLORIDE 10 MG: 10 TABLET ORAL at 10:54

## 2022-08-27 RX ADMIN — SUCRALFATE 1 G: 1 TABLET ORAL at 06:11

## 2022-08-27 RX ADMIN — TORSEMIDE 40 MG: 20 TABLET ORAL at 09:01

## 2022-08-27 RX ADMIN — ACETAMINOPHEN 975 MG: 325 TABLET, FILM COATED ORAL at 06:11

## 2022-08-27 RX ADMIN — LOSARTAN POTASSIUM 100 MG: 50 TABLET, FILM COATED ORAL at 09:01

## 2022-08-27 RX ADMIN — DIAZEPAM 5 MG: 5 TABLET ORAL at 23:27

## 2022-08-27 RX ADMIN — LIDOCAINE 5% 1 PATCH: 700 PATCH TOPICAL at 09:02

## 2022-08-27 RX ADMIN — DIAZEPAM 5 MG: 5 TABLET ORAL at 06:11

## 2022-08-27 RX ADMIN — SUCRALFATE 1 G: 1 TABLET ORAL at 17:08

## 2022-08-27 RX ADMIN — APIXABAN 5 MG: 5 TABLET, FILM COATED ORAL at 17:10

## 2022-08-27 RX ADMIN — POTASSIUM CHLORIDE 40 MEQ: 1500 TABLET, EXTENDED RELEASE ORAL at 09:01

## 2022-08-27 RX ADMIN — OXYCODONE HYDROCHLORIDE 10 MG: 10 TABLET ORAL at 00:49

## 2022-08-27 RX ADMIN — OXYCODONE HYDROCHLORIDE 10 MG: 10 TABLET ORAL at 17:18

## 2022-08-27 RX ADMIN — ACETAMINOPHEN 975 MG: 325 TABLET, FILM COATED ORAL at 13:50

## 2022-08-27 RX ADMIN — SENNOSIDES AND DOCUSATE SODIUM 1 TABLET: 8.6; 5 TABLET ORAL at 09:02

## 2022-08-27 RX ADMIN — APIXABAN 5 MG: 5 TABLET, FILM COATED ORAL at 09:02

## 2022-08-27 RX ADMIN — POLYETHYLENE GLYCOL 3350 17 G: 17 POWDER, FOR SOLUTION ORAL at 09:02

## 2022-08-27 RX ADMIN — DIAZEPAM 5 MG: 5 TABLET ORAL at 10:54

## 2022-08-27 RX ADMIN — SUCRALFATE 1 G: 1 TABLET ORAL at 10:54

## 2022-08-27 RX ADMIN — PANTOPRAZOLE SODIUM 40 MG: 40 TABLET, DELAYED RELEASE ORAL at 06:11

## 2022-08-27 RX ADMIN — HYDROMORPHONE HYDROCHLORIDE 0.5 MG: 1 INJECTION, SOLUTION INTRAMUSCULAR; INTRAVENOUS; SUBCUTANEOUS at 12:15

## 2022-08-27 RX ADMIN — SENNOSIDES AND DOCUSATE SODIUM 1 TABLET: 8.6; 5 TABLET ORAL at 17:08

## 2022-08-27 RX ADMIN — ACETAMINOPHEN 975 MG: 325 TABLET, FILM COATED ORAL at 21:01

## 2022-08-27 RX ADMIN — DIAZEPAM 5 MG: 5 TABLET ORAL at 17:10

## 2022-08-27 RX ADMIN — PANTOPRAZOLE SODIUM 40 MG: 40 TABLET, DELAYED RELEASE ORAL at 17:09

## 2022-08-27 NOTE — ASSESSMENT & PLAN NOTE
DATE OF VISIT: 12/14/2021      REASON FOR VISIT: Leukocytosis, iron deficiency anemia      HISTORY OF PRESENT ILLNESS:   58-year-old female with medical problem consisting of diabetes mellitus with neuropathy, hypothyroidism, hypertension, dyslipidemia, history of left lower extremity amputation, iron deficiency was initially seen in consultation on April 21, 2021 for evaluation of leukocytosis and iron deficiency.  Patient is here for follow-up appointment today.  Denies any bleeding.  Denies any new lymph node enlargement or any recent infection.          Past Medical History, Past Surgical History, Social History, Family History have been reviewed and are without significant changes except as mentioned.    Review of Systems   A comprehensive 14 point review of systems was performed and was negative except as mentioned in HPI.    Medications:  The current medication list was reviewed in the EMR    ALLERGIES:    Allergies   Allergen Reactions   • Compazine [Prochlorperazine Edisylate] Unknown - High Severity   • Penicillins Other (See Comments) and Unknown - High Severity     unknown       Objective      Vitals:    12/13/21 1136   BP: 129/64   Pulse: 81   Temp: 97.3 °F (36.3 °C)   TempSrc: Temporal   SpO2: 98%   Weight: 107 kg (235 lb)   PainSc: 0-No pain     Current Status 8/16/2021   ECOG score 3       Physical Exam  Pulmonary:      Breath sounds: Normal breath sounds.   Neurological:      Mental Status: She is alert and oriented to person, place, and time.           RECENT LABS:  Glucose   Date Value Ref Range Status   09/08/2021 201 (H) 65 - 99 mg/dL Final     Sodium   Date Value Ref Range Status   09/08/2021 139 136 - 145 mmol/L Final     Potassium   Date Value Ref Range Status   09/08/2021 5.0 3.5 - 5.2 mmol/L Final     CO2   Date Value Ref Range Status   09/08/2021 27.4 22.0 - 29.0 mmol/L Final     Chloride   Date Value Ref Range Status   09/08/2021 104 98 - 107 mmol/L Final     Anion Gap   Date Value Ref  · In the setting of fibromyalgia  · Continue current pain regimen  APS note appreciated  · Oxycodone 10/15  · Ketamine drip  · Dilaudid p r n    · Valium Range Status   09/08/2021 7.6 5.0 - 15.0 mmol/L Final     Creatinine   Date Value Ref Range Status   12/07/2021 1.12 (H) 0.57 - 1.00 mg/dL Final     BUN   Date Value Ref Range Status   12/07/2021 26 (H) 6 - 20 mg/dL Final     BUN/Creatinine Ratio   Date Value Ref Range Status   09/08/2021 17.7 7.0 - 25.0 Final     Calcium   Date Value Ref Range Status   09/08/2021 8.8 8.6 - 10.5 mg/dL Final     eGFR Non  Amer   Date Value Ref Range Status   12/07/2021 50 (L) >60 mL/min/1.73 Final     Alkaline Phosphatase   Date Value Ref Range Status   09/08/2021 84 39 - 117 U/L Final     Total Protein   Date Value Ref Range Status   09/08/2021 7.2 6.0 - 8.5 g/dL Final     ALT (SGPT)   Date Value Ref Range Status   09/08/2021 11 1 - 33 U/L Final     AST (SGOT)   Date Value Ref Range Status   09/08/2021 10 1 - 32 U/L Final     Total Bilirubin   Date Value Ref Range Status   09/08/2021 <0.2 0.0 - 1.2 mg/dL Final     Albumin   Date Value Ref Range Status   09/08/2021 3.90 3.50 - 5.20 g/dL Final     Globulin   Date Value Ref Range Status   09/08/2021 3.3 gm/dL Final     Lab Results   Component Value Date    WBC 13.53 (H) 12/13/2021    HGB 11.7 (L) 12/13/2021    HCT 36.4 12/13/2021    MCV 82.0 12/13/2021     12/13/2021     Lab Results   Component Value Date    NEUTROABS 10.08 (H) 12/13/2021    IRON 48 12/13/2021    IRON 57 08/16/2021    IRON 43 04/21/2021    TIBC 304 12/13/2021    TIBC 317 08/16/2021    TIBC 320 04/21/2021    LABIRON 16 (L) 12/13/2021    LABIRON 18 (L) 08/16/2021    LABIRON 13 (L) 04/21/2021    FERRITIN 52.45 12/13/2021    FERRITIN 59.94 08/16/2021    FERRITIN 85.95 04/21/2021    [id number] 337 12/13/2021    ZDZSLDMJ51 403 09/08/2021    SCFQQVQJ58 500 08/16/2021    FOLATE 15.40 12/13/2021    FOLATE 19.60 08/16/2021    FOLATE 17.60 04/21/2021     No results found for: , LABCA2, AFPTM, HCGQUANT, , CHROMGRNA, [id number], CEA, REFLABREPO      PATHOLOGY:  * Cannot find OR log *         RADIOLOGY DATA  :  No radiology results for the last 7 days        Assessment/Plan     1.  Leukocytosis:  -Patient has an ongoing leukocytosis since December 2020  -CBC done today shows white blood cell count is 13.5 which is predominantly increase in neutrophil  -Work-up in the past including peripheral blood flow cytometry in April 2021 was negative for any leukemia or lymphoma.  -We will continue with clinical monitoring for now  -We will have patient return to clinic in 4 months with repeat CBC, iron studies, ferritin, CMP, B12 and folate to be done on that day    2.  Iron deficiency anemia:  -Hemoglobin today is 11.7  -Patient is currently on B12 and ferrous sulfate p.o. daily.  Anemia work-up from today shows B12 level is worsened to 337.  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  -Recommend increasing ferrous sulfate to 1 tablet twice daily.  If patient is not able to tolerate ferrous sulfate by mouth, she will be started on intravenous iron.  Recommendation will be discussed with patient.  -We will repeat anemia work-up upon next clinic visit in 4 months    3.  Diabetes mellitus with neuropathy    4.  Health maintenance: Patient quit smoking in 2019.  Had a colonoscopy in August 2020    5. Advance Care Planning   ACP discussion was held with the patient during this visit. Patient has an advance directive in EMR which is still valid.                  PHQ-9 Total Score: 2   -Patient is not homicidal or suicidal.  No acute intervention required.    Marta Giraldo reports a pain score of 0.  Given her pain assessment as noted, treatment options were discussed and the following options were decided upon as a follow-up plan to address the patient's pain: continuation of current treatment plan for pain.         Adalid Bates MD  12/14/2021  06:52 CST        Part of this note may be an electronic transcription/translation of spoken language to printed  text using the Dragon Dictation System.          CC:

## 2022-08-27 NOTE — PROGRESS NOTES
Progress Note - Acute Pain Service    Brii Hernandez 77 y o  female MRN: [id number]  Unit/Bed#: S -01 Encounter: [id number]      Assessment:   Principal Problem:    Fracture of proximal end of left femur ([address] )  Active Problems:    Chronic venous insufficiency    A-fib (HCC)    Chronic diastolic heart failure (HCC)    Venous stasis dermatitis of both lower extremities    Fall    Melena    Acute pain due to trauma    Brii Hernandez is a 77 y o  female with PMHx of A-fib on eliquis, morbid obesity, chronic opioid dependence (PDMP confirmed, takes percocet 10-325mg q6hr PRN), and HFpEF who initially presented with left proximal femur fracture after a mechanical fall  She had a left femoral catheter placed on 8/20 but was inadvertently removed  Subsequently, she had a left femur ORIF with perioperative femoral single-shot block for post-operative analgesia on 8/22  Due to increased pain needs, a left femoral catheter was attempted on 8/24 but aborted due to poor acoustic windows  Instead, a ketamine infusion was started (8/24-) with significant improvement in Kya's pain  APS consulted for post-operative pain control  Patient was doing well this morning from a pain perspective until shortly before my interview  She states pain was under control but was experiencing a muscle spasm that was severe  She had her muscle relaxants as scheduled and her RN gave her a dose of dilaudid while we were talking  She feels as though her current regimen has been keeping her comfortable and attributes her spasm to being in bed all day  She hopes that once she is up and moving she feels better  I discussed getting her ready for discharge on Monday which would include discontinuing ketamine and IV dilaudid  She agreed to discontinue ketamine and would like to have dilaudid just in case she has another spasm       Plan:   - Discontinue ketamine infusion  - Consider discontinuing dilaudid tomorrow if she requires no further PRN doses today  - Continue PO oxycodone 10/15mg q4hr PRN for moderate-severe pain  Multimodal analgesia:  - Tylenol 975 mg PO q8hrs standing  - Lidocaine patches to affected areas 12 hours on, 12 hours off  - Avoid NSAIDs due to potential CV side-effects given HF history  - Valium 5mg PO q6hr PRN for muscle spasms/anxiety    Pain History  Current pain location(s): LLE  Pain Scale:   4-8/10  Quality: Spasmodic  24 hour history: See above    Meds/Allergies     No Known Allergies    Objective     Temp:  [98 °F (36 7 °C)-98 3 °F (36 8 °C)] 98 3 °F (36 8 °C)  HR:  [63-84] 71  Resp:  [16-18] 16  BP: ()/(52-73) 151/64    Physical Exam  Vitals and nursing note reviewed  Constitutional:       Appearance: Normal appearance  HENT:      Head: Normocephalic and atraumatic  Right Ear: External ear normal       Left Ear: External ear normal       Nose: Nose normal       Mouth/Throat:      Mouth: Mucous membranes are moist       Pharynx: Oropharynx is clear  Eyes:      Conjunctiva/sclera: Conjunctivae normal    Cardiovascular:      Rate and Rhythm: Normal rate and regular rhythm  Pulses: Normal pulses  Heart sounds: Normal heart sounds  Pulmonary:      Effort: Pulmonary effort is normal       Breath sounds: Normal breath sounds  Abdominal:      General: Abdomen is flat  Bowel sounds are normal       Palpations: Abdomen is soft  Musculoskeletal:         General: Tenderness present  Normal range of motion  Cervical back: Normal range of motion  Skin:     General: Skin is warm and dry  Neurological:      General: No focal deficit present  Mental Status: She is alert and oriented to person, place, and time  Mental status is at baseline     Psychiatric:         Mood and Affect: Mood normal          Lab Results:   Results from last 7 days   Lab Units 08/27/22  0542   WBC Thousand/uL 9 30   HEMOGLOBIN g/dL 9 8*   HEMATOCRIT % 31 1*   PLATELETS Thousands/uL 441*      Results from last 7 days   Lab Units 08/27/22  0542 08/26/22  0545 08/23/22  0745 08/22/22  1556   POTASSIUM mmol/L 3 2* 3 5   < >  --    CHLORIDE mmol/L 97 98   < >  --    CO2 mmol/L 32 32   < >  --    CO2, I-STAT mmol/L  --   --   --  36*   BUN mg/dL 21 19   < >  --    CREATININE mg/dL 0 77 0 71   < >  --    CALCIUM mg/dL 8 9 8 8   < >  --    ALK PHOS U/L  --  119*   < >  --    ALT U/L  --  21   < >  --    AST U/L  --  23   < >  --    GLUCOSE, ISTAT mg/dl  --   --   --  121    < > = values in this interval not displayed  Counseling / Coordination of Care  Total floor / unit time spent today 15 minutes  Greater than 50% of total time was spent with the patient and / or family counseling and / or coordination of care  Please note that the APS provides consultative services regarding pain management only  With the exception of ketamine and epidural infusions and except when indicated, final decisions regarding starting or changing doses of analgesic medications are at the discretion of the consulting service  Off hours consultation and/or medication management is generally not available      Bart Gomez MD  Acute Pain Service

## 2022-08-27 NOTE — PROGRESS NOTES
[facility]  Progress Note - [facility] 1945, 77 y o  female MRN: [id number]  Unit/Bed#: S -01 Encounter: [id number]  Primary Care Provider: Rena Leonard MD   Date and time admitted to hospital: 8/20/2022  5:58 AM    * Fracture of proximal end of left femur [facility])  Assessment & Plan  · Patient had a mechanical fall while going to the bathroom  Patient has been having some melanotic stools over the last 24 hours and slipped on it  She landed on her left knee  · Underlying h/o bilateral knee replacements also  · Admission imaging consistent with left femur fracture  · POD 5 left retrograde intramedullary zoë  · Venous duplex normal  · Will continue Pain management per Acute Pain Service recs  · PT/OT: post acute rehab    Acute pain due to trauma  Assessment & Plan  Continue current pain medications  Melena  Assessment & Plan  Patient has been having melanotic stools over the last 24 hours  She takes Eliquis for atrial fibrillation  Currently Eliquis is on hold  S/p Kcentra  Placed on Protonix gtt  no melanotic stools reported overnight  Plan  · Resolved  · Continue protonix 40 IV bid  · Close Hgb watch      Fall  Assessment & Plan  Mechanical fall  PT OT once clinical stability, patient may benefit of post acute rehab    Venous stasis dermatitis of both lower extremities  Assessment & Plan  · Patient has mild erythema of the bilateral extremity which seems to be chronic  Erythema is symmetric, skin its significant dry and scaly , she only reports tenderness in left extremity, has remained afebrile, wbc wnl , very low likelihood of  cellulitis  · Patient complained of left calf pain which radiates upward, on examination left lower leg looks swollen compared to right side     · Will continue topical moisturizers  · Venous doppler normal    Chronic diastolic heart failure (HCC)  Assessment & Plan  Wt Readings from Last 3 Encounters:   08/18/22 103 kg (227 Pharmacy Pop Care Documentation:      AVS received for required office visit on: 2/7/22 with Lauro Larios per careeverywhere lb)   22 103 kg (227 lb 9 6 oz)   22 109 kg (239 lb 10 2 oz)     Patient has chronic diastolic congestive heart failure  Most recent echo (2021) : 65%  Systolic function is normal  Wall motion is normal  G1DD  Doesn't appear to be on acute exacerbation    Plan  · Continue carvedilol  · Oral Torsemide 40 mg  · Monitor electrolytes while on diuretic therapy  · Monitor urine output         A-fib (HCC)  Assessment & Plan  Continue patient on carvedilol and Eliquis  Chronic venous insufficiency  Assessment & Plan  Continue care with Lac-Hydrin cream       Acute on chronic blood loss anemia-resolved as of 2022  Assessment & Plan  1 U of pRBC transfused   Current Hb 9 2  Monitor Hb            VTE Pharmacologic Prophylaxis: VTE Score: 10 Eliquis    Patient Centered Rounds: I performed bedside rounds with nursing staff today  Discussions with Specialists or Other Care Team Provider: CM    Education and Discussions with Family / Patient: Will update  Time Spent for Care: 20 minutes  More than 50% of total time spent on counseling and coordination of care as described above  Current Length of Stay: 7 day(s)  Current Patient Status: Inpatient   Certification Statement: The patient will continue to require additional inpatient hospital stay due to femur fracture, s/p ORIF, on ketamine drip, pending rehab placement  Discharge Plan: post acute rehab    Code Status: Level 1 - Full Code    Subjective:   Seen this morning in NAD  Reports persistent pain in leg  Continues on Ketamine drip  Denies chest pain, SOB, fever or chills  Objective:     Vitals:   Temp (24hrs), Av 3 °F (36 8 °C), Min:98 °F (36 7 °C), Max:98 5 °F (36 9 °C)    Temp:  [98 °F (36 7 °C)-98 5 °F (36 9 °C)] 98 2 °F (36 8 °C)  HR:  [63-84] 69  Resp:  [17-20] 17  BP: ()/(52-94) 140/59  SpO2:  [93 %-100 %] 93 %  There is no height or weight on file to calculate BMI       Input and Output Summary (last 24 hours): Intake/Output Summary (Last 24 hours) at 8/27/2022 0813  Last data filed at 8/26/2022 2358  Gross per 24 hour   Intake --   Output 2500 ml   Net -2500 ml       Physical Exam:   Physical Exam  Vitals reviewed  Constitutional:       General: She is not in acute distress  HENT:      Head: Normocephalic and atraumatic  Mouth/Throat:      Mouth: Mucous membranes are moist       Pharynx: Oropharynx is clear  Eyes:      Extraocular Movements: Extraocular movements intact  Conjunctiva/sclera: Conjunctivae normal    Cardiovascular:      Rate and Rhythm: Normal rate  Pulses: Normal pulses  Pulmonary:      Effort: Pulmonary effort is normal  No respiratory distress  Abdominal:      Palpations: Abdomen is soft  Tenderness: There is no abdominal tenderness  Musculoskeletal:      Cervical back: Normal range of motion and neck supple  Comments: Left LE in dressing c/d/i, skin is dry, sensation intact, decreased ROM 2/2 pain    Neurological:      Mental Status: She is alert and oriented to person, place, and time            Additional Data:     Labs:  Results from last 7 days   Lab Units 08/27/22  0542   WBC Thousand/uL 9 30   HEMOGLOBIN g/dL 9 8*   HEMATOCRIT % 31 1*   PLATELETS Thousands/uL 441*   NEUTROS PCT % 60   LYMPHS PCT % 26   MONOS PCT % 9   EOS PCT % 4     Results from last 7 days   Lab Units 08/27/22  0542 08/26/22  0545   SODIUM mmol/L 137 137   POTASSIUM mmol/L 3 2* 3 5   CHLORIDE mmol/L 97 98   CO2 mmol/L 32 32   BUN mg/dL 21 19   CREATININE mg/dL 0 77 0 71   ANION GAP mmol/L 8 7   CALCIUM mg/dL 8 9 8 8   ALBUMIN g/dL  --  3 3*   TOTAL BILIRUBIN mg/dL  --  0 55   ALK PHOS U/L  --  119*   ALT U/L  --  21   AST U/L  --  23   GLUCOSE RANDOM mg/dL 127 126     Results from last 7 days   Lab Units 08/21/22  0449   INR  1 16     Results from last 7 days   Lab Units 08/21/22  1116   POC GLUCOSE mg/dl 145*         Results from last 7 days   Lab Units 08/20/22  0955   LACTIC ACID mmol/L 0  7   PROCALCITONIN ng/ml 0 07       Lines/Drains:  Invasive Devices  Report    Peripheral Intravenous Line  Duration           Peripheral IV 08/26/22 Left Antecubital 1 day                Imaging: Reviewed radiology reports from this admission including: xray(s)    Recent Cultures (last 7 days):         Last 24 Hours Medication List:   Current Facility-Administered Medications   Medication Dose Route Frequency Provider Last Rate    acetaminophen  975 mg Oral [facility] Leah Velasco PA-C      ammonium lactate   Topical Daily Leah Velasco PA-C      apixaban  5 mg Oral BID SHAHIDA Dye      carvedilol  25 mg Oral BID With Meals Leah Velasco PA-C      diazepam  5 mg Oral Q6H [address] Scooter San MD      HYDROmorphone  0 5 mg Intravenous Q4H PRN Leah Velasco PA-C      ketamine  0 05 mg/kg/hr Intravenous Continuous Greg Albert MD 0 05 mg/kg/hr (08/27/22 0753)    lidocaine  1 patch Topical Daily Leah Velasco PA-C      losartan  100 mg Oral Daily Leah Velasco PA-C      ondansetron  4 mg Intravenous Q4H PRN Leah Velasco PA-C      oxyCODONE  10 mg Oral Q3H PRN Sonido Cardenas MD      oxyCODONE  15 mg Oral Q4H PRN Sonido Cardenas MD      pantoprazole  40 mg Oral BID AC SHAHIDA Dye      polyethylene glycol  17 g Oral Daily JohnSHAHIDA Summers      potassium chloride  40 mEq Oral Once Jason Maddox PA-C      senna-docusate sodium  1 tablet Oral BID John Kingdom, SHAHIDA      sucralfate  1 g Oral 4x Daily (AC & HS) Leah Velasco PA-C      torsemide  40 mg Oral Daily Chucho Hanson MD          Today, Patient Was Seen By: Renate Blanc MD    **Please Note: This note may have been constructed using a voice recognition system  **

## 2022-08-27 NOTE — QUICK NOTE
Per patient request attempted to call  x3 with numbers provided in chart  Unable to get in contact or confirm identity of patient over the numbers provided so will not leave message at this time     Will attempt to call back later if able  Otherwise he has been visiting will update at bedside  Will update patient regarding inability to access  by phone

## 2022-08-27 NOTE — PROGRESS NOTES
[facility]  [facility] 1945[facility] 77 [facility]  [facility] MRN: [id number]  Unit/Bed#: S -01 Encounter: [id number]  Primary Care Provider: Tejal Oneal MD   Date and time admitted to hospital: 8/20/2022  5:58 AM    Acute pain due to trauma  Assessment & Plan  · In the setting of fibromyalgia  · Continue current pain regimen  APS note appreciated  · Oxycodone 10/15  · Ketamine drip  · Dilaudid p r n  · Valium    Melena  Assessment & Plan  · Multiple melenotic stool, reversed with Kcentra on admission due to history of Eliquis use associated with atrial fibrillation  · GI consulted and note appreciated  · 8/21- EGD- Single cratered, benign-appearing ulcer in the antrum with flat pigmented spot (Vish IIC); performed cold forceps biopsy; injected 3 mL of epinephrine to address bleeding; hemostasis achieved  2 small, superficial, round ulcers in the incisura and antrum with clean base (Vish III)  · Protonix changed to b i d  · Hemoglobin is currently stable   Eliquis restarted  · Tolerating a regular diet  · Continue Carafate  · Avoid NSAIDs    Fall  Assessment & Plan  · Reported as mechanical fall  · Injuries listed below  · PT/OT    Venous stasis dermatitis of both lower extremities  Assessment & Plan  · Original concern a bilateral lower extremity cellulitis, status post IV antibiotics  · Slim consultation with low likelihood of cellulitic infection, procalcitonin and lactic acid normal, will continue to monitor off of antibiotics  · Stable WBC  · Continue use of  ammonium lactate lotion daily  · Lower extremities bilaterally with no worsening qualitative evidence of infection, no worsening erythema or swelling    Chronic diastolic heart failure (HCC)  Assessment & Plan  Wt Readings from Last 3 Encounters:   08/18/22 103 kg (227 lb)   08/03/22 103 kg (227 lb 9 6 oz)   07/28/22 109 kg (239 lb 10 2 oz)     · Continue home carvedilol, torsemide 40 mg  · Appreciate Internal Medicine consult and recommendations  · I&O    A-fib ([address] )  Assessment & Plan  · Continue coreg and Eliquis  · Rate controlled and anticoagulated    * Fracture of proximal end of left femur (HCC)  Assessment & Plan  · Secondary to fall  · XR in CT show distal left femur fracture--no vascular injury on CTA  · Appreciate Orthopedic surgery consult and recommendations  · Ortho left retrograde IM zoë 08/22/2022  · Maintain WBAT LLE  · Multimodail pain regimen  · Appreciate APS consult and recommendations  · 8/24 Ketamine gtt initiated  · DVT Prop:  Eliquis  · PT/OT when indicated  DVT Prophylaxis: SCDs and Eliquis  PT and OT: eval and treat    Disposition:  DC planning  Look to wean pain medications today  Discussed possible removal of ketamine and transition to only oral with IV Dilaudid as needed for breakthrough  Patient in agreement for planning for DC on Monday  SUBJECTIVE:  Chief Complaint:  No new complaints    Subjective:  Patient offering no new complaints today on presentation  Currently resting in bed  States her pain is controlled at this time  OBJECTIVE:   Vitals:   Temp:  [98 °F (36 7 °C)-98 5 °F (36 9 °C)] 98 2 °F (36 8 °C)  HR:  [63-84] 69  Resp:  [17-20] 17  BP: ()/(52-94) 140/59    Intake/Output:  I/O       08/25 0701  08/26 0700 08/26 0701  08/27 0700 08/27 0701 08/28 0700    I V         Total Intake       Urine 1050 2500     Total Output 1050 2500     Net -1050 -2500                 Nutrition: Diet Regular; Regular House    Physical Exam:   GENERAL APPEARANCE:  No acute distress  NEURO:  GCS 15  HEENT:  Normocephalic  CV:  Regular rate and rhythm  LUNGS:  CTA bilaterally  GI:  Nontender, nondistended  :  No Campos  MSK:  +2 pulses on extremities  SKIN:  Warm, dry, intact    Invasive Devices  Report    Peripheral Intravenous Line  Duration           Peripheral IV 08/26/22 Left Antecubital 1 day                      Lab Results:   Results: I have personally reviewed all pertinent laboratory/tests results, BMP/CMP:   Lab Results   Component Value Date    SODIUM 137 08/27/2022    K 3 2 (L) 08/27/2022    CL 97 08/27/2022    CO2 32 08/27/2022    BUN 21 08/27/2022    CREATININE 0 77 08/27/2022    CALCIUM 8 9 08/27/2022    EGFR 75 08/27/2022    and CBC:   Lab Results   Component Value Date    WBC 9 30 08/27/2022    HGB 9 8 (L) 08/27/2022    HCT 31 1 (L) 08/27/2022    MCV 92 08/27/2022     (H) 08/27/2022    MCH 29 0 08/27/2022    MCHC 31 5 08/27/2022    RDW 15 2 (H) 08/27/2022    MPV 10 4 08/27/2022    NRBC 0 08/27/2022     Imaging/EKG Studies: I have personally reviewed pertinent reports       Other Studies: no other studies

## 2022-08-27 NOTE — QUICK NOTE
Update  at bedside in regards to discharge planning  At this time no further changes and care plan  Will continue to wean pain medications and anticipated discharge plan  Tentative discharge planned for 08/29/2022  Family are agreeable to discharge planning process

## 2022-08-27 NOTE — PROGRESS NOTES
Progress Note - Orthopedics   Renee Rodríguez 77 y o  female MRN: [id number]  Unit/Bed#: S -01      Subjective:    77 y  o female seen and examined this AM  Lying comfortably in bed  No issues overnight  Pain is controlled at this time  Denies fevers, chills or sweats      Labs:  0   Lab Value Date/Time    HCT 31 1 (L) 08/27/2022 0542    HCT 29 6 (L) 08/26/2022 0545    HCT 29 3 (L) 08/25/2022 0714    HCT 37 4 09/23/2014 1339    HCT 33 6 (L) 09/03/2014 1146    HGB 9 8 (L) 08/27/2022 0542    HGB 9 5 (L) 08/26/2022 0545    HGB 9 2 (L) 08/25/2022 0714    HGB 11 3 (L) 09/23/2014 1339    HGB 10 8 (L) 09/03/2014 1146    INR 1 16 08/21/2022 0449    WBC 9 30 08/27/2022 0542    WBC 10 09 08/26/2022 0545    WBC 10 30 (H) 08/25/2022 0714    WBC 7 10 09/23/2014 1339    WBC 6 13 09/03/2014 1146       Meds:    Current Facility-Administered Medications:     acetaminophen (TYLENOL) tablet 975 mg, 975 mg, Oral, Q8H [facility], Acosta Higgins PA-C, 975 mg at 08/27/22 3413    ammonium lactate (LAC-HYDRIN) 12 % cream, , Topical, Daily, Acosta Higgins PA-C, Given at 08/26/22 0820    apixaban (ELIQUIS) tablet 5 mg, 5 mg, Oral, BID, SHAHIDA Dye, 5 mg at 08/26/22 1707    carvedilol (COREG) tablet 25 mg, 25 mg, Oral, BID With Meals, Acosta Higgins PA-C, 25 mg at 08/26/22 1707    diazepam (VALIUM) tablet 5 mg, 5 mg, Oral, Q6H [facility], Bradley Bingham MD, 5 mg at 08/27/22 0877    HYDROmorphone (DILAUDID) injection 0 5 mg, 0 5 mg, Intravenous, Q4H PRN, Acosta Higgins PA-C, 0 5 mg at 08/26/22 2147    ketamine 250 mg (STANDARD CONCENTRATION) IV in sodium chloride 0 9% 250 mL, 0 05 mg/kg/hr, Intravenous, Continuous, Johnathan Kern MD, Last Rate: 5 2 mL/hr at 08/26/22 2200, 0 05 mg/kg/hr at 08/26/22 2200    lidocaine (LIDODERM) 5 % patch 1 patch, 1 patch, Topical, Daily, Acosta Higgins PA-C, 1 patch at 08/26/22 0820    losartan (COZAAR) tablet 100 mg, 100 mg, Oral, Daily, Acosta Higgins PA-C, 100 mg at 08/26/22 0820    ondansetron (ZOFRAN) injection 4 mg, 4 mg, Intravenous, Q4H PRN, Karen Cruz PA-C, 4 mg at 08/22/22 1659    oxyCODONE (ROXICODONE) immediate release tablet 10 mg, 10 mg, Oral, Q3H PRN, Sonido Short MD, 10 mg at 08/27/22 0440    oxyCODONE (ROXICODONE) IR tablet 15 mg, 15 mg, Oral, Q4H PRN, Soniod Short MD, 15 mg at 08/26/22 0958    pantoprazole (PROTONIX) EC tablet 40 mg, 40 mg, Oral, BID AC, SHAHIDA Dye, 40 mg at 08/27/22 3754    polyethylene glycol (MIRALAX) packet 17 g, 17 g, Oral, Daily, SHAHIDA Mcgee, 17 g at 08/26/22 0820    senna-docusate sodium (SENOKOT S) 8 6-50 mg per tablet 1 tablet, 1 tablet, Oral, BID, SHAHIDA Mcgee, 1 tablet at 08/26/22 1706    sucralfate (CARAFATE) tablet 1 g, 1 g, Oral, 4x Daily (AC & HS), Karen Cruz PA-C, 1 g at 08/27/22 0611    torsemide (DEMADEX) tablet 40 mg, 40 mg, Oral, Daily, Reinaldo Lozada MD, 40 mg at 08/26/22 0820    Blood Culture:   Lab Results   Component Value Date    BLOODCX No Growth After 5 Days  07/28/2022       Wound Culture:   Lab Results   Component Value Date    WOUNDCULT 3+ Growth of Proteus mirabilis (A) 12/30/2021    WOUNDCULT 3+ Growth of Enterococcus faecalis (A) 12/30/2021    WOUNDCULT 2+ Growth of Pseudomonas aeruginosa (A) 12/30/2021    WOUNDCULT 3+ Growth of  12/30/2021       Ins and Outs:  I/O last 24 hours: In: -   Out: 2500 [Urine:2500]          Physical:  Vitals:    08/27/22 0327   BP: 118/57   Pulse: 63   Resp:    Temp: 98 °F (36 7 °C)   SpO2: 94%     Musculoskeletal: left Lower Extremity  · Dressing over the knee C/D/I without strikethrough  · Mild tenderness over the upper thigh  Thigh and calf are soft and compressible  · Distal bilateral lower extremities with mottled skin/venous stasis  Edema present LE  · SILT s/s/sp/dp/t  +fhl/ehl, +ankle dorsi/plantar flexion  · 2+ DP pulse    Assessment:    77 y  o female POD 5 left retrograde intramedullary zoë   Intraoperative findings of stable prosthesis  Plan:  · WBAT to the left lower extremity  · Hgb 9 8 today  ABLA- continue to monitor vitals and H/H  · PT/OT  · Pain control per primary   · DVT ppx: Eliquis resumed  · Dispo: Ortho will follow   · Will need follow up with Dr Delmer Santiago upon discharge       Delaney Howard PA-C

## 2022-08-28 PROCEDURE — 99232 SBSQ HOSP IP/OBS MODERATE 35: CPT | Performed by: ANESTHESIOLOGY

## 2022-08-28 PROCEDURE — 99232 SBSQ HOSP IP/OBS MODERATE 35: CPT | Performed by: SURGERY

## 2022-08-28 PROCEDURE — 99232 SBSQ HOSP IP/OBS MODERATE 35: CPT | Performed by: INTERNAL MEDICINE

## 2022-08-28 RX ORDER — METHOCARBAMOL 500 MG/1
500 TABLET, FILM COATED ORAL EVERY 6 HOURS PRN
Status: DISCONTINUED | OUTPATIENT
Start: 2022-08-28 | End: 2022-08-30 | Stop reason: HOSPADM

## 2022-08-28 RX ADMIN — OXYCODONE HYDROCHLORIDE 15 MG: 10 TABLET ORAL at 16:26

## 2022-08-28 RX ADMIN — HYDROMORPHONE HYDROCHLORIDE 0.5 MG: 1 INJECTION, SOLUTION INTRAMUSCULAR; INTRAVENOUS; SUBCUTANEOUS at 20:11

## 2022-08-28 RX ADMIN — LIDOCAINE 5% 1 PATCH: 700 PATCH TOPICAL at 08:41

## 2022-08-28 RX ADMIN — ACETAMINOPHEN 975 MG: 325 TABLET, FILM COATED ORAL at 06:08

## 2022-08-28 RX ADMIN — Medication: at 08:42

## 2022-08-28 RX ADMIN — CARVEDILOL 25 MG: 12.5 TABLET, FILM COATED ORAL at 17:49

## 2022-08-28 RX ADMIN — ACETAMINOPHEN 975 MG: 325 TABLET, FILM COATED ORAL at 22:59

## 2022-08-28 RX ADMIN — SENNOSIDES AND DOCUSATE SODIUM 1 TABLET: 8.6; 5 TABLET ORAL at 17:49

## 2022-08-28 RX ADMIN — APIXABAN 5 MG: 5 TABLET, FILM COATED ORAL at 08:41

## 2022-08-28 RX ADMIN — SUCRALFATE 1 G: 1 TABLET ORAL at 16:25

## 2022-08-28 RX ADMIN — OXYCODONE HYDROCHLORIDE 15 MG: 10 TABLET ORAL at 00:32

## 2022-08-28 RX ADMIN — PANTOPRAZOLE SODIUM 40 MG: 40 TABLET, DELAYED RELEASE ORAL at 06:08

## 2022-08-28 RX ADMIN — SUCRALFATE 1 G: 1 TABLET ORAL at 22:59

## 2022-08-28 RX ADMIN — SENNOSIDES AND DOCUSATE SODIUM 1 TABLET: 8.6; 5 TABLET ORAL at 08:41

## 2022-08-28 RX ADMIN — POLYETHYLENE GLYCOL 3350 17 G: 17 POWDER, FOR SOLUTION ORAL at 08:41

## 2022-08-28 RX ADMIN — PANTOPRAZOLE SODIUM 40 MG: 40 TABLET, DELAYED RELEASE ORAL at 16:25

## 2022-08-28 RX ADMIN — OXYCODONE HYDROCHLORIDE 15 MG: 10 TABLET ORAL at 06:08

## 2022-08-28 RX ADMIN — SUCRALFATE 1 G: 1 TABLET ORAL at 06:08

## 2022-08-28 RX ADMIN — DIAZEPAM 5 MG: 5 TABLET ORAL at 23:24

## 2022-08-28 RX ADMIN — APIXABAN 5 MG: 5 TABLET, FILM COATED ORAL at 17:49

## 2022-08-28 RX ADMIN — OXYCODONE HYDROCHLORIDE 15 MG: 10 TABLET ORAL at 12:12

## 2022-08-28 RX ADMIN — DIAZEPAM 5 MG: 5 TABLET ORAL at 06:08

## 2022-08-28 RX ADMIN — DIAZEPAM 5 MG: 5 TABLET ORAL at 17:49

## 2022-08-28 RX ADMIN — SUCRALFATE 1 G: 1 TABLET ORAL at 12:12

## 2022-08-28 RX ADMIN — DIAZEPAM 5 MG: 5 TABLET ORAL at 12:12

## 2022-08-28 NOTE — PROGRESS NOTES
[facility]  [facility] 77 [facility]  [facility] MRN: [id number]  Unit/Bed#: S -01 Encounter: [id number]  Primary Care Provider: Rosemary Hanna MD   Date and time admitted to hospital: 8/20/2022  5:58 AM    Acute pain due to trauma  Assessment & Plan  · In the setting of fibromyalgia  · Continue current pain regimen  APS note appreciated  · Oxycodone 10/15  · Ketamine drip discontinued  · Dilaudid p r n  · Valium    Melena  Assessment & Plan  · Multiple melenotic stool, reversed with Kcentra on admission due to history of Eliquis use associated with atrial fibrillation  · GI consulted and note appreciated  · 8/21- EGD- Single cratered, benign-appearing ulcer in the antrum with flat pigmented spot (Vish IIC); performed cold forceps biopsy; injected 3 mL of epinephrine to address bleeding; hemostasis achieved  2 small, superficial, round ulcers in the incisura and antrum with clean base (Vish III)  · Protonix changed to b i d  · Hemoglobin is currently stable   Eliquis restarted  · Tolerating a regular diet  · Continue Carafate  · Avoid NSAIDs    Fall  Assessment & Plan  · Reported as mechanical fall  · Injuries listed below  · PT/OT    Venous stasis dermatitis of both lower extremities  Assessment & Plan  · Original concern a bilateral lower extremity cellulitis, status post IV antibiotics  · Slim consultation with low likelihood of cellulitic infection, procalcitonin and lactic acid normal, will continue to monitor off of antibiotics  · Stable WBC  · Continue use of  ammonium lactate lotion daily  · Lower extremities bilaterally with no worsening qualitative evidence of infection, no worsening erythema or swelling    Chronic diastolic heart failure (HCC)  Assessment & Plan  Wt Readings from Last 3 Encounters:   08/18/22 103 kg (227 lb)   08/03/22 103 kg (227 lb 9 6 oz)   07/28/22 109 kg (239 lb 10 2 oz)     · Continue home carvedilol, torsemide 40 mg  · Appreciate Internal Medicine consult and recommendations  · I&O    A-fib ([address] )  Assessment & Plan  · Continue coreg and Eliquis  · Rate controlled and anticoagulated    * Fracture of proximal end of left femur (HCC)  Assessment & Plan  · Secondary to fall  · XR in CT show distal left femur fracture--no vascular injury on CTA  · Appreciate Orthopedic surgery consult and recommendations  · Ortho left retrograde IM zoë 08/22/2022  · Maintain WBAT LLE  · Multimodail pain regimen  · Appreciate APS consult and recommendations  · 8/24 Ketamine gtt initiated  · DVT Prop:  Eliquis  · PT/OT when indicated  DVT prophylaxis: SCDs and Eliquis  PT and OT: eval and treat    Disposition:  DC planning  Patient medically stable for discharge at this time  No further workup  SUBJECTIVE:  Chief Complaint: "My leg hurts "    Subjective:  Patient reporting that her leg hurts today on presentation  Otherwise doing well at this time      OBJECTIVE:   Vitals:   Temp:  [97 °F (36 1 °C)-98 3 °F (36 8 °C)] 98 2 °F (36 8 °C)  HR:  [71-81] 73  Resp:  [16-18] 18  BP: (101-157)/(57-71) 101/63    Intake/Output:  I/O       08/26 0701  08/27 0700 08/27 0701  08/28 0700 08/28 0701  08/29 0700    I V   419 5     Total Intake  419 5     Urine 2500 1450     Total Output 2500 1450     Net -2500 -1030 5                 Nutrition: Diet Regular; Regular House      Physical Exam:   GENERAL APPEARANCE:  No acute distress  NEURO:  GCS 15  HEENT:  Normocephalic  CV:  Regular rate and rhythm  LUNGS:  CTA bilaterally  GI:  Nontender, nondistended  :  No Campos  MSK:  Tenderness to surgical site; otherwise neurovascular intact  SKIN:  Warm, dry intact    Invasive Devices  Report    Peripheral Intravenous Line  Duration           Peripheral IV 08/26/22 Left Antecubital 2 days                      Lab Results: Results: I have personally reviewed all pertinent laboratory/tests results, BMP/CMP: No results found for: SODIUM, K, CL, CO2, ANIONGAP, BUN, CREATININE, GLUCOSE, CALCIUM, AST, ALT, ALKPHOS, PROT, BILITOT, EGFR and CBC: No results found for: WBC, HGB, HCT, MCV, PLT, ADJUSTEDWBC, MCH, MCHC, RDW, MPV, NRBC  Imaging/EKG Studies: I have personally reviewed pertinent reports       Other Studies: no other studies

## 2022-08-28 NOTE — ASSESSMENT & PLAN NOTE
Patient presented with a mechanical fall while going to the bathroom  Landed on her left knee  History of bilateral knee replacements  Of the imaging on presentation demonstrated left femur fracture  Postoperative day 6 left retrograde intramedullary ozë    Venous duplex normal     Plan:  pain management her acute pain service signed off  in preparation of discharge  PT OT recommends post acute care rehab  Follow-up with Dr Niel Person on d/c per ortho   will be discharged to acute care rehabilitation center

## 2022-08-28 NOTE — PROGRESS NOTES
[facility]  Progress Note - [facility] 1945, 77 y o  female MRN: [id number]  Unit/Bed#: S -01 Encounter: [id number]  Primary Care Provider: Karine Rangel MD   Date and time admitted to hospital: 8/20/2022  5:58 AM    * Fracture of proximal end of left femur [facility])  Assessment & Plan  · Patient had a mechanical fall while going to the bathroom  Patient has been having some melanotic stools over the last 24 hours and slipped on it  She landed on her left knee  · Underlying h/o bilateral knee replacements also  · Admission imaging consistent with left femur fracture  · POD 5 left retrograde intramedullary zoë  · Venous duplex normal  · Will continue Pain management per Acute Pain Service recs  · PT/OT: post acute rehab    Melena  Assessment & Plan  Patient has been having melanotic stools over the last 24 hours  She takes Eliquis for atrial fibrillation  Currently Eliquis is on hold  S/p Kcentra  Placed on Protonix gtt  no melanotic stools reported overnight  Plan  · Resolved  · Continue protonix 40 IV bid  · Close Hgb watch      Acute pain due to trauma  Assessment & Plan  Continue current pain medications  Fall  Assessment & Plan  Mechanical fall  PT OT once clinical stability, patient may benefit of post acute rehab    Venous stasis dermatitis of both lower extremities  Assessment & Plan  · Patient has mild erythema of the bilateral extremity which seems to be chronic  Erythema is symmetric, skin its significant dry and scaly , she only reports tenderness in left extremity, has remained afebrile, wbc wnl , very low likelihood of  cellulitis  · Patient complained of left calf pain which radiates upward, on examination left lower leg looks swollen compared to right side     · Will continue topical moisturizers  · Venous doppler normal    Chronic diastolic heart failure (HCC)  Assessment & Plan  Wt Readings from Last 3 Encounters:   08/18/22 103 kg (227 lb)   22 103 kg (227 lb 9 6 oz)   22 109 kg (239 lb 10 2 oz)     Patient has chronic diastolic congestive heart failure  Most recent echo (2021) : 65%  Systolic function is normal  Wall motion is normal  G1DD  Doesn't appear to be on acute exacerbation    Plan  · Continue carvedilol  · Oral Torsemide 40 mg  · Monitor electrolytes while on diuretic therapy  · Monitor urine output         A-fib (HCC)  Assessment & Plan  Continue patient on carvedilol and Eliquis  Chronic venous insufficiency  Assessment & Plan  Continue care with Lac-Hydrin cream           VTE Pharmacologic Prophylaxis: VTE Score: 10 Eliquis    Patient Centered Rounds: I performed bedside rounds with nursing staff today  Discussions with Specialists or Other Care Team Provider: CM    Education and Discussions with Family / Patient: Mariana Castro  via phone  Time Spent for Care: 20 minutes  More than 50% of total time spent on counseling and coordination of care as described above  Current Length of Stay: 8 day(s)  Current Patient Status: Inpatient   Certification Statement: The patient will continue to require additional inpatient hospital stay due to femur fracture, s/p ORIF, on ketamine drip, pending rehab placement  Discharge Plan: post acute rehab    Code Status: Level 1 - Full Code    Subjective:   Seen this morning in NAD  Still eports pain in leg  Denies chest pain, SOB, fever or chills  Objective:     Vitals:   Temp (24hrs), Av 8 °F (36 6 °C), Min:97 °F (36 1 °C), Max:98 3 °F (36 8 °C)    Temp:  [97 °F (36 1 °C)-98 3 °F (36 8 °C)] 98 2 °F (36 8 °C)  HR:  [69-81] 73  Resp:  [16-18] 18  BP: (101-157)/(57-71) 101/63  SpO2:  [90 %-95 %] 95 %  There is no height or weight on file to calculate BMI  Input and Output Summary (last 24 hours):      Intake/Output Summary (Last 24 hours) at 2022 0414  Last data filed at 2022 1301  Gross per 24 hour   Intake 419 5 ml   Output 850 ml   Net -430 5 ml Physical Exam:   Physical Exam  Vitals reviewed  Constitutional:       General: She is not in acute distress  HENT:      Head: Normocephalic and atraumatic  Mouth/Throat:      Mouth: Mucous membranes are moist       Pharynx: Oropharynx is clear  Eyes:      Extraocular Movements: Extraocular movements intact  Conjunctiva/sclera: Conjunctivae normal    Cardiovascular:      Rate and Rhythm: Normal rate  Pulses: Normal pulses  Pulmonary:      Effort: Pulmonary effort is normal  No respiratory distress  Abdominal:      Palpations: Abdomen is soft  Tenderness: There is no abdominal tenderness  Musculoskeletal:      Cervical back: Normal range of motion and neck supple  Comments: Left LE in dressing c/d/i, skin is dry, sensation intact, decreased ROM 2/2 pain    Neurological:      Mental Status: She is alert and oriented to person, place, and time            Additional Data:     Labs:  Results from last 7 days   Lab Units 08/27/22  0542   WBC Thousand/uL 9 30   HEMOGLOBIN g/dL 9 8*   HEMATOCRIT % 31 1*   PLATELETS Thousands/uL 441*   NEUTROS PCT % 60   LYMPHS PCT % 26   MONOS PCT % 9   EOS PCT % 4     Results from last 7 days   Lab Units 08/27/22  0542 08/26/22  0545   SODIUM mmol/L 137 137   POTASSIUM mmol/L 3 2* 3 5   CHLORIDE mmol/L 97 98   CO2 mmol/L 32 32   BUN mg/dL 21 19   CREATININE mg/dL 0 77 0 71   ANION GAP mmol/L 8 7   CALCIUM mg/dL 8 9 8 8   ALBUMIN g/dL  --  3 3*   TOTAL BILIRUBIN mg/dL  --  0 55   ALK PHOS U/L  --  119*   ALT U/L  --  21   AST U/L  --  23   GLUCOSE RANDOM mg/dL 127 126     Results from last 7 days   Lab Units 08/21/22  0449   INR  1 16     Results from last 7 days   Lab Units 08/21/22  1116   POC GLUCOSE mg/dl 145*               Lines/Drains:  Invasive Devices  Report    Peripheral Intravenous Line  Duration           Peripheral IV 08/26/22 Left Antecubital 2 days                Imaging: Reviewed radiology reports from this admission including: xray(s)    Recent Cultures (last 7 days):         Last 24 Hours Medication List:   Current Facility-Administered Medications   Medication Dose Route Frequency Provider Last Rate    acetaminophen  975 mg Oral [facility] Lisandra Sevilla PA-C      ammonium lactate   Topical Daily Lisandra Sevilla PA-C      apixaban  5 mg Oral BID SHAHIDA Dye      carvedilol  25 mg Oral BID With Meals Lisandra Sevilla PA-C      diazepam  5 mg Oral Q6H [facility] Farrukh Penn MD      HYDROmorphone  0 5 mg Intravenous Q4H PRN Lisandra Sevilla PA-C      lidocaine  1 patch Topical Daily Lisandra Sevilla PA-C      losartan  100 mg Oral Daily Lisandra Sevilla PA-C      ondansetron  4 mg Intravenous Q4H PRN Lisandra Sevilla PA-C      oxyCODONE  10 mg Oral Q3H PRN Sonido Gardner MD      oxyCODONE  15 mg Oral Q4H PRN Sonido Gardner MD      pantoprazole  40 mg Oral BID AC SHAHIDA Dye      polyethylene glycol  17 g Oral Daily Humza GenSHAHIDA pickering      senna-docusate sodium  1 tablet Oral BID Humza GenSHAHIDA pickering      sucralfate  1 g Oral 4x Daily (AC & HS) Lisandra Sevilla PA-C      torsemide  40 mg Oral Daily Lino Chaudhary MD          Today, Patient Was Seen By: Eriberto Carlin MD    **Please Note: This note may have been constructed using a voice recognition system  **

## 2022-08-28 NOTE — ASSESSMENT & PLAN NOTE
· In the setting of fibromyalgia  · Continue current pain regimen  APS note appreciated  · Oxycodone 10/15  · Ketamine drip discontinued  · Dilaudid p r n    · Valium

## 2022-08-28 NOTE — PROGRESS NOTES
Progress Note - Acute Pain Service    Anastasia Jhaveri 77 y o  female MRN: [id number]  Unit/Bed#: S -01 Encounter: [id number]      Assessment:   Principal Problem:    Fracture of proximal end of left femur ([address] )  Active Problems:    Chronic venous insufficiency    A-fib (HCC)    Chronic diastolic heart failure (HCC)    Venous stasis dermatitis of both lower extremities    Fall    Melena    Acute pain due to trauma    Anastasia Jhaveri is a 77 y o  female with a past medical history of AFib on Eliquis, morbid obesity, chronic opioid dependence, and heart failure who initially presented of the left femur fracture after a mechanical fall  She had of left femoral catheter placed on 08/20 which became completely dislodged within the 1st 6 hours  She underwent left femur ORIF on 08/22 with the perioperative femoral single-shot block for postoperative analgesia  In setting of increased pain, a left femoral catheter was again attempted on 08/24 but was aborted due to poor acoustic windows  Instead, a ketamine infusion was started on 08/24 and discontinued on 08/27  Patient continues reasonably well from a pain perspective particularly well at rest   Has not noticed a significant change since the discontinuation of her ketamine infusion  Randomly, and often times with movement, she continues to struggle with breakthrough muscular spasms of her LLE  We discussed the addition of a 2nd muscle relaxant to aid with this component of her pain  We spoke at length about the possibility of sedation and patient continues to endorse difficulty with sleep and denies any sedation during the course of the day  Otherwise she is doing fairly well looking forward to discharge  She denies any side effects of her current medication regimen  She is tolerating a diet without nausea or vomiting  She has not had a bowel movement since around the time of her admission  She remains soft and does not feel distended    She will continue to monitor this herself and reach out for additional stool softeners/stimulants as needed      Plan:   -Continue oxycodone 10 mg/50 mg PO q 4 hours p r n  For moderate/severe pain  -Discontinue Dilaudid 0 5 mg IV in preparation for discharge  -No indication for further regional analgesia or IV ketamine infusion at this time  -Consider addition of standing Robaxin 500 mg PO q 6 hours scheduled  -Would plan to continue both muscle relaxants for 5-7 days upon discharge  Ongoing needs to be established at time of follow-up    Multimodal analgesia:  - Tylenol 975 mg PO q8hrs standing  - Lidocaine patches to affected areas 12 hours on, 12 hours off   -Valium 5 mg PO q 6 hours scheduled for anxiety and muscle relaxation    Bowel Regimen:  - Polyethylene glycol (Miralax) 17g PO once daily PRN  - Senna-docusate sodium (Senokot S) 8 6-50 mg 1 tab PO qhs    APS will continue to follow  Please contact Acute Pain Service - [facility] via [employer] from [phone] with additional questions or concerns  See [email or web address] or [place] for additional contacts and after hours information  Pain History  Current pain location(s): LLE above the knee  Pain Scale:  5-7/10  Quality: Sore, spasms    Opioid requirement previous 24 hours:  Oxycodone 60 mg PO, Dilaudid 0 5 mg IV    Meds/Allergies   all current active meds have been reviewed    No Known Allergies    Objective     Temp:  [97 °F (36 1 °C)-98 3 °F (36 8 °C)] 98 3 °F (36 8 °C)  HR:  [71-81] 81  Resp:  [16-18] 18  BP: ()/(57-71) 90/64    Physical Exam  Vitals and nursing note reviewed  Constitutional:       General: She is not in acute distress  Appearance: She is normal weight  She is not toxic-appearing  HENT:      Head: Normocephalic  Mouth/Throat:      Mouth: Mucous membranes are moist    Eyes:      Extraocular Movements: Extraocular movements intact  Cardiovascular:      Rate and Rhythm: Normal rate     Pulmonary:      Effort: Pulmonary effort is normal  No respiratory distress  Abdominal:      General: Abdomen is flat  There is no distension  Palpations: Abdomen is soft  Tenderness: There is no abdominal tenderness  Musculoskeletal:      Comments: LLE in ace dressing, c/d  ROM limited due to injury  Distal motor and sensory intact   Skin:     General: Skin is warm and dry  Neurological:      General: No focal deficit present  Mental Status: She is alert and oriented to person, place, and time  Mental status is at baseline  Psychiatric:         Mood and Affect: Mood normal          Behavior: Behavior normal          Lab Results:   Results from last 7 days   Lab Units 08/27/22  0542   WBC Thousand/uL 9 30   HEMOGLOBIN g/dL 9 8*   HEMATOCRIT % 31 1*   PLATELETS Thousands/uL 441*      Results from last 7 days   Lab Units 08/27/22  0542 08/26/22  0545 08/23/22  0745 08/22/22  1556   POTASSIUM mmol/L 3 2* 3 5   < >  --    CHLORIDE mmol/L 97 98   < >  --    CO2 mmol/L 32 32   < >  --    CO2, I-STAT mmol/L  --   --   --  36*   BUN mg/dL 21 19   < >  --    CREATININE mg/dL 0 77 0 71   < >  --    CALCIUM mg/dL 8 9 8 8   < >  --    ALK PHOS U/L  --  119*   < >  --    ALT U/L  --  21   < >  --    AST U/L  --  23   < >  --    GLUCOSE, ISTAT mg/dl  --   --   --  121    < > = values in this interval not displayed  Imaging Studies: I have personally reviewed pertinent reports  EKG, Pathology, and Other Studies: I have personally reviewed pertinent reports  Please note that the APS provides consultative services regarding pain management only  With the exception of ketamine and epidural infusions and except when indicated, final decisions regarding starting or changing doses of analgesic medications are at the discretion of the consulting service  Off hours consultation and/or medication management is generally not available      Nichole Magana MD  Acute Pain Service

## 2022-08-29 PROBLEM — K59.00 CONSTIPATION: Status: ACTIVE | Noted: 2022-08-29

## 2022-08-29 LAB
ANION GAP SERPL CALCULATED.3IONS-SCNC: 8 MMOL/L (ref 4–13)
BASOPHILS # BLD AUTO: 0.03 THOUSANDS/ΜL (ref 0–0.1)
BASOPHILS NFR BLD AUTO: 0 % (ref 0–1)
BUN SERPL-MCNC: 18 MG/DL (ref 5–25)
CALCIUM SERPL-MCNC: 9.4 MG/DL (ref 8.4–10.2)
CHLORIDE SERPL-SCNC: 96 MMOL/L (ref 96–108)
CO2 SERPL-SCNC: 31 MMOL/L (ref 21–32)
CREAT SERPL-MCNC: 0.82 MG/DL (ref 0.6–1.3)
EOSINOPHIL # BLD AUTO: 0.55 THOUSAND/ΜL (ref 0–0.61)
EOSINOPHIL NFR BLD AUTO: 6 % (ref 0–6)
ERYTHROCYTE [DISTWIDTH] IN BLOOD BY AUTOMATED COUNT: 15.2 % (ref 11.6–15.1)
GFR SERPL CREATININE-BSD FRML MDRD: 70 ML/MIN/1.73SQ M
GLUCOSE SERPL-MCNC: 147 MG/DL (ref 65–140)
HCT VFR BLD AUTO: 34.2 % (ref 34.8–46.1)
HGB BLD-MCNC: 10.9 G/DL (ref 11.5–15.4)
IMM GRANULOCYTES # BLD AUTO: 0.08 THOUSAND/UL (ref 0–0.2)
IMM GRANULOCYTES NFR BLD AUTO: 1 % (ref 0–2)
LYMPHOCYTES # BLD AUTO: 1.62 THOUSANDS/ΜL (ref 0.6–4.47)
LYMPHOCYTES NFR BLD AUTO: 18 % (ref 14–44)
MAGNESIUM SERPL-MCNC: 1.9 MG/DL (ref 1.9–2.7)
MCH RBC QN AUTO: 29.7 PG (ref 26.8–34.3)
MCHC RBC AUTO-ENTMCNC: 31.9 G/DL (ref 31.4–37.4)
MCV RBC AUTO: 93 FL (ref 82–98)
MONOCYTES # BLD AUTO: 0.83 THOUSAND/ΜL (ref 0.17–1.22)
MONOCYTES NFR BLD AUTO: 9 % (ref 4–12)
NEUTROPHILS # BLD AUTO: 6.17 THOUSANDS/ΜL (ref 1.85–7.62)
NEUTS SEG NFR BLD AUTO: 66 % (ref 43–75)
NRBC BLD AUTO-RTO: 0 /100 WBCS
PLATELET # BLD AUTO: 516 THOUSANDS/UL (ref 149–390)
PMV BLD AUTO: 10.5 FL (ref 8.9–12.7)
POTASSIUM SERPL-SCNC: 3.8 MMOL/L (ref 3.5–5.3)
RBC # BLD AUTO: 3.67 MILLION/UL (ref 3.81–5.12)
SODIUM SERPL-SCNC: 135 MMOL/L (ref 135–147)
WBC # BLD AUTO: 9.28 THOUSAND/UL (ref 4.31–10.16)

## 2022-08-29 PROCEDURE — 97530 THERAPEUTIC ACTIVITIES: CPT

## 2022-08-29 PROCEDURE — 85025 COMPLETE CBC W/AUTO DIFF WBC: CPT | Performed by: NURSE PRACTITIONER

## 2022-08-29 PROCEDURE — 97116 GAIT TRAINING THERAPY: CPT

## 2022-08-29 PROCEDURE — 99232 SBSQ HOSP IP/OBS MODERATE 35: CPT | Performed by: ANESTHESIOLOGY

## 2022-08-29 PROCEDURE — 80048 BASIC METABOLIC PNL TOTAL CA: CPT | Performed by: NURSE PRACTITIONER

## 2022-08-29 PROCEDURE — 97110 THERAPEUTIC EXERCISES: CPT

## 2022-08-29 PROCEDURE — 99024 POSTOP FOLLOW-UP VISIT: CPT | Performed by: PHYSICIAN ASSISTANT

## 2022-08-29 PROCEDURE — 99232 SBSQ HOSP IP/OBS MODERATE 35: CPT | Performed by: SURGERY

## 2022-08-29 PROCEDURE — 97535 SELF CARE MNGMENT TRAINING: CPT

## 2022-08-29 PROCEDURE — 99232 SBSQ HOSP IP/OBS MODERATE 35: CPT | Performed by: INTERNAL MEDICINE

## 2022-08-29 PROCEDURE — 83735 ASSAY OF MAGNESIUM: CPT | Performed by: NURSE PRACTITIONER

## 2022-08-29 RX ORDER — AMOXICILLIN 250 MG
2 CAPSULE ORAL 2 TIMES DAILY
Status: DISCONTINUED | OUTPATIENT
Start: 2022-08-29 | End: 2022-08-30 | Stop reason: HOSPADM

## 2022-08-29 RX ORDER — POTASSIUM CHLORIDE 20 MEQ/1
40 TABLET, EXTENDED RELEASE ORAL ONCE
Status: COMPLETED | OUTPATIENT
Start: 2022-08-29 | End: 2022-08-29

## 2022-08-29 RX ORDER — BISACODYL 10 MG
10 SUPPOSITORY, RECTAL RECTAL DAILY
Status: DISCONTINUED | OUTPATIENT
Start: 2022-08-29 | End: 2022-08-30 | Stop reason: HOSPADM

## 2022-08-29 RX ADMIN — APIXABAN 5 MG: 5 TABLET, FILM COATED ORAL at 18:20

## 2022-08-29 RX ADMIN — PANTOPRAZOLE SODIUM 40 MG: 40 TABLET, DELAYED RELEASE ORAL at 06:18

## 2022-08-29 RX ADMIN — SUCRALFATE 1 G: 1 TABLET ORAL at 18:20

## 2022-08-29 RX ADMIN — PANTOPRAZOLE SODIUM 40 MG: 40 TABLET, DELAYED RELEASE ORAL at 18:20

## 2022-08-29 RX ADMIN — OXYCODONE HYDROCHLORIDE 15 MG: 10 TABLET ORAL at 02:16

## 2022-08-29 RX ADMIN — SUCRALFATE 1 G: 1 TABLET ORAL at 12:30

## 2022-08-29 RX ADMIN — DIAZEPAM 5 MG: 5 TABLET ORAL at 18:20

## 2022-08-29 RX ADMIN — POTASSIUM CHLORIDE 40 MEQ: 1500 TABLET, EXTENDED RELEASE ORAL at 06:55

## 2022-08-29 RX ADMIN — CARVEDILOL 25 MG: 12.5 TABLET, FILM COATED ORAL at 18:20

## 2022-08-29 RX ADMIN — TORSEMIDE 40 MG: 20 TABLET ORAL at 08:15

## 2022-08-29 RX ADMIN — OXYCODONE HYDROCHLORIDE 15 MG: 10 TABLET ORAL at 18:19

## 2022-08-29 RX ADMIN — SUCRALFATE 1 G: 1 TABLET ORAL at 22:07

## 2022-08-29 RX ADMIN — DOCUSATE SODIUM AND SENNOSIDES 2 TABLET: 8.6; 5 TABLET, FILM COATED ORAL at 18:19

## 2022-08-29 RX ADMIN — SENNOSIDES AND DOCUSATE SODIUM 1 TABLET: 8.6; 5 TABLET ORAL at 08:15

## 2022-08-29 RX ADMIN — LOSARTAN POTASSIUM 100 MG: 50 TABLET, FILM COATED ORAL at 08:15

## 2022-08-29 RX ADMIN — DIAZEPAM 5 MG: 5 TABLET ORAL at 23:05

## 2022-08-29 RX ADMIN — DIAZEPAM 5 MG: 5 TABLET ORAL at 13:49

## 2022-08-29 RX ADMIN — ACETAMINOPHEN 975 MG: 325 TABLET, FILM COATED ORAL at 22:07

## 2022-08-29 RX ADMIN — OXYCODONE HYDROCHLORIDE 15 MG: 10 TABLET ORAL at 12:30

## 2022-08-29 RX ADMIN — SUCRALFATE 1 G: 1 TABLET ORAL at 06:15

## 2022-08-29 RX ADMIN — CARVEDILOL 25 MG: 12.5 TABLET, FILM COATED ORAL at 08:16

## 2022-08-29 RX ADMIN — POLYETHYLENE GLYCOL 3350 17 G: 17 POWDER, FOR SOLUTION ORAL at 08:16

## 2022-08-29 RX ADMIN — ACETAMINOPHEN 975 MG: 325 TABLET, FILM COATED ORAL at 06:16

## 2022-08-29 RX ADMIN — APIXABAN 5 MG: 5 TABLET, FILM COATED ORAL at 08:16

## 2022-08-29 RX ADMIN — DIAZEPAM 5 MG: 5 TABLET ORAL at 06:15

## 2022-08-29 RX ADMIN — Medication: at 08:16

## 2022-08-29 RX ADMIN — LIDOCAINE 5% 1 PATCH: 700 PATCH TOPICAL at 08:18

## 2022-08-29 RX ADMIN — ACETAMINOPHEN 975 MG: 325 TABLET, FILM COATED ORAL at 13:49

## 2022-08-29 NOTE — UTILIZATION REVIEW
Continued Stay Review    Date: 8/27                          Current Patient Class: inpatient    Current Level of Care: med surg     HPI:76 y o  female initially admitted on 8/20/2022     Assessment/Plan: left femur ORIF with perioperative femoral single-shot block for post-operative analgesia on 8/22  Due to increased pain needs, a left femoral catheter  attempted on 8/24 but aborted due to poor acoustic windows  Instead, an IV  ketamine infusion  started (8/24-) with significant improvement in  pain  APS consulted for post-operative pain control  Currently in MILD>MOD pain; DC Ketamine today; consider DC Dilaudid tomorrow if no further PRN doses today & cont PO Oxycodone 10/15mg q4hr PRN for moderate-severe pain  Multimodal analgesia:  - Tylenol 975 mg PO q8hrs standing  - Lidocaine patches to affected areas 12 hours on, 12 hours off  - Avoid NSAIDs due to potential CV side-effects given HF history  - Valium 5mg PO q6hr PRN for muscle spasms/anxiety  Pending Rehab placement  Vital Signs:   Temp:  [98 °F (36 7 °C)-98 5 °F (36 9 °C)] 98 2 °F (36 8 °C)  HR:  [63-84] 69  Resp:  [17-20] 17  BP: ()/(52-94) 140/59  Pertinent Labs/Diagnostic Results:       Results from last 7 days   Lab Units 08/27/22  0542 08/26/22  0545 08/25/22  0714 08/24/22  0455 08/23/22  1815 08/23/22  1210 08/23/22  0745   WBC Thousand/uL 9 30 10 09 10 30* 12 34*  --   --  12 14*   HEMOGLOBIN g/dL 9 8* 9 5* 9 2* 8 7* 8 7*   < > 8 3*  8 5*   HEMATOCRIT % 31 1* 29 6* 29 3* 27 4*  --   --  26 7*   PLATELETS Thousands/uL 441* 389 352 316  --   --  267   NEUTROS ABS Thousands/µL 5 48  --   --  8 80*  --   --   --     < > = values in this interval not displayed           Results from last 7 days   Lab Units 08/27/22  0542 08/26/22  0545 08/25/22  1015 08/24/22  0455 08/23/22  0745 08/22/22  1556   SODIUM mmol/L 137 137 139 138 141  --    POTASSIUM mmol/L 3 2* 3 5 3 8 3 2* 3 9  --    CHLORIDE mmol/L 97 98 99 98 102  --    CO2 mmol/L 32 32 34* 34* 32  --    CO2, I-STAT mmol/L  --   --   --   --   --  36*   ANION GAP mmol/L 8 7 6 6 7  --    BUN mg/dL 21 19 15 15 13  --    CREATININE mg/dL 0 77 0 71 0 88 0 72 0 82  --    EGFR ml/min/1 73sq m 75 83 64 82 70  --    CALCIUM mg/dL 8 9 8 8 8 8 8 7 8 5  --    CALCIUM, IONIZED, ISTAT mmol/L  --   --   --   --   --  1 10*   MAGNESIUM mg/dL  --   --  2 0  --   --   --      Results from last 7 days   Lab Units 08/26/22  0545 08/23/22  0745   AST U/L 23 16   ALT U/L 21 6*   ALK PHOS U/L 119* 60   TOTAL PROTEIN g/dL 6 1* 5 9*   ALBUMIN g/dL 3 3* 3 3*   TOTAL BILIRUBIN mg/dL 0 55 0 44         Results from last 7 days   Lab Units 08/27/22  0542 08/26/22  0545 08/25/22  1015 08/24/22  0455 08/23/22  0745   GLUCOSE RANDOM mg/dL 127 126 138 114 152*             No results found for: BETA-HYDROXYBUTYRATE           Results from last 7 days   Lab Units 08/22/22  1556   PH, MAXIMO I-STAT  7 546*   PCO2, MAXIMO ISTAT mm HG 40 1*   PO2, MAXIMO ISTAT mm  0*   HCO3, MAXIMO ISTAT mmol/L 34 8*   I STAT BASE EXC mmol/L 11*   I STAT O2 SAT % 99*          Medications:   Scheduled Medications:  acetaminophen, 975 mg, Oral, Q8H SHANNAN  ammonium lactate, , Topical, Daily  apixaban, 5 mg, Oral, BID  carvedilol, 25 mg, Oral, BID With Meals  diazepam, 5 mg, Oral, Q6H SHANNAN  lidocaine, 1 patch, Topical, Daily  losartan, 100 mg, Oral, Daily  pantoprazole, 40 mg, Oral, BID AC  polyethylene glycol, 17 g, Oral, Daily  senna-docusate sodium, 1 tablet, Oral, BID  sucralfate, 1 g, Oral, 4x Daily (AC & HS)  torsemide, 40 mg, Oral, Daily    Continuous IV Infusions:      ketamine  0 05 mg/kg/hr Intravenous Continuous       PRN Meds:  HYDROmorphone  0 5 mg Intravenous Q4H PRN     methocarbamol, 500 mg, Oral, Q6H PRN  ondansetron, 4 mg, Intravenous, Q4H PRN  oxyCODONE, 10 mg, Oral, Q3H PRN  oxyCODONE, 15 mg, Oral, Q4H PRN    Discharge Plan: TBD  Network Utilization Review Department  ATTENTION: Please call with any questions or concerns to [phone] and carefully listen to the prompts so that you are directed to the right person  All voicemails are confidential   Kristine Bhatia all requests for admission clinical reviews, approved or denied determinations and any other requests to dedicated fax number below belonging to the campus where the patient is receiving treatment   List of dedicated fax numbers for the Facilities:  [address] DENIALS (Administrative/Medical Necessity) [phone]   [address] (Maternity/NICU/Pediatrics) [phone]   [address] [place] [address]  [address] [id number] [place] [address] [address] [place], [place] [address] [phone]

## 2022-08-29 NOTE — ASSESSMENT & PLAN NOTE
Patient presented with a mechanical fall while going to the bathroom  Landed on her left knee  History of bilateral knee replacements  Of the imaging on presentation demonstrated left femur fracture  Postoperative day 6 left retrograde intramedullary zoë    Venous duplex normal     Plan:  pain management her acute pain service signed off  in preparation of discharge  PT OT recommends post acute care rehab  Follow-up with Dr Gemma Mcardle on d/c per ortho   will be discharged to acute care rehabilitation center

## 2022-08-29 NOTE — ASSESSMENT & PLAN NOTE
Wt Readings from Last 3 Encounters:   08/18/22 103 kg (227 lb)   08/03/22 103 kg (227 lb 9 6 oz)   07/28/22 109 kg (239 lb 10 2 oz)       History of chronic diastolic congestive heart failure  Most recent echo November 2021 demonstrating EF was 65%  Normal systolic function, normal wall motion  Unlikely to be an acute exacerbation      Plan:  Continue with carvedilol  Continue with oral torsemide 40 mg  Monitor electrolytes while on diuretic therapy  Monitor urine output

## 2022-08-29 NOTE — ASSESSMENT & PLAN NOTE
Plan:  Continue with pain regimen from acute Pain Services   recommended to discontinue opioids in preparation for discharge  Acute pain service signed off   continue with bowel regimen per APS

## 2022-08-29 NOTE — PLAN OF CARE
Problem: OCCUPATIONAL THERAPY ADULT  Goal: Performs self-care activities at highest level of function for planned discharge setting  See evaluation for individualized goals  Description: Treatment Interventions: ADL retraining, Functional transfer training, Endurance training, Cognitive reorientation, Patient/family training, Equipment evaluation/education, Compensatory technique education, Energy conservation, Activityengagement          See flowsheet documentation for full assessment, interventions and recommendations  Outcome: Progressing  Note: Limitation: Decreased ADL status, Decreased Safe judgement during ADL, Decreased cognition, Decreased endurance, Decreased self-care trans, Decreased high-level ADLs  Prognosis: Good  Assessment: Pt seen on this date for skilled OT treatment session  At start of session pt supine in bed  Pt agreeable and motivated to participate in session stating " I need to use the bathroom "  Pt required continued max A x1 for bed mobility, pt with improved sitting tolerance at EOB  Pt with improved sit >< stand and able to progress to take steps forward to [facility]  Pt noted to be motivated to take steps forward to commode at this time due to need for bowel movement  Pt with improved participation in ADL tasks  Pt demonstrating improved functional reach, pt continues to require assist for toleting tasks, provided with education and techniques on completing hygiene with poor carryover, pt would benefit from continued education  Pt would continue to benefit from skilled OT treatment sessions in order to address remaining deficits and continue to recommend d/c to rehab when medically stable       OT Discharge Recommendation: Post acute rehabilitation services

## 2022-08-29 NOTE — PLAN OF CARE
Problem: Potential for Falls  Goal: Patient will remain free of falls  Description: INTERVENTIONS:  - Educate patient/family on patient safety including physical limitations  - Instruct patient to call for assistance with activity   - Consult OT/PT to assist with strengthening/mobility   - Keep Call bell within reach  - Keep bed low and locked with side rails adjusted as appropriate  - Keep care items and personal belongings within reach  - Initiate and maintain comfort rounds  - Make Fall Risk Sign visible to staff  - Offer Toileting every 2 Hours, in advance of need  - Initiate/Maintain bed alarm  - Obtain necessary fall risk management equipment  - Apply yellow socks and bracelet for high fall risk patients  - Consider moving patient to room near nurses station  Outcome: Progressing     Problem: MOBILITY - ADULT  Goal: Maintain or return to baseline ADL function  Description: INTERVENTIONS:  -  Assess patient's ability to carry out ADLs; assess patient's baseline for ADL function and identify physical deficits which impact ability to perform ADLs (bathing, care of mouth/teeth, toileting, grooming, dressing, etc )  - Assess/evaluate cause of self-care deficits   - Assess range of motion  - Assess patient's mobility; develop plan if impaired  - Assess patient's need for assistive devices and provide as appropriate  - Encourage maximum independence but intervene and supervise when necessary  - Involve family in performance of ADLs  - Assess for home care needs following discharge   - Consider OT consult to assist with ADL evaluation and planning for discharge  - Provide patient education as appropriate  Outcome: Progressing  Goal: Maintains/Returns to pre admission functional level  Description: INTERVENTIONS:  - Perform BMAT or MOVE assessment daily    - Set and communicate daily mobility goal to care team and patient/family/caregiver     - Collaborate with rehabilitation services on mobility goals if consulted  - Perform Range of Motion 4 times a day  - Reposition patient every 2 hours  - Dangle patient 4 times a day  - Stand patient 4 times a day  - Ambulate patient 4 times a day  - Out of bed to chair 4 times a day   - Out of bed for meals 4 times a day  - Out of bed for toileting  - Record patient progress and toleration of activity level   Outcome: Progressing     Problem: Prexisting or High Potential for Compromised Skin Integrity  Goal: Skin integrity is maintained or improved  Description: INTERVENTIONS:  - Identify patients at risk for skin breakdown  - Assess and monitor skin integrity  - Assess and monitor nutrition and hydration status  - Monitor labs   - Assess for incontinence   - Turn and reposition patient  - Assist with mobility/ambulation  - Relieve pressure over bony prominences  - Avoid friction and shearing  - Provide appropriate hygiene as needed including keeping skin clean and dry  - Evaluate need for skin moisturizer/barrier cream  - Collaborate with interdisciplinary team   - Patient/family teaching  - Consider wound care consult   Outcome: Progressing     Problem: Nutrition/Hydration-ADULT  Goal: Nutrient/Hydration intake appropriate for improving, restoring or maintaining nutritional needs  Description: Monitor and assess patient's nutrition/hydration status for malnutrition  Collaborate with interdisciplinary team and initiate plan and interventions as ordered  Monitor patient's weight and dietary intake as ordered or per policy  Utilize nutrition screening tool and intervene as necessary  Determine patient's food preferences and provide high-protein, high-caloric foods as appropriate       INTERVENTIONS:  - Monitor oral intake, urinary output, labs, and treatment plans  - Assess nutrition and hydration status and recommend course of action  - Evaluate amount of meals eaten  - Assist patient with eating if necessary   - Allow adequate time for meals  - Recommend/ encourage appropriate diets, oral nutritional supplements, and vitamin/mineral supplements  - Order, calculate, and assess calorie counts as needed  - Recommend, monitor, and adjust tube feedings and TPN/PPN based on assessed needs  - Assess need for intravenous fluids  - Provide specific nutrition/hydration education as appropriate  - Include patient/family/caregiver in decisions related to nutrition  Outcome: Progressing     Problem: PAIN - ADULT  Goal: Verbalizes/displays adequate comfort level or baseline comfort level  Description: Interventions:  - Encourage patient to monitor pain and request assistance  - Assess pain using appropriate pain scale  - Administer analgesics based on type and severity of pain and evaluate response  - Implement non-pharmacological measures as appropriate and evaluate response  - Consider cultural and social influences on pain and pain management  - Notify physician/advanced practitioner if interventions unsuccessful or patient reports new pain  Outcome: Progressing     Problem: INFECTION - ADULT  Goal: Absence or prevention of progression during hospitalization  Description: INTERVENTIONS:  - Assess and monitor for signs and symptoms of infection  - Monitor lab/diagnostic results  - Monitor all insertion sites, i e  indwelling lines, tubes, and drains  - Monitor endotracheal if appropriate and nasal secretions for changes in amount and color  - [place] appropriate cooling/warming therapies per order  - Administer medications as ordered  - Instruct and encourage patient and family to use good hand hygiene technique  - Identify and instruct in appropriate isolation precautions for identified infection/condition  Outcome: Progressing  Goal: Absence of fever/infection during neutropenic period  Description: INTERVENTIONS:  - Monitor WBC    Outcome: Progressing     Problem: DISCHARGE PLANNING  Goal: Discharge to home or other facility with appropriate resources  Description: INTERVENTIONS:  - Identify barriers to discharge w/patient and caregiver  - Arrange for needed discharge resources and transportation as appropriate  - Identify discharge learning needs (meds, wound care, etc )  - Arrange for interpretive services to assist at discharge as needed  - Refer to Case Management Department for coordinating discharge planning if the patient needs post-hospital services based on physician/advanced practitioner order or complex needs related to functional status, cognitive ability, or social support system  Outcome: Progressing

## 2022-08-29 NOTE — PROGRESS NOTES
Progress Note - Orthopedics   Maria Night Arcadipane 77 y o  female MRN: [id number]  Unit/Bed#: S -01      Subjective:    77 y  o female seen and examined this AM  Offers no complaints at this time  In better spirits  Pain more controlled overall  Denies fevers, chills or sweats      Labs:  0   Lab Value Date/Time    HCT 31 1 (L) 08/27/2022 0542    HCT 29 6 (L) 08/26/2022 0545    HCT 29 3 (L) 08/25/2022 0714    HCT 37 4 09/23/2014 1339    HCT 33 6 (L) 09/03/2014 1146    HGB 9 8 (L) 08/27/2022 0542    HGB 9 5 (L) 08/26/2022 0545    HGB 9 2 (L) 08/25/2022 0714    HGB 11 3 (L) 09/23/2014 1339    HGB 10 8 (L) 09/03/2014 1146    INR 1 16 08/21/2022 0449    WBC 9 30 08/27/2022 0542    WBC 10 09 08/26/2022 0545    WBC 10 30 (H) 08/25/2022 0714    WBC 7 10 09/23/2014 1339    WBC 6 13 09/03/2014 1146       Meds:    Current Facility-Administered Medications:     acetaminophen (TYLENOL) tablet 975 mg, 975 mg, Oral, Q8H [address], Alejandro Ann PA-C, 975 mg at 08/29/22 0616    ammonium lactate (LAC-HYDRIN) 12 % cream, , Topical, Daily, Alejandro Ann PA-C, Given at 08/28/22 2730    apixaban (ELIQUIS) tablet 5 mg, 5 mg, Oral, BID, SHAHIDA Dye, 5 mg at 08/28/22 1749    carvedilol (COREG) tablet 25 mg, 25 mg, Oral, BID With Meals, Alejandro Ann PA-C, 25 mg at 08/28/22 1749    diazepam (VALIUM) tablet 5 mg, 5 mg, Oral, Q6H [address], Erica Solorio MD, 5 mg at 08/29/22 0615    HYDROmorphone (DILAUDID) injection 0 5 mg, 0 5 mg, Intravenous, Q4H PRN, Alejandro Ann PA-C, 0 5 mg at 08/28/22 2011    lidocaine (LIDODERM) 5 % patch 1 patch, 1 patch, Topical, Daily, Alejandro Ann PA-C, 1 patch at 08/28/22 0841    losartan (COZAAR) tablet 100 mg, 100 mg, Oral, Daily, Alejandro Ann PA-C, 100 mg at 08/27/22 0901    methocarbamol (ROBAXIN) tablet 500 mg, 500 mg, Oral, Q6H PRN, Macho Fenton PA-C    ondansetron [facility]) injection 4 mg, 4 mg, Intravenous, Q4H PRN, Alejandro Ann PA-C, 4 mg at 08/22/22 1659    oxyCODONE (ROXICODONE) immediate release tablet 10 mg, 10 mg, Oral, Q3H PRN, Bright Hilario Duane, MD, 10 mg at 08/27/22 2101    oxyCODONE (ROXICODONE) IR tablet 15 mg, 15 mg, Oral, Q4H PRN, Bright Hilario Duane, MD, 15 mg at 08/29/22 0216    pantoprazole (PROTONIX) EC tablet 40 mg, 40 mg, Oral, BID AC, SHAHIDA Dye, 40 mg at 08/29/22 0618    polyethylene glycol (MIRALAX) packet 17 g, 17 g, Oral, Daily, Ganesh Acosta CRNP, 17 g at 08/28/22 4617    senna-docusate sodium (SENOKOT S) 8 6-50 mg per tablet 1 tablet, 1 tablet, Oral, BID, SOFÍA DwyerNP, 1 tablet at 08/28/22 1749    sucralfate (CARAFATE) tablet 1 g, 1 g, Oral, 4x Daily (AC & HS), [employer], PA-C, 1 g at 08/29/22 0615    torsemide (DEMADEX) tablet 40 mg, 40 mg, Oral, Daily, Vanda Munroe MD, 40 mg at 08/27/22 0901    Blood Culture:   Lab Results   Component Value Date    BLOODCX No Growth After 5 Days  07/28/2022       Wound Culture:   Lab Results   Component Value Date    WOUNDCULT 3+ Growth of Proteus mirabilis (A) 12/30/2021    WOUNDCULT 3+ Growth of Enterococcus faecalis (A) 12/30/2021    WOUNDCULT 2+ Growth of Pseudomonas aeruginosa (A) 12/30/2021    WOUNDCULT 3+ Growth of  12/30/2021       Ins and Outs:  I/O last 24 hours: In: 360 [P O :360]  Out: 485 [Urine:485]          Physical:  Vitals:    08/29/22 0741   BP: 112/52   Pulse: 69   Resp: 18   Temp: 98 3 °F (36 8 °C)   SpO2: 95%     Musculoskeletal: left Lower Extremity  · Dressing over the knee C/D/I without strikethrough  · Mild tenderness over the upper thigh  Thigh and calf are soft and compressible  · Distal bilateral lower extremities with mottled skin/venous stasis  Edema present BLE  · SILT s/s/sp/dp/t  +fhl/ehl, +ankle dorsi/plantar flexion  · 2+ DP pulse    Assessment:    77 y  o female POD 7 left retrograde intramedullary zoë  Intraoperative findings of stable prosthesis  Plan:  · WBAT to the left lower extremity  · Hgb 9 8 8/27   ABLA- continue to monitor vitals and H/H  · PT/OT  · Pain control per primary   · DVT ppx: Eliquis resumed  · Dispo: Ortho will follow   · Will need follow up with Dr Jodi Davila upon discharge       Jen Blair PA-C

## 2022-08-29 NOTE — ASSESSMENT & PLAN NOTE
· Multiple melenotic stool, reversed with Kcentra on admission due to history of Eliquis use associated with atrial fibrillation  · GI consulted and note appreciated  · 8/21- EGD- Single cratered, benign-appearing ulcer in the antrum with flat pigmented spot (Vish IIC); performed cold forceps biopsy; injected 3 mL of epinephrine to address bleeding; hemostasis achieved  2 small, superficial, round ulcers in the incisura and antrum with clean base (Vihs III)  · Continue on Protonix b i d   · Hemoglobin is currently stable   Eliquis restarted  · Tolerating a regular diet  · Continue Carafate  · Avoid NSAIDs

## 2022-08-29 NOTE — ASSESSMENT & PLAN NOTE
Wt Readings from Last 3 Encounters:   08/18/22 103 kg (227 lb)   08/03/22 103 kg (227 lb 9 6 oz)   07/28/22 109 kg (239 lb 10 2 oz)     · Continue home carvedilol, torsemide 40 mg  · Appreciate Internal Medicine consult and recommendations  · I&O None

## 2022-08-29 NOTE — PROGRESS NOTES
Progress Note - Acute Pain Service    Anurag Romero 77 y o  female MRN: [id number]  Unit/Bed#: S -01 Encounter: [id number]      Assessment:   Principal Problem:    Fracture of proximal end of left femur ([address] )  Active Problems:    Chronic venous insufficiency    A-fib (HCC)    Chronic diastolic heart failure (HCC)    Venous stasis dermatitis of both lower extremities    Fall    Melena    Acute pain due to trauma    Anurag Romero is a 77 y o  female with a past medical history significant for AFib on Eliquis, morbid obesity, chronic opioid dependence, and heart failure who presented left femur fracture after a mechanical fall  She left femoral catheter placed on 08/20 which became dislodged within hours of placement  She underwent left femur ORIF on 08/22 with perioperative femoral single-shot block for postoperative analgesia  On 08/24 a left femoral catheter was again attempted but aborted due to poor acoustic windows  Ketamine infusion was initiated on 08/24 and discontinue on 08/27 without incident  Patient was doing really well yesterday and Robaxin p r n  Was added to target her most troublesome source of pain, muscle spasms  Pain control definitely improved  Patient looking forward to discharge though anxious about rehab location  Continues to go without a significant bowel movement in 5+ days  Continues to pass flatus without incident  Is aware the constipating effects of her opioid regimen and insists that she will ask for additional stool softeners at the 1st signs of discomfort or nausea    Plan:   - Oxycodone 10 mg/15 mg PO Q4hrs PRN for moderate/severe pain  - Consider discontinuing IV opioids in preparation for discharge    Multimodal analgesia:  - Tylenol 975 mg PO q8hrs standing  - Lidocaine patches to affected areas 12 hours on, 12 hours off   - Valium 5 mg PO q 6 hours scheduled    Consider short-term script upon discharge if acceptable at destination facility  - Robaxin 500 mg PO q 6 hours p r n  Bowel Regimen:  - Polyethylene glycol (Miralax) 17g PO once daily PRN  - Senna-docusate sodium (Senokot S) 8 6-50 mg 1 tab PO qhs    APS will sign off at this time  Thank you for the consult  All opioids and other analgesics to be written at discretion of primary team  Please contact Acute Pain Service - [facility] via [employer] from [phone] with additional questions or concerns  See [employer] or Shea for additional contacts and after hours information  Pain History  Current pain location(s): Left knee/thigh  Pain Scale:  8/10  Quality: Intermittent spasm    Opioid requirement previous 24 hours:  Oxycodone 45 mg PO, Dilaudid 0 5 mg IV    Meds/Allergies   all current active meds have been reviewed    No Known Allergies    Objective     Temp:  [97 8 °F (36 6 °C)-99 °F (37 2 °C)] 98 3 °F (36 8 °C)  HR:  [69-89] 69  Resp:  [18] 18  BP: (105-143)/(45-64) 112/52    Physical Exam  Vitals and nursing note reviewed  Constitutional:       General: She is not in acute distress  Appearance: She is normal weight  HENT:      Head: Normocephalic  Nose: Nose normal       Mouth/Throat:      Mouth: Mucous membranes are moist    Eyes:      Extraocular Movements: Extraocular movements intact  Pulmonary:      Effort: Pulmonary effort is normal  No respiratory distress  Abdominal:      General: There is no distension  Palpations: Abdomen is soft  Tenderness: There is no abdominal tenderness  Musculoskeletal:      Comments: LLE in c/d ace bandage   Skin:     General: Skin is warm and dry  Neurological:      General: No focal deficit present  Mental Status: She is alert and oriented to person, place, and time  Mental status is at baseline     Psychiatric:         Mood and Affect: Mood normal          Behavior: Behavior normal          Lab Results:   Results from last 7 days   Lab Units 08/27/22  0542   WBC Thousand/uL 9 30   HEMOGLOBIN g/dL 9 8*   HEMATOCRIT % 31 1*   PLATELETS Thousands/uL 441*      Results from last 7 days   Lab Units 08/27/22  0542 08/26/22  0545 08/23/22  0745 08/22/22  1556   POTASSIUM mmol/L 3 2* 3 5   < >  --    CHLORIDE mmol/L 97 98   < >  --    CO2 mmol/L 32 32   < >  --    CO2, I-STAT mmol/L  --   --   --  36*   BUN mg/dL 21 19   < >  --    CREATININE mg/dL 0 77 0 71   < >  --    CALCIUM mg/dL 8 9 8 8   < >  --    ALK PHOS U/L  --  119*   < >  --    ALT U/L  --  21   < >  --    AST U/L  --  23   < >  --    GLUCOSE, ISTAT mg/dl  --   --   --  121    < > = values in this interval not displayed  Imaging Studies: I have personally reviewed pertinent reports  EKG, Pathology, and Other Studies: I have personally reviewed pertinent reports  Please note that the APS provides consultative services regarding pain management only  With the exception of ketamine and epidural infusions and except when indicated, final decisions regarding starting or changing doses of analgesic medications are at the discretion of the consulting service  Off hours consultation and/or medication management is generally not available      Nichole Magana MD  Acute Pain Service

## 2022-08-29 NOTE — PROGRESS NOTES
[facility]  Progress Note - [facility] 1945, 77 [facility]  female MRN: [id number]  Unit/Bed#: S -01 Encounter: [id number]  Primary Care Provider: Carlos Zapien MD   Date and time admitted to hospital: 8/20/2022  5:58 AM    * Fracture of proximal end of left femur [facility])  Assessment & Plan  Patient presented with a mechanical fall while going to the bathroom  Landed on her left knee  History of bilateral knee replacements  Of the imaging on presentation demonstrated left femur fracture  Postoperative day 6 left retrograde intramedullary zoë  Venous duplex normal     Plan:  pain management her acute pain service signed off  in preparation of discharge  PT OT recommends post acute care rehab  Follow-up with Dr Fiona Constantino on d/c per ortho   will be discharged to acute care rehabilitation center    [facility]  Patient was having melanotic stools of her past 48 hours  Patient is on Eliquis for AFib  Eliquis was placed on hold and patient was put on Protonix drip  Melanotic stools has resolved  Plan:  Continue with Protonix 40 mg IV b i d   Monitor hemoglobin, hemoglobin today 9 8 down from day prior, trend hemoglobin and CBC    Fall  Assessment & Plan  Presents with mechanical fall  Plan:  PT OT recommending acute care rehabilitation    A-fib [facility])  Assessment & Plan  History of AFib on Eliquis    Plan:  resume Eliquis  Continue with carvedilol    Venous stasis dermatitis of both lower extremities  Assessment & Plan  Patient demonstrating mild erythema bilateral extremities which seems to be chronic in nature  Erythema asymmetric, skin is significantly dry and scaly  Patient reports tenderness in the left extremity, patient has remained afebrile, no leukocytosis and low likelihood of cellulitis  No calf pain today on examination    +    Plan:  Continue with topical moisturizers  Venous Doppler within normal limits    Chronic diastolic heart failure [facility])  Assessment & Plan  Wt Readings from Last 3 Encounters:   08/18/22 103 kg (227 lb)   08/03/22 103 kg (227 lb 9 6 oz)   07/28/22 109 kg (239 lb 10 2 oz)       History of chronic diastolic congestive heart failure  Most recent echo November 2021 demonstrating EF was 65%  Normal systolic function, normal wall motion  Unlikely to be an acute exacerbation  Plan:  Continue with carvedilol  Continue with oral torsemide 40 mg  Monitor electrolytes while on diuretic therapy  Monitor urine output      Chronic venous insufficiency  Assessment & Plan  History of bilateral chronic venous insufficiency    Plan:    Continue care with Lac-hydrin cream    Constipation  Assessment & Plan   Patient reports not having a bowel movement for 1 week  Patient is on chronic opioid use  Plan:   Continue with bowel regimen per APS    Acute pain due to trauma  Assessment & Plan  Plan:  Continue with pain regimen from acute Pain Services   recommended to discontinue opioids in preparation for discharge  Acute pain service signed off   continue with bowel regimen per APS            VTE Pharmacologic Prophylaxis: VTE Score: 10 High Risk (Score >/= 5) - Pharmacological DVT Prophylaxis Ordered: apixaban (Eliquis)  Sequential Compression Devices Ordered  Patient Centered Rounds: I performed bedside rounds with nursing staff today  Discussions with Specialists or Other Care Team Provider:     Education and Discussions with Family / Patient: did not attempt to update family  Current Length of Stay: 9 day(s)  Current Patient Status: Inpatient   Discharge Plan: Anticipate discharge later today or tomorrow to rehab facility      Code Status: Level 1 - Full Code    Subjective:   Chi Valdez is a 43-year-old female with a past medical history of AFib on Eliquis, obesity chronic opiate dependence taking Percocet  mg q 6 p r n , heart failure with preserved ejection fraction, bilateral knee replacements who is being evaluated for a fracture of the left femur secondary to mechanical fall  She is on postoperative day 6 today, she received a left retrograde intramedullary zoë procedure  There were no overnight events  Patient reports that she is doing well and her pain is tolerable  Pain is mainly in the left thigh but is improved from days prior  The patient denies shortness of breath, chest pain, fevers, chills  Patient reports urination  Patient reports she has not had a bowel movement in 1 week  Objective:     Vitals:   Temp (24hrs), Av 3 °F (36 8 °C), Min:97 7 °F (36 5 °C), Max:98 9 °F (37 2 °C)    Temp:  [97 7 °F (36 5 °C)-98 9 °F (37 2 °C)] 98 9 °F (37 2 °C)  HR:  [69-89] 75  Resp:  [16-18] 16  BP: ()/(45-64) 112/46  SpO2:  [83 %-98 %] 83 %  There is no height or weight on file to calculate BMI  Input and Output Summary (last 24 hours): Intake/Output Summary (Last 24 hours) at 2022 1701  Last data filed at 2022 2300  Gross per 24 hour   Intake 240 ml   Output 485 ml   Net -245 ml       Physical Exam:   Physical Exam  Vitals and nursing note reviewed  Constitutional:       General: She is not in acute distress  Appearance: She is well-developed  HENT:      Head: Normocephalic and atraumatic  Eyes:      Conjunctiva/sclera: Conjunctivae normal    Cardiovascular:      Rate and Rhythm: Normal rate and regular rhythm  Heart sounds: No murmur heard  Pulmonary:      Effort: Pulmonary effort is normal  No respiratory distress  Breath sounds: Normal breath sounds  Abdominal:      Palpations: Abdomen is soft  Tenderness: There is no abdominal tenderness  Musculoskeletal:         General: Tenderness (Tenderness of left thigh, previous surgery incision non erythematous with lidocaine patch) present  Cervical back: Neck supple  Right lower leg: Edema present  Left lower leg: Edema present        Comments: Left shin wrapped   Skin:     General: Skin is warm and dry  Findings: Erythema (Erythema bilateral legs ) present  Neurological:      Mental Status: She is alert  Additional Data:     Labs:  Results from last 7 days   Lab Units 08/29/22  1112   WBC Thousand/uL 9 28   HEMOGLOBIN g/dL 10 9*   HEMATOCRIT % 34 2*   PLATELETS Thousands/uL 516*   NEUTROS PCT % 66   LYMPHS PCT % 18   MONOS PCT % 9   EOS PCT % 6     Results from last 7 days   Lab Units 08/29/22  1112 08/27/22  0542 08/26/22  0545   SODIUM mmol/L 135   < > 137   POTASSIUM mmol/L 3 8   < > 3 5   CHLORIDE mmol/L 96   < > 98   CO2 mmol/L 31   < > 32   BUN mg/dL 18   < > 19   CREATININE mg/dL 0 82   < > 0 71   ANION GAP mmol/L 8   < > 7   CALCIUM mg/dL 9 4   < > 8 8   ALBUMIN g/dL  --   --  3 3*   TOTAL BILIRUBIN mg/dL  --   --  0 55   ALK PHOS U/L  --   --  119*   ALT U/L  --   --  21   AST U/L  --   --  23   GLUCOSE RANDOM mg/dL 147*   < > 126    < > = values in this interval not displayed  Lines/Drains:  Invasive Devices  Report    Peripheral Intravenous Line  Duration           Peripheral IV 08/26/22 Left Antecubital 3 days                      Imaging: No pertinent imaging reviewed      Recent Cultures (last 7 days):         Last 24 Hours Medication List:   Current Facility-Administered Medications   Medication Dose Route Frequency Provider Last Rate    acetaminophen  975 mg Oral [facility] SAM Patten-JAIR      ammonium lactate   Topical Daily SAM Patten-C      apixaban  5 mg Oral BID SHAHIDA Dye      bisacodyl  10 mg Rectal Daily SHAHIDA Julio      carvedilol  25 mg Oral BID [employer]als Mickey Hernandez PA-C      diazepam  5 mg Oral Q6H [address] Jennifer Win MD      lidocaine  1 patch Topical Daily Mickey Hernandez PA-C      losartan  100 mg Oral Daily MickeySAM Castillo-JAIR      methocarbamol  500 mg Oral Q6H PRN Mirela RYAN Holloway      ondansetron  4 mg Intravenous Q4H PRN SAM Patten-JAIR      oxyCODONE  10 mg Oral Q3H PRN Sonido Boland MD      oxyCODONE  15 mg Oral Q4H PRN Sonido Boland MD      pantoprazole  40 mg Oral BID AC SHAHIDA Dye      polyethylene glycol  17 g Oral Daily SHAHIDA Lopes      senna-docusate sodium  2 tablet Oral BID SHAHIDA Lopes      sucralfate  1 g Oral 4x Daily (AC & HS) Gonzalo Morris, PA-C      torsemide  40 mg Oral Daily Sushant Brown MD          Today, Patient Was Seen By: Reagan Ignacio MD    **Please Note: This note may have been constructed using a voice recognition system  **

## 2022-08-29 NOTE — PROGRESS NOTES
[facility]  Progress Note - Jacinto Gar 1945, 77 y o  female MRN: [id number]  Unit/Bed#: S -01 Encounter: [id number]  Primary Care Provider: Karine Rangel MD   Date and time admitted to hospital: 8/20/2022  5:58 AM    [address]  · Reported as mechanical fall  · Injuries listed below  · PT/OT    * Fracture of proximal end of left femur [facility])  Assessment & Plan  · Secondary to fall  · XR in CT show distal left femur fracture--no vascular injury on CTA  · Appreciate Orthopedic surgery consult and recommendations  · Ortho left retrograde IM zoë 08/22/2022  · Maintain WBAT LLE  · Multimodail pain regimen  · Appreciate APS consult and recommendations  · DVT Prop:  Eliquis  · PT/OT when indicated  Melena  Assessment & Plan  · Multiple melenotic stool, reversed with Kcentra on admission due to history of Eliquis use associated with atrial fibrillation  · GI consulted and note appreciated  · 8/21- EGD- Single cratered, benign-appearing ulcer in the antrum with flat pigmented spot ([facility] I[facility]); performed cold forceps biopsy; injected 3 mL of epinephrine to address bleeding; hemostasis achieved  2 small, superficial, round ulcers in the incisura and antrum with clean base (Vish III)  · Continue on Protonix b i d   · Hemoglobin is currently stable   Eliquis restarted  · Tolerating a regular diet  · Continue Carafate  · Avoid NSAIDs    Venous stasis dermatitis of both lower extremities  Assessment & Plan  · Original concern a bilateral lower extremity cellulitis, status post IV antibiotics  · Slim consultation with low likelihood of cellulitic infection, procalcitonin and lactic acid normal, will continue to monitor off of antibiotics  · Stable WBC  · Continue use of  ammonium lactate lotion daily  · Lower extremities bilaterally with no worsening qualitative evidence of infection, no worsening erythema or swelling    Acute pain due to trauma  Assessment & Plan  · In the setting of fibromyalgia  · Continue current pain regimen  APS note appreciated  · Oxycodone 10/15  · Valium scheduled q6  · Robaxin as needed    Chronic diastolic heart failure (HCC)  Assessment & Plan  Wt Readings from Last 3 Encounters:   08/18/22 103 kg (227 lb)   08/03/22 103 kg (227 lb 9 6 oz)   07/28/22 109 kg (239 lb 10 2 oz)     · Continue home carvedilol, torsemide 40 mg  · Appreciate Internal Medicine consult and recommendations  · I&O    A-fib ([address] )  Assessment & Plan  · Continue coreg and Eliquis  · Rate controlled and anticoagulated          Disposition:  Placement pending    SUBJECTIVE:  Chief Complaint:  I am doing better    Subjective:  Patient describes doing better over the past 24-48 hours  She states that her pain has been under control despite being off ketamine and IV Dilaudid  She notes that her spasms in her left thigh have decreased in frequency and severity  She confirms that she continues to tolerate a diet  She states that she has not had a bowel movement in 5 days though is passing gas and tolerating her diet  She denies having any other complaints  I was later updated by nursing staff the patient did have a bowel movement today, no blood or melena reported  OBJECTIVE:   Vitals:   Temp:  [97 7 °F (36 5 °C)-99 °F (37 2 °C)] 97 7 °F (36 5 °C)  HR:  [69-89] 77  Resp:  [16-18] 16  BP: ()/(45-64) 88/52    Intake/Output:  I/O       08/27 0701  08/28 0700 08/28 0701  08/29 0700 08/29 0701  08/30 0700    P  O   360     I V  419 5      Total Intake 419 5 360     Urine 1450 485     Total Output 1450 485     Net -1030 5 -125                 Nutrition: Diet Regular; Regular House  GI Proph/Bowel Reg: senna s  VTE Prophylaxis:Sequential compression device (Venodyne)  Eliquis    Physical Exam:   GENERAL APPEARANCE:  No acute distress  NEURO:  GCS 15  Light touch sensation intact throughout  No lateralizing weakness  HEENT:  Normocephalic, atraumatic    Neck supple  CV: RRR, +2 radial dorsalis pedis pulses, bilaterally  LUNGS:  Clear to auscultation, bilaterally  No wheezing, no rhonchi, no rales  Patient is on room air  GI:  Abdomen is soft and nontender  Bowel sounds are present  :  Pelvis stable  MSK:  Left Knee is wrapped in Ace bandage  Mild tenderness on palpation of left thigh-compartments are soft  Patient is able to fully range left ankle and toes  All other extremities display full range of motion  SKIN:  Bilateral lower legs display minimal redness, though significant chronic dryness and cracking  Otherwise warm and dry  Invasive Devices  Report    Peripheral Intravenous Line  Duration           Peripheral IV 08/26/22 Left Antecubital 3 days                      Lab Results: Results: I have personally reviewed all pertinent laboratory/tests results  Imaging/EKG Studies: I have personally reviewed pertinent reports       Other Studies: none

## 2022-08-29 NOTE — ASSESSMENT & PLAN NOTE
· Secondary to fall  · XR in CT show distal left femur fracture--no vascular injury on CTA  · Appreciate Orthopedic surgery consult and recommendations  · Ortho left retrograde IM zoë 08/22/2022  · Maintain WBAT LLE  · Multimodail pain regimen  · Appreciate APS consult and recommendations  · DVT Prop:  Eliquis  · PT/OT when indicated

## 2022-08-29 NOTE — PHYSICAL THERAPY NOTE
PHYSICAL THERAPY NOTE          Patient Name: Tyrell Claire  IPCKY'J Date: 8/29/2022 08/29/22 1418   PT Last Visit   PT Visit Date 08/29/22   Note Type   Note Type Treatment   Pain Assessment   Pain Assessment Tool 0-10   Pain Score 8   Pain Location/Orientation Orientation: Left; Location: Leg   Pain Onset/Description Onset: Gradual;Onset: Ongoing   Effect of Pain on Daily Activities limited functional mobility and activity tolerance   Patient's Stated Pain Goal No pain   Hospital Pain Intervention(s) Repositioned; Ambulation/increased activity; Elevated; Emotional support; Rest   Multiple Pain Sites No   Pain Rating: FLACC (Rest) - Face 0   Pain Rating: FLACC (Rest) - Legs 0   Pain Rating: FLACC (Rest) - Activity 0   Pain Rating: FLACC (Rest) - Cry 0   Pain Rating: FLACC (Rest) - Consolability 0   Score: FLACC (Rest) 0   Pain Rating: FLACC (Activity) - Face 1   Pain Rating: FLACC (Activity) - Legs 1   Pain Rating: FLACC (Activity) - Activity 1   Pain Rating: FLACC (Activity) - Cry 1   Pain Rating: FLACC (Activity) - Consolability 1   Score: FLACC (Activity) 5   Restrictions/Precautions   Weight Bearing Precautions Per Order Yes   LLE Weight Bearing Per Order WBAT   Other Precautions Cognitive; Chair Alarm;WBS;Fall Risk;Pain   General   Chart Reviewed Yes   Response to Previous Treatment Patient with no complaints from previous session  Family/Caregiver Present Yes  ()   Cognition   Overall Cognitive Status Impaired   Arousal/Participation Alert; Responsive; Cooperative   Attention Attends with cues to redirect   Orientation Level Oriented X4   Memory Decreased short term memory;Decreased recall of precautions   Following Commands Follows one step commands with increased time or repetition   Comments pt required motivation and encouragement in order to participate in PT intervention     Subjective   Subjective pt was agreeable to participate in PT intervention  pt stated pain level 8/10 pre tx session   Bed Mobility   Rolling R Unable to assess   Rolling L Unable to assess   Supine to Sit Unable to assess   Sit to Supine Unable to assess   Additional Comments pt seated OOB in the recliner pre/post tx session   Transfers   Sit to Stand 3  Moderate assistance   Additional items Assist x 1; Armrests; Increased time required;Verbal cues  (w/ RW)   Stand to Sit 3  Moderate assistance   Additional items Assist x 1; Armrests; Increased time required;Verbal cues  (w/ RW)   Additional Comments pt continues to require RW and VC's in todays tx session to complete all functional transfers safely to and from RW   Ambulation/Elevation   Gait pattern Antalgic;Narrow AGUSTIN; Decreased foot clearance; Foward flexed; Short stride; Shuffling;Excessively slow;Decreased heel strike;Decreased toe off   Gait Assistance 3  Moderate assist   Additional items Assist x 1;Verbal cues; Tactile cues   Assistive Device Rolling walker   Distance 3'fwd and 3' back RW   Balance   Static Sitting Fair -   Dynamic Sitting Poor +   Static Standing Poor   Ambulatory Zero   Endurance Deficit   Endurance Deficit Yes   Endurance Deficit Description limited functional mobility , activity tolerance and ambulation distance   Activity Tolerance   Activity Tolerance Patient limited by fatigue;Patient limited by pain   Nurse Made Aware Spoke to RN   Exercises   Hip Adduction Sitting;10 reps;AROM; Bilateral  (pillow squeezes)   Knee AROM Long Arc Quad Sitting;10 reps;AROM; Bilateral   Ankle Pumps Sitting;10 reps;AROM; Bilateral   Assessment   Problem List Decreased strength;Decreased endurance; Impaired balance;Decreased mobility; Decreased cognition; Impaired vision;Orthopedic restrictions;Pain   Assessment pt began tx session seated in recliner and was agreeable to participate in PT intervention after some motivation and encouragement   pt continues to require a significant amount of asssitance to complete all functional transfers from recliner to rW and back to recliner  pt continues to require VC's for hand placement for safety and balance while performing functional transfers  pt cotninues to be limited with functional mobility, activity tolerance and ambulatioon distance as pt was only able to ambulate 3'fwd and 3' back with RW due to increased pain and fatigue  pt was able to complete TE activities seated in the recliner with no increases in pian but required VC's in order for pt to complete TE properly  post tx session pt in recliner with call bell and all pt needs met  Continue to recommend post acute rehab services at the time of D/C in order to maximize pt functional independence and safety with all OOB mobility  Goals   Patient Goals to rest and get back into recliner   STG Expiration Date 09/02/22   PT Treatment Day 2   Plan   Treatment/Interventions Functional transfer training;LE strengthening/ROM; Therapeutic exercise; Endurance training;Cognitive reorientation;Patient/family training;Equipment eval/education;Gait training;Spoke to nursing   Progress Slow progress, decreased activity tolerance   PT Frequency 3-5x/wk   Recommendation   PT Discharge Recommendation Post acute rehabilitation services   Equipment Recommended [address] Basic Mobility Inpatient   Turning in Bed Without Bedrails 2   Lying on Back to Sitting on Edge of Flat Bed 2   Moving Bed to Chair 2   Standing Up From Chair 2   Walk in Room 2   Climb 3-5 Stairs 1   Basic Mobility Inpatient Raw Score 11   Basic Mobility Standardized Score 30 25   Turning Head Towards Sound 3   Follow Simple Instructions 2   Low Function Basic Mobility Raw Score 16   Low Function Basic Mobility Standardized Score 25 72   Highest Level Of Mobility   JH-HLM Goal 4: Move to chair/commode   JH-HLM Achieved 4: Move to chair/commode   Education   Education Provided Mobility training;Assistive device   Patient Reinforcement needed   End of Consult   Patient Position at End of Consult Bedside chair;Bed/Chair alarm activated; All needs within reach   The patient's AM-PAC Basic Mobility Inpatient Short Form Raw Score is 11  A Raw score of less than or equal to 16 suggests the patient may benefit from discharge to post-acute rehabilitation services  Please also refer to the recommendation of the Physical Therapist for safe discharge planning       Obdulio Pain, PTA

## 2022-08-29 NOTE — OCCUPATIONAL THERAPY NOTE
Occupational Therapy Progress Note     Patient Name: Anastasia Jhaveri  HGZMN'I Date: 8/29/2022  Problem List  Principal Problem:    Fracture of proximal end of left femur [facility])  Active Problems:    Chronic venous insufficiency    A-fib (HCC)    Chronic diastolic heart failure (HCC)    Venous stasis dermatitis of both lower extremities    Fall    Melena    Acute pain due to trauma              08/29/22 1047   OT Last Visit   OT Visit Date 08/29/22   Note Type   Note Type Treatment for insurance authorization   Restrictions/Precautions   Weight Bearing Precautions Per Order Yes   LLE Weight Bearing Per Order WBAT   Other Precautions Cognitive; Chair Alarm; Bed Alarm; Fall Risk   General   Response to Previous Treatment Patient with no complaints from previous session   Pain Assessment   Pain Assessment Tool 0-10   Pain Score 4   Pain Location/Orientation Orientation: Left; Location: Leg   ADL   Grooming Assistance 6  Modified Independent   Grooming Comments washing hands and face   UB Bathing Assistance 5  Supervision/Setup   UB Bathing Deficit Increased time to complete;Supervision/safety;Verbal cueing   LB Bathing Assistance 3  Moderate Assistance   LB Bathing Deficit Increased time to complete;Supervision/safety;Verbal cueing;Buttocks;Right lower leg including foot; Left lower leg including foot   LB Bathing Comments sitting on commode to complete   UB Dressing Assistance 5  Supervision/Setup   UB Dressing Deficit Increased time to complete;Supervision/safety;Verbal cueing   UB Dressing Comments don/doffing gown   Toileting Assistance  2  Maximal Assistance   Toileting Deficit Increased time to complete;Clothing management down;Perineal hygiene;Clothing management up; Bedside commode   Bed Mobility   Supine to Sit 3  Moderate assistance   Additional items Assist x 1; Increased time required;Verbal cues;LE management   Additional Comments OOB and in chair at end of session   Transfers   Sit to Stand 3  Moderate assistance   Additional items Assist x 1; Increased time required;Verbal cues   Stand to Sit 3  Moderate assistance   Additional items Assist x 1; Increased time required;Verbal cues   Toilet transfer 3  Moderate assistance   Additional items Assist x 1; Increased time required;Verbal cues; Commode   Additional Comments use of RW   Functional Mobility   Functional Mobility 2  Maximal assistance   Additional Comments Ax1, max VC and step by step VC  Limited bed > BSC > recliner   Additional items Rolling walker   Cognition   Overall Cognitive Status Impaired   Arousal/Participation Alert; Cooperative   Attention Attends with cues to redirect   Orientation Level Oriented to person;Oriented to place   Memory Decreased short term memory;Decreased recall of recent events;Decreased recall of precautions   Following Commands Follows one step commands with increased time or repetition   Comments pleasant and cooperative, motivated to participate   Activity Tolerance   Activity Tolerance Patient limited by fatigue;Patient limited by pain   Medical Staff Made Aware KITTY Serrano   Assessment   Assessment Pt seen on this date for skilled OT treatment session  At start of session pt supine in bed  Pt agreeable and motivated to participate in session stating " I need to use the bathroom "  Pt required continued max A x1 for bed mobility, pt with improved sitting tolerance at EOB  Pt with improved sit >< stand and able to progress to take steps forward to [facility]  Pt noted to be motivated to take steps forward to commode at this time due to need for bowel movement  Pt with improved participation in ADL tasks  Pt demonstrating improved functional reach, pt continues to require assist for toleting tasks, provided with education and techniques on completing hygiene with poor carryover, pt would benefit from continued education    Pt would continue to benefit from skilled OT treatment sessions in order to address remaining deficits and continue to recommend d/c to rehab when medically stable  Plan   Goal Expiration Date 09/02/22   OT Treatment Day 2   OT Frequency 3-5x/wk   Recommendation   OT Discharge Recommendation Post acute rehabilitation services   [facility]   Lower Body Dressing 1   Bathing 3   Toileting 2   Upper Body Dressing 3   Grooming 3   Eating 4   Daily Activity Raw Score 16   Daily Activity Standardized Score (Calc for Raw Score >=11) 35 96   AM-[facility] Applied Cognition Inpatient   Following a Speech/Presentation 3   Understanding Ordinary Conversation 3   Taking Medications 2   Remembering Where Things Are Placed or Put Away 2   Remembering List of 4-5 Errands 1   Taking Care of Complicated Tasks 1   Applied Cognition Raw Score 12   Applied Cognition Standardized Score 28 82       The patient's raw score on the AM-PAC Daily Activity inpatient short form is 16, standardized score is 35 96, less than 39 4  Patients at this level are likely to benefit from discharge to post-acute rehabilitation services  Please refer to the recommendation of the Occupational Therapist for safe discharge planning      At the end of the session, all needs met and pt seated in bedside chair, chair alarm activated, LEs elevated , and call bell within reach    [facility], OTR/L

## 2022-08-29 NOTE — ASSESSMENT & PLAN NOTE
Patient was having melanotic stools of her past 48 hours  Patient is on Eliquis for AFib  Eliquis was placed on hold and patient was put on Protonix drip  Melanotic stools has resolved      Plan:  Continue with Protonix 40 mg IV b i d   Monitor hemoglobin, hemoglobin today 9 8 down from day prior, trend hemoglobin and CBC

## 2022-08-29 NOTE — ASSESSMENT & PLAN NOTE
· In the setting of fibromyalgia  · Continue current pain regimen  APS note appreciated    · Oxycodone 10/15  · Valium scheduled q6  · Robaxin as needed

## 2022-08-29 NOTE — PLAN OF CARE
Problem: PHYSICAL THERAPY ADULT  Goal: Performs mobility at highest level of function for planned discharge setting  See evaluation for individualized goals  Description: Treatment/Interventions: Functional transfer training, LE strengthening/ROM, Therapeutic exercise, Endurance training, Cognitive reorientation, Bed mobility, Patient/family training, Equipment eval/education, Gait training  Equipment Recommended: Alana Rodriguez       See flowsheet documentation for full assessment, interventions and recommendations  Outcome: Progressing  Note:    Problem List: Decreased strength, Decreased endurance, Impaired balance, Decreased mobility, Decreased cognition, Impaired vision, Orthopedic restrictions, Pain  Assessment: pt began tx session seated in recliner and was agreeable to participate in PT intervention after some motivation and encouragement  pt continues to require a significant amount of asssitance to complete all functional transfers from recliner to rW and back to recliner  pt continues to require VC's for hand placement for safety and balance while performing functional transfers  pt cotninues to be limited with functional mobility, activity tolerance and ambulatioon distance as pt was only able to ambulate 3'fwd and 3' back with RW due to increased pain and fatigue  pt was able to complete TE activities seated in the recliner with no increases in pian but required VC's in order for pt to complete TE properly  post tx session pt in recliner with call bell and all pt needs met  Continue to recommend post acute rehab services at the time of D/C in order to maximize pt functional independence and safety with all OOB mobility  PT Discharge Recommendation: Post acute rehabilitation services    See flowsheet documentation for full assessment

## 2022-08-29 NOTE — ASSESSMENT & PLAN NOTE
Patient demonstrating mild erythema bilateral extremities which seems to be chronic in nature  Erythema asymmetric, skin is significantly dry and scaly  Patient reports tenderness in the left extremity, patient has remained afebrile, no leukocytosis and low likelihood of cellulitis  No calf pain today on examination    +    Plan:  Continue with topical moisturizers  Venous Doppler within normal limits

## 2022-08-29 NOTE — ASSESSMENT & PLAN NOTE
Patient reports not having a bowel movement for 1 week  Patient is on chronic opioid use      Plan:   Continue with bowel regimen per APS

## 2022-08-30 ENCOUNTER — TELEPHONE (OUTPATIENT)
Dept: OTHER | Facility: OTHER | Age: 77
End: 2022-08-30

## 2022-08-30 VITALS
RESPIRATION RATE: 18 BRPM | SYSTOLIC BLOOD PRESSURE: 121 MMHG | TEMPERATURE: 97.6 F | HEART RATE: 78 BPM | OXYGEN SATURATION: 97 % | DIASTOLIC BLOOD PRESSURE: 68 MMHG

## 2022-08-30 PROCEDURE — 99232 SBSQ HOSP IP/OBS MODERATE 35: CPT | Performed by: HOSPITALIST

## 2022-08-30 PROCEDURE — NC001 PR NO CHARGE: Performed by: SURGERY

## 2022-08-30 PROCEDURE — 99238 HOSP IP/OBS DSCHRG MGMT 30/<: CPT | Performed by: PHYSICIAN ASSISTANT

## 2022-08-30 PROCEDURE — 91305 COVID-19 PFIZER VAC (TRIS SUCROSE, GRAY CAP FORM) 30 MCG/0.3ML SUSP: CPT | Performed by: PHYSICIAN ASSISTANT

## 2022-08-30 PROCEDURE — 99024 POSTOP FOLLOW-UP VISIT: CPT | Performed by: PHYSICIAN ASSISTANT

## 2022-08-30 RX ORDER — POLYETHYLENE GLYCOL 3350 17 G/17G
17 POWDER, FOR SOLUTION ORAL DAILY
Refills: 0
Start: 2022-08-31 | End: 2022-10-27

## 2022-08-30 RX ORDER — ACETAMINOPHEN 325 MG/1
975 TABLET ORAL EVERY 8 HOURS SCHEDULED
Refills: 0
Start: 2022-08-30 | End: 2022-10-05 | Stop reason: SDUPTHER

## 2022-08-30 RX ORDER — OXYCODONE HYDROCHLORIDE 5 MG/1
10-15 TABLET ORAL
Qty: 48 TABLET | Refills: 0 | Status: SHIPPED | OUTPATIENT
Start: 2022-08-30 | End: 2022-09-02

## 2022-08-30 RX ORDER — DIAZEPAM 5 MG/1
5 TABLET ORAL EVERY 6 HOURS SCHEDULED
Qty: 12 TABLET | Refills: 0 | Status: SHIPPED | OUTPATIENT
Start: 2022-08-30 | End: 2022-09-07

## 2022-08-30 RX ORDER — BISACODYL 10 MG
10 SUPPOSITORY, RECTAL RECTAL DAILY PRN
Qty: 12 SUPPOSITORY | Refills: 0 | Status: ON HOLD
Start: 2022-08-30 | End: 2022-10-08

## 2022-08-30 RX ORDER — PANTOPRAZOLE SODIUM 40 MG/1
40 TABLET, DELAYED RELEASE ORAL
Refills: 0
Start: 2022-08-30 | End: 2022-09-13 | Stop reason: SDUPTHER

## 2022-08-30 RX ORDER — DIAZEPAM 5 MG/1
5 TABLET ORAL EVERY 6 HOURS SCHEDULED
Qty: 12 TABLET | Refills: 0 | Status: SHIPPED | OUTPATIENT
Start: 2022-08-30 | End: 2022-08-30 | Stop reason: SDUPTHER

## 2022-08-30 RX ORDER — AMOXICILLIN 250 MG
2 CAPSULE ORAL 2 TIMES DAILY
Refills: 0
Start: 2022-08-30 | End: 2022-10-27

## 2022-08-30 RX ORDER — METHOCARBAMOL 500 MG/1
500 TABLET, FILM COATED ORAL EVERY 6 HOURS PRN
Refills: 0
Start: 2022-08-30 | End: 2022-09-13 | Stop reason: SDUPTHER

## 2022-08-30 RX ORDER — SUCRALFATE 1 G/1
1 TABLET ORAL
Qty: 56 TABLET | Refills: 0
Start: 2022-08-30 | End: 2022-09-13 | Stop reason: SDUPTHER

## 2022-08-30 RX ADMIN — PANTOPRAZOLE SODIUM 40 MG: 40 TABLET, DELAYED RELEASE ORAL at 06:30

## 2022-08-30 RX ADMIN — DIAZEPAM 5 MG: 5 TABLET ORAL at 12:13

## 2022-08-30 RX ADMIN — DIAZEPAM 5 MG: 5 TABLET ORAL at 06:30

## 2022-08-30 RX ADMIN — SUCRALFATE 1 G: 1 TABLET ORAL at 12:13

## 2022-08-30 RX ADMIN — OXYCODONE HYDROCHLORIDE 15 MG: 10 TABLET ORAL at 09:02

## 2022-08-30 RX ADMIN — LOSARTAN POTASSIUM 100 MG: 50 TABLET, FILM COATED ORAL at 09:02

## 2022-08-30 RX ADMIN — CARVEDILOL 25 MG: 12.5 TABLET, FILM COATED ORAL at 09:04

## 2022-08-30 RX ADMIN — OXYCODONE HYDROCHLORIDE 15 MG: 10 TABLET ORAL at 15:29

## 2022-08-30 RX ADMIN — TORSEMIDE 40 MG: 20 TABLET ORAL at 09:01

## 2022-08-30 RX ADMIN — ACETAMINOPHEN 975 MG: 325 TABLET, FILM COATED ORAL at 13:27

## 2022-08-30 RX ADMIN — LIDOCAINE 5% 1 PATCH: 700 PATCH TOPICAL at 09:03

## 2022-08-30 RX ADMIN — APIXABAN 5 MG: 5 TABLET, FILM COATED ORAL at 09:01

## 2022-08-30 RX ADMIN — BNT162B2 0.3 ML: 0.23 INJECTION, SUSPENSION INTRAMUSCULAR at 12:13

## 2022-08-30 RX ADMIN — ACETAMINOPHEN 975 MG: 325 TABLET, FILM COATED ORAL at 06:30

## 2022-08-30 RX ADMIN — SUCRALFATE 1 G: 1 TABLET ORAL at 06:30

## 2022-08-30 RX ADMIN — Medication: at 09:05

## 2022-08-30 NOTE — ASSESSMENT & PLAN NOTE
Patient presented with a mechanical fall while going to the bathroom  Landed on her left knee  History of bilateral knee replacements  Of the imaging on presentation demonstrated left femur fracture  Postoperative day 6 left retrograde intramedullary zoë    Venous duplex normal     Plan:  pain management her acute pain service signed off  in preparation of discharge  PT OT recommends post acute care rehab  Follow-up with Dr Panda Genao on d/c per ortho   will be discharged to acute care rehabilitation center

## 2022-08-30 NOTE — PROGRESS NOTES
[facility]  Progress Note - Seth Parker 1945, 77 y o  female MRN: [id number]  Unit/Bed#: S -01 Encounter: [id number]  Primary Care Provider: Leatha Barker MD   Date and time admitted to hospital: 8/20/2022  5:58 AM    Acute pain due to trauma  Assessment & Plan  · In the setting of fibromyalgia  · Continue current pain regimen  APS note appreciated  · Oxycodone 10/15  · Valium scheduled q6  · Robaxin as needed    Melena  Assessment & Plan  · Multiple melenotic stool, reversed with Kcentra on admission due to history of Eliquis use associated with atrial fibrillation  · GI consulted and note appreciated  · 8/21- EGD- Single cratered, benign-appearing ulcer in the antrum with flat pigmented spot (Vish IIC); performed cold forceps biopsy; injected 3 mL of epinephrine to address bleeding; hemostasis achieved  2 small, superficial, round ulcers in the incisura and antrum with clean base (Vish III)  · Continue on Protonix b i d   · Hemoglobin is currently stable   Eliquis restarted  · Tolerating a regular diet  · Continue Carafate  · Avoid NSAIDs    Fall  Assessment & Plan  · Reported as mechanical fall  · Injuries listed below  · PT/OT    Venous stasis dermatitis of both lower extremities  Assessment & Plan  · Original concern a bilateral lower extremity cellulitis, status post IV antibiotics  · Slim consultation with low likelihood of cellulitic infection, procalcitonin and lactic acid normal, will continue to monitor off of antibiotics  · Stable WBC  · Continue use of  ammonium lactate lotion daily  · Lower extremities bilaterally with no worsening qualitative evidence of infection, no worsening erythema or swelling    Chronic diastolic heart failure (HCC)  Assessment & Plan  Wt Readings from Last 3 Encounters:   08/18/22 103 kg (227 lb)   08/03/22 103 kg (227 lb 9 6 oz)   07/28/22 109 kg (239 lb 10 2 oz)     · Continue home carvedilol, torsemide 40 mg  · Appreciate Internal Medicine consult and recommendations  · I&O    A-fib ([address] )  Assessment & Plan  · Continue coreg and Eliquis  · Rate controlled and anticoagulated    * Fracture of proximal end of left femur (HCC)  Assessment & Plan  · Secondary to fall  · XR in CT show distal left femur fracture--no vascular injury on CTA  · Appreciate Orthopedic surgery consult and recommendations  · Ortho left retrograde IM zoë 08/22/2022  · Maintain WBAT LLE  · Multimodail pain regimen  · Appreciate APS consult and recommendations  · DVT Prop:  Eliquis  · PT/OT when indicated  Disposition: rehab pending placement; ortho dispo plan WBAT LLE w/outpt f/u    SUBJECTIVE:  Chief Complaint: n/a feels well this morning    Subjective: Patient seen and examined bedside  No acute complaints  Denies abdominal pain, nausea, emesis, fevers, or chills  Endorses some leg pain but not worsening from prior  OBJECTIVE:   Vitals:   Temp:  [96 8 °F (36 °C)-98 9 °F (37 2 °C)] 98 7 °F (37 1 °C)  HR:  [68-96] 71  Resp:  [16-18] 18  BP: ()/(40-53) 134/53    Intake/Output:  I/O       08/28 0701 08/29 0700 08/29 0701  08/30 0700 08/30 0701  08/31 0700    P  O  360      I V         Total Intake 360      Urine 485 200     Total Output 485 200     Net -125 -200                 Nutrition: Diet Regular; Regular House  GI Proph/Bowel Reg: dulcolax, miralax, senna  VTE Prophylaxis:Sequential compression device (Venodyne)   eliquis    Physical Exam:   General - no acute distress, responsive and cooperative  CV - warm, regular rate  Pulm - normal work of breathing, no respiratory distress  Abd - soft, nondistended, nontender  Neuro - m/s grossly intact, cn grossly intact  Ext - moving all extremities, LLE wrapped in ACE, distal m/s intact      Invasive Devices  Report    Peripheral Intravenous Line  Duration           Peripheral IV 08/26/22 Left Antecubital 4 days                      Lab Results:   BMP/CMP:   Lab Results   Component Value Date    SODIUM 135 08/29/2022    K 3 8 08/29/2022    CL 96 08/29/2022    CO2 31 08/29/2022    BUN 18 08/29/2022    CREATININE 0 82 08/29/2022    CALCIUM 9 4 08/29/2022    EGFR 70 08/29/2022   , CBC:   Lab Results   Component Value Date    WBC 9 28 08/29/2022    HGB 10 9 (L) 08/29/2022    HCT 34 2 (L) 08/29/2022    MCV 93 08/29/2022     (H) 08/29/2022    MCH 29 7 08/29/2022    MCHC 31 9 08/29/2022    RDW 15 2 (H) 08/29/2022    MPV 10 5 08/29/2022    NRBC 0 08/29/2022   , Coagulation: No results found for: PT, INR, APTT, Lactate: No results found for: LACTATE, Amylase: No results found for: AMYLASE, Lipase: No results found for: LIPASE, AST: No results found for: AST, ALT: No results found for: ALT, Urinalysis: No results found for: COLORU, CLARITYU, SPECGRAV, PHUR, LEUKOCYTESUR, NITRITE, PROTEINUA, GLUCOSEU, KETONESU, BILIRUBINUR, BLOODU, CK: No results found for: CKTOTAL, Troponin: No results found for: TROPONINI, EtOH: No results found for: ETOH, UDS: No components found for: RAPIDDRUGSCREEN, Pregnancy: No results found for: PREGTESTUR, ABG: No results found for: PHART, WLB7KBP, PO2ART, FFH9EAX, F3UGZXDL, BEART, SOURCE and ISTAT: No components found for: VBG  Imaging/EKG Studies: I have personally reviewed pertinent reports       Other Studies: n/a

## 2022-08-30 NOTE — CASE MANAGEMENT
Case Management Discharge Planning Note    Patient name Art TAYLOR /S -01 MRN [id number]  : 1945 Date 2022       Current Admission Date: 2022  Current Admission Diagnosis:Fracture of proximal end of left femur [facility])   Patient Active Problem List    Diagnosis Date Noted    Constipation 2022    Fall 2022    Fracture of proximal end of left femur ([facility] [address] ) 2022    Melena 2022    Acute pain due to trauma 2022    At risk for obstructive sleep apnea 2022    Colon cancer screening 2022    Osteoporosis screening 2022    Lymphedema 2022    Venous stasis dermatitis of both lower extremities 2022    Encounter for screening mammogram for malignant neoplasm of breast 2022    Seasonal allergies 2022    Xerosis of skin 2022    Prediabetes 2022    Abnormal CXR 2022    Memory impairment 2022    Chronic diastolic heart failure ([facility] ) 2022    Anxiety     Opioid dependence due to Chronic back pain  2022    A-fib ([facility] ) 2021    Mixed hyperlipidemia 2021    Obesity 2021    Osteoarthritis of knee 2021    Stage 3 chronic kidney disease ([facility] [facility] ) 2021    Chronic low back pain with sciatica 2021    Chronic venous insufficiency 2021    Essential hypertension 2017    Sjogren's syndrome ([facility] [facility] ) 2014      LOS (days): 10  Geometric Mean LOS (GMLOS) (days): 6 20  Days to GMLOS:-4     OBJECTIVE:  Risk of Unplanned Readmission Score: 28 7         Current admission status: Inpatient   Preferred Pharmacy:   [address], [address] [place] [address]  Phone: [phone] Fax: [phone]    Primary Care Provider: Subha Ying MD    Primary Insurance: [address] [place] [address]  Secondary Insurance:     DISCHARGE DETAILS:     Transport at Discharge : S Ambulance  Dispatcher Contacted: Yes  Number/Name of Dispatcher: Roundtrip  Transported by [employer] and Unit #): [facility]  ETA of Transport (Date): 08/30/22  ETA of Transport (Time): 9806              IMM Given (Date):: 08/30/22  IMM Given to[de-identified] Patient    IMM reviewed with patient and caregiver, patient and caregiver agrees with discharge determination

## 2022-08-30 NOTE — ASSESSMENT & PLAN NOTE
· Multiple melenotic stool, reversed with Kcentra on admission due to history of Eliquis use associated with atrial fibrillation  · GI consulted and note appreciated  · 8/21- EGD- Single cratered, benign-appearing ulcer in the antrum with flat pigmented spot (Vish IIC); performed cold forceps biopsy; injected 3 mL of epinephrine to address bleeding; hemostasis achieved  2 small, superficial, round ulcers in the incisura and antrum with clean base (Vish III)  · Continue on Protonix b i d   · Hemoglobin is currently stable   Eliquis restarted  · Tolerating a regular diet  · Continue Carafate  · Avoid NSAIDs

## 2022-08-30 NOTE — CASE MANAGEMENT
Case Management Discharge Planning Note    Patient name Elke Sauer S /S -01 MRN [id number]  : 1945 Date 2022       Current Admission Date: 2022  Current Admission Diagnosis:Fracture of proximal end of left femur [facility])   Patient Active Problem List    Diagnosis Date Noted    Constipation 2022    Fall 2022    Fracture of proximal end of left femur ([facility] [address] ) 2022    Melena 2022    Acute pain due to trauma 2022    At risk for obstructive sleep apnea 2022    Colon cancer screening 2022    Osteoporosis screening 2022    Lymphedema 2022    Venous stasis dermatitis of both lower extremities 2022    Encounter for screening mammogram for malignant neoplasm of breast 2022    Seasonal allergies 2022    Xerosis of skin 2022    Prediabetes 2022    Abnormal CXR 2022    Memory impairment 2022    Chronic diastolic heart failure ([facility] [address] ) 2022    Anxiety     Opioid dependence due to Chronic back pain  2022    A-fib ([facility] [address] ) 2021    Mixed hyperlipidemia 2021    Obesity 2021    Osteoarthritis of knee 2021    Stage 3 chronic kidney disease ([facility] [address] ) 2021    Chronic low back pain with sciatica 2021    Chronic venous insufficiency 2021    Essential hypertension 2017    Sjogren's syndrome ([facility] [address] ) 2014      LOS (days): 10  Geometric Mean LOS (GMLOS) (days): 6 20  Days to GMLOS:-4     OBJECTIVE:  Risk of Unplanned Readmission Score: 28 39         Current admission status: Inpatient   Preferred Pharmacy:   [address]  Phone: [phone] Fax: [phone]    Primary Care Provider: Norris Eden MD    Primary Insurance: Ayesha Wang [facility] RE[facility]  Secondary Insurance:     DISCHARGE DETAILS:    Discharge planning discussed with[de-identified] Patient's   Freedom of Choice: Yes  Comments - Freedom of Choice: Cm met with patient and  at bedside to discuss received authorization for inpatient rehab  Both remain in agreement with [facility]    CM contacted family/caregiver?: Yes     Did patient/caregiver verbalize understanding of patient care needs?: Yes       Contacts  Patient Contacts: Imagene Duty, spouse  Relationship to Patient[de-identified] Family  Contact Method: Phone  Phone Number: [phone]  Reason/Outcome: Continuity of Care, Discharge Planning, Emergency Contact, Referral    Requested [address]         Is the [place] interested in [place] [id number] at discharge?: No    DME Referral Provided  Referral made for DME?: No    Other Referral/Resources/Interventions Provided:  Referral Comments: Bed accepted at [address] Team Recommendation: SNF  Discharge Destination Plan[de-identified] Short Term Rehab  Transport at Discharge : BLS Ambulance

## 2022-08-30 NOTE — PROGRESS NOTES
[facility]  Progress Note - [facility] 1945, 77 y o  female MRN: [id number]  Unit/Bed#: S -01 Encounter: [id number]  Primary Care Provider: Leatha Barker MD   Date and time admitted to hospital: 8/20/2022  5:58 AM    * Fracture of proximal end of left femur [facility])  Assessment & Plan  Patient presented with a mechanical fall while going to the bathroom  Landed on her left knee  History of bilateral knee replacements  Of the imaging on presentation demonstrated left femur fracture  Postoperative day 6 left retrograde intramedullary zoë  Venous duplex normal     Plan:  pain management her acute pain service signed off  in preparation of discharge  PT OT recommends post acute care rehab  Follow-up with Dr Mahnaz Qiu on d/c per ortho   will be discharged to acute care rehabilitation [facility]  Patient was having melanotic stools of her past 48 hours  Patient is on Eliquis for AFib  Eliquis was placed on hold and patient was put on Protonix drip  Melanotic stools has resolved  Plan:  Continue with Protonix 40 mg IV b i d   Monitor hemoglobin, hemoglobin today 9 8 down from day prior, trend hemoglobin and CBC    Fall  Assessment & Plan  Presents with mechanical fall  Plan:  PT OT recommending acute care rehabilitation    A-fib [facility])  Assessment & Plan  History of AFib on Eliquis    Plan:  resume Eliquis  Continue with carvedilol    Venous stasis dermatitis of both lower extremities  Assessment & Plan  Patient demonstrating mild erythema bilateral extremities which seems to be chronic in nature  Erythema asymmetric, skin is significantly dry and scaly  Patient reports tenderness in the left extremity, patient has remained afebrile, no leukocytosis and low likelihood of cellulitis  No calf pain today on examination    +    Plan:  Continue with topical moisturizers  Venous Doppler within normal limits    Chronic diastolic heart failure [facility])  Assessment & Plan  Wt Readings from Last 3 Encounters:   08/18/22 103 kg (227 lb)   08/03/22 103 kg (227 lb 9 6 oz)   07/28/22 109 kg (239 lb 10 2 oz)       History of chronic diastolic congestive heart failure  Most recent echo November 2021 demonstrating EF was 65%  Normal systolic function, normal wall motion  Unlikely to be an acute exacerbation  Plan:  Continue with carvedilol  Continue with oral torsemide 40 mg  Monitor electrolytes while on diuretic therapy  Monitor urine output      Chronic venous insufficiency  Assessment & Plan  History of bilateral chronic venous insufficiency    Plan:    Continue care with Lac-hydrin cream    Constipation  Assessment & Plan   Patient reports not having a bowel movement for 1 week  Patient is on chronic opioid use  Plan:   Continue with bowel regimen per APS    Acute pain due to trauma  Assessment & Plan  Plan:  Continue with pain regimen from acute Pain Services   recommended to discontinue opioids in preparation for discharge  Acute pain service signed off   continue with bowel regimen per APS            VTE Pharmacologic Prophylaxis: VTE Score: 10 High Risk (Score >/= 5) - Pharmacological DVT Prophylaxis Ordered: apixaban (Eliquis)  Sequential Compression Devices Ordered  Patient Centered Rounds: I performed bedside rounds with nursing staff today  Discussions with Specialists or Other Care Team Provider: attending physician, senior resident    Education and Discussions with Family / Patient: did not discuss with family today, discharging today  Current Length of Stay: 10 day(s)  Current Patient Status: Inpatient   Discharge Plan: Anticipate discharge later today or tomorrow to rehab facility      Code Status: Level 1 - Full Code    Subjective:   Andrew Cardona is a 80-year-old female with a past medical history of AFib on Eliquis, obesity chronic opiate dependence taking Percocet  mg q 6 p r n , heart failure with preserved ejection fraction, bilateral knee replacements who is being evaluated for a fracture of the left femur secondary to mechanical fall  She is on postoperative day 7 today, she received a left retrograde intramedullary zoë procedure  No overnight events  Patient this morning reports that her pain level is okay, she had some pain moving distally in the anterior left knee described as achy  She reports her pain in her left thigh and incision site is tolerable and has not progressed from days prior  Patient reports she had a bowel movement yesterday which was formed stool  Patient also reports that she has a headache but she has headaches at baseline  Patient denies chest pain, shortness of breath, chills, fever, nausea, vomiting  Objective:     Vitals:   Temp (24hrs), Av 8 °F (36 6 °C), Min:96 8 °F (36 °C), Max:98 9 °F (37 2 °C)    Temp:  [96 8 °F (36 °C)-98 9 °F (37 2 °C)] 97 5 °F (36 4 °C)  HR:  [68-96] 68  Resp:  [16-18] 18  BP: ()/(40-52) 113/40  SpO2:  [83 %-97 %] 96 %  There is no height or weight on file to calculate BMI  Input and Output Summary (last 24 hours): Intake/Output Summary (Last 24 hours) at 2022  Last data filed at 2022  Gross per 24 hour   Intake --   Output 200 ml   Net -200 ml       Physical Exam:   Physical Exam  Vitals and nursing note reviewed  Constitutional:       General: She is not in acute distress  Appearance: She is well-developed  HENT:      Head: Normocephalic and atraumatic  Eyes:      Conjunctiva/sclera: Conjunctivae normal    Cardiovascular:      Rate and Rhythm: Normal rate and regular rhythm  Heart sounds: No murmur heard  Pulmonary:      Effort: Pulmonary effort is normal  No respiratory distress  Breath sounds: Normal breath sounds  Abdominal:      Palpations: Abdomen is soft  Tenderness: There is no abdominal tenderness  Musculoskeletal:         General: Tenderness (anterior left knee, slight TTP) present  Cervical back: Neck supple  Comments: L thigh incision dry and clean   Skin:     General: Skin is warm and dry  Findings: Erythema (bilateral legs, venous stasis) present  Neurological:      Mental Status: She is alert  Additional Data:     Labs:  Results from last 7 days   Lab Units 08/29/22  1112   WBC Thousand/uL 9 28   HEMOGLOBIN g/dL 10 9*   HEMATOCRIT % 34 2*   PLATELETS Thousands/uL 516*   NEUTROS PCT % 66   LYMPHS PCT % 18   MONOS PCT % 9   EOS PCT % 6     Results from last 7 days   Lab Units 08/29/22  1112 08/27/22  0542 08/26/22  0545   SODIUM mmol/L 135   < > 137   POTASSIUM mmol/L 3 8   < > 3 5   CHLORIDE mmol/L 96   < > 98   CO2 mmol/L 31   < > 32   BUN mg/dL 18   < > 19   CREATININE mg/dL 0 82   < > 0 71   ANION GAP mmol/L 8   < > 7   CALCIUM mg/dL 9 4   < > 8 8   ALBUMIN g/dL  --   --  3 3*   TOTAL BILIRUBIN mg/dL  --   --  0 55   ALK PHOS U/L  --   --  119*   ALT U/L  --   --  21   AST U/L  --   --  23   GLUCOSE RANDOM mg/dL 147*   < > 126    < > = values in this interval not displayed  Lines/Drains:  Invasive Devices  Report    Peripheral Intravenous Line  Duration           Peripheral IV 08/26/22 Left Antecubital 4 days                      Imaging: No pertinent imaging reviewed      Recent Cultures (last 7 days):         Last 24 Hours Medication List:   Current Facility-Administered Medications   Medication Dose Route Frequency Provider Last Rate    acetaminophen  975 mg Oral [facility] Tato Casillas PA-C      ammonium lactate   Topical Daily Tato Casillas PA-C      apixaban  5 mg Oral BID SHAHIDA Dye      bisacodyl  10 mg Rectal Daily SHAHIDA Miles      carvedilol  25 mg Oral BID With Meals Tato Casillas PA-C      diazepam  5 mg Oral Q6H [address] Issac Merlin, MD      lidocaine  1 patch Topical Daily Tato Casillas PA-C      losartan  100 mg Oral Daily Tato Casillas PA-C      methocarbamol  500 mg Oral Q6H PRN Melina Orosco Eyad Martin PA-C      ondansetron  4 mg Intravenous Q4H PRN Keesha Mendez PA-C      oxyCODONE  10 mg Oral Q3H PRN Sonido Armijo MD      oxyCODONE  15 mg Oral Q4H PRN Sonido Armijo MD      pantoprazole  40 mg Oral BID AC SHAHIDA Dye      polyethylene glycol  17 g Oral Daily SHAHIDA Corbett      senna-docusate sodium  2 tablet Oral BID SHAHIDA Corbett      sucralfate  1 g Oral 4x Daily (AC & HS) Keesha Mendez PA-C      torsemide  40 mg Oral Daily Jalil Roach MD          Today, Patient Was Seen By: Mona Lagunas MD    **Please Note: This note may have been constructed using a voice recognition system  **

## 2022-08-30 NOTE — DISCHARGE SUMMARY
[facility]  Discharge- Osiel Blanc 1945, 77 y o  female MRN: [id number]  Unit/Bed#: S -01 Encounter: [id number]  Primary Care Provider: Jemima Corona MD   Date and time admitted to hospital: 8/20/2022  5:58 AM    No new Assessment & Plan notes have been filed under this hospital service since the last note was generated  Service: Trauma      Discharge Summary - Trauma Service   Osiel Blanc 77 y o  female MRN: [id number]  Unit/Bed#: S -01 Encounter: [id number]    Admission Date: 8/20/2022     Discharge Date: 8/30/2022    Admitting Diagnosis: Melena [K92 1]  Leg pain [M79 606]  Closed fracture of left distal femur ([facility] [address] ) [S72 402A]  Closed fracture of proximal end of left femur, initial encounter ([facility] ) [S72 002A]  Cellulitis of lower extremity, unspecified laterality [Y86 270]    Discharge Diagnosis: See above  Attending and Service: Dr Favio Peace, Acute Care Surgical Services  Consulting Physician(s):     1  Orthopedic surgery  2  Gastroenterology  3  Internal Medicine  4  Acute Pain service  5  Geriatric Medicine      Imaging and Procedures Performed:   Orders Placed This Encounter   Procedures    ED ECG Documentation Only    Pre-Procedural Sedation    Procedural Sedation       EGD    Result Date: 8/21/2022  Impression: The esophagus appeared normal  Single cratered, benign-appearing ulcer in the antrum with flat pigmented spot (Vish IIC); performed cold forceps biopsy; injected 3 mL of epinephrine to address bleeding; hemostasis achieved 2 small, superficial, round ulcers in the incisura and antrum with clean base (Vish III) The duodenal bulb and 2nd part of the duodenum appeared normal  RECOMMENDATION: Await pathology results Transition to IV PPI BID Clear liquid diet x 24 hours, and advance as tolerated Monitor stool output, hemoglobin, transfuse as needed Repeat EGD in three months to confirm ulcer healing Resume Eliquis in 24-48 hours pending clinical course Return to floor Results discussed with patient's   Tatianna Neal MD     XR chest 1 view portable    Result Date: 8/20/2022  Impression: No acute cardiopulmonary disease  Workstation performed: [id number]     XR femur 2 vw left    Result Date: 8/23/2022  Impression: Fluoroscopic guidance provided for procedure guidance  Please refer to the separate procedure notes for additional details  Workstation performed: [id number]     XR femur 2 views LEFT    Result Date: 8/20/2022  Impression: Displaced comminuted distal femoral fracture  This finding is noted in the ED preliminary report  Workstation performed: [id number]     XR knee 1 or 2 vw left    Result Date: 8/23/2022  Impression: No interval change in posterior displaced comminuted distal femoral fracture  Workstation performed: [id number]     XR knee 1 or 2 vw left    Result Date: 8/20/2022  Impression: No change comminuted posteriorly displaced distal femoral fracture  Workstation performed: [id number]     XR knee 4+ views left injury    Result Date: 8/20/2022  Impression: Comminuted displaced distal femoral fracture  This is also reported with the femoral images  Workstation performed: [id number]     CT head wo contrast    Result Date: 8/20/2022  Impression: No intracranial hemorrhage or calvarial fracture  Very mild, chronic microangiopathy Workstation performed: [id number]     XR pelvis ap only 1 or 2 vw    Result Date: 8/20/2022  Impression: No acute osseous abnormality  Workstation performed: [id number]     CT lower extremity w contrast left    Result Date: 8/21/2022  Impression: Displaced, comminuted distal femoral periprosthetic fracture as above  Severe left hip osteoarthritis  Diffuse subcutaneous edema/cellulitis throughout the left lower extremity  Correlate with clinical findings  Workstation performed: [id number]     ORIF of the left femur with IM nail insertion on 08/22/2022      Hospital Course: Andre Silva Job Woody is a 25-year-old female who presented for evaluation after mechanical fall while walking to the bathroom  She also admits to several bloody stools over the last 24 hours  She noted that she landed on her left knee and had been unable to ambulate following the fall  She denied hitting her head or losing consciousness  On her initial trauma evaluation, her primary survey was unremarkable  On secondary survey, she was hypertensive with normal vital signs; she was and acute distress secondary to pain and ill-appearing at baseline; she had tenderness to the right side of her head without obvious deformity or lacerations; she had right thigh and proximal knee severe tenderness with an effusion noted and inability to range the right knee; the remainder of her secondary survey was unremarkable  Her initial workup included labs in the above-noted imaging studies  She was admitted to the trauma service status post fall with left femur fracture, melena, bilateral lower extremity erythema concerning for cellulitis, chronic history of atrial fibrillation with use of Eliquis and acute pain secondary to her traumatic injuries as well as chronic pain secondary to fibromyalgia  She was started on Rocephin for the lower extremity erythema over concern for cellulitis  Orthopedic surgery was consulted for her left femur fracture and recommended operative intervention with nonweightbearing status in the interim as well as correction of coagulopathy  She was placed on a Protonix drip secondary to her suspected GI bleed and GI was consulted  She was kept NPO for GI and Orthopedic surgery planned interventions, placed on IV fluids for hydration/resuscitation, and her hemoglobin was closely monitored  She agreed to proceed with operative intervention and ultimately underwent ORIF of the left femur with IM nail insertion on 08/22/2022    Postoperatively, she was allowed to be weight-bearing as tolerated on the left lower extremity was transitioned to an oral analgesic regimen when appropriate  Regards to her GI workup, the GI service did recommend endoscopy  She underwent EGD on 08/21/2022 and was found to have a single created benign-appearing ulcer in the antrum with biopsy taken and injection of epinephrine to achieve hemostasis, she had 2 additional small superficial round ulcers in the incisura and antrum that were clean based and not requiring intervention  She was continued on a PPI twice daily throughout her hospital encounter and Carafate was also initiated with plan to follow-up on the biopsy results as well as repeat the EGD in 3 months to confirm ulcer healing with plan to resume her anticoagulation in 24-48 hours following this procedure  Additional consultants during her hospital encounter included the internal medicine service, the pain service and the geriatric Medicine service who assisted with her management  PT and OT evaluated her and recommended rehab  Case Management assisted with disposition planning  The patient was deemed stable for discharge with appropriate available placement on 08/30/2022  For further details of her hospital encounter, please see her complete medical records  On discharge, the patient is instructed to follow-up with the patient's primary care provider to review the events of the patient's recent hospitalization  The patient is instructed to follow-up in the Trauma Clinic as needed  The patient is instructed to follow up with Orthopedic surgery in 1 week for postoperative re-evaluation  The patient is instructed to follow up with Gastroenterology in 1 month for re-evaluation of her gastric ulcer and to arrange repeat EGD in approximately 3 months  The patient should follow the provided discharge instructions      Condition at Discharge: stable     Discharge instructions/Information to patient and family:   See after visit summary for information provided to patient and family  Provisions for Follow-Up Care:  See after visit summary for information related to follow-up care and any pertinent home health orders  Disposition: See After Visit Summary for discharge disposition information  Planned Readmission: No    Discharge Statement   I spent 30 minutes discharging the patient  This time was spent on the day of discharge  I had direct contact with the patient on the day of discharge  Additional documentation is required if more than 30 minutes were spent on discharge  Discharge Medications:  See after visit summary for reconciled discharge medications provided to patient and family        Yessi Mosquera PA-C  8/30/2022  2:19 PM

## 2022-08-30 NOTE — PROGRESS NOTES
Progress Note - Orthopedics   Alphonsomonae Rodríguez 77 y o  female MRN: [id number]  Unit/Bed#: S -01      Subjective:    77 y  o female seen and examined this AM  Has some pain in her left leg this morning, but overall controlled  Denies fevers, chills or sweats      Labs:  0   Lab Value Date/Time    HCT 34 2 (L) 08/29/2022 1112    HCT 31 1 (L) 08/27/2022 0542    HCT 29 6 (L) 08/26/2022 0545    HCT 37 4 09/23/2014 1339    HCT 33 6 (L) 09/03/2014 1146    HGB 10 9 (L) 08/29/2022 1112    HGB 9 8 (L) 08/27/2022 0542    HGB 9 5 (L) 08/26/2022 0545    HGB 11 3 (L) 09/23/2014 1339    HGB 10 8 (L) 09/03/2014 1146    INR 1 16 08/21/2022 0449    WBC 9 28 08/29/2022 1112    WBC 9 30 08/27/2022 0542    WBC 10 09 08/26/2022 0545    WBC 7 10 09/23/2014 1339    WBC 6 13 09/03/2014 1146       Meds:    Current Facility-Administered Medications:     acetaminophen (TYLENOL) tablet 975 mg, 975 mg, Oral, Q8H [address], Stefano Ryan PA-C, 975 mg at 08/30/22 0630    ammonium lactate (LAC-HYDRIN) 12 % cream, , Topical, Daily, Stefano Ryan PA-C, Given at 08/29/22 0816    apixaban (ELIQUIS) tablet 5 mg, 5 mg, Oral, BID, SHAHIDA Dye, 5 mg at 08/29/22 1820    bisacodyl (DULCOLAX) rectal suppository 10 mg, 10 mg, Rectal, Daily, SHAHIDA Soria    carvedilol (COREG) tablet 25 mg, 25 mg, Oral, BID With Meals, Stefano Ryan PA-C, 25 mg at 08/29/22 1820    diazepam (VALIUM) tablet 5 mg, 5 mg, Oral, Q6H [address], Israel Murillo MD, 5 mg at 08/30/22 0630    lidocaine (LIDODERM) 5 % patch 1 patch, 1 patch, Topical, Daily, Stefano Ryan PA-C, 1 patch at 08/29/22 0818    losartan (COZAAR) tablet 100 mg, 100 mg, Oral, Daily, Stefano Ryan PA-C, 100 mg at 08/29/22 0815    methocarbamol (ROBAXIN) tablet 500 mg, 500 mg, Oral, Q6H PRN, Robert Blanc PA-C    ondansetron [facility]) injection 4 mg, 4 mg, Intravenous, Q4H PRN, Stefano Ryan PA-C, 4 mg at 08/22/22 4649    oxyCODONE (ROXICODONE) immediate release tablet 10 mg, 10 mg, Oral, Q3H PRN, Sonido Swanson MD, 10 mg at 08/27/22 2101    oxyCODONE (ROXICODONE) IR tablet 15 mg, 15 mg, Oral, Q4H PRN, Sonido Swanson MD, 15 mg at 08/29/22 1819    pantoprazole (PROTONIX) EC tablet 40 mg, 40 mg, Oral, BID AC, SHAHIDA Dye, 40 mg at 08/30/22 0630    polyethylene glycol (MIRALAX) packet 17 g, 17 g, Oral, Daily, SHAHIDA Smiley, 17 g at 08/29/22 4553    senna-docusate sodium (SENOKOT S) 8 6-50 mg per tablet 2 tablet, 2 tablet, Oral, BID, SHAHIDA Smiley, 2 tablet at 08/29/22 1819    sucralfate (CARAFATE) tablet 1 g, 1 g, Oral, 4x Daily (AC & HS), Tyrone Pathak PA-C, 1 g at 08/30/22 0630    torsemide (DEMADEX) tablet 40 mg, 40 mg, Oral, Daily, Анна Neri MD, 40 mg at 08/29/22 0815    Blood Culture:   Lab Results   Component Value Date    BLOODCX No Growth After 5 Days  07/28/2022       Wound Culture:   Lab Results   Component Value Date    WOUNDCULT 3+ Growth of Proteus mirabilis (A) 12/30/2021    WOUNDCULT 3+ Growth of Enterococcus faecalis (A) 12/30/2021    WOUNDCULT 2+ Growth of Pseudomonas aeruginosa (A) 12/30/2021    WOUNDCULT 3+ Growth of  12/30/2021       Ins and Outs:  I/O last 24 hours: In: -   Out: 200 [Urine:200]          Physical:  Vitals:    08/30/22 0754   BP: 134/53   Pulse: 71   Resp: 18   Temp: 98 7 °F (37 1 °C)   SpO2: 93%     Musculoskeletal: left Lower Extremity  · Dressing over the knee C/D/I without strikethrough  · Mild tenderness over the upper thigh  Thigh and calf are soft and compressible  · Distal bilateral lower extremities with mottled skin/venous stasis  Edema present BLE  · SILT s/s/sp/dp/t  +fhl/ehl, +ankle dorsi/plantar flexion  · 2+ DP pulse    Assessment:    77 y  o female POD 8 left retrograde intramedullary zoë  Intraoperative findings of stable prosthesis  Plan:  · WBAT to the left lower extremity  · Hgb 10 9 yesterday   ABLA- continue to monitor vitals and H/H  · PT/OT  · Pain control per primary   · DVT ppx: Eliquis resumed  · Dispo: Ortho will follow   · Will need follow up with Dr Cristi Prado upon discharge       BETO IzaguirreC

## 2022-08-31 ENCOUNTER — TELEPHONE (OUTPATIENT)
Dept: OBGYN CLINIC | Facility: HOSPITAL | Age: 77
End: 2022-08-31

## 2022-08-31 DIAGNOSIS — Z71.89 COMPLEX CARE COORDINATION: Primary | ICD-10-CM

## 2022-08-31 NOTE — UTILIZATION REVIEW
Notification of Discharge   This is a Notification of Discharge from our facility [address]  Please be advised that this patient has been discharge from our facility  Below you will find the admission and discharge date and time including the patients disposition  UTILIZATION REVIEW CONTACT:  Mary Beth Cancino MA  Utilization   Network Utilization Review Department  Phone: [phone] x carefully listen to the prompts  All voicemails are confidential   Email: [email or web address]     PHYSICIAN ADVISORY SERVICES:  FOR [id number] REVIEW - MEDICAL NECESSITY DENIAL  Phone: [phone]  Fax: [phone]  Email: [email or web address]     PRESENTATION DATE: 8/20/2022  5:58 AM  OBERVATION ADMISSION DATE:   INPATIENT ADMISSION DATE: 8/20/22  7:40 AM   DISCHARGE DATE: 8/30/2022  3:43 PM  DISPOSITION: Non [facility] Non [facility]      IMPORTANT INFORMATION:  Send all requests for admission clinical reviews, approved or denied determinations and any other requests to dedicated fax number below belonging to the campus where the patient is receiving treatment   List of dedicated fax numbers:  [address] DENIALS (Administrative/Medical Necessity) [phone]   [address] (Maternity/NICU/Pediatrics) [phone]   [facility] [phone]   130 [facility] [phone]   [address] [place] [address] [phone]   [address] [place] [address],[address] Floor [address] [phone]   [facility]  [phone]   2205 [facility] [address]  2401 [facility] 1000 [facility] [phone]

## 2022-08-31 NOTE — TELEPHONE ENCOUNTER
Called number provided to schedule patient  Left voicemail, scheduled patient on 9/6 @ 3pm at the [facility] location (NOT [facility])   Please confirm this is ok

## 2022-08-31 NOTE — TELEPHONE ENCOUNTER
Dr Duglas Plata  RE:  Post-op Force On  # [id number] or [facility]    Name:  Jessie Fitch  MR: [id number]  Reason for Appt:  LEFT RETROGRADE IM SHAN (Left Leg Upper)  Location:  Next available

## 2022-08-31 NOTE — UTILIZATION REVIEW
Continued Stay Review    Date: 8/29/22                      Current Patient Class: Inpatient  Current Level of Care: Med Surg    HPI:76 y o  female initially admitted on 8/20/22 8/29 Orthopedics: POD 7 left retrograde intramedullary zoë  Intraoperative findings of stable prosthesis  Pain more controlled overall  Plan:  WBAT to the left lower extremity  Hgb 9 8 8/27  ABLA- continue to monitor vitals and H/H  PT/OT  Pain control per primary  PE: Dressing over the knee C/D/I without strikethrough  Mild tenderness over the upper thigh  Thigh and calf are soft and compressible  Distal bilateral lower extremities with mottled skin/venous stasis  Edema present BLE SILT s/s/sp/dp/t  +fhl/ehl, +ankle dorsi/plantar flexion  2+ DP pulse  Acute Pain Service: Ketamine infusion discontinued on 8/27 without incident  Pain control definitely improved  Opioid requirement previous 24 hours: oxycodone 45 mg PO, Dilaudid 0 5 mg IV  Continues to go without a significant bowel movement in 5+ days  Continues to pass flatus without incident  Is aware the constipating effects of her opioid regimen and insists that she will ask for additional stool softeners at the 1st signs of discomfort or nausea  Plan:  Oxycodone 10 mg/15 mg PO Q4hrs PRN for moderate/severe pain, consider discontinuing IV opioids in preparation for discharge  Multimodal analgesia: Tylenol 975 mg PO q8hrs standing,  Lidocaine patches to affected areas 12 hours on, 12 hours off  Valium 5 mg PO q 6 hours scheduled  Consider short-term script upon discharge if acceptable at destination facility, Robaxin 500 mg PO q 6 hours p r n  Bowel Regimen:  Polyethylene glycol (Miralax) 17g PO once daily PRN  Senna-docusate sodium (Senokot S) 8 6-50 mg 1 tab PO qhs  Physical Therapy recommending post acute rehab at discharge         Vital Signs:       Date/Time Temp Pulse Resp BP MAP (mmHg) SpO2 O2 Device   08/30/22 1145 97 6 °F (36 4 °C) 78 18 121/68 78 97 % --   08/30/22 07:54:08 98 7 °F (37 1 °C) 71 18 134/53 80 93 % --   08/30/22 0000 97 5 °F (36 4 °C) -- -- -- -- -- --   08/29/22 22:28:16 96 8 °F (36 °C) Abnormal  68 18 113/40 Abnormal  64 Abnormal  96 % --   08/29/22 19:02:21 -- 96 18 92/47 Abnormal  62 Abnormal  97 % --   08/29/22 18:22:48 -- 84 -- 119/49 Abnormal  72 96 % --   08/29/22 15:50:35 98 9 °F (37 2 °C) 75 16 112/46 Abnormal  68 83 % Abnormal  --   08/29/22 10:57:52 97 7 °F (36 5 °C) 77 16 88/52 Abnormal  64 Abnormal  97 % --   08/29/22 07:41:33 98 3 °F (36 8 °C) 69 18 112/52 72 95 % --   08/29/22 02:19:20 98 7 °F (37 1 °C) 89 18 127/59 82 96 % None (Room air)   08/28/22 22:12:10 98 1 °F (36 7 °C) 81 18 133/64 87 95 % --   08/28/22 20:14:44 -- 86 -- 105/45 Abnormal  65 94 % --   08/28/22 18:40:19 -- 89 18 106/49 Abnormal  68 98 % --   08/28/22 17:49:01 -- 84 -- 143/56 85 94 % --   08/28/22 16:01:55 99 °F (37 2 °C) 75 18 126/58 81 96 % --   08/28/22 10:47:33 97 8 °F (36 6 °C) 85 18 116/58 77 97 % --   08/28/22 07:52:59 98 3 °F (36 8 °C) 81 18 90/64 73 95 % --   08/28/22 02:52:48 98 2 °F (36 8 °C) 73 -- 101/63 76 95 % --   08/28/22 02:52:16 98 2 °F (36 8 °C) 81 -- 101/63 76 94 % --         Pertinent Labs/Diagnostic Results:       Results from last 7 days   Lab Units 08/29/22  1112 08/27/22  0542 08/26/22  0545 08/25/22  0714   WBC Thousand/uL 9 28 9 30 10 09 10 30*   HEMOGLOBIN g/dL 10 9* 9 8* 9 5* 9 2*   HEMATOCRIT % 34 2* 31 1* 29 6* 29 3*   PLATELETS Thousands/uL 516* 441* 389 352   NEUTROS ABS Thousands/µL 6 17 5 48  --   --          Results from last 7 days   Lab Units 08/29/22  1112 08/27/22  0542 08/26/22  0545 08/25/22  1015   SODIUM mmol/L 135 137 137 139   POTASSIUM mmol/L 3 8 3 2* 3 5 3 8   CHLORIDE mmol/L 96 97 98 99   CO2 mmol/L 31 32 32 34*   ANION GAP mmol/L 8 8 7 6   BUN mg/dL 18 21 19 15   CREATININE mg/dL 0 82 0 77 0 71 0 88   EGFR ml/min/1 73sq m 70 75 83 64   CALCIUM mg/dL 9 4 8 9 8 8 8 8   MAGNESIUM mg/dL 1 9  --   --  2 0     Results from last 7 days   Lab Units 08/26/22  0545   AST U/L 23   ALT U/L 21   ALK PHOS U/L 119*   TOTAL PROTEIN g/dL 6 1*   ALBUMIN g/dL 3 3*   TOTAL BILIRUBIN mg/dL 0 55         Results from last 7 days   Lab Units 08/29/22  1112 08/27/22  0542 08/26/22  0545 08/25/22  1015   GLUCOSE RANDOM mg/dL 147* 127 126 138           Medications:   Scheduled Medications:    Last 24 Hours Medication List:          Medication Dose Route Frequency    acetaminophen  975 mg Oral Q8H [address]    ammonium lactate   Topical Daily    apixaban  5 mg Oral BID    bisacodyl  10 mg Rectal Daily    carvedilol  25 mg Oral BID With Meals    diazepam  5 mg Oral Q6H [address]    lidocaine  1 patch Topical Daily    losartan  100 mg Oral Daily    methocarbamol  500 mg Oral Q6H PRN    ondansetron  4 mg Intravenous Q4H PRN    oxyCODONE  10 mg Oral Q3H PRN    oxyCODONE  15 mg Oral Q4H PRN    pantoprazole  40 mg Oral BID AC    polyethylene glycol  17 g Oral Daily    senna-docusate sodium  2 tablet Oral BID    sucralfate  1 g Oral 4x Daily (AC & HS)    torsemide  40 mg Oral Daily     PRN Meds Sorted by Name    Medications 08/28 08/29   HYDROmorphone (DILAUDID) injection 0 5 mg  Dose: 0 5 mg  Freq: Every 4 hours PRN Route: IV  PRN Reason: breakthrough pain  Start: 08/22/22 1145 End: 08/29/22 0801 2011      0801-D/C'd      methocarbamol (ROBAXIN) tablet 500 mg  Dose: 500 mg  Freq: Every 6 hours PRN Route: PO  PRN Reason: muscle spasms  Start: 08/28/22 2016 End: 08/30/22 1749        ondansetron (ZOFRAN) injection 4 mg  Dose: 4 mg  Freq: Every 4 hours PRN Route: IV  PRN Reasons: nausea,vomiting  Start: 08/20/22 0749 End: 08/30/22 1749           oxyCODONE (ROXICODONE) immediate release tablet 10 mg  Dose: 10 mg  Freq: Every 3 hours PRN Route: PO  PRN Reason: moderate pain  Start: 08/24/22 1203 End: 08/30/22 1749        oxyCODONE (ROXICODONE) IR tablet 15 mg  Dose: 15 mg  Freq: Every 4 hours PRN Route: PO  PRN Reason: severe pain  Start: 08/24/22 1202 End: 08/30/22 2291          3392     2725     8928     0099      0941     1165     1819        Medications 08/28 08/29                 Network Utilization Review Department  ATTENTION: Please call with any questions or concerns to [phone] and carefully listen to the prompts so that you are directed to the right person  All voicemails are confidential   Kendy Trejo all requests for admission clinical reviews, approved or denied determinations and any other requests to dedicated fax number below belonging to the campus where the patient is receiving treatment   List of dedicated fax numbers for the Facilities:  [address] DENIALS (Administrative/Medical Necessity) [phone]   [address] (Maternity/NICU/Pediatrics) [phone]   [address]  [address] Medical [place] [address] [address] [place] [address] [address] [facility] [place] [place] [address] [phone]

## 2022-09-01 ENCOUNTER — NURSING HOME VISIT (OUTPATIENT)
Dept: GERIATRICS | Facility: OTHER | Age: 77
End: 2022-09-01
Payer: COMMERCIAL

## 2022-09-01 ENCOUNTER — PATIENT OUTREACH (OUTPATIENT)
Dept: FAMILY MEDICINE CLINIC | Facility: CLINIC | Age: 77
End: 2022-09-01

## 2022-09-01 DIAGNOSIS — I10 ESSENTIAL HYPERTENSION: ICD-10-CM

## 2022-09-01 DIAGNOSIS — S72.002S: Primary | ICD-10-CM

## 2022-09-01 DIAGNOSIS — I48.19 PERSISTENT ATRIAL FIBRILLATION (HCC): ICD-10-CM

## 2022-09-01 DIAGNOSIS — I50.33 ACUTE ON CHRONIC DIASTOLIC CONGESTIVE HEART FAILURE (HCC): ICD-10-CM
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
## 2022-09-01 PROCEDURE — 99306 1ST NF CARE HIGH MDM 50: CPT | Performed by: FAMILY MEDICINE

## 2022-09-01 NOTE — PROGRESS NOTES
Nicolas 11  [address] 27 [facility], [address] [place] [address] 31  History and Physical    NAME: Chris Rodríguez  AGE: 77 y o  SEX: female [id number]    DATE OF ENCOUNTER: 9/1/2022    Code status:  CPR    Assessment and Plan     1  Closed fracture of proximal end of left femur, sequela  - s/p ORIF  - cont Oxycodone 5 mg po q3 prn  - cont lidocaine patch  - cont Methocarbamol 500 mg po q6 prn  - weight bearing as tolerated  - f/u with Ortho  - cont Apixaban for DVT prophylaxis    2  Fall, sequela/Amb dysfunction  - PT/OT ordered  - Fall precautions in place    3  Essential hypertension  - cont Losartan 100 mg po qd    4  Acute on chronic diastolic congestive heart failure (HCC)  - cont Torsemide 40 mg po qd  - cont Coreg 25 mg po bid    5  Persistent atrial fibrillation (HCC)  - rate controlled  - cont Apixaban 5 mg po bid      All medications and routine orders were reviewed and updated as needed  Plan discussed with: patient and     Chief Complaint     Seen for admission at [address] [place] [address]    History of Present Illness     Loy Osuna, a 76 y/o female with past medical history of HTN, CHF,A  Fib, anemia got admitted to hospital after a mechanical fall, resulting in displaced comminuted distal femoral fracture  Ortho was consulted, underwent ORIF with IM nail insertion, tolerated well  She also has melena, GI consulted, placed on Protonix drip and EGD was done, which showed a single crated benign appearing ulcer in the antrum, biopsy was done and epinephrine was injected to achieve hemostasis  Continued on PPI twice daily and Carafate  She also has b/l lower extremity erythema, started on Rocephin for concern of cellulitis  Geriatric medicine was also consulted to address chronic conditions and polypharmacy  Her overall condition improved, got discharged to [address] [place] [address] for subacute rehab  She was seen and examined at bedside, stable   She is able to give good history  She is c/o pain in left leg 10/10, she is getting oxy 5 mg q3 as needed  Staff have no concerns at this time  HISTORY:  Past Medical History:   Diagnosis Date    A-fib ([place] [address] ) 12/29/2021    Anxiety     Arthritis     Back pain     Cellulitis     CHF (congestive heart failure) ([facility])     Edema of both lower extremities due to peripheral venous insufficiency     Edema of both lower extremities due to peripheral venous insufficiency     Erythema of lower extremity 11/12/2021    Fibromyalgia     Hypertension     Sjogren syndrome, unspecified ([place] [address] )      Family History   Problem Relation Age of Onset    Heart disease Mother     Cancer Father      Social History     Socioeconomic History    Marital status: /Civil Union     Spouse name: None    Number of children: None    Years of education: None    Highest education level: None   Occupational History    None   Tobacco Use    Smoking status: Former Smoker     Types: Cigarettes    Smokeless tobacco: Never Used   Vaping Use    Vaping Use: Never used   Substance and Sexual Activity    Alcohol use: Not Currently    Drug use: Never    Sexual activity: None   Other Topics Concern    None   Social History Narrative    None     Social Determinants of Health     Financial Resource Strain: Not on file   Food Insecurity: No Food Insecurity    Worried About Running Out of Food in the Last Year: Never true    Sindy of Food in the Last Year: Never true   Transportation Needs: No Transportation Needs    Lack of Transportation (Medical): No    Lack of Transportation (Non-Medical):  No   Physical Activity: Not on file   Stress: Not on file   Social Connections: Not on file   Intimate Partner Violence: Not on file   Housing Stability: Low Risk     Unable to Pay for Housing in the Last Year: No    Number of Places Lived in the Last Year: 1    Unstable Housing in the Last Year: No       Allergies:  No Known Allergies    Review of Systems     Review of Systems   Constitutional: Positive for activity change  Negative for fatigue and fever  HENT: Negative for dental problem, hearing loss and trouble swallowing  Eyes: Negative for photophobia and visual disturbance  Respiratory: Negative for cough and shortness of breath  Cardiovascular: Negative for chest pain, palpitations and leg swelling  Gastrointestinal: Negative for abdominal pain, constipation, diarrhea, nausea and vomiting  Genitourinary: Negative for difficulty urinating and dysuria  Musculoskeletal: Positive for arthralgias and gait problem  Neurological: Negative for dizziness, weakness and headaches  All other systems reviewed and are negative  as in HPI  Medications and orders     All medications reviewed and updated in Nursing Home EMR  Objective     Vitals: T: 97 9, P: 76, R: 16, BP: 128/76, 96% on RA, Wt: 226 lbs    Physical Exam  Vitals and nursing note reviewed  Constitutional:       General: She is not in acute distress  Appearance: She is well-developed  She is obese  She is not diaphoretic  HENT:      Head: Normocephalic and atraumatic  Nose: Nose normal       Mouth/Throat:      Mouth: Mucous membranes are moist       Pharynx: Oropharynx is clear  No oropharyngeal exudate  Eyes:      General: No scleral icterus  Right eye: No discharge  Left eye: No discharge  Extraocular Movements: Extraocular movements intact  Conjunctiva/sclera: Conjunctivae normal    Cardiovascular:      Rate and Rhythm: Normal rate and regular rhythm  Heart sounds: Normal heart sounds  No murmur heard  Pulmonary:      Effort: Pulmonary effort is normal  No respiratory distress  Breath sounds: Normal breath sounds  No wheezing  Chest:      Chest wall: No tenderness  Abdominal:      General: Bowel sounds are normal       Palpations: Abdomen is soft  Tenderness: There is no abdominal tenderness   There is no guarding or rebound  Musculoskeletal:         General: No tenderness or deformity  Cervical back: Normal range of motion and neck supple  Right lower leg: Edema present  Left lower leg: Edema present  Comments: Impaired ROM in left leg   Skin:     General: Skin is warm and dry  Neurological:      Mental Status: She is alert and oriented to person, place, and time  Cranial Nerves: No cranial nerve deficit  Psychiatric:         Mood and Affect: Mood normal          Behavior: Behavior normal          Pertinent Laboratory/Diagnostic Studies: The following labs/studies were reviewed please see chart or hospital paperwork for details    Ref Range & Units 8/29/22 1112 8/27/22 0542 8/26/22 0545 8/25/22 1015 8/24/22 0455 8/23/22 0745 8/22/22 0526    Sodium 135 - 147 mmol/L 135  137  137  139  138  141  143    Potassium 3 5 - 5 3 mmol/L 3 8  3 2 Low   3 5  3 8  3 2 Low   3 9  3 4 Low     Chloride 96 - 108 mmol/L 96  97  98  99  98  102  102    CO2 21 - 32 mmol/L 31  32  32  34 High   34 High   32  36 High     ANION GAP 4 - 13 mmol/L 8  8  7  6  6  7  5    BUN 5 - 25 mg/dL 18  21  19  15  15  13  17    Creatinine 0 60 - 1 30 mg/dL 0 82  0 77 CM  0 71 CM  0 88 CM  0 72 CM  0 82 CM  0 88 CM    Comment: Standardized to IDMS reference method   Glucose 65 - 140 mg/dL 147 High   127 CM  126 CM  138 CM  114 CM  152 High  CM  107 CM       Calcium 8 4 - 10 2 mg/dL 9 4  8 9  8 8  8 8  8 7  8 5  8 6    eGFR ml/min/1 73sq m 70  75  83          Ref Range & Units 8/29/22 1112 8/27/22 0542 8/26/22 0545 8/25/22 0714 8/24/22 0455 8/23/22 1815 8/23/22 1210    WBC 4 31 - 10 16 Thousand/uL 9 28  9 30  10 09  10 30 High   12 34 High       RBC 3 81 - 5 12 Million/uL 3 67 Low   3 38 Low   3 17 Low   3 08 Low   2 94 Low       Hemoglobin 11 5 - 15 4 g/dL 10 9 Low   9 8 Low   9 5 Low   9 2 Low   8 7 Low   8 7 Low   9 1 Low     Hematocrit 34 8 - 46 1 % 34 2 Low   31 1 Low   29 6 Low   29 3 Low   27 4 Low       MCV 82 - 98 fL 93 92  93  95  93      MCH 26 8 - 34 3 pg 29 7  29 0  30 0  29 9  29 6      MCHC 31 4 - 37 4 g/dL 31 9  31 5  32 1  31 4  31 8      RDW 11 6 - 15 1 % 15 2 High   15 2 High   15 3 High   15 2 High   15 1      MPV 8 9 - 12 7 fL 10 5  10 4  10 6  10 3  10 6      Platelets 733 - 037 Thousands/uL 516 High   441 High   389  352  316      nRBC /100 WBCs 0  0    0      Neutrophils Relative 43 - 75 % 66  60    72      Immat GRANS % 0 - 2 % 1  1    1      Lymphocytes Relative 14 - 44 % 18  26    17      Monocytes Relative 4 - 12 % 9  9    10      Eosinophils Relative 0 - 6 % 6  4    0      Basophils Relative 0 - 1 % 0  0    0      Neutrophils Absolute 1 85 - 7 62 Thousands/µL 6 17  5 48    8 80 High       Immature Grans Absolute 0 00 - 0 20 Thousand/uL 0 08  0 08    0 08      Lymphocytes Absolute 0 60 - 4 47 Thousands/µL 1 62  2 45    2 15      Monocytes Absolute 0 17 - 1 22 Thousand/µL 0 83  0 85    1 25 High       Eosinophils Absolute 0 00 - 0 61 Thousand/µL 0 55  0 40    0 03      Basophils Absolute 0 00 - 0 10 Thousands/µL 0 03  0 04             - Counseling Documentation: patient was counseled regarding: risk factor reductions

## 2022-09-06 ENCOUNTER — HOSPITAL ENCOUNTER (OUTPATIENT)
Dept: RADIOLOGY | Facility: HOSPITAL | Age: 77
Discharge: HOME | End: 2022-09-06
Attending: ORTHOPAEDIC SURGERY
Payer: COMMERCIAL

## 2022-09-06 ENCOUNTER — TELEPHONE (OUTPATIENT)
Dept: OBGYN CLINIC | Facility: HOSPITAL | Age: 77
End: 2022-09-06

## 2022-09-06 ENCOUNTER — OFFICE VISIT (OUTPATIENT)
Dept: OBGYN CLINIC | Facility: CLINIC | Age: 77
End: 2022-09-06

## 2022-09-06 VITALS — WEIGHT: 227 LBS | HEIGHT: 62 IN | BODY MASS INDEX: 41.77 KG/M2

## 2022-09-06 DIAGNOSIS — Z98.890 STATUS POST SURGERY: Primary | ICD-10-CM

## 2022-09-06 DIAGNOSIS — S72.402D CLOSED FRACTURE OF DISTAL END OF LEFT FEMUR WITH ROUTINE HEALING, UNSPECIFIED FRACTURE MORPHOLOGY, SUBSEQUENT ENCOUNTER: ICD-10-CM

## 2022-09-06 DIAGNOSIS — Z98.890 STATUS POST SURGERY: ICD-10-CM

## 2022-09-06 PROCEDURE — 73552 X-RAY EXAM OF FEMUR 2/>: CPT

## 2022-09-06 PROCEDURE — 99024 POSTOP FOLLOW-UP VISIT: CPT | Performed by: ORTHOPAEDIC SURGERY

## 2022-09-06 NOTE — PATIENT INSTRUCTIONS
Assessment:   S/P Left Retrograde Im Nathan - Left on 8/22/2022    Plan:   Continue PT-Referral provided for outpatient PT when discharged from facility to focus on gait training and knee/hip ROM  Continue DVT ppx  NSAIDS/Tylenol for pain relief     Follow Up:  8  week(s)    To Do Next Visit:    and X-rays of the  left  femur

## 2022-09-06 NOTE — TELEPHONE ENCOUNTER
[facility] would like to know what the  Weight Bearing status is & Sterile Strips Can they be just aired?     Please call Elliot Wakefield  [phone]

## 2022-09-06 NOTE — PROGRESS NOTES
Assessment:   S/P intramedullary nailing of left periprosthetic distal femur fracture    Plan:   Continue PT-Referral provided for outpatient PT when discharged from facility to focus on gait training and knee/hip ROM  Continue DVT ppx  NSAIDS/Tylenol for pain relief       Follow Up:  8  week(s)    To Do Next Visit:    and X-rays of the  left  femur      CHIEF COMPLAINT:  Chief Complaint   Patient presents with    Left Thigh - Post-op         SUBJECTIVE:  Tyrell Claire is a 77 y o  female who presents for follow up after Left Retrograde Im Nathan - Left on 8/22/2022  Today patient has mild pain but has been able to ambulate with PT at her facility, she denies any issue with numbness or tingling to the left leg, she denies any issues with her surgical sites since her hospital discharge  PHYSICAL EXAMINATION:  Vital signs: There were no vitals taken for this visit  General: well developed and well nourished, alert, oriented times 3 and appears comfortable  Psychiatric: Normal    MUSCULOSKELETAL EXAMINATION:  Incision: healed  Range of Motion: As expected and Limited due to pain/stiffness   Neurovascular status: Neuro intact, good cap refill  Activity Restrictions: No restrictions  Done today: Steri strips applied and staples removed      STUDIES REVIEWED:  AP and lateral XR of the left femur reviewed which shows stable appearance of left femur hardware as well as stable fracture alignment         PROCEDURES PERFORMED:  Procedures  No Procedures performed today

## 2022-09-07 ENCOUNTER — NURSING HOME VISIT (OUTPATIENT)
Dept: GERIATRICS | Facility: OTHER | Age: 77
End: 2022-09-07
Payer: COMMERCIAL

## 2022-09-07 VITALS
RESPIRATION RATE: 18 BRPM | DIASTOLIC BLOOD PRESSURE: 53 MMHG | TEMPERATURE: 97.8 F | OXYGEN SATURATION: 94 % | BODY MASS INDEX: 41.52 KG/M2 | HEART RATE: 68 BPM | SYSTOLIC BLOOD PRESSURE: 109 MMHG | WEIGHT: 227 LBS

## 2022-09-07 DIAGNOSIS — I89.0 LYMPHEDEMA: ICD-10-CM

## 2022-09-07 DIAGNOSIS — I50.32 CHRONIC DIASTOLIC HEART FAILURE (HCC): ICD-10-CM

## 2022-09-07 DIAGNOSIS — N18.30 STAGE 3 CHRONIC KIDNEY DISEASE, UNSPECIFIED WHETHER STAGE 3A OR 3B CKD (HCC): ICD-10-CM

## 2022-09-07 DIAGNOSIS — I48.19 PERSISTENT ATRIAL FIBRILLATION (HCC): ICD-10-CM

## 2022-09-07 DIAGNOSIS — R41.3 MEMORY IMPAIRMENT: ICD-10-CM

## 2022-09-07 DIAGNOSIS — I10 ESSENTIAL HYPERTENSION: ICD-10-CM

## 2022-09-07 DIAGNOSIS — K92.1 MELENA: ICD-10-CM

## 2022-09-07 DIAGNOSIS — S72.002S: ICD-10-CM

## 2022-09-07 DIAGNOSIS — R26.2 AMBULATORY DYSFUNCTION: ICD-10-CM

## 2022-09-07 DIAGNOSIS — G89.11 ACUTE PAIN DUE TO TRAUMA: ICD-10-CM

## 2022-09-07 DIAGNOSIS — I87.2 VENOUS STASIS DERMATITIS OF BOTH LOWER EXTREMITIES: Primary | ICD-10-CM

## 2022-09-07 PROCEDURE — 99309 SBSQ NF CARE MODERATE MDM 30: CPT | Performed by: NURSE PRACTITIONER

## 2022-09-07 RX ORDER — AMMONIUM LACTATE 12 G/100G
CREAM TOPICAL 2 TIMES DAILY
Qty: 385 G | Refills: 0
Start: 2022-09-07

## 2022-09-07 RX ORDER — DIAZEPAM 5 MG/1
TABLET ORAL
Qty: 12 TABLET | Refills: 0
Start: 2022-09-05 | End: 2022-09-13 | Stop reason: ALTCHOICE

## 2022-09-07 NOTE — ASSESSMENT & PLAN NOTE
· HR regular and controlled on exam   · Continue Coreg for rate control   · Continue Eliquis for anticoagulation

## 2022-09-07 NOTE — ASSESSMENT & PLAN NOTE
Wt Readings from Last 3 Encounters:   09/07/22 103 kg (227 lb)   09/06/22 103 kg (227 lb)   08/18/22 103 kg (227 lb)     · Weights appear stable   · Continue daily weights cardiac diet   · Continue carvedilol   · Continue torsemide 40 mg daily  · Renal function stable

## 2022-09-07 NOTE — ASSESSMENT & PLAN NOTE
· Presented to ED with bloody stools she did receive Kcentra on admission due to history of Eliquis use  · GI following  · S/p EGD 8/21/2022   · Single crater, benign-appearing ulcer in the antrum with flat pigmented spot  Biopsies taken-epinephrine injected to stop bleeding    2 -small superficial round ulcers also noted in the incisura and antrum with a clean base   · GI recommends Protonix b i d  and okay to continue Eliquis  · Avoid NSAIDs   · Continue Carafate   · Okay for regular diet

## 2022-09-07 NOTE — ASSESSMENT & PLAN NOTE
Lab Results   Component Value Date    EGFR 70 08/29/2022    EGFR 75 08/27/2022    EGFR 83 08/26/2022    CREATININE 0 82 08/29/2022    CREATININE 0 77 08/27/2022    CREATININE 0 71 08/26/2022      Baseline creatinine appears to be 0 7-0 9  Will check BMP tomorrow at [facility] to monitor   States she is eating and drinking well   Renally dose meds as appropriate  Avoid NSAIDS and Hypotension

## 2022-09-07 NOTE — ASSESSMENT & PLAN NOTE
· Denies pain on exam  · Continue Robaxin 500 mg Q 6 hr PRN for spasms  · Tylenol 975 mg TID  · Oxycodone 10 mg Q 3 hrs PRN for moderate pain and 15 mg Q 3 hrs PRN for severe pain   · Valium 5 mg TID til 9/5/22 then taper down to 5 mg Q 12 hrs x 3 days then 5 mg Daily x 3 days then make PRN

## 2022-09-07 NOTE — ASSESSMENT & PLAN NOTE
· Chronic   ·  at bedside- concered about cellulitis  · Legs have dry scaly skin, chronic vascular changes- good pedal pulses- no s/s of cellulitis on exam  · Review of records show patient is receive IV abx inpatient for possible cellulitis - procal and lactic acid WNL, WBC stable     Plan:  Continue AmLactin Lotion - increase to BID   Elevate/Compression  Routine OP f/u with Vascular-  states she follows with Dr Claudia Peña

## 2022-09-07 NOTE — PROGRESS NOTES
[facility]  [address]  [phone]  [place]  Code 31 (STR)        NAME: Marysol Schmitz  AGE: 77 y o  SEX: female CODE STATUS: CPR    DATE OF ENCOUNTER: 9/7/2022    Assessment and Plan     1  Venous stasis dermatitis of both lower extremities  Assessment & Plan:  · Chronic   ·  at bedside- concered about cellulitis  · Legs have dry scaly skin, chronic vascular changes- good pedal pulses- no s/s of cellulitis on exam  · Review of records show patient is receive IV abx inpatient for possible cellulitis - procal and lactic acid WNL, WBC stable     Plan:  Continue AmLactin Lotion - increase to BID   Elevate/Compression  Routine OP f/u with Vascular-  states she follows with Dr Zunilda Belle     Orders:  -     ammonium lactate (LAC-HYDRIN) 12 % cream; Apply topically 2 (two) times a day    2  Ambulatory dysfunction  Assessment & Plan:   Multifactorial in setting of Chronic multiple comorbidities, recent fall with Femur Fx   Continue PT/OT   Fall Precautions   Ensure adequate nutrition/hydration    Monitor CBC/BMP     following for d/c planning         3  Closed fracture of proximal end of left femur, sequela  Assessment & Plan:  · S/P mechanical fall   · Orthopedics performed ORIF with retrograde IM zoë on 8/22/2022   · Weight-bearing as tolerated to the left lower extremity   · Continue Eliquis for DVT prophylaxis on previously for AFib   · Continue PT/OT  · Had outpatient follow-up with Orthopedics yesterday staples removed   · Steri-Strips intact on exam no signs symptom of infection  · Patient states her pain is well controlled patient states therapy is going well  · Patient will follow-up with orthopedics in 8 weeks      4   Lymphedema  Assessment & Plan:  · Chronic lymphemda to BLE  · Also with hx of CHF contributing  · Has Compression Pumps at home - recommended to use 3 times/week at home    Plan:  While at SNF- ACE compression on daily off every evening  Elevate      5  Melena  Assessment & Plan:  · Presented to ED with bloody stools she did receive Kcentra on admission due to history of Eliquis use  · GI following  · S/p EGD 8/21/2022   · Single crater, benign-appearing ulcer in the antrum with flat pigmented spot  Biopsies taken-epinephrine injected to stop bleeding  2 -small superficial round ulcers also noted in the incisura and antrum with a clean base   · GI recommends Protonix b i d  and okay to continue Eliquis  · Avoid NSAIDs   · Continue Carafate   · Okay for regular diet      6  Memory impairment  Assessment & Plan:  · AAOx3 on exam today  · Review of records done- had MOCA score 11 in the hospital early August 2022  · Recommend OP f/u with Geriatrics/Senior Care as OP- awaiting initial assessment      Provide redirection, reorientation, distraction techniques   Fall Precautions   Assist with ADLs/IADLs as needed    Avoid deliriogenic medications such as tramadol, benzodiazepines, anticholinergics, benadryl   Encourage Hydration/ Nutrition   Implement sleep hygiene, limit night time interuptions           7  Stage 3 chronic kidney disease, unspecified whether stage 3a or 3b CKD ([facility] [address] )  Assessment & Plan:  Lab Results   Component Value Date    EGFR 70 08/29/2022    EGFR 75 08/27/2022    EGFR 83 08/26/2022    CREATININE 0 82 08/29/2022    CREATININE 0 77 08/27/2022    CREATININE 0 71 08/26/2022      Baseline creatinine appears to be 0 7-0 9  Will check BMP tomorrow at SNF to monitor   States she is eating and drinking well   Renally dose meds as appropriate  Avoid NSAIDS and Hypotension         8  Essential hypertension  Assessment & Plan:  · Stable   · Continue Coreg 25 mg b i d    · Continue losartan 100 mg daily  · Outpatient follow-up with PCP/Cardiology      9   Chronic diastolic heart failure [facility])  Assessment & Plan:  Wt Readings from Last 3 Encounters:   09/07/22 103 kg (227 lb)   09/06/22 103 kg (227 lb)   08/18/22 103 kg (227 lb)     · Weights appear stable   · Continue daily weights cardiac diet   · Continue carvedilol   · Continue torsemide 40 mg daily  · Renal function stable          10  Persistent atrial fibrillation ([place] [address] )  Assessment & Plan:  · HR regular and controlled on exam   · Continue Coreg for rate control   · Continue Eliquis for anticoagulation      11  Acute pain due to trauma  Assessment & Plan:  · Denies pain on exam  · Continue Robaxin 500 mg Q 6 hr PRN for spasms  · Tylenol 975 mg TID  · Oxycodone 10 mg Q 3 hrs PRN for moderate pain and 15 mg Q 3 hrs PRN for severe pain   · Valium 5 mg TID til 9/5/22 then taper down to 5 mg Q 12 hrs x 3 days then 5 mg Daily x 3 days then make PRN       Orders:  -     diazepam (VALIUM) 5 mg tablet; Take 1 tablet (5 mg total) by mouth every 12 (twelve) hours for 3 days, THEN 1 tablet (5 mg total) in the morning for 3 days  All medications and routine orders were reviewed and updated as needed  Chief Complaint     STR follow up visit    Past Medical and Surgica History      Past Medical History:   Diagnosis Date    A-fib ([facility] ) 12/29/2021    Anxiety     Arthritis     Back pain     Cellulitis     [facility]congestive [facility] failure) [facility]     Edema of both lower extremities due to peripheral venous insufficiency     Edema of both lower extremities due to peripheral venous insufficiency     Erythema of lower extremity 11/12/2021    Fibromyalgia     Hypertension     Sjogren syndrome, unspecified ([place] [address] )      Past Surgical History:   Procedure Laterality Date    KNEE CARTILAGE SURGERY      KS OPEN RX FEMUR FX+INTRAMED SHAN Left 8/22/2022    Procedure: LEFT RETROGRADE IM SHAN;  Surgeon: Viridiana Samano MD;  Location: [facility];  Service: Orthopedics    REPLACEMENT TOTAL KNEE BILATERAL       No Known Allergies       History of Present Illness     Tanisha Gonzalez  is a 77year old female admitted to [facility] on [facility]/30/22 for STR   Past Medical Hx including but not limited to CHF,CKD,Lymphedema, Venous Statis, MCI, Ambulatory dysfunction seen in collaboration with nursing for medical mgmt and STR follow up  Hospital Course:   Presented to [facility] 8/20/2022 with fracture of left femur  Patient reported they had a mechanical fall while ambulating to the bathroom at home  She also noted several bloody stools 24 hours prior to admission  Imaging revealed left femur fracture  S/p ORIF 8/22 with IM zoë placement  Patient noted to have bilateral lower extremity erythema concerning for cellulitis  She was treated with IV Rocephin and ammonia lactate  Patient was placed on a Protonix drip and set for EGD to rule out GI bleed she was found to have at ulcer which was injected with epinephrine am biopsies taken  GI recommended PPI twice daily and Carafate  GI recommended outpatient follow-up for repeat EGD in 3 months to confirm healing of ulcer  PT/OT recommended discharge to rehab  Rehab:   Seen and examined at bedside today  Patient is able to provide a limited reliable history due to mild cognitive impairment  Patient is alert and oriented x3 on exam but forgetful   is at bedside and helps supplement history   states he has noticed some memory impairment over the last few months with increased forgetfulness  Patient denies pain currently in her left leg  She denies any further bleeding  Patient states she has a good appetite   concerned about possible cellulitis to the lower legs  However has been also states her legs look much improved than they did prior  We discussed increasing ammonia lactate to twice a day  Patient states therapy is going well and she offers no other complaints at this time  Per review of SNF records, Patient is eating 3 meals per day, consuming  %  Her bowel and bladder continence varies  Last documented BM was 9/7/2022  Patient is actively participating in therapy   No concerns from nursing at this time                     The patient's allergies, past medical, surgical, social and family history were reviewed and unchanged  Review of Systems     Review of Systems   Constitutional: Positive for activity change  Negative for chills and fever  HENT: Negative for congestion, rhinorrhea and sore throat  Eyes: Negative for pain and visual disturbance  Respiratory: Negative for cough, shortness of breath and wheezing  Cardiovascular: Positive for leg swelling (improved per patient/ report)  Negative for chest pain and palpitations  Gastrointestinal: Negative for abdominal pain, blood in stool, constipation, diarrhea and nausea  Genitourinary: Negative for decreased urine volume, difficulty urinating, dysuria and hematuria  Musculoskeletal: Positive for gait problem  Negative for arthralgias and back pain  Skin: Negative for color change and rash  Neurological: Positive for weakness  Negative for dizziness, syncope, numbness and headaches  Psychiatric/Behavioral: Negative for dysphoric mood and sleep disturbance  The patient is not nervous/anxious  Objective     Vitals:   Vitals:    09/07/22 1625   BP: 109/53   Pulse: 68   Resp: 18   Temp: 97 8 °F (36 6 °C)   SpO2: 94%         Physical Exam  Vitals and nursing note reviewed  Constitutional:       General: She is not in acute distress  Appearance: Normal appearance  She is obese  HENT:      Head: Normocephalic and atraumatic  Nose: No congestion or rhinorrhea  Mouth/Throat:      Mouth: Mucous membranes are moist    Eyes:      General: No scleral icterus  Conjunctiva/sclera: Conjunctivae normal       Pupils: Pupils are equal, round, and reactive to light  Cardiovascular:      Rate and Rhythm: Normal rate and regular rhythm  Pulses: Normal pulses  Heart sounds: Normal heart sounds  No murmur heard  Pulmonary:      Effort: Pulmonary effort is normal  No respiratory distress        Breath sounds: Normal breath sounds  No wheezing, rhonchi or rales  Abdominal:      General: Bowel sounds are normal  There is no distension  Palpations: Abdomen is soft  There is no mass  Tenderness: There is no abdominal tenderness  Hernia: No hernia is present  Musculoskeletal:         General: No swelling or tenderness  Right lower leg: Edema (Trace/mild nonpitting) present  Left lower leg: Edema ( trace/mild nonpitting) present  Comments: Limited ROM to Left knee/leg   Lymphadenopathy:      Cervical: No cervical adenopathy  Skin:     General: Skin is warm and dry  Coloration: Skin is not pale  Findings: No rash  Comments: B/L lower extremity dry scaling skin with vascular discoloration mild erythema no drainage   Neurological:      General: No focal deficit present  Mental Status: She is alert and oriented to person, place, and time  Mental status is at baseline  Motor: No weakness  Gait: Gait normal    Psychiatric:         Mood and Affect: Mood normal          Behavior: Behavior normal          Pertinent Laboratory/Diagnostic Studies:   Reviewed in facility chart-stable      Current Medications   Medications reviewed and updated see facility [facility] for details  Current Outpatient Medications:     ammonium lactate (LAC-HYDRIN) 12 % cream, Apply topically 2 (two) times a day, Disp: 385 g, Rfl: 0    diazepam (VALIUM) 5 mg tablet, Take 1 tablet (5 mg total) by mouth every 12 (twelve) hours for 3 days, THEN 1 tablet (5 mg total) in the morning for 3 days  , Disp: 12 tablet, Rfl: 0    acetaminophen (TYLENOL) 325 mg tablet, Take 3 tablets (975 mg total) by mouth every 8 (eight) hours, Disp: , Rfl: 0    apixaban (Eliquis) 5 mg, Take 1 tablet (5 mg total) by mouth 2 (two) times a day, Disp: 60 tablet, Rfl: 2    bisacodyl (DULCOLAX) 10 mg suppository, Insert 1 suppository (10 mg total) into the rectum daily as needed for constipation, Disp: 12 suppository, Rfl: 0    carvedilol (COREG) 25 mg tablet, Take 1 tablet (25 mg total) by mouth 2 (two) times a day with meals, Disp: 180 tablet, Rfl: 1    losartan (COZAAR) 100 MG tablet, Take 1 tablet (100 mg total) by mouth in the morning , Disp: 90 tablet, Rfl: 1    methocarbamol (ROBAXIN) 500 mg tablet, Take 1 tablet (500 mg total) by mouth every 6 (six) hours as needed for muscle spasms, Disp: , Rfl: 0    pantoprazole (PROTONIX) 40 mg tablet, Take 1 tablet (40 mg total) by mouth 2 (two) times a day before meals, Disp: , Rfl: 0    polyethylene glycol (MIRALAX) 17 g packet, Take 17 g by mouth daily, Disp: , Rfl: 0    potassium chloride (K-DUR,KLOR-CON) 20 mEq tablet, Take 2 tablets (40 mEq total) by mouth daily, Disp: 180 tablet, Rfl: 1    senna-docusate sodium (SENOKOT S) 8 6-50 mg per tablet, Take 2 tablets by mouth 2 (two) times a day, Disp: , Rfl: 0    sucralfate (CARAFATE) 1 g tablet, Take 1 tablet (1 g total) by mouth 4 (four) times a day (before meals and at bedtime) for 14 days, Disp: 56 tablet, Rfl: 0    torsemide (DEMADEX) 20 mg tablet, Take 2 tablets (40 mg total) by mouth daily, Disp: 180 tablet, Rfl: 0     Plan discussed with Dr Suzanna Chávez noted agreement with assessment and plan  Please note:  Voice-recognition software may have been used in the preparation of this document  Occasional wrong word or "sound-alike" substitutions may have occurred due to the inherent limitations of voice recognition software  Interpretation should be guided by context           Elliot Adkins  9/7/2022  4:58 PM

## 2022-09-07 NOTE — ASSESSMENT & PLAN NOTE
 Multifactorial in setting of Chronic multiple comorbidities, recent fall with Femur Fx   Continue PT/OT   Fall Precautions   Ensure adequate nutrition/hydration    Monitor CBC/BMP     following for d/c planning

## 2022-09-07 NOTE — ASSESSMENT & PLAN NOTE
· Stable   · Continue Coreg 25 mg b i d    · Continue losartan 100 mg daily  · Outpatient follow-up with PCP/Cardiology

## 2022-09-07 NOTE — ASSESSMENT & PLAN NOTE
· S/P mechanical fall   · Orthopedics performed ORIF with retrograde IM zoë on 8/22/2022   · Weight-bearing as tolerated to the left lower extremity   · Continue Eliquis for DVT prophylaxis on previously for AFib   · Continue PT/OT  · Had outpatient follow-up with Orthopedics yesterday staples removed   · Steri-Strips intact on exam no signs symptom of infection  · Patient states her pain is well controlled patient states therapy is going well  · Patient will follow-up with orthopedics in 8 weeks

## 2022-09-07 NOTE — ASSESSMENT & PLAN NOTE
· AAOx3 on exam today  · Review of records done- had MOCA score 11 in the hospital early August 2022  · Recommend OP f/u with Geriatrics/Senior Care as OP- awaiting initial assessment      Provide redirection, reorientation, distraction techniques   Fall Precautions   Assist with ADLs/IADLs as needed    Avoid deliriogenic medications such as tramadol, benzodiazepines, anticholinergics, benadryl   Encourage Hydration/ Nutrition   Implement sleep hygiene, limit night time interuptions

## 2022-09-08 ENCOUNTER — TELEPHONE (OUTPATIENT)
Dept: GERIATRICS | Age: 77
End: 2022-09-08

## 2022-09-08 NOTE — TELEPHONE ENCOUNTER
Left message for pt's spouse to see if they would like an earlier appointment  Dr Sukhdeep Marte has openings on Sept 14th and Oct 12th

## 2022-09-11 ENCOUNTER — TELEPHONE (OUTPATIENT)
Dept: OTHER | Facility: OTHER | Age: 77
End: 2022-09-11

## 2022-09-13 ENCOUNTER — VBI (OUTPATIENT)
Dept: ADMINISTRATIVE | Facility: OTHER | Age: 77
End: 2022-09-13

## 2022-09-13 ENCOUNTER — NURSING HOME VISIT (OUTPATIENT)
Dept: GERIATRICS | Facility: OTHER | Age: 77
End: 2022-09-13
Payer: COMMERCIAL

## 2022-09-13 VITALS
SYSTOLIC BLOOD PRESSURE: 122 MMHG | WEIGHT: 227 LBS | DIASTOLIC BLOOD PRESSURE: 66 MMHG | BODY MASS INDEX: 41.52 KG/M2 | OXYGEN SATURATION: 98 % | RESPIRATION RATE: 18 BRPM | TEMPERATURE: 98 F | HEART RATE: 80 BPM

## 2022-09-13 DIAGNOSIS — S72.002S: Primary | ICD-10-CM

## 2022-09-13 DIAGNOSIS — N18.30 STAGE 3 CHRONIC KIDNEY DISEASE, UNSPECIFIED WHETHER STAGE 3A OR 3B CKD (HCC): ICD-10-CM

## 2022-09-13 DIAGNOSIS — F41.9 ANXIETY: ICD-10-CM

## 2022-09-13 DIAGNOSIS — K92.1 MELENA: ICD-10-CM

## 2022-09-13 DIAGNOSIS — G89.29 CHRONIC LOW BACK PAIN WITH SCIATICA, SCIATICA LATERALITY UNSPECIFIED, UNSPECIFIED BACK PAIN LATERALITY: ICD-10-CM

## 2022-09-13 DIAGNOSIS — I50.32 CHRONIC DIASTOLIC HEART FAILURE (HCC): ICD-10-CM

## 2022-09-13 DIAGNOSIS — R26.2 AMBULATORY DYSFUNCTION: ICD-10-CM

## 2022-09-13 DIAGNOSIS — I89.0 LYMPHEDEMA: ICD-10-CM

## 2022-09-13 DIAGNOSIS — R41.3 MEMORY IMPAIRMENT: ICD-10-CM

## 2022-09-13 DIAGNOSIS — I48.11 LONGSTANDING PERSISTENT ATRIAL FIBRILLATION (HCC): ICD-10-CM

## 2022-09-13 DIAGNOSIS — M54.40 CHRONIC LOW BACK PAIN WITH SCIATICA, SCIATICA LATERALITY UNSPECIFIED, UNSPECIFIED BACK PAIN LATERALITY: ICD-10-CM

## 2022-09-13 PROCEDURE — 99316 NF DSCHRG MGMT 30 MIN+: CPT | Performed by: NURSE PRACTITIONER

## 2022-09-13 RX ORDER — PANTOPRAZOLE SODIUM 40 MG/1
40 TABLET, DELAYED RELEASE ORAL
Qty: 60 TABLET | Refills: 1 | Status: SHIPPED | OUTPATIENT
Start: 2022-09-13 | End: 2022-10-19 | Stop reason: SDUPTHER

## 2022-09-13 RX ORDER — METHOCARBAMOL 500 MG/1
500 TABLET, FILM COATED ORAL EVERY 6 HOURS PRN
Qty: 20 TABLET | Refills: 0 | Status: CANCELLED | OUTPATIENT
Start: 2022-09-13

## 2022-09-13 RX ORDER — TIZANIDINE 4 MG/1
1 TABLET ORAL EVERY 8 HOURS PRN
COMMUNITY
Start: 2022-08-26 | End: 2022-09-17

## 2022-09-13 RX ORDER — BUSPIRONE HYDROCHLORIDE 5 MG/1
5 TABLET ORAL 3 TIMES DAILY
COMMUNITY
End: 2022-09-13 | Stop reason: SDUPTHER

## 2022-09-13 RX ORDER — OXYCODONE AND ACETAMINOPHEN 10; 325 MG/1; MG/1
1 TABLET ORAL EVERY 8 HOURS PRN
COMMUNITY
Start: 2022-08-26 | End: 2022-10-05

## 2022-09-13 RX ORDER — ESCITALOPRAM OXALATE 10 MG/1
10 TABLET ORAL DAILY
COMMUNITY
End: 2022-09-13 | Stop reason: SDUPTHER

## 2022-09-13 RX ORDER — BUSPIRONE HYDROCHLORIDE 5 MG/1
5 TABLET ORAL 3 TIMES DAILY
Qty: 90 TABLET | Refills: 0 | Status: SHIPPED | OUTPATIENT
Start: 2022-09-13 | End: 2022-10-19 | Stop reason: SDUPTHER

## 2022-09-13 RX ORDER — SUCRALFATE 1 G/1
1 TABLET ORAL
Qty: 120 TABLET | Refills: 0 | Status: SHIPPED | OUTPATIENT
Start: 2022-09-13 | End: 2022-10-19 | Stop reason: SDUPTHER

## 2022-09-13 RX ORDER — ESCITALOPRAM OXALATE 10 MG/1
10 TABLET ORAL DAILY
Qty: 30 TABLET | Refills: 0 | Status: ON HOLD | OUTPATIENT
Start: 2022-09-13 | End: 2022-10-10 | Stop reason: SDUPTHER

## 2022-09-13 RX ORDER — CARVEDILOL 25 MG/1
25 TABLET ORAL 2 TIMES DAILY WITH MEALS
Qty: 60 TABLET | Refills: 0 | Status: SHIPPED | OUTPATIENT
Start: 2022-09-13 | End: 2022-09-19

## 2022-09-13 NOTE — ASSESSMENT & PLAN NOTE
Wt Readings from Last 3 Encounters:   09/13/22 103 kg (227 lb)   09/07/22 103 kg (227 lb)   09/06/22 103 kg (227 lb)     · Weights stable   · Continue daily weights cardiac diet   · Continue carvedilol   · Continue torsemide 40 mg daily  · Renal function stable

## 2022-09-13 NOTE — ASSESSMENT & PLAN NOTE
· Chronic lymphemda to BLE  · Also with hx of CHF contributing  · Has Compression Pumps at home - recommended to use 3 times/week at home    Plan:  While at SNF- ACE compression on daily off every evening  Elevate

## 2022-09-13 NOTE — ASSESSMENT & PLAN NOTE
· Chronic lymphemda to BLE  · Also with hx of CHF contributing  · Has Compression Pumps at home - recommended to use 3 times/week at home    Plan:  While at SNF- ACE compression on daily off every evening  Elevate Detail Level: Detailed General Sunscreen Counseling: I recommended a broad spectrum sunscreen with a SPF of 30 or higher. I explained that SPF 30 sunscreens block approximately 97 percent of the sun's harmful rays. Sunscreens should be applied at least 15 minutes prior to expected sun exposure and then every 2 hours after that as long as sun exposure continues. If swimming or exercising sunscreen should be reapplied every 45 minutes to an hour after getting wet or sweating. One ounce, or the equivalent of a shot glass full of sunscreen, is adequate to protect the skin not covered by a bathing suit. I also recommended a lip balm with a sunscreen as well. Sun protective clothing can be used in lieu of sunscreen but must be worn the entire time you are exposed to the sun's rays. Products Recommended: Cerave

## 2022-09-13 NOTE — PROGRESS NOTES
[facility]  [address]  [phone]  [address] [facility]: [place]  Code 32    NAME: Elroy Rodríguez  AGE: 77 y o  SEX: female   CODE STATUS: CPR    DATE OF ADMISSION: 8/30/2022   DATE OF DISCHARGE: 9/15/2022   DISCHARGE DISPOSITION: Stable for discharge to home with family support and home health PT/OT/SN services  Reason for Admission: Patient was admitted to [facility] for rehabilitation after hospitalization for femur fracture  Past Medical and Surgical History:   Past Medical History:   Diagnosis Date    A[facility]fib [facility] ) 12/29/2021    Anxiety     Arthritis     Back pain     Cellulitis     CHF (congestive heart failure) ([facility])     Edema of both lower extremities due to peripheral venous insufficiency     Edema of both lower extremities due to peripheral venous insufficiency     Erythema of lower extremity 11/12/2021    Fibromyalgia     Hypertension     Sjogren syndrome, unspecified ([facility] )       Past Surgical History:   Procedure Laterality Date    KNEE CARTILAGE SURGERY      MA OPEN RX FEMUR FX+INTRAMED SHAN Left 8/22/2022    Procedure: LEFT RETROGRADE IM SHAN;  Surgeon: Kristy Don MD;  Location: [facility];  Service: Orthopedics    REPLACEMENT TOTAL KNEE BILATERAL         Course of stay:   Joanna Jacobs  is a 77year old female admitted to [facility] on 8/30/22 for STR  Past Medical Hx including but not limited to CHF,CKD,Lymphedema, Venous Statis, MCI, Ambulatory dysfunction seen in collaboration with nursing for medical mgmt and discharge visit  Hospital Course:   Presented to [facility] 8/20/2022 with fracture of left femur  Patient reported they had a mechanical fall while ambulating to the bathroom at home  She also noted several bloody stools 24 hours prior to admission  Imaging revealed left femur fracture  S/p ORIF 8/22 with IM shan placement    Patient noted to have bilateral lower extremity erythema concerning for cellulitis  She was treated with IV Rocephin and ammonia lactate  Patient was placed on a Protonix drip and set for EGD to rule out GI bleed she was found to have at ulcer which was injected with epinephrine am biopsies taken  GI recommended PPI twice daily and Carafate  GI recommended outpatient follow-up for repeat EGD in 3 months to confirm healing of ulcer  PT/OT recommended discharge to rehab  Rehab:   Seen and examined at bedside today  Patient is able to provide a limited reliable history due to mild cognitive impairment  Patient is alert and oriented x3 on exam but forgetful  Patient denies pain currently in her left leg  She denies any further bleeding  Patient states she has a good appetite  Patient states therapy is going well and she offers no other complaints at this time  Per review of SNF records, Patient is eating 3 meals per day, consuming  %  Her bowel and bladder continence varies  Last documented BM was 9/12/2022  Patient is actively participating in therapy  No concerns from nursing at this time  ROS:  Review of Systems   Constitutional: Negative for chills and fever  HENT: Negative for congestion, rhinorrhea and sore throat  Eyes: Negative for pain and visual disturbance  Respiratory: Negative for cough, shortness of breath and wheezing  Cardiovascular: Negative for chest pain and palpitations  Gastrointestinal: Negative for abdominal pain, constipation, diarrhea and nausea  Genitourinary: Negative for decreased urine volume, difficulty urinating, dysuria and hematuria  Musculoskeletal: Negative for arthralgias and back pain  Skin: Negative for color change and rash  Neurological: Negative for dizziness, syncope, weakness, numbness and headaches  Psychiatric/Behavioral: Negative for dysphoric mood and sleep disturbance  The patient is not nervous/anxious          PHYSICAL EXAM:  VITALS:   Vitals:    09/13/22 0955   BP: 122/66   Pulse: 80 Resp: 18   Temp: 98 °F (36 7 °C)   SpO2: 98%        Physical Exam  Vitals and nursing note reviewed  Constitutional:       General: She is not in acute distress  Appearance: Normal appearance  She is obese  HENT:      Head: Normocephalic and atraumatic  Nose: No congestion or rhinorrhea  Mouth/Throat:      Mouth: Mucous membranes are moist    Eyes:      General: No scleral icterus  Conjunctiva/sclera: Conjunctivae normal       Pupils: Pupils are equal, round, and reactive to light  Cardiovascular:      Rate and Rhythm: Normal rate and regular rhythm  Pulses: Normal pulses  Heart sounds: Normal heart sounds  No murmur heard  Pulmonary:      Effort: Pulmonary effort is normal  No respiratory distress  Breath sounds: Normal breath sounds  No wheezing, rhonchi or rales  Abdominal:      General: Bowel sounds are normal  There is no distension  Palpations: Abdomen is soft  There is no mass  Tenderness: There is no abdominal tenderness  Hernia: No hernia is present  Musculoskeletal:         General: No swelling or tenderness  Right lower leg: Edema (Trace/mild nonpitting) present  Left lower leg: Edema ( trace/mild nonpitting) present  Comments: Limited ROM to Left knee/leg   Lymphadenopathy:      Cervical: No cervical adenopathy  Skin:     General: Skin is warm and dry  Coloration: Skin is not pale  Findings: No rash  Comments: B/L lower extremity dry scaling skin with vascular discoloration mild erythema no drainage   Neurological:      General: No focal deficit present  Mental Status: She is alert and oriented to person, place, and time  Mental status is at baseline  Motor: Weakness present  Gait: Gait abnormal       Comments: Forgetful    Psychiatric:         Mood and Affect: Mood normal          Behavior: Behavior normal          Admission Diagnoses:   1   Closed fracture of proximal end of left femur, sequela  Assessment & Plan:  · S/P mechanical fall   · Orthopedics performed ORIF with retrograde IM zoë on 8/22/2022   · Weight-bearing as tolerated to the left lower extremity   · Continue Eliquis for DVT prophylaxis on previously for AFib   · Continue PT/OT  · Had outpatient follow-up with Orthopedics yesterday staples removed   · Steri-Strips intact on exam no signs symptom of infection  · Patient states her pain is well controlled patient states therapy is going well  · Patient will follow-up with orthopedics in 8 weeks      2  Ambulatory dysfunction  Assessment & Plan:   Multifactorial in setting of Chronic multiple comorbidities, recent fall with Femur Fx   Continue PT/OT   Fall Precautions   Ensure adequate nutrition/hydration     following for d/c planning - will d/c home with  and home health services at d/c from [facility]        3  Melena  Assessment & Plan:  · Presented to ED with bloody stools she did receive Kcentra on admission due to history of Eliquis use  · GI following  · S/p EGD 8/21/2022   · Single crater, benign-appearing ulcer in the antrum with flat pigmented spot  Biopsies taken-epinephrine injected to stop bleeding  2 -small superficial round ulcers also noted in the incisura and antrum with a clean base   · GI recommends Protonix b i d  and okay to continue Eliquis  · Avoid NSAIDs   · Continue Carafate   · Okay for regular diet    Orders:  -     sucralfate (CARAFATE) 1 g tablet; Take 1 tablet (1 g total) by mouth 4 (four) times a day (before meals and at bedtime)  -     pantoprazole (PROTONIX) 40 mg tablet; Take 1 tablet (40 mg total) by mouth 2 (two) times a day before meals    4   Anxiety  Assessment & Plan:  · Patient was tapered off Valium at [facility] she c/o anxiety and was seen by [facility] Psychatrist  · Started on Lexapro and Buspar by [facility] Psych   · Seen on exam today- poor historian due to MCI- but states she feels her mood is stable   · Continue Lexapro and Buspar  · Valium has been stopped   · OP f/u with PCP to manage at D/C from [facility]   · Scripts sent at d/c     Orders:  -     busPIRone (BUSPAR) 5 mg tablet; Take 1 tablet (5 mg total) by mouth 3 (three) times a day  -     escitalopram (LEXAPRO) 10 mg tablet; Take 1 tablet (10 mg total) by mouth daily    5  Memory impairment  Assessment & Plan:  · AAOx3 on exam today- forgetful with some situational confusion   · Review of records done- had MOCA score 11 in the hospital early August 2022  · Recommend OP f/u with Geriatrics/Senior Care as OP- awaiting initial assessment      Provide redirection, reorientation, distraction techniques   Fall Precautions   Assist with ADLs/IADLs as needed    Avoid deliriogenic medications such as tramadol, benzodiazepines, anticholinergics, benadryl   Encourage Hydration/ Nutrition   Implement sleep hygiene, limit night time interuptions           6  Chronic low back pain with sciatica, sciatica laterality unspecified, unspecified back pain laterality  Assessment & Plan:  · PDMP reviewed  · Recently filled Percocet 10/325 180 tabs and Zanaflex 4 mg 90 tabs on 8/26- Prescribed by PCP  · No scripts sent at D/C from [facility]       7  Longstanding persistent atrial fibrillation (HCC)  Assessment & Plan:  · HR regular and controlled on exam   · Continue Coreg for rate control   · Continue Eliquis for anticoagulation    Orders:  -     apixaban (Eliquis) 5 mg; Take 1 tablet (5 mg total) by mouth 2 (two) times a day  -     carvedilol (COREG) 25 mg tablet; Take 1 tablet (25 mg total) by mouth 2 (two) times a day with meals    8   Chronic diastolic heart failure [facility])  Assessment & Plan:  Wt Readings from Last 3 Encounters:   09/13/22 103 kg (227 lb)   09/07/22 103 kg (227 lb)   09/06/22 103 kg (227 lb)     · Weights stable   · Continue daily weights cardiac diet   · Continue carvedilol   · Continue torsemide 40 mg daily  · Renal function stable        Orders:  -     carvedilol (COREG) 25 mg tablet; Take 1 tablet (25 mg total) by mouth 2 (two) times a day with meals    9  Stage 3 chronic kidney disease, unspecified whether stage 3a or 3b CKD [facility])  Assessment & Plan:    Lab Results   Component Value Date    EGFR 70 08/29/2022    EGFR 75 08/27/2022    EGFR 83 08/26/2022    CREATININE 0 82 08/29/2022    CREATININE 0 77 08/27/2022    CREATININE 0 71 08/26/2022      Baseline creatinine appears to be 0 7-0 9  Creatinine bumped to 1 2 on 9/5/22  Repeat BMP tomorrow 9/14  States she is eating and drinking well   Renally dose meds as appropriate  Avoid NSAIDS and Hypotension         10  Lymphedema  Assessment & Plan:  · Chronic lymphemda to BLE  · Also with hx of CHF contributing  · Has Compression Pumps at home - recommended to use 3 times/week at home    Plan:  While at SNF- ACE compression on daily off every evening  Elevate         Follow-up Recommendations:     Outpatient Follow up with PCP in the next 2 weeks  [facility] PT/OT/SN services     Labs and testing performed during stay:    Collection Date:09/08/2022 72:64  BASIC METABOLIC PNL  GLUCOSE 582 mg/dL 65-99 H Final  BUN 26 mg/dL 7-25 H Final  CREATININE 1 20 mg/dL 0 40-1 10 H Final  SODIUM 137 mmol/L 135-145 Final  POTASSIUM 3 5 mmol/L 3 5-5 2 Final  CHLORIDE 101 mmol/L 100-109 Final  CARBON DIOXIDE 29 mmol/L 23-31 Final  CALCIUM 9 2 mg/dL 8 5-10 1 Final  ANION GAP 7 3-11 Final  eGFRcr 47 >59 L Final   CBC NO DIFF  HEMOGLOBIN 10 2 g/dL 11 5-14 5 L Final  HEMATOCRIT 30 5 % 35 0-43 0 L Final  WBC 8 5 thou/cmm 4 0-10 0 Final  RBC 3 45 mill/cmm 3 70-4 70 L Final  PLATELET COUNT 173 thou/cmm 140-350 H Final  MPV 9 2 fL 7 5-11 3 Final  MCV 88 fL  Final  MCH 29 5 pg 26 0-34 0 Final  MCHC 33 3 g/dL 32 0-37 0 Final  RDW 15 2 % 12 0-16 0 Final    Discharge Medications: See discharge medication list which was reviewed and signed        Current Outpatient Medications:     apixaban (Eliquis) 5 mg, Take 1 tablet (5 mg total) by mouth 2 (two) times a day, Disp: 60 tablet, Rfl: 0    busPIRone (BUSPAR) 5 mg tablet, Take 1 tablet (5 mg total) by mouth 3 (three) times a day, Disp: 90 tablet, Rfl: 0    carvedilol (COREG) 25 mg tablet, Take 1 tablet (25 mg total) by mouth 2 (two) times a day with meals, Disp: 60 tablet, Rfl: 0    escitalopram (LEXAPRO) 10 mg tablet, Take 1 tablet (10 mg total) by mouth daily, Disp: 30 tablet, Rfl: 0    pantoprazole (PROTONIX) 40 mg tablet, Take 1 tablet (40 mg total) by mouth 2 (two) times a day before meals, Disp: 60 tablet, Rfl: 1    sucralfate (CARAFATE) 1 g tablet, Take 1 tablet (1 g total) by mouth 4 (four) times a day (before meals and at bedtime), Disp: 120 tablet, Rfl: 0    acetaminophen (TYLENOL) 325 mg tablet, Take 3 tablets (975 mg total) by mouth every 8 (eight) hours, Disp: , Rfl: 0    ammonium lactate (LAC-HYDRIN) 12 % cream, Apply topically 2 (two) times a day, Disp: 385 g, Rfl: 0    bisacodyl (DULCOLAX) 10 mg suppository, Insert 1 suppository (10 mg total) into the rectum daily as needed for constipation, Disp: 12 suppository, Rfl: 0    losartan (COZAAR) 100 MG tablet, Take 1 tablet (100 mg total) by mouth in the morning , Disp: 90 tablet, Rfl: 1    oxyCODONE-acetaminophen (PERCOCET)  mg per tablet, Take 1 tablet by mouth every 8 (eight) hours as needed, Disp: , Rfl:     polyethylene glycol (MIRALAX) 17 g packet, Take 17 g by mouth daily, Disp: , Rfl: 0    potassium chloride (K-DUR,KLOR-CON) 20 mEq tablet, Take 2 tablets (40 mEq total) by mouth daily, Disp: 180 tablet, Rfl: 1    senna-docusate sodium (SENOKOT S) 8 6-50 mg per tablet, Take 2 tablets by mouth 2 (two) times a day, Disp: , Rfl: 0    tiZANidine (ZANAFLEX) 4 mg tablet, Take 1 tablet by mouth every 8 (eight) hours as needed, Disp: , Rfl:     torsemide (DEMADEX) 20 mg tablet, Take 2 tablets (40 mg total) by mouth daily, Disp: 180 tablet, Rfl: 0     Discussion with patient/family and further instructions:  -Fall precautions  -Aspiration precautions  -Bleeding precautions  -Monitor for signs/symptoms of infection  -Medication list was reviewed and updated    Status at time of discharge: Stable    Plan discussed with Dr Genie Bautista noted agreement with assessment and plan  Billing based on time  Time spent on unit, 40 minutes  Time spent counseling pt on debility/condition, 30 minutes  Please note:  Voice-recognition software may have been used in the preparation of this document  Occasional wrong word or "sound-alike" substitutions may have occurred due to the inherent limitations of voice recognition software  Interpretation should be guided by context          SHAHIDA Florez  9/13/2022

## 2022-09-13 NOTE — ASSESSMENT & PLAN NOTE
· PDMP reviewed  · Recently filled Percocet 10/325 180 tabs and Zanaflex 4 mg 90 tabs on 8/26- Prescribed by PCP  · No scripts sent at D/C from SNF

## 2022-09-13 NOTE — ASSESSMENT & PLAN NOTE
Lab Results   Component Value Date    EGFR 70 08/29/2022    EGFR 75 08/27/2022    EGFR 83 08/26/2022    CREATININE 0 82 08/29/2022    CREATININE 0 77 08/27/2022    CREATININE 0 71 08/26/2022      Baseline creatinine appears to be 0 7-0 9  Creatinine bumped to 1 2 on 9/5/22  Repeat BMP tomorrow 9/14  States she is eating and drinking well   Renally dose meds as appropriate  Avoid NSAIDS and Hypotension

## 2022-09-13 NOTE — ASSESSMENT & PLAN NOTE
 Multifactorial in setting of Chronic multiple comorbidities, recent fall with Femur Fx   Continue PT/OT   Fall Precautions   Ensure adequate nutrition/hydration     following for d/c planning - will d/c home with  and home health services at d/c from [facility]

## 2022-09-13 NOTE — ASSESSMENT & PLAN NOTE
· AAOx3 on exam today- forgetful with some situational confusion   · Review of records done- had MOCA score 11 in the hospital early August 2022  · Recommend OP f/u with Geriatrics/Senior Care as OP- awaiting initial assessment      Provide redirection, reorientation, distraction techniques   Fall Precautions   Assist with ADLs/IADLs as needed    Avoid deliriogenic medications such as tramadol, benzodiazepines, anticholinergics, benadryl   Encourage Hydration/ Nutrition   Implement sleep hygiene, limit night time interuptions

## 2022-09-13 NOTE — ASSESSMENT & PLAN NOTE
· Patient was tapered off Valium at SNF she c/o anxiety and was seen by SNF Psychatrist  · Started on Lexapro and Buspar by SNF Psych   · Seen on exam today- poor historian due to MCI- but states she feels her mood is stable   · Continue Lexapro and Buspar  · Valium has been stopped   · OP f/u with PCP to manage at D/C from SNF   · Scripts sent at d/c

## 2022-09-14 ENCOUNTER — PATIENT OUTREACH (OUTPATIENT)
Dept: INTERNAL MEDICINE CLINIC | Facility: CLINIC | Age: 77
End: 2022-09-14

## 2022-09-14 NOTE — PROGRESS NOTES
Outreach to patients    reports that patient has not been discharged yet from [address] [place] [address]  Expected discharge is tomorrow   states he is expecting services through [facility] care  They will be providing  skilled nursing and PT   reports patient is not doing well with rehab due to her co morbidities  Pt reportedly  lost 50 pounds while in STR   is patients primary caregiver   does not identify any needs at this time, however he recorded RN CM contact number  Pt will be kept on surveillance as she is a HRR

## 2022-09-16 ENCOUNTER — TELEPHONE (OUTPATIENT)
Dept: INTERNAL MEDICINE CLINIC | Facility: CLINIC | Age: 77
End: 2022-09-16

## 2022-09-17 ENCOUNTER — HOSPITAL ENCOUNTER (INPATIENT)
Facility: HOSPITAL | Age: 77
LOS: 1 days | Discharge: HOME HEALTH | DRG: 683 | End: 2022-09-19
Attending: EMERGENCY MEDICINE | Admitting: INTERNAL MEDICINE
Payer: COMMERCIAL

## 2022-09-17 DIAGNOSIS — I10 ESSENTIAL HYPERTENSION: ICD-10-CM

## 2022-09-17 DIAGNOSIS — I87.2 VENOUS STASIS DERMATITIS OF BOTH LOWER EXTREMITIES: ICD-10-CM

## 2022-09-17 DIAGNOSIS — R26.2 AMBULATORY DYSFUNCTION: ICD-10-CM

## 2022-09-17 DIAGNOSIS — I50.32 CHRONIC DIASTOLIC HEART FAILURE (HCC): ICD-10-CM

## 2022-09-17 DIAGNOSIS — I95.9 HYPOTENSION: Primary | ICD-10-CM

## 2022-09-17 DIAGNOSIS — N17.9 AKI (ACUTE KIDNEY INJURY) (HCC): ICD-10-CM

## 2022-09-17 PROBLEM — R82.81 PYURIA: Status: ACTIVE | Noted: 2022-09-17

## 2022-09-17 PROBLEM — E87.1 HYPONATREMIA: Status: ACTIVE | Noted: 2022-09-17

## 2022-09-17 PROBLEM — D64.9 ANEMIA: Status: ACTIVE | Noted: 2022-09-17

## 2022-09-17 LAB
ALBUMIN SERPL BCP-MCNC: 4.1 G/DL (ref 3.5–5)
ALP SERPL-CCNC: 153 U/L (ref 34–104)
ALT SERPL W P-5'-P-CCNC: 11 U/L (ref 7–52)
ANION GAP SERPL CALCULATED.3IONS-SCNC: 9 MMOL/L (ref 4–13)
AST SERPL W P-5'-P-CCNC: 15 U/L (ref 13–39)
ATRIAL RATE: 357 BPM
BACTERIA UR QL AUTO: ABNORMAL /HPF
BASOPHILS # BLD AUTO: 0.05 THOUSANDS/ΜL (ref 0–0.1)
BASOPHILS NFR BLD AUTO: 1 % (ref 0–1)
BILIRUB SERPL-MCNC: 0.44 MG/DL (ref 0.2–1)
BILIRUB UR QL STRIP: NEGATIVE
BUN SERPL-MCNC: 36 MG/DL (ref 5–25)
CALCIUM SERPL-MCNC: 9.8 MG/DL (ref 8.4–10.2)
CARDIAC TROPONIN I PNL SERPL HS: 7 NG/L
CHLORIDE SERPL-SCNC: 92 MMOL/L (ref 96–108)
CLARITY UR: CLEAR
CO2 SERPL-SCNC: 29 MMOL/L (ref 21–32)
COLOR UR: ABNORMAL
CREAT SERPL-MCNC: 2.6 MG/DL (ref 0.6–1.3)
EOSINOPHIL # BLD AUTO: 0.56 THOUSAND/ΜL (ref 0–0.61)
EOSINOPHIL NFR BLD AUTO: 6 % (ref 0–6)
ERYTHROCYTE [DISTWIDTH] IN BLOOD BY AUTOMATED COUNT: 14 % (ref 11.6–15.1)
GFR SERPL CREATININE-BSD FRML MDRD: 17 ML/MIN/1.73SQ M
GLUCOSE SERPL-MCNC: 110 MG/DL (ref 65–140)
GLUCOSE UR STRIP-MCNC: NEGATIVE MG/DL
HCT VFR BLD AUTO: 33.7 % (ref 34.8–46.1)
HGB BLD-MCNC: 10.4 G/DL (ref 11.5–15.4)
HGB UR QL STRIP.AUTO: NEGATIVE
HYALINE CASTS #/AREA URNS LPF: ABNORMAL /LPF
IMM GRANULOCYTES # BLD AUTO: 0.04 THOUSAND/UL (ref 0–0.2)
IMM GRANULOCYTES NFR BLD AUTO: 0 % (ref 0–2)
KETONES UR STRIP-MCNC: NEGATIVE MG/DL
LEUKOCYTE ESTERASE UR QL STRIP: ABNORMAL
LYMPHOCYTES # BLD AUTO: 2.31 THOUSANDS/ΜL (ref 0.6–4.47)
LYMPHOCYTES NFR BLD AUTO: 24 % (ref 14–44)
MCH RBC QN AUTO: 29.1 PG (ref 26.8–34.3)
MCHC RBC AUTO-ENTMCNC: 30.9 G/DL (ref 31.4–37.4)
MCV RBC AUTO: 94 FL (ref 82–98)
MONOCYTES # BLD AUTO: 0.7 THOUSAND/ΜL (ref 0.17–1.22)
MONOCYTES NFR BLD AUTO: 7 % (ref 4–12)
NEUTROPHILS # BLD AUTO: 5.98 THOUSANDS/ΜL (ref 1.85–7.62)
NEUTS SEG NFR BLD AUTO: 62 % (ref 43–75)
NITRITE UR QL STRIP: NEGATIVE
NON-SQ EPI CELLS URNS QL MICRO: ABNORMAL /HPF
NRBC BLD AUTO-RTO: 0 /100 WBCS
PH UR STRIP.AUTO: 5 [PH]
PLATELET # BLD AUTO: 337 THOUSANDS/UL (ref 149–390)
PMV BLD AUTO: 10.9 FL (ref 8.9–12.7)
POTASSIUM SERPL-SCNC: 4.2 MMOL/L (ref 3.5–5.3)
PROT SERPL-MCNC: 7.3 G/DL (ref 6.4–8.4)
PROT UR STRIP-MCNC: ABNORMAL MG/DL
QRS AXIS: 92 DEGREES
QRSD INTERVAL: 90 MS
QT INTERVAL: 400 MS
QTC INTERVAL: 458 MS
RBC # BLD AUTO: 3.57 MILLION/UL (ref 3.81–5.12)
RBC #/AREA URNS AUTO: ABNORMAL /HPF
SODIUM SERPL-SCNC: 130 MMOL/L (ref 135–147)
SP GR UR STRIP.AUTO: 1.02 (ref 1–1.03)
T WAVE AXIS: 57 DEGREES
UROBILINOGEN UR STRIP-ACNC: <2 MG/DL
VENTRICULAR RATE: 79 BPM
WBC # BLD AUTO: 9.64 THOUSAND/UL (ref 4.31–10.16)
WBC #/AREA URNS AUTO: ABNORMAL /HPF

## 2022-09-17 PROCEDURE — 36415 COLL VENOUS BLD VENIPUNCTURE: CPT | Performed by: EMERGENCY MEDICINE

## 2022-09-17 PROCEDURE — 99285 EMERGENCY DEPT VISIT HI MDM: CPT | Performed by: EMERGENCY MEDICINE

## 2022-09-17 PROCEDURE — 82570 ASSAY OF URINE CREATININE: CPT | Performed by: PHYSICIAN ASSISTANT

## 2022-09-17 PROCEDURE — 99219 PR INITIAL OBSERVATION CARE/DAY 50 MINUTES: CPT | Performed by: PHYSICIAN ASSISTANT

## 2022-09-17 PROCEDURE — 87086 URINE CULTURE/COLONY COUNT: CPT | Performed by: PHYSICIAN ASSISTANT

## 2022-09-17 PROCEDURE — 96360 HYDRATION IV INFUSION INIT: CPT

## 2022-09-17 PROCEDURE — 93005 ELECTROCARDIOGRAM TRACING: CPT

## 2022-09-17 PROCEDURE — 81001 URINALYSIS AUTO W/SCOPE: CPT | Performed by: EMERGENCY MEDICINE

## 2022-09-17 PROCEDURE — 84540 ASSAY OF URINE/UREA-N: CPT | Performed by: PHYSICIAN ASSISTANT

## 2022-09-17 PROCEDURE — 80053 COMPREHEN METABOLIC PANEL: CPT | Performed by: EMERGENCY MEDICINE

## 2022-09-17 PROCEDURE — 99285 EMERGENCY DEPT VISIT HI MDM: CPT

## 2022-09-17 PROCEDURE — 84484 ASSAY OF TROPONIN QUANT: CPT | Performed by: EMERGENCY MEDICINE

## 2022-09-17 PROCEDURE — 85025 COMPLETE CBC W/AUTO DIFF WBC: CPT | Performed by: EMERGENCY MEDICINE

## 2022-09-17 RX ORDER — ONDANSETRON 2 MG/ML
4 INJECTION INTRAMUSCULAR; INTRAVENOUS EVERY 6 HOURS PRN
Status: DISCONTINUED | OUTPATIENT
Start: 2022-09-17 | End: 2022-09-19 | Stop reason: HOSPADM

## 2022-09-17 RX ORDER — AMMONIUM LACTATE 12 G/100G
CREAM TOPICAL 2 TIMES DAILY
Status: DISCONTINUED | OUTPATIENT
Start: 2022-09-17 | End: 2022-09-19 | Stop reason: HOSPADM

## 2022-09-17 RX ORDER — PANTOPRAZOLE SODIUM 40 MG/1
40 TABLET, DELAYED RELEASE ORAL
Status: DISCONTINUED | OUTPATIENT
Start: 2022-09-18 | End: 2022-09-19 | Stop reason: HOSPADM

## 2022-09-17 RX ORDER — CARVEDILOL 12.5 MG/1
25 TABLET ORAL 2 TIMES DAILY WITH MEALS
Status: DISCONTINUED | OUTPATIENT
Start: 2022-09-18 | End: 2022-09-18

## 2022-09-17 RX ORDER — SUCRALFATE 1 G/1
1 TABLET ORAL
Status: DISCONTINUED | OUTPATIENT
Start: 2022-09-17 | End: 2022-09-19 | Stop reason: HOSPADM

## 2022-09-17 RX ORDER — ACETAMINOPHEN 325 MG/1
650 TABLET ORAL EVERY 6 HOURS PRN
Status: DISCONTINUED | OUTPATIENT
Start: 2022-09-17 | End: 2022-09-19 | Stop reason: HOSPADM

## 2022-09-17 RX ORDER — BUSPIRONE HYDROCHLORIDE 5 MG/1
5 TABLET ORAL 3 TIMES DAILY
Status: DISCONTINUED | OUTPATIENT
Start: 2022-09-17 | End: 2022-09-19 | Stop reason: HOSPADM

## 2022-09-17 RX ORDER — SODIUM CHLORIDE 9 MG/ML
75 INJECTION, SOLUTION INTRAVENOUS CONTINUOUS
Status: DISCONTINUED | OUTPATIENT
Start: 2022-09-17 | End: 2022-09-19

## 2022-09-17 RX ORDER — ESCITALOPRAM OXALATE 10 MG/1
10 TABLET ORAL
Status: DISCONTINUED | OUTPATIENT
Start: 2022-09-17 | End: 2022-09-19 | Stop reason: HOSPADM

## 2022-09-17 RX ADMIN — SODIUM CHLORIDE 500 ML: 0.9 INJECTION, SOLUTION INTRAVENOUS at 21:29

## 2022-09-18 LAB
2HR DELTA HS TROPONIN: 0 NG/L
4HR DELTA HS TROPONIN: 0 NG/L
ANION GAP SERPL CALCULATED.3IONS-SCNC: 7 MMOL/L (ref 4–13)
BASOPHILS # BLD AUTO: 0.04 THOUSANDS/ΜL (ref 0–0.1)
BASOPHILS NFR BLD AUTO: 1 % (ref 0–1)
BUN SERPL-MCNC: 37 MG/DL (ref 5–25)
CALCIUM SERPL-MCNC: 9.1 MG/DL (ref 8.4–10.2)
CARDIAC TROPONIN I PNL SERPL HS: 7 NG/L
CARDIAC TROPONIN I PNL SERPL HS: 7 NG/L
CHLORIDE SERPL-SCNC: 99 MMOL/L (ref 96–108)
CO2 SERPL-SCNC: 28 MMOL/L (ref 21–32)
CREAT SERPL-MCNC: 1.89 MG/DL (ref 0.6–1.3)
CREAT UR-MCNC: 122 MG/DL
EOSINOPHIL # BLD AUTO: 0.58 THOUSAND/ΜL (ref 0–0.61)
EOSINOPHIL NFR BLD AUTO: 7 % (ref 0–6)
ERYTHROCYTE [DISTWIDTH] IN BLOOD BY AUTOMATED COUNT: 14.1 % (ref 11.6–15.1)
GFR SERPL CREATININE-BSD FRML MDRD: 25 ML/MIN/1.73SQ M
GLUCOSE P FAST SERPL-MCNC: 109 MG/DL (ref 65–99)
GLUCOSE SERPL-MCNC: 109 MG/DL (ref 65–140)
HCT VFR BLD AUTO: 31.8 % (ref 34.8–46.1)
HGB BLD-MCNC: 9.9 G/DL (ref 11.5–15.4)
IMM GRANULOCYTES # BLD AUTO: 0.04 THOUSAND/UL (ref 0–0.2)
IMM GRANULOCYTES NFR BLD AUTO: 1 % (ref 0–2)
LYMPHOCYTES # BLD AUTO: 2.49 THOUSANDS/ΜL (ref 0.6–4.47)
LYMPHOCYTES NFR BLD AUTO: 29 % (ref 14–44)
MCH RBC QN AUTO: 29.2 PG (ref 26.8–34.3)
MCHC RBC AUTO-ENTMCNC: 31.1 G/DL (ref 31.4–37.4)
MCV RBC AUTO: 94 FL (ref 82–98)
MONOCYTES # BLD AUTO: 0.81 THOUSAND/ΜL (ref 0.17–1.22)
MONOCYTES NFR BLD AUTO: 9 % (ref 4–12)
NEUTROPHILS # BLD AUTO: 4.76 THOUSANDS/ΜL (ref 1.85–7.62)
NEUTS SEG NFR BLD AUTO: 53 % (ref 43–75)
NRBC BLD AUTO-RTO: 0 /100 WBCS
PLATELET # BLD AUTO: 286 THOUSANDS/UL (ref 149–390)
PMV BLD AUTO: 10.9 FL (ref 8.9–12.7)
POTASSIUM SERPL-SCNC: 3.6 MMOL/L (ref 3.5–5.3)
RBC # BLD AUTO: 3.39 MILLION/UL (ref 3.81–5.12)
SODIUM SERPL-SCNC: 134 MMOL/L (ref 135–147)
UUN 24H UR-MCNC: 227 MG/DL
WBC # BLD AUTO: 8.72 THOUSAND/UL (ref 4.31–10.16)

## 2022-09-18 PROCEDURE — 85025 COMPLETE CBC W/AUTO DIFF WBC: CPT | Performed by: PHYSICIAN ASSISTANT

## 2022-09-18 PROCEDURE — 97163 PT EVAL HIGH COMPLEX 45 MIN: CPT

## 2022-09-18 PROCEDURE — 80048 BASIC METABOLIC PNL TOTAL CA: CPT | Performed by: PHYSICIAN ASSISTANT

## 2022-09-18 PROCEDURE — 99233 SBSQ HOSP IP/OBS HIGH 50: CPT | Performed by: INTERNAL MEDICINE

## 2022-09-18 PROCEDURE — 97116 GAIT TRAINING THERAPY: CPT

## 2022-09-18 PROCEDURE — 84484 ASSAY OF TROPONIN QUANT: CPT | Performed by: EMERGENCY MEDICINE

## 2022-09-18 RX ORDER — CARVEDILOL 12.5 MG/1
25 TABLET ORAL 2 TIMES DAILY WITH MEALS
Status: DISCONTINUED | OUTPATIENT
Start: 2022-09-18 | End: 2022-09-18

## 2022-09-18 RX ORDER — CARVEDILOL 12.5 MG/1
12.5 TABLET ORAL 2 TIMES DAILY WITH MEALS
Status: DISCONTINUED | OUTPATIENT
Start: 2022-09-18 | End: 2022-09-19 | Stop reason: HOSPADM

## 2022-09-18 RX ADMIN — BUSPIRONE HYDROCHLORIDE 5 MG: 5 TABLET ORAL at 08:47

## 2022-09-18 RX ADMIN — BUSPIRONE HYDROCHLORIDE 5 MG: 5 TABLET ORAL at 21:53

## 2022-09-18 RX ADMIN — PANTOPRAZOLE SODIUM 40 MG: 40 TABLET, DELAYED RELEASE ORAL at 18:20

## 2022-09-18 RX ADMIN — SUCRALFATE 1 G: 1 TABLET ORAL at 11:45

## 2022-09-18 RX ADMIN — SUCRALFATE 1 G: 1 TABLET ORAL at 21:53

## 2022-09-18 RX ADMIN — SODIUM CHLORIDE 75 ML/HR: 0.9 INJECTION, SOLUTION INTRAVENOUS at 00:31

## 2022-09-18 RX ADMIN — Medication: at 08:47

## 2022-09-18 RX ADMIN — ACETAMINOPHEN 650 MG: 325 TABLET, FILM COATED ORAL at 11:46

## 2022-09-18 RX ADMIN — SUCRALFATE 1 G: 1 TABLET ORAL at 06:25

## 2022-09-18 RX ADMIN — ESCITALOPRAM OXALATE 10 MG: 10 TABLET ORAL at 00:30

## 2022-09-18 RX ADMIN — BUSPIRONE HYDROCHLORIDE 5 MG: 5 TABLET ORAL at 00:30

## 2022-09-18 RX ADMIN — ESCITALOPRAM OXALATE 10 MG: 10 TABLET ORAL at 21:53

## 2022-09-18 RX ADMIN — SUCRALFATE 1 G: 1 TABLET ORAL at 18:20

## 2022-09-18 RX ADMIN — ACETAMINOPHEN 650 MG: 325 TABLET, FILM COATED ORAL at 18:20

## 2022-09-18 RX ADMIN — APIXABAN 5 MG: 5 TABLET, FILM COATED ORAL at 18:20

## 2022-09-18 RX ADMIN — SUCRALFATE 1 G: 1 TABLET ORAL at 00:30

## 2022-09-18 RX ADMIN — SODIUM CHLORIDE 75 ML/HR: 0.9 INJECTION, SOLUTION INTRAVENOUS at 21:53

## 2022-09-18 RX ADMIN — CARVEDILOL 12.5 MG: 12.5 TABLET, FILM COATED ORAL at 18:20

## 2022-09-18 RX ADMIN — Medication: at 21:53

## 2022-09-18 RX ADMIN — PANTOPRAZOLE SODIUM 40 MG: 40 TABLET, DELAYED RELEASE ORAL at 06:25

## 2022-09-18 RX ADMIN — BUSPIRONE HYDROCHLORIDE 5 MG: 5 TABLET ORAL at 18:20

## 2022-09-18 RX ADMIN — Medication: at 01:57

## 2022-09-18 RX ADMIN — APIXABAN 5 MG: 5 TABLET, FILM COATED ORAL at 08:47

## 2022-09-18 NOTE — PLAN OF CARE
Problem: MOBILITY - ADULT  Goal: Maintain or return to baseline ADL function  Description: INTERVENTIONS:  -  Assess patient's ability to carry out ADLs; assess patient's baseline for ADL function and identify physical deficits which impact ability to perform ADLs (bathing, care of mouth/teeth, toileting, grooming, dressing, etc )  - Assess/evaluate cause of self-care deficits   - Assess range of motion  - Assess patient's mobility; develop plan if impaired  - Assess patient's need for assistive devices and provide as appropriate  - Encourage maximum independence but intervene and supervise when necessary  - Involve family in performance of ADLs  - Assess for home care needs following discharge   - Consider OT consult to assist with ADL evaluation and planning for discharge  - Provide patient education as appropriate  Outcome: Progressing  Goal: Maintains/Returns to pre admission functional level  Description: INTERVENTIONS:  - Perform BMAT or MOVE assessment daily    - Set and communicate daily mobility goal to care team and patient/family/caregiver  - Collaborate with rehabilitation services on mobility goals if consulted  - Perform Range of Motion  times a day  - Reposition patient every  hours    - Dangle patient  times a day  - Stand patient  times a day  - Ambulate patient  times a day  - Out of bed to chair  times a day   - Out of bed for meals  times a day  - Out of bed for toileting  - Record patient progress and toleration of activity level   Outcome: Progressing     Problem: Prexisting or High Potential for Compromised Skin Integrity  Goal: Skin integrity is maintained or improved  Description: INTERVENTIONS:  - Identify patients at risk for skin breakdown  - Assess and monitor skin integrity  - Assess and monitor nutrition and hydration status  - Monitor labs   - Assess for incontinence   - Turn and reposition patient  - Assist with mobility/ambulation  - Relieve pressure over bony prominences  - Avoid friction and shearing  - Provide appropriate hygiene as needed including keeping skin clean and dry  - Evaluate need for skin moisturizer/barrier cream  - Collaborate with interdisciplinary team   - Patient/family teaching  - Consider wound care consult   Outcome: Progressing     Problem: PAIN - ADULT  Goal: Verbalizes/displays adequate comfort level or baseline comfort level  Description: Interventions:  - Encourage patient to monitor pain and request assistance  - Assess pain using appropriate pain scale  - Administer analgesics based on type and severity of pain and evaluate response  - Implement non-pharmacological measures as appropriate and evaluate response  - Consider cultural and social influences on pain and pain management  - Notify physician/advanced practitioner if interventions unsuccessful or patient reports new pain  Outcome: Progressing     Problem: INFECTION - ADULT  Goal: Absence or prevention of progression during hospitalization  Description: INTERVENTIONS:  - Assess and monitor for signs and symptoms of infection  - Monitor lab/diagnostic results  - Monitor all insertion sites, i e  indwelling lines, tubes, and drains  - Monitor endotracheal if appropriate and nasal secretions for changes in amount and color  - Ladora appropriate cooling/warming therapies per order  - Administer medications as ordered  - Instruct and encourage patient and family to use good hand hygiene technique  - Identify and instruct in appropriate isolation precautions for identified infection/condition  Outcome: Progressing  Goal: Absence of fever/infection during neutropenic period  Description: INTERVENTIONS:  - Monitor WBC    Outcome: Progressing     Problem: DISCHARGE PLANNING  Goal: Discharge to home or other facility with appropriate resources  Description: INTERVENTIONS:  - Identify barriers to discharge w/patient and caregiver  - Arrange for needed discharge resources and transportation as appropriate  - Identify discharge learning needs (meds, wound care, etc )  - Arrange for interpretive services to assist at discharge as needed  - Refer to Case Management Department for coordinating discharge planning if the patient needs post-hospital services based on physician/advanced practitioner order or complex needs related to functional status, cognitive ability, or social support system  Outcome: Progressing     Problem: Knowledge Deficit  Goal: Patient/family/caregiver demonstrates understanding of disease process, treatment plan, medications, and discharge instructions  Description: Complete learning assessment and assess knowledge base    Interventions:  - Provide teaching at level of understanding  - Provide teaching via preferred learning methods  Outcome: Progressing

## 2022-09-18 NOTE — ASSESSMENT & PLAN NOTE
· Chronic   · No signs/symptoms of cellulitis or systemic infection- patient endorses improvement in the swelling and chronic redness of her bilateral LE   · Monitor off abx   · Continue Lac-Hydrin lotion BID   · Elevate/compression    · Follow up with vascular surgery as outpatient

## 2022-09-18 NOTE — ED PROVIDER NOTES
History  Chief Complaint   Patient presents with    Hypotension     Patients daughter has been taking her blood pressure throughout the day and it has been low  1230- 102/60, 1430- 107/65, 1900- 91/68 and 106/67  Patient has visiting nursing that told her daughter to check it more frequently this morning      Patient patient is a 81-year-old female with a history of atrial fibrillation, CHF and recent repair of femur fracture who presents with hypotension  Patient was recently discharged from rehab facility and has a visiting nurse come to her home  The nurse noted low blood pressures morning and they were being monitored throughout the day  Patient continued to have borderline low blood pressures  Patient also reported lightheadedness with ambulation today  She history seemed unsteady when using her walker according to family  Patient denies any complaints at this time  She denies chest pain, shortness of breath, palpitations or other concerns  History provided by:  Patient and relative  Medical Problem  Quality:  Hypotension  Duration:  1 day  Timing:  Intermittent  Chronicity:  New  Ineffective treatments:  Po fluids  Associated symptoms: no abdominal pain, no chest pain, no cough, no diarrhea, no fever, no headaches, no nausea, no rash, no shortness of breath, no sore throat and no vomiting        Prior to Admission Medications   Prescriptions Last Dose Informant Patient Reported?  Taking?   acetaminophen (TYLENOL) 325 mg tablet 9/17/2022 at Unknown time  No Yes   Sig: Take 3 tablets (975 mg total) by mouth every 8 (eight) hours   ammonium lactate (LAC-HYDRIN) 12 % cream   No No   Sig: Apply topically 2 (two) times a day   apixaban (Eliquis) 5 mg 9/17/2022 at Unknown time  No Yes   Sig: Take 1 tablet (5 mg total) by mouth 2 (two) times a day   bisacodyl (DULCOLAX) 10 mg suppository   No No   Sig: Insert 1 suppository (10 mg total) into the rectum daily as needed for constipation   busPIRone (BUSPAR) 5 mg tablet 9/17/2022 at Unknown time  No Yes   Sig: Take 1 tablet (5 mg total) by mouth 3 (three) times a day   carvedilol (COREG) 25 mg tablet 9/17/2022 at Unknown time  No Yes   Sig: Take 1 tablet (25 mg total) by mouth 2 (two) times a day with meals   escitalopram (LEXAPRO) 10 mg tablet 9/16/2022 at Unknown time  No Yes   Sig: Take 1 tablet (10 mg total) by mouth daily   losartan (COZAAR) 100 MG tablet 9/17/2022 at Unknown time Spouse/Significant Other No Yes   Sig: Take 1 tablet (100 mg total) by mouth in the morning     oxyCODONE-acetaminophen (PERCOCET)  mg per tablet   Yes No   Sig: Take 1 tablet by mouth every 8 (eight) hours as needed   pantoprazole (PROTONIX) 40 mg tablet 9/17/2022 at Unknown time  No Yes   Sig: Take 1 tablet (40 mg total) by mouth 2 (two) times a day before meals   polyethylene glycol (MIRALAX) 17 g packet   No No   Sig: Take 17 g by mouth daily   potassium chloride (K-DUR,KLOR-CON) 20 mEq tablet 9/17/2022 at Unknown time Spouse/Significant Other No Yes   Sig: Take 2 tablets (40 mEq total) by mouth daily   senna-docusate sodium (SENOKOT S) 8 6-50 mg per tablet   No No   Sig: Take 2 tablets by mouth 2 (two) times a day   sucralfate (CARAFATE) 1 g tablet 9/17/2022 at Unknown time  No Yes   Sig: Take 1 tablet (1 g total) by mouth 4 (four) times a day (before meals and at bedtime)   torsemide (DEMADEX) 20 mg tablet 9/17/2022 at Unknown time Spouse/Significant Other No Yes   Sig: Take 2 tablets (40 mg total) by mouth daily      Facility-Administered Medications: None       Past Medical History:   Diagnosis Date    [facility]    Anxiety     Arthritis     Back pain     Cellulitis     [facility])     Edema of both lower extremities due to peripheral venous insufficiency     Edema of both lower extremities due to peripheral venous insufficiency     Erythema of lower extremity 11/12/2021    Fibromyalgia     Hypertension     [facility]        Past Surgical History:   Procedure Laterality Date    KNEE CARTILAGE SURGERY      IL OPEN RX FEMUR FX+INTRAMED SHAN Left 8/22/2022    Procedure: LEFT RETROGRADE IM SHAN;  Surgeon: Trenton Bateman MD;  Location: [facility];  Service: Orthopedics    REPLACEMENT TOTAL KNEE BILATERAL         Family History   Problem Relation Age of Onset    Heart disease Mother     Cancer Father      I have reviewed and agree with the history as documented  E-Cigarette/Vaping    E-Cigarette Use Never User      E-Cigarette/Vaping Substances     Social History     Tobacco Use    Smoking status: Former Smoker     Types: Cigarettes    Smokeless tobacco: Never Used   Vaping Use    Vaping Use: Never used   Substance Use Topics    Alcohol use: Not Currently    Drug use: Never       Review of Systems   Constitutional: Negative for chills, diaphoresis and fever  HENT: Negative for nosebleeds, sore throat and trouble swallowing  Eyes: Negative for photophobia, pain and visual disturbance  Respiratory: Negative for cough, chest tightness and shortness of breath  Cardiovascular: Negative for chest pain, palpitations and leg swelling  Gastrointestinal: Negative for abdominal pain, constipation, diarrhea, nausea and vomiting  Endocrine: Negative for polydipsia and polyuria  Genitourinary: Negative for difficulty urinating, dysuria, hematuria, pelvic pain, vaginal bleeding and vaginal discharge  Musculoskeletal: Negative for back pain, neck pain and neck stiffness  Skin: Negative for pallor and rash  Neurological: Negative for dizziness, seizures, light-headedness and headaches  All other systems reviewed and are negative  Physical Exam  Physical Exam  Vitals and nursing note reviewed  Constitutional:       General: She is not in acute distress  Appearance: She is well-developed  HENT:      Head: Normocephalic and atraumatic     Eyes:      Pupils: Pupils are equal, round, and reactive to light  Cardiovascular:      Rate and Rhythm: Normal rate and regular rhythm  Pulses: Normal pulses  Heart sounds: Normal heart sounds  Pulmonary:      Effort: Pulmonary effort is normal  No respiratory distress  Breath sounds: Normal breath sounds  Abdominal:      General: There is no distension  Palpations: Abdomen is soft  Abdomen is not rigid  Tenderness: There is no abdominal tenderness  There is no guarding or rebound  Musculoskeletal:         General: No tenderness  Normal range of motion  Cervical back: Normal range of motion and neck supple  Right lower leg: Edema present  Left lower leg: Edema present  Lymphadenopathy:      Cervical: No cervical adenopathy  Skin:     General: Skin is warm and dry  Capillary Refill: Capillary refill takes less than 2 seconds  Neurological:      Mental Status: She is alert and oriented to person, place, and time  Cranial Nerves: No cranial nerve deficit  Sensory: No sensory deficit           Vital Signs  ED Triage Vitals   Temperature Pulse Respirations Blood Pressure SpO2   09/17/22 2021 09/17/22 2021 09/17/22 2021 09/17/22 2021 09/17/22 2021   98 °F (36 7 °C) 75 18 (!) 109/49 98 %      Temp Source Heart Rate Source Patient Position - Orthostatic VS BP Location FiO2 (%)   09/17/22 2021 09/17/22 2021 09/17/22 2021 09/17/22 2021 --   Oral Monitor Lying Right arm       Pain Score       09/18/22 0043       No Pain           Vitals:    09/18/22 0722 09/18/22 0900 09/18/22 0903 09/18/22 0906   BP: 98/53 126/61 122/62 (!) 148/118   Pulse: 78 89 75 90   Patient Position - Orthostatic VS:  Lying - Orthostatic VS Sitting - Orthostatic VS Standing - Orthostatic VS         Visual Acuity      ED Medications  Medications   ammonium lactate (LAC-HYDRIN) 12 % cream ( Topical Given 9/18/22 0847)   apixaban (ELIQUIS) tablet 5 mg (5 mg Oral Given 9/18/22 0847)   busPIRone (BUSPAR) tablet 5 mg (5 mg Oral Given 9/18/22 0847)   escitalopram (LEXAPRO) tablet 10 mg (10 mg Oral Given 9/18/22 0030)   pantoprazole (PROTONIX) EC tablet 40 mg (40 mg Oral Given 9/18/22 0625)   sucralfate (CARAFATE) tablet 1 g (1 g Oral Given 9/18/22 0625)   sodium chloride 0 9 % infusion (75 mL/hr Intravenous New Bag 9/18/22 0031)   acetaminophen (TYLENOL) tablet 650 mg (has no administration in time range)   ondansetron (ZOFRAN) injection 4 mg (has no administration in time range)   carvedilol (COREG) tablet 12 5 mg (has no administration in time range)   sodium chloride 0 9 % bolus 500 mL (0 mL Intravenous Stopped 9/17/22 2229)       Diagnostic Studies  Results Reviewed     Procedure Component Value Units Date/Time    Basic metabolic panel [[id number]]  (Abnormal) Collected: 09/18/22 0632    Lab Status: Final result Specimen: Blood from Hand, Left Updated: 09/18/22 0707     Sodium 134 mmol/L      Potassium 3 6 mmol/L      Chloride 99 mmol/L      CO2 28 mmol/L      ANION GAP 7 mmol/L      BUN 37 mg/dL      Creatinine 1 89 mg/dL      Glucose 109 mg/dL      Glucose, Fasting 109 mg/dL      Calcium 9 1 mg/dL      eGFR 25 ml/min/1 73sq m     Narrative:      [place] guidelines for Chronic Kidney Disease (CKD):     Stage 1 with normal or high GFR (GFR > 90 mL/min/1 73 square meters)    Stage 2 Mild CKD (GFR = 60-89 mL/min/1 73 square meters)    Stage 3A Moderate CKD (GFR = 45-59 mL/min/1 73 square meters)    Stage 3B Moderate CKD (GFR = 30-44 mL/min/1 73 square meters)    Stage 4 Severe CKD (GFR = 15-29 mL/min/1 73 square meters)    Stage 5 End Stage CKD (GFR <15 mL/min/1 73 square meters)  Note: GFR calculation is accurate only with a steady state creatinine    CBC and differential [[id number]]  (Abnormal) Collected: 09/18/22 0632    Lab Status: Final result Specimen: Blood from Hand, Left Updated: 09/18/22 0651     WBC 8 72 Thousand/uL      RBC 3 39 Million/uL      Hemoglobin 9 9 g/dL      Hematocrit 31 8 %      MCV 94 fL MCH 29 2 pg      MCHC 31 1 g/dL      RDW 14 1 %      MPV 10 9 fL      Platelets 415 Thousands/uL      nRBC 0 /100 WBCs      Neutrophils Relative 53 %      Immat GRANS % 1 %      Lymphocytes Relative 29 %      Monocytes Relative 9 %      Eosinophils Relative 7 %      Basophils Relative 1 %      Neutrophils Absolute 4 76 Thousands/µL      Immature Grans Absolute 0 04 Thousand/uL      Lymphocytes Absolute 2 49 Thousands/µL      Monocytes Absolute 0 81 Thousand/µL      Eosinophils Absolute 0 58 Thousand/µL      Basophils Absolute 0 04 Thousands/µL     Urea nitrogen, urine [[id number]] Collected: 09/17/22 2206    Lab Status: Final result Specimen: Urine Updated: 09/18/22 0527     Urea Nitrogen, Ur 227 mg/dL     HS Troponin I 4hr [[id number]]  (Normal) Collected: 09/18/22 0220    Lab Status: Final result Specimen: Blood from Arm, Left Updated: 09/18/22 0301     hs TnI 4hr 7 ng/L      Delta 4hr hsTnI 0 ng/L     HS Troponin I 2hr [[id number]]  (Normal) Collected: 09/17/22 2344    Lab Status: Final result Specimen: Blood from Arm, Right Updated: 09/18/22 0021     hs TnI 2hr 7 ng/L      Delta 2hr hsTnI 0 ng/L     Creatinine, urine, random [[id number]] Collected: 09/17/22 2206    Lab Status: Final result Specimen: Urine Updated: 09/18/22 0006     Creatinine, Ur 122 0 mg/dL     Urine culture [[id number]] Collected: 09/17/22 2206    Lab Status:  In process Specimen: Urine Updated: 09/17/22 2349    Urine Microscopic [[id number]]  (Abnormal) Collected: 09/17/22 2206    Lab Status: Final result Specimen: Urine, Other Updated: 09/17/22 2221     RBC, UA None Seen /hpf      WBC, UA 4-10 /hpf      Epithelial Cells Occasional /hpf      Bacteria, UA Occasional /hpf      Hyaline Casts, UA 25-50 /lpf     UA (URINE) with reflex to Scope [[id number]]  (Abnormal) Collected: 09/17/22 2206    Lab Status: Final result Specimen: Urine, Other Updated: 09/17/22 2217     Color, UA Light Yellow     Clarity, UA Clear     Specific [place], UA 1 016 pH, UA 5 0     Leukocytes, UA Moderate     Nitrite, UA Negative     Protein, UA Trace mg/dl      Glucose, UA Negative mg/dl      Ketones, UA Negative mg/dl      Urobilinogen, UA <2 0 mg/dl      Bilirubin, UA Negative     Occult Blood, UA Negative    HS Troponin 0hr (reflex protocol) [[id number]]  (Normal) Collected: 09/17/22 2127    Lab Status: Final result Specimen: Blood from Arm, Right Updated: 09/17/22 2207     hs TnI 0hr 7 ng/L     Comprehensive metabolic panel [[id number]]  (Abnormal) Collected: 09/17/22 2127    Lab Status: Final result Specimen: Blood from Arm, Right Updated: 09/17/22 2157     Sodium 130 mmol/L      Potassium 4 2 mmol/L      Chloride 92 mmol/L      CO2 29 mmol/L      ANION GAP 9 mmol/L      BUN 36 mg/dL      Creatinine 2 60 mg/dL      Glucose 110 mg/dL      Calcium 9 8 mg/dL      AST 15 U/L      ALT 11 U/L      Alkaline Phosphatase 153 U/L      Total Protein 7 3 g/dL      Albumin 4 1 g/dL      Total Bilirubin 0 44 mg/dL      eGFR 17 ml/min/1 73sq m     Narrative:      [facility] guidelines for Chronic Kidney Disease (CKD):     Stage 1 with normal or high GFR (GFR > 90 mL/min/1 73 square meters)    Stage 2 Mild CKD (GFR = 60-89 mL/min/1 73 square meters)    Stage 3A Moderate CKD (GFR = 45-59 mL/min/1 73 square meters)    Stage 3B Moderate CKD (GFR = 30-44 mL/min/1 73 square meters)    Stage 4 Severe CKD (GFR = 15-29 mL/min/1 73 square meters)    Stage 5 End Stage CKD (GFR <15 mL/min/1 73 square meters)  Note: GFR calculation is accurate only with a steady state creatinine    CBC and differential [[id number]]  (Abnormal) Collected: 09/17/22 2127    Lab Status: Final result Specimen: Blood from Arm, Right Updated: 09/17/22 2137     WBC 9 64 Thousand/uL      RBC 3 57 Million/uL      Hemoglobin 10 4 g/dL      Hematocrit 33 7 %      MCV 94 fL      MCH 29 1 pg      MCHC 30 9 g/dL      RDW 14 0 %      MPV 10 9 fL      Platelets 749 Thousands/uL      nRBC 0 /100 WBCs Neutrophils Relative 62 %      Immat GRANS % 0 %      Lymphocytes Relative 24 %      Monocytes Relative 7 %      Eosinophils Relative 6 %      Basophils Relative 1 %      Neutrophils Absolute 5 98 Thousands/µL      Immature Grans Absolute 0 04 Thousand/uL      Lymphocytes Absolute 2 31 Thousands/µL      Monocytes Absolute 0 70 Thousand/µL      Eosinophils Absolute 0 56 Thousand/µL      Basophils Absolute 0 05 Thousands/µL                  No orders to display              Procedures  ECG 12 Lead Documentation Only    Date/Time: 9/17/2022 10:14 PM  Performed by: Debra Gamboa DO  Authorized by: Debra Gamboa DO     ECG reviewed by me, the ED Provider: yes    Patient location:  ED  Previous ECG:     Previous ECG:  Compared to current    Similarity:  No change    Comparison to cardiac monitor: Yes    Comments:      Atrial fibrillation at a rate of 79 beats per minute  Normal axis  Normal QRS  Nonspecific ST T wave abnormalities  No change from previous from 08/20/2022  ED Course                                             MDM  Number of Diagnoses or Management Options  LEXY (acute kidney injury) [facility]): new and requires workup  Hypotension: new and requires workup  Diagnosis management comments: Patient presents with hypotension  I suspect this is secondary to volume depletion  She has had decreased p o  intake and is on a diuretic  Her creatinine is significantly elevated compared to baseline  Will give IV fluids and hospitalize overnight for continued hydration  Portions of the above record have been created with voice recognition software   Occasional wrong word or "sound alike" substitutions may have occurred due to the inherent limitations of voice recognition software   Read the chart carefully and recognize, using context, where substitutions may have occurred           Amount and/or Complexity of Data Reviewed  Clinical lab tests: ordered and reviewed  Tests in the medicine section of CPT®: ordered and reviewed  Review and summarize past medical records: yes  Discuss the patient with other providers: yes  Independent visualization of images, tracings, or specimens: yes    Risk of Complications, Morbidity, and/or Mortality  Presenting problems: high  Diagnostic procedures: moderate  Management options: moderate    Patient Progress  Patient progress: stable      Disposition  Final diagnoses:   Hypotension   LE[facility] ([facility] kidney injury) ([facility] )     Time reflects when diagnosis was documented in both MDM as applicable and the Disposition within this note     Time User Action Codes Description Comment    9/17/2022 10:23 PM Murriel Repine L Add [I95 9] Hypotension     9/17/2022 10:23 PM Murriel Repine L Add [N17 9] LEXY (acute kidney injury) [facility])       ED Disposition     ED Disposition   Admit    Condition   Stable    Date/Time   Sat Sep 17, 2022 10:23 PM    Comment   Case was discussed with SHERRI and the patient's admission status was agreed to be Admission Status: observation status to the service of Dr Alisha Morales   Follow-up Information    None         Current Discharge Medication List      CONTINUE these medications which have NOT CHANGED    Details   acetaminophen (TYLENOL) 325 mg tablet Take 3 tablets (975 mg total) by mouth every 8 (eight) hours  Refills: 0    Associated Diagnoses: Closed fracture of left distal femur (HCC)      apixaban (Eliquis) 5 mg Take 1 tablet (5 mg total) by mouth 2 (two) times a day  Qty: 60 tablet, Refills: 0    Associated Diagnoses: Longstanding persistent atrial fibrillation (HCC)      busPIRone (BUSPAR) 5 mg tablet Take 1 tablet (5 mg total) by mouth 3 (three) times a day  Qty: 90 tablet, Refills: 0    Associated Diagnoses: Anxiety      carvedilol (COREG) 25 mg tablet Take 1 tablet (25 mg total) by mouth 2 (two) times a day with meals  Qty: 60 tablet, Refills: 0    Associated Diagnoses: Long[facility]  Chronic diastolic heart failure (HCC)      escitalopram (LEXAPRO) 10 mg tablet Take 1 tablet (10 mg total) by mouth daily  Qty: 30 tablet, Refills: 0    Associated Diagnoses: Anxiety      losartan (COZAAR) 100 MG tablet Take 1 tablet (100 mg total) by mouth in the morning    Qty: 90 tablet, Refills: 1    Associated Diagnoses: Essential hypertension      pantoprazole (PROTONIX) 40 mg tablet Take 1 tablet (40 mg total) by mouth 2 (two) times a day before meals  Qty: 60 tablet, Refills: 1    Associated Diagnoses: Melena      potassium chloride (K-DUR,KLOR-CON) 20 mEq tablet Take 2 tablets (40 mEq total) by mouth daily  Qty: 180 tablet, Refills: 1    Associated Diagnoses: CHF (congestive heart failure) ([facility])      sucralfate (CARAFATE) 1 g tablet Take 1 tablet (1 g total) by mouth 4 (four) times a day (before meals and at bedtime)  Qty: 120 tablet, Refills: 0    Associated Diagnoses: Melena      torsemide (DEMADEX) 20 mg tablet Take 2 tablets (40 mg total) by mouth daily  Qty: 180 tablet, Refills: 0    Associated Diagnoses: CHF (congestive heart failure) ([facility])      ammonium lactate (LAC-HYDRIN) 12 % cream Apply topically 2 (two) times a day  Qty: 385 g, Refills: 0    Associated Diagnoses: Venous stasis dermatitis of both lower extremities      bisacodyl (DULCOLAX) 10 mg suppository Insert 1 suppository (10 mg total) into the rectum daily as needed for constipation  Qty: 12 suppository, Refills: 0    Associated Diagnoses: Closed fracture of left distal femur ([facility])      oxyCODONE-acetaminophen (PERCOCET)  mg per tablet Take 1 tablet by mouth every 8 (eight) hours as needed      polyethylene glycol (MIRALAX) 17 g packet Take 17 g by mouth daily  Refills: 0    Associated Diagnoses: Closed fracture of left distal femur ([facility])      senna-docusate sodium (SENOKOT S) 8 6-50 mg per tablet Take 2 tablets by mouth 2 (two) times a day  Refills: 0    Associated Diagnoses: Closed fracture of left distal femur ([facility])             No discharge procedures on file     PDMP Review       Value Time User    PDMP Reviewed  Yes 9/7/2022  5:19 PM Sharyle Beans, [place]          ED Provider  Electronically Signed by           Edmond Savage DO  09/18/22 1149

## 2022-09-18 NOTE — ASSESSMENT & PLAN NOTE
POA: Patient presents with hypotension readings at home by [facility] RN SBP in 90-100s with associated dizziness/fatigue/nausea  Reports decreased PO intake  Was recently discharged from [facility] after left femur fx       BP Readings from Last 3 Encounters:   09/18/22 (!) 148/118   09/13/22 122/66   09/07/22 109/53     Plan:  · Rpt orthostatics   · Continue to hold Losartan and torsemide - will likely need decreased dose of diuretic and losartan at discharge   · During admit, education given to patient and family at bedside to check blood pressure prior to taking medication and hold for SBP < 110mmHg  · Gentle IVF- noted hx of chronic diastolic HF   · PT/OT- of note, does have [facility] set up currently

## 2022-09-18 NOTE — ASSESSMENT & PLAN NOTE
Recent Labs     09/17/22 2127 09/18/22  0632   CREATININE 2 60* 1 89*   EGFR 17 25     Estimated Creatinine Clearance: 28 6 mL/min (A) (by C-G formula based on SCr of 1 89 mg/dL (H))  Cr w/ downtrend  Superimposed on stage 3B CKD   Suspect pre-renal in setting of dehydration/poor PO intake and hypotension   · Cr 2 6 on admission   · Baseline 0 7-0 9      Plan:   Urinary retention protocol, Bladder scan, Daily weights, I/O  · Gentle IVF   · Continue to hold PTA losartan and torsemide   · Continue to monitor BMP

## 2022-09-18 NOTE — ASSESSMENT & PLAN NOTE
LMTCB- pt post op day 1 T/A · Hx of ABLA 2/2 melena and recent left femur fx s/o ORIF with retrograde IM zoë on 08/22/2022   · Hgb stable today at 10 4   · She currently denies any melena/hematochezia  · EGD 08/21/22 showing single crater, benign ulcer in the antrum, epinephrine injected to stop bleeding and biopsies taken   · Continue protonix BID and carafate  · Cleared by GI to continue Eliquis   · Avoid NSAIDs

## 2022-09-18 NOTE — ASSESSMENT & PLAN NOTE
· Superimposed on stage 3B CKD   · Cr 2 6 on admission   · Baseline 0 7-0 9    · Check urine studies   · Suspect pre-renal in setting of dehydration/poor PO intake and hypotension   · Gentle IVF   · Holding PTA losartan and torsemide   · If creatinine worsens tomorrow consider nephrology consult

## 2022-09-18 NOTE — ASSESSMENT & PLAN NOTE
Chronic   Remains w/out signs/symptoms of cellulitis or systemic infection; Pt reports improvement in the swelling and chronic redness of her bilateral LE     Plan:  · Monitor off abx   · Continue Lac-Hydrin lotion BID   · Elevate/compression    · Follow up with vascular surgery as outpatient

## 2022-09-18 NOTE — PLAN OF CARE
Problem: PHYSICAL THERAPY ADULT  Goal: Performs mobility at highest level of function for planned discharge setting  See evaluation for individualized goals  Description: Treatment/Interventions: Functional transfer training, LE strengthening/ROM, Therapeutic exercise, Elevations, Patient/family training, Equipment eval/education, Bed mobility, Gait training, Cognitive reorientation, Endurance training  Equipment Recommended: Gerardo Florez       See flowsheet documentation for full assessment, interventions and recommendations  Note: Prognosis: Fair  Problem List: Decreased strength, Decreased endurance, Impaired balance, Decreased mobility, Decreased safety awareness, Obesity  Assessment: Pt is a 77 y o  female seen for PT evaluation s/p admit to [address] [place] [address] on 8/18/2022 w/ UTI (urinary tract infection)  Order placed for PT  Comorbidities affecting pt's physical performance at time of assessment include: HTN, obesity and limited cognition  Personal factors affecting pt at time of IE include: anxiety and steps to enter environment  Prior to admission, pt was living in a [facility] with 2 GLORIA and stairglide to 2nd floor with  since recent discharge home from rehab  Pt was still requiring assist for transfers out of chairs at home, however able to ambulate with supervision and RW  Upon evaluation: Pt requires supervision for bed mobility, min A for sit to stand, and min A for ambulation with RW  (Please find full objective findings from PT assessment regarding body systems outlined above)  Impairments and limitations also listed above, especially due to  weakness, impaired balance, decreased endurance, gait deviations, decreased activity tolerance, decreased safety awareness, fall risk and decreased cognition  Pt's clinical presentation is currently unstable/unpredictable seen in pt's presentation of fall risk, decrease in BP after mobility, and decreased insight into deficits    Pt to benefit from continued skilled PT tx while in hospital and upon DC to address deficits as defined above and maximize level of functional mobility  From PT/mobility standpoint, recommendation at time of d/c would be Home PT vs  STR and with rolling walker pending progress/level of assist available at home  Home PT more likely as pt is close to her functional level since discharge home from rehab  Recommend progression of ambulation and initiation of HEP as appropriate  PT Discharge Recommendation: Home with home health rehabilitation (HHPT > rehab pending progress and level of assist available at home)    See flowsheet documentation for full assessment

## 2022-09-18 NOTE — ASSESSMENT & PLAN NOTE
Admitted with hypotension     Plan:  · Holding losartan and torsemide   · Continue coreg with hold parameter  · Repeat orthostatics

## 2022-09-18 NOTE — H&P
[facility] 1945[facility] 77 [facility]  [facility] MRN: [id number]  Unit/Bed#: ED-04 Encounter: [id number]  Primary Care Provider: Juan C العراقي MD   Date and time admitted to hospital: 9/17/2022  8:08 PM    * Hypotension  Assessment & Plan  Patient presents with hypotension readings at home by [facility] RN SBP in 90-100s with associated dizziness/fatigue/nausea  Reports decreased PO intake  Was recently discharged from [facility] after left femur fx     · Check orthostatic BP   · Hold Losartan and torsemide - will likely need decreased dose of diuretic and losartan at discharge   · Education given to patient and family at bedside to check blood pressure prior to taking medication and hold for SBP < 110mmHg  · Gentle IVF- noted hx of chronic diastolic HF   · PT/OT- of note, does have [facility] set up currently       LEXY (acute kidney injury) ([facility] [address] )  Assessment & Plan  · Superimposed on stage 3B CKD   · Cr 2 6 on admission   · Baseline 0 7-0 9    · Check urine studies   · Suspect pre-renal in setting of dehydration/poor PO intake and hypotension   · Gentle IVF   · Holding PTA losartan and torsemide   · If creatinine worsens tomorrow consider nephrology consult     Hyponatremia  Assessment & Plan  · Sodium 130   · Suspect 2/2 hypovolemia   · Receiving gentle IVF  · Continue to trend     Anemia  Assessment & Plan  · Hx of ABLA 2/2 melena and recent left femur fx s/o ORIF with retrograde IM zoë on 08/22/2022   · Hgb stable today at 10 4   · She currently denies any melena/hematochezia  · EGD 08/21/22 showing single crater, benign ulcer in the antrum, epinephrine injected to stop bleeding and biopsies taken   · Continue protonix BID and carafate  · Cleared by GI to continue Eliquis   · Avoid NSAIDs     Chronic diastolic heart failure (HCC)  Assessment & Plan  Wt Readings from Last 3 Encounters:   09/13/22 103 kg (227 lb)   09/07/22 103 kg (227 lb)   09/06/22 103 kg (227 lb)     · Appears dry   · Patient has chronic lymphedema to bilateral LE   · Holding PTA torsemide 40mg daily   · Gentle IVF - monitor volume status closely   · Intake/output, daily weights         Venous stasis dermatitis of both lower extremities  Assessment & Plan  · Chronic   · No signs/symptoms of cellulitis or systemic infection- patient endorses improvement in the swelling and chronic redness of her bilateral LE   · Monitor off abx   · Continue Lac-Hydrin lotion BID   · Elevate/compression    · Follow up with vascular surgery as outpatient     Pyuria  Assessment & Plan  · UA with WBC and leukocytes   · Patient is asymptomatic   · Monitor off abx     Anxiety  Assessment & Plan  · Continue Lexapro and Buspar   · Valium has been discontinued at recent SNF stay     [facility])  Assessment & Plan  · Rate controlled   · Continue Coreg with BP hold parameters  · Continue Eliquis for anticoagulation     Essential hypertension  Assessment & Plan  · Currently with hypotension   · Holding losartan and torsemide   · Continue coreg with hold parameter    VTE Pharmacologic Prophylaxis: VTE Score: 11 High Risk (Score >/= 5) - Pharmacological DVT Prophylaxis Ordered: apixaban (Eliquis)  Sequential Compression Devices Ordered  Code Status: Level 1-Full Code   Discussion with family: Updated  ( and daughter) at bedside  Anticipated Length of Stay: Patient will be admitted on an observation basis with an anticipated length of stay of less than 2 midnights secondary to hypotension, LEXY   Total Time for Visit, including Counseling / Coordination of Care: 45 minutes Greater than 50% of this total time spent on direct patient counseling and coordination of care      Chief Complaint: hypotension     History of Present Illness:  Anastasia Jhaveri is a 77 y o  female with a PMH of diastolic CHF, HTN, anxiety, afib on eliquis, CKD who presents with hypotension readings at home by [facility] RN with SBP in 90-100s with associated dizziness/fatigue/nausea  Denies any syncopal events  Of note, patient was recently discharged from [address] [place] [address] 09/15/2022 after left femur fx  She reports that she has had decreased PO intake since she has been at home  She notes that she was not checking her blood pressure since she has been home the past 2 days until the [facility] RN came in today  Reports medication compliance  She denies any fever/chills, chest pain, SOB, palpitations, abdominal pain, n/v/d, melena, hematochezia  Review of Systems:  Review of Systems   Constitutional: Positive for appetite change and fatigue  Negative for chills and fever  HENT: Negative for sore throat  Eyes: Negative for visual disturbance  Respiratory: Negative for cough, choking, chest tightness, shortness of breath and wheezing  Cardiovascular: Negative for chest pain, palpitations and leg swelling  Gastrointestinal: Negative for abdominal pain, diarrhea, nausea and vomiting  Genitourinary: Negative for dysuria  Musculoskeletal: Positive for gait problem  Skin: Negative for color change, rash and wound  Neurological: Positive for dizziness, weakness and light-headedness  Negative for syncope  Psychiatric/Behavioral: Negative for confusion          Past Medical and Surgical History:   Past Medical History:   Diagnosis Date    [facility]-fib ([facility] ) 12/29/2021    Anxiety     Arthritis     Back pain     Cellulitis     CHF (congestive heart failure) ([facility])     Edema of both lower extremities due to peripheral venous insufficiency     Edema of both lower extremities due to peripheral venous insufficiency     Erythema of lower extremity 11/12/2021    Fibromyalgia     Hypertension     [facility]        Past Surgical History:   Procedure Laterality Date    KNEE CARTILAGE SURGERY      DE OPEN RX FEMUR FX+INTRAMED SHAN Left 8/22/2022    Procedure: LEFT RETROGRADE IM SHAN;  Surgeon: Dhruv Macias MD;  Location: [facility]; Service: Orthopedics    REPLACEMENT TOTAL KNEE BILATERAL         Meds/Allergies:  Prior to Admission medications    Medication Sig Start Date End Date Taking? Authorizing Provider   acetaminophen (TYLENOL) 325 mg tablet Take 3 tablets (975 mg total) by mouth every 8 (eight) hours 8/30/22  Yes Carter Copeland PA-C   apixaban (Eliquis) 5 mg Take 1 tablet (5 mg total) by mouth 2 (two) times a day 9/13/22 10/13/22 Yes SHAHIDA Esteban   busPIRone (BUSPAR) 5 mg tablet Take 1 tablet (5 mg total) by mouth 3 (three) times a day 9/13/22  Yes SHAHIDA Esteban   carvedilol (COREG) 25 mg tablet Take 1 tablet (25 mg total) by mouth 2 (two) times a day with meals 9/13/22 10/13/22 Yes SHAHIDA Esteban   escitalopram (LEXAPRO) 10 mg tablet Take 1 tablet (10 mg total) by mouth daily 9/13/22  Yes SHAHIDA Esteban   losartan (COZAAR) 100 MG tablet Take 1 tablet (100 mg total) by mouth in the morning   5/19/22  Yes Mady Connell MD   pantoprazole (PROTONIX) 40 mg tablet Take 1 tablet (40 mg total) by mouth 2 (two) times a day before meals 9/13/22 11/12/22 Yes SHAHIDA Esteban   potassium chloride (K-DUR,KLOR-CON) 20 mEq tablet Take 2 tablets (40 mEq total) by mouth daily 2/14/22  Yes Mady Connell MD   sucralfate (CARAFATE) 1 g tablet Take 1 tablet (1 g total) by mouth 4 (four) times a day (before meals and at bedtime) 9/13/22 10/13/22 Yes SHAHIDA Esteban   torsemide (DEMADEX) 20 mg tablet Take 2 tablets (40 mg total) by mouth daily 7/30/22 10/28/22 Yes Corina Crouch PA-C   ammonium lactate (LAC-HYDRIN) 12 % cream Apply topically 2 (two) times a day 9/7/22   SHAHIDA Esteban   bisacodyl (DULCOLAX) 10 mg suppository Insert 1 suppository (10 mg total) into the rectum daily as needed for constipation 8/30/22   Brigida Rubio PA-C   oxyCODONE-acetaminophen (PERCOCET)  mg per tablet Take 1 tablet by mouth every 8 (eight) hours as needed 8/26/22   Historical Provider, MD   polyethylene glycol (MIRALAX) 17 g packet Take 17 g by mouth daily 8/31/22   Josy Rodriguez PA-C   senna-docusate sodium (SENOKOT S) 8 6-50 mg per tablet Take 2 tablets by mouth 2 (two) times a day 8/30/22   Josy Rodriguez PA-C   tiZANidine (ZANAFLEX) 4 mg tablet Take 1 tablet by mouth every 8 (eight) hours as needed 8/26/22 9/17/22  Historical Provider, MD     I have reviewed home medications with patient family member  Allergies: No Known Allergies    Social History:  Marital Status: /Civil Union   Patient Pre-hospital Living Situation: Home  Patient Pre-hospital Level of Mobility: walks with walker  Patient Pre-hospital Diet Restrictions: cardiac  Substance Use History:   Social History     Substance and Sexual Activity   Alcohol Use Not Currently     Social History     Tobacco Use   Smoking Status Former Smoker    Types: Cigarettes   Smokeless Tobacco Never Used     Social History     Substance and Sexual Activity   Drug Use Never       Family History:  Family History   Problem Relation Age of Onset    Heart disease Mother     Cancer Father        Physical Exam:     Vitals:   Blood Pressure: (!) 96/46 (09/17/22 2130)  Pulse: 69 (09/17/22 2130)  Temperature: 98 °F (36 7 °C) (09/17/22 2021)  Temp Source: Oral (09/17/22 2021)  Respirations: 18 (09/17/22 2130)  SpO2: 97 % (09/17/22 2130)    Physical Exam  Constitutional:       General: She is not in acute distress  Appearance: She is obese  She is not ill-appearing, toxic-appearing or diaphoretic  HENT:      Mouth/Throat:      Mouth: Mucous membranes are dry  Eyes:      General: No scleral icterus  Cardiovascular:      Rate and Rhythm: Normal rate  Rhythm irregular  Heart sounds: Normal heart sounds  Pulmonary:      Effort: No respiratory distress  Breath sounds: Normal breath sounds  No wheezing, rhonchi or rales  Abdominal:      General: Abdomen is flat  Bowel sounds are normal       Palpations: Abdomen is soft  Tenderness: There is no abdominal tenderness  Musculoskeletal:      Right lower leg: Edema present  Left lower leg: Edema present  Comments: Chronic lymphedema bilateral LE    Skin:     General: Skin is warm and dry  Comments: Chronic hyperpigmentation/ venous stasis changes/redness of bilateral LE     Neurological:      Mental Status: She is alert and oriented to person, place, and time  Psychiatric:         Mood and Affect: Mood normal           Additional Data:     Lab Results:  Results from last 7 days   Lab Units 09/17/22 2127   WBC Thousand/uL 9 64   HEMOGLOBIN g/dL 10 4*   HEMATOCRIT % 33 7*   PLATELETS Thousands/uL 337   NEUTROS PCT % 62   LYMPHS PCT % 24   MONOS PCT % 7   EOS PCT % 6     Results from last 7 days   Lab Units 09/17/22 2127   SODIUM mmol/L 130*   POTASSIUM mmol/L 4 2   CHLORIDE mmol/L 92*   CO2 mmol/L 29   BUN mg/dL 36*   CREATININE mg/dL 2 60*   ANION GAP mmol/L 9   CALCIUM mg/dL 9 8   ALBUMIN g/dL 4 1   TOTAL BILIRUBIN mg/dL 0 44   ALK PHOS U/L 153*   ALT U/L 11   AST U/L 15   GLUCOSE RANDOM mg/dL 110                       Imaging: No pertinent imaging reviewed  No orders to display       EKG and Other Studies Reviewed on Admission:   · EKG: Atrial fibrillation  HR 79     ** Please Note: This note has been constructed using a voice recognition system   **

## 2022-09-18 NOTE — ASSESSMENT & PLAN NOTE
Patient presents with hypotension readings at home by [facility] RN SBP in 90-100s with associated dizziness/fatigue/nausea  Reports decreased PO intake  Was recently discharged from [facility] after left femur fx     · Check orthostatic BP   · Hold Losartan and torsemide - will likely need decreased dose of diuretic and losartan at discharge   · Education given to patient and family at bedside to check blood pressure prior to taking medication and hold for SBP < 110mmHg  · Gentle IVF- noted hx of chronic diastolic HF   · PT/OT- of note, does have [facility] set up currently

## 2022-09-18 NOTE — ASSESSMENT & PLAN NOTE
Recent Labs     09/17/22 2127 09/18/22  0632   HGB 10 4* 9 9*   MCV 94 94     Hx of ABLA 2/2 melena and recent left femur fx s/o ORIF with retrograde IM zoë on 08/22/2022     Hgb stable today at 9 9, no dark BMs or clinical s/sx of bleed   Slight decreased likely dilutional     EGD 08/21/22 showing single crater, benign ulcer in the antrum, epinephrine injected to stop bleeding and biopsies taken     Plan:  · Continue protonix BID and carafate  · Cleared by GI to continue Eliquis   · Avoid NSAIDs   · Monitor vitals

## 2022-09-18 NOTE — ASSESSMENT & PLAN NOTE
· Currently with hypotension   · Holding losartan and torsemide   · Continue coreg with hold parameter

## 2022-09-18 NOTE — ASSESSMENT & PLAN NOTE
Rate controlled     · Continue Coreg with BP hold parameters (sbp <120, Hr <50)  · Continue Eliquis for anticoagulation

## 2022-09-18 NOTE — UTILIZATION REVIEW
UNABLE TO SUBMIT VIA [employer] ON Sunday 9/18 @  1730      Initial Clinical Review    Admission: Date/Time/Statement:   Admission Orders (From admission, onward)     Ordered        09/18/22 1624  Inpatient Admission  Once            09/17/22 2223  Place in Observation  Once                      Orders Placed This Encounter    Inpatient Admission     Standing Status:   Standing     Number of Occurrences:   1     Order Specific Question:   Level of Care     Answer:   Med Surg [16]     Order Specific Question:   Estimated length of stay     Answer:   More than 2 Midnights     Order Specific Question:   Certification     Answer:   I certify that inpatient services are medically necessary for this patient for a duration of greater than two midnights  See H&P and MD Progress Notes for additional information about the patient's course of treatment  ED Arrival Information     Expected   -    Arrival   9/17/2022 20:08    Acuity   Urgent            Means of arrival   Ambulance    Escorted by   [facility]    Service   Hospitalist    Admission type   Urgent            Arrival complaint   HYPOTENTION           Chief Complaint   Patient presents with    Hypotension     Patients daughter has been taking her blood pressure throughout the day and it has been low  1230- 102/60, 1430- 107/65, 1900- 91/68 and 106/67  Patient has visiting nursing that told her daughter to check it more frequently this morning      Initial Presentation: 77 y o  female    with a history of atrial fibrillation, CHF and recent repair of femur fracture  Recently discharged from rehab facility and has a visiting nurse come to her home  Today the nurse noted low blood pressures in AM and family continued to track thru the day :  bp remained low  (BP in 90-100s)   with associated dizziness/fatigue/nausea  Family reports decreased PO intake & unsteady using her RW today  Baseline crt 0 -0 9:  Today 2 6 on admission   chronic lymphedema to bilateral LE       Will admit obs status, MS level of care  For management of hypotension , hyponatremia and LEXY on CKD 3B,  In setting of hypovolemia associated with diuretic use  Will cont Telemetry, provide  IVF hydration/Na repletion (cautious, h/o diastolic CHF)   HOLD Losartan and torsemide,  Trend orthostatic VS   Closely monitor volume status, renal indices and urine studies  Elevation/ compression b/l LE       @  2130 - given  2nd  Unit  IVF  500 cc NS     Date:  9/18      Day 2:   CHANGED TO INPATIENT STATUS,   MS level of care for ongoing correction of  Na/ volume status,  Med adjustments for bp control,  Completion of  Course IVAB      9/18   @  0031  Began IVF NS @  75 cc/hr  Today patient feels better  Creatinine has improved also  Denies any nausea vomiting  Tolerating diet -  Appetite is improving;  States still feels "weak"  W/ambulation  Will order PT/OT evals  Decrease the dose of Coreg to 12 5 q 12 hours with a with holding parameters to hold if less than 434 systolics    Ck orthostatic VS  (neg thus far)     -----------------------------------------------------------------------------------------------------------------------------------------------------------------------------------------------------    ED Triage Vitals   Temperature Pulse Respirations Blood Pressure SpO2   09/17/22 2021 09/17/22 2021 09/17/22 2021 09/17/22 2021 09/17/22 2021   98 °F (36 7 °C) 75 18 (!) 109/49 98 %      Temp Source Heart Rate Source Patient Position - Orthostatic VS BP Location FiO2 (%)   09/17/22 2021 09/17/22 2021 09/17/22 2021 09/17/22 2021 --   Oral Monitor Lying Right arm       Pain Score       09/18/22 0043       No Pain          Wt Readings from Last 1 Encounters:   09/18/22 101 kg (222 lb 10 6 oz)     09/13/22 103 kg (227 lb)  wgt    09/07/22 103 kg (227 lb)         Additional Vital Signs:    09/18/22 1500 98 2 °F  63 16 130/51 -- 95 % -- --   09/18/22 0906 -- 90 18 148/118 Abnormal  128 82 % Abnormal  -- Standing - Orthostatic VS   09/18/22 0903 -- 75 18 122/62 82 96 % -- Sitting - Orthostatic VS   09/18/22 0900 -- 89 -- 126/61 68 98 % -- Lying - Orthostatic VS   09/18/22 0722 97 9 °F  78 18 98/53 68 96 % -- --   09/18/22 0023 97 6 °F  83 18 90/50 -- 95 % None (Room air) Sitting   09/17/22 2345 -- 80 18 93/52 68 96 % None (Room air) Sitting   09/17/22 2130 -- 69 18 96/46 Abnormal  66 97 % None (Room air) --   09/17/22 2102 -- 80 18 98/50 -- 97 % None (Room air) Sitting       Pertinent Labs/Diagnostic Test Results:     9/17  ekg -  Atrial fibrillation at a rate of 79 beats per minute    Normal axis      Normal QRS   Nonspecific ST T wave abnormalities   No change from previous   from 08/20/2022      Results from last 7 days   Lab Units 09/18/22 0632 09/17/22 2127   WBC Thousand/uL 8 72 9 64   HEMOGLOBIN g/dL 9 9* 10 4*   HEMATOCRIT % 31 8* 33 7*   PLATELETS Thousands/uL 286 337   NEUTROS ABS Thousands/µL 4 76 5 98         Results from last 7 days   Lab Units 09/18/22 0632 09/17/22 2127   SODIUM mmol/L 134* 130*   POTASSIUM mmol/L 3 6 4 2   CHLORIDE mmol/L 99 92*   CO2 mmol/L 28 29   ANION GAP mmol/L 7 9   BUN mg/dL 37* 36*   CREATININE mg/dL 1 89* 2 60*   EGFR ml/min/1 73sq m 25 17   CALCIUM mg/dL 9 1 9 8     Results from last 7 days   Lab Units 09/17/22 2127   AST U/L 15   ALT U/L 11   ALK PHOS U/L 153*   TOTAL PROTEIN g/dL 7 3   ALBUMIN g/dL 4 1   TOTAL BILIRUBIN mg/dL 0 44         Results from last 7 days   Lab Units 09/18/22 0632 09/17/22 2127   GLUCOSE RANDOM mg/dL 109 110     Results from last 7 days   Lab Units 09/18/22  0220 09/17/22 2344 09/17/22 2127   HS TNI 0HR ng/L  --   --  7   HS TNI 2HR ng/L  --  7  --    HSTNI D2 ng/L  --  0  --    HS TNI 4HR ng/L 7  --   --    HSTNI D4 ng/L 0  --   --      Results from last 7 days   Lab Units 09/17/22  2206   CLARITY UA  Clear   COLOR UA  Light Yellow   SPEC GRAV UA  1 016   PH UA  5 0   GLUCOSE UA mg/dl Negative KETONES UA mg/dl Negative   BLOOD UA  Negative   PROTEIN UA mg/dl Trace*   NITRITE UA  Negative   BILIRUBIN UA  Negative   UROBILINOGEN UA (BE) mg/dl <2 0   LEUKOCYTES UA  Moderate*   WBC UA /hpf 4-10*   RBC UA /hpf None Seen   BACTERIA UA /hpf Occasional   EPITHELIAL CELLS WET PREP /hpf Occasional   CREATININE UR mg/dL 122 0     ED Treatment:   Medication Administration from 09/17/2022 2007 to 09/18/2022 0012       Date/Time Order Dose Route Action     09/17/2022 2129 sodium chloride 0 9 % bolus 500 mL 500 mL Intravenous New Bag        Past Medical History:   Diagnosis Date    A-fib [facility]) 12/29/2021    Anxiety     Arthritis     Back pain     Cellulitis     [facility]congestive [facility] failure) [facility])     Edema of both lower extremities due to peripheral venous insufficiency     Edema of both lower extremities due to peripheral venous insufficiency     Erythema of lower extremity 11/12/2021    Fibromyalgia     Hypertension     Sjogren syndrome, unspecified ([facility] )      Present on Admission:   A-fib ([facility] )   LEXY (acute kidney injury) ([facility] )   Venous stasis dermatitis of both lower extremities   Essential hypertension   Chronic diastolic heart failure ([facility])   Anxiety      Admitting Diagnosis: Hypotension [I95 9]  LEXY (acute kidney injury) ([facility] ) [N17 9]  Age/Sex: 77 y o  female  Admission Orders:   See above;  Daily standing wgt;  I/O q shift ;  Bladder scan / PVR x1   Scheduled Medications:  ammonium lactate, , Topical, BID  apixaban, 5 mg, Oral, BID  busPIRone, 5 mg, Oral, TID  carvedilol, 12 5 mg, Oral, BID With Meals  escitalopram, 10 mg, Oral, HS  pantoprazole, 40 mg, Oral, BID AC  sucralfate, 1 g, Oral, 4x Daily (AC & HS)      Continuous IV Infusions:  sodium chloride, 75 mL/hr, Intravenous, Continuous      PRN Meds:  acetaminophen, 650 mg, Oral, Q6H PRN  ondansetron, 4 mg, Intravenous, Q6H PRN          Network Utilization Review Department  ATTENTION: Please call with any questions or concerns to [phone] and carefully listen to the prompts so that you are directed to the right person  All voicemails are confidential   Lexx Miller all requests for admission clinical reviews, approved or denied determinations and any other requests to dedicated fax number below belonging to the campus where the patient is receiving treatment   List of dedicated fax numbers for the Facilities:  [address] DENIALS (Administrative/Medical Necessity) [phone]   [address] (Maternity/NICU/Pediatrics) [phone]   [address]  [address] [id number] [place] [address] [address] [facility] [place] [address] [phone]

## 2022-09-18 NOTE — ASSESSMENT & PLAN NOTE
Recent Labs     09/17/22 2127 09/18/22  0632   SODIUM 130* 134*     Likely secondary to hypovolemia  Patient appears more euvolemic today    Plan:  · Encourage adequate oral intake  · Monitor BMP, repeat in a m    · Receiving gentle IVF, continue for now

## 2022-09-18 NOTE — PROGRESS NOTES
[facility]  Progress Note - [facility] 1945, 77 y o  female MRN: [id number]  Unit/Bed#: S -01 Encounter: [id number]  Primary Care Provider: Stephy Mcduffie MD   Date and time admitted to hospital: 9/17/2022  8:08 PM    * Hypotension  Assessment & Plan  POA: Patient presents with hypotension readings at home by Nel Coello RN SBP in 90-100s with associated dizziness/fatigue/nausea  Reports decreased PO intake  Was recently discharged from [facility] after left femur fx  BP Readings from Last 3 Encounters:   09/18/22 (!) 148/118   09/13/22 122/66   09/07/22 109/53     Plan:  · Rpt orthostatics   · Continue to hold Losartan and torsemide - will likely need decreased dose of diuretic and losartan at discharge   · During admit, education given to patient and family at bedside to check blood pressure prior to taking medication and hold for SBP < 110mmHg  · Gentle IVF- noted hx of chronic diastolic HF   · PT/OT- of note, does have Nel Coello set up currently       Pyuria  Assessment & Plan  UA with WBC and leukocytes     Plan:  · Patient remains asymptomatic   · Continue to monitor off abx     Hyponatremia  Assessment & Plan  Recent Labs     09/17/22 2127 09/18/22  0632   SODIUM 130* 134*     Likely secondary to hypovolemia  Patient appears more euvolemic today    Plan:  · Encourage adequate oral intake  · Monitor BMP, repeat in a m  · Receiving gentle IVF, continue for now    Anemia  Assessment & Plan  Recent Labs     09/17/22 2127 09/18/22  0632   HGB 10 4* 9 9*   MCV 94 94     Hx of ABLA 2/2 melena and recent left femur fx s/o ORIF with retrograde IM zoë on 08/22/2022     Hgb stable today at 9 9, no dark BMs or clinical s/sx of bleed   Slight decreased likely dilutional     EGD 08/21/22 showing single crater, benign ulcer in the antrum, epinephrine injected to stop bleeding and biopsies taken     Plan:  · Continue protonix BID and carafate  · Cleared by GI to continue Eliquis · Avoid NSAIDs   · Monitor vitals    Venous stasis dermatitis of both lower extremities  Assessment & Plan  Chronic  Remains w/out signs/symptoms of cellulitis or systemic infection; Pt reports improvement in the swelling and chronic redness of her bilateral LE     Plan:  · Monitor off abx   · Continue Lac-Hydrin lotion BID   · Elevate/compression    · Follow up with vascular surgery as outpatient     Chronic diastolic heart failure (HCC)  Assessment & Plan  Wt Readings from Last 3 Encounters:   09/18/22 101 kg (222 lb 10 6 oz)   09/13/22 103 kg (227 lb)   09/07/22 103 kg (227 lb)     · Patient has chronic lymphedema to bilateral LE   · Wt down 5lbs since 9/13    Plan:  · Continue holding PTA torsemide 40mg daily   · Gentle IVF - monitor volume status closely   · Intake/output, daily weights     Anxiety  Assessment & Plan  Continue Lexapro and Buspar    A-fib ([facility])  Assessment & Plan  Rate controlled     · Continue Coreg with BP hold parameters (sbp <120, Hr <50)  · Continue Eliquis for anticoagulation     LEXY (acute kidney injury) [facility])  Assessment & Plan  Recent Labs     09/17/22 2127 09/18/22  6388   CREATININE 2 60* 1 89*   EGFR 17 25     Estimated Creatinine Clearance: 28 6 mL/min (A) (by C-G formula based on SCr of 1 89 mg/dL (H))  Cr w/ downtrend  Superimposed on stage 3B CKD  Suspect pre-renal in setting of dehydration/poor PO intake and hypotension   · Cr 2 6 on admission   · Baseline 0 7-0 9      Plan:   Urinary retention protocol, Bladder scan, Daily weights, I/O  · Gentle IVF   · Continue to hold PTA losartan and torsemide   · Continue to monitor BMP    Essential hypertension  Assessment & Plan  Admitted with hypotension     Plan:  · Holding losartan and torsemide   · Continue coreg with hold parameter  · Repeat orthostatics      VTE Pharmacologic Prophylaxis: VTE Score: 11 High Risk (Score >/= 5) - Pharmacological DVT Prophylaxis Ordered: apixaban (Eliquis)   Sequential Compression Devices Ordered  Patient Centered Rounds: I performed bedside rounds with nursing staff today  Discussions with Specialists or Other Care Team Provider:  Case management    Education and Discussions with Family / Patient: Updated  ( and daughter) at bedside  Current Length of Stay: 0 day(s)  Current Patient Status: Observation   Discharge Plan: Anticipate discharge in 24-48 with home with home health vs   Rehab    Code Status: Level 1 - Full Code    Subjective:   Patient reports I am doing fine  No acute complaints overnight  Reports episodes of lower blood pressure are asymptomatic for her  Reports my daughter made me come in!"  Patient is very pleasant, no acute distress  Reports chronic swelling bilateral lower extremities is unchanged - sometimes the left swells more than the right secondary to her recent femur fracture  RN reports no acute events overnight  Objective:     Vitals:   Temp (24hrs), Av 8 °F (36 6 °C), Min:97 6 °F (36 4 °C), Max:98 °F (36 7 °C)    Temp:  [97 6 °F (36 4 °C)-98 °F (36 7 °C)] 97 9 °F (36 6 °C)  HR:  [69-90] 90  Resp:  [18] 18  BP: ()/() 148/118  SpO2:  [82 %-98 %] 82 %  Body mass index is 40 73 kg/m²  Input and Output Summary (last 24 hours): Intake/Output Summary (Last 24 hours) at 2022 1127  Last data filed at 2022 0925  Gross per 24 hour   Intake 740 ml   Output 1120 ml   Net -380 ml       Physical Exam:   Physical Exam  Vitals reviewed  Constitutional:       General: She is not in acute distress  Appearance: She is obese  She is not ill-appearing, toxic-appearing or diaphoretic  Comments: Pleasant, no acute distress   HENT:      Head: Normocephalic and atraumatic  Mouth/Throat:      Mouth: Mucous membranes are moist    Eyes:      General: No scleral icterus  Cardiovascular:      Rate and Rhythm: Normal rate  Rhythm irregular  Heart sounds: Normal heart sounds     Pulmonary:      Effort: No respiratory distress  Breath sounds: Normal breath sounds  No wheezing, rhonchi or rales  Abdominal:      General: Bowel sounds are normal  There is no distension  Palpations: Abdomen is soft  Tenderness: There is no abdominal tenderness  Musculoskeletal:      Right lower leg: Edema present  Left lower leg: Edema present  Comments: Chronic lymphedema/venous stasis changes bilateral LE    Skin:     General: Skin is warm and dry  Comments: Remains with bilateral LE chronic hyperpigmentation/ venous stasis changes/redness      Neurological:      Mental Status: She is alert and oriented to person, place, and time  Psychiatric:         Mood and Affect: Mood normal          Behavior: Behavior normal           Additional Data:     Labs:  Results from last 7 days   Lab Units 09/18/22  0632   WBC Thousand/uL 8 72   HEMOGLOBIN g/dL 9 9*   HEMATOCRIT % 31 8*   PLATELETS Thousands/uL 286   NEUTROS PCT % 53   LYMPHS PCT % 29   MONOS PCT % 9   EOS PCT % 7*     Results from last 7 days   Lab Units 09/18/22  0632 09/17/22  2127   SODIUM mmol/L 134* 130*   POTASSIUM mmol/L 3 6 4 2   CHLORIDE mmol/L 99 92*   CO2 mmol/L 28 29   BUN mg/dL 37* 36*   CREATININE mg/dL 1 89* 2 60*   ANION GAP mmol/L 7 9   CALCIUM mg/dL 9 1 9 8   ALBUMIN g/dL  --  4 1   TOTAL BILIRUBIN mg/dL  --  0 44   ALK PHOS U/L  --  153*   ALT U/L  --  11   AST U/L  --  15   GLUCOSE RANDOM mg/dL 109 110                       Lines/Drains:  Invasive Devices  Report    Peripheral Intravenous Line  Duration           Peripheral IV 09/17/22 Right Antecubital <1 day                Imaging: No pertinent imaging reviewed      Recent Cultures (last 7 days):         Last 24 Hours Medication List:   Current Facility-Administered Medications   Medication Dose Route Frequency Provider Last Rate    acetaminophen  650 mg Oral Q6H PRN Kimmie Jamaica, PA-C      ammonium lactate   Topical BID Kimmie Jamaica, PA-C      apixaban  5 mg Oral BID Amarilis Stanley PA-C      busPIRone  5 mg Oral TID Amarilis Stanley PA-C      carvedilol  12 5 mg Oral BID With Meals Vikin Daphne Patten MD      escitalopram  10 mg Oral HS Amarilis Stanley PA-C      ondansetron  4 mg Intravenous Q6H PRN Amarilis Stanley PA-C      pantoprazole  40 mg Oral BID AC Amarilis Stanley PA-C      sodium chloride  75 mL/hr Intravenous Continuous Amarilis Stanley PA-C 75 mL/hr (09/18/22 0031)    sucralfate  1 g Oral 4x Daily (AC & HS) Amarilis Stanley PA-C          Today, Patient Was Seen By: Amelia Echavarria MD    **Please Note: This note may have been constructed using a voice recognition system  **

## 2022-09-18 NOTE — PHYSICAL THERAPY NOTE
PHYSICAL THERAPY EVALUATION  NAME: Neelima Rodríguez  AGE:   77 y o  MRN:  [id number]  ADMIT DX: Hypotension [I95 9]  LEXY (acute kidney injury) ([place] [address] ) [N17 9]    PMH:   Past Medical History:   Diagnosis Date    A-fib ([place] [address] ) 12/29/2021    Anxiety     Arthritis     Back pain     Cellulitis     CHF (congestive heart failure) ([facility])     Edema of both lower extremities due to peripheral venous insufficiency     Edema of both lower extremities due to peripheral venous insufficiency     Erythema of lower extremity 11/12/2021    Fibromyalgia     Hypertension     Sjogren syndrome, unspecified ([place] [address] )      LENGTH OF STAY: 0       09/18/22 1051   PT Last Visit   PT Visit Date 09/18/22   Note Type   Note type Evaluation   Pain Assessment   Pain Assessment Tool 0-10   Pain Score No Pain   Restrictions/Precautions   Weight Bearing Precautions Per Order Yes   RLE Weight Bearing Per Order WBAT  (per last admission)   LLE Weight Bearing Per Order WBAT  (per last admission)   Other Precautions Cognitive; Chair Alarm; Bed Alarm; Fall Risk  ([place])   Home Living   Type of [address] [place] [address] Two level;Stairs to enter with rails  (stairglide to 2nd floor; 2 GLORIA)   Home Equipment Walker;Cane   Additional Comments Recently discharged home from SNF rehab  Family reports she would occasionally need assist standing up from a chair, however only supervision for ambulation with RW since being discharged home     Prior Function   Lives With Spouse   Receives Help From Family  (daughter is supportive)   ADL Assistance Needs assistance   IADLs Needs assistance   Falls in the last 6 months 1 to 4   General   Additional Pertinent History recent h/o L femur fracture with ORIF/IM nail last admission   Family/Caregiver Present Yes   Cognition   Overall Cognitive Status Impaired   Arousal/Participation Cooperative   Orientation Level Oriented to person;Oriented to place;Oriented to situation   Memory Decreased short term memory;Decreased recall of recent events;Decreased recall of precautions   Following Commands Follows one step commands with increased time or repetition   Comments Pt identified by name and   Subjective   Subjective Agrees to PT evaluation and is pleasant and cooperative throughout session  RLE Assessment   RLE Assessment X   Strength RLE   RLE Overall Strength 4/5  (functionally)   LLE Assessment   LLE Assessment X   Strength LLE   LLE Overall Strength 3+/5  (functionally)   Bed Mobility   Supine to Sit 5  Supervision   Additional items HOB elevated; Bedrails; Increased time required;Verbal cues   Sit to Supine Unable to assess  (left OOB in chair post session with alarm intact; RN aware of status)   Transfers   Sit to Stand 4  Minimal assistance   Additional items Assist x 1; Increased time required;Verbal cues  (hand placement)   Stand to Sit 4  Minimal assistance   Additional items Assist x 1; Increased time required;Verbal cues  (hand placement)   Stand pivot 4  Minimal assistance   Additional items Assist x 1; Increased time required;Verbal cues  (with RW; steppage transfer bed to commode and back to bed)   Balance   Static Sitting Good   Dynamic Sitting Fair +   Static Standing Fair -   Dynamic Standing Fair -   Ambulatory Poor +  (with RW)   Endurance Deficit   Endurance Deficit Yes   Endurance Deficit Description limited standing/ambulation tolerance   Activity Tolerance   Activity Tolerance Patient limited by fatigue;Patient tolerated treatment well   Nurse Made Aware Per RN, pt appropriate to evaluate; updated on status and decrease in BP while sitting OOB in chair; Care coordination with PCA as well   Assessment   Prognosis Fair   Problem List Decreased strength;Decreased endurance; Impaired balance;Decreased mobility; Decreased safety awareness; Obesity   Assessment Pt is a 77 y o  female seen for PT evaluation s/p admit to [address] [place] [address] on 2022 w/ UTI (urinary tract infection)  Order placed for PT   Comorbidities affecting pt's physical performance at time of assessment include: HTN, obesity and limited cognition  Personal factors affecting pt at time of IE include: anxiety and steps to enter environment  Prior to admission, pt was living in a [facility] with 2 GLORIA and stairglide to 2nd floor with  since recent discharge home from rehab  Pt was still requiring assist for transfers out of chairs at home, however able to ambulate with supervision and RW  Upon evaluation: Pt requires supervision for bed mobility, min A for sit to stand, and min A for ambulation with RW  (Please find full objective findings from PT assessment regarding body systems outlined above)  Impairments and limitations also listed above, especially due to  weakness, impaired balance, decreased endurance, gait deviations, decreased activity tolerance, decreased safety awareness, fall risk and decreased cognition  Pt's clinical presentation is currently unstable/unpredictable seen in pt's presentation of fall risk, decrease in BP after mobility, and decreased insight into deficits  Pt to benefit from continued skilled PT tx while in hospital and upon DC to address deficits as defined above and maximize level of functional mobility  From PT/mobility standpoint, recommendation at time of d/c would be Home PT vs  STR and with rolling walker pending progress/level of assist available at home  Home PT more likely as pt is close to her functional level since discharge home from rehab  Recommend progression of ambulation and initiation of HEP as appropriate     Goals   Patient Goals to go home today   STG Expiration Date 09/28/22   Short Term Goal #1 Pt will be able to: (1) perform bed mobility with mod I to promote OOB activity (2) perform sit to stand with supervision to decrease burden of care (3) ambulate at least 200` with supervision and least restrictive AD to increase activity tolerance (4) increase standing balance by 1 grade to decrease risk of falls (5) negotiate at least 2 stairs with min A to allow safe access into home   PT Treatment Day 1   Plan   Treatment/Interventions Functional transfer training;LE strengthening/ROM; Therapeutic exercise;Elevations; Patient/family training;Equipment eval/education; Bed mobility;Gait training;Cognitive reorientation; Endurance training   PT Frequency 3-5x/wk   Recommendation   PT Discharge Recommendation Home with home health rehabilitation  (HHPT > rehab pending progress and level of assist available at home)   Eb [place] [address]   Turning in Bed Without Bedrails 3   Lying on Back to Sitting on Edge of Flat Bed 3   Moving Bed to Chair 3   Standing Up From Chair 3   Walk in Room 3   Climb 3-5 Stairs 2   Basic Mobility Inpatient Raw Score 17   Basic Mobility Standardized Score 39 67   Highest Level Of Mobility   -HL Goal 5: Stand one or more mins   -HLM Achieved 6: Walk 10 steps or more   Additional Treatment Session   Start Time 1040   End Time 1051   Treatment Assessment Pt cooperative throughout session and agreeable to additional mobility trials  Able to ambulate an additional ~12` x1 with min A x1 and use of RW  Occasional shuffle noted on BLE, however no overt LOB  Decreased gait speed and short step length noted with step to pattern noted  BP seated in chair= 94/54, however not symptomatic  RN and PCA both notified  Will continue to benefit from ongoing skilled PT to maximize her functional mobility and increase her level of independence  Equipment Use RW   Additional Treatment Day 1   End of Consult   Patient Position at End of Consult Bedside chair;Bed/Chair alarm activated; All needs within reach     The patient's AM-PAC Basic Mobility Inpatient Short Form Raw Score is 17, Standardized Score is 39 67    A Raw Score of greater than or equal to 16 suggests the patient may benefit from discharge to home, which DOES coincide with CURRENT above PT recommendations  However please refer to therapist recommendation for discharge planning given other factors that may influence destination  Adapted from [facility] of -PAC 6-Clicks Basic Mobility and Daily Activity Scores With Discharge Destination  Physical Therapy, 2021;101:1-9   DOI: 10 [id number]      Valdez Lozano PT,DPT

## 2022-09-18 NOTE — ASSESSMENT & PLAN NOTE
Wt Readings from Last 3 Encounters:   09/13/22 103 kg (227 lb)   09/07/22 103 kg (227 lb)   09/06/22 103 kg (227 lb)     · Appears dry   · Patient has chronic lymphedema to bilateral LE   · Holding PTA torsemide 40mg daily   · Gentle IVF - monitor volume status closely   · Intake/output, daily weights

## 2022-09-18 NOTE — ASSESSMENT & PLAN NOTE
Wt Readings from Last 3 Encounters:   09/18/22 101 kg (222 lb 10 6 oz)   09/13/22 103 kg (227 lb)   09/07/22 103 kg (227 lb)     · Patient has chronic lymphedema to bilateral LE   · Wt down 5lbs since 9/13    Plan:  · Continue holding PTA torsemide 40mg daily   · Gentle IVF - monitor volume status closely   · Intake/output, daily weights

## 2022-09-18 NOTE — PLAN OF CARE
Problem: MOBILITY - ADULT  Goal: Maintain or return to baseline ADL function  Description: INTERVENTIONS:  -  Assess patient's ability to carry out ADLs; assess patient's baseline for ADL function and identify physical deficits which impact ability to perform ADLs (bathing, care of mouth/teeth, toileting, grooming, dressing, etc )  - Assess/evaluate cause of self-care deficits   - Assess range of motion  - Assess patient's mobility; develop plan if impaired  - Assess patient's need for assistive devices and provide as appropriate  - Encourage maximum independence but intervene and supervise when necessary  - Involve family in performance of ADLs  - Assess for home care needs following discharge   - Consider OT consult to assist with ADL evaluation and planning for discharge  - Provide patient education as appropriate  Outcome: Progressing     Problem: Prexisting or High Potential for Compromised Skin Integrity  Goal: Skin integrity is maintained or improved  Description: INTERVENTIONS:  - Identify patients at risk for skin breakdown  - Assess and monitor skin integrity  - Assess and monitor nutrition and hydration status  - Monitor labs   - Assess for incontinence   - Turn and reposition patient  - Assist with mobility/ambulation  - Relieve pressure over bony prominences  - Avoid friction and shearing  - Provide appropriate hygiene as needed including keeping skin clean and dry  - Evaluate need for skin moisturizer/barrier cream  - Collaborate with interdisciplinary team   - Patient/family teaching  - Consider wound care consult   Outcome: Progressing     Problem: PAIN - ADULT  Goal: Verbalizes/displays adequate comfort level or baseline comfort level  Description: Interventions:  - Encourage patient to monitor pain and request assistance  - Assess pain using appropriate pain scale  - Administer analgesics based on type and severity of pain and evaluate response  - Implement non-pharmacological measures as appropriate and evaluate response  - Consider cultural and social influences on pain and pain management  - Notify physician/advanced practitioner if interventions unsuccessful or patient reports new pain  Outcome: Progressing     Problem: INFECTION - ADULT  Goal: Absence or prevention of progression during hospitalization  Description: INTERVENTIONS:  - Assess and monitor for signs and symptoms of infection  - Monitor lab/diagnostic results  - Monitor all insertion sites, i e  indwelling lines, tubes, and drains  - Monitor endotracheal if appropriate and nasal secretions for changes in amount and color  - [place] appropriate cooling/warming therapies per order  - Administer medications as ordered  - Instruct and encourage patient and family to use good hand hygiene technique  - Identify and instruct in appropriate isolation precautions for identified infection/condition  Outcome: Progressing     Problem: INFECTION - ADULT  Goal: Absence of fever/infection during neutropenic period  Description: INTERVENTIONS:  - Monitor WBC    Outcome: Progressing

## 2022-09-18 NOTE — ASSESSMENT & PLAN NOTE
UA with WBC and leukocytes     Plan:  · Patient remains asymptomatic   · Continue to monitor off abx

## 2022-09-18 NOTE — ASSESSMENT & PLAN NOTE
· Rate controlled   · Continue Coreg with BP hold parameters  · Continue Eliquis for anticoagulation

## 2022-09-19 ENCOUNTER — PATIENT OUTREACH (OUTPATIENT)
Dept: FAMILY MEDICINE CLINIC | Facility: CLINIC | Age: 77
End: 2022-09-19

## 2022-09-19 VITALS
WEIGHT: 222.66 LBS | DIASTOLIC BLOOD PRESSURE: 59 MMHG | OXYGEN SATURATION: 96 % | SYSTOLIC BLOOD PRESSURE: 129 MMHG | BODY MASS INDEX: 40.73 KG/M2 | RESPIRATION RATE: 18 BRPM | TEMPERATURE: 98.3 F | HEART RATE: 67 BPM

## 2022-09-19 PROBLEM — N17.9 AKI (ACUTE KIDNEY INJURY) (HCC): Status: RESOLVED | Noted: 2021-12-30 | Resolved: 2022-09-19

## 2022-09-19 PROBLEM — I95.9 HYPOTENSION: Status: RESOLVED | Noted: 2022-09-17 | Resolved: 2022-09-19

## 2022-09-19 PROBLEM — R82.81 PYURIA: Status: RESOLVED | Noted: 2022-09-17 | Resolved: 2022-09-19

## 2022-09-19 PROBLEM — E87.1 HYPONATREMIA: Status: RESOLVED | Noted: 2022-09-17 | Resolved: 2022-09-19

## 2022-09-19 LAB
ANION GAP SERPL CALCULATED.3IONS-SCNC: 7 MMOL/L (ref 4–13)
ATRIAL RATE: 357 BPM
BACTERIA UR CULT: NORMAL
BUN SERPL-MCNC: 25 MG/DL (ref 5–25)
CALCIUM SERPL-MCNC: 9.5 MG/DL (ref 8.4–10.2)
CHLORIDE SERPL-SCNC: 105 MMOL/L (ref 96–108)
CO2 SERPL-SCNC: 27 MMOL/L (ref 21–32)
CREAT SERPL-MCNC: 0.83 MG/DL (ref 0.6–1.3)
ERYTHROCYTE [DISTWIDTH] IN BLOOD BY AUTOMATED COUNT: 14.2 % (ref 11.6–15.1)
GFR SERPL CREATININE-BSD FRML MDRD: 69 ML/MIN/1.73SQ M
GLUCOSE SERPL-MCNC: 120 MG/DL (ref 65–140)
HCT VFR BLD AUTO: 30.3 % (ref 34.8–46.1)
HGB BLD-MCNC: 9.5 G/DL (ref 11.5–15.4)
MAGNESIUM SERPL-MCNC: 2.1 MG/DL (ref 1.9–2.7)
MCH RBC QN AUTO: 29 PG (ref 26.8–34.3)
MCHC RBC AUTO-ENTMCNC: 31.4 G/DL (ref 31.4–37.4)
MCV RBC AUTO: 92 FL (ref 82–98)
PLATELET # BLD AUTO: 293 THOUSANDS/UL (ref 149–390)
PMV BLD AUTO: 10.7 FL (ref 8.9–12.7)
POTASSIUM SERPL-SCNC: 3.4 MMOL/L (ref 3.5–5.3)
QRS AXIS: 92 DEGREES
QRSD INTERVAL: 90 MS
QT INTERVAL: 400 MS
QTC INTERVAL: 458 MS
RBC # BLD AUTO: 3.28 MILLION/UL (ref 3.81–5.12)
SODIUM SERPL-SCNC: 139 MMOL/L (ref 135–147)
T WAVE AXIS: 57 DEGREES
VENTRICULAR RATE: 79 BPM
WBC # BLD AUTO: 6.5 THOUSAND/UL (ref 4.31–10.16)

## 2022-09-19 PROCEDURE — 80048 BASIC METABOLIC PNL TOTAL CA: CPT | Performed by: FAMILY MEDICINE

## 2022-09-19 PROCEDURE — 93010 ELECTROCARDIOGRAM REPORT: CPT | Performed by: INTERNAL MEDICINE

## 2022-09-19 PROCEDURE — 83735 ASSAY OF MAGNESIUM: CPT | Performed by: FAMILY MEDICINE

## 2022-09-19 PROCEDURE — 85027 COMPLETE CBC AUTOMATED: CPT | Performed by: FAMILY MEDICINE

## 2022-09-19 PROCEDURE — 99239 HOSP IP/OBS DSCHRG MGMT >30: CPT | Performed by: INTERNAL MEDICINE

## 2022-09-19 RX ORDER — CARVEDILOL 12.5 MG/1
12.5 TABLET ORAL 2 TIMES DAILY WITH MEALS
Qty: 60 TABLET | Refills: 0
Start: 2022-09-19 | End: 2022-10-25 | Stop reason: SDUPTHER

## 2022-09-19 RX ORDER — POTASSIUM CHLORIDE 20 MEQ/1
40 TABLET, EXTENDED RELEASE ORAL ONCE
Status: COMPLETED | OUTPATIENT
Start: 2022-09-19 | End: 2022-09-19

## 2022-09-19 RX ADMIN — CARVEDILOL 12.5 MG: 12.5 TABLET, FILM COATED ORAL at 06:44

## 2022-09-19 RX ADMIN — SUCRALFATE 1 G: 1 TABLET ORAL at 06:44

## 2022-09-19 RX ADMIN — PANTOPRAZOLE SODIUM 40 MG: 40 TABLET, DELAYED RELEASE ORAL at 06:44

## 2022-09-19 RX ADMIN — APIXABAN 5 MG: 5 TABLET, FILM COATED ORAL at 09:22

## 2022-09-19 RX ADMIN — ACETAMINOPHEN 650 MG: 325 TABLET, FILM COATED ORAL at 10:45

## 2022-09-19 RX ADMIN — Medication: at 09:26

## 2022-09-19 RX ADMIN — POTASSIUM CHLORIDE 40 MEQ: 1500 TABLET, EXTENDED RELEASE ORAL at 09:22

## 2022-09-19 RX ADMIN — SUCRALFATE 1 G: 1 TABLET ORAL at 10:45

## 2022-09-19 RX ADMIN — BUSPIRONE HYDROCHLORIDE 5 MG: 5 TABLET ORAL at 09:23

## 2022-09-19 NOTE — PROGRESS NOTES
Chart review done after receiving ADT alert  Pt admitted for hypotension  RN CM will continue to follow

## 2022-09-19 NOTE — ASSESSMENT & PLAN NOTE
Wt Readings from Last 3 Encounters:   09/18/22 101 kg (222 lb 10 6 oz)   09/13/22 103 kg (227 lb)   09/07/22 103 kg (227 lb)     · Patient has chronic lymphedema to bilateral LE   · Wt down 5lbs since 9/13    Plan:  · Will restart home torsemide 40 mg upon discharge  · Gentle IVF - monitor volume status closely   · Intake/output, daily weights

## 2022-09-19 NOTE — DISCHARGE INSTRUCTIONS
Hypotension   WHAT YOU NEED TO KNOW:   Hypotension is a condition that causes your blood pressure (BP) to drop lower than it should be  Hypotension may be mild, serious, or life-threatening  DISCHARGE INSTRUCTIONS:   Medicines:   Alpha-adrenoreceptor agonists: These medicines may increase your BP and decrease your symptoms  Take these medicines as directed  Steroids: This medicine helps prevent salt loss from your body  Steroids may also help increase the amount of fluid in your body and raise your BP  Vasopressors: These medicines help constrict (make smaller) your blood vessels and increase your BP  Vasopressor medicines may increase the blood flow to your brain and help decrease your symptoms  Antidiuretic hormone: This medicine helps control your BP and helps decrease your need to urinate during the night  Antiparkinson medicine: This medicine may help increase your standing BP and decrease your symptoms  Take your medicine as directed  Contact your healthcare provider if you think your medicine is not helping or if you have side effects  Tell him of her if you are allergic to any medicine  Keep a list of the medicines, vitamins, and herbs you take  Include the amounts, and when and why you take them  Bring the list or the pill bottles to follow-up visits  Carry your medicine list with you in case of an emergency  Follow up with your healthcare provider or specialist as directed:  Write down your questions so you remember to ask them during your visits  Check your blood pressure: You may need to check your BP at home  Record the results and bring them with you to follow-up visits  Ask when and how often to check your BP  You may need to wear a BP monitor for up to 24 hours  This will record your blood pressure during your normal daily activities  The monitor will take your BP every 15 to 30 minutes  Try to keep still while your BP is taken   Avoid heavy activity, such as exercise, while you are wearing the monitor  Manage your symptoms:   Change positions slowly:  When you get out of bed, sit up first, then slowly move your legs to the side of the bed  If you are not having any symptoms, slowly stand up  If you have symptoms, sit down right away  Exercise and do physical counter maneuvers:  Ask your healthcare provider or specialist about the best exercise plan for you  Physical counter maneuvers may help to increase your BP and increase blood flow to your heart  They include crossing your legs, squatting, and bending at the waist  You can also rise up on your toes while you are standing, and tighten your thigh muscles  Drink liquids as directed:  Ask your healthcare provider or specialist how much liquid to drink each day and which liquids are best for you  Drink 500 milliliters (½ liter) of liquid quickly in the morning or before meals to help increase your BP  Your healthcare provider may tell you to drink 2 cups of coffee with, or after, breakfast and lunch  The caffeine in the coffee can help prevent a drop in your BP  Your healthcare provider may also give you caffeine pills  Change how you eat meals: If your BP drops after eating large meals, try to eat smaller meals more often  Eat foods low in carbohydrates and cholesterol to help prevent BP drops after you eat  Ask if you need to increase the amount of sodium (salt) you eat each day  Raise the head of your bed:  Raise the head of your bed 4 to 8 inches  This may help prevent morning BP drops and decrease the need to urinate during the night  Avoid things that make your hypotension worse:   Do not drink alcohol:  Alcohol can make your symptoms worse  Ask for information if you need help quitting  Avoid straining:  Activities and movements that cause you to strain can cause a drop in your BP   Activities to avoid include lifting, coughing, and other movements that increase the feeling of pressure in your chest     Avoid the heat: This can cause a decrease in your BP  Stay inside during very hot days, or limit the amount of time you are outside  Do not take hot baths  Contact your healthcare provider or specialist if:   You vomit several times or have diarrhea, and you cannot drink liquid  You have a fever  You have new or increased symptoms, such as dizziness, weakness, or fainting  Your legs, ankles, and feet are swollen, or you gain weight for no known reason  You have questions or concerns about your condition or care  Return to the emergency department if:   You become confused or cannot speak  You urinate very little or not at all  You have a seizure  You have chest pain or trouble breathing  You have changes in vision or cannot see  © Copyright [employer] 2022 Information is for End User's use only and may not be sold, redistributed or otherwise used for commercial purposes  All illustrations and images included in CareNotes® are the copyrighted property of [employer]  or [facility] Irina Zambrano   The above information is an  only  It is not intended as medical advice for individual conditions or treatments  Talk to your doctor, nurse or pharmacist before following any medical regimen to see if it is safe and effective for you

## 2022-09-19 NOTE — DISCHARGE SUMMARY
[facility]  Discharge- Merwyn Rising 1945, 77 y o  female MRN: [id number]  Unit/Bed#: S -01 Encounter: [id number]  Primary Care Provider: Davon Aldana MD   Date and time admitted to hospital: 9/17/2022  8:08 PM    * Hypotension-resolved as of 9/19/2022  Assessment & Plan  POA: Patient presents with hypotension readings at home by [facility] RN SBP in 90-100s with associated dizziness/fatigue/nausea  Reports decreased PO intake  Was recently discharged from [facility] after left femur fx  BP Readings from Last 3 Encounters:   09/19/22 129/59   09/13/22 122/66   09/07/22 109/53     Plan:  · Rpt orthostatics   · Continue to hold Losartan and torsemide - will likely need decreased dose of diuretic and losartan at discharge   · During admit, education given to patient and family at bedside to check blood pressure prior to taking medication and hold for SBP < 110mmHg  · Gentle IVF- noted hx of chronic diastolic HF   · PT/OT- of note, does have [facility] set up currently       LEXY (acute kidney injury) (HCC)-resolved as of 9/19/2022  Assessment & Plan  Recent Labs     09/17/22 2127 09/18/22  0632 09/19/22  0516   CREATININE 2 60* 1 89* 0 83   EGFR 17 25 69     Estimated Creatinine Clearance: 65 2 mL/min (by C-G formula based on SCr of 0 83 mg/dL)  Cr w/ downtrend  Superimposed on stage 3B CKD  Suspect pre-renal in setting of dehydration/poor PO intake and hypotension   · Cr 2 6 on admission; now resolved  · Baseline 0 7-0 9      Plan:   Urinary retention protocol, Bladder scan, Daily weights, I/O  · Gentle IVF   · Continue to hold losartan and restart torsemide on discharge  · Continue to monitor BMP    Hyponatremia-resolved as of 9/19/2022  Assessment & Plan  Recent Labs     09/17/22 2127 09/18/22  0632 09/19/22  0516   SODIUM 130* 134* 139     Likely secondary to hypovolemia    Patient appears more euvolemic today    Plan:  · Encourage adequate oral intake  · Monitor BMP, repeat in a m  · Receiving gentle IVF, continue for now    Essential hypertension  Assessment & Plan  Admitted with hypotension     Plan:  · Continue to hold losartan restart torsemide upon discharge  · Decrease coreg to 12 5 mg BID with hold parameter  · Repeat orthostatics    A-fib (HCC)  Assessment & Plan  Rate controlled     · Decrease Coreg to 12 5 mg BID with BP hold parameters (sbp <120, Hr <50)  · Continue Eliquis for anticoagulation     Chronic diastolic heart failure (HCC)  Assessment & Plan  Wt Readings from Last 3 Encounters:   09/18/22 101 kg (222 lb 10 6 oz)   09/13/22 103 kg (227 lb)   09/07/22 103 kg (227 lb)     · Patient has chronic lymphedema to bilateral LE   · Wt down 5lbs since 9/13    Plan:  · Will restart home torsemide 40 mg upon discharge  · Gentle IVF - monitor volume status closely   · Intake/output, daily weights     Venous stasis dermatitis of both lower extremities  Assessment & Plan  Chronic  Remains w/out signs/symptoms of cellulitis or systemic infection; Pt reports improvement in the swelling and chronic redness of her bilateral LE     Plan:  · Monitor off abx   · Continue Lac-Hydrin lotion BID   · Elevate/compression    · Follow up with vascular surgery as outpatient     Anemia  Assessment & Plan  Recent Labs     09/17/22  2127 09/18/22  0632 09/19/22  0516   HGB 10 4* 9 9* 9 5*   MCV 94 94 92     Hx of ABLA 2/2 melena and recent left femur fx s/o ORIF with retrograde IM zoë on 08/22/2022     Hgb stable today at 9 9, no dark BMs or clinical s/sx of bleed   Slight decreased likely dilutional     EGD 08/21/22 showing single crater, benign ulcer in the antrum, epinephrine injected to stop bleeding and biopsies taken     Plan:  · Continue protonix BID and carafate  · Cleared by GI to continue Eliquis   · Avoid NSAIDs   · Monitor vitals    Anxiety  Assessment & Plan  Continue Lexapro and Buspar    Pyuria-resolved as of 9/19/2022  Assessment & Plan  UA with WBC and leukocytes Plan:  · Patient remains asymptomatic   · Continue to monitor off abx     Medical Problems             Resolved Problems  Date Reviewed: 9/18/2022          Resolved    LEXY (acute kidney injury) ([address] ) 9/19/2022     Resolved by  Ca Klein MD    * (Principal) Hypotension 9/19/2022     Resolved by  Ca Klein MD    Hyponatremia 9/19/2022     Resolved by  Ca Klein MD    Pyuria 9/19/2022     Resolved by  Ca Klein MD              Discharging Resident: Ca Klein MD  Discharging Attending: Miguel Davalos MD  PCP: Leatha Barker MD  Admission Date:   Admission Orders (From admission, onward)     Ordered        09/18/22 1624  Inpatient Admission  Once            09/17/22 2223  Place in Observation  Once                      Discharge Date: 09/19/22    Consultations During Hospital Stay:  · Case management    Procedures Performed:   · None    Significant Findings / Test Results:   · POA BP: 109/49  · POA Hgb 9 9  · POA Cr 2 6    Incidental Findings:   · None    Test Results Pending at Discharge (will require follow up): · None     Outpatient Tests Requested:  · None    Complications:  None    Reason for Admission:  Dizziness/fatigue/nausea 2/2 hypotension    Hospital Course:   Aki Franklin is a 77 y o  female patient with a past medical history significant for diastolic CHF, HTN, anxiety, afib on eliquis, CKDwho originally presented to the hospital on 9/17/2022 with hypotension readings at home by [facility] RN with SBP in 90-100s with associated dizziness/fatigue/nausea  The patient was recently discharged from [facility] on 09/15/2022 after sustaining a left femur fracture and had decreased p o  Intake since returning home  When [facility] RN checked her blood pressure at home, SBP found to be in the 90-100s  Upon admission, the patient's BP meds were temporarily placed on hold and the patient was started on gentle IVF hydration    She was also found to have an LEXY, with a Cr of 2 6 (baseline 0 7-0 9), hyponatremia     The patient, initially admitted to the hospital as inpatient, was discharged earlier than expected given the following: Improvement of hypotension  Please see above list of diagnoses and related plan for additional information  Condition at Discharge: fair    Discharge Day Visit / Exam:   Subjective:  Patient was examined with daughter at bedside  She reports improvement her dizziness/nausea/fatigue  She was able to get up and move around without recurrence of her symptoms  Patient is anxious to return home at this time  Vitals: Blood Pressure: 129/59 (09/19/22 0746)  Pulse: 67 (09/19/22 0746)  Temperature: 98 3 °F (36 8 °C) (09/19/22 0746)  Temp Source: Oral (09/18/22 2152)  Respirations: 18 (09/18/22 2152)  Weight - Scale: 101 kg (222 lb 10 6 oz) (09/18/22 0023)  SpO2: 96 % (09/19/22 0746)  Exam:   Physical Exam  Vitals reviewed  Constitutional:       General: She is not in acute distress  Appearance: She is obese  She is not ill-appearing, toxic-appearing or diaphoretic  HENT:      Head: Normocephalic and atraumatic  Mouth/Throat:      Mouth: Mucous membranes are moist    Eyes:      General: No scleral icterus  Cardiovascular:      Rate and Rhythm: Normal rate  Rhythm irregular  Heart sounds: Normal heart sounds  Pulmonary:      Effort: No respiratory distress  Breath sounds: Normal breath sounds  No wheezing, rhonchi or rales  Abdominal:      General: Bowel sounds are normal  There is no distension  Palpations: Abdomen is soft  Tenderness: There is no abdominal tenderness  Musculoskeletal:      Right lower leg: Edema present  Left lower leg: Edema present  Comments: Chronic lymphedema/venous stasis changes bilateral LE    Skin:     General: Skin is warm and dry        Comments: Remains with bilateral LE chronic hyperpigmentation/ venous stasis changes/redness      Neurological:      Mental Status: She is alert and oriented to person, place, and time  Psychiatric:         Mood and Affect: Mood normal          Behavior: Behavior normal          Discussion with Family: Updated  (daughter) at bedside  Discharge instructions/Information to patient and family:   See after visit summary for information provided to patient and family  Provisions for Follow-Up Care:  See after visit summary for information related to follow-up care and any pertinent home health orders  Disposition:   Home with [facility] Services (Reminder: Complete face to face encounter)    Planned Readmission: No    Discharge Medications:  See after visit summary for reconciled discharge medications provided to patient and/or family        **Please Note: This note may have been constructed using a voice recognition system**

## 2022-09-19 NOTE — ASSESSMENT & PLAN NOTE
Recent Labs     09/17/22  2127 09/18/22  0632 09/19/22  0516   SODIUM 130* 134* 139     Likely secondary to hypovolemia  Patient appears more euvolemic today    Plan:  · Encourage adequate oral intake  · Monitor BMP, repeat in a m    · Receiving gentle IVF, continue for now

## 2022-09-19 NOTE — ASSESSMENT & PLAN NOTE
POA: Patient presents with hypotension readings at home by [facility] RN SBP in 90-100s with associated dizziness/fatigue/nausea  Reports decreased PO intake  Was recently discharged from [facility] after left femur fx       BP Readings from Last 3 Encounters:   09/19/22 129/59   09/13/22 122/66   09/07/22 109/53     Plan:  · Rpt orthostatics   · Continue to hold Losartan and torsemide - will likely need decreased dose of diuretic and losartan at discharge   · During admit, education given to patient and family at bedside to check blood pressure prior to taking medication and hold for SBP < 110mmHg  · Gentle IVF- noted hx of chronic diastolic HF   · PT/OT- of note, does have Nel Coello set up currently

## 2022-09-19 NOTE — ASSESSMENT & PLAN NOTE
Admitted with hypotension     Plan:  · Continue to hold losartan restart torsemide upon discharge  · Decrease coreg to 12 5 mg BID with hold parameter  · Repeat orthostatics

## 2022-09-19 NOTE — DISCHARGE INSTR - AVS FIRST PAGE
Dear Angelica Patel,     It was our pleasure to care for you here at [facility]  It is our hope that we were always able to exceed the expected standards for your care during your stay  You were hospitalized due to Hypotension  You were cared for on the 3rd floor by Aline Ballard MD under the service of Ara Paredes MD with the [address] [place] [address] [facility] who covers for your primary care physician (PCP), Tejal Oneal MD, while you were hospitalized  If you have any questions or concerns related to this hospitalization, you may contact us at [phone]  For follow up as well as any medication refills, we recommend that you follow up with your primary care physician  A registered nurse will reach out to you by phone within a few days after your discharge to answer any additional questions that you may have after going home  However, at this time we provide for you here, the most important instructions / recommendations at discharge:     Notable Medication Adjustments -   STOP taking losartan (Cozaar)  Decrease carvedilol (Coreg) to 12 5 mg two times daily (Use pill cutter to cut 25 mg pills in half)  Continue Torsemide 40 mg daily and potassium chloride 40 mEq daily  Testing Required after Discharge -   None  Important follow up information -   Follow up with your PCP and Cardiologist within 1 week to continue management of blood pressure   Other Instructions -   Check your blood pressure before taking the BP medications; DO NOT TAKE if Systolic number (top number) is greater than 120  Please review this entire after visit summary as additional general instructions including medication list, appointments, activity, diet, any pertinent wound care, and other additional recommendations from your care team that may be provided for you        Sincerely,     Aline Ballard MD

## 2022-09-19 NOTE — ASSESSMENT & PLAN NOTE
Recent Labs     09/17/22 2127 09/18/22  0632 09/19/22  0516   HGB 10 4* 9 9* 9 5*   MCV 94 94 92     Hx of ABLA 2/2 melena and recent left femur fx s/o ORIF with retrograde IM zoë on 08/22/2022     Hgb stable today at 9 9, no dark BMs or clinical s/sx of bleed   Slight decreased likely dilutional     EGD 08/21/22 showing single crater, benign ulcer in the antrum, epinephrine injected to stop bleeding and biopsies taken     Plan:  · Continue protonix BID and carafate  · Cleared by GI to continue Eliquis   · Avoid NSAIDs   · Monitor vitals

## 2022-09-19 NOTE — PLAN OF CARE
Problem: MOBILITY - ADULT  Goal: Maintain or return to baseline ADL function  Description: INTERVENTIONS:  -  Assess patient's ability to carry out ADLs; assess patient's baseline for ADL function and identify physical deficits which impact ability to perform ADLs (bathing, care of mouth/teeth, toileting, grooming, dressing, etc )  - Assess/evaluate cause of self-care deficits   - Assess range of motion  - Assess patient's mobility; develop plan if impaired  - Assess patient's need for assistive devices and provide as appropriate  - Encourage maximum independence but intervene and supervise when necessary  - Involve family in performance of ADLs  - Assess for home care needs following discharge   - Consider OT consult to assist with ADL evaluation and planning for discharge  - Provide patient education as appropriate  Outcome: Progressing  Goal: Maintains/Returns to pre admission functional level  Description: INTERVENTIONS:  - Perform BMAT or MOVE assessment daily    - Set and communicate daily mobility goal to care team and patient/family/caregiver  - Collaborate with rehabilitation services on mobility goals if consulted  - Perform Range of Motion 2 times a day  - Reposition patient every 2 hours    - Dangle patient 2 times a day  - Stand patient 2 times a day  - Ambulate patient 2 times a day  - Out of bed to chair 2 times a day   - Out of bed for meals 2 times a day  - Out of bed for toileting  - Record patient progress and toleration of activity level   Outcome: Progressing     Problem: PAIN - ADULT  Goal: Verbalizes/displays adequate comfort level or baseline comfort level  Description: Interventions:  - Encourage patient to monitor pain and request assistance  - Assess pain using appropriate pain scale  - Administer analgesics based on type and severity of pain and evaluate response  - Implement non-pharmacological measures as appropriate and evaluate response  - Consider cultural and social influences on pain and pain management  - Notify physician/advanced practitioner if interventions unsuccessful or patient reports new pain  Outcome: Progressing     Problem: INFECTION - ADULT  Goal: Absence or prevention of progression during hospitalization  Description: INTERVENTIONS:  - Assess and monitor for signs and symptoms of infection  - Monitor lab/diagnostic results  - Monitor all insertion sites, i e  indwelling lines, tubes, and drains  - Monitor endotracheal if appropriate and nasal secretions for changes in amount and color  - [place] appropriate cooling/warming therapies per order  - Administer medications as ordered  - Instruct and encourage patient and family to use good hand hygiene technique  - Identify and instruct in appropriate isolation precautions for identified infection/condition  Outcome: Progressing  Goal: Absence of fever/infection during neutropenic period  Description: INTERVENTIONS:  - Monitor WBC    Outcome: Progressing

## 2022-09-19 NOTE — ASSESSMENT & PLAN NOTE
Recent Labs     09/17/22 2127 09/18/22  0632 09/19/22  0516   CREATININE 2 60* 1 89* 0 83   EGFR 17 25 69     Estimated Creatinine Clearance: 65 2 mL/min (by C-G formula based on SCr of 0 83 mg/dL)  Cr w/ downtrend  Superimposed on stage 3B CKD   Suspect pre-renal in setting of dehydration/poor PO intake and hypotension   · Cr 2 6 on admission; now resolved  · Baseline 0 7-0 9      Plan:   Urinary retention protocol, Bladder scan, Daily weights, I/O  · Gentle IVF   · Continue to hold losartan and restart torsemide on discharge  · Continue to monitor BMP

## 2022-09-19 NOTE — CASE MANAGEMENT
Case Management Assessment & Discharge Planning Note    Patient name Sonia Paris  Location S /S -01 MRN [id number]  : 1945 Date 2022       Current Admission Date: 2022  Current Admission Diagnosis:A-fib [facility])   Patient Active Problem List    Diagnosis Date Noted    Anemia 2022    Ambulatory dysfunction 2022    Constipation 2022    Fall 2022    Fracture of proximal end of left femur ([facility] ) 2022    Melena 2022    Acute pain due to trauma 2022    At risk for obstructive sleep apnea 2022    Colon cancer screening 2022    Osteoporosis screening 2022    Lymphedema 2022    Venous stasis dermatitis of both lower extremities 2022    Encounter for screening mammogram for malignant neoplasm of breast 2022    Seasonal allergies 2022    Xerosis of skin 2022    Prediabetes 2022    Abnormal CXR 2022    Memory impairment 2022    Chronic diastolic heart failure ([facility] ) 2022    Anxiety     Opioid dependence due to Chronic back pain  2022    A-fib ([facility] ) 2021    Mixed hyperlipidemia 2021    Obesity 2021    Osteoarthritis of knee 2021    Stage 3 chronic kidney disease ([facility] ) 2021    Chronic low back pain with sciatica 2021    Chronic venous insufficiency 2021    Essential hypertension 2017    Sjogren's syndrome ([facility] ) 2014      LOS (days): 1  Geometric Mean LOS (GMLOS) (days): 3 10  Days to GMLOS:2 2     OBJECTIVE:  PATIENT READMITTED [address]  of Unplanned Readmission Score: 35 01         Current admission status: Inpatient       Preferred Pharmacy:   [address], [facility]  [address] [place] [address]  Phone: [phone] Fax: [phone]    Primary Care Provider: Estill Krabbe, MD    Primary Insurance: [address] [place] [address]  Secondary Insurance:     ASSESSMENT:  Active Health Care Proxies     Jame Rodríguez 26 - Spouse   Primary Phone: [phone] (Mobile)  Home Phone: [phone]                              Patient Information  Admitted from[de-identified] Home  Mental Status: Alert  During Assessment patient was accompanied by: Spouse, Daughter  Assessment information provided by[de-identified] Patient, Daughter, Spouse  Primary Caregiver: Family  Support Systems: Spouse/significant other, Daughter, Self  In the last 12 months, was there a time when you were not able to pay the mortgage or rent on time?: No  In the last 12 months, how many places have you lived?: 1  In the last 12 months, was there a time when you did not have a steady place to sleep or slept in a shelter (including now)?: No  Homeless/housing insecurity resource given?: N/A  Living Arrangements: Lives w/ Spouse/significant other    Activities of Daily Living Prior to Admission  Functional Status: Assistance  Completes ADLs independently?: Yes  Ambulates independently?: No  Level of ambulatory dependence: Assistance  Does patient use assisted devices?: Yes  Assisted Devices (DME) used: [employer]  Does patient currently own DME?: Yes  What DME does the patient currently own?: Merlin Sovereign  Does the patient have a history of Short-Term Rehab?: Yes  Does patient have a history of HHC?: Yes  Does patient currently have [address]?: Yes Dominick Saul)    Current [facility]  Type of Current Home Care Services: Home OT, Nurse visit, Home PT  [address][de-[address]] [address] [place] [address] Provider[de-identified] PCP    Patient Information Continued  Does patient have prescription coverage?: Yes  Within the past 12 months, you worried that your food would run out before you got the money to buy more : Never true  Within the past 12 months, the food you bought just didn't last and you didn't have money to get more : Never true  Food insecurity resource given?: N/A  Does patient receive dialysis treatments?: No  Does patient have a history of substance abuse?: No  Does patient have a history of Mental Health Diagnosis?: No         Means of Transportation  Means of Transport to Appts[de-identified] Family transport  In the past 12 months, has lack of transportation kept you from medical appointments or from getting medications?: No  In the past 12 months, has lack of transportation kept you from meetings, work, or from getting things needed for daily living?: No  Was application for public transport provided?: N/A        DISCHARGE DETAILS:    Discharge planning discussed with[de-identified] patient and family (spouse and daughter) at bedside  Freedom of Choice: Yes (re: home care)  Comments - Freedom of Choice: requesting resumption with Destinee Perez CM contacted family/caregiver?: Yes (family at bedside)  Were Treatment Team discharge recommendations reviewed with patient/caregiver?: Yes  Did patient/caregiver verbalize understanding of patient care needs?: Yes  Were patient/caregiver advised of the risks associated with not following Treatment Team discharge recommendations?: Yes    Contacts  Patient Contacts: Lew Bateman, spouse  Relationship to Patient[de-identified] Family  Contact Method:  In Person  Reason/Outcome: Continuity of Care, Emergency Contact, Referral, Discharge [address]         Is the patient interested in [address] at discharge?: Yes  [address] requested[de-identified] Nursing, Physical Therapy, Occupational [address] Name[de-identified] [facility] Provider[de-identified] PCP  Home Health Services Needed[de-identified] Evaluate Functional Status and Safety, Gait/ADL Training, Heart Failure Management, Strengthening/Theraputic Exercises to Improve Function  Homebound Criteria Met[de-identified] Requires the Assistance of Another Person for Safe Ambulation or to Leave the Home, Uses an Assist Device (i e  cane, walker, etc)  Supporting Clincal Findings[de-identified] Fatigues Easnitza in [employer], [employer] Endurance    DME Referral Provided  Referral made for DME?: No    Other Referral/Resources/Interventions Provided:  Interventions: C  Referral Comments: Patient admitted due to hypertension, CHF  Per MDs, patient clear for d/c today  PT recommending home care services  Met with patient, spouse and daughter at bedside to complete assessment and discuss d/c plan  Patient reports that she lives with spouse who has been assisting with her care  Reports that they have all the DME they need at home and state that she's currently receiving home care services through [facility]  Daughter reports that [facility] is aware of anticipated d/c for today, and she is requesting order be sent to them for resumption of services  Family will be transporting patient home  Referral sent to [facility] in [place]  No other d/c needs identified at this time      Would you like to participate in our [address] service program?  : No - Declined    Treatment Team Recommendation: Home with [address] [employer]  Discharge Destination Plan[de-identified] Home with [address] [place] [employer] [facility])  Transport at Discharge : Family                             IMM Given (Date):: 09/19/22

## 2022-09-19 NOTE — ASSESSMENT & PLAN NOTE
Rate controlled     · Decrease Coreg to 12 5 mg BID with BP hold parameters (sbp <120, Hr <50)  · Continue Eliquis for anticoagulation

## 2022-09-20 ENCOUNTER — TRANSITIONAL CARE MANAGEMENT (OUTPATIENT)
Dept: FAMILY MEDICINE CLINIC | Facility: CLINIC | Age: 77
End: 2022-09-20

## 2022-09-20 NOTE — UTILIZATION REVIEW
Notification of Discharge   This is a Notification of Discharge from our facility [address]  Please be advised that this patient has been discharge from our facility  Below you will find the admission and discharge date and time including the patients disposition  UTILIZATION REVIEW CONTACT:  Ary Lafleur MA  Utilization   Network Utilization Review Department  Phone: [phone] x carefully listen to the prompts  All voicemails are confidential   Email: [email or web address]     PHYSICIAN ADVISORY SERVICES:  FOR [id number] REVIEW - MEDICAL NECESSITY DENIAL  Phone: [phone]  Fax: [phone]  Email: [email or web address]     PRESENTATION DATE: 9/17/2022  8:08 PM  OBERVATION ADMISSION DATE:   INPATIENT ADMISSION DATE: 9/18/22  4:24 PM   DISCHARGE DATE: 9/19/2022  3:33 PM  DISPOSITION: Home with [place] with [address] INFORMATION:  Send all requests for admission clinical reviews, approved or denied determinations and any other requests to dedicated fax number below belonging to the campus where the patient is receiving treatment   List of dedicated fax numbers:  [address] [place] [address] DENIALS (Administrative/Medical Necessity) [phone]   [address] (Maternity/NICU/Pediatrics) [phone]   [facility] [phone]   [address] [facility] [phone]   [address] [place] [address] [phone]   [address] [place] [address] [address] [place] [address],[address] [phone]   [facility]  [phone]   [address] [place] [address], [facility]  [address] [facility] [address] [facility] [phone]

## 2022-09-22 ENCOUNTER — TELEPHONE (OUTPATIENT)
Dept: INTERNAL MEDICINE CLINIC | Facility: CLINIC | Age: 77
End: 2022-09-22

## 2022-09-22 NOTE — TELEPHONE ENCOUNTER
Daughter called and is concerned with marilyns bleeding, she states she has ulcers and she wants to ask a few quesitons

## 2022-09-22 NOTE — TELEPHONE ENCOUNTER
Patient family contacted this office regarding evidence of bleeding noted in the setting of more frequent bowel movements over the oast 24 hours  Small volume bright red blood noted with toileting  There are also several wounds present around the buttocks and rectum  Recommended barrier ointment  Advised patient and family to hold eliquis over the weekend until patient's GI appointment on Monday  Instructed patient and family to report to the emergency department for increased evidence of GIB  Family verbalized understanding of and agreement to plan       Ana Morin MD

## 2022-09-26 ENCOUNTER — APPOINTMENT (OUTPATIENT)
Dept: LAB | Facility: CLINIC | Age: 77
End: 2022-09-26
Payer: COMMERCIAL

## 2022-09-26 ENCOUNTER — OFFICE VISIT (OUTPATIENT)
Dept: INTERNAL MEDICINE CLINIC | Facility: CLINIC | Age: 77
End: 2022-09-26
Payer: COMMERCIAL

## 2022-09-26 ENCOUNTER — TELEPHONE (OUTPATIENT)
Dept: ENDOCRINOLOGY | Facility: CLINIC | Age: 77
End: 2022-09-26

## 2022-09-26 VITALS
SYSTOLIC BLOOD PRESSURE: 124 MMHG | TEMPERATURE: 98.5 F | HEART RATE: 89 BPM | WEIGHT: 210.4 LBS | OXYGEN SATURATION: 98 % | DIASTOLIC BLOOD PRESSURE: 80 MMHG | HEIGHT: 62 IN | BODY MASS INDEX: 38.72 KG/M2

## 2022-09-26 DIAGNOSIS — Z76.89 ENCOUNTER FOR SUPPORT AND COORDINATION OF TRANSITION OF CARE: Primary | ICD-10-CM

## 2022-09-26 DIAGNOSIS — I89.0 LYMPHEDEMA: ICD-10-CM

## 2022-09-26 DIAGNOSIS — D50.9 IRON DEFICIENCY ANEMIA, UNSPECIFIED IRON DEFICIENCY ANEMIA TYPE: ICD-10-CM

## 2022-09-26 DIAGNOSIS — M80.00XA AGE-RELATED OSTEOPOROSIS WITH CURRENT PATHOLOGICAL FRACTURE, INITIAL ENCOUNTER: ICD-10-CM

## 2022-09-26 DIAGNOSIS — I10 ESSENTIAL HYPERTENSION: ICD-10-CM

## 2022-09-26 PROBLEM — Z12.11 COLON CANCER SCREENING: Status: RESOLVED | Noted: 2022-08-03 | Resolved: 2022-09-26

## 2022-09-26 PROBLEM — Z13.820 OSTEOPOROSIS SCREENING: Status: RESOLVED | Noted: 2022-08-03 | Resolved: 2022-09-26

## 2022-09-26 PROBLEM — Z12.31 ENCOUNTER FOR SCREENING MAMMOGRAM FOR MALIGNANT NEOPLASM OF BREAST: Status: RESOLVED | Noted: 2022-03-21 | Resolved: 2022-09-26

## 2022-09-26 LAB
25(OH)D3 SERPL-MCNC: 31.2 NG/ML (ref 30–100)
FERRITIN SERPL-MCNC: 53 NG/ML (ref 8–388)
IRON SATN MFR SERPL: 9 % (ref 15–50)
IRON SERPL-MCNC: 35 UG/DL (ref 50–170)
PHOSPHATE SERPL-MCNC: 2.8 MG/DL (ref 2.3–4.1)
PTH-INTACT SERPL-MCNC: 74 PG/ML (ref 18.4–80.1)
TIBC SERPL-MCNC: 403 UG/DL (ref 250–450)

## 2022-09-26 PROCEDURE — 83540 ASSAY OF IRON: CPT

## 2022-09-26 PROCEDURE — 36415 COLL VENOUS BLD VENIPUNCTURE: CPT

## 2022-09-26 PROCEDURE — 1111F DSCHRG MED/CURRENT MED MERGE: CPT | Performed by: INTERNAL MEDICINE

## 2022-09-26 PROCEDURE — 83970 ASSAY OF PARATHORMONE: CPT

## 2022-09-26 PROCEDURE — 83550 IRON BINDING TEST: CPT

## 2022-09-26 PROCEDURE — 82306 VITAMIN D 25 HYDROXY: CPT

## 2022-09-26 PROCEDURE — 84100 ASSAY OF PHOSPHORUS: CPT

## 2022-09-26 PROCEDURE — 99496 TRANSJ CARE MGMT HIGH F2F 7D: CPT | Performed by: INTERNAL MEDICINE

## 2022-09-26 PROCEDURE — 82728 ASSAY OF FERRITIN: CPT

## 2022-09-26 RX ORDER — OXYCODONE HCL 20 MG/1
20 TABLET, FILM COATED, EXTENDED RELEASE ORAL 2 TIMES DAILY
COMMUNITY

## 2022-09-26 RX ORDER — LOSARTAN POTASSIUM 50 MG/1
50 TABLET ORAL DAILY
Qty: 30 TABLET | Status: ON HOLD
Start: 2022-09-26 | End: 2022-10-10 | Stop reason: SDUPTHER

## 2022-09-26 NOTE — ASSESSMENT & PLAN NOTE
Check iron panel  Continue treatment for PUD with sucralfate and PPI  She has follow-up with GI  I have recommended resume Eliquis since local bleeding has stopped  Oriented to call the office if recurs

## 2022-09-26 NOTE — TELEPHONE ENCOUNTER
Received referral for Angelica Patel  Left voicemail for patient to contact office to schedule Consult/New Patient Appointment

## 2022-09-26 NOTE — PROGRESS NOTES
Assessment/Plan:    Essential hypertension  Blood pressure at home has been ranging more than 147/80 at most of the time  Resume losartan half a dose, 50 mg once a day, to continue checking her blood pressure and report consistent readings above 150/90 or reading lower than 120/80  Avoid hypotension due to risk of falls  Anemia  Check iron panel  Continue treatment for PUD with sucralfate and PPI  She has follow-up with GI  I have recommended resume Eliquis since local bleeding has stopped  Oriented to call the office if recurs  Age-related osteoporosis with current pathological fracture  Recent fall from standing height with left femur fracture  She is scheduled for a DEXA scan  Clinical diagnosis of osteoporosis based on fragility fracture  We will refer her to endocrinology for further treatment, since patient has multiple medical issues and might need injectable medication  Last TSH was wnl in 2022  Basic blood work to rule out secondary causes ordered  Encounter for support and coordination of transition of care  Medication reconciled  Blood work for follow-up ordered, she has a follow-up with GI, she followed with Orthopedics as well  Recommended follow-up with pain management for pain control regimen  Lymphedema  Continue compression, diuretics, emollients  Small superficial wound with no s/s infection to use bacitracin 2x a day re-evaluated in 1 week  Diagnoses and all orders for this visit:    Encounter for support and coordination of transition of care    Age-related osteoporosis with current pathological fracture, initial encounter  -     Vitamin D 25 hydroxy; Future  -     PTH, intact; Future  -     Phosphorus; Future  -     Ambulatory Referral to Endocrinology; Future    Iron deficiency anemia, unspecified iron deficiency anemia type  -     Iron Panel (Includes Ferritin, Iron Sat%, Iron, and TIBC); Future  -     CBC and Platelet;  Future    Essential hypertension  -     losartan (COZAAR) 50 mg tablet; Take 1 tablet (50 mg total) by mouth daily    Lymphedema    Other orders  -     oxyCODONE (OxyCONTIN) 20 mg 12 hr tablet; Take 10 mg by mouth 3 (three) times a day          Subjective:      Patient ID: Elke Gilmore is a 77 y o  female  Patient presents in the office for a TCM visit  Since last time in the office she was hospitalized 2x  First admission was on 08/20 due to a fall in the bathroom resulting on left femur fracture  Fracture was surgically fixed  She also had complaining of bloody stools, EGD was done and show PUD  Endoscopically fixed  She was started on PPI and Carafate  Patient was then discharged to nursing home for rehab  After few days she was then again admitted in the hospital on September 17 because of hypotension at home  She had nausea, dizziness, poor oral intake  She was found to have YUVAL  Blood pressure medication has placed on hold as well diuretic  Yuval resolved, BP improved  She was d/c home  Last week patient called the office with rectal bleeding, daughter examined the patient and saw raw skin around the anus, and local bleeding  Her Eliquis was placed on hold  Since then bleeding stopped  She denies constipation  She is doing PT at home, she has visiting nurses  Today patient states that she has pain left leg, that is improved from when she had a fracture  She is concerned about her pain level, since the family is now managing her medications  She feels like she was in lot of pain after the fracture, and did not have good pain control  She is currently taking half a tablet of 20 mg OxyContin 3 times a day  She also takes Tylenol round-the-clock 1 g every 8 hours  She states now the pain is improved, but she was very distressed about the lack of control her medications  She used to see a pain doctor who managed her pain medications, but she would like to switch to someone else    I have revealed her list of medications and updated in the system  I have also checked her blood pressure log, her blood pressure has been ranging around 140 over 90, she has reading above that most of the time, occasional readings on 130s  No hypotension  The following portions of the patient's history were reviewed and updated as appropriate: allergies, current medications, past family history, past medical history, past social history, past surgical history and problem list     Review of Systems   Constitutional: Positive for fatigue  Negative for appetite change, chills and fever  HENT: Negative for congestion and sneezing  Eyes: Negative for visual disturbance  Respiratory: Negative for cough, chest tightness, shortness of breath and wheezing  Cardiovascular: Positive for leg swelling  Negative for chest pain and palpitations  Gastrointestinal: Positive for anal bleeding (skin around anus)  Negative for abdominal pain, constipation, nausea and vomiting  Genitourinary: Negative for difficulty urinating and frequency  Musculoskeletal: Negative for arthralgias and joint swelling  Skin: Negative for rash  Neurological: Negative for dizziness and headaches  Psychiatric/Behavioral: Negative for agitation  Objective:      /80 (BP Location: Right arm, Patient Position: Sitting, Cuff Size: Standard)   Pulse 89   Temp 98 5 °F (36 9 °C) (Tympanic)   Ht 5' 2" (1 575 m)   Wt 95 4 kg (210 lb 6 4 oz)   SpO2 98%   BMI 38 48 kg/m²          Physical Exam  Vitals and nursing note reviewed  Constitutional:       General: She is not in acute distress  Appearance: She is well-developed  HENT:      Head: Normocephalic and atraumatic  Eyes:      Conjunctiva/sclera: Conjunctivae normal       Pupils: Pupils are equal, round, and reactive to light  Neck:      Thyroid: No thyromegaly  Cardiovascular:      Rate and Rhythm: Normal rate  Rhythm irregular  Heart sounds: Normal heart sounds  Pulmonary:      Effort: No respiratory distress  Breath sounds: Normal breath sounds  No wheezing  Abdominal:      General: Bowel sounds are normal  There is no distension  Palpations: Abdomen is soft  Tenderness: There is no abdominal tenderness  Genitourinary:     Comments: refused  Musculoskeletal:         General: Normal range of motion  Cervical back: Normal range of motion and neck supple  Right lower leg: Edema present  Left lower leg: Edema present  Comments: B/l lower extremities edema, chronic, venous stasis dermatitis, dry scaly skin   Skin:     General: Skin is warm and dry  Capillary Refill: Capillary refill takes less than 2 seconds  Comments: Small 1cm rounded superficial wound on anterior aspect of right lower leg with no s/s of infection   Neurological:      Mental Status: She is alert and oriented to person, place, and time  Sensory: No sensory deficit  Motor: No abnormal muscle tone  Comments: Walks with a walker   Psychiatric:         Thought Content:  Thought content normal

## 2022-09-26 NOTE — ASSESSMENT & PLAN NOTE
Blood pressure at home has been ranging more than 147/80 at most of the time  Resume losartan half a dose, 50 mg once a day, to continue checking her blood pressure and report consistent readings above 150/90 or reading lower than 120/80  Avoid hypotension due to risk of falls

## 2022-09-26 NOTE — ASSESSMENT & PLAN NOTE
Medication reconciled  Blood work for follow-up ordered, she has a follow-up with GI, she followed with Orthopedics as well  Recommended follow-up with pain management for pain control regimen

## 2022-09-26 NOTE — ASSESSMENT & PLAN NOTE
Continue compression, diuretics, emollients  Small superficial wound with no s/s infection to use bacitracin 2x a day re-evaluated in 1 week

## 2022-09-26 NOTE — ASSESSMENT & PLAN NOTE
Recent fall from standing height with left femur fracture  She is scheduled for a DEXA scan  Clinical diagnosis of osteoporosis based on fragility fracture  We will refer her to endocrinology for further treatment, since patient has multiple medical issues and might need injectable medication  Last TSH was wnl in 2022  Basic blood work to rule out secondary causes ordered

## 2022-09-28 ENCOUNTER — TELEPHONE (OUTPATIENT)
Dept: INTERNAL MEDICINE CLINIC | Facility: CLINIC | Age: 77
End: 2022-09-28

## 2022-09-28 NOTE — TELEPHONE ENCOUNTER
If BP has been on that range at home she should not resume losartan  Hold Carvedilol if BP is less than 120/80  Follow up with Dr Gena May next week  Thank you!

## 2022-09-28 NOTE — TELEPHONE ENCOUNTER
Estrella Bales had called back in regards to Dionicio Katz of the information provided by Dr Amelia Brock was thankful for the information and ended the call

## 2022-09-28 NOTE — TELEPHONE ENCOUNTER
Daughter called to claify about losartan   She wanted to know if she should be resuming losartan, which I told her yes , it was on avs  She states bp is yesterday afternoon 98/60, then 100/60, then last nite 10:00pm it was 120/57

## 2022-10-05 DIAGNOSIS — S72.402A CLOSED FRACTURE OF LEFT DISTAL FEMUR (HCC): ICD-10-CM

## 2022-10-05 PROBLEM — G89.11 ACUTE PAIN DUE TO TRAUMA: Status: RESOLVED | Noted: 2022-08-20 | Resolved: 2022-10-05

## 2022-10-05 PROBLEM — R93.89 ABNORMAL CXR: Status: RESOLVED | Noted: 2022-02-23 | Resolved: 2022-10-05

## 2022-10-05 PROBLEM — K92.1 MELENA: Status: RESOLVED | Noted: 2022-08-20 | Resolved: 2022-10-05

## 2022-10-05 PROBLEM — K25.0 ACUTE GASTRIC ULCER WITH HEMORRHAGE: Status: ACTIVE | Noted: 2022-10-05

## 2022-10-05 RX ORDER — ACETAMINOPHEN 500 MG
975 TABLET ORAL EVERY 8 HOURS SCHEDULED
Status: ON HOLD
Start: 2022-10-05 | End: 2022-10-10 | Stop reason: SDUPTHER

## 2022-10-06 ENCOUNTER — APPOINTMENT (OUTPATIENT)
Dept: RADIOLOGY | Facility: HOSPITAL | Age: 77
DRG: 291 | End: 2022-10-06
Payer: COMMERCIAL

## 2022-10-06 ENCOUNTER — HOSPITAL ENCOUNTER (INPATIENT)
Facility: HOSPITAL | Age: 77
LOS: 4 days | Discharge: HOME HEALTH | DRG: 291 | End: 2022-10-10
Attending: EMERGENCY MEDICINE | Admitting: INTERNAL MEDICINE
Payer: COMMERCIAL

## 2022-10-06 ENCOUNTER — TELEPHONE (OUTPATIENT)
Dept: OTHER | Facility: OTHER | Age: 77
End: 2022-10-06

## 2022-10-06 DIAGNOSIS — E66.01 CLASS 3 SEVERE OBESITY DUE TO EXCESS CALORIES WITH SERIOUS COMORBIDITY AND BODY MASS INDEX (BMI) OF 40.0 TO 44.9 IN ADULT (HCC): ICD-10-CM

## 2022-10-06 DIAGNOSIS — F32.A DEPRESSION: ICD-10-CM

## 2022-10-06 DIAGNOSIS — I50.9 CHF (CONGESTIVE HEART FAILURE) (HCC): ICD-10-CM

## 2022-10-06 DIAGNOSIS — F41.9 ANXIETY: ICD-10-CM

## 2022-10-06 DIAGNOSIS — I48.11 LONGSTANDING PERSISTENT ATRIAL FIBRILLATION (HCC): ICD-10-CM

## 2022-10-06 DIAGNOSIS — R77.8 ELEVATED TROPONIN: ICD-10-CM

## 2022-10-06 DIAGNOSIS — I50.33 ACUTE ON CHRONIC DIASTOLIC HEART FAILURE (HCC): Primary | ICD-10-CM

## 2022-10-06 DIAGNOSIS — I10 ESSENTIAL HYPERTENSION: ICD-10-CM

## 2022-10-06 DIAGNOSIS — S72.402A CLOSED FRACTURE OF LEFT DISTAL FEMUR (HCC): ICD-10-CM

## 2022-10-06 DIAGNOSIS — R26.2 AMBULATORY DYSFUNCTION: ICD-10-CM

## 2022-10-06 DIAGNOSIS — E87.70 VOLUME OVERLOAD: ICD-10-CM

## 2022-10-06 PROBLEM — D72.829 LEUKOCYTOSIS: Status: ACTIVE | Noted: 2022-10-06

## 2022-10-06 PROBLEM — R79.89 ELEVATED TROPONIN: Status: ACTIVE | Noted: 2022-10-06

## 2022-10-06 PROBLEM — N18.9 ACUTE KIDNEY INJURY SUPERIMPOSED ON CKD (HCC): Status: ACTIVE | Noted: 2021-12-30

## 2022-10-06 PROBLEM — M79.674 TOE PAIN, RIGHT: Status: ACTIVE | Noted: 2022-10-06

## 2022-10-06 LAB
2HR DELTA HS TROPONIN: 112 NG/L
ALBUMIN SERPL BCP-MCNC: 3.5 G/DL (ref 3.5–5)
ALP SERPL-CCNC: 106 U/L (ref 34–104)
ALT SERPL W P-5'-P-CCNC: 6 U/L (ref 7–52)
ANION GAP SERPL CALCULATED.3IONS-SCNC: 8 MMOL/L (ref 4–13)
APTT PPP: 32 SECONDS (ref 23–37)
AST SERPL W P-5'-P-CCNC: 13 U/L (ref 13–39)
BASOPHILS # BLD AUTO: 0.03 THOUSANDS/ΜL (ref 0–0.1)
BASOPHILS NFR BLD AUTO: 0 % (ref 0–1)
BILIRUB SERPL-MCNC: 0.46 MG/DL (ref 0.2–1)
BNP SERPL-MCNC: 197 PG/ML (ref 0–100)
BUN SERPL-MCNC: 26 MG/DL (ref 5–25)
CALCIUM SERPL-MCNC: 9.4 MG/DL (ref 8.4–10.2)
CARDIAC TROPONIN I PNL SERPL HS: 386 NG/L
CARDIAC TROPONIN I PNL SERPL HS: 498 NG/L
CHLORIDE SERPL-SCNC: 98 MMOL/L (ref 96–108)
CO2 SERPL-SCNC: 30 MMOL/L (ref 21–32)
CREAT SERPL-MCNC: 1.24 MG/DL (ref 0.6–1.3)
EOSINOPHIL # BLD AUTO: 0.22 THOUSAND/ΜL (ref 0–0.61)
EOSINOPHIL NFR BLD AUTO: 2 % (ref 0–6)
ERYTHROCYTE [DISTWIDTH] IN BLOOD BY AUTOMATED COUNT: 13.8 % (ref 11.6–15.1)
GFR SERPL CREATININE-BSD FRML MDRD: 42 ML/MIN/1.73SQ M
GLUCOSE SERPL-MCNC: 132 MG/DL (ref 65–140)
HCT VFR BLD AUTO: 28.7 % (ref 34.8–46.1)
HGB BLD-MCNC: 8.9 G/DL (ref 11.5–15.4)
IMM GRANULOCYTES # BLD AUTO: 0.05 THOUSAND/UL (ref 0–0.2)
IMM GRANULOCYTES NFR BLD AUTO: 0 % (ref 0–2)
INR PPP: 1.31 (ref 0.84–1.19)
LYMPHOCYTES # BLD AUTO: 1.17 THOUSANDS/ΜL (ref 0.6–4.47)
LYMPHOCYTES NFR BLD AUTO: 10 % (ref 14–44)
MCH RBC QN AUTO: 28.4 PG (ref 26.8–34.3)
MCHC RBC AUTO-ENTMCNC: 31 G/DL (ref 31.4–37.4)
MCV RBC AUTO: 92 FL (ref 82–98)
MONOCYTES # BLD AUTO: 1.06 THOUSAND/ΜL (ref 0.17–1.22)
MONOCYTES NFR BLD AUTO: 9 % (ref 4–12)
NEUTROPHILS # BLD AUTO: 9.33 THOUSANDS/ΜL (ref 1.85–7.62)
NEUTS SEG NFR BLD AUTO: 79 % (ref 43–75)
NRBC BLD AUTO-RTO: 0 /100 WBCS
PLATELET # BLD AUTO: 385 THOUSANDS/UL (ref 149–390)
PMV BLD AUTO: 9.8 FL (ref 8.9–12.7)
POTASSIUM SERPL-SCNC: 3.5 MMOL/L (ref 3.5–5.3)
PROT SERPL-MCNC: 6.5 G/DL (ref 6.4–8.4)
PROTHROMBIN TIME: 16.5 SECONDS (ref 11.6–14.5)
RBC # BLD AUTO: 3.13 MILLION/UL (ref 3.81–5.12)
SODIUM SERPL-SCNC: 136 MMOL/L (ref 135–147)
WBC # BLD AUTO: 11.86 THOUSAND/UL (ref 4.31–10.16)

## 2022-10-06 PROCEDURE — 96374 THER/PROPH/DIAG INJ IV PUSH: CPT

## 2022-10-06 PROCEDURE — 36415 COLL VENOUS BLD VENIPUNCTURE: CPT | Performed by: EMERGENCY MEDICINE

## 2022-10-06 PROCEDURE — 85025 COMPLETE CBC W/AUTO DIFF WBC: CPT | Performed by: EMERGENCY MEDICINE

## 2022-10-06 PROCEDURE — 85730 THROMBOPLASTIN TIME PARTIAL: CPT | Performed by: EMERGENCY MEDICINE

## 2022-10-06 PROCEDURE — 84484 ASSAY OF TROPONIN QUANT: CPT | Performed by: EMERGENCY MEDICINE

## 2022-10-06 PROCEDURE — 73660 X-RAY EXAM OF TOE(S): CPT

## 2022-10-06 PROCEDURE — 99285 EMERGENCY DEPT VISIT HI MDM: CPT | Performed by: EMERGENCY MEDICINE

## 2022-10-06 PROCEDURE — 99223 1ST HOSP IP/OBS HIGH 75: CPT | Performed by: NURSE PRACTITIONER

## 2022-10-06 PROCEDURE — 71045 X-RAY EXAM CHEST 1 VIEW: CPT

## 2022-10-06 PROCEDURE — 85610 PROTHROMBIN TIME: CPT | Performed by: EMERGENCY MEDICINE

## 2022-10-06 PROCEDURE — 80053 COMPREHEN METABOLIC PANEL: CPT | Performed by: EMERGENCY MEDICINE

## 2022-10-06 PROCEDURE — 99285 EMERGENCY DEPT VISIT HI MDM: CPT

## 2022-10-06 PROCEDURE — 83880 ASSAY OF NATRIURETIC PEPTIDE: CPT | Performed by: EMERGENCY MEDICINE

## 2022-10-06 PROCEDURE — 93005 ELECTROCARDIOGRAM TRACING: CPT

## 2022-10-06 RX ORDER — FUROSEMIDE 10 MG/ML
20 INJECTION INTRAMUSCULAR; INTRAVENOUS ONCE
Status: COMPLETED | OUTPATIENT
Start: 2022-10-06 | End: 2022-10-07

## 2022-10-06 RX ORDER — CARVEDILOL 12.5 MG/1
12.5 TABLET ORAL 2 TIMES DAILY WITH MEALS
Status: DISCONTINUED | OUTPATIENT
Start: 2022-10-07 | End: 2022-10-10 | Stop reason: HOSPADM

## 2022-10-06 RX ORDER — PANTOPRAZOLE SODIUM 40 MG/1
40 TABLET, DELAYED RELEASE ORAL
Status: DISCONTINUED | OUTPATIENT
Start: 2022-10-07 | End: 2022-10-08

## 2022-10-06 RX ORDER — OXYCODONE HCL 20 MG/1
20 TABLET, FILM COATED, EXTENDED RELEASE ORAL 2 TIMES DAILY
Status: DISCONTINUED | OUTPATIENT
Start: 2022-10-06 | End: 2022-10-10 | Stop reason: HOSPADM

## 2022-10-06 RX ORDER — FUROSEMIDE 10 MG/ML
20 INJECTION INTRAMUSCULAR; INTRAVENOUS ONCE
Status: COMPLETED | OUTPATIENT
Start: 2022-10-06 | End: 2022-10-06

## 2022-10-06 RX ORDER — POLYETHYLENE GLYCOL 3350 17 G/17G
17 POWDER, FOR SOLUTION ORAL DAILY
Status: DISCONTINUED | OUTPATIENT
Start: 2022-10-07 | End: 2022-10-10 | Stop reason: HOSPADM

## 2022-10-06 RX ORDER — AMOXICILLIN 250 MG
2 CAPSULE ORAL 2 TIMES DAILY
Status: DISCONTINUED | OUTPATIENT
Start: 2022-10-07 | End: 2022-10-10 | Stop reason: HOSPADM

## 2022-10-06 RX ORDER — SUCRALFATE 1 G/1
1 TABLET ORAL
Status: DISCONTINUED | OUTPATIENT
Start: 2022-10-06 | End: 2022-10-10 | Stop reason: HOSPADM

## 2022-10-06 RX ORDER — ASPIRIN 325 MG
325 TABLET ORAL ONCE
Status: COMPLETED | OUTPATIENT
Start: 2022-10-06 | End: 2022-10-06

## 2022-10-06 RX ORDER — ESCITALOPRAM OXALATE 10 MG/1
10 TABLET ORAL DAILY
Status: DISCONTINUED | OUTPATIENT
Start: 2022-10-07 | End: 2022-10-07

## 2022-10-06 RX ORDER — FUROSEMIDE 40 MG/1
80 TABLET ORAL
Status: DISCONTINUED | OUTPATIENT
Start: 2022-10-07 | End: 2022-10-06

## 2022-10-06 RX ORDER — AMMONIUM LACTATE 12 G/100G
CREAM TOPICAL 2 TIMES DAILY
Status: DISCONTINUED | OUTPATIENT
Start: 2022-10-07 | End: 2022-10-10 | Stop reason: HOSPADM

## 2022-10-06 RX ORDER — ACETAMINOPHEN 325 MG/1
650 TABLET ORAL EVERY 6 HOURS PRN
Status: DISCONTINUED | OUTPATIENT
Start: 2022-10-06 | End: 2022-10-10 | Stop reason: HOSPADM

## 2022-10-06 RX ORDER — BUSPIRONE HYDROCHLORIDE 5 MG/1
5 TABLET ORAL 3 TIMES DAILY
Status: DISCONTINUED | OUTPATIENT
Start: 2022-10-06 | End: 2022-10-10 | Stop reason: HOSPADM

## 2022-10-06 RX ADMIN — ASPIRIN 325 MG ORAL TABLET 325 MG: 325 PILL ORAL at 20:44

## 2022-10-06 RX ADMIN — FUROSEMIDE 20 MG: 10 INJECTION, SOLUTION INTRAMUSCULAR; INTRAVENOUS at 20:44

## 2022-10-06 NOTE — ED PROVIDER NOTES
History  Chief Complaint   Patient presents with   • Leg Swelling     Pt with bilateral lower leg edema  Also reports right toe pain after stumbling earlier     Patient is a 78-year-old female who presents to the emergency department from home for evaluation of increasing lower extremity edema  She relates "everyone is telling me it is too much " She has lymphedema and chronic lower extremity swelling  She relates that she had not thought much of this although the last day it has seemed worse to her  She admits that swelling has been increasing over the last 1 week  She reports having 1 area of open skin that has drained some clear fluid  A Band-Aid would not stay in place on the right upper calf  She reports feeling well without any chest discomfort or dyspnea  She denies having had any recent medication change  Losartan was added back to her medications at the end of September  She denies any increase in sodium intake  She reports keeping her legs elevated the majority of the time though admits that she has not been using compressive stockings provided to her by vascular team   She estimates that she has gained 5 lb in the last 1 week  She relates that she normally has assistance in maneuvering down the steps  Today her right great toe was "bent backwards "  She has ongoing discomfort in this  Past medical history significant for hypertension, hyperlipidemia, CKD, CHF, lymphedema and atrial fibrillation  She is on Eliquis  She does have history of GI bleeds and 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  A few weeks ago she underwent surgical treatment for left leg fracture  Her  arrived near the end of the encounter  He shares that her weight was 208 lb upon discharge from rehab 2 weeks ago  She was up to 215 last week and 225 this week prompting concern for multiple care providers    Although patient denied appreciating shortness of breath he notes that her respirations have been more labored over the last 1 week and especially the last couple of days  He describes it took her 10 minutes to walk around their bed  Prior to Admission Medications   Prescriptions Last Dose Informant Patient Reported? Taking?    TIZANIDINE HCL PO   Yes Yes   Sig: Take 4 mg by mouth every 8 (eight) hours as needed (muscle spasms)   acetaminophen (TYLENOL) 500 mg tablet   No No   Sig: Take 2 tablets (1,000 mg total) by mouth every 8 (eight) hours   ammonium lactate (LAC-HYDRIN) 12 % cream  Other (Specify) No No   Sig: Apply topically 2 (two) times a day   apixaban (Eliquis) 5 mg   No No   Sig: Take 1 tablet (5 mg total) by mouth 2 (two) times a day Lot [id number] exp 10/2024   busPIRone (BUSPAR) 5 mg tablet  Other (Specify) No No   Sig: Take 1 tablet (5 mg total) by mouth 3 (three) times a day   carvedilol (COREG) 12 5 mg tablet  Other (Specify) No No   Sig: Take 1 tablet (12 5 mg total) by mouth 2 (two) times a day with meals   escitalopram (LEXAPRO) 10 mg tablet  Other (Specify) No No   Sig: Take 1 tablet (10 mg total) by mouth daily   losartan (COZAAR) 50 mg tablet   No No   Sig: Take 1 tablet (50 mg total) by mouth daily   oxyCODONE (OxyCONTIN) 20 mg 12 hr tablet   Yes No   Sig: Take 20 mg by mouth 2 (two) times a day   pantoprazole (PROTONIX) 40 mg tablet  Other (Specify) No No   Sig: Take 1 tablet (40 mg total) by mouth 2 (two) times a day before meals   polyethylene glycol (MIRALAX) 17 g packet  Other (Specify) No No   Sig: Take 17 g by mouth daily   Patient not taking: Reported on 9/26/2022   potassium chloride (K-DUR,KLOR-CON) 20 mEq tablet  Other (Specify) No No   Sig: Take 2 tablets (40 mEq total) by mouth daily   senna-docusate sodium (SENOKOT S) 8 6-50 mg per tablet  Other (Specify) No No   Sig: Take 2 tablets by mouth 2 (two) times a day   sucralfate (CARAFATE) 1 g tablet  Other (Specify) No No   Sig: Take 1 tablet (1 g total) by mouth 4 (four) times a day (before meals and at bedtime)   torsemide (DEMADEX) 20 mg tablet  Other (Specify) No No   Sig: Take 2 tablets (40 mg total) by mouth daily      Facility-Administered Medications: None       Past Medical History:   Diagnosis Date   • [facility] 12/29/2021   • Anxiety    • Arthritis    • Back pain    • Cellulitis    • CHF (congestive heart failure) (HCC)    • Colon cancer screening 8/3/2022   • Edema of both lower extremities due to peripheral venous insufficiency    • Edema of both lower extremities due to peripheral venous insufficiency    • Encounter for screening mammogram for malignant neoplasm of breast 3/21/2022   • Erythema of lower extremity 11/12/2021   • Fibromyalgia    • Great toe pain, right 10/6/2022   • Hypertension    • Hypotension 9/17/2022   • Leukocytosis 10/6/2022   • Melena 8/20/2022   • Osteoporosis screening 8/3/2022   • Sjogren syndrome, unspecified ([facility] )        Past Surgical History:   Procedure Laterality Date   • KNEE CARTILAGE SURGERY     • MN OPEN RX FEMUR FX+INTRAMED SHAN Left 8/22/2022    Procedure: LEFT RETROGRADE IM SHAN;  Surgeon: Roseann Olivarez MD;  Location: [facility];  Service: Orthopedics   • REPLACEMENT TOTAL KNEE BILATERAL         Family History   Problem Relation Age of Onset   • Heart disease Mother    • Cancer Father      I have reviewed and agree with the history as documented  E-Cigarette/Vaping   • E-Cigarette Use Never User      E-Cigarette/Vaping Substances     Social History     Tobacco Use   • Smoking status: Former Smoker     Types: Cigarettes   • Smokeless tobacco: Never Used   Vaping Use   • Vaping Use: Never used   Substance Use Topics   • Alcohol use: Not Currently   • Drug use: Never       Review of Systems   All other systems reviewed and are negative  Physical Exam  Physical Exam  Vitals and nursing note reviewed  Constitutional:       Appearance: She is obese  She is not ill-appearing  HENT:      Head: Normocephalic        Nose: Nose normal    Eyes: Extraocular Movements: Extraocular movements intact  Conjunctiva/sclera: Conjunctivae normal    Cardiovascular:      Rate and Rhythm: Normal rate and regular rhythm  Heart sounds: Normal heart sounds  Pulmonary:      Effort: Pulmonary effort is normal       Breath sounds: Normal breath sounds  Abdominal:      Palpations: Abdomen is soft  Tenderness: There is no abdominal tenderness  There is no right CVA tenderness or left CVA tenderness  Musculoskeletal:      Cervical back: Normal range of motion  Comments: Plus three pitting edema of the lower extremities  Light pink erythema without distinct increased warmth over the entire lower legs  Nodular skin thickening consistent with chronic changes present  Lower legs are tender  There are small clear vesicles on the right leg as well as a few open sites with minimal serous drainage  No malodor  Plus two bilateral PT and DP pulses  Tenderness is present at the base of the right great toe  No discoloration or swelling appreciated  Capillary refill of all toes is less than 2 seconds  Sensation is present in all of these  Skin:     General: Skin is warm and dry  Neurological:      Mental Status: She is alert and oriented to person, place, and time     Psychiatric:         Mood and Affect: Mood normal          Behavior: Behavior normal          Vital Signs  ED Triage Vitals   Temperature Pulse Respirations Blood Pressure SpO2   10/06/22 1848 10/06/22 1848 10/06/22 1848 10/06/22 1848 10/06/22 1848   98 5 °F (36 9 °C) 80 20 97/55 90 %      Temp Source Heart Rate Source Patient Position - Orthostatic VS BP Location FiO2 (%)   10/06/22 1848 -- 10/07/22 0700 10/07/22 0109 --   Oral  Lying Left arm       Pain Score       10/06/22 1848       3           Vitals:    10/09/22 0734 10/09/22 1430 10/09/22 1551 10/09/22 2129   BP: 138/70 136/65  95/57   Pulse: 75  72 73   Patient Position - Orthostatic VS: Lying            Visual Acuity      ED Medications  Medications   ammonium lactate (LAC-HYDRIN) 12 % cream ( Topical Given 10/9/22 2125)   apixaban (ELIQUIS) tablet 5 mg (5 mg Oral Given 10/9/22 1752)   busPIRone (BUSPAR) tablet 5 mg (5 mg Oral Given 10/9/22 2125)   oxyCODONE (OxyCONTIN) 12 hr tablet 20 mg (20 mg Oral Given 10/9/22 2125)   polyethylene glycol (MIRALAX) packet 17 g (17 g Oral Refused 10/9/22 0842)   senna-docusate sodium (SENOKOT S) 8 6-50 mg per tablet 2 tablet (2 tablets Oral Not Given 10/9/22 1752)   sucralfate (CARAFATE) tablet 1 g (1 g Oral Given 10/9/22 2125)   acetaminophen (TYLENOL) tablet 650 mg (has no administration in time range)   carvedilol (COREG) tablet 12 5 mg (12 5 mg Oral Given 10/9/22 1551)   furosemide (LASIX) injection 40 mg (40 mg Intravenous Given 10/9/22 1752)   escitalopram (LEXAPRO) tablet 20 mg (20 mg Oral Given 10/9/22 0842)   potassium chloride (K-DUR,KLOR-CON) CR tablet 40 mEq (40 mEq Oral Given 10/9/22 1752)   tiZANidine (ZANAFLEX) tablet 4 mg (has no administration in time range)   furosemide (LASIX) injection 20 mg (20 mg Intravenous Given 10/6/22 2044)   aspirin tablet 325 mg (325 mg Oral Given 10/6/22 2044)   furosemide (LASIX) injection 20 mg (20 mg Intravenous Given 10/7/22 0109)   potassium chloride (K-DUR,KLOR-CON) CR tablet 40 mEq (40 mEq Oral Given 10/7/22 0858)   magnesium sulfate 2 g/50 mL IVPB (premix) 2 g (2 g Intravenous New Bag 10/9/22 0849)   potassium chloride (K-DUR,KLOR-CON) CR tablet 40 mEq (40 mEq Oral Given 10/9/22 1000)       Diagnostic Studies  Results Reviewed     Procedure Component Value Units Date/Time    CBC and differential [[id number]]  (Abnormal) Collected: 10/07/22 0531    Lab Status: Final result Specimen: Blood from Hand, Right Updated: 10/07/22 0700     WBC 7 65 Thousand/uL      RBC 2 90 Million/uL      Hemoglobin 8 2 g/dL      Hematocrit 25 8 %      MCV 89 fL      MCH 28 3 pg      MCHC 31 8 g/dL      RDW 13 9 %      MPV 10 2 fL      Platelets 839 Thousands/uL      nRBC 0 /100 WBCs      Neutrophils Relative 62 %      Immat GRANS % 0 %      Lymphocytes Relative 23 %      Monocytes Relative 11 %      Eosinophils Relative 3 %      Basophils Relative 1 %      Neutrophils Absolute 4 74 Thousands/µL      Immature Grans Absolute 0 03 Thousand/uL      Lymphocytes Absolute 1 77 Thousands/µL      Monocytes Absolute 0 85 Thousand/µL      Eosinophils Absolute 0 22 Thousand/µL      Basophils Absolute 0 04 Thousands/µL     Basic metabolic panel [[id number]]  (Abnormal) Collected: 10/07/22 0553    Lab Status: Final result Specimen: Blood from Hand, Right Updated: 10/07/22 0643     Sodium 139 mmol/L      Potassium 3 0 mmol/L      Chloride 99 mmol/L      CO2 31 mmol/L      ANION GAP 9 mmol/L      BUN 21 mg/dL      Creatinine 0 98 mg/dL      Glucose 110 mg/dL      Calcium 8 9 mg/dL      eGFR 56 ml/min/1 73sq m     Narrative:      [place] guidelines for Chronic Kidney Disease (CKD):   •  Stage 1 with normal or high GFR (GFR > 90 mL/min/1 73 square meters)  •  Stage 2 Mild CKD (GFR = 60-89 mL/min/1 73 square meters)  •  Stage 3A Moderate CKD (GFR = 45-59 mL/min/1 73 square meters)  •  Stage 3B Moderate CKD (GFR = 30-44 mL/min/1 73 square meters)  •  Stage 4 Severe CKD (GFR = 15-29 mL/min/1 73 square meters)  •  Stage 5 End Stage CKD (GFR <15 mL/min/1 73 square meters)  Note: GFR calculation is accurate only with a steady state creatinine    HS Troponin I 4hr [[id number]]  (Abnormal) Collected: 10/06/22 2357    Lab Status: Final result Specimen: Blood from Arm, Left Updated: 10/07/22 0029     hs TnI 4hr 483 ng/L      Delta 4hr hsTnI 97 ng/L     HS Troponin I 2hr [[id number]]  (Abnormal) Collected: 10/06/22 2156    Lab Status: Final result Specimen: Blood from Arm, Right Updated: 10/06/22 2240     hs TnI 2hr 498 ng/L      Delta 2hr hsTnI 112 ng/L     B-Type Natriuretic Peptide(BNP) [facility], [place], [facility] Only [[id number]]  (Abnormal) Collected: 10/06/22 1927    Lab Status: Final result Specimen: Blood from Arm, Right Updated: 10/06/22 2042      pg/mL     HS Troponin 0hr (reflex protocol) [[id number]]  (Abnormal) Collected: 10/06/22 1927    Lab Status: Final result Specimen: Blood from Arm, Right Updated: 10/06/22 2026     hs TnI 0hr 386 ng/L     Comprehensive metabolic panel [[id number]]  (Abnormal) Collected: 10/06/22 1927    Lab Status: Final result Specimen: Blood from Arm, Right Updated: 10/06/22 2015     Sodium 136 mmol/L      Potassium 3 5 mmol/L      Chloride 98 mmol/L      CO2 30 mmol/L      ANION GAP 8 mmol/L      BUN 26 mg/dL      Creatinine 1 24 mg/dL      Glucose 132 mg/dL      Calcium 9 4 mg/dL      AST 13 U/L      ALT 6 U/L      Alkaline Phosphatase 106 U/L      Total Protein 6 5 g/dL      Albumin 3 5 g/dL      Total Bilirubin 0 46 mg/dL      eGFR 42 ml/min/1 73sq m     Narrative:      [facility] guidelines for Chronic Kidney Disease (CKD):   •  Stage 1 with normal or high GFR (GFR > 90 mL/min/1 73 square meters)  •  Stage 2 Mild CKD (GFR = 60-89 mL/min/1 73 square meters)  •  Stage 3A Moderate CKD (GFR = 45-59 mL/min/1 73 square meters)  •  Stage 3B Moderate CKD (GFR = 30-44 mL/min/1 73 square meters)  •  Stage 4 Severe CKD (GFR = 15-29 mL/min/1 73 square meters)  •  Stage 5 End Stage CKD (GFR <15 mL/min/1 73 square meters)  Note: GFR calculation is accurate only with a steady state creatinine    Protime-INR [[id number]]  (Abnormal) Collected: 10/06/22 1927    Lab Status: Final result Specimen: Blood from Arm, Right Updated: 10/06/22 2008     Protime 16 5 seconds      INR 1 31    APTT [[id number]]  (Normal) Collected: 10/06/22 1927    Lab Status: Final result Specimen: Blood from Arm, Right Updated: 10/06/22 2008     PTT 32 seconds     CBC and differential [[id number]]  (Abnormal) Collected: 10/06/22 1927    Lab Status: Final result Specimen: Blood from Arm, Right Updated: 10/06/22 1935     WBC 11 86 Thousand/uL      RBC 3 13 Million/uL      Hemoglobin 8 9 g/dL      Hematocrit 28 7 %      MCV 92 fL      MCH 28 4 pg      MCHC 31 0 g/dL      RDW 13 8 %      MPV 9 8 fL      Platelets 820 Thousands/uL      nRBC 0 /100 WBCs      Neutrophils Relative 79 %      Immat GRANS % 0 %      Lymphocytes Relative 10 %      Monocytes Relative 9 %      Eosinophils Relative 2 %      Basophils Relative 0 %      Neutrophils Absolute 9 33 Thousands/µL      Immature Grans Absolute 0 05 Thousand/uL      Lymphocytes Absolute 1 17 Thousands/µL      Monocytes Absolute 1 06 Thousand/µL      Eosinophils Absolute 0 22 Thousand/µL      Basophils Absolute 0 03 Thousands/µL                  VAS lower limb venous duplex study, complete bilateral   Final Result by Laura Valdez MD (10/07 1856)      XR chest 1 view portable   ED Interpretation by Jaci Isabel MD (10/06 2028)   Cardiomegaly  Lungs clear  Final Result by Shay Bermudez MD (10/07 0848)         1  No consolidation or pulmonary edema  2   Stable enlarged cardiomediastinal silhouette  Workstation performed: [id number]         XR toe great min 2 view RIGHT   ED Interpretation by Jaci Isabel MD (10/06 2026)   No fracture  Final Result by Lan Negron MD (10/07 4845)      No acute osseous abnormality  Workstation performed: [id number]                    Procedures  ECG 12 Lead Documentation Only    Date/Time: 10/6/2022 7:26 PM  Performed by: Jaci Isabel MD  Authorized by: Jaci Isabel MD     ECG reviewed by me, the ED Provider: yes    Patient location:  ED  Previous ECG:     Previous ECG:  Compared to current    Comparison ECG info:  September 17, 2022  PVC new today    Morphology otherwise very similar  Rate:     ECG rate:  76    ECG rate assessment: normal    Rhythm:     Rhythm: atrial fibrillation    Ectopy:     Ectopy: PVCs      PVCs:  Infrequent  QRS:     QRS axis:  Normal    QRS intervals: Normal  Conduction:     Conduction: normal    ST segments:     ST segments:  Non-specific  T waves:     T waves: non-specific               ED Course  ED Course as of 10/09/22 2357   Thu Oct 06, 2022   2024 Creatinine slightly bumped at 1 2 up from 0 8  Hemoglobin 8 9, down from the 9 as a few weeks ago  Suspect some of this is dilutional    2028 Troponin is elevated at 386  This was normal couple of weeks ago  She has not had any chest discomfort  Will bring in for diuresis and further cardiac evaluation  2041 Spoke with patient and  regarding study results including elevated troponin  Patient continues to deny having had any chest discomfort  She is comfortable presently  MDM    Disposition  Final diagnoses:   Volume overload   CHF (congestive heart failure) ([facility])   Elevated troponin     Time reflects when diagnosis was documented in both MDM as applicable and the Disposition within this note     Time User Action Codes Description Comment    10/6/2022  8:45 PM Anastasia Jaylon A Add [E87 70] Volume overload     10/6/2022  8:45 PM [facility] A Add [I50 9] [facility]estive [facility]     10/6/2022  8:46 PM Anastasia Jaylon A Add [R77 8] Elevated troponin     10/6/2022  9:45 PM Foye Marte Add [I50 33] Acute on chronic diastolic heart failure ([facility] )     10/6/2022  9:48 PM Foye Marte Add [R26 2] Ambulatory dysfunction     10/7/2022  1:45 PM Luwanna Najjar Add Valentino Martínez  A] Depression       ED Disposition     ED Disposition   Admit    Condition   Stable    Date/Time   Thu Oct 6, 2022  8:45 PM    Comment   Case was discussed with SAM Wyatt and the patient's admission status was agreed to be Admission Status: inpatient status to the service of Dr Mumtaz Harrington              Follow-up Information    None         Current Discharge Medication List      CONTINUE these medications which have NOT CHANGED    Details acetaminophen (TYLENOL) 500 mg tablet Take 2 tablets (1,000 mg total) by mouth every 8 (eight) hours    Associated Diagnoses: Closed fracture of left distal femur (HCC)      TIZANIDINE HCL PO Take 4 mg by mouth every 8 (eight) hours as needed (muscle spasms)      ammonium lactate (LAC-HYDRIN) 12 % cream Apply topically 2 (two) times a day  Qty: 385 g, Refills: 0    Associated Diagnoses: Venous stasis dermatitis of both lower extremities      apixaban (Eliquis) 5 mg Take 1 tablet (5 mg total) by mouth 2 (two) times a day Lot [id number] exp 10/2024  Qty: 56 tablet, Refills: 0    Associated Diagnoses: Longstanding persistent atrial fibrillation (HCC)      busPIRone (BUSPAR) 5 mg tablet Take 1 tablet (5 mg total) by mouth 3 (three) times a day  Qty: 90 tablet, Refills: 0    Associated Diagnoses: Anxiety      carvedilol (COREG) 12 5 mg tablet Take 1 tablet (12 5 mg total) by mouth 2 (two) times a day with meals  Qty: 60 tablet, Refills: 0    Associated Diagnoses: Essential hypertension      escitalopram (LEXAPRO) 10 mg tablet Take 1 tablet (10 mg total) by mouth daily  Qty: 30 tablet, Refills: 0    Associated Diagnoses: Anxiety      losartan (COZAAR) 50 mg tablet Take 1 tablet (50 mg total) by mouth daily  Qty: 30 tablet    Associated Diagnoses: Essential hypertension      oxyCODONE (OxyCONTIN) 20 mg 12 hr tablet Take 20 mg by mouth 2 (two) times a day      pantoprazole (PROTONIX) 40 mg tablet Take 1 tablet (40 mg total) by mouth 2 (two) times a day before meals  Qty: 60 tablet, Refills: 1    Associated Diagnoses: Melena      polyethylene glycol (MIRALAX) 17 g packet Take 17 g by mouth daily  Refills: 0    Associated Diagnoses: Closed fracture of left distal femur (HCC)      potassium chloride (K-DUR,KLOR-CON) 20 mEq tablet Take 2 tablets (40 mEq total) by mouth daily  Qty: 180 tablet, Refills: 1    Associated Diagnoses: CHF (congestive heart failure) (HCC)      senna-docusate sodium (SENOKOT S) 8 6-50 mg per tablet Take 2 tablets by mouth 2 (two) times a day  Refills: 0    Associated Diagnoses: Closed fracture of left distal femur (HCC)      sucralfate (CARAFATE) 1 g tablet Take 1 tablet (1 g total) by mouth 4 (four) times a day (before meals and at bedtime)  Qty: 120 tablet, Refills: 0    Associated Diagnoses: Melena      torsemide (DEMADEX) 20 mg tablet Take 2 tablets (40 mg total) by mouth daily  Qty: 180 tablet, Refills: 0    Associated Diagnoses: CHF (congestive heart failure) ([facility])             No discharge procedures on file      PDMP Review       Value Time User    PDMP Reviewed  Yes 10/6/2022  2:04 PM Miah Jackson MD          ED Provider  Electronically Signed by           Alexandria Washington MD  10/09/22 8203

## 2022-10-06 NOTE — TELEPHONE ENCOUNTER
Home health nurse calling to report, pt gained 10 pounds over night   911 was called for suspected heart failure, pt was taken to [facility]

## 2022-10-07 ENCOUNTER — APPOINTMENT (INPATIENT)
Dept: NON INVASIVE DIAGNOSTICS | Facility: HOSPITAL | Age: 77
DRG: 291 | End: 2022-10-07
Payer: COMMERCIAL

## 2022-10-07 ENCOUNTER — APPOINTMENT (INPATIENT)
Dept: VASCULAR ULTRASOUND | Facility: HOSPITAL | Age: 77
DRG: 291 | End: 2022-10-07
Payer: COMMERCIAL

## 2022-10-07 PROBLEM — I95.9 HYPOTENSION: Status: RESOLVED | Noted: 2022-09-17 | Resolved: 2022-10-07

## 2022-10-07 PROBLEM — D72.829 LEUKOCYTOSIS: Status: RESOLVED | Noted: 2022-10-06 | Resolved: 2022-10-07

## 2022-10-07 PROBLEM — M79.674 GREAT TOE PAIN, RIGHT: Status: RESOLVED | Noted: 2022-10-06 | Resolved: 2022-10-07

## 2022-10-07 LAB
4HR DELTA HS TROPONIN: 97 NG/L
ANION GAP SERPL CALCULATED.3IONS-SCNC: 9 MMOL/L (ref 4–13)
AORTIC ROOT: 2.8 CM
APICAL FOUR CHAMBER EJECTION FRACTION: 71 %
ASCENDING AORTA: 3.4 CM
ATRIAL RATE: 100 BPM
AV LVOT PEAK GRADIENT: 4 MMHG
AV PEAK GRADIENT: 6 MMHG
BASOPHILS # BLD AUTO: 0.04 THOUSANDS/ΜL (ref 0–0.1)
BASOPHILS NFR BLD AUTO: 1 % (ref 0–1)
BUN SERPL-MCNC: 21 MG/DL (ref 5–25)
CALCIUM SERPL-MCNC: 8.9 MG/DL (ref 8.4–10.2)
CARDIAC TROPONIN I PNL SERPL HS: 483 NG/L
CHLORIDE SERPL-SCNC: 99 MMOL/L (ref 96–108)
CO2 SERPL-SCNC: 31 MMOL/L (ref 21–32)
CREAT SERPL-MCNC: 0.98 MG/DL (ref 0.6–1.3)
E WAVE DECELERATION TIME: 202 MS
EOSINOPHIL # BLD AUTO: 0.22 THOUSAND/ΜL (ref 0–0.61)
EOSINOPHIL NFR BLD AUTO: 3 % (ref 0–6)
ERYTHROCYTE [DISTWIDTH] IN BLOOD BY AUTOMATED COUNT: 13.9 % (ref 11.6–15.1)
FRACTIONAL SHORTENING: 33 % (ref 28–44)
GFR SERPL CREATININE-BSD FRML MDRD: 56 ML/MIN/1.73SQ M
GLUCOSE SERPL-MCNC: 110 MG/DL (ref 65–140)
HCT VFR BLD AUTO: 25.8 % (ref 34.8–46.1)
HGB BLD-MCNC: 8.2 G/DL (ref 11.5–15.4)
IMM GRANULOCYTES # BLD AUTO: 0.03 THOUSAND/UL (ref 0–0.2)
IMM GRANULOCYTES NFR BLD AUTO: 0 % (ref 0–2)
INTERVENTRICULAR SEPTUM IN DIASTOLE (PARASTERNAL SHORT AXIS VIEW): 1 CM
INTERVENTRICULAR SEPTUM: 1 CM (ref 0.6–1.1)
LAAS-AP2: 21 CM2
LAAS-AP4: 22.6 CM2
LEFT ATRIUM SIZE: 4.7 CM
LEFT INTERNAL DIMENSION IN SYSTOLE: 3.5 CM (ref 2.1–4)
LEFT VENTRICULAR INTERNAL DIMENSION IN DIASTOLE: 5.2 CM (ref 3.5–6)
LEFT VENTRICULAR POSTERIOR WALL IN END DIASTOLE: 1 CM
LEFT VENTRICULAR STROKE VOLUME: 79 ML
LVSV (TEICH): 79 ML
LYMPHOCYTES # BLD AUTO: 1.77 THOUSANDS/ΜL (ref 0.6–4.47)
LYMPHOCYTES NFR BLD AUTO: 23 % (ref 14–44)
MCH RBC QN AUTO: 28.3 PG (ref 26.8–34.3)
MCHC RBC AUTO-ENTMCNC: 31.8 G/DL (ref 31.4–37.4)
MCV RBC AUTO: 89 FL (ref 82–98)
MONOCYTES # BLD AUTO: 0.85 THOUSAND/ΜL (ref 0.17–1.22)
MONOCYTES NFR BLD AUTO: 11 % (ref 4–12)
MV E'TISSUE VEL-SEP: 10 CM/S
MV PEAK E VEL: 124 CM/S
MV STENOSIS PRESSURE HALF TIME: 58 MS
MV VALVE AREA P 1/2 METHOD: 3.79 CM2
NEUTROPHILS # BLD AUTO: 4.74 THOUSANDS/ΜL (ref 1.85–7.62)
NEUTS SEG NFR BLD AUTO: 62 % (ref 43–75)
NRBC BLD AUTO-RTO: 0 /100 WBCS
PLATELET # BLD AUTO: 376 THOUSANDS/UL (ref 149–390)
PMV BLD AUTO: 10.2 FL (ref 8.9–12.7)
POTASSIUM SERPL-SCNC: 3 MMOL/L (ref 3.5–5.3)
QRS AXIS: 113 DEGREES
QRSD INTERVAL: 88 MS
QT INTERVAL: 396 MS
QTC INTERVAL: 445 MS
RA PRESSURE ESTIMATED: 10 MMHG
RBC # BLD AUTO: 2.9 MILLION/UL (ref 3.81–5.12)
RIGHT ATRIUM AREA SYSTOLE A4C: 19.3 CM2
RIGHT VENTRICLE ID DIMENSION: 4.2 CM
RV PSP: 42 MMHG
SL CV LEFT ATRIUM LENGTH A2C: 6 CM
SL CV LV EF: 55
SL CV PED ECHO LEFT VENTRICLE DIASTOLIC VOLUME (MOD BIPLANE) 2D: 129 ML
SL CV PED ECHO LEFT VENTRICLE SYSTOLIC VOLUME (MOD BIPLANE) 2D: 49 ML
SODIUM SERPL-SCNC: 139 MMOL/L (ref 135–147)
T WAVE AXIS: 23 DEGREES
TR MAX PG: 32 MMHG
TR PEAK VELOCITY: 3.2 M/S
TRICUSPID VALVE PEAK REGURGITATION VELOCITY: 3.21 M/S
VENTRICULAR RATE: 76 BPM
WBC # BLD AUTO: 7.65 THOUSAND/UL (ref 4.31–10.16)

## 2022-10-07 PROCEDURE — 99222 1ST HOSP IP/OBS MODERATE 55: CPT | Performed by: INTERNAL MEDICINE

## 2022-10-07 PROCEDURE — 93970 EXTREMITY STUDY: CPT | Performed by: SURGERY

## 2022-10-07 PROCEDURE — 93306 TTE W/DOPPLER COMPLETE: CPT | Performed by: INTERNAL MEDICINE

## 2022-10-07 PROCEDURE — 93010 ELECTROCARDIOGRAM REPORT: CPT | Performed by: INTERNAL MEDICINE

## 2022-10-07 PROCEDURE — 85025 COMPLETE CBC W/AUTO DIFF WBC: CPT | Performed by: NURSE PRACTITIONER

## 2022-10-07 PROCEDURE — 93970 EXTREMITY STUDY: CPT

## 2022-10-07 PROCEDURE — 93306 TTE W/DOPPLER COMPLETE: CPT

## 2022-10-07 PROCEDURE — 80048 BASIC METABOLIC PNL TOTAL CA: CPT | Performed by: NURSE PRACTITIONER

## 2022-10-07 PROCEDURE — 99233 SBSQ HOSP IP/OBS HIGH 50: CPT | Performed by: INTERNAL MEDICINE

## 2022-10-07 RX ORDER — POTASSIUM CHLORIDE 20 MEQ/1
20 TABLET, EXTENDED RELEASE ORAL 2 TIMES DAILY
Status: DISCONTINUED | OUTPATIENT
Start: 2022-10-07 | End: 2022-10-08

## 2022-10-07 RX ORDER — ESCITALOPRAM OXALATE 20 MG/1
20 TABLET ORAL DAILY
Status: DISCONTINUED | OUTPATIENT
Start: 2022-10-08 | End: 2022-10-10 | Stop reason: HOSPADM

## 2022-10-07 RX ORDER — FUROSEMIDE 10 MG/ML
40 INJECTION INTRAMUSCULAR; INTRAVENOUS
Status: DISCONTINUED | OUTPATIENT
Start: 2022-10-07 | End: 2022-10-10

## 2022-10-07 RX ORDER — POTASSIUM CHLORIDE 20 MEQ/1
40 TABLET, EXTENDED RELEASE ORAL ONCE
Status: COMPLETED | OUTPATIENT
Start: 2022-10-07 | End: 2022-10-07

## 2022-10-07 RX ADMIN — SENNOSIDES AND DOCUSATE SODIUM 2 TABLET: 8.6; 5 TABLET ORAL at 18:52

## 2022-10-07 RX ADMIN — BUSPIRONE HYDROCHLORIDE 5 MG: 5 TABLET ORAL at 16:12

## 2022-10-07 RX ADMIN — POTASSIUM CHLORIDE 20 MEQ: 1500 TABLET, EXTENDED RELEASE ORAL at 11:56

## 2022-10-07 RX ADMIN — BUSPIRONE HYDROCHLORIDE 5 MG: 5 TABLET ORAL at 01:09

## 2022-10-07 RX ADMIN — CARVEDILOL 12.5 MG: 12.5 TABLET, FILM COATED ORAL at 16:13

## 2022-10-07 RX ADMIN — OXYCODONE HYDROCHLORIDE 20 MG: 20 TABLET, FILM COATED, EXTENDED RELEASE ORAL at 08:30

## 2022-10-07 RX ADMIN — FUROSEMIDE 40 MG: 10 INJECTION, SOLUTION INTRAMUSCULAR; INTRAVENOUS at 11:57

## 2022-10-07 RX ADMIN — ESCITALOPRAM OXALATE 10 MG: 10 TABLET ORAL at 08:30

## 2022-10-07 RX ADMIN — Medication: at 21:28

## 2022-10-07 RX ADMIN — SUCRALFATE 1 G: 1 TABLET ORAL at 21:28

## 2022-10-07 RX ADMIN — Medication: at 08:36

## 2022-10-07 RX ADMIN — SUCRALFATE 1 G: 1 TABLET ORAL at 16:12

## 2022-10-07 RX ADMIN — BUSPIRONE HYDROCHLORIDE 5 MG: 5 TABLET ORAL at 08:30

## 2022-10-07 RX ADMIN — POTASSIUM CHLORIDE 40 MEQ: 1500 TABLET, EXTENDED RELEASE ORAL at 08:58

## 2022-10-07 RX ADMIN — POTASSIUM CHLORIDE 20 MEQ: 1500 TABLET, EXTENDED RELEASE ORAL at 19:05

## 2022-10-07 RX ADMIN — PANTOPRAZOLE SODIUM 40 MG: 40 TABLET, DELAYED RELEASE ORAL at 16:12

## 2022-10-07 RX ADMIN — SUCRALFATE 1 G: 1 TABLET ORAL at 06:03

## 2022-10-07 RX ADMIN — FUROSEMIDE 40 MG: 10 INJECTION, SOLUTION INTRAMUSCULAR; INTRAVENOUS at 08:58

## 2022-10-07 RX ADMIN — OXYCODONE HYDROCHLORIDE 20 MG: 20 TABLET, FILM COATED, EXTENDED RELEASE ORAL at 20:26

## 2022-10-07 RX ADMIN — SUCRALFATE 1 G: 1 TABLET ORAL at 11:56

## 2022-10-07 RX ADMIN — FUROSEMIDE 40 MG: 10 INJECTION, SOLUTION INTRAMUSCULAR; INTRAVENOUS at 18:52

## 2022-10-07 RX ADMIN — APIXABAN 5 MG: 5 TABLET, FILM COATED ORAL at 08:30

## 2022-10-07 RX ADMIN — SUCRALFATE 1 G: 1 TABLET ORAL at 01:08

## 2022-10-07 RX ADMIN — OXYCODONE HYDROCHLORIDE 20 MG: 20 TABLET, FILM COATED, EXTENDED RELEASE ORAL at 01:07

## 2022-10-07 RX ADMIN — SENNOSIDES AND DOCUSATE SODIUM 2 TABLET: 8.6; 5 TABLET ORAL at 08:31

## 2022-10-07 RX ADMIN — BUSPIRONE HYDROCHLORIDE 5 MG: 5 TABLET ORAL at 20:26

## 2022-10-07 RX ADMIN — PANTOPRAZOLE SODIUM 40 MG: 40 TABLET, DELAYED RELEASE ORAL at 06:03

## 2022-10-07 RX ADMIN — APIXABAN 5 MG: 5 TABLET, FILM COATED ORAL at 18:52

## 2022-10-07 RX ADMIN — FUROSEMIDE 20 MG: 10 INJECTION, SOLUTION INTRAMUSCULAR; INTRAVENOUS at 01:09

## 2022-10-07 RX ADMIN — CARVEDILOL 12.5 MG: 12.5 TABLET, FILM COATED ORAL at 08:31

## 2022-10-07 NOTE — ASSESSMENT & PLAN NOTE
Home medications include Lexapro and BuSpar    Plan-  Per Dr German Salomon recommendations increase Lexapro to 20mg qd

## 2022-10-07 NOTE — H&P
[facility] 1945, 77 y o  female MRN: [id number]  Unit/Bed#: S -01 Encounter: [id number]  Primary Care Provider: Rena Leonard MD   Date and time admitted to hospital: 10/6/2022  6:34 PM    * Acute on chronic diastolic heart failure [facility])  Assessment & Plan  Wt Readings from Last 3 Encounters:   09/26/22 95 4 kg (210 lb 6 4 oz)   09/18/22 101 kg (222 lb 10 6 oz)   09/13/22 103 kg (227 lb)   • Patient reports increased bilateral lower extremity edema and 10 lb weight gain over the past week  • Chest x-ray: Vascular congestion on my read, official read pending  • Last echocardiogram on file from November 2021: "LVEF is 65%  Systolic function is normal   Wall motion is normal   Diastolic function is mildly abnormal, consistent with grade I (abnormal) relaxation  • Initial troponin: 386  o Will trend x3 or to peak  • BNP: 197  • Diuresis:  PTA medication of 40 mg of Demadex daily  Received 20 mg of IV Lasix in ED due to hypotension  Will give an additional 20 mg of IV Lasix now  Continue with 60 mg of IV Lasix bid  • Beta-blocker:  Coreg  • Monitor intake and output, daily weights  • Diet: Fluid restriction and 2 g Sodium  • Consult Cardiology  Elevated troponin  Assessment & Plan  · Present on admission, initial troponin of 386  · Trend troponins x 3   · Patient denies chest pain  · Suspect 2/2 acute CHF exacerbation  · Patient received 325 mg of ASA in ED  · Monitor on telemetry  · Cardiology consult  Acute kidney injury superimposed on CKD [facility])  Assessment & Plan  Lab Results   Component Value Date    EGFR 42 10/06/2022    EGFR 69 09/19/2022    EGFR 25 09/18/2022    CREATININE 1 24 10/06/2022    CREATININE 0 83 09/19/2022    CREATININE 1 89 (H) 09/18/2022   • Creatinine upon admission: 1 24  o Baseline: 0 8  • Secondary to volume overload  • Treatment:  IV diuretics  • Monitor BMP      Essential hypertension  Assessment & Plan  Blood pressure low on arrival which she had been admitted to the hospital in September 2022 due to hypotension  Patient was restarted on Losartan last month post discharge  Last recorded pressure: 114/55  PTA medications of 40 mg of Torsemide daily, 50 mg of Losartan daily and 12 5 mg of Carvedilol bid  Will hold Losartan and Torsemide at this time due to SBPs in 90s on arrival and need for IV diuretics due to acute CHF  Continue Coreg while honoring hold parameters  Monitor blood pressure  Great toe pain, right  Assessment & Plan  · Patient reports right great toe pain after she states her right great toe bent backwards today  · Right foot x-ray: No acute findings on my read, official read pending  · Tylenol prn  Hypotension  Assessment & Plan  · Present on admission, SBP in 90s  · Of note, patient was recently admitted 1 month ago due to hypotension  Home antihypertensive regimen of Carvedilol, Torsemide and Losartan  Patient was restarted on Losartan last month post discharge from the hospital    · Monitor blood pressure closely in the setting of current IV diuretic use  Venous stasis dermatitis of both lower extremities  Assessment & Plan  · Patient presented to complaints of worsening bilateral lower extremity edema  · Chronic  · No signs or symptoms cellulitis  · Continue topical ammonium lactate b i d  · Elevate extremities  A-fib [facility])  Assessment & Plan  • Rate/Rhythm Control:  Carvedilol  • Antiplatelet/Anticoagulation:  Eliquis    Anemia  Assessment & Plan  · Present on admission, hemoglobin 8 9  · Baseline appears to be 9-10  · Monitor CBC  Anxiety  Assessment & Plan  · Continue Lexapro and Buspar  Leukocytosis  Assessment & Plan  · Present on admission, WBCs 11 86  · Patient did not meet two SIRS criteria on arrival   · Suspect reactive  · Trend CBC      VTE Pharmacologic Prophylaxis: VTE Score: 4 Moderate Risk (Score 3-4) - Pharmacological DVT Prophylaxis Ordered: apixaban (Eliquis)  Code Status: Level 1 - Full Code per patient  Discussion with family: Updated  () at bedside  Anticipated Length of Stay: Patient will be admitted on an inpatient basis with an anticipated length of stay of greater than 2 midnights secondary to IV diuretics, elevated troponin, cardiology consult  Total Time for Visit, including Counseling / Coordination of Care: 70 minutes Greater than 50% of this total time spent on direct patient counseling and coordination of care  Chief Complaint: Bilateral leg swelling    History of Present Illness:  Linda Julien is a 77 y o  female with a PMH of Afib on Eliquis, anxiety, CHF, HTN, HLD, anemia, chronic venous stasis dermatitis of lower extremities, Sjogren syndrome and obesity who presents with complaints of worsening bilateral lower extremity edema and right great toe pain  Patient reports chronic lower extremity swelling; however, she states that her bilateral leg swelling has worsened over the past week  Patient has been having increased difficulty ambulating over the past week due to lower extremity swelling  Of note, patient was discharged home from inpatient rehab 2 weeks ago which was where she was placed post hospitalization  Additionally, she endorses 10 lb weight gain over the past week  She states that today her right great toe bent backwards upon walking down step and she is experiencing pain of the right great toe since this incident  Patient denies chest pain, shortness of breath, orthopnea, fever, chills, nausea or vomiting  Upon evaluation in the ED, patient was noted to have an elevated troponin and vascular congestion on CXR  Patient will be admitted for IV diuretics and cardiology consult  Review of Systems:  Review of Systems   Constitutional: Positive for unexpected weight change  Negative for chills and fever  Respiratory: Negative for chest tightness and shortness of breath  Cardiovascular: Positive for leg swelling  Negative for chest pain  Gastrointestinal: Negative for nausea and vomiting  Musculoskeletal: Positive for gait problem  Neurological: Negative for dizziness  All other systems reviewed and are negative  Past Medical and Surgical History:   Past Medical History:   Diagnosis Date   • A-fib ([facility] ) 12/29/2021   • Anxiety    • Arthritis    • Back pain    • Cellulitis    • CHF (congestive heart failure) (HCC)    • Colon cancer screening 8/3/2022   • Edema of both lower extremities due to peripheral venous insufficiency    • Edema of both lower extremities due to peripheral venous insufficiency    • Encounter for screening mammogram for malignant neoplasm of breast 3/21/2022   • Erythema of lower extremity 11/12/2021   • Fibromyalgia    • Hypertension    • Melena 8/20/2022   • Osteoporosis screening 8/3/2022   • Sjogren syndrome, unspecified ([facility] )        Past Surgical History:   Procedure Laterality Date   • KNEE CARTILAGE SURGERY     • AK OPEN RX FEMUR FX+INTRAMED SHAN Left 8/22/2022    Procedure: LEFT RETROGRADE IM SHAN;  Surgeon: Aneta Kerr MD;  Location: [facility];  Service: Orthopedics   • REPLACEMENT TOTAL KNEE BILATERAL         Meds/Allergies:  Prior to Admission medications    Medication Sig Start Date End Date Taking?  Authorizing Provider   acetaminophen (TYLENOL) 500 mg tablet Take 2 tablets (1,000 mg total) by mouth every 8 (eight) hours 10/5/22   Miah Jackson MD   ammonium lactate (LAC-HYDRIN) 12 % cream Apply topically 2 (two) times a day 9/7/22   SHAHIDA Esteban   apixaban (Eliquis) 5 mg Take 1 tablet (5 mg total) by mouth 2 (two) times a day Lot [id number] exp 10/2024 10/6/22 11/5/22  Gregorio Pastor MD   bisacodyl (DULCOLAX) 10 mg suppository Insert 1 suppository (10 mg total) into the rectum daily as needed for constipation  Patient not taking: No sig reported 8/30/22   Tomás Meade PA-C   busPIRone (BUSPAR) 5 mg tablet Take 1 tablet (5 mg total) by mouth 3 (three) times a day 9/13/22   SHAHIDA Esteban   carvedilol (COREG) 12 5 mg tablet Take 1 tablet (12 5 mg total) by mouth 2 (two) times a day with meals 9/19/22 10/19/22  Margy Dsouza MD   escitalopram (LEXAPRO) 10 mg tablet Take 1 tablet (10 mg total) by mouth daily 9/13/22   SHAHIDA Esteban   losartan (COZAAR) 50 mg tablet Take 1 tablet (50 mg total) by mouth daily 9/26/22   Chrystal Cohn MD   oxyCODONE (OxyCONTIN) 20 mg 12 hr tablet Take 20 mg by mouth 2 (two) times a day    Historical Provider, MD   pantoprazole (PROTONIX) 40 mg tablet Take 1 tablet (40 mg total) by mouth 2 (two) times a day before meals 9/13/22 11/12/22  SHAHIDA Esteban   polyethylene glycol (MIRALAX) 17 g packet Take 17 g by mouth daily  Patient not taking: Reported on 9/26/2022 8/31/22   Brigida Rubio PA-C   potassium chloride (K-DUR,KLOR-CON) 20 mEq tablet Take 2 tablets (40 mEq total) by mouth daily 2/14/22   Mayd Connell MD   senna-docusate sodium (SENOKOT S) 8 6-50 mg per tablet Take 2 tablets by mouth 2 (two) times a day 8/30/22   Brigida Rubio PA-C   sucralfate (CARAFATE) 1 g tablet Take 1 tablet (1 g total) by mouth 4 (four) times a day (before meals and at bedtime) 9/13/22 10/13/22  SHAHIDA Esteban   torsemide (DEMADEX) 20 mg tablet Take 2 tablets (40 mg total) by mouth daily 7/30/22 10/28/22  Corina Crouch PA-C   apixaban (Eliquis) 5 mg Take 1 tablet (5 mg total) by mouth 2 (two) times a day 9/13/22 10/6/22  SHAHIDA Esteban     I have reviewed home medications with patient personally      Allergies: No Known Allergies    Social History:  Marital Status: /Civil Union   Occupation: Unknown  Patient Pre-hospital Living Situation: Home, With spouse  Patient Pre-hospital Level of Mobility: walks with walker  Patient Pre-hospital Diet Restrictions: Cardiac  Substance Use History:   Social History     Substance and Sexual Activity   Alcohol Use Not Currently     Social History Tobacco Use   Smoking Status Former Smoker   • Types: Cigarettes   Smokeless Tobacco Never Used     Social History     Substance and Sexual Activity   Drug Use Never       Family History:  Family History   Problem Relation Age of Onset   • Heart disease Mother    • Cancer Father        Physical Exam:     Vitals:   Blood Pressure: 104/50 (10/06/22 2234)  Pulse: 75 (10/06/22 2234)  Temperature: 98 6 °F (37 °C) (10/06/22 2234)  Temp Source: Oral (10/06/22 184)  Respirations: 18 (10/06/22 2234)  SpO2: 92 % (10/06/22 2234)    Physical Exam  Vitals and nursing note reviewed  Constitutional:       General: She is not in acute distress  Appearance: She is obese  She is not ill-appearing or diaphoretic  HENT:      Head: Normocephalic  Nose: Nose normal       Mouth/Throat:      Pharynx: Oropharynx is clear  Eyes:      General: No scleral icterus  Conjunctiva/sclera: Conjunctivae normal    Cardiovascular:      Rate and Rhythm: Normal rate  Rhythm irregular  Pulses:           Posterior tibial pulses are 1+ on the right side and 1+ on the left side  Pulmonary:      Effort: Pulmonary effort is normal  No respiratory distress  Breath sounds: Normal breath sounds  No wheezing, rhonchi or rales  Chest:      Chest wall: No tenderness  Abdominal:      General: Bowel sounds are normal  There is no distension  Palpations: Abdomen is soft  Tenderness: There is no abdominal tenderness  Musculoskeletal:      Cervical back: Normal range of motion  Right lower le+ Pitting Edema present  Left lower le+ Pitting Edema present  Skin:     Comments: Bilateral lower extremities with chronic venous stasis dermatitis, crusting  Neurological:      Mental Status: She is alert and oriented to person, place, and time            Additional Data:     Lab Results:  Results from last 7 days   Lab Units 10/06/22  1927   WBC Thousand/uL 11 86*   HEMOGLOBIN g/dL 8 9*   HEMATOCRIT % 28 7* PLATELETS Thousands/uL 385   NEUTROS PCT % 79*   LYMPHS PCT % 10*   MONOS PCT % 9   EOS PCT % 2     Results from last 7 days   Lab Units 10/06/22  1927   SODIUM mmol/L 136   POTASSIUM mmol/L 3 5   CHLORIDE mmol/L 98   CO2 mmol/L 30   BUN mg/dL 26*   CREATININE mg/dL 1 24   ANION GAP mmol/L 8   CALCIUM mg/dL 9 4   ALBUMIN g/dL 3 5   TOTAL BILIRUBIN mg/dL 0 46   ALK PHOS U/L 106*   ALT U/L 6*   AST U/L 13   GLUCOSE RANDOM mg/dL 132     Results from last 7 days   Lab Units 10/06/22  1927   INR  1 31*                   Imaging: Reviewed radiology reports from this admission including: chest xray  XR chest 1 view portable   ED Interpretation by Baldo Perez MD (10/06 2028)   Cardiomegaly  Lungs clear  XR toe great min 2 view RIGHT   ED Interpretation by Baldo Perez MD (10/06 2026)   No fracture  EKG and Other Studies Reviewed on Admission:   · EKG: Atrial fibrillation  HR 76     ** Please Note: This note has been constructed using a voice recognition system   **

## 2022-10-07 NOTE — ASSESSMENT & PLAN NOTE
· Present on admission, WBCs 11 86  · Patient did not meet two SIRS criteria on arrival   · Suspect reactive  · Trend CBC

## 2022-10-07 NOTE — ASSESSMENT & PLAN NOTE
Wt Readings from Last 3 Encounters:   10/07/22 105 kg (231 lb)   09/26/22 95 4 kg (210 lb 6 4 oz)   09/18/22 101 kg (222 lb 10 6 oz)   • Patient reports increased bilateral lower extremity edema and 10 lb weight gain over the past week  • Chest x-ray- no pulmonary edema stable enlarged cardiomediastinal silhouette  • Last echocardiogram on file from November 2021: "LVEF is 65%  Systolic function is normal   Wall motion is normal   Diastolic function is mildly abnormal, consistent with grade I (abnormal) relaxation     • Troponin -386  ED gave 2 doses of Lasix 20 mg IV    Plan-  Appreciate cardiology recommendations- Lasix 40 t i d, monitor BMP and creatinine function in the setting of diuresis  ECHO

## 2022-10-07 NOTE — ASSESSMENT & PLAN NOTE
Lab Results   Component Value Date    EGFR 42 10/06/2022    EGFR 69 09/19/2022    EGFR 25 09/18/2022    CREATININE 1 24 10/06/2022    CREATININE 0 83 09/19/2022    CREATININE 1 89 (H) 09/18/2022   • Creatinine upon admission: 1 24  o Baseline: 0 8  • Secondary to volume overload  • Treatment:  IV diuretics  • Monitor BMP

## 2022-10-07 NOTE — ASSESSMENT & PLAN NOTE
· POA- Initial troponin of 386  · Patient denies chest pain  · ED gave 325 mg of ASA  Troponin- 498, 483  Elevated troponin-no evidence of ACS    Likely non ischemic myocardial injury secondary to volume overload  EKG-Atrial fibrillation with premature ventricular or aberrantly conducted complexes, Right axis deviation, Low voltage QRS  Nonspecific ST abnormality    Plan-  Appreciate cardiology recommendations- Currently on anticoagulation for Afib, Will not start anti-platelet, Continue BB

## 2022-10-07 NOTE — PROGRESS NOTES
[facility]  [facility] 1945[facility] 77 y [facility]  [facility] MRN: [id number]  Unit/Bed#: S -01 Encounter: [id number]  Primary Care Provider: Bandar Pruitt MD   Date and time admitted to hospital: 10/6/2022  6:34 PM    * Acute on chronic diastolic heart failure [facility])  Assessment & Plan  Wt Readings from Last 3 Encounters:   10/07/22 105 kg (231 lb)   09/26/22 95 4 kg (210 lb 6 4 oz)   09/18/22 101 kg (222 lb 10 6 oz)   • Patient reports increased bilateral lower extremity edema and 10 lb weight gain over the past week  • Chest x-ray- no pulmonary edema stable enlarged cardiomediastinal silhouette  • Last echocardiogram on file from November 2021: "LVEF is 65%  Systolic function is normal   Wall motion is normal   Diastolic function is mildly abnormal, consistent with grade I (abnormal) relaxation  • Troponin -386  ED gave 2 doses of Lasix 20 mg IV    Plan-  Appreciate cardiology recommendations- Lasix 40 t i d, monitor BMP and creatinine function in the setting of diuresis  ECHO    Elevated troponin  Assessment & Plan  · POA- Initial troponin of 386  · Patient denies chest pain  · ED gave 325 mg of ASA  Troponin- 498, 483  Elevated troponin-no evidence of ACS  Likely non ischemic myocardial injury secondary to volume overload  EKG-Atrial fibrillation with premature ventricular or aberrantly conducted complexes, Right axis deviation, Low voltage QRS  Nonspecific ST abnormality    Plan-  Appreciate cardiology recommendations- Currently on anticoagulation for Afib, Will not start anti-platelet, Continue BB    Anemia  Assessment & Plan  · POA- hemoglobin 8 9  · Baseline appears to be 9-10  Hbg- 8 2    Plan-  Monitor CBC       Venous stasis dermatitis of both lower extremities  Assessment & Plan  · Patient presented to complaints of worsening bilateral lower extremity edema    Plan-  Continue topical ammonium lactate b i d    Elevate extremities  VAS US Bilateral  PT/OT Eval    Anxiety  Assessment & Plan  Home medications include Lexapro and BuSpar    Plan-  Per Dr Mesha Orr recommendations increase Lexapro to 20mg qd    A-fib [facility])  Assessment & Plan  Home medications include carvedilol 12 5 b i d  and Eliquis 5 b i d  Plan-  Continue home medications    Acute kidney injury superimposed on CKD [facility])  Assessment & Plan  Lab Results   Component Value Date    EGFR 56 10/07/2022    EGFR 42 10/06/2022    EGFR 69 09/19/2022    CREATININE 0 98 10/07/2022    CREATININE 1 24 10/06/2022    CREATININE 0 83 09/19/2022   • Creatinine upon admission: 1 24  • Baseline: 0 8  Cr- 0 98    Plan-  Monitor BMP in the setting of diuresis      Essential hypertension  Assessment & Plan  Blood pressure low on arrival which she had been admitted to the hospital in September 2022 due to hypotension  Patient was restarted on Losartan last month post discharge  Home medications include Coreg 12 5 b i d , torsemide 20 q day, losartan 50  /82    Plan-  Continue home medications  Hold losartan  Appreciate cardiology recommendations of Lasix 40 t i d         Leukocytosis-resolved as of 10/7/2022  Assessment & Plan  · Present on admission, WBCs 11 86  · Patient did not meet two SIRS criteria on arrival   · Suspect reactive  · Trend CBC  Great toe pain, right-resolved as of 10/7/2022  Assessment & Plan  · Patient reports right great toe pain after she states her right great toe bent backwards today  · Right foot x-ray: No acute findings on my read, official read pending  · Tylenol prn  Hypotension-resolved as of 10/7/2022  Assessment & Plan  · Present on admission, SBP in 90s  · Of note, patient was recently admitted 1 month ago due to hypotension  Home antihypertensive regimen of Carvedilol, Torsemide and Losartan  Patient was restarted on Losartan last month post discharge from the hospital    · Monitor blood pressure closely in the setting of current IV diuretic use        VTE Pharmacologic Prophylaxis: VTE Score: 4 Moderate Risk (Score 3-4) - Pharmacological DVT Prophylaxis Ordered: apixaban (Eliquis)  Patient Centered Rounds: I performed bedside rounds with nursing staff today  Discussions with Specialists or Other Care Team Provider:  Cardiology    Education and Discussions with Family / Patient: Updated  (significant other) via phone  Current Length of Stay: 1 day(s)  Current Patient Status: Inpatient   Discharge Plan: Anticipate discharge in 24-48 hrs to discharge location to be determined pending rehab evaluations  Code Status: Level 1 - Full Code    Subjective:   Patient was seen examined at the bedside  Nursing reported no overnight events  Patient reported that her legs feel swollen and they have gotten bad  Patient reported that she feels well has no other complaints at this time  Patient denies chest pain, shortness of breath, abdominal pain, abdominal bloating, nausea, vomiting, diarrhea, leg pain, or any other symptoms at this time  Objective:     Vitals:   Temp (24hrs), Av 5 °F (36 9 °C), Min:98 3 °F (36 8 °C), Max:98 6 °F (37 °C)    Temp:  [98 3 °F (36 8 °C)-98 6 °F (37 °C)] 98 3 °F (36 8 °C)  HR:  [72-81] 72  Resp:  [18-20] 18  BP: ()/(49-82) 112/51  SpO2:  [90 %-96 %] 94 %  Body mass index is 42 25 kg/m²  Input and Output Summary (last 24 hours): Intake/Output Summary (Last 24 hours) at 10/7/2022 1506  Last data filed at 10/7/2022 1122  Gross per 24 hour   Intake --   Output 2150 ml   Net -2150 ml       Physical Exam:   Physical Exam  Vitals and nursing note reviewed  Constitutional:       General: She is not in acute distress  Appearance: She is well-developed  She is obese  She is not ill-appearing, toxic-appearing or diaphoretic  HENT:      Head: Normocephalic and atraumatic  Mouth/Throat:      Mouth: Mucous membranes are moist    Eyes:      Extraocular Movements: Extraocular movements intact        Conjunctiva/sclera: Conjunctivae normal       Pupils: Pupils are equal, round, and reactive to light  Cardiovascular:      Rate and Rhythm: Tachycardia present  Rhythm irregular  Pulses: Normal pulses  Heart sounds: Normal heart sounds  No murmur heard  Comments: No JVD  Pulmonary:      Effort: Pulmonary effort is normal  No respiratory distress  Breath sounds: Normal breath sounds  Abdominal:      General: Bowel sounds are normal  There is no distension  Palpations: Abdomen is soft  Tenderness: There is no abdominal tenderness  Musculoskeletal:      Cervical back: Normal range of motion and neck supple  Right lower leg: Edema present  Left lower leg: Edema present  Comments: Bilateral venous stasis and erythema up to the knees   Skin:     General: Skin is warm and dry  Neurological:      Mental Status: She is alert and oriented to person, place, and time  Cranial Nerves: No cranial nerve deficit  Motor: No weakness  Coordination: Coordination normal       Gait: Gait normal    Psychiatric:         Mood and Affect: Mood normal          Behavior: Behavior normal          Thought Content:  Thought content normal       Comments: Anxious          Additional Data:     Labs:  Results from last 7 days   Lab Units 10/07/22  0553   WBC Thousand/uL 7 65   HEMOGLOBIN g/dL 8 2*   HEMATOCRIT % 25 8*   PLATELETS Thousands/uL 376   NEUTROS PCT % 62   LYMPHS PCT % 23   MONOS PCT % 11   EOS PCT % 3     Results from last 7 days   Lab Units 10/07/22  0553 10/06/22  1927   SODIUM mmol/L 139 136   POTASSIUM mmol/L 3 0* 3 5   CHLORIDE mmol/L 99 98   CO2 mmol/L 31 30   BUN mg/dL 21 26*   CREATININE mg/dL 0 98 1 24   ANION GAP mmol/L 9 8   CALCIUM mg/dL 8 9 9 4   ALBUMIN g/dL  --  3 5   TOTAL BILIRUBIN mg/dL  --  0 46   ALK PHOS U/L  --  106*   ALT U/L  --  6*   AST U/L  --  13   GLUCOSE RANDOM mg/dL 110 132     Results from last 7 days   Lab Units 10/06/22  1927   INR  1 31* Lines/Drains:  Invasive Devices  Report    Peripheral Intravenous Line  Duration           Peripheral IV 10/06/22 Right Antecubital <1 day                  Telemetry:  Telemetry Orders (From admission, onward)             48 Hour Telemetry Monitoring  Continuous x 48 hours        References:    Telemetry Guidelines   Question:  Reason for 48 Hour Telemetry  Answer:  Acute Decompensated CHF (continuous diuretic infusion or total diuretic dose > 200 mg daily, associated electrolyte derangement, ionotropic drip, history of ventricular arrhythmia, or new EF <35%)                 Telemetry Reviewed: Atrial fibrillation   HR averaging 68  Indication for Continued Telemetry Use: Arrthymias requiring medical therapy             Imaging: Reviewed radiology reports from this admission including: chest xray, xray(s), ultrasound(s) and ECHO    Recent Cultures (last 7 days):         Last 24 Hours Medication List:   Current Facility-Administered Medications   Medication Dose Route Frequency Provider Last Rate   • acetaminophen  650 mg Oral Q6H PRN SHAHIDA Keller     • ammonium lactate   Topical BID SHAHIDA Keller     • apixaban  5 mg Oral BID SHAHIDA Keller     • busPIRone  5 mg Oral TID SHAHIDA Keller     • carvedilol  12 5 mg Oral BID With Meals SHAHIDA Keller     • [START ON 10/8/2022] escitalopram  20 mg Oral Daily Moraima Cash MD     • furosemide  40 mg Intravenous TID (diuretic) SHAHIDA Tong     • oxyCODONE  20 mg Oral BID SHAHIDA Keller     • pantoprazole  40 mg Oral BID SHAHIDA Vazquez     • polyethylene glycol  17 g Oral Daily SHAHIDA Keller     • potassium chloride  20 mEq Oral BID SHAHIDA Tong     • senna-docusate sodium  2 tablet Oral BID SHAHIDA Keller     • sucralfate  1 g Oral 4x Daily (AC & HS) SHAHIDA Keller          Today, Patient Was Seen By: Moraima Cash    **Please Note: This note may have been constructed using a voice recognition system  **

## 2022-10-07 NOTE — ASSESSMENT & PLAN NOTE
Wt Readings from Last 3 Encounters:   09/26/22 95 4 kg (210 lb 6 4 oz)   09/18/22 101 kg (222 lb 10 6 oz)   09/13/22 103 kg (227 lb)   • Patient reports increased bilateral lower extremity edema and 10 lb weight gain over the past week  • Chest x-ray: Vascular congestion on my read, official read pending  • Last echocardiogram on file from November 2021: "LVEF is 65%  Systolic function is normal   Wall motion is normal   Diastolic function is mildly abnormal, consistent with grade I (abnormal) relaxation  • Initial troponin: 386  o Will trend x3 or to peak  • BNP: 197  • Diuresis:  PTA medication of 40 mg of Demadex daily  Received 20 mg of IV Lasix in ED due to hypotension  Will give an additional 20 mg of IV Lasix now  Continue with 60 mg of IV Lasix bid  • Beta-blocker:  Coreg  • Monitor intake and output, daily weights  • Diet: Fluid restriction and 2 g Sodium  • Consult Cardiology

## 2022-10-07 NOTE — ASSESSMENT & PLAN NOTE
Home medications include carvedilol 12 5 b i d  and Eliquis 5 b i d      Plan-  Continue home medications

## 2022-10-07 NOTE — ASSESSMENT & PLAN NOTE
Recent Labs     10/06/22  1927 10/07/22  0553 10/08/22  0551   CREATININE 1 24 0 98 0 95   EGFR 42 56 58     Estimated Creatinine Clearance: 58 2 mL/min (by C-G formula based on SCr of 0 95 mg/dL)      • Creatinine upon admission: 1 24  • Baseline: 0 8    Plan-  Monitor BMP in the setting of diuresis

## 2022-10-07 NOTE — UTILIZATION REVIEW
Initial Clinical Review    Admission: Date/Time/Statement:   Admission Orders (From admission, onward)     Ordered        10/06/22 2116  INPATIENT ADMISSION  Once                      Orders Placed This Encounter   Procedures   • INPATIENT ADMISSION     Standing Status:   Standing     Number of Occurrences:   1     Order Specific Question:   Level of Care     Answer:   Med Surg [16]     Order Specific Question:   Estimated length of stay     Answer:   More than 2 Midnights     Order Specific Question:   Certification     Answer:   I certify that inpatient services are medically necessary for this patient for a duration of greater than two midnights  See H&P and MD Progress Notes for additional information about the patient's course of treatment  ED Arrival Information     Expected   -    Arrival   10/6/2022 18:14    Acuity   Urgent            Means of arrival   Ambulance    Escorted by   [facility]    Admission type   Emergency            Arrival complaint   leg edema           Chief Complaint   Patient presents with   • Leg Swelling     Pt with bilateral lower leg edema  Also reports right toe pain after stumbling earlier       Initial Presentation: 77 y o  female PMH of Afib on Eliquis, anxiety, CHF on demadex, HTN, HLD, anemia, chronic venous stasis dermatitis of lower extremities, Sjogren syndrome and obesity who presents with complaints of worsening bilateral lower extremity edema and right great toe pain  Pt reports worsening BLE from chronic BLE  Over past week, having difficulty ambulating 2/2 swelling BLE and reports 10 lb w gain in 1 week  R great toe pain started after toe bent backwards while walking  Pt d/c'ed from rehab 2 weeks ago   On exam, BP low, 2 + pitting edema  BLE with chronic venous stasis dermatitis, crusting , irreg heart rhythm  Denies chest pain  Labs -elevated troponin, WBC 11 86,  , creat 1 24 from baseline 0 8    CXR shows vascular congestion   No Fx of R great toe on XR  ECG- A fib  Pt given IV lasix, ASA  in ED  Pt admitted as inpatient to telemetry with acute on chronic diastolic heart failure, elevated troponin, LEXY on CKD 2/2 vol overload   Plan - telemetry, IV lasix 20 mg IV now and then 40 mg IV TID, monitor BMP renal function and lytes, monitor BP w/ diuresis   Trend troponin  silvio and fluid restriction, cardiology consult  Continue Coreg  Hold home Losartan and Torsemide  Date: 10/7   Day 2: venous duplex LE ordered today  Wt appears up 20 lbs from 9/27/22   Cardiology consult-Lungs clear, denies dyspnea, on RA with 94 % O2 sat  I/O= - 1150 ml last 24 hrsK 3 0-repletion ordered and added K supplement  BP's soft  Plan Continue IV lasix 40 mg TID, strict I/O, daily wt  Monitor renal function closely   Decrease coreg   No evidence of ACS with elevated troponin-likely non ischemic 2/2/ vol overload  Continue BB      ED Triage Vitals   Temperature Pulse Respirations Blood Pressure SpO2   10/06/22 1848 10/06/22 1848 10/06/22 1848 10/06/22 1848 10/06/22 1848   98 5 °F (36 9 °C) 80 20 97/55 90 %      Temp Source Heart Rate Source Patient Position - Orthostatic VS BP Location FiO2 (%)   10/06/22 1848 -- 10/07/22 0700 10/07/22 0109 --   Oral  Lying Left arm       Pain Score       10/06/22 1848       3          Wt Readings from Last 1 Encounters:   10/07/22 105 kg (231 lb 7 7 oz)     · Wt Readings from Last 3 Encounters:   · 09/26/22 · 95 4 kg (210 lb 6 4 oz)   · 09/18/22 · 101 kg (222 lb 10 6 oz)   · 09/13/22 · 103 kg (227 lb)     Additional Vital Signs:   Date/Time Temp Pulse Resp BP MAP (mmHg) SpO2   10/07/22 11:57:16 -- -- -- 112/51 71 --   10/07/22 0836 -- -- -- 120/58 -- --   10/07/22 08:30:36 -- -- -- 96/49 Abnormal  65 --   10/07/22 0700 98 3 °F (36 8 °C) 72 18 112/50 -- 94 %   10/07/22 0109 -- -- -- 128/82 -- --   10/06/22 2234 98 6 °F (37 °C) 75 18 104/50 70 92 %   10/06/22 2130 -- 73 18 97/51 70 96 %   10/06/22 2046 -- 81 18 114/55 -- 93 %       Pertinent Labs/Diagnostic Test Results:   · 10/6 ECG-Atrial fibrillation  HR 76    XR chest 1 view portable   ED Interpretation by Temo Vargas MD (10/06 2028)   Cardiomegaly  Lungs clear  Final Result by Hanh Sher MD (10/07 0848)         1  No consolidation or pulmonary edema  2   Stable enlarged cardiomediastinal silhouette  Workstation performed: [id number]         XR toe great min 2 view RIGHT   ED Interpretation by Temo Vargas MD (10/06 2026)   No fracture  Final Result by Janna Parson MD (10/07 1686)      No acute osseous abnormality           Results from last 7 days   Lab Units 10/07/22  0553 10/06/22  1927   WBC Thousand/uL 7 65 11 86*   HEMOGLOBIN g/dL 8 2* 8 9*   HEMATOCRIT % 25 8* 28 7*   PLATELETS Thousands/uL 376 385   NEUTROS ABS Thousands/µL 4 74 9 33*         Results from last 7 days   Lab Units 10/07/22  0553 10/06/22  1927   SODIUM mmol/L 139 136   POTASSIUM mmol/L 3 0* 3 5   CHLORIDE mmol/L 99 98   CO2 mmol/L 31 30   ANION GAP mmol/L 9 8   BUN mg/dL 21 26*   CREATININE mg/dL 0 98 1 24   EGFR ml/min/1 73sq m 56 42   CALCIUM mg/dL 8 9 9 4     Results from last 7 days   Lab Units 10/06/22  1927   AST U/L 13   ALT U/L 6*   ALK PHOS U/L 106*   TOTAL PROTEIN g/dL 6 5   ALBUMIN g/dL 3 5   TOTAL BILIRUBIN mg/dL 0 46         Results from last 7 days   Lab Units 10/07/22  0553 10/06/22  1927   GLUCOSE RANDOM mg/dL 110 132               Results from last 7 days   Lab Units 10/06/22  2357 10/06/22  2156 10/06/22  1927   HS TNI 0HR ng/L  --   --  386*   HS TNI 2HR ng/L  --  498*  --    HSTNI D2 ng/L  --  112*  --    HS TNI 4HR ng/L 483*  --   --    HSTNI D4 ng/L 97*  --   --          Results from last 7 days   Lab Units 10/06/22  1927   PROTIME seconds 16 5*   INR  1 31*   PTT seconds 32               Results from last 7 days   Lab Units 10/06/22  1927   BNP pg/mL 197*                 ED Treatment: Medication Administration from 10/06/2022 1814 to 10/06/2022 2233       Date/Time Order Dose Route Action     10/06/2022 2044 furosemide (LASIX) injection 20 mg 20 mg Intravenous Given     10/06/2022 2044 aspirin tablet 325 mg 325 mg Oral Given        Past Medical History:   Diagnosis Date   • A-fib ([facility] ) 12/29/2021   • Anxiety    • Arthritis    • Back pain    • Cellulitis    • CHF (congestive heart failure) ([facility])    • Colon cancer screening 8/3/2022   • Edema of both lower extremities due to peripheral venous insufficiency    • Edema of both lower extremities due to peripheral venous insufficiency    • Encounter for screening mammogram for malignant neoplasm of breast 3/21/2022   • Erythema of lower extremity 11/12/2021   • Fibromyalgia    • Hypertension    • Melena 8/20/2022   • Osteoporosis screening 8/3/2022   • Sjogren syndrome, unspecified ([facility] )      Present on Admission:  • Acute on chronic diastolic heart failure ([facility])  • Anxiety  • A-fib ([facility])  • Venous stasis dermatitis of both lower extremities  • Anemia  • Essential hypertension      Admitting Diagnosis: Acute on chronic diastolic heart failure (HCC) [I50 33]  CHF (congestive heart failure) ([facility]) [I50 9]  Leg swelling [M79 89]  Elevated troponin [R77 8]  Volume overload [E87 70]  Ambulatory dysfunction [R26 2]  Age/Sex: 77 y o  female  Admission Orders:  Scheduled Medications:  ammonium lactate, , Topical, BID  apixaban, 5 mg, Oral, BID  busPIRone, 5 mg, Oral, TID  carvedilol, 12 5 mg, Oral, BID With Meals  escitalopram, 10 mg, Oral, Daily  furosemide, 40 mg, Intravenous, TID (diuretic)  oxyCODONE, 20 mg, Oral, BID  pantoprazole, 40 mg, Oral, BID AC  polyethylene glycol, 17 g, Oral, Daily  potassium chloride, 20 mEq, Oral, BID  senna-docusate sodium, 2 tablet, Oral, BID  sucralfate, 1 g, Oral, 4x Daily (AC & HS)    potassium chloride (K-DUR,KLOR-CON) CR tablet 40 mEq  Dose: 40 mEq  Freq:  Once Route: PO  Start: 10/07/22 0900 End: 10/07/22 1680  furosemide (LASIX) injection 20 mg  Dose: 20 mg  Freq: Once Route: IV  Start: 10/06/22 2315 End: 10/07/22 0109        Continuous IV Infusions:     PRN Meds:  acetaminophen, 650 mg, Oral, Q6H PRN    telemetry   spirometry   elevate BLE  daily wt  I/O   cardiac diet 1500 ml FR          IP CONSULT TO NUTRITION SERVICES  IP CONSULT TO CARDIOLOGY  IP CONSULT TO CASE MANAGEMENT    Network Utilization Review Department  ATTENTION: Please call with any questions or concerns to [phone] and carefully listen to the prompts so that you are directed to the right person  All voicemails are confidential   [facility] all requests for admission clinical reviews, approved or denied determinations and any other requests to dedicated fax number below belonging to the campus where the patient is receiving treatment   List of dedicated fax numbers for the Facilities:  [address] DENIALS (Administrative/Medical Necessity) [phone]   [address] (Maternity/NICU/Pediatrics) [phone]   [address] Kati Ceballos [phone]   [facility] [phone]   1306 [facility] 150 [facility] [place] [address] [address] [facility] [phone]   [address] [place] [facility] [address] [phone]

## 2022-10-07 NOTE — PLAN OF CARE
Problem: MOBILITY - ADULT  Goal: Maintain or return to baseline ADL function  Description: INTERVENTIONS:  -  Assess patient's ability to carry out ADLs; assess patient's baseline for ADL function and identify physical deficits which impact ability to perform ADLs (bathing, care of mouth/teeth, toileting, grooming, dressing, etc )  - Assess/evaluate cause of self-care deficits   - Assess range of motion  - Assess patient's mobility; develop plan if impaired  - Assess patient's need for assistive devices and provide as appropriate  - Encourage maximum independence but intervene and supervise when necessary  - Involve family in performance of ADLs  - Assess for home care needs following discharge   - Consider OT consult to assist with ADL evaluation and planning for discharge  - Provide patient education as appropriate  Outcome: Progressing  Goal: Maintains/Returns to pre admission functional level  Description: INTERVENTIONS:  - Perform BMAT or MOVE assessment daily    - Set and communicate daily mobility goal to care team and patient/family/caregiver     - Collaborate with rehabilitation services on mobility goals if consulted    - Ambulate patient 3times a day  - Out of bed to chair 3 a day   - Out of bed for meals 3 times a day  - Out of bed for toileting  - Record patient progress and toleration of activity level   Outcome: Progressing     Problem: Potential for Falls  Goal: Patient will remain free of falls  Description: INTERVENTIONS:  - Educate patient/family on patient safety including physical limitations  - Instruct patient to call for assistance with activity   - Consult OT/PT to assist with strengthening/mobility   - Keep Call bell within reach  - Keep bed low and locked with side rails adjusted as appropriate  - Keep care items and personal belongings within reach  - Initiate and maintain comfort rounds  - Make Fall Risk Sign visible to staff    - Initiate/Maintain bedalarm    - Apply yellow socks and bracelet for high fall risk patients  - Consider moving patient to room near nurses station  Outcome: Progressing

## 2022-10-07 NOTE — ASSESSMENT & PLAN NOTE
· Patient reports right great toe pain after she states her right great toe bent backwards  · Right foot x-ray: No fx  · Tylenol prn

## 2022-10-07 NOTE — ASSESSMENT & PLAN NOTE
· Patient presented to complaints of worsening bilateral lower extremity edema  · Chronic  · No signs or symptoms cellulitis  · Continue topical ammonium lactate b i d  · Elevate extremities

## 2022-10-07 NOTE — ASSESSMENT & PLAN NOTE
Blood pressure low on arrival which she had been admitted to the hospital in September 2022 due to hypotension   Patient was restarted on Losartan last month post discharge  Home medications include Coreg 12 5 b i d , torsemide 20 q day, losartan 50  /82    Plan-  Continue home medications  Hold losartan  Appreciate cardiology recommendations of Lasix 40 t i d

## 2022-10-07 NOTE — ASSESSMENT & PLAN NOTE
· Present on admission, initial troponin of 386  · Trend troponins x 3   · Patient denies chest pain  · Suspect 2/2 acute CHF exacerbation  · Patient received 325 mg of ASA in ED  · Monitor on telemetry  · Cardiology consult

## 2022-10-07 NOTE — ASSESSMENT & PLAN NOTE
Blood pressure low on arrival which she had been admitted to the hospital in September 2022 due to hypotension   Patient was restarted on Losartan last month post discharge  Home medications include Coreg 12 5 b i d , torsemide 20 q day, losartan 50  /82    Plan-  Continue home Coreg  Hold losartan and demadex  Appreciate cardiology recommendations of Lasix 40 t i d

## 2022-10-07 NOTE — MALNUTRITION/BMI
This medical record reflects one or more clinical indicators suggestive of malnutrition and/or morbid obesity  BMI Findings:  Adult BMI Classifications: Morbid Obesity 40-44 9        Body mass index is 42 25 kg/m²  See Nutrition note dated 10/7/2022 for additional details  Completed nutrition assessment is viewable in the nutrition documentation

## 2022-10-07 NOTE — ASSESSMENT & PLAN NOTE
Blood pressure low on arrival which she had been admitted to the hospital in September 2022 due to hypotension  Patient was restarted on Losartan last month post discharge  Last recorded pressure: 114/55  PTA medications of 40 mg of Torsemide daily, 50 mg of Losartan daily and 12 5 mg of Carvedilol bid  Will hold all at this time due to SBPs in 90s on arrival and need for IV diuretics due to acute CHF  Monitor blood pressure

## 2022-10-07 NOTE — ASSESSMENT & PLAN NOTE
· Patient reports right great toe pain after she states her right great toe bent backwards today  · Right foot x-ray: No acute findings on my read, official read pending  · Tylenol prn

## 2022-10-07 NOTE — ASSESSMENT & PLAN NOTE
· Present on admission, SBP in 90s  · Of note, patient was recently admitted 1 month ago due to hypotension  Home antihypertensive regimen of Carvedilol, Torsemide and Losartan  Patient was restarted on Losartan last month post discharge from the hospital    · Monitor blood pressure closely in the setting of current IV diuretic use

## 2022-10-07 NOTE — ASSESSMENT & PLAN NOTE
Home medications include Lexapro and BuSpar    Plan-  Per Dr Kaycee Tyler (PCP) recommendations increase Lexapro to 20mg qd

## 2022-10-07 NOTE — ASSESSMENT & PLAN NOTE
· Patient presented to complaints of worsening bilateral lower extremity edema  · Doppler negative for DVT b/l    Plan-  Continue topical ammonium lactate b i d    Elevate extremities  PT/OT Eval

## 2022-10-07 NOTE — ASSESSMENT & PLAN NOTE
· Patient presented to complaints of worsening bilateral lower extremity edema    Plan-  Continue topical ammonium lactate b i d    Elevate extremities  VAS US Bilateral

## 2022-10-07 NOTE — ASSESSMENT & PLAN NOTE
Lab Results   Component Value Date    EGFR 56 10/07/2022    EGFR 42 10/06/2022    EGFR 69 09/19/2022    CREATININE 0 98 10/07/2022    CREATININE 1 24 10/06/2022    CREATININE 0 83 09/19/2022   • Creatinine upon admission: 1 24  • Baseline: 0 8  Cr- 0 98    Plan-  Monitor BMP in the setting of diuresis

## 2022-10-07 NOTE — ASSESSMENT & PLAN NOTE
Intake/Output Summary (Last 24 hours) at 10/8/2022 1429  Last data filed at 10/8/2022 1423  Gross per 24 hour   Intake 120 ml   Output 3475 ml   Net -3355 ml     • Patient reports increased bilateral lower extremity edema and 10 lb weight gain over the past week  • Chest x-ray- no pulmonary edema stable enlarged cardiomediastinal silhouette  • Echo this admission: EF 55%, unable to assess diastolic function  • Cards on board    Plan-  Lasix 40 t i d, Coreg, monitor BMP and creatinine function in the setting of diuresis

## 2022-10-07 NOTE — CONSULTS
Consultation - Cardiology Team 1  Hepzibah Yadira Rodríguez 77 y o  female MRN: [id number]  Unit/Bed#: S -01 Encounter: [id number]    Assessment/Plan     Principal Problem:    Acute on chronic diastolic heart failure ([facility] )  Active Problems:    Essential hypertension    Acute kidney injury superimposed on CKD ([facility] )    A-fib (HCC)    Anxiety    Venous stasis dermatitis of both lower extremities    Hypotension    Anemia    Elevated troponin    Great toe pain, right    Leukocytosis      Assessment/Plan    1  Acute on chronic diastolic heart failure  Reports worsening LE edema  Reports 15 lb weight gain in 1 day? No dyspnea  No hypoxia  Lungs are clear    Current weight 231 bed scale  Weight 9/17- 222 lbs   She received IV Lasix 20 mg x 2  Difficulty urinating  Straight cath for 1 L of urine  Pressures are a bit soft  I will continue with Lasix 40 mg IV t i d  Today  If needed we could decrease coreg  PTA-  demadex 40mg daily ( she can not confirm)   Will replete potassium-currently 3 0  Add daily potassium supplement  CHF teaching  2 g sodium restriction, 1500 cc fluid restriction  Will maintain strict I&O and try to get daily weights  Monitor renal function carefully     Suspect somewhat iatrogenic since she was hospitalized mid September for acute kidney injury  Diuretics were held  Received intravenous fluid    2  Persistent atrial fibrillation  Controlled ventricular response  AC- Eliquis 5 mg b i d   BB- carvedilol 12 5 mg b i d     3  Morbid obesity    4  Chronic LE- feels Rt greater than Lt generally    5  Anemia-appears chronic, stable  H&H-8 9/28 7    6  Elevated troponin-no evidence of ACS  Likely non ischemic myocardial injury secondary to volume overload  HS troponin-386/for 98/43  No anginal symptoms  EKG-no acute ischemic changes  Currently on anticoagulation for AFib  Will not start anti-platelet  Will continue BB    7  CKD- current GFR 56   At baseline  Recent LEXY- creat to 2 6  Monitor renal function closely with diuresis      Reviewed with bedside nurse and primary team    History of Present Illness   Physician Requesting Consult: Cindy Giordano MD  Reason for Consult / Principal Problem:  Acute on chronic diastolic heart failure    HPI: Sonia Paris is a 77y o  year old female with persistent atrial fibrillation, morbid obesity, chronic diastolic heart failure, essential hypertension, hyperlipidemia, chronic anemia, chronic venous stasis dermatitis and edema , Sjogren syndrome , recent hospitalization for LEXY ( hypotension received IVF - coreg decreased, torsemide resumed same dose 40mg, losartan held ) who presents with worsening lower extremity edema  She reports a 15 lb weight gain in 1 day  She was recently in rehab following a hospital stay  She lives home with her   Her neighbors cook for her  She denies shortness of breath  Denies PND and orthopnea  Denies palpitations  Denies any chest pain or pressure  She does report worsening lower extremity edema  She has been having more difficulty walking  Cardiology consult for elevated troponin and management of heart failure  She is followed by Dr Ti Donnelly  As of March she was on Demadex 40 mg daily  She is not able to confirm her dose  Inpatient consult to Cardiology  Consult performed by: SHAHIDA Kulkarni  Consult ordered by: SHAHIDA Lazo          Review of Systems   Constitutional: Positive for activity change and unexpected weight change  HENT: Negative  Eyes: Negative  Respiratory: Negative for shortness of breath  Cardiovascular: Negative for chest pain, palpitations and leg swelling  Gastrointestinal: Negative  Endocrine: Negative  Genitourinary: Positive for difficulty urinating  Musculoskeletal: Positive for gait problem  Skin: Negative  Allergic/Immunologic: Negative  Hematological: Bruises/bleeds easily     Psychiatric/Behavioral: Positive for confusion  All other systems reviewed and are negative        Historical Information   Past Medical History:   Diagnosis Date   • A-fib ([facility] ) 12/29/2021   • Anxiety    • Arthritis    • Back pain    • Cellulitis    • CHF (congestive heart failure) (HCC)    • Colon cancer screening 8/3/2022   • Edema of both lower extremities due to peripheral venous insufficiency    • Edema of both lower extremities due to peripheral venous insufficiency    • Encounter for screening mammogram for malignant neoplasm of breast 3/21/2022   • Erythema of lower extremity 11/12/2021   • Fibromyalgia    • Hypertension    • Melena 8/20/2022   • Osteoporosis screening 8/3/2022   • Sjogren syndrome, unspecified ([email or web address] )      Past Surgical History:   Procedure Laterality Date   • KNEE CARTILAGE SURGERY     • ME OPEN RX FEMUR FX+INTRAMED SHAN Left 8/22/2022    Procedure: LEFT RETROGRADE IM SHAN;  Surgeon: Eros Winchester MD;  Location: [facility];  Service: Orthopedics   • REPLACEMENT TOTAL KNEE BILATERAL       Social History     Substance and Sexual Activity   Alcohol Use Not Currently     Social History     Substance and Sexual Activity   Drug Use Never     Social History     Tobacco Use   Smoking Status Former Smoker   • Types: Cigarettes   Smokeless Tobacco Never Used     Family History:   Family History   Problem Relation Age of Onset   • Heart disease Mother    • Cancer Father        Meds/Allergies   current meds:   Current Facility-Administered Medications   Medication Dose Route Frequency   • acetaminophen (TYLENOL) tablet 650 mg  650 mg Oral Q6H PRN   • ammonium lactate (LAC-HYDRIN) 12 % cream   Topical BID   • apixaban (ELIQUIS) tablet 5 mg  5 mg Oral BID   • busPIRone (BUSPAR) tablet 5 mg  5 mg Oral TID   • carvedilol (COREG) tablet 12 5 mg  12 5 mg Oral BID With Meals   • escitalopram (LEXAPRO) tablet 10 mg  10 mg Oral Daily   • furosemide (LASIX) injection 40 mg  40 mg Intravenous TID (diuretic)   • oxyCODONE (OxyCONTIN) 12 hr tablet 20 mg  20 mg Oral BID   • pantoprazole (PROTONIX) EC tablet 40 mg  40 mg Oral BID AC   • polyethylene glycol (MIRALAX) packet 17 g  17 g Oral Daily   • potassium chloride (K-DUR,KLOR-CON) CR tablet 20 mEq  20 mEq Oral BID   • potassium chloride (K-DUR,KLOR-CON) CR tablet 40 mEq  40 mEq Oral Once   • senna-docusate sodium (SENOKOT S) 8 6-50 mg per tablet 2 tablet  2 tablet Oral BID   • sucralfate (CARAFATE) tablet 1 g  1 g Oral 4x Daily (AC & HS)    and PTA meds:    Medications Prior to Admission   Medication   • acetaminophen (TYLENOL) 500 mg tablet   • ammonium lactate (LAC-HYDRIN) 12 % cream   • apixaban (Eliquis) 5 mg   • bisacodyl (DULCOLAX) 10 mg suppository   • busPIRone (BUSPAR) 5 mg tablet   • carvedilol (COREG) 12 5 mg tablet   • escitalopram (LEXAPRO) 10 mg tablet   • losartan (COZAAR) 50 mg tablet   • oxyCODONE (OxyCONTIN) 20 mg 12 hr tablet   • pantoprazole (PROTONIX) 40 mg tablet   • polyethylene glycol (MIRALAX) 17 g packet   • potassium chloride (K-DUR,KLOR-CON) 20 mEq tablet   • senna-docusate sodium (SENOKOT S) 8 6-50 mg per tablet   • sucralfate (CARAFATE) 1 g tablet   • torsemide (DEMADEX) 20 mg tablet     No Known Allergies    Objective   Vitals: Blood pressure 120/58, pulse 72, temperature 98 3 °F (36 8 °C), temperature source Oral, resp  rate 18, weight 105 kg (231 lb 7 7 oz), SpO2 94 %    Orthostatic Blood Pressures    Flowsheet Row Most Recent Value   Blood Pressure 120/58 filed at 10/07/2022 0836   Patient Position - Orthostatic VS Lying filed at 10/07/2022 0836            Intake/Output Summary (Last 24 hours) at 10/7/2022 0850  Last data filed at 10/7/2022 0129  Gross per 24 hour   Intake --   Output 1150 ml   Net -1150 ml       Invasive Devices  Report    Peripheral Intravenous Line  Duration           Peripheral IV 10/06/22 Right Antecubital <1 day                Physical Exam: /58 (BP Location: Left arm)   Pulse 72   Temp 98 3 °F (36 8 °C) (Oral)   Resp 18   Wt 105 kg (231 lb 7 7 oz)   SpO2 94%   BMI 42 34 kg/m²   General Appearance:    Alert, cooperative, no distress, appears stated age   Head:    Normocephalic, no scleral icterus   Eyes:    PERRL   Nose:   Nares normal, septum midline, mucosa normal, no drainage    Throat:   Lips, mucosa, and tongue normal   Neck:   Supple, symmetrical, trachea midline            Lungs:     Clear to auscultation bilaterally, respirations unlabored   Chest Wall:    No tenderness or deformity    Heart:    irr egular rate and rhythm, S1 and S2 normal, no murmur, rub   or gallop   Abdomen:     Soft, non-tender   Extremities:   Extremities grossly edematous,scaley        Skin:   Skin warm   Neurologic:   Alert and oriented to person place and time          Lab Results:   Recent Results (from the past 72 hour(s))   CBC and differential    Collection Time: 10/06/22  7:27 PM   Result Value Ref Range    WBC 11 86 (H) 4 31 - 10 16 Thousand/uL    RBC 3 13 (L) 3 81 - 5 12 Million/uL    Hemoglobin 8 9 (L) 11 5 - 15 4 g/dL    Hematocrit 28 7 (L) 34 8 - 46 1 %    MCV 92 82 - 98 fL    MCH 28 4 26 8 - 34 3 pg    MCHC 31 0 (L) 31 4 - 37 4 g/dL    RDW 13 8 11 6 - 15 1 %    MPV 9 8 8 9 - 12 7 fL    Platelets 979 812 - 080 Thousands/uL    nRBC 0 /100 WBCs    Neutrophils Relative 79 (H) 43 - 75 %    Immat GRANS % 0 0 - 2 %    Lymphocytes Relative 10 (L) 14 - 44 %    Monocytes Relative 9 4 - 12 %    Eosinophils Relative 2 0 - 6 %    Basophils Relative 0 0 - 1 %    Neutrophils Absolute 9 33 (H) 1 85 - 7 62 Thousands/µL    Immature Grans Absolute 0 05 0 00 - 0 20 Thousand/uL    Lymphocytes Absolute 1 17 0 60 - 4 47 Thousands/µL    Monocytes Absolute 1 06 0 17 - 1 22 Thousand/µL    Eosinophils Absolute 0 22 0 00 - 0 61 Thousand/µL    Basophils Absolute 0 03 0 00 - 0 10 Thousands/µL   Comprehensive metabolic panel    Collection Time: 10/06/22  7:27 PM   Result Value Ref Range    Sodium 136 135 - 147 mmol/L    Potassium 3 5 3 5 - 5 3 mmol/L    Chloride 98 96 - 108 mmol/L CO2 30 21 - 32 mmol/L    ANION GAP 8 4 - 13 mmol/L    BUN 26 (H) 5 - 25 mg/dL    Creatinine 1 24 0 60 - 1 30 mg/dL    Glucose 132 65 - 140 mg/dL    Calcium 9 4 8 4 - 10 2 mg/dL    AST 13 13 - 39 U/L    ALT 6 (L) 7 - 52 U/L    Alkaline Phosphatase 106 (H) 34 - 104 U/L    Total Protein 6 5 6 4 - 8 4 g/dL    Albumin 3 5 3 5 - 5 0 g/dL    Total Bilirubin 0 46 0 20 - 1 00 mg/dL    eGFR 42 ml/min/1 73sq m   HS Troponin 0hr (reflex protocol)    Collection Time: 10/06/22  7:27 PM   Result Value Ref Range    hs TnI 0hr 386 (H) "Refer to ACS Flowchart"- see link ng/L   B-Type Natriuretic Peptide(BNP) [facility], [place], [facility] Only    Collection Time: 10/06/22  7:27 PM   Result Value Ref Range     (H) 0 - 100 pg/mL   Protime-INR    Collection Time: 10/06/22  7:27 PM   Result Value Ref Range    Protime 16 5 (H) 11 6 - 14 5 seconds    INR 1 31 (H) 0 84 - 1 19   APTT    Collection Time: 10/06/22  7:27 PM   Result Value Ref Range    PTT 32 23 - 37 seconds   HS Troponin I 2hr    Collection Time: 10/06/22  9:56 PM   Result Value Ref Range    hs TnI 2hr 498 (H) "Refer to ACS Flowchart"- see link ng/L    Delta 2hr hsTnI 112 (H) <20 ng/L   HS Troponin I 4hr    Collection Time: 10/06/22 11:57 PM   Result Value Ref Range    hs TnI 4hr 483 (H) "Refer to ACS Flowchart"- see link ng/L    Delta 4hr hsTnI 97 (H) <20 ng/L   CBC and differential    Collection Time: 10/07/22  5:53 AM   Result Value Ref Range    WBC 7 65 4 31 - 10 16 Thousand/uL    RBC 2 90 (L) 3 81 - 5 12 Million/uL    Hemoglobin 8 2 (L) 11 5 - 15 4 g/dL    Hematocrit 25 8 (L) 34 8 - 46 1 %    MCV 89 82 - 98 fL    MCH 28 3 26 8 - 34 3 pg    MCHC 31 8 31 4 - 37 4 g/dL    RDW 13 9 11 6 - 15 1 %    MPV 10 2 8 9 - 12 7 fL    Platelets 563 344 - 181 Thousands/uL    nRBC 0 /100 WBCs    Neutrophils Relative 62 43 - 75 %    Immat GRANS % 0 0 - 2 %    Lymphocytes Relative 23 14 - 44 %    Monocytes Relative 11 4 - 12 %    Eosinophils Relative 3 0 - 6 %    Basophils Relative 1 0 - 1 % Neutrophils Absolute 4 74 1 85 - 7 62 Thousands/µL    Immature Grans Absolute 0 03 0 00 - 0 20 Thousand/uL    Lymphocytes Absolute 1 77 0 60 - 4 47 Thousands/µL    Monocytes Absolute 0 85 0 17 - 1 22 Thousand/µL    Eosinophils Absolute 0 22 0 00 - 0 61 Thousand/µL    Basophils Absolute 0 04 0 00 - 0 10 Thousands/µL   Basic metabolic panel    Collection Time: 10/07/22  5:53 AM   Result Value Ref Range    Sodium 139 135 - 147 mmol/L    Potassium 3 0 (L) 3 5 - 5 3 mmol/L    Chloride 99 96 - 108 mmol/L    CO2 31 21 - 32 mmol/L    ANION GAP 9 4 - 13 mmol/L    BUN 21 5 - 25 mg/dL    Creatinine 0 98 0 60 - 1 30 mg/dL    Glucose 110 65 - 140 mg/dL    Calcium 8 9 8 4 - 10 2 mg/dL    eGFR 56 ml/min/1 73sq m     Imaging: I have personally reviewed pertinent reports  EKG: atrial fibrillation  VTE Prophylaxis: elqiuis    Code Status: Level 1 - Full Code  Advance Directive and Living Will: Yes    Power of :    POLST:      Counseling / Coordination of Care  Total floor / unit time spent today 45minutes  Greater than 50% of total time was spent with the patient and / or family counseling and / or coordination of care

## 2022-10-07 NOTE — ASSESSMENT & PLAN NOTE
· POA- Initial troponin of 386   · Patient denies chest pain  · ED gave 325 mg of ASA    Elevated troponin-no evidence of ACS    Likely non ischemic myocardial injury secondary to volume overload  EKG-Atrial fibrillation with premature ventricular or aberrantly conducted complexes, Right axis deviation, Low voltage QRS  Nonspecific ST abnormality    Plan-  Appreciate cardiology recommendations- no further workup indicated

## 2022-10-08 PROBLEM — N18.9 ACUTE KIDNEY INJURY SUPERIMPOSED ON CKD (HCC): Status: RESOLVED | Noted: 2021-12-30 | Resolved: 2022-10-08

## 2022-10-08 PROBLEM — N17.9 ACUTE KIDNEY INJURY SUPERIMPOSED ON CKD (HCC): Status: RESOLVED | Noted: 2021-12-30 | Resolved: 2022-10-08

## 2022-10-08 LAB
ANION GAP SERPL CALCULATED.3IONS-SCNC: 8 MMOL/L (ref 4–13)
BUN SERPL-MCNC: 15 MG/DL (ref 5–25)
CALCIUM SERPL-MCNC: 9.3 MG/DL (ref 8.4–10.2)
CHLORIDE SERPL-SCNC: 97 MMOL/L (ref 96–108)
CO2 SERPL-SCNC: 34 MMOL/L (ref 21–32)
CREAT SERPL-MCNC: 0.95 MG/DL (ref 0.6–1.3)
ERYTHROCYTE [DISTWIDTH] IN BLOOD BY AUTOMATED COUNT: 14.1 % (ref 11.6–15.1)
GFR SERPL CREATININE-BSD FRML MDRD: 58 ML/MIN/1.73SQ M
GLUCOSE SERPL-MCNC: 107 MG/DL (ref 65–140)
HCT VFR BLD AUTO: 31.3 % (ref 34.8–46.1)
HGB BLD-MCNC: 9.7 G/DL (ref 11.5–15.4)
MAGNESIUM SERPL-MCNC: 1.7 MG/DL (ref 1.9–2.7)
MCH RBC QN AUTO: 27.7 PG (ref 26.8–34.3)
MCHC RBC AUTO-ENTMCNC: 31 G/DL (ref 31.4–37.4)
MCV RBC AUTO: 89 FL (ref 82–98)
PLATELET # BLD AUTO: 411 THOUSANDS/UL (ref 149–390)
PMV BLD AUTO: 10.3 FL (ref 8.9–12.7)
POTASSIUM SERPL-SCNC: 3.2 MMOL/L (ref 3.5–5.3)
RBC # BLD AUTO: 3.5 MILLION/UL (ref 3.81–5.12)
SODIUM SERPL-SCNC: 139 MMOL/L (ref 135–147)
WBC # BLD AUTO: 7.44 THOUSAND/UL (ref 4.31–10.16)

## 2022-10-08 PROCEDURE — 97163 PT EVAL HIGH COMPLEX 45 MIN: CPT

## 2022-10-08 PROCEDURE — 85027 COMPLETE CBC AUTOMATED: CPT

## 2022-10-08 PROCEDURE — 80048 BASIC METABOLIC PNL TOTAL CA: CPT

## 2022-10-08 PROCEDURE — 99233 SBSQ HOSP IP/OBS HIGH 50: CPT | Performed by: INTERNAL MEDICINE

## 2022-10-08 PROCEDURE — 83735 ASSAY OF MAGNESIUM: CPT

## 2022-10-08 PROCEDURE — 99232 SBSQ HOSP IP/OBS MODERATE 35: CPT | Performed by: INTERNAL MEDICINE

## 2022-10-08 RX ORDER — POTASSIUM CHLORIDE 20 MEQ/1
40 TABLET, EXTENDED RELEASE ORAL 2 TIMES DAILY
Status: DISCONTINUED | OUTPATIENT
Start: 2022-10-08 | End: 2022-10-10 | Stop reason: HOSPADM

## 2022-10-08 RX ORDER — TIZANIDINE 2 MG/1
4 TABLET ORAL EVERY 8 HOURS PRN
Status: DISCONTINUED | OUTPATIENT
Start: 2022-10-08 | End: 2022-10-10 | Stop reason: HOSPADM

## 2022-10-08 RX ORDER — MAGNESIUM SULFATE HEPTAHYDRATE 40 MG/ML
2 INJECTION, SOLUTION INTRAVENOUS ONCE
Status: DISCONTINUED | OUTPATIENT
Start: 2022-10-08 | End: 2022-10-08

## 2022-10-08 RX ADMIN — CARVEDILOL 12.5 MG: 12.5 TABLET, FILM COATED ORAL at 16:44

## 2022-10-08 RX ADMIN — Medication 1 APPLICATION: at 20:11

## 2022-10-08 RX ADMIN — SENNOSIDES AND DOCUSATE SODIUM 2 TABLET: 8.6; 5 TABLET ORAL at 08:41

## 2022-10-08 RX ADMIN — ESCITALOPRAM OXALATE 20 MG: 20 TABLET ORAL at 08:41

## 2022-10-08 RX ADMIN — FUROSEMIDE 40 MG: 10 INJECTION, SOLUTION INTRAMUSCULAR; INTRAVENOUS at 05:26

## 2022-10-08 RX ADMIN — SUCRALFATE 1 G: 1 TABLET ORAL at 11:13

## 2022-10-08 RX ADMIN — FUROSEMIDE 40 MG: 10 INJECTION, SOLUTION INTRAMUSCULAR; INTRAVENOUS at 17:52

## 2022-10-08 RX ADMIN — APIXABAN 5 MG: 5 TABLET, FILM COATED ORAL at 08:41

## 2022-10-08 RX ADMIN — POTASSIUM CHLORIDE 40 MEQ: 1500 TABLET, EXTENDED RELEASE ORAL at 08:41

## 2022-10-08 RX ADMIN — BUSPIRONE HYDROCHLORIDE 5 MG: 5 TABLET ORAL at 20:10

## 2022-10-08 RX ADMIN — SUCRALFATE 1 G: 1 TABLET ORAL at 06:29

## 2022-10-08 RX ADMIN — Medication: at 08:45

## 2022-10-08 RX ADMIN — OXYCODONE HYDROCHLORIDE 20 MG: 20 TABLET, FILM COATED, EXTENDED RELEASE ORAL at 08:41

## 2022-10-08 RX ADMIN — PANTOPRAZOLE SODIUM 40 MG: 40 TABLET, DELAYED RELEASE ORAL at 05:25

## 2022-10-08 RX ADMIN — BUSPIRONE HYDROCHLORIDE 5 MG: 5 TABLET ORAL at 08:42

## 2022-10-08 RX ADMIN — POTASSIUM CHLORIDE 40 MEQ: 1500 TABLET, EXTENDED RELEASE ORAL at 17:52

## 2022-10-08 RX ADMIN — APIXABAN 5 MG: 5 TABLET, FILM COATED ORAL at 17:52

## 2022-10-08 RX ADMIN — SUCRALFATE 1 G: 1 TABLET ORAL at 20:10

## 2022-10-08 RX ADMIN — CARVEDILOL 12.5 MG: 12.5 TABLET, FILM COATED ORAL at 08:44

## 2022-10-08 RX ADMIN — BUSPIRONE HYDROCHLORIDE 5 MG: 5 TABLET ORAL at 16:45

## 2022-10-08 RX ADMIN — OXYCODONE HYDROCHLORIDE 20 MG: 20 TABLET, FILM COATED, EXTENDED RELEASE ORAL at 20:10

## 2022-10-08 RX ADMIN — SUCRALFATE 1 G: 1 TABLET ORAL at 16:44

## 2022-10-08 RX ADMIN — FUROSEMIDE 40 MG: 10 INJECTION, SOLUTION INTRAMUSCULAR; INTRAVENOUS at 11:13

## 2022-10-08 NOTE — PLAN OF CARE
Problem: MOBILITY - ADULT  Goal: Maintain or return to baseline ADL function  Description: INTERVENTIONS:  -  Assess patient's ability to carry out ADLs; assess patient's baseline for ADL function and identify physical deficits which impact ability to perform ADLs (bathing, care of mouth/teeth, toileting, grooming, dressing, etc )  - Assess/evaluate cause of self-care deficits   - Assess range of motion  - Assess patient's mobility; develop plan if impaired  - Assess patient's need for assistive devices and provide as appropriate  - Encourage maximum independence but intervene and supervise when necessary  - Involve family in performance of ADLs  - Assess for home care needs following discharge   - Consider OT consult to assist with ADL evaluation and planning for discharge  - Provide patient education as appropriate  Outcome: Progressing  Goal: Maintains/Returns to pre admission functional level  Description: INTERVENTIONS:  - Perform BMAT or MOVE assessment daily    - Set and communicate daily mobility goal to care team and patient/family/caregiver     - Collaborate with rehabilitation services on mobility goals if consulted  - Out of bed for toileting  - Record patient progress and toleration of activity level   Outcome: Progressing     Problem: Potential for Falls  Goal: Patient will remain free of falls  Description: INTERVENTIONS:  - Educate patient/family on patient safety including physical limitations  - Instruct patient to call for assistance with activity   - Consult OT/PT to assist with strengthening/mobility   - Keep Call bell within reach  - Keep bed low and locked with side rails adjusted as appropriate  - Keep care items and personal belongings within reach  - Initiate and maintain comfort rounds  - Apply yellow socks and bracelet for high fall risk patients  - Consider moving patient to room near nurses station  Outcome: Progressing     Problem: Nutrition/Hydration-ADULT  Goal: Nutrient/Hydration intake appropriate for improving, restoring or maintaining nutritional needs  Description: Monitor and assess patient's nutrition/hydration status for malnutrition  Collaborate with interdisciplinary team and initiate plan and interventions as ordered  Monitor patient's weight and dietary intake as ordered or per policy  Utilize nutrition screening tool and intervene as necessary  Determine patient's food preferences and provide high-protein, high-caloric foods as appropriate       INTERVENTIONS:  - Monitor oral intake, urinary output, labs, and treatment plans  - Assess nutrition and hydration status and recommend course of action  - Evaluate amount of meals eaten  - Assist patient with eating if necessary   - Allow adequate time for meals  - Recommend/ encourage appropriate diets, oral nutritional supplements, and vitamin/mineral supplements  - Order, calculate, and assess calorie counts as needed  - Recommend, monitor, and adjust tube feedings and TPN/PPN based on assessed needs  - Assess need for intravenous fluids  - Provide specific nutrition/hydration education as appropriate  - Include patient/family/caregiver in decisions related to nutrition  Outcome: Progressing

## 2022-10-08 NOTE — PHYSICAL THERAPY NOTE
Physical Therapy Evaluation    Patient's Name: Freddy Adam    Admitting Diagnosis  Acute on chronic diastolic heart failure (HCC) [I50 33]  CHF (congestive heart failure) ([facility]) [I50 9]  Leg swelling [M79 89]  Elevated troponin [R77 8]  Volume overload [E87 70]  Ambulatory dysfunction [R26 2]    Problem List  Patient Active Problem List   Diagnosis    Chronic venous insufficiency    Essential hypertension    Chronic low back pain with sciatica    Stage 3 chronic kidney disease (HCC)    Mixed hyperlipidemia    Obesity    Osteoarthritis of knee    Sjogren's syndrome ([facility] )    Acute kidney injury superimposed on CKD ([facility])    A-fib ([facility])    Opioid dependence due to Chronic back pain     Anxiety    Acute on chronic diastolic heart failure ([facility])    Memory impairment    Prediabetes    Seasonal allergies    Xerosis of skin    Venous stasis dermatitis of both lower extremities    At risk for obstructive sleep apnea    Lymphedema    Fall    Fracture of proximal end of left femur ([facility] )    Constipation    Ambulatory dysfunction    Anemia    Age-related osteoporosis with current pathological fracture    Encounter for support and coordination of transition of care    Acute gastric ulcer with hemorrhage    Elevated troponin       Past Medical History  Past Medical History:   Diagnosis Date    A-fib ([facility] ) 12/29/2021    Anxiety     Arthritis     Back pain     Cellulitis     CHF (congestive heart failure) ([facility] )     Colon cancer screening 8/3/2022    Edema of both lower extremities due to peripheral venous insufficiency     Edema of both lower extremities due to peripheral venous insufficiency     Encounter for screening mammogram for malignant neoplasm of breast 3/21/2022    Erythema of lower extremity 11/12/2021    Fibromyalgia     Great toe pain, right 10/6/2022    Hypertension     Hypotension 9/17/2022    Leukocytosis 10/6/2022    Melena 8/20/2022    Osteoporosis screening 8/3/2022    Sjogren syndrome, unspecified ([facility] ) Past Surgical History  Past Surgical History:   Procedure Laterality Date    KNEE CARTILAGE SURGERY      GA OPEN RX FEMUR FX+INTRAMED SHAN Left 2022    Procedure: LEFT RETROGRADE IM SHAN;  Surgeon: Earl Arce MD;  Location: [facility];  Service: Orthopedics    REPLACEMENT TOTAL KNEE BILATERAL          10/08/22 1209   PT Last Visit   PT Visit Date 10/08/22   Note Type   Note type Evaluation   Pain Assessment   Pain Assessment Tool 0-10   Pain Score 5   Pain Location/Orientation Orientation: Left; Location: Foot   Effect of Pain on Daily Activities limited ambulation   Hospital Pain Intervention(s) Repositioned   Restrictions/Precautions   Weight Bearing Precautions Per Order No   Other Precautions Cognitive; Chair Alarm; Bed Alarm; Fall Risk;Pain;Multiple lines   Home Living   Type of [address] Two level;Stairs to enter with rails  (2 GLORIA)   Home Equipment Walker;Cane;Stair glide   Prior Function   Lives With Spouse   Receives Help From Family;Friend(s)   Falls in the last 6 months 1 to 4   General   Additional Pertinent History pt with fall and L femur IM nail earlier this year   Family/Caregiver Present No   Cognition   Overall Cognitive Status Impaired   Arousal/Participation Cooperative   Orientation Level Oriented to person;Oriented to place;Oriented to situation   Following Commands Follows one step commands inconsistently   Comments pt ID by wristband, name and    Subjective   Subjective pt supine in bed agreeable to PT eval   RLE Assessment   RLE Assessment WFL  (grossly 4-/5)   LLE Assessment   LLE Assessment WFL  (grossly 3+/5, painful resisted DF 2/2 to PT resistance)   Bed Mobility   Supine to Sit 3  Moderate assistance   Additional items Assist x 1;HOB elevated; Bedrails; Increased time required   Sit to Supine Unable to assess   Additional items   (pt OOB in recliner at end of session)   Transfers   Sit to Stand 4  Minimal assistance   Additional items Assist x 1; Increased time required;Verbal cues  (VC for hand placement)   Stand to Sit 4  Minimal assistance   Additional items Increased time required;Verbal cues  (VC for LE positioning)   Ambulation/Elevation   Gait pattern Decreased L stance; Forward Flexion;Narrow AGUSTIN; Excessively slow; Short stride   Gait Assistance 4  Minimal assist   Additional items Assist x 1;Verbal cues  (RW management)   Assistive Device Rolling walker   Distance 35 ft x 2  (pt defers further)   Stair Management Assistance Not tested   Balance   Static Sitting Fair   Static Standing Fair -   Ambulatory Poor +  (RW)   Activity Tolerance   Activity Tolerance Patient limited by pain; Patient limited by fatigue   Nurse Made Aware RN clers pt prior, in room to remove [employer] [place]   Assessment   Prognosis Fair   Problem List Decreased endurance;Decreased range of motion;Decreased strength;Decreased mobility; Decreased coordination; Impaired balance;Decreased cognition;Decreased safety awareness; Impaired judgement;Obesity; Decreased skin integrity;Pain   Assessment Pt is a 77 y o  female seen for PT evaluation s/p admit [facility] on 10/6/2022  Pt was admitted with a primary dx of: acute on chronic diastolic HF, CHF, Leg swelling, elevated troponin, volume overload, ambulatory dysfunction  PT now consulted for assessment of mobility and d/c needs  Pt with Up as tolerated orders  Pts current comorbidities and personal factors effecting treatment include: BMI, history of falls, afib, anxiety, HTN, CKD, lymphedema,    Pts current clinical presentation is Unstable/Unpredictable (high complexity) due to Ongoing medical management for primary dx, Increased reliance on more restrictive AD compared to baseline, Decreased activity tolerance compared to baseline, Fall risk, Increased assistance needed from caregiver at current time, Ongoing telemetry monitoring, Cog status  Prior to admission, pt was independent with use of RW   Upon evaluation, pt currently is requiring ModA for bed mobility; Leila for transfers and Leila for ambulation 35 ft w/ RW  Pt presents at PT eval functioning below baseline and currently w/ overall mobility deficits 2* to: BLE weakness, decreased ROM, impaired balance, decreased endurance, impaired coordination, gait deviations, pain, decreased activity tolerance compared to baseline, decreased functional mobility tolerance compared to baseline, decreased safety awareness, impaired judgement, fall risk, decreased skin integrity, decreased cognition  Pt currently at a fall risk 2* to impairments listed above  Pt will continue to benefit from skilled acute PT interventions to address stated impairments; to maximize functional mobility; for ongoing pt/ family training; and DME needs  At conclusion of PT session chair alarm engaged, all needs in reach, RN notified of session findings/recommendations and pt returned back in recliner chair with phone and call bell within reach  Pt denies any further questions at this time  Recommend post acute rehabilitation vs HHPT pending progress upon hospital D/C  Goals   Patient Goals to get better   STG Expiration Date 10/18/22   Short Term Goal #1 In 10 days pt will be able to: 1  Demonstrate ability to perform all aspects of bed mobility independently to improve functional safety  2  Perform functional transfers with LRAD independently to facilitate safe return to previous living environment  3   Ambulate 150 ft with LRAD independently with stable vitals to improve safety with household distances and reduce fall risk  4  Improve LE strength grades by 1 to increase ease of functional mobility with transfers and gait  5  Pt will demonstrate improved balance by one grade in order to decrease risk of falls  6  Climb 3 steps with 1 HR with LRAD independently to simulate entrance to home  PT Treatment Day 0   Plan   Treatment/Interventions Functional transfer training;LE strengthening/ROM; Therapeutic exercise;Cognitive reorientation;Patient/family training;Equipment eval/education; Bed mobility;Gait training;Spoke to nursing   PT Frequency 3-5x/wk   Recommendation   PT Discharge Recommendation Post acute rehabilitation services  (vs home with HHPT and increased support)   Equipment Recommended [facility] Recommended Wheeled walker   AM-PAC Basic Mobility Inpatient   Turning in Bed Without Bedrails 2   Lying on Back to Sitting on Edge of Flat Bed 2   Moving Bed to Chair 3   Standing Up From Chair 3   Walk in Room 3   Climb 3-5 Stairs 2   Basic Mobility Inpatient Raw Score 15   Basic Mobility Standardized Score 36 97   Highest Level Of Mobility   -[facility] Goal 4: Move to chair/commode   -HL Achieved 6: Walk 10 steps or more   Barthel Index   Feeding 10   Bathing 0   Grooming Score 0   Dressing Score 5   Bladder Score 5   Bowels Score 10   Toilet Use Score 5   Transfers (Bed/Chair) Score 5   Mobility (Level Surface) Score 0   Stairs Score 0   Barthel Index Score 40   End of Consult   Patient Position at End of Consult Bedside chair;Bed/Chair alarm activated; All needs within reach   The patient's AM-PAC Basic Mobility Inpatient Short Form Raw Score is 15  A Raw score of less than or equal to 16 suggests the patient may benefit from discharge to post-acute rehabilitation services  Please also refer to the recommendation of the Physical Therapist for safe discharge planning            Benji Else, PT, DPT,

## 2022-10-08 NOTE — PLAN OF CARE
Problem: MOBILITY - ADULT  Goal: Maintain or return to baseline ADL function  Description: INTERVENTIONS:  -  Assess patient's ability to carry out ADLs; assess patient's baseline for ADL function and identify physical deficits which impact ability to perform ADLs (bathing, care of mouth/teeth, toileting, grooming, dressing, etc )  - Assess/evaluate cause of self-care deficits   - Assess range of motion  - Assess patient's mobility; develop plan if impaired  - Assess patient's need for assistive devices and provide as appropriate  - Encourage maximum independence but intervene and supervise when necessary  - Involve family in performance of ADLs  - Assess for home care needs following discharge   - Consider OT consult to assist with ADL evaluation and planning for discharge  - Provide patient education as appropriate  10/8/2022 1331 by Nelson Mendez  Outcome: Progressing  10/8/2022 1331 by Nelson Mendez  Outcome: Progressing  Goal: Maintains/Returns to pre admission functional level  Description: INTERVENTIONS:  - Perform BMAT or MOVE assessment daily    - Set and communicate daily mobility goal to care team and patient/family/caregiver     - Collaborate with rehabilitation services on mobility goals if consulted  - Out of bed for toileting  - Record patient progress and toleration of activity level   10/8/2022 1331 by Nelson Mendez  Outcome: Progressing  10/8/2022 1331 by Nelson Menedz  Outcome: Progressing     Problem: Potential for Falls  Goal: Patient will remain free of falls  Description: INTERVENTIONS:  - Educate patient/family on patient safety including physical limitations  - Instruct patient to call for assistance with activity   - Consult OT/PT to assist with strengthening/mobility   - Keep Call bell within reach  - Keep bed low and locked with side rails adjusted as appropriate  - Keep care items and personal belongings within reach  - Initiate and maintain comfort rounds  - Apply yellow socks and bracelet for high fall risk patients  - Consider moving patient to room near nurses station  10/8/2022 1331 by Paula Found  Outcome: Progressing  10/8/2022 1331 by Paula Found  Outcome: Progressing     Problem: Nutrition/Hydration-ADULT  Goal: Nutrient/Hydration intake appropriate for improving, restoring or maintaining nutritional needs  Description: Monitor and assess patient's nutrition/hydration status for malnutrition  Collaborate with interdisciplinary team and initiate plan and interventions as ordered  Monitor patient's weight and dietary intake as ordered or per policy  Utilize nutrition screening tool and intervene as necessary  Determine patient's food preferences and provide high-protein, high-caloric foods as appropriate       INTERVENTIONS:  - Monitor oral intake, urinary output, labs, and treatment plans  - Assess nutrition and hydration status and recommend course of action  - Evaluate amount of meals eaten  - Assist patient with eating if necessary   - Allow adequate time for meals  - Recommend/ encourage appropriate diets, oral nutritional supplements, and vitamin/mineral supplements  - Order, calculate, and assess calorie counts as needed  - Recommend, monitor, and adjust tube feedings and TPN/PPN based on assessed needs  - Assess need for intravenous fluids  - Provide specific nutrition/hydration education as appropriate  - Include patient/family/caregiver in decisions related to nutrition  10/8/2022 1331 by Paula Found  Outcome: Progressing  10/8/2022 1331 by Paula Found  Outcome: Progressing

## 2022-10-08 NOTE — PROGRESS NOTES
Cardiology Progress Note - Alphonso Rodríguez 77 y o  female MRN: [id number]    Unit/Bed#: S -01 Encounter: [id number]      Assessment/Recommendations:  1  Acute on chronic diastolic heart failure: With worsening lower extremity edema that is improving with IV diuresis  Continue on current dose of IV Lasix  Creatinine stable, strict I&Os and daily weights recommended  Recheck creatinine in a m  Jorden Nasir Replete potassium to keep greater than 4 0   2  Persistent atrial fibrillation:  With controlled ventricular response  Continue on Eliquis for anticoagulation and beta-blocker for rate control  3  Morbid obesity  4  Chronic lower extremity edema:  Normally right is greater than left, however currently both are significantly edematous  5  Anemia:  Appears to be chronic and stable  6  Elevated troponin:  No evidence of ACS  Likely non ischemic myocardial injury in setting of volume overload  No further cardiac workup recommended at the current time for this  7  CKD:  Creatinine is at baseline  Subjective:   Patient seen and examined  No significant events overnight   stable dyspnea on exertion, lower extremity edema, ; pertinent negatives - chest pain, chest pressure/discomfort, irregular heart beat and palpitations  Objective:     Vitals: Blood pressure 126/57, pulse 77, temperature 98 1 °F (36 7 °C), temperature source Oral, resp  rate 18, height 5' 2" (1 575 m), weight 105 kg (231 lb), SpO2 94 %  , Body mass index is 42 25 kg/m² ,   Orthostatic Blood Pressures    Flowsheet Row Most Recent Value   Blood Pressure 126/57 filed at 10/08/2022 0700   Patient Position - Orthostatic VS Lying filed at 10/08/2022 0700            Intake/Output Summary (Last 24 hours) at 10/8/2022 1033  Last data filed at 10/8/2022 0629  Gross per 24 hour   Intake 340 ml   Output 3475 ml   Net -3135 ml       TELE: No significant arrhythmias seen      Physical Exam:    GEN: Osiel Blanc appears well, alert and oriented x 3, pleasant and cooperative   HEENT: pupils equal, round, and reactive to light; extraocular muscles intact  NECK: supple, no carotid bruits   HEART: regular rhythm, normal S1 and S2, + systolic murmur, no clicks, gallops or rubs   LUNGS: clear to auscultation bilaterally; no wheezes, rales, or rhonchi   ABDOMEN: normal bowel sounds, soft, no tenderness, no distention  EXTREMITIES: peripheral pulses normal; no clubbing, cyanosis, +edema  NEURO: no focal findings   SKIN: normal without suspicious lesions on exposed skin    Medications:      Current Facility-Administered Medications:   •  acetaminophen (TYLENOL) tablet 650 mg, 650 mg, Oral, Q6H PRN, SHAHIDA Espinoza  •  ammonium lactate (LAC-HYDRIN) 12 % cream, , Topical, BID, SHAHIDA Espinoza, Given at 10/08/22 0845  •  apixaban (ELIQUIS) tablet 5 mg, 5 mg, Oral, BID, SHAHIDA Addison, 5 mg at 10/08/22 2362  •  busPIRone (BUSPAR) tablet 5 mg, 5 mg, Oral, TID, SAHHIDA Addison, 5 mg at 10/08/22 1398  •  carvedilol (COREG) tablet 12 5 mg, 12 5 mg, Oral, BID With Meals, SHAHIDA Espinoza, 12 5 mg at 10/08/22 0844  •  escitalopram (LEXAPRO) tablet 20 mg, 20 mg, Oral, Daily, Mavis Russ MD, 20 mg at 10/08/22 0841  •  furosemide (LASIX) injection 40 mg, 40 mg, Intravenous, TID (diuretic), Jackquelyn Patch, CRNP, 40 mg at 10/08/22 2352  •  oxyCODONE (OxyCONTIN) 12 hr tablet 20 mg, 20 mg, Oral, BID, SOFÍA AddiosnNP, 20 mg at 10/08/22 0841  •  polyethylene glycol (MIRALAX) packet 17 g, 17 g, Oral, Daily, HSAHIDA Addison  •  potassium chloride (K-DUR,KLOR-CON) CR tablet 40 mEq, 40 mEq, Oral, BID, Sara Balasundram, DO, 40 mEq at 10/08/22 0841  •  senna-docusate sodium (SENOKOT S) 8 6-50 mg per tablet 2 tablet, 2 tablet, Oral, BID, SHAHIDA Espinoza, 2 tablet at 10/08/22 0841  •  sucralfate (CARAFATE) tablet 1 g, 1 g, Oral, 4x Daily (AC & HS), SHAHIDA Addison, 1 g at 10/08/22 0629  •  tiZANidine (ZANAFLEX) tablet 4 mg, 4 mg, Oral, Q8H PRN, Bhavna Marshall, DO     Labs & Results:        Results from last 7 days   Lab Units 10/08/22  0551 10/07/22  0553 10/06/22  1927   WBC Thousand/uL 7 44 7 65 11 86*   HEMOGLOBIN g/dL 9 7* 8 2* 8 9*   HEMATOCRIT % 31 3* 25 8* 28 7*   PLATELETS Thousands/uL 411* 376 385         Results from last 7 days   Lab Units 10/08/22  0551 10/07/22  0553 10/06/22  1927   POTASSIUM mmol/L 3 2* 3 0* 3 5   CHLORIDE mmol/L 97 99 98   CO2 mmol/L 34* 31 30   BUN mg/dL 15 21 26*   CREATININE mg/dL 0 95 0 98 1 24   CALCIUM mg/dL 9 3 8 9 9 4   ALK PHOS U/L  --   --  106*   ALT U/L  --   --  6*   AST U/L  --   --  13     Results from last 7 days   Lab Units 10/06/22  1927   INR  1 31*   PTT seconds 32           Echo: personally reviewed - normal LV size and function  Mildly dilated RV with normal function  Mild pulmonary hypertension  EKG personally reviewed by Cari Lesches

## 2022-10-08 NOTE — PROGRESS NOTES
[facility]  Progress Note - Seth Parker 1945, 77 y o  female MRN: [id number]  Unit/Bed#: S -01 Encounter: [id number]  Primary Care Provider: Joel Moss MD   Date and time admitted to hospital: 10/6/2022  6:34 PM    * Acute on chronic diastolic heart failure [facility])  Assessment & Plan    Intake/Output Summary (Last 24 hours) at 10/8/2022 1429  Last data filed at 10/8/2022 1423  Gross per 24 hour   Intake 120 ml   Output 3475 ml   Net -3355 ml     • Patient reports increased bilateral lower extremity edema and 10 lb weight gain over the past week  • Chest x-ray- no pulmonary edema stable enlarged cardiomediastinal silhouette  • Echo this admission: EF 55%, unable to assess diastolic function  • Cards on board    Plan-  Lasix 40 t i d, Coreg, monitor BMP and creatinine function in the setting of diuresis    Elevated troponin  Assessment & Plan  · POA- Initial troponin of 386   · Patient denies chest pain  · ED gave 325 mg of ASA    Elevated troponin-no evidence of ACS  Likely non ischemic myocardial injury secondary to volume overload  EKG-Atrial fibrillation with premature ventricular or aberrantly conducted complexes, Right axis deviation, Low voltage QRS  Nonspecific ST abnormality    Plan-  Appreciate cardiology recommendations- no further workup indicated    Anemia  Assessment & Plan  · POA- hemoglobin 8 9  · Baseline appears to be 9-10  Currently 9 7    Plan-  Monitor CBC       Venous stasis dermatitis of both lower extremities  Assessment & Plan  · Patient presented to complaints of worsening bilateral lower extremity edema  · Doppler negative for DVT b/l    Plan-  Continue topical ammonium lactate b i d    Elevate extremities  PT/OT Eval    Anxiety  Assessment & Plan  Home medications include Lexapro and BuSpar    Plan-  Per Dr Joseluis Farmer (PCP) recommendations increase Lexapro to 20mg qd    A-fib [facility])  Assessment & Plan  Home medications include carvedilol 12 5 b i d  and Eliquis 5 b i d  Plan-  Continue home medications    Essential hypertension  Assessment & Plan  Blood pressure low on arrival which she had been admitted to the hospital in September 2022 due to hypotension  Patient was restarted on Losartan last month post discharge  Home medications include Coreg 12 5 b i d , torsemide 20 q day, losartan 50  /82    Plan-  Continue home Coreg  Hold losartan and demadex  Appreciate cardiology recommendations of Lasix 40 t i d         Leukocytosis-resolved as of 10/7/2022  Assessment & Plan  · Present on admission, WBCs 11 86  · Patient did not meet two SIRS criteria on arrival   · Suspect reactive  · Trend CBC  Great toe pain, right-resolved as of 10/7/2022  Assessment & Plan  · Patient reports right great toe pain after she states her right great toe bent backwards  · Right foot x-ray: No fx  · Tylenol prn  Hypotension-resolved as of 10/7/2022  Assessment & Plan  · Present on admission, SBP in 90s  · Of note, patient was recently admitted 1 month ago due to hypotension  Home antihypertensive regimen of Carvedilol, Torsemide and Losartan  Patient was restarted on Losartan last month post discharge from the hospital    · Monitor blood pressure closely in the setting of current IV diuretic use  Acute kidney injury superimposed on CKD (HCC)-resolved as of 10/8/2022  Assessment & Plan  Recent Labs     10/06/22  1927 10/07/22  0553 10/08/22  0551   CREATININE 1 24 0 98 0 95   EGFR 42 56 58     Estimated Creatinine Clearance: 58 2 mL/min (by C-G formula based on SCr of 0 95 mg/dL)  • Creatinine upon admission: 1 24  • Baseline: 0 8    Plan-  Monitor BMP in the setting of diuresis        VTE Pharmacologic Prophylaxis: VTE Score: 4 Moderate Risk (Score 3-4) - Pharmacological DVT Prophylaxis Ordered: apixaban (Eliquis)  Patient Centered Rounds: I performed bedside rounds with nursing staff today    Discussions with Specialists or Other Care Team Provider: cardiology note reviewed    Education and Discussions with Family / Patient: Updated  () at bedside  Time Spent for Care: 30 minutes  More than 50% of total time spent on counseling and coordination of care as described above  Current Length of Stay: 2 day(s)  Current Patient Status: Inpatient   Certification Statement: The patient will continue to require additional inpatient hospital stay due to iv diuresis  Discharge Plan:  rehab vs home with home pt (which she already receives)    Code Status: Level 1 - Full Code    Subjective:   Patient lying in bed comfortably, with her  at bedside  She denies any acute complaints  Denies any difficulty breathing, chest pain  Endorses decrease in the size of her legs  Objective:     Vitals:   Temp (24hrs), Av 1 °F (36 7 °C), Min:97 7 °F (36 5 °C), Max:98 4 °F (36 9 °C)    Temp:  [97 7 °F (36 5 °C)-98 4 °F (36 9 °C)] 98 1 °F (36 7 °C)  HR:  [75-77] 77  Resp:  [18-20] 18  BP: (112-147)/(50-66) 126/57  SpO2:  [94 %] 94 %  Body mass index is 42 25 kg/m²  Input and Output Summary (last 24 hours): Intake/Output Summary (Last 24 hours) at 10/8/2022 1435  Last data filed at 10/8/2022 1423  Gross per 24 hour   Intake 120 ml   Output 3475 ml   Net -3355 ml       Physical Exam:   Physical Exam  Vitals and nursing note reviewed  Constitutional:       General: She is not in acute distress  Appearance: She is well-developed  HENT:      Head: Normocephalic and atraumatic  Mouth/Throat:      Mouth: Mucous membranes are moist    Eyes:      Extraocular Movements: Extraocular movements intact  Conjunctiva/sclera: Conjunctivae normal       Pupils: Pupils are equal, round, and reactive to light  Cardiovascular:      Rate and Rhythm: Normal rate and regular rhythm  Pulses: Normal pulses  Heart sounds: Normal heart sounds  No murmur heard  Pulmonary:      Effort: Pulmonary effort is normal  No respiratory distress  Breath sounds: Normal breath sounds  Abdominal:      General: Abdomen is flat  Bowel sounds are normal  There is no distension  Palpations: Abdomen is soft  Tenderness: There is no abdominal tenderness  Musculoskeletal:         General: No swelling or tenderness  Cervical back: Neck supple  Right lower leg: Edema present  Left lower leg: Edema present  Comments: Bilateral leg edema with chronic venous stasis dermatitis   Skin:     General: Skin is warm and dry  Neurological:      Mental Status: She is alert and oriented to person, place, and time  Psychiatric:         Mood and Affect: Mood normal           Additional Data:     Labs:  Results from last 7 days   Lab Units 10/08/22  0551 10/07/22  0553   WBC Thousand/uL 7 44 7 65   HEMOGLOBIN g/dL 9 7* 8 2*   HEMATOCRIT % 31 3* 25 8*   PLATELETS Thousands/uL 411* 376   NEUTROS PCT %  --  62   LYMPHS PCT %  --  23   MONOS PCT %  --  11   EOS PCT %  --  3     Results from last 7 days   Lab Units 10/08/22  0551 10/07/22  0553 10/06/22  1927   SODIUM mmol/L 139   < > 136   POTASSIUM mmol/L 3 2*   < > 3 5   CHLORIDE mmol/L 97   < > 98   CO2 mmol/L 34*   < > 30   BUN mg/dL 15   < > 26*   CREATININE mg/dL 0 95   < > 1 24   ANION GAP mmol/L 8   < > 8   CALCIUM mg/dL 9 3   < > 9 4   ALBUMIN g/dL  --   --  3 5   TOTAL BILIRUBIN mg/dL  --   --  0 46   ALK PHOS U/L  --   --  106*   ALT U/L  --   --  6*   AST U/L  --   --  13   GLUCOSE RANDOM mg/dL 107   < > 132    < > = values in this interval not displayed       Results from last 7 days   Lab Units 10/06/22  1927   INR  1 31*                   Lines/Drains:  Invasive Devices  Report    Peripheral Intravenous Line  Duration           Peripheral IV 10/06/22 Right Antecubital 1 day              Imaging: Reviewed radiology reports from this admission including: chest xray and ECHO    Recent Cultures (last 7 days):         Last 24 Hours Medication List:   Current Facility-Administered Medications Medication Dose Route Frequency Provider Last Rate   • acetaminophen  650 mg Oral Q6H PRN SHAHIDA Coe     • ammonium lactate   Topical BID SHAHIDA Coe     • apixaban  5 mg Oral BID SHAHIDA Coe     • busPIRone  5 mg Oral TID SHAHIDA Coe     • carvedilol  12 5 mg Oral BID With Meals SHAHIDA Coe     • escitalopram  20 mg Oral Daily Melly Alvarez MD     • furosemide  40 mg Intravenous TID (diuretic) SHAHIDA Shen     • oxyCODONE  20 mg Oral BID SHAHIDA Coe     • polyethylene glycol  17 g Oral Daily SHAHIDA Coe     • potassium chloride  40 mEq Oral BID Luanne Rogers DO     • senna-docusate sodium  2 tablet Oral BID SHAHIDA Coe     • sucralfate  1 g Oral 4x Daily (AC & HS) SHAHIDA Coe     • tiZANidine  4 mg Oral Q8H PRN Luanne Rogers DO          Today, Patient Was Seen By: Luanne Rogers DO    **Please Note: This note may have been constructed using a voice recognition system  **

## 2022-10-08 NOTE — PLAN OF CARE
Problem: PHYSICAL THERAPY ADULT  Goal: Performs mobility at highest level of function for planned discharge setting  See evaluation for individualized goals  Description: Treatment/Interventions: Functional transfer training, LE strengthening/ROM, Therapeutic exercise, Cognitive reorientation, Patient/family training, Equipment eval/education, Bed mobility, Gait training, Spoke to nursing  Equipment Recommended: Lottie Colvin       See flowsheet documentation for full assessment, interventions and recommendations  Outcome: Progressing  Note: Prognosis: Fair  Problem List: Decreased endurance, Decreased range of motion, Decreased strength, Decreased mobility, Decreased coordination, Impaired balance, Decreased cognition, Decreased safety awareness, Impaired judgement, Obesity, Decreased skin integrity, Pain  Assessment: Pt is a 77 y o  female seen for PT evaluation s/p admit to Counts include 234 beds at the [facility] on 10/6/2022  Pt was admitted with a primary dx of: acute on chronic diastolic HF, CHF, Leg swelling, elevated troponin, volume overload, ambulatory dysfunction  PT now consulted for assessment of mobility and d/c needs  Pt with Up as tolerated orders  Pts current comorbidities and personal factors effecting treatment include: BMI, history of falls, afib, anxiety, HTN, CKD, lymphedema,    Pts current clinical presentation is Unstable/Unpredictable (high complexity) due to Ongoing medical management for primary dx, Increased reliance on more restrictive AD compared to baseline, Decreased activity tolerance compared to baseline, Fall risk, Increased assistance needed from caregiver at current time, Ongoing telemetry monitoring, Cog status  Prior to admission, pt was independent with use of RW  Upon evaluation, pt currently is requiring ModA for bed mobility; Leila for transfers and Leila for ambulation 35 ft w/ RW   Pt presents at PT eval functioning below baseline and currently w/ overall mobility deficits 2* to: BLE weakness, decreased ROM, impaired balance, decreased endurance, impaired coordination, gait deviations, pain, decreased activity tolerance compared to baseline, decreased functional mobility tolerance compared to baseline, decreased safety awareness, impaired judgement, fall risk, decreased skin integrity, decreased cognition  Pt currently at a fall risk 2* to impairments listed above  Pt will continue to benefit from skilled acute PT interventions to address stated impairments; to maximize functional mobility; for ongoing pt/ family training; and DME needs  At conclusion of PT session chair alarm engaged, all needs in reach, RN notified of session findings/recommendations and pt returned back in recliner chair with phone and call bell within reach  Pt denies any further questions at this time  Recommend post acute rehabilitation vs HHPT pending progress upon hospital D/C  PT Discharge Recommendation: Post acute rehabilitation services (vs home with HHPT and increased support)    See flowsheet documentation for full assessment

## 2022-10-09 LAB
ANION GAP SERPL CALCULATED.3IONS-SCNC: 7 MMOL/L (ref 4–13)
BUN SERPL-MCNC: 14 MG/DL (ref 5–25)
CALCIUM SERPL-MCNC: 9.3 MG/DL (ref 8.4–10.2)
CHLORIDE SERPL-SCNC: 98 MMOL/L (ref 96–108)
CO2 SERPL-SCNC: 35 MMOL/L (ref 21–32)
CREAT SERPL-MCNC: 0.98 MG/DL (ref 0.6–1.3)
GFR SERPL CREATININE-BSD FRML MDRD: 56 ML/MIN/1.73SQ M
GLUCOSE SERPL-MCNC: 117 MG/DL (ref 65–140)
MAGNESIUM SERPL-MCNC: 1.8 MG/DL (ref 1.9–2.7)
POTASSIUM SERPL-SCNC: 3.5 MMOL/L (ref 3.5–5.3)
SODIUM SERPL-SCNC: 140 MMOL/L (ref 135–147)

## 2022-10-09 PROCEDURE — 83735 ASSAY OF MAGNESIUM: CPT | Performed by: INTERNAL MEDICINE

## 2022-10-09 PROCEDURE — 80048 BASIC METABOLIC PNL TOTAL CA: CPT

## 2022-10-09 PROCEDURE — 99233 SBSQ HOSP IP/OBS HIGH 50: CPT | Performed by: INTERNAL MEDICINE

## 2022-10-09 PROCEDURE — 99232 SBSQ HOSP IP/OBS MODERATE 35: CPT | Performed by: INTERNAL MEDICINE

## 2022-10-09 RX ORDER — POTASSIUM CHLORIDE 20 MEQ/1
40 TABLET, EXTENDED RELEASE ORAL ONCE
Status: COMPLETED | OUTPATIENT
Start: 2022-10-09 | End: 2022-10-09

## 2022-10-09 RX ORDER — MAGNESIUM SULFATE HEPTAHYDRATE 40 MG/ML
2 INJECTION, SOLUTION INTRAVENOUS ONCE
Status: COMPLETED | OUTPATIENT
Start: 2022-10-09 | End: 2022-10-09

## 2022-10-09 RX ADMIN — MAGNESIUM SULFATE HEPTAHYDRATE 2 G: 40 INJECTION, SOLUTION INTRAVENOUS at 08:49

## 2022-10-09 RX ADMIN — POTASSIUM CHLORIDE 40 MEQ: 1500 TABLET, EXTENDED RELEASE ORAL at 10:00

## 2022-10-09 RX ADMIN — OXYCODONE HYDROCHLORIDE 20 MG: 20 TABLET, FILM COATED, EXTENDED RELEASE ORAL at 21:25

## 2022-10-09 RX ADMIN — BUSPIRONE HYDROCHLORIDE 5 MG: 5 TABLET ORAL at 15:51

## 2022-10-09 RX ADMIN — SUCRALFATE 1 G: 1 TABLET ORAL at 15:51

## 2022-10-09 RX ADMIN — APIXABAN 5 MG: 5 TABLET, FILM COATED ORAL at 17:52

## 2022-10-09 RX ADMIN — POTASSIUM CHLORIDE 40 MEQ: 1500 TABLET, EXTENDED RELEASE ORAL at 17:52

## 2022-10-09 RX ADMIN — CARVEDILOL 12.5 MG: 12.5 TABLET, FILM COATED ORAL at 08:42

## 2022-10-09 RX ADMIN — CARVEDILOL 12.5 MG: 12.5 TABLET, FILM COATED ORAL at 15:51

## 2022-10-09 RX ADMIN — OXYCODONE HYDROCHLORIDE 20 MG: 20 TABLET, FILM COATED, EXTENDED RELEASE ORAL at 08:42

## 2022-10-09 RX ADMIN — FUROSEMIDE 40 MG: 10 INJECTION, SOLUTION INTRAMUSCULAR; INTRAVENOUS at 05:12

## 2022-10-09 RX ADMIN — Medication: at 08:46

## 2022-10-09 RX ADMIN — BUSPIRONE HYDROCHLORIDE 5 MG: 5 TABLET ORAL at 21:25

## 2022-10-09 RX ADMIN — SUCRALFATE 1 G: 1 TABLET ORAL at 21:25

## 2022-10-09 RX ADMIN — POTASSIUM CHLORIDE 40 MEQ: 1500 TABLET, EXTENDED RELEASE ORAL at 08:42

## 2022-10-09 RX ADMIN — BUSPIRONE HYDROCHLORIDE 5 MG: 5 TABLET ORAL at 08:42

## 2022-10-09 RX ADMIN — FUROSEMIDE 40 MG: 10 INJECTION, SOLUTION INTRAMUSCULAR; INTRAVENOUS at 11:37

## 2022-10-09 RX ADMIN — SUCRALFATE 1 G: 1 TABLET ORAL at 11:37

## 2022-10-09 RX ADMIN — FUROSEMIDE 40 MG: 10 INJECTION, SOLUTION INTRAMUSCULAR; INTRAVENOUS at 17:52

## 2022-10-09 RX ADMIN — Medication: at 21:25

## 2022-10-09 RX ADMIN — SUCRALFATE 1 G: 1 TABLET ORAL at 08:46

## 2022-10-09 RX ADMIN — APIXABAN 5 MG: 5 TABLET, FILM COATED ORAL at 08:42

## 2022-10-09 RX ADMIN — ESCITALOPRAM OXALATE 20 MG: 20 TABLET ORAL at 08:42

## 2022-10-09 NOTE — ASSESSMENT & PLAN NOTE
Recent Labs     10/07/22  0553 10/08/22  0551 10/09/22  0605   CREATININE 0 98 0 95 0 98   EGFR 56 58 56     Estimated Creatinine Clearance: 55 2 mL/min (by C-G formula based on SCr of 0 98 mg/dL)      • Creatinine upon admission: 1 24  • Baseline: 0 8    Plan-  Monitor BMP in the setting of diuresis

## 2022-10-09 NOTE — ASSESSMENT & PLAN NOTE
Intake/Output Summary (Last 24 hours) at 10/9/2022 1155  Last data filed at 10/9/2022 1100  Gross per 24 hour   Intake 520 ml   Output 600 ml   Net -80 ml     • Patient reports increased bilateral lower extremity edema and 10 lb weight gain over the past week  • Chest x-ray- no pulmonary edema stable enlarged cardiomediastinal silhouette  • Echo this admission: EF 55%, unable to assess diastolic function    Plan-  Appreciate cardiology recommendations-continue Lasix 40 t i d  IV, keep potassium >4 and Mg >2

## 2022-10-09 NOTE — DISCHARGE SUMMARY
[facility]  Discharge- Oren Alpers 1945, 77 y o  female MRN: [id number]  Unit/Bed#: S -01 Encounter: [id number]  Primary Care Provider: Elvira Ryan MD   Date and time admitted to hospital: 10/6/2022  6:34 PM    * Acute on chronic diastolic heart failure [facility])  Assessment & Plan    Intake/Output Summary (Last 24 hours) at 10/9/2022 1540  Last data filed at 10/9/2022 1100  Gross per 24 hour   Intake 400 ml   Output 600 ml   Net -200 ml     • Patient reports increased bilateral lower extremity edema and 10 lb weight gain over the past week  • Chest x-ray- no pulmonary edema stable enlarged cardiomediastinal silhouette  • Echo this admission: EF 55%, unable to assess diastolic function    Plan-  Appreciate cardiology recommendations, transition from IV Lasix to oral diuretics with Demadex 40 mg in the morning and 20 mg in the evening  Discharge today, follow-up with Cardiology  BMP outpatient 10/13 and 10/20    [facility])  Assessment & Plan  Home medications include carvedilol 12 5 b i d  and Eliquis 5 b i d  Plan-  Continue home medications Eliquis and carvedilol      Venous stasis dermatitis of both lower extremities  Assessment & Plan  · Patient presented to complaints of worsening bilateral lower extremity edema  · Doppler negative for DVT b/l    Plan-  Continue topical ammonium lactate b i d  Elevate extremities  PT/OT- Recommend post acute rehabilitation vs HHPT pending progress upon hospital D/C         Elevated troponin  Assessment & Plan  · POA- Initial troponin of 386   · Patient denies chest pain  · ED gave 325 mg of ASA    Elevated troponin-no evidence of ACS    Likely non ischemic myocardial injury secondary to volume overload  EKG-Atrial fibrillation with premature ventricular or aberrantly conducted complexes, Right axis deviation, Low voltage QRS  Nonspecific ST abnormality    Plan-  Appreciate cardiology recommendations- no further workup indicated    Anemia  Assessment & Plan  · POA- hemoglobin 8 9  · Baseline appears to be 9-10  Currently 9 7    Plan-  Monitor CBC       Anxiety  Assessment & Plan  Home medications include Lexapro and BuSpar    Plan-  Per Dr Kaz Cardona (PCP) recommendations increase Lexapro to 20mg qd    Essential hypertension  Assessment & Plan  Blood pressure low on arrival which she had been admitted to the hospital in September 2022 due to hypotension  Patient was restarted on Losartan last month post discharge  Home medications include Coreg 12 5 b i d , torsemide 20 q day, losartan 50  /52    Plan-  Continue home Coreg  Hold losartan and demadex  Appreciate cardiology recommendations of Lasix 40 t i d  IV          Medical Problems             Resolved Problems  Date Reviewed: 10/10/2022   None               Discharging Resident: Keith Gabriel [id number]  Discharging Attending: Evie Jean MD  PCP: Miah Jackson MD  Admission Date:   Admission Orders (From admission, onward)     Ordered        10/06/22 2116  INPATIENT ADMISSION  Once                      Discharge Date: 10/10/22    Consultations During Hospital Stay:  · Cardiology    Procedures Performed:   · None    Significant Findings / Test Results:   VAS lower limb venous duplex study, complete bilateral   Final Result by Alan Arizmendi MD (10/07 1856)      XR chest 1 view portable   ED Interpretation by Alexandria Washington MD (10/06 2028)   Cardiomegaly  Lungs clear  Final Result by Jerome Goddard MD (10/07 4473)         1  No consolidation or pulmonary edema  2   Stable enlarged cardiomediastinal silhouette  Workstation performed: [id number]         XR toe great min 2 view RIGHT   ED Interpretation by Alexandria Washington MD (10/06 2026)   No fracture  Final Result by Abdirahman Cee MD (10/07 6363)      No acute osseous abnormality              Workstation performed: [id number]           ·     Incidental Findings:   · None     Test Results Pending at Discharge (will require follow up): · None     Outpatient Tests Requested:  · BMP    Complications:  None    Reason for Admission:  None    Hospital Course:   Angelica Patel is a 77 y o  female patient with a past medical history of diastolic CHF, AFib on Eliquis, anxiety, hypertension, hyperlipidemia, anemia, chronic venous stasis dermatitis, who originally presented to the hospital on 10/6/2022 due to worsening bilateral lower extremity edema  ED reported , elevated troponins likely in the setting of volume overload, mild leukocytosis of 11 86 and a chest x-ray that demonstrated the above findings  Cardiology was consulted throughout hospitalization who recommended Lasix 40 t i d  Patient responded well  During this time Patient was hypokalemic and started on potassium chloride 40 b i d  Repeat echo demonstrated EF 00%, however diastolic dysfunction was unable to be assessed  Patient's AFib was managed with carvedilol 12 5 mg b i d  5 mg b i d  Patient was slightly anxious during hospitalization, Dr Leo Nichole (PCP) recommended increasing Lexapro to 20 mg daily  On the final day of admission cardiology evaluated patient and recommended transition to oral diuretics Demadex b i d  40 mg in the morning and 20 mg in the evening, with a BMP on 10/13 and 10/20  Patient tolerated well and was stable upon discharge  Please see above list of diagnoses and related plan for additional information  Condition at Discharge: stable    Discharge Day Visit / Exam:   Subjective:  Patient reports feeling well this morning denies shortness of breath, generalized pain, chest pain, hematuria, dysuria, diarrhea, constipation  Patient reports she had a bowel movement yesterday which was nonbloody  Patient reports she is able to ambulate around her room without any shortness of breath or fatigue/lightheadedness    Vitals: Blood Pressure: 143/64 (10/10/22 0700)  Pulse: 73 (10/10/22 0700)  Temperature: 97 8 °F (36 6 °C) (10/10/22 0700)  Temp Source: Oral (10/10/22 0700)  Respirations: 18 (10/10/22 0700)  Height: 5' 2" (157 5 cm) (10/07/22 1355)  Weight - Scale: 101 kg (221 lb 9 oz) (10/09/22 0600)  SpO2: 94 % (10/10/22 0700)  Exam:   Physical Exam  Vitals and nursing note reviewed  Constitutional:       General: She is not in acute distress  Appearance: She is well-developed  HENT:      Head: Normocephalic and atraumatic  Eyes:      Conjunctiva/sclera: Conjunctivae normal    Cardiovascular:      Rate and Rhythm: Normal rate and regular rhythm  Heart sounds: No murmur heard  Comments: Atrial fibrillation  Pulmonary:      Effort: Pulmonary effort is normal  No respiratory distress  Breath sounds: Normal breath sounds  Comments: Mild crackles  Abdominal:      General: Abdomen is flat  Bowel sounds are normal       Palpations: Abdomen is soft  Tenderness: There is no abdominal tenderness  Musculoskeletal:      Cervical back: Neck supple  Right lower leg: Edema (Mild) present  Left lower leg: Edema (Mild) present  Skin:     General: Skin is warm and dry  Findings: Erythema (Bilateral lower extremity erythema up to the mid shins) and lesion (Small scab on right midshin) present  Neurological:      Mental Status: She is alert and oriented to person, place, and time  Discussion with Family: Updated  () at bedside  Discharge instructions/Information to patient and family:   See after visit summary for information provided to patient and family  Provisions for Follow-Up Care:  See after visit summary for information related to follow-up care and any pertinent home health orders  Disposition:   Home    Planned Readmission:  None    Discharge Medications:  See after visit summary for reconciled discharge medications provided to patient and/or family        **Please Note: This note may have been constructed using a voice recognition system**

## 2022-10-09 NOTE — ASSESSMENT & PLAN NOTE
· Patient presented to complaints of worsening bilateral lower extremity edema  · Doppler negative for DVT b/l    Plan-  Continue topical ammonium lactate b i d    Elevate extremities  PT/OT- Recommend post acute rehabilitation vs HHPT pending progress upon hospital D/C

## 2022-10-09 NOTE — PROGRESS NOTES
Cardiology Progress Note - Losalie Rodríguez 77 y o  female MRN: [id number]    Unit/Bed#: S -01 Encounter: [id number]      Assessment/Recommendations:  1  Acute on chronic diastolic heart failure: With worsening lower extremity edema that is improving with IV diuresis  Continue on current dose of IV Lasix, responding well with stable output  Creatinine stable, strict I&Os and daily weights recommended  Recheck creatinine in a m  Valarie Blend Replete potassium to keep greater than 4 0 and magnesium to keep greater than 2   2  Persistent atrial fibrillation:  With controlled ventricular response  Continue on Eliquis for anticoagulation and beta-blocker for rate control  3  Morbid obesity  4  Chronic lower extremity edema:  Normally right is greater than left, however currently both are significantly edematous, however improving  5  Anemia:  Appears to be chronic and stable  6  Elevated troponin:  No evidence of ACS  Likely non ischemic myocardial injury in setting of volume overload  No further cardiac workup recommended at the current time for this  7  CKD:  Creatinine is at baseline  Subjective:   Patient seen and examined  No significant events overnight   stable dyspnea on exertion, lower extremity edema, ; pertinent negatives - chest pain, chest pressure/discomfort, irregular heart beat and palpitations  Objective:     Vitals: Blood pressure 138/70, pulse 75, temperature 98 3 °F (36 8 °C), temperature source Oral, resp   rate 17, height 5' 2" (1 575 m), weight 101 kg (221 lb 9 oz), SpO2 91 % , Body mass index is 40 52 kg/m² ,   Orthostatic Blood Pressures    Flowsheet Row Most Recent Value   Blood Pressure 138/70 filed at 10/09/2022 0734   Patient Position - Orthostatic VS Lying filed at 10/09/2022 0734            Intake/Output Summary (Last 24 hours) at 10/9/2022 0921  Last data filed at 10/8/2022 1905  Gross per 24 hour   Intake 120 ml   Output 1600 ml   Net -1480 ml         Physical Exam:  GEN: London Cord appears well, alert and oriented x 3, pleasant and cooperative   HEENT: pupils equal, round, and reactive to light; extraocular muscles intact  NECK: supple, no carotid bruits   HEART: regular rhythm, normal S1 and S2, +systolic murmur, no clicks, gallops or rubs   LUNGS: clear to auscultation bilaterally; no wheezes, rales, or rhonchi   ABDOMEN: normal bowel sounds, soft, no tenderness, no distention  EXTREMITIES: peripheral pulses normal; no clubbing, cyanosis, +edema  NEURO: no focal findings   SKIN: normal without suspicious lesions on exposed skin      Medications:      Current Facility-Administered Medications:   •  acetaminophen (TYLENOL) tablet 650 mg, 650 mg, Oral, Q6H PRN, SHAHIDA Craig  •  ammonium lactate (LAC-HYDRIN) 12 % cream, , Topical, BID, SHAHIDA Craig, Given at 10/09/22 0846  •  apixaban (ELIQUIS) tablet 5 mg, 5 mg, Oral, BID, SHAHIDA Addison, 5 mg at 10/09/22 0824  •  busPIRone (BUSPAR) tablet 5 mg, 5 mg, Oral, TID, June SHAHIDA Mckeon, 5 mg at 10/09/22 2654  •  carvedilol (COREG) tablet 12 5 mg, 12 5 mg, Oral, BID With Meals, SHAHIDA Craig, 12 5 mg at 10/09/22 4835  •  escitalopram (LEXAPRO) tablet 20 mg, 20 mg, Oral, Daily, Isabella White MD, 20 mg at 10/09/22 8449  •  furosemide (LASIX) injection 40 mg, 40 mg, Intravenous, TID (diuretic), Vamshi Antonio, SOFÍANP, 40 mg at 10/09/22 4275  •  magnesium sulfate 2 g/50 mL IVPB (premix) 2 g, 2 g, Intravenous, Once, Isabella White MD, 2 g at 10/09/22 0849  •  oxyCODONE (OxyCONTIN) 12 hr tablet 20 mg, 20 mg, Oral, BID, SOFÍA AddisonNP, 20 mg at 10/09/22 1598  •  polyethylene glycol (MIRALAX) packet 17 g, 17 g, Oral, Daily, SHAHIDA Addison  •  potassium chloride (K-DUR,KLOR-CON) CR tablet 40 mEq, 40 mEq, Oral, BID, Sara Gomez, DO, 40 mEq at 10/09/22 1197  •  senna-docusate sodium (SENOKOT S) 8 6-50 mg per tablet 2 tablet, 2 tablet, Oral, BID, SHAHIDA Craig, 2 tablet at 10/08/22 0841  •  sucralfate (CARAFATE) tablet 1 g, 1 g, Oral, 4x Daily (AC & HS), SHAHIDA Addison, 1 g at 10/09/22 0846  •  tiZANidine (ZANAFLEX) tablet 4 mg, 4 mg, Oral, Q8H PRN, Karen July, DO     Labs & Results:        Results from last 7 days   Lab Units 10/08/22  0551 10/07/22  0553 10/06/22  1927   WBC Thousand/uL 7 44 7 65 11 86*   HEMOGLOBIN g/dL 9 7* 8 2* 8 9*   HEMATOCRIT % 31 3* 25 8* 28 7*   PLATELETS Thousands/uL 411* 376 385         Results from last 7 days   Lab Units 10/09/22  0605 10/08/22  0551 10/07/22  0553 10/06/22  1927   POTASSIUM mmol/L 3 5 3 2* 3 0* 3 5   CHLORIDE mmol/L 98 97 99 98   CO2 mmol/L 35* 34* 31 30   BUN mg/dL 14 15 21 26*   CREATININE mg/dL 0 98 0 95 0 98 1 24   CALCIUM mg/dL 9 3 9 3 8 9 9 4   ALK PHOS U/L  --   --   --  106*   ALT U/L  --   --   --  6*   AST U/L  --   --   --  13     Results from last 7 days   Lab Units 10/06/22  1927   INR  1 31*   PTT seconds 32     Results from last 7 days   Lab Units 10/09/22  0605 10/08/22  1748   MAGNESIUM mg/dL 1 8* 1 7*       Echo: personally reviewed - normal LV size and function  Mildly dilated RV with normal function  Mild pulmonary hypertension  EKG personally reviewed by Genet Guido

## 2022-10-09 NOTE — ASSESSMENT & PLAN NOTE
Home medications include Lexapro and BuSpar    Plan-  Per Dr Kaz Cardona (PCP) recommendations increase Lexapro to 20mg qd

## 2022-10-09 NOTE — ASSESSMENT & PLAN NOTE
Blood pressure low on arrival which she had been admitted to the hospital in September 2022 due to hypotension   Patient was restarted on Losartan last month post discharge  Home medications include Coreg 12 5 b i d , torsemide 20 q day, losartan 50  /52    Plan-  Continue home Coreg  Hold losartan and demadex  Appreciate cardiology recommendations of Lasix 40 t i d  IV

## 2022-10-09 NOTE — PLAN OF CARE
Problem: MOBILITY - ADULT  Goal: Maintain or return to baseline ADL function  Description: INTERVENTIONS:  -  Assess patient's ability to carry out ADLs; assess patient's baseline for ADL function and identify physical deficits which impact ability to perform ADLs (bathing, care of mouth/teeth, toileting, grooming, dressing, etc )  - Assess/evaluate cause of self-care deficits   - Assess range of motion  - Assess patient's mobility; develop plan if impaired  - Assess patient's need for assistive devices and provide as appropriate  - Encourage maximum independence but intervene and supervise when necessary  - Involve family in performance of ADLs  - Assess for home care needs following discharge   - Consider OT consult to assist with ADL evaluation and planning for discharge  - Provide patient education as appropriate  Outcome: Progressing  Goal: Maintains/Returns to pre admission functional level  Description: INTERVENTIONS:  - Perform BMAT or MOVE assessment daily    - Set and communicate daily mobility goal to care team and patient/family/caregiver     - Collaborate with rehabilitation services on mobility goals if consulted  - Out of bed for toileting  - Record patient progress and toleration of activity level   Outcome: Progressing     Problem: Potential for Falls  Goal: Patient will remain free of falls  Description: INTERVENTIONS:  - Educate patient/family on patient safety including physical limitations  - Instruct patient to call for assistance with activity   - Consult OT/PT to assist with strengthening/mobility   - Keep Call bell within reach  - Keep bed low and locked with side rails adjusted as appropriate  - Keep care items and personal belongings within reach  - Initiate and maintain comfort rounds  - Apply yellow socks and bracelet for high fall risk patients  - Consider moving patient to room near nurses station  Outcome: Progressing     Problem: Nutrition/Hydration-ADULT  Goal: Nutrient/Hydration intake appropriate for improving, restoring or maintaining nutritional needs  Description: Monitor and assess patient's nutrition/hydration status for malnutrition  Collaborate with interdisciplinary team and initiate plan and interventions as ordered  Monitor patient's weight and dietary intake as ordered or per policy  Utilize nutrition screening tool and intervene as necessary  Determine patient's food preferences and provide high-protein, high-caloric foods as appropriate       INTERVENTIONS:  - Monitor oral intake, urinary output, labs, and treatment plans  - Assess nutrition and [id number] status and recommend course of action  - Evaluate amount of meals eaten  - Assist patient with eating if necessary   - Allow adequate time for meals  - Recommend/ encourage appropriate diets, oral nutritional supplements, and vitamin/mineral supplements  - Order, calculate, and assess calorie counts as needed  - Recommend, monitor, and adjust tube feedings and TPN/PPN based on assessed needs  - Assess need for intravenous fluids  - Provide specific nutrition/hydration education as appropriate  - Include patient/family/caregiver in decisions related to nutrition  Outcome: Progressing

## 2022-10-09 NOTE — PROGRESS NOTES
[facility]  Progress Note - Seth Parker 1945, 77 y o  female MRN: [id number]  Unit/Bed#: S -01 Encounter: [id number]  Primary Care Provider: Jemima Corona MD   Date and time admitted to hospital: 10/6/2022  6:34 PM    * Acute on chronic diastolic heart failure [facility])  Assessment & Plan    Intake/Output Summary (Last 24 hours) at 10/9/2022 1155  Last data filed at 10/9/2022 1100  Gross per 24 hour   Intake 520 ml   Output 600 ml   Net -80 ml     • Patient reports increased bilateral lower extremity edema and 10 lb weight gain over the past week  • Chest x-ray- no pulmonary edema stable enlarged cardiomediastinal silhouette  • Echo this admission: EF 55%, unable to assess diastolic function    Plan-  Appreciate cardiology recommendations-continue Lasix 40 t i d  IV, keep potassium >4 and Mg >2    Elevated troponin  Assessment & Plan  · POA- Initial troponin of 386   · Patient denies chest pain  · ED gave 325 mg of ASA    Elevated troponin-no evidence of ACS  Likely non ischemic myocardial injury secondary to volume overload  EKG-Atrial fibrillation with premature ventricular or aberrantly conducted complexes, Right axis deviation, Low voltage QRS  Nonspecific ST abnormality    Plan-  Appreciate cardiology recommendations- no further workup indicated    Anemia  Assessment & Plan  · POA- hemoglobin 8 9  · Baseline appears to be 9-10  Currently 9 7    Plan-  Monitor CBC       Venous stasis dermatitis of both lower extremities  Assessment & Plan  · Patient presented to complaints of worsening bilateral lower extremity edema  · Doppler negative for DVT b/l    Plan-  Continue topical ammonium lactate b i d    Elevate extremities  PT/OT- Recommend post acute rehabilitation vs HHPT pending progress upon hospital D/C         Anxiety  Assessment & Plan  Home medications include Lexapro and BuSpar    Plan-  Per Dr Anya Orta (PCP) recommendations increase Lexapro to 20mg qd    A-fib [facility])  Assessment & Plan  Home medications include carvedilol 12 5 b i d  and Eliquis 5 b i d  Plan-  Continue home medications    Essential hypertension  Assessment & Plan  Blood pressure low on arrival which she had been admitted to the hospital in 2022 due to hypotension  Patient was restarted on Losartan last month post discharge  Home medications include Coreg 12 5 b i d , torsemide 20 q day, losartan 50  /52    Plan-  Continue home Coreg  Hold losartan and demadex  Appreciate cardiology recommendations of Lasix 40 t i d  IV        Acute kidney injury superimposed on CKD (HCC)-resolved as of 10/8/2022  Assessment & Plan  Recent Labs     10/07/22  0553 10/08/22  0551 10/09/22  0605   CREATININE 0 98 0 95 0 98   EGFR 56 58 56     Estimated Creatinine Clearance: 55 2 mL/min (by C-G formula based on SCr of 0 98 mg/dL)  • Creatinine upon admission: 1 24  • Baseline: 0 8    Plan-  Monitor BMP in the setting of diuresis          VTE Pharmacologic Prophylaxis: VTE Score: 4 Moderate Risk (Score 3-4) - Pharmacological DVT Prophylaxis Ordered: apixaban (Eliquis)  Patient Centered Rounds: I performed bedside rounds with nursing staff today  Discussions with Specialists or Other Care Team Provider:  Cardiology    Education and Discussions with Family / Patient: Updated  () at bedside  Current Length of Stay: 3 day(s)  Current Patient Status: Inpatient   Discharge Plan: Anticipate discharge in 24-48 hrs to discharge location to be determined pending rehab evaluations  Code Status: Level 1 - Full Code    Subjective:   Patient was seen examined at the bedside  Nursing reported no overnight events  She reports that her legs feel better and have decreased in size  Patient denies chest pain, shortness a breath, abdominal pain, abdominal bloating, leg pain, or any other symptoms at this time      Objective:     Vitals:   Temp (24hrs), Av 3 °F (36 8 °C), Min:98 3 °F (36 8 °C), Max:98 4 °F (36 9 °C)    Temp:  [98 3 °F (36 8 °C)-98 4 °F (36 9 °C)] 98 3 °F (36 8 °C)  HR:  [72-75] 75  Resp:  [16-18] 17  BP: (107-138)/(52-70) 138/70  SpO2:  [91 %-96 %] 91 %  Body mass index is 40 52 kg/m²  Input and Output Summary (last 24 hours): Intake/Output Summary (Last 24 hours) at 10/9/2022 1204  Last data filed at 10/9/2022 1100  Gross per 24 hour   Intake 520 ml   Output 600 ml   Net -80 ml       Physical Exam:   Physical Exam  Vitals and nursing note reviewed  Constitutional:       General: She is not in acute distress  Appearance: She is well-developed  She is obese  She is not ill-appearing, toxic-appearing or diaphoretic  HENT:      Head: Normocephalic and atraumatic  Mouth/Throat:      Mouth: Mucous membranes are moist    Eyes:      Extraocular Movements: Extraocular movements intact  Conjunctiva/sclera: Conjunctivae normal       Pupils: Pupils are equal, round, and reactive to light  Cardiovascular:      Rate and Rhythm: Normal rate  Rhythm irregular  Pulses: Normal pulses  Heart sounds: Normal heart sounds  No murmur heard  Comments: No JVD  Pulmonary:      Effort: Pulmonary effort is normal  No respiratory distress  Breath sounds: Normal breath sounds  Abdominal:      General: Bowel sounds are normal  There is no distension  Palpations: Abdomen is soft  Tenderness: There is no abdominal tenderness  Musculoskeletal:      Cervical back: Normal range of motion and neck supple  Right lower leg: Edema present  Left lower leg: Edema present  Comments: Bilateral venous stasis dermatitis, bilateral leg edema is improving   Skin:     General: Skin is warm and dry  Neurological:      Mental Status: She is alert and oriented to person, place, and time  Cranial Nerves: No cranial nerve deficit  Motor: No weakness        Coordination: Coordination normal       Gait: Gait normal    Psychiatric:         Mood and Affect: Mood normal          Behavior: Behavior normal          Thought Content: Thought content normal           Additional Data:     Labs:  Results from last 7 days   Lab Units 10/08/22  0551 10/07/22  0553   WBC Thousand/uL 7 44 7 65   HEMOGLOBIN g/dL 9 7* 8 2*   HEMATOCRIT % 31 3* 25 8*   PLATELETS Thousands/uL 411* 376   NEUTROS PCT %  --  62   LYMPHS PCT %  --  23   MONOS PCT %  --  11   EOS PCT %  --  3     Results from last 7 days   Lab Units 10/09/22  0605 10/07/22  0553 10/06/22  1927   SODIUM mmol/L 140   < > 136   POTASSIUM mmol/L 3 5   < > 3 5   CHLORIDE mmol/L 98   < > 98   CO2 mmol/L 35*   < > 30   BUN mg/dL 14   < > 26*   CREATININE mg/dL 0 98   < > 1 24   ANION GAP mmol/L 7   < > 8   CALCIUM mg/dL 9 3   < > 9 4   ALBUMIN g/dL  --   --  3 5   TOTAL BILIRUBIN mg/dL  --   --  0 46   ALK PHOS U/L  --   --  106*   ALT U/L  --   --  6*   AST U/L  --   --  13   GLUCOSE RANDOM mg/dL 117   < > 132    < > = values in this interval not displayed       Results from last 7 days   Lab Units 10/06/22  1927   INR  1 31*                   Lines/Drains:  Invasive Devices  Report    Peripheral Intravenous Line  Duration           Peripheral IV 10/06/22 Right Antecubital 2 days                      Imaging: Reviewed radiology reports from this admission including: chest xray, ultrasound(s) and ECHO    Recent Cultures (last 7 days):         Last 24 Hours Medication List:   Current Facility-Administered Medications   Medication Dose Route Frequency Provider Last Rate   • acetaminophen  650 mg Oral Q6H PRN SHAHIDA Kirby     • ammonium lactate   Topical BID SHAHIDA Kirby     • apixaban  5 mg Oral BID SHAHIDA Kirby     • busPIRone  5 mg Oral TID SHAHIDA Kirby     • carvedilol  12 5 mg Oral BID With Meals SHAHIDA Kirby     • escitalopram  20 mg Oral Daily Rebecca Garcia MD     • furosemide  40 mg Intravenous TID (diuretic) SHAHIDA Godoy     • oxyCODONE  20 mg Oral BID Liv Saleh SHAHIDA Mckeon     • polyethylene glycol  17 g Oral Daily SHAHIDA Goodman     • potassium chloride  40 mEq Oral BID Enrique Forth, DO     • senna-docusate sodium  2 tablet Oral BID SHAHIDA Goodman     • sucralfate  1 g Oral 4x Daily (AC & HS) SHAHIDA Goodman     • tiZANidine  4 mg Oral Q8H PRN Enrique Forth, DO          Today, Patient Was Seen By: Grant Glover    **Please Note: This note may have been constructed using a voice recognition system  **

## 2022-10-09 NOTE — ASSESSMENT & PLAN NOTE
Intake/Output Summary (Last 24 hours) at 10/9/2022 1540  Last data filed at 10/9/2022 1100  Gross per 24 hour   Intake 400 ml   Output 600 ml   Net -200 ml     • Patient reports increased bilateral lower extremity edema and 10 lb weight gain over the past week  • Chest x-ray- no pulmonary edema stable enlarged cardiomediastinal silhouette  • Echo this admission: EF 55%, unable to assess diastolic function    Plan-  Appreciate cardiology recommendations, transition from IV Lasix to oral diuretics with Demadex 40 mg in the morning and 20 mg in the evening  Discharge today, follow-up with Cardiology  [facility] outpatient 10/13 and 10/20

## 2022-10-10 VITALS
BODY MASS INDEX: 40.77 KG/M2 | RESPIRATION RATE: 18 BRPM | WEIGHT: 221.56 LBS | DIASTOLIC BLOOD PRESSURE: 64 MMHG | OXYGEN SATURATION: 94 % | TEMPERATURE: 97.8 F | HEIGHT: 62 IN | HEART RATE: 73 BPM | SYSTOLIC BLOOD PRESSURE: 143 MMHG

## 2022-10-10 LAB
ANION GAP SERPL CALCULATED.3IONS-SCNC: 6 MMOL/L (ref 4–13)
BUN SERPL-MCNC: 15 MG/DL (ref 5–25)
CALCIUM SERPL-MCNC: 9.5 MG/DL (ref 8.4–10.2)
CHLORIDE SERPL-SCNC: 97 MMOL/L (ref 96–108)
CO2 SERPL-SCNC: 36 MMOL/L (ref 21–32)
CREAT SERPL-MCNC: 0.97 MG/DL (ref 0.6–1.3)
ERYTHROCYTE [DISTWIDTH] IN BLOOD BY AUTOMATED COUNT: 14 % (ref 11.6–15.1)
GFR SERPL CREATININE-BSD FRML MDRD: 57 ML/MIN/1.73SQ M
GLUCOSE SERPL-MCNC: 149 MG/DL (ref 65–140)
HCT VFR BLD AUTO: 33.3 % (ref 34.8–46.1)
HGB BLD-MCNC: 10.3 G/DL (ref 11.5–15.4)
MAGNESIUM SERPL-MCNC: 1.9 MG/DL (ref 1.9–2.7)
MCH RBC QN AUTO: 28.4 PG (ref 26.8–34.3)
MCHC RBC AUTO-ENTMCNC: 30.9 G/DL (ref 31.4–37.4)
MCV RBC AUTO: 92 FL (ref 82–98)
PLATELET # BLD AUTO: 418 THOUSANDS/UL (ref 149–390)
PMV BLD AUTO: 9.4 FL (ref 8.9–12.7)
POTASSIUM SERPL-SCNC: 3.5 MMOL/L (ref 3.5–5.3)
RBC # BLD AUTO: 3.63 MILLION/UL (ref 3.81–5.12)
SODIUM SERPL-SCNC: 139 MMOL/L (ref 135–147)
WBC # BLD AUTO: 7.61 THOUSAND/UL (ref 4.31–10.16)

## 2022-10-10 PROCEDURE — 99239 HOSP IP/OBS DSCHRG MGMT >30: CPT | Performed by: INTERNAL MEDICINE

## 2022-10-10 PROCEDURE — 97535 SELF CARE MNGMENT TRAINING: CPT

## 2022-10-10 PROCEDURE — 97166 OT EVAL MOD COMPLEX 45 MIN: CPT

## 2022-10-10 PROCEDURE — 80048 BASIC METABOLIC PNL TOTAL CA: CPT

## 2022-10-10 PROCEDURE — 99232 SBSQ HOSP IP/OBS MODERATE 35: CPT | Performed by: INTERNAL MEDICINE

## 2022-10-10 PROCEDURE — 83735 ASSAY OF MAGNESIUM: CPT

## 2022-10-10 PROCEDURE — 85027 COMPLETE CBC AUTOMATED: CPT

## 2022-10-10 RX ORDER — POTASSIUM CHLORIDE 20 MEQ/1
40 TABLET, EXTENDED RELEASE ORAL ONCE
Status: COMPLETED | OUTPATIENT
Start: 2022-10-10 | End: 2022-10-10

## 2022-10-10 RX ORDER — ESCITALOPRAM OXALATE 20 MG/1
20 TABLET ORAL DAILY
Qty: 30 TABLET | Refills: 1 | Status: SHIPPED | OUTPATIENT
Start: 2022-10-10

## 2022-10-10 RX ORDER — TORSEMIDE 20 MG/1
40 TABLET ORAL DAILY
Qty: 180 TABLET | Refills: 0 | Status: SHIPPED | OUTPATIENT
Start: 2022-10-10 | End: 2022-10-26 | Stop reason: SDUPTHER

## 2022-10-10 RX ORDER — MAGNESIUM SULFATE 1 G/100ML
1 INJECTION INTRAVENOUS ONCE
Status: DISCONTINUED | OUTPATIENT
Start: 2022-10-10 | End: 2022-10-10

## 2022-10-10 RX ORDER — TORSEMIDE 20 MG/1
20 TABLET ORAL
Status: DISCONTINUED | OUTPATIENT
Start: 2022-10-11 | End: 2022-10-10 | Stop reason: HOSPADM

## 2022-10-10 RX ORDER — LOSARTAN POTASSIUM 50 MG/1
50 TABLET ORAL DAILY
Qty: 30 TABLET | Refills: 0 | Status: SHIPPED | OUTPATIENT
Start: 2022-10-19 | End: 2022-10-19 | Stop reason: SDUPTHER

## 2022-10-10 RX ORDER — TORSEMIDE 20 MG/1
40 TABLET ORAL ONCE
Status: COMPLETED | OUTPATIENT
Start: 2022-10-10 | End: 2022-10-10

## 2022-10-10 RX ORDER — TORSEMIDE 20 MG/1
40 TABLET ORAL DAILY
Status: DISCONTINUED | OUTPATIENT
Start: 2022-10-11 | End: 2022-10-10 | Stop reason: HOSPADM

## 2022-10-10 RX ORDER — ACETAMINOPHEN 500 MG
500 TABLET ORAL EVERY 6 HOURS PRN
Refills: 0
Start: 2022-10-10

## 2022-10-10 RX ADMIN — ESCITALOPRAM OXALATE 20 MG: 20 TABLET ORAL at 08:47

## 2022-10-10 RX ADMIN — TORSEMIDE 40 MG: 20 TABLET ORAL at 15:20

## 2022-10-10 RX ADMIN — BUSPIRONE HYDROCHLORIDE 5 MG: 5 TABLET ORAL at 15:20

## 2022-10-10 RX ADMIN — OXYCODONE HYDROCHLORIDE 20 MG: 20 TABLET, FILM COATED, EXTENDED RELEASE ORAL at 08:46

## 2022-10-10 RX ADMIN — SUCRALFATE 1 G: 1 TABLET ORAL at 15:22

## 2022-10-10 RX ADMIN — POTASSIUM CHLORIDE 40 MEQ: 1500 TABLET, EXTENDED RELEASE ORAL at 08:46

## 2022-10-10 RX ADMIN — FUROSEMIDE 40 MG: 10 INJECTION, SOLUTION INTRAMUSCULAR; INTRAVENOUS at 05:30

## 2022-10-10 RX ADMIN — CARVEDILOL 12.5 MG: 12.5 TABLET, FILM COATED ORAL at 08:46

## 2022-10-10 RX ADMIN — APIXABAN 5 MG: 5 TABLET, FILM COATED ORAL at 08:46

## 2022-10-10 RX ADMIN — SUCRALFATE 1 G: 1 TABLET ORAL at 06:05

## 2022-10-10 RX ADMIN — Medication: at 08:48

## 2022-10-10 RX ADMIN — BUSPIRONE HYDROCHLORIDE 5 MG: 5 TABLET ORAL at 08:46

## 2022-10-10 RX ADMIN — SUCRALFATE 1 G: 1 TABLET ORAL at 12:05

## 2022-10-10 RX ADMIN — POTASSIUM CHLORIDE 40 MEQ: 1500 TABLET, EXTENDED RELEASE ORAL at 12:05

## 2022-10-10 NOTE — PROGRESS NOTES
Progress Note - Cardiology Team 1  Gus Rodríguez 77 y o  female MRN: [id number]  Unit/Bed#: S -01 Encounter: [id number]        Principal Problem:    Acute on chronic diastolic heart failure (HCC)  Active Problems:    Essential hypertension    A-fib (HCC)    Anxiety    Venous stasis dermatitis of both lower extremities    Anemia    Elevated troponin      Assessment/Plan     1  Acute on chronic diastolic heart failure  Reports worsening LE edema  Difficulty walking d/t edema  Reports 15 lb weight gain in 1 day? No dyspnea  No hypoxia  Lungs are clear    Presented bed scale weight 231 lbs   Current weight 221  Weight 9/17- 222 lbs   IV lasix 40mg TID  LE edema significantly improved     PTA-  demadex 40mg daily     Suspect somewhat iatrogenic since she was hospitalized mid September for acute kidney injury  Diuretics were held  Received intravenous fluid  Will transition to oral diuretics demadex 40mg AM and 20mg PM   Will  Need close follow up   BMP 10/13 and 10/20     2  Persistent atrial fibrillation  Controlled ventricular response  AC- Eliquis 5 mg b i d   BB- carvedilol 12 5 mg b i d      3  Morbid obesity     4  Chronic LE-/ venous insuffiency/ venous stasis - feels Rt greater than Lt generally  Feels at baseline  Reportedly has lymphedema pumps at home     5  Anemia-appears chronic, stable  H&H-10 3/33 3     6  Elevated troponin-no evidence of ACS  Likely non ischemic myocardial injury secondary to volume overload  HS troponin-386/498/483  No anginal symptoms  EKG-no acute ischemic changes  Currently on anticoagulation for AFib  Will not start anti-platelet  Will continue BB     7  CKD- current GFR 57  At baseline  Recent LEXY- creat to 2 6  Monitor renal function closely with diuresis- BUN/Creat- 15/0 97  stable     Subjective/Objective   Chief Complaint/Subjective  Feels lower extremity edema is at baseline  Ambulates to the bathroom without any major difficulty    Decreased diuresis  No events overnight  No chest pain or shortness of breath          Vitals: /64 (BP Location: Left arm)   Pulse 73   Temp 97 8 °F (36 6 °C) (Oral)   Resp 18   Ht 5' 2" (1 575 m)   Wt 101 kg (221 lb 9 oz)   SpO2 94%   BMI 40 52 kg/m²     Vitals:    10/07/22 1355 10/09/22 0600   Weight: 105 kg (231 lb) 101 kg (221 lb 9 oz)     Orthostatic Blood Pressures    Flowsheet Row Most Recent Value   Blood Pressure 143/64 filed at 10/10/2022 0700   Patient Position - Orthostatic VS Lying filed at 10/10/2022 0700            Intake/Output Summary (Last 24 hours) at 10/10/2022 1126  Last data filed at 10/10/2022 0850  Gross per 24 hour   Intake 360 ml   Output 700 ml   Net -340 ml       Invasive Devices  Report    Peripheral Intravenous Line  Duration           Peripheral IV 10/06/22 Right Antecubital 3 days                Current Facility-Administered Medications   Medication Dose Route Frequency   • acetaminophen (TYLENOL) tablet 650 mg  650 mg Oral Q6H PRN   • ammonium lactate (LAC-HYDRIN) 12 % cream   Topical BID   • apixaban (ELIQUIS) tablet 5 mg  5 mg Oral BID   • busPIRone (BUSPAR) tablet 5 mg  5 mg Oral TID   • carvedilol (COREG) tablet 12 5 mg  12 5 mg Oral BID With Meals   • escitalopram (LEXAPRO) tablet 20 mg  20 mg Oral Daily   • furosemide (LASIX) injection 40 mg  40 mg Intravenous TID (diuretic)   • oxyCODONE (OxyCONTIN) 12 hr tablet 20 mg  20 mg Oral BID   • polyethylene glycol (MIRALAX) packet 17 g  17 g Oral Daily   • potassium chloride (K-DUR,KLOR-CON) CR tablet 40 mEq  40 mEq Oral BID   • senna-docusate sodium (SENOKOT S) 8 6-50 mg per tablet 2 tablet  2 tablet Oral BID   • sucralfate (CARAFATE) tablet 1 g  1 g Oral 4x Daily (AC & HS)   • tiZANidine (ZANAFLEX) tablet 4 mg  4 mg Oral Q8H PRN         Physical Exam: /64 (BP Location: Left arm)   Pulse 73   Temp 97 8 °F (36 6 °C) (Oral)   Resp 18   Ht 5' 2" (1 575 m)   Wt 101 kg (221 lb 9 oz)   SpO2 94%   BMI 40 52 kg/m² General Appearance:    Alert, cooperative, no distress, appears stated age   Head:    Normocephalic, no scleral icterus   Eyes:    PERRL   Nose:   Nares normal, septum midline, no drainage    Throat:   Lips, mucosa, and tongue normal   Neck:   Supple, symmetrical, trachea midline,              Lungs:     Clear to auscultation bilaterally, respirations unlabored        Heart:    Regular rate and rhythm, S1 and S2 normal, no murmur, rub   or gallop       Extremities:   Extremities 2+ edema       Skin:   Skin warm   LE scaley   Neurologic:   Alert and oriented to person place and time, no focal deficits                 Lab Results:   Recent Results (from the past 72 hour(s))   Echo complete    Collection Time: 10/07/22  2:45 PM   Result Value Ref Range    LA size 4 7 cm    Triscuspid Valve Regurgitation Peak Gradient 32 0 mmHg    Tricuspid valve peak regurgitation velocity 3 21 m/s    LVPWd 1 00 cm    Left Atrium Area-systolic Apical Two Chamber 21 cm2    Left Atrium Area-systolic Four Chamber 49 3 cm2    MV E' Tissue Velocity Septal 10 cm/s    TR Peak Kian 3 2 m/s    IVSd 4 87 cm    LV DIASTOLIC VOLUME (MOD BIPLANE) 2D 129 mL    LEFT VENTRICLE SYSTOLIC VOLUME (MOD BIPLANE) 2D 49 mL    Left ventricular stroke volume (2D) 79 00 mL    A4C EF 71 %    LA length (A2C) 6 00 cm    LVIDd 5 20 cm    IVS 1 cm    LVIDS 3 50 cm    FS 33 28 - 44 %    Asc Ao 3 4 cm    Ao root 2 80 cm    RVID d 4 2 cm    AV LVOT peak gradient 4 mmHg    MV valve area p 1/2 method 3 79 cm2    E wave deceleration time 202 ms    AV peak gradient 6 mmHg    MV Peak E Kian 124 cm/s    RAA A4C 19 3 cm2    MV stenosis pressure 1/2 time 58 ms    LVSV, 2D 79 mL    Est  RA pres 10 0 mmHg    Right Ventricular Peak Systolic Pressure 40 76 mmHg    LV EF 55    CBC and Platelet    Collection Time: 10/08/22  5:51 AM   Result Value Ref Range    WBC 7 44 4 31 - 10 16 Thousand/uL    RBC 3 50 (L) 3 81 - 5 12 Million/uL    Hemoglobin 9 7 (L) 11 5 - 15 4 g/dL    Hematocrit 31 3 (L) 34 8 - 46 1 %    MCV 89 82 - 98 fL    MCH 27 7 26 8 - 34 3 pg    MCHC 31 0 (L) 31 4 - 37 4 g/dL    RDW 14 1 11 6 - 15 1 %    Platelets 739 (H) 423 - 390 Thousands/uL    MPV 10 3 8 9 - 12 7 fL   Basic metabolic panel    Collection Time: 10/08/22  5:51 AM   Result Value Ref Range    Sodium 139 135 - 147 mmol/L    Potassium 3 2 (L) 3 5 - 5 3 mmol/L    Chloride 97 96 - 108 mmol/L    CO2 34 (H) 21 - 32 mmol/L    ANION GAP 8 4 - 13 mmol/L    BUN 15 5 - 25 mg/dL    Creatinine 0 95 0 60 - 1 30 mg/dL    Glucose 107 65 - 140 mg/dL    Calcium 9 3 8 4 - 10 2 mg/dL    eGFR 58 ml/min/1 73sq m   Magnesium    Collection Time: 10/08/22  5:48 PM   Result Value Ref Range    Magnesium 1 7 (L) 1 9 - 2 7 mg/dL   Basic metabolic panel    Collection Time: 10/09/22  6:05 AM   Result Value Ref Range    Sodium 140 135 - 147 mmol/L    Potassium 3 5 3 5 - 5 3 mmol/L    Chloride 98 96 - 108 mmol/L    CO2 35 (H) 21 - 32 mmol/L    ANION GAP 7 4 - 13 mmol/L    BUN 14 5 - 25 mg/dL    Creatinine 0 98 0 60 - 1 30 mg/dL    Glucose 117 65 - 140 mg/dL    Calcium 9 3 8 4 - 10 2 mg/dL    eGFR 56 ml/min/1 73sq m   Magnesium    Collection Time: 10/09/22  6:05 AM   Result Value Ref Range    Magnesium 1 8 (L) 1 9 - 2 7 mg/dL   CBC and Platelet    Collection Time: 10/10/22  8:11 AM   Result Value Ref Range    WBC 7 61 4 31 - 10 16 Thousand/uL    RBC 3 63 (L) 3 81 - 5 12 Million/uL    Hemoglobin 10 3 (L) 11 5 - 15 4 g/dL    Hematocrit 33 3 (L) 34 8 - 46 1 %    MCV 92 82 - 98 fL    MCH 28 4 26 8 - 34 3 pg    MCHC 30 9 (L) 31 4 - 37 4 g/dL    RDW 14 0 11 6 - 15 1 %    Platelets 233 (H) 690 - 390 Thousands/uL    MPV 9 4 8 9 - 12 7 fL   Basic metabolic panel    Collection Time: 10/10/22  8:11 AM   Result Value Ref Range    Sodium 139 135 - 147 mmol/L    Potassium 3 5 3 5 - 5 3 mmol/L    Chloride 97 96 - 108 mmol/L    CO2 36 (H) 21 - 32 mmol/L    ANION GAP 6 4 - 13 mmol/L    BUN 15 5 - 25 mg/dL    Creatinine 0 97 0 60 - 1 30 mg/dL    Glucose 149 (H) 65 - 140 mg/dL    Calcium 9 5 8 4 - 10 2 mg/dL    eGFR 57 ml/min/1 73sq m   Magnesium    Collection Time: 10/10/22  8:11 AM   Result Value Ref Range    Magnesium 1 9 1 9 - 2 7 mg/dL     Imaging: I have personally reviewed pertinent reports  Counseling / Coordination of Care  Total time spent today 20 minutes  Greater than 50% of total time was spent with the patient and / or family counseling and / or coordination of care

## 2022-10-10 NOTE — ASSESSMENT & PLAN NOTE
Home medications include carvedilol 12 5 b i d  and Eliquis 5 b i d      Plan-  Continue home medications Eliquis and carvedilol

## 2022-10-10 NOTE — PLAN OF CARE
Problem: OCCUPATIONAL THERAPY ADULT  Goal: Performs self-care activities at highest level of function for planned discharge setting  See evaluation for individualized goals  Description: Treatment Interventions: ADL retraining, Functional transfer training, UE strengthening/ROM, Endurance training, Cognitive reorientation, Patient/family training, Compensatory technique education, Activityengagement, Energy conservation          See flowsheet documentation for full assessment, interventions and recommendations  Note: Limitation: Decreased ADL status, Decreased UE strength, Decreased Safe judgement during ADL, Decreased endurance, Decreased self-care trans, Decreased high-level ADLs  Prognosis: Fair  Assessment: Patient is a 77 y o  female seen for OT evaluation s/p admit to  [address] [place] [address] on 10/6/2022 w/Acute on chronic diastolic heart failure ([facility] [address] ) + commorbidities/PMHx (as listed in medical record) affecting patient's functional performance c ADL tasks at time of assessment  OT orders placed for evaluation and treatment to assess pt's ADLs, cognitive status + performance during functional tasks in order to formulate appropriate d/c recommendations  Therapist performed at least two patient identifiers during session including name and wristband  Personal factors affecting patient at time of initial evaluation include: step(s) to enter environment, multi-level environment, limited home support, inability to perform ADLs and inability to ambulate household distances     Pt's clinical presentation is currently evolving given new onset deficits that effect pt's occupational performance and ability to safely return to PLOF including decrease activity tolerance, decrease standing balance, decrease sitting balance, decrease performance during ADL tasks, decrease UB MS, decrease generalized strength, decrease activity engagement, decrease performance during functional transfers and steps to enter home combined with medical complications of abnormal renal lab values, abnormal CBC and need for input for mobility technique/safety  This evaluation required an extensive review of medical and/or therapy records and additional review of physical, cognitive and psychosocial history related to functional performance  Based upon functional performance deficits and assessments, this evaluation has been identified as a  moderate complexity evaluation  Patient to benefit from continued Occupational Therapy treatment while in the hospital to address aforementioned deficits and maximize level of functional independence with ADLs and functional mobility  Pt currently requires A to facilitate appropriate d/c plan, supportive family involved, demonstrates G awareness in d/c plan and is agreeable to d/c plan including transition home c HHOT involvement (as able)  From OT standpoint, recommendation at time of d/c would be Home OT       OT Discharge Recommendation: Home with home health rehabilitation

## 2022-10-10 NOTE — OCCUPATIONAL THERAPY NOTE
Occupational Therapy Evaluation     Patient Name: Elke Gilmore  FWCSI'B Date: 10/10/2022  Problem List  Principal Problem:    Acute on chronic diastolic heart failure (HCC)  Active Problems:    Essential hypertension    A-fib ([facility])    Anxiety    Venous stasis dermatitis of both lower extremities    Anemia    Elevated troponin    Past Medical History  Past Medical History:   Diagnosis Date    A-fib ([facility] [address] ) 12/29/2021    Anxiety     Arthritis     Back pain     Cellulitis     CHF (congestive heart failure) ([facility])     Colon cancer screening 8/3/2022    Edema of both lower extremities due to peripheral venous insufficiency     Edema of both lower extremities due to peripheral venous insufficiency     Encounter for screening mammogram for malignant neoplasm of breast 3/21/2022    Erythema of lower extremity 11/12/2021    Fibromyalgia     Great toe pain, right 10/6/2022    Hypertension     Hypotension 9/17/2022    Leukocytosis 10/6/2022    Melena 8/20/2022    Osteoporosis screening 8/3/2022    Sjogren syndrome, unspecified ([facility] )      Past Surgical History  Past Surgical History:   Procedure Laterality Date    KNEE CARTILAGE SURGERY      AR OPEN RX FEMUR FX+INTRAMED SHAN Left 8/22/2022    Procedure: LEFT RETROGRADE IM SHAN;  Surgeon: Harriet Ramirez MD;  Location: [facility];  Service: Orthopedics    REPLACEMENT TOTAL KNEE BILATERAL           10/10/22 1307   OT Last Visit   OT Visit Date 10/10/22   Note Type   Note type Evaluation   Additional Comments Nsg staff verbally cleared pt for OT evaluation  Pt received  standing in room on this date reporting no pain + is agreeable to OT session @ this time  @ exit, pt remains in  supine in bed c HOB semi-elevated c all lines intact, all needs met, call bell in hand + nsg aware of location/disposition  During evaluation,  present in room     Pain Assessment   Pain Assessment Tool 0-10   Pain Score No Pain   Restrictions/Precautions   Weight Bearing Precautions Per Order No   Other Precautions Cognitive; Chair Alarm; Bed Alarm; Fall Risk;Multiple lines   Home Living   Type of [address] Two level;Stairs to enter with rails;Bed/bath upstairs  (2 [address])   Bathroom Shower/Tub Walk-in shower   Bathroom Toilet   (Commode over toilet)   [address] [place] [address] chair;Grab bars in shower;Commode   [address] Walker;Cane;Wheelchair-manual;Stair glide  (RW use @ baseline)   Additional Comments Sleeps in recliner   Prior Function   Level of [place] Independent with ADLs; Independent with functional mobility; Needs assistance with IADLS  ( assists c IADLs)   Lives With Spouse   Receives Help From Family;Friend(s)   IADLs Family/Friend/Other provides transportation   Falls in the last 6 months 1 to 4  (1x)   Vocational Retired   Comments (-) drive   Lifestyle   Autonomy Pt lives in  2 story home c  + @ baseline, performs ADLs  I'ly, IADLs with A + fxl mobility I'ly with RW  (-)   Pt is retired     Reciprocal Relationships    Service to Others Family relations   Intrinsic Gratification Reading, talking on this phone   Subjective   Subjective "they tell me the swelling is getting better"   ADL   Eating Assistance 7  [address] 5  Supervision/Setup   LB Bathing Assistance 3  Moderate Assistance   [address] 5  Supervision/Setup   LB Dressing Assistance 2  [address]  4  Minimal Assistance   Additional Comments Given fxl performance skills + deficits, therapist suspecting via clinical judgement + skilled analysis; pt currently requires stated assist above to perform each area of ADL d/t limitations including: generalized muscle weakness, sitting/standing balance deficits, fxl activity tolerance limitations, difficulty performing bend/reach-anterior trunk flexion, requires external assist to complete transitional movements, instability in stance, cognitive deficits, BUE strength, high fall risk and cardiac concerns  Bed Mobility   Sit to Supine 3  Moderate assistance   Additional items Increased time required;Verbal cues;LE management;HOB elevated   Additional Comments (Sleeps in recliner @ home)   Transfers   Sit to Stand 5  Supervision   Additional items Assist x 1; Increased time required;Verbal cues   Stand to Sit 5  Supervision   Additional items Verbal cues   Functional Mobility   Functional Mobility 5  Supervision   Additional Comments Pt performed short distance ADL related fxl mobility to/from bathroom c SUP  /RW simulating toileting distances  No overt LOB demonstrated + pt denies pain /  denies SOB/ denies dizziness during transitional movements  Reports feeling steady on BLE and similar to typical stability on BLE  F safety awareness noted while navigating t/o room  Safely returns to bed for remainder of session  Balance   Static Sitting Good   Dynamic Sitting Fair +   Static Standing Fair   Dynamic Standing Fair -   Ambulatory Fair -   Activity Tolerance   Activity Tolerance Patient limited by fatigue   Nurse Naye staff made aware of current fxn, recommendations for d/c planning, fall risk + pt's location upon exit  Alex Wagner   RUE Assessment   RUE Assessment WFL  (4+/5 grossly)   LUE Assessment   LUE Assessment WFL  (4+/5 grossly)   Hand Function   Gross Motor Coordination Functional   Fine Motor Coordination Functional   Sensation   Light Touch No apparent deficits   Vision-Basic Assessment   Current Vision Wears glasses all the time   Visual History Other (Comment)  (None reported )   Patient Visual Report Other (Comment)  (No acute changes reported since hospitalization )   Vision - Complex Assessment   Ocular Range of Motion Intact   Psychosocial   Psychosocial (WDL) WDL   Cognition   Overall Cognitive Status Impaired   Arousal/Participation Alert; Responsive; Cooperative   Attention Within functional limits   Orientation Level Oriented X4   Memory Decreased short term memory;Decreased long term memory   Following Commands Follows one step commands without difficulty   Comments SBT administered: scored 10  indicating severe cognitive impairment  Self limiting + frequently requests A although pt capable  Assessment   Limitation Decreased ADL status; Decreased UE strength;Decreased Safe judgement during ADL;Decreased endurance;Decreased self-care trans;Decreased high-level ADLs   Prognosis Fair   Assessment Patient is a 77 y o  female seen for OT evaluation s/p admit to  [address] [place] [address] on 10/6/2022 w/Acute on chronic diastolic heart failure ([facility] [address] ) + commorbidities/PMHx (as listed in medical record) affecting patient's functional performance c ADL tasks at time of assessment  OT orders placed for evaluation and treatment to assess pt's ADLs, cognitive status + performance during functional tasks in order to formulate appropriate d/c recommendations  Therapist performed at least two patient identifiers during session including name and wristband  Personal factors affecting patient at time of initial evaluation include: step(s) to enter environment, multi-level environment, limited home support, inability to perform ADLs and inability to ambulate household distances  Pt's clinical presentation is currently evolving given new onset deficits that effect pt's occupational performance and ability to safely return to PLOF including decrease activity tolerance, decrease standing balance, decrease sitting balance, decrease performance during ADL tasks, decrease UB MS, decrease generalized strength, decrease activity engagement, decrease performance during functional transfers and steps to enter home combined with medical complications of abnormal renal lab values, abnormal CBC and need for input for mobility technique/safety   This evaluation required an extensive review of medical and/or therapy records and additional review of physical, cognitive and psychosocial history related to functional performance  Based upon functional performance deficits and assessments, this evaluation has been identified as a  moderate complexity evaluation  Patient to benefit from continued Occupational Therapy treatment while in the hospital to address aforementioned deficits and maximize level of functional independence with ADLs and functional mobility  Pt currently requires A to facilitate appropriate d/c plan, supportive family involved, demonstrates G awareness in d/c plan and is agreeable to d/c plan including transition home c HHOT involvement (as able)  From OT standpoint, recommendation at time of d/c would be Home OT  Plan   Treatment Interventions ADL retraining;Functional transfer training;UE strengthening/ROM; Endurance training;Cognitive reorientation;Patient/family training; Compensatory technique education; Activityengagement; Energy conservation   Goal Expiration Date 10/20/22   OT Treatment Day 1   OT Frequency 2-3x/wk   Recommendation   OT Discharge Recommendation Home with home health rehabilitation   AM-PAC Daily Activity Inpatient   Lower Body Dressing 2   Bathing 3   Toileting 3   Upper Body Dressing 4   Grooming 4   Eating 4   Daily Activity Raw Score 20   Daily Activity Standardized Score (Calc for Raw Score >=11) 42 03   AM-PAC Applied Cognition Inpatient   Following a Speech/Presentation 4   Understanding Ordinary Conversation 4   Taking Medications 2   Remembering Where Things Are Placed or Put Away 2   Remembering List of 4-5 Errands 2   Taking Care of Complicated Tasks 3   Applied Cognition Raw Score 17   Applied Cognition Standardized Score 36 52   Barthel Index   Feeding 10   Bathing 0   Grooming Score 5   Dressing Score 5   Bladder Score 5   Bowels Score 10   Toilet Use Score 5   Transfers (Bed/Chair) Score 10   Mobility (Level Surface) Score 0   Stairs Score 0   Barthel Index Score 50   Additional Treatment Session   Start Time 9171   End Time 1331   Treatment Assessment Facilitating toileting on this date  Pt reports inability to complete toilet hygiene although I'ly standing in room without support of RW  Pt verbalizes "they have been doing it for me here " Therapist educating pt in encroachment/enablement decreasing overall I + encouraging higher level of I - participation  Pt agreeable + performs toilet hygiene c SUP/CGA in stance  Completes hand washing - tolerance @ 3 min standing @ sink c intermittent unilateral support of RW  Noted high RW + adjusted appropriately to pt's height; pt receptive/aware + thankful  Discussing POC c physician reporting decreased need for A compared to prior PT note (improvement)  OT rec HHOT as pt c  to A c LB ADLs, recliner (no need for bed mob), RW @ home + stair glide to 2nd fl of home eliminating need to complete <> flight of stairs  The patient's raw score on the -PAC Daily Activity inpatient short form is 20, standardized score is 42 03, greater than 39 4  Patients at this level are likely to benefit from DC to home  Please refer to the recommendation of the Occupational Therapist for safe DC planning  GOALS:    *ADL transfers with (I) for inc'd independence with ADLs/purposeful tasks    *UB ADL with (I) for inc'd independence with self cares    *LB ADL with (I) using AE prn for inc'd independence with self cares    *Toileting with (I) for clothing management and hygiene for return to PLOF with personal care    *Increase stand tolerance x 10  m for inc'd tolerance with standing purposeful tasks    *Participate in 10m UE therex to increase overall stamina/activity tolerance for purposeful tasks    *Bed mobility- (I) for inc'd independence to manage own comfort and initiate EOB & OOB purposeful tasks    *Patient will verbalize 3 safety awareness/ principles to prevent falls in the home setting       *Patient will verbalize and demonstrate use of energy conservation/deep breathing techniques and work simplification skills during functional activities with no verbal cues  *Patient will increase OOB/sitting tolerance to 2-4 hours per day to increase participation in self-care and leisure tasks with no s/s of exertion  *Patient will engage in ongoing cognitive assessment to assist with safe discharge planning/recommendations         Angelo Reaves, OTR/L

## 2022-10-10 NOTE — CASE MANAGEMENT
Case Management Discharge Planning Note    Patient name Osiel Blanc  Location S /S -01 MRN [id number]  : 1945 Date 10/10/2022       Current Admission Date: 10/6/2022  Current Admission Diagnosis:Acute on chronic diastolic heart failure [facility])   Patient Active Problem List    Diagnosis Date Noted   • Elevated troponin 10/06/2022   • Acute gastric ulcer with hemorrhage 10/05/2022   • Age-related osteoporosis with current pathological fracture 2022   • Encounter for support and coordination of transition of care 2022   • Anemia 2022   • Ambulatory dysfunction 2022   • Constipation 2022   • Fall 2022   • Fracture of proximal end of left femur ([facility] [address] ) 2022   • At risk for obstructive sleep apnea 2022   • Lymphedema 2022   • Venous stasis dermatitis of both lower extremities 2022   • Seasonal allergies 2022   • Xerosis of skin 2022   • Prediabetes 2022   • Memory impairment 2022   • Acute on chronic diastolic heart failure ([address] ) 2022   • Anxiety    • Opioid dependence due to Chronic back pain  2022   • A-fib ([address] ) 2021   • Mixed hyperlipidemia 2021   • Obesity 2021   • Osteoarthritis of knee 2021   • Stage 3 chronic kidney disease ([facility] [address] ) 2021   • Chronic low back pain with sciatica 2021   • Chronic venous insufficiency 2021   • Essential hypertension 2017   • Sjogren's syndrome ([facility] [address] ) 2014      LOS (days): 4  Geometric Mean LOS (GMLOS) (days): 3 90  Days to GMLOS:0 2     OBJECTIVE:  Risk of Unplanned Readmission Score: 42 73         Current admission status: Inpatient   Preferred Pharmacy:   [address] [place] [address] Drive, [address] [place] [address]  Phone: [phone] Fax: [phone]    Primary Care Provider: Jemima Corona MD    Primary Insurance: [facility] REP  Secondary Insurance:     DISCHARGE DETAILS:    Discharge planning discussed with[de-identified]   Freedom of Choice: Yes  Comments - Freedom of Choice: SLIM resident reviewed with pt and  the care team recommendations of rehab  Both pt and  prefer to have [facility] as she has had them coming to the home currently  Pt is stable for DC and will DC home with  this afternoon  CM contacted family/caregiver?: Yes  Were Treatment Team discharge recommendations reviewed with patient/caregiver?: Yes  Did patient/caregiver verbalize understanding of patient care needs?: Yes  Were patient/caregiver advised of the risks associated with not following Treatment Team discharge recommendations?: Yes    Contacts  Patient Contacts: Jesus Manuel Eli, spouse  Relationship to Patient[de-identified] Family  Contact Method: In Person  Reason/Outcome: Continuity of Care, Emergency Contact, Referral, Discharge [address]         Is the patient interested in [address] at discharge?: Yes  [address] requested[de-identified] Nursing, Occupational Therapy, Physical [address] Name[de-identified] [facility] Provider[de-identified] PCP  Home Health Services Needed[de-identified] Other (comment), Strengthening/Theraputic Exercises to Improve Function, Evaluate Functional Status and Safety, Gait/ADL Training  Homebound Criteria Met[de-identified] Uses an Assist Device (i e  cane, walker, etc), Immunosuppressed  Supporting Clincal Findings[de-identified] Limited Endurance    DME Referral Provided  Referral made for DME?: No    Other Referral/Resources/Interventions Provided:  Interventions: [facility]  Referral Comments: Referral has been made to [facility] as  states they have been seeing pt at home for some time  He stated he had been in contact with them and they are just needing a new referral   CM made this referral and they will resume care           Treatment Team Recommendation: Short Term Rehab  Discharge Destination Plan[de-identified] Home with [place] at Discharge : Family           ETA of Transport (Date): 10/10/22  ETA of Transport (Time): 9676     Transfer Mode: Wheelchair  Accompanied by: Significant other     IMM Given (Date):: 10/10/22  IMM Given to[de-identified] Family  Family notified[de-identified]      IMM reviewed with ,  agrees with discharge determination

## 2022-10-10 NOTE — DISCHARGE INSTR - AVS FIRST PAGE
Dear Tyrell Claire,     It was our pleasure to care for you here at [facility]  It is our hope that we were always able to exceed the expected standards for your care during your stay  You were hospitalized due to ***  You were cared for on the *** floor by Jake Zaragoza under the service of Lina Ko MD with the [facility] who covers for your primary care physician (PCP), Diego Thompson MD, while you were hospitalized  If you have any questions or concerns related to this hospitalization, you may contact us at [phone]  For follow up as well as any medication refills, we recommend that you follow up with your primary care physician  A registered nurse will reach out to you by phone within a few days after your discharge to answer any additional questions that you may have after going home  However, at this time we provide for you here, the most important instructions / recommendations at discharge:     Notable Medication Adjustments -   Your demadex (torsemide) which is your water pill is now 40mg in the morning and 20mg in the evening every day  We will hold your Losartan for now, do not take it until speaking with the cardiology team at your appointment on 10/19  Testing Required after Discharge -   Basic metabolic panel on 36/11 and 10/20   Important follow up information -   Please make an appointment with Dr Radha Edward as soon as possible  Other Instructions -   Take your medications as directed, and keep your follow up appointments  Adhere to a heart healthy lifestyle, maintaining a low sodium diet  Daily weight and record    If your weight increases 2-3 lbs in one day, or 5 lbs in 2 days, you are short of breath or have lower extremity swelling, please call Nurse Bruno Michael at  [place] [address] office  at [phone]   Please review this entire after visit summary as additional general instructions including medication list, appointments, activity, diet, any pertinent wound care, and other additional recommendations from your care team that may be provided for you        Sincerely,     Sisi Gutiérrez

## 2022-10-10 NOTE — ASSESSMENT & PLAN NOTE
Home medications include Lexapro and BuSpar    Plan-  Per Dr Jacinda Leon (PCP) recommendations increase Lexapro to 20mg qd

## 2022-10-10 NOTE — DISCHARGE INSTRUCTIONS
Take your medications as directed, and keep your follow up appointments  Adhere to a heart healthy lifestyle, maintaining a low sodium diet  Daily weight and record    If your weight increases 2-3 lbs in one day, or 5 lbs in 2 days, you are short of breath or have lower extremity swelling, please call Nurse Domonique De La Rosa at  [place] [address] office  at [phone]

## 2022-10-10 NOTE — Clinical Note
Nsg staff verbally cleared pt for OT evaluation  Pt received  {pmlocation:01276::"supine in bed"} on this date reporting {pmpain:19960} {pmd:61985} + is agreeable to OT session @ this time  @ exit, pt remains in  {pmlocation:54793} c all lines intact, all needs met, call bell in hand + nsg aware of location/disposition  {pmloc:88889} {pmbody:22625}     During evaluation, {pmwho:46759} present in room  Pt lives in  {pmhome:32199} {pmwho:17111} + @ baseline, performs ADLs  {pmlevels:19151}, IADLs {pmlevels:51846} + fxl mobility {pmlevels:51989} {pmad:75020}  ({pmplus:25547})   {pmemp:94321}        Given fxl performance skills + deficits, therapist suspecting via clinical judgement + skilled analysis; pt currently requires stated assist above to perform each area of ADL d/t limitations including: {pmdeficits:69067}      {pmdnt:01939}      Pt performed short distance ADL related fxl mobility {pmfxlmob:72763} {pmassistlevels:49058} {pmad:90845} simulating toileting distances  {pmpain:86216} overt LOB demonstrated + pt {pmc:58376} {pmd:45718} /  {pmpain:45955} SOB/ {pmpain:03808} dizziness during transitional movements  Reports feeling {pmst:75267}  {pmgf:88475} safety awareness noted while navigating t/o room  {pmfxl:11885}  {pmc:76913}        PT/OT co-eval on this date d/t medical complexity, ambulatory dysfunction c high fall risk, various impeding lines + requirement for skilled interdisciplinary analysis of appropriate d/c recommendations  PT/OT POC/goals I'ly determined per respective discipline  ({pmpt:73616})    Medical staff made aware of current fxn, recommendations for d/c planning, fall risk + pt's location upon exit  (***)              SBT administered: scored *** indicating {pmsbt:76737} cognitive impairment      Year yes  Month yes  Time yes  20-1 yes  Months x 2  No name, no 42, yes st, yes [facility]    Patient is a 77 y o  female seen for OT evaluation s/p admit to  {PM location:74115} on 10/6/2022 w/Acute on chronic diastolic heart failure ([facility] [address] ) + commorbidities/PMHx (as listed in medical record) affecting patient's functional performance c ADL tasks at time of assessment  OT orders placed for evaluation and treatment to assess pt's ADLs, cognitive status + performance during functional tasks in order to formulate appropriate d/c recommendations  Therapist performed at least two patient identifiers during session including name and wristband  Personal factors affecting patient at time of initial evaluation include: {NCPERSONALFACTORS:63133}  Pt's clinical presentation is currently {MSLstable:61006} given new onset deficits that effect pt's occupational performance and ability to safely return to [facility] including {NCOTdeficits:43309} combined with medical complications of {QQEYVBKZJXPPGDEEMTEWUI:69238}  This evaluation required an extensive review of medical and/or therapy records and additional review of physical, cognitive and psychosocial history related to functional performance  Based upon functional performance deficits and assessments, this evaluation has been identified as a  {pmcomplex:56940} complexity evaluation  Patient to benefit from continued Occupational Therapy treatment while in the hospital to address aforementioned deficits and maximize level of functional independence with ADLs and functional mobility  Pt currently {pmdc:80972}  From OT standpoint, recommendation at time of d/c would be {RV recommendation:50441}  @ this time, pt presents @ baseline fxn including I c ADLs/fxl mobility  D/t aforementioned factors, no further skilled occupational therapy services warranted during hospitalization/ following return home

## 2022-10-10 NOTE — PHYSICAL THERAPY NOTE
10/10/22 1508   Note Type   Note Type Cancelled Session  (Treatment)   Cancel Reasons Other  (Pt states she is being discharged in a few minutes to home with follow-up home services  PT did offer a treatment session for ambulation and potential stair training but pt declined stating “I'll be fine  ")   Licensure   2189 \[facility] [place] [facility]\"" Number  Mukesh Lloyd PT [id number]

## 2022-10-11 DIAGNOSIS — S72.402A CLOSED FRACTURE OF LEFT DISTAL FEMUR (HCC): ICD-10-CM

## 2022-10-11 RX ORDER — AMOXICILLIN 250 MG
2 CAPSULE ORAL 2 TIMES DAILY
Refills: 0 | Status: CANCELLED | OUTPATIENT
Start: 2022-10-11

## 2022-10-12 ENCOUNTER — TELEPHONE (OUTPATIENT)
Dept: INTERNAL MEDICINE CLINIC | Facility: CLINIC | Age: 77
End: 2022-10-12

## 2022-10-12 NOTE — TELEPHONE ENCOUNTER
----- Message from Kiran Ramirez sent at 10/11/2022  9:50 AM EDT -----  Regarding: in wrong clinical box    ----- Message -----  From: Mehrdad Stout  Sent: 10/11/2022   9:37 AM EDT  To: [facility] Clinical    Patient is being discharged from their inpatient hospitalization today  Patients is insured by [facility] and requires a follow up TCM appointment within 7 days of discharge  Please contact this patient to schedule appointment       Thank you    Inpatient Care Management

## 2022-10-12 NOTE — PATIENT INSTRUCTIONS
1800 ml fluid restriction and 2g sodium restriction    Take 2 tablets daily On Tuesday, Thursday, and Saturday  Take 2 tablets twice daily Monday, Wednesday, Friday, Sunday  Call Dr Jacinda Leon in 1 week with weight  Weigh yourself without clothing at the same time each day  Record your weight  Please call your doctor if you have a sudden weight gain of more than 2-3lbs (1-1 3kg) in 1-2 days or 5lbs (2 3kg) in a week  DASH Eating Plan   WHAT YOU NEED TO KNOW:   The DASH (Dietary Approaches to Stop Hypertension) Eating Plan is designed to help prevent or lower high blood pressure  It can also help to lower LDL (bad) cholesterol and decrease your risk for heart disease  The plan is low in sodium, sugar, unhealthy fats, and total fat  It is high in potassium, calcium, magnesium, and fiber  These nutrients are added when you eat more fruits, vegetables, and whole grains  With the DASH eating plan, you need to eat a certain number of servings from each food group  This will help you get enough of certain nutrients and limit others  The amount of servings you should eat depends on how many calories you need  Your dietitian can help you create meal plans with the right number of servings for each food group  DISCHARGE INSTRUCTIONS:   What you need to know about sodium:  Your dietitian will tell you how much sodium is safe for you to have each day  People with high blood pressure should have no more than 1,500 to 2,300 mg of sodium in a day  A teaspoon (tsp) of salt has 2,300 mg of sodium  This may seem like a difficult goal, but small changes to the foods you eat can make a big difference  Your healthcare provider or dietitian can help you create a meal plan that follows your sodium limit  · Read food labels  Food labels can help you choose foods that are low in sodium  The amount of sodium is listed in milligrams (mg)   The % Daily Value (DV) column tells you how much of your daily needs are met by 1 serving of the food for each nutrient listed  Choose foods that have less than 5% of the DV of sodium  These foods are considered low in sodium  Foods that have 20% or more of the DV of sodium are considered high in sodium  Avoid foods that have more than 300 mg of sodium in each serving  Choose foods that say low-sodium, reduced-sodium, or no salt added on the food label  · Limit added salt  Do not salt food at the table if you add salt when you cook  Use herbs and spices, such as onions, garlic, and salt-free seasonings to add flavor  Try lemon or lime juice or vinegar to add a tart flavor  Use hot peppers or a small amount of hot pepper sauce to add a spicy flavor  Limit foods high in added salt, such as the following:    ? Seasonings made with salt, such as garlic salt, celery salt, onion salt, seasoned salt, meat tenderizers, and monosodium glutamate (MSG)    ? Miso soup and canned or dried soup mixes    ? Regular soy sauce, barbecue sauce, teriyaki sauce, steak sauce, Worcestershire sauce, and most flavored vinegars    ? Snack foods, such as salted chips, popcorn, pretzels, pork rinds, salted crackers, and salted nuts    ? Frozen foods, such as dinners, entrees, vegetables with sauces, and breaded meats    · Ask about salt substitutes  Ask your healthcare provider if you may use salt substitutes  Some salt substitutes have ingredients that can be harmful if you have certain health conditions  · Choose foods carefully at restaurants  Meals from restaurants, especially fast food restaurants, are often high in sodium  Some restaurants have nutrition information that tells you the amount of sodium in their foods  Ask to have your food prepared with less, or no salt  What you need to know about fats:  Healthy fats include unsaturated fats and omega-3 fatty acids  Unhealthy fats include saturated fats and trans fats    · Include healthy fats, such as the following:      ? Cooking oils, such as soybean, canola, olive, or sunflower    ? Fatty fish, such as salmon, tuna, mackerel, or sardines    ? Flaxseed oil or ground flaxseed    ? ½ cup of cooked beans, such as black beans, kidney beans, or garcia beans    ? 1½ ounces of low-sodium nuts, such as almonds or walnuts    ? Low-sugar, low-sodium peanut butter    ? Seeds such as adelaida seeds or sunflower seeds       · Limit or do not have unhealthy fats, such as the following:      ? Foods that contain fat from animals, such as fatty meats, whole milk, butter, and cream    ? Shortening, stick margarine, palm oil, and coconut oil    ? Full-fat or creamy salad dressing    ? Creamy soup    ? Crackers, chips, and baked goods made with margarine or shortening    ? Foods that are fried in unhealthy fats    ? Gravy and sauces, such as Denzel or cheese sauces    What you need to know about carbohydrates (carbs): All carbs break down into sugar  Complex carbs contain more fiber than simple carbs  This means complex carbs go into the bloodstream more slowly and cause less of a blood sugar spike  Try to include more complex carbs and fewer simple carbs  · Include complex carbs, such as the following:      ? 1 slice of whole-grain bread    ? 1 ounce of dry cereal that does not contain added sugar    ? ½ cup of cooked oatmeal    ? 2 ounces of cooked whole-grain pasta    ? ½ cup of cooked brown rice    · Limit or do not have simple carbs, such as the following:      ? Baked goods, such as doughnuts, pastries, and cookies    ? Mixes for cornbread and biscuits    ? White rice and pasta mixes, such as boxed macaroni and cheese    ? Instant and cold cereals that contain sugar    ? Jelly, jam, and ice cream that contain sugar    ? Condiments such as ketchup    ? Drinks high in sugar, such as soft drinks, lemonade, and fruit juice    What you need to know about vegetables and fruits:  Vegetables and fruits can be fresh, frozen, or canned   If possible, try to choose low-sodium canned options  · Include a variety of vegetables and fruits, such as the following:      ? 1 medium apple, pear, or peach (about ½ cup chopped)    ? ½ small banana    ? ½ cup berries, such as blueberries, strawberries, or blackberries    ? 1 cup of raw leafy greens, such as lettuce, spinach, kale, or luana greens    ? ½ cup of frozen or canned (no added salt) vegetables, such as green beans    ? ½ cup of fresh, frozen, or canned fruit (canned in light syrup or fruit juice)    ? ½ cup of vegetable or fruit juice    · Limit or do not have vegetables and fruits made in the following ways:      ? Frozen fruit such as cherries that have added sugar    ? Fruit in cream or butter sauce    ? Canned vegetables that are high in sodium    ? Sauerkraut, pickled vegetables, and other foods prepared in brine    ? Fried vegetables or vegetables in butter or high-fat sauces    What you need to know about protein foods:   · Include lean or low-fat protein foods, such as the following:      ? Poultry (chicken, turkey) with no skin    ? Fish (especially fatty fish, such as salmon, fresh tuna, or mackerel)    ? Lean beef and pork (loin, round, extra lean hamburger)    ? Egg whites and egg substitutes    ? 1 cup of nonfat (skim) or 1% milk    ? 1½ ounces of fat-free or low-fat cheese    ? 6 ounces of nonfat or low-fat yogurt    · Limit or do not have high-fat protein foods, such as the following:      ? Smoked or cured meat, such as corned beef, coffman, ham, hot dogs, and sausage    ? Canned beans and canned meats or spreads, such as potted meats, sardines, anchovies, and imitation seafood    ? Deli or lunch meats, such as bologna, ham, turkey, and roast beef    ? High-fat meat (T-bone steak, regular hamburger, and ribs)    ? Whole eggs and egg yolks    ? Whole milk, 2% milk, and cream    ? Regular cheese and processed cheese    Other guidelines to follow:   · Maintain a healthy weight    Your risk for heart disease is higher if you are overweight  Your healthcare provider may suggest that you lose weight if you are overweight  You can lose weight by eating fewer calories and foods that have added sugars and fat  The DASH meal plan can help you do this  Decrease calories by eating smaller portions at each meal and fewer snacks  Ask your healthcare provider for more information about how to lose weight  · Exercise regularly  Regular exercise can help you reach or maintain a healthy weight  Regular exercise can also help decrease your blood pressure and improve your cholesterol levels  Get 30 minutes or more of moderate exercise each day of the week  To lose weight, get at least 60 minutes of exercise  Talk to your healthcare provider about the best exercise program for you  · Limit alcohol  Women should limit alcohol to 1 drink a day  Men should limit alcohol to 2 drinks a day  A drink of alcohol is 12 ounces of beer, 5 ounces of wine, or 1½ ounces of liquor  For more information:   · [employer] [facility] and [address]  [address] [address]  [place] [address]  Phone: 8- [phone]  Web Address: [facility] no    © 7293 [facility] 2021 Information is for End User's use only and may not be sold, redistributed or otherwise used for commercial purposes  All illustrations and images included in CareNotes® are the copyrighted property of A D A M , Inc  or [facility] Irina Zambrano   The above information is an  only  It is not intended as medical advice for individual conditions or treatments  Talk to your doctor, nurse or pharmacist before following any medical regimen to see if it is safe and effective for you  No

## 2022-10-18 PROBLEM — I50.32 CHRONIC HEART FAILURE WITH PRESERVED EJECTION FRACTION (HFPEF) (HCC): Status: ACTIVE | Noted: 2022-10-18

## 2022-10-18 NOTE — PROGRESS NOTES
Cardiology  Heart Failure   Follow Up Office Visit Note     Marysol Schmitz   77 y o    female   MRN: [id number]  [address]  [address]  [place] [address] [facility] [address] [place] [address]  [phone]    PCP: Marquis Quentin MD  Cardiologist :  Dr Kristin Moore                Summary of Recommendations  Low-sodium diet, Heart failure education as below  Decrease losartan to 25 mg/d for BP room  Add metolazone 2 5 mg once a week on thursdays, 30-60 min before am torsemide  Add an extra K 40 meq on thursdays yaron, with metolazone  BMP is scheduled for this Friday  Will add a magnesium level  Repeat nonfasting BMP next Friday with magnesium level  Follow up will be scheduled with Dr Kristin Moore       Impression/plan  Chronic diastolic heart failure  Recent ADM for decompensation 10/7-10/10/22  Her  reports she was a low of 208 lb when she got out of rehab  Most recent discharge weight 221 lb  Wt Readings from Last 3 Encounters:   10/19/22 100 kg (220 lb 9 6 oz)   10/19/22 101 kg (221 lb 12 8 oz)   10/09/22 101 kg (221 lb 9 oz)     --Beta-blocker:  Carvedilol 12 5 mg b i d   --Diuretic:  Torsemide 40 mg daily, 20 mg in the pm  plus potassium 40 mEq daily  Today add metolazone 2 5 mg once a week on Thursday mornings before morning dose of torsemide plus an extra potassium 40 mEq with it  BMP/ magnesium this Friday as well as next Friday   --ACE/ARB/ARNI:  Losartan 50 mg/d  Decrease to 25 mg daily  --2 g sodium diet, 1800 cc fluid restriction  Daily weights, call weight gain 2-3 lb in 1 day or 5 lb in 5 days  Atrial fibrillation, persistent  onset 11/21  [id number]:   --Now on Eliquis 5 mg b i d   --Outpatient sleep study recommended  Patient declined  --On carvedilol  Went back into AFib in December 2021 in the setting of acute decompensated heart failure  Hypertension, essential  BP   115/69 On carvedilol, losartan, diuretics    Will decrease losartan dosing given that I am adding weekly metolazone     Lipids 11/13/21: LDL 93 Non [id number]  ASCVD risk score 20 4%:  High risk  She may benefit from statin therapy  This can be addressed at a future visit  Obesity BMI 47  Chronic venous insufficiency Pneumatic compression pumps, per vascular  She has not been wearing them to the pain  She has not been wearing her compression stockings  Recent GI bleed/gastric ulceration Anemia  She may need endoscopy by GI in the near future  She remains on Protonix  Chronic back pain with opioid dependence  Cognitive decline as reported by her   This can be evaluated by primary care  Cardiac testing  • TTE 11/14/21  EF 65%  Wall motion is normal  Grade 1 DD  Mild LVH  Mild LAE  Mild AI  Mild MR  Mild TR  • TTE 10/7/22  EF 55%  Although no diagnostic regional wall motion abnormality was identified, this possibility cannot be completely excluded on the basis of this study  LAE  Mild TR   RV cavity mildly dilated with normal function  Estimated RVSP 42 mm Hg            HPI:   Chi Valdez is a 76 yo female with morbid obesity, chronic venous insufficiency and lymphedema  She has a history of recurrent cellulitis  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  Adm 11/12-11/17/21 (5d)  CC:  Increasing shortness of breath, increasing lower extremity edema  Found to be in AFib with RVR, new onset  Diagnosed and treated for acute diastolic heart failure secondary to rapid AFib in the setting of sodium dietary indiscretion-fast food, microwave meals  In the past she was prescribed Lasix 20 mg 3 times a week but she was not taking it  She was markedly volume overloaded with weeping lower extremities  An echocardiogram showed preserved LV function, normal wall motion  Mild LVH    Mild MR mild TR  CTA showed no evidence of PE, however there was enlargement of the pulmonary trunk the main right and left pulmonary artery suggestive of pulmonary hypertension  She diuresed with Lasix 80 mg IV b i d  Lower extremity Dopplers negative for DVT   Recommended referral to lymphedema Clinic as an outpatient  DCd on coreg  6 25 mg BID  Discharge weight :  250 lb? Admission weight was 240 lb? Discharge creatinine:  1 06  Discharge diuretics: Torsemide 40 mg daily      12/2/21   office visit with her PCP found to be in acute heart failure  Hypertensive  10 lb over discharge weight, at 260 lb  Carvedilol uptitrated  Decision to reassess her renal function before adjusting diuretics    1215/21  Pt canceled cardiology OV      12/23 INR greater than 8    12/28/21 chart notes reviewed  Gait dysfunction   Worsening lower extremity edema  Back pain  leg pain  Urged by Internal Medicine go to the ED  INR not repeated, as  It was requested    ADM 12/29-1/6/22  Chief complaint :worsening shortness of breath and lower extremity swelling   Was hypoxic satting 80% on arrival  Diagnosis: acute hypoxic respiratory failure secondary to acute on chronic HF with preserved EF and right-sided pneumonia, treated with antibiotics  Also with right lower extremity cellulitis treated with antibiotics  Diuresed with IV Lasix  Followed by Cardiology  Patient developed contraction alkalosis secondary to IV diuresis  But has resolved with Diamox  She is back on torsemide 40 mg   Discharged to acute rehab  Discharge weight :  247 lb  Discharge creatinine : 0 85  Discharge diuretic: Torsemide 40 mg daily plus potassium 40 mEq daily   Discharged to [address] at [facility]     1/10/22: Weight at the facility 227 lb  O2 requirement, however higher, at 90%  Given an extra dose of torsemide 40 mg x 1  BMP ordered in 3 days  Chest x-ray ordered      1/13/22 She comes from the facility, [facility]  She is accompanied by her  who met her here today    He provided some extra history:  He has been concerned about his wife, as she appears to have memory issues  He also reports that he works 2:00 p m  to 10:00 p m , and she was in the room, with a microwave close by  She was consuming mostly microwave foods, extremely high sodium dietary intake  She was extremely sedentary given back issues and moved very little  Today he tells me she is significantly improved, she has lost nearly 50 lb, of fluid  Weights and labs are available for review She weighed 270 lb in December  Labs:  1/10/22 hemoglobin 10 6 hematocrit 33 2 platelet count 943Z  Creatinine 0 8 BUN 22 potassium 4 0  ALT 10  On a no added salt diet, 1800 cc fluid restriction  O2 sat 98% on 1 L nasal cannula  /62  Today, she appears euvolemic  She does have chronic lower extremity lymphedema  She was observed falling asleep several times, as her  and I were talking  She has clear breath sounds but diminished, and heart sounds that are distant and irregular irregular but controlled  Agreeable to a sleep study and follow-up with vascular food for lymphedema/ venous compression pump follow-up  Education provided regarding salt restriction, daily weights, adherence to diet meds and office visits, etc     Follow-up with her cardiologist in 6 weeks  Interval history  3/3/22 OV Dr MAXX Vann volume overloaded  Weight improved since her 1st hospitalization  Continue torsemide 40 mg daily  Persistent AFib, rate controlled  Noted she transitioned from Coumadin to  Eliquis  Recommend follow-up 3 months    Adm 7/28-7/30/22   Fall, ambulatory dysfunction  Missed 2 days of diuretics  Treated for acute on chronic diastolic heart failure, lower extremity cellulitis  Discharge weight 239 lb    Adm 8/20-8/30/22  CC: Mechanical fall while walking to the bathroom  Melena/UGI bleed  Diagnosed with a left femur fracture, status post ORIF left femur, IM nail 8/22/22  EGD 8/21/22: Single crated ulcer  Biopsy taken   treated with epinephrine  Restarted on Eliquis prior to discharge    Adm 3/39-3/67/72  PMH Diastolic HF, HTN,afib on eliquis, CKD, anemia, chronic venous stasis dermatitis  Chief complaint:  Hypotension, SBP 90s to 100s with dizziness, fatigue, nausea  LEXY, creatinine was 2 6, baseline 0 7-0 9  Na 130  Given gentle IV fluids  Coreg was decreased to 12 5 BID  Torsemide was resumed with the same dose 40 mg daily  Losartan held  Noted chronic anemia  Hemoglobin stable at 9 9  Last EGD 8/21/22: Single greater, benign ulcer in the antrum  Epinephrine injected  Biopsies taken  Follow-up GI  Cleared to restart Eliquis  DC wt 222 lb    Adm 10/7-10/10/22  CC: Worsening lower extremity edema, 15 lb weight gain  Admission weight 231 lb   9/17/22:  222 lb  Followed by Cardiology  Diuresed with Lasix 40 IV t i d  Persistent AFib, rate controlled anticoagulated with Eliquis  Chronic lower extremity/venous insufficiency, venous stasis, right greater than left chronically  Lymphedema pumps at home  Chronic anemia stable  Non MI troponin elevation no evidence of ACS; secondary to volume overload no angina  CKD at baseline  Given anxiety Lexapro increased to 20 mg daily  Discharge weight:  221 lb  Discharge creatinine: 0 9  Discharge diuretic: Torsemide 40 mg daily, 20 mg in the p m      10/19/22 Hospital follow-up, heart failure exacerbation  Lowest wt was 208 lb after DC from rehab  Pt declines sleep study  Currently on torsemide 40 am/ 20 pm/ losartan 50 mg/d/ coreg 12 5 bid  /69  on exam: filling up with shonda LE edema again  Pt declined utilizing compression stockings and pneumatic compression boots  Reports they are too painful  She has visiting nurses and therapies coming in  Blood work to be drawn this Friday  They help with medication administration  Plan: Add metolazone 2 5 mg once a week on Thursdays, with K 40 mEq  Decrease losartan to 25 mg/d  Watch renal function very carefully  We again reviewed the importance of a salt restricted diet    I reviewed how to read food labels to facilitate adherence   Follow-up- close interval     I have spent 40 minutes with Patient and family today in which greater than 50% of this time was spent in counseling/coordination of care regarding Intructions for management, Patient and family education, Importance of tx compliance and Risk factor reductions  Assessment  Diagnoses and all orders for this visit:    Hospital discharge follow-up    Chronic heart failure with preserved ejection fraction (HFpEF) ([facility])    Atrial fibrillation, unspecified type ([place] [address] )    Essential hypertension    Stage 3 chronic kidney disease, unspecified whether stage 3a or 3b CKD ([place] [address] )    Mixed hyperlipidemia        Past Medical History:   Diagnosis Date   • A-fib ([place] [address] ) 12/29/2021   • Anxiety    • Arthritis    • Back pain    • Cellulitis    • CHF (congestive heart failure) ([place] [address] )    • Colon cancer screening 8/3/2022   • Edema of both lower extremities due to peripheral venous insufficiency    • Edema of both lower extremities due to peripheral venous insufficiency    • Encounter for screening mammogram for malignant neoplasm of breast 3/21/2022   • Erythema of lower extremity 11/12/2021   • Fibromyalgia    • Great toe pain, right 10/6/2022   • Hypertension    • Hypotension 9/17/2022   • Leukocytosis 10/6/2022   • Melena 8/20/2022   • Osteoporosis screening 8/3/2022   • Sjogren syndrome, unspecified ([place] [address] )        Review of Systems   Constitutional: Negative for chills  Cardiovascular: Positive for dyspnea on exertion and leg swelling  Negative for chest pain, claudication, cyanosis, irregular heartbeat, near-syncope, orthopnea, palpitations, paroxysmal nocturnal dyspnea and syncope  Respiratory: Positive for sleep disturbances due to breathing and snoring  Negative for cough  Musculoskeletal:        Ambulatory dysfunction  In a wheelchair today   Gastrointestinal: Negative for heartburn and nausea     Neurological: Negative for dizziness, focal weakness, headaches, light-headedness and weakness  All other systems reviewed and are negative  No Known Allergies        Current Outpatient Medications:   •  acetaminophen (TYLENOL) 500 mg tablet, Take 1 tablet (500 mg total) by mouth every 6 (six) hours as needed for mild pain, Disp: , Rfl: 0  •  ammonium lactate (LAC-HYDRIN) 12 % cream, Apply topically 2 (two) times a day, Disp: 385 g, Rfl: 0  •  apixaban (Eliquis) 5 mg, Take 1 tablet (5 mg total) by mouth 2 (two) times a day Lot [id number] exp 10/2024, Disp: 56 tablet, Rfl: 0  •  busPIRone (BUSPAR) 5 mg tablet, Take 1 tablet (5 mg total) by mouth 3 (three) times a day, Disp: 90 tablet, Rfl: 0  •  carvedilol (COREG) 12 5 mg tablet, Take 1 tablet (12 5 mg total) by mouth 2 (two) times a day with meals, Disp: 60 tablet, Rfl: 0  •  escitalopram (LEXAPRO) 20 mg tablet, Take 1 tablet (20 mg total) by mouth daily, Disp: 30 tablet, Rfl: 1  •  [START ON 10/19/2022] losartan (COZAAR) 50 mg tablet, Take 1 tablet (50 mg total) by mouth daily Do not start before October 19, 2022 , Disp: 30 tablet, Rfl: 0  •  oxyCODONE (OxyCONTIN) 20 mg 12 hr tablet, Take 20 mg by mouth 2 (two) times a day, Disp: , Rfl:   •  pantoprazole (PROTONIX) 40 mg tablet, Take 1 tablet (40 mg total) by mouth 2 (two) times a day before meals, Disp: 60 tablet, Rfl: 1  •  polyethylene glycol (MIRALAX) 17 g packet, Take 17 g by mouth daily (Patient not taking: Reported on 9/26/2022), Disp: , Rfl: 0  •  potassium chloride (K-DUR,KLOR-CON) 20 mEq tablet, Take 2 tablets (40 mEq total) by mouth daily, Disp: 180 tablet, Rfl: 1  •  senna-docusate sodium (SENOKOT S) 8 6-50 mg per tablet, Take 2 tablets by mouth 2 (two) times a day, Disp: , Rfl: 0  •  sucralfate (CARAFATE) 1 g tablet, Take 1 tablet (1 g total) by mouth 4 (four) times a day (before meals and at bedtime), Disp: 120 tablet, Rfl: 0  •  TIZANIDINE HCL PO, Take 4 mg by mouth every 8 (eight) hours as needed (muscle spasms), Disp: , Rfl:   •  torsemide (DEMADEX) 20 mg tablet, Take 2 tablets (40 mg total) by mouth daily 40mg (2 tablets) by mouth every morning, 20mg (1 tablet) by mouth every evening, Disp: 180 tablet, Rfl: 0    Social History     Socioeconomic History   • Marital status: /Civil Union     Spouse name: Not on file   • Number of children: Not on file   • Years of education: Not on file   • Highest education level: Not on file   Occupational History   • Not on file   Tobacco Use   • Smoking status: Former Smoker     Types: Cigarettes   • Smokeless tobacco: Never Used   Vaping Use   • Vaping Use: Never used   Substance and Sexual Activity   • Alcohol use: Not Currently   • Drug use: Never   • Sexual activity: Not on file   Other Topics Concern   • Not on file   Social History Narrative   • Not on file     Social Determinants of Health     Financial Resource Strain: Not on file   Food Insecurity: No Food Insecurity   • Worried About Running Out of Food in the Last Year: Never true   • Ran Out of Food in the Last Year: Never true   Transportation Needs: No Transportation Needs   • Lack of Transportation (Medical): No   • Lack of Transportation (Non-Medical): No   Physical Activity: Not on file   Stress: Not on file   Social Connections: Not on file   Intimate Partner Violence: Not on file   Housing Stability: Low Risk    • Unable to Pay for Housing in the Last Year: No   • Number of Places Lived in the Last Year: 1   • Unstable Housing in the Last Year: No       Family History   Problem Relation Age of Onset   • Heart disease Mother    • Cancer Father        Physical Exam  Vitals and nursing note reviewed  Constitutional:       General: She is not in acute distress  Appearance: She is obese  She is not diaphoretic  Comments: She is in a wheelchair  She falls asleep easily during the visit  HENT:      Head: Normocephalic and atraumatic  Eyes:      Conjunctiva/sclera: Conjunctivae normal    Cardiovascular:      Rate and Rhythm: Normal rate   Rhythm irregularly irregular  Pulses: Intact distal pulses  Heart sounds: Normal heart sounds  Pulmonary:      Effort: Pulmonary effort is normal       Breath sounds: Normal breath sounds  Abdominal:      General: Bowel sounds are normal       Palpations: Abdomen is soft  Musculoskeletal:         General: Normal range of motion  Cervical back: Normal range of motion and neck supple  Right lower leg: Edema present  Left lower leg: Edema present  Skin:     General: Skin is warm and dry  Neurological:      Mental Status: She is alert  Mental status is at baseline  Vitals: There were no vitals taken for this visit  Wt Readings from Last 3 Encounters:   10/09/22 101 kg (221 lb 9 oz)   09/26/22 95 4 kg (210 lb 6 4 oz)   09/18/22 101 kg (222 lb 10 6 oz)         Labs & Results:  Lab Results   Component Value Date    WBC 7 61 10/10/2022    HGB 10 3 (L) 10/10/2022    HCT 33 3 (L) 10/10/2022    MCV 92 10/10/2022     (H) 10/10/2022     BNP   Date Value Ref Range Status   10/06/2022 197 (H) 0 - 100 pg/mL Final   07/28/2022 125 (H) 0 - 100 pg/mL Final     No components found for: CHEM    No results found for this or any previous visit  No results found for this or any previous visit  This note was completed in part utilizing [employer] direct voice recognition software  Grammatical errors, random word insertion, spelling mistakes, and incomplete sentences may be an occasional consequence of the system secondary to software limitations, ambient noise and hardware issues  At the time of dictation, efforts were made to edit, clarify and /or correct errors  Please read the chart carefully and recognize, using context, where substitutions have occurred    If you have any questions or concerns about the context, text or information contained within the body of this dictation, please contact myself, the provider, for further clarification

## 2022-10-19 ENCOUNTER — CONSULT (OUTPATIENT)
Dept: GASTROENTEROLOGY | Facility: AMBULARY SURGERY CENTER | Age: 77
End: 2022-10-19
Payer: COMMERCIAL

## 2022-10-19 ENCOUNTER — TELEPHONE (OUTPATIENT)
Dept: GASTROENTEROLOGY | Facility: AMBULARY SURGERY CENTER | Age: 77
End: 2022-10-19

## 2022-10-19 ENCOUNTER — OFFICE VISIT (OUTPATIENT)
Dept: CARDIOLOGY CLINIC | Facility: CLINIC | Age: 77
End: 2022-10-19
Payer: COMMERCIAL

## 2022-10-19 VITALS
SYSTOLIC BLOOD PRESSURE: 140 MMHG | HEART RATE: 97 BPM | BODY MASS INDEX: 40.82 KG/M2 | DIASTOLIC BLOOD PRESSURE: 82 MMHG | OXYGEN SATURATION: 97 % | HEIGHT: 62 IN | WEIGHT: 221.8 LBS

## 2022-10-19 VITALS
BODY MASS INDEX: 40.59 KG/M2 | HEART RATE: 75 BPM | HEIGHT: 62 IN | OXYGEN SATURATION: 93 % | DIASTOLIC BLOOD PRESSURE: 69 MMHG | WEIGHT: 220.6 LBS | SYSTOLIC BLOOD PRESSURE: 115 MMHG

## 2022-10-19 DIAGNOSIS — Z09 HOSPITAL DISCHARGE FOLLOW-UP: Primary | ICD-10-CM

## 2022-10-19 DIAGNOSIS — K92.1 MELENA: ICD-10-CM

## 2022-10-19 DIAGNOSIS — I50.32 CHRONIC HEART FAILURE WITH PRESERVED EJECTION FRACTION (HFPEF) (HCC): ICD-10-CM

## 2022-10-19 DIAGNOSIS — E78.2 MIXED HYPERLIPIDEMIA: ICD-10-CM

## 2022-10-19 DIAGNOSIS — K25.0 ACUTE GASTRIC ULCER WITH HEMORRHAGE: Primary | ICD-10-CM

## 2022-10-19 DIAGNOSIS — I48.91 ATRIAL FIBRILLATION, UNSPECIFIED TYPE (HCC): ICD-10-CM

## 2022-10-19 DIAGNOSIS — I10 ESSENTIAL HYPERTENSION: ICD-10-CM

## 2022-10-19 DIAGNOSIS — D50.8 OTHER IRON DEFICIENCY ANEMIA: ICD-10-CM

## 2022-10-19 DIAGNOSIS — N18.30 STAGE 3 CHRONIC KIDNEY DISEASE, UNSPECIFIED WHETHER STAGE 3A OR 3B CKD (HCC): ICD-10-CM

## 2022-10-19 DIAGNOSIS — F41.9 ANXIETY: ICD-10-CM

## 2022-10-19 DIAGNOSIS — I50.9 CHF (CONGESTIVE HEART FAILURE) (HCC): ICD-10-CM

## 2022-10-19 PROCEDURE — 99213 OFFICE O/P EST LOW 20 MIN: CPT | Performed by: INTERNAL MEDICINE

## 2022-10-19 PROCEDURE — 99215 OFFICE O/P EST HI 40 MIN: CPT | Performed by: NURSE PRACTITIONER

## 2022-10-19 RX ORDER — PANTOPRAZOLE SODIUM 40 MG/1
40 TABLET, DELAYED RELEASE ORAL DAILY
Qty: 30 TABLET | Refills: 6 | Status: SHIPPED | OUTPATIENT
Start: 2022-10-19 | End: 2022-12-18

## 2022-10-19 RX ORDER — LOSARTAN POTASSIUM 25 MG/1
25 TABLET ORAL DAILY
Start: 2022-10-19 | End: 2022-10-25 | Stop reason: SDUPTHER

## 2022-10-19 RX ORDER — SUCRALFATE 1 G/1
1 TABLET ORAL
Qty: 120 TABLET | Refills: 0 | Status: SHIPPED | OUTPATIENT
Start: 2022-10-19 | End: 2022-10-19

## 2022-10-19 RX ORDER — BUSPIRONE HYDROCHLORIDE 5 MG/1
5 TABLET ORAL 3 TIMES DAILY
Qty: 90 TABLET | Refills: 0 | Status: SHIPPED | OUTPATIENT
Start: 2022-10-19

## 2022-10-19 RX ORDER — POTASSIUM CHLORIDE 20 MEQ/1
TABLET, EXTENDED RELEASE ORAL
Qty: 180 TABLET | Refills: 1
Start: 2022-10-19 | End: 2022-10-26 | Stop reason: SDUPTHER

## 2022-10-19 RX ORDER — METOLAZONE 2.5 MG/1
TABLET ORAL
Qty: 8 TABLET | Refills: 2 | Status: SHIPPED | OUTPATIENT
Start: 2022-10-19

## 2022-10-19 NOTE — PROGRESS NOTES
[address] [facility]  Manfred Mccollum 77 y o  female MRN: [id number]            Assessment & Plan:    43-year-old female, on anticoagulation for atrial fibrillation, congestive heart failure, recent admission to hospital after fall with fractures found to have acute gastric ulceration with GI bleed  1  Peptic ulcer disease:  -reduce pantoprazole to once daily  -okay to stop Carafate  -biopsies were negative at the time of endoscopy  -repeat EGD to document healing    2  Anemia:  Most likely secondary to EGD  Hemoglobin improving after discharge  -patient last colonoscopy was many years ago, given requirement of anticoagulation will schedule colonoscopy the same time as upper endoscopy  -will obtain cardiac clearance is hold anticoagulation  -discussed with her risks of procedure including bleeding, surgery, perforation      Rohit Kemp was seen today for new patient visit  Diagnoses and all orders for this visit:    Acute gastric ulcer with hemorrhage  -     EGD; Future    Other iron deficiency anemia  -     Colonoscopy; Future    Melena  -     pantoprazole (PROTONIX) 40 mg tablet; Take 1 tablet (40 mg total) by mouth daily    Other orders  -     Diet NPO; Sips with meds; Standing  -     Void on call to OR; Standing            _____________________________________________________________        CC: Follow-up for     HPI:  Manfred Mccollum is a 77 y  o female who is here for follow-up   43-year-old female, history of congestive heart failure, atrial fibrillation, on anticoagulation, recent was in the hospital had a fall had upper GI bleed secondary to gastric ulceration requiring endoscopic therapy  Since discharge patient reports she is doing well, having regular bowel movements, denies any melena, rectal bleeding, abdominal pain, nausea, vomiting, heartburn, dysphagia  She has been taking Protonix twice daily, Carafate with meals      Last colonoscopy was greater than 10 years ago       ROS:  The remainder of the ROS was negative except for the pertinent positives mentioned in HPI  Allergies: Patient has no known allergies      Medications:   Current Outpatient Medications:   •  acetaminophen (TYLENOL) 500 mg tablet, Take 1 tablet (500 mg total) by mouth every 6 (six) hours as needed for mild pain, Disp: , Rfl: 0  •  ammonium lactate (LAC-HYDRIN) 12 % cream, Apply topically 2 (two) times a day, Disp: 385 g, Rfl: 0  •  apixaban (Eliquis) 5 mg, Take 1 tablet (5 mg total) by mouth 2 (two) times a day Lot [id number] exp 10/2024, Disp: 56 tablet, Rfl: 0  •  busPIRone (BUSPAR) 5 mg tablet, Take 1 tablet (5 mg total) by mouth 3 (three) times a day, Disp: 90 tablet, Rfl: 0  •  carvedilol (COREG) 12 5 mg tablet, Take 1 tablet (12 5 mg total) by mouth 2 (two) times a day with meals, Disp: 60 tablet, Rfl: 0  •  escitalopram (LEXAPRO) 20 mg tablet, Take 1 tablet (20 mg total) by mouth daily, Disp: 30 tablet, Rfl: 1  •  losartan (COZAAR) 50 mg tablet, Take 1 tablet (50 mg total) by mouth daily Do not start before October 19, 2022 , Disp: 30 tablet, Rfl: 0  •  oxyCODONE (OxyCONTIN) 20 mg 12 hr tablet, Take 20 mg by mouth 2 (two) times a day, Disp: , Rfl:   •  pantoprazole (PROTONIX) 40 mg tablet, Take 1 tablet (40 mg total) by mouth daily, Disp: 30 tablet, Rfl: 6  •  potassium chloride (K-DUR,KLOR-CON) 20 mEq tablet, Take 2 tablets (40 mEq total) by mouth daily, Disp: 180 tablet, Rfl: 1  •  senna-docusate sodium (SENOKOT S) 8 6-50 mg per tablet, Take 2 tablets by mouth 2 (two) times a day, Disp: , Rfl: 0  •  TIZANIDINE HCL PO, Take 4 mg by mouth every 8 (eight) hours as needed (muscle spasms), Disp: , Rfl:   •  torsemide (DEMADEX) 20 mg tablet, Take 2 tablets (40 mg total) by mouth daily 40mg (2 tablets) by mouth every morning, 20mg (1 tablet) by mouth every evening, Disp: 180 tablet, Rfl: 0  •  polyethylene glycol (MIRALAX) 17 g packet, Take 17 g by mouth daily (Patient not taking: No sig reported), Disp: , Rfl: 0    Past Medical History:   Diagnosis Date   • A-fib ([facility] ) 12/29/2021   • Anxiety    • Arthritis    • Back pain    • Cellulitis    • CHF (congestive heart failure) ([facility])    • Colon cancer screening 8/3/2022   • Edema of both lower extremities due to peripheral venous insufficiency    • Edema of both lower extremities due to peripheral venous insufficiency    • Encounter for screening mammogram for malignant neoplasm of breast 3/21/2022   • Erythema of lower extremity 11/12/2021   • Fibromyalgia    • Great toe pain, right 10/6/2022   • Hypertension    • Hypotension 9/17/2022   • Leukocytosis 10/6/2022   • Melena 8/20/2022   • Osteoporosis screening 8/3/2022   • Sjogren syndrome, unspecified ([facility] )        Past Surgical History:   Procedure Laterality Date   • KNEE CARTILAGE SURGERY     • [facility] OPEN RX FEMUR FX+INTRAMED SHAN Left 8/22/2022    Procedure: LEFT RETROGRADE IM SHAN;  Surgeon: Kristy Don MD;  Location: [facility];  Service: Orthopedics   • REPLACEMENT TOTAL KNEE BILATERAL         Family History   Problem Relation Age of Onset   • Heart disease Mother    • Cancer Father         reports that she has quit smoking  Her smoking use included cigarettes  She has never used smokeless tobacco  She reports previous alcohol use  She reports that she does not use drugs        Physical Exam:    /82   Pulse 97   Ht 5' 2" (1 575 m)   Wt 101 kg (221 lb 12 8 oz)   SpO2 97%   BMI 40 57 kg/m²     Gen: wn/wd, NAD, obese in wheelchair  HEENT: anicteric, MMM, no cervical LAD  CVS: RRR, no m/r/g  CHEST: CTA b/l  ABD: +BS, soft, NT,ND, no hepatosplenomegaly  EXT:  Bilateral lower extremity edema with chronic stasis changes, 2 to 3+ extending to her thighs   NEURO: aaox3  SKIN: NO rashes

## 2022-10-19 NOTE — TELEPHONE ENCOUNTER
Our mutual patient is scheduled for procedure: COLONOSCOPY/EGD    On: _1___/_ 23   _/_  2023  _      With:   _____DAISY____    He/She is taking the following blood thinner:   ELIQUIS       Can this be stopped ____2__ days prior to the procedure?       Physician Approving clearance: ________________________

## 2022-10-19 NOTE — TELEPHONE ENCOUNTER
Rodo Barajas has called the [place] [id number] office in regards to Greenberg Edelrachael Wood requested refills of busPIRone (BUSPAR) 5 mg tablet and sucralfate (CARAFATE) 1 g tablet  Refills to be sent to the [facility] on [address]

## 2022-10-19 NOTE — PATIENT INSTRUCTIONS

## 2022-10-19 NOTE — PATIENT INSTRUCTIONS
Stop the sucralfate  Take pantoprazole once daily  Scheduled date of EGD/colonoscopy (as of today):1/19/2023  Physician performing EGD/colonoscopy:DR VILLA  [facility]colonoscopy[facility]  Desired bowel prep reviewed with patient:MIRALAX/DULCOLAX PER DR VILLA  Instructions reviewed with patient by:HUSSEIN FERNANDEZ MED CLEARANCE SEN TO DR Tati Mccrary  Clearances:

## 2022-10-19 NOTE — LETTER
October 19, 2022     MD Ld [facility] [id number]    Patient: Yuliana Hall   YOB: 1945   Date of Visit: 10/19/2022       Dear Dr Kaycee Tyler:    Thank you for referring Lior Sigala to me for evaluation  Below are my notes for this consultation  If you have questions, please do not hesitate to call me  I look forward to following your patient along with you  Sincerely,        SHAHIDA Harrell        CC: MD Alycia Ontiveros, [place]  10/19/2022  4:57 PM  Sign when Signing Visit  Cardiology  Heart Failure   Follow Up Office Visit Note     Yuliana Hall   77 y o    female   MRN: [id number]  [address]  [address] [place] [address]  [phone]  [phone]    PCP: Kendrick Temple MD  Cardiologist :  Dr Tanner Cruz                Summary of Recommendations  Low-sodium diet, Heart failure education as below  Decrease losartan to 25 mg/d for BP room  Add metolazone 2 5 mg once a week on thursdays, 30-60 min before am torsemide  Add an extra K 40 meq on thursdays yaron, with metolazone  BMP is scheduled for this Friday  Will add a magnesium level  Repeat nonfasting BMP next Friday with magnesium level  Follow up will be scheduled with Dr Tanner Cruz       Impression/plan  Chronic diastolic heart failure  Recent ADM for decompensation 10/7-10/10/22  Her  reports she was a low of 208 lb when she got out of rehab  Most recent discharge weight 221 lb  Wt Readings from Last 3 Encounters:   10/19/22 100 kg (220 lb 9 6 oz)   10/19/22 101 kg (221 lb 12 8 oz)   10/09/22 101 kg (221 lb 9 oz)     --Beta-blocker:  Carvedilol 12 5 mg b i d   --Diuretic:  Torsemide 40 mg daily, 20 mg in the pm  plus potassium 40 mEq daily  Today add metolazone 2 5 mg once a week on Thursday mornings before morning dose of torsemide plus an extra potassium 40 mEq with it    BMP/ magnesium this Friday as well as next Friday   --ACE/ARB/ARNI:  Losartan 50 mg/d  Decrease to 25 mg daily  --2 g sodium diet, 1800 cc fluid restriction  Daily weights, call weight gain 2-3 lb in 1 day or 5 lb in 5 days  Atrial fibrillation, persistent  onset 11/21  [id number]:   --Now on Eliquis 5 mg b i d   --Outpatient sleep study recommended  Patient declined  --On carvedilol  Went back into AFib in December 2021 in the setting of acute decompensated heart failure  Hypertension, essential  BP   115/69 On carvedilol, losartan, diuretics  Will decrease losartan dosing given that I am adding weekly metolazone     Lipids 11/13/21: LDL 93 Non [id number]  ASCVD risk score 20 4%:  High risk  She may benefit from statin therapy  This can be addressed at a future visit  Obesity BMI 47  Chronic venous insufficiency Pneumatic compression pumps, per vascular  She has not been wearing them to the pain  She has not been wearing her compression stockings  Recent GI bleed/gastric ulceration Anemia  She may need endoscopy by GI in the near future  She remains on Protonix  Chronic back pain with opioid dependence  Cognitive decline as reported by her   This can be evaluated by primary care  Cardiac testing  • TTE 11/14/21  EF 65%  Wall motion is normal  Grade 1 DD  Mild LVH  Mild LAE  Mild AI  Mild MR  Mild TR  • TTE 10/7/22  EF 55%  Although no diagnostic regional wall motion abnormality was identified, this possibility cannot be completely excluded on the basis of this study  LAE  Mild TR   RV cavity mildly dilated with normal function  Estimated RVSP 42 mm Hg            HPI:   Jessie Fitch is a 76 yo female with morbid obesity, chronic venous insufficiency and lymphedema  She has a history of recurrent cellulitis  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      Adm 11/12-11/17/21 (5d)  CC:  Increasing shortness of breath, increasing lower extremity edema  Found to be in AFib with RVR, new onset  Diagnosed and treated for acute diastolic heart failure secondary to rapid AFib in the setting of sodium dietary indiscretion-fast food, microwave meals  In the past she was prescribed Lasix 20 mg 3 times a week but she was not taking it  She was markedly volume overloaded with weeping lower extremities  An echocardiogram showed preserved LV function, normal wall motion  Mild LVH  Mild MR mild TR  CTA showed no evidence of PE, however there was enlargement of the pulmonary trunk the main right and left pulmonary artery suggestive of pulmonary hypertension  She diuresed with Lasix 80 mg IV b i d  Lower extremity Dopplers negative for DVT   Recommended referral to lymphedema Clinic as an outpatient  DCd on coreg  6 25 mg BID  Discharge weight :  250 lb? Admission weight was 240 lb? Discharge creatinine:  1 06  Discharge diuretics: Torsemide 40 mg daily      12/2/21   office visit with her PCP found to be in acute heart failure  Hypertensive  10 lb over discharge weight, at 260 lb  Carvedilol uptitrated  Decision to reassess her renal function before adjusting diuretics    1215/21  Pt canceled cardiology OV      12/23 INR greater than 8    12/28/21 chart notes reviewed  Gait dysfunction   Worsening lower extremity edema  Back pain  leg pain  Urged by Internal Medicine go to the ED  INR not repeated, as  It was requested    ADM 12/29-1/6/22  Chief complaint :worsening shortness of breath and lower extremity swelling   Was hypoxic satting 80% on arrival  Diagnosis: acute hypoxic respiratory failure secondary to acute on chronic HF with preserved EF and right-sided pneumonia, treated with antibiotics  Also with right lower extremity cellulitis treated with antibiotics  Diuresed with IV Lasix  Followed by Cardiology  Patient developed contraction alkalosis secondary to IV diuresis  But has resolved with Diamox    She is back on torsemide 40 mg   Discharged to acute rehab  Discharge weight :  247 lb  Discharge creatinine : 0 85  Discharge diuretic: Torsemide 40 mg daily plus potassium 40 mEq daily   Discharged to [address] at [facility]     1/10/22: Weight at the facility 227 lb  O2 requirement, however higher, at 90%  Given an extra dose of torsemide 40 mg x 1  BMP ordered in 3 days  Chest x-ray ordered      1/13/22 She comes from the facility, [facility]  She is accompanied by her  who met her here today  He provided some extra history:  He has been concerned about his wife, as she appears to have memory issues  He also reports that he works 2:00 p m  to 10:00 p m , and she was in the room, with a microwave close by  She was consuming mostly microwave foods, extremely high sodium dietary intake  She was extremely sedentary given back issues and moved very little  Today he tells me she is significantly improved, she has lost nearly 50 lb, of fluid  Weights and labs are available for review She weighed 270 lb in December  Labs:  1/10/22 hemoglobin 10 6 hematocrit 33 2 platelet count 362K  Creatinine 0 8 BUN 22 potassium 4 0  ALT 10  On a no added salt diet, 1800 cc fluid restriction  O2 sat 98% on 1 L nasal cannula  /62  Today, she appears euvolemic  She does have chronic lower extremity lymphedema  She was observed falling asleep several times, as her  and I were talking  She has clear breath sounds but diminished, and heart sounds that are distant and irregular irregular but controlled  Agreeable to a sleep study and follow-up with vascular food for lymphedema/ venous compression pump follow-up  Education provided regarding salt restriction, daily weights, adherence to diet meds and office visits, etc     Follow-up with her cardiologist in 6 weeks  Interval history  3/3/22 OV Dr MAXX Lopez volume overloaded  Weight improved since her 1st hospitalization  Continue torsemide 40 mg daily  Persistent AFib, rate controlled  Noted she transitioned from Coumadin to  Eliquis  Recommend follow-up 3 months    Adm 7/28-7/30/22   Fall, ambulatory dysfunction  Missed 2 days of diuretics  Treated for acute on chronic diastolic heart failure, lower extremity cellulitis  Discharge weight 239 lb    Adm 8/20-8/30/22  CC: Mechanical fall while walking to the bathroom  Melena/UGI bleed  Diagnosed with a left femur fracture, status post ORIF left femur, IM nail 8/22/22  EGD 8/21/22: Single crated ulcer  Biopsy taken  treated with epinephrine  Restarted on Eliquis prior to discharge    Adm 7/98-1/25/93  PMH Diastolic HF, HTN,afib on eliquis, CKD, anemia, chronic venous stasis dermatitis  Chief complaint:  Hypotension, SBP 90s to 100s with dizziness, fatigue, nausea  LEXY, creatinine was 2 6, baseline 0 7-0 9  Na 130  Given gentle IV fluids  Coreg was decreased to 12 5 BID  Torsemide was resumed with the same dose 40 mg daily  Losartan held  Noted chronic anemia  Hemoglobin stable at 9 9  Last EGD 8/21/22: Single greater, benign ulcer in the antrum  Epinephrine injected  Biopsies taken  Follow-up GI  Cleared to restart Eliquis  DC wt 222 lb    Adm 10/7-10/10/22  CC: Worsening lower extremity edema, 15 lb weight gain  Admission weight 231 lb   9/17/22:  222 lb  Followed by Cardiology  Diuresed with Lasix 40 IV t i d  Persistent AFib, rate controlled anticoagulated with Eliquis  Chronic lower extremity/venous insufficiency, venous stasis, right greater than left chronically  Lymphedema pumps at home  Chronic anemia stable  Non MI troponin elevation no evidence of ACS; secondary to volume overload no angina  CKD at baseline  Given anxiety Lexapro increased to 20 mg daily  Discharge weight:  221 lb  Discharge creatinine: 0 9  Discharge diuretic: Torsemide 40 mg daily, 20 mg in the p m      10/19/22 Hospital follow-up, heart failure exacerbation    Lowest wt was 208 lb after DC from rehab  Pt declines sleep study  Currently on torsemide 40 am/ 20 pm/ losartan 50 mg/d/ coreg 12 5 bid  /69  on exam: filling up with shonda LE edema again  Pt declined utilizing compression stockings and pneumatic compression boots  Reports they are too painful  She has visiting nurses and therapies coming in  Blood work to be drawn this Friday  They help with medication administration  Plan: Add metolazone 2 5 mg once a week on Thursdays, with K 40 mEq  Decrease losartan to 25 mg/d  Watch renal function very carefully  We again reviewed the importance of a salt restricted diet  I reviewed how to read food labels to facilitate adherence   Follow-up- close interval     I have spent 40 minutes with Patient and family today in which greater than 50% of this time was spent in counseling/coordination of care regarding Intructions for management, Patient and family education, Importance of tx compliance and Risk factor reductions    Assessment  Diagnoses and all orders for this visit:    Hospital discharge follow-up    Chronic heart failure with preserved ejection fraction (HFpEF) ([facility])    Atrial fibrillation, unspecified type ([place] [id number] )    Essential hypertension    Stage 3 chronic kidney disease, unspecified whether stage 3a or 3b CKD ([place] [address] )    Mixed hyperlipidemia        Past Medical History:   Diagnosis Date   • A-fib ([place] [address] ) 12/29/2021   • Anxiety    • Arthritis    • Back pain    • Cellulitis    • CHF (congestive heart failure) ([facility] )    • Colon cancer screening 8/3/2022   • Edema of both lower extremities due to peripheral venous insufficiency    • Edema of both lower extremities due to peripheral venous insufficiency    • Encounter for screening mammogram for malignant neoplasm of breast 3/21/2022   • Erythema of lower extremity 11/12/2021   • Fibromyalgia    • Great toe pain, right 10/6/2022   • Hypertension    • Hypotension 9/17/2022   • Leukocytosis 10/6/2022   • Melena 8/20/2022   • Osteoporosis screening 8/3/2022   • Sjogren syndrome, unspecified ([place] [address] )        Review of Systems   Constitutional: Negative for chills  Cardiovascular: Positive for dyspnea on exertion and leg swelling  Negative for chest pain, claudication, cyanosis, irregular heartbeat, near-syncope, orthopnea, palpitations, paroxysmal nocturnal dyspnea and syncope  Respiratory: Positive for sleep disturbances due to breathing and snoring  Negative for cough  Musculoskeletal:        Ambulatory dysfunction  In a wheelchair today   Gastrointestinal: Negative for heartburn and nausea  Neurological: Negative for dizziness, focal weakness, headaches, light-headedness and weakness  All other systems reviewed and are negative  No Known Allergies        Current Outpatient Medications:   •  acetaminophen (TYLENOL) 500 mg tablet, Take 1 tablet (500 mg total) by mouth every 6 (six) hours as needed for mild pain, Disp: , Rfl: 0  •  ammonium lactate (LAC-HYDRIN) 12 % cream, Apply topically 2 (two) times a day, Disp: 385 g, Rfl: 0  •  apixaban (Eliquis) 5 mg, Take 1 tablet (5 mg total) by mouth 2 (two) times a day Lot [id number] exp 10/2024, Disp: 56 tablet, Rfl: 0  •  busPIRone (BUSPAR) 5 mg tablet, Take 1 tablet (5 mg total) by mouth 3 (three) times a day, Disp: 90 tablet, Rfl: 0  •  carvedilol (COREG) 12 5 mg tablet, Take 1 tablet (12 5 mg total) by mouth 2 (two) times a day with meals, Disp: 60 tablet, Rfl: 0  •  escitalopram (LEXAPRO) 20 mg tablet, Take 1 tablet (20 mg total) by mouth daily, Disp: 30 tablet, Rfl: 1  •  [START ON 10/19/2022] losartan (COZAAR) 50 mg tablet, Take 1 tablet (50 mg total) by mouth daily Do not start before October 19, 2022 , Disp: 30 tablet, Rfl: 0  •  oxyCODONE (OxyCONTIN) 20 mg 12 hr tablet, Take 20 mg by mouth 2 (two) times a day, Disp: , Rfl:   •  pantoprazole (PROTONIX) 40 mg tablet, Take 1 tablet (40 mg total) by mouth 2 (two) times a day before meals, Disp: 60 tablet, Rfl: 1  •  polyethylene glycol (MIRALAX) 17 g packet, Take 17 g by mouth daily (Patient not taking: Reported on 9/26/2022), Disp: , Rfl: 0  •  potassium chloride (K-DUR,KLOR-CON) 20 mEq tablet, Take 2 tablets (40 mEq total) by mouth daily, Disp: 180 tablet, Rfl: 1  •  senna-docusate sodium (SENOKOT S) 8 6-50 mg per tablet, Take 2 tablets by mouth 2 (two) times a day, Disp: , Rfl: 0  •  sucralfate (CARAFATE) 1 g tablet, Take 1 tablet (1 g total) by mouth 4 (four) times a day (before meals and at bedtime), Disp: 120 tablet, Rfl: 0  •  TIZANIDINE HCL PO, Take 4 mg by mouth every 8 (eight) hours as needed (muscle spasms), Disp: , Rfl:   •  torsemide (DEMADEX) 20 mg tablet, Take 2 tablets (40 mg total) by mouth daily 40mg (2 tablets) by mouth every morning, 20mg (1 tablet) by mouth every evening, Disp: 180 tablet, Rfl: 0    Social History     Socioeconomic History   • Marital status: /Civil Union     Spouse name: Not on file   • Number of children: Not on file   • Years of education: Not on file   • Highest education level: Not on file   Occupational History   • Not on file   Tobacco Use   • Smoking status: Former Smoker     Types: Cigarettes   • Smokeless tobacco: Never Used   Vaping Use   • Vaping Use: Never used   Substance and Sexual Activity   • Alcohol use: Not Currently   • Drug use: Never   • Sexual activity: Not on file   Other Topics Concern   • Not on file   Social History Narrative   • Not on file     Social Determinants of Health     Financial Resource Strain: Not on file   Food Insecurity: No Food Insecurity   • Worried About Running Out of Food in the Last Year: Never true   • Ran Out of Food in the Last Year: Never true   Transportation Needs: No Transportation Needs   • Lack of Transportation (Medical): No   • Lack of Transportation (Non-Medical):  No   Physical Activity: Not on file   Stress: Not on file   Social Connections: Not on file   Intimate Partner Violence: Not on file   Housing Stability: Low Risk    • Unable to Pay for Housing in the Last Year: No   • Number of Places Lived in the Last Year: 1 • Unstable Housing in the Last Year: No       Family History   Problem Relation Age of Onset   • Heart disease Mother    • Cancer Father        Physical Exam  Vitals and nursing note reviewed  Constitutional:       General: She is not in acute distress  Appearance: She is obese  She is not diaphoretic  Comments: She is in a wheelchair  She falls asleep easily during the visit  HENT:      Head: Normocephalic and atraumatic  Eyes:      Conjunctiva/sclera: Conjunctivae normal    Cardiovascular:      Rate and Rhythm: Normal rate  Rhythm irregularly irregular  Pulses: Intact distal pulses  Heart sounds: Normal heart sounds  Pulmonary:      Effort: Pulmonary effort is normal       Breath sounds: Normal breath sounds  Abdominal:      General: Bowel sounds are normal       Palpations: Abdomen is soft  Musculoskeletal:         General: Normal range of motion  Cervical back: Normal range of motion and neck supple  Right lower leg: Edema present  Left lower leg: Edema present  Skin:     General: Skin is warm and dry  Neurological:      Mental Status: She is alert  Mental status is at baseline  Vitals: There were no vitals taken for this visit  Wt Readings from Last 3 Encounters:   10/09/22 101 kg (221 lb 9 oz)   09/26/22 95 4 kg (210 lb 6 4 oz)   09/18/22 101 kg (222 lb 10 6 oz)         Labs & Results:  Lab Results   Component Value Date    WBC 7 61 10/10/2022    HGB 10 3 (L) 10/10/2022    HCT 33 3 (L) 10/10/2022    MCV 92 10/10/2022     (H) 10/10/2022     BNP   Date Value Ref Range Status   10/06/2022 197 (H) 0 - 100 pg/mL Final   07/28/2022 125 (H) 0 - 100 pg/mL Final     No components found for: CHEM    No results found for this or any previous visit  No results found for this or any previous visit  This note was completed in part utilizing [employer] direct voice recognition software     Grammatical errors, random word insertion, spelling mistakes, and incomplete sentences may be an occasional consequence of the system secondary to software limitations, ambient noise and hardware issues  At the time of dictation, efforts were made to edit, clarify and /or correct errors  Please read the chart carefully and recognize, using context, where substitutions have occurred    If you have any questions or concerns about the context, text or information contained within the body of this dictation, please contact myself, the provider, for further clarification

## 2022-10-19 NOTE — LETTER
October 19, 2022     [address] [phone]    Patient: Erika Hollis   YOB: 1945   Date of Visit: 10/19/2022       Dear Dr Ngozi Larkin:    Thank you for referring Barbara Michel to me for evaluation  Below are my notes for this consultation  If you have questions, please do not hesitate to call me  I look forward to following your patient along with you  Sincerely,        Harman Gordon MD        CC: No Recipients  Harman Gordon MD  10/19/2022  1:35 PM  Sign when Signing Visit  [facility]  Erika Hollis 77 y o  female MRN: [id number]            Assessment & Plan:    78-year-old female, on anticoagulation for atrial fibrillation, congestive heart failure, recent admission to hospital after fall with fractures found to have acute gastric ulceration with GI bleed  1  Peptic ulcer disease:  -reduce pantoprazole to once daily  -okay to stop Carafate  -biopsies were negative at the time of endoscopy  -repeat EGD to document healing    2  Anemia:  Most likely secondary to EGD  Hemoglobin improving after discharge  -patient last colonoscopy was many years ago, given requirement of anticoagulation will schedule colonoscopy the same time as upper endoscopy  -will obtain cardiac clearance is hold anticoagulation  -discussed with her risks of procedure including bleeding, surgery, perforation      Ciara Rachael was seen today for new patient visit  Diagnoses and all orders for this visit:    Acute gastric ulcer with hemorrhage  -     EGD; Future    Other iron deficiency anemia  -     Colonoscopy; Future    Melena  -     pantoprazole (PROTONIX) 40 mg tablet; Take 1 tablet (40 mg total) by mouth daily    Other orders  -     Diet NPO; Sips with meds; Standing  -     Void on call to OR; Standing            _____________________________________________________________        CC:   Follow-up for     HPI:  Erika Hollis is a 77 y o female who is here for follow-up   78-year-old female, history of congestive heart failure, atrial fibrillation, on anticoagulation, recent was in the hospital had a fall had upper GI bleed secondary to gastric ulceration requiring endoscopic therapy  Since discharge patient reports she is doing well, having regular bowel movements, denies any melena, rectal bleeding, abdominal pain, nausea, vomiting, heartburn, dysphagia  She has been taking Protonix twice daily, Carafate with meals  Last colonoscopy was greater than 10 years ago  ROS:  The remainder of the ROS was negative except for the pertinent positives mentioned in HPI  Allergies: Patient has no known allergies      Medications:   Current Outpatient Medications:   •  acetaminophen (TYLENOL) 500 mg tablet, Take 1 tablet (500 mg total) by mouth every 6 (six) hours as needed for mild pain, Disp: , Rfl: 0  •  ammonium lactate (LAC-HYDRIN) 12 % cream, Apply topically 2 (two) times a day, Disp: 385 g, Rfl: 0  •  apixaban (Eliquis) 5 mg, Take 1 tablet (5 mg total) by mouth 2 (two) times a day Lot [id number] exp 10/2024, Disp: 56 tablet, Rfl: 0  •  busPIRone (BUSPAR) 5 mg tablet, Take 1 tablet (5 mg total) by mouth 3 (three) times a day, Disp: 90 tablet, Rfl: 0  •  carvedilol (COREG) 12 5 mg tablet, Take 1 tablet (12 5 mg total) by mouth 2 (two) times a day with meals, Disp: 60 tablet, Rfl: 0  •  escitalopram (LEXAPRO) 20 mg tablet, Take 1 tablet (20 mg total) by mouth daily, Disp: 30 tablet, Rfl: 1  •  losartan (COZAAR) 50 mg tablet, Take 1 tablet (50 mg total) by mouth daily Do not start before October 19, 2022 , Disp: 30 tablet, Rfl: 0  •  oxyCODONE (OxyCONTIN) 20 mg 12 hr tablet, Take 20 mg by mouth 2 (two) times a day, Disp: , Rfl:   •  pantoprazole (PROTONIX) 40 mg tablet, Take 1 tablet (40 mg total) by mouth daily, Disp: 30 tablet, Rfl: 6  •  potassium chloride (K-DUR,KLOR-CON) 20 mEq tablet, Take 2 tablets (40 mEq total) by mouth daily, Disp: 180 tablet, Rfl: 1  •  senna-docusate sodium (SENOKOT S) 8 6-50 mg per tablet, Take 2 tablets by mouth 2 (two) times a day, Disp: , Rfl: 0  •  TIZANIDINE HCL PO, Take 4 mg by mouth every 8 (eight) hours as needed (muscle spasms), Disp: , Rfl:   •  torsemide (DEMADEX) 20 mg tablet, Take 2 tablets (40 mg total) by mouth daily 40mg (2 tablets) by mouth every morning, 20mg (1 tablet) by mouth every evening, Disp: 180 tablet, Rfl: 0  •  polyethylene glycol (MIRALAX) 17 g packet, Take 17 g by mouth daily (Patient not taking: No sig reported), Disp: , Rfl: 0    Past Medical History:   Diagnosis Date   • A-fib ([place] [address] ) 12/29/2021   • Anxiety    • Arthritis    • Back pain    • Cellulitis    • CHF (congestive heart failure) ([place] [address] )    • Colon cancer screening 8/3/2022   • Edema of both lower extremities due to peripheral venous insufficiency    • Edema of both lower extremities due to peripheral venous insufficiency    • Encounter for screening mammogram for malignant neoplasm of breast 3/21/2022   • Erythema of lower extremity 11/12/2021   • Fibromyalgia    • Great toe pain, right 10/6/2022   • Hypertension    • Hypotension 9/17/2022   • Leukocytosis 10/6/2022   • Melena 8/20/2022   • Osteoporosis screening 8/3/2022   • Sjogren syndrome, unspecified ([place] [address] )        Past Surgical History:   Procedure Laterality Date   • KNEE CARTILAGE SURGERY     • LA OPEN RX FEMUR FX+INTRAMED SHAN Left 8/22/2022    Procedure: LEFT RETROGRADE IM SHAN;  Surgeon: Almaz Hdz MD;  Location: [facility];  Service: Orthopedics   • REPLACEMENT TOTAL KNEE BILATERAL         Family History   Problem Relation Age of Onset   • Heart disease Mother    • Cancer Father         reports that she has quit smoking  Her smoking use included cigarettes  She has never used smokeless tobacco  She reports previous alcohol use  She reports that she does not use drugs        Physical Exam:    /82   Pulse 97   Ht 5' 2" (1 575 m)   Wt 101 kg (221 lb 12 8 oz) SpO2 97%   BMI 40 57 kg/m²     Gen: wn/wd, NAD, obese in wheelchair  HEENT: anicteric, MMM, no cervical LAD  CVS: RRR, no m/r/g  CHEST: CTA b/l  ABD: +BS, soft, NT,ND, no hepatosplenomegaly  EXT:  Bilateral lower extremity edema with chronic stasis changes, 2 to 3+ extending to her thighs   NEURO: aaox3  SKIN: NO rashes

## 2022-10-24 ENCOUNTER — TELEPHONE (OUTPATIENT)
Dept: CARDIOLOGY CLINIC | Facility: CLINIC | Age: 77
End: 2022-10-24

## 2022-10-24 DIAGNOSIS — I50.32 CHRONIC HEART FAILURE WITH PRESERVED EJECTION FRACTION (HFPEF) (HCC): ICD-10-CM

## 2022-10-24 DIAGNOSIS — I10 ESSENTIAL HYPERTENSION: ICD-10-CM

## 2022-10-24 NOTE — TELEPHONE ENCOUNTER
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

## 2022-10-25 ENCOUNTER — CLINICAL SUPPORT (OUTPATIENT)
Dept: CARDIOLOGY CLINIC | Facility: CLINIC | Age: 77
End: 2022-10-25

## 2022-10-25 VITALS
RESPIRATION RATE: 18 BRPM | SYSTOLIC BLOOD PRESSURE: 84 MMHG | DIASTOLIC BLOOD PRESSURE: 50 MMHG | HEIGHT: 62 IN | HEART RATE: 76 BPM | WEIGHT: 225.38 LBS | BODY MASS INDEX: 41.47 KG/M2 | OXYGEN SATURATION: 94 %

## 2022-10-25 DIAGNOSIS — I50.32 CHRONIC HEART FAILURE WITH PRESERVED EJECTION FRACTION (HFPEF) (HCC): Primary | ICD-10-CM

## 2022-10-25 DIAGNOSIS — I10 ESSENTIAL HYPERTENSION: ICD-10-CM

## 2022-10-25 RX ORDER — CARVEDILOL 3.12 MG/1
3.12 TABLET ORAL 2 TIMES DAILY WITH MEALS
Qty: 60 TABLET | Refills: 1 | Status: SHIPPED | OUTPATIENT
Start: 2022-10-25 | End: 2022-10-25 | Stop reason: SDUPTHER

## 2022-10-25 RX ORDER — CARVEDILOL 12.5 MG/1
6.25 TABLET ORAL 2 TIMES DAILY WITH MEALS
Qty: 30 TABLET | Refills: 3 | Status: SHIPPED | OUTPATIENT
Start: 2022-10-25 | End: 2022-10-25 | Stop reason: ALTCHOICE

## 2022-10-25 RX ORDER — LOSARTAN POTASSIUM 50 MG/1
TABLET ORAL
COMMUNITY
Start: 2022-10-19 | End: 2022-10-25 | Stop reason: ALTCHOICE

## 2022-10-25 RX ORDER — CARVEDILOL 3.12 MG/1
3.12 TABLET ORAL 2 TIMES DAILY WITH MEALS
Qty: 60 TABLET | Refills: 1 | Status: SHIPPED | OUTPATIENT
Start: 2022-10-25

## 2022-10-25 RX ORDER — LOSARTAN POTASSIUM 25 MG/1
25 TABLET ORAL DAILY
Start: 2022-10-25 | End: 2022-10-25 | Stop reason: SINTOL

## 2022-10-25 RX ORDER — FUROSEMIDE 10 MG/ML
80 INJECTION INTRAMUSCULAR; INTRAVENOUS ONCE
Status: CANCELLED | OUTPATIENT
Start: 2022-10-25 | End: 2022-10-25

## 2022-10-25 NOTE — TELEPHONE ENCOUNTER
She was taking 6 25 mg bid ( I clarified this with her ) she should now decrease to 3 125 mg bid and hold losartan until re-evaluation with Dr Gustavo Vargas on Thursday

## 2022-10-25 NOTE — TELEPHONE ENCOUNTER
Unfortunately, we found she was already on 6 25 BID, after discussion with her   Given that her BP was in the 80s, we reduced it further, and asked her and her  to bring in all the meds for a med check next week

## 2022-10-25 NOTE — TELEPHONE ENCOUNTER
Lets bring her into the [facility] office today for Lasix 80 mg IV x 1  [facility] [employer], could you coordinate this? Nonfasting BMP/mag on Monday 10/31  Follow up appt with Chin Marte NP Wed Nov 2 @ [facility], can you schedule this? Thanks all for your help   Ameya Greenberg

## 2022-10-25 NOTE — TELEPHONE ENCOUNTER
Pt came into the office today for IV lasix  Her BP is 82/50  Therefore, we cannot give IV lasix    Plan: no further BP meds today  Tomorrow, reduce Carvedilol to 6 25 mg BID, Losartan to 25 mg/d  Follow up with Dr Faustino Curling this Thursday in [facility] for re-evaluation  Encourage flds to 1500 cc a day

## 2022-10-25 NOTE — PROGRESS NOTES
Cardiology  Heart Failure   Follow Up Office Visit Note     Compa Bardales   69 y o    female   MRN: [id number]  [address]  [address] [facility] [address] [facility] Radha Kemp [address]  [phone]    PCP: Earl Perry MD  Cardiologist :  Dr Bill Dickson                Summary of Recommendations  Low-sodium diet, Heart failure education as below  No TV dinners or canned soup which she has been eating  Hydrate, 60 oz non caffeinated fluid a day  carvedilol reduced yesterday to 3 125 mg b i d -BP room  Losartan stopped yesterday for BP room  Continue torsemide 40 mg in the am/20mg in the pm  Continue metolazone 2 5 mg on Thursdays only, before am torsemide  continue K 40 meq/d  Increase k to 60 meq on Thurs only with the metolazone  Increase potassium containing foods  Follow up will be scheduled with Dr Bill Dickson       Impression/plan  Chronic diastolic heart failure  Recent ADM for decompensation 10/7-10/10/22  Her  reports she was a low of 208 lb when she got out of rehab 1/6/22  -Most recent discharge weight 221 lb 10/10/22  -They do have a scale at home, with blood pressure and heart rate, from [facility]  Wt Readings from Last 3 Encounters:   10/26/22 101 kg (223 lb)   10/25/22 102 kg (225 lb 6 oz)   10/19/22 100 kg (220 lb 9 6 oz)     --Beta-blocker:  Reduce carvedilol to 3 125 mg b i d  for BP room  --Diuretic:  Torsemide 40 mg daily, 20 mg in the pm  plus potassium 40 mEq daily  · 10/19: add metolazone 2 5 mg once a week on Thursday mornings before morning dose of torsemide plus an extra potassium 40 mEq with it  BMP/ magnesium this Friday ( done at [facility])   · 10/26:  Continue torsemide 40 in the morning 20 in the p m , potassium 40 daily  Increase potassium to 60 mEq on Thursdays  Recheck BMP November 4th  --ACE/ARB/ARNI:  Stop losartan for blood pressure room  --2 g sodium diet, 1800 cc fluid restriction   Daily weights, call weight gain 2-3 lb in 1 day or 5 lb in 5 days   Atrial fibrillation, persistent  onset 11/21  [id number]:   --Now on Eliquis 5 mg b i d   --Outpatient sleep study recommended  Patient declined  --On carvedilol  Went back into AFib in December 2021 in the setting of acute decompensated heart failure  Hypertension, essential  /61 On carvedilol, losartan, diuretics  She has been on carvedilol 12 5 mg b i d  reduced over time, now on 3 125 mg b i d  for blood pressure room to [facility]reduced  10/25  Will stop losartan for now, given renal function - cr 2 1  Lipids 11/13/21: LDL 93 Non [id number]  ASCVD risk score 20 4%:  High risk  She may benefit from statin therapy  This can be addressed at a future visit  CKD  Baseline unclear  Her creatinine peaked at 1 9 December 2021 when she was admitted with decompensated heart failure  It bumped to 2 1 10/21/22 shortly after starting weekly metolazone, for gross lower extremity volume overload also in setting of low BP  Obesity BMI 47  Chronic venous insufficiency Pneumatic compression pumps, per vascular  She has not been wearing them to the pain  She has not been wearing her compression stockings-unable to get them on  Recent GI bleed/gastric ulceration Anemia  She may need endoscopy by GI in the near future  She remains on Protonix  Chronic back pain with opioid dependence  Cognitive decline as reported by her   This can be evaluated by primary care  Cardiac testing  • TTE 11/14/21  EF 65%  Wall motion is normal  Grade 1 DD  Mild LVH  Mild LAE  Mild AI  Mild MR  Mild TR  • TTE 10/7/22  EF 55%  Although no diagnostic regional wall motion abnormality was identified, this possibility cannot be completely excluded on the basis of this study  LAE  Mild TR   RV cavity mildly dilated with normal function  Estimated RVSP 42 mm Hg            HPI:   Iron Woodward is a 76 yo female with morbid obesity, chronic venous insufficiency and lymphedema    She has a history of recurrent cellulitis  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  Adm 11/12-11/17/21 (5d)  CC:  Increasing shortness of breath, increasing lower extremity edema  Found to be in AFib with RVR, new onset  Diagnosed and treated for acute diastolic heart failure secondary to rapid AFib in the setting of sodium dietary indiscretion-fast food, microwave meals  In the past she was prescribed Lasix 20 mg 3 times a week but she was not taking it  She was markedly volume overloaded with weeping lower extremities  An echocardiogram showed preserved LV function, normal wall motion  Mild LVH  Mild MR mild TR  CTA showed no evidence of PE, however there was enlargement of the pulmonary trunk the main right and left pulmonary artery suggestive of pulmonary hypertension  She diuresed with Lasix 80 mg IV b i d  Lower extremity Dopplers negative for DVT   Recommended referral to lymphedema Clinic as an outpatient  DCd on coreg  6 25 mg BID  Discharge weight :  250 lb? Admission weight was 240 lb? Discharge creatinine:  1 06  Discharge diuretics: Torsemide 40 mg daily      12/2/21   office visit with her PCP found to be in acute heart failure  Hypertensive  10 lb over discharge weight, at 260 lb  Carvedilol uptitrated  Decision to reassess her renal function before adjusting diuretics    1215/21  Pt canceled cardiology OV      12/23 INR greater than 8    12/28/21 chart notes reviewed  Gait dysfunction   Worsening lower extremity edema  Back pain  leg pain  Urged by Internal Medicine go to the ED  INR not repeated, as  It was requested    ADM 12/29-1/6/22  Chief complaint :worsening shortness of breath and lower extremity swelling   Was hypoxic satting 80% on arrival  Diagnosis: acute hypoxic respiratory failure secondary to acute on chronic HF with preserved EF and right-sided pneumonia, treated with antibiotics    Also with right lower extremity cellulitis treated with antibiotics  Diuresed with IV Lasix  Followed by Cardiology  Patient developed contraction alkalosis secondary to IV diuresis  But has resolved with Diamox  She is back on torsemide 40 mg   Discharged to acute rehab  Discharge weight :  247 lb  Discharge creatinine : 0 85  Discharge diuretic: Torsemide 40 mg daily plus potassium 40 mEq daily   Discharged to [address] at [facility]     1/10/22: Weight at the facility 227 lb  O2 requirement, however higher, at 90%  Given an extra dose of torsemide 40 mg x 1  BMP ordered in 3 days  Chest x-ray ordered      1/13/22 She comes from the facility, [facility]  She is accompanied by her  who met her here today  He provided some extra history:  He has been concerned about his wife, as she appears to have memory issues  He also reports that he works 2:00 p m  to 10:00 p m , and she was in the room, with a microwave close by  She was consuming mostly microwave foods, extremely high sodium dietary intake  She was extremely sedentary given back issues and moved very little  Today he tells me she is significantly improved, she has lost nearly 50 lb, of fluid  Weights and labs are available for review She weighed 270 lb in December  Labs:  1/10/22 hemoglobin 10 6 hematocrit 33 2 platelet count 168G  Creatinine 0 8 BUN 22 potassium 4 0  ALT 10  On a no added salt diet, 1800 cc fluid restriction  O2 sat 98% on 1 L nasal cannula  /62  Today, she appears euvolemic  She does have chronic lower extremity lymphedema  She was observed falling asleep several times, as her  and I were talking    She has clear breath sounds but diminished, and heart sounds that are distant and irregular irregular but controlled  Agreeable to a sleep study and follow-up with vascular food for lymphedema/ venous compression pump follow-up  Education provided regarding salt restriction, daily weights, adherence to diet meds and office visits, etc     Follow-up with her cardiologist in 6 weeks  Interval history  3/3/22 OV Dr MAXX Solorio volume overloaded  Weight improved since her 1st hospitalization  Continue torsemide 40 mg daily  Persistent AFib, rate controlled  Noted she transitioned from Coumadin to  Eliquis  Recommend follow-up 3 months    Adm 7/28-7/30/22   Fall, ambulatory dysfunction  Missed 2 days of diuretics  Treated for acute on chronic diastolic heart failure, lower extremity cellulitis  Discharge weight 239 lb    Adm 8/20-8/30/22  CC: Mechanical fall while walking to the bathroom  Melena/UGI bleed  Diagnosed with a left femur fracture, status post ORIF left femur, IM nail 8/22/22  EGD 8/21/22: Single crated ulcer  Biopsy taken  treated with epinephrine  Restarted on Eliquis prior to discharge    Adm 2/58-1/50/42  PMH Diastolic HF, HTN,afib on eliquis, CKD, anemia, chronic venous stasis dermatitis  Chief complaint:  Hypotension, SBP 90s to 100s with dizziness, fatigue, nausea  LEXY, creatinine was 2 6, baseline 0 7-0 9  Na 130  Given gentle IV fluids  Coreg was decreased to 12 5 BID  Torsemide was resumed with the same dose 40 mg daily  Losartan held  Noted chronic anemia  Hemoglobin stable at 9 9  Last EGD 8/21/22: Single greater, benign ulcer in the antrum  Epinephrine injected  Biopsies taken  Follow-up GI  Cleared to restart Eliquis  DC wt 222 lb    Adm 10/7-10/10/22  CC: Worsening lower extremity edema, 15 lb weight gain  Admission weight 231 lb   9/17/22:  222 lb  Followed by Cardiology  Diuresed with Lasix 40 IV t i d  Persistent AFib, rate controlled anticoagulated with Eliquis  Chronic lower extremity/venous insufficiency, venous stasis, right greater than left chronically  Lymphedema pumps at home  Chronic anemia stable  Non MI troponin elevation no evidence of ACS; secondary to volume overload no angina    CKD at baseline  Given anxiety Lexapro increased to 20 mg daily  Discharge weight:  221 lb  Discharge creatinine: 0 9  Discharge diuretic: Torsemide 40 mg daily, 20 mg in the p m      10/19/22 Hospital follow-up, heart failure exacerbation  Lowest wt was 208 lb after DC from rehab  Pt declines sleep study  Currently on torsemide 40 am/ 20 pm/ losartan 50 mg/d/ coreg 12 5 bid  /69  on exam: filling up with shonda LE edema again  Pt declined utilizing compression stockings and pneumatic compression boots  Reports they are too painful  She has visiting nurses and therapies coming in  Blood work to be drawn this Friday  They help with medication administration  Plan: Add metolazone 2 5 mg once a week on Thursdays, with K 40 mEq  Decrease losartan to 25 mg/d  Watch renal function very carefully  We again reviewed the importance of a salt restricted diet  I reviewed how to read food labels to facilitate adherence   Follow-up- close interval    10/26/22  Phone call from RN at [facility] 10/25 at  [facility]iu, reporting from visiting nurses that she gained 6 lb- up to 225 5 lb in that she had bilateral lower extremity edema  She did have labs last Friday however I did not see them until just now  They were performed at [facility], brought her in for 80 mg of IV Lasix  Before giving the Lasix she was found have a blood pressure of 84/50 which we were not told about before bringing her into the office  There was lot of confusion about what medication she has actually been on  An urgent visit was scheduled with me today given that an appointment with her cardiologist in the near future was recently canceled  At the last visit a week ago I added weekly metolazone on Thursdays, along with Torsemide 40 mg / am   20 mg / pm Potassium 40 meq qd with extra 40 meq on Thursdays  Last labs were last Friday at [facility]- of which I did not receive--- 1021: Magnesium:  1 9 Creatinine 2 1, potassium 3 3    Today, blood pressure 123/71  Lower extremity edema persists    She tells me since I saw her last she had been air soup twice   She had rare TV dinner  Reinforced importance of adhering to a salt restricted diet  Today, we will continue with changes in her BP meds  She may have had reduced flow to her kidneys causing a bump in her creatinine  She only had 1 dose of metolazone 2 5, the day before the creatinine  I encouraged her to hydrate as well  Will get repeat labs this week  I will see her next week  Will also referred to Nephrology for assistance with diuresis in this regard    I have spent 40 minutes with Patient and family today in which greater than 50% of this time was spent in counseling/coordination of care regarding Intructions for management, Patient and family education, Importance of tx compliance and Risk factor reductions  Assessment  Diagnoses and all orders for this visit:    Acute on chronic diastolic heart failure ([place] [address] )  -     Basic metabolic panel; Future    Persistent atrial fibrillation (HCC)    Essential hypertension    Chronic venous insufficiency    Mixed hyperlipidemia    Prediabetes    Cardiorenal syndrome with renal failure, stage 1-4 or unspecified chronic kidney disease, with heart failure [facility])  -     Ambulatory Referral to Nephrology; Future  -     Basic metabolic panel; Future    CHF (congestive heart failure) ([facility])  -     torsemide (DEMADEX) 20 mg tablet; Take 2 tablets (40 mg total) by mouth daily 40mg (2 tablets) by mouth every morning, 20mg (1 tablet) by mouth every evening  -     potassium chloride (K-DUR,KLOR-CON) 20 mEq tablet; Take 2 tablets daily ( 40 meq) plus an extra 3 tablets-( 60 meq) on Thursdays, with metolazone    Other orders  -     Discontinue: losartan (COZAAR) 50 mg tablet;  Take 50 mg by mouth daily Taking 0 5 tablet once daily        Past Medical History:   Diagnosis Date   • A-fib ([facility] ) 12/29/2021   • Anxiety    • Arthritis    • Back pain    • Cellulitis    • CHF (congestive heart failure) (HCC)    • Colon cancer screening 8/3/2022   • Edema of both lower extremities due to peripheral venous insufficiency    • Edema of both lower extremities due to peripheral venous insufficiency    • Encounter for screening mammogram for malignant neoplasm of breast 3/21/2022   • Erythema of lower extremity 11/12/2021   • Fibromyalgia    • Great toe pain, right 10/6/2022   • Hypertension    • Hypotension 9/17/2022   • Leukocytosis 10/6/2022   • Melena 8/20/2022   • Osteoporosis screening 8/3/2022   • Sjogren syndrome, unspecified ([facility] )        Review of Systems   Constitutional: Negative for chills  Cardiovascular: Positive for dyspnea on exertion and leg swelling  Negative for chest pain, claudication, cyanosis, irregular heartbeat, near-syncope, orthopnea, palpitations, paroxysmal nocturnal dyspnea and syncope  Respiratory: Positive for sleep disturbances due to breathing and snoring  Negative for cough  Musculoskeletal:        Ambulatory dysfunction  In a wheelchair today   Gastrointestinal: Negative for heartburn and nausea  Neurological: Negative for dizziness, focal weakness, headaches, light-headedness and weakness  All other systems reviewed and are negative  No Known Allergies        Current Outpatient Medications:   •  acetaminophen (TYLENOL) 500 mg tablet, Take 1 tablet (500 mg total) by mouth every 6 (six) hours as needed for mild pain, Disp: , Rfl: 0  •  ammonium lactate (LAC-HYDRIN) 12 % cream, Apply topically 2 (two) times a day, Disp: 385 g, Rfl: 0  •  apixaban (Eliquis) 5 mg, Take 1 tablet (5 mg total) by mouth 2 (two) times a day Lot [id number] exp 10/2024, Disp: 56 tablet, Rfl: 0  •  busPIRone (BUSPAR) 5 mg tablet, Take 1 tablet (5 mg total) by mouth 3 (three) times a day, Disp: 90 tablet, Rfl: 0  •  carvedilol (COREG) 3 125 mg tablet, Take 1 tablet (3 125 mg total) by mouth 2 (two) times a day with meals, Disp: 60 tablet, Rfl: 1  •  escitalopram (LEXAPRO) 20 mg tablet, Take 1 tablet (20 mg total) by mouth daily, Disp: 30 tablet, Rfl: 1  •  metolazone (ZAROXOLYN) 2 5 mg tablet, Take 1 tablet ( 2 5 mg) by mouth once a week on Thursdays, before the morning dose of torsemide, Disp: 8 tablet, Rfl: 2  •  oxyCODONE (OxyCONTIN) 20 mg 12 hr tablet, Take 20 mg by mouth 2 (two) times a day, Disp: , Rfl:   •  pantoprazole (PROTONIX) 40 mg tablet, Take 1 tablet (40 mg total) by mouth daily, Disp: 30 tablet, Rfl: 6  •  potassium chloride (K-DUR,KLOR-CON) 20 mEq tablet, Take 2 tablets daily ( 40 meq) plus an extra 3 tablets-( 60 meq) on Thursdays, with metolazone, Disp: 180 tablet, Rfl: 1  •  TIZANIDINE HCL PO, Take 4 mg by mouth every 8 (eight) hours as needed (muscle spasms), Disp: , Rfl:   •  torsemide (DEMADEX) 20 mg tablet, Take 2 tablets (40 mg total) by mouth daily 40mg (2 tablets) by mouth every morning, 20mg (1 tablet) by mouth every evening, Disp: 180 tablet, Rfl: 0  •  polyethylene glycol (MIRALAX) 17 g packet, Take 17 g by mouth daily (Patient not taking: No sig reported), Disp: , Rfl: 0  •  senna-docusate sodium (SENOKOT S) 8 6-50 mg per tablet, Take 2 tablets by mouth 2 (two) times a day (Patient not taking: No sig reported), Disp: , Rfl: 0    Social History     Socioeconomic History   • Marital status: /Civil Union     Spouse name: Not on file   • Number of children: Not on file   • Years of education: Not on file   • Highest education level: Not on file   Occupational History   • Not on file   Tobacco Use   • Smoking status: Former Smoker     Types: Cigarettes   • Smokeless tobacco: Never Used   Vaping Use   • Vaping Use: Never used   Substance and Sexual Activity   • Alcohol use: Not Currently   • Drug use: Never   • Sexual activity: Not on file   Other Topics Concern   • Not on file   Social History Narrative   • Not on file     Social Determinants of Health     Financial Resource Strain: Not on file   Food Insecurity: No Food Insecurity   • Worried About Running Out of Food in the Last Year: Never true   • Ran Out of Food in the Last Year: Never true Transportation Needs: No Transportation Needs   • Lack of Transportation (Medical): No   • Lack of Transportation (Non-Medical): No   Physical Activity: Not on file   Stress: Not on file   Social Connections: Not on file   Intimate Partner Violence: Not on file   Housing Stability: Low Risk    • Unable to Pay for Housing in the Last Year: No   • Number of Places Lived in the Last Year: 1   • Unstable Housing in the Last Year: No       Family History   Problem Relation Age of Onset   • Heart disease Mother    • Cancer Father        Physical Exam  Vitals and nursing note reviewed  Constitutional:       General: She is not in acute distress  Appearance: She is obese  She is not diaphoretic  Comments: She is in a wheelchair  She falls asleep easily during the visit  HENT:      Head: Normocephalic and atraumatic  Eyes:      Conjunctiva/sclera: Conjunctivae normal    Cardiovascular:      Rate and Rhythm: Normal rate  Rhythm irregularly irregular  Pulses: Intact distal pulses  Heart sounds: Normal heart sounds  Pulmonary:      Effort: Pulmonary effort is normal       Breath sounds: Normal breath sounds  Abdominal:      General: Bowel sounds are normal       Palpations: Abdomen is soft  Musculoskeletal:         General: Normal range of motion  Cervical back: Normal range of motion and neck supple  Right lower leg: Edema present  Left lower leg: Edema present  Skin:     General: Skin is warm and dry  Neurological:      Mental Status: She is alert  Mental status is at baseline  Vitals: Blood pressure 123/61, pulse 77, height 5' 2" (1 575 m), weight 101 kg (223 lb), SpO2 97 %     Wt Readings from Last 3 Encounters:   10/26/22 101 kg (223 lb)   10/25/22 102 kg (225 lb 6 oz)   10/19/22 100 kg (220 lb 9 6 oz)         Labs & Results:  Lab Results   Component Value Date    WBC 7 61 10/10/2022    HGB 10 3 (L) 10/10/2022    HCT 33 3 (L) 10/10/2022    MCV 92 10/10/2022    PLT 418 (H) 10/10/2022     BNP   Date Value Ref Range Status   10/06/2022 197 (H) 0 - 100 pg/mL Final   07/28/2022 125 (H) 0 - 100 pg/mL Final     No components found for: CHEM    No results found for this or any previous visit  No results found for this or any previous visit  This note was completed in part utilizing [employer] direct voice recognition software  Grammatical errors, random word insertion, spelling mistakes, and incomplete sentences may be an occasional consequence of the system secondary to software limitations, ambient noise and hardware issues  At the time of dictation, efforts were made to edit, clarify and /or correct errors  Please read the chart carefully and recognize, using context, where substitutions have occurred    If you have any questions or concerns about the context, text or information contained within the body of this dictation, please contact myself, the provider, for further clarification

## 2022-10-25 NOTE — TELEPHONE ENCOUNTER
The script sent for coreg was for 3 125 mg and you said you decreased to 6 25 mg bid      Please advise

## 2022-10-26 ENCOUNTER — OFFICE VISIT (OUTPATIENT)
Dept: CARDIOLOGY CLINIC | Facility: CLINIC | Age: 77
End: 2022-10-26
Payer: COMMERCIAL

## 2022-10-26 VITALS
BODY MASS INDEX: 41.04 KG/M2 | SYSTOLIC BLOOD PRESSURE: 123 MMHG | HEIGHT: 62 IN | HEART RATE: 77 BPM | WEIGHT: 223 LBS | DIASTOLIC BLOOD PRESSURE: 61 MMHG | OXYGEN SATURATION: 97 %

## 2022-10-26 DIAGNOSIS — I13.0 CARDIORENAL SYNDROME WITH RENAL FAILURE, STAGE 1-4 OR UNSPECIFIED CHRONIC KIDNEY DISEASE, WITH HEART FAILURE (HCC): ICD-10-CM

## 2022-10-26 DIAGNOSIS — I50.9 CHF (CONGESTIVE HEART FAILURE) (HCC): ICD-10-CM

## 2022-10-26 DIAGNOSIS — R73.03 PREDIABETES: ICD-10-CM

## 2022-10-26 DIAGNOSIS — I10 ESSENTIAL HYPERTENSION: ICD-10-CM

## 2022-10-26 DIAGNOSIS — I87.2 CHRONIC VENOUS INSUFFICIENCY: ICD-10-CM

## 2022-10-26 DIAGNOSIS — I50.33 ACUTE ON CHRONIC DIASTOLIC HEART FAILURE (HCC): Primary | ICD-10-CM

## 2022-10-26 DIAGNOSIS — E78.2 MIXED HYPERLIPIDEMIA: ICD-10-CM

## 2022-10-26 DIAGNOSIS — I48.19 PERSISTENT ATRIAL FIBRILLATION (HCC): ICD-10-CM

## 2022-10-26 PROCEDURE — 99215 OFFICE O/P EST HI 40 MIN: CPT | Performed by: INTERNAL MEDICINE

## 2022-10-26 RX ORDER — POTASSIUM CHLORIDE 20 MEQ/1
TABLET, EXTENDED RELEASE ORAL
Qty: 180 TABLET | Refills: 1 | Status: SHIPPED | OUTPATIENT
Start: 2022-10-26 | End: 2022-10-28 | Stop reason: SDUPTHER

## 2022-10-26 RX ORDER — TORSEMIDE 20 MG/1
40 TABLET ORAL DAILY
Qty: 180 TABLET | Refills: 0 | Status: SHIPPED | OUTPATIENT
Start: 2022-10-26 | End: 2023-01-24

## 2022-10-26 RX ORDER — LOSARTAN POTASSIUM 50 MG/1
50 TABLET ORAL DAILY
COMMUNITY
End: 2022-10-26 | Stop reason: SINTOL

## 2022-10-26 NOTE — PROGRESS NOTES
Patient was found to be hypotensive upon arrival for IV lasix  bp was 84/50  She has instructions to hold losartan, decrease carvedillol to 3 125mg bid restrict sodium intake , drink a total of 50oz of fluid today  She had an appointment with Dr Fe Lockwood on Thursday that had to be rescheduled  She is coming in to see Vidya Lott on 10/26/22

## 2022-10-26 NOTE — PROGRESS NOTES
[facility]  Progress Note [facility] 1945[facility] 77 [facility]  [facility] MRN: [id number]  Unit/Bed#: W -01 Encounter: [id number]  Primary Care Provider: Gucci Rdz MD   Date and time admitted to hospital: 12/29/2021  8:10 PM    * Acute respiratory failure with hypoxia (HCC)  Assessment & Plan  Patient's O2 saturation at 80% POA  Patient complains of worsening shortness of breath both at rest and on exertion  Not on oxygen at home  Recent hospitalization for similar complaint  Plan  -BiPAP ordered, however currently weaned to 4 L O2 nasal cannula  -Continue to wean as tolerated  -maintain oxygen saturation at minimum 88%  -monitor vitals      Cellulitis of right lower extremity  Assessment & Plan  Green purulent discharge from posterior right lower extremity with erythema  Not painful  Patient believes infection of right lower extremity began roughly 1 week ago, but is unsure  Leukocytosis POA  One dose of IV cefepime provided in ED    Plan  -IV cefazolin  -monitor right lower extremity  -follow-up blood/wound cultures    Atrial fibrillation [facility])  Assessment & Plan  Recently diagnosed atrial fibrillation on warfarin  Most recent INR supratherapeutic at 8 66  INR 2 45 POA  Patient on warfarin 5 mg b i d  At home  Patient instructed to stop warfarin in setting of supratherapeutic INR  Lab Results   Component Value Date    INR 3 02 (H) 01/01/2022    INR 2 68 (H) 12/31/2021    INR 2 45 (H) 12/30/2021    PROTIME 30 7 (H) 01/01/2022    PROTIME 28 0 (H) 12/31/2021    PROTIME 26 1 (H) 12/30/2021       Plan  -Held warfarin at 2 5 mg once today in setting of supra-therapeutic INR  -F/u AM INR, ordered to restart tomorrow  -Monitor INR     LEXY (acute kidney injury) ([facility] [address] )  Assessment & Plan  Creatinine at 1 97 POA  Baseline appears to be around 1 0     Likely prerenal in setting of CHF exacerbation    Plan  -continue diuresis as above    CHF exacerbation [facility])  Assessment & Plan  Wt Readings from Last 3 Encounters:   21 122 kg (270 lb)   21 120 kg (264 lb 3 2 oz)   21 118 kg (260 lb)     BNP elevated at 1,860 POA  LEXY POA likely prerenal    Patient appears volume overloaded on exam   Chronic and worsening bilateral lower extremity edema  Dyspnea at rest and on exertion  Patient on torsemide 20 mg b i d  at home  Patient leads increasingly sedentary lifestyle which she attributes to her current state of health  Plan  -Lasix 80 mg b i d   -In setting of contraction alkalosis, 2 doses diamox ordered for today per cardiology    -Hold diuretic tomorrow pending AM labs  -monitor I/O, monitor weight  -fluid restriction 1800 mL daily, salt restriction 2 g daily  -continue telemetry      Chronic back pain  Assessment & Plan  Patient complains of chronic back pain  Because back pain, patient unable to lay flat so she sleeps in a recliner at home  Sees pain management  Plan  -Continue home pain medications          VTE Pharmacologic Prophylaxis: VTE Score: 12 High Risk (Score >/= 5) - Pharmacological DVT Prophylaxis Ordered: warfarin (Coumadin)  Sequential Compression Devices Ordered  Patient Centered Rounds: I performed bedside rounds with nursing staff today  Discussions with Specialists or Other Care Team Provider:  Cardiology    Education and Discussions with Family / Patient: Attempted to update  (daughter) via phone  Left voicemail  Current Length of Stay: 3 day(s)  Current Patient Status: Inpatient   Discharge Plan: TBD    Code Status: Level 1 - Full Code    Subjective:   No events overnight  No acute distress  Denies pain    Awake alert orient x3    Objective:     Vitals:   Temp (24hrs), Av °F (36 7 °C), Min:97 2 °F (36 2 °C), Max:98 4 °F (36 9 °C)    Temp:  [97 2 °F (36 2 °C)-98 4 °F (36 9 °C)] 98 2 °F (36 8 °C)  HR:  [53-64] 53  Resp:  [16-18] 18  BP: (131-153)/(60-72) 135/60  SpO2:  [90 %-99 %] 94 %  Body mass index is 45 97 kg/m²  Input and Output Summary (last 24 hours): Intake/Output Summary (Last 24 hours) at 1/2/2022 1326  Last data filed at 1/2/2022 1124  Gross per 24 hour   Intake 890 ml   Output 2824 ml   Net -1934 ml       Physical Exam:   Physical Exam  Vitals reviewed  Constitutional:       General: She is not in acute distress  Appearance: She is not ill-appearing or toxic-appearing  HENT:      Head: Normocephalic and atraumatic  Eyes:      Extraocular Movements: Extraocular movements intact  Conjunctiva/sclera: Conjunctivae normal    Cardiovascular:      Rate and Rhythm: Normal rate  Rhythm irregular  Pulses: Normal pulses  Heart sounds: Normal heart sounds  Pulmonary:      Effort: No respiratory distress  Breath sounds: Wheezing (Bilateral basilar expiratory wheezes) present  No rales  Comments: On 4 L O2 nasal cannula, diminished breath sounds bilaterally   Abdominal:      General: Bowel sounds are normal       Palpations: Abdomen is soft  Musculoskeletal:      Right lower leg: Edema (Nonpitting lymphedema) present  Left lower leg: Edema (Nonpitting lymphedema) present  Skin:     Findings: No erythema (Bilateral lower extremities)  Comments: Green purulent discharge from posterior aspect of right lower extremity   Neurological:      Mental Status: She is alert and oriented to person, place, and time  Mental status is at baseline  Psychiatric:         Mood and Affect: Mood normal          Behavior: Behavior normal           Additional Data:     Labs:  Results from last 7 days   Lab Units 01/02/22  0553 01/01/22  0558 12/31/21  0509   WBC Thousand/uL 9 77   < > 9 46   HEMOGLOBIN g/dL 10 1*   < > 9 8*   HEMATOCRIT % 33 7*   < > 32 0*   PLATELETS Thousands/uL 365   < > 353   NEUTROS PCT %  --   --  71   LYMPHS PCT %  --   --  10*   MONOS PCT %  --   --  13*   EOS PCT %  --   --  6    < > = values in this interval not displayed       Results from last 7 days   Lab Units 01/02/22  0553 12/29/21  2313 12/29/21  2040   SODIUM mmol/L 144   < > 134*   POTASSIUM mmol/L 3 6   < > 5 2   CHLORIDE mmol/L 100   < > 97*   CO2 mmol/L 42*   < > 27   CO2, I-STAT   --    < >  --    BUN mg/dL 14   < > 62*   CREATININE mg/dL 0 83   < > 1 97*   ANION GAP mmol/L 2*   < > 10   CALCIUM mg/dL 9 0   < > 9 6   ALBUMIN g/dL  --   --  3 4*   TOTAL BILIRUBIN mg/dL  --   --  0 42   ALK PHOS U/L  --   --  128*   ALT U/L  --   --  24   AST U/L  --   --  29   GLUCOSE RANDOM mg/dL 123   < > 122    < > = values in this interval not displayed  Results from last 7 days   Lab Units 01/02/22  0553   INR  3 39*             Results from last 7 days   Lab Units 12/31/21  0509 12/30/21  1440 12/30/21  0159   LACTIC ACID mmol/L  --   --  0 9   PROCALCITONIN ng/ml <0 05 <0 05  --        Lines/Drains:  Invasive Devices  Report    Peripheral Intravenous Line            Peripheral IV 12/29/21 Dorsal (posterior); Left Forearm 3 days    Peripheral IV 01/02/22 Right;Ventral (anterior) Forearm <1 day          Drain            External Urinary Catheter -- days                      Imaging: Reviewed radiology reports from this admission including: chest xray    Recent Cultures (last 7 days):   Results from last 7 days   Lab Units 12/30/21  0837 12/30/21  0159   BLOOD CULTURE   --  No Growth at 72 hrs  No Growth at 72 hrs     GRAM STAIN RESULT  No polys seen*  1+ Gram negative rods*  Rare Gram positive cocci in pairs*  --    WOUND CULTURE  3+ Growth of Proteus mirabilis*  3+ Growth of Enterococcus faecalis*  2+ Growth of Pseudomonas aeruginosa*  3+ Growth of   --        Last 24 Hours Medication List:   Current Facility-Administered Medications   Medication Dose Route Frequency Provider Last Rate    acetaminophen  650 mg Oral Q6H PRN Walt Herman MD      acetaZOLAMIDE  500 mg Intravenous Q6H Mick Singh MD      amLODIPine  5 mg Oral BID Kath Hughes DO      carvedilol  25 mg Oral BID With Meals Dawson Hughes DO      cefazolin  2,000 mg Intravenous [id number], MD 2,000 mg (01/02/22 0586)    nystatin   Topical BID Sole Chiu MD      ondansetron  4 mg Intravenous Q6H PRN Tank Villeda MD      oxyCODONE  20 mg Oral [facility] Dawson Shirley [place], [place]      oxyCODONE-acetaminophen  1 tablet Oral Q4H PRN Sorin Holden DO      senna  1 tablet Oral Daily MD Coretta Greene ON 1/3/2022] warfarin  2 5 mg Oral Daily (warfarin) Nicole Javed MD          Today, Patient Was Seen By: Nicole Javed MD    **Please Note: This note may have been constructed using a voice recognition system  ** No

## 2022-10-26 NOTE — PATIENT INSTRUCTIONS

## 2022-10-26 NOTE — LETTER
October 26, 2022     MD Ld [facility] [id number]    Patient: Boogie Méndez   YOB: 1945   Date of Visit: 10/26/2022       Dear Dr Benson Night:    Thank you for referring Cristiano Moran to me for evaluation  Below are my notes for this consultation  If you have questions, please do not hesitate to call me  I look forward to following your patient along with you  Sincerely,        SHAHIDA Hook        CC: Lemon Hodgkin, MD Willetta Platter, [facility]  10/26/2022 12:42 PM  Sign when Signing Visit  Cardiology  Heart Failure   Follow Up Office Visit Note     Boogie Méndez   69 y o    female   MRN: [id number]  [address]  [address]  [phone]  [phone]    PCP: Rakel Florence MD  Cardiologist :  Dr Stephanie Piña                Summary of Recommendations  Low-sodium diet, Heart failure education as below  No TV dinners or canned soup which she has been eating  Hydrate, 60 oz non caffeinated fluid a day  carvedilol reduced yesterday to 3 125 mg b i d -BP room  Losartan stopped yesterday for BP room  Continue torsemide 40 mg in the am/20mg in the pm  Continue metolazone 2 5 mg on Thursdays only, before am torsemide  continue K 40 meq/d  Increase k to 60 meq on Thurs only with the metolazone  Follow up will be scheduled with Dr Stephanie Piña       Impression/plan  Chronic diastolic heart failure  Recent ADM for decompensation 10/7-10/10/22    Her  reports she was a low of 208 lb when she got out of rehab 1/6/22  -Most recent discharge weight 221 lb 10/10/22  -They do have a scale at home, with blood pressure and heart rate, from [facility]  Wt Readings from Last 3 Encounters:   10/26/22 101 kg (223 lb)   10/25/22 102 kg (225 lb 6 oz)   10/19/22 100 kg (220 lb 9 6 oz)     --Beta-blocker:  Reduce carvedilol to 3 125 mg b i d  for BP room  --Diuretic:  Torsemide 40 mg daily, 20 mg in the pm plus potassium 40 mEq daily  · 10/19: add metolazone 2 5 mg once a week on Thursday mornings before morning dose of torsemide plus an extra potassium 40 mEq with it  BMP/ magnesium this Friday ( done at [facility])   · 10/26:  Continue torsemide 40 in the morning 20 in the p m , potassium 40 daily  Increase potassium to 60 mEq on Thursdays  Recheck BMP November 4th  --ACE/ARB/ARNI:  Stop losartan for blood pressure room  --2 g sodium diet, 1800 cc fluid restriction  Daily weights, call weight gain 2-3 lb in 1 day or 5 lb in 5 days  Atrial fibrillation, persistent  onset 11/21  [id number]:   --Now on Eliquis 5 mg b i d   --Outpatient sleep study recommended  Patient declined  --On carvedilol  Went back into AFib in December 2021 in the setting of acute decompensated heart failure  Hypertension, essential  /61 On carvedilol, losartan, diuretics  She has been on carvedilol 12 5 mg b i d  reduced over time, now on 3 125 mg b i d  for blood pressure room to [facility]-reduced  10/25  Will stop losartan for now, given renal function - cr 2 1  Lipids 11/13/21: LDL 93 Non [id number]  ASCVD risk score 20 4%:  High risk  She may benefit from statin therapy  This can be addressed at a future visit  CKD  Baseline unclear  Her creatinine peaked at 1 9 December 2021 when she was admitted with decompensated heart failure  It bumped to 2 1 10/21/22 shortly after starting weekly metolazone, for gross lower extremity volume overload also in setting of low BP  Obesity BMI 47  Chronic venous insufficiency Pneumatic compression pumps, per vascular  She has not been wearing them to the pain  She has not been wearing her compression stockings-unable to get them on  Recent GI bleed/gastric ulceration Anemia  She may need endoscopy by GI in the near future  She remains on Protonix  Chronic back pain with opioid dependence  Cognitive decline as reported by her     This can be evaluated by primary care  Cardiac testing  • TTE 11/14/21  EF 65%  Wall motion is normal  Grade 1 DD  Mild LVH  Mild LAE  Mild AI  Mild MR  Mild TR  • TTE 10/7/22  EF 55%  Although no diagnostic regional wall motion abnormality was identified, this possibility cannot be completely excluded on the basis of this study  LAE  Mild TR   RV cavity mildly dilated with normal function  Estimated RVSP 42 mm Hg            HPI:   Karen Cristina is a 76 yo female with morbid obesity, chronic venous insufficiency and lymphedema  She has a history of recurrent cellulitis  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  Adm 11/12-11/17/21 (5d)  CC:  Increasing shortness of breath, increasing lower extremity edema  Found to be in AFib with RVR, new onset  Diagnosed and treated for acute diastolic heart failure secondary to rapid AFib in the setting of sodium dietary indiscretion-fast food, microwave meals  In the past she was prescribed Lasix 20 mg 3 times a week but she was not taking it  She was markedly volume overloaded with weeping lower extremities  An echocardiogram showed preserved LV function, normal wall motion  Mild LVH  Mild MR mild TR  CTA showed no evidence of PE, however there was enlargement of the pulmonary trunk the main right and left pulmonary artery suggestive of pulmonary hypertension  She diuresed with Lasix 80 mg IV b i d  Lower extremity Dopplers negative for DVT   Recommended referral to lymphedema Clinic as an outpatient  DCd on coreg  6 25 mg BID  Discharge weight :  250 lb? Admission weight was 240 lb? Discharge creatinine:  1 06  Discharge diuretics: Torsemide 40 mg daily      12/2/21   office visit with her PCP found to be in acute heart failure  Hypertensive  10 lb over discharge weight, at 260 lb  Carvedilol uptitrated  Decision to reassess her renal function before adjusting diuretics    1215/21   Pt canceled cardiology OV      12/23 INR greater than 8    12/28/21 chart notes reviewed  Gait dysfunction   Worsening lower extremity edema  Back pain  leg pain  Urged by Internal Medicine go to the ED  INR not repeated, as  It was requested    ADM 12/29-1/6/22  Chief complaint :worsening shortness of breath and lower extremity swelling   Was hypoxic satting 80% on arrival  Diagnosis: acute hypoxic respiratory failure secondary to acute on chronic HF with preserved EF and right-sided pneumonia, treated with antibiotics  Also with right lower extremity cellulitis treated with antibiotics  Diuresed with IV Lasix  Followed by Cardiology  Patient developed contraction alkalosis secondary to IV diuresis  But has resolved with Diamox  She is back on torsemide 40 mg   Discharged to acute rehab  Discharge weight :  247 lb  Discharge creatinine : 0 85  Discharge diuretic: Torsemide 40 mg daily plus potassium 40 mEq daily   Discharged to [address] at [facility]     1/10/22: Weight at the facility 227 lb  O2 requirement, however higher, at 90%  Given an extra dose of torsemide 40 mg x 1  BMP ordered in 3 days  Chest x-ray ordered      1/13/22 She comes from the facility, [facility]  She is accompanied by her  who met her here today  He provided some extra history:  He has been concerned about his wife, as she appears to have memory issues  He also reports that he works 2:00 p m  to 10:00 p m , and she was in the room, with a microwave close by  She was consuming mostly microwave foods, extremely high sodium dietary intake  She was extremely sedentary given back issues and moved very little  Today he tells me she is significantly improved, she has lost nearly 50 lb, of fluid  Weights and labs are available for review She weighed 270 lb in December  Labs:  1/10/22 hemoglobin 10 6 hematocrit 33 2 platelet count 843I  Creatinine 0 8 BUN 22 potassium 4 0  ALT 10  On a no added salt diet, 1800 cc fluid restriction  O2 sat 98% on 1 L nasal cannula    BP 104/62  Today, she appears euvolemic  She does have chronic lower extremity lymphedema  She was observed falling asleep several times, as her  and I were talking  She has clear breath sounds but diminished, and heart sounds that are distant and irregular irregular but controlled  Agreeable to a sleep study and follow-up with vascular food for lymphedema/ venous compression pump follow-up  Education provided regarding salt restriction, daily weights, adherence to diet meds and office visits, etc     Follow-up with her cardiologist in 6 weeks  Interval history  3/3/22 OV Dr MAXX Kowalski volume overloaded  Weight improved since her 1st hospitalization  Continue torsemide 40 mg daily  Persistent AFib, rate controlled  Noted she transitioned from Coumadin to  Eliquis  Recommend follow-up 3 months    Adm 7/28-7/30/22   Fall, ambulatory dysfunction  Missed 2 days of diuretics  Treated for acute on chronic diastolic heart failure, lower extremity cellulitis  Discharge weight 239 lb    Adm 8/20-8/30/22  CC: Mechanical fall while walking to the bathroom  Melena/UGI bleed  Diagnosed with a left femur fracture, status post ORIF left femur, IM nail 8/22/22  EGD 8/21/22: Single crated ulcer  Biopsy taken  treated with epinephrine  Restarted on Eliquis prior to discharge    Adm 0/69-1/95/79  PMH Diastolic HF, HTN,afib on eliquis, CKD, anemia, chronic venous stasis dermatitis  Chief complaint:  Hypotension, SBP 90s to 100s with dizziness, fatigue, nausea  LEXY, creatinine was 2 6, baseline 0 7-0 9  Na 130  Given gentle IV fluids  Coreg was decreased to 12 5 BID  Torsemide was resumed with the same dose 40 mg daily  Losartan held  Noted chronic anemia  Hemoglobin stable at 9 9  Last EGD 8/21/22: Single greater, benign ulcer in the antrum  Epinephrine injected  Biopsies taken  Follow-up GI  Cleared to restart Eliquis  DC wt 222 lb    Adm 10/7-10/10/22  CC:   Worsening lower extremity edema, 15 lb weight gain  Admission weight 231 lb   9/17/22:  222 lb  Followed by Cardiology  Diuresed with Lasix 40 IV t i d  Persistent AFib, rate controlled anticoagulated with Eliquis  Chronic lower extremity/venous insufficiency, venous stasis, right greater than left chronically  Lymphedema pumps at home  Chronic anemia stable  Non MI troponin elevation no evidence of ACS; secondary to volume overload no angina  CKD at baseline  Given anxiety Lexapro increased to 20 mg daily  Discharge weight:  221 lb  Discharge creatinine: 0 9  Discharge diuretic: Torsemide 40 mg daily, 20 mg in the p m      10/19/22 Hospital follow-up, heart failure exacerbation  Lowest wt was 208 lb after DC from rehab  Pt declines sleep study  Currently on torsemide 40 am/ 20 pm/ losartan 50 mg/d/ coreg 12 5 bid  /69  on exam: filling up with shonda LE edema again  Pt declined utilizing compression stockings and pneumatic compression boots  Reports they are too painful  She has visiting nurses and therapies coming in  Blood work to be drawn this Friday  They help with medication administration  Plan: Add metolazone 2 5 mg once a week on Thursdays, with K 40 mEq  Decrease losartan to 25 mg/d  Watch renal function very carefully  We again reviewed the importance of a salt restricted diet  I reviewed how to read food labels to facilitate adherence   Follow-up- close interval    10/26/22  Phone call from RN at [facility] 10/25 at  [place], reporting from visiting nurses that she gained 6 lb- up to 225 5 lb in that she had bilateral lower extremity edema  She did have labs last Friday however I did not see them until just now  They were performed at [facility], brought her in for 80 mg of IV Lasix  Before giving the Lasix she was found have a blood pressure of 84/50 which we were not told about before bringing her into the office  There was lot of confusion about what medication she has actually been on    An urgent visit was scheduled with me today given that an appointment with her cardiologist in the near future was recently canceled  At the last visit a week ago I added weekly metolazone on Thursdays, along with Torsemide 40 mg / am   20 mg / pm Potassium 40 meq qd with extra 40 meq on Thursdays  Last labs were last Friday at [facility]- of which I did not receive--- 1021: Magnesium:  1 9 Creatinine 2 1, potassium 3 3    Today, blood pressure 123/71  Lower extremity edema persists  Today, we will continue with changes in her BP meds  She may have had reduced flow to her kidneys causing a bump in her creatinine  She only had 1 dose of metolazone 2 5, the day before the creatinine  I encouraged her to hydrate as well  Will get repeat labs this week  I will see her next week  Will also referred to Nephrology for assistance with diuresis in this regard    I have spent 40 minutes with Patient and family today in which greater than 50% of this time was spent in counseling/coordination of care regarding Intructions for management, Patient and family education, Importance of tx compliance and Risk factor reductions  Assessment  Diagnoses and all orders for this visit:    Acute on chronic diastolic heart failure ([facility] [address] )  -     Basic metabolic panel; Future    Persistent atrial fibrillation (HCC)    Essential hypertension    Chronic venous insufficiency    Mixed hyperlipidemia    Prediabetes    Cardiorenal syndrome with renal failure, stage 1-4 or unspecified chronic kidney disease, with heart failure [facility])  -     Ambulatory Referral to Nephrology; Future  -     Basic metabolic panel; Future    CHF (congestive heart failure) (HCC)  -     torsemide (DEMADEX) 20 mg tablet; Take 2 tablets (40 mg total) by mouth daily 40mg (2 tablets) by mouth every morning, 20mg (1 tablet) by mouth every evening  -     potassium chloride (K-DUR,KLOR-CON) 20 mEq tablet;  Take 2 tablets daily ( 40 meq) plus an extra 3 tablets-( 60 meq) on Thursdays, with metolazone    Other orders  -     Discontinue: losartan (COZAAR) 50 mg tablet; Take 50 mg by mouth daily Taking 0 5 tablet once daily        Past Medical History:   Diagnosis Date   • A-fib ([facility] ) 12/29/2021   • Anxiety    • Arthritis    • Back pain    • Cellulitis    • CHF (congestive heart failure) (HCC)    • Colon cancer screening 8/3/2022   • Edema of both lower extremities due to peripheral venous insufficiency    • Edema of both lower extremities due to peripheral venous insufficiency    • Encounter for screening mammogram for malignant neoplasm of breast 3/21/2022   • Erythema of lower extremity 11/12/2021   • Fibromyalgia    • Great toe pain, right 10/6/2022   • Hypertension    • Hypotension 9/17/2022   • Leukocytosis 10/6/2022   • Melena 8/20/2022   • Osteoporosis screening 8/3/2022   • Sjogren syndrome, unspecified ([place] [id number] )        Review of Systems   Constitutional: Negative for chills  Cardiovascular: Positive for dyspnea on exertion and leg swelling  Negative for chest pain, claudication, cyanosis, irregular heartbeat, near-syncope, orthopnea, palpitations, paroxysmal nocturnal dyspnea and syncope  Respiratory: Positive for sleep disturbances due to breathing and snoring  Negative for cough  Musculoskeletal:        Ambulatory dysfunction  In a wheelchair today   Gastrointestinal: Negative for heartburn and nausea  Neurological: Negative for dizziness, focal weakness, headaches, light-headedness and weakness  All other systems reviewed and are negative  No Known Allergies        Current Outpatient Medications:   •  acetaminophen (TYLENOL) 500 mg tablet, Take 1 tablet (500 mg total) by mouth every 6 (six) hours as needed for mild pain, Disp: , Rfl: 0  •  ammonium lactate (LAC-HYDRIN) 12 % cream, Apply topically 2 (two) times a day, Disp: 385 g, Rfl: 0  •  apixaban (Eliquis) 5 mg, Take 1 tablet (5 mg total) by mouth 2 (two) times a day Lot [id number] exp 10/2024, Disp: 56 tablet, Rfl: 0  • busPIRone (BUSPAR) 5 mg tablet, Take 1 tablet (5 mg total) by mouth 3 (three) times a day, Disp: 90 tablet, Rfl: 0  •  carvedilol (COREG) 3 125 mg tablet, Take 1 tablet (3 125 mg total) by mouth 2 (two) times a day with meals, Disp: 60 tablet, Rfl: 1  •  escitalopram (LEXAPRO) 20 mg tablet, Take 1 tablet (20 mg total) by mouth daily, Disp: 30 tablet, Rfl: 1  •  metolazone (ZAROXOLYN) 2 5 mg tablet, Take 1 tablet ( 2 5 mg) by mouth once a week on Thursdays, before the morning dose of torsemide, Disp: 8 tablet, Rfl: 2  •  oxyCODONE (OxyCONTIN) 20 mg 12 hr tablet, Take 20 mg by mouth 2 (two) times a day, Disp: , Rfl:   •  pantoprazole (PROTONIX) 40 mg tablet, Take 1 tablet (40 mg total) by mouth daily, Disp: 30 tablet, Rfl: 6  •  potassium chloride (K-DUR,KLOR-CON) 20 mEq tablet, Take 2 tablets daily ( 40 meq) plus an extra 3 tablets-( 60 meq) on Thursdays, with metolazone, Disp: 180 tablet, Rfl: 1  •  TIZANIDINE HCL PO, Take 4 mg by mouth every 8 (eight) hours as needed (muscle spasms), Disp: , Rfl:   •  torsemide (DEMADEX) 20 mg tablet, Take 2 tablets (40 mg total) by mouth daily 40mg (2 tablets) by mouth every morning, 20mg (1 tablet) by mouth every evening, Disp: 180 tablet, Rfl: 0  •  polyethylene glycol (MIRALAX) 17 g packet, Take 17 g by mouth daily (Patient not taking: No sig reported), Disp: , Rfl: 0  •  senna-docusate sodium (SENOKOT S) 8 6-50 mg per tablet, Take 2 tablets by mouth 2 (two) times a day (Patient not taking: No sig reported), Disp: , Rfl: 0    Social History     Socioeconomic History   • Marital status: /Civil Union     Spouse name: Not on file   • Number of children: Not on file   • Years of education: Not on file   • Highest education level: Not on file   Occupational History   • Not on file   Tobacco Use   • Smoking status: Former Smoker     Types: Cigarettes   • Smokeless tobacco: Never Used   Vaping Use   • Vaping Use: Never used   Substance and Sexual Activity   • Alcohol use: Not Currently   • Drug use: Never   • Sexual activity: Not on file   Other Topics Concern   • Not on file   Social History Narrative   • Not on file     Social Determinants of Health     Financial Resource Strain: Not on file   Food Insecurity: No Food Insecurity   • Worried About Running Out of Food in the Last Year: Never true   • Ran Out of Food in the Last Year: Never true   Transportation Needs: No Transportation Needs   • Lack of Transportation (Medical): No   • Lack of Transportation (Non-Medical): No   Physical Activity: Not on file   Stress: Not on file   Social Connections: Not on file   Intimate Partner Violence: Not on file   Housing Stability: Low Risk    • Unable to Pay for Housing in the Last Year: No   • Number of Places Lived in the Last Year: 1   • Unstable Housing in the Last Year: No       Family History   Problem Relation Age of Onset   • Heart disease Mother    • Cancer Father        Physical Exam  Vitals and nursing note reviewed  Constitutional:       General: She is not in acute distress  Appearance: She is obese  She is not diaphoretic  Comments: She is in a wheelchair  She falls asleep easily during the visit  HENT:      Head: Normocephalic and atraumatic  Eyes:      Conjunctiva/sclera: Conjunctivae normal    Cardiovascular:      Rate and Rhythm: Normal rate  Rhythm irregularly irregular  Pulses: Intact distal pulses  Heart sounds: Normal heart sounds  Pulmonary:      Effort: Pulmonary effort is normal       Breath sounds: Normal breath sounds  Abdominal:      General: Bowel sounds are normal       Palpations: Abdomen is soft  Musculoskeletal:         General: Normal range of motion  Cervical back: Normal range of motion and neck supple  Right lower leg: Edema present  Left lower leg: Edema present  Skin:     General: Skin is warm and dry  Neurological:      Mental Status: She is alert  Mental status is at baseline           Vitals: Blood pressure 123/61, pulse 77, height 5' 2" (1 575 m), weight 101 kg (223 lb), SpO2 97 %  Wt Readings from Last 3 Encounters:   10/26/22 101 kg (223 lb)   10/25/22 102 kg (225 lb 6 oz)   10/19/22 100 kg (220 lb 9 6 oz)         Labs & Results:  Lab Results   Component Value Date    WBC 7 61 10/10/2022    HGB 10 3 (L) 10/10/2022    HCT 33 3 (L) 10/10/2022    MCV 92 10/10/2022     (H) 10/10/2022     BNP   Date Value Ref Range Status   10/06/2022 197 (H) 0 - 100 pg/mL Final   07/28/2022 125 (H) 0 - 100 pg/mL Final     No components found for: CHEM    No results found for this or any previous visit  No results found for this or any previous visit  This note was completed in part utilizing [address] direct voice recognition software  Grammatical errors, random word insertion, spelling mistakes, and incomplete sentences may be an occasional consequence of the system secondary to software limitations, ambient noise and hardware issues  At the time of dictation, efforts were made to edit, clarify and /or correct errors  Please read the chart carefully and recognize, using context, where substitutions have occurred    If you have any questions or concerns about the context, text or information contained within the body of this dictation, please contact myself, the provider, for further clarification

## 2022-10-27 ENCOUNTER — TELEPHONE (OUTPATIENT)
Dept: NEPHROLOGY | Facility: CLINIC | Age: 77
End: 2022-10-27

## 2022-10-27 ENCOUNTER — OFFICE VISIT (OUTPATIENT)
Dept: INTERNAL MEDICINE CLINIC | Facility: CLINIC | Age: 77
End: 2022-10-27

## 2022-10-27 ENCOUNTER — TELEPHONE (OUTPATIENT)
Dept: CARDIOLOGY CLINIC | Facility: CLINIC | Age: 77
End: 2022-10-27

## 2022-10-27 VITALS
TEMPERATURE: 99.6 F | SYSTOLIC BLOOD PRESSURE: 138 MMHG | DIASTOLIC BLOOD PRESSURE: 80 MMHG | HEART RATE: 64 BPM | OXYGEN SATURATION: 95 % | HEIGHT: 62 IN | WEIGHT: 223 LBS | BODY MASS INDEX: 41.04 KG/M2

## 2022-10-27 DIAGNOSIS — I89.0 LYMPHEDEMA: ICD-10-CM

## 2022-10-27 DIAGNOSIS — I10 ESSENTIAL HYPERTENSION: ICD-10-CM

## 2022-10-27 DIAGNOSIS — R41.3 MEMORY IMPAIRMENT: ICD-10-CM

## 2022-10-27 DIAGNOSIS — I48.19 PERSISTENT ATRIAL FIBRILLATION (HCC): ICD-10-CM

## 2022-10-27 DIAGNOSIS — I50.32 CHRONIC HEART FAILURE WITH PRESERVED EJECTION FRACTION (HFPEF) (HCC): Primary | ICD-10-CM

## 2022-10-27 PROBLEM — I50.33 ACUTE ON CHRONIC DIASTOLIC HEART FAILURE (HCC): Status: RESOLVED | Noted: 2022-01-12 | Resolved: 2022-10-27

## 2022-10-27 PROBLEM — N18.31 ACUTE RENAL FAILURE SUPERIMPOSED ON STAGE 3A CHRONIC KIDNEY DISEASE (HCC): Status: ACTIVE | Noted: 2021-12-30

## 2022-10-27 NOTE — ASSESSMENT & PLAN NOTE
· Currently awaiting evaluation by geriatrics, scheduled for 11/2 as of today  · MOCA score today was 14/30, PHQ-2 was negative with a score of 1  Scanned MOCA into chart  ·  says that there are significant memory issues, but patient denies this  · Had long discussion with patient on importance of pursuing this to find the etiology of memory impairment, as it could possibly be treated or we may be able to try to slow the progression of the disease process

## 2022-10-27 NOTE — TELEPHONE ENCOUNTER
Patient's  called to clarify medication changes made at yesterday's appointment  He verbally understands to hold losartan and on thursdays after taking metolazone take an additional 60meq of potassium  All questions were answered  He will be monitoring her blood pressure and encouraging fluid intake up to 60oz/day

## 2022-10-27 NOTE — ASSESSMENT & PLAN NOTE
Wt Readings from Last 3 Encounters:   10/26/22 101 kg (223 lb)   10/25/22 102 kg (225 lb 6 oz)   10/19/22 100 kg (220 lb 9 6 oz)     · Was hospitalized from 10/06-10/10 for acute on chronic HFprEF  · Discharged on torsemide 40mg AM, 20mg PM  · Cardiology added weekly metolazone 2 5mg, decreased carvedilol to 3 125mg, and stopped losaratan  · Patient instructed to call our office or cardiology if there is any weight gain day to day

## 2022-10-27 NOTE — ASSESSMENT & PLAN NOTE
· Likely contributing to her lower extremity swelling  · Requested patient use her compression devices to assist in helping her edema

## 2022-10-27 NOTE — PROGRESS NOTES
Name: Asael Parker      : 1945      MRN: [id number]  Encounter Provider: Rachel Leon DO  Encounter Date: 10/27/2022   Encounter department: [address] [place] [address]  Chronic heart failure with preserved ejection fraction (HFpEF) (HCC)  Assessment & Plan:  Wt Readings from Last 3 Encounters:   10/26/22 101 kg (223 lb)   10/25/22 102 kg (225 lb 6 oz)   10/19/22 100 kg (220 lb 9 6 oz)     · Was hospitalized from 10/06-10/10 for acute on chronic HFprEF  · Discharged on torsemide 40mg AM, 20mg PM  · Cardiology added weekly metolazone 2 5mg, decreased carvedilol to 3 125mg, and stopped losaratan  · Patient instructed to call our office or cardiology if there is any weight gain day to day      2  Essential hypertension  Assessment & Plan:  · Losartan discontinued and carvedilol decreased to 3 125mg daily by cardiology on 10/26/22  · Also taking torsemide 40mg in AM and 20mg in PM, metolazone 2 5mg weekly      3  Persistent atrial fibrillation (HCC)  Assessment & Plan:  · Taking eliquis 5mg daily for anticoagulation  · Now on carvedilol 3 125mg for rate control, dose decreased by cardiology      4  Memory impairment  Assessment & Plan:  · Currently awaiting evaluation by geriatrics, scheduled for  as of today  · MOCA score today was 14/30, PHQ-2 was negative with a score of 1  Scanned MOCA into chart  ·  says that there are significant memory issues, but patient denies this  · Had long discussion with patient on importance of pursuing this to find the etiology of memory impairment, as it could possibly be treated or we may be able to try to slow the progression of the disease process  5  Lymphedema  Assessment & Plan:  · Likely contributing to her lower extremity swelling  · Requested patient use her compression devices to assist in helping her edema          Depression Screening and Follow-up Plan: Patient was screened for depression during today's encounter  They screened negative with a PHQ-2 score of 1  Subjective      Patient presents today to be evaluated after her hospitalization on 10/06-10/10 for acute on chronic HFprEF  Her  is accompanying her  She is feeling better now since her discharge  She denies SOB, chest pain, dizziness, trouble with urination  She says her legs are still significantly swollen  She does not use her compression devices  Her medications were recently changed by cardiology, including stopping losartan and adding metolazone weekly  She is using a wheelchair today, as she is having pain in her legs from her edema if she walks too far  She is going to schedule an appointment with nephrology and see geriatrics next week  Patient denies having any memory issues, although her  says that she does have trouble with her memory  Her lexapro was increased to 20mg daily during her last hospitalization, but she does not yet feel this has had any effect on her moods  She does get very frustrated when people suggest she has memory issues  Review of Systems   Constitutional: Negative for activity change, appetite change, fatigue and unexpected weight change  Respiratory: Negative for shortness of breath  Cardiovascular: Positive for leg swelling  Negative for chest pain  Gastrointestinal: Negative for abdominal pain, diarrhea, nausea and vomiting  Genitourinary: Negative for difficulty urinating  Musculoskeletal: Positive for arthralgias and myalgias  Skin: Negative for wound  Neurological: Negative for dizziness and weakness  Psychiatric/Behavioral: Positive for confusion         Current Outpatient Medications on File Prior to Visit   Medication Sig   • acetaminophen (TYLENOL) 500 mg tablet Take 1 tablet (500 mg total) by mouth every 6 (six) hours as needed for mild pain   • ammonium lactate (LAC-HYDRIN) 12 % cream Apply topically 2 (two) times a day   • apixaban (Eliquis) 5 mg Take 1 tablet (5 mg total) by mouth 2 (two) times a day Lot [id number] exp 10/2024   • busPIRone (BUSPAR) 5 mg tablet Take 1 tablet (5 mg total) by mouth 3 (three) times a day   • carvedilol (COREG) 3 125 mg tablet Take 1 tablet (3 125 mg total) by mouth 2 (two) times a day with meals   • escitalopram (LEXAPRO) 20 mg tablet Take 1 tablet (20 mg total) by mouth daily   • metolazone (ZAROXOLYN) 2 5 mg tablet Take 1 tablet ( 2 5 mg) by mouth once a week on Thursdays, before the morning dose of torsemide   • oxyCODONE (OxyCONTIN) 20 mg 12 hr tablet Take 20 mg by mouth 2 (two) times a day   • pantoprazole (PROTONIX) 40 mg tablet Take 1 tablet (40 mg total) by mouth daily   • potassium chloride (K-DUR,KLOR-CON) 20 mEq tablet Take 2 tablets daily ( 40 meq) plus an extra 3 tablets-( 60 meq) on Thursdays, with metolazone   • TIZANIDINE HCL PO Take 4 mg by mouth every 8 (eight) hours as needed (muscle spasms)   • torsemide (DEMADEX) 20 mg tablet Take 2 tablets (40 mg total) by mouth daily 40mg (2 tablets) by mouth every morning, 20mg (1 tablet) by mouth every evening (Patient not taking: Reported on 10/27/2022)   • [DISCONTINUED] bisacodyl (DULCOLAX) 10 mg suppository Insert 1 suppository (10 mg total) into the rectum daily as needed for constipation (Patient not taking: No sig reported)   • [DISCONTINUED] polyethylene glycol (MIRALAX) 17 g packet Take 17 g by mouth daily (Patient not taking: No sig reported)   • [DISCONTINUED] senna-docusate sodium (SENOKOT S) 8 6-50 mg per tablet Take 2 tablets by mouth 2 (two) times a day (Patient not taking: No sig reported)       Objective     /80 (BP Location: Left arm, Patient Position: Sitting, Cuff Size: Standard)   Pulse 64   Temp 99 6 °F (37 6 °C) (Tympanic)   Ht 5' 2" (1 575 m)   Wt 101 kg (223 lb)   SpO2 95%   BMI 40 79 kg/m²     Physical Exam  Constitutional:       General: She is not in acute distress  Appearance: She is obese  She is not ill-appearing     HENT:      Head: Normocephalic and atraumatic  Mouth/Throat:      Mouth: Mucous membranes are moist       Pharynx: Oropharynx is clear  Eyes:      Extraocular Movements: Extraocular movements intact  Cardiovascular:      Rate and Rhythm: Normal rate  Rhythm irregular  Heart sounds: No murmur heard  Pulmonary:      Effort: Pulmonary effort is normal  No respiratory distress  Breath sounds: No wheezing or rales  Abdominal:      General: Abdomen is flat  Palpations: Abdomen is soft  Tenderness: There is no abdominal tenderness  Musculoskeletal:      Right lower leg: Edema present  Left lower leg: Edema present  Skin:     General: Skin is warm and dry  Neurological:      Mental Status: She is alert  Mental status is at baseline     Psychiatric:         Mood and Affect: Mood normal          Behavior: Behavior normal        Tedi Bloch, DO

## 2022-10-27 NOTE — ASSESSMENT & PLAN NOTE
· Taking eliquis 5mg daily for anticoagulation  · Now on carvedilol 3 125mg for rate control, dose decreased by cardiology

## 2022-10-27 NOTE — ASSESSMENT & PLAN NOTE
· Losartan discontinued and carvedilol decreased to 3 125mg daily by cardiology on 10/26/22  · Also taking torsemide 40mg in AM and 20mg in PM, metolazone 2 5mg weekly

## 2022-10-27 NOTE — ASSESSMENT & PLAN NOTE
· Most recent BMP showing Creatine 2 13 and GFR 24  · Baseline Creatine less than 1, GFR in the 50s  · Patient and  say they will get repeat BMP and mag tomorrow 10/28

## 2022-10-28 ENCOUNTER — APPOINTMENT (OUTPATIENT)
Dept: LAB | Facility: CLINIC | Age: 77
End: 2022-10-28
Payer: COMMERCIAL

## 2022-10-28 DIAGNOSIS — I50.32 CHRONIC HEART FAILURE WITH PRESERVED EJECTION FRACTION (HFPEF) (HCC): ICD-10-CM

## 2022-10-28 DIAGNOSIS — I50.9 CHF (CONGESTIVE HEART FAILURE) (HCC): ICD-10-CM

## 2022-10-28 DIAGNOSIS — I50.33 ACUTE ON CHRONIC DIASTOLIC HEART FAILURE (HCC): ICD-10-CM

## 2022-10-28 DIAGNOSIS — I13.0 CARDIORENAL SYNDROME WITH RENAL FAILURE, STAGE 1-4 OR UNSPECIFIED CHRONIC KIDNEY DISEASE, WITH HEART FAILURE (HCC): ICD-10-CM

## 2022-10-28 LAB
ANION GAP SERPL CALCULATED.3IONS-SCNC: 9 MMOL/L (ref 4–13)
BUN SERPL-MCNC: 28 MG/DL (ref 5–25)
CALCIUM SERPL-MCNC: 9.8 MG/DL (ref 8.4–10.2)
CHLORIDE SERPL-SCNC: 91 MMOL/L (ref 96–108)
CO2 SERPL-SCNC: 38 MMOL/L (ref 21–32)
CREAT SERPL-MCNC: 1.34 MG/DL (ref 0.6–1.3)
GFR SERPL CREATININE-BSD FRML MDRD: 38 ML/MIN/1.73SQ M
GLUCOSE SERPL-MCNC: 112 MG/DL (ref 65–140)
MAGNESIUM SERPL-MCNC: 1.6 MG/DL (ref 1.9–2.7)
POTASSIUM SERPL-SCNC: 2.8 MMOL/L (ref 3.5–5.3)
SODIUM SERPL-SCNC: 138 MMOL/L (ref 135–147)

## 2022-10-28 PROCEDURE — 36415 COLL VENOUS BLD VENIPUNCTURE: CPT

## 2022-10-28 PROCEDURE — 80048 BASIC METABOLIC PNL TOTAL CA: CPT

## 2022-10-28 PROCEDURE — 83735 ASSAY OF MAGNESIUM: CPT

## 2022-10-28 RX ORDER — POTASSIUM CHLORIDE 20 MEQ/1
TABLET, EXTENDED RELEASE ORAL
Qty: 180 TABLET | Refills: 1
Start: 2022-10-28 | End: 2023-02-01 | Stop reason: SDUPTHER

## 2022-10-31 NOTE — TELEPHONE ENCOUNTER
cNew Patient Intake Form   Patient Details   Dorie Mcdonald     1945     [id number]     Insurance Information   Name of Lucina    Does the patient need an insurance referral? no   If patient has Pitney Blanquita, please ask if they will be using their Pitney Blanquita  Appointment Information   Who is calling to schedule? If not patient, what is callers name? Spouse   [facility]    Referring Provider Number [phone]   Reason for Appt (Diagnosis) Cardiorenal syndrome   Is patient aware of why they are being referred? yes   Does Patient have labs done at [place] [address]? If not, where do they go?  yes   Has patient had labs / urine work done? List date of most recent lab / urine work  yes   Has patient had a BMP & CBC done in the past 2 years? If so, list the date yes   Has patient been hospitalized recently? If yes, list name and location of hospital they were in  yes - [facility]   Has patient been seen by a Nephrologist before? If yes, list name, location and phone number no    Has patient been see by another Speciality before (ex  Neurology, urology, cardiology)? If yes, please list name, and speciality  [facility]- Dr Gurpreet Dutta   Has the patient had imaging done? If so, list the most recent date and type of imaging no   Does the patient has a stone analysis report if history of kidney stones? no    Appointment Details   Is there a referral on file?  yes    Appointment Date  12/5   Location [facility]   Miscellaneous

## 2022-11-03 ENCOUNTER — TELEPHONE (OUTPATIENT)
Dept: GERIATRICS | Age: 77
End: 2022-11-03

## 2022-11-03 NOTE — TELEPHONE ENCOUNTER
----- Message from Alie Dunbar sent at 11/2/2022  3:37 PM EDT -----  Regarding: Concerns  Hi Edwige       Patient's spouse, Deana Rowland ([phone]) called very upset because she was supposed to have consult appointment today (11/2/22 @4:00 ) and the patient was taking over an hour to get ready  Lindsey Cheatham did not want to cancelled the appointment, but he didn't know what to do  I did reschedule the appointment for April 2023  Lindsey Cheatham was very upset  I thought you may be able to give him some advice  I told him I will give the  the information       Thank you

## 2022-11-03 NOTE — TELEPHONE ENCOUNTER
LSW spoke with  Adonis Costa to offer support/resources as patient was unable to come to appointment yesterday and had to be scheduled out to April 2023   stated they have a few follow up appointments for patient's medical conditions at this time, and that PCP is following up regarding patients memory loss  LSW stated we are available if he does need resources for patient

## 2022-11-07 NOTE — CONSULTS
Consultation - [facility]  Marysol Schmitz 77 y o  female MRN: [id number]  Unit/Bed#: S -01 Encounter: [id number]        Consults    Reason for Consult / Principal Problem:  Melena    ASSESSMENT and PLAN:    Active Problems:    A-fib ([address] )    Cellulitis of lower extremity    Fall    Fracture of proximal end of left femur ([address] )    Melena    Acute pain due to trauma    #1  Melena and anemia:  Differential includes peptic ulcer disease, AVMs, less likely malignancy  Patient had acute onset of melena starting yesterday and today  Denies any NSAID use  She is on Eliquis with her last dose yesterday  Presented secondary to mechanical fall with a fracture however hemoglobin is down trending  Currently 7 5 with a baseline between 9 and 10  BUN is elevated  -NPO  -Protonix drip  -monitor hemoglobin closely  -monitor vital signs closely  -monitor stool output closely  -recommend endoscopy for further evaluation, will plan this for tomorrow morning morning after Eliquis has been held for approximately 24 to 48 hours, sooner if patient would become unstable   -please contact on-call GI for any clinical decompensation  -discussed with orthopedics  -------------------------------------------------------------------------------------------------------------------    HPI:  This is a 23-year-old female with a history of atrial fibrillation on Eliquis, CHF, lower extremity edema, fibromyalgia, hypertension, and Sjogren's disease who presented to the hospital secondary to a mechanical fall resulting in a left femur fracture  Patient also was having melena at the time of evaluation  She denies having any lightheadedness or dizziness at the time of her fall  She reports that she believes that her walker slipped  She denies any significant abdominal pain at this time  Denies any nausea or vomiting    Reports her last dose of Eliquis was yesterday but does not sure if it was in the morning or in the evening  Denies any recent excessive NSAID use  She denies ever having melena like this in the past   Denies having an endoscopy in the past   She reports having a colonoscopy in the past which was likely done with Dr Desi Peres however we do not have record of this and she states this was several years ago  Denies any unexplained weight loss  REVIEW OF SYSTEMS:    CONSTITUTIONAL: Denies any fever, chills, or rigors  Good appetite, and no recent weight loss  HEENT: No earache or tinnitus  Denies hearing loss or visual disturbances  CARDIOVASCULAR: No chest pain or palpitations  RESPIRATORY: Denies any cough, hemoptysis, shortness of breath or dyspnea on exertion  GASTROINTESTINAL: As noted in the History of Present Illness  GENITOURINARY: No problems with urination  Denies any hematuria or dysuria  NEUROLOGIC: No dizziness or vertigo, denies headaches  MUSCULOSKELETAL: Denies any muscle or joint pain  Left hip/leg pain  SKIN: Denies skin rashes or itching  ENDOCRINE: Denies excessive thirst  Denies intolerance to heat or cold  PSYCHOSOCIAL: Denies depression or anxiety  Denies any recent memory loss         Historical Information   Past Medical History:   Diagnosis Date    A-fib ([facility] ) 12/29/2021    Anxiety     Arthritis     Back pain     Cellulitis     [facility]     Edema of both lower extremities due to peripheral venous insufficiency     Edema of both lower extremities due to peripheral venous insufficiency     Erythema of lower extremity 11/12/2021    Fibromyalgia     Hypertension     Sjogren syndrome, unspecified ([facility] )      Past Surgical History:   Procedure Laterality Date    KNEE CARTILAGE SURGERY      REPLACEMENT TOTAL KNEE BILATERAL       Social History   Social History     Substance and Sexual Activity   Alcohol Use Not Currently     Social History     Substance and Sexual Activity   Drug Use Never     Social History     Tobacco Use   Smoking Status Former Smoker    Types: Cigarettes   Smokeless Tobacco Never Used     Family History   Problem Relation Age of Onset    Heart disease Mother     Cancer Father        Meds/Allergies     Medications Prior to Admission   Medication    ammonium lactate (LAC-HYDRIN) 12 % cream    apixaban (Eliquis) 5 mg    carvedilol (COREG) 25 mg tablet    hydrocortisone 2 5 % cream    lidocaine (LIDODERM) 5 %    losartan (COZAAR) 100 MG tablet    oxyCODONE (OxyCONTIN) 20 mg 12 hr tablet    potassium chloride (K-DUR,KLOR-CON) 20 mEq tablet    torsemide (DEMADEX) 20 mg tablet     Current Facility-Administered Medications   Medication Dose Route Frequency    acetaminophen (TYLENOL) tablet 975 mg  975 mg Oral Q8H [facility]    carvedilol (COREG) tablet 25 mg  25 mg Oral BID With Meals    cefTRIAXone (ROCEPHIN) IVPB (premix in dextrose) 2,000 mg 50 mL  2,000 mg Intravenous Q24H    gabapentin (NEURONTIN) capsule 100 mg  100 mg Oral TID    HYDROmorphone (DILAUDID) injection 0 5 mg  0 5 mg Intravenous Q3H PRN    lidocaine (LIDODERM) 5 % patch 1 patch  1 patch Topical Daily    methocarbamol (ROBAXIN) tablet 500 mg  500 mg Oral Q6H [facility]    multi-electrolyte (PLASMALYTE-A/ISOLYTE-S PH 7 4) IV solution  75 mL/hr Intravenous Continuous    ondansetron (ZOFRAN) injection 4 mg  4 mg Intravenous Q4H PRN    oxyCODONE (ROXICODONE) immediate release tablet 10 mg  10 mg Oral Q4H PRN    oxyCODONE (ROXICODONE) IR tablet 5 mg  5 mg Oral Q3H PRN    pantoprazole (PROTONIX) 80 mg in sodium chloride 0 9 % 100 mL infusion  8 mg/hr Intravenous Continuous    tetanus-diphtheria-acellular pertussis (BOOSTRIX) IM injection 0 5 mL  0 5 mL Intramuscular Once       No Known Allergies        Objective     Blood pressure 143/67, pulse (!) 110, temperature 99 6 °F (37 6 °C), resp  rate 18, SpO2 94 %      No intake or output data in the 24 hours ending 08/20/22 1214      PHYSICAL EXAM:      General Appearance:   Alert, cooperative, no distress, appears stated age; appears anxious, tremors    HEENT:   Normocephalic, atraumatic, anicteric, no oropharyngeal thrush present      Neck:  Supple, symmetrical, trachea midline, no adenopathy;    thyroid: no enlargement/tenderness/nodules; no carotid  bruit or JVD    Lungs:   Clear to auscultation bilaterally; no rales, rhonchi or wheezing; respirations unlabored    Heart[de-identified]   S1 and S2 normal; mild tachycardia, regular  rhythm; no murmur, rub, or gallop  Abdomen:   Soft, non-tender, obese, non-distended; normal bowel sounds; no masses, no organomegaly    Genitalia:   Deferred    Rectal:   Deferred    Extremities:  No cyanosis, clubbing; significant lower extremity edema, chronic skin changes, dry skin   Pulses:  2+ and symmetric all extremities    Skin:   pale, texture, turgor normal, no rashes or lesions    Lymph nodes:  No palpable cervical, axillary or inguinal lymphadenopathy        Lab Results:   Results from last 7 days   Lab Units 08/20/22  0955 08/20/22  0623   WBC Thousand/uL  --  9 61   HEMOGLOBIN g/dL 7 5* 8 4*   HEMATOCRIT % 24 4* 27 5*   PLATELETS Thousands/uL  --  334   NEUTROS PCT %  --  65   LYMPHS PCT %  --  21   MONOS PCT %  --  10   EOS PCT %  --  3     Results from last 7 days   Lab Units 08/20/22  0623   POTASSIUM mmol/L 3 5   CHLORIDE mmol/L 100   CO2 mmol/L 33*   BUN mg/dL 48*   CREATININE mg/dL 1 03   CALCIUM mg/dL 9 3   ALK PHOS U/L 81   ALT U/L 7   AST U/L 11*     Results from last 7 days   Lab Units 08/20/22  0623   INR  1 31*           Imaging Studies: I have personally reviewed pertinent imaging studies  CT head wo contrast    Result Date: 8/20/2022  Impression: No intracranial hemorrhage or calvarial fracture  Very mild, chronic microangiopathy Workstation performed: [id number]           Patient was seen and examined by Dr Pravin Doan  All philip medical decisions were made by Dr Pravin Doan  Thank you for allowing us to participate in the care of this present patient   We will follow-up with you closely  FAMILY HISTORY:  Mother  Still living? Yes, Estimated age: Age Unknown  Family history of hypertension, Age at diagnosis: Age Unknown

## 2022-11-11 ENCOUNTER — TELEPHONE (OUTPATIENT)
Dept: INTERNAL MEDICINE CLINIC | Facility: CLINIC | Age: 77
End: 2022-11-11

## 2022-11-14 ENCOUNTER — TELEPHONE (OUTPATIENT)
Dept: INTERNAL MEDICINE CLINIC | Facility: CLINIC | Age: 77
End: 2022-11-14

## 2022-11-14 ENCOUNTER — TELEPHONE (OUTPATIENT)
Dept: CARDIOLOGY CLINIC | Facility: CLINIC | Age: 77
End: 2022-11-14

## 2022-11-14 NOTE — TELEPHONE ENCOUNTER
Rodo Barajas has called the [facility] office wishing to speak with Dr Joseluis Farmer in regards to Nona Farmer is not in the office until the following week, 11/21/22  [address] [place] [address],[address] speaking with the  providers who are in the office  Millicent Redmond refused stating he would only speak with Dr Joseluis Farmer in regards to Nona

## 2022-11-14 NOTE — TELEPHONE ENCOUNTER
Patient's  called office , her appointment with Chris Giles had to be cancelled today because her legs hurt so bad she can't get out of the house  She refuses compression stockings and pumps  She is refusing to take the metolazone rx  Per  her vital signs are ok bp 156/72 hr 90 O2 91% , she has lost a couple pounds since yesterday  Her legs are as hard as a rock  He is asking if she should be re admitted to the hospital or rehab  Please advise

## 2022-11-14 NOTE — TELEPHONE ENCOUNTER
There is not much we can do if she does not wish to follow instructions  If they her this bad I would suggest her to be seen by somebody with therapy her PCP or back to the ER  Thank you

## 2022-11-15 ENCOUNTER — OFFICE VISIT (OUTPATIENT)
Dept: OBGYN CLINIC | Facility: CLINIC | Age: 77
End: 2022-11-15

## 2022-11-15 ENCOUNTER — HOSPITAL ENCOUNTER (OUTPATIENT)
Dept: RADIOLOGY | Facility: HOSPITAL | Age: 77
Discharge: HOME | End: 2022-11-15
Attending: ORTHOPAEDIC SURGERY

## 2022-11-15 ENCOUNTER — PATIENT OUTREACH (OUTPATIENT)
Dept: INTERNAL MEDICINE CLINIC | Facility: CLINIC | Age: 77
End: 2022-11-15

## 2022-11-15 ENCOUNTER — APPOINTMENT (OUTPATIENT)
Dept: LAB | Facility: CLINIC | Age: 77
End: 2022-11-15

## 2022-11-15 VITALS — WEIGHT: 223 LBS | HEIGHT: 62 IN | BODY MASS INDEX: 41.04 KG/M2

## 2022-11-15 DIAGNOSIS — Z98.890 STATUS POST SURGERY: ICD-10-CM

## 2022-11-15 DIAGNOSIS — I50.32 CHRONIC HEART FAILURE WITH PRESERVED EJECTION FRACTION (HFPEF) (HCC): ICD-10-CM

## 2022-11-15 DIAGNOSIS — Z98.890 STATUS POST SURGERY: Primary | ICD-10-CM

## 2022-11-15 NOTE — PROGRESS NOTES
Cardiology  Heart Failure   Follow Up Office Visit Note     Brii Earing   69 y o    female   MRN: [id number]  [address]  [address] [place] [address]  [phone]    PCP: Farida Samson MD  Cardiologist :  Dr Sergey Argueta  Patient's  called with her daily weights from home     11/10 214   11/11 216   11/12 220   11/13 218   11/14 215   11/15 210   11/16 215     Her physical therapist said she is not stepping on the scale correctly because she has a hard time getting her feet up on the scale  She will be more careful to make sure her weights are accurate  Summary of Recommendations  Low-sodium diet, Heart failure education as below  No TV dinners or canned soup which she had been eating  Recently they tell me she is been more adherent  Her  will call us with some updated weights from home, with equipment from [employer], for our chart records  Continue the current plan  Nonfasting BMP, magnesium today  Follow-up nephrology December 5th assistance with diuresis   Follow up will be scheduled with Dr Dar Chu 12/16      Impression/plan  Chronic diastolic heart failure  Recent ADM for decompensation 10/7-10/10/22  Her  reports she was a low of 208 lb when she got out of rehab 1/6/22  -Most recent discharge weight 221 lb 10/10/22  -They do have a scale at home, with blood pressure and heart rate, from [facility]  Her  is going to call us for some updated readings  Today, in the office with the assistance of 2 people, including myself her weight was 212 4 lb  Weight yesterday likely erroneous  Wt Readings from Last 3 Encounters:   11/16/22 96 3 kg (212 lb 6 4 oz)   11/15/22 101 kg (223 lb)   10/27/22 101 kg (223 lb)     --Beta-blocker: carvedilol 3 125 mg b i d    --Diuretic:  Torsemide 40 mg daily, 20 mg in the pm  plus potassium 40 mEq daily      · 10/19: add metolazone 2 5 mg once a week on Thursday mornings before morning dose of torsemide plus an extra potassium 40 mEq with it  BMP/ magnesium this Friday ( done at [facility])   · 10/26:  Continue torsemide 40 in the morning 20 in the p m , potassium 40 daily  Increase potassium to 60 mEq on Thursdays  --ACE/ARB/ARNI:  Losartan stopped for blood pressure room/LEXY  --2 g sodium diet, 1800 cc fluid restriction  Daily weights, call weight gain 2-3 lb in 1 day or 5 lb in 5 days  Atrial fibrillation, persistent  onset 11/21  [id number]  --on [address] with Eliquis 5 mg b i d   --Outpatient sleep study recommended  Patient declined  --On carvedilol  Went back into AFib in December 2021 in the setting of acute decompensated heart failure  She has been in rate controlled AFib since  Hypertension, essential  BP  139/76  On carvedilol,  diuretics  Lipids 11/13/21: LDL 93 Non [id number]  ASCVD risk score 20 4%:  High risk  · 11/10/22 atorvastatin 40 mg daily added   · Fasting lipids 8 weeks   CKD  Baseline unclear  Her creatinine peaked at 1 9 December 2021 when she was admitted with decompensated heart failure  It bumped to 2 1 10/21/22 shortly after starting weekly metolazone, for gross lower extremity volume overload also in setting of low BP  She has been referred to nephrology  Obesity BMI 47  Chronic venous insufficiency Pneumatic compression pumps, per vascular  She has not been wearing them to pain and lower extremity edema  She has not been wearing her compression stockings-unable to get them on  Recent GI bleed/gastric ulceration Anemia  She may need endoscopy by GI in the near future  She remains on Protonix  Chronic back pain with opioid dependence  Cognitive decline moderate  Per PCP  She has been referred to geriatrics  Cardiac testing  • TTE 11/14/21  EF 65%  Wall motion is normal  Grade 1 DD  Mild LVH  Mild LAE  Mild AI  Mild MR  Mild TR  • TTE 10/7/22  EF 55%  Although no diagnostic regional wall motion abnormality was identified, this possibility cannot be completely excluded on the basis of this study  LAE  Mild TR   RV cavity mildly dilated with normal function  Estimated RVSP 42 mm Hg            HPI:   Avery Oviedo is a 78 yo female with morbid obesity, chronic venous insufficiency and lymphedema  She has a history of recurrent cellulitis  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  Adm 11/12-11/17/21 (5d)  CC:  Increasing shortness of breath, increasing lower extremity edema  Found to be in AFib with RVR, new onset  Diagnosed and treated for acute diastolic heart failure secondary to rapid AFib in the setting of sodium dietary indiscretion-fast food, microwave meals  In the past she was prescribed Lasix 20 mg 3 times a week but she was not taking it  She was markedly volume overloaded with weeping lower extremities  An echocardiogram showed preserved LV function, normal wall motion  Mild LVH  Mild MR mild TR  CTA showed no evidence of PE, however there was enlargement of the pulmonary trunk the main right and left pulmonary artery suggestive of pulmonary hypertension  She diuresed with Lasix 80 mg IV b i d  Lower extremity Dopplers negative for DVT   Recommended referral to lymphedema Clinic as an outpatient  DCd on coreg  6 25 mg BID  Discharge weight :  250 lb? Admission weight was 240 lb? Discharge creatinine:  1 06  Discharge diuretics: Torsemide 40 mg daily      12/2/21   office visit with her PCP found to be in acute heart failure  Hypertensive  10 lb over discharge weight, at 260 lb  Carvedilol uptitrated  Decision to reassess her renal function before adjusting diuretics    1215/21  Pt canceled cardiology OV      12/23 INR greater than 8    12/28/21 chart notes reviewed  Gait dysfunction   Worsening lower extremity edema  Back pain  leg pain  Urged by Internal Medicine go to the ED    INR not repeated, as  It was requested    ADM 12/29-1/6/22  Chief complaint :worsening shortness of breath and lower extremity swelling   Was hypoxic satting 80% on arrival  Diagnosis: acute hypoxic respiratory failure secondary to acute on chronic HF with preserved EF and right-sided pneumonia, treated with antibiotics  Also with right lower extremity cellulitis treated with antibiotics  Diuresed with IV Lasix  Followed by Cardiology  Patient developed contraction alkalosis secondary to IV diuresis  But has resolved with Diamox  She is back on torsemide 40 mg   Discharged to acute rehab  Discharge weight :  247 lb  Discharge creatinine : 0 85  Discharge diuretic: Torsemide 40 mg daily plus potassium 40 mEq daily   Discharged to [address] at [facility]     1/10/22: Weight at the facility 227 lb  O2 requirement, however higher, at 90%  Given an extra dose of torsemide 40 mg x 1  BMP ordered in 3 days  Chest x-ray ordered      1/13/22 She comes from the facility, [facility]  She is accompanied by her  who met her here today  He provided some extra history:  He has been concerned about his wife, as she appears to have memory issues  He also reports that he works 2:00 p m  to 10:00 p m , and she was in the room, with a microwave close by  She was consuming mostly microwave foods, extremely high sodium dietary intake  She was extremely sedentary given back issues and moved very little  Today he tells me she is significantly improved, she has lost nearly 50 lb, of fluid  Weights and labs are available for review She weighed 270 lb in December  Labs:  1/10/22 hemoglobin 10 6 hematocrit 33 2 platelet count 222Y  Creatinine 0 8 BUN 22 potassium 4 0  ALT 10  On a no added salt diet, 1800 cc fluid restriction  O2 sat 98% on 1 L nasal cannula  /62  Today, she appears euvolemic  She does have chronic lower extremity lymphedema  She was observed falling asleep several times, as her  and I were talking    She has clear breath sounds but diminished, and heart sounds that are distant and irregular irregular but controlled  Agreeable to a sleep study and follow-up with vascular food for lymphedema/ venous compression pump follow-up  Education provided regarding salt restriction, daily weights, adherence to diet meds and office visits, etc     Follow-up with her cardiologist in 6 weeks  Interval history  3/3/22 OV Dr MAXX Lopez volume overloaded  Weight improved since her 1st hospitalization  Continue torsemide 40 mg daily  Persistent AFib, rate controlled  Noted she transitioned from Coumadin to  Eliquis  Recommend follow-up 3 months    Adm 7/28-7/30/22   Fall, ambulatory dysfunction  Missed 2 days of diuretics  Treated for acute on chronic diastolic heart failure, lower extremity cellulitis  Discharge weight 239 lb    Adm 8/20-8/30/22  CC: Mechanical fall while walking to the bathroom  Melena/UGI bleed  Diagnosed with a left femur fracture, status post ORIF left femur, IM nail 8/22/22  EGD 8/21/22: Single crated ulcer  Biopsy taken  treated with epinephrine  Restarted on Eliquis prior to discharge    Adm 8/16-2/49/26  PMH Diastolic HF, HTN,afib on eliquis, CKD, anemia, chronic venous stasis dermatitis  Chief complaint:  Hypotension, SBP 90s to 100s with dizziness, fatigue, nausea  LEXY, creatinine was 2 6, baseline 0 7-0 9  Na 130  Given gentle IV fluids  Coreg was decreased to 12 5 BID  Torsemide was resumed with the same dose 40 mg daily  Losartan held  Noted chronic anemia  Hemoglobin stable at 9 9  Last EGD 8/21/22: Single greater, benign ulcer in the antrum  Epinephrine injected  Biopsies taken  Follow-up GI  Cleared to restart Eliquis  DC wt 222 lb    Adm 10/7-10/10/22  CC: Worsening lower extremity edema, 15 lb weight gain  Admission weight 231 lb   9/17/22:  222 lb  Followed by Cardiology  Diuresed with Lasix 40 IV t i d    Persistent AFib, rate controlled anticoagulated with Eliquis  Chronic lower extremity/venous insufficiency, venous stasis, right greater than left chronically  Lymphedema pumps at home  Chronic anemia stable  Non MI troponin elevation no evidence of ACS; secondary to volume overload no angina  CKD at baseline  Given anxiety Lexapro increased to 20 mg daily  Discharge weight:  221 lb  Discharge creatinine: 0 9  Discharge diuretic: Torsemide 40 mg daily, 20 mg in the p m      10/19/22 Hospital follow-up, heart failure exacerbation  Lowest wt was 208 lb after DC from rehab  Pt declines sleep study  Currently on torsemide 40 am/ 20 pm/ losartan 50 mg/d/ coreg 12 5 bid  /69  on exam: filling up with shonda LE edema again  Pt declined utilizing compression stockings and pneumatic compression boots  Reports they are too painful  She has visiting nurses and therapies coming in  Blood work to be drawn this Friday  They help with medication administration  Plan: Add metolazone 2 5 mg once a week on Thursdays, with K 40 mEq  Decrease losartan to 25 mg/d  Watch renal function very carefully  We again reviewed the importance of a salt restricted diet  I reviewed how to read food labels to facilitate adherence   Follow-up- close interval    10/26/22  Phone call from RN at [facility] 10/25 at  [facility], reporting from visiting nurses that she gained 6 lb- up to 225 5 lb in that she had bilateral lower extremity edema  She did have labs last Friday however I did not see them until just now  They were performed at [facility], brought her in for 80 mg of IV Lasix  Before giving the Lasix she was found have a blood pressure of 84/50 which we were not told about before bringing her into the office  There was lot of confusion about what medication she has actually been on    An urgent visit was scheduled with me today given that an appointment with her cardiologist in the near future was recently canceled  At the last visit a week ago I added weekly metolazone on Thursdays, along with Torsemide 40 mg / am   20 mg / pm Potassium 40 meq qd with extra 40 meq on Thursdays  Last labs were last Friday at [facility]- of which I did not receive--- 1021: Magnesium:  1 9 Creatinine 2 1, potassium 3 3    Today, blood pressure 123/71  Lower extremity edema persists  She tells me since I saw her last she had been air soup twice  She had rare TV dinner  Reinforced importance of adhering to a salt restricted diet  Today, we will continue with changes in her BP meds  She may have had reduced flow to her kidneys causing a bump in her creatinine  She only had 1 dose of metolazone 2 5, the day before the creatinine  I encouraged her to hydrate as well  Will get repeat labs this week  I will see her next week  Will also referred to Nephrology for assistance with diuresis in this regard    10/28/22  Labs:  Creatinine 1 34  BUN 28  Potassium 2 8  Magnesium 1 6  Plan: Increase potassium to 60 mEq daily plus an extra 20 mEq on Thursdays with metolazone  Repeat BMP scheduled in 1 wk      11/10/22  Hospital follow-up  Several appointments were canceled  She is with her  per her usual     She is in a wheelchair  Ambulation is difficult  Chronic GONZALEZ per her usual  I weighed her with the assistance of another, 212 4  This is the lowest she is been in some time  She did miss last week's dose of metolazone  Unfortunately, she remains volume overload/with significant lymphedema  Unable to get labs yesterday   Will repeat labs today, nonfasting BMP magnesium today  Continue the current plan   I reinforced importance of adhering to a salt restricted diet  She  had an appointment yesterday with orthopedics regarding her ankle  She was advised to try to use the compression stockings and the boots  She finds it difficult due to her significant edema/  lymphedema  She has follow-ups appointment scheduled in the near future    She will follow with me p r n       I have spent 25 minutes with Patient and family today in which greater than 50% of this time was spent in counseling/coordination of care regarding Intructions for management, Patient and family education, Importance of tx compliance and Risk factor reductions  Assessment  Diagnoses and all orders for this visit:    Chronic heart failure with preserved ejection fraction (HFpEF) ([place] [address] )  -     Basic metabolic panel; Future  -     Magnesium; Future    Persistent atrial fibrillation (HCC)    Chronic venous insufficiency    Essential hypertension    Stage 3 chronic kidney disease, unspecified whether stage 3a or 3b CKD (HCC)    Ambulatory dysfunction    Lymphedema    Mixed hyperlipidemia    Prediabetes    CHF (congestive heart failure) ([place] [address] )        Past Medical History:   Diagnosis Date   • A-fib ([place] [address] ) 12/29/2021   • Anxiety    • Arthritis    • Back pain    • Cellulitis    • CHF (congestive heart failure) ([place] [address] )    • Colon cancer screening 8/3/2022   • Edema of both lower extremities due to peripheral venous insufficiency    • Edema of both lower extremities due to peripheral venous insufficiency    • Encounter for screening mammogram for malignant neoplasm of breast 3/21/2022   • Erythema of lower extremity 11/12/2021   • Fibromyalgia    • Great toe pain, right 10/6/2022   • Hypertension    • Hypotension 9/17/2022   • Leukocytosis 10/6/2022   • Melena 8/20/2022   • Osteoporosis screening 8/3/2022   • Sjogren syndrome, unspecified ([place] [address] )        Review of Systems   Constitutional: Positive for weight loss  Negative for chills  Cardiovascular: Positive for dyspnea on exertion  Negative for chest pain, claudication, cyanosis, irregular heartbeat, leg swelling, near-syncope, orthopnea, palpitations, paroxysmal nocturnal dyspnea and syncope  Respiratory: Negative for cough and shortness of breath  Musculoskeletal:        Gait dysfunction   Gastrointestinal: Negative for heartburn and nausea  Neurological: Positive for weakness   Negative for dizziness, focal weakness, headaches and light-headedness  All other systems reviewed and are negative  No Known Allergies        Current Outpatient Medications:   •  acetaminophen (TYLENOL) 500 mg tablet, Take 1 tablet (500 mg total) by mouth every 6 (six) hours as needed for mild pain, Disp: , Rfl: 0  •  ammonium lactate (LAC-HYDRIN) 12 % cream, Apply topically 2 (two) times a day, Disp: 385 g, Rfl: 0  •  apixaban (Eliquis) 5 mg, Take 1 tablet (5 mg total) by mouth 2 (two) times a day Lot [id number] exp 10/2024, Disp: 56 tablet, Rfl: 0  •  busPIRone (BUSPAR) 5 mg tablet, TAKE ONE TABLET BY MOUTH THREE TIMES DAILY, Disp: 90 tablet, Rfl: 2  •  carvedilol (COREG) 3 125 mg tablet, Take 1 tablet (3 125 mg total) by mouth 2 (two) times a day with meals, Disp: 60 tablet, Rfl: 1  •  escitalopram (LEXAPRO) 20 mg tablet, Take 1 tablet (20 mg total) by mouth daily, Disp: 30 tablet, Rfl: 1  •  metolazone (ZAROXOLYN) 2 5 mg tablet, Take 1 tablet ( 2 5 mg) by mouth once a week on Thursdays, before the morning dose of torsemide, Disp: 8 tablet, Rfl: 2  •  oxyCODONE (OxyCONTIN) 20 mg 12 hr tablet, Take 20 mg by mouth 2 (two) times a day, Disp: , Rfl:   •  pantoprazole (PROTONIX) 40 mg tablet, Take 1 tablet (40 mg total) by mouth daily, Disp: 30 tablet, Rfl: 6  •  potassium chloride (K-DUR,KLOR-CON) 20 mEq tablet, Take 3 tablets daily ( 60 meq) plus an extra 20 meq on thursdays with metolazone, Disp: 180 tablet, Rfl: 1  •  TIZANIDINE HCL PO, Take 4 mg by mouth every 8 (eight) hours as needed (muscle spasms), Disp: , Rfl:   •  torsemide (DEMADEX) 20 mg tablet, Take 2 tablets (40 mg total) by mouth daily 40mg (2 tablets) by mouth every morning, 20mg (1 tablet) by mouth every evening, Disp: 180 tablet, Rfl: 0    Social History     Socioeconomic History   • Marital status: /Civil Union     Spouse name: Not on file   • Number of children: Not on file   • Years of education: Not on file   • Highest education level: Not on file   Occupational History   • Not on file   Tobacco Use   • Smoking status: Former     Types: Cigarettes   • Smokeless tobacco: Never   Vaping Use   • Vaping Use: Never used   Substance and Sexual Activity   • Alcohol use: Not Currently   • Drug use: Never   • Sexual activity: Not on file   Other Topics Concern   • Not on file   Social History Narrative   • Not on file     Social Determinants of Health     Financial Resource Strain: Not on file   Food Insecurity: No Food Insecurity   • Worried About Running Out of Food in the Last Year: Never true   • Ran Out of Food in the Last Year: Never true   Transportation Needs: No Transportation Needs   • Lack of Transportation (Medical): No   • Lack of Transportation (Non-Medical): No   Physical Activity: Not on file   Stress: Not on file   Social Connections: Not on file   Intimate Partner Violence: Not on file   Housing Stability: Low Risk    • Unable to Pay for Housing in the Last Year: No   • Number of Places Lived in the Last Year: 1   • Unstable Housing in the Last Year: No       Family History   Problem Relation Age of Onset   • Heart disease Mother    • Cancer Father        Physical Exam  Vitals and nursing note reviewed  Constitutional:       General: She is not in acute distress  Appearance: She is obese  She is not diaphoretic  HENT:      Head: Normocephalic and atraumatic  Eyes:      Conjunctiva/sclera: Conjunctivae normal    Cardiovascular:      Rate and Rhythm: Normal rate  Rhythm irregularly irregular  Pulses: Intact distal pulses  Heart sounds: Normal heart sounds  Pulmonary:      Effort: Pulmonary effort is normal       Breath sounds: Normal breath sounds  Abdominal:      General: Bowel sounds are normal       Palpations: Abdomen is soft  Musculoskeletal:         General: Normal range of motion  Cervical back: Normal range of motion and neck supple  Right lower leg: Edema present  Left lower leg: Edema present     Skin:     General: Skin is warm and dry  Neurological:      Mental Status: She is alert and oriented to person, place, and time  Mental status is at baseline  Vitals: Blood pressure 139/76, pulse 95, height 5' 2" (1 575 m), weight 96 3 kg (212 lb 6 4 oz), SpO2 93 %  Wt Readings from Last 3 Encounters:   11/16/22 96 3 kg (212 lb 6 4 oz)   11/15/22 101 kg (223 lb)   10/27/22 101 kg (223 lb)         Labs & Results:  Lab Results   Component Value Date    WBC 7 61 10/10/2022    HGB 10 3 (L) 10/10/2022    HCT 33 3 (L) 10/10/2022    MCV 92 10/10/2022     (H) 10/10/2022     BNP   Date Value Ref Range Status   10/06/2022 197 (H) 0 - 100 pg/mL Final   07/28/2022 125 (H) 0 - 100 pg/mL Final     No components found for: CHEM    No results found for this or any previous visit  No results found for this or any previous visit  This note was completed in part utilizing [employer] fluency direct voice recognition software  Grammatical errors, random word insertion, spelling mistakes, and incomplete sentences may be an occasional consequence of the system secondary to software limitations, ambient noise and hardware issues  At the time of dictation, efforts were made to edit, clarify and /or correct errors  Please read the chart carefully and recognize, using context, where substitutions have occurred    If you have any questions or concerns about the context, text or information contained within the body of this dictation, please contact myself, the provider, for further clarification

## 2022-11-15 NOTE — PROGRESS NOTES
Chart review done  Pt being followed by Janine Yan  Survfloridalence episode closed   RN CM removed from care team

## 2022-11-15 NOTE — PROGRESS NOTES
Assessment:  1  Status post surgery  XR femur 2 vw left       Plan:    • Patient is  12 weeks S/P intramedullary nailing of left periprosthetic distal femur fracture, DOS 8/22/22 and IT band tightness   • Weight bearing as tolerated left lower extremity   • Recommended patient to follow up with Vascular for yearly exam   • Also recommended patient to wear compression boots to help decrease swelling and to prevent DVT and also to help with IT band tightness   • She has an appointment with Cardiology tomorrow   • Continue with current pain regimen per pain management   • Repeat x-rays at the next office visit  • Follow up 9 months         The above stated was discussed in layman's terms and the patient expressed understanding  All questions were answered to the patient's satisfaction  Subjective:   Ang Fiore is a 69 y o  female who presents today 12 weeks S/P intramedullary nailing of left periprosthetic distal femur fracture, DOS 8/22/22  Patient is present in a wheelchair today  She states she does walk with assistance of a walker  She states she has been having pain over the lateral aspect of the left thigh  Patient does have history of bilateral extremity cellulitis and was treating with vascular who recommended wearing compression socks and boots to help with the decrease swelling  She was recently in the hospital for acute heart failure and seeing Cardiology tomorrow  She is taking oxycodone 20 mg and tizanidine prescribed by her pain management physician  She is also taking Tylenol as needed for pain relief        Review of systems negative unless otherwise specified in HPI    Past Medical History:   Diagnosis Date   • A-fib ([facility] [address] ) 12/29/2021   • Anxiety    • Arthritis    • Back pain    • Cellulitis    • CHF (congestive heart failure) ([facility])    • Colon cancer screening 8/3/2022   • Edema of both lower extremities due to peripheral venous insufficiency    • Edema of both lower extremities due to peripheral venous insufficiency    • Encounter for screening mammogram for malignant neoplasm of breast 3/21/2022   • Erythema of lower extremity 11/12/2021   • Fibromyalgia    • Great toe pain, right 10/6/2022   • Hypertension    • Hypotension 9/17/2022   • Leukocytosis 10/6/2022   • Melena 8/20/2022   • Osteoporosis screening 8/3/2022   • Sjogren syndrome, unspecified ([address] )        Past Surgical History:   Procedure Laterality Date   • KNEE CARTILAGE SURGERY     • DE OPEN RX FEMUR FX+INTRAMED SHAN Left 8/22/2022    Procedure: LEFT RETROGRADE IM SHAN;  Surgeon: Nickie Rivas MD;  Location: [facility];  Service: Orthopedics   • REPLACEMENT TOTAL KNEE BILATERAL         Family History   Problem Relation Age of Onset   • Heart disease Mother    • Cancer Father        Social History     Occupational History   • Not on file   Tobacco Use   • Smoking status: Former Smoker     Types: Cigarettes   • Smokeless tobacco: Never Used   Vaping Use   • Vaping Use: Never used   Substance and Sexual Activity   • Alcohol use: Not Currently   • Drug use: Never   • Sexual activity: Not on file         Current Outpatient Medications:   •  acetaminophen (TYLENOL) 500 mg tablet, Take 1 tablet (500 mg total) by mouth every 6 (six) hours as needed for mild pain, Disp: , Rfl: 0  •  ammonium lactate (LAC-HYDRIN) 12 % cream, Apply topically 2 (two) times a day, Disp: 385 g, Rfl: 0  •  busPIRone (BUSPAR) 5 mg tablet, TAKE ONE TABLET BY MOUTH THREE TIMES DAILY, Disp: 90 tablet, Rfl: 2  •  carvedilol (COREG) 3 125 mg tablet, Take 1 tablet (3 125 mg total) by mouth 2 (two) times a day with meals, Disp: 60 tablet, Rfl: 1  •  escitalopram (LEXAPRO) 20 mg tablet, Take 1 tablet (20 mg total) by mouth daily, Disp: 30 tablet, Rfl: 1  •  metolazone (ZAROXOLYN) 2 5 mg tablet, Take 1 tablet ( 2 5 mg) by mouth once a week on Thursdays, before the morning dose of torsemide, Disp: 8 tablet, Rfl: 2  •  oxyCODONE (OxyCONTIN) 20 mg 12 hr tablet, Take 20 mg by mouth 2 (two) times a day, Disp: , Rfl:   •  pantoprazole (PROTONIX) 40 mg tablet, Take 1 tablet (40 mg total) by mouth daily, Disp: 30 tablet, Rfl: 6  •  potassium chloride (K-DUR,KLOR-CON) 20 mEq tablet, Take 3 tablets daily ( 60 meq) plus an extra 20 meq on thursdays with metolazone, Disp: 180 tablet, Rfl: 1  •  TIZANIDINE HCL PO, Take 4 mg by mouth every 8 (eight) hours as needed (muscle spasms), Disp: , Rfl:   •  apixaban (Eliquis) 5 mg, Take 1 tablet (5 mg total) by mouth 2 (two) times a day Lot [id number] exp 10/2024, Disp: 56 tablet, Rfl: 0  •  torsemide (DEMADEX) 20 mg tablet, Take 2 tablets (40 mg total) by mouth daily 40mg (2 tablets) by mouth every morning, 20mg (1 tablet) by mouth every evening (Patient not taking: No sig reported), Disp: 180 tablet, Rfl: 0    No Known Allergies         Vitals:       Objective:            Physical Exam  Physical Exam:      General Appearance:    Alert, cooperative, no distress, appears stated age   Head:    Normocephalic, without obvious abnormality, atraumatic   Eyes:    conjunctiva/corneas clear, both eyes         Nose:   Nares normal, septum midline, no drainage    Throat:   Lips normal; teeth and gums normal   Neck:    symmetrical, trachea midline, ;     thyroid:  no enlargement/   Back:     Symmetric, no curvature, ROM normal   Lungs:   No audible wheezing or labored breathing   Chest Wall:    No tenderness or deformity    Heart:    Regular rate and rhythm                         Pulses:   2+ and symmetric all extremities   Skin:   Skin color, texture, turgor normal, no rashes or lesions   Neurologic:   normal strength, sensation and reflexes     throughout                       Ortho Exam    Left lower eternity   Surgical incision well healed   No erythema   Limited internal rotation and external rotation   Neurovascularly Intact Distally     Bilateral lower extremity cellulitis, no pitting edema , some blanching     Diagnostics, reviewed and taken today if performed as documented: The attending physician has personally reviewed the pertinent films in PACS and interpretation is as follows:  X-ray left femur demonstrates status post IM nailing, no evidence of hardware failure, fracture alignment maintained , callus formation noted, arthritis over  ipsilateral hip       Procedures, if performed today:    Procedures    None performed      Scribe Attestation    I,:   am acting as a scribe while in the presence of the attending physician :       I,:   personally performed the services described in this documentation    as scribed in my presence :             Portions of the record may have been created with voice recognition software  Occasional wrong word or "sound a like" substitutions may have occurred due to the inherent limitations of voice recognition software  Read the chart carefully and recognize, using context, where substitutions have occurred

## 2022-11-16 ENCOUNTER — OFFICE VISIT (OUTPATIENT)
Dept: CARDIOLOGY CLINIC | Facility: CLINIC | Age: 77
End: 2022-11-16

## 2022-11-16 ENCOUNTER — APPOINTMENT (OUTPATIENT)
Dept: LAB | Facility: CLINIC | Age: 77
End: 2022-11-16

## 2022-11-16 VITALS
DIASTOLIC BLOOD PRESSURE: 76 MMHG | OXYGEN SATURATION: 93 % | BODY MASS INDEX: 39.08 KG/M2 | WEIGHT: 212.4 LBS | SYSTOLIC BLOOD PRESSURE: 139 MMHG | HEART RATE: 95 BPM | HEIGHT: 62 IN

## 2022-11-16 DIAGNOSIS — R73.03 PREDIABETES: ICD-10-CM

## 2022-11-16 DIAGNOSIS — I87.2 CHRONIC VENOUS INSUFFICIENCY: ICD-10-CM

## 2022-11-16 DIAGNOSIS — I48.19 PERSISTENT ATRIAL FIBRILLATION (HCC): ICD-10-CM

## 2022-11-16 DIAGNOSIS — I50.9 CHF (CONGESTIVE HEART FAILURE) (HCC): ICD-10-CM

## 2022-11-16 DIAGNOSIS — I50.32 CHRONIC HEART FAILURE WITH PRESERVED EJECTION FRACTION (HFPEF) (HCC): ICD-10-CM

## 2022-11-16 DIAGNOSIS — N18.30 STAGE 3 CHRONIC KIDNEY DISEASE, UNSPECIFIED WHETHER STAGE 3A OR 3B CKD (HCC): ICD-10-CM

## 2022-11-16 DIAGNOSIS — I89.0 LYMPHEDEMA: ICD-10-CM

## 2022-11-16 DIAGNOSIS — I50.32 CHRONIC HEART FAILURE WITH PRESERVED EJECTION FRACTION (HFPEF) (HCC): Primary | ICD-10-CM

## 2022-11-16 DIAGNOSIS — R26.2 AMBULATORY DYSFUNCTION: ICD-10-CM

## 2022-11-16 DIAGNOSIS — I50.32 CHRONIC HEART FAILURE WITH PRESERVED EJECTION FRACTION (HCC): ICD-10-CM

## 2022-11-16 DIAGNOSIS — E78.2 MIXED HYPERLIPIDEMIA: ICD-10-CM

## 2022-11-16 DIAGNOSIS — I10 ESSENTIAL HYPERTENSION: ICD-10-CM

## 2022-11-16 LAB
ANION GAP SERPL CALCULATED.3IONS-SCNC: 7 MMOL/L (ref 4–13)
BUN SERPL-MCNC: 13 MG/DL (ref 5–25)
CALCIUM SERPL-MCNC: 9.2 MG/DL (ref 8.4–10.2)
CHLORIDE SERPL-SCNC: 96 MMOL/L (ref 96–108)
CO2 SERPL-SCNC: 35 MMOL/L (ref 21–32)
CREAT SERPL-MCNC: 1.11 MG/DL (ref 0.6–1.3)
GFR SERPL CREATININE-BSD FRML MDRD: 48 ML/MIN/1.73SQ M
GLUCOSE SERPL-MCNC: 113 MG/DL (ref 65–140)
MAGNESIUM SERPL-MCNC: 1.7 MG/DL (ref 1.9–2.7)
POTASSIUM SERPL-SCNC: 3.4 MMOL/L (ref 3.5–5.3)
SODIUM SERPL-SCNC: 138 MMOL/L (ref 135–147)

## 2022-11-16 NOTE — LETTER
November 16, 2022     MD [address] [facility] [phone]    Patient: Azucena Tanner   YOB: 1945   Date of Visit: 11/16/2022       Dear Dr Beck Blanco:    Thank you for referring Sunil Grimm to me for evaluation  Below are my notes for this consultation  If you have questions, please do not hesitate to call me  I look forward to following your patient along with you  Sincerely,        SHAHIDA Jackson        CC: MD [place], [place]  11/16/2022  9:52 AM  Sign when Signing Visit  Cardiology  Heart Failure   Follow Up Office Visit Note     Azucena Tanner   69 y o    female   MRN: [id number]  [address]  [address] [facility] [address] [facility] [address] [place] [address]  [phone]    PCP: Rosemary Hanna MD  Cardiologist :  Dr Sarai Hsieh                Summary of Recommendations  Low-sodium diet, Heart failure education as below  No TV dinners or canned soup which she had been eating  Recently they tell me she is been more adherent  Her  will call us with some updated weights from home, with equipment from [employer], for our chart records  Continue the current plan  Nonfasting BMP, magnesium today  Follow-up nephrology December 5th assistance with diuresis   Follow up will be scheduled with Dr Sarai Hsieh 12/16      Impression/plan  Chronic diastolic heart failure  Recent ADM for decompensation 10/7-10/10/22  Her  reports she was a low of 208 lb when she got out of rehab 1/6/22  -Most recent discharge weight 221 lb 10/10/22  -They do have a scale at home, with blood pressure and heart rate, from [facility]  Her  is going to call us for some updated readings  Today, in the office with the assistance of 2 people, including myself her weight was 212 4 lb    Weight yesterday likely erroneous  Wt Readings from Last 3 Encounters:   11/16/22 96 3 kg (212 lb 6 4 oz)   11/15/22 101 kg (223 lb) 10/27/22 101 kg (223 lb)     --Beta-blocker: carvedilol 3 125 mg b i d    --Diuretic:  Torsemide 40 mg daily, 20 mg in the pm  plus potassium 40 mEq daily  · 10/19: add metolazone 2 5 mg once a week on Thursday mornings before morning dose of torsemide plus an extra potassium 40 mEq with it  BMP/ magnesium this Friday ( done at [facility])   · 10/26:  Continue torsemide 40 in the morning 20 in the p m , potassium 40 daily  Increase potassium to 60 mEq on Thursdays  --ACE/ARB/ARNI:  Losartan stopped for blood pressure room/LEXY  --2 g sodium diet, 1800 cc fluid restriction  Daily weights, call weight gain 2-3 lb in 1 day or 5 lb in 5 days  Atrial fibrillation, persistent  onset 11/21  [id number]  --on [address] [place] [address] Road with Eliquis 5 mg b i d   --Outpatient sleep study recommended  Patient declined  --On carvedilol  Went back into AFib in December 2021 in the setting of acute decompensated heart failure  She has been in rate controlled AFib since  Hypertension, essential  BP  139/76  On carvedilol,  diuretics  Lipids 11/13/21: LDL 93 Non [id number]  ASCVD risk score 20 4%:  High risk  · 11/10/22 atorvastatin 40 mg daily added   · Fasting lipids 8 weeks   CKD  Baseline unclear  Her creatinine peaked at 1 9 December 2021 when she was admitted with decompensated heart failure  It bumped to 2 1 10/21/22 shortly after starting weekly metolazone, for gross lower extremity volume overload also in setting of low BP  She has been referred to nephrology  Obesity BMI 47  Chronic venous insufficiency Pneumatic compression pumps, per vascular  She has not been wearing them to pain and lower extremity edema  She has not been wearing her compression stockings-unable to get them on  Recent GI bleed/gastric ulceration Anemia  She may need endoscopy by GI in the near future  She remains on Protonix  Chronic back pain with opioid dependence  Cognitive decline moderate  Per PCP    She has been referred to geriatrics  Cardiac testing  • TTE 11/14/21  EF 65%  Wall motion is normal  Grade 1 DD  Mild LVH  Mild LAE  Mild AI  Mild MR  Mild TR  • TTE 10/7/22  EF 55%  Although no diagnostic regional wall motion abnormality was identified, this possibility cannot be completely excluded on the basis of this study  LAE  Mild TR   RV cavity mildly dilated with normal function  Estimated RVSP 42 mm Hg            HPI:   Cameron Aguirre is a 76 yo female with morbid obesity, chronic venous insufficiency and lymphedema  She has a history of recurrent cellulitis  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  Adm 11/12-11/17/21 (5d)  CC:  Increasing shortness of breath, increasing lower extremity edema  Found to be in AFib with RVR, new onset  Diagnosed and treated for acute diastolic heart failure secondary to rapid AFib in the setting of sodium dietary indiscretion-fast food, microwave meals  In the past she was prescribed Lasix 20 mg 3 times a week but she was not taking it  She was markedly volume overloaded with weeping lower extremities  An echocardiogram showed preserved LV function, normal wall motion  Mild LVH  Mild MR mild TR  CTA showed no evidence of PE, however there was enlargement of the pulmonary trunk the main right and left pulmonary artery suggestive of pulmonary hypertension  She diuresed with Lasix 80 mg IV b i d  Lower extremity Dopplers negative for DVT   Recommended referral to lymphedema Clinic as an outpatient  DCd on coreg  6 25 mg BID  Discharge weight :  250 lb? Admission weight was 240 lb? Discharge creatinine:  1 06  Discharge diuretics: Torsemide 40 mg daily      12/2/21   office visit with her PCP found to be in acute heart failure  Hypertensive  10 lb over discharge weight, at 260 lb  Carvedilol uptitrated  Decision to reassess her renal function before adjusting diuretics    1215/21   Pt canceled cardiology OV      12/23 INR greater than 8    12/28/21 chart notes reviewed  Gait dysfunction   Worsening lower extremity edema  Back pain  leg pain  Urged by Internal Medicine go to the ED  INR not repeated, as  It was requested    ADM 12/29-1/6/22  Chief complaint :worsening shortness of breath and lower extremity swelling   Was hypoxic satting 80% on arrival  Diagnosis: acute hypoxic respiratory failure secondary to acute on chronic HF with preserved EF and right-sided pneumonia, treated with antibiotics  Also with right lower extremity cellulitis treated with antibiotics  Diuresed with IV Lasix  Followed by Cardiology  Patient developed contraction alkalosis secondary to IV diuresis  But has resolved with Diamox  She is back on torsemide 40 mg   Discharged to acute rehab  Discharge weight :  247 lb  Discharge creatinine : 0 85  Discharge diuretic: Torsemide 40 mg daily plus potassium 40 mEq daily   Discharged to [address] at [facility]     1/10/22: Weight at the facility 227 lb  O2 requirement, however higher, at 90%  Given an extra dose of torsemide 40 mg x 1  BMP ordered in 3 days  Chest x-ray ordered      1/13/22 She comes from the facility, [facility]  She is accompanied by her  who met her here today  He provided some extra history:  He has been concerned about his wife, as she appears to have memory issues  He also reports that he works 2:00 p m  to 10:00 p m , and she was in the room, with a microwave close by  She was consuming mostly microwave foods, extremely high sodium dietary intake  She was extremely sedentary given back issues and moved very little  Today he tells me she is significantly improved, she has lost nearly 50 lb, of fluid  Weights and labs are available for review She weighed 270 lb in December  Labs:  1/10/22 hemoglobin 10 6 hematocrit 33 2 platelet count 863B  Creatinine 0 8 BUN 22 potassium 4 0  ALT 10  On a no added salt diet, 1800 cc fluid restriction  O2 sat 98% on 1 L nasal cannula    BP 104/62  Today, she appears euvolemic  She does have chronic lower extremity lymphedema  She was observed falling asleep several times, as her  and I were talking  She has clear breath sounds but diminished, and heart sounds that are distant and irregular irregular but controlled  Agreeable to a sleep study and follow-up with vascular food for lymphedema/ venous compression pump follow-up  Education provided regarding salt restriction, daily weights, adherence to diet meds and office visits, etc     Follow-up with her cardiologist in 6 weeks  Interval history  3/3/22 OV Dr MAXX Porter volume overloaded  Weight improved since her 1st hospitalization  Continue torsemide 40 mg daily  Persistent AFib, rate controlled  Noted she transitioned from Coumadin to  Eliquis  Recommend follow-up 3 months    Adm 7/28-7/30/22   Fall, ambulatory dysfunction  Missed 2 days of diuretics  Treated for acute on chronic diastolic heart failure, lower extremity cellulitis  Discharge weight 239 lb    Adm 8/20-8/30/22  CC: Mechanical fall while walking to the bathroom  Melena/UGI bleed  Diagnosed with a left femur fracture, status post ORIF left femur, IM nail 8/22/22  EGD 8/21/22: Single crated ulcer  Biopsy taken  treated with epinephrine  Restarted on Eliquis prior to discharge    Adm 6/33-9/21/09  PMH Diastolic HF, HTN,afib on eliquis, CKD, anemia, chronic venous stasis dermatitis  Chief complaint:  Hypotension, SBP 90s to 100s with dizziness, fatigue, nausea  LEXY, creatinine was 2 6, baseline 0 7-0 9  Na 130  Given gentle IV fluids  Coreg was decreased to 12 5 BID  Torsemide was resumed with the same dose 40 mg daily  Losartan held  Noted chronic anemia  Hemoglobin stable at 9 9  Last EGD 8/21/22: Single greater, benign ulcer in the antrum  Epinephrine injected  Biopsies taken  Follow-up GI  Cleared to restart Eliquis  DC wt 222 lb    Adm 10/7-10/10/22  CC:   Worsening lower extremity edema, 15 lb weight gain  Admission weight 231 lb   9/17/22:  222 lb  Followed by Cardiology  Diuresed with Lasix 40 IV t i d  Persistent AFib, rate controlled anticoagulated with Eliquis  Chronic lower extremity/venous insufficiency, venous stasis, right greater than left chronically  Lymphedema pumps at home  Chronic anemia stable  Non MI troponin elevation no evidence of ACS; secondary to volume overload no angina  CKD at baseline  Given anxiety Lexapro increased to 20 mg daily  Discharge weight:  221 lb  Discharge creatinine: 0 9  Discharge diuretic: Torsemide 40 mg daily, 20 mg in the p m      10/19/22 Hospital follow-up, heart failure exacerbation  Lowest wt was 208 lb after DC from rehab  Pt declines sleep study  Currently on torsemide 40 am/ 20 pm/ losartan 50 mg/d/ coreg 12 5 bid  /69  on exam: filling up with shonda LE edema again  Pt declined utilizing compression stockings and pneumatic compression boots  Reports they are too painful  She has visiting nurses and therapies coming in  Blood work to be drawn this Friday  They help with medication administration  Plan: Add metolazone 2 5 mg once a week on Thursdays, with K 40 mEq  Decrease losartan to 25 mg/d  Watch renal function very carefully  We again reviewed the importance of a salt restricted diet  I reviewed how to read food labels to facilitate adherence   Follow-up- close interval    10/26/22  Phone call from RN at [facility] 10/25 at  [place], reporting from visiting nurses that she gained 6 lb- up to 225 5 lb in that she had bilateral lower extremity edema  She did have labs last Friday however I did not see them until just now  They were performed at [facility], brought her in for 80 mg of IV Lasix  Before giving the Lasix she was found have a blood pressure of 84/50 which we were not told about before bringing her into the office  There was lot of confusion about what medication she has actually been on    An urgent visit was scheduled with me today given that an appointment with her cardiologist in the near future was recently canceled  At the last visit a week ago I added weekly metolazone on Thursdays, along with Torsemide 40 mg / am   20 mg / pm Potassium 40 meq qd with extra 40 meq on Thursdays  Last labs were last Friday at [facility]- of which I did not receive--- 1021: Magnesium:  1 9 Creatinine 2 1, potassium 3 3    Today, blood pressure 123/71  Lower extremity edema persists  She tells me since I saw her last she had been air soup twice  She had rare TV dinner  Reinforced importance of adhering to a salt restricted diet  Today, we will continue with changes in her BP meds  She may have had reduced flow to her kidneys causing a bump in her creatinine  She only had 1 dose of metolazone 2 5, the day before the creatinine  I encouraged her to hydrate as well  Will get repeat labs this week  I will see her next week  Will also referred to Nephrology for assistance with diuresis in this regard    10/28/22  Labs:  Creatinine 1 34  BUN 28  Potassium 2 8  Magnesium 1 6  Plan: Increase potassium to 60 mEq daily plus an extra 20 mEq on Thursdays with metolazone  Repeat BMP scheduled in 1 wk      11/10/22  Hospital follow-up  Several appointments were canceled  She is with her  per her usual     She is in a wheelchair  Ambulation is difficult  Chronic GONZALEZ per her usual  I weighed her with the assistance of another, 212 4  This is the lowest she is been in some time  She did miss last week's dose of metolazone  Unfortunately, she remains volume overload/with significant lymphedema  Unable to get labs yesterday   Will repeat labs today, nonfasting BMP magnesium today  Continue the current plan   I reinforced importance of adhering to a salt restricted diet  She  had an appointment yesterday with orthopedics regarding her ankle  She was advised to try to use the compression stockings and the boots    She finds it difficult due to her significant edema/  lymphedema  She has follow-ups appointment scheduled in the near future  She will follow with me p r n       I have spent 25 minutes with Patient and family today in which greater than 50% of this time was spent in counseling/coordination of care regarding Intructions for management, Patient and family education, Importance of tx compliance and Risk factor reductions  Assessment  Diagnoses and all orders for this visit:    Chronic heart failure with preserved ejection fraction (HFpEF) ([place] [id number] )  -     Basic metabolic panel; Future  -     Magnesium; Future    Persistent atrial fibrillation (HCC)    Chronic venous insufficiency    Essential hypertension    Stage 3 chronic kidney disease, unspecified whether stage 3a or 3b CKD (HCC)    Ambulatory dysfunction    Lymphedema    Mixed hyperlipidemia    Prediabetes    CHF (congestive heart failure) ([place] [id number] )        Past Medical History:   Diagnosis Date   • A-fib ([place] [id number] ) 12/29/2021   • Anxiety    • Arthritis    • Back pain    • Cellulitis    • CHF (congestive heart failure) ([place] [id number] )    • Colon cancer screening 8/3/2022   • Edema of both lower extremities due to peripheral venous insufficiency    • Edema of both lower extremities due to peripheral venous insufficiency    • Encounter for screening mammogram for malignant neoplasm of breast 3/21/2022   • Erythema of lower extremity 11/12/2021   • Fibromyalgia    • Great toe pain, right 10/6/2022   • Hypertension    • Hypotension 9/17/2022   • Leukocytosis 10/6/2022   • Melena 8/20/2022   • Osteoporosis screening 8/3/2022   • Sjogren syndrome, unspecified ([place] [id number] )        Review of Systems   Constitutional: Positive for weight loss  Negative for chills  Cardiovascular: Positive for dyspnea on exertion  Negative for chest pain, claudication, cyanosis, irregular heartbeat, leg swelling, near-syncope, orthopnea, palpitations, paroxysmal nocturnal dyspnea and syncope     Respiratory: Negative for cough and shortness of breath  Musculoskeletal:        Gait dysfunction   Gastrointestinal: Negative for heartburn and nausea  Neurological: Positive for weakness  Negative for dizziness, focal weakness, headaches and light-headedness  All other systems reviewed and are negative  No Known Allergies        Current Outpatient Medications:   •  acetaminophen (TYLENOL) 500 mg tablet, Take 1 tablet (500 mg total) by mouth every 6 (six) hours as needed for mild pain, Disp: , Rfl: 0  •  ammonium lactate (LAC-HYDRIN) 12 % cream, Apply topically 2 (two) times a day, Disp: 385 g, Rfl: 0  •  apixaban (Eliquis) 5 mg, Take 1 tablet (5 mg total) by mouth 2 (two) times a day Lot [id number] exp 10/2024, Disp: 56 tablet, Rfl: 0  •  busPIRone (BUSPAR) 5 mg tablet, TAKE ONE TABLET BY MOUTH THREE TIMES DAILY, Disp: 90 tablet, Rfl: 2  •  carvedilol (COREG) 3 125 mg tablet, Take 1 tablet (3 125 mg total) by mouth 2 (two) times a day with meals, Disp: 60 tablet, Rfl: 1  •  escitalopram (LEXAPRO) 20 mg tablet, Take 1 tablet (20 mg total) by mouth daily, Disp: 30 tablet, Rfl: 1  •  metolazone (ZAROXOLYN) 2 5 mg tablet, Take 1 tablet ( 2 5 mg) by mouth once a week on Thursdays, before the morning dose of torsemide, Disp: 8 tablet, Rfl: 2  •  oxyCODONE (OxyCONTIN) 20 mg 12 hr tablet, Take 20 mg by mouth 2 (two) times a day, Disp: , Rfl:   •  pantoprazole (PROTONIX) 40 mg tablet, Take 1 tablet (40 mg total) by mouth daily, Disp: 30 tablet, Rfl: 6  •  potassium chloride (K-DUR,KLOR-CON) 20 mEq tablet, Take 3 tablets daily ( 60 meq) plus an extra 20 meq on thursdays with metolazone, Disp: 180 tablet, Rfl: 1  •  TIZANIDINE HCL PO, Take 4 mg by mouth every 8 (eight) hours as needed (muscle spasms), Disp: , Rfl:   •  torsemide (DEMADEX) 20 mg tablet, Take 2 tablets (40 mg total) by mouth daily 40mg (2 tablets) by mouth every morning, 20mg (1 tablet) by mouth every evening, Disp: 180 tablet, Rfl: 0    Social History     Socioeconomic History   • Marital status: /Civil Union     Spouse name: Not on file   • Number of children: Not on file   • Years of education: Not on file   • Highest education level: Not on file   Occupational History   • Not on file   Tobacco Use   • Smoking status: Former     Types: Cigarettes   • Smokeless tobacco: Never   Vaping Use   • Vaping Use: Never used   Substance and Sexual Activity   • Alcohol use: Not Currently   • Drug use: Never   • Sexual activity: Not on file   Other Topics Concern   • Not on file   Social History Narrative   • Not on file     Social Determinants of Health     Financial Resource Strain: Not on file   Food Insecurity: No Food Insecurity   • Worried About Running Out of Food in the Last Year: Never true   • Ran Out of Food in the Last Year: Never true   Transportation Needs: No Transportation Needs   • Lack of Transportation (Medical): No   • Lack of Transportation (Non-Medical): No   Physical Activity: Not on file   Stress: Not on file   Social Connections: Not on file   Intimate Partner Violence: Not on file   Housing Stability: Low Risk    • Unable to Pay for Housing in the Last Year: No   • Number of Places Lived in the Last Year: 1   • Unstable Housing in the Last Year: No       Family History   Problem Relation Age of Onset   • Heart disease Mother    • Cancer Father        Physical Exam  Vitals and nursing note reviewed  Constitutional:       General: She is not in acute distress  Appearance: She is obese  She is not diaphoretic  HENT:      Head: Normocephalic and atraumatic  Eyes:      Conjunctiva/sclera: Conjunctivae normal    Cardiovascular:      Rate and Rhythm: Normal rate  Rhythm irregularly irregular  Pulses: Intact distal pulses  Heart sounds: Normal heart sounds  Pulmonary:      Effort: Pulmonary effort is normal       Breath sounds: Normal breath sounds  Abdominal:      General: Bowel sounds are normal       Palpations: Abdomen is soft     Musculoskeletal: General: Normal range of motion  Cervical back: Normal range of motion and neck supple  Right lower leg: Edema present  Left lower leg: Edema present  Skin:     General: Skin is warm and dry  Neurological:      Mental Status: She is alert and oriented to person, place, and time  Mental status is at baseline  Vitals: Blood pressure 139/76, pulse 95, height 5' 2" (1 575 m), weight 96 3 kg (212 lb 6 4 oz), SpO2 93 %  Wt Readings from Last 3 Encounters:   11/16/22 96 3 kg (212 lb 6 4 oz)   11/15/22 101 kg (223 lb)   10/27/22 101 kg (223 lb)         Labs & Results:  Lab Results   Component Value Date    WBC 7 61 10/10/2022    HGB 10 3 (L) 10/10/2022    HCT 33 3 (L) 10/10/2022    MCV 92 10/10/2022     (H) 10/10/2022     BNP   Date Value Ref Range Status   10/06/2022 197 (H) 0 - 100 pg/mL Final   07/28/2022 125 (H) 0 - 100 pg/mL Final     No components found for: CHEM    No results found for this or any previous visit  No results found for this or any previous visit  This note was completed in part utilizing m-modal fluency direct voice recognition software  Grammatical errors, random word insertion, spelling mistakes, and incomplete sentences may be an occasional consequence of the system secondary to software limitations, ambient noise and hardware issues  At the time of dictation, efforts were made to edit, clarify and /or correct errors  Please read the chart carefully and recognize, using context, where substitutions have occurred    If you have any questions or concerns about the context, text or information contained within the body of this dictation, please contact myself, the provider, for further clarification

## 2022-11-16 NOTE — PATIENT INSTRUCTIONS

## 2022-11-18 ENCOUNTER — PATIENT OUTREACH (OUTPATIENT)
Dept: CASE MANAGEMENT | Facility: HOSPITAL | Age: 77
End: 2022-11-18

## 2022-11-18 NOTE — PROGRESS NOTES
Heart Failure Outpatient Care Coordinator Note: IB referral received for Heart Failure Outpatient Care Management  Chart notes reviewed and call made to patient's /caregiver Mathew May  Heart failure outpatient care manager role explained  Patient's  reports he feels he has enough support to care for patient and manage her chronic health problems  He states she has nursing, PT and OT with [facility]  Patient has tele-health monitoring with daily BP, Weight and pulse ox  These results are scanned in weekly to her medical record  Her weight today is 209# (down 1 pound from yesterday)  He states patient pain is improving along with her ambulation  She is now able to use her lymphedema pumps and feels is helping  He verbalizes good understanding of daily heart failure management including low sodium diet and need to contact cardiology with symptoms/weight gain  He declines need for Outpatient CM assistance with any care needs  States he is in close contact with her providers, including cardiology office for micro-management of diuretics  He was given my contact information and encouraged to call if needed  I will check in monthly

## 2022-11-27 PROBLEM — I12.9 PARENCHYMAL RENAL HYPERTENSION: Status: ACTIVE | Noted: 2022-11-27

## 2022-11-27 PROBLEM — E87.6 HYPOKALEMIA: Status: ACTIVE | Noted: 2022-11-27

## 2022-11-27 PROBLEM — N18.30 ANEMIA IN STAGE 3 CHRONIC KIDNEY DISEASE (HCC): Status: ACTIVE | Noted: 2022-11-27

## 2022-11-27 PROBLEM — N17.9 AKI (ACUTE KIDNEY INJURY) (HCC): Status: ACTIVE | Noted: 2022-11-27

## 2022-11-27 PROBLEM — D63.1 ANEMIA IN STAGE 3 CHRONIC KIDNEY DISEASE (HCC): Status: ACTIVE | Noted: 2022-11-27

## 2022-12-02 ENCOUNTER — TELEPHONE (OUTPATIENT)
Dept: CARDIOLOGY CLINIC | Facility: CLINIC | Age: 77
End: 2022-12-02

## 2022-12-11 ENCOUNTER — RA CDI HCC (OUTPATIENT)
Dept: OTHER | Facility: HOSPITAL | Age: 77
End: 2022-12-11

## 2022-12-11 NOTE — PROGRESS NOTES
I13 0  [facility]  coding opportunities          Chart Reviewed number of suggestions sent to Provider: 1     Patients Insurance     Medicare Insurance: [employer]

## 2022-12-12 ENCOUNTER — HOSPITAL ENCOUNTER (EMERGENCY)
Facility: HOSPITAL | Age: 77
Discharge: HOME | End: 2022-12-13
Attending: EMERGENCY MEDICINE

## 2022-12-12 DIAGNOSIS — E87.6 HYPOKALEMIA: ICD-10-CM

## 2022-12-12 DIAGNOSIS — R25.2 CRAMP IN LOWER LEG: Primary | ICD-10-CM

## 2022-12-12 LAB
BASOPHILS # BLD AUTO: 0.04 THOUSANDS/ÂΜL (ref 0–0.1)
BASOPHILS NFR BLD AUTO: 0 % (ref 0–1)
EOSINOPHIL # BLD AUTO: 0.19 THOUSAND/ÂΜL (ref 0–0.61)
EOSINOPHIL NFR BLD AUTO: 2 % (ref 0–6)
ERYTHROCYTE [DISTWIDTH] IN BLOOD BY AUTOMATED COUNT: 15.9 % (ref 11.6–15.1)
HCT VFR BLD AUTO: 32.2 % (ref 34.8–46.1)
HGB BLD-MCNC: 9.7 G/DL (ref 11.5–15.4)
IMM GRANULOCYTES # BLD AUTO: 0.04 THOUSAND/UL (ref 0–0.2)
IMM GRANULOCYTES NFR BLD AUTO: 0 % (ref 0–2)
LYMPHOCYTES # BLD AUTO: 1.31 THOUSANDS/ÂΜL (ref 0.6–4.47)
LYMPHOCYTES NFR BLD AUTO: 14 % (ref 14–44)
MCH RBC QN AUTO: 23.5 PG (ref 26.8–34.3)
MCHC RBC AUTO-ENTMCNC: 30.1 G/DL (ref 31.4–37.4)
MCV RBC AUTO: 78 FL (ref 82–98)
MONOCYTES # BLD AUTO: 0.74 THOUSAND/ÂΜL (ref 0.17–1.22)
MONOCYTES NFR BLD AUTO: 8 % (ref 4–12)
NEUTROPHILS # BLD AUTO: 6.75 THOUSANDS/ÂΜL (ref 1.85–7.62)
NEUTS SEG NFR BLD AUTO: 76 % (ref 43–75)
NRBC BLD AUTO-RTO: 0 /100 WBCS
PLATELET # BLD AUTO: 474 THOUSANDS/UL (ref 149–390)
PMV BLD AUTO: 10.2 FL (ref 8.9–12.7)
RBC # BLD AUTO: 4.12 MILLION/UL (ref 3.81–5.12)
WBC # BLD AUTO: 9.07 THOUSAND/UL (ref 4.31–10.16)

## 2022-12-13 VITALS
DIASTOLIC BLOOD PRESSURE: 60 MMHG | SYSTOLIC BLOOD PRESSURE: 140 MMHG | RESPIRATION RATE: 18 BRPM | TEMPERATURE: 98 F | OXYGEN SATURATION: 97 % | HEART RATE: 73 BPM

## 2022-12-13 LAB
ALBUMIN SERPL BCP-MCNC: 3.9 G/DL (ref 3.5–5)
ALP SERPL-CCNC: 118 U/L (ref 34–104)
ALT SERPL W P-5'-P-CCNC: 7 U/L (ref 7–52)
ANION GAP SERPL CALCULATED.3IONS-SCNC: 8 MMOL/L (ref 4–13)
AST SERPL W P-5'-P-CCNC: 15 U/L (ref 13–39)
BILIRUB SERPL-MCNC: 0.52 MG/DL (ref 0.2–1)
BUN SERPL-MCNC: 16 MG/DL (ref 5–25)
CALCIUM SERPL-MCNC: 9.9 MG/DL (ref 8.4–10.2)
CHLORIDE SERPL-SCNC: 91 MMOL/L (ref 96–108)
CO2 SERPL-SCNC: 35 MMOL/L (ref 21–32)
CREAT SERPL-MCNC: 1.01 MG/DL (ref 0.6–1.3)
GFR SERPL CREATININE-BSD FRML MDRD: 54 ML/MIN/1.73SQ M
GLUCOSE SERPL-MCNC: 133 MG/DL (ref 65–140)
POTASSIUM SERPL-SCNC: 2.7 MMOL/L (ref 3.5–5.3)
PROT SERPL-MCNC: 7.9 G/DL (ref 6.4–8.4)
SODIUM SERPL-SCNC: 134 MMOL/L (ref 135–147)

## 2022-12-13 RX ORDER — CEPHALEXIN 500 MG/1
500 CAPSULE ORAL EVERY 6 HOURS SCHEDULED
Qty: 28 CAPSULE | Refills: 0 | Status: SHIPPED | OUTPATIENT
Start: 2022-12-13 | End: 2022-12-20

## 2022-12-13 RX ORDER — POTASSIUM CHLORIDE 20 MEQ/1
40 TABLET, EXTENDED RELEASE ORAL ONCE
Status: COMPLETED | OUTPATIENT
Start: 2022-12-13 | End: 2022-12-13

## 2022-12-13 RX ADMIN — POTASSIUM CHLORIDE 40 MEQ: 1500 TABLET, EXTENDED RELEASE ORAL at 00:56

## 2022-12-13 NOTE — ED ATTENDING ATTESTATION
12/12/2022  I, Nicholas Lockhart MD, saw and evaluated the patient  I have discussed the patient with the resident/non-physician practitioner and agree with the resident's/non-physician practitioner's findings, Plan of Care, and MDM as documented in the resident's/non-physician practitioner's note, except where noted  All available labs and Radiology studies were reviewed  I was present for key portions of any procedure(s) performed by the resident/non-physician practitioner and I was immediately available to provide assistance  At this point I agree with the current assessment done in the Emergency Department  I have conducted an independent evaluation of this patient a history and physical is as follows:    ED Course         Critical Care Time  Procedures    Patient is a pleasant 69 yof who presents with bilateral lower extremity edema  On diuretics  Notes missing some of her dosages and then took metolazone today  No f/c/s  No sob to suggest pe  Felt some leg cramping after taking metolazone today  MDM pleasant 77 yof, LE edema and then cramping after metolazone  Doubt dvt but will have her f/u tomorrow for US, also will treat for cellulitis given age/risk factors although I suspect chronic stasis rubor instead          Wt Readings from Last 3 Encounters:   11/16/22 96 3 kg (212 lb 6 4 oz)   11/15/22 101 kg (223 lb)   10/27/22 101 kg (223 lb)

## 2022-12-13 NOTE — DISCHARGE INSTRUCTIONS
Take Keflex 1 tablet every six hours for 7 days  Follow up with your PCP  Return sooner to the ED if you feel worse

## 2022-12-13 NOTE — ED PROVIDER NOTES
History  Chief Complaint   Patient presents with   • Leg Swelling     Pt reports b/l leg swelling and pain x2 years  Pt reports cramping b/l leg cramping that started tonight     Patient is a 50-year-old female with past medical history of chronic bilateral legs venous sufficiency,CKD3, hypertension who came to the ED accompanied by her  with a complaint of worsening cramp in the left leg this afternoon  Patient reported that she usually takes potassium pills as well as furosemide but was recently prescribed metolazone for chronic venous insufficiency  She stated that she took metolazone at 10 AM this afternoon and had peed a lot  2 hours later she started feeling pain in bilateral legs worse in the left  as well as cramping that started few hours ago  Patient denied fever, nausea ,vomiting diarrhea or any urinary symptoms  Patient is seen at bedside accompanied by her  and is not in any acute distress  Prior to Admission Medications   Prescriptions Last Dose Informant Patient Reported? Taking?    TIZANIDINE HCL PO   Yes No   Sig: Take 4 mg by mouth every 8 (eight) hours as needed (muscle spasms)   acetaminophen (TYLENOL) 500 mg tablet   No No   Sig: Take 1 tablet (500 mg total) by mouth every 6 (six) hours as needed for mild pain   ammonium lactate (LAC-HYDRIN) 12 % cream   No No   Sig: Apply topically 2 (two) times a day   apixaban (Eliquis) 5 mg   No No   Sig: Take 1 tablet (5 mg total) by mouth 2 (two) times a day Lot [id number] exp 10/2024   busPIRone (BUSPAR) 5 mg tablet   No No   Sig: TAKE ONE TABLET BY MOUTH THREE TIMES DAILY   carvedilol (COREG) 3 125 mg tablet   No No   Sig: Take 1 tablet (3 125 mg total) by mouth 2 (two) times a day with meals   escitalopram (LEXAPRO) 20 mg tablet   No No   Sig: Take 1 tablet (20 mg total) by mouth daily   metolazone (ZAROXOLYN) 2 5 mg tablet   No No   Sig: Take 1 tablet ( 2 5 mg) by mouth once a week on Thursdays, before the morning dose of torsemide   oxyCODONE (OxyCONTIN) 20 mg 12 hr tablet   Yes No   Sig: Take 20 mg by mouth 2 (two) times a day   pantoprazole (PROTONIX) 40 mg tablet   No No   Sig: Take 1 tablet (40 mg total) by mouth daily   potassium chloride (K-DUR,KLOR-CON) 20 mEq tablet   No No   Sig: Take 3 tablets daily ( 60 meq) plus an extra 20 meq on thursdays with metolazone   torsemide (DEMADEX) 20 mg tablet   No No   Sig: Take 2 tablets (40 mg total) by mouth daily 40mg (2 tablets) by mouth every morning, 20mg (1 tablet) by mouth every evening      Facility-Administered Medications: None       Past Medical History:   Diagnosis Date   • A-fib ([place] [address] ) 12/29/2021   • Anxiety    • Arthritis    • Back pain    • Cellulitis    • CHF (congestive heart failure) (HCC)    • Colon cancer screening 8/3/2022   • Edema of both lower extremities due to peripheral venous insufficiency    • Edema of both lower extremities due to peripheral venous insufficiency    • Encounter for screening mammogram for malignant neoplasm of breast 3/21/2022   • Erythema of lower extremity 11/12/2021   • Fibromyalgia    • Great toe pain, right 10/6/2022   • Hypertension    • Hypotension 9/17/2022   • Leukocytosis 10/6/2022   • Melena 8/20/2022   • Osteoporosis screening 8/3/2022   • Sjogren syndrome, unspecified ([facility] )        Past Surgical History:   Procedure Laterality Date   • KNEE CARTILAGE SURGERY     • [place] OPTX FEM SHFT FX W/INSJ IMED IMPLT W/WO SCREW Left 8/22/2022    Procedure: LEFT RETROGRADE IM SHAN;  Surgeon: True Guevara MD;  Location: [facility];  Service: Orthopedics   • REPLACEMENT TOTAL KNEE BILATERAL         Family History   Problem Relation Age of Onset   • Heart disease Mother    • Cancer Father      I have reviewed and agree with the history as documented      E-Cigarette/Vaping   • E-Cigarette Use Never User      E-Cigarette/Vaping Substances   • Nicotine No    • THC No    • CBD No    • Flavoring No    • Other No    • Unknown No      Social History Tobacco Use   • Smoking status: Former     Types: Cigarettes   • Smokeless tobacco: Never   Vaping Use   • Vaping Use: Never used   Substance Use Topics   • Alcohol use: Not Currently   • Drug use: Never        Review of Systems   Constitutional: Negative for chills and fever  HENT: Negative for ear pain and sore throat  Eyes: Negative for pain and visual disturbance  Respiratory: Negative for cough and shortness of breath  Cardiovascular: Negative for chest pain and palpitations  Gastrointestinal: Negative for abdominal pain and vomiting  Genitourinary: Negative for dysuria and hematuria  Musculoskeletal: Negative for arthralgias and back pain  Skin: Positive for color change (Bilateral erythematous legs  )  Negative for rash  Neurological: Negative for seizures and syncope  All other systems reviewed and are negative  Physical Exam  ED Triage Vitals   Temperature Pulse Respirations Blood Pressure SpO2   12/12/22 2229 12/12/22 2229 12/12/22 2229 12/12/22 2229 12/12/22 2229   98 °F (36 7 °C) 77 18 103/55 97 %      Temp Source Heart Rate Source Patient Position - Orthostatic VS BP Location FiO2 (%)   12/12/22 2229 12/12/22 2229 12/12/22 2229 12/12/22 2229 --   Oral Monitor Lying Right arm       Pain Score       12/12/22 2338       7             Orthostatic Vital Signs  Vitals:    12/12/22 2229 12/12/22 2230 12/12/22 2338 12/12/22 2345   BP: 103/55 102/51 140/60 140/60   Pulse: 77  80 73   Patient Position - Orthostatic VS: Lying Lying Lying Lying       Physical Exam  Vitals and nursing note reviewed  Constitutional:       General: She is not in acute distress  Appearance: She is well-developed  HENT:      Head: Normocephalic and atraumatic  Eyes:      Conjunctiva/sclera: Conjunctivae normal    Cardiovascular:      Rate and Rhythm: Normal rate and regular rhythm  Heart sounds: No murmur heard  Pulmonary:      Effort: Pulmonary effort is normal  No respiratory distress  Breath sounds: Normal breath sounds  Abdominal:      Palpations: Abdomen is soft  Tenderness: There is no abdominal tenderness  Musculoskeletal:         General: Swelling (Bilateral legs) and tenderness (Left leg) present  Cervical back: Neck supple  Right lower leg: Edema (Nonpitting) present  Left lower leg: Edema (Nonpitting) present  Legs:       Comments: Chronic bilateral lower extremity edema due to venous insufficiency  Skin:     General: Skin is warm and dry  Capillary Refill: Capillary refill takes less than 2 seconds  Findings: Erythema (Bilateral legs) present  Neurological:      Mental Status: She is alert     Psychiatric:         Mood and Affect: Mood normal          ED Medications  Medications   potassium chloride (K-DUR,KLOR-CON) CR tablet 40 mEq (40 mEq Oral Given 12/13/22 0056)       Diagnostic Studies  Results Reviewed     Procedure Component Value Units Date/Time    Comprehensive metabolic panel [[id number]]  (Abnormal) Collected: 12/12/22 2313    Lab Status: Final result Specimen: Blood from Arm, Right Updated: 12/13/22 0031     Sodium 134 mmol/L      Potassium 2 7 mmol/L      Chloride 91 mmol/L      CO2 35 mmol/L      ANION GAP 8 mmol/L      BUN 16 mg/dL      Creatinine 1 01 mg/dL      Glucose 133 mg/dL      Calcium 9 9 mg/dL      AST 15 U/L      ALT 7 U/L      Alkaline Phosphatase 118 U/L      Total Protein 7 9 g/dL      Albumin 3 9 g/dL      Total Bilirubin 0 52 mg/dL      eGFR 54 ml/min/1 73sq m     Narrative:      [place] guidelines for Chronic Kidney Disease (CKD):   •  Stage 1 with normal or high GFR (GFR > 90 mL/min/1 73 square meters)  •  Stage 2 Mild CKD (GFR = 60-89 mL/min/1 73 square meters)  •  Stage 3A Moderate CKD (GFR = 45-59 mL/min/1 73 square meters)  •  Stage 3B Moderate CKD (GFR = 30-44 mL/min/1 73 square meters)  •  Stage 4 Severe CKD (GFR = 15-29 mL/min/1 73 square meters)  •  Stage 5 End Stage CKD (GFR <15 mL/min/1 73 square meters)  Note: GFR calculation is accurate only with a steady state creatinine    CBC and differential [[id number]]  (Abnormal) Collected: 12/12/22 2313    Lab Status: Final result Specimen: Blood from Arm, Right Updated: 12/12/22 2326     WBC 9 07 Thousand/uL      RBC 4 12 Million/uL      Hemoglobin 9 7 g/dL      Hematocrit 32 2 %      MCV 78 fL      MCH 23 5 pg      MCHC 30 1 g/dL      RDW 15 9 %      MPV 10 2 fL      Platelets 643 Thousands/uL      nRBC 0 /100 WBCs      Neutrophils Relative 76 %      Immat GRANS % 0 %      Lymphocytes Relative 14 %      Monocytes Relative 8 %      Eosinophils Relative 2 %      Basophils Relative 0 %      Neutrophils Absolute 6 75 Thousands/µL      Immature Grans Absolute 0 04 Thousand/uL      Lymphocytes Absolute 1 31 Thousands/µL      Monocytes Absolute 0 74 Thousand/µL      Eosinophils Absolute 0 19 Thousand/µL      Basophils Absolute 0 04 Thousands/µL                  No orders to display         Procedures  Procedures      ED Course  ED Course as of 12/13/22 0248   Mon Dec 12, 2022   214 69-year-old female chronic bilateral lower extremity edema due to venous insufficiency presented with bilateral legs cramping worse in the left since taking metolazone earlier this afternoon  Bilateral legs look erythematous  In the ED we ordered CBC, CMP,results awaits  Tue Dec 13, 2022   5231 Potassium(!!): 2 7  Gave 40 mEq of oral potassium in the ED  MDM  Number of Diagnoses or Management Options  Cramp in lower leg  Hypokalemia  Diagnosis management comments: 69-year-old female with past medical history of chronic venous stasis with bilateral legs edema presented with legs cramps starting tonight after taking a dose of her metolazone earlier this afternoon  In the ED, labs are remarkable for a potassium of 2 7  Patient was given p o  potassium replacement      Patient's cramping most likely due to diuretic effect of metolazone  Patient's legs look erythematous as well, Left more than Right  Patient was discharged with a 7-days prescription of Keflex for cellulitis coverage  Return precautions discussed  Disposition  Final diagnoses:   Cramp in lower leg   Hypokalemia     Time reflects when diagnosis was documented in both MDM as applicable and the Disposition within this note     Time User Action Codes Description Comment    12/13/2022  1:15 AM Marvel Beauchamp Add [R25 2] Cramp in lower leg     12/13/2022  1:15 AM Pollo Beauchamp Add [E87 6] Hypokalemia       ED Disposition     ED Disposition   Discharge    Condition   Stable    Date/Time   Tue Dec 13, 2022  1:15 AM    Comment   Pineda Rodríguez discharge to home/self care                 Follow-up Information     Follow up With Specialties Details Why Contact Info    Karine Rangel MD Internal Medicine  As needed [address] [place] [address]  [phone]            Discharge Medication List as of 12/13/2022  1:20 AM      START taking these medications    Details   cephalexin (KEFLEX) 500 mg capsule Take 1 capsule (500 mg total) by mouth every 6 (six) hours for 7 days, Starting Tue 12/13/2022, Until Tue 12/20/2022, Normal         CONTINUE these medications which have NOT CHANGED    Details   acetaminophen (TYLENOL) 500 mg tablet Take 1 tablet (500 mg total) by mouth every 6 (six) hours as needed for mild pain, Starting Mon 10/10/2022, No Print      ammonium lactate (LAC-HYDRIN) 12 % cream Apply topically 2 (two) times a day, Starting Wed 9/7/2022, No Print      apixaban (Eliquis) 5 mg Take 1 tablet (5 mg total) by mouth 2 (two) times a day Lot [id number] exp 10/2024, Starting Thu 10/6/2022, Until Wed 11/16/2022, Sample      busPIRone (BUSPAR) 5 mg tablet TAKE ONE TABLET BY MOUTH THREE TIMES DAILY, Normal      carvedilol (COREG) 3 125 mg tablet Take 1 tablet (3 125 mg total) by mouth 2 (two) times a day with meals, Starting Tue 10/25/2022, Normal      escitalopram (LEXAPRO) 20 mg tablet Take 1 tablet (20 mg total) by mouth daily, Starting Mon 10/10/2022, Normal      metolazone (ZAROXOLYN) 2 5 mg tablet Take 1 tablet ( 2 5 mg) by mouth once a week on Thursdays, before the morning dose of torsemide, Normal      oxyCODONE (OxyCONTIN) 20 mg 12 hr tablet Take 20 mg by mouth 2 (two) times a day, Historical Med      pantoprazole (PROTONIX) 40 mg tablet Take 1 tablet (40 mg total) by mouth daily, Starting Wed 10/19/2022, Until Sun 12/18/2022, Normal      potassium chloride (K-DUR,KLOR-CON) 20 mEq tablet Take 3 tablets daily ( 60 meq) plus an extra 20 meq on thursdays with metolazone, No Print      TIZANIDINE HCL PO Take 4 mg by mouth every 8 (eight) hours as needed (muscle spasms), Historical Med      torsemide (DEMADEX) 20 mg tablet Take 2 tablets (40 mg total) by mouth daily 40mg (2 tablets) by mouth every morning, 20mg (1 tablet) by mouth every evening, Starting Wed 10/26/2022, Until Tue 1/24/2023, Normal           No discharge procedures on file  PDMP Review       Value Time User    PDMP Reviewed  Yes 10/27/2022  1:28 PM King Sundar MD           ED Provider  Attending physically available and evaluated Mark Goodpasture  I managed the patient along with the ED Attending      Electronically Signed by         Denise Barajas MD  12/13/22 0050

## 2022-12-14 ENCOUNTER — TELEPHONE (OUTPATIENT)
Dept: NEPHROLOGY | Facility: CLINIC | Age: 77
End: 2022-12-14

## 2022-12-15 NOTE — TELEPHONE ENCOUNTER
Called pt to reschedule her consult 1/6 with Dr Nida Le  Pt stated her  will call us back when he gets home to reschedule

## 2022-12-16 DIAGNOSIS — F41.9 ANXIETY: ICD-10-CM

## 2022-12-16 RX ORDER — ESCITALOPRAM OXALATE 20 MG/1
20 TABLET ORAL DAILY
Qty: 30 TABLET | Refills: 1 | Status: SHIPPED | OUTPATIENT
Start: 2022-12-16

## 2022-12-16 NOTE — TELEPHONE ENCOUNTER
Shai Francisco has come into the [facility] requesting a refill of Priyanka Rodríguez's escitalopram (LEXAPRO) 20 mg tablet  Would a refill be appropriate?

## 2022-12-19 DIAGNOSIS — G30.9 MODERATE ALZHEIMER'S DEMENTIA WITH MOOD DISTURBANCE, UNSPECIFIED TIMING OF DEMENTIA ONSET (HCC): Primary | ICD-10-CM

## 2022-12-19 DIAGNOSIS — R41.3 MEMORY IMPAIRMENT: ICD-10-CM

## 2022-12-19 DIAGNOSIS — F02.B3 MODERATE ALZHEIMER'S DEMENTIA WITH MOOD DISTURBANCE, UNSPECIFIED TIMING OF DEMENTIA ONSET (HCC): Primary | ICD-10-CM

## 2022-12-19 RX ORDER — DONEPEZIL HYDROCHLORIDE 5 MG/1
5 TABLET, FILM COATED ORAL
Qty: 30 TABLET | Refills: 0 | Status: ON HOLD | OUTPATIENT
Start: 2022-12-19

## 2022-12-21 ENCOUNTER — PATIENT OUTREACH (OUTPATIENT)
Dept: CASE MANAGEMENT | Facility: HOSPITAL | Age: 77
End: 2022-12-21

## 2022-12-21 ENCOUNTER — TELEPHONE (OUTPATIENT)
Dept: INTERNAL MEDICINE CLINIC | Facility: CLINIC | Age: 77
End: 2022-12-21

## 2022-12-21 NOTE — TELEPHONE ENCOUNTER
Katy Pino has called the [place] [address] office in regards to Dionicio Montiel  Katy Pino stated they have been faxing orders to have signed, yet have yet to receive any faxed back  Asked what fax number was being used  Informed [phone]  Informed Katy Pino correct fax number is [phone]  Ended the call soon after

## 2022-12-21 NOTE — PROGRESS NOTES
Heart Failure Outpatient Care Coordinator Note: Chart notes reviewed and call made to patient's family caregiver/ Sp  He reports patient doing well except for her lymphedema causing weeping and pain especially left leg  He states patient will not wear stockings, ace wraps or use her lymphedema pumps  He states she has been depressed since their son  2 years ago and not motivated  She is on antidepressants  Otherwise her weight and BP are stable- weight this am is 201#   Report form [facility] tele-monitoring shows -314'U systolic, stable weights and heart rates 80-90's  He states has rescheduled their PCP appointments from  to  as patient "was in too much pain to go"  I will continue to check in monthly

## 2022-12-23 DIAGNOSIS — I10 ESSENTIAL HYPERTENSION: ICD-10-CM

## 2022-12-23 DIAGNOSIS — I50.32 CHRONIC HEART FAILURE WITH PRESERVED EJECTION FRACTION (HFPEF) (HCC): ICD-10-CM

## 2022-12-23 RX ORDER — CARVEDILOL 3.12 MG/1
3.12 TABLET ORAL 2 TIMES DAILY WITH MEALS
Qty: 60 TABLET | Refills: 2 | Status: SHIPPED | OUTPATIENT
Start: 2022-12-23

## 2022-12-29 ENCOUNTER — TELEMEDICINE (OUTPATIENT)
Dept: INTERNAL MEDICINE CLINIC | Facility: CLINIC | Age: 77
End: 2022-12-29

## 2022-12-29 DIAGNOSIS — R19.5 LOOSE STOOLS: Primary | ICD-10-CM

## 2022-12-29 DIAGNOSIS — K52.9 DIARRHEAL DISEASE: Primary | ICD-10-CM

## 2022-12-29 RX ORDER — LOPERAMIDE HYDROCHLORIDE 2 MG/1
2 CAPSULE ORAL 4 TIMES DAILY PRN
Qty: 28 CAPSULE | Refills: 0 | Status: SHIPPED | OUTPATIENT
Start: 2022-12-29 | End: 2023-01-04

## 2022-12-29 NOTE — PROGRESS NOTES
Virtual Regular Visit    Verification of patient location:    Patient is located in the following state in which I hold an active license [place]      Assessment/Plan:    Problem List Items Addressed This Visit        Other    Loose stools - Primary    Relevant Orders    Clostridium difficile toxin by PCR       Reason for visit is   Chief Complaint   Patient presents with   • Virtual Regular Visit        Encounter provider Ginger Bar MD    Provider located at [address] [address]  [phone]      Recent Visits  No visits were found meeting these conditions  Showing recent visits within past 7 days and meeting all other requirements  Today's Visits  Date Type Provider Dept   12/29/22 Telemedicine Ginger Bar MD [facility]   Showing today's visits and meeting all other requirements  Future Appointments  No visits were found meeting these conditions  Showing future appointments within next 150 days and meeting all other requirements       The patient was identified by name and date of birth  Jackson Oviedo was informed that this is a telemedicine visit and that the visit is being conducted through the [facility]  She agrees to proceed     My office door was closed  No one else was in the room  She acknowledged consent and understanding of privacy and security of the video platform  The patient has agreed to participate and understands they can discontinue the visit at any time  Patient is aware this is a billable service  Subjective/HPI:  Jackson Oviedo is a 69 y o  female who seen today for acute visit regarding diarrhea that began overnight  Patient reports multiple episodes of loose stools since then  She denies any abdominal pain or recent sick contacts  No fevers or chills  No nausea or vomiting  No recent abx use though was hospitalized last month        Past Medical History: Diagnosis Date   • A-fib ([facility] ) 12/29/2021   • Anxiety    • Arthritis    • Back pain    • Cellulitis    • CHF (congestive heart failure) (HCC)    • Colon cancer screening 8/3/2022   • Edema of both lower extremities due to peripheral venous insufficiency    • Edema of both lower extremities due to peripheral venous insufficiency    • Encounter for screening mammogram for malignant neoplasm of breast 3/21/2022   • Erythema of lower extremity 11/12/2021   • Fibromyalgia    • Great toe pain, right 10/6/2022   • Hypertension    • Hypotension 9/17/2022   • Leukocytosis 10/6/2022   • Melena 8/20/2022   • Osteoporosis screening 8/3/2022   • Sjogren syndrome, unspecified ([facility] )        Past Surgical History:   Procedure Laterality Date   • KNEE CARTILAGE SURGERY     • [place] OPTX FEM SHFT FX W/INSJ IMED IMPLT W/WO SCREW Left 8/22/2022    Procedure: LEFT RETROGRADE IM SHAN;  Surgeon: Fidel Ramirez MD;  Location: [facility];  Service: Orthopedics   • REPLACEMENT TOTAL KNEE BILATERAL         Current Outpatient Medications   Medication Sig Dispense Refill   • acetaminophen (TYLENOL) 500 mg tablet Take 1 tablet (500 mg total) by mouth every 6 (six) hours as needed for mild pain  0   • ammonium lactate (LAC-HYDRIN) 12 % cream Apply topically 2 (two) times a day 385 g 0   • apixaban (Eliquis) 5 mg Take 1 tablet (5 mg total) by mouth 2 (two) times a day Lot [id number] exp 10/2024 56 tablet 0   • busPIRone (BUSPAR) 5 mg tablet TAKE ONE TABLET BY MOUTH THREE TIMES DAILY 90 tablet 2   • carvedilol (COREG) 3 125 mg tablet Take 1 tablet (3 125 mg total) by mouth 2 (two) times a day with meals 60 tablet 2   • donepezil (ARICEPT) 5 mg tablet Take 1 tablet (5 mg total) by mouth daily at bedtime 30 tablet 0   • escitalopram (LEXAPRO) 20 mg tablet Take 1 tablet (20 mg total) by mouth daily 30 tablet 1   • loperamide (IMODIUM) 2 mg capsule Take 1 capsule (2 mg total) by mouth 4 (four) times a day as needed for diarrhea for up to 7 days 28 capsule 0   • metolazone (ZAROXOLYN) 2 5 mg tablet Take 1 tablet ( 2 5 mg) by mouth once a week on Thursdays, before the morning dose of torsemide 8 tablet 2   • oxyCODONE (OxyCONTIN) 20 mg 12 hr tablet Take 20 mg by mouth 2 (two) times a day     • pantoprazole (PROTONIX) 40 mg tablet Take 1 tablet (40 mg total) by mouth daily 30 tablet 6   • potassium chloride (K-DUR,KLOR-CON) 20 mEq tablet Take 3 tablets daily ( 60 meq) plus an extra 20 meq on thursdays with metolazone 180 tablet 1   • TIZANIDINE HCL PO Take 4 mg by mouth every 8 (eight) hours as needed (muscle spasms)     • torsemide (DEMADEX) 20 mg tablet Take 2 tablets (40 mg total) by mouth daily 40mg (2 tablets) by mouth every morning, 20mg (1 tablet) by mouth every evening 180 tablet 0     No current facility-administered medications for this visit  No Known Allergies    Review of Systems   Constitutional: Negative for chills and fever  Cardiovascular: Positive for leg swelling  Gastrointestinal: Positive for diarrhea  Negative for abdominal pain, nausea and vomiting  Musculoskeletal: Positive for gait problem  Video Exam    There were no vitals filed for this visit  Physical Exam  Vitals reviewed     Constitutional:       Comments: Mayer snot sound to be in acute distress          I spent 25 minutes directly with the patient during this visit

## 2022-12-30 ENCOUNTER — APPOINTMENT (EMERGENCY)
Dept: CT IMAGING | Facility: HOSPITAL | Age: 77
End: 2022-12-30

## 2022-12-30 ENCOUNTER — APPOINTMENT (INPATIENT)
Dept: CT IMAGING | Facility: HOSPITAL | Age: 77
End: 2022-12-30

## 2022-12-30 ENCOUNTER — TELEPHONE (OUTPATIENT)
Dept: INTERNAL MEDICINE CLINIC | Facility: CLINIC | Age: 77
End: 2022-12-30

## 2022-12-30 ENCOUNTER — APPOINTMENT (INPATIENT)
Dept: RADIOLOGY | Facility: HOSPITAL | Age: 77
End: 2022-12-30

## 2022-12-30 ENCOUNTER — HOSPITAL ENCOUNTER (INPATIENT)
Facility: HOSPITAL | Age: 77
LOS: 5 days | Discharge: NURSING, REHAB OR LONG-TERM CARE | End: 2023-01-04
Attending: EMERGENCY MEDICINE | Admitting: SURGERY
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
## 2022-12-30 DIAGNOSIS — S22.32XA CLOSED FRACTURE OF ONE RIB OF LEFT SIDE, INITIAL ENCOUNTER: ICD-10-CM

## 2022-12-30 DIAGNOSIS — S32.019A L1 VERTEBRAL FRACTURE (HCC): ICD-10-CM

## 2022-12-30 DIAGNOSIS — R41.3 MEMORY IMPAIRMENT: ICD-10-CM

## 2022-12-30 DIAGNOSIS — S32.029A L2 VERTEBRAL FRACTURE (HCC): ICD-10-CM

## 2022-12-30 DIAGNOSIS — S22.060D COMPRESSION FRACTURE OF T8 VERTEBRA WITH ROUTINE HEALING: ICD-10-CM

## 2022-12-30 DIAGNOSIS — S32.009A CLOSED FRACTURE OF TRANSVERSE PROCESS OF LUMBAR VERTEBRA, INITIAL ENCOUNTER (HCC): ICD-10-CM

## 2022-12-30 DIAGNOSIS — I48.19 PERSISTENT ATRIAL FIBRILLATION (HCC): ICD-10-CM

## 2022-12-30 DIAGNOSIS — S22.069A T8 VERTEBRAL FRACTURE (HCC): Primary | ICD-10-CM

## 2022-12-30 DIAGNOSIS — I87.2 CHRONIC VENOUS INSUFFICIENCY: ICD-10-CM

## 2022-12-30 PROBLEM — S22.39XA FRACTURE OF RIB, SINGLE, CLOSED: Status: ACTIVE | Noted: 2022-12-30

## 2022-12-30 LAB
ANION GAP SERPL CALCULATED.3IONS-SCNC: 8 MMOL/L (ref 4–13)
ANION GAP SERPL CALCULATED.3IONS-SCNC: 9 MMOL/L (ref 4–13)
BASOPHILS # BLD AUTO: 0.04 THOUSANDS/ÂΜL (ref 0–0.1)
BASOPHILS NFR BLD AUTO: 0 % (ref 0–1)
BILIRUB UR QL STRIP: NEGATIVE
BUN SERPL-MCNC: 11 MG/DL (ref 5–25)
BUN SERPL-MCNC: 12 MG/DL (ref 5–25)
CALCIUM SERPL-MCNC: 9.1 MG/DL (ref 8.4–10.2)
CALCIUM SERPL-MCNC: 9.3 MG/DL (ref 8.4–10.2)
CHLORIDE SERPL-SCNC: 100 MMOL/L (ref 96–108)
CHLORIDE SERPL-SCNC: 102 MMOL/L (ref 96–108)
CLARITY UR: CLEAR
CO2 SERPL-SCNC: 28 MMOL/L (ref 21–32)
CO2 SERPL-SCNC: 33 MMOL/L (ref 21–32)
COLOR UR: COLORLESS
CREAT SERPL-MCNC: 0.91 MG/DL (ref 0.6–1.3)
CREAT SERPL-MCNC: 0.93 MG/DL (ref 0.6–1.3)
EOSINOPHIL # BLD AUTO: 0.14 THOUSAND/ÂΜL (ref 0–0.61)
EOSINOPHIL NFR BLD AUTO: 1 % (ref 0–6)
ERYTHROCYTE [DISTWIDTH] IN BLOOD BY AUTOMATED COUNT: 17.3 % (ref 11.6–15.1)
GFR SERPL CREATININE-BSD FRML MDRD: 59 ML/MIN/1.73SQ M
GFR SERPL CREATININE-BSD FRML MDRD: 61 ML/MIN/1.73SQ M
GLUCOSE SERPL-MCNC: 113 MG/DL (ref 65–140)
GLUCOSE SERPL-MCNC: 165 MG/DL (ref 65–140)
GLUCOSE UR STRIP-MCNC: NEGATIVE MG/DL
HCT VFR BLD AUTO: 29.5 % (ref 34.8–46.1)
HGB BLD-MCNC: 8.9 G/DL (ref 11.5–15.4)
HGB UR QL STRIP.AUTO: NEGATIVE
IMM GRANULOCYTES # BLD AUTO: 0.06 THOUSAND/UL (ref 0–0.2)
IMM GRANULOCYTES NFR BLD AUTO: 1 % (ref 0–2)
KETONES UR STRIP-MCNC: NEGATIVE MG/DL
LEUKOCYTE ESTERASE UR QL STRIP: NEGATIVE
LYMPHOCYTES # BLD AUTO: 1.79 THOUSANDS/ÂΜL (ref 0.6–4.47)
LYMPHOCYTES NFR BLD AUTO: 13 % (ref 14–44)
MAGNESIUM SERPL-MCNC: 1.8 MG/DL (ref 1.9–2.7)
MAGNESIUM SERPL-MCNC: 1.9 MG/DL (ref 1.9–2.7)
MCH RBC QN AUTO: 23.2 PG (ref 26.8–34.3)
MCHC RBC AUTO-ENTMCNC: 30.2 G/DL (ref 31.4–37.4)
MCV RBC AUTO: 77 FL (ref 82–98)
MONOCYTES # BLD AUTO: 1.04 THOUSAND/ÂΜL (ref 0.17–1.22)
MONOCYTES NFR BLD AUTO: 8 % (ref 4–12)
NEUTROPHILS # BLD AUTO: 10.24 THOUSANDS/ÂΜL (ref 1.85–7.62)
NEUTS SEG NFR BLD AUTO: 77 % (ref 43–75)
NITRITE UR QL STRIP: NEGATIVE
NRBC BLD AUTO-RTO: 0 /100 WBCS
PH UR STRIP.AUTO: 6 [PH]
PLATELET # BLD AUTO: 361 THOUSANDS/UL (ref 149–390)
PMV BLD AUTO: 10.3 FL (ref 8.9–12.7)
POTASSIUM SERPL-SCNC: 3 MMOL/L (ref 3.5–5.3)
POTASSIUM SERPL-SCNC: 3.1 MMOL/L (ref 3.5–5.3)
PROT UR STRIP-MCNC: NEGATIVE MG/DL
RBC # BLD AUTO: 3.83 MILLION/UL (ref 3.81–5.12)
SODIUM SERPL-SCNC: 136 MMOL/L (ref 135–147)
SODIUM SERPL-SCNC: 144 MMOL/L (ref 135–147)
SP GR UR STRIP.AUTO: 1.01 (ref 1–1.03)
UROBILINOGEN UR STRIP-ACNC: <2 MG/DL
WBC # BLD AUTO: 13.31 THOUSAND/UL (ref 4.31–10.16)

## 2022-12-30 RX ORDER — HYDROMORPHONE HCL/PF 1 MG/ML
1 SYRINGE (ML) INJECTION ONCE
Status: COMPLETED | OUTPATIENT
Start: 2022-12-30 | End: 2022-12-30

## 2022-12-30 RX ORDER — CARVEDILOL 3.12 MG/1
3.12 TABLET ORAL 2 TIMES DAILY WITH MEALS
Status: DISCONTINUED | OUTPATIENT
Start: 2022-12-30 | End: 2023-01-04 | Stop reason: HOSPADM

## 2022-12-30 RX ORDER — TORSEMIDE 20 MG/1
20 TABLET ORAL DAILY
Status: DISCONTINUED | OUTPATIENT
Start: 2022-12-30 | End: 2023-01-04 | Stop reason: HOSPADM

## 2022-12-30 RX ORDER — ONDANSETRON 2 MG/ML
4 INJECTION INTRAMUSCULAR; INTRAVENOUS ONCE
Status: COMPLETED | OUTPATIENT
Start: 2022-12-30 | End: 2022-12-30

## 2022-12-30 RX ORDER — BUSPIRONE HYDROCHLORIDE 5 MG/1
5 TABLET ORAL 3 TIMES DAILY
Status: DISCONTINUED | OUTPATIENT
Start: 2022-12-30 | End: 2023-01-04 | Stop reason: HOSPADM

## 2022-12-30 RX ORDER — HYDROMORPHONE HCL/PF 1 MG/ML
0.5 SYRINGE (ML) INJECTION ONCE
Status: COMPLETED | OUTPATIENT
Start: 2022-12-30 | End: 2022-12-31

## 2022-12-30 RX ORDER — ACETAMINOPHEN 325 MG/1
650 TABLET ORAL ONCE
Status: COMPLETED | OUTPATIENT
Start: 2022-12-30 | End: 2022-12-30

## 2022-12-30 RX ORDER — ONDANSETRON 2 MG/ML
4 INJECTION INTRAMUSCULAR; INTRAVENOUS EVERY 6 HOURS PRN
Status: DISCONTINUED | OUTPATIENT
Start: 2022-12-30 | End: 2023-01-04 | Stop reason: HOSPADM

## 2022-12-30 RX ORDER — ACETAMINOPHEN 325 MG/1
975 TABLET ORAL EVERY 8 HOURS SCHEDULED
Status: DISCONTINUED | OUTPATIENT
Start: 2022-12-30 | End: 2022-12-31

## 2022-12-30 RX ORDER — HYDROMORPHONE HCL/PF 1 MG/ML
0.5 SYRINGE (ML) INJECTION EVERY 4 HOURS PRN
Status: DISCONTINUED | OUTPATIENT
Start: 2022-12-30 | End: 2022-12-30

## 2022-12-30 RX ORDER — TORSEMIDE 20 MG/1
40 TABLET ORAL DAILY
Status: DISCONTINUED | OUTPATIENT
Start: 2022-12-30 | End: 2023-01-04 | Stop reason: HOSPADM

## 2022-12-30 RX ORDER — GABAPENTIN 100 MG/1
100 CAPSULE ORAL 2 TIMES DAILY
Status: DISCONTINUED | OUTPATIENT
Start: 2022-12-30 | End: 2023-01-04 | Stop reason: HOSPADM

## 2022-12-30 RX ORDER — POTASSIUM CHLORIDE 20 MEQ/1
40 TABLET, EXTENDED RELEASE ORAL 2 TIMES DAILY
Status: COMPLETED | OUTPATIENT
Start: 2022-12-30 | End: 2022-12-30

## 2022-12-30 RX ORDER — OXYCODONE HYDROCHLORIDE 5 MG/1
5 TABLET ORAL EVERY 4 HOURS PRN
Status: DISCONTINUED | OUTPATIENT
Start: 2022-12-30 | End: 2023-01-04 | Stop reason: HOSPADM

## 2022-12-30 RX ORDER — METHOCARBAMOL 500 MG/1
500 TABLET, FILM COATED ORAL EVERY 6 HOURS SCHEDULED
Status: DISCONTINUED | OUTPATIENT
Start: 2022-12-30 | End: 2022-12-30

## 2022-12-30 RX ORDER — AMOXICILLIN 250 MG
2 CAPSULE ORAL 2 TIMES DAILY
Status: DISCONTINUED | OUTPATIENT
Start: 2022-12-30 | End: 2023-01-04 | Stop reason: HOSPADM

## 2022-12-30 RX ORDER — HYDROMORPHONE HCL/PF 1 MG/ML
0.5 SYRINGE (ML) INJECTION ONCE
Status: COMPLETED | OUTPATIENT
Start: 2022-12-30 | End: 2022-12-30

## 2022-12-30 RX ORDER — LIDOCAINE 50 MG/G
2 PATCH TOPICAL DAILY
Status: DISCONTINUED | OUTPATIENT
Start: 2022-12-30 | End: 2023-01-04 | Stop reason: HOSPADM

## 2022-12-30 RX ORDER — GABAPENTIN 100 MG/1
100 CAPSULE ORAL 3 TIMES DAILY
Status: DISCONTINUED | OUTPATIENT
Start: 2022-12-30 | End: 2022-12-30

## 2022-12-30 RX ORDER — ENOXAPARIN SODIUM 100 MG/ML
30 INJECTION SUBCUTANEOUS EVERY 12 HOURS
Status: DISCONTINUED | OUTPATIENT
Start: 2022-12-30 | End: 2022-12-31

## 2022-12-30 RX ORDER — DONEPEZIL HYDROCHLORIDE 5 MG/1
5 TABLET, FILM COATED ORAL
Status: DISCONTINUED | OUTPATIENT
Start: 2022-12-30 | End: 2023-01-04 | Stop reason: HOSPADM

## 2022-12-30 RX ORDER — HYDROMORPHONE HCL IN WATER/PF 6 MG/30 ML
0.2 PATIENT CONTROLLED ANALGESIA SYRINGE INTRAVENOUS EVERY 4 HOURS PRN
Status: DISCONTINUED | OUTPATIENT
Start: 2022-12-30 | End: 2023-01-02

## 2022-12-30 RX ORDER — OXYCODONE HYDROCHLORIDE 5 MG/1
2.5 TABLET ORAL EVERY 4 HOURS PRN
Status: DISCONTINUED | OUTPATIENT
Start: 2022-12-30 | End: 2023-01-04 | Stop reason: HOSPADM

## 2022-12-30 RX ORDER — ESCITALOPRAM OXALATE 20 MG/1
20 TABLET ORAL DAILY
Status: DISCONTINUED | OUTPATIENT
Start: 2022-12-30 | End: 2023-01-04 | Stop reason: HOSPADM

## 2022-12-30 RX ORDER — MAGNESIUM SULFATE HEPTAHYDRATE 40 MG/ML
2 INJECTION, SOLUTION INTRAVENOUS ONCE
Status: COMPLETED | OUTPATIENT
Start: 2022-12-30 | End: 2022-12-30

## 2022-12-30 RX ORDER — OXYCODONE HCL 20 MG/1
20 TABLET, FILM COATED, EXTENDED RELEASE ORAL 2 TIMES DAILY
Status: DISCONTINUED | OUTPATIENT
Start: 2022-12-30 | End: 2022-12-30

## 2022-12-30 RX ORDER — METHOCARBAMOL 500 MG/1
250 TABLET, FILM COATED ORAL EVERY 8 HOURS SCHEDULED
Status: DISCONTINUED | OUTPATIENT
Start: 2022-12-30 | End: 2023-01-04 | Stop reason: HOSPADM

## 2022-12-30 RX ADMIN — OXYCODONE HYDROCHLORIDE 5 MG: 5 TABLET ORAL at 16:24

## 2022-12-30 RX ADMIN — METHOCARBAMOL 500 MG: 500 TABLET ORAL at 05:21

## 2022-12-30 RX ADMIN — ENOXAPARIN SODIUM 30 MG: 30 INJECTION SUBCUTANEOUS at 17:16

## 2022-12-30 RX ADMIN — GABAPENTIN 100 MG: 100 CAPSULE ORAL at 08:26

## 2022-12-30 RX ADMIN — TORSEMIDE 40 MG: 20 TABLET ORAL at 08:15

## 2022-12-30 RX ADMIN — ENOXAPARIN SODIUM 30 MG: 30 INJECTION SUBCUTANEOUS at 05:21

## 2022-12-30 RX ADMIN — DONEPEZIL HYDROCHLORIDE 5 MG: 5 TABLET ORAL at 21:14

## 2022-12-30 RX ADMIN — POTASSIUM CHLORIDE 40 MEQ: 1500 TABLET, EXTENDED RELEASE ORAL at 17:16

## 2022-12-30 RX ADMIN — POTASSIUM CHLORIDE 40 MEQ: 1500 TABLET, EXTENDED RELEASE ORAL at 08:15

## 2022-12-30 RX ADMIN — BUSPIRONE HYDROCHLORIDE 5 MG: 5 TABLET ORAL at 17:16

## 2022-12-30 RX ADMIN — GABAPENTIN 100 MG: 100 CAPSULE ORAL at 04:38

## 2022-12-30 RX ADMIN — CARVEDILOL 3.12 MG: 3.12 TABLET, FILM COATED ORAL at 17:15

## 2022-12-30 RX ADMIN — HYDROMORPHONE HYDROCHLORIDE 0.5 MG: 1 INJECTION, SOLUTION INTRAMUSCULAR; INTRAVENOUS; SUBCUTANEOUS at 01:16

## 2022-12-30 RX ADMIN — ACETAMINOPHEN 975 MG: 325 TABLET, FILM COATED ORAL at 21:14

## 2022-12-30 RX ADMIN — MAGNESIUM SULFATE HEPTAHYDRATE 2 G: 40 INJECTION, SOLUTION INTRAVENOUS at 19:33

## 2022-12-30 RX ADMIN — SENNOSIDES AND DOCUSATE SODIUM 2 TABLET: 8.6; 5 TABLET ORAL at 17:16

## 2022-12-30 RX ADMIN — ACETAMINOPHEN 975 MG: 325 TABLET, FILM COATED ORAL at 05:21

## 2022-12-30 RX ADMIN — BUSPIRONE HYDROCHLORIDE 5 MG: 5 TABLET ORAL at 21:14

## 2022-12-30 RX ADMIN — LIDOCAINE 2 PATCH: 50 PATCH CUTANEOUS at 04:38

## 2022-12-30 RX ADMIN — ONDANSETRON 4 MG: 2 INJECTION INTRAMUSCULAR; INTRAVENOUS at 01:15

## 2022-12-30 RX ADMIN — ACETAMINOPHEN 650 MG: 325 TABLET ORAL at 01:08

## 2022-12-30 RX ADMIN — ESCITALOPRAM OXALATE 20 MG: 20 TABLET ORAL at 08:15

## 2022-12-30 RX ADMIN — HYDROMORPHONE HYDROCHLORIDE 0.2 MG: 0.2 INJECTION, SOLUTION INTRAMUSCULAR; INTRAVENOUS; SUBCUTANEOUS at 22:26

## 2022-12-30 RX ADMIN — OXYCODONE HYDROCHLORIDE 5 MG: 5 TABLET ORAL at 21:19

## 2022-12-30 RX ADMIN — METHOCARBAMOL 250 MG: 500 TABLET ORAL at 21:14

## 2022-12-30 RX ADMIN — SENNOSIDES AND DOCUSATE SODIUM 2 TABLET: 8.6; 5 TABLET ORAL at 08:15

## 2022-12-30 RX ADMIN — OXYCODONE HYDROCHLORIDE 20 MG: 20 TABLET, FILM COATED, EXTENDED RELEASE ORAL at 04:38

## 2022-12-30 RX ADMIN — OXYCODONE HYDROCHLORIDE 5 MG: 5 TABLET ORAL at 08:26

## 2022-12-30 RX ADMIN — GABAPENTIN 100 MG: 100 CAPSULE ORAL at 17:16

## 2022-12-30 RX ADMIN — ACETAMINOPHEN 975 MG: 325 TABLET, FILM COATED ORAL at 14:17

## 2022-12-30 RX ADMIN — TORSEMIDE 20 MG: 20 TABLET ORAL at 17:16

## 2022-12-30 RX ADMIN — BUSPIRONE HYDROCHLORIDE 5 MG: 5 TABLET ORAL at 08:15

## 2022-12-30 RX ADMIN — CARVEDILOL 3.12 MG: 3.12 TABLET, FILM COATED ORAL at 08:15

## 2022-12-30 RX ADMIN — HYDROMORPHONE HYDROCHLORIDE 1 MG: 1 INJECTION, SOLUTION INTRAMUSCULAR; INTRAVENOUS; SUBCUTANEOUS at 01:57

## 2022-12-30 NOTE — ED PROVIDER NOTES
History  Chief Complaint   Patient presents with   • Fall     Pt presents to ED with a fall  Pt comes from home, walking to the toilet and walker gave out, - LOC, + eliquis for afib, - headstrike, + mid back pain  Pt denies dizziness or lightheadedness      Patient is a 76-year-old female with PMH of A  fib on Eliquis, CHF, chronic lower extremity edema 2/2 PVD, arthritis, fibromyalgia, and sjogren syndrome presenting for evaluation of acute back pain after mechanical fall at home this evening  Patient notes that she was using her walker to go to the bathroom when she lost her balance falling backwards onto her bottom  She notes falling onto a tiled floor and striking her vanity chair with her middle left back  She is unable to ambulate at the scene  She called out to her  who called EMS who brings her here for evaluation  She denies lightheadedness/dizziness preceding the fall  She denies head strike  She denies headache, vision changes, neck pain, chest pain, abdominal pain, leg pain, numbness/tingling, and wound/break in skin  She notes middle to lower back 10 out of 10, constant, sharp/aching, nonradiating pain  Her  notes one prior fall three days ago at home, but the patient at that time was able to ambulate and had no complaint of pain        History provided by:  Patient and significant other   used: No    Fall  Mechanism of injury: fall    Injury location:  Pelvis  Pelvic injury location:  L buttock and R buttock  Incident location:  Home  Time since incident:  30 minutes  Arrived directly from scene: yes    Fall:     Fall occurred:  Standing    Impact surface:  Hard floor (tile)    Point of impact:  Buttocks and back    Entrapped after fall: no    Protective equipment: none    Suspicion of alcohol use: no    Suspicion of drug use: no    Tetanus status:  Unknown  Prior to arrival data:     Bystander interventions:  None    Patient ambulatory at scene: no      Blood loss:  None    Responsiveness at scene:  Alert    Orientation at scene:  Person, place, situation and time    Loss of consciousness: no      Amnesic to event: no      Airway interventions:  None    Breathing interventions:  None    IV access status:  None    Fluids administered:  None    Cardiac interventions:  None    Medications administered:  None    Immobilization:  None    Airway condition since incident:  Stable    Breathing condition since incident:  Stable    Circulation condition since incident:  Stable    Mental status condition since incident:  Stable    Disability condition since incident:  Stable  Associated symptoms: back pain    Associated symptoms: no abdominal pain, no chest pain, no difficulty breathing, no headaches, no loss of consciousness, no nausea, no neck pain and no vomiting    Risk factors: anticoagulation therapy and CHF    Risk factors: no AICD, no CABG, no CAD, no COPD, no hemophilia, no kidney disease, no pacemaker and no steroid use        Prior to Admission Medications   Prescriptions Last Dose Informant Patient Reported? Taking?    TIZANIDINE HCL PO   Yes No   Sig: Take 4 mg by mouth every 8 (eight) hours as needed (muscle spasms)   acetaminophen (TYLENOL) 500 mg tablet   No No   Sig: Take 1 tablet (500 mg total) by mouth every 6 (six) hours as needed for mild pain   ammonium lactate (LAC-HYDRIN) 12 % cream   No No   Sig: Apply topically 2 (two) times a day   apixaban (Eliquis) 5 mg   No No   Sig: Take 1 tablet (5 mg total) by mouth 2 (two) times a day Lot [id number] exp 10/2024   busPIRone (BUSPAR) 5 mg tablet   No No   Sig: TAKE ONE TABLET BY MOUTH THREE TIMES DAILY   carvedilol (COREG) 3 125 mg tablet   No No   Sig: Take 1 tablet (3 125 mg total) by mouth 2 (two) times a day with meals   donepezil (ARICEPT) 5 mg tablet   No No   Sig: Take 1 tablet (5 mg total) by mouth daily at bedtime   escitalopram (LEXAPRO) 20 mg tablet   No No   Sig: Take 1 tablet (20 mg total) by mouth daily loperamide (IMODIUM) 2 mg capsule   No No   Sig: Take 1 capsule (2 mg total) by mouth 4 (four) times a day as needed for diarrhea for up to 7 days   metolazone (ZAROXOLYN) 2 5 mg tablet   No No   Sig: Take 1 tablet ( 2 5 mg) by mouth once a week on Thursdays, before the morning dose of torsemide   oxyCODONE (OxyCONTIN) 20 mg 12 hr tablet   Yes No   Sig: Take 20 mg by mouth 2 (two) times a day   pantoprazole (PROTONIX) 40 mg tablet   No No   Sig: Take 1 tablet (40 mg total) by mouth daily   potassium chloride (K-DUR,KLOR-CON) 20 mEq tablet   No No   Sig: Take 3 tablets daily ( 60 meq) plus an extra 20 meq on thursdays with metolazone   torsemide (DEMADEX) 20 mg tablet   No No   Sig: Take 2 tablets (40 mg total) by mouth daily 40mg (2 tablets) by mouth every morning, 20mg (1 tablet) by mouth every evening      Facility-Administered Medications: None       Past Medical History:   Diagnosis Date   • A-fib ([place] [address] ) 12/29/2021   • Anxiety    • Arthritis    • Back pain    • Cellulitis    • CHF (congestive heart failure) ([facility] )    • Colon cancer screening 8/3/2022   • Edema of both lower extremities due to peripheral venous insufficiency    • Edema of both lower extremities due to peripheral venous insufficiency    • Encounter for screening mammogram for malignant neoplasm of breast 3/21/2022   • Erythema of lower extremity 11/12/2021   • Fibromyalgia    • Great toe pain, right 10/6/2022   • Hypertension    • Hypotension 9/17/2022   • Leukocytosis 10/6/2022   • Melena 8/20/2022   • Osteoporosis screening 8/3/2022   • Sjogren syndrome, unspecified ([facility] )        Past Surgical History:   Procedure Laterality Date   • KNEE CARTILAGE SURGERY     • CT OPTX FEM SHFT FX W/INSJ IMED IMPLT W/WO SCREW Left 8/22/2022    Procedure: LEFT RETROGRADE IM SHAN;  Surgeon: Almaz Hdz MD;  Location: [facility];  Service: Orthopedics   • REPLACEMENT TOTAL KNEE BILATERAL         Family History   Problem Relation Age of Onset   • Heart disease Mother    • Cancer Father      I have reviewed and agree with the history as documented  E-Cigarette/Vaping   • E-Cigarette Use Never User      E-Cigarette/Vaping Substances   • Nicotine No    • THC No    • CBD No    • Flavoring No    • Other No    • Unknown No      Social History     Tobacco Use   • Smoking status: Former     Types: Cigarettes   • Smokeless tobacco: Never   Vaping Use   • Vaping Use: Never used   Substance Use Topics   • Alcohol use: Not Currently   • Drug use: Never       Review of Systems   Constitutional: Negative for appetite change, chills and fever  HENT: Negative for congestion, sore throat and trouble swallowing  Eyes: Negative for pain and visual disturbance  Respiratory: Negative for cough, chest tightness and shortness of breath  Cardiovascular: Positive for leg swelling (Chronic per patient)  Negative for chest pain and palpitations  Gastrointestinal: Negative for abdominal pain, nausea and vomiting  Musculoskeletal: Positive for back pain and gait problem (Unable to ambulate since the fall due to pain)  Negative for arthralgias, joint swelling, myalgias, neck pain and neck stiffness  Skin: Negative for color change, rash and wound  Neurological: Negative for dizziness, loss of consciousness, light-headedness, numbness and headaches  All other systems reviewed and are negative  Physical Exam  Physical Exam  Vitals and nursing note reviewed  Constitutional:       General: She is awake  She is in acute distress  Appearance: Normal appearance  She is well-developed  She is obese  She is not ill-appearing, toxic-appearing or diaphoretic  HENT:      Head: Normocephalic and atraumatic  No contusion  Jaw: There is normal jaw occlusion  No tenderness, swelling, pain on movement or malocclusion  Nose: Nose normal  No nasal deformity  Mouth/Throat:      Lips: Pink  No lesions        Mouth: Mucous membranes are moist    Eyes:      General: Lids are normal  Vision grossly intact  Gaze aligned appropriately  Extraocular Movements: Extraocular movements intact  Conjunctiva/sclera: Conjunctivae normal    Neck:      Trachea: Trachea and phonation normal  No abnormal tracheal secretions  Cardiovascular:      Rate and Rhythm: Normal rate  Pulses:           Radial pulses are 2+ on the right side and 2+ on the left side  Dorsalis pedis pulses are 1+ on the right side and 1+ on the left side  Posterior tibial pulses are 1+ on the right side and 1+ on the left side  Heart sounds: Normal heart sounds, S1 normal and S2 normal  No murmur heard  Pulmonary:      Effort: Pulmonary effort is normal  No tachypnea or respiratory distress  Breath sounds: Normal breath sounds and air entry  No stridor, decreased air movement or transmitted upper airway sounds  No decreased breath sounds  Chest:      Chest wall: No tenderness or crepitus  Abdominal:      General: There is no distension  Palpations: Abdomen is soft  Tenderness: There is no abdominal tenderness  There is no guarding or rebound  Musculoskeletal:      Right shoulder: Normal       Left shoulder: Normal       Right elbow: Normal       Left elbow: Normal       Right wrist: Normal       Left wrist: Normal       Right hand: Normal       Left hand: Normal       Cervical back: Full passive range of motion without pain, normal range of motion and neck supple  No swelling, edema, deformity, erythema, rigidity or bony tenderness  No pain with movement or spinous process tenderness  Normal range of motion  Thoracic back: Tenderness and bony tenderness (T8, T9, T10) present  No swelling, edema, deformity or signs of trauma  Decreased range of motion  Lumbar back: Tenderness and bony tenderness (L1, L2) present  No swelling, edema, deformity or signs of trauma  Decreased range of motion   Positive left straight leg raise test  Negative right straight leg raise test       Right hip: Normal       Left hip: Normal       Right knee: Normal       Left knee: Normal       Right lower leg: No bony tenderness  3+ Pitting Edema present  Left lower leg: No bony tenderness  3+ Pitting Edema present  Comments: TURNER, 5/5 strength to UE, 4/5 strength to LE, sensation intact, no focal joint swelling  Skin:     General: Skin is warm and dry  Capillary Refill: Capillary refill takes less than 2 seconds  Findings: Erythema and rash present  No abrasion, bruising, ecchymosis, signs of injury, laceration or wound  Rash is scaling (flaking)  Comments: Chronic lymphedema with dry, erythematous, flaking skin to b/l LE distal to knees   Neurological:      General: No focal deficit present  Mental Status: She is alert and oriented to person, place, and time  Mental status is at baseline  GCS: GCS eye subscore is 4  GCS verbal subscore is 5  GCS motor subscore is 6  Cranial Nerves: Cranial nerves 2-12 are intact  Sensory: Sensation is intact  Motor: No tremor or abnormal muscle tone  Psychiatric:         Behavior: Behavior is cooperative           Vital Signs  ED Triage Vitals   Temperature Pulse Respirations Blood Pressure SpO2   12/30/22 0028 12/30/22 0028 12/30/22 0028 12/30/22 0028 12/30/22 0028   97 8 °F (36 6 °C) 75 20 139/58 100 %      Temp Source Heart Rate Source Patient Position - Orthostatic VS BP Location FiO2 (%)   12/30/22 0028 12/30/22 0028 12/30/22 0028 12/30/22 0028 --   Oral Monitor Sitting Right arm       Pain Score       12/30/22 0108       10 - Worst Possible Pain           Vitals:    12/30/22 0145 12/30/22 0200 12/30/22 0230 12/30/22 0300   BP: 136/76   117/51   Pulse: 70 78 76 72   Patient Position - Orthostatic VS: Sitting            Visual Acuity  Visual Acuity    Flowsheet Row Most Recent Value   L Pupil Size (mm) 3   R Pupil Size (mm) 3          ED Medications  Medications   enoxaparin (LOVENOX) subcutaneous injection 30 mg (has no administration in time range)   ondansetron (ZOFRAN) injection 4 mg (has no administration in time range)   senna-docusate sodium (SENOKOT S) 8 6-50 mg per tablet 2 tablet (has no administration in time range)   acetaminophen (TYLENOL) tablet 975 mg (has no administration in time range)   methocarbamol (ROBAXIN) tablet 500 mg (has no administration in time range)   ondansetron (ZOFRAN) injection 4 mg (4 mg Intravenous Given 12/30/22 0115)   HYDROmorphone (DILAUDID) injection 0 5 mg (0 5 mg Intravenous Given 12/30/22 0116)   acetaminophen (TYLENOL) tablet 650 mg (650 mg Oral Given 12/30/22 0108)   HYDROmorphone (DILAUDID) injection 1 mg (1 mg Intravenous Given 12/30/22 0157)       Diagnostic Studies  Results Reviewed     Procedure Component Value Units Date/Time    CBC and differential [[id number]] Collected: 12/30/22 0323    Lab Status: In process Specimen: Blood from Arm, Right Updated: 12/30/22 9227    Basic metabolic panel [[id number]] Collected: 12/30/22 0323    Lab Status: In process Specimen: Blood from Arm, Right Updated: 12/30/22 0329    UA w Reflex to Microscopic w Reflex to Culture [[id number]]     Lab Status: No result Specimen: Urine                  CT spine thoracic & lumbar wo contrast   Final Result by Cyrus Barber MD (12/30 0222)      Acute nondisplaced fracture involving the right anterior aspect of the T8 vertebral body  Acute fractures of the left transverse processes of L1 on L2  Acute nondisplaced fracture of the partially visualized posterior left 11th rib  Consider a CT chest for further evaluation        Workstation performed: [id number]                    Procedures  Procedures         ED Course  ED Course as of 12/30/22 0331   Fri Dec 30, 2022   0139 Patient notes no improvement in pain, will try second dose of 1 mg Dilaudid   0230 CT spine thoracic & lumbar wo contrast  IMPRESSION:     Acute nondisplaced fracture involving the right anterior aspect of the T8 vertebral body      Acute fractures of the left transverse processes of L1 on L2      Acute nondisplaced fracture of the partially visualized posterior left 11th rib  Consider a CT chest for further evaluation  46 I updated the patient and her  on CT findings  She notes mild improvement in pain after second dose of IV Dilaudid  She remains neurologically intact and denies HA  Her VS remain stable  Plan made to admit to trauma given significant CT findings  Pt agreeable to plan and with no new complaints or questions                                                MDM  Number of Diagnoses or Management Options  Closed fracture of one rib of left side, initial encounter: new and requires workup  Fall from standing, initial encounter: new and requires workup  Injury to L1 level of spinal cord, initial encounter [facility]): new and requires workup  L2 vertebral fracture [facility]): new and requires workup  T8 vertebral fracture [facility]): new and requires workup  Diagnosis management comments: DDx including but not limited to: sciatica, herniated disc, arthritis, strain, sprain, fracture; doubt intracranial injury, intraabdominal injury       Amount and/or Complexity of Data Reviewed  Tests in the radiology section of CPT®: ordered and reviewed  Decide to obtain previous medical records or to obtain history from someone other than the patient: yes  Review and summarize past medical records: yes  Discuss the patient with other providers: yes (Dr Alejandro Mcgarry from trauma)  Independent visualization of images, tracings, or specimens: yes        Disposition  Final diagnoses:   T8 vertebral fracture ([address] )   L2 vertebral fracture ([address] )   Closed fracture of one rib of left side, initial encounter   Fall from standing, initial encounter   L1 vertebral fracture ([address] )     Time reflects when diagnosis was documented in both MDM as applicable and the Disposition within this note     Time User Action Codes Description Comment 12/30/2022  2:38 AM MonicaVeneciaMartamelisa Aj Add [T84 686M] T8 vertebral fracture ([facility])     12/30/2022  2:41 AM Marta Anderson Add [U47 824V] Injury to L1 level of spinal cord, initial encounter ([facility])     12/30/2022  2:42 AM Marta Anderson Add [S32 029A] L2 vertebral fracture ([facility])     12/30/2022  2:43 AM Marta Anderson Add [S22 32XA] Closed fracture of one rib of left side, initial encounter     12/30/2022  2:43 AM Fam Short Add [L98  FOVK] Fall from standing, initial encounter     12/30/2022  3:27 AM Lore Valle Add [I48 19] Persistent atrial fibrillation ([facility])     12/30/2022  3:27 AM OscarevLore trevino Add [R41 3] Memory impairment     12/30/2022  3:27 AM OscarevLore trevino Add [S32 009A] Closed fracture of transverse process of lumbar vertebra, initial encounter ([facility])     12/30/2022  3:27 AM Lore Valle Add [I87 2] Chronic venous insufficiency     12/30/2022  3:29 AM Alexandrea Anderson [P79 151E] Injury to L1 level of spinal cord, initial encounter ([facility])     12/30/2022  3:30 AM Fam Short Add [S32 019A] L1 vertebral fracture [facility])       ED Disposition     ED Disposition   Admit    Condition   Stable    Date/Time   Fri Dec 30, 2022  3:01 AM    Comment   Case was discussed with Gissel Daniel and the patient's admission status was agreed to be Admission Status: inpatient status to the service of Dr Gissel Daniel   Follow-up Information    None         Patient's Medications   Discharge Prescriptions    No medications on file       No discharge procedures on file      PDMP Review       Value Time User    PDMP Reviewed  Yes 10/27/2022  1:28 PM Mouna Kenyon MD          ED Provider  Electronically Signed by           SHAHIDA Roland  12/30/22 1465 [facility], [address] [place] [address]  12/30/22 1043

## 2022-12-30 NOTE — PLAN OF CARE
Problem: Potential for Falls  Goal: Patient will remain free of falls  Description: INTERVENTIONS:  - Educate patient/family on patient safety including physical limitations  - Instruct patient to call for assistance with activity   - Consult OT/PT to assist with strengthening/mobility   - Keep Call bell within reach  - Keep bed low and locked with side rails adjusted as appropriate  - Keep care items and personal belongings within reach  - Initiate and maintain comfort rounds  - Make Fall Risk Sign visible to staff  - Offer Toileting every Hours, in advance of need  - Initiate/Maintain alarm  - Obtain necessary fall risk management equipment:   - Apply yellow socks and bracelet for high fall risk patients  - Consider moving patient to room near nurses station  Outcome: Progressing     Problem: Prexisting or High Potential for Compromised Skin Integrity  Goal: Skin integrity is maintained or improved  Description: INTERVENTIONS:  - Identify patients at risk for skin breakdown  - Assess and monitor skin integrity  - Assess and monitor nutrition and hydration status  - Monitor labs   - Assess for incontinence   - Turn and reposition patient  - Assist with mobility/ambulation  - Relieve pressure over bony prominences  - Avoid friction and shearing  - Provide appropriate hygiene as needed including keeping skin clean and dry  - Evaluate need for skin moisturizer/barrier cream  - Collaborate with interdisciplinary team   - Patient/family teaching  - Consider wound care consult   Outcome: Progressing     Problem: MOBILITY - ADULT  Goal: Maintain or return to baseline ADL function  Description: INTERVENTIONS:  -  Assess patient's ability to carry out ADLs; assess patient's baseline for ADL function and identify physical deficits which impact ability to perform ADLs (bathing, care of mouth/teeth, toileting, grooming, dressing, etc )  - Assess/evaluate cause of self-care deficits   - Assess range of motion  - Assess patient's mobility; develop plan if impaired  - Assess patient's need for assistive devices and provide as appropriate  - Encourage maximum independence but intervene and supervise when necessary  - Involve family in performance of ADLs  - Assess for home care needs following discharge   - Consider OT consult to assist with ADL evaluation and planning for discharge  - Provide patient education as appropriate  Outcome: Progressing  Goal: Maintains/Returns to pre admission functional level  Description: INTERVENTIONS:  - Perform BMAT or MOVE assessment daily    - Set and communicate daily mobility goal to care team and patient/family/caregiver  - Collaborate with rehabilitation services on mobility goals if consulted  - Perform Range of Motion times a day  - Reposition patient every  hours    - Dangle patient times a day  - Stand patient  times a day  - Ambulate patient  times a day  - Out of bed to chair  times a day   - Out of bed for meals  times a day  - Out of bed for toileting  - Record patient progress and toleration of activity level   Outcome: Progressing

## 2022-12-30 NOTE — CONSULTS
Consult Note- Acute Pain Service   Bonnie Rodríguez 69 y o  female MRN: [id number]  Unit/Bed#: W -01 Encounter: [id number]               Assessment/Plan     Assessment:   Patient Active Problem List   Diagnosis   • Chronic venous insufficiency   • Essential hypertension   • Chronic low back pain with sciatica   • Stage 3 chronic kidney disease (HCC)   • Mixed hyperlipidemia   • Obesity   • Osteoarthritis of knee   • Sjogren's syndrome (HCC)   • Acute renal failure superimposed on stage 3a chronic kidney disease (HCC)   • A-fib ([facility])   • Opioid dependence due to Chronic back pain    • Anxiety   • Memory impairment   • Prediabetes   • Seasonal allergies   • Xerosis of skin   • Venous stasis dermatitis of both lower extremities   • At risk for obstructive sleep apnea   • Lymphedema   • Fall   • Fracture of proximal end of left femur ([facility])   • Constipation   • Ambulatory dysfunction   • Anemia   • Age-related osteoporosis with current pathological fracture   • Encounter for support and coordination of transition of care   • Acute gastric ulcer with hemorrhage   • Elevated troponin   • Chronic heart failure with preserved ejection fraction (HFpEF) ([facility])   • Parenchymal renal hypertension   • LEXY (acute kidney injury) ([facility])   • Anemia in stage 3 chronic kidney disease (HCC)   • Hypokalemia   • Loose stools   • Fracture of rib, single, closed   • Closed fracture of transverse process of lumbar vertebra ([facility])   • Compression fracture of T8 vertebra with routine healing      Brii Hernandez is a 69 y o  female who is status post fall with single left-sided 11th rib fracture, T8 compression fracture, L1 and L2 transverse process fracture  Neurosurgery consulted, recommending conservative treatment, no surgical intervention planned at this time  SPO2 99%, no acute respiratory distress, able to utilize incentive spirometer and take a deep breath  Denied history of underlying COPD/lung disease      Plan: Multimodal analgesia:  · Tylenol 975 mg every 8 hours scheduled  · Decrease Gabapentin to 100 mg twice a day  · Estimated Creatinine Clearance: 57 mL/min (by C-G formula based on SCr of 0 91 mg/dL)  · Recommended decrease dose based on age and renal function  · Lidocaine patch daily, on for 12 hours and off for 12 hours  · Decrease Robaxin to 250 mg every 8 hours scheduled; Hold for sedation  · Okay to continue muscle relaxant during hospitalization, would not recommend muscle relaxant at time of discharge  · Discontinue OxyContin  · Patient is no longer on long-acting opioids, last prescription was August 26, 2022  · Oxycodone 2 5 mg every 4 hours as needed for moderate pain  · Oxycodone 5 mg every 4 hours as needed for severe pain  · Decrease IV Dilaudid to 0 2 mg every 4 hours as needed for breakthrough pain    Bowel Regimen:  · Senokot S 2 tablets twice a day    Treatment recommendations and plan of care discussed with bedside nursing staff and primary care service  APS will continue to follow  Please contact Acute Pain Service - [facility] via [employer] from [phone] with additional questions or concerns  See Carlito or Shea for additional contacts and after hours information  History of Present Illness    Admit Date:  12/30/2022  Hospital Day:  0 days  Primary Service:  Trauma  Attending Provider:  Candace Joseph DO  Reason for Consult / Principal Problem: acute pain due to trauma  HPI: Aki Franklin is a 69 y o  female who is status post fall with single left-sided 11th rib fracture, T8 compression fracture, L1 and L2 transverse process fracture  Neurosurgery consulted, recommending conservative treatment, no surgical intervention planned at this time  Current pain location(s): lower back  Pain Scale:  6-7  Quality: constant, aching  Current Analgesic regimen: Patient resting in bed, no acute distress    Continues to report pain in her lower back that is mild at rest, increases with any movement and repositioning  Tolerating current analgesic regimen, no nausea or vomiting  Received OxyContin 20 mg dose this morning and and oxycodone 5 mg dose at 830am   Mild improvement in pain with as needed oxycodone  Pain History: Has a history of chronic back pain; follows with outpatient pain management has been on long-acting opioids in the past but has been weaned off, last prescription was in August of this year  Has received spinal injections from her pain provider without significant improvement in chronic pain  Last prescription for immediate release opioids was in September 2022  Pain Management Provider:  Dr Migue Jacobs     I have reviewed the patient's controlled substance dispensing history in the Prescription Drug Monitoring Program in compliance with the [facility] regulations before prescribing any controlled substances  Inpatient consult to Acute Pain Service  Consult performed by: SHAHIDA Jimenez  Consult ordered by: Nate Phillips PA-C          Review of Systems   Respiratory: Negative for shortness of breath  Cardiovascular: Positive for chest pain  Gastrointestinal: Negative for abdominal pain, nausea and vomiting  Genitourinary: Negative for difficulty urinating  Musculoskeletal: Positive for back pain  Neurological: Negative for dizziness and weakness  All other systems reviewed and are negative        Historical Information   Past Medical History:   Diagnosis Date   • A-fib ([facility] ) 12/29/2021   • Anxiety    • Arthritis    • Back pain    • Cellulitis    • CHF (congestive heart failure) ([facility] )    • Colon cancer screening 8/3/2022   • Edema of both lower extremities due to peripheral venous insufficiency    • Edema of both lower extremities due to peripheral venous insufficiency    • Encounter for screening mammogram for malignant neoplasm of breast 3/21/2022   • Erythema of lower extremity 11/12/2021   • Fibromyalgia    • Great toe pain, right 10/6/2022   • Hypertension    • Hypotension 9/17/2022   • Leukocytosis 10/6/2022   • Melena 8/20/2022   • Osteoporosis screening 8/3/2022   • Sjogren syndrome, unspecified ([facility] )      Past Surgical History:   Procedure Laterality Date   • KNEE CARTILAGE SURGERY     • SC OPTX FEM SHFT FX W/INSJ IMED IMPLT W/WO SCREW Left 8/22/2022    Procedure: LEFT RETROGRADE IM SHAN;  Surgeon: Sridevi Correa MD;  Location: [facility];  Service: Orthopedics   • REPLACEMENT TOTAL KNEE BILATERAL       Social History   Social History     Substance and Sexual Activity   Alcohol Use Not Currently     Social History     Substance and Sexual Activity   Drug Use Never     Social History     Tobacco Use   Smoking Status Former   • Types: Cigarettes   Smokeless Tobacco Never     Family History: non-contributory    Meds/Allergies   all current active meds have been reviewed, current meds:   Current Facility-Administered Medications   Medication Dose Route Frequency   • acetaminophen (TYLENOL) tablet 975 mg  975 mg Oral Q8H [facility]   • busPIRone (BUSPAR) tablet 5 mg  5 mg Oral TID   • carvedilol (COREG) tablet 3 125 mg  3 125 mg Oral BID With Meals   • donepezil (ARICEPT) tablet 5 mg  5 mg Oral HS   • enoxaparin (LOVENOX) subcutaneous injection 30 mg  30 mg Subcutaneous Q12H   • escitalopram (LEXAPRO) tablet 20 mg  20 mg Oral Daily   • gabapentin (NEURONTIN) capsule 100 mg  100 mg Oral TID   • HYDROmorphone (DILAUDID) injection 0 5 mg  0 5 mg Intravenous Q4H PRN   • lidocaine (LIDODERM) 5 % patch 2 patch  2 patch Topical Daily   • methocarbamol (ROBAXIN) tablet 500 mg  500 mg Oral Q6H [facility]   • naloxone (NARCAN) 0 04 mg/mL syringe 0 04 mg  0 04 mg Intravenous Q1MIN PRN   • ondansetron (ZOFRAN) injection 4 mg  4 mg Intravenous Q6H PRN   • oxyCODONE (OxyCONTIN) 12 hr tablet 20 mg  20 mg Oral BID   • oxyCODONE (ROXICODONE) IR tablet 2 5 mg  2 5 mg Oral Q4H PRN   • oxyCODONE (ROXICODONE) IR tablet 5 mg  5 mg Oral Q4H PRN   • potassium chloride (K-DUR,KLOR-CON) CR tablet 40 mEq  40 mEq Oral BID   • senna-docusate sodium (SENOKOT S) 8 6-50 mg per tablet 2 tablet  2 tablet Oral BID   • torsemide (DEMADEX) tablet 20 mg  20 mg Oral Daily   • torsemide (DEMADEX) tablet 40 mg  40 mg Oral Daily    and PTA meds:   Prior to Admission Medications   Prescriptions Last Dose Informant Patient Reported? Taking?    TIZANIDINE HCL PO   Yes No   Sig: Take 4 mg by mouth every 8 (eight) hours as needed (muscle spasms)   acetaminophen (TYLENOL) 500 mg tablet   No No   Sig: Take 1 tablet (500 mg total) by mouth every 6 (six) hours as needed for mild pain   ammonium lactate (LAC-HYDRIN) 12 % cream   No No   Sig: Apply topically 2 (two) times a day   apixaban (Eliquis) 5 mg   No No   Sig: Take 1 tablet (5 mg total) by mouth 2 (two) times a day Lot [id number] exp 10/2024   busPIRone (BUSPAR) 5 mg tablet   No No   Sig: TAKE ONE TABLET BY MOUTH THREE TIMES DAILY   carvedilol (COREG) 3 125 mg tablet   No No   Sig: Take 1 tablet (3 125 mg total) by mouth 2 (two) times a day with meals   donepezil (ARICEPT) 5 mg tablet   No No   Sig: Take 1 tablet (5 mg total) by mouth daily at bedtime   escitalopram (LEXAPRO) 20 mg tablet   No No   Sig: Take 1 tablet (20 mg total) by mouth daily   loperamide (IMODIUM) 2 mg capsule   No No   Sig: Take 1 capsule (2 mg total) by mouth 4 (four) times a day as needed for diarrhea for up to 7 days   metolazone (ZAROXOLYN) 2 5 mg tablet   No No   Sig: Take 1 tablet ( 2 5 mg) by mouth once a week on Thursdays, before the morning dose of torsemide   oxyCODONE (OxyCONTIN) 20 mg 12 hr tablet   Yes No   Sig: Take 20 mg by mouth 2 (two) times a day   pantoprazole (PROTONIX) 40 mg tablet   No No   Sig: Take 1 tablet (40 mg total) by mouth daily   potassium chloride (K-DUR,KLOR-CON) 20 mEq tablet   No No   Sig: Take 3 tablets daily ( 60 meq) plus an extra 20 meq on thursdays with metolazone   torsemide (DEMADEX) 20 mg tablet   No No   Sig: Take 2 tablets (40 mg total) by mouth daily 40mg (2 tablets) by mouth every morning, 20mg (1 tablet) by mouth every evening      Facility-Administered Medications: None       No Known Allergies    Objective   Temp:  [97 3 °F (36 3 °C)-98 4 °F (36 9 °C)] 97 3 °F (36 3 °C)  HR:  [57-85] 57  Resp:  [16-20] 17  BP: (115-144)/(51-83) 141/61    Intake/Output Summary (Last 24 hours) at 12/30/2022 1257  Last data filed at 12/30/2022 1253  Gross per 24 hour   Intake 240 ml   Output --   Net 240 ml       Physical Exam  Vitals reviewed  Constitutional:       General: She is awake  She is not in acute distress  Appearance: Normal appearance  She is not ill-appearing, toxic-appearing or diaphoretic  HENT:      Head: Normocephalic and atraumatic  Nose: Nose normal  No congestion or rhinorrhea  Mouth/Throat:      Mouth: Mucous membranes are moist    Eyes:      General:         Right eye: No discharge  Left eye: No discharge  Extraocular Movements: Extraocular movements intact  Cardiovascular:      Rate and Rhythm: Normal rate  Pulmonary:      Effort: Pulmonary effort is normal  No tachypnea, bradypnea or respiratory distress  Breath sounds: Decreased breath sounds present  No wheezing or rhonchi  Abdominal:      General: Bowel sounds are normal  There is no distension  Palpations: Abdomen is soft  Tenderness: There is no abdominal tenderness  Musculoskeletal:      Right lower leg: No edema  Left lower leg: No edema  Skin:     General: Skin is warm and dry  Coloration: Skin is not pale  Findings: No rash  Neurological:      Mental Status: She is alert and oriented to person, place, and time  Mental status is at baseline  Psychiatric:         Attention and Perception: Attention normal          Mood and Affect: Mood normal  Mood is not anxious  Speech: Speech normal          Behavior: Behavior normal  Behavior is cooperative  Lab Results:   I have personally reviewed pertinent labs  , CBC: Lab Results   Component Value Date    WBC 13 31 (H) 12/30/2022    HGB 8 9 (L) 12/30/2022    HCT 29 5 (L) 12/30/2022    MCV 77 (L) 12/30/2022     12/30/2022    MCH 23 2 (L) 12/30/2022    MCHC 30 2 (L) 12/30/2022    RDW 17 3 (H) 12/30/2022    MPV 10 3 12/30/2022    NRBC 0 12/30/2022   , CMP:   Lab Results   Component Value Date    SODIUM 136 12/30/2022    K 3 0 (L) 12/30/2022     12/30/2022    CO2 28 12/30/2022    BUN 12 12/30/2022    CREATININE 0 91 12/30/2022    CALCIUM 9 3 12/30/2022    EGFR 61 12/30/2022   , BMP:  Lab Results   Component Value Date    SODIUM 136 12/30/2022    K 3 0 (L) 12/30/2022     12/30/2022    CO2 28 12/30/2022    BUN 12 12/30/2022    CREATININE 0 91 12/30/2022    GLUC 113 12/30/2022    CALCIUM 9 3 12/30/2022    AGAP 8 12/30/2022    EGFR 61 12/30/2022   , PT/PTT:No results found for: PT, PTT    Imaging Studies: I have personally reviewed pertinent reports  Please note that the APS provides consultative services regarding pain management only  With the exception of ketamine and epidural infusions and except when indicated, final decisions regarding starting or changing doses of analgesic medications are at the discretion of the consulting service  Off hours consultation and/or medication management is generally not available      SHAHIDA Dean  Acute Pain Service

## 2022-12-30 NOTE — PLAN OF CARE
Problem: OCCUPATIONAL THERAPY ADULT  Goal: Performs self-care activities at highest level of function for planned discharge setting  See evaluation for individualized goals  Description: Treatment Interventions: ADL retraining, Functional transfer training, Endurance training, Patient/family training, Equipment evaluation/education, Compensatory technique education, Continued evaluation, Energy conservation, Activityengagement          See flowsheet documentation for full assessment, interventions and recommendations  Note: Limitation: Decreased ADL status, Decreased Safe judgement during ADL, Decreased endurance, Decreased self-care trans, Decreased high-level ADLs  Prognosis: Good  Assessment: Pt is a 69 y o  female seen for OT evaluation s/p admit to [facility] on [facility]2022 w/Compression fracture of T8 vertebra with routine healing  Orders received for OT evaluation and treatment  Pt identified during session via name and wristband  Comorbidities affecting patient at time of evaluation include: memory impairment, closed fx of lumbar vertebrae, fx of L rib, Afib, CKD and falls  Personal factors affecting patient at time of evaluation include: limited caregiver support, inaccesible home environment, limited insight into deficits, decreased initiation and engagement, difficulty performing ADLs and difficulty performing IADLs  PTA, patient was independent with functional mobility with rollator, independent with ADLS, requiring assist for IADLS, living with spouse in a 2 level home with 2 steps to enter and retired   The evaluation identifies the following performance deficits: weakness, impaired balance, decreased endurance, increased fall risk, new onset of impairment of functional mobility, decreased ADLS, decreased IADLS, pain, decreased activity tolerance, decreased safety awareness, ortheopedic restrictions and decreased strength, that result in activity limitations (as is outlined above in flowsheet)  Based on the OT evaluation outcomes, functional performance deficits, and assessment findings, pt has been identified as high complexity, because the patient presents with comorbidites that affect occupational performance and required significant modification of tasks or assistance with consideration of multiple treatment options  The patient's raw score on the AM-PAC Daily Activity inpatient short form is 17, standardized score is 37 26, less than 39 4  From an OT standpoint, patients at this level may benefit from d/c to Post acute rehabilitation services  Pt will benefit from continued IPOT treatment to address above deficits and maximize level of independence with ADLs and functional mobility in the areas of: bathing/ shower, dressing, toilet hygiene, transfer to all surfaces and functional ambulation       OT Discharge Recommendation: Post acute rehabilitation services     Jesse Rivers OTR/L

## 2022-12-30 NOTE — PROGRESS NOTES
[facility]  Progress Note - Seth Parker 1945, 69 y o  female MRN: [id number]  Unit/Bed#: W -01 Encounter: [id number]  Primary Care Provider: Iliana Mcdonald MD   Date and time admitted to hospital: 12/30/2022 12:24 AM    Closed fracture of transverse process of lumbar vertebra ([address] )  Assessment & Plan  - L1, L2 left sided transverse process fractures, present on admission  - Multimodal pain control  - DVT ppx- Lovenox, will transition to eliquis (home medication for [facility] [place] by [facility] - atrial fibrillation)  - PT and OT as indicated  - Follow up with trauma clinic    Fracture of rib, single, closed  Assessment & Plan  - Single left-sided 11th rib fracture, present on admission   - Continue rib fracture protocol   - Continue to encourage incentive spirometer use and adequate pulmonary hygiene  - Continue multimodal analgesic regimen with accompanying bowel regimen  - Supplemental oxygen via nasal cannula as needed to maintain saturations greater than or equal to 94%  - PT and OT evaluation and treatment as indicated  - Currently on room oxygen to SPO2 saturation at 98%  -  cc, average pull  - Outpatient follow-up in the trauma clinic for re-evaluation in approximately 2 weeks  Fall  Assessment & Plan  - Status post mechanical fall with the below noted injuries  - Fall precautions  - Geriatric Medicine consultation for evaluation, medication review and recommendations; maintain Aricept 5 mg daily, delirium precautions, rib fracture protocol, geriatric pain control  - PT and OT evaluation and treatment as indicated  - Case Management consultation for disposition planning        Memory impairment  Assessment & Plan  - History of memory impairment  - geriatrics consulted and note appreciated; Aricept 5mg and delirium precautions  - Disoriented to time on admission  - Urinalysis negative for urinary tract infection, clinical blood labs unremarkable; portable chest xray- impression -"Mild cardiomegaly  Trace left effusion  No pneumothorax  Chronic elevation of the right hemidiaphragm  Acute left 11th rib fracture not visible "    A-fib ([address] )  Assessment & Plan  - Rate controlled with carvedilol 3 125mg daily  - Hold home Eliquis    Stage 3 chronic kidney disease [facility])  Assessment & Plan  Lab Results   Component Value Date    EGFR 59 12/30/2022    EGFR 61 12/30/2022    EGFR 54 12/12/2022    CREATININE 0 93 12/30/2022    CREATININE 0 91 12/30/2022    CREATININE 1 01 12/12/2022     - Avoid nephrotoxic agents  - Continue to trend serum creatinine  - Currently hypokalemic and hypomagnesemic; 3 1 and 1 8 respectively, pharmacological repletion  - Will continue to trend serum electrolytes    Chronic venous insufficiency  Assessment & Plan  - Bilateral LE edema at baseline, contributory to ambulatory dysfunction  Uses a walker at baseline    * Compression fracture of T8 vertebra with routine healing  Assessment & Plan  - T8 compression fracture, present on admission  - Neurosurgery consult  - Neurovascularly intact  - Thoracic spine precautions  - TLSO & Uprights pending  - DVT ppx - lovenox  - Multimodal pain control with accompanying bowel regimen  - PT and OT as indicated      TRAUMA TERTIARY SURVEY NOTE    VTE Prophylaxis:Enoxaparin (Lovenox)     Disposition: stable    Code status:  Level 1 - Full Code    Consultants: IP CONSULT TO TRAUMA SURGERY  IP CONSULT TO NEUROSURGERY  IP CONSULT TO GERONTOLOGY  IP CONSULT TO CASE MANAGEMENT  IP CONSULT TO ACUTE PAIN SERVICE    Subjective   Transfer from: home    Mechanism of Injury:Fall     Chief Complaint: back pain s/p fall    HPI/Last 24 hour events: Patient has no significant overnight events  Patient with SPO2 saturation 98% on evaluation  Patient has average IS pull 600  Patient is not on supplemental oxygen at this time  Patient reports normal appetite dietary intake  Patient reports normal bowel movements    Patient denies do concerns  Objective   Vitals:   Temp:  [96 8 °F (36 °C)-98 4 °F (36 9 °C)] 98 1 °F (36 7 °C)  HR:  [57-95] 74  Resp:  [17-18] 18  BP: (135-162)/(56-72) 162/72    I/O       12/28 0701  12/29 0700 12/29 0701  12/30 0700 12/30 0701 12/31 0700    P  O    240    Total Intake(mL/kg)   240 (2 5)    Urine (mL/kg/hr)   1100 (1)    Total Output   1100    Net   -860                Physical Exam:   General-comfortable  Neuro-no acute neurological deficits; alert and oriented x3  HEENT-EOM intact no pain on EOM, oropharynx clear and patent  Cardiac-regular rate rhythm  Pulmonary-clear to auscultation bilaterally, no adventitious breath sounds  GI-soft, nontender, no distension, normal bowel sounds  -voiding  Musculoskeletal-moves all extremities; neurovascularly intact  Skin-warm and well perfused    Invasive Devices     Peripheral Intravenous Line  Duration           Peripheral IV 12/31/22 Right Antecubital <1 day                   1  Before the illness or injury that brought you to the Emergency, did you need someone to help you on a regular basis? 0=No   2  Since the illness or injury that brought you to the Emergency, have you needed more help than usual to take care of yourself? 1=Yes   3  Have you been hospitalized for one or more nights during the past 6 months (excluding a stay in the Emergency Department)? 0=No   4  In general, do you see well? 1=No   5  In general, do you have serious problems with your memory? 1=Yes   6  Do you take more than three different medications everyday?  1=Yes   TOTAL   4     Did you order a geriatric consult if the score was 2 or greater?: yes       PIC Score  PIC Pain Score: 1 (12/31/2022  1:54 AM)  PIC Incentive Spirometry Score: 2 (12/30/2022  4:00 PM)  PIC Cough Description: 3 (12/30/2022  4:00 PM)  PIC Total Score: 6 (12/30/2022  4:00 PM)       If the Total PIC Score </=5, did you consult APS and evaluate patient for further intervention?: yes      Pain:    Incentive Spirometry  Cough  3 = Controlled  4 = Above goal volume 3 = Strong  2 = Moderate  3 = Goal to alert volume 2 = Weak  1 = Severe  2 = Below alert volume 1 = Absent     1 = Unable to perform IS      Lab Results:   BMP/CMP:   Lab Results   Component Value Date    SODIUM 144 12/30/2022    K 3 1 (L) 12/30/2022     12/30/2022    CO2 33 (H) 12/30/2022    BUN 11 12/30/2022    CREATININE 0 93 12/30/2022    CALCIUM 9 1 12/30/2022    EGFR 59 12/30/2022    and CBC:   No results found for: WBC, HGB, HCT, MCV, PLT, ADJUSTEDWBC, MCH, MCHC, RDW, MPV, NRBC    Imaging Results: I have personally reviewed pertinent reports      Chest Xray(s): positive for acute findings: Single left-sided 11th rib fracture   FAST exam(s): N/A   CT Scan(s): positive for acute findings: Cute nondisplaced fracture involving the right anterior aspect of the T8 vertebral body, acute fractures of the left transverse process of L1 on L2   Additional Xray(s): N/A     Other Studies: none

## 2022-12-30 NOTE — PHYSICAL THERAPY NOTE
PHYSICAL THERAPY EVALUATION  NAME:  Anastasia Jhaveri  DATE: 12/30/22    AGE:   69 y o  Mrn:   [id number]  Principal problem: Principal Problem:    Compression fracture of T8 vertebra with routine healing  Active Problems:    Chronic venous insufficiency    Stage 3 chronic kidney disease (HCC)    A-fib (HCC)    Memory impairment    Fall    Fracture of rib, single, closed    Closed fracture of transverse process of lumbar vertebra ([address] )      Vitals:    12/30/22 0230 12/30/22 0300 12/30/22 0423 12/30/22 0803   BP:  117/51 144/64 138/62   BP Location:       Pulse: 76 72 72 75   Resp: 16 16 16 17   Temp:   98 4 °F (36 9 °C) 98 4 °F (36 9 °C)   TempSrc:   Oral    SpO2: 96% 92% 93% 98%   Weight:   96 6 kg (213 lb)    Height:   5' 2" (1 575 m)        Length Of Stay: 0  Performed at least 2 patient identifiers during session: Name and Birthday  PHYSICAL THERAPY EVALUATION :    12/30/22 1637   PT Last Visit   PT Visit Date 12/30/22   Note Type   Note type Evaluation;Orthotic Management/Training   Type of Brace   Brace Applied [place] TLSO   Additional Brace Ordered No   Patient Position When Brace Applied Seated   Bracing Recommendations Recommendation (comment)  Pt measured and fit for  TLSO [address] backpack brace  while patient in sitting at largest setting without extension panels  Pt requires max assistance for donning /doffing brace at this time, as well as repeated verbal instruction  Education   Education Provided Yes  (Including written handout)Pt verbalized some understanding  of donning/doffing brace, when to wear brace, monitoring skin for irritation  RN aware   Written handout provided  End of Consult   Patient Position at End of Consult Seated edge of bed;Bed/Chair alarm activated; All needs within reach   Nurse Communication Nurse aware of consult, application of brace   Pain Assessment   Pain Assessment Tool 0-10   Pain Score 7   Pain Location/Orientation Location: Back   Effect of Pain on Daily Activities Limits speed and independence mobility  Limits ROM at left> right lower extremity  Limits upright supported and unsupported sitting tolerance   Patient's Stated Pain Goal No pain   Hospital Pain Intervention(s) Repositioned;Medication (See MAR); Ambulation/increased activity  (RN medicated patient prior to session, patient also has lidocaine patches on back)   Restrictions/Precautions   Weight Bearing Precautions Per Order No   Other Precautions Bed Alarm; Chair Alarm;Cognitive; Fall Risk;Pain;O2;Hard of hearing   Home Living   Type of [address] Two level  (2 GLORIA; stairglide to second floor)   Home Equipment Walker;Cane;Stair glide  (also has transport WC from family but does not use it  Sleeps in a recliner at home)   Additional Comments Spouse works still; home alone from 2-10pm  Completed rehab at [facility] 8/30 - 9/13 s/p   Prior Function   Level of [place] Independent with ADLs; Independent with functional mobility   Lives With Spouse   Receives Help From Family; Neighbor   IADLs Family/Friend/Other provides medication management; Family/Friend/Other provides meals; Family/Friend/Other provides transportation   Falls in the last 6 months 1 to 4  (2 in the last 3 weeks  Patient also states that she is unaware why she falls, but does not lose consciousness  Reports that legs buckle, but does not trip)   Vocational Retired   [employer]   Additional Pertinent History Found to have transverse process fracture of L1 and L2 compression fracture of T8 and left 11th rib fracture   S/p intramedullary nailing of left periprosthetic distal femur fracture on 8/22/22   Family/Caregiver Present No  (But spouse called two times during the session)   Cognition   Arousal/Participation Alert   Orientation Level Oriented to person;Oriented to place;Oriented to situation   Memory Decreased recall of precautions;Decreased recall of recent events;Decreased short term memory   Following Commands Follows one step commands with increased time or repetition   Subjective   Subjective Patient pleasant and cooperative  Reports fear and retreat with edge of bed and out of bed mobility but agreeable to participate  RUE Assessment   RUE Assessment WFL   LUE Assessment   LUE Assessment WFL   RLE Assessment   RLE Assessment   (limited rom @ hip flexion and knee flexion)   Strength RLE   R Hip Flexion 3/5   R Knee Extension 4/5   R Ankle Dorsiflexion 4/5   LLE Assessment   LLE Assessment   (limited rom @ hip flexion and knee flexion)   Strength LLE   L Hip Flexion 3/5   L Knee Extension 4/5   L Ankle Dorsiflexion 4/5   Coordination   Movements are Fluid and Coordinated 0   Coordination and Movement Description Antalgic   Light Touch   RLE Light Touch Grossly intact   LLE Light Touch Grossly intact   Bed Mobility   Supine to Sit 2  Maximal assistance   Additional items Assist x 1;Bedrails; Increased time required;Verbal cues;HOB elevated  (Side-lying to sit method due to spinal precautions; right egressed  Using head of bed controls to assist with side-lying to sit transition due to pain control)   Transfers   Sit to Stand 4  Minimal assistance   Additional items Assist x 1;Bedrails;HOB elevated; Increased time required;Verbal cues  (X3 reps)   Stand to Sit 4  Minimal assistance   Additional items Assist x 1; Increased time required;Verbal cues;Armrests  (Verbal instruction for hand placement and completion of approach )   Ambulation/Elevation   Gait pattern Antalgic; Excessively slow; Short stride  (No LE buckling)   Gait Assistance 4  Minimal assist   Additional items Assist x 1;Verbal cues   Assistive Device Rolling walker   Distance Pregait activity of advanced retreat x3 reps with each leg prior to ambulation    Second trial two feet bed to chair   Stair Management Assistance Not tested   Balance   Static Sitting Good   Static Standing Fair -   Ambulatory Poor +   Endurance Deficit   Endurance Deficit Yes   Endurance Deficit Description Limited standing tolerance, limited unsupported sitting tolerance, needs therapeutic rest between mobility attempts  Denies any lightheadedness  Activity Tolerance   Activity Tolerance Patient limited by pain; Patient limited by fatigue   Medical Staff Beth coordination with Christopher Courtney from [address] to RN before and after session   Assessment   Prognosis Fair   Problem List Decreased strength;Decreased range of motion; Impaired balance;Decreased endurance;Decreased mobility; Decreased coordination;Pain;Obesity; Impaired hearing   Assessment Pt is a 69 y o  female seen for PT evaluation s/p admit to [facility] on 12/30/2022 w/s/p fall, - LOC, +eliquis; Compression fracture of T8 vertebra with routine healing, transverse process fracture of L1 and L2, left 11th rib fracture  Patient fitted for TLSO brace prior to out of bed mobility  Prior to admission: Pt lives with spouse in a two-story home with 2 steps to enter and stair glide to the second floor  Patient primarily uses a Rollator walker for ambulation, but also does have a transport chair if needed, and sleeps in a recliner  Upon evaluation: Pt requires substantial assistance of 1 for bed mobility, min assist for transfers and short ambulation to a target surface within 2 feet using a walker  Pt's clinical presentation is currently unstable/unpredictable given the functional mobility deficits above, especially (but not limited to) decreased ROM, pain and decreased functional mobility tolerance, coupled with fall risks including hx of falls and impaired balance, and combined with medical complications of abnormal H&H, abnormal potassium values and Recent ED visit for hypokalemia  Recommend continue mobility training using rolling walker over next several sessions, Ther Ex to BLE  Additional education regarding TLSO bracing, spinal precautions     Barriers to Discharge Decreased caregiver support; Inaccessible home environment   Barriers to Discharge Comments Patient reports spouse " is not well" and uncertain how much physical assistance he can provide to patient upon discharge  Patient was followed by heart failure care coordinator who documented on 11/18/2022 that "patient's  reports he had enough support to care for patient to manage her chronic health problems"/   Goals   Patient Goals To have less pain, walk better   STG Expiration Date 01/09/23   Short Term Goal #1 Pt will: Perform bed mobility tasks with no more than min assist of 1 to prepare for transfers and reposition in bed  Perform transfers with supervision to improve ease of transfers  Perform ambulation with L RAD for at least 50 feet with supervision to decrease risk for falls and promote proper use of assistive device  Perform 2 stairs with railing and w/No more than min assist to decrease burden of care and return to home with GLORIA  Patient to don/doff brace supervision for set up only to promote increased compliance with wearing TLSO brace   PT Treatment Day 0   Plan   Treatment/Interventions Functional transfer training;LE strengthening/ROM; Therapeutic exercise; Endurance training;Bed mobility;Gait training;Equipment eval/education;Patient/family training;Cognitive reorientation;Spoke to nursing;OT;Elevations   PT Frequency 3-5x/wk   Recommendation   PT Discharge Recommendation Post acute rehabilitation services   Equipment Recommended [address] Basic Mobility Inpatient   Turning in Bed Without Bedrails 2   Lying on Back to Sitting on Edge of Flat Bed 1   Moving Bed to Chair 3   Standing Up From Chair 3   Walk in Room 1   Climb 3-5 Stairs 1   Basic Mobility Inpatient Raw Score 11   Basic Mobility Standardized Score 30 25   Highest Level Of Mobility   JH-HLM Goal 4: Move to chair/commode   JH-HLM Achieved 4: Move to chair/commode   PT /OT co-eval for portions of session only due to required skilled interventions of at least 2 clinicians for care delivery, medical complexity, limited activity tolerance and continuity of care  PT and OT goals addressed separately  (Please find full objective findings from PT assessment regarding body systems outlined above)  t to benefit from continued skilled PT tx while in hospital and upon DC to address deficits as defined above and maximize level of functional mobility  Co-morbidities affecting pt's physical performance at time of assessment include: A  fib, arthritis, CHF, lower extremity edema, hypertension and hypertension, Sjogren's syndrome, bilateral TKA,optx fem shft fx w/insj imed implt w/wo screw (Left, 8/22/2022)  Personal factors affecting pt at time of IE include: steps to enter environment, limited home support, advanced age, past experience, behavioral pattern, inability to perform IADLs, inability to perform ADLs, inability to ambulate household distances, inability to navigate community distances and recent fall(s)  The patient's -[facility] Basic Mobility Inpatient Short Form Raw Score is 11  A Raw score of less than or equal to 16 suggests the patient may benefit from discharge to post-acute rehabilitation services, which DOES coincide with CURRENT above PT recommendations  However please refer to therapist recommendation for discharge planning given other factors that may influence destination  Adapted from [facility] “6-Clicks” Basic Mobility and Daily Activity Scores With Discharge Destination  Physical Therapy, 2021;101:1-9  DOI: [id number]    Portions of the record may have been created with voice recognition software  Occasional wrong word or "sound a like" substitutions may have occurred due to the inherent limitations of voice recognition software    Read the chart carefully and recognize, using context, where substitutions have occurred

## 2022-12-30 NOTE — LETTER
[address] [place] [address]  [address] [address]      January 12, 2023    MRN: [id number]     Phone: [phone]     Dear Ms  Sadaf Mason recently had a(n)  performed on  at  [address] [place] [address] that was requested by Ebony Tong  The study was reviewed by a radiologist, which is a physician who specializes in medical imaging  The radiologist issued a report describing his or her findings  In that report there was a finding that the radiologist felt warranted further discussion with your health care provider and that discussion would be beneficial to you  The results were sent to Ebony Tong on    We recommend that you contact Sudeep Johnson at  or set up an appointment to discuss the results of the imaging test  If you have already heard from Ebony Tong regarding the results of your study, you can disregard this letter  This letter is not meant to alarm you, but intended to encourage you to follow-up on your results with the provider that sent you for the imaging study  In addition, we have enclosed answers to frequently asked questions by other patients who have also received a letter to review results with their health care provider (see page two)  Thank you for choosing [facility] for your medical imaging needs  FREQUENTLY ASKED QUESTIONS    1  Why am I receiving this letter? [facility] requires us to notify patients who have findings on imaging exams that may require more testing or follow-up with a health professional within the next 3 months  2  How serious is the finding on the imaging test?  This letter is sent to all patients who may need follow-up or more testing within the next 3 months    Receiving this letter does not necessarily mean you have a life-threatening imaging finding or disease  Recommendations in the radiologist’s imaging report are general in nature and it is up to your healthcare provider to say whether those recommendations make sense for your situation  You are strongly encouraged to talk to your health care provider about the results and ask whether additional steps need to be taken  3  Where can I get a copy of the final report for my recent radiology exam?  To get a full copy of the report you can access your records online at [email or web address] or please contact [facility] Medical Records Department at [phone] Monday through Friday between 8 am and 6 pm          4  What do I need to do now? Please contact your health care provider who requested the imaging study to discuss what further actions (if any) are needed  You may have already heard from (your ordering provider) in regard to this test in which case you can disregard this letter  NOTICE IN ACCORDANCE WITH THE [place] STATE “PATIENT TEST RESULT INFORMATION ACT OF 2018”    You are receiving this notice as a result of a determination by your diagnostic imaging service that further discussions of your test results are warranted and would be beneficial to you  The complete results of your test or tests have been or will be sent to the health care practitioner that ordered the test or tests  It is recommended that you contact your health care practitioner to discuss your results as soon as possible

## 2022-12-30 NOTE — ASSESSMENT & PLAN NOTE
- History of memory impairment  - Disoriented to time on admission  - Basic lab work, UA to rule out infection as a cause of disorientation  - Geriatric medicine consult

## 2022-12-30 NOTE — ASSESSMENT & PLAN NOTE
- T8 compression fracture, present on admission  - Neurosurgery consult  - Neurovascularly intact  - Thoracic spine precautions  - TLSO & Uprights pending  - DVT ppx - lovenox  - Multimodal pain control with accompanying bowel regimen  - PT and OT as indicated

## 2022-12-30 NOTE — ASSESSMENT & PLAN NOTE
Lab Results   Component Value Date    EGFR 59 12/30/2022    EGFR 61 12/30/2022    EGFR 54 12/12/2022    CREATININE 0 93 12/30/2022    CREATININE 0 91 12/30/2022    CREATININE 1 01 12/12/2022     - Avoid nephrotoxic agents  - Continue to trend serum creatinine  - Currently hypokalemic and hypomagnesemic; 3 1 and 1 8 respectively, pharmacological repletion  - Will continue to trend serum electrolytes

## 2022-12-30 NOTE — ASSESSMENT & PLAN NOTE
- T8 compression fracture, present on admission  - Neurosurgery consult  - Neurovascularly intact  - Thoracic spine precautions  - TLSO & Uprights pending  - Pain control  - DVT ppx  - PT and OT as indicated

## 2022-12-30 NOTE — CONSULTS
Consultation - Jimmie Rangel [address] 69 y o  female MRN: [id number]  Unit/Bed#: W -01 Encounter: [id number]        Inpatient consult to Gerontology  Consult performed by: Maxime Shearer MD  Consult ordered by: William Rubalcava PA-C            Assessment/Plan   1 -Closed fracture of transverse process of lumbar vertebra  -Patient with an L1-L2 left-sided transverse process fracture that was present on admission  Currently on DVT prophylaxis PT and OT to evaluate  Recommend following geriatric pain protocol   -continue pain control per Geriatric pain protocol:  Tylenol 975mg Q8H scheduled  Roxicodone 2 5mg Q4H PRN moderate pain  Roxicodone 5mg Q4H PRN severe pain  Dilaudid 0 2mg Q2H PRN  -continue adjuncts such as Gabapentin 100mg HS and lidocaine patch topically  -encourage addition of non-pharmacologic pain treatment including ice and frequent repositioning  -recommend  bowel regimen to prevent and treat constipation due to increased risk with acute pain and opiate pain medications    2 -  Fracture of left-sided 11th rib  -  Continue with incentive spirometry and pulmonary hygiene  O2 supplementation keeping sats over 94%  3 -  Recurrent falls -Patient will need to be evaluated by physical and Occupational Therapy    4 -Cognitive decline -Patient with underlying Alzheimer's has been evaluated  Recently started on Aricept 5 mg orally daily by primary care  Patient would be at high risk of developing delirium given her cognitive decline would place her on delirium precautions per geriatric protocol    Delirium precautions  -Patient is high risk of delirium due to dementia  -Initiate delirium precautions  -maintain normal sleep/wake cycle  -minimize overnight interruptions, group overnight vitals/labs/nursing checks as possible  -dim lights, close blinds and turn off tv to minimize stimulation and encourage sleep environment in evenings  -ensure that pain is well controlled  consider Tylenol 975mg Q8H scheduled if not already ordered   -monitor for fecal and urinary retention which may precipitate delirium  -encourage early mobilization and ambulation  -provide frequent reorientation and redirection  -encourage family and friends at the bedside to help help calm patient if anxious  -avoid medications which may precipitate or worsen delirium such as tramadol, benzodiazepine, anticholinergics, and benadryl  -encourage hydration and nutrition   -redirect unwanted behaviors as first line, avoid physical restraints, use chemical restraint only if all other attempts have been unsuccessful, would consider Zyprexa 2 5 mg, monitor for orthostatic hypotension and QTc prolongation with repeat dosing, recommend lowest dose possible for shortest duration possible         5 -  Atrial fibrillation -Currently on Eliquis and Coreg for rate control  6 -  Obesity    7 -  History of peripheral edema -Patient with history of lymphedema  states this has made her ambulation more difficult  Patient also has history of chronic venous insufficiency  Patient will benefit from keeping legs elevated and using a sequential compression device to help with the fluid drainage  8 -  Hypokalemia -I see a trend of low potassiums throughout her blood work this will need to be corrected  9 -Hypomagnesemia -  I see also low levels of magnesium in the past would repeat magnesium level at this time  10 -Chronic renal insufficiency that ranges between 2 and 3 A -Fluctuating secondary to diuretic usage  11 -History of chronic diastolic congestive heart failure    12 -History of depression-anxiety  -  Patient should continue with Lexapro    Recommendations    1 -  Monitor potassium levels closely    2 -Monitor magnesium levels    3 -Keep legs elevated    4 -Sequential compression device       History of Present Illness   Physician Requesting Consult: Kayley Diss, DO  Reason for Consult / Principal Problem: Fall with fracture- dementia  Hx and PE limited by: No limitations  Additional history obtained from:  was able to provide an excellent history  HPI: Rogers Avina is a 69y o  year old female who presents to [address] [place] [address] emergency room after sustaining a mechanical fall  Apparently the patient was using her walker to go to the bathroom when she slipped on a tile floor and fell directly on her buttock  Patient was unable to get off the floor on her own due to increased pain  Patient takes an oral anticoagulant i e  Eliquis on a regular basis  Patient reported left-sided back pain and apparently denied numbness or tingling in her lower extremities when evaluated in the emergency room  Patient's initial blood work performed revealed evidence of hypokalemia with a potassium of 3 0  On reviewing her blood work in the past she has had hypokalemia for a number of months patient is on Zaroxolyn and torsemide suspect this is the etiology of her hypokalemia  Patient also noted to have evidence of hypomagnesemia in the past   Patient noted to have leukocytosis with a white count of 13,310 admission  Patient has anemia hypochromic microcytic with a hemoglobin of 8 9 hematocrit 29 5 MCV of 77  Imaging studies with a CT scan of the thoracic and lumbar spine revealed an acute nondisplaced fracture involving the right anterior aspect of the T8 vertebral body, acute fractures of the left transverse processes of L1 and L2, acute nondisplaced fractures of the partially visualized posterior left 11th rib  CAT scan of the chest revealed scattered nonspecific bilateral groundglass opacities, mildly prominent mediastinal and bilateral hilar lymph nodes, acute minimally displaced comminuted fracture posterior left 11th rib minimally displaced acute fractures of left L1 and L2 transverse processes acute nondisplaced oblique fracture of right anterior T8 vertebral body    Echocardiogram revealed left ventricular cavity size with normal patient's ejection fraction is approximately 55% with a normal systolic function  There was evidence of mild tricuspid regurg with elevation of the right ventricular systolic pressure  Patient's other comorbidities include a history of cognitive impairment, obesity, atrial fibrillation she is anticoagulated on Eliquis, chronic renal insufficiency stage II with a GFR of 61  Patient's GFR's in the past have ranged between 45 and 64  Patient was evaluated by neurosurgery at the time of their evaluation the patient complained of pain 7 out of 10 no evidence of radiculopathy was noted no evidence of bowel or bladder dysfunction  No neurosurgical intervention at this juncture  They recommended continued conservative treatment  Review of Systems   Constitutional: Negative  HENT: Negative  Eyes: Negative  Respiratory: Negative  Cardiovascular: Positive for leg swelling  Gastrointestinal: Negative  Endocrine: Negative  Genitourinary: Negative  Musculoskeletal: Positive for gait problem  Hematological: Negative  Psychiatric/Behavioral: The patient is nervous/anxious  Memory/Cognitive screening: Patient with evidence of dementia with a MoCA score of 14 out of 30 and October 27, 2022  Patient's primary care provider started her on Aricept approximately 2 weeks ago  Her declining cognition has been over the past few years  Mobility: Patient has been utilizing a walker for the past year and a half  Falls: Patient has had 3 falls this past year in April of this year apparently she fractured her left leg    Assistive Devices: Patient utilizes a walker with front wheels  Fraility: No evidence of frailty  Nutrition/weight loss/grocery shopping/meal preparation: Patient has good appetite  Vision impairment: Patient has no visual impairment  Hearing impairment: No hearing impairment  Incontinence: No history of urinary incontinence  Delirium: No previous history of delirium  Polypharmacy:   No current facility-administered medications on file prior to encounter       Current Outpatient Medications on File Prior to Encounter   Medication Sig Dispense Refill   • acetaminophen (TYLENOL) 500 mg tablet Take 1 tablet (500 mg total) by mouth every 6 (six) hours as needed for mild pain  0   • ammonium lactate (LAC-HYDRIN) 12 % cream Apply topically 2 (two) times a day 385 g 0   • apixaban (Eliquis) 5 mg Take 1 tablet (5 mg total) by mouth 2 (two) times a day Lot [id number] exp 10/2024 56 tablet 0   • busPIRone (BUSPAR) 5 mg tablet TAKE ONE TABLET BY MOUTH THREE TIMES DAILY 90 tablet 2   • carvedilol (COREG) 3 125 mg tablet Take 1 tablet (3 125 mg total) by mouth 2 (two) times a day with meals 60 tablet 2   • donepezil (ARICEPT) 5 mg tablet Take 1 tablet (5 mg total) by mouth daily at bedtime 30 tablet 0   • escitalopram (LEXAPRO) 20 mg tablet Take 1 tablet (20 mg total) by mouth daily 30 tablet 1   • loperamide (IMODIUM) 2 mg capsule Take 1 capsule (2 mg total) by mouth 4 (four) times a day as needed for diarrhea for up to 7 days 28 capsule 0   • metolazone (ZAROXOLYN) 2 5 mg tablet Take 1 tablet ( 2 5 mg) by mouth once a week on Thursdays, before the morning dose of torsemide 8 tablet 2   • oxyCODONE (OxyCONTIN) 20 mg 12 hr tablet Take 20 mg by mouth 2 (two) times a day     • pantoprazole (PROTONIX) 40 mg tablet Take 1 tablet (40 mg total) by mouth daily 30 tablet 6   • potassium chloride (K-DUR,KLOR-CON) 20 mEq tablet Take 3 tablets daily ( 60 meq) plus an extra 20 meq on thursdays with metolazone 180 tablet 1   • TIZANIDINE HCL PO Take 4 mg by mouth every 8 (eight) hours as needed (muscle spasms)     • torsemide (DEMADEX) 20 mg tablet Take 2 tablets (40 mg total) by mouth daily 40mg (2 tablets) by mouth every morning, 20mg (1 tablet) by mouth every evening 180 tablet 0   • [DISCONTINUED] bisacodyl (DULCOLAX) 10 mg suppository Insert 1 suppository (10 mg total) into the rectum daily as needed for constipation (Patient not taking: Reported on 9/19/2022) 12 suppository 0       Patients primary residence patient lives in a two-story home she does have a chairlift that has been present over the past year and a half lives with: Patient lives with her   iADL's: Patient does not enjoy her instrumental activities of daily living  ADL's: Patient is able to enjoy all her basic actives of daily living the  does need to supervise her bathing      Historical Information   Past medical history:   Past Medical History:   Diagnosis Date   • A-fib ([facility] ) 12/29/2021   • Anxiety    • Arthritis    • Back pain    • Cellulitis    • CHF (congestive heart failure) ([facility])    • Colon cancer screening 8/3/2022   • Edema of both lower extremities due to peripheral venous insufficiency    • Edema of both lower extremities due to peripheral venous insufficiency    • Encounter for screening mammogram for malignant neoplasm of breast 3/21/2022   • Erythema of lower extremity 11/12/2021   • Fibromyalgia    • Great toe pain, right 10/6/2022   • Hypertension    • Hypotension 9/17/2022   • Leukocytosis 10/6/2022   • Melena 8/20/2022   • Osteoporosis screening 8/3/2022   • Sjogren syndrome, unspecified ([facility] )      Past surgical history:   Past Surgical History:   Procedure Laterality Date   • KNEE CARTILAGE SURGERY     • OH OPTX FEM SHFT FX W/INSJ IMED IMPLT W/WO SCREW Left 8/22/2022    Procedure: LEFT RETROGRADE IM SHAN;  Surgeon: Elizabeth Curtis MD;  Location: [facility];  Service: Orthopedics   • REPLACEMENT TOTAL KNEE BILATERAL       Social history:  Social History     Socioeconomic History   • Marital status: /Civil Union     Spouse name: Not on file   • Number of children: Not on file   • Years of education: Not on file   • Highest education level: Not on file   Occupational History   • Not on file   Tobacco Use   • Smoking status: Former     Types: Cigarettes   • Smokeless tobacco: Never Vaping Use   • Vaping Use: Never used   Substance and Sexual Activity   • Alcohol use: Not Currently   • Drug use: Never   • Sexual activity: Not on file   Other Topics Concern   • Not on file   Social History Narrative   • Not on file     Social Determinants of Health     Financial Resource Strain: Not on file   Food Insecurity: No Food Insecurity   • Worried About Running Out of Food in the Last Year: Never true   • Ran Out of Food in the Last Year: Never true   Transportation Needs: No Transportation Needs   • Lack of Transportation (Medical): No   • Lack of Transportation (Non-Medical): No   Physical Activity: Not on file   Stress: Not on file   Social Connections: Not on file   Intimate Partner Violence: Not on file   Housing Stability: Low Risk    • Unable to Pay for Housing in the Last Year: No   • Number of Places Lived in the Last Year: 1   • Unstable Housing in the Last Year: No     Family history:   Family History   Problem Relation Age of Onset   • Heart disease Mother    • Cancer Father        Meds/Allergies   All current active meds have been reviewed  No Known Allergies    Objective   Vitals:    12/30/22 0803   BP: 138/62   Pulse: 75   Resp: 17   Temp: 98 4 °F (36 9 °C)   SpO2: 98%     No intake or output data in the 24 hours ending 12/30/22 0946  Invasive Devices     Peripheral Intravenous Line  Duration           Peripheral IV 12/30/22 Right;Ventral (anterior) Forearm <1 day                Physical Exam  Constitutional:       Appearance: She is obese  HENT:      Head: Atraumatic  Right Ear: Ear canal and external ear normal       Left Ear: Ear canal and external ear normal       Nose: Nose normal       Mouth/Throat:      Mouth: Mucous membranes are moist       Pharynx: Oropharynx is clear  Eyes:      Extraocular Movements: Extraocular movements intact  Conjunctiva/sclera: Conjunctivae normal       Pupils: Pupils are equal, round, and reactive to light     Cardiovascular:      Rate and Rhythm: Normal rate  Rhythm irregular  Heart sounds: Normal heart sounds  Pulmonary:      Breath sounds: Normal breath sounds  Abdominal:      General: Bowel sounds are normal       Palpations: Abdomen is soft  Musculoskeletal:      Cervical back: Neck supple  Right lower leg: Edema present  Left lower leg: Edema present  Skin:     General: Skin is dry  Neurological:      Mental Status: She is alert and oriented to person, place, and time  Mental status is at baseline  Psychiatric:         Mood and Affect: Mood normal          Behavior: Behavior normal          Thought Content: Thought content normal          Judgment: Judgment normal          Lab Results:   I have personally reviewed pertinent lab and imaging results  VTE Prophylaxis: Patient currently on Lovenox 30 mg every 12 hours    Code Status: Level 1 - Full Code  Advance Directive and Living Will: Yes    Power of :    POLST:      Family and Social Support: Patient has excellent support from 's name is Amber Donovan phone number [phone]    Living Arrangements: Lives w/ Spouse/significant other  Support Systems: Spouse/significant other; Friend  Assistance Needed: n/a  Type of Current Residence: Private residence  [address]: No

## 2022-12-30 NOTE — PLAN OF CARE
Problem: PHYSICAL THERAPY ADULT  Goal: Performs mobility at highest level of function for planned discharge setting  See evaluation for individualized goals  Description: Treatment/Interventions: Functional transfer training, LE strengthening/ROM, Therapeutic exercise, Endurance training, Bed mobility, Gait training, Equipment eval/education, Patient/family training, Cognitive reorientation, Spoke to nursing, OT, Elevations  Equipment Recommended: Albina Ellis       See flowsheet documentation for full assessment, interventions and recommendations  12/30/2022 0941 by Lizbet Jimenez PT  Note: Prognosis: Fair  Problem List: Decreased strength, Decreased range of motion, Impaired balance, Decreased endurance, Decreased mobility, Decreased coordination, Pain, Obesity, Impaired hearing  Assessment: Pt is a 69 y o  female seen for PT evaluation s/p admit to [facility]30[facility]2022 w/s/p fall, - LOC, +eliquis; Compression fracture of T8 vertebra with routine healing, transverse process fracture of L1 and L2, left 11th rib fracture  Patient fitted for TLSO brace prior to out of bed mobility  Prior to admission: Pt lives with spouse in a two-story home with 2 steps to enter and stair glide to the second floor  Patient primarily uses a Rollator walker for ambulation, but also does have a transport chair if needed, and sleeps in a recliner  Upon evaluation: Pt requires substantial assistance of 1 for bed mobility, min assist for transfers and short ambulation to a target surface within 2 feet using a walker  Pt's clinical presentation is currently unstable/unpredictable given the functional mobility deficits above, especially (but not limited to) decreased ROM, pain and decreased functional mobility tolerance, coupled with fall risks including hx of falls and impaired balance, and combined with medical complications of abnormal H&H, abnormal potassium values and Recent ED visit for hypokalemia     Recommend continue mobility training using rolling walker over next several sessions, Ther Ex to BLE  Additional education regarding TLSO bracing, spinal precautions  Barriers to Discharge: Decreased caregiver support, Inaccessible home environment  Barriers to Discharge Comments: Patient reports spouse " is not well" and uncertain how much physical assistance he can provide to patient upon discharge  Patient was followed by heart failure care coordinator who documented on 11/18/2022 that "patient's  reports he had enough support to care for patient to manage her chronic health problems"/  PT Discharge Recommendation: Post acute rehabilitation services    See flowsheet documentation for full assessment

## 2022-12-30 NOTE — CONSULTS
Consultation - Neurosurgery   Marysol Schmitz 69 y o  female MRN: [id number]  Unit/Bed#: W -01 Encounter: [id number]    Images reviewed at morning rounds on 12/30/2022 at 7 AM  Patient was seen and examined on 12/30/2022 at 8 AM    Inpatient consult to Neurosurgery  Consult performed by: Eyad Dunbar PA-C  Consult ordered by: Mandeep Whyte PA-C          Assessment/Plan               Assessment:  1  Acute anterior nondisplaced T8 vertebral body fracture-TLICS1  2  Hx of fall  3  Hx of Ambulatory dysfunction      Plan:   · Exam: Patient alert and oriented x3, moves all extremities with gross bilateral lower extremity swelling likely lymphedema, strength is 5/5 and sensation to light intact throughout  DTR 2+ without clonus bilaterally  Tenderness at T8 in the lower lumbar region noted on palpation  · Imagings reviewed personally and findings as follows:  · CT spine thoracic and lumbar without contrast on 12/13/2022 demonstrates acute nondisplaced fracture involving the right anterior aspect of the T8 vertebral body  Acute fracture of the left transverse process of L1 and L2  · Baseline upright thoracic spine x-rays in brace ordered-pending  · Pain control:  per primary team  · DVT ppx: SCDs bilateral legs  · Seizure ppx: None  · Activity: As tolerated  · PT/OT evaluation & treatment  · Brace: Wear TLSO brace when upright and at 45 degree head of bed  · Medical Mx: Per primary team  · Neurocheck: Routine  · Neurosurgery evaluated the patient this morning   patient with history of fall complaining of lower thoracic and lumbar back pain 7/10 without radiculopathy or bowel or bladder dysfunction  No neurosurgical intervention at this juncture   Continue conservative treatment with pain control, PT/OT and wearing brace   Baseline upright thoracic spine x-rays in brace  We will review the image once it is done, until then follow from periphery  Call with question or concern        History of Present Illness     HPI: Marysol Schmitz is a 69 y o  female with PMHx of HTN, A  fib on Eliquis, anxiety, arthritis, fibromyalgia, Sjogren's syndrome, congestive heart failure and bilateral lower extremity edema in the setting of venous insufficiency  Patient with ambulatory dysfunction fell down at home while on her way to the bathroom  She uses walker  Her walker gives out and fell down hitting her BACK, denies head injury, loss of consciousness, seizures, nausea, vomiting, vision issues or bowel/ bladder dysfunction  Reported 7/10 back pain localized in the lower thoracic and lumbar regions  Patient denies any radicular symptoms, weakness, numbness or paresthesia in the lower extremities  She denies history of fever, chills, rigors, cough or chest pain  No history of diabetic mellitus, stroke, seizures, or bleeding disorder  Denies Hx of smoking cigarettes or drinking alcohol  Patient lives with her  in their home  Review of Systems   Constitutional: Negative for chills, fever and unexpected weight change  HENT: Negative for trouble swallowing and voice change  Eyes: Negative for photophobia and visual disturbance  Respiratory: Negative for cough, shortness of breath and wheezing  Cardiovascular: Negative for palpitations  Gastrointestinal: Negative for nausea and vomiting  Genitourinary: Negative for difficulty urinating  Musculoskeletal: Positive for back pain and gait problem  Neurological: Negative for dizziness, tremors, seizures, syncope, facial asymmetry, speech difficulty, weakness, light-headedness, numbness and headaches  Psychiatric/Behavioral: Negative for confusion         Historical Information   Past Medical History:   Diagnosis Date   • A-fib ([facility] ) 12/29/2021   • Anxiety    • Arthritis    • Back pain    • Cellulitis    • CHF (congestive heart failure) ([facility] )    • Colon cancer screening 8/3/2022   • Edema of both lower extremities due to peripheral venous insufficiency    • Edema of both lower extremities due to peripheral venous insufficiency    • Encounter for screening mammogram for malignant neoplasm of breast 3/21/2022   • Erythema of lower extremity 11/12/2021   • Fibromyalgia    • Great toe pain, right 10/6/2022   • Hypertension    • Hypotension 9/17/2022   • Leukocytosis 10/6/2022   • Melena 8/20/2022   • Osteoporosis screening 8/3/2022   • Sjogren syndrome, unspecified ([address] )      Past Surgical History:   Procedure Laterality Date   • KNEE CARTILAGE SURGERY     • OH OPTX FEM SHFT FX W/INSJ IMED IMPLT W/WO SCREW Left 8/22/2022    Procedure: LEFT RETROGRADE IM SHAN;  Surgeon: Miracle Taveras MD;  Location: [facility];  Service: Orthopedics   • REPLACEMENT TOTAL KNEE BILATERAL       Social History     Substance and Sexual Activity   Alcohol Use Not Currently     Social History     Substance and Sexual Activity   Drug Use Never     Social History     Tobacco Use   Smoking Status Former   • Types: Cigarettes   Smokeless Tobacco Never     Family History   Problem Relation Age of Onset   • Heart disease Mother    • Cancer Father        Meds/Allergies   all current active meds have been reviewed, current meds:   Current Facility-Administered Medications   Medication Dose Route Frequency   • acetaminophen (TYLENOL) tablet 975 mg  975 mg Oral Q8H [address]   • busPIRone (BUSPAR) tablet 5 mg  5 mg Oral TID   • carvedilol (COREG) tablet 3 125 mg  3 125 mg Oral BID With Meals   • donepezil (ARICEPT) tablet 5 mg  5 mg Oral HS   • enoxaparin (LOVENOX) subcutaneous injection 30 mg  30 mg Subcutaneous Q12H   • escitalopram (LEXAPRO) tablet 20 mg  20 mg Oral Daily   • gabapentin (NEURONTIN) capsule 100 mg  100 mg Oral TID   • HYDROmorphone (DILAUDID) injection 0 5 mg  0 5 mg Intravenous Q4H PRN   • lidocaine (LIDODERM) 5 % patch 2 patch  2 patch Topical Daily   • methocarbamol (ROBAXIN) tablet 500 mg  500 mg Oral Q6H [address]   • naloxone (NARCAN) 0 04 mg/mL syringe 0 04 mg 0 04 mg Intravenous Q1MIN PRN   • ondansetron (ZOFRAN) injection 4 mg  4 mg Intravenous Q6H PRN   • oxyCODONE (OxyCONTIN) 12 hr tablet 20 mg  20 mg Oral BID   • oxyCODONE (ROXICODONE) IR tablet 2 5 mg  2 5 mg Oral Q4H PRN   • oxyCODONE (ROXICODONE) IR tablet 5 mg  5 mg Oral Q4H PRN   • potassium chloride (K-DUR,KLOR-CON) CR tablet 40 mEq  40 mEq Oral BID   • senna-docusate sodium (SENOKOT S) 8 6-50 mg per tablet 2 tablet  2 tablet Oral BID   • torsemide (DEMADEX) tablet 20 mg  20 mg Oral Daily   • torsemide (DEMADEX) tablet 40 mg  40 mg Oral Daily    and PTA meds:   Prior to Admission Medications   Prescriptions Last Dose Informant Patient Reported? Taking?    TIZANIDINE HCL PO   Yes No   Sig: Take 4 mg by mouth every 8 (eight) hours as needed (muscle spasms)   acetaminophen (TYLENOL) 500 mg tablet   No No   Sig: Take 1 tablet (500 mg total) by mouth every 6 (six) hours as needed for mild pain   ammonium lactate (LAC-HYDRIN) 12 % cream   No No   Sig: Apply topically 2 (two) times a day   apixaban (Eliquis) 5 mg   No No   Sig: Take 1 tablet (5 mg total) by mouth 2 (two) times a day Lot [id number] exp 10/2024   busPIRone (BUSPAR) 5 mg tablet   No No   Sig: TAKE ONE TABLET BY MOUTH THREE TIMES DAILY   carvedilol (COREG) 3 125 mg tablet   No No   Sig: Take 1 tablet (3 125 mg total) by mouth 2 (two) times a day with meals   donepezil (ARICEPT) 5 mg tablet   No No   Sig: Take 1 tablet (5 mg total) by mouth daily at bedtime   escitalopram (LEXAPRO) 20 mg tablet   No No   Sig: Take 1 tablet (20 mg total) by mouth daily   loperamide (IMODIUM) 2 mg capsule   No No   Sig: Take 1 capsule (2 mg total) by mouth 4 (four) times a day as needed for diarrhea for up to 7 days   metolazone (ZAROXOLYN) 2 5 mg tablet   No No   Sig: Take 1 tablet ( 2 5 mg) by mouth once a week on Thursdays, before the morning dose of torsemide   oxyCODONE (OxyCONTIN) 20 mg 12 hr tablet   Yes No   Sig: Take 20 mg by mouth 2 (two) times a day   pantoprazole (PROTONIX) 40 mg tablet   No No   Sig: Take 1 tablet (40 mg total) by mouth daily   potassium chloride (K-DUR,KLOR-CON) 20 mEq tablet   No No   Sig: Take 3 tablets daily ( 60 meq) plus an extra 20 meq on thursdays with metolazone   torsemide (DEMADEX) 20 mg tablet   No No   Sig: Take 2 tablets (40 mg total) by mouth daily 40mg (2 tablets) by mouth every morning, 20mg (1 tablet) by mouth every evening      Facility-Administered Medications: None     No Known Allergies    Objective   I/O     None          Physical Exam  Constitutional:       Appearance: She is obese  HENT:      Head: Normocephalic and atraumatic  Eyes:      Extraocular Movements: Extraocular movements intact  Cardiovascular:      Rate and Rhythm: Normal rate  Pulses: Normal pulses  Pulmonary:      Effort: Pulmonary effort is normal    Musculoskeletal:         General: Tenderness present  Cervical back: Normal range of motion  Neurological:      General: No focal deficit present  Mental Status: She is alert and oriented to person, place, and time  GCS: GCS eye subscore is 4  GCS verbal subscore is 5  GCS motor subscore is 6  Cranial Nerves: Cranial nerves 2-12 are intact  Sensory: Sensation is intact  Motor: Motor function is intact  Coordination: Finger-Nose-Finger Test normal       Deep Tendon Reflexes: Reflexes are normal and symmetric  Reflex Scores:       Tricep reflexes are 2+ on the right side and 2+ on the left side  Bicep reflexes are 2+ on the right side and 2+ on the left side  Brachioradialis reflexes are 2+ on the right side and 2+ on the left side  Patellar reflexes are 2+ on the right side and 2+ on the left side  Achilles reflexes are 2+ on the right side and 2+ on the left side  Psychiatric:         Speech: Speech normal        Neurologic Exam     Mental Status   Oriented to person, place, and time     Speech: speech is normal   Level of consciousness: alert    Cranial Nerves   Cranial nerves II through XII intact  CN III, IV, VI   Nystagmus: none     CN XI   CN XI normal      Motor Exam   Muscle bulk: normal  Overall muscle tone: normal  Right arm tone: normal  Left arm tone: normal  Right arm pronator drift: absent  Left arm pronator drift: absent  Right leg tone: normal  Left leg tone: normal    Sensory Exam   Light touch normal      Gait, Coordination, and Reflexes     Coordination   Finger to nose coordination: normal    Reflexes   Right brachioradialis: 2+  Left brachioradialis: 2+  Right biceps: 2+  Left biceps: 2+  Right triceps: 2+  Left triceps: 2+  Right patellar: 2+  Left patellar: 2+  Right achilles: 2+  Left achilles: 2+  Right : 2+  Left : 2+  Right Dior: absent  Left Dior: absent  Right ankle clonus: absent  Left pendular knee jerk: absent      Vitals:Blood pressure 144/64, pulse 72, temperature 98 4 °F (36 9 °C), temperature source Oral, resp  rate 16, height 5' 2" (1 575 m), weight 96 6 kg (213 lb), SpO2 93 %  ,Body mass index is 38 96 kg/m²       Lab Results:   Results from last 7 days   Lab Units 12/30/22  0323   WBC Thousand/uL 13 31*   HEMOGLOBIN g/dL 8 9*   HEMATOCRIT % 29 5*   PLATELETS Thousands/uL 361   NEUTROS PCT % 77*   MONOS PCT % 8     Results from last 7 days   Lab Units 12/30/22  0323   POTASSIUM mmol/L 3 0*   CHLORIDE mmol/L 100   CO2 mmol/L 28   BUN mg/dL 12   CREATININE mg/dL 0 91   CALCIUM mg/dL 9 3                 No results found for: TROPONINT  ABG:No results found for: PHART, WZW9CAT, PO2ART, EDI7HWH, S1SBJJIV, BEART, SOURCE    Imaging Studies: I have personally reviewed pertinent reports   , I have personally reviewed pertinent films in PACS and I have personally reviewed pertinent films in PACS with a Radiologist     EKG, Pathology, and Other Studies: I have personally reviewed pertinent reports   , I have personally reviewed pertinent films in PACS and I have personally reviewed pertinent films in PACS with a Radiologist     VTE Prophylaxis: Sequential compression device (Venodyne)     Code Status: Level 1 - Full Code  Advance Directive and Living Will: Yes    Power of :    POLST:      Counseling / Coordination of Care  I spent 20 minutes with the patient

## 2022-12-30 NOTE — ASSESSMENT & PLAN NOTE
- History of memory impairment  - geriatrics consulted and note appreciated; Aricept 5mg and delirium precautions  - Disoriented to time on admission  - Urinalysis negative for urinary tract infection, clinical blood labs unremarkable; portable chest xray- impression -"Mild cardiomegaly  Trace left effusion  No pneumothorax  Chronic elevation of the right hemidiaphragm   Acute left 11th rib fracture not visible "

## 2022-12-30 NOTE — ASSESSMENT & PLAN NOTE
- Status post mechanical fall with the below noted injuries  - Fall precautions  - Geriatric Medicine consultation for evaluation, medication review and recommendations; maintain Aricept 5 mg daily, delirium precautions, rib fracture protocol, geriatric pain control  - PT and OT evaluation and treatment as indicated  - Case Management consultation for disposition planning

## 2022-12-30 NOTE — OCCUPATIONAL THERAPY NOTE
Occupational Therapy Evaluation      Rodo Rodríguez    12/30/2022    Patient Active Problem List   Diagnosis    Chronic venous insufficiency    Essential hypertension    Chronic low back pain with sciatica    Stage 3 chronic kidney disease (HCC)    Mixed hyperlipidemia    Obesity    Osteoarthritis of knee    Sjogren's syndrome ([facility] )    Acute renal failure superimposed on stage 3a chronic kidney disease (HCC)    A-fib ([facility])    Opioid dependence due to Chronic back pain     Anxiety    Memory impairment    Prediabetes    Seasonal allergies    Xerosis of skin    Venous stasis dermatitis of both lower extremities    At risk for obstructive sleep apnea    Lymphedema    Fall    Fracture of proximal end of left femur ([facility] )    Constipation    Ambulatory dysfunction    Anemia    Age-related osteoporosis with current pathological fracture    Encounter for support and coordination of transition of care    Acute gastric ulcer with hemorrhage    Elevated troponin    Chronic heart failure with preserved ejection fraction (HFpEF) ([facility] )    Parenchymal renal hypertension    LEXY (acute kidney injury) ([facility] )    Anemia in stage 3 chronic kidney disease (HCC)    Hypokalemia    Loose stools    Fracture of rib, single, closed    Closed fracture of transverse process of lumbar vertebra ([facility])    Compression fracture of T8 vertebra with routine healing       Past Medical History:   Diagnosis Date    A-fib ([facility] ) 12/29/2021    Anxiety     Arthritis     Back pain     Cellulitis     CHF (congestive heart failure) ([facility] )     Colon cancer screening 8/3/2022    Edema of both lower extremities due to peripheral venous insufficiency     Edema of both lower extremities due to peripheral venous insufficiency     Encounter for screening mammogram for malignant neoplasm of breast 3/21/2022    Erythema of lower extremity 11/12/2021    Fibromyalgia     Great toe pain, right 10/6/2022    Hypertension     Hypotension 9/17/2022    Leukocytosis 10/6/2022 Melena 8/20/2022    Osteoporosis screening 8/3/2022    Sjogren syndrome, unspecified ([facility] )        Past Surgical History:   Procedure Laterality Date    KNEE CARTILAGE SURGERY      MI OPTX FEM SHFT FX W/INSJ IMED IMPLT W/WO SCREW Left 8/22/2022    Procedure: LEFT RETROGRADE IM SHAN;  Surgeon: Earl Arce MD;  Location: [facility];  Service: Orthopedics    REPLACEMENT TOTAL KNEE BILATERAL          12/30/22 0905   OT Last Visit   OT Visit Date 12/30/22   Note Type   Note type Evaluation   Pain Assessment   Pain Assessment Tool 0-10   Pain Score 7   Pain Location/Orientation Orientation: Lower; Location: Back   Pain Onset/Description Onset: Ongoing; Descriptor: Aching;Descriptor: Regenia Setswana   Effect of Pain on Daily Activities limits activity tolerance and comfort   Patient's Stated Pain Goal No pain   Hospital Pain Intervention(s) Repositioned; Ambulation/increased activity; Emotional support   Restrictions/Precautions   Weight Bearing Precautions Per Order No   Braces or Orthoses TLSO   Other Precautions Fall Risk;Pain;O2;Chair Alarm; Bed Alarm;Multiple lines;Spinal precautions  (1 5 L O2 NC)   Home Living   Type of Home House   Home Layout Two level;Bed/bath upstairs;Stairs to enter with rails;1/2 bath on main level  (2 GLORIA to enter; stair glide to upstairs)   Bathroom Shower/Tub Walk-in shower   Bathroom Toilet Raised  (commode over toilet)   Bathroom Equipment Grab bars in shower; Shower chair;Commode   Bathroom Accessibility Accessible   Home Equipment Walker;Cane;Stair glide  (transport chair)   Prior Function   Level of [place] Independent with ADLs; Independent with functional mobility; Needs assistance with IADLS   Lives With Spouse  (Spouse works still; home alone from 2-10pm)   Receives Help From Family   IADLs Family/Friend/Other provides transportation; Family/Friend/Other provides meals; Family/Friend/Other provides medication management   Falls in the last 6 months 1 to 4  (at least 3 per pt)   Vocational Retired  (Worked in [place] for [employer])   Comments Pt reports (I) with ADLs however spouse has been completing more IADLs for pt lately  Pt no longer drives and spouse cooks most meals and manages her medications because "He's [place] and he nejoys helping me"  Rollator for mobility baseline  Frequent falls  Lifestyle   Service to Others 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   [address] Pt reports she   General   Family/Caregiver Present Yes  (Spouse Dakota at end of session)   Subjective   Subjective "I've got so many toys" (referring to [place])   ADL   Eating Assistance 7  [address]  [address] Unable to assess   LB [address] Unable to assess    [address]  Maximal Assistance   LB Dressing Deficit Setup;Steadying;Verbal cueing;Supervision/safety; Increased time to complete; Thread RLE into underwear; Thread LLE into underwear;Pull up over hips   Toileting Assistance  3  Moderate Assistance   Toileting Deficit Setup;Steadying;Verbal cueing;Supervison/safety; Increased time to complete; Bedside commode;Clothing management up;Clothing management down;Perineal hygiene  (ap care seated with supervision vs in stance with max A)   Bed Mobility   Supine to Sit Unable to assess   Sit to Supine Unable to assess   Additional Comments Pt received in recliner and returned at end of session; bed mobility not assessed   Transfers   Sit to Stand 4  Minimal assistance   Additional items Assist x 1; Increased time required;Verbal cues   Stand to Sit 4  Minimal assistance   Additional items Assist x 1; Increased time required;Verbal cues   Toilet transfer 4  Minimal assistance   Additional items Assist x 1; Increased time required;Verbal cues; Commode;Armrests   Additional Comments VC for safe hand placement   Functional Mobility   Functional Mobility 4  Minimal assistance   Additional Comments Few steps from recliner<>commode with min A x 1 and RW, VC for safety and sequencing of steps  Periods of pain that required pt to take standing rest breaks  Additional items Rolling walker   Balance   Static Sitting Good   Dynamic Sitting Fair   Static Standing Fair -   Dynamic Standing Fair -   Activity Tolerance   Activity Tolerance Patient limited by fatigue;Patient limited by pain   Medical Staff Made Aware Spoke to PT [facility]   Nurse Made Aware yes, RN Jocelin Brooks ok to see pt and updated on outcomes   RUE Assessment   RUE Assessment WFL   LUE Assessment   LUE Assessment WFL   Hand Function   Gross Motor Coordination Functional   Fine Motor Coordination Functional   Sensation   Light Touch No apparent deficits   Vision-Basic Assessment   Current Vision Wears glasses all the time   Cognition   Overall Cognitive Status [facility]   Arousal/Participation Alert; Cooperative   Attention Within functional limits   Orientation Level Oriented X4   Memory Decreased recall of precautions;Decreased recall of recent events   Following Commands Follows one step commands with increased time or repetition   Comments Pleasant and agreeable  Somewhat limited recall of falls recently  Limited insight inot deficits  Assessment   Limitation Decreased ADL status; Decreased Safe judgement during ADL;Decreased endurance;Decreased self-care trans;Decreased high-level ADLs   Prognosis Good   Assessment Pt is a 69 y o  female seen for OT evaluation s/p admit to [address] [place] [address] on 12/30/2022 w/Compression fracture of T8 vertebra with routine healing  Orders received for OT evaluation and treatment  Pt identified during session via name and wristband  Comorbidities affecting patient at time of evaluation include: memory impairment, closed fx of lumbar vertebrae, fx of L rib, Afib, CKD and falls   Personal factors affecting patient at time of evaluation include: limited caregiver support, inaccesible home environment, limited insight into deficits, decreased initiation and engagement, difficulty performing ADLs and difficulty performing IADLs  PTA, patient was independent with functional mobility with rollator, independent with ADLS, requiring assist for IADLS, living with spouse in a 2 level home with 2 steps to enter and retired  The evaluation identifies the following performance deficits: weakness, impaired balance, decreased endurance, increased fall risk, new onset of impairment of functional mobility, decreased ADLS, decreased IADLS, pain, decreased activity tolerance, decreased safety awareness, ortheopedic restrictions and decreased strength, that result in activity limitations (as is outlined above in flowsheet)  Based on the OT evaluation outcomes, functional performance deficits, and assessment findings, pt has been identified as high complexity, because the patient presents with comorbidites that affect occupational performance and required significant modification of tasks or assistance with consideration of multiple treatment options  The patient's raw score on the AM-PAC Daily Activity inpatient short form is 17, standardized score is 37 26, less than 39 4  From an OT standpoint, patients at this level may benefit from d/c to Post acute rehabilitation services  Pt will benefit from continued IPOT treatment to address above deficits and maximize level of independence with ADLs and functional mobility in the areas of: bathing/ shower, dressing, toilet hygiene, transfer to all surfaces and functional ambulation  Goals   Patient Goals to have less pain and be independent   LTG Time Frame 10-14   Long Term Goal #1 see goals below   Plan   Treatment Interventions ADL retraining;Functional transfer training; Endurance training;Patient/family training;Equipment evaluation/education; Compensatory technique education;Continued evaluation; Energy conservation; Activityengagement   Goal Expiration Date 01/09/23   OT Treatment Day 0   OT Frequency 3-5x/wk Recommendation   OT Discharge Recommendation Post acute rehabilitation services   AM-PAC Daily Activity Inpatient   Lower Body Dressing 2   Bathing 2   Toileting 2   Upper Body Dressing 3   Grooming 4   Eating 4   Daily Activity Raw Score 17   Daily Activity Standardized Score (Calc for Raw Score >=11) 37 26   AM-PAC Applied Cognition Inpatient   Following a Speech/Presentation 3   Understanding Ordinary Conversation 4   Taking Medications 2   Remembering Where Things Are Placed or Put Away 3   Remembering List of 4-5 Errands 2   Taking Care of Complicated Tasks 2   Applied Cognition Raw Score 16   Applied Cognition Standardized Score 35 03   Additional Treatment Session   Start Time 0920   End Time 6200   Treatment Assessment Pt seen for additional treatment session  After toileting, pt to don underwear and pad sitting in recliner  Pt required A to thread over toes and A to pull underwear to knees in sitting  Pt required min A x 1 to perform sit<>stand from recliner to RW  Pt able to assist with pulling underwear to hips in stance, 1 LOB posterior and required A to correct  Pt to return to sitting with min A x 1  Pt educated on spinal precautions using BLT acronym  Pt verbalized good understanding of precautions and notified of educational handout to review in spare time  All needs met and call bell within reach  Additional Treatment Day 1   End of Consult   Patient Position at End of Consult Bedside chair;Bed/Chair alarm activated; All needs within reach   Nurse Communication Nurse aware of consult     GOALS:    *Goals established to promote patient goal of to go home and be independent:      *ADL transfers with (S) for inc'd independence with ADLs/purposeful tasks    *Patient will perform grooming tasks standing at sink with (S) in order to increase overall independence    *LB ADL with Min (A) using AE prn for inc'd independence with self cares    *Toileting with Min (A) for clothing management and hygiene to increase hygiene/thoroughness in order to reduce caregiver burden    *Increase stand tolerance x 3  m for inc'd tolerance with standing purposeful tasks    *Patient will verbalize 3 safety awareness/ principles to prevent falls in the home setting  *Patient will verbalize and demonstrate use of energy conservation/deep breathing techniques and work simplification skills during functional activities with no verbal cues  *Patient will increase OOB/sitting tolerance to 2-4 hours per day to increase participation in self-care and leisure tasks with no s/s of exertion  *Patient will engage in ongoing cognitive assessment to assist with safe discharge planning/recommendations  *Pt will verbalize and demonstrate understanding of spinal movement precautions 100% in tx sessions for increased safety and functional mobility  *Pt will demonstrate use of long handled AE during 100% of tx sessions for increased ADL safety and independence following D/C     *Patient will increase functional mobility to and from bathroom with rolling walker with supervision to increase independence with toileting    *Patient will improve functional activity tolerance to 15 minutes of sustained functional tasks to increase participation in basic self-care and decrease assistance level       Wero De Souza, OTR/L

## 2022-12-30 NOTE — ASSESSMENT & PLAN NOTE
Lab Results   Component Value Date    EGFR 54 12/12/2022    EGFR 48 11/16/2022    EGFR 38 10/28/2022    CREATININE 1 01 12/12/2022    CREATININE 1 11 11/16/2022    CREATININE 1 34 (H) 10/28/2022     - Avoid nephrotoxic agents

## 2022-12-30 NOTE — CASE MANAGEMENT
Case Management Discharge Planning Note    Patient name Evelio Mcdonough  Location W /W -49 MRN [id number]  : 1945 Date 2022       Current Admission Date: 2022  Current Admission Diagnosis:Compression fracture of T8 vertebra with routine healing   Patient Active Problem List    Diagnosis Date Noted   • Fracture of rib, single, closed 2022   • Closed fracture of transverse process of lumbar vertebra ([address] ) 2022   • Compression fracture of T8 vertebra with routine healing 2022   • Loose stools 2022   • Parenchymal renal hypertension 2022   • LEXY (acute kidney injury) ([address] ) 2022   • Anemia in stage 3 chronic kidney disease ([facility] ) 2022   • Hypokalemia 2022   • Chronic heart failure with preserved ejection fraction (HFpEF) ([facility] ) 10/18/2022   • Elevated troponin 10/06/2022   • Acute gastric ulcer with hemorrhage 10/05/2022   • Age-related osteoporosis with current pathological fracture 2022   • Encounter for support and coordination of transition of care 2022   • Anemia 2022   • Ambulatory dysfunction 2022   • Constipation 2022   • Fall 2022   • Fracture of proximal end of left femur ([facility] ) 2022   • At risk for obstructive sleep apnea 2022   • Lymphedema 2022   • Venous stasis dermatitis of both lower extremities 2022   • Seasonal allergies 2022   • Xerosis of skin 2022   • Prediabetes 2022   • Memory impairment 2022   • Anxiety    • Opioid dependence due to Chronic back pain  2022   • Acute renal failure superimposed on stage 3a chronic kidney disease ([address] ) 2021   • A-fib ([address] ) 2021   • Mixed hyperlipidemia 2021   • Obesity 2021   • Osteoarthritis of knee 2021   • Stage 3 chronic kidney disease ([address] ) 2021   • Chronic low back pain with sciatica 2021   • Chronic venous insufficiency 2021   • Essential hypertension 03/09/2017   • Sjogren's syndrome ([facility] ) 07/08/2014      LOS (days): 0  Geometric Mean LOS (GMLOS) (days): 2 40  Days to GMLOS:1 8     OBJECTIVE:  Risk of Unplanned Readmission Score: 45 68         Current admission status: Inpatient   Preferred Pharmacy:   [address], [address]  [address] [place] [address]  Phone: [phone] Fax: [phone]    Primary Care Provider: Bandar Pruitt MD    Primary Insurance: [address] [place] [address]  Secondary Insurance:     DISCHARGE DETAILS:    Discharge planning discussed with[de-identified] daughterDebra Spindle of Choice: Yes  Comments - Freedom of Choice: re: SNF  CM contacted family/caregiver?: Yes  Were Treatment Team discharge recommendations reviewed with patient/caregiver?: Yes  Did patient/caregiver verbalize understanding of patient care needs?: N/A- going to facility  Were patient/caregiver advised of the risks associated with not following Treatment Team discharge recommendations?: Yes    Contacts  Patient Contacts: Brandan Amaya (Daughter)  Relationship to Patient[de-identified] Family  Contact Method: Phone  Phone Number: [phone]  Reason/Outcome: Continuity of Care, Emergency Contact, Referral, Discharge [address]         Is the patient interested in [facility] at discharge?: No    DME Referral Provided  Referral made for DME?: No    Other Referral/Resources/Interventions Provided:  Interventions: SNF  Referral Comments: CM spoke with patients daughter, as she was on phone with RN and was concerned about patients d/c plan  Daughter stating she was notified that patient will be discharged tomorrow but is concerned nothing is in place  CM notified that patient will need placement, referrals still need to be made and patient will need insurance auth  CM confirmed patient will stay at hospital until transported to rehab   Daughter agreeable to this and stating she would prefer patient goes to [address] [place] [address] and a second choice of [facility] location  Daughter requesting all communication goes though her and she will pass along to her father  CM to send referrals in [address] and follow up able           Treatment Team Recommendation: SNF  Discharge Destination Plan[de-identified] SNF

## 2022-12-30 NOTE — ASSESSMENT & PLAN NOTE
- Single left-sided 11th rib fracture, present on admission   - Continue rib fracture protocol   - Continue to encourage incentive spirometer use and adequate pulmonary hygiene  - Continue multimodal analgesic regimen     - Supplemental oxygen via nasal cannula as needed to maintain saturations greater than or equal to 94%  - PT and OT evaluation and treatment as indicated  - Outpatient follow-up in the trauma clinic for re-evaluation in approximately 2 weeks

## 2022-12-30 NOTE — ASSESSMENT & PLAN NOTE
- L1, L2 left sided transverse process fractures, present on admission  - Multimodal pain control  - DVT ppx- Lovenox, will transition to eliquis (home medication for [facility] [place] - atrial fibrillation)  - PT and OT as indicated  - Follow up with trauma clinic

## 2022-12-30 NOTE — ASSESSMENT & PLAN NOTE
- Single left-sided 11th rib fracture, present on admission   - Continue rib fracture protocol   - Continue to encourage incentive spirometer use and adequate pulmonary hygiene  - Continue multimodal analgesic regimen with accompanying bowel regimen  - Supplemental oxygen via nasal cannula as needed to maintain saturations greater than or equal to 94%  - PT and OT evaluation and treatment as indicated  - Currently on room oxygen to SPO2 saturation at 98%  -  cc, average pull  - Outpatient follow-up in the trauma clinic for re-evaluation in approximately 2 weeks

## 2022-12-30 NOTE — ASSESSMENT & PLAN NOTE
- Bilateral LE edema at baseline, contributory to ambulatory dysfunction   Uses a walker at baseline

## 2022-12-30 NOTE — H&P
[facility] 1945[facility] 69 [facility]  [facility] MRN: [id number]  Unit/Bed#: ED-42 Encounter: [id number]  Primary Care Provider: Mouna Kenyon MD   Date and time admitted to hospital: 12/30/2022 12:24 AM    Closed fracture of transverse process of lumbar vertebra [facility])  Assessment & Plan  - L1, L2 left sided transverse process fractures, present on admission  - Multimodal pain control  - DVT ppx  - PT and OT as indicated  - Follow up with trauma clinic    Fracture of rib, single, closed  Assessment & Plan  - Single left-sided 11th rib fracture, present on admission   - Continue rib fracture protocol   - Continue to encourage incentive spirometer use and adequate pulmonary hygiene  - Continue multimodal analgesic regimen     - Supplemental oxygen via nasal cannula as needed to maintain saturations greater than or equal to 94%  - PT and OT evaluation and treatment as indicated  - Outpatient follow-up in the trauma clinic for re-evaluation in approximately 2 weeks  Fall  Assessment & Plan  - Status post mechanical fall with the below noted injuries  - Fall precautions  - Geriatric Medicine consultation for evaluation, medication review and recommendations   - PT and OT evaluation and treatment as indicated  - Case Management consultation for disposition planning        Memory impairment  Assessment & Plan  - History of memory impairment  - Disoriented to time on admission  - Basic lab work, UA to rule out infection as a cause of disorientation  - Geriatric medicine consult    A-fib [facility])  Assessment & Plan  - Rate controlled  - Hold home Eliquis    Stage 3 chronic kidney disease [facility])  Assessment & Plan  Lab Results   Component Value Date    EGFR 54 12/12/2022    EGFR 48 11/16/2022    EGFR 38 10/28/2022    CREATININE 1 01 12/12/2022    CREATININE 1 11 11/16/2022    CREATININE 1 34 (H) 10/28/2022     - Avoid nephrotoxic agents    Chronic venous insufficiency  Assessment & Plan  - Bilateral LE edema at baseline, contributory to ambulatory dysfunction  Uses a walker at baseline    * Compression fracture of T8 vertebra with routine healing  Assessment & Plan  - T8 compression fracture, present on admission  - Neurosurgery consult  - Neurovascularly intact  - Thoracic spine precautions  - TLSO & Uprights pending  - Pain control  - DVT ppx  - PT and OT as indicated      Trauma Alert: Evaluation; trauma team notified at [phone] via phone   Model of Arrival: Ambulance    Trauma Team: Attending [facility] and [address] [place] [address]  Consultants:     Neurosurgery: routine consult; Epic consult order placed; History of Present Illness     Chief Complaint: Back pain  Mechanism:Fall     HPI:    Riley Duffy is a 69 y o  female who presents with back pain after a fall  Pt reports she was using her walker to go to the bathroom when she slipped on the tile floor and fell directly onto her buttocks  She was unable to get off the floor on her own due to pain, so her  called 911  She denies head strike, loss of consciousness, and reports she takes Eliquis daily  She reports left sided back pain and denies numbness or tingling in LE  Review of Systems   HENT: Negative for facial swelling  Eyes: Negative for photophobia  Respiratory: Negative for shortness of breath  Cardiovascular: Negative for chest pain  Gastrointestinal: Negative for abdominal pain and nausea  Musculoskeletal: Positive for arthralgias and myalgias  Negative for back pain, neck pain and neck stiffness  Back pain   Skin: Negative for wound  Neurological: Negative for dizziness, syncope, weakness, light-headedness, numbness and headaches  Psychiatric/Behavioral: Negative for confusion  All other systems reviewed and are negative  12-point, complete review of systems was reviewed and negative except as stated above       Historical Information     Past Medical History: Diagnosis Date   • A-fib ([facility] ) 12/29/2021   • Anxiety    • Arthritis    • Back pain    • Cellulitis    • CHF (congestive heart failure) ([facility] )    • Colon cancer screening 8/3/2022   • Edema of both lower extremities due to peripheral venous insufficiency    • Edema of both lower extremities due to peripheral venous insufficiency    • Encounter for screening mammogram for malignant neoplasm of breast 3/21/2022   • Erythema of lower extremity 11/12/2021   • Fibromyalgia    • Great toe pain, right 10/6/2022   • Hypertension    • Hypotension 9/17/2022   • Leukocytosis 10/6/2022   • Melena 8/20/2022   • Osteoporosis screening 8/3/2022   • Sjogren syndrome, unspecified ([facility] )      Past Surgical History:   Procedure Laterality Date   • KNEE CARTILAGE SURGERY     • NC OPTX FEM SHFT FX W/INSJ IMED IMPLT W/WO SCREW Left 8/22/2022    Procedure: LEFT RETROGRADE IM SHAN;  Surgeon: Carrol Mir MD;  Location: [facility];  Service: Orthopedics   • REPLACEMENT TOTAL KNEE BILATERAL          Social History     Tobacco Use   • Smoking status: Former     Types: Cigarettes   • Smokeless tobacco: Never   Vaping Use   • Vaping Use: Never used   Substance Use Topics   • Alcohol use: Not Currently   • Drug use: Never     Immunization History   Administered Date(s) Administered   • COVID-19 PFIZER VACCINE 0 3 ML IM 02/15/2021, 03/10/2021   • COVID-19 Pfizer vac (Hardy-sucrose, gray cap) 12 yr+ IM 08/30/2022, 08/30/2022   • INFLUENZA 11/11/2016, 12/29/2017   • Pneumococcal Conjugate 13-Valent 11/17/2016   • Pneumococcal Polysaccharide PPV23 04/09/2014   • Tdap 07/20/2014, 08/20/2022, 08/20/2022     Last Tetanus: 2022  Family History: Non-contributory    1  Before the illness or injury that brought you to the Emergency, did you need someone to help you on a regular basis? 1=Yes   2  Since the illness or injury that brought you to the Emergency, have you needed more help than usual to take care of yourself? 1=Yes   3   Have you been hospitalized for one or more nights during the past 6 months (excluding a stay in the Emergency Department)? 1=Yes   4  In general, do you see well? 1=No   5  In general, do you have serious problems with your memory? 1=Yes   6  Do you take more than three different medications everyday? 1=Yes   TOTAL   6     Did you order a geriatric consult if the score was 2 or greater?: yes     Meds/Allergies   all current active meds have been reviewed   No Known Allergies    Objective   Initial Vitals:   Temperature: 97 8 °F (36 6 °C) (12/30/22 0028)  Pulse: 75 (12/30/22 0028)  Respirations: 20 (12/30/22 0028)  Blood Pressure: 139/58 (12/30/22 0028)    Primary Survey:   Airway:        Status: patent;        Pre-hospital Interventions: none        Hospital Interventions: none  Breathing:        Pre-hospital Interventions: none       Effort: normal       Right breath sounds: normal       Left breath sounds: normal  Circulation:        Rhythm: regular       Rate: regular   Right Pulses Left Pulses    R radial: 2+    R pedal: 2+     L radial: 2+    L pedal: 2+       Disability:        GCS: Eye: 4; Verbal: 4 Motor: 6 Total: 14       Right Pupil: 4 mm;  round;  reactive         Left Pupil:  4 mm;  round;  reactive      R Motor Strength L Motor Strength    R : 5/5  R dorsiflex: 5/5  R plantarflex: 5/5 L : 5/5  L dorsiflex: 5/5  L plantarflex: 5/5        Sensory:  No sensory deficit  Exposure:       Completed: Yes      Secondary Survey:  Physical Exam  Vitals reviewed  Constitutional:       General: She is not in acute distress  Appearance: Normal appearance  HENT:      Head: Normocephalic and atraumatic  Right Ear: External ear normal       Left Ear: External ear normal       Nose: Nose normal       Mouth/Throat:      Mouth: Mucous membranes are moist       Pharynx: Oropharynx is clear  Eyes:      Extraocular Movements: Extraocular movements intact        Conjunctiva/sclera: Conjunctivae normal       Pupils: Pupils are equal, round, and reactive to light  Cardiovascular:      Rate and Rhythm: Normal rate  Rhythm irregular  Pulses: Normal pulses  Heart sounds: Normal heart sounds  Pulmonary:      Effort: Pulmonary effort is normal  No respiratory distress  Breath sounds: Normal breath sounds  Comments: Left lower chest wall tenderness  Chest:      Chest wall: Tenderness present  Abdominal:      General: Abdomen is flat  Bowel sounds are normal  There is no distension  Palpations: Abdomen is soft  There is no mass  Tenderness: There is no abdominal tenderness  There is no guarding or rebound  Hernia: No hernia is present  Musculoskeletal:         General: No swelling, tenderness, deformity or signs of injury  Cervical back: Normal range of motion and neck supple  No rigidity or tenderness  Right lower leg: Edema present  Left lower leg: Edema present  Comments: Chronic bilateral lymphedema with skin changes   Skin:     General: Skin is warm and dry  Capillary Refill: Capillary refill takes less than 2 seconds  Findings: No bruising or lesion  Neurological:      General: No focal deficit present  Mental Status: She is alert and oriented to person, place, and time  Mental status is at baseline  Sensory: No sensory deficit  Motor: No weakness     Psychiatric:         Mood and Affect: Mood normal          Behavior: Behavior normal          Invasive Devices     Peripheral Intravenous Line  Duration           Peripheral IV 12/30/22 Right;Ventral (anterior) Forearm <1 day              Lab Results: Results: I have personally reviewed all pertinent laboratory/tests results, BMP/CMP: No results found for: SODIUM, K, CL, CO2, ANIONGAP, BUN, CREATININE, GLUCOSE, CALCIUM, AST, ALT, ALKPHOS, PROT, BILITOT, EGFR and CBC: No results found for: WBC, HGB, HCT, MCV, PLT, ADJUSTEDWBC, MCH, MCHC, RDW, MPV, NRBC    Imaging Results: I have personally reviewed pertinent reports  Chest Xray(s): N/A   FAST exam(s): N/A   CT Scan(s): positive for acute findings: see below   Additional Xray(s): N/A     Other Studies:   CT spine thoracic & lumbar wo contrast   Final Result by Stiven Corcoran MD (12/30 0222)      Acute nondisplaced fracture involving the right anterior aspect of the T8 vertebral body  Acute fractures of the left transverse processes of L1 on L2  Acute nondisplaced fracture of the partially visualized posterior left 11th rib  Consider a CT chest for further evaluation  Workstation performed: [id number]               Code Status: Prior  Advance Directive and Living Will: Yes    Power of :    POLST:    I have spent 30 minutes with Patient and family today in which greater than 50% of this time was spent in counseling/coordination of care regarding Diagnostic results, Prognosis, Risks and benefits of tx options, Intructions for management, Patient and family education, Importance of tx compliance, Risk factor reductions and Impressions

## 2022-12-31 ENCOUNTER — APPOINTMENT (INPATIENT)
Dept: RADIOLOGY | Facility: HOSPITAL | Age: 77
End: 2022-12-31

## 2022-12-31 LAB
ANION GAP SERPL CALCULATED.3IONS-SCNC: 8 MMOL/L (ref 4–13)
BASOPHILS # BLD AUTO: 0.03 THOUSANDS/ÂΜL (ref 0–0.1)
BASOPHILS NFR BLD AUTO: 0 % (ref 0–1)
BUN SERPL-MCNC: 10 MG/DL (ref 5–25)
CALCIUM SERPL-MCNC: 8.9 MG/DL (ref 8.4–10.2)
CHLORIDE SERPL-SCNC: 103 MMOL/L (ref 96–108)
CO2 SERPL-SCNC: 32 MMOL/L (ref 21–32)
CREAT SERPL-MCNC: 0.95 MG/DL (ref 0.6–1.3)
EOSINOPHIL # BLD AUTO: 0.16 THOUSAND/ÂΜL (ref 0–0.61)
EOSINOPHIL NFR BLD AUTO: 2 % (ref 0–6)
ERYTHROCYTE [DISTWIDTH] IN BLOOD BY AUTOMATED COUNT: 17.4 % (ref 11.6–15.1)
GFR SERPL CREATININE-BSD FRML MDRD: 58 ML/MIN/1.73SQ M
GLUCOSE SERPL-MCNC: 97 MG/DL (ref 65–140)
HCT VFR BLD AUTO: 31.8 % (ref 34.8–46.1)
HGB BLD-MCNC: 9.3 G/DL (ref 11.5–15.4)
IMM GRANULOCYTES # BLD AUTO: 0.03 THOUSAND/UL (ref 0–0.2)
IMM GRANULOCYTES NFR BLD AUTO: 0 % (ref 0–2)
LYMPHOCYTES # BLD AUTO: 1.84 THOUSANDS/ÂΜL (ref 0.6–4.47)
LYMPHOCYTES NFR BLD AUTO: 22 % (ref 14–44)
MCH RBC QN AUTO: 23.5 PG (ref 26.8–34.3)
MCHC RBC AUTO-ENTMCNC: 29.2 G/DL (ref 31.4–37.4)
MCV RBC AUTO: 80 FL (ref 82–98)
MONOCYTES # BLD AUTO: 0.72 THOUSAND/ÂΜL (ref 0.17–1.22)
MONOCYTES NFR BLD AUTO: 9 % (ref 4–12)
NEUTROPHILS # BLD AUTO: 5.49 THOUSANDS/ÂΜL (ref 1.85–7.62)
NEUTS SEG NFR BLD AUTO: 67 % (ref 43–75)
NRBC BLD AUTO-RTO: 0 /100 WBCS
PHOSPHATE SERPL-MCNC: 2.9 MG/DL (ref 2.3–4.1)
PLATELET # BLD AUTO: 353 THOUSANDS/UL (ref 149–390)
PMV BLD AUTO: 10.3 FL (ref 8.9–12.7)
POTASSIUM SERPL-SCNC: 3.4 MMOL/L (ref 3.5–5.3)
RBC # BLD AUTO: 3.96 MILLION/UL (ref 3.81–5.12)
SODIUM SERPL-SCNC: 143 MMOL/L (ref 135–147)
WBC # BLD AUTO: 8.27 THOUSAND/UL (ref 4.31–10.16)

## 2022-12-31 RX ORDER — ACETAMINOPHEN 325 MG/1
650 TABLET ORAL ONCE
Status: COMPLETED | OUTPATIENT
Start: 2022-12-31 | End: 2022-12-31

## 2022-12-31 RX ORDER — DIPHENHYDRAMINE HYDROCHLORIDE 50 MG/ML
25 INJECTION INTRAMUSCULAR; INTRAVENOUS ONCE
Status: COMPLETED | OUTPATIENT
Start: 2022-12-31 | End: 2022-12-31

## 2022-12-31 RX ORDER — ACETAMINOPHEN 325 MG/1
650 TABLET ORAL EVERY 6 HOURS SCHEDULED
Status: DISCONTINUED | OUTPATIENT
Start: 2023-01-01 | End: 2023-01-04 | Stop reason: HOSPADM

## 2022-12-31 RX ADMIN — ACETAMINOPHEN 650 MG: 325 TABLET, FILM COATED ORAL at 18:35

## 2022-12-31 RX ADMIN — TORSEMIDE 40 MG: 20 TABLET ORAL at 09:10

## 2022-12-31 RX ADMIN — OXYCODONE HYDROCHLORIDE 5 MG: 5 TABLET ORAL at 07:32

## 2022-12-31 RX ADMIN — HYDROMORPHONE HYDROCHLORIDE 0.5 MG: 1 INJECTION, SOLUTION INTRAMUSCULAR; INTRAVENOUS; SUBCUTANEOUS at 01:10

## 2022-12-31 RX ADMIN — ACETAMINOPHEN 975 MG: 325 TABLET, FILM COATED ORAL at 14:27

## 2022-12-31 RX ADMIN — GABAPENTIN 100 MG: 100 CAPSULE ORAL at 17:43

## 2022-12-31 RX ADMIN — LIDOCAINE 2 PATCH: 50 PATCH CUTANEOUS at 05:09

## 2022-12-31 RX ADMIN — HYDROMORPHONE HYDROCHLORIDE 0.2 MG: 0.2 INJECTION, SOLUTION INTRAMUSCULAR; INTRAVENOUS; SUBCUTANEOUS at 09:56

## 2022-12-31 RX ADMIN — GABAPENTIN 100 MG: 100 CAPSULE ORAL at 09:10

## 2022-12-31 RX ADMIN — DONEPEZIL HYDROCHLORIDE 5 MG: 5 TABLET ORAL at 21:03

## 2022-12-31 RX ADMIN — BUSPIRONE HYDROCHLORIDE 5 MG: 5 TABLET ORAL at 21:03

## 2022-12-31 RX ADMIN — HYDROMORPHONE HYDROCHLORIDE 0.2 MG: 0.2 INJECTION, SOLUTION INTRAMUSCULAR; INTRAVENOUS; SUBCUTANEOUS at 14:27

## 2022-12-31 RX ADMIN — ENOXAPARIN SODIUM 30 MG: 30 INJECTION SUBCUTANEOUS at 05:09

## 2022-12-31 RX ADMIN — ACETAMINOPHEN 975 MG: 325 TABLET, FILM COATED ORAL at 05:09

## 2022-12-31 RX ADMIN — HYDROMORPHONE HYDROCHLORIDE 0.2 MG: 0.2 INJECTION, SOLUTION INTRAMUSCULAR; INTRAVENOUS; SUBCUTANEOUS at 05:09

## 2022-12-31 RX ADMIN — BUSPIRONE HYDROCHLORIDE 5 MG: 5 TABLET ORAL at 09:10

## 2022-12-31 RX ADMIN — CARVEDILOL 3.12 MG: 3.12 TABLET, FILM COATED ORAL at 07:32

## 2022-12-31 RX ADMIN — SENNOSIDES AND DOCUSATE SODIUM 2 TABLET: 8.6; 5 TABLET ORAL at 17:43

## 2022-12-31 RX ADMIN — OXYCODONE HYDROCHLORIDE 5 MG: 5 TABLET ORAL at 12:24

## 2022-12-31 RX ADMIN — CARVEDILOL 3.12 MG: 3.12 TABLET, FILM COATED ORAL at 16:40

## 2022-12-31 RX ADMIN — OXYCODONE HYDROCHLORIDE 5 MG: 5 TABLET ORAL at 01:54

## 2022-12-31 RX ADMIN — TORSEMIDE 20 MG: 20 TABLET ORAL at 17:43

## 2022-12-31 RX ADMIN — APIXABAN 5 MG: 5 TABLET, FILM COATED ORAL at 18:35

## 2022-12-31 RX ADMIN — OXYCODONE HYDROCHLORIDE 5 MG: 5 TABLET ORAL at 16:38

## 2022-12-31 RX ADMIN — DIPHENHYDRAMINE HYDROCHLORIDE 25 MG: 50 INJECTION, SOLUTION INTRAMUSCULAR; INTRAVENOUS at 01:54

## 2022-12-31 RX ADMIN — METHOCARBAMOL 250 MG: 500 TABLET ORAL at 05:09

## 2022-12-31 RX ADMIN — BUSPIRONE HYDROCHLORIDE 5 MG: 5 TABLET ORAL at 16:39

## 2022-12-31 RX ADMIN — METHOCARBAMOL 250 MG: 500 TABLET ORAL at 21:03

## 2022-12-31 RX ADMIN — OXYCODONE HYDROCHLORIDE 5 MG: 5 TABLET ORAL at 21:03

## 2022-12-31 RX ADMIN — METHOCARBAMOL 250 MG: 500 TABLET ORAL at 14:27

## 2022-12-31 RX ADMIN — SENNOSIDES AND DOCUSATE SODIUM 2 TABLET: 8.6; 5 TABLET ORAL at 09:10

## 2022-12-31 RX ADMIN — ESCITALOPRAM OXALATE 20 MG: 20 TABLET ORAL at 09:10

## 2022-12-31 NOTE — PROGRESS NOTES
Upright xrays completed completed for evaluation of T8 fracture  Final report pending  On personal review, fracture appears overall stable with acceptable alignment  Continue TLSO brace when out of bed  Pain control per primary team    Patient cleared to discharge from neurosurgical standpoint when medically appropriate  We will plan for outpatient follow-up in 2 weeks with repeat upright x-rays which have been ordered  Call for questions or concerns

## 2022-12-31 NOTE — RESPIRATORY THERAPY NOTE
RT Protocol Note  Mark Goodpasture 69 y o  female MRN: [id number]  Unit/Bed#:  W -01 Encounter: [id number]    Assessment    Principal Problem:    Compression fracture of T8 vertebra with routine healing  Active Problems:    Chronic venous insufficiency    Stage 3 chronic kidney disease ([facility])    A-fib ([facility])    Memory impairment    Fall    Fracture of rib, single, closed    Closed fracture of transverse process of lumbar vertebra ([facility])      Home Pulmonary Medications:    Home Devices/Therapy:  (none)    Past Medical History:   Diagnosis Date    A-fib ([facility] ) 12/29/2021    Anxiety     Arthritis     Back pain     Cellulitis     CHF (congestive heart failure) ([facility])     Colon cancer screening 8/3/2022    Edema of both lower extremities due to peripheral venous insufficiency     Edema of both lower extremities due to peripheral venous insufficiency     Encounter for screening mammogram for malignant neoplasm of breast 3/21/2022    Erythema of lower extremity 11/12/2021    Fibromyalgia     Great toe pain, right 10/6/2022    Hypertension     Hypotension 9/17/2022    Leukocytosis 10/6/2022    Melena 8/20/2022    Osteoporosis screening 8/3/2022    Sjogren syndrome, unspecified ([place] [address] )      Social History     Socioeconomic History    Marital status: /Civil Union     Spouse name: None    Number of children: None    Years of education: None    Highest education level: None   Occupational History    None   Tobacco Use    Smoking status: Former     Types: Cigarettes    Smokeless tobacco: Never   Vaping Use    Vaping Use: Never used   Substance and Sexual Activity    Alcohol use: Not Currently    Drug use: Never    Sexual activity: None   Other Topics Concern    None   Social History Narrative    None     Social Determinants of Health     Financial Resource Strain: Not on file   Food Insecurity: No Food Insecurity    Worried About Running Out of Food in the Last Year: Never true    Ran Out of Food in the Last Year: Never true   Transportation Needs: No Transportation Needs    Lack of Transportation (Medical): No    Lack of Transportation (Non-Medical): No   Physical Activity: Not on file   Stress: Not on file   Social Connections: Not on file   Intimate Partner Violence: Not on file   Housing Stability: Low Risk     Unable to Pay for Housing in the Last Year: No    Number of Places Lived in the Last Year: 1    Unstable Housing in the Last Year: No       Subjective         Objective    Physical Exam:        Vitals:  Blood pressure 163/70, pulse 91, temperature 98 1 °F (36 7 °C), resp  rate 16, height 5' 2" (1 575 m), weight 96 6 kg (213 lb), SpO2 97 %  Imaging and other studies: I have personally reviewed pertinent reports              Plan    Respiratory Plan: No distress/Pulmonary history, Discontinue Protocol

## 2022-12-31 NOTE — ASSESSMENT & PLAN NOTE
Lab Results   Component Value Date    EGFR 58 12/31/2022    EGFR 59 12/30/2022    EGFR 61 12/30/2022    CREATININE 0 95 12/31/2022    CREATININE 0 93 12/30/2022    CREATININE 0 91 12/30/2022     - Avoid nephrotoxic agents  - Continue to trend serum creatinine  - Currently hypokalemic and hypomagnesemic; 3 1 and 1 8 respectively, pharmacological repletion  - Will continue to trend serum electrolytes

## 2022-12-31 NOTE — ASSESSMENT & PLAN NOTE
- L1, L2 left sided transverse process fractures, present on admission  - Multimodal pain control  - DVT ppx- Lovenox, will transition to eliquis (home medication for [facility] - atrial fibrillation)  - PT and OT as indicated  - Follow up with trauma clinic

## 2022-12-31 NOTE — UTILIZATION REVIEW
Initial Clinical Review    Admission: Date/Time/Statement:   Admission Orders (From admission, onward)     Ordered        12/30/22 0329  Inpatient Admission  Once                      Orders Placed This Encounter   Procedures   • Inpatient Admission     Standing Status:   Standing     Number of Occurrences:   1     Order Specific Question:   Level of Care     Answer:   Med Surg [16]     Order Specific Question:   Estimated length of stay     Answer:   More than 2 Midnights     Order Specific Question:   Certification     Answer:   I certify that inpatient services are medically necessary for this patient for a duration of greater than two midnights  See H&P and MD Progress Notes for additional information about the patient's course of treatment  ED Arrival Information     Expected   -    Arrival   12/30/2022 00:23    Acuity   Urgent            Means of arrival   Ambulance    Escorted by   [facility]    Service   Trauma    Admission type   Emergency            Arrival complaint   fall           Chief Complaint   Patient presents with   • Fall     Pt presents to ED with a fall  Pt comes from home, walking to the toilet and walker gave out, - LOC, + eliquis for afib, - headstrike, + mid back pain  Pt denies dizziness or lightheadedness        Initial Presentation: 69 y o  female PMH HTN, A  fib on Eliquis, CKD3, Alzheimer's disease, arthritis, fibromyalgia, Sjogren's syndrome, congestive heart failure and bilateral lower extremity edema in the setting of venous insufficiency,  ambulatory dysfunction uses walker @ baseline to ED by EMS reports her walker slipped on the tile floor w a fall on her buttocks & hitting her BACK, denies head injury, loss of consciousness, seizures, nausea, vomiting, vision issues or bowel/ bladder dysfunction  Reports 7/10 back pain localized in the lower thoracic and lumbar regions       EXAM  GCS 4/4/6=14, CT reveals transverse process fracture of L1 and L2 compression fracture of T8 and left 11th rib fracture    Inpatient admission on Trauma service w Rib FXs consult Neurosurgery for compression FXs, will need bracing orthotics in AM; cont pain management; Geriatrics consult, cont home meds; supplemental O2 PRN, aggressive pulmonary toilet   NEURO SURGERY   Acute  anterior nondisplaced T8 vertebral body fracture-TLICS1  wear tlso brace when upright & AT 45 Degree HOB, PT/OT, routine Neuro checks, cont w thoracic & lumbar pain moderate intensity wo radiculopathy or bowel /bladder dysfunction  No neurosurgical intervention cont w conservative treatment w pain control, PT/OT & Brace  Geriatric Medicine  Recommend Geriatric pain protocol w adjuncts, use of non pharmacologic pain RX , bowel regiment; cont Incentive spirometry for POX>94%, delirium precautions, trend of hypokalemia & hypomagnesemia req replenish & monitor   APS  Multimodal pain regimen: Tylenol 975 mg every 8 hours scheduled  • Decrease Gabapentin to 100 mg twice a day  ? Estimated Creatinine Clearance: 57 mL/min (by C-G formula based on SCr of 0 91 mg/dL)  ? Recommended decrease dose based on age and renal function  • Lidocaine patch daily, on for 12 hours and off for 12 hours  • Decrease Robaxin to 250 mg every 8 hours scheduled; Hold for sedation  • Okay to continue muscle relaxant during hospitalization;  Oxycodone 2 5 mg every 4 hours as needed for moderate pain  • Oxycodone 5 mg every 4 hours as needed for severe pain  Decrease IV Dilaudid to 0 2 mg every 4 hours as needed for breakthrough pain w bowel regimen  Date: 12/21/2022   Day 2:   TRAUMA  Pain problems today; today multimodal resistant, cont multimodal pain regimen; cont aggressive incentive spirometry, her IS pull is 600, not on O2 currently; PT/OT, monitor electrolytes    ED Triage Vitals   Temperature Pulse Respirations Blood Pressure SpO2   12/30/22 0028 12/30/22 0028 12/30/22 0028 12/30/22 0028 12/30/22 0028   97 8 °F (36 6 °C) 75 20 139/58 100 % Temp Source Heart Rate Source Patient Position - Orthostatic VS BP Location FiO2 (%)   12/30/22 0028 12/30/22 0028 12/30/22 0028 12/30/22 0028 --   Oral Monitor Sitting Right arm       Pain Score       12/30/22 0108       10 - Worst Possible Pain          Wt Readings from Last 1 Encounters:   12/30/22 96 6 kg (213 lb)     Additional Vital Signs:   Date/Time Temp Pulse Resp BP MAP (mmHg) SpO2 O2 Device Patient Position - Orthostatic VS   12/31/22 11:46:30 98 1 °F (36 7 °C) 91 16 163/70 101 97 % -- --   12/31/22 0732 -- 87 -- 163/70 -- -- -- --   12/31/22 02:01:50 98 1 °F (36 7 °C) 74 18 162/72 102 97 % -- --   12/30/22 23:29:38 98 4 °F (36 9 °C) 89 -- 141/56 84 94 % -- --   12/30/22 19:31:25 96 8 °F (36 °C) Abnormal  72 17 139/57 84 92 % -- --   12/30/22 17:18:05 -- 79 -- 141/57 85 98 % -- --   12/30/22 1715 -- 79 -- 141/57 -- -- -- --   12/30/22 15:25:17 -- 95 -- 135/60 85 96 % -- --   12/30/22 10:49:26 97 3 °F (36 3 °C) Abnormal  57 17 141/61 88 99 % -- --   12/30/22 08:03:41 98 4 °F (36 9 °C) 75 17 138/62 87 98 % -- --   12/30/22 04:23:15 98 4 °F (36 9 °C) 72 16 144/64 91 93 % -- --   12/30/22 0300 -- 72 16 117/51 -- 92 % -- --   12/30/22 0230 -- 76 16 -- -- 96 % -- --   12/30/22 0200 -- 78 16 -- -- 98 % -- --   12/30/22 0145 -- 70 20 136/76 -- 98 % None (Room air) Sitting   12/30/22 0130 -- 70 20 120/83 -- 97 % None (Room air) Sitting   12/30/22 0115 -- 85 16 144/74 -- 97 % None (Room air) Sitting   12/30/22 0100 -- 66 16 132/62 -- 98 % None (Room air) Sitting   12/30/22 0045 -- 68 16 124/68 -- 99 % None (Room air) Sitting   12/30/22 0030 -- 64 20 115/53 77 100 % None (Room air) Sitting   12/30/22 0028 97 8 °F (36 6 °C) 75 20 139/58 84 100 % None (Room air) Sitting     Weights (last 14 days)    Date/Time Weight Weight Method Height   12/30/22 04:23:15 96 6 kg (213 lb) Stated 5' 2" (1 575 m)   12/30/22 0028 96 7 kg (213 lb 3 oz) Stretcher scale        Pertinent Labs/Diagnostic Test Results:   XR chest portable Final Result by Talya Galindo MD (12/30 1646)      Acute left 11th rib fracture not visible  Trace left effusion with no pneumothorax  CT recon (no charge)   Final Result by Elizabeth Trimble MD (28/95 8434)         1  Acute minimally displaced comminuted fracture posterior left 11th rib  2   Minimally displaced acute fractures left L1 and L2 transverse processes  3   Acute nondisplaced oblique fracture right anterior T8 vertebral body  4   Additional findings as noted  CT spine thoracic & lumbar wo contrast   Final Result by Daniela Peterson MD (12/30 0222)      Acute nondisplaced fracture involving the right anterior aspect of the T8 vertebral body  Acute fractures of the left transverse processes of L1 on L2  Acute nondisplaced fracture of the partially visualized posterior left 11th rib  Consider a CT chest for further evaluation     XR spine thoracic 2 vw    (Results Pending)         Results from last 7 days   Lab Units 12/31/22  0505 12/30/22  0323   WBC Thousand/uL 8 27 13 31*   HEMOGLOBIN g/dL 9 3* 8 9*   HEMATOCRIT % 31 8* 29 5*   PLATELETS Thousands/uL 353 361   NEUTROS ABS Thousands/µL 5 49 10 24*         Results from last 7 days   Lab Units 12/31/22  0505 12/30/22  1520 12/30/22  0323   SODIUM mmol/L 143 144 136   POTASSIUM mmol/L 3 4* 3 1* 3 0*   CHLORIDE mmol/L 103 102 100   CO2 mmol/L 32 33* 28   ANION GAP mmol/L 8 9 8   BUN mg/dL 10 11 12   CREATININE mg/dL 0 95 0 93 0 91   EGFR ml/min/1 73sq m 58 59 61   CALCIUM mg/dL 8 9 9 1 9 3   MAGNESIUM mg/dL  --  1 8* 1 9   PHOSPHORUS mg/dL 2 9  --   --              Results from last 7 days   Lab Units 12/31/22  0505 12/30/22  1520 12/30/22  0323   GLUCOSE RANDOM mg/dL 97 165* 113           Results from last 7 days   Lab Units 12/30/22  1051   CLARITY UA  Clear   COLOR UA  Colorless   SPEC GRAV UA  1 006   PH UA  6 0   GLUCOSE UA mg/dl Negative   KETONES UA mg/dl Negative   BLOOD UA  Negative   PROTEIN UA mg/dl Negative   NITRITE UA  Negative   BILIRUBIN UA  Negative   UROBILINOGEN UA (BE) mg/dl <2 0   LEUKOCYTES UA  Negative     No ecg available  ED Treatment:   Medication Administration from 12/30/2022 0023 to 12/30/2022 0189       Date/Time Order Dose Route Action     12/30/2022 0115 EST ondansetron (ZOFRAN) injection 4 mg 4 mg Intravenous Given     12/30/2022 0116 EST HYDROmorphone (DILAUDID) injection 0 5 mg 0 5 mg Intravenous Given     12/30/2022 0108 EST acetaminophen (TYLENOL) tablet 650 mg 650 mg Oral Given     12/30/2022 0157 EST HYDROmorphone (DILAUDID) injection 1 mg 1 mg Intravenous Given        Past Medical History:   Diagnosis Date   • A-fib ([place] [address] ) 12/29/2021   • Anxiety    • Arthritis    • Back pain    • Cellulitis    • CHF (congestive heart failure) ([facility] )    • Colon cancer screening 8/3/2022   • Edema of both lower extremities due to peripheral venous insufficiency    • Edema of both lower extremities due to peripheral venous insufficiency    • Encounter for screening mammogram for malignant neoplasm of breast 3/21/2022   • Erythema of lower extremity 11/12/2021   • Fibromyalgia    • Great toe pain, right 10/6/2022   • Hypertension    • Hypotension 9/17/2022   • Leukocytosis 10/6/2022   • Melena 8/20/2022   • Osteoporosis screening 8/3/2022   • Sjogren syndrome, unspecified ([facility] )      Present on Admission:  • Fracture of rib, single, closed  • Closed fracture of transverse process of lumbar vertebra ([facility] )  • Chronic venous insufficiency  • Stage 3 chronic kidney disease ([facility] )  • Fall  • A-fib [facility])  • Memory impairment      Admitting Diagnosis: Multiple injuries [T07  XXXA]  Chronic venous insufficiency [I87 2]  Memory impairment [R41 3]  Persistent atrial fibrillation ([facility] ) [I48 19]  L1 vertebral fracture ([facility] ) [S32 019A]  L2 vertebral fracture ([facility] ) [S32 029A]  T8 vertebral fracture ([facility] ) [S22 069A]  Closed fracture of transverse process of lumbar vertebra, initial encounter ([facility] ) [S32 009A]  Closed fracture of one rib of left side, initial encounter [S22 32XA]  Age/Sex: 69 y o  female  Admission Orders:      Scheduled Medications:  acetaminophen, 975 mg, Oral, Q8H SHANNAN  apixaban, 5 mg, Oral, BID  busPIRone, 5 mg, Oral, TID  carvedilol, 3 125 mg, Oral, BID With Meals  donepezil, 5 mg, Oral, HS  escitalopram, 20 mg, Oral, Daily  gabapentin, 100 mg, Oral, BID  lidocaine, 2 patch, Topical, Daily  methocarbamol, 250 mg, Oral, Q8H SHANNAN  senna-docusate sodium, 2 tablet, Oral, BID  torsemide, 20 mg, Oral, Daily  torsemide, 40 mg, Oral, Daily  Continuous IV Infusions:     PRN Meds:  IV=   HYDROmorphone (DILAUDID) injection 0 5 mg  Dose: 0 5 mg  Freq: Once Route: IV  Start: 12/30/22 2345 End: 12/31/22 0110   Admin Instructions:   High alert medication  LOOK ALIKE SOUND ALIKE MED    0041 [C]     0110 12/31/2022        HYDROmorphone (DILAUDID) injection 0 5 mg  Dose: 0 5 mg  Freq: Once Route: IV    0116 12/30/2022       HYDROmorphone, 0 2 mg, Intravenous, Q4H PRN  naloxone, 0 04 mg, Intravenous, Q1MIN PRN  ondansetron, 4 mg, Intravenous, Q6H PRN  oxyCODONE, 2 5 mg, Oral, Q4H PRN  oxyCODONE, 5 mg, Oral, Q4H PRN    IV=HYDROmorphone (DILAUDID) injection 1 mg   12/30 x1  Dose: 1 mg  Freq: Once Route: IV      IP CONSULT TO TRAUMA SURGERY  IP CONSULT TO NEUROSURGERY  IP CONSULT TO GERONTOLOGY  IP CONSULT TO CASE MANAGEMENT  IP CONSULT TO ACUTE PAIN SERVICE    Network Utilization Review Department  ATTENTION: Please call with any questions or concerns to [phone] and carefully listen to the prompts so that you are directed to the right person  All voicemails are confidential   Farrel Bodily all requests for admission clinical reviews, approved or denied determinations and any other requests to dedicated fax number below belonging to the campus where the patient is receiving treatment   List of dedicated fax numbers for the Facilities:  FACILITY NAME [address] DENIALS (Administrative/Medical Necessity) [phone] [address]  (Maternity/NICU/Pediatrics) [address] [place] [address] [phone]   [address] [place] [address] [place] [address] [facility] [address] [facility] [address] [facility] [place] [address] [place] [phone]

## 2022-12-31 NOTE — PROGRESS NOTES
[facility]  Progress Note - Seth Parker 1945, 69 y o  female MRN: [id number]  Unit/Bed#: W -01 Encounter: [id number]  Primary Care Provider: Stephy Mcduffie MD   Date and time admitted to hospital: 12/30/2022 12:24 AM    Closed fracture of transverse process of lumbar vertebra ([address] )  Assessment & Plan  - L1, L2 left sided transverse process fractures, present on admission  - Multimodal pain control  - DVT ppx- Lovenox, will transition to eliquis (home medication for [facility] - atrial fibrillation)  - PT and OT as indicated  - Follow up with trauma clinic    Fracture of rib, single, closed  Assessment & Plan  - Single left-sided 11th rib fracture, present on admission   - Continue rib fracture protocol   - Continue to encourage incentive spirometer use and adequate pulmonary hygiene  - Continue multimodal analgesic regimen with accompanying bowel regimen  - Supplemental oxygen via nasal cannula as needed to maintain saturations greater than or equal to 94%  - PT and OT evaluation and treatment as indicated  - Currently on room oxygen to SPO2 saturation at 98%  -  cc, average pull  - Outpatient follow-up in the trauma clinic for re-evaluation in approximately 2 weeks  Fall  Assessment & Plan  - Status post mechanical fall with the below noted injuries  - Fall precautions  - Geriatric Medicine consultation for evaluation, medication review and recommendations; maintain Aricept 5 mg daily, delirium precautions, rib fracture protocol, geriatric pain control  - PT and OT evaluation and treatment as indicated  - Case Management consultation for disposition planning        Memory impairment  Assessment & Plan  - History of memory impairment  - geriatrics consulted and note appreciated; Aricept 5mg and delirium precautions  - Disoriented to time on admission  - Urinalysis negative for urinary tract infection, clinical blood labs unremarkable; portable chest xray- impression -"Mild cardiomegaly  Trace left effusion  No pneumothorax  Chronic elevation of the right hemidiaphragm  Acute left 11th rib fracture not visible "    A-fib ([address] )  Assessment & Plan  - Rate controlled with carvedilol 3 125mg daily  - Hold home Eliquis    Stage 3 chronic kidney disease [facility])  Assessment & Plan  Lab Results   Component Value Date    EGFR 58 12/31/2022    EGFR 59 12/30/2022    EGFR 61 12/30/2022    CREATININE 0 95 12/31/2022    CREATININE 0 93 12/30/2022    CREATININE 0 91 12/30/2022     - Avoid nephrotoxic agents  - Continue to trend serum creatinine  - Currently hypokalemic and hypomagnesemic; 3 1 and 1 8 respectively, pharmacological repletion  - Will continue to trend serum electrolytes    Chronic venous insufficiency  Assessment & Plan  - Bilateral LE edema at baseline, contributory to ambulatory dysfunction  Uses a walker at baseline    * Compression fracture of T8 vertebra with routine healing  Assessment & Plan  - T8 compression fracture, present on admission  - Neurosurgery consult  - Neurovascularly intact  - Thoracic spine precautions  - TLSO & Uprights pending  - DVT ppx - lovenox  - Multimodal pain control with accompanying bowel regimen  - PT and OT as indicated    Bowel Regimen: senokot  VTE Prophylaxis:Reason for no pharmacologic prophylaxis takes daily Eliquis     Disposition: stable    Subjective   Chief Complaint: back pain    Subjective: " I do not wear oxygen at home "     Objective   Vitals:   Temp:  [98 1 °F (36 7 °C)-98 3 °F (36 8 °C)] 98 3 °F (36 8 °C)  HR:  [72-91] 83  Resp:  [16-18] 16  BP: (136-163)/(62-78) 136/78    I/O       12/30 0701  12/31 0700 12/31 0701  01/01 0700    P  O  480 960    IV Piggyback 50     Total Intake(mL/kg) 530 (5 5) 960 (9 9)    Urine (mL/kg/hr) 1100 (0 5) 450 (0 3)    Total Output 1100 450    Net -570 +510          Unmeasured Urine Occurrence 1 x 5 x    Unmeasured Stool Occurrence 0 x 0 x         Physical Exam: General-comfortable  Neuro-no acute neurological deficits; alert and oriented x3  HEENT-EOM intact no pain on EOM, oropharynx clear and patent  Cardiac-regular rate rhythm  Pulmonary-clear to auscultation bilaterally, no adventitious breath sounds  GI-soft, nontender, no distension, normal bowel sounds  -voiding  Musculoskeletal-moves all extremities; neurovascularly intact  Skin-warm and well perfused    Invasive Devices     Peripheral Intravenous Line  Duration           Peripheral IV 12/31/22 Right Antecubital <1 day               PIC Score  PIC Pain Score: 2 (1/1/2023 12:00 AM)  PIC Incentive Spirometry Score: 2 (1/1/2023 12:00 AM)  PIC Cough Description: 3 (1/1/2023 12:00 AM)  PIC Total Score: 7 (1/1/2023 12:00 AM)     If the Total PIC Score </=5, did you consult APS and evaluate patient for further intervention?: yes    Pain:    Incentive Spirometry  Cough  3 = Controlled  4 = Above goal volume 3 = Strong  2 = Moderate  3 = Goal to alert volume 2 = Weak  1 = Severe  2 = Below alert volume 1 = Absent     1 = Unable to perform IS    Lab Results:   BMP/CMP:   Lab Results   Component Value Date    SODIUM 143 12/31/2022    K 3 4 (L) 12/31/2022     12/31/2022    CO2 32 12/31/2022    BUN 10 12/31/2022    CREATININE 0 95 12/31/2022    CALCIUM 8 9 12/31/2022    EGFR 58 12/31/2022    and CBC:   Lab Results   Component Value Date    WBC 8 27 12/31/2022    HGB 9 3 (L) 12/31/2022    HCT 31 8 (L) 12/31/2022    MCV 80 (L) 12/31/2022     12/31/2022    MCH 23 5 (L) 12/31/2022    MCHC 29 2 (L) 12/31/2022    RDW 17 4 (H) 12/31/2022    MPV 10 3 12/31/2022    NRBC 0 12/31/2022     Imaging: I have personally reviewed pertinent reports      Other Studies: none

## 2022-12-31 NOTE — PLAN OF CARE
Problem: Potential for Falls  Goal: Patient will remain free of falls  Description: INTERVENTIONS:  - Educate patient/family on patient safety including physical limitations  - Instruct patient to call for assistance with activity   - Consult OT/PT to assist with strengthening/mobility   - Keep Call bell within reach  - Keep bed low and locked with side rails adjusted as appropriate  - Keep care items and personal belongings within reach  - Initiate and maintain comfort rounds  - Make Fall Risk Sign visible to staff  Problem: Prexisting or High Potential for Compromised Skin Integrity  Goal: Skin integrity is maintained or improved  Description: INTERVENTIONS:  - Identify patients at risk for skin breakdown  - Assess and monitor skin integrity  - Assess and monitor nutrition and hydration status  - Monitor labs   - Assess for incontinence   - Turn and reposition patient  - Assist with mobility/ambulation  - Relieve pressure over bony prominences  - Avoid friction and shearing  - Provide appropriate hygiene as needed including keeping skin clean and dry  - Evaluate need for skin moisturizer/barrier cream  - Collaborate with interdisciplinary team   - Patient/family teaching  - Consider wound care consult   Outcome: Progressing     - Apply yellow socks and bracelet for high fall risk patients  - Consider moving patient to room near nurses station  Outcome: Progressing     Problem: Prexisting or High Potential for Compromised Skin Integrity  Goal: Skin integrity is maintained or improved  Description: INTERVENTIONS:  - Identify patients at risk for skin breakdown  - Assess and monitor skin integrity  - Assess and monitor nutrition and hydration status  - Monitor labs   - Assess for incontinence   - Turn and reposition patient  - Assist with mobility/ambulation  - Relieve pressure over bony prominences  - Avoid friction and shearing  - Provide appropriate hygiene as needed including keeping skin clean and dry  - Evaluate need for skin moisturizer/barrier cream  - Collaborate with interdisciplinary team   - Patient/family teaching  - Consider wound care consult   Outcome: Progressing

## 2023-01-01 LAB
ANION GAP SERPL CALCULATED.3IONS-SCNC: 8 MMOL/L (ref 4–13)
BASOPHILS # BLD AUTO: 0.03 THOUSANDS/ÂΜL (ref 0–0.1)
BASOPHILS NFR BLD AUTO: 0 % (ref 0–1)
BUN SERPL-MCNC: 10 MG/DL (ref 5–25)
CALCIUM SERPL-MCNC: 8.8 MG/DL (ref 8.4–10.2)
CHLORIDE SERPL-SCNC: 98 MMOL/L (ref 96–108)
CO2 SERPL-SCNC: 33 MMOL/L (ref 21–32)
CREAT SERPL-MCNC: 0.9 MG/DL (ref 0.6–1.3)
EOSINOPHIL # BLD AUTO: 0.31 THOUSAND/ÂΜL (ref 0–0.61)
EOSINOPHIL NFR BLD AUTO: 3 % (ref 0–6)
ERYTHROCYTE [DISTWIDTH] IN BLOOD BY AUTOMATED COUNT: 17.8 % (ref 11.6–15.1)
GFR SERPL CREATININE-BSD FRML MDRD: 62 ML/MIN/1.73SQ M
GLUCOSE SERPL-MCNC: 105 MG/DL (ref 65–140)
HCT VFR BLD AUTO: 31.8 % (ref 34.8–46.1)
HGB BLD-MCNC: 9.4 G/DL (ref 11.5–15.4)
IMM GRANULOCYTES # BLD AUTO: 0.04 THOUSAND/UL (ref 0–0.2)
IMM GRANULOCYTES NFR BLD AUTO: 0 % (ref 0–2)
LYMPHOCYTES # BLD AUTO: 1.43 THOUSANDS/ÂΜL (ref 0.6–4.47)
LYMPHOCYTES NFR BLD AUTO: 14 % (ref 14–44)
MCH RBC QN AUTO: 23.4 PG (ref 26.8–34.3)
MCHC RBC AUTO-ENTMCNC: 29.6 G/DL (ref 31.4–37.4)
MCV RBC AUTO: 79 FL (ref 82–98)
MONOCYTES # BLD AUTO: 0.78 THOUSAND/ÂΜL (ref 0.17–1.22)
MONOCYTES NFR BLD AUTO: 8 % (ref 4–12)
NEUTROPHILS # BLD AUTO: 7.36 THOUSANDS/ÂΜL (ref 1.85–7.62)
NEUTS SEG NFR BLD AUTO: 75 % (ref 43–75)
NRBC BLD AUTO-RTO: 0 /100 WBCS
PLATELET # BLD AUTO: 329 THOUSANDS/UL (ref 149–390)
PMV BLD AUTO: 10.2 FL (ref 8.9–12.7)
POTASSIUM SERPL-SCNC: 3.3 MMOL/L (ref 3.5–5.3)
RBC # BLD AUTO: 4.02 MILLION/UL (ref 3.81–5.12)
SODIUM SERPL-SCNC: 139 MMOL/L (ref 135–147)
WBC # BLD AUTO: 9.95 THOUSAND/UL (ref 4.31–10.16)

## 2023-01-01 RX ORDER — POTASSIUM CHLORIDE 20 MEQ/1
40 TABLET, EXTENDED RELEASE ORAL 2 TIMES DAILY
Status: COMPLETED | OUTPATIENT
Start: 2023-01-01 | End: 2023-01-01

## 2023-01-01 RX ORDER — POLYETHYLENE GLYCOL 3350 17 G/17G
17 POWDER, FOR SOLUTION ORAL ONCE
Status: DISCONTINUED | OUTPATIENT
Start: 2023-01-01 | End: 2023-01-04 | Stop reason: HOSPADM

## 2023-01-01 RX ADMIN — CARVEDILOL 3.12 MG: 3.12 TABLET, FILM COATED ORAL at 08:18

## 2023-01-01 RX ADMIN — METHOCARBAMOL 250 MG: 500 TABLET ORAL at 05:16

## 2023-01-01 RX ADMIN — METHOCARBAMOL 250 MG: 500 TABLET ORAL at 15:34

## 2023-01-01 RX ADMIN — APIXABAN 5 MG: 5 TABLET, FILM COATED ORAL at 08:18

## 2023-01-01 RX ADMIN — GABAPENTIN 100 MG: 100 CAPSULE ORAL at 17:49

## 2023-01-01 RX ADMIN — METHOCARBAMOL 250 MG: 500 TABLET ORAL at 21:34

## 2023-01-01 RX ADMIN — POTASSIUM CHLORIDE 40 MEQ: 1500 TABLET, EXTENDED RELEASE ORAL at 12:23

## 2023-01-01 RX ADMIN — ACETAMINOPHEN 650 MG: 325 TABLET, FILM COATED ORAL at 23:33

## 2023-01-01 RX ADMIN — OXYCODONE HYDROCHLORIDE 5 MG: 5 TABLET ORAL at 22:50

## 2023-01-01 RX ADMIN — BUSPIRONE HYDROCHLORIDE 5 MG: 5 TABLET ORAL at 21:34

## 2023-01-01 RX ADMIN — LIDOCAINE 2 PATCH: 50 PATCH CUTANEOUS at 05:17

## 2023-01-01 RX ADMIN — DONEPEZIL HYDROCHLORIDE 5 MG: 5 TABLET ORAL at 21:34

## 2023-01-01 RX ADMIN — OXYCODONE HYDROCHLORIDE 5 MG: 5 TABLET ORAL at 07:33

## 2023-01-01 RX ADMIN — APIXABAN 5 MG: 5 TABLET, FILM COATED ORAL at 17:48

## 2023-01-01 RX ADMIN — ACETAMINOPHEN 650 MG: 325 TABLET, FILM COATED ORAL at 05:16

## 2023-01-01 RX ADMIN — TORSEMIDE 20 MG: 20 TABLET ORAL at 17:51

## 2023-01-01 RX ADMIN — GABAPENTIN 100 MG: 100 CAPSULE ORAL at 08:18

## 2023-01-01 RX ADMIN — POTASSIUM CHLORIDE 40 MEQ: 1500 TABLET, EXTENDED RELEASE ORAL at 17:48

## 2023-01-01 RX ADMIN — CARVEDILOL 3.12 MG: 3.12 TABLET, FILM COATED ORAL at 15:34

## 2023-01-01 RX ADMIN — OXYCODONE HYDROCHLORIDE 5 MG: 5 TABLET ORAL at 02:01

## 2023-01-01 RX ADMIN — HYDROMORPHONE HYDROCHLORIDE 0.2 MG: 0.2 INJECTION, SOLUTION INTRAMUSCULAR; INTRAVENOUS; SUBCUTANEOUS at 02:44

## 2023-01-01 RX ADMIN — ESCITALOPRAM OXALATE 20 MG: 20 TABLET ORAL at 08:18

## 2023-01-01 RX ADMIN — ACETAMINOPHEN 650 MG: 325 TABLET, FILM COATED ORAL at 17:49

## 2023-01-01 RX ADMIN — HYDROMORPHONE HYDROCHLORIDE 0.2 MG: 0.2 INJECTION, SOLUTION INTRAMUSCULAR; INTRAVENOUS; SUBCUTANEOUS at 13:23

## 2023-01-01 RX ADMIN — BUSPIRONE HYDROCHLORIDE 5 MG: 5 TABLET ORAL at 15:34

## 2023-01-01 RX ADMIN — HYDROMORPHONE HYDROCHLORIDE 0.2 MG: 0.2 INJECTION, SOLUTION INTRAMUSCULAR; INTRAVENOUS; SUBCUTANEOUS at 20:46

## 2023-01-01 RX ADMIN — TORSEMIDE 40 MG: 20 TABLET ORAL at 08:18

## 2023-01-01 RX ADMIN — OXYCODONE HYDROCHLORIDE 5 MG: 5 TABLET ORAL at 17:03

## 2023-01-01 RX ADMIN — OXYCODONE HYDROCHLORIDE 5 MG: 5 TABLET ORAL at 11:39

## 2023-01-01 RX ADMIN — BUSPIRONE HYDROCHLORIDE 5 MG: 5 TABLET ORAL at 08:18

## 2023-01-01 RX ADMIN — ACETAMINOPHEN 650 MG: 325 TABLET, FILM COATED ORAL at 12:23

## 2023-01-01 NOTE — ASSESSMENT & PLAN NOTE
- L1, L2 left sided transverse process fractures, present on admission  - Multimodal pain control  - DVT ppx- Lovenox, will transition to eliquis (home medication for [facility] - atrial fibrillation)  - PT and OT as indicated  - Follow up with trauma clinic

## 2023-01-01 NOTE — PROGRESS NOTES
[facility]  Progress Note - Seth Parker 1945, 69 y o  female MRN: [id number]  Unit/Bed#: W -01 Encounter: [id number]  Primary Care Provider: Estill Krabbe, MD   Date and time admitted to hospital: 12/30/2022 12:24 AM    Closed fracture of transverse process of lumbar vertebra ([address] )  Assessment & Plan  - L1, L2 left sided transverse process fractures, present on admission  - Multimodal pain control  - DVT ppx- Lovenox, will transition to eliquis (home medication for [facility] - atrial fibrillation)  - PT and OT as indicated  - Follow up with trauma clinic    Fracture of rib, single, closed  Assessment & Plan  - Single left-sided 11th rib fracture, present on admission   - Continue rib fracture protocol   - Continue to encourage incentive spirometer use and adequate pulmonary hygiene  - Continue multimodal analgesic regimen with accompanying bowel regimen  - Supplemental oxygen via nasal cannula as needed to maintain saturations greater than or equal to 94%  - PT and OT evaluation and treatment as indicated  - Currently on room oxygen to SPO2 saturation at 98%  -  cc, average pull  - Outpatient follow-up in the trauma clinic for re-evaluation in approximately 2 weeks  Fall  Assessment & Plan  - Status post mechanical fall with the below noted injuries  - Fall precautions  - Geriatric Medicine consultation for evaluation, medication review and recommendations; maintain Aricept 5 mg daily, delirium precautions, rib fracture protocol, geriatric pain control  - PT and OT evaluation and treatment as indicated  - Case Management consultation for disposition planning        Memory impairment  Assessment & Plan  - History of memory impairment  - geriatrics consulted and note appreciated; Aricept 5mg and delirium precautions  - Disoriented to time on admission  - Urinalysis negative for urinary tract infection, clinical blood labs unremarkable; portable chest xray- impression -"Mild cardiomegaly  Trace left effusion  No pneumothorax  Chronic elevation of the right hemidiaphragm  Acute left 11th rib fracture not visible "    A-fib ([address] )  Assessment & Plan  - Rate controlled with carvedilol 3 125mg daily  - Hold home Eliquis    Stage 3 chronic kidney disease [facility])  Assessment & Plan  Lab Results   Component Value Date    EGFR 62 01/01/2023    EGFR 58 12/31/2022    EGFR 59 12/30/2022    CREATININE 0 90 01/01/2023    CREATININE 0 95 12/31/2022    CREATININE 0 93 12/30/2022     - Avoid nephrotoxic agents  - Continue to trend serum creatinine  - Currently hypokalemic and hypomagnesemic; 3 1 and 1 8 respectively, pharmacological repletion  - Will continue to trend serum electrolytes    Chronic venous insufficiency  Assessment & Plan  - Bilateral LE edema at baseline, contributory to ambulatory dysfunction  Uses a walker at baseline    * Compression fracture of T8 vertebra with routine healing  Assessment & Plan  - T8 compression fracture, present on admission  - Neurosurgery consult  - Neurovascularly intact  - Thoracic spine precautions  - TLSO with uprights performed- "Unchanged height and alignment of the T8 vertebral body fracture  "  - DVT ppx - lovenox  - Multimodal pain control with accompanying bowel regimen  - PT and OT as indicated    Bowel Regimen: miralax  VTE Prophylaxis:Reason for no pharmacologic prophylaxis eliquis 5mg     Disposition: stable    Subjective   Chief Complaint: back and chest wall pain    Subjective: "I am feeing a bit anxious tonight "     Objective   Vitals:   Temp:  [97 9 °F (36 6 °C)-98 3 °F (36 8 °C)] 98 °F (36 7 °C)  HR:  [70-89] 81  Resp:  [17-18] 18  BP: (115-145)/(57-75) 115/57    I/O       12/31 0701  01/01 0700 01/01 0701  01/02 0700    P  O  960     Total Intake(mL/kg) 960 (9 9)     Urine (mL/kg/hr) 450 (0 2) 800 (0 3)    Total Output 450 800    Net +510 -800          Unmeasured Urine Occurrence 5 x 4 x    Unmeasured Stool Occurrence 0 x Physical Exam:   General-comfortable  Neuro-no acute neurological deficits; alert and oriented x3  HEENT-EOM intact no pain on EOM, oropharynx clear and patent  Cardiac-regular rate rhythm  Pulmonary-clear to auscultation bilaterally, no adventitious breath sounds  GI-soft, nontender, no distension, normal bowel sounds  -voiding  Musculoskeletal-moves all extremities; neurovascularly intact  Skin-warm and well perfused    Invasive Devices     Peripheral Intravenous Line  Duration           Peripheral IV 12/31/22 Right Antecubital 2 days               PIC Score  PIC Pain Score: 2 (1/2/2023 12:00 AM)  PIC Incentive Spirometry Score: 2 (1/2/2023 12:00 AM)  PIC Cough Description: 3 (1/2/2023 12:00 AM)  PIC Total Score: 7 (1/2/2023 12:00 AM)       If the Total PIC Score </=5, did you consult APS and evaluate patient for further intervention?: yes      Pain:    Incentive Spirometry  Cough  3 = Controlled  4 = Above goal volume 3 = Strong  2 = Moderate  3 = Goal to alert volume 2 = Weak  1 = Severe  2 = Below alert volume 1 = Absent     1 = Unable to perform IS    Lab Results:   BMP/CMP:   Lab Results   Component Value Date    SODIUM 139 01/01/2023    K 3 3 (L) 01/01/2023    CL 98 01/01/2023    CO2 33 (H) 01/01/2023    BUN 10 01/01/2023    CREATININE 0 90 01/01/2023    CALCIUM 8 8 01/01/2023    EGFR 62 01/01/2023    and CBC:   Lab Results   Component Value Date    WBC 9 95 01/01/2023    HGB 9 4 (L) 01/01/2023    HCT 31 8 (L) 01/01/2023    MCV 79 (L) 01/01/2023     01/01/2023    MCH 23 4 (L) 01/01/2023    MCHC 29 6 (L) 01/01/2023    RDW 17 8 (H) 01/01/2023    MPV 10 2 01/01/2023    NRBC 0 01/01/2023     Imaging: I have personally reviewed pertinent reports       Other Studies: none

## 2023-01-01 NOTE — ASSESSMENT & PLAN NOTE
- T8 compression fracture, present on admission  - Neurosurgery consult  - Neurovascularly intact  - Thoracic spine precautions  - TLSO with uprights performed- "Unchanged height and alignment of the T8 vertebral body fracture  "  - DVT ppx - lovenox  - Multimodal pain control with accompanying bowel regimen  - PT and OT as indicated

## 2023-01-01 NOTE — ASSESSMENT & PLAN NOTE
Lab Results   Component Value Date    EGFR 62 01/01/2023    EGFR 58 12/31/2022    EGFR 59 12/30/2022    CREATININE 0 90 01/01/2023    CREATININE 0 95 12/31/2022    CREATININE 0 93 12/30/2022     - Avoid nephrotoxic agents  - Continue to trend serum creatinine  - Currently hypokalemic and hypomagnesemic; 3 1 and 1 8 respectively, pharmacological repletion  - Will continue to trend serum electrolytes

## 2023-01-02 RX ORDER — OLANZAPINE 5 MG/1
5 TABLET, ORALLY DISINTEGRATING ORAL ONCE
Status: COMPLETED | OUTPATIENT
Start: 2023-01-02 | End: 2023-01-02

## 2023-01-02 RX ADMIN — TORSEMIDE 40 MG: 20 TABLET ORAL at 08:16

## 2023-01-02 RX ADMIN — METHOCARBAMOL 250 MG: 500 TABLET ORAL at 13:04

## 2023-01-02 RX ADMIN — METHOCARBAMOL 250 MG: 500 TABLET ORAL at 05:45

## 2023-01-02 RX ADMIN — OLANZAPINE 5 MG: 5 TABLET, ORALLY DISINTEGRATING ORAL at 00:41

## 2023-01-02 RX ADMIN — SENNOSIDES AND DOCUSATE SODIUM 2 TABLET: 8.6; 5 TABLET ORAL at 17:40

## 2023-01-02 RX ADMIN — OXYCODONE HYDROCHLORIDE 5 MG: 5 TABLET ORAL at 13:04

## 2023-01-02 RX ADMIN — SENNOSIDES AND DOCUSATE SODIUM 2 TABLET: 8.6; 5 TABLET ORAL at 08:17

## 2023-01-02 RX ADMIN — OXYCODONE HYDROCHLORIDE 5 MG: 5 TABLET ORAL at 22:39

## 2023-01-02 RX ADMIN — ESCITALOPRAM OXALATE 20 MG: 20 TABLET ORAL at 08:17

## 2023-01-02 RX ADMIN — BUSPIRONE HYDROCHLORIDE 5 MG: 5 TABLET ORAL at 22:40

## 2023-01-02 RX ADMIN — BUSPIRONE HYDROCHLORIDE 5 MG: 5 TABLET ORAL at 08:16

## 2023-01-02 RX ADMIN — METHOCARBAMOL 250 MG: 500 TABLET ORAL at 22:39

## 2023-01-02 RX ADMIN — GABAPENTIN 100 MG: 100 CAPSULE ORAL at 17:40

## 2023-01-02 RX ADMIN — HYDROMORPHONE HYDROCHLORIDE 0.2 MG: 0.2 INJECTION, SOLUTION INTRAMUSCULAR; INTRAVENOUS; SUBCUTANEOUS at 02:42

## 2023-01-02 RX ADMIN — ACETAMINOPHEN 650 MG: 325 TABLET, FILM COATED ORAL at 05:45

## 2023-01-02 RX ADMIN — ACETAMINOPHEN 650 MG: 325 TABLET, FILM COATED ORAL at 17:40

## 2023-01-02 RX ADMIN — BUSPIRONE HYDROCHLORIDE 5 MG: 5 TABLET ORAL at 16:57

## 2023-01-02 RX ADMIN — OXYCODONE HYDROCHLORIDE 5 MG: 5 TABLET ORAL at 06:34

## 2023-01-02 RX ADMIN — APIXABAN 5 MG: 5 TABLET, FILM COATED ORAL at 17:40

## 2023-01-02 RX ADMIN — ACETAMINOPHEN 650 MG: 325 TABLET, FILM COATED ORAL at 23:09

## 2023-01-02 RX ADMIN — CARVEDILOL 3.12 MG: 3.12 TABLET, FILM COATED ORAL at 08:16

## 2023-01-02 RX ADMIN — DONEPEZIL HYDROCHLORIDE 5 MG: 5 TABLET ORAL at 22:40

## 2023-01-02 RX ADMIN — OXYCODONE HYDROCHLORIDE 5 MG: 5 TABLET ORAL at 18:26

## 2023-01-02 RX ADMIN — CARVEDILOL 3.12 MG: 3.12 TABLET, FILM COATED ORAL at 16:57

## 2023-01-02 RX ADMIN — GABAPENTIN 100 MG: 100 CAPSULE ORAL at 08:16

## 2023-01-02 RX ADMIN — APIXABAN 5 MG: 5 TABLET, FILM COATED ORAL at 08:17

## 2023-01-02 RX ADMIN — HYDROMORPHONE HYDROCHLORIDE 0.2 MG: 0.2 INJECTION, SOLUTION INTRAMUSCULAR; INTRAVENOUS; SUBCUTANEOUS at 07:14

## 2023-01-02 RX ADMIN — LIDOCAINE 2 PATCH: 50 PATCH CUTANEOUS at 05:45

## 2023-01-02 RX ADMIN — ACETAMINOPHEN 650 MG: 325 TABLET, FILM COATED ORAL at 13:04

## 2023-01-02 RX ADMIN — TORSEMIDE 20 MG: 20 TABLET ORAL at 17:40

## 2023-01-02 NOTE — OCCUPATIONAL THERAPY NOTE
Occupational Therapy Progress Note     Patient Name: Dayan Hicks  GLIJH'Y Date: 1/2/2023  Problem List  Principal Problem:    Compression fracture of T8 vertebra with routine healing  Active Problems:    Chronic venous insufficiency    Stage 3 chronic kidney disease ([facility])    A-fib ([facility])    Memory impairment    Fall    Fracture of rib, single, closed    Closed fracture of transverse process of lumbar vertebra ([facility])              01/02/23 1419   OT Last Visit   OT Visit Date 01/02/23   Note Type   Note Type Treatment for insurance authorization   Pain Assessment   Pain Assessment Tool FLACC   Pain Location/Orientation Orientation: Lower; Location: Back   Hospital Pain Intervention(s) Repositioned; Ambulation/increased activity   Pain Rating: FLACC (Rest) - Face 0   Pain Rating: FLACC (Rest) - Legs 0   Pain Rating: FLACC (Rest) - Activity 0   Pain Rating: FLACC (Rest) - Cry 0   Pain Rating: FLACC (Rest) - Consolability 1   Score: FLACC (Rest) 1   Pain Rating: FLACC (Activity) - Face 1   Pain Rating: FLACC (Activity) - Legs 1   Pain Rating: FLACC (Activity) - Activity 0   Pain Rating: FLACC (Activity) - Cry 1   Pain Rating: FLACC (Activity) - Consolability 1   Score: FLACC (Activity) 4   Restrictions/Precautions   Braces or Orthoses TLSO   Other Precautions Fall Risk;Pain;O2;Chair Alarm;Cognitive; Bed Alarm;Spinal precautions   Lifestyle   Service to Others Retired worked in [employer] for [employer] enjoys watching tv and spending time with    ADL   Grooming Assistance 5  Supervision/Setup   Grooming Deficit Setup; Increased time to complete   Grooming Comments combing hair   UB Dressing Assistance 2  Maximal Assistance   UB Dressing Deficit Increased time to complete;Supervision/safety;Verbal cueing   UB Dressing Comments donning brace   LB Dressing Assistance 1  Total Assistance   LB Dressing Deficit Don/doff R sock; Don/doff L sock   LB Dressing Comments Edu on limiting bending with LB ADL tasks due to spinal precautions   Bed Mobility   Supine to Sit 2  Maximal assistance   Additional items Assist x 1; Increased time required;Verbal cues;LE management   Additional Comments Edu required for log roll technique   Transfers   Sit to Stand 3  Moderate assistance   Additional items Assist x 1; Increased time required;Verbal cues   Stand to Sit 3  Moderate assistance   Additional items Assist x 1; Increased time required;Verbal cues   Functional Mobility   Functional Mobility 3  Moderate assistance   Additional Comments Ax1, limited bed > recliner chair  Max VC and incresed time, shuffling gait  Unable to achieve full distance to recliner, requiring chair to be brought in behind pt   Additional items Rolling walker   Subjective   Subjective "I wish I could've been a "   Cognition   Overall Cognitive Status Impaired   Arousal/Participation Alert; Cooperative   Attention Attends with cues to redirect   Orientation Level Oriented to person;Oriented to place   Memory Decreased short term memory;Decreased recall of recent events;Decreased recall of precautions   Following Commands Follows one step commands with increased time or repetition   Comments Pt with limited STM, and LTM, pt requiring reminders on all precautions  Poor historian at times, limited insight into deficits   Activity Tolerance   Activity Tolerance Patient limited by fatigue;Patient limited by pain   Medical Staff Made Aware RN Ganesh Boateng, spoke to PT Alexandrea   Assessment   Assessment Pt seen on this date for skilled OT treatment session  At start of session pt supine in bed  Pt requiring increased encouragement for OOB activity  Pt sitting EOB for ADL tasks  Pt requiring increased assist and education for don/doffing brace  Poor recall of spinal precautions  Re-education provided  Edu on LB dressing techniques with spinal precautions, with poor carryover due  To body habitus  Would benefit from edu with [address]   Pt requiring increased assist with sit >< stand transfers due to fatigue and overall endurance  Pt would continue to benefit from skilled OT treatment sessions in order to address remaining deficits and continue to recommend d/c to rehab when medically stable  Plan   Goal Expiration Date 01/09/23   OT Treatment Day 1   OT Frequency 3-5x/wk   Recommendation   OT Discharge Recommendation Post acute rehabilitation services   AM-PAC Daily Activity Inpatient   Lower Body Dressing 2   Bathing 2   Toileting 2   Upper Body Dressing 3   Grooming 3   Eating 4   Daily Activity Raw Score 16   Daily Activity Standardized Score (Calc for Raw Score >=11) 35 96   AM-[facility] Applied Cognition Inpatient   Following a Speech/Presentation 3   Understanding Ordinary Conversation 4   Taking Medications 2   Remembering Where Things Are Placed or Put Away 3   Remembering List of 4-5 Errands 1   Taking Care of Complicated Tasks 1   Applied Cognition Raw Score 14   Applied Cognition Standardized Score 32 02   End of Consult   Patient Position at End of Consult Bedside chair;Bed/Chair alarm activated; All needs within reach     The patient's raw score on the AM-PAC Daily Activity inpatient short form is 16, standardized score is 35 96, less than 39 4  Patients at this level are likely to benefit from discharge to post-acute rehabilitation services  Please refer to the recommendation of the Occupational Therapist for safe discharge planning        Milka Morrissey MS, OTR/L

## 2023-01-02 NOTE — PLAN OF CARE
Problem: Potential for Falls  Goal: Patient will remain free of falls  Description: INTERVENTIONS:  - Educate patient/family on patient safety including physical limitations  - Instruct patient to call for assistance with activity   - Consult OT/PT to assist with strengthening/mobility   - Keep Call bell within reach  - Keep bed low and locked with side rails adjusted as appropriate  - Keep care items and personal belongings within reach  - Initiate and maintain comfort rounds  - Make Fall Risk Sign visible to staff  - Offer Toileting every  Hours, in advance of need  - Initiate/Maintain bed alarm  - Obtain necessary fall risk management equipment: yes  - Apply yellow socks and bracelet for high fall risk patients  - Consider moving patient to room near nurses station  Outcome: Progressing     Problem: Prexisting or High Potential for Compromised Skin Integrity  Goal: Skin integrity is maintained or improved  Description: INTERVENTIONS:  - Identify patients at risk for skin breakdown  - Assess and monitor skin integrity  - Assess and monitor nutrition and hydration status  - Monitor labs   - Assess for incontinence   - Turn and reposition patient  - Assist with mobility/ambulation  - Relieve pressure over bony prominences  - Avoid friction and shearing  - Provide appropriate hygiene as needed including keeping skin clean and dry  - Evaluate need for skin moisturizer/barrier cream  - Collaborate with interdisciplinary team   - Patient/family teaching  - Consider wound care consult   Outcome: Progressing     Problem: MOBILITY - ADULT  Goal: Maintain or return to baseline ADL function  Description: INTERVENTIONS:  -  Assess patient's ability to carry out ADLs; assess patient's baseline for ADL function and identify physical deficits which impact ability to perform ADLs (bathing, care of mouth/teeth, toileting, grooming, dressing, etc )  - Assess/evaluate cause of self-care deficits   - Assess range of motion  - Assess patient's mobility; develop plan if impaired  - Assess patient's need for assistive devices and provide as appropriate  - Encourage maximum independence but intervene and supervise when necessary  - Involve family in performance of ADLs  - Assess for home care needs following discharge   - Consider OT consult to assist with ADL evaluation and planning for discharge  - Provide patient education as appropriate  Outcome: Progressing  Goal: Maintains/Returns to pre admission functional level  Description: INTERVENTIONS:  - Perform BMAT or MOVE assessment daily    - Set and communicate daily mobility goal to care team and patient/family/caregiver  - Collaborate with rehabilitation services on mobility goals if consulted  - Perform Range of Motion 3 times a day  - Reposition patient every 2 hours    - Dangle patient  times a day  - Stand patient  times a day  - Ambulate patient  times a day  - Out of bed to chair  times a day   - Out of bed for meals  times a day  - Out of bed for toileting  - Record patient progress and toleration of activity level   Outcome: Progressing

## 2023-01-02 NOTE — PHYSICAL THERAPY NOTE
PHYSICAL THERAPY TREATMENT NOTE          Patient Name: Erika Hollis  CWNWK'S Date: 23 1441   PT Last Visit   PT Visit Date 23   Note Type   Note Type Treatment for insurance authorization   Pain Assessment   Pain Assessment Tool FLACC   Pain Location/Orientation Orientation: Bilateral;Location: Leg   Pain Onset/Description Frequency: Intermittent   Effect of Pain on Daily Activities limits pt's activity tolerance, ambulatory endurance   Hospital Pain Intervention(s) Repositioned; Ambulation/increased activity  (RNs pre-medicated pt)   Pain Rating: FLACC (Rest) - Face 0   Pain Rating: FLACC (Rest) - Legs 0   Pain Rating: FLACC (Rest) - Activity 0   Pain Rating: FLACC (Rest) - Cry 0   Pain Rating: FLACC (Rest) - Consolability 0   Score: FLACC (Rest) 0   Pain Rating: FLACC (Activity) - Face 1   Pain Rating: FLACC (Activity) - Legs 0   Pain Rating: FLACC (Activity) - Activity 0   Pain Rating: FLACC (Activity) - Cry 1   Pain Rating: FLACC (Activity) - Consolability 1   Score: FLACC (Activity) 3   Restrictions/Precautions   Weight Bearing Precautions Per Order No   Braces or Orthoses TLSO  (when OOB or HOB >45*)   Other Precautions Cognitive; Chair Alarm; Bed Alarm; Fall Risk;Pain;Spinal precautions   General   Chart Reviewed Yes   Response to Previous Treatment Patient with no complaints from previous session  Family/Caregiver Present No   Cognition   Overall Cognitive Status Impaired   Arousal/Participation Alert; Cooperative   Attention Attends with cues to redirect   Orientation Level Oriented to person;Oriented to place   Memory Decreased recall of recent events;Decreased recall of precautions   Following Commands Follows one step commands with increased time or repetition   Comments Pt ID via name and  on wristband; pt agreeable to PT tx and OOB mobility w/ encouragement   Subjective   Subjective "I've never been sick like this before"   Bed Mobility   Rolling L 2  Maximal assistance   Additional items Assist x 1;HOB elevated; Bedrails; Increased time required;Verbal cues;LE management   Supine to Sit 2  Maximal assistance   Additional items Assist x 1;HOB elevated; Bedrails; Increased time required;Verbal cues;LE management   Sit to Supine Unable to assess   Additional Comments TLSO donned w/ total A w/ pt sitting at EOB  Pt able to maintain sitting balance at EOB w/ supervision   Transfers   Sit to Stand 3  Moderate assistance   Additional items Assist x 1; Increased time required;Verbal cues   Stand to Sit 3  Moderate assistance   Additional items Assist x 1; Armrests; Increased time required;Verbal cues   Additional Comments VC for hand placement   Ambulation/Elevation   Gait pattern Improper Weight shift; Wide AGUSTIN; Forward Flexion;Decreased foot clearance; Short stride; Excessively slow   Gait Assistance 3  Moderate assist   Additional items Assist x 1;Verbal cues   Assistive Device Rolling walker   Distance 4'  (w/ chair brought behind pt due to pt quickly fatiguing)   Balance   Static Sitting Fair   Dynamic Sitting Fair -   Static Standing Poor +  (w/ RW)   Dynamic Standing Poor   Ambulatory Poor  (w/ RW)   Endurance Deficit   Endurance Deficit Yes   Endurance Deficit Description limited ambulatory endurance and decreased overall tolerance to functional mobility/physical activity   Activity Tolerance   Activity Tolerance Patient limited by fatigue;Patient limited by pain   Medical Staff Made Aware Spoke to CINDY Gonzalez   Nurse Made Aware KITTY Kahn   Assessment   Prognosis Fair   Problem List Decreased strength;Decreased range of motion; Impaired balance;Decreased endurance;Decreased mobility; Decreased coordination;Pain;Obesity; Decreased cognition; Impaired judgement   Assessment Pt seen for PT treatment session today w/ pt agreeable to participate   PT interventions provided include: bed mobility, transfer, ambulation, and endurance training in order to challenge pt's activity tolerance and to maximize pt independence w/ functional mobility  Education provided on TLSO brace and hand placement for transfer technique  In comparison to previous session, pt increased ambulatory distance; however pt required increased amount of physical assistance for transfers and ambulation  Pt continues to be functioning below baseline level, and remains limited in functional mobility due to pain, impaired cognition, impaired balance  Pt will continue to benefit from skilled PT intervention to promote pt's independence w/ functional mobility and progress towards set goals  Continue to recommend DC post acute rehabilitation services when medically cleared  Barriers to Discharge Inaccessible home environment   Goals   Patient Goals to have less pain, to het healthy   STG Expiration Date 01/09/22   Short Term Goal #1 Pt will: Perform bed mobility tasks with no more than min assist of 1 to prepare for transfers and reposition in bed  Perform transfers with supervision to improve ease of transfers  Perform ambulation with L RAD for at least 50 feet with supervision to decrease risk for falls and promote proper use of assistive device  Perform 2 stairs with railing and w/No more than min assist to decrease burden of care and return to home with GLORIA  Patient to don/doff brace supervision for set up only to promote increased compliance with wearing TLSO brace  (goals set during eval continue to be appropraite)   PT Treatment Day 1   Plan   Treatment/Interventions LE strengthening/ROM; Functional transfer training;Elevations; Therapeutic exercise; Endurance training;Cognitive reorientation;Patient/family training;Equipment eval/education; Bed mobility;Gait training   Progress Slow progress, decreased activity tolerance   PT Frequency 3-5x/wk   Recommendation   PT Discharge Recommendation Post acute rehabilitation services   Equipment Recommended [address] Recommended Wheeled walker   Change/add to Scaleform? No   AM-PAC Basic Mobility Inpatient   Turning in Bed Without Bedrails 2   Lying on Back to Sitting on Edge of Flat Bed 2   Moving Bed to Chair 2   Standing Up From Chair 2   Walk in Room 2   Climb 3-5 Stairs 1   Basic Mobility Inpatient Raw Score 11   Basic Mobility Standardized Score 30 25   Highest Level Of Mobility   JH-HLM Goal 4: Move to chair/commode   JH-HLM Achieved 4: Move to chair/commode   Education   Education Provided Mobility training;Assistive device  (TLSO brace)   Patient Reinforcement needed; Explanation/teachback used   End of Consult   Patient Position at End of Consult Bedside chair;Bed/Chair alarm activated; All needs within reach  (TLSO remaining donned)         The patient's AM-PAC Basic Mobility Inpatient Short Form Raw Score is 11  A Raw score of less than or equal to 16 suggests the patient may benefit from discharge to post-acute rehabilitation services  Please also refer to the recommendation of the Physical Therapist for safe discharge planning  Pt will continue to benefit from skilled inpatient PT during this admission in order to facilitate progress towards goals and to maximize pt's independence w/ functional mobility      DC rec: post acute rehab      Pal Laura PT, DPT  01/02/23

## 2023-01-02 NOTE — CASE MANAGEMENT
Case Management Progress Note    Patient name Victor M Carpio  Location W /W -49 MRN [id number]  : 1945 Date 2023       LOS (days): 3  Geometric Mean LOS (GMLOS) (days): 2 40  Days to GMLOS:-0 9        OBJECTIVE:        Current admission status: Inpatient  Preferred Pharmacy:   [address] [place] [address] Drive, [address] [place] [address]  Phone: [phone] Fax: [phone]    Primary Care Provider: Violet Trimble MD    Primary Insurance: [facility]  Secondary Insurance:     PROGRESS NOTE:    Cm contacted liaison for [facility] during transition  Cm provided information and liaison to get back to CM about availability and insurance authorization  Cm awaiting update  Cm will follow

## 2023-01-02 NOTE — PLAN OF CARE
Problem: PHYSICAL THERAPY ADULT  Goal: Performs mobility at highest level of function for planned discharge setting  See evaluation for individualized goals  Description: Treatment/Interventions: Functional transfer training, LE strengthening/ROM, Therapeutic exercise, Endurance training, Bed mobility, Gait training, Equipment eval/education, Patient/family training, Cognitive reorientation, Spoke to nursing, OT, Elevations  Equipment Recommended: Carol Rizzo       See flowsheet documentation for full assessment, interventions and recommendations  Outcome: Progressing  Note: Prognosis: Fair  Problem List: Decreased strength, Decreased range of motion, Impaired balance, Decreased endurance, Decreased mobility, Decreased coordination, Pain, Obesity, Decreased cognition, Impaired judgement  Assessment: Pt seen for PT treatment session today w/ pt agreeable to participate  PT interventions provided include: bed mobility, transfer, ambulation, and endurance training in order to challenge pt's activity tolerance and to maximize pt independence w/ functional mobility  Education provided on TLSO brace and hand placement for transfer technique  In comparison to previous session, pt increased ambulatory distance; however pt required increased amount of physical assistance for transfers and ambulation  Pt continues to be functioning below baseline level, and remains limited in functional mobility due to pain, impaired cognition, impaired balance  Pt will continue to benefit from skilled PT intervention to promote pt's independence w/ functional mobility and progress towards set goals  Continue to recommend DC post acute rehabilitation services when medically cleared  Barriers to Discharge: Inaccessible home environment  Barriers to Discharge Comments: Patient reports spouse " is not well" and uncertain how much physical assistance he can provide to patient upon discharge    Patient was followed by heart failure care coordinator who documented on 11/18/2022 that "patient's  reports he had enough support to care for patient to manage her chronic health problems"/  PT Discharge Recommendation: Post acute rehabilitation services    See flowsheet documentation for full assessment

## 2023-01-02 NOTE — PLAN OF CARE
Problem: OCCUPATIONAL THERAPY ADULT  Goal: Performs self-care activities at highest level of function for planned discharge setting  See evaluation for individualized goals  Description: Treatment Interventions: ADL retraining, Functional transfer training, Endurance training, Patient/family training, Equipment evaluation/education, Compensatory technique education, Continued evaluation, Energy conservation, Activityengagement          See flowsheet documentation for full assessment, interventions and recommendations  Note: Limitation: Decreased ADL status, Decreased Safe judgement during ADL, Decreased endurance, Decreased self-care trans, Decreased high-level ADLs  Prognosis: Good  Assessment: Pt seen on this date for skilled OT treatment session  At start of session pt supine in bed  Pt requiring increased encouragement for OOB activity  Pt sitting EOB for ADL tasks  Pt requiring increased assist and education for don/doffing brace  Poor recall of spinal precautions  Re-education provided  Edu on LB dressing techniques with spinal precautions, with poor carryover due  To body habitus  Would benefit from edu with [address] [facility]  Pt requiring increased assist with sit >< stand transfers due to fatigue and overall endurance  Pt would continue to benefit from skilled OT treatment sessions in order to address remaining deficits and continue to recommend d/c to rehab when medically stable       OT Discharge Recommendation: Post acute rehabilitation services

## 2023-01-02 NOTE — PLAN OF CARE
Problem: Potential for Falls  Goal: Patient will remain free of falls  Description: INTERVENTIONS:  - Educate patient/family on patient safety including physical limitations  - Instruct patient to call for assistance with activity   - Consult OT/PT to assist with strengthening/mobility   - Keep Call bell within reach  - Keep bed low and locked with side rails adjusted as appropriate  - Keep care items and personal belongings within reach  - Initiate and maintain comfort rounds  - Make Fall Risk Sign visible to staff  - Offer Toileting every 4 Hours, in advance of need  - Initiate/Maintain bed alarm  - Obtain necessary fall risk management equipment: yellow socks  - Apply yellow socks and bracelet for high fall risk patients  - Consider moving patient to room near nurses station  Outcome: Progressing     Problem: Prexisting or High Potential for Compromised Skin Integrity  Goal: Skin integrity is maintained or improved  Description: INTERVENTIONS:  - Identify patients at risk for skin breakdown  - Assess and monitor skin integrity  - Assess and monitor nutrition and hydration status  - Monitor labs   - Assess for incontinence   - Turn and reposition patient  - Assist with mobility/ambulation  - Relieve pressure over bony prominences  - Avoid friction and shearing  - Provide appropriate hygiene as needed including keeping skin clean and dry  - Evaluate need for skin moisturizer/barrier cream  - Collaborate with interdisciplinary team   - Patient/family teaching  - Consider wound care consult   Outcome: Progressing     Problem: MOBILITY - ADULT  Goal: Maintain or return to baseline ADL function  Description: INTERVENTIONS:  -  Assess patient's ability to carry out ADLs; assess patient's baseline for ADL function and identify physical deficits which impact ability to perform ADLs (bathing, care of mouth/teeth, toileting, grooming, dressing, etc )  - Assess/evaluate cause of self-care deficits   - Assess range of motion  - Assess patient's mobility; develop plan if impaired  - Assess patient's need for assistive devices and provide as appropriate  - Encourage maximum independence but intervene and supervise when necessary  - Involve family in performance of ADLs  - Assess for home care needs following discharge   - Consider OT consult to assist with ADL evaluation and planning for discharge  - Provide patient education as appropriate  Outcome: Progressing  Goal: Maintains/Returns to pre admission functional level  Description: INTERVENTIONS:  - Perform BMAT or MOVE assessment daily    - Set and communicate daily mobility goal to care team and patient/family/caregiver  - Collaborate with rehabilitation services on mobility goals if consulted  - Perform Range of Motion 2 times a day  - Reposition patient every 2 hours    - Dangle patient 2 times a day  - Stand patient 2 times a day  - Ambulate patient 2 times a day  - Out of bed to chair 1 times a day   - Out of bed for meals 1 times a day  - Out of bed for toileting  - Record patient progress and toleration of activity level   Outcome: Progressing

## 2023-01-02 NOTE — CASE MANAGEMENT
Case Management Progress Note    Patient name Oren Alpers  Location W /W -25 MRN [id number]  : 1945 Date 2023       LOS (days): 3  Geometric Mean LOS (GMLOS) (days): 2 40  Days to GMLOS:-1        OBJECTIVE:        Current admission status: Inpatient  Preferred Pharmacy:   [address] [place] [address], [address] [place] [address]  Phone: [phone] Fax: [phone]    Primary Care Provider: Elvira Ryan MD    Primary Insurance: [facility]  Secondary Insurance:     PROGRESS NOTE:    Cm continues to work on discharge  CM spoke with patient's daughter about update on OO and facility starting insurance authorization  Daughter grateful for update  Cm awaiting confirmation on auth

## 2023-01-02 NOTE — CASE MANAGEMENT
Case Management Assessment & Discharge Planning Note    Patient name Aki Franklin  Location W /W -34 MRN [id number]  : 1945 Date 2023       Current Admission Date: 2022  Current Admission Diagnosis:Compression fracture of T8 vertebra with routine healing   Patient Active Problem List    Diagnosis Date Noted   • Fracture of rib, single, closed 2022   • Closed fracture of transverse process of lumbar vertebra ([address] ) 2022   • Compression fracture of T8 vertebra with routine healing 2022   • Loose stools 2022   • Parenchymal renal hypertension 2022   • LEXY (acute kidney injury) ([facility] ) 2022   • Anemia in stage 3 chronic kidney disease ([facility] ) 2022   • Hypokalemia 2022   • Chronic heart failure with preserved ejection fraction (HFpEF) ([facility] ) 10/18/2022   • Elevated troponin 10/06/2022   • Acute gastric ulcer with hemorrhage 10/05/2022   • Age-related osteoporosis with current pathological fracture 2022   • Encounter for support and coordination of transition of care 2022   • Anemia 2022   • Ambulatory dysfunction 2022   • Constipation 2022   • Fall 2022   • Fracture of proximal end of left femur ([facility] ) 2022   • At risk for obstructive sleep apnea 2022   • Lymphedema 2022   • Venous stasis dermatitis of both lower extremities 2022   • Seasonal allergies 2022   • Xerosis of skin 2022   • Prediabetes 2022   • Memory impairment 2022   • Anxiety    • Opioid dependence due to Chronic back pain  2022   • Acute renal failure superimposed on stage 3a chronic kidney disease ([address] ) 2021   • A-fib ([facility] ) 2021   • Mixed hyperlipidemia 2021   • Obesity 2021   • Osteoarthritis of knee 2021   • Stage 3 chronic kidney disease ([address] ) 2021   • Chronic low back pain with sciatica 2021   • Chronic venous insufficiency 2021 • Essential hypertension 03/09/2017   • Sjogren's syndrome ([facility] ) 07/08/2014      LOS (days): 3  Geometric Mean LOS (GMLOS) (days): 2 40  Days to GMLOS:-1 2     OBJECTIVE:    Risk of Unplanned Readmission Score: 49 69         Current admission status: Inpatient       Preferred Pharmacy:   [address]  Phone: [phone] Fax: [phone]    Primary Care Provider: Bandar Pruitt MD    Primary Insurance: [facility]  Secondary Insurance:     ASSESSMENT:  [address], [address] - Spouse   Primary Phone: [phone] (Mobile)  Home Phone: [phone]                         Readmission Root Cause  30 Day Readmission: No    Patient Information  Admitted from[de-identified] Home  Mental Status: Alert  During Assessment patient was accompanied by: Not accompanied during assessment  Assessment information provided by[de-identified] Patient  Primary Caregiver: Self  Support Systems: Self, Spouse/significant other, Daughter, Family members  Home entry access options   Select all that apply : Stairs  Number of steps to enter home : 2  Do the steps have railings?: Yes  Type of Current Residence: 2 story home  Upon entering residence, is there a bedroom on the main floor (no further steps)?: No  A bedroom is located on the following floor levels of residence (select all that apply):: 2nd Floor  Upon entering residence, is there a bathroom on the main floor (no further steps)?: No  Indicate which floors of current residence have a bathroom (select all the apply):: 2nd Floor  Number of steps to 2nd floor from main floor: One Flight  In the last 12 months, was there a time when you were not able to pay the mortgage or rent on time?: No  In the last 12 months, was there a time when you did not have a steady place to sleep or slept in a shelter (including now)?: No  Homeless/housing insecurity resource given?: N/A  Living Arrangements: Lives w/ Spouse/significant other    Activities of Daily Living Prior to Admission  Functional Status: Independent  Completes ADLs independently?: Yes  Ambulates independently?: Yes  Does patient use assisted devices?: Yes  Assisted Devices (DME) used: [employer]  Does patient currently own DME?: Yes  What DME does the patient currently own?: [employer] Don[employer]  Does patient have a history of Outpatient Therapy (PT/OT)?: No  Does the patient have a history of Short-Term Rehab?: Yes ([facility])  Does patient have a history of HHC?: Yes  Does patient currently have [facility]?: No         Patient Information Continued  Income Source: Pension/FDC  Does patient have prescription coverage?: Yes  Within the past 12 months, you worried that your food would run out before you got the money to buy more : Never true  Within the past 12 months, the food you bought just didn't last and you didn't have money to get more : Never true  Food insecurity resource given?: N/A  Does patient receive dialysis treatments?: No  Does patient have a history of substance abuse?: No  Does patient have a history of Mental Health Diagnosis?: No         Means of Transportation  Means of Transport to Appts[de-identified] Family transport  In the past 12 months, has lack of transportation kept you from medical appointments or from getting medications?: No  In the past 12 months, has lack of transportation kept you from meetings, work, or from getting things needed for daily living?: No  Was application for public transport provided?: N/A        DISCHARGE DETAILS:    Discharge planning discussed with[de-identified] daughter, Neeraj Carreon and patient  Freedom of Choice: Yes  Comments - Freedom of Choice: Re-SNF  CM contacted family/caregiver?: Yes  Were Treatment Team discharge recommendations reviewed with patient/caregiver?: Yes  Did patient/caregiver verbalize understanding of patient care needs?: N/A- going to facility  Were patient/caregiver advised of the risks associated with not following Treatment Team discharge recommendations?: Yes    Contacts  Patient Contacts: Sierra Arriaza (Daughter)  Contact Method: Phone  Phone Number: [phone]  Reason/Outcome: Continuity of Care, Emergency Contact, Referral, Discharge Planning              Other Referral/Resources/Interventions Provided:  Referral Comments: Patient prefers to have bed at [facility]  Facility working on insurance authorization           Treatment Team Recommendation: SNF  Discharge Destination Plan[de-identified] SNF

## 2023-01-03 ENCOUNTER — TELEPHONE (OUTPATIENT)
Dept: NEUROSURGERY | Facility: CLINIC | Age: 78
End: 2023-01-03

## 2023-01-03 RX ORDER — DIPHENHYDRAMINE HCL 25 MG
25 TABLET ORAL EVERY 6 HOURS PRN
Status: DISCONTINUED | OUTPATIENT
Start: 2023-01-03 | End: 2023-01-04 | Stop reason: HOSPADM

## 2023-01-03 RX ORDER — POTASSIUM CHLORIDE 20 MEQ/1
40 TABLET, EXTENDED RELEASE ORAL DAILY
Status: DISCONTINUED | OUTPATIENT
Start: 2023-01-03 | End: 2023-01-04 | Stop reason: HOSPADM

## 2023-01-03 RX ADMIN — APIXABAN 5 MG: 5 TABLET, FILM COATED ORAL at 17:37

## 2023-01-03 RX ADMIN — SENNOSIDES AND DOCUSATE SODIUM 2 TABLET: 8.6; 5 TABLET ORAL at 10:03

## 2023-01-03 RX ADMIN — OXYCODONE HYDROCHLORIDE 5 MG: 5 TABLET ORAL at 22:45

## 2023-01-03 RX ADMIN — LIDOCAINE 2 PATCH: 50 PATCH CUTANEOUS at 05:13

## 2023-01-03 RX ADMIN — GABAPENTIN 100 MG: 100 CAPSULE ORAL at 17:37

## 2023-01-03 RX ADMIN — METHOCARBAMOL 250 MG: 500 TABLET ORAL at 05:12

## 2023-01-03 RX ADMIN — SENNOSIDES AND DOCUSATE SODIUM 2 TABLET: 8.6; 5 TABLET ORAL at 17:37

## 2023-01-03 RX ADMIN — METHOCARBAMOL 250 MG: 500 TABLET ORAL at 22:11

## 2023-01-03 RX ADMIN — ACETAMINOPHEN 650 MG: 325 TABLET, FILM COATED ORAL at 12:05

## 2023-01-03 RX ADMIN — ACETAMINOPHEN 650 MG: 325 TABLET, FILM COATED ORAL at 05:12

## 2023-01-03 RX ADMIN — OXYCODONE HYDROCHLORIDE 5 MG: 5 TABLET ORAL at 07:14

## 2023-01-03 RX ADMIN — ESCITALOPRAM OXALATE 20 MG: 20 TABLET ORAL at 10:03

## 2023-01-03 RX ADMIN — BUSPIRONE HYDROCHLORIDE 5 MG: 5 TABLET ORAL at 10:02

## 2023-01-03 RX ADMIN — CARVEDILOL 3.12 MG: 3.12 TABLET, FILM COATED ORAL at 16:25

## 2023-01-03 RX ADMIN — DONEPEZIL HYDROCHLORIDE 5 MG: 5 TABLET ORAL at 22:12

## 2023-01-03 RX ADMIN — POTASSIUM CHLORIDE 40 MEQ: 1500 TABLET, EXTENDED RELEASE ORAL at 10:03

## 2023-01-03 RX ADMIN — APIXABAN 5 MG: 5 TABLET, FILM COATED ORAL at 10:02

## 2023-01-03 RX ADMIN — DIPHENHYDRAMINE HCL 25 MG: 25 TABLET, COATED ORAL at 06:10

## 2023-01-03 RX ADMIN — OXYCODONE HYDROCHLORIDE 5 MG: 5 TABLET ORAL at 02:52

## 2023-01-03 RX ADMIN — ACETAMINOPHEN 650 MG: 325 TABLET, FILM COATED ORAL at 17:37

## 2023-01-03 RX ADMIN — OXYCODONE HYDROCHLORIDE 5 MG: 5 TABLET ORAL at 13:44

## 2023-01-03 RX ADMIN — GABAPENTIN 100 MG: 100 CAPSULE ORAL at 10:03

## 2023-01-03 RX ADMIN — TORSEMIDE 40 MG: 20 TABLET ORAL at 10:02

## 2023-01-03 RX ADMIN — DIPHENHYDRAMINE HCL 25 MG: 25 TABLET, COATED ORAL at 12:05

## 2023-01-03 RX ADMIN — METHOCARBAMOL 250 MG: 500 TABLET ORAL at 13:44

## 2023-01-03 RX ADMIN — CARVEDILOL 3.12 MG: 3.12 TABLET, FILM COATED ORAL at 07:14

## 2023-01-03 RX ADMIN — TORSEMIDE 20 MG: 20 TABLET ORAL at 17:37

## 2023-01-03 RX ADMIN — BUSPIRONE HYDROCHLORIDE 5 MG: 5 TABLET ORAL at 22:11

## 2023-01-03 RX ADMIN — ACETAMINOPHEN 650 MG: 325 TABLET, FILM COATED ORAL at 23:39

## 2023-01-03 RX ADMIN — BUSPIRONE HYDROCHLORIDE 5 MG: 5 TABLET ORAL at 16:25

## 2023-01-03 NOTE — PROGRESS NOTES
[facility]  Progress Note - Seth Parker 1945, 69 y o  female MRN: [id number]  Unit/Bed#: W -01 Encounter: [id number]  Primary Care Provider: Norris Eden MD   Date and time admitted to hospital: 12/30/2022 12:24 AM    Closed fracture of transverse process of lumbar vertebra ([facility] )  Assessment & Plan  - L1, L2 left sided transverse process fractures, present on admission  - Multimodal pain control  - DVT ppx- home eliquis (home medication for [facility] [place] - atrial fibrillation)  - PT and OT as indicated  - Follow up with trauma clinic    Fracture of rib, single, closed  Assessment & Plan  - Single left-sided 11th rib fracture, present on admission   - Continue rib fracture protocol   - Continue to encourage incentive spirometer use and adequate pulmonary hygiene  - Continue multimodal analgesic regimen with accompanying bowel regimen  - Supplemental oxygen via nasal cannula as needed to maintain saturations greater than or equal to 94%  - PT and OT evaluation and treatment as indicated  - Currently on room oxygen to SPO2 saturation at 98%  -  cc, average pull  - Outpatient follow-up in the trauma clinic for re-evaluation in approximately 2 weeks  Fall  Assessment & Plan  - Status post mechanical fall with the below noted injuries  - Fall precautions  - Geriatric Medicine consultation for evaluation, medication review and recommendations; maintain Aricept 5 mg daily, delirium precautions, rib fracture protocol, geriatric pain control  - PT and OT evaluation and treatment as indicated  - Case Management consultation for disposition planning        Memory impairment  Assessment & Plan  - History of memory impairment  - geriatrics consulted and note appreciated; Aricept 5mg and delirium precautions      A-fib (HCC)  Assessment & Plan  - Rate controlled with carvedilol 3 125mg daily  - Continue Home Eliquis    Stage 3 chronic kidney disease ([facility] )  Assessment & Plan  Lab Results   Component Value Date    EGFR 62 01/01/2023    EGFR 58 12/31/2022    EGFR 59 12/30/2022    CREATININE 0 90 01/01/2023    CREATININE 0 95 12/31/2022    CREATININE 0 93 12/30/2022     - Avoid nephrotoxic agents  - Continue to trend serum creatinine  - Will continue to trend serum electrolytes    Chronic venous insufficiency  Assessment & Plan  - Bilateral LE edema at baseline, contributory to ambulatory dysfunction  Uses a walker at baseline    * Compression fracture of T8 vertebra with routine healing  Assessment & Plan  - T8 compression fracture, present on admission  - Neurosurgery consult  - Neurovascularly intact  - Thoracic spine precautions  - TLSO with uprights performed- "Unchanged height and alignment of the T8 vertebral body fracture  "  - DVT ppx   - Multimodal pain control with accompanying bowel regimen  - PT and OT as indicated      Bowel Regimen: senokot s  VTE Prophylaxis:Sequential compression device (Venodyne)  and Eliquis    Disposition: medically stable for discharge to rehab    Subjective   Chief Complaint: "I'm itchy"    Subjective: Pt reports she is feeling itchy and hot this morning  She reports she did not get any medications this morning so it is unlikely due to a medication  She reports its probably from laying in bed so much  She is able to sit herself up out of bed and reports this is the most shes moved but her pain is feeling controlled  She reports back pain, denies any numbness or tingling  Objective   Vitals:   Temp:  [97 5 °F (36 4 °C)-98 2 °F (36 8 °C)] 98 1 °F (36 7 °C)  HR:  [69-90] 69  Resp:  [17-20] 17  BP: (116-163)/(65-84) 150/70    I/O       01/01 0701  01/02 0700 01/02 0701  01/03 0700    P  O   960    I V  (mL/kg)  10 (0 1)    Total Intake(mL/kg)  970 (10)    Urine (mL/kg/hr) 800 (0 3) 600 (0 4)    Total Output 800 600    Net -800 +370          Unmeasured Urine Occurrence 4 x 1 x           Physical Exam:   GENERAL APPEARANCE: Patient in no acute distress  HEENT: NCAT; PERRL, EOMs intact; Mucous membranes moist  CV: Regular rate and rhythm; no murmur/gallops/rubs appreciated  CHEST / LUNGS: Clear to auscultation; no wheezes/rales/rhonci  Left posterior chest wall tenderness  ABD: NABS; soft; non-distended; non-tender  : voiding  EXT: +2 pulses bilaterally upper & lower extremities; no edema  NEURO: GCS 15; no focal neurologic deficits; neurovascularly intact  SKIN: Warm, dry and well perfused; no rash; no jaundice  Invasive Devices     Peripheral Intravenous Line  Duration           Peripheral IV 12/31/22 Right Antecubital 3 days                 PIC Score  PIC Pain Score: 2 (1/3/2023  5:12 AM)  PIC Incentive Spirometry Score: 3 (1/3/2023 12:00 AM)  PIC Cough Description: 2 (1/3/2023 12:00 AM)  PIC Total Score: 7 (1/3/2023 12:00 AM)       If the Total PIC Score </=5, did you consult APS and evaluate patient for further intervention?: no      Pain:    Incentive Spirometry  Cough  3 = Controlled  4 = Above goal volume 3 = Strong  2 = Moderate  3 = Goal to alert volume 2 = Weak  1 = Severe  2 = Below alert volume 1 = Absent     1 = Unable to perform IS         Lab Results: Results: I have personally reviewed all pertinent laboratory/tests results  Imaging: I have personally reviewed pertinent reports  see below  Other Studies:   XR spine thoracic 2 vw   Final Result by Chele Palomares MD (01/01 0802)      Unchanged height and alignment of the T8 vertebral body fracture  Workstation performed: [id number]         XR chest portable   Final Result by Lisa Pettit MD (12/30 8776)      Acute left 11th rib fracture not visible  Trace left effusion with no pneumothorax  Workstation performed: [id number]         CT recon (no charge)   Final Result by Harvey Hanna MD (17/23 7248)         1  Acute minimally displaced comminuted fracture posterior left 11th rib     2   Minimally displaced acute fractures left L1 and L2 transverse processes  3   Acute nondisplaced oblique fracture right anterior T8 vertebral body  4   Additional findings as noted  Workstation performed: [id number]         CT spine thoracic & lumbar wo contrast   Final Result by Shai Saeed MD (12/30 0222)      Acute nondisplaced fracture involving the right anterior aspect of the T8 vertebral body  Acute fractures of the left transverse processes of L1 on L2  Acute nondisplaced fracture of the partially visualized posterior left 11th rib  Consider a CT chest for further evaluation        Workstation performed: [id number]         XR spine thoracolumbar 2 vw    (Results Pending)

## 2023-01-03 NOTE — ASSESSMENT & PLAN NOTE
· Complains primarily of lower back pain  · States pain is somewhat worse today but also complains of anxiety which she states is new for her  · States she feels generally unwell  · Pain well controlled on current pain regimen

## 2023-01-03 NOTE — ASSESSMENT & PLAN NOTE
Lab Results   Component Value Date    EGFR 62 01/01/2023    EGFR 58 12/31/2022    EGFR 59 12/30/2022    CREATININE 0 90 01/01/2023    CREATININE 0 95 12/31/2022    CREATININE 0 93 12/30/2022     - Avoid nephrotoxic agents  - Continue to trend serum creatinine  - Will continue to trend serum electrolytes

## 2023-01-03 NOTE — ASSESSMENT & PLAN NOTE
· Does have some upper back pain but complains primarily of lower back pain  · Denies any neurologic deficits  · Pain well controlled on current regimen

## 2023-01-03 NOTE — ASSESSMENT & PLAN NOTE
- T8 compression fracture, present on admission  - Neurosurgery consult  - Neurovascularly intact  - Thoracic spine precautions  - TLSO with uprights performed- "Unchanged height and alignment of the T8 vertebral body fracture  "  - DVT ppx   - Multimodal pain control with accompanying bowel regimen  - PT and OT as indicated

## 2023-01-03 NOTE — PLAN OF CARE
Problem: Potential for Falls  Goal: Patient will remain free of falls  Description: INTERVENTIONS:  - Educate patient/family on patient safety including physical limitations  - Instruct patient to call for assistance with activity   - Consult OT/PT to assist with strengthening/mobility   - Keep Call bell within reach  - Keep bed low and locked with side rails adjusted as appropriate  - Keep care items and personal belongings within reach  - Initiate and maintain comfort rounds  - Make Fall Risk Sign visible to staff  - Offer Toileting every 4 Hours, in advance of need  - Initiate/Maintain bed alarm  - Obtain necessary fall risk management equipment: yellow socks  - Apply yellow socks and bracelet for high fall risk patients  - Consider moving patient to room near nurses station  Outcome: Progressing     Problem: Prexisting or High Potential for Compromised Skin Integrity  Goal: Skin integrity is maintained or improved  Description: INTERVENTIONS:  - Identify patients at risk for skin breakdown  - Assess and monitor skin integrity  - Assess and monitor nutrition and hydration status  - Monitor labs   - Assess for incontinence   - Turn and reposition patient  - Assist with mobility/ambulation  - Relieve pressure over bony prominences  - Avoid friction and shearing  - Provide appropriate hygiene as needed including keeping skin clean and dry  - Evaluate need for skin moisturizer/barrier cream  - Collaborate with interdisciplinary team   - Patient/family teaching  - Consider wound care consult   Outcome: Progressing

## 2023-01-03 NOTE — ASSESSMENT & PLAN NOTE
· Continue Tylenol 650 mg p o  every 6 hours scheduled  · Continue gabapentin 100 mg p o  twice daily scheduled  · Continue lidocaine patch x2, on for 12 hours and off for 12 hours  · Continue Robaxin 250 mg p o  every 8 hours scheduled  · Continue oxycodone 2 5 mg p o  every 4 hours as needed moderate pain  · Continue oxycodone 5 mg p o  every 4 hours as needed severe pain  · Continue bowel regimen to avoid opioid-induced constipation while on opioid pain medication  · Ice to painful area for up to 20 minutes every hour as needed  · At discharge, suggest the following:  · Tylenol 650 mg p o  every 6 hours scheduled x1 week, then as needed  · Gabapentin 100 mg p o  twice daily scheduled  · Lidocaine patch x2, on for 12 hours and off for 12 hours  · Robaxin 250 mg p o  every 8 hours scheduled x1 week, then discontinue  · Oxycodone 2 5 mg p o  every 4 hours as needed moderate to severe pain x5 days  · Bowel regimen while on opioid pain medication

## 2023-01-03 NOTE — DISCHARGE SUMMARY
Discharge Summary - Jacinto Gar 69 y o  female MRN: [id number]    Unit/Bed#: W -01 Encounter: [id number]    Admission Date:   Admission Orders (From admission, onward)     Ordered        12/30/22 0329  Inpatient Admission  Once                        Admitting Diagnosis: Multiple injuries [T07  XXXA]  Chronic venous insufficiency [I87 2]  Memory impairment [R41 3]  Persistent atrial fibrillation ([address] ) [I48 19]  L1 vertebral fracture ([address] ) [S32 019A]  L2 vertebral fracture ([address] ) [S32 029A]  T8 vertebral fracture ([address] ) [S22 069A]  Closed fracture of transverse process of lumbar vertebra, initial encounter ([facility] ) [S32 009A]  Closed fracture of one rib of left side, initial encounter Maryann Damian    HPI written 12/30/22 "Jacinto Gar is a 69 y o  female who presents with back pain after a fall  Pt reports she was using her walker to go to the bathroom when she slipped on the tile floor and fell directly onto her buttocks  She was unable to get off the floor on her own due to pain, so her  called 911  She denies head strike, loss of consciousness, and reports she takes Eliquis daily  She reports left sided back pain and denies numbness or tingling in LE "    Procedures Performed: No orders of the defined types were placed in this encounter  Summary of Hospital Course: Please see above and reference hospital medical records  Significant Findings, Care, Treatment and Services Provided: Patient was evaluated by the trauma service and admitted after sustaining a fall with a single left rib fracture, 2 left-sided TP fractures, and a T8 compression fracture  Neurosurgery was consulted and recommended brace and uprights which were determined stable  Acute pain service was consulted and pain was controlled  PT and OT evaluated and treated the patient, and recommended discharge to rehab  She was discharged medically stable      Complications: None    Discharge Diagnosis: Traumatic injuries, routine healing  Medical Problems     Resolved Problems  Date Reviewed: 1/2/2023   None         Condition at Discharge: stable         Discharge instructions/Information to patient and family:   See after visit summary for information provided to patient and family  Provisions for Follow-Up Care:  See after visit summary for information related to follow-up care and any pertinent home health orders  PCP: Tiki Alford MD    Disposition: Short-term rehab at [facility]    Planned Readmission: No      Discharge Statement   I spent 20 minutes discharging the patient  This time was spent on the day of discharge  I had direct contact with the patient on the day of discharge  Additional documentation is required if more than 30 minutes were spent on discharge  Discharge Medications:  See after visit summary for reconciled discharge medications provided to patient and family

## 2023-01-03 NOTE — PROGRESS NOTES
[facility]  Progress Note - [facility] 1945, 69 y o  female MRN: [id number]  Unit/Bed#: W -01 Encounter: [id number]  Primary Care Provider: Rosemary Hanna MD   Date and time admitted to hospital: 12/30/2022 12:24 AM    Closed fracture of transverse process of lumbar vertebra ([address] )  Assessment & Plan  · Complains primarily of lower back pain  · States pain is somewhat worse today but also complains of anxiety which she states is new for her  · States she feels generally unwell  · Pain well controlled on current pain regimen  Fracture of rib, single, closed  Assessment & Plan  · Denies chest pain or shortness of breath  · No oxygen requirement  Acute pain due to trauma  Assessment & Plan  · Continue Tylenol 650 mg p o  every 6 hours scheduled  · Continue gabapentin 100 mg p o  twice daily scheduled  · Continue lidocaine patch x2, on for 12 hours and off for 12 hours  · Continue Robaxin 250 mg p o  every 8 hours scheduled  · Continue oxycodone 2 5 mg p o  every 4 hours as needed moderate pain  · Continue oxycodone 5 mg p o  every 4 hours as needed severe pain  · Continue bowel regimen to avoid opioid-induced constipation while on opioid pain medication  · Ice to painful area for up to 20 minutes every hour as needed  · At discharge, suggest the following:  · Tylenol 650 mg p o  every 6 hours scheduled x1 week, then as needed  · Gabapentin 100 mg p o  twice daily scheduled  · Lidocaine patch x2, on for 12 hours and off for 12 hours  · Robaxin 250 mg p o  every 8 hours scheduled x1 week, then discontinue  · Oxycodone 2 5 mg p o  every 4 hours as needed moderate to severe pain x5 days  · Bowel regimen while on opioid pain medication  Anxiety  Assessment & Plan  • Patients with depression and/or anxiety have more perceived pain on average and often require higher doses of opioid pain medication    • Treat depression and/or anxiety aggressively  Stage 3 chronic kidney disease [facility])  Assessment & Plan  Lab Results   Component Value Date    EGFR 62 01/01/2023    EGFR 58 12/31/2022    EGFR 59 12/30/2022    CREATININE 0 90 01/01/2023    CREATININE 0 95 12/31/2022    CREATININE 0 93 12/30/2022   • Estimated Creatinine Clearance: 57 7 mL/min (by C-G formula based on SCr of 0 9 mg/dL)  • Will attempt to minimize renally excreted pain medications  Chronic low back pain with sciatica  Assessment & Plan  · Patient not on chronic opioids  * Compression fracture of T8 vertebra with routine healing  Assessment & Plan  · Does have some upper back pain but complains primarily of lower back pain  · Denies any neurologic deficits  · Pain well controlled on current regimen  Acute pain due to multiple trauma  APS will sign off at this time  Thank you for the consult  All opioids and other analgesics to be written at discretion of primary team  Please contact Acute Pain Service - [facility] via [employer] from [phone] with additional questions or concerns  See Carlito or Shea for additional contacts and after hours information  Pain History  Current pain location(s): Lower back  Pain Scale:   5-10 out of 10  Quality: Aching, sharp  24 hour history: Patient complains primarily of lower back pain which is worse with movement  Otherwise feels generally well  States current pain regimen has been effective  Opioid requirement previous 24 hours: Oxycodone 25 mg p o      Meds/Allergies   all current active meds have been reviewed, current meds:   Current Facility-Administered Medications   Medication Dose Route Frequency   • acetaminophen (TYLENOL) tablet 650 mg  650 mg Oral Q6H [facility]   • apixaban (ELIQUIS) tablet 5 mg  5 mg Oral BID   • busPIRone (BUSPAR) tablet 5 mg  5 mg Oral TID   • carvedilol (COREG) tablet 3 125 mg  3 125 mg Oral BID With Meals   • diphenhydrAMINE (BENADRYL) tablet 25 mg  25 mg Oral Q6H PRN   • donepezil (ARICEPT) tablet 5 mg  5 mg Oral HS   • escitalopram (LEXAPRO) tablet 20 mg  20 mg Oral Daily   • gabapentin (NEURONTIN) capsule 100 mg  100 mg Oral BID   • lidocaine (LIDODERM) 5 % patch 2 patch  2 patch Topical Daily   • methocarbamol (ROBAXIN) tablet 250 mg  250 mg Oral Q8H [facility]   • naloxone (NARCAN) 0 04 mg/mL syringe 0 04 mg  0 04 mg Intravenous Q1MIN PRN   • ondansetron (ZOFRAN) injection 4 mg  4 mg Intravenous Q6H PRN   • oxyCODONE (ROXICODONE) IR tablet 2 5 mg  2 5 mg Oral Q4H PRN   • oxyCODONE (ROXICODONE) IR tablet 5 mg  5 mg Oral Q4H PRN   • polyethylene glycol (MIRALAX) packet 17 g  17 g Oral Once   • potassium chloride (K-DUR,KLOR-CON) CR tablet 40 mEq  40 mEq Oral Daily   • senna-docusate sodium (SENOKOT S) 8 6-50 mg per tablet 2 tablet  2 tablet Oral BID   • torsemide (DEMADEX) tablet 20 mg  20 mg Oral Daily   • torsemide (DEMADEX) tablet 40 mg  40 mg Oral Daily    and PTA meds:   Prior to Admission Medications   Prescriptions Last Dose Informant Patient Reported? Taking?    TIZANIDINE HCL PO   Yes No   Sig: Take 4 mg by mouth every 8 (eight) hours as needed (muscle spasms)   acetaminophen (TYLENOL) 500 mg tablet   No No   Sig: Take 1 tablet (500 mg total) by mouth every 6 (six) hours as needed for mild pain   ammonium lactate (LAC-HYDRIN) 12 % cream   No No   Sig: Apply topically 2 (two) times a day   apixaban (Eliquis) 5 mg   No No   Sig: Take 1 tablet (5 mg total) by mouth 2 (two) times a day Lot [id number] exp 10/2024   busPIRone (BUSPAR) 5 mg tablet   No No   Sig: TAKE ONE TABLET BY MOUTH THREE TIMES DAILY   carvedilol (COREG) 3 125 mg tablet   No No   Sig: Take 1 tablet (3 125 mg total) by mouth 2 (two) times a day with meals   donepezil (ARICEPT) 5 mg tablet   No No   Sig: Take 1 tablet (5 mg total) by mouth daily at bedtime   escitalopram (LEXAPRO) 20 mg tablet   No No   Sig: Take 1 tablet (20 mg total) by mouth daily   loperamide (IMODIUM) 2 mg capsule   No No   Sig: Take 1 capsule (2 mg total) by mouth 4 (four) times a day as needed for diarrhea for up to 7 days   metolazone (ZAROXOLYN) 2 5 mg tablet   No No   Sig: Take 1 tablet ( 2 5 mg) by mouth once a week on Thursdays, before the morning dose of torsemide   oxyCODONE (OxyCONTIN) 20 mg 12 hr tablet   Yes No   Sig: Take 20 mg by mouth 2 (two) times a day   pantoprazole (PROTONIX) 40 mg tablet   No No   Sig: Take 1 tablet (40 mg total) by mouth daily   potassium chloride (K-DUR,KLOR-CON) 20 mEq tablet   No No   Sig: Take 3 tablets daily ( 60 meq) plus an extra 20 meq on thursdays with metolazone   torsemide (DEMADEX) 20 mg tablet   No No   Sig: Take 2 tablets (40 mg total) by mouth daily 40mg (2 tablets) by mouth every morning, 20mg (1 tablet) by mouth every evening      Facility-Administered Medications: None       No Known Allergies    Objective     Temp:  [97 5 °F (36 4 °C)-98 2 °F (36 8 °C)] 97 6 °F (36 4 °C)  HR:  [69-90] 69  Resp:  [17-20] 18  BP: (116-163)/(65-97) 135/97    Physical Exam  Gen: Awake and alert, NAD, Does not appear ill  Vital signs reviewed  Nursing notes reviewed  Eyes: PERRL, sclera/conjunctiva normal   ENT: No JVD, trachea midline, moist mucus membranes  MSK: Lower  to palpation in the midline  No step-offs  No deformities  Increased pain with range of motion  CV: Heart normal rhythm and normal rate  No extra systoles  Lungs:  CTA bilaterally  No wheeze  No rhonchi  Skin: Warm, dry  Cap refill <2 seconds  No diaphoresis  Neuro: A&O x 3, GCS 15 (E4, V5, M6)  No obvious focal motor deficit  Psych: Normal speech, normal attention, normal behavior      Lab Results:   Results from last 7 days   Lab Units 01/01/23  0550   WBC Thousand/uL 9 95   HEMOGLOBIN g/dL 9 4*   HEMATOCRIT % 31 8*   PLATELETS Thousands/uL 329      Results from last 7 days   Lab Units 01/01/23  0550   POTASSIUM mmol/L 3 3*   CHLORIDE mmol/L 98   CO2 mmol/L 33*   BUN mg/dL 10   CREATININE mg/dL 0 90   CALCIUM mg/dL 8 8    Estimated Creatinine Clearance: 57 7 mL/min (by C-G formula based on SCr of 0 9 mg/dL)  Imaging Studies: I have personally reviewed pertinent reports  Counseling / Coordination of Care  Total floor / unit time spent today Level 3 = 55 minutes  Greater than 50% of total time was spent with the patient and / or family counseling and / or coordination of care  A description of the counseling / coordination of care: Patient interview, physical examination, review of medical record, review of imaging and laboratory data, development of pain management plan, discussion of pain management plan with patient and primary service  Explanation of risks and benefits of suggested pain medications  Please note that the APS provides consultative services regarding pain management only  With the exception of ketamine and epidural infusions and except when indicated, final decisions regarding starting or changing doses of analgesic medications are at the discretion of the consulting service  Off hours consultation and/or medication management is generally not available  Portions of the record may have been created with voice recognition software  Occasional wrong-word or 'sound-a-like' substitutions may have occurred due to the inherent limitations of voice recognition software       Mariel Mitchell PA-C  Acute Pain Service

## 2023-01-03 NOTE — ASSESSMENT & PLAN NOTE
- History of memory impairment  - geriatrics consulted and note appreciated; Aricept 5mg and delirium precautions

## 2023-01-03 NOTE — ASSESSMENT & PLAN NOTE
- L1, L2 left sided transverse process fractures, present on admission  - Multimodal pain control  - DVT ppx- home eliquis (home medication for [facility] - atrial fibrillation)  - PT and OT as indicated  - Follow up with trauma clinic

## 2023-01-03 NOTE — PROGRESS NOTES
Progress Note - Geriatric Medicine   Demetria Burroughs 69 y o  female MRN: [id number]  Unit/Bed#: W -01 Encounter: [id number]      Assessment/Plan:  1 -Closed fracture of transverse process of lumbar vertebra  -Patient appears to be tolerating her discomfort well denies any active pain at the time of evaluation  2 -  Fracture of the left-sided 11th rib -Patient to continue with incentive spirometry currently on O2 supplementation I have stopped her oxygen we will see if she can maintains O2 sats over 92%  3 -  History of recurrent falls -Patient will have PT and OT  4 -  Peripheral edema  That she has evidence of lymphedema we have placed some intermittent compression stockings on her legs do appear to be markedly improved  She is somewhat uncomfortable with the device on a continuous basis we can certainly give her some time off in between  5 -  Atrial fibrillation -  Eliquis has been resumed currently taking 5 mg orally twice a day  She is also taking Coreg 3 125 mg twice daily for rate control  6 -History of cognitive decline -Patient has been taking Aricept 5 mg orally at bedtime  7 -  Hypokalemia potassium has been low at 3 3 currently on potassium replacement we will check BMP to make sure potassium has improved  8 -Anemia with hypochromic microcytic indices  Patient's hemoglobin currently is 9 4 with a hematocrit of 31 8 with an MCV of 79  We will order serum iron levels  9 -  Chronic renal insufficiency stage II -  Patient currently is stage II with a GFR of 62 she fluctuates between 59 and 62     10 -  Currently on O2 supplementation -  Have stopped her oxygen will monitor her O2 sats  Subjective:   Patient states she has not had a bowel movement she will attempt today she was given senna  Her appetite is fair  She denies any active pain at present  Review of Systems   Constitutional: Negative  HENT: Negative  Eyes: Negative  Respiratory: Negative  Cardiovascular: Negative  Gastrointestinal: Negative  Endocrine: Negative  Genitourinary: Negative  Musculoskeletal: Positive for arthralgias and gait problem  Allergic/Immunologic: Negative  Psychiatric/Behavioral: Negative  Objective:     Vitals: Blood pressure 135/97, pulse 69, temperature 97 6 °F (36 4 °C), resp  rate 18, height 5' 2" (1 575 m), weight 96 6 kg (213 lb), SpO2 97 %  ,Body mass index is 38 96 kg/m²  Intake/Output Summary (Last 24 hours) at 1/3/2023 1005  Last data filed at 1/2/2023 1400  Gross per 24 hour   Intake 970 ml   Output 600 ml   Net 370 ml       Current Medications: Reviewed    Physical Exam:   Physical Exam  Constitutional:       Appearance: She is obese  HENT:      Head: Atraumatic  Right Ear: Ear canal and external ear normal       Left Ear: Ear canal and external ear normal       Mouth/Throat:      Mouth: Mucous membranes are moist    Eyes:      Extraocular Movements: Extraocular movements intact  Pupils: Pupils are equal, round, and reactive to light  Cardiovascular:      Rate and Rhythm: Tachycardia present  Rhythm irregular  Pulmonary:      Effort: Pulmonary effort is normal       Breath sounds: Normal breath sounds  Abdominal:      Palpations: Abdomen is soft  Musculoskeletal:         General: Normal range of motion  Cervical back: Neck supple  Neurological:      Mental Status: She is alert and oriented to person, place, and time  Mental status is at baseline  Psychiatric:         Mood and Affect: Mood normal           Invasive Devices     Peripheral Intravenous Line  Duration           Peripheral IV 12/31/22 Right Antecubital 3 days                Lab, Imaging and other studies: I have personally reviewed pertinent reports

## 2023-01-03 NOTE — ASSESSMENT & PLAN NOTE
• Patients with depression and/or anxiety have more perceived pain on average and often require higher doses of opioid pain medication  • Treat depression and/or anxiety aggressively

## 2023-01-03 NOTE — ASSESSMENT & PLAN NOTE
Lab Results   Component Value Date    EGFR 62 01/01/2023    EGFR 58 12/31/2022    EGFR 59 12/30/2022    CREATININE 0 90 01/01/2023    CREATININE 0 95 12/31/2022    CREATININE 0 93 12/30/2022   • Estimated Creatinine Clearance: 57 7 mL/min (by C-G formula based on SCr of 0 9 mg/dL)  • Will attempt to minimize renally excreted pain medications

## 2023-01-03 NOTE — TELEPHONE ENCOUNTER
1/5/23:   DISCHARGED TO Ewa Cheek # [phone]    SPOKE TO Chris Velasquez / Regina Momin / LOCATION        1/4/23: INPATIENT    1/3/23: INPATIENT    2 WK HFU W/ AP  1/17/22 / 9: / [facility]    IMAGING   XRAY RYAN Jim MA  2-week hospital follow-up with repeat x-rays with AP

## 2023-01-04 ENCOUNTER — TELEPHONE (OUTPATIENT)
Dept: INTERNAL MEDICINE CLINIC | Facility: CLINIC | Age: 78
End: 2023-01-04

## 2023-01-04 VITALS
DIASTOLIC BLOOD PRESSURE: 63 MMHG | TEMPERATURE: 98.1 F | BODY MASS INDEX: 39.2 KG/M2 | RESPIRATION RATE: 17 BRPM | OXYGEN SATURATION: 95 % | WEIGHT: 213 LBS | HEIGHT: 62 IN | SYSTOLIC BLOOD PRESSURE: 91 MMHG | HEART RATE: 80 BPM

## 2023-01-04 LAB
ANION GAP SERPL CALCULATED.3IONS-SCNC: 8 MMOL/L (ref 4–13)
BASOPHILS # BLD AUTO: 0.05 THOUSANDS/ÂΜL (ref 0–0.1)
BASOPHILS NFR BLD AUTO: 1 % (ref 0–1)
BUN SERPL-MCNC: 14 MG/DL (ref 5–25)
CALCIUM SERPL-MCNC: 9.5 MG/DL (ref 8.4–10.2)
CHLORIDE SERPL-SCNC: 97 MMOL/L (ref 96–108)
CO2 SERPL-SCNC: 32 MMOL/L (ref 21–32)
CREAT SERPL-MCNC: 0.87 MG/DL (ref 0.6–1.3)
EOSINOPHIL # BLD AUTO: 0.37 THOUSAND/ÂΜL (ref 0–0.61)
EOSINOPHIL NFR BLD AUTO: 5 % (ref 0–6)
ERYTHROCYTE [DISTWIDTH] IN BLOOD BY AUTOMATED COUNT: 19.2 % (ref 11.6–15.1)
FERRITIN SERPL-MCNC: 33 NG/ML (ref 8–388)
GFR SERPL CREATININE-BSD FRML MDRD: 64 ML/MIN/1.73SQ M
GLUCOSE SERPL-MCNC: 106 MG/DL (ref 65–140)
HCT VFR BLD AUTO: 35.6 % (ref 34.8–46.1)
HGB BLD-MCNC: 10.7 G/DL (ref 11.5–15.4)
IMM GRANULOCYTES # BLD AUTO: 0.03 THOUSAND/UL (ref 0–0.2)
IMM GRANULOCYTES NFR BLD AUTO: 0 % (ref 0–2)
IRON SATN MFR SERPL: 9 % (ref 15–50)
IRON SERPL-MCNC: 35 UG/DL (ref 50–170)
LYMPHOCYTES # BLD AUTO: 2.36 THOUSANDS/ÂΜL (ref 0.6–4.47)
LYMPHOCYTES NFR BLD AUTO: 31 % (ref 14–44)
MCH RBC QN AUTO: 23.5 PG (ref 26.8–34.3)
MCHC RBC AUTO-ENTMCNC: 30.1 G/DL (ref 31.4–37.4)
MCV RBC AUTO: 78 FL (ref 82–98)
MONOCYTES # BLD AUTO: 0.7 THOUSAND/ÂΜL (ref 0.17–1.22)
MONOCYTES NFR BLD AUTO: 9 % (ref 4–12)
NEUTROPHILS # BLD AUTO: 4.02 THOUSANDS/ÂΜL (ref 1.85–7.62)
NEUTS SEG NFR BLD AUTO: 54 % (ref 43–75)
NRBC BLD AUTO-RTO: 0 /100 WBCS
PLATELET # BLD AUTO: 411 THOUSANDS/UL (ref 149–390)
PMV BLD AUTO: 10.4 FL (ref 8.9–12.7)
POTASSIUM SERPL-SCNC: 3.2 MMOL/L (ref 3.5–5.3)
RBC # BLD AUTO: 4.55 MILLION/UL (ref 3.81–5.12)
SODIUM SERPL-SCNC: 137 MMOL/L (ref 135–147)
TIBC SERPL-MCNC: 391 UG/DL (ref 250–450)
WBC # BLD AUTO: 7.53 THOUSAND/UL (ref 4.31–10.16)

## 2023-01-04 RX ORDER — OXYCODONE HYDROCHLORIDE 5 MG/1
5 TABLET ORAL EVERY 4 HOURS PRN
Qty: 30 TABLET | Refills: 0 | Status: SHIPPED | OUTPATIENT
Start: 2023-01-04 | End: 2023-01-04 | Stop reason: SDUPTHER

## 2023-01-04 RX ORDER — GABAPENTIN 100 MG/1
100 CAPSULE ORAL 2 TIMES DAILY
Refills: 0
Start: 2023-01-04

## 2023-01-04 RX ORDER — OXYCODONE HYDROCHLORIDE 5 MG/1
5 TABLET ORAL EVERY 4 HOURS PRN
Qty: 20 TABLET | Refills: 0 | Status: SHIPPED | OUTPATIENT
Start: 2023-01-04 | End: 2023-01-12 | Stop reason: SDUPTHER

## 2023-01-04 RX ORDER — LIDOCAINE 50 MG/G
2 PATCH TOPICAL DAILY
Refills: 0
Start: 2023-01-04

## 2023-01-04 RX ORDER — POLYETHYLENE GLYCOL 3350 17 G/17G
17 POWDER, FOR SOLUTION ORAL ONCE
Qty: 17 G | Refills: 0
Start: 2023-01-04 | End: 2023-01-17

## 2023-01-04 RX ORDER — ACETAMINOPHEN 325 MG/1
650 TABLET ORAL EVERY 6 HOURS SCHEDULED
Refills: 0
Start: 2023-01-04

## 2023-01-04 RX ORDER — AMOXICILLIN 250 MG
2 CAPSULE ORAL 2 TIMES DAILY
Refills: 0
Start: 2023-01-04

## 2023-01-04 RX ORDER — POLYETHYLENE GLYCOL 3350 17 G/17G
17 POWDER, FOR SOLUTION ORAL DAILY
Refills: 0
Start: 2023-01-05 | End: 2023-01-17

## 2023-01-04 RX ORDER — POLYETHYLENE GLYCOL 3350 17 G/17G
17 POWDER, FOR SOLUTION ORAL DAILY
Status: DISCONTINUED | OUTPATIENT
Start: 2023-01-04 | End: 2023-01-04 | Stop reason: HOSPADM

## 2023-01-04 RX ADMIN — ACETAMINOPHEN 650 MG: 325 TABLET, FILM COATED ORAL at 05:12

## 2023-01-04 RX ADMIN — POTASSIUM CHLORIDE 40 MEQ: 1500 TABLET, EXTENDED RELEASE ORAL at 09:18

## 2023-01-04 RX ADMIN — LIDOCAINE 2 PATCH: 50 PATCH CUTANEOUS at 05:13

## 2023-01-04 RX ADMIN — OXYCODONE HYDROCHLORIDE 5 MG: 5 TABLET ORAL at 11:41

## 2023-01-04 RX ADMIN — CARVEDILOL 3.12 MG: 3.12 TABLET, FILM COATED ORAL at 07:43

## 2023-01-04 RX ADMIN — GABAPENTIN 100 MG: 100 CAPSULE ORAL at 09:18

## 2023-01-04 RX ADMIN — METHOCARBAMOL 250 MG: 500 TABLET ORAL at 05:12

## 2023-01-04 RX ADMIN — SENNOSIDES AND DOCUSATE SODIUM 2 TABLET: 8.6; 5 TABLET ORAL at 09:19

## 2023-01-04 RX ADMIN — TORSEMIDE 40 MG: 20 TABLET ORAL at 09:18

## 2023-01-04 RX ADMIN — OXYCODONE HYDROCHLORIDE 5 MG: 5 TABLET ORAL at 02:39

## 2023-01-04 RX ADMIN — OXYCODONE HYDROCHLORIDE 5 MG: 5 TABLET ORAL at 06:29

## 2023-01-04 RX ADMIN — ESCITALOPRAM OXALATE 20 MG: 20 TABLET ORAL at 09:18

## 2023-01-04 RX ADMIN — APIXABAN 5 MG: 5 TABLET, FILM COATED ORAL at 09:18

## 2023-01-04 RX ADMIN — BUSPIRONE HYDROCHLORIDE 5 MG: 5 TABLET ORAL at 09:19

## 2023-01-04 RX ADMIN — ACETAMINOPHEN 650 MG: 325 TABLET, FILM COATED ORAL at 11:41

## 2023-01-04 NOTE — PROGRESS NOTES
[facility]  [facility] 1945[facility] 69 [facility]  [facility] MRN: [id number]  Unit/Bed#: W -01 Encounter: [id number]  Primary Care Provider: Katerina Perez MD   Date and time admitted to hospital: 12/30/2022 12:24 AM    Fall  Assessment & Plan  - Status post mechanical fall with the below noted injuries  - Fall precautions  - Geriatric Medicine consultation for evaluation, medication review and recommendations; maintain Aricept 5 mg daily, delirium precautions, rib fracture protocol, geriatric pain control  - PT and OT evaluation and treatment as indicated  - Case Management consultation for disposition planning  Closed fracture of transverse process of lumbar vertebra ([address] )  Assessment & Plan  - L1, L2 left sided transverse process fractures, present on admission  - Multimodal pain control  - DVT ppx- home eliquis [facility]  - PT and OT as indicated  - Follow up with trauma clinic    Fracture of rib, single, closed  Assessment & Plan  - Single left-sided 11th rib fracture, present on admission   - Continue rib fracture protocol   - Continue to encourage incentive spirometer use and adequate pulmonary hygiene  - Continue multimodal analgesic regimen with accompanying bowel regimen  - Supplemental oxygen via nasal cannula as needed to maintain saturations greater than or equal to 94%  - PT and OT evaluation and treatment as indicated  - Currently on room oxygen to SPO2 saturation at 98%  -  cc, average pull  - Outpatient follow-up in the trauma clinic for re-evaluation in approximately 2 weeks  * Compression fracture of T8 vertebra with routine healing  Assessment & Plan  - T8 compression fracture, present on admission  - Neurosurgery consult  - Neurovascularly intact  - Thoracic spine precautions  - TLSO with uprights performed- "Unchanged height and alignment of the T8 vertebral body fracture  "  - DVT ppx   - Multimodal pain control with accompanying bowel regimen  - PT and OT as indicated    Acute pain due to trauma  Assessment & Plan  Multimodal pain regimen as above    Memory impairment  Assessment & Plan  - History of memory impairment  - geriatrics consulted and note appreciated; Aricept 5mg and delirium precautions      A-fib [facility])  Assessment & Plan  - Rate controlled with carvedilol 3 125mg daily  - Continue Home Eliquis    Stage 3 chronic kidney disease [facility])  Assessment & Plan  Lab Results   Component Value Date    EGFR 62 01/01/2023    EGFR 58 12/31/2022    EGFR 59 12/30/2022    CREATININE 0 90 01/01/2023    CREATININE 0 95 12/31/2022    CREATININE 0 93 12/30/2022     - Avoid nephrotoxic agents  - Continue to trend serum creatinine  - Will continue to trend serum electrolytes    Chronic venous insufficiency  Assessment & Plan  - Bilateral LE edema at baseline, contributory to ambulatory dysfunction  Uses a walker at baseline      Bowel Regimen: MiraLAX, senokot  VTE Prophylaxis: Eliquis    Disposition: Continue MedSurg  Disposition pending CM    Subjective   Chief Complaint: Left rib pain    Subjective: Continues to have pain on left ribs although improving  Able to pull in about 750 on I-S with repeated instruction  Otherwise no complaints     Objective   Vitals:   Temp:  [97 6 °F (36 4 °C)-98 2 °F (36 8 °C)] 98 1 °F (36 7 °C)  HR:  [63-95] 63  Resp:  [17-18] 18  BP: ()/(57-97) 125/62    I/O       01/02 0701  01/03 0700 01/03 0701  01/04 0700    P  O  960 960    I V  (mL/kg) 10 (0 1) 20 (0 2)    Total Intake(mL/kg) 970 (10) 980 (10 1)    Urine (mL/kg/hr) 600 (0 3) 100 (0)    Total Output 600 100    Net +370 +880          Unmeasured Urine Occurrence 1 x 1 x           Physical Exam:   GENERAL APPEARANCE: No acute distress, nontoxic appearing  NEURO: CN II through XII grossly intact  Sensation and motor function intact in all 4 extremities  HEENT: Normocephalic    No obvious traumatic injuries  CV: RRR, pulses 2+ in all 4 extremities  LUNGS: Lung sounds clear to auscultation bilaterally  Continues to have pain on left chest wall from rib injury  Able to take deep breaths  Pulling about 750 with I-S  GI: No abdominal tenderness  Bowel sounds normal  : Deferred  MSK: Left hip pain especially when being rotated for position change  Otherwise moving all extremities without issue  SKIN: Skin warm and dry  No rashes  No active bleeding  Invasive Devices     Peripheral Intravenous Line  Duration           Peripheral IV 01/03/23 Left Antecubital <1 day                 PIC Score  PIC Pain Score: 2 (1/4/2023  2:39 AM)  PIC Incentive Spirometry Score: 2 (1/4/2023  2:39 AM)  PIC Cough Description: 1 (1/4/2023  2:39 AM)  PIC Total Score: 5 (1/4/2023  2:39 AM)       If the Total PIC Score </=5, did you consult APS and evaluate patient for further intervention?: yes      Pain:    Incentive Spirometry  Cough  3 = Controlled  4 = Above goal volume 3 = Strong  2 = Moderate  3 = Goal to alert volume 2 = Weak  1 = Severe  2 = Below alert volume 1 = Absent     1 = Unable to perform IS         Lab Results: Results: I have personally reviewed all pertinent laboratory/tests results, BMP/CMP: No results found for: SODIUM, K, CL, CO2, ANIONGAP, BUN, CREATININE, GLUCOSE, CALCIUM, AST, ALT, ALKPHOS, PROT, BILITOT, EGFR and CBC: No results found for: WBC, HGB, HCT, MCV, PLT, ADJUSTEDWBC, MCH, MCHC, RDW, MPV, NRBC  Imaging: I have personally reviewed pertinent reports       Other Studies: N/A

## 2023-01-04 NOTE — CASE MANAGEMENT
Case Management Assessment & Discharge Planning Note    Patient name Evelio Mcdonough  Location W /W -23 MRN [id number]  : 1945 Date 2023       Current Admission Date: 2022  Current Admission Diagnosis:Compression fracture of T8 vertebra with routine healing   Patient Active Problem List    Diagnosis Date Noted   • Fracture of rib, single, closed 2022   • Closed fracture of transverse process of lumbar vertebra ([address] ) 2022   • Compression fracture of T8 vertebra with routine healing 2022   • Loose stools 2022   • Parenchymal renal hypertension 2022   • LEXY (acute kidney injury) ([facility] ) 2022   • Anemia in stage 3 chronic kidney disease ([facility] ) 2022   • Hypokalemia 2022   • Chronic heart failure with preserved ejection fraction (HFpEF) ([facility] ) 10/18/2022   • Elevated troponin 10/06/2022   • Acute gastric ulcer with hemorrhage 10/05/2022   • Age-related osteoporosis with current pathological fracture 2022   • Encounter for support and coordination of transition of care 2022   • Anemia 2022   • Ambulatory dysfunction 2022   • Constipation 2022   • Fall 2022   • Fracture of proximal end of left femur ([address] ) 2022   • Acute pain due to trauma 2022   • At risk for obstructive sleep apnea 2022   • Lymphedema 2022   • Venous stasis dermatitis of both lower extremities 2022   • Seasonal allergies 2022   • Xerosis of skin 2022   • Prediabetes 2022   • Memory impairment 2022   • Anxiety    • Opioid dependence due to Chronic back pain  2022   • Acute renal failure superimposed on stage 3a chronic kidney disease ([address] ) 2021   • A-fib ([address] ) 2021   • Mixed hyperlipidemia 2021   • Obesity 2021   • Osteoarthritis of knee 2021   • Stage 3 chronic kidney disease ([facility] ) 2021   • Chronic low back pain with sciatica 2021   • Chronic venous insufficiency 01/14/2021   • Essential hypertension 03/09/2017   • Sjogren's syndrome ([facility] ) 07/08/2014      LOS (days): 5  Geometric Mean LOS (GMLOS) (days): 2 40  Days to GMLOS:-2 9     OBJECTIVE:    Risk of Unplanned Readmission Score: 48 02         Current admission status: Inpatient       Preferred Pharmacy:   [address]  Phone: [phone] Fax: [phone]    Primary Care Provider: Davon Aldana MD    Primary Insurance: [facility]  Secondary Insurance:     ASSESSMENT:  Marcelina [employer]     [address] [facility], [facility] - Spouse   Primary Phone: [phone] (Mobile)  Home Phone: [phone]                                                                DISCHARGE DETAILS:    Discharge planning discussed with[de-identified] Luiz Campbell - daughter  Freedom of Choice: Yes  Comments - Freedom of Choice: Luiz Campbell wishes for her mom to go to [address] [place] [address] for her short-term rehabilitation  CM contacted family/caregiver?: Yes  Were Treatment Team discharge recommendations reviewed with patient/caregiver?: Yes  Did patient/caregiver verbalize understanding of patient care needs?: N/A- going to facility  Were patient/caregiver advised of the risks associated with not following Treatment Team discharge recommendations?: Yes    Contacts  Patient Contacts: Sierra Arriaza (Daughter)  Relationship to Patient[de-identified] Family  Contact Method: Phone  Phone Number: [phone]  Reason/Outcome: Discharge Planning, Continuity of Care              Other Referral/Resources/Interventions Provided:  Interventions: Short Term Rehab, Transportation  Referral Comments: CM accepted the availability of [place] via [address], insurance authorization was initiated and obtained    CM called daughter to review insurance authorization being obtained for the pt to go to [facility] today and a transporation requested time of 1300 BLS          Treatment Team Recommendation: SNF  Discharge Destination Plan[de-identified] Short Term Rehab  Transport at Discharge : S Ambulance  Dispatcher Contacted: Yes  Number/Name of Dispatcher: Lisa Brantley and Unit #): TBD  ETA of Transport (Date): 01/04/23  ETA of Transport (Time): 1300              IMM Given (Date):: 01/04/23  IMM Given to[de-identified] Family  Family notified[de-identified] CM reviewed the IMM with the daughter by phone  Daughter agrees with discharge plan and has no questions or concerns at this time  Accepting Facility Name, [facility] : [address] [place], [place]  Receiving Facility/Agency Phone Number: [phone]  Facility/Agency Fax Number: [phone] [address] notified by TT MD and RN of pt's transportation requested time  CM notified family by phone of pt's transportation time

## 2023-01-04 NOTE — ASSESSMENT & PLAN NOTE
- L1, L2 left sided transverse process fractures, present on admission  - Multimodal pain control  - DVT ppx- home eliquis (home medication for [facility] - atrial fibrillation)  - PT and OT as indicated  - Follow up with trauma clinic

## 2023-01-04 NOTE — INCIDENTAL FINDINGS
The following findings require follow up:  Radiographic finding   Finding: Mildly prominent mediastinal and bilateral hilar lymph nodes are noted similar to the study of 11/12/2021 and likely reactive in etiology   Follow up required: PCP follow up   Follow up should be done within 4 month(s)    Please notify the following clinician to assist with the follow up: These findings were discussed with the patient, who expresses understanding of unchanged enlarged lymph nodes which could be reactive or could represent malignancy  She understands the importance of PCP follow up for continued monitoring and that delay of this follow up could result in a delayed diagnosis resulting in worsening prognosis or death

## 2023-01-05 ENCOUNTER — NURSING HOME VISIT (OUTPATIENT)
Dept: GERIATRICS | Facility: OTHER | Age: 78
End: 2023-01-05

## 2023-01-05 DIAGNOSIS — F41.9 ANXIETY: ICD-10-CM

## 2023-01-05 DIAGNOSIS — S22.060D COMPRESSION FRACTURE OF T8 VERTEBRA WITH ROUTINE HEALING: Primary | ICD-10-CM

## 2023-01-05 DIAGNOSIS — I48.19 PERSISTENT ATRIAL FIBRILLATION (HCC): ICD-10-CM

## 2023-01-05 DIAGNOSIS — S32.009D CLOSED FRACTURE OF TRANSVERSE PROCESS OF LUMBAR VERTEBRA WITH ROUTINE HEALING, SUBSEQUENT ENCOUNTER: ICD-10-CM

## 2023-01-05 DIAGNOSIS — I10 ESSENTIAL HYPERTENSION: ICD-10-CM

## 2023-01-05 DIAGNOSIS — R26.2 AMBULATORY DYSFUNCTION: ICD-10-CM

## 2023-01-05 DIAGNOSIS — I50.32 CHRONIC HEART FAILURE WITH PRESERVED EJECTION FRACTION (HFPEF) (HCC): ICD-10-CM

## 2023-01-05 DIAGNOSIS — N18.31 STAGE 3A CHRONIC KIDNEY DISEASE (HCC): ICD-10-CM

## 2023-01-05 NOTE — UTILIZATION REVIEW
NOTIFICATION OF ADMISSION DISCHARGE   This is a Notification of Discharge from [address]  Please be advised that this patient has been discharge from our facility  Below you will find the admission and discharge date and time including the patient’s disposition  UTILIZATION REVIEW CONTACT:  Codey Smith MA  Utilization   Network Utilization Review Department  Phone: [phone] x carefully listen to the prompts  All voicemails are confidential   Email: [email or web address]     ADMISSION INFORMATION  PRESENTATION DATE: 12/30/2022 12:24 AM  OBERVATION ADMISSION DATE:   INPATIENT ADMISSION DATE: 12/30/22  3:29 AM   DISCHARGE DATE: 1/4/2023  1:45 PM   DISPOSITION:[facility] SNF/TCU/SNU    IMPORTANT INFORMATION:  Send all requests for admission clinical reviews, approved or denied determinations and any other requests to dedicated fax number below belonging to the campus where the patient is receiving treatment   List of dedicated fax numbers:  [address] DENIALS (Administrative/Medical Necessity) [phone]   [address] (Maternity/NICU/Pediatrics) [phone]   [facility] [phone]   [place] [address] [phone]   [address] [phone]   220 [facility] [phone] [facility] [phone]   [address] [place] [address] 119 [phone]   [facility]  [phone] [address] [facility] [phone]   [address] [facility] [address] [facility] [phone]

## 2023-01-06 NOTE — PROGRESS NOTES
[address]  [address] Acute Care [id number]    Nursing Home Admission    NAME: Riley Duffy  AGE: 69 y o  SEX: female [id number]    DATE OF ENCOUNTER: 1-5-2023    Assessment/Plan:   L1/L2 fractures  Cont back brace  Cont oxycodone 5 mg 1 tab po q4 prn pain  Pt also on tizanidine 4 mg  Verified in pdmp  PT/OT to eval/tx    11th rib fx  Cont rib fracture protocol  Cont prn oxygen  Encourage incentive spirometer    T8 vertrebra compression fx  Cont TLSO brace    S/p fall  Ambulatory dysfunction  PT/OT to eval tx    Hx of memory impairment  Pt currently A&Ox3 at this point in time  Pt on donepezil 5 mg 1 tab po QHS    CKDIII  Cr stable 0 87  Pt on torsemide 20 mg 2 tabs in am & 1 tab in afternoon    Afib  HR controlled  Cont carvedilol 3 125 mg 1 tab po BID  Cont apixaban 5 mg 1 tab po BID    Anxiety  Cont buspar 5 mg 1 tab po TID  Cont lexapro 20 mg 1 tab po daily  Cont gabapentin 300 mg 1 tab po BID    Chronic heart failure with HFpEF  Cont torsemide 20 mg 2 tab po qam & 1 tab in afternoon  Cont metolazone 2 5    HTN  Bp controlled 136/61  Cont carvedilol 3 125 mg 1 tab po BID    GERD  Cont pantoprazole 40 mg 1 tab po daily    Hx Sjogren's syndrome  F/u outpatient with rheumatology    Prediabetes  hga1c 5 9  Monitor periodic sugar    Patient’s care was coordinated with nursing facility staff  Recent vitals, labs and updated medications were reviewed on [facility] system of facility  Past Medical, surgical, social, medication and allergy history and patient’s previous records reviewed  Chief Complaint      Back pain    HPI   Patient is a 69 y o  female with PMH Afib, Anxiety, Arthritis, CHF, HTN, and Sjogren's syndrome  Pt admitted from [facility] from 1/2-1/5/2023 after sustaining a fall  Her  brought her to the emergency room  Pt seen and examined today  Pt's  at bedside   Pt complains of not having pain meds for one hour  Notified pt's RN who gave pt the medication  Pt says on oxycontin at home  Pt sees pain management doctor  Verified in PDMP that pt is on oxycodone at home  Need full records for oxycontin  Will continue oxycodone 5 mg 1 tab po q4 prn pain       Past Medical History:   Diagnosis Date   • [facility] ) 12/29/2021   • Anxiety    • Arthritis    • Back pain    • Cellulitis    • CHF (congestive heart failure) (HCC)    • Colon cancer screening 8/3/2022   • Edema of both lower extremities due to peripheral venous insufficiency    • Edema of both lower extremities due to peripheral venous insufficiency    • Encounter for screening mammogram for malignant neoplasm of breast 3/21/2022   • Erythema of lower extremity 11/12/2021   • Fibromyalgia    • Great toe pain, right 10/6/2022   • Hypertension    • Hypotension 9/17/2022   • Leukocytosis 10/6/2022   • Melena 8/20/2022   • Osteoporosis screening 8/3/2022   • Sjogren syndrome, unspecified ([facility] )        Past Surgical History:   Procedure Laterality Date   • KNEE CARTILAGE SURGERY     • [place] OPTX FEM SHFT FX W/INSJ IMED IMPLT W/WO SCREW Left 8/22/2022    Procedure: LEFT RETROGRADE IM SHAN;  Surgeon: Nickie Rivas MD;  Location: [facility] Main OR;  Service: Orthopedics   • REPLACEMENT TOTAL KNEE BILATERAL         Social History     Tobacco Use   Smoking Status Former   • Types: Cigarettes   Smokeless Tobacco Never          Family History   Problem Relation Age of Onset   • Heart disease Mother    • Cancer Father         No Known Allergies       Current Outpatient Medications:   •  acetaminophen (TYLENOL) 325 mg tablet, Take 2 tablets (650 mg total) by mouth every 6 (six) hours, Disp: , Rfl: 0  •  ammonium lactate (LAC-HYDRIN) 12 % cream, Apply topically 2 (two) times a day, Disp: 385 g, Rfl: 0  •  apixaban (Eliquis) 5 mg, Take 1 tablet (5 mg total) by mouth 2 (two) times a day Lot [id number] exp 10/2024, Disp: 56 tablet, Rfl: 0  •  busPIRone (BUSPAR) 5 mg tablet, TAKE ONE TABLET BY MOUTH THREE TIMES DAILY, Disp: 90 tablet, Rfl: 2  •  carvedilol (COREG) 3 125 mg tablet, Take 1 tablet (3 125 mg total) by mouth 2 (two) times a day with meals, Disp: 60 tablet, Rfl: 2  •  donepezil (ARICEPT) 5 mg tablet, Take 1 tablet (5 mg total) by mouth daily at bedtime, Disp: 30 tablet, Rfl: 0  •  escitalopram (LEXAPRO) 20 mg tablet, Take 1 tablet (20 mg total) by mouth daily, Disp: 30 tablet, Rfl: 1  •  gabapentin (NEURONTIN) 100 mg capsule, Take 1 capsule (100 mg total) by mouth 2 (two) times a day, Disp: , Rfl: 0  •  lidocaine (LIDODERM) 5 %, Apply 2 patches topically daily Remove & Discard patch within 12 hours or as directed by MD, Disp: , Rfl: 0  •  metolazone (ZAROXOLYN) 2 5 mg tablet, Take 1 tablet ( 2 5 mg) by mouth once a week on Thursdays, before the morning dose of torsemide, Disp: 8 tablet, Rfl: 2  •  oxyCODONE (ROXICODONE) 5 immediate release tablet, Take 1 tablet (5 mg total) by mouth every 4 (four) hours as needed for severe pain for up to 10 days Max Daily Amount: 30 mg, Disp: 20 tablet, Rfl: 0  •  pantoprazole (PROTONIX) 40 mg tablet, Take 1 tablet (40 mg total) by mouth daily, Disp: 30 tablet, Rfl: 6  •  polyethylene glycol (MIRALAX) 17 g packet, Take 17 g by mouth once for 1 dose, Disp: 17 g, Rfl: 0  •  polyethylene glycol (MIRALAX) 17 g packet, Take 17 g by mouth daily Do not start before January 5, 2023 , Disp: , Rfl: 0  •  potassium chloride (K-DUR,KLOR-CON) 20 mEq tablet, Take 3 tablets daily ( 60 meq) plus an extra 20 meq on thursdays with metolazone, Disp: 180 tablet, Rfl: 1  •  senna-docusate sodium (SENOKOT S) 8 6-50 mg per tablet, Take 2 tablets by mouth 2 (two) times a day, Disp: , Rfl: 0  •  TIZANIDINE HCL PO, Take 4 mg by mouth every 8 (eight) hours as needed (muscle spasms), Disp: , Rfl:   •  torsemide (DEMADEX) 20 mg tablet, Take 2 tablets (40 mg total) by mouth daily 40mg (2 tablets) by mouth every morning, 20mg (1 tablet) by mouth every evening, Disp: 180 tablet, Rfl: 0    Updated list was reviewed in [facility] system of facility  Vital signs were reviewed in point [facility]    Review of Systems   Musculoskeletal: Positive for back pain  Physical Exam  Constitutional:       General: She is not in acute distress  Appearance: She is not ill-appearing  HENT:      Head: Normocephalic and atraumatic  Cardiovascular:      Rate and Rhythm: Normal rate  Rhythm irregular  Heart sounds: Normal heart sounds  No murmur heard  No friction rub  Pulmonary:      Effort: No respiratory distress  Breath sounds: Normal breath sounds  No wheezing  Abdominal:      General: Bowel sounds are normal  There is no distension  Palpations: Abdomen is soft  Tenderness: There is no abdominal tenderness  Neurological:      Mental Status: She is alert and oriented to person, place, and time  Psychiatric:         Mood and Affect: Mood normal          Behavior: Behavior normal          Thought Content: Thought content normal          Judgment: Judgment normal            Diagnostic Data   Recent labs and imaging studies were reviewed  Code Status:    Full  Additional notes:     This note was electronically signed by Dr Nilo Fragoso

## 2023-01-09 ENCOUNTER — NURSING HOME VISIT (OUTPATIENT)
Dept: GERIATRICS | Facility: OTHER | Age: 78
End: 2023-01-09

## 2023-01-09 DIAGNOSIS — R26.2 AMBULATORY DYSFUNCTION: ICD-10-CM

## 2023-01-09 DIAGNOSIS — N18.31 STAGE 3A CHRONIC KIDNEY DISEASE (HCC): ICD-10-CM

## 2023-01-09 DIAGNOSIS — S22.060D COMPRESSION FRACTURE OF T8 VERTEBRA WITH ROUTINE HEALING: ICD-10-CM

## 2023-01-09 DIAGNOSIS — S22.32XD CLOSED FRACTURE OF ONE RIB OF LEFT SIDE WITH ROUTINE HEALING, SUBSEQUENT ENCOUNTER: ICD-10-CM

## 2023-01-09 DIAGNOSIS — F41.9 ANXIETY: ICD-10-CM

## 2023-01-09 DIAGNOSIS — S32.009D CLOSED FRACTURE OF TRANSVERSE PROCESS OF LUMBAR VERTEBRA WITH ROUTINE HEALING, SUBSEQUENT ENCOUNTER: Primary | ICD-10-CM

## 2023-01-09 DIAGNOSIS — I50.32 CHRONIC HEART FAILURE WITH PRESERVED EJECTION FRACTION (HFPEF) (HCC): ICD-10-CM

## 2023-01-09 DIAGNOSIS — K59.00 CONSTIPATION, UNSPECIFIED CONSTIPATION TYPE: ICD-10-CM

## 2023-01-09 DIAGNOSIS — I48.91 ATRIAL FIBRILLATION, UNSPECIFIED TYPE (HCC): ICD-10-CM

## 2023-01-09 NOTE — ASSESSMENT & PLAN NOTE
No BM documented since 1/4  Likely in setting of oxycodone use 5 mg every 4 hours  Senna S 2 tablets twice daily, daily MiraLAX  Add daily milk of mag, first dose now, add daily bisacodyl suppository as needed

## 2023-01-09 NOTE — ASSESSMENT & PLAN NOTE
Multifactorial  History of falls status post recent fall  Continue PT/OT at short-term rehab  Maintain fall precautions

## 2023-01-09 NOTE — ASSESSMENT & PLAN NOTE
Stable  Evaluated by neurosurgery, neurovascular intact  Continue TLSO brace when out of bed  Continue Eliquis  Continue pain regimen with accompanying bowel regimen  Continue PT/OT

## 2023-01-09 NOTE — ASSESSMENT & PLAN NOTE
Lab Results   Component Value Date    EGFR 64 01/04/2023    EGFR 62 01/01/2023    EGFR 58 12/31/2022    CREATININE 0 87 01/04/2023    CREATININE 0 90 01/01/2023    CREATININE 0 95 12/31/2022     Stable at 0 98  Continue to avoid nephrotoxic agents, monitor routine labs, encourage p o  intake  BMP pending 1/13

## 2023-01-09 NOTE — PROGRESS NOTES
[facility]  [address]  [phone]  [address]  Code 31 (STR)          NAME: Evelio Mcdonough  AGE: 69 y o  SEX: female     DATE OF ENCOUNTER: 1/9/2023    Assessment and Plan     1  Closed fracture of transverse process of lumbar vertebra with routine healing, subsequent encounter  Assessment & Plan:  [address] :L1, L2 left sided transverse process fractures, present on admission  Continue Eliquis, PT/OT  Follow-up trauma      2  Closed fracture of one rib of left side with routine healing, subsequent encounter  Assessment & Plan:  Stable  left 11th rib fracture  Continue PT/OT, incentive spirometer use  Lidocaine patch, oxycodone 5 mg every 4 hours, tizanidine 4 mg 3 times daily  Follow-up with trauma      3  Compression fracture of T8 vertebra with routine healing  Assessment & Plan:  Stable  Evaluated by neurosurgery, neurovascular intact  Continue TLSO brace when out of bed  Continue Eliquis  Continue pain regimen with accompanying bowel regimen  Continue PT/OT      4  Stage 3a chronic kidney disease [facility])  Assessment & Plan:  Lab Results   Component Value Date    EGFR 64 01/04/2023    EGFR 62 01/01/2023    EGFR 58 12/31/2022    CREATININE 0 87 01/04/2023    CREATININE 0 90 01/01/2023    CREATININE 0 95 12/31/2022     Stable at 0 98  Continue to avoid nephrotoxic agents, monitor routine labs, encourage p o  intake  BMP pending 1/13      5  Chronic heart failure with preserved ejection fraction (HFpEF) ([facility])  Assessment & Plan:  Wt Readings from Last 3 Encounters:   12/30/22 96 6 kg (213 lb)   11/16/22 96 3 kg (212 lb 6 4 oz)   11/15/22 101 kg (223 lb)     Stable  Weight stable 188lb  Continue carvedilol, metolazone, torsemide, potassium  Continue daily weight and low-sodium diet, monitor labs      6  Atrial fibrillation, unspecified type [facility])  Assessment & Plan:  Stable  Heart rate trends reviewed average 60s to 70s  Continue Eliquis, Coreg      7   Ambulatory dysfunction  Assessment & Plan:  Multifactorial  History of falls status post recent fall  Continue PT/OT at short-term rehab  Maintain fall precautions      8  Anxiety  Assessment & Plan:  Stable  Continue BuSpar, and Cymbalta  Psychiatry consulted while at short-term rehab  Continue supportive care      9  Constipation, unspecified constipation type  Assessment & Plan:  No BM documented since 1/4  Likely in setting of oxycodone use 5 mg every 4 hours  Senna S 2 tablets twice daily, daily MiraLAX  Add daily milk of mag, first dose now, add daily bisacodyl suppository as needed         All medications and routine orders were reviewed and updated as needed  Chief Complaint     STR follow up visit      Past Medical and Surgica History      Past Medical History:   Diagnosis Date   • A-fib ([place] [address] ) 12/29/2021   • Anxiety    • Arthritis    • Back pain    • Cellulitis    • CHF (congestive heart failure) ([place] [address] )    • Colon cancer screening 8/3/2022   • Edema of both lower extremities due to peripheral venous insufficiency    • Edema of both lower extremities due to peripheral venous insufficiency    • Encounter for screening mammogram for malignant neoplasm of breast 3/21/2022   • Erythema of lower extremity 11/12/2021   • Fibromyalgia    • Great toe pain, right 10/6/2022   • Hypertension    • Hypotension 9/17/2022   • Leukocytosis 10/6/2022   • Melena 8/20/2022   • Osteoporosis screening 8/3/2022   • Sjogren syndrome, unspecified ([place] [address] )      Past Surgical History:   Procedure Laterality Date   • KNEE CARTILAGE SURGERY     • NE OPTX FEM SHFT FX W/INSJ IMED IMPLT W/WO SCREW Left 8/22/2022    Procedure: LEFT RETROGRADE IM SHAN;  Surgeon: Kaitlin Gutiérrez MD;  Location: [facility];  Service: Orthopedics   • REPLACEMENT TOTAL KNEE BILATERAL       No Known Allergies       History of Present Illness     Francisca Barriga is a 76-year-old female admitted to [facility] for short term rehab   PMH including but not limited to HTN, A  fib, CHF, CKD, anemia, seen in collaboration with nursing for medical mgmt and STR follow up  Hospital course:  She was hospitalized from 12/30-1/4 with chief complaint of back pain status post fall trauma work-up revealingsingle left rib fracture, 2 left-sided TP fractures, and a T8 compression fracture  Rehab course:  Continues with supportive care at short term rehab including PT/OT with continued use of TLSO brace when out of bed  She is able to ambulate with walker  Continue incentive spirometer and setting of rib fracture  Continues on pain regimen, she does report increased left lower back pain today  Due to have BM, denies any abdominal pain  Per nursing notes, patient refused Gavilax yesterday  Eating approximately 2 meals per day, %  Reports some difficulty sleeping, she also reports poor sleep at baseline  Reports "good" mood  Denies any SOB, fever, fatigue, chills  The patient's allergies, past medical, surgical, social and family history were reviewed and unchanged  Review of Systems     REVIEW OF SYSTEMS:  Per history of present illness, all other systems reviewed and negative  Objective     Vitals: /68, temp 97 6, HR 64, RR 18, O2 97%  Weight 188lb      Physical Exam  Constitutional:       General: She is not in acute distress  Appearance: Normal appearance  She is not ill-appearing  HENT:      Head: Normocephalic and atraumatic  Right Ear: External ear normal       Left Ear: External ear normal       Nose: Nose normal  No congestion or rhinorrhea  Mouth/Throat:      Mouth: Mucous membranes are moist       Pharynx: No oropharyngeal exudate or posterior oropharyngeal erythema  Eyes:      General:         Right eye: No discharge  Left eye: No discharge  Extraocular Movements: Extraocular movements intact  Conjunctiva/sclera: Conjunctivae normal    Cardiovascular:      Rate and Rhythm: Normal rate  Rhythm irregular  Pulses: Normal pulses  Heart sounds: Normal heart sounds  Pulmonary:      Effort: Pulmonary effort is normal  No respiratory distress  Breath sounds: Normal breath sounds  No wheezing or rhonchi  Comments: CTA diminished on RA  Abdominal:      General: Bowel sounds are normal  There is no distension  Palpations: Abdomen is soft  Tenderness: There is no abdominal tenderness  There is no guarding  Musculoskeletal:         General: No tenderness  Cervical back: Normal range of motion and neck supple  No tenderness  Right lower leg: No edema  Left lower leg: No edema  Skin:     General: Skin is warm and dry  Findings: No bruising, erythema or rash  Neurological:      General: No focal deficit present  Mental Status: She is alert  Mental status is at baseline  Sensory: No sensory deficit  Motor: Weakness present  Gait: Gait abnormal    Psychiatric:         Mood and Affect: Mood normal          Behavior: Behavior normal          Pertinent Laboratory/Diagnostic Studies:    1/6 BMP, CBC: BUN 18, creatinine 0 98, sodium 141, potassium 4 2, EGFR 60, hemoglobin 10 2, hematocrit 32 3, WBC 7 9    Current Medications   Medications reviewed and updated see [facility] for details  Please note:  Voice-recognition software may have been used in the preparation of this document  Occasional wrong word or "sound-alike" substitutions may have occurred due to the inherent limitations of voice recognition software  Interpretation should be guided by context           SHAHIDA Whaley  1/9/2023  Patient: Dayan

## 2023-01-09 NOTE — ASSESSMENT & PLAN NOTE
Wt Readings from Last 3 Encounters:   12/30/22 96 6 kg (213 lb)   11/16/22 96 3 kg (212 lb 6 4 oz)   11/15/22 101 kg (223 lb)     Stable  Weight stable 188lb  Continue carvedilol, metolazone, torsemide, potassium  Continue daily weight and low-sodium diet, monitor labs

## 2023-01-09 NOTE — ASSESSMENT & PLAN NOTE
Stable  Continue BuSpar, and Cymbalta  Psychiatry consulted while at short-term rehab  Continue supportive care

## 2023-01-09 NOTE — ASSESSMENT & PLAN NOTE
Stable  left 11th rib fracture  Continue PT/OT, incentive spirometer use  Lidocaine patch, oxycodone 5 mg every 4 hours, tizanidine 4 mg 3 times daily  Follow-up with trauma

## 2023-01-09 NOTE — ASSESSMENT & PLAN NOTE
[facility] :L1, L2 left sided transverse process fractures, present on admission  Continue Master, PT/OT  Follow-up trauma

## 2023-01-12 ENCOUNTER — NURSING HOME VISIT (OUTPATIENT)
Dept: GERIATRICS | Facility: OTHER | Age: 78
End: 2023-01-12

## 2023-01-12 ENCOUNTER — TELEPHONE (OUTPATIENT)
Dept: INTERNAL MEDICINE CLINIC | Facility: CLINIC | Age: 78
End: 2023-01-12

## 2023-01-12 DIAGNOSIS — S22.32XD CLOSED FRACTURE OF ONE RIB OF LEFT SIDE WITH ROUTINE HEALING, SUBSEQUENT ENCOUNTER: ICD-10-CM

## 2023-01-12 DIAGNOSIS — S22.060D COMPRESSION FRACTURE OF T8 VERTEBRA WITH ROUTINE HEALING: ICD-10-CM

## 2023-01-12 DIAGNOSIS — S32.009D CLOSED FRACTURE OF TRANSVERSE PROCESS OF LUMBAR VERTEBRA WITH ROUTINE HEALING, SUBSEQUENT ENCOUNTER: Primary | ICD-10-CM

## 2023-01-12 DIAGNOSIS — N18.31 ANEMIA IN STAGE 3A CHRONIC KIDNEY DISEASE (HCC): ICD-10-CM

## 2023-01-12 DIAGNOSIS — D63.1 ANEMIA IN STAGE 3A CHRONIC KIDNEY DISEASE (HCC): ICD-10-CM

## 2023-01-12 DIAGNOSIS — S22.069A T8 VERTEBRAL FRACTURE (HCC): ICD-10-CM

## 2023-01-12 RX ORDER — TIZANIDINE 4 MG/1
4 TABLET ORAL EVERY 8 HOURS PRN
Qty: 15 TABLET | Refills: 0 | Status: SHIPPED | OUTPATIENT
Start: 2023-01-12 | End: 2023-01-18 | Stop reason: SDUPTHER

## 2023-01-12 RX ORDER — OXYCODONE HYDROCHLORIDE 5 MG/1
5 TABLET ORAL EVERY 4 HOURS PRN
Qty: 20 TABLET | Refills: 0 | Status: SHIPPED | OUTPATIENT
Start: 2023-01-12 | End: 2023-01-17

## 2023-01-12 NOTE — TELEPHONE ENCOUNTER
Received call from [facility] in regards to Sunil Lesser recent discharge from their care  Called Marcos to schedule a TCM appointment with the  [facility] office  Spoke with Joshua Hodge who stated an appointment was made for 01/19/2023  Looked into patient's chart to see no appointment with the  [facility] office  Informed Anette of no appointment  Appointment made for 01/17/2023

## 2023-01-12 NOTE — ASSESSMENT & PLAN NOTE
[facility] :L1, L2 left sided transverse process fractures, present on admission  Continue Master, PT/OT  Follow-up trauma

## 2023-01-12 NOTE — PROGRESS NOTES
[facility]  [address]  [phone]  DISCHARGE SUMMARY  [place]  Code 31 (STR)          NAME: Joanna Preston  AGE: 69 y o  SEX: female     DATE OF ADMISSION:    1/4/2023  DATE OF DISCHARGE: 1/12/2023     DISCHARGE DISPOSITION: Stable for discharge to home with family support and [facility] health PT/OT/SN services          Reason for Admission: Patient was admitted to [address] [place] [address] for rehabilitation after hospitalization fortatus post fall trauma work-up revealingsingle left rib fracture, 2 left-sided TP fractures, and a T8 compression fracture  Ashwini Burow DATE OF ENCOUNTER: 1/12/2023    Assessment and Plan     1  Closed fracture of transverse process of lumbar vertebra with routine healing, subsequent encounter  Assessment & Plan:  [facility] :L1, L2 left sided transverse process fractures, present on admission  Continue Eliquis, PT/OT  Follow-up trauma    Orders:  -     tiZANidine (ZANAFLEX) 4 mg tablet; Take 1 tablet (4 mg total) by mouth every 8 (eight) hours as needed (muscle spasms) for up to 5 days    2  Compression fracture of T8 vertebra with routine healing  Assessment & Plan:  Stable  Evaluated by neurosurgery, neurovascular intact  Continue TLSO brace when out of bed  Continue Eliquis  Continue pain regimen with accompanying bowel regimen  Continue PT/OT       Orders:  -     tiZANidine (ZANAFLEX) 4 mg tablet; Take 1 tablet (4 mg total) by mouth every 8 (eight) hours as needed (muscle spasms) for up to 5 days    3  Closed fracture of one rib of left side with routine healing, subsequent encounter  Assessment & Plan:  Stable  left 11th rib fracture  Continue PT/OT, incentive spirometer use  Lidocaine patch, oxycodone 5 mg every 4 hours, tizanidine 4 mg 3 times daily  Follow-up with trauma      4   Anemia in stage 3a chronic kidney disease [facility])  Assessment & Plan:  Lab Results   Component Value Date    EGFR 64 01/04/2023    EGFR 62 01/01/2023    EGFR 58 12/31/2022    CREATININE 0 87 01/04/2023    CREATININE 0 90 01/01/2023    CREATININE 0 95 12/31/2022     Stable at 0 98  Continue to avoid nephrotoxic agents, monitor routine labs, encourage p o  intake  BMP pending 1/13      5  T8 vertebral fracture (HCC)  -     oxyCODONE (ROXICODONE) 5 immediate release tablet; Take 1 tablet (5 mg total) by mouth every 4 (four) hours as needed for severe pain for up to 5 days Max Daily Amount: 30 mg     5  Chronic heart failure with preserved ejection fraction (HFpEF) (HCC)  Assessment & Plan:  Wt Readings from Last 3 Encounters:  12/30/22 96 6 kg (213 lb)  11/16/22 96 3 kg (212 lb 6 4 oz)  11/15/22 101 kg (223 lb)     Stable  Weight stable   Continue carvedilol, metolazone, torsemide, potassium  Continue daily weight and low-sodium diet, monitor labs        6  Atrial fibrillation, unspecified type [facility])  Assessment & Plan:  Stable  Heart rate trends reviewed average 60s to 70s  Continue Eliquis, Coreg        7  Ambulatory dysfunction  Assessment & Plan:  Multifactorial  History of falls status post recent fall  Continue PT/OT at short-term rehab  Maintain fall precautions        8  Anxiety  Assessment & Plan:  Stable  Continue BuSpar, and Cymbalta  Psychiatry consulted while at short-term rehab  Continue supportive care        9  Constipation, unspecified constipation type  Assessment & Plan:  No BM documented since 1/4  Likely in setting of oxycodone use 5 mg every 4 hours  Senna S 2 tablets twice daily, daily MiraLAX  Add daily milk of mag, first dose now, add daily bisacodyl suppository as needed           All medications and routine orders were reviewed and updated as needed      Chief Complaint     STR follow up visit      Past Medical and Surgica History      Past Medical History:   Diagnosis Date   • A-fib ([facility] ) 12/29/2021   • Anxiety    • Arthritis    • Back pain    • Cellulitis    • CHF (congestive heart failure) ([facility] )    • Colon cancer screening 8/3/2022   • Edema of both lower extremities due to peripheral venous insufficiency    • Edema of both lower extremities due to peripheral venous insufficiency    • Encounter for screening mammogram for malignant neoplasm of breast 3/21/2022   • Erythema of lower extremity 11/12/2021   • Fibromyalgia    • Great toe pain, right 10/6/2022   • Hypertension    • Hypotension 9/17/2022   • Leukocytosis 10/6/2022   • Melena 8/20/2022   • Osteoporosis screening 8/3/2022   • Sjogren syndrome, unspecified ([facility] )      Past Surgical History:   Procedure Laterality Date   • KNEE CARTILAGE SURGERY     • [place] OPTX FEM SHFT FX W/INSJ IMED IMPLT W/WO SCREW Left 8/22/2022    Procedure: LEFT RETROGRADE IM SHAN;  Surgeon: Sridevi Correa MD;  Location: [facility];  Service: Orthopedics   • REPLACEMENT TOTAL KNEE BILATERAL       No Known Allergies       History of Present Illness     Angelica Patel is a 50-year-old female admitted to [address] [place] [address] for short term rehab  PMH including but not limited to HTN, A  fib, CHF, CKD, anemia, seen in collaboration with nursing for medical mgmt and STR follow up  Hospital course:  She was hospitalized from 12/30-1/4 with chief complaint of back pain status post fall trauma work-up revealingsingle left rib fracture, 2 left-sided TP fractures, and a T8 compression fracture  Rehab course:  Continues with supportive care at short term rehab including PT/OT with continued use of TLSO brace when out of bed  She is able to ambulate with walker, she was seen transfering from wheelchair to bed upon today's visit, tolerated well  Continue incentive spirometer and setting of rib fracture  Continues on pain regimen for back pain  Having BMs  Continues with some difficulty sleeping, she also reports poor sleep at baseline  Mood and appetite stable  Denies any SOB, fever, fatigue, chills  Discharge  Coordinated by     Plan for today 1/12, discharge home via private vehicle with [facility], PT, OT, SN  The patient's allergies, past medical, surgical, social and family history were reviewed and unchanged  Review of Systems     REVIEW OF SYSTEMS:  Per history of present illness, all other systems reviewed and negative  Objective     /62, temp 97 6, HR 78, RR 18, O2 97%  Weight 183  6       Physical Exam  Constitutional:       General: She is not in acute distress  Appearance: Normal appearance  She is not ill-appearing  HENT:      Head: Normocephalic and atraumatic  Right Ear: External ear normal       Left Ear: External ear normal       Mouth/Throat:      Mouth: Mucous membranes are moist       Pharynx: No oropharyngeal exudate or posterior oropharyngeal erythema  Eyes:      General:         Right eye: No discharge  Left eye: No discharge  Extraocular Movements: Extraocular movements intact  Conjunctiva/sclera: Conjunctivae normal    Cardiovascular:      Rate and Rhythm: Normal rate  Rhythm irregular  Pulses: Normal pulses  Heart sounds: Normal heart sounds  Pulmonary:      Effort: Pulmonary effort is normal  No respiratory distress  Breath sounds: Normal breath sounds  No wheezing or rhonchi  Comments: CTA diminished on RA  Abdominal:      General: Bowel sounds are normal  There is no distension  Palpations: Abdomen is soft  Tenderness: There is no abdominal tenderness  There is no guarding  Musculoskeletal:         General: No tenderness  Cervical back: Normal range of motion and neck supple  No tenderness  Right lower leg: Edema present  Left lower leg: Edema present  Comments: Non-pitting   Skin:     General: Skin is warm and dry  Findings: No bruising, erythema or rash  Neurological:      General: No focal deficit present  Mental Status: She is alert  Mental status is at baseline  Sensory: No sensory deficit  Motor: Weakness present        Gait: Gait abnormal    Psychiatric: Mood and Affect: Mood normal          Behavior: Behavior normal      Follow-up Recommendations:    • Outpatient Follow up with PCP in the next 2 weeks  • Home Health PT/OT/SN services        Pertinent Laboratory/Diagnostic Studies:    1/6 BMP, CBC: BUN 18, creatinine 0 98, sodium 141, potassium 4 2, EGFR 60, hemoglobin 10 2, hematocrit 32 3, WBC 7 9    Discharge Medications: See discharge medication list which was reviewed and signed  Current Outpatient Medications on File Prior to Visit   Medication Sig   • acetaminophen (TYLENOL) 325 mg tablet Take 2 tablets (650 mg total) by mouth every 6 (six) hours   • ammonium lactate (LAC-HYDRIN) 12 % cream Apply topically 2 (two) times a day   • apixaban (Eliquis) 5 mg Take 1 tablet (5 mg total) by mouth 2 (two) times a day Lot [id number] exp 10/2024   • busPIRone (BUSPAR) 5 mg tablet TAKE ONE TABLET BY MOUTH THREE TIMES DAILY   • carvedilol (COREG) 3 125 mg tablet Take 1 tablet (3 125 mg total) by mouth 2 (two) times a day with meals   • donepezil (ARICEPT) 5 mg tablet Take 1 tablet (5 mg total) by mouth daily at bedtime   • escitalopram (LEXAPRO) 20 mg tablet Take 1 tablet (20 mg total) by mouth daily   • gabapentin (NEURONTIN) 100 mg capsule Take 1 capsule (100 mg total) by mouth 2 (two) times a day   • lidocaine (LIDODERM) 5 % Apply 2 patches topically daily Remove & Discard patch within 12 hours or as directed by MD   • metolazone (ZAROXOLYN) 2 5 mg tablet Take 1 tablet ( 2 5 mg) by mouth once a week on Thursdays, before the morning dose of torsemide   • pantoprazole (PROTONIX) 40 mg tablet Take 1 tablet (40 mg total) by mouth daily   • polyethylene glycol (MIRALAX) 17 g packet Take 17 g by mouth once for 1 dose   • polyethylene glycol (MIRALAX) 17 g packet Take 17 g by mouth daily Do not start before January 5, 2023     • potassium chloride (K-DUR,KLOR-CON) 20 mEq tablet Take 3 tablets daily ( 60 meq) plus an extra 20 meq on thursdays with metolazone   • senna-docusate sodium (SENOKOT S) 8 6-50 mg per tablet Take 2 tablets by mouth 2 (two) times a day   • torsemide (DEMADEX) 20 mg tablet Take 2 tablets (40 mg total) by mouth daily 40mg (2 tablets) by mouth every morning, 20mg (1 tablet) by mouth every evening   • [DISCONTINUED] bisacodyl (DULCOLAX) 10 mg suppository Insert 1 suppository (10 mg total) into the rectum daily as needed for constipation (Patient not taking: Reported on 9/19/2022)   • [DISCONTINUED] oxyCODONE (ROXICODONE) 5 immediate release tablet Take 1 tablet (5 mg total) by mouth every 4 (four) hours as needed for severe pain for up to 10 days Max Daily Amount: 30 mg   • [DISCONTINUED] TIZANIDINE HCL PO Take 4 mg by mouth every 8 (eight) hours as needed (muscle spasms)     No current facility-administered medications on file prior to visit  Current Medications   Medications reviewed and updated see facility [facility] for details  Discussion with patient/family and further instructions:  -Fall precautions  -Aspiration precautions  -Bleeding precautions  -Monitor for signs/symptoms of infection  -Medication list was reviewed and updated        Status at time of discharge: Stable      Billing based on time  Time spent on unit, 40 minutes  Time spent counseling pt on debility/condition, 30 minutes  Please note:  Voice-recognition software may have been used in the preparation of this document  Occasional wrong word or "sound-alike" substitutions may have occurred due to the inherent limitations of voice recognition software  Interpretation should be guided by context           SHAHIDA Cortes  1/12/2023 1:55  Patient: Haley Hart

## 2023-01-12 NOTE — LETTER
January 12, 2023     Estill Krabbe, MD  [address] [facility]    Patient: Sonia Paris   YOB: 1945   Date of Visit: 1/12/2023       Dear Dr Carlos Manuel Lawler:    I had the pleasure of seeing Sergio Steinberg while at short term rehab  Below are my notes for discharge  If you have questions, please do not hesitate to call me  I look forward to following your patient along with you  Sincerely,        SHAHIDA Chowdary        CC: No Recipients  [address] [facility]  1/12/2023  3:42 PM  Incomplete  [place]  [address]  [phone]  DISCHARGE SUMMARY  [place]  Code 32 (STR)          NAME: Linda Rodríguez  AGE: 69 y o  SEX: female     DATE OF ADMISSION:    1/4/2023  DATE OF DISCHARGE: 1/12/2023     DISCHARGE DISPOSITION: Stable for discharge to home with family support and [facility] health PT/OT/SN services          Reason for Admission: Patient was admitted to [address] [place] [address] for rehabilitation after hospitalization fortatus post fall trauma work-up revealingsingle left rib fracture, 2 left-sided TP fractures, and a T8 compression fracture  Joelleshaunna Kyle DATE OF ENCOUNTER: 1/12/2023    Assessment and Plan     1  Closed fracture of transverse process of lumbar vertebra with routine healing, subsequent encounter  Assessment & Plan:  [place] [address] :L1, L2 left sided transverse process fractures, present on admission  Continue Eliquis, PT/OT  Follow-up trauma    Orders:  -     tiZANidine (ZANAFLEX) 4 mg tablet; Take 1 tablet (4 mg total) by mouth every 8 (eight) hours as needed (muscle spasms) for up to 5 days    2  Compression fracture of T8 vertebra with routine healing  Assessment & Plan:  Stable  Evaluated by neurosurgery, neurovascular intact  Continue TLSO brace when out of bed  Continue Eliquis  Continue pain regimen with accompanying bowel regimen  Continue PT/OT       Orders:  -     tiZANidine (ZANAFLEX) 4 mg tablet;  Take 1 tablet (4 mg total) by mouth every 8 (eight) hours as needed (muscle spasms) for up to 5 days    3  Closed fracture of one rib of left side with routine healing, subsequent encounter  Assessment & Plan:  Stable  left 11th rib fracture  Continue PT/OT, incentive spirometer use  Lidocaine patch, oxycodone 5 mg every 4 hours, tizanidine 4 mg 3 times daily  Follow-up with trauma      4  Anemia in stage 3a chronic kidney disease [facility])  Assessment & Plan:  Lab Results   Component Value Date    EGFR 64 01/04/2023    EGFR 62 01/01/2023    EGFR 58 12/31/2022    CREATININE 0 87 01/04/2023    CREATININE 0 90 01/01/2023    CREATININE 0 95 12/31/2022     Stable at 0 98  Continue to avoid nephrotoxic agents, monitor routine labs, encourage p o  intake  BMP pending 1/13      5  T8 vertebral fracture ([facility])  -     oxyCODONE (ROXICODONE) 5 immediate release tablet; Take 1 tablet (5 mg total) by mouth every 4 (four) hours as needed for severe pain for up to 5 days Max Daily Amount: 30 mg     5  Chronic heart failure with preserved ejection fraction (HFpEF) ([facility])  Assessment & Plan:  Wt Readings from Last 3 Encounters:  12/30/22 96 6 kg (213 lb)  11/16/22 96 3 kg (212 lb 6 4 oz)  11/15/22 101 kg (223 lb)     Stable  Weight stable   Continue carvedilol, metolazone, torsemide, potassium  Continue daily weight and low-sodium diet, monitor labs        6  Atrial fibrillation, unspecified type [facility])  Assessment & Plan:  Stable  Heart rate trends reviewed average 60s to 70s  Continue Eliquis, Coreg        7  Ambulatory dysfunction  Assessment & Plan:  Multifactorial  History of falls status post recent fall  Continue PT/OT at short-term rehab  Maintain fall precautions        8  Anxiety  Assessment & Plan:  Stable  Continue BuSpar, and Cymbalta  Psychiatry consulted while at short-term rehab  Continue supportive care        9   Constipation, unspecified constipation type  Assessment & Plan:  No BM documented since 1/4  Likely in setting of oxycodone use 5 mg every 4 hours  Senna S 2 tablets twice daily, daily MiraLAX  Add daily milk of mag, first dose now, add daily bisacodyl suppository as needed           All medications and routine orders were reviewed and updated as needed  Chief Complaint     STR follow up visit      Past Medical and Surgica History      Past Medical History:   Diagnosis Date   • A-fib ([place] [address] ) 12/29/2021   • Anxiety    • Arthritis    • Back pain    • Cellulitis    • CHF (congestive heart failure) ([place] [address] )    • Colon cancer screening 8/3/2022   • Edema of both lower extremities due to peripheral venous insufficiency    • Edema of both lower extremities due to peripheral venous insufficiency    • Encounter for screening mammogram for malignant neoplasm of breast 3/21/2022   • Erythema of lower extremity 11/12/2021   • Fibromyalgia    • Great toe pain, right 10/6/2022   • Hypertension    • Hypotension 9/17/2022   • Leukocytosis 10/6/2022   • Melena 8/20/2022   • Osteoporosis screening 8/3/2022   • Sjogren syndrome, unspecified ([place] [address] )      Past Surgical History:   Procedure Laterality Date   • KNEE CARTILAGE SURGERY     • [place] OPTX FEM SHFT FX W/INSJ IMED IMPLT W/WO SCREW Left 8/22/2022    Procedure: LEFT RETROGRADE IM SHAN;  Surgeon: Halima Aquino MD;  Location: [facility];  Service: Orthopedics   • REPLACEMENT TOTAL KNEE BILATERAL       No Known Allergies       History of Present Illness     Sonia Paris is a 67-year-old female admitted to [address] [place] [address] for short term rehab  PMH including but not limited to HTN, A  fib, CHF, CKD, anemia, seen in collaboration with nursing for medical mgmt and STR follow up  Hospital course:  She was hospitalized from 12/30-1/4 with chief complaint of back pain status post fall trauma work-up revealingsingle left rib fracture, 2 left-sided TP fractures, and a T8 compression fracture       Rehab course:  Continues with supportive care at short term rehab including PT/OT with continued use of TLSO brace when out of bed  She is able to ambulate with walker, she was seen transfering from wheelchair to bed upon today's visit, tolerated well  Continue incentive spirometer and setting of rib fracture  Continues on pain regimen for back pain  Having BMs  Continues with some difficulty sleeping, she also reports poor sleep at baseline  Mood and appetite stable  Denies any SOB, fever, fatigue, chills  Discharge  Coordinated by   Plan for today 1/12, discharge home via private vehicle with [facility], PT, OT, SN  The patient's allergies, past medical, surgical, social and family history were reviewed and unchanged  Review of Systems     REVIEW OF SYSTEMS:  Per history of present illness, all other systems reviewed and negative  Objective     /62, temp 97 6, HR 78, RR 18, O2 97%  Weight 183  6       Physical Exam  Constitutional:       General: She is not in acute distress  Appearance: Normal appearance  She is not ill-appearing  HENT:      Head: Normocephalic and atraumatic  Right Ear: External ear normal       Left Ear: External ear normal       Mouth/Throat:      Mouth: Mucous membranes are moist       Pharynx: No oropharyngeal exudate or posterior oropharyngeal erythema  Eyes:      General:         Right eye: No discharge  Left eye: No discharge  Extraocular Movements: Extraocular movements intact  Conjunctiva/sclera: Conjunctivae normal    Cardiovascular:      Rate and Rhythm: Normal rate  Rhythm irregular  Pulses: Normal pulses  Heart sounds: Normal heart sounds  Pulmonary:      Effort: Pulmonary effort is normal  No respiratory distress  Breath sounds: Normal breath sounds  No wheezing or rhonchi  Comments: CTA diminished on RA  Abdominal:      General: Bowel sounds are normal  There is no distension  Palpations: Abdomen is soft  Tenderness: There is no abdominal tenderness  There is no guarding  Musculoskeletal:         General: No tenderness  Cervical back: Normal range of motion and neck supple  No tenderness  Right lower leg: Edema present  Left lower leg: Edema present  Comments: Non-pitting   Skin:     General: Skin is warm and dry  Findings: No bruising, erythema or rash  Neurological:      General: No focal deficit present  Mental Status: She is alert  Mental status is at baseline  Sensory: No sensory deficit  Motor: Weakness present        Gait: Gait abnormal    Psychiatric:         Mood and Affect: Mood normal          Behavior: Behavior normal      Follow-up Recommendations:    • Outpatient Follow up with PCP in the next 2 weeks  • Home Health PT/OT/SN services        Pertinent Laboratory/Diagnostic Studies:    1/6 BMP, CBC: BUN 18, creatinine 0 98, sodium 141, potassium 4 2, EGFR 60, hemoglobin 10 2, hematocrit 32 3, WBC 7 9    Discharge Medications: See discharge medication list which was reviewed and signed  Current Outpatient Medications on File Prior to Visit   Medication Sig   • acetaminophen (TYLENOL) 325 mg tablet Take 2 tablets (650 mg total) by mouth every 6 (six) hours   • ammonium lactate (LAC-HYDRIN) 12 % cream Apply topically 2 (two) times a day   • apixaban (Eliquis) 5 mg Take 1 tablet (5 mg total) by mouth 2 (two) times a day Lot [id number] exp 10/2024   • busPIRone (BUSPAR) 5 mg tablet TAKE ONE TABLET BY MOUTH THREE TIMES DAILY   • carvedilol (COREG) 3 125 mg tablet Take 1 tablet (3 125 mg total) by mouth 2 (two) times a day with meals   • donepezil (ARICEPT) 5 mg tablet Take 1 tablet (5 mg total) by mouth daily at bedtime   • escitalopram (LEXAPRO) 20 mg tablet Take 1 tablet (20 mg total) by mouth daily   • gabapentin (NEURONTIN) 100 mg capsule Take 1 capsule (100 mg total) by mouth 2 (two) times a day   • lidocaine (LIDODERM) 5 % Apply 2 patches topically daily Remove & Discard patch within 12 hours or as directed by MD   • metolazone (ZAROXOLYN) 2 5 mg tablet Take 1 tablet ( 2 5 mg) by mouth once a week on Thursdays, before the morning dose of torsemide   • pantoprazole (PROTONIX) 40 mg tablet Take 1 tablet (40 mg total) by mouth daily   • polyethylene glycol (MIRALAX) 17 g packet Take 17 g by mouth once for 1 dose   • polyethylene glycol (MIRALAX) 17 g packet Take 17 g by mouth daily Do not start before January 5, 2023  • potassium chloride (K-DUR,KLOR-CON) 20 mEq tablet Take 3 tablets daily ( 60 meq) plus an extra 20 meq on thursdays with metolazone   • senna-docusate sodium (SENOKOT S) 8 6-50 mg per tablet Take 2 tablets by mouth 2 (two) times a day   • torsemide (DEMADEX) 20 mg tablet Take 2 tablets (40 mg total) by mouth daily 40mg (2 tablets) by mouth every morning, 20mg (1 tablet) by mouth every evening   • [DISCONTINUED] bisacodyl (DULCOLAX) 10 mg suppository Insert 1 suppository (10 mg total) into the rectum daily as needed for constipation (Patient not taking: Reported on 9/19/2022)   • [DISCONTINUED] oxyCODONE (ROXICODONE) 5 immediate release tablet Take 1 tablet (5 mg total) by mouth every 4 (four) hours as needed for severe pain for up to 10 days Max Daily Amount: 30 mg   • [DISCONTINUED] TIZANIDINE HCL PO Take 4 mg by mouth every 8 (eight) hours as needed (muscle spasms)     No current facility-administered medications on file prior to visit  Current Medications   Medications reviewed and updated see facility [facility] for details  Discussion with patient/family and further instructions:  -Fall precautions  -Aspiration precautions  -Bleeding precautions  -Monitor for signs/symptoms of infection  -Medication list was reviewed and updated        Status at time of discharge: Stable      Billing based on time  Time spent on unit, 40 minutes  Time spent counseling pt on debility/condition, 30 minutes  Please note:  Voice-recognition software may have been used in the preparation of this document    Occasional wrong word or "sound-alike" substitutions may have occurred due to the inherent limitations of voice recognition software  Interpretation should be guided by context  SHAHIDA Tadeo  1/12/2023 1:55  Patient: Dar PayanSHAHIDA  1/12/2023  1:56 PM  Sign when Signing Visit  [facility]  [address]  [phone]  [place]  Code 32 (STR)          NAME: Art Dominguez  AGE: 69 y o  SEX: female     DATE OF ENCOUNTER: 1/12/2023    Assessment and Plan     1  Closed fracture of transverse process of lumbar vertebra with routine healing, subsequent encounter  Assessment & Plan:  [address] :L1, L2 left sided transverse process fractures, present on admission  Continue Eliquis, PT/OT  Follow-up trauma      2  Compression fracture of T8 vertebra with routine healing  Assessment & Plan:  Stable  Evaluated by neurosurgery, neurovascular intact  Continue TLSO brace when out of bed  Continue Eliquis  Continue pain regimen with accompanying bowel regimen  Continue PT/OT         3  Closed fracture of one rib of left side with routine healing, subsequent encounter  Assessment & Plan:  Stable  left 11th rib fracture  Continue PT/OT, incentive spirometer use  Lidocaine patch, oxycodone 5 mg every 4 hours, tizanidine 4 mg 3 times daily  Follow-up with trauma      4  Anemia in stage 3a chronic kidney disease [facility])  Assessment & Plan:  Lab Results   Component Value Date    EGFR 64 01/04/2023    EGFR 62 01/01/2023    EGFR 58 12/31/2022    CREATININE 0 87 01/04/2023    CREATININE 0 90 01/01/2023    CREATININE 0 95 12/31/2022     Stable at 0 98  Continue to avoid nephrotoxic agents, monitor routine labs, encourage p o  intake  BMP pending 1/13       5   Chronic heart failure with preserved ejection fraction (HFpEF) ([facility])  Assessment & Plan:  Wt Readings from Last 3 Encounters:  12/30/22 96 6 kg (213 lb)  11/16/22 96 3 kg (212 lb 6 4 oz)  11/15/22 101 kg (223 lb)     Stable  Weight stable   Continue carvedilol, metolazone, torsemide, potassium  Continue daily weight and low-sodium diet, monitor labs        6  Atrial fibrillation, unspecified type [facility])  Assessment & Plan:  Stable  Heart rate trends reviewed average 60s to 70s  Continue Eliquis, Coreg        7  Ambulatory dysfunction  Assessment & Plan:  Multifactorial  History of falls status post recent fall  Continue PT/OT at short-term rehab  Maintain fall precautions        8  Anxiety  Assessment & Plan:  Stable  Continue BuSpar, and Cymbalta  Psychiatry consulted while at short-term rehab  Continue supportive care        9  Constipation, unspecified constipation type  Assessment & Plan:  No BM documented since 1/4  Likely in setting of oxycodone use 5 mg every 4 hours  Senna S 2 tablets twice daily, daily MiraLAX  Add daily milk of mag, first dose now, add daily bisacodyl suppository as needed           All medications and routine orders were reviewed and updated as needed      Chief Complaint     STR follow up visit      Past Medical and Surgica History      Past Medical History:   Diagnosis Date   • A-fib ([place] [address] ) 12/29/2021   • Anxiety    • Arthritis    • Back pain    • Cellulitis    • CHF (congestive heart failure) ([facility] )    • Colon cancer screening 8/3/2022   • Edema of both lower extremities due to peripheral venous insufficiency    • Edema of both lower extremities due to peripheral venous insufficiency    • Encounter for screening mammogram for malignant neoplasm of breast 3/21/2022   • Erythema of lower extremity 11/12/2021   • Fibromyalgia    • Great toe pain, right 10/6/2022   • Hypertension    • Hypotension 9/17/2022   • Leukocytosis 10/6/2022   • Melena 8/20/2022   • Osteoporosis screening 8/3/2022   • Sjogren syndrome, unspecified ([facility] )      Past Surgical History:   Procedure Laterality Date   • KNEE CARTILAGE SURGERY     • [place] OPTX FEM SHFT FX W/INSJ IMED IMPLT W/WO SCREW Left 8/22/2022    Procedure: LEFT RETROGRADE IM SHAN;  Surgeon: Harriet Ramirez MD;  Location: [facility];  Service: Orthopedics   • REPLACEMENT TOTAL KNEE BILATERAL       No Known Allergies       History of Present Illness     Elke Gilmoer is a 80-year-old female admitted to [address] [place] [address] for short term rehab  PMH including but not limited to HTN, A  fib, CHF, CKD, anemia, seen in collaboration with nursing for medical mgmt and STR follow up  Hospital course:  She was hospitalized from 12/30-1/4 with chief complaint of back pain status post fall trauma work-up revealingsingle left rib fracture, 2 left-sided TP fractures, and a T8 compression fracture  Rehab course:  Continues with supportive care at short term rehab including PT/OT with continued use of TLSO brace when out of bed  She is able to ambulate with walker, she was seen transfering from wheelchair to bed upon today's visit, tolerated well  Continue incentive spirometer and setting of rib fracture  Continues on pain regimen, she does report increased left lower back pain today  Due to have BM, denies any abdominal pain  Per nursing notes, patient refused Gavilax yesterday  Eating approximately 2 meals per day, %  Reports some difficulty sleeping, she also reports poor sleep at baseline  Reports "good" mood  Denies any SOB, fever, fatigue, chills  The patient's allergies, past medical, surgical, social and family history were reviewed and unchanged  Review of Systems     REVIEW OF SYSTEMS:  Per history of present illness, all other systems reviewed and negative  Objective     Vitals:   Weight       Physical Exam  Constitutional:       General: She is not in acute distress  Appearance: Normal appearance  She is not ill-appearing  HENT:      Head: Normocephalic and atraumatic  Right Ear: External ear normal       Left Ear: External ear normal       Nose: Nose normal  No congestion or rhinorrhea        Mouth/Throat:      Mouth: Mucous membranes are moist       Pharynx: No oropharyngeal exudate or posterior oropharyngeal erythema  Eyes:      General:         Right eye: No discharge  Left eye: No discharge  Extraocular Movements: Extraocular movements intact  Conjunctiva/sclera: Conjunctivae normal    Cardiovascular:      Rate and Rhythm: Normal rate  Rhythm irregular  Pulses: Normal pulses  Heart sounds: Normal heart sounds  Pulmonary:      Effort: Pulmonary effort is normal  No respiratory distress  Breath sounds: Normal breath sounds  No wheezing or rhonchi  Comments: CTA diminished on RA  Abdominal:      General: Bowel sounds are normal  There is no distension  Palpations: Abdomen is soft  Tenderness: There is no abdominal tenderness  There is no guarding  Musculoskeletal:         General: No tenderness  Cervical back: Normal range of motion and neck supple  No tenderness  Right lower leg: No edema  Left lower leg: No edema  Skin:     General: Skin is warm and dry  Findings: No bruising, erythema or rash  Neurological:      General: No focal deficit present  Mental Status: She is alert  Mental status is at baseline  Sensory: No sensory deficit  Motor: Weakness present  Gait: Gait abnormal    Psychiatric:         Mood and Affect: Mood normal          Behavior: Behavior normal          Pertinent Laboratory/Diagnostic Studies:    1/6 BMP, CBC: BUN 18, creatinine 0 98, sodium 141, potassium 4 2, EGFR 60, hemoglobin 10 2, hematocrit 32 3, WBC 7 9    Current Medications   Medications reviewed and updated see facility [facility] for details  Please note:  Voice-recognition software may have been used in the preparation of this document  Occasional wrong word or "sound-alike" substitutions may have occurred due to the inherent limitations of voice recognition software  Interpretation should be guided by context           SHAHIDA Tadeo  1/12/2023 1:55  Patient: Gisel Elizabeth

## 2023-01-13 ENCOUNTER — TELEPHONE (OUTPATIENT)
Dept: CARDIOLOGY CLINIC | Facility: CLINIC | Age: 78
End: 2023-01-13

## 2023-01-15 DIAGNOSIS — G89.11 ACUTE PAIN DUE TO TRAUMA: ICD-10-CM

## 2023-01-15 DIAGNOSIS — F11.20 UNCOMPLICATED OPIOID DEPENDENCE (HCC): Primary | ICD-10-CM

## 2023-01-15 RX ORDER — OXYCODONE HYDROCHLORIDE 10 MG/1
10 TABLET ORAL EVERY 6 HOURS PRN
Qty: 12 TABLET | Refills: 0 | Status: SHIPPED | OUTPATIENT
Start: 2023-01-15 | End: 2023-01-18 | Stop reason: SDUPTHER

## 2023-01-15 RX ORDER — NALOXONE HYDROCHLORIDE 4 MG/.1ML
SPRAY NASAL
Qty: 1 EACH | Refills: 0 | Status: SHIPPED | OUTPATIENT
Start: 2023-01-15

## 2023-01-17 ENCOUNTER — TRANSITIONAL CARE MANAGEMENT (OUTPATIENT)
Dept: INTERNAL MEDICINE CLINIC | Facility: CLINIC | Age: 78
End: 2023-01-17

## 2023-01-17 ENCOUNTER — TELEMEDICINE (OUTPATIENT)
Dept: INTERNAL MEDICINE CLINIC | Facility: CLINIC | Age: 78
End: 2023-01-17

## 2023-01-17 DIAGNOSIS — G30.9 MODERATE ALZHEIMER'S DEMENTIA WITH MOOD DISTURBANCE, UNSPECIFIED TIMING OF DEMENTIA ONSET (HCC): ICD-10-CM

## 2023-01-17 DIAGNOSIS — F11.20 OPIOID DEPENDENCE, UNCOMPLICATED (HCC): ICD-10-CM

## 2023-01-17 DIAGNOSIS — Z76.89 ENCOUNTER FOR SUPPORT AND COORDINATION OF TRANSITION OF CARE: ICD-10-CM

## 2023-01-17 DIAGNOSIS — D62 ACUTE BLOOD LOSS ANEMIA: ICD-10-CM

## 2023-01-17 DIAGNOSIS — F02.B3 MODERATE ALZHEIMER'S DEMENTIA WITH MOOD DISTURBANCE, UNSPECIFIED TIMING OF DEMENTIA ONSET (HCC): ICD-10-CM

## 2023-01-17 DIAGNOSIS — I50.32 CHRONIC HEART FAILURE WITH PRESERVED EJECTION FRACTION (HFPEF) (HCC): ICD-10-CM

## 2023-01-17 DIAGNOSIS — M80.00XA AGE-RELATED OSTEOPOROSIS WITH CURRENT PATHOLOGICAL FRACTURE, INITIAL ENCOUNTER: ICD-10-CM

## 2023-01-17 DIAGNOSIS — N18.2 CHRONIC KIDNEY DISEASE, STAGE 2 (MILD): ICD-10-CM

## 2023-01-17 DIAGNOSIS — G89.4 CHRONIC PAIN SYNDROME: ICD-10-CM

## 2023-01-17 DIAGNOSIS — I50.32 CHRONIC DIASTOLIC CONGESTIVE HEART FAILURE (HCC): ICD-10-CM

## 2023-01-17 DIAGNOSIS — F41.9 ANXIETY: ICD-10-CM

## 2023-01-17 DIAGNOSIS — R41.3 MEMORY IMPAIRMENT: Primary | ICD-10-CM

## 2023-01-17 PROBLEM — N18.31 ACUTE RENAL FAILURE SUPERIMPOSED ON STAGE 3A CHRONIC KIDNEY DISEASE (HCC): Status: RESOLVED | Noted: 2021-12-30 | Resolved: 2023-01-17

## 2023-01-17 PROBLEM — N17.9 AKI (ACUTE KIDNEY INJURY) (HCC): Status: RESOLVED | Noted: 2022-11-27 | Resolved: 2023-01-17

## 2023-01-17 PROBLEM — R19.5 LOOSE STOOLS: Status: RESOLVED | Noted: 2022-12-29 | Resolved: 2023-01-17

## 2023-01-17 PROBLEM — N18.30 ANEMIA IN STAGE 3 CHRONIC KIDNEY DISEASE (HCC): Status: RESOLVED | Noted: 2022-11-27 | Resolved: 2023-01-17

## 2023-01-17 PROBLEM — E87.6 HYPOKALEMIA: Status: RESOLVED | Noted: 2022-01-06 | Resolved: 2023-01-17

## 2023-01-17 PROBLEM — N17.9 ACUTE RENAL FAILURE SUPERIMPOSED ON STAGE 3A CHRONIC KIDNEY DISEASE (HCC): Status: RESOLVED | Noted: 2021-12-30 | Resolved: 2023-01-17

## 2023-01-17 PROBLEM — R77.8 ELEVATED TROPONIN: Status: RESOLVED | Noted: 2022-10-06 | Resolved: 2023-01-17

## 2023-01-17 PROBLEM — J96.01 ACUTE RESPIRATORY FAILURE WITH HYPOXIA (HCC): Status: RESOLVED | Noted: 2021-11-12 | Resolved: 2023-01-17

## 2023-01-17 PROBLEM — R79.89 ELEVATED TROPONIN: Status: RESOLVED | Noted: 2022-10-06 | Resolved: 2023-01-17

## 2023-01-17 PROBLEM — E87.6 HYPOKALEMIA: Status: ACTIVE | Noted: 2022-01-06

## 2023-01-17 PROBLEM — D63.1 ANEMIA IN STAGE 3 CHRONIC KIDNEY DISEASE (HCC): Status: RESOLVED | Noted: 2022-11-27 | Resolved: 2023-01-17

## 2023-01-17 RX ORDER — MEMANTINE HYDROCHLORIDE 5 MG-10 MG
KIT ORAL
Qty: 49 TABLET | Refills: 0 | Status: SHIPPED | OUTPATIENT
Start: 2023-01-17 | End: 2023-01-25

## 2023-01-17 RX ORDER — DONEPEZIL HYDROCHLORIDE 10 MG/1
10 TABLET, FILM COATED ORAL
Qty: 90 TABLET | Refills: 1 | Status: SHIPPED | OUTPATIENT
Start: 2023-01-17

## 2023-01-17 RX ORDER — ESCITALOPRAM OXALATE 10 MG/1
TABLET ORAL
Qty: 11 TABLET | Refills: 0 | Status: SHIPPED | OUTPATIENT
Start: 2023-01-17

## 2023-01-17 RX ORDER — METOLAZONE 2.5 MG/1
TABLET ORAL
Qty: 8 TABLET | Refills: 2 | Status: SHIPPED | OUTPATIENT
Start: 2023-01-17

## 2023-01-17 NOTE — PROGRESS NOTES
Virtual TCM Visit:    Verification of patient location:    Patient is located in the following state in which I hold an active license [place]    Assessment/Plan:     Instructions:    - Continue to take torsemide 40mg in the morning and 20mg in the evening  Take 1 dose of metolazone (2 5 mg) on Thursday with your torsemide in the morning with 2 extra potassium  Continue to weight yourself every day in the morning without clothes  Call me if you experience weight gain of 3 lbs in 2 days or 5 lbs in 1 week  For now, they do not want to follow with cardiology for the time being as they feel that seeing multiple providers is creating confusion    - Stop taking lexapro 20mg qHS  Start taking 1 tablet of lexapro (10mg) starting tomorrow x1 week  Then take 1/2 tablet (5mg) x1 week then stop taking medicine  - Stop taking buspirone 5mg 3x a day  - Increase donepazil to 10mg at bedtime  - Start Namenda as prescribed (starter pack)  Directions will be on starter pack  - Go for blood work in middle of February to reassess electrolytes and kidney function  Make a follow-up appointment with me in 2 weeks  - Restart scheduled tylenol 1000mg TID  Can continue zanaflex 4mg q8 hours prn  Oxycodone 10mg every 6 hours prn for severe and break through pains   - Patient will be starting home PT service tomorrow  Stressed importance of compliance with therapy plan, including any exercises that may be recommended outside of her structured supervised settings  Dakota () will be taking FMLA though he is having health issues himself right now         Problem List Items Addressed This Visit        Cardiovascular and Mediastinum    Chronic heart failure with preserved ejection fraction (HFpEF) (HCC)    Relevant Medications    metolazone (ZAROXOLYN) 2 5 mg tablet       Musculoskeletal and Integument    Age-related osteoporosis with current pathological fracture    Relevant Orders    Vitamin D 25 hydroxy       Genitourinary Chronic kidney disease, stage 2 (mild)    Relevant Medications    metolazone (ZAROXOLYN) 2 5 mg tablet    Other Relevant Orders    Comprehensive metabolic panel       Other    Chronic pain syndrome    Opioid dependence, uncomplicated (HCC)    Anxiety    Relevant Medications    escitalopram (LEXAPRO) 10 mg tablet    Memory impairment - Primary    Relevant Medications    donepezil (ARICEPT) 10 mg tablet    memantine (NAMENDA TITRATION PACK)    Acute blood loss anemia    Relevant Orders    CBC and Platelet    Encounter for support and coordination of transition of care   Other Visit Diagnoses     Moderate Alzheimer's dementia with mood disturbance, unspecified timing of dementia onset (HCC)        Relevant Medications    donepezil (ARICEPT) 10 mg tablet    memantine (NAMENDA TITRATION PACK)    escitalopram (LEXAPRO) 10 mg tablet    Chronic diastolic congestive heart failure ([address] )               Reason for visit is TCM  Patient [facility] from 12/30/22 - 1/4/23 following fall with T8 vertebral compression fracture  Admitted to rehab on 1/4 at [facility] and discharged on 1/12/23  Encounter provider Ginger Bar MD     Provider located at [address] [place] [address]  [phone]    Recent Visits  Date Type Provider Dept   01/12/23 Telephone Shanta Singleton [facility]   Showing recent visits within past 7 days and meeting all other requirements  Today's Visits  Date Type Provider Dept   01/17/23 Telemedicine Ginger Bar MD [facility]   Showing today's visits and meeting all other requirements  Future Appointments  No visits were found meeting these conditions  Showing future appointments within next 150 days and meeting all other requirements       After connecting through [facility], the patient was identified by name and date of birth   Heidejorgito  was informed that this is a telemedicine visit and that the visit is being conducted through the [email or web address]  She agrees to proceed    She acknowledged consent and understanding of privacy and security of the video platform  The patient has agreed to participate and understands they can discontinue the visit at any time  Patient is aware this is a billable service  Transitional Care Management Review:  Jackson Oviedo is a 69 y o  female here for TCM follow up  During the TCM phone call patient stated:    TCM Call     Date and time call was made  1/17/2023 11:35 AM    Hospital care reviewed  Records reviewed    Patient was hospitialized at  [address] [place] [address]    Date of Admission  12/30/22    Date of discharge  01/04/23    Diagnosis  Compression fracture of T8 vertebra with routine healing    Disposition  Home    Were the patients medications reviewed and updated  No  Spouse states he does not know    Current Symptoms  Weakness    Weakness severity  Mild    Fatigue severity  Mild      TCM Call     Post hospital issues  None    Scheduled for follow up? Yes    Did you obtain your prescribed medications  --  Spouse states he does not know if meds were updated    Do you need help managing your prescriptions or medications  No    Is transportation to your appointment needed  No    I have advised the patient to call PCP with any new or worsening symptoms  Shanta Singleton MA    Living Arrangements  Family members    Support System  Friends; Family    The type of support provided  Physical; Emotional; Financial    Do you have social support  Yes, as much as I need    Are you recieving any outpatient services  Yes    What type of services  [address] [place] [address]    Are you recieving home care services  No    Are you using any community resources  No    Current waiver services  No    Interperter language line needed  No        Subjective:     Patient ID: Jackson Oviedo is a 69 y o  female      Blaisedeirdre Cb reports worsening anxiety symptoms mostly at night-time over the past few weeks  She can cry for no reason  She is unsure why she feels this way  Noticed became worse when she came home from rehab  She states she had traumatic experience at rehab  She is taking lexapro 20mg qHS for some time now though Dakota and her do not feel that this had any improvement in her depressed mood or anxiety symptoms  She is also on buspirone 3x daily though not noticed any improvement in sx  Patient appears to be more engaged however, shows more insight into her medical problems  She voices frustration over lack of improvement thus far  She was surprised to hear about her recent vertebral and rib fractures from fall  She notes that she is urinating a lot multiple times a day  Urine is large volume and clear  Lower extremity edema is improving  Weight is down to 176lbs  Dakota reports that they have not started the metolazone once weekly yet  Trying to comply with Na and fluid restriction though notes that it is very difficult  Review of Systems   Constitutional: Positive for activity change and fatigue  Negative for fever  Respiratory: Negative for cough and shortness of breath  Cardiovascular: Positive for leg swelling  Negative for chest pain and palpitations  Gastrointestinal: Negative for abdominal pain, constipation, diarrhea, nausea and vomiting  Genitourinary: Positive for frequency  Negative for decreased urine volume, difficulty urinating, dysuria and urgency  Musculoskeletal: Positive for arthralgias, back pain and gait problem  Skin: Positive for color change and wound  Neurological: Negative for dizziness, syncope and light-headedness  Hematological: Does not bruise/bleed easily  Psychiatric/Behavioral: Positive for confusion, decreased concentration and sleep disturbance  Negative for suicidal ideas  The patient is nervous/anxious  Objective: There were no vitals filed for this visit      Physical Exam  Constitutional:       General: She is not in acute distress  Appearance: She is obese  She is not ill-appearing or toxic-appearing  Musculoskeletal:         General: Swelling (bilateral lower extremities (Improved)) present  Skin:     General: Skin is dry  Comments: Venous stasis ulceration noted R anterior shin  Significant flaking and xerosis of LE         Medications have been reviewed by provider in current encounter    I spent 40 minutes with the patient during this visit  Jyoti Price MD      VIRTUAL VISIT [address] [place] [address] verbally agrees to participate in [employer]  Pt is aware that [employer] could be limited without vital signs or the ability to perform a full hands-on physical Francisco Javier Page understands she or the provider may request at any time to terminate the video visit and request the patient to seek care or treatment in person

## 2023-01-17 NOTE — PATIENT INSTRUCTIONS
- Continue to take torsemide 40mg in the morning and 20mg in the evening  Take 1 dose of metolazone (2 5 mg) on Thursday with your torsemide in the morning with 2 extra potassium  Continue to weight yourself every day in the morning without clothes  Call me if you experience weight gain of 3 lbs in 2 days or 5 lbs in 1 week  For now, they do not want to follow with cardiology for the time being as they feel that seeing multiple providers is creating confusion    - Stop taking lexapro 20mg qHS  Start taking 1 tablet of lexapro (10mg) starting tomorrow x1 week  Then take 1/2 tablet (5mg) x1 week then stop taking medicine  - Stop taking buspirone 5mg 3x a day  - Increase donepazil to 10mg at bedtime  - Start Namenda as prescribed (starter pack)  Directions will be on starter pack  - Go for blood work in middle of February to reassess electrolytes and kidney function  Make a follow-up appointment with me in 2 weeks  - Restart scheduled tylenol 1000mg TID  Can continue zanaflex 4mg q8 hours prn  Oxycodone 10mg every 6 hours prn for severe and break through pains   - Patient will be starting home PT service tomorrow  Stressed importance of compliance with therapy plan, including any exercises that may be recommended outside of her structured supervised settings  Dakota () will be taking FMLA though he is having health issues himself right now

## 2023-01-18 DIAGNOSIS — G89.11 ACUTE PAIN DUE TO TRAUMA: ICD-10-CM

## 2023-01-18 DIAGNOSIS — S32.009D CLOSED FRACTURE OF TRANSVERSE PROCESS OF LUMBAR VERTEBRA WITH ROUTINE HEALING, SUBSEQUENT ENCOUNTER: ICD-10-CM

## 2023-01-18 DIAGNOSIS — S22.060D COMPRESSION FRACTURE OF T8 VERTEBRA WITH ROUTINE HEALING: ICD-10-CM

## 2023-01-18 RX ORDER — OXYCODONE HYDROCHLORIDE 10 MG/1
10 TABLET ORAL EVERY 8 HOURS PRN
Qty: 90 TABLET | Refills: 0 | Status: SHIPPED | OUTPATIENT
Start: 2023-01-18 | End: 2023-02-17

## 2023-01-18 RX ORDER — TIZANIDINE 4 MG/1
4 TABLET ORAL EVERY 8 HOURS PRN
Qty: 90 TABLET | Refills: 1 | Status: SHIPPED | OUTPATIENT
Start: 2023-01-18 | End: 2023-07-17

## 2023-01-18 NOTE — TELEPHONE ENCOUNTER
Alphonso Biggs called and states you were going to take over pain mangement , and she needs refill on oxcycodone?

## 2023-01-19 ENCOUNTER — TELEPHONE (OUTPATIENT)
Dept: GASTROENTEROLOGY | Facility: HOSPITAL | Age: 78
End: 2023-01-19

## 2023-01-20 ENCOUNTER — TELEPHONE (OUTPATIENT)
Dept: INTERNAL MEDICINE CLINIC | Facility: CLINIC | Age: 78
End: 2023-01-20

## 2023-01-20 NOTE — TELEPHONE ENCOUNTER
With patient already taking gabapentin, donepazil at bedtime, would not want to prescribe additional medications for sleep  Pt can try taking over-the-counter melatonin 3 mg by mouth about 30 minutes before bedtime, but should stop if she or  notes hallucinations/agitation/delirium  Called pt  Fabiola Mendez and spoke with him over the phone to relay the same  Pt spouse demonstrated understanding and agrees with plan of care  Will trial melatonin and discontinue if ineffective or unable to tolerate d/t SEs

## 2023-01-25 ENCOUNTER — TELEPHONE (OUTPATIENT)
Dept: LAB | Facility: HOSPITAL | Age: 78
End: 2023-01-25

## 2023-01-25 ENCOUNTER — TELEPHONE (OUTPATIENT)
Dept: INTERNAL MEDICINE CLINIC | Facility: CLINIC | Age: 78
End: 2023-01-25

## 2023-01-25 DIAGNOSIS — S22.069A T8 VERTEBRAL FRACTURE (HCC): ICD-10-CM

## 2023-01-25 DIAGNOSIS — G30.9 MODERATE ALZHEIMER'S DEMENTIA WITH MOOD DISTURBANCE, UNSPECIFIED TIMING OF DEMENTIA ONSET (HCC): Primary | ICD-10-CM

## 2023-01-25 DIAGNOSIS — F02.B3 MODERATE ALZHEIMER'S DEMENTIA WITH MOOD DISTURBANCE, UNSPECIFIED TIMING OF DEMENTIA ONSET (HCC): Primary | ICD-10-CM

## 2023-01-25 DIAGNOSIS — R41.3 MEMORY IMPAIRMENT: ICD-10-CM

## 2023-01-25 RX ORDER — GABAPENTIN 100 MG/1
100 CAPSULE ORAL 2 TIMES DAILY
Qty: 180 CAPSULE | Refills: 1 | Status: ON HOLD | OUTPATIENT
Start: 2023-01-25

## 2023-01-25 RX ORDER — MEMANTINE HYDROCHLORIDE 10 MG/1
TABLET ORAL
Qty: 43 TABLET | Refills: 2 | Status: ON HOLD | OUTPATIENT
Start: 2023-01-25

## 2023-01-25 NOTE — TELEPHONE ENCOUNTER
hope nurse called from [facility] during visit, she states pharmacy doesn't have blister asif for nameda, they have loose pills, and also can you send gabapentin to pharmacy

## 2023-02-01 ENCOUNTER — OFFICE VISIT (OUTPATIENT)
Dept: INTERNAL MEDICINE CLINIC | Facility: CLINIC | Age: 78
End: 2023-02-01

## 2023-02-01 VITALS
HEART RATE: 61 BPM | SYSTOLIC BLOOD PRESSURE: 112 MMHG | WEIGHT: 208.8 LBS | DIASTOLIC BLOOD PRESSURE: 60 MMHG | TEMPERATURE: 97.4 F | BODY MASS INDEX: 38.19 KG/M2 | RESPIRATION RATE: 20 BRPM | OXYGEN SATURATION: 96 %

## 2023-02-01 DIAGNOSIS — D50.8 OTHER IRON DEFICIENCY ANEMIA: ICD-10-CM

## 2023-02-01 DIAGNOSIS — L03.115 BILATERAL LOWER LEG CELLULITIS: ICD-10-CM

## 2023-02-01 DIAGNOSIS — I50.32 CHRONIC HEART FAILURE WITH PRESERVED EJECTION FRACTION (HFPEF) (HCC): Primary | ICD-10-CM

## 2023-02-01 DIAGNOSIS — M80.00XA AGE-RELATED OSTEOPOROSIS WITH CURRENT PATHOLOGICAL FRACTURE, INITIAL ENCOUNTER: ICD-10-CM

## 2023-02-01 DIAGNOSIS — I50.9 CHF (CONGESTIVE HEART FAILURE) (HCC): ICD-10-CM

## 2023-02-01 DIAGNOSIS — L03.116 BILATERAL LOWER LEG CELLULITIS: ICD-10-CM

## 2023-02-01 PROBLEM — Z76.89 ENCOUNTER FOR SUPPORT AND COORDINATION OF TRANSITION OF CARE: Status: RESOLVED | Noted: 2022-09-26 | Resolved: 2023-02-01

## 2023-02-01 RX ORDER — METOLAZONE 2.5 MG/1
TABLET ORAL
Qty: 8 TABLET | Refills: 2 | Status: ON HOLD | OUTPATIENT
Start: 2023-02-01

## 2023-02-01 RX ORDER — CEPHALEXIN 500 MG/1
500 CAPSULE ORAL EVERY 6 HOURS SCHEDULED
Qty: 28 CAPSULE | Refills: 0 | Status: SHIPPED | OUTPATIENT
Start: 2023-02-01 | End: 2023-02-08

## 2023-02-01 RX ORDER — FERROUS SULFATE TAB EC 324 MG (65 MG FE EQUIVALENT) 324 (65 FE) MG
324 TABLET DELAYED RESPONSE ORAL
Qty: 90 TABLET | Refills: 0 | Status: ON HOLD | OUTPATIENT
Start: 2023-02-01

## 2023-02-01 RX ORDER — POTASSIUM CHLORIDE 20 MEQ/1
TABLET, EXTENDED RELEASE ORAL
Qty: 180 TABLET | Refills: 1 | Status: ON HOLD | OUTPATIENT
Start: 2023-02-01

## 2023-02-01 RX ORDER — TORSEMIDE 20 MG/1
40 TABLET ORAL 2 TIMES DAILY
Qty: 120 TABLET | Refills: 0 | Status: ON HOLD | OUTPATIENT
Start: 2023-02-01 | End: 2023-03-03

## 2023-02-01 NOTE — PATIENT INSTRUCTIONS
Please take torsemide 40 mg in the morning and 40 mg in the afternoon  Please take metolazone 2 5 mg twice a week, Monday and Thursday before morning dose of torsemide and   Add 20mEq of potassium with extra metolazone  Fluid restriction of 1500 ml a day  Low salt diet    Fluid Restriction   AMBULATORY CARE:   Fluid restriction  means that you need to limit the amount of liquid you have each day  Fluid restriction is needed if your body is holding water  This is called fluid retention  Fluid retention can cause health problems, such as tissue and blood vessel damage, long-term swelling, and stress on the heart  Ask your healthcare provider how much liquid you can have each day  Call your local emergency number (911 in the [facility]) if:   You have shortness of breath  You have chest pain  Call your doctor if:   You have a severe headache that does not get better with medicine  You gain 2 pounds in 1 day  Your skin is tight and shiny  You are urinating very little, even though you are regularly drinking liquids  You have signs of dehydration, such as a headache, dark yellow urine, dry eyes or mouth, or a fast heartbeat  You have questions or concerns about your condition or care      Amount of liquid in common foods and drinks:  Liquid from both foods and drinks should be counted toward your daily liquid limit:  12 ounces (1 can) of soda (332 mL)    1 cup of juice (215 mL) or 2% milk (217 mL)    6 ounces of coffee (175 mL) or 6 ounces of tea (168 mL)    1 cup of gelatin (200 mL)    1 single popsicle (45 mL)    1 cup of ice cream (100 mL) or sherbet (127 mL)    1 cup of yogurt (182 mL) or cottage cheese (185 mL)    1 cup of raw peaches, canned in juice (218 mL)    1 cup of grapes (120 mL) or berries (130 mL)    1 cup of watermelon (140 mL)    1 cup of cooked broccoli (170 mL) or creamed corn (200 mL)    How to track your liquid intake:  Keep a record of the amount of every liquid you have each day   Liquids can be measured in milliliters (mL), liters (L), ounces (oz), or cups (c)  Choose a measurement that is familiar to you  Your healthcare provider or dietitian can show you how to change units of measurement if needed  For example, you may be tracking liquids in mL, but the container lists the amount in ounces  Your provider or dietitian can show you how to find how many mL are in the container  You may be able to use a chart or computer program to make this easier  Remember to record the amount of liquid you use to swallow your medicines  Soup needs to be  into liquids and solids before you eat it  Write down the amount of liquid before you eat  Monitor your weight:  Weigh yourself at the same time every day, with the same scale  Record your weight so you can compare it to your other daily weights  You may be retaining fluid if your weight goes up by more than 2 pounds in 24 hours  If you have a sudden weight loss, you may be dehydrated  Other things to remember about fluid balance:   Large amounts of sodium from foods can cause fluid retention  Sodium is found in table salt, salted snacks, coffman, cheddar cheese, soy sauce, lunch meat, and canned vegetables  Ask your healthcare provider how much sodium you can have each day  Diuretics are medicines that can help your body get rid of extra fluid  If you take diuretics, follow your healthcare provider's directions  You may become dehydrated if you take too much of this medicine  Help relieve thirst:   Sip your liquid  Small sips throughout the day may help relieve or prevent thirst     Gum or hard candy can help your mouth feel less dry  Rinse your mouth  You can use mouthwash or water  Do not swallow after you rinse  Limit salt  Salt may make your thirst worse  Do not eat foods that are high in salt, such as crackers  Do not add salt to your food      Frozen liquids may help with thirst, and can help you get liquids more slowly  You will still need to count the liquid in your daily amount  Before you have these for the first time, figure out the amount of liquid they contain  For example, you can let a few ice chips or a popsicle melt and measure the amount of liquid  Record the amount so you will know it the next time you have the ice chips or popsicle  Follow up with your healthcare provider as directed: Your healthcare provider may recommend that you see a dietitian on a regular basis  A dietitian can help you create a plan to get the right amount of liquid each day  The dietitian can also help you calculate the amount of liquid in the foods you eat  Write down your questions so you remember to ask them during your visits  © Copyright [employer] 2022 Information is for End User's use only and may not be sold, redistributed or otherwise used for commercial purposes  All illustrations and images included in CareNotes® are the copyrighted property of A D A M , Inc  or [facility] [employer]   The above information is an  only  It is not intended as medical advice for individual conditions or treatments  Talk to your doctor, nurse or pharmacist before following any medical regimen to see if it is safe and effective for you  Low-Sodium Diet   AMBULATORY CARE:   A low-sodium diet  limits foods that are high in sodium (salt)  You will need to follow a low-sodium diet if you have high blood pressure, kidney disease, or heart failure  You may also need to follow this diet if you have a condition that is causing your body to retain (hold) extra fluid  You may need to limit the amount of sodium you eat in a day to 1,500 to 2,000 mg  Ask your healthcare provider how much sodium you can have each day  How to use food labels to choose foods that are low in sodium:  Read food labels to find the amount of sodium they contain  The amount of sodium is listed in milligrams (mg)   The % Daily Value (DV) column tells you how much of your daily needs are met by 1 serving of the food for each nutrient listed  Choose foods that have less than 5% of the DV of sodium  These foods are considered low in sodium  Foods that have 20% or more of the DV of sodium are considered high in sodium  Some food labels may also list any of the following terms that tell you about the sodium content in the food:  Sodium-free:  Less than 5 mg in each serving    Very low sodium:  35 mg of sodium or less in each serving    Low sodium:  140 mg of sodium or less in each serving    Reduced sodium: At least 25% less sodium in each serving than the regular type    Light in sodium:  50% less sodium in each serving    Unsalted or no added salt:  No extra salt is added during processing (the food may still contain sodium)       Foods to avoid:  Salty foods are high in sodium   You should avoid the following:  Processed foods:      Mixes for cornbread, biscuits, cake, and pudding     Instant foods, such as potatoes, cereals, noodles, and rice     Packaged foods, such as bread stuffing, rice and pasta mixes, snack dip mixes, and macaroni and cheese     Canned foods, such as canned vegetables, soups, broths, sauces, and vegetable or tomato juice    Snack foods, such as salted chips, popcorn, pretzels, pork rinds, salted crackers, and salted nuts    Frozen foods, such as dinners, entrees, vegetables with sauces, and breaded meats    Sauerkraut, pickled vegetables, and other foods prepared in brine    Meats and cheeses:      Smoked or cured meat, such as corned beef, coffman, ham, hot dogs, and sausage    Canned meats or spreads, such as potted meats, sardines, anchovies, and imitation seafood    Deli or lunch meats, such as bologna, ham, turkey, and roast beef    Processed cheese, such as American cheese and cheese spreads    Condiments, sauces, and seasonings:      Salt (¼ teaspoon of salt contains 575 mg of sodium)    Seasonings made with salt, such as garlic salt, celery salt, onion salt, and seasoned salt    Regular soy sauce, barbecue sauce, teriyaki sauce, steak sauce, Worcestershire sauce, and most flavored vinegars    Canned gravy and mixes     Regular condiments, such as mustard, ketchup, and salad dressings    Pickles and olives    Meat tenderizers and monosodium glutamate (MSG)    Foods to include:  Read the food label to find the exact amount of sodium in each serving  Bread and cereal:  Try to choose breads with less than 80 mg of sodium per serving  Bread, roll, zander, tortilla, or unsalted crackers  Ready-to-eat cereals with less than 5% DV of sodium (examples include shredded wheat and puffed rice)    Pasta    Vegetables and fruits:      Unsalted fresh, frozen, or canned vegetables    Fresh, frozen, or canned fruits    Fruit juice    Dairy:  One serving has about 150 mg of sodium  Milk, all types    Yogurt    Hard cheese, such as cheddar, Swiss, [employer], or [employer] and other protein foods:  Some raw meats may have added sodium  Plain meats, fish, and poultry     Eggs    Other foods:      Homemade pudding    Unsalted nuts, popcorn, or pretzels    Unsalted butter or margarine    Ways to decrease sodium:   Add spices and herbs to foods instead of salt during cooking  Use salt-free seasonings to add flavor to foods  Examples include onion powder, garlic powder, basil, casiano powder, paprika, and parsley  Try lemon or lime juice or vinegar to give foods a tart flavor  Use hot peppers, pepper, or cayenne pepper to add a spicy flavor  Do not keep a salt shaker at your kitchen table  This may help keep you from adding salt to food at the table  A teaspoon of salt has 2,300 mg of sodium  It may take time to get used to enjoying the natural flavor of food instead of adding salt  Talk to your healthcare provider before you use salt substitutes   Some salt substitutes have a high amount of potassium and need to be avoided if you have kidney disease  Choose low-sodium foods at restaurants  Meals from restaurants are often high in sodium  Some restaurants have nutrition information on the menu that tells you the amount of sodium in their foods  If possible, ask for your food to be prepared with less, or no salt  Shop for unsalted or low-sodium foods and snacks at the grocery store  Examples include unsalted or low-sodium broths, soups, and canned vegetables  Choose fresh or frozen vegetables instead  Choose unsalted nuts or seeds or fresh fruits or vegetables as snacks  Read food labels and choose salt-free, very low-sodium, or low-sodium foods  © Copyright [employer] 2022 Information is for End User's use only and may not be sold, redistributed or otherwise used for commercial purposes  All illustrations and images included in CareNotes® are the copyrighted property of A D A M , Inc  or Andi Zambrano   The above information is an  only  It is not intended as medical advice for individual conditions or treatments  Talk to your doctor, nurse or pharmacist before following any medical regimen to see if it is safe and effective for you

## 2023-02-02 ENCOUNTER — LAB (OUTPATIENT)
Dept: LAB | Facility: HOSPITAL | Age: 78
End: 2023-02-02
Attending: INTERNAL MEDICINE

## 2023-02-02 DIAGNOSIS — D62 ACUTE BLOOD LOSS ANEMIA: ICD-10-CM

## 2023-02-02 DIAGNOSIS — N18.2 CHRONIC KIDNEY DISEASE, STAGE 2 (MILD): ICD-10-CM

## 2023-02-02 DIAGNOSIS — M80.00XA AGE-RELATED OSTEOPOROSIS WITH CURRENT PATHOLOGICAL FRACTURE, INITIAL ENCOUNTER: ICD-10-CM

## 2023-02-02 PROBLEM — L03.116 BILATERAL LOWER LEG CELLULITIS: Status: ACTIVE | Noted: 2023-02-02

## 2023-02-02 PROBLEM — L03.115 BILATERAL LOWER LEG CELLULITIS: Status: ACTIVE | Noted: 2023-02-02

## 2023-02-02 PROBLEM — L03.90 CELLULITIS, UNSPECIFIED: Status: RESOLVED | Noted: 2021-12-30 | Resolved: 2023-02-02

## 2023-02-02 LAB
25(OH)D3 SERPL-MCNC: 25.8 NG/ML (ref 30–100)
ALBUMIN SERPL BCP-MCNC: 3.2 G/DL (ref 3.5–5)
ALP SERPL-CCNC: 143 U/L (ref 46–116)
ALT SERPL W P-5'-P-CCNC: 15 U/L (ref 12–78)
ANION GAP SERPL CALCULATED.3IONS-SCNC: 6 MMOL/L (ref 4–13)
AST SERPL W P-5'-P-CCNC: 18 U/L (ref 5–45)
BILIRUB SERPL-MCNC: 0.45 MG/DL (ref 0.2–1)
BUN SERPL-MCNC: 14 MG/DL (ref 5–25)
CALCIUM ALBUM COR SERPL-MCNC: 10.2 MG/DL (ref 8.3–10.1)
CALCIUM SERPL-MCNC: 9.6 MG/DL (ref 8.3–10.1)
CHLORIDE SERPL-SCNC: 101 MMOL/L (ref 96–108)
CO2 SERPL-SCNC: 32 MMOL/L (ref 21–32)
CREAT SERPL-MCNC: 0.99 MG/DL (ref 0.6–1.3)
ERYTHROCYTE [DISTWIDTH] IN BLOOD BY AUTOMATED COUNT: 20.9 % (ref 11.6–15.1)
GFR SERPL CREATININE-BSD FRML MDRD: 55 ML/MIN/1.73SQ M
GLUCOSE SERPL-MCNC: 143 MG/DL (ref 65–140)
HCT VFR BLD AUTO: 29.6 % (ref 34.8–46.1)
HGB BLD-MCNC: 8.9 G/DL (ref 11.5–15.4)
MCH RBC QN AUTO: 24.1 PG (ref 26.8–34.3)
MCHC RBC AUTO-ENTMCNC: 30.1 G/DL (ref 31.4–37.4)
MCV RBC AUTO: 80 FL (ref 82–98)
PLATELET # BLD AUTO: 322 THOUSANDS/UL (ref 149–390)
PMV BLD AUTO: 11.2 FL (ref 8.9–12.7)
POTASSIUM SERPL-SCNC: 3.6 MMOL/L (ref 3.5–5.3)
PROT SERPL-MCNC: 7 G/DL (ref 6.4–8.4)
RBC # BLD AUTO: 3.69 MILLION/UL (ref 3.81–5.12)
SODIUM SERPL-SCNC: 139 MMOL/L (ref 135–147)
WBC # BLD AUTO: 5.71 THOUSAND/UL (ref 4.31–10.16)

## 2023-02-02 NOTE — PROGRESS NOTES
Assessment/Plan:    Chronic heart failure with preserved ejection fraction (HFpEF) ([facility])  Wt Readings from Last 3 Encounters:   02/01/23 94 7 kg (208 lb 12 8 oz)   12/30/22 96 6 kg (213 lb)   11/16/22 96 3 kg (212 lb 6 4 oz)   Patient had significant weight gain since discharge from nursing home, more than 20 pounds  There is question of compliance, unsure if patient took metolazone as directed as well  I have asked daughter to put me in contact with visiting nurse for further clarification, since daughter is not always at home to help her, and the  who used to help her is currently sick  Will increase torsemide from 40 mg in the morning and 20 at night to 40 mg twice daily and add another dose of metolazone on Mondays  Potassium supplementation will be increased for extra 20 mEq with second dose of metolazone  Fluid restriction to 1500ml /day  Low salt diet  To do blood work as soon as possible for further assessment of renal function and electrolytes  We will follow-up in 1 week  Age-related osteoporosis with current pathological fracture  Patient had several fractures with low impact trauma in the last few months, she does have a clinical diagnosis of osteoporosis she did not follow with endocrinology for further management, encouraged to see them as soon as possible  Anemia  Likely a combination of anemia of chronic disease and iron deficiency anemia in the setting of prior GI bleed  Recommended start daily iron supplementation, iron level was 31 on 01/23  Follow-up with GI  Bilateral lower leg cellulitis  No systemic symptoms but has several blisters and significant lower leg edema associated with chronic lymphedema, redness and warmth  We will treat with Keflex 500 mg every 6 hours for 7 days  Follow-up in 1 week       I have spent 45 minutes with Patient and family today in which greater than 50% of this time was spent in counseling/coordination of care regarding Prognosis, Risks and benefits of tx options, Intructions for management, Patient and family education and Importance of tx compliance  Diagnoses and all orders for this visit:    Chronic heart failure with preserved ejection fraction (HFpEF) ([facility])  -     metolazone (ZAROXOLYN) 2 5 mg tablet; Take 1 tablet ( 2 5 mg) by mouth twice a week on Monday and Thursdays, before the morning dose of torsemide    Age-related osteoporosis with current pathological fracture, initial encounter  -     DXA bone density spine hip and pelvis; Future    Other iron deficiency anemia  -     ferrous sulfate 324 (65 Fe) mg; Take 1 tablet (324 mg total) by mouth daily before breakfast    Bilateral lower leg cellulitis  -     cephalexin (KEFLEX) 500 mg capsule; Take 1 capsule (500 mg total) by mouth every 6 (six) hours for 7 days    CHF (congestive heart failure) ([facility])  -     torsemide (DEMADEX) 20 mg tablet; Take 2 tablets (40 mg total) by mouth 2 (two) times a day  -     potassium chloride (K-DUR,KLOR-CON) 20 mEq tablet; Take 3 tablets daily ( 60 meq) plus an extra 20 meq on mondays and thursdays with metolazone          Subjective:      Patient ID: Rosalina Corcoran is a 69 y o  female  Murvin Schwab presents in the office for sick visit  She is in the office with her daughter who offers  some of the history  Patient's daughter states that the visiting nurse was worried about patient's legs swelling and redness  Patient states the redness started today  There is weight gain and leg swelling has been worsening for the past several days, since she left the nursing home she gained over 20 pounds  Patient denies fever, chills, nausea or vomiting, she is eating and drinking well  Patient was supposed to be on torsemide 40 mg in the morning 20 mg at night with extra dose 1 dose of metolazone once a week, but patient and daughter are unsure if the dosage of torsemide has been effectively be taking or even the metolazone was done  Patient's medication was given by , who has been sick for the past several days, and daughter states that she noticed some leftover pills in her pillbox  Patient is requesting a refill of her oxycodone, which was recently filled in January 18  She states that she has not been taking extra pills, but looks like she runned off of it  Of note patient had a clinical diagnosis of osteoporosis back in September 2022, she failed to follow-up with endocrinology for further treatment, since then patient had a fall with several fractures  The following portions of the patient's history were reviewed and updated as appropriate: allergies, current medications, past family history, past medical history, past social history, past surgical history and problem list     Review of Systems   Constitutional: Positive for fatigue  Negative for appetite change  HENT: Negative for congestion  Eyes: Negative for visual disturbance  Respiratory: Negative for cough, chest tightness, shortness of breath and wheezing  Cardiovascular: Positive for leg swelling  Negative for chest pain and palpitations  Gastrointestinal: Negative for abdominal pain, constipation, nausea and vomiting  Genitourinary: Negative for difficulty urinating and frequency  Musculoskeletal: Positive for arthralgias and back pain  Negative for joint swelling  Skin: Positive for color change and wound  Negative for rash  Neurological: Negative for dizziness and headaches  Psychiatric/Behavioral: Negative for agitation  The patient is not nervous/anxious  Objective:      /60 (BP Location: Right arm, Patient Position: Sitting, Cuff Size: Large)   Pulse 61   Temp (!) 97 4 °F (36 3 °C)   Resp 20   Wt 94 7 kg (208 lb 12 8 oz)   SpO2 96%   BMI 38 19 kg/m²          Physical Exam  Vitals and nursing note reviewed  Constitutional:       Appearance: She is well-developed  She is obese     HENT:      Head: Normocephalic and atraumatic  Eyes:      Conjunctiva/sclera: Conjunctivae normal       Pupils: Pupils are equal, round, and reactive to light  Neck:      Thyroid: No thyromegaly  Cardiovascular:      Rate and Rhythm: Normal rate  Rhythm irregular  Heart sounds: Normal heart sounds  Pulmonary:      Effort: No respiratory distress  Breath sounds: Rales (b/l lower bases) present  No wheezing  Abdominal:      General: There is no distension  Palpations: Abdomen is soft  Tenderness: There is no abdominal tenderness  Musculoskeletal:         General: Normal range of motion  Cervical back: Normal range of motion and neck supple  Right lower leg: Edema present  Left lower leg: Edema present  Skin:     General: Skin is warm and dry  Capillary Refill: Capillary refill takes less than 2 seconds  Findings: Erythema (b/l lower leg up to 2/3 of the legs assocaited with small blisters with clear fluid) present  Comments: Superficial rounded wound on anterior aspect of right leg measuring about 3 cm in diameter, with clear exudate, covered with gauze  Neurological:      General: No focal deficit present  Mental Status: She is alert and oriented to person, place, and time  Motor: No abnormal muscle tone  Psychiatric:         Thought Content:  Thought content normal          Judgment: Judgment normal

## 2023-02-02 NOTE — ASSESSMENT & PLAN NOTE
No systemic symptoms but has several blisters and significant lower leg edema associated with chronic lymphedema, redness and warmth  We will treat with Keflex 500 mg every 6 hours for 7 days  Follow-up in 1 week

## 2023-02-02 NOTE — ASSESSMENT & PLAN NOTE
Wt Readings from Last 3 Encounters:   02/01/23 94 7 kg (208 lb 12 8 oz)   12/30/22 96 6 kg (213 lb)   11/16/22 96 3 kg (212 lb 6 4 oz)   Patient had significant weight gain since discharge from nursing home, more than 20 pounds  There is question of compliance, unsure if patient took metolazone as directed as well  I have asked daughter to put me in contact with visiting nurse for further clarification, since daughter is not always at home to help her, and the  who used to help her is currently sick  Will increase torsemide from 40 mg in the morning and 20 at night to 40 mg twice daily and add another dose of metolazone on Mondays  Potassium supplementation will be increased for extra 20 mEq with second dose of metolazone  Fluid restriction to 1500ml /day  Low salt diet  To do blood work as soon as possible for further assessment of renal function and electrolytes  We will follow-up in 1 week

## 2023-02-02 NOTE — ASSESSMENT & PLAN NOTE
Patient had several fractures with low impact trauma in the last few months, she does have a clinical diagnosis of osteoporosis she did not follow with endocrinology for further management, encouraged to see them as soon as possible

## 2023-02-02 NOTE — ASSESSMENT & PLAN NOTE
Likely a combination of anemia of chronic disease and iron deficiency anemia in the setting of prior GI bleed  Recommended start daily iron supplementation, iron level was 31 on 01/23    Follow-up with GI

## 2023-02-04 ENCOUNTER — APPOINTMENT (INPATIENT)
Dept: RADIOLOGY | Facility: HOSPITAL | Age: 78
End: 2023-02-04

## 2023-02-04 ENCOUNTER — HOSPITAL ENCOUNTER (INPATIENT)
Facility: HOSPITAL | Age: 78
LOS: 2 days | Discharge: HOME HEALTH | End: 2023-02-06
Attending: EMERGENCY MEDICINE | Admitting: INTERNAL MEDICINE

## 2023-02-04 DIAGNOSIS — S22.060D COMPRESSION FRACTURE OF T8 VERTEBRA WITH ROUTINE HEALING: ICD-10-CM

## 2023-02-04 DIAGNOSIS — I89.0 LYMPHEDEMA: ICD-10-CM

## 2023-02-04 DIAGNOSIS — E87.6 HYPOKALEMIA: ICD-10-CM

## 2023-02-04 DIAGNOSIS — I50.9 ACUTE CONGESTIVE HEART FAILURE, UNSPECIFIED HEART FAILURE TYPE (HCC): Primary | ICD-10-CM

## 2023-02-04 DIAGNOSIS — I87.2 VENOUS STASIS DERMATITIS OF BOTH LOWER EXTREMITIES: ICD-10-CM

## 2023-02-04 DIAGNOSIS — I50.32 CHRONIC HEART FAILURE WITH PRESERVED EJECTION FRACTION (HFPEF) (HCC): ICD-10-CM

## 2023-02-04 DIAGNOSIS — E87.6 ACUTE HYPOKALEMIA: ICD-10-CM

## 2023-02-04 DIAGNOSIS — L53.9 ERYTHEMA OF LOWER EXTREMITY: ICD-10-CM

## 2023-02-04 DIAGNOSIS — L03.116 BILATERAL LOWER LEG CELLULITIS: ICD-10-CM

## 2023-02-04 DIAGNOSIS — L03.115 BILATERAL LOWER LEG CELLULITIS: ICD-10-CM

## 2023-02-04 PROBLEM — R19.7 DIARRHEA: Status: ACTIVE | Noted: 2023-02-04

## 2023-02-04 LAB
ALBUMIN SERPL BCP-MCNC: 4.3 G/DL (ref 3.5–5)
ALP SERPL-CCNC: 163 U/L (ref 34–104)
ALT SERPL W P-5'-P-CCNC: 9 U/L (ref 7–52)
ANION GAP SERPL CALCULATED.3IONS-SCNC: 11 MMOL/L (ref 4–13)
APTT PPP: 30 SECONDS (ref 23–37)
AST SERPL W P-5'-P-CCNC: 13 U/L (ref 13–39)
BASOPHILS # BLD AUTO: 0.05 THOUSANDS/ÂΜL (ref 0–0.1)
BASOPHILS NFR BLD AUTO: 1 % (ref 0–1)
BILIRUB SERPL-MCNC: 0.46 MG/DL (ref 0.2–1)
BNP SERPL-MCNC: 238 PG/ML (ref 0–100)
BUN SERPL-MCNC: 19 MG/DL (ref 5–25)
CALCIUM SERPL-MCNC: 9.8 MG/DL (ref 8.4–10.2)
CARDIAC TROPONIN I PNL SERPL HS: 9 NG/L
CHLORIDE SERPL-SCNC: 90 MMOL/L (ref 96–108)
CO2 SERPL-SCNC: 35 MMOL/L (ref 21–32)
CREAT SERPL-MCNC: 1.19 MG/DL (ref 0.6–1.3)
CRP SERPL QL: 4.2 MG/L
EOSINOPHIL # BLD AUTO: 0.23 THOUSAND/ÂΜL (ref 0–0.61)
EOSINOPHIL NFR BLD AUTO: 2 % (ref 0–6)
ERYTHROCYTE [DISTWIDTH] IN BLOOD BY AUTOMATED COUNT: 20.9 % (ref 11.6–15.1)
GFR SERPL CREATININE-BSD FRML MDRD: 44 ML/MIN/1.73SQ M
GLUCOSE SERPL-MCNC: 98 MG/DL (ref 65–140)
HCT VFR BLD AUTO: 31.6 % (ref 34.8–46.1)
HGB BLD-MCNC: 9.6 G/DL (ref 11.5–15.4)
IMM GRANULOCYTES # BLD AUTO: 0.03 THOUSAND/UL (ref 0–0.2)
IMM GRANULOCYTES NFR BLD AUTO: 0 % (ref 0–2)
INR PPP: 1.15 (ref 0.84–1.19)
LACTATE SERPL-SCNC: 1.3 MMOL/L (ref 0.5–2)
LYMPHOCYTES # BLD AUTO: 2.62 THOUSANDS/ÂΜL (ref 0.6–4.47)
LYMPHOCYTES NFR BLD AUTO: 24 % (ref 14–44)
MAGNESIUM SERPL-MCNC: 1.8 MG/DL (ref 1.9–2.7)
MCH RBC QN AUTO: 23.4 PG (ref 26.8–34.3)
MCHC RBC AUTO-ENTMCNC: 30.4 G/DL (ref 31.4–37.4)
MCV RBC AUTO: 77 FL (ref 82–98)
MONOCYTES # BLD AUTO: 1.04 THOUSAND/ÂΜL (ref 0.17–1.22)
MONOCYTES NFR BLD AUTO: 9 % (ref 4–12)
NEUTROPHILS # BLD AUTO: 7.11 THOUSANDS/ÂΜL (ref 1.85–7.62)
NEUTS SEG NFR BLD AUTO: 64 % (ref 43–75)
NRBC BLD AUTO-RTO: 0 /100 WBCS
PLATELET # BLD AUTO: 410 THOUSANDS/UL (ref 149–390)
PMV BLD AUTO: 10.2 FL (ref 8.9–12.7)
POTASSIUM SERPL-SCNC: 2.7 MMOL/L (ref 3.5–5.3)
PROCALCITONIN SERPL-MCNC: <0.05 NG/ML
PROT SERPL-MCNC: 8.1 G/DL (ref 6.4–8.4)
PROTHROMBIN TIME: 15 SECONDS (ref 11.6–14.5)
RBC # BLD AUTO: 4.11 MILLION/UL (ref 3.81–5.12)
SODIUM SERPL-SCNC: 136 MMOL/L (ref 135–147)
WBC # BLD AUTO: 11.08 THOUSAND/UL (ref 4.31–10.16)

## 2023-02-04 RX ORDER — GABAPENTIN 100 MG/1
100 CAPSULE ORAL 2 TIMES DAILY
Status: DISCONTINUED | OUTPATIENT
Start: 2023-02-04 | End: 2023-02-06 | Stop reason: HOSPADM

## 2023-02-04 RX ORDER — HYDROMORPHONE HCL/PF 1 MG/ML
0.5 SYRINGE (ML) INJECTION ONCE
Status: COMPLETED | OUTPATIENT
Start: 2023-02-04 | End: 2023-02-04

## 2023-02-04 RX ORDER — AMMONIUM LACTATE 12 G/100G
CREAM TOPICAL 2 TIMES DAILY PRN
Status: DISCONTINUED | OUTPATIENT
Start: 2023-02-04 | End: 2023-02-06 | Stop reason: HOSPADM

## 2023-02-04 RX ORDER — SACCHAROMYCES BOULARDII 250 MG
250 CAPSULE ORAL 2 TIMES DAILY
Status: DISCONTINUED | OUTPATIENT
Start: 2023-02-04 | End: 2023-02-06 | Stop reason: HOSPADM

## 2023-02-04 RX ORDER — OXYCODONE HYDROCHLORIDE 10 MG/1
10 TABLET ORAL EVERY 8 HOURS PRN
Status: DISCONTINUED | OUTPATIENT
Start: 2023-02-04 | End: 2023-02-06 | Stop reason: HOSPADM

## 2023-02-04 RX ORDER — PANTOPRAZOLE SODIUM 40 MG/1
40 TABLET, DELAYED RELEASE ORAL DAILY
Status: DISCONTINUED | OUTPATIENT
Start: 2023-02-05 | End: 2023-02-06 | Stop reason: HOSPADM

## 2023-02-04 RX ORDER — DONEPEZIL HYDROCHLORIDE 5 MG/1
10 TABLET, FILM COATED ORAL
Status: DISCONTINUED | OUTPATIENT
Start: 2023-02-04 | End: 2023-02-06 | Stop reason: HOSPADM

## 2023-02-04 RX ORDER — TIZANIDINE 2 MG/1
4 TABLET ORAL EVERY 8 HOURS PRN
Status: DISCONTINUED | OUTPATIENT
Start: 2023-02-04 | End: 2023-02-06 | Stop reason: HOSPADM

## 2023-02-04 RX ORDER — FUROSEMIDE 10 MG/ML
40 INJECTION INTRAMUSCULAR; INTRAVENOUS
Status: DISCONTINUED | OUTPATIENT
Start: 2023-02-05 | End: 2023-02-04

## 2023-02-04 RX ORDER — FUROSEMIDE 10 MG/ML
40 INJECTION INTRAMUSCULAR; INTRAVENOUS ONCE
Status: COMPLETED | OUTPATIENT
Start: 2023-02-04 | End: 2023-02-04

## 2023-02-04 RX ORDER — AMOXICILLIN 250 MG
2 CAPSULE ORAL 2 TIMES DAILY
Status: DISCONTINUED | OUTPATIENT
Start: 2023-02-04 | End: 2023-02-06 | Stop reason: HOSPADM

## 2023-02-04 RX ORDER — POTASSIUM CHLORIDE 20MEQ/15ML
40 LIQUID (ML) ORAL ONCE
Status: COMPLETED | OUTPATIENT
Start: 2023-02-04 | End: 2023-02-04

## 2023-02-04 RX ORDER — OXYCODONE HYDROCHLORIDE 5 MG/1
5 TABLET ORAL EVERY 8 HOURS PRN
Status: DISCONTINUED | OUTPATIENT
Start: 2023-02-04 | End: 2023-02-06 | Stop reason: HOSPADM

## 2023-02-04 RX ORDER — CEFAZOLIN SODIUM 1 G/50ML
1000 SOLUTION INTRAVENOUS EVERY 8 HOURS
Status: DISCONTINUED | OUTPATIENT
Start: 2023-02-04 | End: 2023-02-06 | Stop reason: HOSPADM

## 2023-02-04 RX ORDER — MORPHINE SULFATE 4 MG/ML
4 INJECTION, SOLUTION INTRAMUSCULAR; INTRAVENOUS ONCE
Status: COMPLETED | OUTPATIENT
Start: 2023-02-04 | End: 2023-02-04

## 2023-02-04 RX ORDER — MEMANTINE HYDROCHLORIDE 10 MG/1
10 TABLET ORAL DAILY
Status: DISCONTINUED | OUTPATIENT
Start: 2023-02-05 | End: 2023-02-06 | Stop reason: HOSPADM

## 2023-02-04 RX ORDER — FERROUS SULFATE 325(65) MG
325 TABLET ORAL
Status: DISCONTINUED | OUTPATIENT
Start: 2023-02-05 | End: 2023-02-06 | Stop reason: HOSPADM

## 2023-02-04 RX ORDER — LIDOCAINE 50 MG/G
2 PATCH TOPICAL DAILY
Status: DISCONTINUED | OUTPATIENT
Start: 2023-02-05 | End: 2023-02-06 | Stop reason: HOSPADM

## 2023-02-04 RX ORDER — MEMANTINE HYDROCHLORIDE 10 MG/1
10 TABLET ORAL 2 TIMES DAILY
Status: DISCONTINUED | OUTPATIENT
Start: 2023-02-08 | End: 2023-02-06 | Stop reason: HOSPADM

## 2023-02-04 RX ORDER — POTASSIUM CHLORIDE 20 MEQ/1
40 TABLET, EXTENDED RELEASE ORAL ONCE
Status: DISCONTINUED | OUTPATIENT
Start: 2023-02-04 | End: 2023-02-04

## 2023-02-04 RX ORDER — POTASSIUM CHLORIDE 20 MEQ/1
60 TABLET, EXTENDED RELEASE ORAL DAILY
Status: DISCONTINUED | OUTPATIENT
Start: 2023-02-05 | End: 2023-02-05

## 2023-02-04 RX ORDER — MAGNESIUM SULFATE HEPTAHYDRATE 40 MG/ML
2 INJECTION, SOLUTION INTRAVENOUS ONCE
Status: COMPLETED | OUTPATIENT
Start: 2023-02-04 | End: 2023-02-05

## 2023-02-04 RX ORDER — ACETAMINOPHEN 325 MG/1
975 TABLET ORAL EVERY 8 HOURS SCHEDULED
Status: DISCONTINUED | OUTPATIENT
Start: 2023-02-04 | End: 2023-02-06 | Stop reason: HOSPADM

## 2023-02-04 RX ORDER — TORSEMIDE 20 MG/1
40 TABLET ORAL 2 TIMES DAILY
Status: DISCONTINUED | OUTPATIENT
Start: 2023-02-04 | End: 2023-02-06 | Stop reason: HOSPADM

## 2023-02-04 RX ORDER — CARVEDILOL 3.12 MG/1
3.12 TABLET ORAL 2 TIMES DAILY WITH MEALS
Status: DISCONTINUED | OUTPATIENT
Start: 2023-02-05 | End: 2023-02-06 | Stop reason: HOSPADM

## 2023-02-04 RX ORDER — DIPHENHYDRAMINE HYDROCHLORIDE 50 MG/ML
25 INJECTION INTRAMUSCULAR; INTRAVENOUS ONCE
Status: COMPLETED | OUTPATIENT
Start: 2023-02-04 | End: 2023-02-04

## 2023-02-04 RX ORDER — POTASSIUM CHLORIDE 14.9 MG/ML
20 INJECTION INTRAVENOUS
Status: COMPLETED | OUTPATIENT
Start: 2023-02-04 | End: 2023-02-05

## 2023-02-04 RX ADMIN — APIXABAN 5 MG: 5 TABLET, FILM COATED ORAL at 21:16

## 2023-02-04 RX ADMIN — ACETAMINOPHEN 975 MG: 325 TABLET, FILM COATED ORAL at 22:08

## 2023-02-04 RX ADMIN — DONEPEZIL HYDROCHLORIDE 10 MG: 5 TABLET, FILM COATED ORAL at 22:09

## 2023-02-04 RX ADMIN — DIPHENHYDRAMINE HYDROCHLORIDE 25 MG: 50 INJECTION, SOLUTION INTRAMUSCULAR; INTRAVENOUS at 19:30

## 2023-02-04 RX ADMIN — TORSEMIDE 40 MG: 20 TABLET ORAL at 22:17

## 2023-02-04 RX ADMIN — CEFEPIME 2000 MG: 2 INJECTION, POWDER, FOR SOLUTION INTRAVENOUS at 19:17

## 2023-02-04 RX ADMIN — HYDROMORPHONE HYDROCHLORIDE 0.5 MG: 1 INJECTION, SOLUTION INTRAMUSCULAR; INTRAVENOUS; SUBCUTANEOUS at 21:15

## 2023-02-04 RX ADMIN — MORPHINE SULFATE 4 MG: 4 INJECTION INTRAVENOUS at 19:58

## 2023-02-04 RX ADMIN — OXYCODONE HYDROCHLORIDE 10 MG: 10 TABLET ORAL at 23:16

## 2023-02-04 RX ADMIN — CEFAZOLIN SODIUM 1000 MG: 1 SOLUTION INTRAVENOUS at 21:30

## 2023-02-04 RX ADMIN — MAGNESIUM SULFATE HEPTAHYDRATE 2 G: 40 INJECTION, SOLUTION INTRAVENOUS at 22:15

## 2023-02-04 RX ADMIN — POTASSIUM CHLORIDE 40 MEQ: 1.5 SOLUTION ORAL at 19:46

## 2023-02-04 RX ADMIN — Medication 250 MG: at 22:08

## 2023-02-04 RX ADMIN — FUROSEMIDE 40 MG: 10 INJECTION, SOLUTION INTRAMUSCULAR; INTRAVENOUS at 19:11

## 2023-02-04 RX ADMIN — POTASSIUM CHLORIDE 20 MEQ: 14.9 INJECTION, SOLUTION INTRAVENOUS at 22:20

## 2023-02-04 RX ADMIN — GABAPENTIN 100 MG: 100 CAPSULE ORAL at 21:15

## 2023-02-05 ENCOUNTER — APPOINTMENT (INPATIENT)
Dept: RADIOLOGY | Facility: HOSPITAL | Age: 78
End: 2023-02-05

## 2023-02-05 LAB
ANION GAP SERPL CALCULATED.3IONS-SCNC: 6 MMOL/L (ref 4–13)
BASOPHILS # BLD AUTO: 0.03 THOUSANDS/ÂΜL (ref 0–0.1)
BASOPHILS NFR BLD AUTO: 0 % (ref 0–1)
BUN SERPL-MCNC: 16 MG/DL (ref 5–25)
CALCIUM SERPL-MCNC: 8.9 MG/DL (ref 8.4–10.2)
CHLORIDE SERPL-SCNC: 98 MMOL/L (ref 96–108)
CO2 SERPL-SCNC: 37 MMOL/L (ref 21–32)
CREAT SERPL-MCNC: 1.08 MG/DL (ref 0.6–1.3)
EOSINOPHIL # BLD AUTO: 0.18 THOUSAND/ÂΜL (ref 0–0.61)
EOSINOPHIL NFR BLD AUTO: 2 % (ref 0–6)
ERYTHROCYTE [DISTWIDTH] IN BLOOD BY AUTOMATED COUNT: 21 % (ref 11.6–15.1)
ERYTHROCYTE [SEDIMENTATION RATE] IN BLOOD: 32 MM/HOUR (ref 0–29)
GFR SERPL CREATININE-BSD FRML MDRD: 49 ML/MIN/1.73SQ M
GLUCOSE SERPL-MCNC: 103 MG/DL (ref 65–140)
HCT VFR BLD AUTO: 29.4 % (ref 34.8–46.1)
HGB BLD-MCNC: 9 G/DL (ref 11.5–15.4)
IMM GRANULOCYTES # BLD AUTO: 0.04 THOUSAND/UL (ref 0–0.2)
IMM GRANULOCYTES NFR BLD AUTO: 1 % (ref 0–2)
LYMPHOCYTES # BLD AUTO: 2.51 THOUSANDS/ÂΜL (ref 0.6–4.47)
LYMPHOCYTES NFR BLD AUTO: 33 % (ref 14–44)
MAGNESIUM SERPL-MCNC: 2.2 MG/DL (ref 1.9–2.7)
MCH RBC QN AUTO: 24.2 PG (ref 26.8–34.3)
MCHC RBC AUTO-ENTMCNC: 30.6 G/DL (ref 31.4–37.4)
MCV RBC AUTO: 79 FL (ref 82–98)
MONOCYTES # BLD AUTO: 0.91 THOUSAND/ÂΜL (ref 0.17–1.22)
MONOCYTES NFR BLD AUTO: 12 % (ref 4–12)
NEUTROPHILS # BLD AUTO: 3.95 THOUSANDS/ÂΜL (ref 1.85–7.62)
NEUTS SEG NFR BLD AUTO: 52 % (ref 43–75)
NRBC BLD AUTO-RTO: 0 /100 WBCS
PLATELET # BLD AUTO: 361 THOUSANDS/UL (ref 149–390)
PMV BLD AUTO: 10.4 FL (ref 8.9–12.7)
POTASSIUM SERPL-SCNC: 3.2 MMOL/L (ref 3.5–5.3)
RBC # BLD AUTO: 3.72 MILLION/UL (ref 3.81–5.12)
SODIUM SERPL-SCNC: 141 MMOL/L (ref 135–147)
WBC # BLD AUTO: 7.62 THOUSAND/UL (ref 4.31–10.16)

## 2023-02-05 RX ORDER — POTASSIUM CHLORIDE 20 MEQ/1
60 TABLET, EXTENDED RELEASE ORAL DAILY
Status: DISCONTINUED | OUTPATIENT
Start: 2023-02-05 | End: 2023-02-06

## 2023-02-05 RX ADMIN — Medication 250 MG: at 08:38

## 2023-02-05 RX ADMIN — DONEPEZIL HYDROCHLORIDE 10 MG: 5 TABLET, FILM COATED ORAL at 21:26

## 2023-02-05 RX ADMIN — POTASSIUM CHLORIDE 60 MEQ: 1500 TABLET, EXTENDED RELEASE ORAL at 08:38

## 2023-02-05 RX ADMIN — FERROUS SULFATE TAB 325 MG (65 MG ELEMENTAL FE) 325 MG: 325 (65 FE) TAB at 08:38

## 2023-02-05 RX ADMIN — PANTOPRAZOLE SODIUM 40 MG: 40 TABLET, DELAYED RELEASE ORAL at 08:40

## 2023-02-05 RX ADMIN — ACETAMINOPHEN 975 MG: 325 TABLET, FILM COATED ORAL at 05:42

## 2023-02-05 RX ADMIN — OXYCODONE HYDROCHLORIDE 10 MG: 10 TABLET ORAL at 21:26

## 2023-02-05 RX ADMIN — CARVEDILOL 3.12 MG: 3.12 TABLET, FILM COATED ORAL at 17:20

## 2023-02-05 RX ADMIN — APIXABAN 5 MG: 5 TABLET, FILM COATED ORAL at 08:38

## 2023-02-05 RX ADMIN — TORSEMIDE 40 MG: 20 TABLET ORAL at 08:39

## 2023-02-05 RX ADMIN — MEMANTINE 10 MG: 10 TABLET ORAL at 08:39

## 2023-02-05 RX ADMIN — SENNOSIDES AND DOCUSATE SODIUM 2 TABLET: 8.6; 5 TABLET ORAL at 17:20

## 2023-02-05 RX ADMIN — TIZANIDINE 4 MG: 2 TABLET ORAL at 16:06

## 2023-02-05 RX ADMIN — POTASSIUM CHLORIDE 20 MEQ: 14.9 INJECTION, SOLUTION INTRAVENOUS at 00:10

## 2023-02-05 RX ADMIN — Medication 250 MG: at 17:20

## 2023-02-05 RX ADMIN — APIXABAN 5 MG: 5 TABLET, FILM COATED ORAL at 17:20

## 2023-02-05 RX ADMIN — TORSEMIDE 40 MG: 20 TABLET ORAL at 17:20

## 2023-02-05 RX ADMIN — LIDOCAINE 2 PATCH: 50 PATCH CUTANEOUS at 08:44

## 2023-02-05 RX ADMIN — CEFAZOLIN SODIUM 1000 MG: 1 SOLUTION INTRAVENOUS at 14:11

## 2023-02-05 RX ADMIN — DIPHENHYDRAMINE HYDROCHLORIDE 25 MG: 25 SOLUTION ORAL at 18:24

## 2023-02-05 RX ADMIN — GABAPENTIN 100 MG: 100 CAPSULE ORAL at 17:20

## 2023-02-05 RX ADMIN — CARVEDILOL 3.12 MG: 3.12 TABLET, FILM COATED ORAL at 08:38

## 2023-02-05 RX ADMIN — ACETAMINOPHEN 975 MG: 325 TABLET, FILM COATED ORAL at 21:26

## 2023-02-05 RX ADMIN — CEFAZOLIN SODIUM 1000 MG: 1 SOLUTION INTRAVENOUS at 05:15

## 2023-02-05 RX ADMIN — OXYCODONE HYDROCHLORIDE 10 MG: 10 TABLET ORAL at 14:11

## 2023-02-05 RX ADMIN — CEFAZOLIN SODIUM 1000 MG: 1 SOLUTION INTRAVENOUS at 21:26

## 2023-02-05 RX ADMIN — GABAPENTIN 100 MG: 100 CAPSULE ORAL at 08:40

## 2023-02-05 NOTE — ASSESSMENT & PLAN NOTE
· Seen by PCP 2/1  Noted to have blisters, edema, erythema due to chronic lymphedema  Started on Keflex 2/1-2/8  · Presents to ED with worsening of symptoms  · Antibiotics:  · Cefepime and vancomycin ordered in ED  Started cefepime and patient became itchy, SOB  ED provider did not feel like true allergic reaction and verbally told nurses to discontinue antibiotics as he did not believe there was true cellulitic infection    · Deescalate to Ancef  ·

## 2023-02-05 NOTE — ASSESSMENT & PLAN NOTE
· Hemoglobin 9 6, but overall stable compared to prior  Denies any current bleeding  · Likely in combination of anemia of chronic disease and LEANNE in setting of prior UGIB    · Continue iron supplements

## 2023-02-05 NOTE — H&P
[facility] 1945[facility] 69 [facility]  [facility] MRN: [id number]  Unit/Bed#: S -01 Encounter: [id number]  Primary Care Provider: Key Estes MD   Date and time admitted to hospital: 2/4/2023  6:03 PM    * Bilateral lower leg cellulitis  Assessment & Plan  · Seen by PCP 2/1  Noted to have blisters, edema, erythema due to chronic lymphedema  Started on Keflex 2/1-2/8  · Presents to ED with worsening of symptoms  · Antibiotics:  · Cefepime and vancomycin ordered in ED  Started cefepime and patient became itchy, SOB  ED provider did not feel like true allergic reaction and verbally told nurses to discontinue antibiotics as he did not believe there was true cellulitic infection  · Deescalate to Ancef  ·     Diarrhea  Assessment & Plan  · 2 days of nonbloody diarrhea without abdominal pain  Has had multiple hospitalizations  · Send C  difficile and bacterial stool panel  · If patient develops abdominal pain will check CT    Chronic heart failure with preserved ejection fraction (HFpEF) ([facility])  Assessment & Plan  Wt Readings from Last 3 Encounters:   02/04/23 91 kg (200 lb 9 9 oz)   02/01/23 94 7 kg (208 lb 12 8 oz)   12/30/22 96 6 kg (213 lb)     • Weights are down  • Echo 10/2022 EF 55%, unable to assess diastolic function due to atrial fibrillation  • Initial troponin: 9  o Will trend x3 or to peak  • BNP:  Pending  • Diuresis:   o Home regimen  - Torsemide 40 milligrams twice daily  - Metolazone 2 5 mg Monday, Thursdays with extra potassium supplement  o Given Lasix 40 mg IV in ED  Patient is not in acute CHF exacerbation  We will continue with p o  torsemide  o Family notes significant GI losses/diarrhea  Will hold metolazone until evaluated oral intake and ongoing diarrhea  • Beta-blocker: carvedilol 3 125 mg BID  • Monitor intake and output, daily weights  • Diet: Fluid restriction and 2 g Sodium           Closed fracture of transverse process of lumbar vertebra (HCC)  Assessment & Plan  · Pain control  · Tylenol 975mg TID  · Zanaflex 4 mg q8 hoursPRN  · Oxycodone 5 mg q8 hr prn for moderate, 10 mg q8 prn for severe  · Currently has home PT      Hypokalemia  Assessment & Plan  · Potassium 2 7 on admission  Given 40 meq PO in ED  · Give another 40 meq IV now  · Start telemetry until >3  · Stat magnesium  · BMP in AM    Anemia  Assessment & Plan  · Hemoglobin 9 6, but overall stable compared to prior  Denies any current bleeding  · Likely in combination of anemia of chronic disease and LEANNE in setting of prior UGIB  · Continue iron supplements     Ambulatory dysfunction  Assessment & Plan  · Chronic and ongoing  Likely secondary to deconditioning and chronic comorbidities  · Has home PT OT   · Fall precaution    Memory impairment  Assessment & Plan  · Continue donepezil 10 mg HS  · Continue namenda starter pack  · 2/1-2/7 1 tablet  · Starting 2/8 1 tab BID  · Fall precaution, reorient as necessary    Anxiety  Assessment & Plan  · In the process of tapering medications  · Lexapro tapered and now discontinued  · Busbar discontinued  · Anxiety is not well controlled  Was given dose of benedryl in ED for anxiety    Unspecified atrial fibrillation (HCC)  Assessment & Plan  · Maintained on eliquis, carvedilol   · Currently rate controlled afib  · Replete electrolytes     VTE Pharmacologic Prophylaxis: VTE Score: 5 High Risk (Score >/= 5) - Pharmacological DVT Prophylaxis Ordered: apixaban (Eliquis)  Sequential Compression Devices Ordered  Code Status: Level 1 - Full Code   Discussion with family: Updated  (daughter) at bedside  Anticipated Length of Stay: Patient will be admitted on an inpatient basis with an anticipated length of stay of greater than 2 midnights secondary to IV antibiotics      Total Time for Visit, including Counseling / Coordination of Care: 70 minutes Greater than 50% of this total time spent on direct patient counseling and coordination of care  Chief Complaint: Bilateral lower extremity pain    History of Present Illness:  Jackson Oviedo is a 69 y o  female with a PMH of A  fib on Eliquis, CHF, anxiety, venous insufficiency of lower extremities, hypertension, osteoporosis, upper GI bleed, who presents with worsening lower extremity pain, erythema and open sores  Her visiting nurse was concerned with her lower extremities and contacted her PCP who recommended evaluation in the ED  Patient does not meet sepsis criteria  She has tachycardia but only very minimal leukocytosis  She is afebrile  Patient was originally prescribed cefepime and vancomycin in the ED for cellulitis however, shortly after starting cefepime she started vomiting and had itching to her hands  She received only a few minutes of cefepime before this was discontinued  Vancomycin was not given either  ED deferred further antibiotics to primary  We will continue with Ancef every 8 hours  Patient was also given a dose of IV Lasix in the ED  She does not examine her fluid overload, therefore we will continue with home dose of torsemide  We will hold off on additional doses of Zaroxolyn until we can evaluate her GI losses and oral intake  Patient does report 2 days of significant diarrhea which would account for her hypokalemia and hypomagnesemia  Increased anxiety due to her 's recent diagnosis of multiple myeloma  Review of Systems:  Review of Systems   Cardiovascular: Positive for leg swelling  Gastrointestinal: Positive for diarrhea  Negative for abdominal distention, abdominal pain, anal bleeding, blood in stool, constipation, nausea, rectal pain and vomiting  Skin: Positive for color change and wound  Psychiatric/Behavioral: The patient is nervous/anxious  All other systems reviewed and are negative        Past Medical and Surgical History:   Past Medical History:   Diagnosis Date   • A-fib ([facility] ) 12/29/2021   • [facility] with [facility] ) 11/12/2021   • Anxiety    • Arthritis    • Back pain    • Cellulitis    • Cellulitis, unspecified 12/30/2021   • CHF (congestive heart failure) ([facility])    • Colon cancer screening 8/3/2022   • Edema of both lower extremities due to peripheral venous insufficiency    • Edema of both lower extremities due to peripheral venous insufficiency    • Encounter for screening mammogram for malignant neoplasm of breast 3/21/2022   • Encounter for support and coordination of transition of care 9/26/2022   • Erythema of lower extremity 11/12/2021   • Fibromyalgia    • Great toe pain, right 10/6/2022   • Hypertension    • Hypotension 9/17/2022   • Leukocytosis 10/6/2022   • Melena 8/20/2022   • Osteoporosis screening 8/3/2022   • Sjogren syndrome, unspecified ([facility] )        Past Surgical History:   Procedure Laterality Date   • KNEE CARTILAGE SURGERY     • UT OPTX FEM SHFT FX W/INSJ IMED IMPLT W/WO SCREW Left 8/22/2022    Procedure: LEFT RETROGRADE IM SHAN;  Surgeon: Steph Kendall MD;  Location: [facility];  Service: Orthopedics   • REPLACEMENT TOTAL KNEE BILATERAL         Meds/Allergies:  Prior to Admission medications    Medication Sig Start Date End Date Taking?  Authorizing Provider   acetaminophen (TYLENOL) 325 mg tablet Take 2 tablets (650 mg total) by mouth every 6 (six) hours 1/4/23   Idania Rosales PA-C   ammonium lactate (LAC-HYDRIN) 12 % cream Apply topically 2 (two) times a day 9/7/22   SHAHIDA Esteban   apixaban (Eliquis) 5 mg Take 1 tablet (5 mg total) by mouth 2 (two) times a day Lot [id number] exp 10/2024 10/6/22 2/1/23  Christina Navarrete MD   carvedilol (COREG) 3 125 mg tablet Take 1 tablet (3 125 mg total) by mouth 2 (two) times a day with meals 12/23/22   Fantasma Laboy MD   cephalexin [facility]) 500 mg capsule Take 1 capsule (500 mg total) by mouth every 6 (six) hours for 7 days 2/1/23 2/8/23  Jose Alberto Lomeli MD   donepezil (ARICEPT) 10 mg tablet Take 1 tablet (10 mg total) by mouth daily at bedtime 1/17/23   Rima Ye MD   escitalopram (LEXAPRO) 10 mg tablet Take 1 tab at bedtime x1 week, then take 1/2 tab at bedtime x1 week, then stop  1/17/23   Rima Ye MD   ferrous sulfate 324 (65 Fe) mg Take 1 tablet (324 mg total) by mouth daily before breakfast 2/1/23   Quiana Larios MD   gabapentin (NEURONTIN) 100 mg capsule Take 1 capsule (100 mg total) by mouth 2 (two) times a day 1/25/23   Rima Ye MD   lidocaine (LIDODERM) 5 % Apply 2 patches topically daily Remove & Discard patch within 12 hours or as directed by MD 1/4/23   Orestes Chavez PA-C   memantine (Namenda) 10 mg tablet Take 1/2 tab daily x1 week, then take 1 tab daily x1 week, then take 1 tab twice daily therafter  1/25/23   Rima Ye MD   metolazone (ZAROXOLYN) 2 5 mg tablet Take 1 tablet ( 2 5 mg) by mouth twice a week on Monday and Thursdays, before the morning dose of torsemide 2/1/23   Quiana Larios MD   naloxone [facility]) 4 mg/0 1 mL nasal spray Administer 1 spray into a nostril  If no response after 2-3 minutes, give another dose in the other nostril using a new spray   1/15/23   Rima Ye MD   oxyCODONE (ROXICODONE) 10 MG TABS Take 1 tablet (10 mg total) by mouth every 8 (eight) hours as needed (breakthrough pain) Max Daily Amount: 30 mg 1/18/23 2/17/23  Rima Ye MD   pantoprazole (PROTONIX) 40 mg tablet Take 1 tablet (40 mg total) by mouth daily 10/19/22 2/1/23  Wing Kern MD   potassium chloride (K-DUR,KLOR-CON) 20 mEq tablet Take 3 tablets daily ( 60 meq) plus an extra 20 meq on mondays and thursdays with metolazone 2/1/23   Quiana Larios MD   senna-docusate sodium (SENOKOT S) 8 6-50 mg per tablet Take 2 tablets by mouth 2 (two) times a day 1/4/23   Tilford Cushing A Lindenmoyer, PA-C   tiZANidine (ZANAFLEX) 4 mg tablet Take 1 tablet (4 mg total) by mouth every 8 (eight) hours as needed for muscle spasms 1/18/23 7/17/23  Rima Ye MD   torsemide [facility]) 20 mg tablet Take 2 tablets (40 mg total) by mouth 2 (two) times a day 2/1/23 3/3/23  Aden Reyez MD   bisacodyl (DULCOLAX) 10 mg suppository Insert 1 suppository (10 mg total) into the rectum daily as needed for constipation  Patient not taking: Reported on 9/19/2022 8/30/22 10/8/22  Shanel Ruffin PA-C     I have reviewed home medications with a medical source (PCP, Pharmacy, other)  Allergies: No Known Allergies    Social History:  Marital Status: /Civil Union   Occupation:   Patient Pre-hospital Living Situation: Home  Patient Pre-hospital Level of Mobility: walks with walker  Patient Pre-hospital Diet Restrictions:   Substance Use History:   Social History     Substance and Sexual Activity   Alcohol Use Not Currently     Social History     Tobacco Use   Smoking Status Former   • Types: Cigarettes   Smokeless Tobacco Never     Social History     Substance and Sexual Activity   Drug Use Never       Family History:  Family History   Problem Relation Age of Onset   • Heart disease Mother    • Cancer Father        Physical Exam:     Vitals:   Blood Pressure: 150/58 (02/04/23 2119)  Pulse: 89 (02/04/23 2119)  Temperature: 99 3 °F (37 4 °C) (02/04/23 2101)  Respirations: 22 (02/04/23 2000)  Weight - Scale: 91 kg (200 lb 9 9 oz) (02/04/23 1810)  SpO2: 92 % (02/04/23 2119)    Physical Exam  Constitutional:       General: She is not in acute distress  Appearance: Normal appearance  She is obese  She is not ill-appearing  HENT:      Head: Normocephalic and atraumatic  Right Ear: External ear normal       Left Ear: External ear normal       Nose: Nose normal       Mouth/Throat:      Pharynx: Oropharynx is clear  Eyes:      General: No scleral icterus  Extraocular Movements: Extraocular movements intact  Conjunctiva/sclera: Conjunctivae normal    Cardiovascular:      Rate and Rhythm: Normal rate  Rhythm irregular  Pulses: Normal pulses  Heart sounds: Normal heart sounds  Pulmonary:      Effort: Pulmonary effort is normal  No respiratory distress  Breath sounds: Normal breath sounds  No wheezing, rhonchi or rales  Chest:      Chest wall: No tenderness  Abdominal:      General: Bowel sounds are normal  There is no distension  Palpations: Abdomen is soft  Tenderness: There is no abdominal tenderness  There is no guarding or rebound  Musculoskeletal:      Cervical back: Normal range of motion  Right lower leg: Edema present  Left lower leg: Edema present  Skin:     General: Skin is warm  Capillary Refill: Capillary refill takes less than 2 seconds  Findings: Erythema present  Neurological:      General: No focal deficit present  Mental Status: She is alert and oriented to person, place, and time  Psychiatric:         Mood and Affect: Mood normal          Behavior: Behavior normal          Cognition and Memory: She exhibits impaired recent memory and impaired remote memory                Additional Data:     Lab Results:  Results from last 7 days   Lab Units 02/04/23  1837   WBC Thousand/uL 11 08*   HEMOGLOBIN g/dL 9 6*   HEMATOCRIT % 31 6*   PLATELETS Thousands/uL 410*   NEUTROS PCT % 64   LYMPHS PCT % 24   MONOS PCT % 9   EOS PCT % 2     Results from last 7 days   Lab Units 02/04/23  1837   SODIUM mmol/L 136   POTASSIUM mmol/L 2 7*   CHLORIDE mmol/L 90*   CO2 mmol/L 35*   BUN mg/dL 19   CREATININE mg/dL 1 19   ANION GAP mmol/L 11   CALCIUM mg/dL 9 8   ALBUMIN g/dL 4 3   TOTAL BILIRUBIN mg/dL 0 46   ALK PHOS U/L 163*   ALT U/L 9   AST U/L 13   GLUCOSE RANDOM mg/dL 98     Results from last 7 days   Lab Units 02/04/23  1837   INR  1 15             Results from last 7 days   Lab Units 02/04/23  1837   LACTIC ACID mmol/L 1 3   PROCALCITONIN ng/ml <0 05       Lines/Drains:  Invasive Devices     Peripheral Intravenous Line  Duration           Peripheral IV 02/04/23 Right Antecubital <1 day                    Imaging: Reviewed radiology reports from this admission including: Chest x-ray pending  XR chest portable    (Results Pending)       EKG and Other Studies Reviewed on Admission:   · EKG: Atrial fibrillation  HR 72     ** Please Note: This note has been constructed using a voice recognition system   **

## 2023-02-05 NOTE — ASSESSMENT & PLAN NOTE
· Seen by PCP 2/1  Noted to have blisters, edema, erythema due to chronic lymphedema  Started on Keflex 2/1-2/8  · Presents to ED with worsening of symptoms  · Antibiotics:  · Cefepime and vancomycin ordered in ED  Started cefepime and patient became itchy, SOB  ED provider did not feel like true allergic reaction and verbally told nurses to discontinue antibiotics as he did not believe there was true cellulitic infection    · Deescalate to Ancef  · Continue with serial exam  · Wound care consultation will be obtained for venous stasis ulcers in the right lower extremity  ·

## 2023-02-05 NOTE — PLAN OF CARE
Problem: Potential for Falls  Goal: Patient will remain free of falls  Description: INTERVENTIONS:  - Educate patient/family on patient safety including physical limitations  - Instruct patient to call for assistance with activity   - Consult OT/PT to assist with strengthening/mobility   - Keep Call bell within reach  - Keep bed low and locked with side rails adjusted as appropriate  - Keep care items and personal belongings within reach  - Initiate and maintain comfort rounds  - Make Fall Risk Sign visible to staff  Problem: PAIN - ADULT  Goal: Verbalizes/displays adequate comfort level or baseline comfort level  Description: Interventions:  - Encourage patient to monitor pain and request assistance  - Assess pain using appropriate pain scale  - Administer analgesics based on type and severity of pain and evaluate response  - Implement non-pharmacological measures as appropriate and evaluate response  - Consider cultural and social influences on pain and pain management  - Notify physician/advanced practitioner if interventions unsuccessful or patient reports new pain  Outcome: Progressing     Problem: INFECTION - ADULT  Goal: Absence or prevention of progression during hospitalization  Description: INTERVENTIONS:  - Assess and monitor for signs and symptoms of infection  - Monitor lab/diagnostic results  - Monitor all insertion sites, i e  indwelling lines, tubes, and drains  - Monitor endotracheal if appropriate and nasal secretions for changes in amount and color  - [place] appropriate cooling/warming therapies per order  - Administer medications as ordered  - Instruct and encourage patient and family to use good hand hygiene technique  - Identify and instruct in appropriate isolation precautions for identified infection/condition  Outcome: Progressing  Goal: Absence of fever/infection during neutropenic period  Description: INTERVENTIONS:  - Monitor WBC    Outcome: Progressing     - Apply yellow socks and bracelet for high fall risk patients  - Consider moving patient to room near nurses station  Outcome: Progressing

## 2023-02-05 NOTE — ASSESSMENT & PLAN NOTE
· Potassium 2 7 on admission  Given 40 meq PO in ED  · Give another 40 meq IV now  · Repeat potassium at 3 2  We will continue with potassium supplementation on current diuretic regimen

## 2023-02-05 NOTE — ED NOTES
Approximately 5 minutes after starting the cefepime antibiotic at a slow drip the patient began to vomit  several times, had some difficulty breathing, and became itchy on her hands  The antibiotic was immediately stopped, the provider was notified, and the patient was given 25mg of humble Ambrocio RN  02/04/23 2032

## 2023-02-05 NOTE — ASSESSMENT & PLAN NOTE
Wt Readings from Last 3 Encounters:   02/05/23 89 8 kg (197 lb 15 6 oz)   02/01/23 94 7 kg (208 lb 12 8 oz)   12/30/22 96 6 kg (213 lb)     • Weights are down  • Echo 10/2022 EF 55%, unable to assess diastolic function due to atrial fibrillation  • Initial troponin: 9  o Will trend x3 or to peak  • BNP:  Pending  • Diuresis:   o Home regimen  - Torsemide 40 milligrams twice daily  - Metolazone 2 5 mg Monday, Thursdays with extra potassium supplement  o Given Lasix 40 mg IV in ED  Patient is not in acute CHF exacerbation  We will continue with p o  torsemide  o Family notes significant GI losses/diarrhea  Will hold metolazone until evaluated oral intake and ongoing diarrhea  • Beta-blocker: carvedilol 3 125 mg BID  • Monitor intake and output, daily weights  • Diet: Fluid restriction and 2 g Sodium

## 2023-02-05 NOTE — ASSESSMENT & PLAN NOTE
· In the process of tapering medications  · Lexapro tapered and now discontinued  · Busbar discontinued  · Anxiety is not well controlled    Was given dose of benedryl in ED for anxiety

## 2023-02-05 NOTE — ASSESSMENT & PLAN NOTE
· 2 days of nonbloody diarrhea without abdominal pain  Has had multiple hospitalizations    · Send C  difficile and bacterial stool panel  · If patient develops abdominal pain will check CT

## 2023-02-05 NOTE — ASSESSMENT & PLAN NOTE
· Continue donepezil 10 mg HS  · Continue namenda starter pack  · 2/1-2/7 1 tablet  · Starting 2/8 1 tab BID  · Fall precaution, reorient as necessary

## 2023-02-05 NOTE — PROGRESS NOTES
Pt arrived to unit via stretcher  Pt slide over to bed  AAOx4  Dual RN skin check completed with BHRN, skin intact, sacral area red but blanchable, bilateral lower extremities red and edemetous  Pt oriented to room and call bell  Pt instructed to use call bell for assistance

## 2023-02-05 NOTE — ED PROVIDER NOTES
History  Chief Complaint   Patient presents with   • Cellulitis     Pt was stated on abx for cellulitis on wed, pts leg (R) is worse, with an open weeping wound  History provided by:  Patient  Leg Pain  Location:  Leg  Time since incident:  3 days  Injury: no    Leg location:  R lower leg and L lower leg  Pain details:     Quality:  Aching    Radiates to:  Does not radiate    Severity:  Moderate    Onset quality:  Gradual    Duration:  3 days    Timing:  Constant    Progression:  Worsening  Chronicity:  New  Relieved by:  Nothing  Worsened by:  Nothing  Ineffective treatments: keflex  Associated symptoms: no fever and no neck pain    Associated symptoms comment:  History of congestive heart failure, legs and been swelling more, causing ambulatory dysfunction having a difficult time ambulating, swelling of the bilateral legs, increasing erythema increasing warmth increasing pain, history of lymphedema, has been on Keflex which has been not helping, sent in for admission for IV antibiotics by visiting nursing team and communications with physician      Prior to Admission Medications   Prescriptions Last Dose Informant Patient Reported?  Taking?   acetaminophen (TYLENOL) 325 mg tablet   No No   Sig: Take 2 tablets (650 mg total) by mouth every 6 (six) hours   ammonium lactate (LAC-HYDRIN) 12 % cream   No No   Sig: Apply topically 2 (two) times a day   apixaban (Eliquis) 5 mg   No No   Sig: Take 1 tablet (5 mg total) by mouth 2 (two) times a day Lot [id number] exp 10/2024   carvedilol (COREG) 3 125 mg tablet   No No   Sig: Take 1 tablet (3 125 mg total) by mouth 2 (two) times a day with meals   cephalexin (KEFLEX) 500 mg capsule   No No   Sig: Take 1 capsule (500 mg total) by mouth every 6 (six) hours for 7 days   donepezil (ARICEPT) 10 mg tablet   No No   Sig: Take 1 tablet (10 mg total) by mouth daily at bedtime   escitalopram (LEXAPRO) 10 mg tablet   No No   Sig: Take 1 tab at bedtime x1 week, then take 1/2 tab at bedtime x1 week, then stop  ferrous sulfate 324 (65 Fe) mg   No No   Sig: Take 1 tablet (324 mg total) by mouth daily before breakfast   gabapentin (NEURONTIN) 100 mg capsule   No No   Sig: Take 1 capsule (100 mg total) by mouth 2 (two) times a day   lidocaine (LIDODERM) 5 %   No No   Sig: Apply 2 patches topically daily Remove & Discard patch within 12 hours or as directed by MD   memantine (Namenda) 10 mg tablet   No No   Sig: Take 1/2 tab daily x1 week, then take 1 tab daily x1 week, then take 1 tab twice daily therafter  metolazone (ZAROXOLYN) 2 5 mg tablet   No No   Sig: Take 1 tablet ( 2 5 mg) by mouth twice a week on Monday and Thursdays, before the morning dose of torsemide   naloxone (NARCAN) 4 mg/0 1 mL nasal spray   No No   Sig: Administer 1 spray into a nostril  If no response after 2-3 minutes, give another dose in the other nostril using a new spray     oxyCODONE (ROXICODONE) 10 MG TABS   No No   Sig: Take 1 tablet (10 mg total) by mouth every 8 (eight) hours as needed (breakthrough pain) Max Daily Amount: 30 mg   pantoprazole (PROTONIX) 40 mg tablet   No No   Sig: Take 1 tablet (40 mg total) by mouth daily   potassium chloride (K-DUR,KLOR-CON) 20 mEq tablet   No No   Sig: Take 3 tablets daily ( 60 meq) plus an extra 20 meq on mondays and thursdays with metolazone   senna-docusate sodium (SENOKOT S) 8 6-50 mg per tablet   No No   Sig: Take 2 tablets by mouth 2 (two) times a day   tiZANidine (ZANAFLEX) 4 mg tablet   No No   Sig: Take 1 tablet (4 mg total) by mouth every 8 (eight) hours as needed for muscle spasms   torsemide (DEMADEX) 20 mg tablet   No No   Sig: Take 2 tablets (40 mg total) by mouth 2 (two) times a day      Facility-Administered Medications: None       Past Medical History:   Diagnosis Date   • [facility] ) 12/29/2021   • Acute respiratory failure with hypoxia (HCC) 11/12/2021   • Anxiety    • Arthritis    • Back pain    • Cellulitis    • Cellulitis, unspecified 12/30/2021   • CHF (congestive heart failure) (HCC)    • Colon cancer screening 8/3/2022   • Edema of both lower extremities due to peripheral venous insufficiency    • Edema of both lower extremities due to peripheral venous insufficiency    • Encounter for screening mammogram for malignant neoplasm of breast 3/21/2022   • Encounter for support and coordination of transition of care 9/26/2022   • Erythema of lower extremity 11/12/2021   • Fibromyalgia    • Great toe pain, right 10/6/2022   • Hypertension    • Hypotension 9/17/2022   • Leukocytosis 10/6/2022   • Melena 8/20/2022   • Osteoporosis screening 8/3/2022   • Sjogren syndrome, unspecified ([address] )        Past Surgical History:   Procedure Laterality Date   • KNEE CARTILAGE SURGERY     • IN OPTX FEM SHFT FX W/INSJ IMED IMPLT W/WO SCREW Left 8/22/2022    Procedure: LEFT RETROGRADE IM SHAN;  Surgeon: Bib Suarez MD;  Location: [facility];  Service: Orthopedics   • REPLACEMENT TOTAL KNEE BILATERAL         Family History   Problem Relation Age of Onset   • Heart disease Mother    • Cancer Father      I have reviewed and agree with the history as documented  E-Cigarette/Vaping   • E-Cigarette Use Never User      E-Cigarette/Vaping Substances   • Nicotine No    • THC No    • CBD No    • Flavoring No    • Other No    • Unknown No      Social History     Tobacco Use   • Smoking status: Former     Types: Cigarettes   • Smokeless tobacco: Never   Vaping Use   • Vaping Use: Never used   Substance Use Topics   • Alcohol use: Not Currently   • Drug use: Never       Review of Systems   Constitutional: Positive for chills  Negative for activity change, diaphoresis and fever  HENT: Negative for congestion, sinus pressure and sore throat  Eyes: Negative for pain and visual disturbance  Respiratory: Negative for cough, chest tightness, shortness of breath, wheezing and stridor  Cardiovascular: Positive for leg swelling  Negative for chest pain and palpitations  Gastrointestinal: Negative for abdominal distention, abdominal pain, constipation, diarrhea, nausea and vomiting  Genitourinary: Negative for dysuria and frequency  Musculoskeletal: Negative for neck pain and neck stiffness  Skin: Negative for rash  Neurological: Negative for dizziness, speech difficulty, light-headedness, numbness and headaches  Physical Exam  Physical Exam  Vitals reviewed  Constitutional:       General: She is not in acute distress  Appearance: She is well-developed  She is not diaphoretic  HENT:      Head: Normocephalic and atraumatic  Right Ear: External ear normal       Left Ear: External ear normal       Nose: Nose normal    Eyes:      General:         Right eye: No discharge  Left eye: No discharge  Pupils: Pupils are equal, round, and reactive to light  Neck:      Trachea: No tracheal deviation  Cardiovascular:      Rate and Rhythm: Normal rate and regular rhythm  Heart sounds: Normal heart sounds  No murmur heard  Pulmonary:      Effort: Pulmonary effort is normal  No respiratory distress  Breath sounds: Normal breath sounds  No stridor  Abdominal:      General: There is no distension  Palpations: Abdomen is soft  Tenderness: There is no abdominal tenderness  There is no guarding or rebound  Musculoskeletal:         General: Normal range of motion  Cervical back: Normal range of motion and neck supple  Right lower leg: Edema present  Left lower leg: Edema present  Skin:     General: Skin is warm and dry  Coloration: Skin is not pale  Findings: Erythema present  Comments: Large amount of lower extremity erythema, open wounds on the legs  Warmth, tenderness   Neurological:      General: No focal deficit present  Mental Status: She is alert and oriented to person, place, and time           Vital Signs  ED Triage Vitals [02/04/23 1810]   Temperature Pulse Respirations Blood Pressure SpO2 98 7 °F (37 1 °C) 74 20 161/70 94 %      Temp src Heart Rate Source Patient Position - Orthostatic VS BP Location FiO2 (%)   -- Monitor Lying Left arm --      Pain Score       --           Vitals:    02/04/23 1810   BP: 161/70   Pulse: 74   Patient Position - Orthostatic VS: Lying         Visual Acuity      ED Medications  Medications   vancomycin (VANCOCIN) 1,750 mg in sodium chloride 0 9 % 500 mL IVPB (0 mg Intravenous Hold 2/4/23 1950)   morphine injection 4 mg (has no administration in time range)   cefepime (MAXIPIME) 2 g/50 mL dextrose IVPB (0 mg Intravenous Stopped 2/4/23 1923)   furosemide (LASIX) injection 40 mg (40 mg Intravenous Given 2/4/23 1911)   diphenhydrAMINE (BENADRYL) injection 25 mg (25 mg Intravenous Given 2/4/23 1930)   potassium chloride oral solution 40 mEq (40 mEq Oral Given 2/1946)       Diagnostic Studies  Results Reviewed     Procedure Component Value Units Date/Time    B-Type Natriuretic Peptide(BNP) [[id number]]     Lab Status: No result Specimen: Blood     Comprehensive metabolic panel [[id number]]  (Abnormal) Collected: 02/04/23 1837    Lab Status: Final result Specimen: Blood from Arm, Right Updated: 02/04/23 1933     Sodium 136 mmol/L      Potassium 2 7 mmol/L      Chloride 90 mmol/L      CO2 35 mmol/L      ANION GAP 11 mmol/L      BUN 19 mg/dL      Creatinine 1 19 mg/dL      Glucose 98 mg/dL      Calcium 9 8 mg/dL      AST 13 U/L      ALT 9 U/L      Alkaline Phosphatase 163 U/L      Total Protein 8 1 g/dL      Albumin 4 3 g/dL      Total Bilirubin 0 46 mg/dL      eGFR 44 ml/min/1 73sq m     Narrative:      Ml guidelines for Chronic Kidney Disease (CKD):   •  Stage 1 with normal or high GFR (GFR > 90 mL/min/1 73 square meters)  •  Stage 2 Mild CKD (GFR = 60-89 mL/min/1 73 square meters)  •  Stage 3A Moderate CKD (GFR = 45-59 mL/min/1 73 square meters)  •  Stage 3B Moderate CKD (GFR = 30-44 mL/min/1 73 square meters)  •  Stage 4 Severe CKD (GFR = 15-29 mL/min/1 73 square meters)  •  Stage 5 End Stage CKD (GFR <15 mL/min/1 73 square meters)  Note: GFR calculation is accurate only with a steady state creatinine    HS Troponin 0hr (reflex protocol) [[id number]] Collected: 02/04/23 1929    Lab Status: In process Specimen: Blood from Arm, Right Updated: 02/04/23 1932    Procalcitonin [[id number]]  (Normal) Collected: 02/04/23 1837    Lab Status: Final result Specimen: Blood from Arm, Right Updated: 02/04/23 1917     Procalcitonin <0 05 ng/ml     Protime-INR [[id number]]  (Abnormal) Collected: 02/04/23 1837    Lab Status: Final result Specimen: Blood from Arm, Right Updated: 02/04/23 1909     Protime 15 0 seconds      INR 1 15    APTT [[id number]]  (Normal) Collected: 02/04/23 1837    Lab Status: Final result Specimen: Blood from Arm, Right Updated: 02/04/23 1909     PTT 30 seconds     Lactic acid [[id number]]  (Normal) Collected: 02/04/23 1837    Lab Status: Final result Specimen: Blood from Arm, Right Updated: 02/04/23 1906     LACTIC ACID 1 3 mmol/L     Narrative:      Result may be elevated if tourniquet was used during collection      CBC and differential [[id number]]  (Abnormal) Collected: 02/04/23 1837    Lab Status: Final result Specimen: Blood from Arm, Right Updated: 02/04/23 1846     WBC 11 08 Thousand/uL      RBC 4 11 Million/uL      Hemoglobin 9 6 g/dL      Hematocrit 31 6 %      MCV 77 fL      MCH 23 4 pg      MCHC 30 4 g/dL      RDW 20 9 %      MPV 10 2 fL      Platelets 979 Thousands/uL      nRBC 0 /100 WBCs      Neutrophils Relative 64 %      Immat GRANS % 0 %      Lymphocytes Relative 24 %      Monocytes Relative 9 %      Eosinophils Relative 2 %      Basophils Relative 1 %      Neutrophils Absolute 7 11 Thousands/µL      Immature Grans Absolute 0 03 Thousand/uL      Lymphocytes Absolute 2 62 Thousands/µL      Monocytes Absolute 1 04 Thousand/µL      Eosinophils Absolute 0 23 Thousand/µL      Basophils Absolute 0 05 Thousands/µL     Blood culture #1 [[id number]] Collected: 02/04/23 1837    Lab Status: In process Specimen: Blood from Arm, Right Updated: 02/04/23 1843    Blood culture #2 [[id number]] Collected: 02/04/23 1837    Lab Status: In process Specimen: Blood from Arm, Right Updated: 02/04/23 1842                 No orders to display              Procedures  ECG 12 Lead Documentation Only    Date/Time: 2/4/2023 7:39 PM  Performed by: Cristi Paris DO  Authorized by: Cristi Paris DO     ECG reviewed by me, the ED Provider: yes    Patient location:  ED  Previous ECG:     Previous ECG:  Compared to current    Comparison ECG info:  10 6 2022    Similarity:  No change  Interpretation:     Interpretation: normal    Rate:     ECG rate:  72    ECG rate assessment: normal    Rhythm:     Rhythm: sinus rhythm    Ectopy:     Ectopy: none    QRS:     QRS axis:  Normal    QRS intervals:  Normal  Conduction:     Conduction: normal    ST segments:     ST segments:  Normal  T waves:     T waves: normal               ED Course  ED Course as of 02/04/23 1957   Sat Feb 04, 2023 1929 Called to patient's room, infusion of cefepime just started patient had a coughing episode says she gagged, and then began coughing more to where she threw up saliva and mucus  ,  Concern for allergic reaction I do not feel this was an allergic reaction to cefepime,  Benadryl given, will continue to monitor closely but I would recommend restarting                               SBIRT 22yo+    Flowsheet Row Most Recent Value   SBIRT (23 yo +)    In order to provide better care to our patients, we are screening all of our patients for alcohol and drug use  Would it be okay to ask you these screening questions? Yes Filed at: 02/04/2023 1832   Initial Alcohol Screen: US AUDIT-C     1  How often do you have a drink containing alcohol? 0 Filed at: 02/04/2023 1832   2  How many drinks containing alcohol do you have on a typical day you are drinking? 0 Filed at: 02/04/2023 1832   3a   Male UNDER 65: How often do you have five or more drinks on one occasion? 0 Filed at: 02/04/2023 1832   3b  FEMALE Any Age, or MALE 65+: How often do you have 4 or more drinks on one occassion? 0 Filed at: 02/04/2023 1832   Audit-C Score 0 Filed at: 02/04/2023 1832   ANA: How many times in the past year have you    Used an illegal drug or used a prescription medication for non-medical reasons? Never Filed at: 02/04/2023 1832                    Medical Decision Making        Initial ED assessment:  69-year-old female, lower extremity edema, erythema, history CHF, increasing difficulty ambulating due to lower extremity edema, increasing erythema of the legs, started on Keflex at home    Initial DDx includes but is not limited to:   Congestive heart failure exacerbation causing ambulatory dysfunction, electrolyte abnormality, renal failure, possibly infection of the lower extremity    Initial ED plan:   IV antibiotics, will treat for hospital-acquired she was in the hospital within the past month, blood work, diuretics        Final ED summary/disposition:   After evaluation and workup in the emergency department, admitted to internal medicine service     Amount and/or Complexity of Data Reviewed  Labs: ordered  Risk  Prescription drug management            Disposition  Final diagnoses:   Acute congestive heart failure, unspecified heart failure type (HCC)   Acute hypokalemia   Erythema of lower extremity     Time reflects when diagnosis was documented in both MDM as applicable and the Disposition within this note     Time User Action Codes Description Comment    2/4/2023  7:41 PM Kalyani Mcclain [I50 9] Acute congestive heart failure, unspecified heart failure type ([facility] [address] )     2/4/2023  7:41 PM Kalyani Mcclain [E87 6] Acute hypokalemia     2/4/2023  7:42 PM Karlene Gentile Add [L53 9] Erythema of lower extremity       ED Disposition     ED Disposition   Admit    Condition   Stable    Date/Time   Sat Feb 4, 2023 7:41 PM    Comment   Case was discussed with Dr Mayur Mckee and the patient's admission status was agreed to be Admission Status: inpatient status to the service of Dr Mayur Mckee   Follow-up Information    None         Patient's Medications   Discharge Prescriptions    No medications on file       No discharge procedures on file      PDMP Review       Value Time User    PDMP Reviewed  Yes 2/2/2023  9:18 AM Dwayne Rodgers MD          ED Provider  Electronically Signed by           Abimbola Fuentes DO  02/04/23 1958

## 2023-02-05 NOTE — ASSESSMENT & PLAN NOTE
Wt Readings from Last 3 Encounters:   02/04/23 91 kg (200 lb 9 9 oz)   02/01/23 94 7 kg (208 lb 12 8 oz)   12/30/22 96 6 kg (213 lb)     • Weights are down  • Echo 10/2022 EF 55%, unable to assess diastolic function due to atrial fibrillation  • Initial troponin: 9  o Will trend x3 or to peak  • BNP:  Pending  • Diuresis:   o Home regimen  - Torsemide 40 milligrams twice daily  - Metolazone 2 5 mg Monday, Thursdays with extra potassium supplement  o Given Lasix 40 mg IV in ED  Patient is not in acute CHF exacerbation  We will continue with p o  torsemide  o Family notes significant GI losses/diarrhea  Will hold metolazone until evaluated oral intake and ongoing diarrhea  • Beta-blocker: carvedilol 3 125 mg BID  • Monitor intake and output, daily weights  • Diet: Fluid restriction and 2 g Sodium

## 2023-02-05 NOTE — PROGRESS NOTES
[facility]  Progress Note [facility] 1945, 69 y [facility]  female MRN: [id number]  Unit/Bed#: S -01 Encounter: [id number]  Primary Care Provider: Melinda Pat MD   Date and time admitted to hospital: 2/4/2023  6:03 PM    * Bilateral lower leg cellulitis  Assessment & Plan  · Seen by PCP 2/1  Noted to have blisters, edema, erythema due to chronic lymphedema  Started on Keflex 2/1-2/8  · Presents to ED with worsening of symptoms  · Antibiotics:  · Cefepime and vancomycin ordered in ED  Started cefepime and patient became itchy, SOB  ED provider did not feel like true allergic reaction and verbally told nurses to discontinue antibiotics as he did not believe there was true cellulitic infection  · Deescalate to Ancef  · Continue with serial exam  · Wound care consultation will be obtained for venous stasis ulcers in the right lower extremity  ·     Diarrhea  Assessment & Plan  · 2 days of nonbloody diarrhea without abdominal pain  Has had multiple hospitalizations  · Send C  difficile and bacterial stool panel  · If patient develops abdominal pain will check CT  · Currently denies any diarrhea  Closed fracture of transverse process of lumbar vertebra (HCC)  Assessment & Plan  · Pain control  · Tylenol 975mg TID  · Zanaflex 4 mg q8 hoursPRN  · Oxycodone 5 mg q8 hr prn for moderate, 10 mg q8 prn for severe  · Currently has home PT      Hypokalemia  Assessment & Plan  · Potassium 2 7 on admission  Given 40 meq PO in ED  · Give another 40 meq IV now  · Repeat potassium at 3 2  We will continue with potassium supplementation on current diuretic regimen      Chronic heart failure with preserved ejection fraction (HFpEF) ([facility])  Assessment & Plan  Wt Readings from Last 3 Encounters:   02/05/23 89 8 kg (197 lb 15 6 oz)   02/01/23 94 7 kg (208 lb 12 8 oz)   12/30/22 96 6 kg (213 lb)     • Weights are down  • Echo 10/2022 EF 55%, unable to assess diastolic function due to atrial fibrillation  • Initial troponin: 9  o Will trend x3 or to peak  • BNP:  Pending  • Diuresis:   o Home regimen  - Torsemide 40 milligrams twice daily  - Metolazone 2 5 mg Monday, Thursdays with extra potassium supplement  o Given Lasix 40 mg IV in ED  Patient is not in acute CHF exacerbation  We will continue with p o  torsemide  o Family notes significant GI losses/diarrhea  Will hold metolazone until evaluated oral intake and ongoing diarrhea  • Beta-blocker: carvedilol 3 125 mg BID  • Monitor intake and output, daily weights  • Diet: Fluid restriction and 2 g Sodium  Anemia  Assessment & Plan  · Hemoglobin 9 6, but overall stable compared to prior  Denies any current bleeding  · Likely in combination of anemia of chronic disease and LEANNE in setting of prior UGIB  · Continue iron supplements     Ambulatory dysfunction  Assessment & Plan  · Chronic and ongoing  Likely secondary to deconditioning and chronic comorbidities  · Has home PT OT   · Fall precaution  · PT/OT evaluation will be obtained during this hospitalization  Memory impairment  Assessment & Plan  · Continue donepezil 10 mg HS  · Continue namenda starter pack  · 2/1-2/7 1 tablet  · Starting 2/8 1 tab BID  · Fall precaution, reorient as necessary    Anxiety  Assessment & Plan  · In the process of tapering medications  · Lexapro tapered and now discontinued  · Busbar discontinued  · Anxiety is not well controlled  Was given dose of benedryl in ED for anxiety    Unspecified atrial fibrillation (HCC)  Assessment & Plan  · Maintained on eliquis, carvedilol   · Currently rate controlled afib  · Replete electrolytes           VTE Pharmacologic Prophylaxis: VTE Score: 5 High Risk (Score >/= 5) - Pharmacological DVT Prophylaxis Ordered: apixaban (Eliquis)  Sequential Compression Devices Ordered  Patient Centered Rounds: I performed bedside rounds with nursing staff today    Discussions with Specialists or Other Care Team Provider: Discussed with nursing    Education and Discussions with Family / Patient: Updated  () via phone  Time Spent for Care: 30 minutes  More than 50% of total time spent on counseling and coordination of care as described above  Current Length of Stay: 1 day(s)  Current Patient Status: Inpatient   Certification Statement: The patient will continue to require additional inpatient hospital stay due to iv antibiotics  Discharge Plan: Anticipate discharge tomorrow to home  Code Status: Level 1 - Full Code    Subjective:   Patient seen and examined at bedside  Currently denies pain or discomfort in the foot  Remains afebrile  Objective:     Vitals:   Temp (24hrs), Av 7 °F (37 1 °C), Min:98 2 °F (36 8 °C), Max:99 3 °F (37 4 °C)    Temp:  [98 2 °F (36 8 °C)-99 3 °F (37 4 °C)] 98 2 °F (36 8 °C)  HR:  [] 71  Resp:  [20-22] 22  BP: (129-161)/(56-76) 129/57  SpO2:  [92 %-100 %] 100 %  Body mass index is 36 21 kg/m²  Input and Output Summary (last 24 hours): Intake/Output Summary (Last 24 hours) at 2023 1415  Last data filed at 2023 1101  Gross per 24 hour   Intake 300 ml   Output 1501 ml   Net -1201 ml       Physical Exam:   Physical Exam  Constitutional:       Appearance: She is obese  She is ill-appearing  HENT:      Head: Normocephalic  Nose: Nose normal       Mouth/Throat:      Mouth: Mucous membranes are moist    Eyes:      Pupils: Pupils are equal, round, and reactive to light  Cardiovascular:      Rate and Rhythm: Normal rate and regular rhythm  Pulses: Normal pulses  Pulmonary:      Effort: Pulmonary effort is normal       Breath sounds: Normal breath sounds  Abdominal:      General: Abdomen is flat  Bowel sounds are normal       Palpations: Abdomen is soft  Musculoskeletal:      Right lower leg: Edema present  Left lower leg: Edema present        Comments: Chronic lymphedema bilateral lower extremity with some erythema of the lower extremity  Denuded area of skin noted in the right lower extremity above the ankle  No foul-smelling drainage or discharge  Neurological:      General: No focal deficit present  Mental Status: She is alert  Mental status is at baseline  Additional Data:     Labs:  Results from last 7 days   Lab Units 02/05/23  0553   WBC Thousand/uL 7 62   HEMOGLOBIN g/dL 9 0*   HEMATOCRIT % 29 4*   PLATELETS Thousands/uL 361   NEUTROS PCT % 52   LYMPHS PCT % 33   MONOS PCT % 12   EOS PCT % 2     Results from last 7 days   Lab Units 02/05/23  0553 02/04/23  1837   SODIUM mmol/L 141 136   POTASSIUM mmol/L 3 2* 2 7*   CHLORIDE mmol/L 98 90*   CO2 mmol/L 37* 35*   BUN mg/dL 16 19   CREATININE mg/dL 1 08 1 19   ANION GAP mmol/L 6 11   CALCIUM mg/dL 8 9 9 8   ALBUMIN g/dL  --  4 3   TOTAL BILIRUBIN mg/dL  --  0 46   ALK PHOS U/L  --  163*   ALT U/L  --  9   AST U/L  --  13   GLUCOSE RANDOM mg/dL 103 98     Results from last 7 days   Lab Units 02/04/23  1837   INR  1 15             Results from last 7 days   Lab Units 02/04/23  1837   LACTIC ACID mmol/L 1 3   PROCALCITONIN ng/ml <0 05       Lines/Drains:  Invasive Devices     Peripheral Intravenous Line  Duration           Peripheral IV Dorsal (posterior); Left Forearm -- days    Peripheral IV 02/04/23 Right Antecubital <1 day                  Telemetry:  Telemetry Orders (From admission, onward)             24 Hour Telemetry Monitoring  Continuous x 24 Hours (Telem)        References:    Telemetry Guidelines   Question:  Reason for 24 Hour Telemetry  Answer:  Metabolic/Electrolyte Disturbance with High Probability of Dysrhythmia (K level <3 or >6, or KCL infusion >=10mEq/hr)                 Telemetry Reviewed: Normal Sinus Rhythm  Indication for Continued Telemetry Use: Metabolic/electrolyte disturbance with high probability of dysrhythmia             Imaging: No pertinent imaging reviewed      Recent Cultures (last 7 days):   Results from last 7 days   Lab Units 02/04/23  1837   BLOOD CULTURE  Received in Microbiology Lab  Culture in Progress  Received in Microbiology Lab  Culture in Progress  Last 24 Hours Medication List:   Current Facility-Administered Medications   Medication Dose Route Frequency Provider Last Rate   • acetaminophen  975 mg Oral [facility] SHAHIDA Keating     • ammonium lactate   Topical BID PRN SHAHIDA Keating     • apixaban  5 mg Oral BID SOFÍA KeatingNP     • carvedilol  3 125 mg Oral BID With Meals SHAHIDA Keating     • cefazolin  1,000 mg Intravenous Q8H SHAHIDA Keating 1,000 mg (02/05/23 1411)   • donepezil  10 mg Oral HS SHAHIDA Keating     • ferrous sulfate  325 mg Oral Daily With Breakfast SHAHIDA Keating     • gabapentin  100 mg Oral BID SHAHIDA Keating     • lidocaine  2 patch Topical Daily SHAHIDA Keating     • memantine  10 mg Oral Daily SHAHIDA Keating     • [START ON 2/8/2023] memantine  10 mg Oral BID SHAHIDA Keating     • oxyCODONE  10 mg Oral Q8H PRN SOFÍA KeatingNP     • oxyCODONE  5 mg Oral Q8H PRN SOFÍA KeatingNP     • pantoprazole  40 mg Oral Daily SHAHIDA Keating     • potassium chloride  60 mEq Oral Daily Damico SOFÍA RiveraNP     • saccharomyces boulardii  250 mg Oral BID SOFÍA KeatingNP     • senna-docusate sodium  2 tablet Oral BID SOFÍA KeatingNP     • tiZANidine  4 mg Oral Q8H PRN SOFÍA KeatingNP     • torsemide  40 mg Oral BID DamicoSOFÍA VencesNP     • vancomycin  25 mg/kg (Adjusted) Intravenous Once Alissa Shelling, DO Stopped (02/04/23 1950)        Today, Patient Was Seen By: Levi Neal MD    **Please Note: This note may have been constructed using a voice recognition system  **

## 2023-02-05 NOTE — ASSESSMENT & PLAN NOTE
· Chronic and ongoing  Likely secondary to deconditioning and chronic comorbidities    · Has home PT OT   · Fall precaution

## 2023-02-05 NOTE — ASSESSMENT & PLAN NOTE
· Chronic and ongoing  Likely secondary to deconditioning and chronic comorbidities  · Has home PT OT   · Fall precaution  · PT/OT evaluation will be obtained during this hospitalization

## 2023-02-05 NOTE — ASSESSMENT & PLAN NOTE
· Potassium 2 7 on admission  Given 40 meq PO in ED     · Give another 40 meq IV now  · Start telemetry until >3  · Stat magnesium  · BMP in AM

## 2023-02-05 NOTE — UTILIZATION REVIEW
Initial Clinical Review    Admission: Date/Time/Statement:   Admission Orders (From admission, onward)     Ordered        02/04/23 [address]  Once                      Orders Placed This Encounter   Procedures   • INPATIENT ADMISSION     Standing Status:   Standing     Number of Occurrences:   1     Order Specific Question:   Level of Care     Answer:   Med Surg [16]     Order Specific Question:   Estimated length of stay     Answer:   More than 2 Midnights     Order Specific Question:   Certification     Answer:   I certify that inpatient services are medically necessary for this patient for a duration of greater than two midnights  See H&P and MD Progress Notes for additional information about the patient's course of treatment  ED Arrival Information     Expected   -    Arrival   2/4/2023 18:00    Acuity   Urgent            Means of arrival   Walk-In    Escorted by   Family Member    Service   Hospitalist    Admission type   Emergency            Arrival complaint   Cellulitis           Chief Complaint   Patient presents with   • Cellulitis     Pt was stated on abx for cellulitis on wed, pts leg (R) is worse, with an open weeping wound  Initial Presentation: 69 y o  female from home to ED admitted inpatient due to Bilateral LE cellulitis/Hypokalemia/Fracture lumbar vertebrae  Presented due to worsening leg pain starting 3 days prior to arrival, on antibiotics for cellulitis  + chills  Diarrhea for last 2 days  Has home PT for fracture lumbar  On exam: bilateral LE edema  Large amount of lower extremity erythema, open wounds on the legs  Warmth, tenderness  K 2 7  Wbc 11 08   Ecg sinus  In the ED given Cefepime, after starting, patient coughing, gagging, she is concerned allergic reaction, physician doubts, given benadryl  Given IV lasix, KCL  Plan is continue antibiotics, switch to Ancef  Stool C Diff and enteric panel  Continue home medications  Pain control  Telemetry, replete K, given magnesium  Date: 2/5/23 Day 2: afebrile  Denies pain in feet  No diarrhea  Net -1201  On exam obese  Chronic lymphedema bilateral lower extremity with some erythema of the lower extremity  Denuded area of skin noted in the right lower extremity above the ankle  Wbc 7 62   K 3 2  Telemetry sinus  Continue Ancef  Stool studies if diarrhea reoccurs  Replete K  Continue home torsemide  Hold home metolazone  Fluid and sodium restriction  PT/OT            ED Triage Vitals   Temperature Pulse Respirations Blood Pressure SpO2   02/04/23 1810 02/04/23 1810 02/04/23 1810 02/04/23 1810 02/04/23 1810   98 7 °F (37 1 °C) 74 20 161/70 94 %      Temp Source Heart Rate Source Patient Position - Orthostatic VS BP Location FiO2 (%)   02/05/23 0814 02/04/23 1810 02/04/23 1810 02/04/23 1810 --   Oral Monitor Lying Left arm       Pain Score       02/04/23 1958       10 - Worst Possible Pain          Wt Readings from Last 1 Encounters:   02/05/23 89 8 kg (197 lb 15 6 oz)     Additional Vital Signs:   02/05/23 08:14:47 98 2 °F (36 8 °C) 71 -- 129/57 81 100 % -- Lying   02/04/23 21:19:42 -- 89 -- 150/58 89 92 % -- --   02/04/23 21:01:26 99 3 °F (37 4 °C) -- -- 130/56 81 -- -- --   02/04/23 2000 -- 107 Abnormal  22 154/76 108 93 % None (Room air      Pertinent Labs/Diagnostic Test Results:   XR chest portable   Final Result by Elzbieta Malhotra MD (02/05 1200)         Asymmetric right perihilar opacity likely represents mild pulmonary edema, less likely developing pneumonia  If concern for the latter, a follow-up radiograph may be helpful for reassessment                 Workstation performed: [id number]           2/4/23 ecg  Interpretation: normal     Rate:     ECG rate:  72     ECG rate assessment: normal     Rhythm:     Rhythm: sinus rhythm     Ectopy:     Ectopy: none     QRS:     QRS axis:  Normal     QRS intervals:  Normal   Conduction:     Conduction: normal     ST segments:     ST segments:  Normal   T waves:     T waves: normal      Results from last 7 days   Lab Units 02/05/23  0553 02/04/23  1837 02/02/23  0840   WBC Thousand/uL 7 62 11 08* 5 71   HEMOGLOBIN g/dL 9 0* 9 6* 8 9*   HEMATOCRIT % 29 4* 31 6* 29 6*   PLATELETS Thousands/uL 361 410* 322   NEUTROS ABS Thousands/µL 3 95 7 11  --      Results from last 7 days   Lab Units 02/05/23  0553 02/04/23  1837 02/02/23  0840   SODIUM mmol/L 141 136 139   POTASSIUM mmol/L 3 2* 2 7* 3 6   CHLORIDE mmol/L 98 90* 101   CO2 mmol/L 37* 35* 32   ANION GAP mmol/L 6 11 6   BUN mg/dL 16 19 14   CREATININE mg/dL 1 08 1 19 0 99   EGFR ml/min/1 73sq m 49 44 55   CALCIUM mg/dL 8 9 9 8 9 6   MAGNESIUM mg/dL 2 2 1 8*  --      Results from last 7 days   Lab Units 02/04/23  1837 02/02/23  0840   AST U/L 13 18   ALT U/L 9 15   ALK PHOS U/L 163* 143*   TOTAL PROTEIN g/dL 8 1 7 0   ALBUMIN g/dL 4 3 3 2*   TOTAL BILIRUBIN mg/dL 0 46 0 45     Results from last 7 days   Lab Units 02/05/23  0553 02/04/23  1837 02/02/23  0840   GLUCOSE RANDOM mg/dL 103 98 143*     Results from last 7 days   Lab Units 02/04/23  1929   HS TNI 0HR ng/L 9     Results from last 7 days   Lab Units 02/04/23  1837   PROTIME seconds 15 0*   INR  1 15   PTT seconds 30     Results from last 7 days   Lab Units 02/04/23  1837   PROCALCITONIN ng/ml <0 05     Results from last 7 days   Lab Units 02/04/23  1837   LACTIC ACID mmol/L 1 3     Results from last 7 days   Lab Units 02/04/23  2017   BNP pg/mL 238*     Results from last 7 days   Lab Units 02/05/23  0553 02/04/23  1837   CRP mg/L  --  4 2*   SED RATE mm/hour 32*  --      Results from last 7 days   Lab Units 02/04/23  1837   BLOOD CULTURE  Received in Microbiology Lab  Culture in Progress  Received in Microbiology Lab  Culture in Progress         ED Treatment:   Medication Administration from 02/04/2023 1800 to 02/04/2023 2053       Date/Time Order Dose Route Action Comments     02/04/2023 1917 EST cefepime (MAXIPIME) 2 g/50 mL dextrose IVPB 2,000 mg Intravenous New Bag --     02/04/2023 1911 EST furosemide (LASIX) injection 40 mg 40 mg Intravenous Given --     02/04/2023 1958 EST morphine injection 4 mg 4 mg Intravenous Given --     02/04/2023 1930 EST diphenhydrAMINE (BENADRYL) injection 25 mg 25 mg Intravenous Given --     02/04/2023 1946 EST potassium chloride oral solution 40 mEq 40 mEq Oral Given --        Past Medical History:   Diagnosis Date   • A-fib ([facility] ) 12/29/2021   • Acute respiratory failure with hypoxia (HCC) 11/12/2021   • Anxiety    • Arthritis    • Back pain    • Cellulitis    • Cellulitis, unspecified 12/30/2021   • CHF (congestive heart failure) ([facility] )    • Colon cancer screening 8/3/2022   • Edema of both lower extremities due to peripheral venous insufficiency    • Edema of both lower extremities due to peripheral venous insufficiency    • Encounter for screening mammogram for malignant neoplasm of breast 3/21/2022   • Encounter for support and coordination of transition of care 9/26/2022   • Erythema of lower extremity 11/12/2021   • Fibromyalgia    • Great toe pain, right 10/6/2022   • Hypertension    • Hypotension 9/17/2022   • Leukocytosis 10/6/2022   • Melena 8/20/2022   • Osteoporosis screening 8/3/2022   • Sjogren syndrome, unspecified ([facility] )      Present on Admission:  • Chronic heart failure with preserved ejection fraction (HFpEF) ([facility])  • Bilateral lower leg cellulitis  • Unspecified atrial fibrillation ([facility])  • Ambulatory dysfunction  • Anxiety  • Memory impairment  • Closed fracture of transverse process of lumbar vertebra ([facility])  • Anemia      Admitting Diagnosis: Acute hypokalemia [E87 6]  Cellulitis of leg [L03 119]  Erythema of lower extremity [L53 9]  Acute congestive heart failure, unspecified heart failure type ([facility] ) [I50 9]  Age/Sex: 69 y o  female  Admission Orders: 2/4/23 1956 inpatient   Scheduled Medications:  acetaminophen, 975 mg, Oral, Q8H [facility]  apixaban, 5 mg, Oral, BID  carvedilol, 3 125 mg, Oral, BID With Meals  cefazolin, 1,000 mg, Intravenous, Q8H  donepezil, 10 mg, Oral, HS  ferrous sulfate, 325 mg, Oral, Daily With Breakfast  gabapentin, 100 mg, Oral, BID  lidocaine, 2 patch, Topical, Daily  memantine, 10 mg, Oral, Daily  [START ON 2/8/2023] memantine, 10 mg, Oral, BID  pantoprazole, 40 mg, Oral, Daily  potassium chloride, 60 mEq, Oral, Daily  saccharomyces boulardii, 250 mg, Oral, BID  senna-docusate sodium, 2 tablet, Oral, BID  torsemide, 40 mg, Oral, BID  vancomycin, 25 mg/kg (Adjusted), Intravenous, Once 1950 2/4/23     HYDROmorphone (DILAUDID) injection 0 5 mg  Dose: 0 5 mg  Freq: Once Route: IV  Start: 02/04/23 2145 End: 02/04/23 2115    magnesium sulfate 2 g/50 mL IVPB (premix) 2 g  Dose: 2 g  Freq: Once Route: IV  Last Dose: 2 g (02/04/23 2215)  Start: 02/04/23 2200 End: 02/05/23 0015    potassium chloride (K-DUR,KLOR-CON) CR tablet 60 mEq  Dose: 60 mEq  Freq: Daily Route: PO  Start: 02/05/23 0700    Continuous IV Infusions: none      PRN Meds:  ammonium lactate, , Topical, BID PRN  oxyCODONE, 10 mg, Oral, Q8H PRN x 1 2/4, x 1 2/5  oxyCODONE, 5 mg, Oral, Q8H PRN  tiZANidine, 4 mg, Oral, Q8H PRN    Neurovascular checks every 4 hours  Telemetry   Fluid restriction   PT      Network Utilization Review Department  ATTENTION: Please call with any questions or concerns to [phone] and carefully listen to the prompts so that you are directed to the right person  All voicemails are confidential   Madelon Sierra all requests for admission clinical reviews, approved or denied determinations and any other requests to dedicated fax number below belonging to the campus where the patient is receiving treatment   List of dedicated fax numbers for the Facilities:  [address] DENIALS (Administrative/Medical Necessity) [phone]   [address] (Maternity/NICU/Pediatrics) [address] [address] [address] [facility] 28 [facility] 310 [facility] [phone] [facility] [phone]

## 2023-02-05 NOTE — ASSESSMENT & PLAN NOTE
· Pain control  · Tylenol 975mg TID  · Zanaflex 4 mg q8 hoursPRN  · Oxycodone 5 mg q8 hr prn for moderate, 10 mg q8 prn for severe  · Currently has home PT

## 2023-02-05 NOTE — ASSESSMENT & PLAN NOTE
· 2 days of nonbloody diarrhea without abdominal pain  Has had multiple hospitalizations  · Send C  difficile and bacterial stool panel  · If patient develops abdominal pain will check CT  · Currently denies any diarrhea

## 2023-02-06 VITALS
SYSTOLIC BLOOD PRESSURE: 154 MMHG | OXYGEN SATURATION: 96 % | HEART RATE: 74 BPM | BODY MASS INDEX: 35.69 KG/M2 | RESPIRATION RATE: 17 BRPM | DIASTOLIC BLOOD PRESSURE: 66 MMHG | WEIGHT: 195.11 LBS | TEMPERATURE: 98.4 F

## 2023-02-06 PROBLEM — R19.7 DIARRHEA: Status: RESOLVED | Noted: 2023-02-04 | Resolved: 2023-02-06

## 2023-02-06 LAB
ANION GAP SERPL CALCULATED.3IONS-SCNC: 7 MMOL/L (ref 4–13)
BASOPHILS # BLD AUTO: 0.05 THOUSANDS/ÂΜL (ref 0–0.1)
BASOPHILS NFR BLD AUTO: 1 % (ref 0–1)
BUN SERPL-MCNC: 14 MG/DL (ref 5–25)
CALCIUM SERPL-MCNC: 8 MG/DL (ref 8.4–10.2)
CHLORIDE SERPL-SCNC: 101 MMOL/L (ref 96–108)
CO2 SERPL-SCNC: 33 MMOL/L (ref 21–32)
CREAT SERPL-MCNC: 1.05 MG/DL (ref 0.6–1.3)
EOSINOPHIL # BLD AUTO: 0.43 THOUSAND/ÂΜL (ref 0–0.61)
EOSINOPHIL NFR BLD AUTO: 6 % (ref 0–6)
ERYTHROCYTE [DISTWIDTH] IN BLOOD BY AUTOMATED COUNT: 21.2 % (ref 11.6–15.1)
GFR SERPL CREATININE-BSD FRML MDRD: 51 ML/MIN/1.73SQ M
GLUCOSE SERPL-MCNC: 137 MG/DL (ref 65–140)
HCT VFR BLD AUTO: 28.8 % (ref 34.8–46.1)
HGB BLD-MCNC: 8.6 G/DL (ref 11.5–15.4)
IMM GRANULOCYTES # BLD AUTO: 0.03 THOUSAND/UL (ref 0–0.2)
IMM GRANULOCYTES NFR BLD AUTO: 0 % (ref 0–2)
LYMPHOCYTES # BLD AUTO: 1.48 THOUSANDS/ÂΜL (ref 0.6–4.47)
LYMPHOCYTES NFR BLD AUTO: 21 % (ref 14–44)
MCH RBC QN AUTO: 24.1 PG (ref 26.8–34.3)
MCHC RBC AUTO-ENTMCNC: 29.9 G/DL (ref 31.4–37.4)
MCV RBC AUTO: 81 FL (ref 82–98)
MONOCYTES # BLD AUTO: 0.8 THOUSAND/ÂΜL (ref 0.17–1.22)
MONOCYTES NFR BLD AUTO: 11 % (ref 4–12)
NEUTROPHILS # BLD AUTO: 4.4 THOUSANDS/ÂΜL (ref 1.85–7.62)
NEUTS SEG NFR BLD AUTO: 61 % (ref 43–75)
NRBC BLD AUTO-RTO: 0 /100 WBCS
PLATELET # BLD AUTO: 298 THOUSANDS/UL (ref 149–390)
PMV BLD AUTO: 10.2 FL (ref 8.9–12.7)
POTASSIUM SERPL-SCNC: 3 MMOL/L (ref 3.5–5.3)
RBC # BLD AUTO: 3.57 MILLION/UL (ref 3.81–5.12)
SODIUM SERPL-SCNC: 141 MMOL/L (ref 135–147)
WBC # BLD AUTO: 7.19 THOUSAND/UL (ref 4.31–10.16)

## 2023-02-06 RX ORDER — POTASSIUM CHLORIDE 20 MEQ/1
60 TABLET, EXTENDED RELEASE ORAL 2 TIMES DAILY WITH MEALS
Status: DISCONTINUED | OUTPATIENT
Start: 2023-02-06 | End: 2023-02-06 | Stop reason: HOSPADM

## 2023-02-06 RX ORDER — SACCHAROMYCES BOULARDII 250 MG
250 CAPSULE ORAL 2 TIMES DAILY
Qty: 10 CAPSULE | Refills: 0 | Status: SHIPPED | OUTPATIENT
Start: 2023-02-06 | End: 2023-02-16

## 2023-02-06 RX ADMIN — ACETAMINOPHEN 975 MG: 325 TABLET, FILM COATED ORAL at 05:57

## 2023-02-06 RX ADMIN — TIZANIDINE 4 MG: 2 TABLET ORAL at 12:35

## 2023-02-06 RX ADMIN — Medication 250 MG: at 07:59

## 2023-02-06 RX ADMIN — DIPHENHYDRAMINE HYDROCHLORIDE 25 MG: 25 SOLUTION ORAL at 00:27

## 2023-02-06 RX ADMIN — CEFAZOLIN SODIUM 1000 MG: 1 SOLUTION INTRAVENOUS at 05:57

## 2023-02-06 RX ADMIN — TIZANIDINE 4 MG: 2 TABLET ORAL at 02:53

## 2023-02-06 RX ADMIN — GABAPENTIN 100 MG: 100 CAPSULE ORAL at 07:58

## 2023-02-06 RX ADMIN — LIDOCAINE 2 PATCH: 50 PATCH CUTANEOUS at 08:08

## 2023-02-06 RX ADMIN — OXYCODONE HYDROCHLORIDE 5 MG: 5 TABLET ORAL at 02:53

## 2023-02-06 RX ADMIN — OXYCODONE HYDROCHLORIDE 10 MG: 10 TABLET ORAL at 07:56

## 2023-02-06 RX ADMIN — TORSEMIDE 40 MG: 20 TABLET ORAL at 07:57

## 2023-02-06 RX ADMIN — ACETAMINOPHEN 975 MG: 325 TABLET, FILM COATED ORAL at 14:10

## 2023-02-06 RX ADMIN — PANTOPRAZOLE SODIUM 40 MG: 40 TABLET, DELAYED RELEASE ORAL at 07:58

## 2023-02-06 RX ADMIN — POTASSIUM CHLORIDE 60 MEQ: 1500 TABLET, EXTENDED RELEASE ORAL at 07:58

## 2023-02-06 RX ADMIN — APIXABAN 5 MG: 5 TABLET, FILM COATED ORAL at 07:58

## 2023-02-06 RX ADMIN — FERROUS SULFATE TAB 325 MG (65 MG ELEMENTAL FE) 325 MG: 325 (65 FE) TAB at 07:56

## 2023-02-06 RX ADMIN — CARVEDILOL 3.12 MG: 3.12 TABLET, FILM COATED ORAL at 07:56

## 2023-02-06 RX ADMIN — MEMANTINE 10 MG: 10 TABLET ORAL at 07:58

## 2023-02-06 RX ADMIN — CEFAZOLIN SODIUM 1000 MG: 1 SOLUTION INTRAVENOUS at 12:36

## 2023-02-06 NOTE — CASE MANAGEMENT
Case Management Assessment & Discharge Planning Note    Patient name Jackson TAYLOR /S -01 MRN [id number]  : 1945 Date 2023       Current Admission Date: 2023  Current Admission Diagnosis:Bilateral lower leg cellulitis   Patient Active Problem List    Diagnosis Date Noted   • Bilateral lower leg cellulitis 2023   • Fracture of rib, single, closed 2022   • Closed fracture of transverse process of lumbar vertebra ([facility] ) 2022   • Compression fracture of T8 vertebra with routine healing 2022   • Parenchymal renal hypertension 2022   • Chronic heart failure with preserved ejection fraction (HFpEF) ([facility] ) 10/18/2022   • Acute gastric ulcer with hemorrhage 10/05/2022   • Age-related osteoporosis with current pathological fracture 2022   • Anemia 2022   • Ambulatory dysfunction 2022   • Constipation 2022   • Fall 2022   • Fracture of proximal end of left femur ([facility] ) 2022   • Acute pain due to trauma 2022   • At risk for obstructive sleep apnea 2022   • Lymphedema 2022   • Venous stasis dermatitis of both lower extremities 2022   • Seasonal allergies 2022   • Xerosis of skin 2022   • Prediabetes 2022   • Acute blood loss anemia 2022   • Memory impairment 2022   • Anxiety    • Hypokalemia 2022   • Opioid dependence, uncomplicated ([facility] )    • Unspecified atrial fibrillation ([facility] ) 2021   • Mixed hyperlipidemia 2021   • Obesity, unspecified 2021   • Unilateral primary osteoarthritis, unspecified knee 2021   • Chronic kidney disease, stage 2 (mild) 2021   • Chronic pain syndrome 2021   • Venous insufficiency (chronic) (peripheral) 2021   • Essential (primary) hypertension 2017   • Sjogren's syndrome ([facility] ) 2014      LOS (days): 2  Geometric Mean LOS (GMLOS) (days): 3 20  Days to GMLOS:1 5 OBJECTIVE:    Risk of Unplanned Readmission Score: 52 93         Current admission status: Inpatient       Preferred Pharmacy:   [address], [address]  [address] [address]  Phone: [phone] Fax: [phone]    Primary Care Provider: Zulma Pickering MD    Primary Insurance: [facility]  Secondary Insurance:     ASSESSMENT:  Marcel[address] [employer]     Jame Rodríguez 26 - Spouse   Primary Phone: [phone] (Mobile)  Home Phone: [phone]                              Patient Information  Admitted from[de-identified] Home  Mental Status: Alert  During Assessment patient was accompanied by: Not accompanied during assessment  Assessment information provided by[de-identified] Patient  Primary Caregiver: Self  Support Systems: Self, Spouse/significant other, Children  Type of Current Residence: [address] home  In the last 12 months, was there a time when you were not able to pay the mortgage or rent on time?: No  In the last 12 months, how many places have you lived?: 1  In the last 12 months, was there a time when you did not have a steady place to sleep or slept in a shelter (including now)?: No  Homeless/housing insecurity resource given?: N/A  Living Arrangements: Lives w/ Spouse/significant other    Activities of Daily Living Prior to Admission  Functional Status: Independent  Completes ADLs independently?: Yes  Ambulates independently?: Yes  Does patient use assisted devices?: Yes  Assisted Devices (DME) used: Mallika Heller  Does patient currently own DME?: Yes  What DME does the patient currently own?: Mallika Heller  Does patient have a history of Outpatient Therapy (PT/OT)?: No  Does the patient have a history of Short-Term Rehab?: Yes  Does patient have a history of HHC?: Yes  Does patient currently have [facility]?: Yes    Current Home Health Care  Type of Current Home Care Services: Home PT, Home OT, Nurse visit  [address][de-identified] [facility] Provider[de-identified] PCP    Patient Information Continued  Does patient have prescription coverage?: Yes  Within the past 12 months, you worried that your food would run out before you got the money to buy more : Never true  Within the past 12 months, the food you bought just didn't last and you didn't have money to get more : Never true  Food insecurity resource given?: N/A  Does patient receive dialysis treatments?: No         Means of Transportation  Means of Transport to Appts[de-identified] Family transport  In the past 12 months, has lack of transportation kept you from medical appointments or from getting medications?: No  In the past 12 months, has lack of transportation kept you from meetings, work, or from getting things needed for daily living?: No  Was application for public transport provided?: N/A        DISCHARGE DETAILS:    Discharge planning discussed with[de-identified] patient at bedside  [place] of Choice: Yes (re: home care)  Comments - [place] of Choice: requesting resumption of care with [facility] - referral sent  CM contacted family/caregiver?: No- see comments (reports spouse recently diagnosed with cancer and daughter on her way to pick her up)  Were Treatment Team discharge recommendations reviewed with patient/caregiver?: Yes  Did patient/caregiver verbalize understanding of patient care needs?: N/A- going to facility  Were patient/caregiver advised of the risks associated with not following Treatment Team discharge recommendations?: Yes         Requested [address]         Is the patient interested in [facility] at discharge?: Yes  [address] requested[de-identified] Nursing, Occupational Therapy, Physical [address] Name[de-identified] [facility] Provider[de-identified] PCP  Home Health Services Needed[de-identified] Evaluate Functional Status and Safety, Gait/ADL Training, Strengthening/Theraputic Exercises to Improve Function, Wound/Ostomy Care  Homebound Criteria Met[de-identified] Requires the Assistance of Another Person for Safe Ambulation or to Leave the Home, Uses an Assist Device (i e  cane, walker, etc)  Supporting Clincal Findings[de-identified] Fatigues Easliy in [employer], Limited Endurance    DME Referral Provided  Referral made for DME?: No    Other Referral/Resources/Interventions Provided:  Interventions: [facility]  Referral Comments: Patient admitted due to bilateral lower leg cellulitis  Met with patient at bedside to complete assessment  Patient reports she lives at home with spouse, but spouse was recently diagnosed with cancer  Daughter is currently visiting from [place] and will be picking her up from the hospital (states she's on her way at this time)  Patient reports that she ambulates with a walker at baseline and is current with home care through [facility] - requesting referral to them for resumption of care  No other d/c needs identified at this time  Daughter to transport home and referral sent to [facility] for continuation of services      Would you like to participate in our [address] service program?  : No - Declined    Treatment Team Recommendation: Home with [address] [employer]  Discharge Destination Plan[de-identified] Home with [address] [employer] [facility])  Transport at Discharge : Family                             IMM Given (Date):: 02/05/23

## 2023-02-06 NOTE — DISCHARGE INSTR - AVS FIRST PAGE
Dear Coty Pretty,     It was our pleasure to care for you here at [facility]  At this time we provide for you here, the most important instructions / recommendations at discharge:     Notable Medication Adjustments -   Continue keflex antibiotic for the legs are you were previously prescribed  Probiotic can be used to prevent diarrhea associated with antibiotic  Diuretic will decrease the electrolytes  Continue taking home potassium supplement  Can take over the counter multivitamin daily, too  If you have muscle spasm try to drink some gatorade to increase the electrolytes  Testing Required after Discharge -   None  Important follow up information -   Keep primary care appt  Keep cardiology appt  Wound care referral was made they will call you to make an appt to check up on the legs  Other Instructions -   Wound care RN here in the hospital wrote recommendations located in this discharge paperwork for the visiting RN to use when dressing the wounds at home  Please review this entire after visit summary as additional general instructions including medication list, appointments, activity, diet, any pertinent wound care, and other additional recommendations from your care team that may be provided for you        Sincerely,     Mendel Seed, PA-C

## 2023-02-06 NOTE — ASSESSMENT & PLAN NOTE
· Seen by PCP 2/1  Noted to have blisters, edema, erythema due to chronic lymphedema  Started on Keflex 2/1/2023  · Presents to ED 2/4/2023 with worsening of symptoms  No sepsis on admission  Procal negative  BCx x 2 in process  · Antibiotics:  · Cefepime and vancomycin ordered in ED  Started cefepime and patient became itchy, SOB  ED provider did not feel like true allergic reaction and verbally told nurses to discontinue antibiotics as he did not believe there was true cellulitic infection  · Ancef day 2 - complete the prior keflex Rx on dc  · Wound care consultation appreciated  [facility] referral placed

## 2023-02-06 NOTE — PLAN OF CARE
Problem: Potential for Falls  Goal: Patient will remain free of falls  Description: INTERVENTIONS:  - Educate patient/family on patient safety including physical limitations  - Instruct patient to call for assistance with activity   - Consult OT/PT to assist with strengthening/mobility   - Keep Call bell within reach  - Keep bed low and locked with side rails adjusted as appropriate  - Keep care items and personal belongings within reach  - Initiate and maintain comfort rounds  - Make Fall Risk Sign visible to staff  - Offer Toileting every 2 Hours, in advance of need  - Initiate/Maintain bed alarm  - Obtain necessary fall risk management equipment: bed alarm  - Apply yellow socks and bracelet for high fall risk patients  - Consider moving patient to room near nurses station  Outcome: Progressing     Problem: PAIN - ADULT  Goal: Verbalizes/displays adequate comfort level or baseline comfort level  Description: Interventions:  - Encourage patient to monitor pain and request assistance  - Assess pain using appropriate pain scale  - Administer analgesics based on type and severity of pain and evaluate response  - Implement non-pharmacological measures as appropriate and evaluate response  - Consider cultural and social influences on pain and pain management  - Notify physician/advanced practitioner if interventions unsuccessful or patient reports new pain  Outcome: Progressing     Problem: INFECTION - ADULT  Goal: Absence or prevention of progression during hospitalization  Description: INTERVENTIONS:  - Assess and monitor for signs and symptoms of infection  - Monitor lab/diagnostic results  - Monitor all insertion sites, i e  indwelling lines, tubes, and drains  - Monitor endotracheal if appropriate and nasal secretions for changes in amount and color  - [place] appropriate cooling/warming therapies per order  - Administer medications as ordered  - Instruct and encourage patient and family to use good hand hygiene technique  - Identify and instruct in appropriate isolation precautions for identified infection/condition  Outcome: Progressing  Goal: Absence of fever/infection during neutropenic period  Description: INTERVENTIONS:  - Monitor WBC    Outcome: Progressing

## 2023-02-06 NOTE — DISCHARGE INSTR - OTHER ORDERS
Wound Care Plan:    1  Cleanse bilateral lower legs with NSS & pat dry  Open small Dermagran square & apply to wounds in a single layer cut to size of wounds  Apply Hydraguard to periwound areas, cover with gauze or ABD pad, nirmal & tape  Change every other day & as needed for soilage/dislodgement  2  Apply hydraguard to both heels, buttocks and sacrum 2x/day and as needed for prevention and protection  3  Apply skin nourishing cream the entire skin daily for moisture  4  Turn and reposition in bed at least every 2 hours   5  Float heels off of bed with pillows to offload pressure so heels are not in contact with pillows or mattress   6  Apply pressure redistribution waffle cushion to chair when OOB, if able  7  Elevate legs when sitting to heart level or above  8  Wear compression stockings daily as prescribed  9  Call Central Scheduling to make a follow up wound care appointment at [facility] or [facility] [address]: [phone]  If you have open wounds, new compression stockings can be ordered through the wound center

## 2023-02-06 NOTE — ASSESSMENT & PLAN NOTE
· In the process of tapering medications  · Lexapro was tapered and now discontinued  · Busbar discontinued  · Anxiety is not well controlled    Was given dose of benedryl in ED for anxiety

## 2023-02-06 NOTE — DISCHARGE SUMMARY
[facility]  Discharge- Suraj  1945, 69 y o  female MRN: [id number]  Unit/Bed#: S -01 Encounter: [id number]  Primary Care Provider: Key Estes MD   Date and time admitted to hospital: 2/4/2023  6:03 PM    * Bilateral lower leg cellulitis  Assessment & Plan  · Seen by PCP 2/1  Noted to have blisters, edema, erythema due to chronic lymphedema  Started on Keflex 2/1/2023  · Presents to ED 2/4/2023 with worsening of symptoms  No sepsis on admission  Procal negative  BCx x 2 in process  · Antibiotics:  · Cefepime and vancomycin ordered in ED  Started cefepime and patient became itchy, SOB  ED provider did not feel like true allergic reaction and verbally told nurses to discontinue antibiotics as he did not believe there was true cellulitic infection  · Ancef day 2 - complete the prior keflex Rx on dc  · Wound care consultation appreciated  Amby referral placed  Closed fracture of transverse process of lumbar vertebra (HCC)  Assessment & Plan  · Pain control  · Tylenol 975mg TID  · Zanaflex 4 mg q8 hoursPRN  · Oxycodone 5 mg q8 hr prn for moderate, 10 mg q8 prn for severe  · Currently has home PT      Hypokalemia  Assessment & Plan  · ;Replete and recheck    Chronic heart failure with preserved ejection fraction (HFpEF) ([facility])  Assessment & Plan  Wt Readings from Last 3 Encounters:   02/06/23 88 5 kg (195 lb 1 7 oz)   02/01/23 94 7 kg (208 lb 12 8 oz)   12/30/22 96 6 kg (213 lb)     • Weights are down over time  • Echo 10/2022 EF 55%, unable to assess diastolic function due to atrial fibrillation  • BNP wnl, Trop wnl  • Home regimen: Torsemide 40 milligrams bid  Metolazone 2 5 mg Monday, Thursdays with extra potassium supplement  Resume on dc  • She has op cards appt soon, keep this  • Continue home beta-blocker: carvedilol 3 125 mg BID  • Monitor intake and output, daily weights  • Diet: Fluid restriction and 2 g Sodium      Anemia  Assessment & Plan  · Hemoglobin 9 6, but overall stable compared to prior  Denies any current bleeding  · Likely in combination of anemia of chronic disease and LEANNE in setting of prior UGIB  · Continue iron supplements     Ambulatory dysfunction  Assessment & Plan  · Chronic and ongoing  Likely secondary to deconditioning and chronic comorbidities  · Has home PT OT , continue  · Fall precaution    Memory impairment  Assessment & Plan  · Continue donepezil 10 mg HS  · Continue namenda starter pack  · 2/1-2/7 1 tablet  · Starting 2/8 1 tab BID  · Fall precaution, reorient as necessary    Anxiety  Assessment & Plan  · In the process of tapering medications  · Lexapro was tapered and now discontinued  · Busbar discontinued  · Anxiety is not well controlled  Was given dose of benedryl in ED for anxiety    Unspecified atrial fibrillation ([address] )  Assessment & Plan  · Maintained on eliquis, carvedilol   · Currently rate controlled afib  · Replete electrolytes     Medical Problems     Resolved Problems  Date Reviewed: 2/4/2023   None       Discharging Physician / Practitioner: Sherral Cowden, PA-C  PCP: Teagan Durán MD  Admission Date:   Admission Orders (From admission, onward)     Ordered        02/04/23 1956  INPATIENT ADMISSION  Once                      Discharge Date: 02/06/23    Consultations During Hospital Stay:  · Wound care, CM    Procedures Performed:   · None    Significant Findings / Test Results:   XR chest portable   Final Result by Jayne Fontenot MD (02/05 1200)         Asymmetric right perihilar opacity likely represents mild pulmonary edema, less likely developing pneumonia  If concern for the latter, a follow-up radiograph may be helpful for reassessment                 Workstation performed: [id number]           Results Reviewed     Procedure Component Value Units Date/Time    Blood culture #1 [[id number]] Collected: 02/04/23 1837    Lab Status: Preliminary result Specimen: Blood from Arm, Right Updated: 02/05/23 2301     Blood Culture No Growth at 24 hrs  Blood culture #2 [[id number]] Collected: 02/04/23 1837    Lab Status: Preliminary result Specimen: Blood from Arm, Right Updated: 02/05/23 2301     Blood Culture No Growth at 24 hrs      Sedimentation rate, automated [[id number]]  (Abnormal) Collected: 02/05/23 0553    Lab Status: Final result Specimen: Blood from Arm, Right Updated: 02/05/23 0639     Sed Rate 32 mm/hour     C-reactive protein [[id number]]  (Abnormal) Collected: 02/04/23 1837    Lab Status: Final result Specimen: Blood from Arm, Right Updated: 02/04/23 2109     CRP 4 2 mg/L     Magnesium [[id number]]  (Abnormal) Collected: 02/04/23 1837    Lab Status: Final result Specimen: Blood from Arm, Right Updated: 02/04/23 2109     Magnesium 1 8 mg/dL     B-Type Natriuretic Peptide(BNP) [[id number]]  (Abnormal) Collected: 02/04/23 2017    Lab Status: Final result Specimen: Blood from Arm, Right Updated: 02/04/23 2054      pg/mL     HS Troponin 0hr (reflex protocol) [[id number]]  (Normal) Collected: 02/04/23 1929    Lab Status: Final result Specimen: Blood from Arm, Right Updated: 02/04/23 2012     hs TnI 0hr 9 ng/L     Comprehensive metabolic panel [[id number]]  (Abnormal) Collected: 02/04/23 1837    Lab Status: Final result Specimen: Blood from Arm, Right Updated: 02/04/23 1933     Sodium 136 mmol/L      Potassium 2 7 mmol/L      Chloride 90 mmol/L      CO2 35 mmol/L      ANION GAP 11 mmol/L      BUN 19 mg/dL      Creatinine 1 19 mg/dL      Glucose 98 mg/dL      Calcium 9 8 mg/dL      AST 13 U/L      ALT 9 U/L      Alkaline Phosphatase 163 U/L      Total Protein 8 1 g/dL      Albumin 4 3 g/dL      Total Bilirubin 0 46 mg/dL      eGFR 44 ml/min/1 73sq m     Narrative:      [place] guidelines for Chronic Kidney Disease (CKD):   •  Stage 1 with normal or high GFR (GFR > 90 mL/min/1 73 square meters)  •  Stage 2 Mild CKD (GFR = 60-89 mL/min/1 73 square meters)  •  Stage 3A Moderate CKD (GFR = 45-59 mL/min/1 73 square meters)  •  Stage 3B Moderate CKD (GFR = 30-44 mL/min/1 73 square meters)  •  Stage 4 Severe CKD (GFR = 15-29 mL/min/1 73 square meters)  •  Stage 5 End Stage CKD (GFR <15 mL/min/1 73 square meters)  Note: GFR calculation is accurate only with a steady state creatinine    Procalcitonin [[id number]]  (Normal) Collected: 02/04/23 1837    Lab Status: Final result Specimen: Blood from Arm, Right Updated: 02/04/23 1917     Procalcitonin <0 05 ng/ml     Protime-INR [[id number]]  (Abnormal) Collected: 02/04/23 1837    Lab Status: Final result Specimen: Blood from Arm, Right Updated: 02/04/23 1909     Protime 15 0 seconds      INR 1 15    APTT [[id number]]  (Normal) Collected: 02/04/23 1837    Lab Status: Final result Specimen: Blood from Arm, Right Updated: 02/04/23 1909     PTT 30 seconds     Lactic acid [[id number]]  (Normal) Collected: 02/04/23 1837    Lab Status: Final result Specimen: Blood from Arm, Right Updated: 02/04/23 1906     LACTIC ACID 1 3 mmol/L     Narrative:      Result may be elevated if tourniquet was used during collection      CBC and differential [[id number]]  (Abnormal) Collected: 02/04/23 1837    Lab Status: Final result Specimen: Blood from Arm, Right Updated: 02/04/23 1846     WBC 11 08 Thousand/uL      RBC 4 11 Million/uL      Hemoglobin 9 6 g/dL      Hematocrit 31 6 %      MCV 77 fL      MCH 23 4 pg      MCHC 30 4 g/dL      RDW 20 9 %      MPV 10 2 fL      Platelets 831 Thousands/uL      nRBC 0 /100 WBCs      Neutrophils Relative 64 %      Immat GRANS % 0 %      Lymphocytes Relative 24 %      Monocytes Relative 9 %      Eosinophils Relative 2 %      Basophils Relative 1 %      Neutrophils Absolute 7 11 Thousands/µL      Immature Grans Absolute 0 03 Thousand/uL      Lymphocytes Absolute 2 62 Thousands/µL      Monocytes Absolute 1 04 Thousand/µL      Eosinophils Absolute 0 23 Thousand/µL      Basophils Absolute 0 05 Thousands/µL           Incidental Findings:   · None     Test Results Pending at Discharge (will require follow up):   · BCx x 2 from admission 2/4/2023 are NTD     Outpatient Tests Requested:  · None    Complications:  None    Reason for Admission: bl leg pain    Hospital Course:   Jackson Oviedo is a 69 y o  female patient with pmh A  fib on Eliquis, CHF, anxiety, venous insufficiency of lower extremities, hypertension, osteoporosis, upper GI bleed who originally presented to the hospital on 2/4/2023 due to worsening lower extremity pain, erythema and open wounds  She did not meet sepsis criteria on admission  IV abx were started  Erythema dulled in color and with elevation the legs are decreasing in size  Wound care saw the patient and provided home and VNA recommendations  Ambulatory referral was placed in the dc tab to [facility] wound care clinic for follow up  Continue home diuretic schedule  Compression stocking ordered  Continue keflex on dc for the entire course which was Rx prior to admission (patient only took 1 day of it, she can continue it as prescribe to completion)  Also added probiotic to avoid diarrhea side effect from abx  She has PCP appt coming up which will be helpful to have someone lay eyes on the legs make sure they are continuing to improve  Also keep cards appt which is presently schedule for this month  No heart medication adjustments on dc  Patient and her daughter have had all of their questions answered and are amenable to her dc to home with resumption of [facility] services  Please see above list of diagnoses and related plan for additional information  Condition at Discharge: stable    Discharge Day Visit / Exam:   Subjective:  Erythema and edema in the bl legs are improving per patient  No pain in the legs   She has not ambulated yet today  Vitals: Blood Pressure: 154/66 (02/06/23 0747)  Pulse: 74 (02/06/23 0747)  Temperature: 98 4 °F (36 9 °C) (02/06/23 0747)  Temp Source: Oral (02/05/23 8113)  Respirations: 17 (02/06/23 7404)  Weight - Scale: 88 5 kg (195 lb 1 7 oz) (02/06/23 0600)  SpO2: 96 % (02/06/23 0747)  Exam:   Physical Exam  Vitals and nursing note reviewed  Constitutional:       General: She is not in acute distress  Appearance: She is obese  She is not ill-appearing, toxic-appearing or diaphoretic  HENT:      Head: Normocephalic and atraumatic  Nose: Nose normal       Mouth/Throat:      Mouth: Mucous membranes are moist    Eyes:      Conjunctiva/sclera: Conjunctivae normal    Cardiovascular:      Rate and Rhythm: Normal rate and regular rhythm  Pulses: Normal pulses  Pulmonary:      Effort: Pulmonary effort is normal  No respiratory distress  Breath sounds: Normal breath sounds  No stridor  No wheezing, rhonchi or rales  Chest:      Chest wall: No tenderness  Abdominal:      General: Bowel sounds are normal  There is no distension  Palpations: Abdomen is soft  Tenderness: There is no abdominal tenderness  There is no guarding  Musculoskeletal:         General: No tenderness  Normal range of motion  Cervical back: Normal range of motion and neck supple  Comments: 1+ edema in the bl le to the knees, normal rom   Skin:     Comments: Erythema receding from the knees and ankles and wound care rn was just in, I did not view under the dressing myself looked at media tab  Dressing are c/d/i     Onychomycosis toenails, dry skin   Neurological:      General: No focal deficit present  Mental Status: She is alert  Mental status is at baseline  Psychiatric:         Mood and Affect: Mood normal       Comments: Tearful when describing her home life  is sick with cancer    Cognitive changes in the older adult          Discussion with Family: Updated  (jeremy) via phone  Discharge instructions/Information to patient and family:   See after visit summary for information provided to patient and family        Provisions for Follow-Up Care:  See after visit summary for information related to follow-up care and any pertinent home health orders  Disposition:   Home with VNA Services (Reminder: Complete face to face encounter)    Planned Readmission: none     Discharge Statement:  I spent 45 minutes discharging the patient  This time was spent on the day of discharge  I had direct contact with the patient on the day of discharge  Greater than 50% of the total time was spent examining patient, answering all patient questions, arranging and discussing plan of care with patient as well as directly providing post-discharge instructions  Additional time then spent on discharge activities  Discharge Medications:  See after visit summary for reconciled discharge medications provided to patient and/or family        **Please Note: This note may have been constructed using a voice recognition system**

## 2023-02-06 NOTE — ASSESSMENT & PLAN NOTE
· Reports PTA 2 days of nonbloody diarrhea without abdominal pain  She has had multiple hospitalizations  · Negative C  difficile and bacterial stool panel  · If patient develops abdominal pain will check CT  · Currently denies any diarrhea

## 2023-02-06 NOTE — ASSESSMENT & PLAN NOTE
Wt Readings from Last 3 Encounters:   02/06/23 88 5 kg (195 lb 1 7 oz)   02/01/23 94 7 kg (208 lb 12 8 oz)   12/30/22 96 6 kg (213 lb)     • Weights are down over time  • Echo 10/2022 EF 55%, unable to assess diastolic function due to atrial fibrillation  • BNP wnl, Trop wnl  • Home regimen: Torsemide 40 milligrams bid  Metolazone 2 5 mg Monday, Thursdays with extra potassium supplement  Resume on dc  • She has op cards appt soon, keep this  • Continue home beta-blocker: carvedilol 3 125 mg BID  • Monitor intake and output, daily weights  • Diet: Fluid restriction and 2 g Sodium

## 2023-02-06 NOTE — WOUND OSTOMY CARE
Progress Note - Wound   Mathew Winadipane 69 y o  female MRN: [id number]  Unit/Bed#: S -01 Encounter: [id number]      Assessment: This is a 69year old female patient admitted on 2/4/23 with bilateral lower leg cellulitis  She has a history of CHF, ambulatory dysfunction, anxiety & memory impairment  She was awake, alert & oriented x 3 and is mostly dependent upon nursing staff for all of her care  She has a Purewick in place with no record of BM  She turns and repositions with assist of 1 person  Assessment Findings:  1  Full thickness wound clusters to bilateral lower extremities with red granulation tissue, black eschar and yellow slough  No drainage noted due to wounds left open to air  Orders in place for wound and skin care  2  Sacrobuttocks with blanchable erythema, bilateral heels intact - orders in place for skin care and for prevention  Plan:    1  Cleanse bilateral lower legs with NSS & pat dry  Open small Dermagran square & apply to wounds in a single layer cut to size of wounds  Apply Hydraguard to periwound areas, cover with gauze or ABD pad, nirmal & tape  Change every other day & prn soilage/dislodgement  2  Apply hydraguard to b/l heels, buttocks and sacrum BID and PRN for prevention and protection  3  Apply skin nourishing cream the entire skin daily for moisture  4  Turn and reposition patient every  2 hours   5  Float heels off of bed with pillows to offload pressure so heels are not in contact with pillows or mattress   6  Apply EHOB waffle cushion to chair when OOB, if able      Wound 02/06/23 Venous Ulcer Cellulitis Leg Right; Anterior; Lower (Active)   Wound Image  Distal cluster of wound is a dried, non-intact blister 02/06/23 1001   Wound Description Granulation tissue;Pink;Slough; Yellow;Eschar;Black 02/06/23 1001   Ольга-wound Assessment Dry; Intact; Erythema;Edema 02/06/23 1001   Wound Length (cm) 12 5 cm 02/06/23 1001   Wound Width (cm) 4 8 cm 02/06/23 1001   Wound Depth (cm) 0 1 cm 02/06/23 1001   Wound Surface Area (cm^2) 60 cm^2 02/06/23 1001   Wound Volume (cm^3) 6 cm^3 02/06/23 1001   Calculated Wound Volume (cm^3) 6 cm^3 02/06/23 1001   Drainage Amount None 02/06/23 1001   Non-staged Wound Description Full thickness 02/06/23 1001   Treatments Cleansed 02/06/23 1001   Dressing Dermagran gauze; Moisture barrier;ABD;Dry dressing 02/06/23 1001   Wound packed? No 02/06/23 1001   Dressing Changed New 02/06/23 1001   Dressing Status Clean;Dry; Intact 02/06/23 1001       Wound 02/06/23 Venous Ulcer Cellulitis Leg Left (Active)   Wound Image  Intact serum filled blister @ 3:00 02/06/23 1003   Wound Description Granulation tissue; Beefy red;Eschar;Black 02/06/23 1003   Ольга-wound Assessment Dry; Intact;Edema; Erythema 02/06/23 1003   Wound Length (cm) 4 5 cm 02/06/23 1003   Wound Width (cm) 5 cm 02/06/23 1003   Wound Depth (cm) 0 1 cm 02/06/23 1003   Wound Surface Area (cm^2) 22 5 cm^2 02/06/23 1003   Wound Volume (cm^3) 2 25 cm^3 02/06/23 1003   Calculated Wound Volume (cm^3) 2 25 cm^3 02/06/23 1003   Drainage Amount None 02/06/23 1003   Non-staged Wound Description Full thickness 02/06/23 1003   Treatments Cleansed 02/06/23 1003   Dressing Dermagran gauze;Protective barrier;ABD;Dry dressing 02/06/23 1003   Wound packed? No 02/06/23 1003   Dressing Changed New 02/06/23 1003   Dressing Status Clean;Dry; Intact 02/06/23 1003     Discussed assessment findings, and plan of care/recommendations with Chris Sanders  Wound care will follow along with patient throughout admission, please call or tiger text with questions and concerns  Recommendations written as orders    Billie Guevara RN, BSN, [employer]

## 2023-02-06 NOTE — ASSESSMENT & PLAN NOTE
· Chronic and ongoing  Likely secondary to deconditioning and chronic comorbidities  · Has home PT OT , continue    · Fall precaution

## 2023-02-06 NOTE — PLAN OF CARE
Problem: Potential for Falls  Goal: Patient will remain free of falls  Description: INTERVENTIONS:  - Educate patient/family on patient safety including physical limitations  - Instruct patient to call for assistance with activity   - Consult OT/PT to assist with strengthening/mobility   - Keep Call bell within reach  - Keep bed low and locked with side rails adjusted as appropriate  - Keep care items and personal belongings within reach  - Initiate and maintain comfort rounds  - Make Fall Risk Sign visible to staff  - Apply yellow socks and bracelet for high fall risk patients  - Consider moving patient to room near nurses station  Outcome: Adequate for Discharge     Problem: PAIN - ADULT  Goal: Verbalizes/displays adequate comfort level or baseline comfort level  Description: Interventions:  - Encourage patient to monitor pain and request assistance  - Assess pain using appropriate pain scale  - Administer analgesics based on type and severity of pain and evaluate response  - Implement non-pharmacological measures as appropriate and evaluate response  - Consider cultural and social influences on pain and pain management  - Notify physician/advanced practitioner if interventions unsuccessful or patient reports new pain  2/6/2023 1441 by Geovanny High  Outcome: Adequate for Discharge  2/6/2023 1408 by Geovanny High  Outcome: Progressing     Problem: INFECTION - ADULT  Goal: Absence or prevention of progression during hospitalization  Description: INTERVENTIONS:  - Assess and monitor for signs and symptoms of infection  - Monitor lab/diagnostic results  - Monitor all insertion sites, i e  indwelling lines, tubes, and drains  - Monitor endotracheal if appropriate and nasal secretions for changes in amount and color  - [place] appropriate cooling/warming therapies per order  - Administer medications as ordered  - Instruct and encourage patient and family to use good hand hygiene technique  - Identify and instruct in appropriate isolation precautions for identified infection/condition  Outcome: Adequate for Discharge  Goal: Absence of fever/infection during neutropenic period  Description: INTERVENTIONS:  - Monitor WBC    Outcome: Adequate for Discharge     Problem: SAFETY ADULT  Goal: Patient will remain free of falls  Description: INTERVENTIONS:  - Educate patient/family on patient safety including physical limitations  - Instruct patient to call for assistance with activity   - Consult OT/PT to assist with strengthening/mobility   - Keep Call bell within reach  - Keep bed low and locked with side rails adjusted as appropriate  - Keep care items and personal belongings within reach  - Initiate and maintain comfort rounds  - Make Fall Risk Sign visible to staff  - Apply yellow socks and bracelet for high fall risk patients  - Consider moving patient to room near nurses station  Outcome: Adequate for Discharge  Goal: Maintain or return to baseline ADL function  Description: INTERVENTIONS:  -  Assess patient's ability to carry out ADLs; assess patient's baseline for ADL function and identify physical deficits which impact ability to perform ADLs (bathing, care of mouth/teeth, toileting, grooming, dressing, etc )  - Assess/evaluate cause of self-care deficits   - Assess range of motion  - Assess patient's mobility; develop plan if impaired  - Assess patient's need for assistive devices and provide as appropriate  - Encourage maximum independence but intervene and supervise when necessary  - Involve family in performance of ADLs  - Assess for home care needs following discharge   - Consider OT consult to assist with ADL evaluation and planning for discharge  - Provide patient education as appropriate  Outcome: Adequate for Discharge  Goal: Maintains/Returns to pre admission functional level  Description: INTERVENTIONS:  - Perform BMAT or MOVE assessment daily    - Set and communicate daily mobility goal to care team and patient/family/caregiver  - Collaborate with rehabilitation services on mobility goals if consulted  - Out of bed for toileting  - Record patient progress and toleration of activity level   Outcome: Adequate for Discharge     Problem: DISCHARGE PLANNING  Goal: Discharge to home or other facility with appropriate resources  Description: INTERVENTIONS:  - Identify barriers to discharge w/patient and caregiver  - Arrange for needed discharge resources and transportation as appropriate  - Identify discharge learning needs (meds, wound care, etc )  - Arrange for interpretive services to assist at discharge as needed  - Refer to Case Management Department for coordinating discharge planning if the patient needs post-hospital services based on physician/advanced practitioner order or complex needs related to functional status, cognitive ability, or social support system  Outcome: Adequate for Discharge     Problem: Knowledge Deficit  Goal: Patient/family/caregiver demonstrates understanding of disease process, treatment plan, medications, and discharge instructions  Description: Complete learning assessment and assess knowledge base    Interventions:  - Provide teaching at level of understanding  - Provide teaching via preferred learning methods  2/6/2023 1441 by Rosa Arce  Outcome: Adequate for Discharge  2/6/2023 1408 by Rosa Arce  Outcome: Progressing     Problem: MOBILITY - ADULT  Goal: Maintain or return to baseline ADL function  Description: INTERVENTIONS:  -  Assess patient's ability to carry out ADLs; assess patient's baseline for ADL function and identify physical deficits which impact ability to perform ADLs (bathing, care of mouth/teeth, toileting, grooming, dressing, etc )  - Assess/evaluate cause of self-care deficits   - Assess range of motion  - Assess patient's mobility; develop plan if impaired  - Assess patient's need for assistive devices and provide as appropriate  - Encourage maximum independence but intervene and supervise when necessary  - Involve family in performance of ADLs  - Assess for home care needs following discharge   - Consider OT consult to assist with ADL evaluation and planning for discharge  - Provide patient education as appropriate  Outcome: Adequate for Discharge  Goal: Maintains/Returns to pre admission functional level  Description: INTERVENTIONS:  - Perform BMAT or MOVE assessment daily    - Set and communicate daily mobility goal to care team and patient/family/caregiver     - Collaborate with rehabilitation services on mobility goals if consulted  - Out of bed for toileting  - Record patient progress and toleration of activity level   Outcome: Adequate for Discharge

## 2023-02-07 ENCOUNTER — TRANSITIONAL CARE MANAGEMENT (OUTPATIENT)
Dept: FAMILY MEDICINE CLINIC | Facility: CLINIC | Age: 78
End: 2023-02-07

## 2023-02-07 ENCOUNTER — RA CDI HCC (OUTPATIENT)
Dept: OTHER | Facility: HOSPITAL | Age: 78
End: 2023-02-07

## 2023-02-07 DIAGNOSIS — Z71.89 COMPLEX CARE COORDINATION: Primary | ICD-10-CM

## 2023-02-07 LAB
ATRIAL RATE: 65 BPM
QRS AXIS: 83 DEGREES
QRSD INTERVAL: 96 MS
QT INTERVAL: 390 MS
QTC INTERVAL: 427 MS
T WAVE AXIS: 14 DEGREES
VENTRICULAR RATE: 72 BPM

## 2023-02-07 NOTE — PROGRESS NOTES
E66 01, I13 0  [facility] 75  coding opportunities          Chart Reviewed number of suggestions sent to Provider: 2     Patients Insurance     Medicare Insurance: [employer]

## 2023-02-08 ENCOUNTER — TELEPHONE (OUTPATIENT)
Dept: SURGERY | Facility: CLINIC | Age: 78
End: 2023-02-08

## 2023-02-09 ENCOUNTER — OFFICE VISIT (OUTPATIENT)
Dept: INTERNAL MEDICINE CLINIC | Facility: CLINIC | Age: 78
End: 2023-02-09

## 2023-02-09 ENCOUNTER — APPOINTMENT (EMERGENCY)
Dept: CT IMAGING | Facility: HOSPITAL | Age: 78
DRG: 057 | End: 2023-02-09
Payer: COMMERCIAL

## 2023-02-09 ENCOUNTER — HOSPITAL ENCOUNTER (INPATIENT)
Facility: HOSPITAL | Age: 78
LOS: 7 days | Discharge: HOME HEALTH | DRG: 057 | End: 2023-02-16
Attending: EMERGENCY MEDICINE | Admitting: HOSPITALIST
Payer: COMMERCIAL

## 2023-02-09 ENCOUNTER — APPOINTMENT (EMERGENCY)
Dept: RADIOLOGY | Facility: HOSPITAL | Age: 78
DRG: 057 | End: 2023-02-09
Payer: COMMERCIAL

## 2023-02-09 VITALS
BODY MASS INDEX: 34.16 KG/M2 | HEART RATE: 103 BPM | HEIGHT: 62 IN | DIASTOLIC BLOOD PRESSURE: 80 MMHG | WEIGHT: 185.6 LBS | OXYGEN SATURATION: 98 % | SYSTOLIC BLOOD PRESSURE: 120 MMHG | TEMPERATURE: 98.9 F

## 2023-02-09 DIAGNOSIS — L03.116 BILATERAL LOWER LEG CELLULITIS: ICD-10-CM

## 2023-02-09 DIAGNOSIS — S32.009D CLOSED FRACTURE OF TRANSVERSE PROCESS OF LUMBAR VERTEBRA WITH ROUTINE HEALING, SUBSEQUENT ENCOUNTER: ICD-10-CM

## 2023-02-09 DIAGNOSIS — S22.060D COMPRESSION FRACTURE OF T8 VERTEBRA WITH ROUTINE HEALING: ICD-10-CM

## 2023-02-09 DIAGNOSIS — F11.20 UNCOMPLICATED OPIOID DEPENDENCE (HCC): ICD-10-CM

## 2023-02-09 DIAGNOSIS — F03.90 DEMENTIA (HCC): ICD-10-CM

## 2023-02-09 DIAGNOSIS — I87.2 STASIS DERMATITIS OF BOTH LEGS: ICD-10-CM

## 2023-02-09 DIAGNOSIS — G89.29 CHRONIC PAIN: ICD-10-CM

## 2023-02-09 DIAGNOSIS — E87.6 HYPOKALEMIA: ICD-10-CM

## 2023-02-09 DIAGNOSIS — R44.1 HALLUCINATIONS, VISUAL: ICD-10-CM

## 2023-02-09 DIAGNOSIS — G30.8 OTHER ALZHEIMER'S DISEASE (CODE) (HCC): ICD-10-CM

## 2023-02-09 DIAGNOSIS — G93.40 ACUTE ENCEPHALOPATHY: Primary | ICD-10-CM

## 2023-02-09 DIAGNOSIS — I87.2 VENOUS INSUFFICIENCY (CHRONIC) (PERIPHERAL): ICD-10-CM

## 2023-02-09 DIAGNOSIS — R41.82 ALTERED MENTAL STATUS, UNSPECIFIED: ICD-10-CM

## 2023-02-09 DIAGNOSIS — G89.11 ACUTE PAIN DUE TO TRAUMA: ICD-10-CM

## 2023-02-09 DIAGNOSIS — R41.82 ALTERED MENTAL STATUS, UNSPECIFIED ALTERED MENTAL STATUS TYPE: Primary | ICD-10-CM

## 2023-02-09 DIAGNOSIS — R19.7 WATERY DIARRHEA: ICD-10-CM

## 2023-02-09 DIAGNOSIS — F11.93 OPIATE WITHDRAWAL (HCC): ICD-10-CM

## 2023-02-09 DIAGNOSIS — R41.3 MEMORY IMPAIRMENT: ICD-10-CM

## 2023-02-09 DIAGNOSIS — S22.069A T8 VERTEBRAL FRACTURE (HCC): ICD-10-CM

## 2023-02-09 DIAGNOSIS — L03.115 BILATERAL LOWER LEG CELLULITIS: ICD-10-CM

## 2023-02-09 PROBLEM — G30.9 ALZHEIMER DISEASE (HCC): Status: ACTIVE | Noted: 2023-02-09

## 2023-02-09 PROBLEM — F02.80 ALZHEIMER DISEASE (HCC): Status: ACTIVE | Noted: 2023-02-09

## 2023-02-09 PROBLEM — R41.89 COGNITIVE DECLINE: Status: ACTIVE | Noted: 2023-02-09

## 2023-02-09 LAB
ALBUMIN SERPL BCP-MCNC: 4.8 G/DL (ref 3.5–5)
ALP SERPL-CCNC: 170 U/L (ref 34–104)
ALT SERPL W P-5'-P-CCNC: 6 U/L (ref 7–52)
AMPHETAMINES SERPL QL SCN: NEGATIVE
ANION GAP SERPL CALCULATED.3IONS-SCNC: 15 MMOL/L (ref 4–13)
APAP SERPL-MCNC: <10 UG/ML (ref 10–20)
AST SERPL W P-5'-P-CCNC: 14 U/L (ref 13–39)
BACTERIA BLD CULT: NORMAL
BACTERIA BLD CULT: NORMAL
BARBITURATES UR QL: NEGATIVE
BASOPHILS # BLD AUTO: 0.07 THOUSANDS/ÂΜL (ref 0–0.1)
BASOPHILS NFR BLD AUTO: 1 % (ref 0–1)
BENZODIAZ UR QL: NEGATIVE
BILIRUB SERPL-MCNC: 0.75 MG/DL (ref 0.2–1)
BILIRUB UR QL STRIP: NEGATIVE
BUN SERPL-MCNC: 18 MG/DL (ref 5–25)
CALCIUM SERPL-MCNC: 10.7 MG/DL (ref 8.4–10.2)
CHLORIDE SERPL-SCNC: 96 MMOL/L (ref 96–108)
CLARITY UR: CLEAR
CO2 SERPL-SCNC: 29 MMOL/L (ref 21–32)
COCAINE UR QL: NEGATIVE
COLOR UR: COLORLESS
CREAT SERPL-MCNC: 1.05 MG/DL (ref 0.6–1.3)
EOSINOPHIL # BLD AUTO: 0.32 THOUSAND/ÂΜL (ref 0–0.61)
EOSINOPHIL NFR BLD AUTO: 3 % (ref 0–6)
ERYTHROCYTE [DISTWIDTH] IN BLOOD BY AUTOMATED COUNT: 21.9 % (ref 11.6–15.1)
ETHANOL SERPL-MCNC: <10 MG/DL
FLUAV RNA RESP QL NAA+PROBE: NEGATIVE
FLUBV RNA RESP QL NAA+PROBE: NEGATIVE
GFR SERPL CREATININE-BSD FRML MDRD: 51 ML/MIN/1.73SQ M
GLUCOSE SERPL-MCNC: 100 MG/DL (ref 65–140)
GLUCOSE UR STRIP-MCNC: NEGATIVE MG/DL
HCT VFR BLD AUTO: 37.9 % (ref 34.8–46.1)
HGB BLD-MCNC: 11.3 G/DL (ref 11.5–15.4)
HGB UR QL STRIP.AUTO: NEGATIVE
IMM GRANULOCYTES # BLD AUTO: 0.03 THOUSAND/UL (ref 0–0.2)
IMM GRANULOCYTES NFR BLD AUTO: 0 % (ref 0–2)
KETONES UR STRIP-MCNC: NEGATIVE MG/DL
LACTATE SERPL-SCNC: 1.2 MMOL/L (ref 0.5–2)
LEUKOCYTE ESTERASE UR QL STRIP: NEGATIVE
LYMPHOCYTES # BLD AUTO: 2.68 THOUSANDS/ÂΜL (ref 0.6–4.47)
LYMPHOCYTES NFR BLD AUTO: 23 % (ref 14–44)
MCH RBC QN AUTO: 23.6 PG (ref 26.8–34.3)
MCHC RBC AUTO-ENTMCNC: 29.8 G/DL (ref 31.4–37.4)
MCV RBC AUTO: 79 FL (ref 82–98)
METHADONE UR QL: NEGATIVE
MONOCYTES # BLD AUTO: 1.1 THOUSAND/ÂΜL (ref 0.17–1.22)
MONOCYTES NFR BLD AUTO: 9 % (ref 4–12)
NEUTROPHILS # BLD AUTO: 7.48 THOUSANDS/ÂΜL (ref 1.85–7.62)
NEUTS SEG NFR BLD AUTO: 64 % (ref 43–75)
NITRITE UR QL STRIP: NEGATIVE
NRBC BLD AUTO-RTO: 0 /100 WBCS
OPIATES UR QL SCN: NEGATIVE
OXYCODONE+OXYMORPHONE UR QL SCN: POSITIVE
PCP UR QL: NEGATIVE
PH UR STRIP.AUTO: 7.5 [PH]
PLATELET # BLD AUTO: 460 THOUSANDS/UL (ref 149–390)
PMV BLD AUTO: 10.2 FL (ref 8.9–12.7)
POTASSIUM SERPL-SCNC: 3.2 MMOL/L (ref 3.5–5.3)
PROCALCITONIN SERPL-MCNC: <0.05 NG/ML
PROT SERPL-MCNC: 8.7 G/DL (ref 6.4–8.4)
PROT UR STRIP-MCNC: NEGATIVE MG/DL
RBC # BLD AUTO: 4.79 MILLION/UL (ref 3.81–5.12)
RSV RNA RESP QL NAA+PROBE: NEGATIVE
SALICYLATES SERPL-MCNC: <5 MG/DL (ref 3–20)
SARS-COV-2 RNA RESP QL NAA+PROBE: NEGATIVE
SODIUM SERPL-SCNC: 140 MMOL/L (ref 135–147)
SP GR UR STRIP.AUTO: 1.01 (ref 1–1.03)
THC UR QL: NEGATIVE
TSH SERPL DL<=0.05 MIU/L-ACNC: 1.58 UIU/ML (ref 0.45–4.5)
UROBILINOGEN UR STRIP-ACNC: <2 MG/DL
WBC # BLD AUTO: 11.68 THOUSAND/UL (ref 4.31–10.16)

## 2023-02-09 PROCEDURE — 81003 URINALYSIS AUTO W/O SCOPE: CPT | Performed by: EMERGENCY MEDICINE

## 2023-02-09 PROCEDURE — 84145 PROCALCITONIN (PCT): CPT | Performed by: EMERGENCY MEDICINE

## 2023-02-09 PROCEDURE — 70450 CT HEAD/BRAIN W/O DYE: CPT

## 2023-02-09 PROCEDURE — 83880 ASSAY OF NATRIURETIC PEPTIDE: CPT

## 2023-02-09 PROCEDURE — 99285 EMERGENCY DEPT VISIT HI MDM: CPT | Performed by: EMERGENCY MEDICINE

## 2023-02-09 PROCEDURE — 85025 COMPLETE CBC W/AUTO DIFF WBC: CPT | Performed by: EMERGENCY MEDICINE

## 2023-02-09 PROCEDURE — 36415 COLL VENOUS BLD VENIPUNCTURE: CPT | Performed by: EMERGENCY MEDICINE

## 2023-02-09 PROCEDURE — 83605 ASSAY OF LACTIC ACID: CPT | Performed by: EMERGENCY MEDICINE

## 2023-02-09 PROCEDURE — 82077 ASSAY SPEC XCP UR&BREATH IA: CPT | Performed by: EMERGENCY MEDICINE

## 2023-02-09 PROCEDURE — 0241U HB NFCT DS VIR RESP RNA 4 TRGT: CPT | Performed by: EMERGENCY MEDICINE

## 2023-02-09 PROCEDURE — 80307 DRUG TEST PRSMV CHEM ANLYZR: CPT | Performed by: EMERGENCY MEDICINE

## 2023-02-09 PROCEDURE — 99285 EMERGENCY DEPT VISIT HI MDM: CPT

## 2023-02-09 PROCEDURE — 93005 ELECTROCARDIOGRAM TRACING: CPT

## 2023-02-09 PROCEDURE — 87040 BLOOD CULTURE FOR BACTERIA: CPT | Performed by: EMERGENCY MEDICINE

## 2023-02-09 PROCEDURE — 80179 DRUG ASSAY SALICYLATE: CPT | Performed by: EMERGENCY MEDICINE

## 2023-02-09 PROCEDURE — 80053 COMPREHEN METABOLIC PANEL: CPT | Performed by: EMERGENCY MEDICINE

## 2023-02-09 PROCEDURE — 71045 X-RAY EXAM CHEST 1 VIEW: CPT

## 2023-02-09 PROCEDURE — 80143 DRUG ASSAY ACETAMINOPHEN: CPT | Performed by: EMERGENCY MEDICINE

## 2023-02-09 PROCEDURE — 84443 ASSAY THYROID STIM HORMONE: CPT | Performed by: EMERGENCY MEDICINE

## 2023-02-09 PROCEDURE — G1004 CDSM NDSC: HCPCS

## 2023-02-09 RX ORDER — CARVEDILOL 3.12 MG/1
3.12 TABLET ORAL 2 TIMES DAILY WITH MEALS
Status: DISCONTINUED | OUTPATIENT
Start: 2023-02-10 | End: 2023-02-16 | Stop reason: HOSPADM

## 2023-02-09 RX ORDER — POTASSIUM CHLORIDE 20 MEQ/1
40 TABLET, EXTENDED RELEASE ORAL ONCE
Status: COMPLETED | OUTPATIENT
Start: 2023-02-09 | End: 2023-02-09

## 2023-02-09 RX ORDER — FERROUS SULFATE 325(65) MG
325 TABLET ORAL DAILY
Status: DISCONTINUED | OUTPATIENT
Start: 2023-02-10 | End: 2023-02-16 | Stop reason: HOSPADM

## 2023-02-09 RX ORDER — DONEPEZIL HYDROCHLORIDE 5 MG/1
10 TABLET, FILM COATED ORAL
Status: DISCONTINUED | OUTPATIENT
Start: 2023-02-09 | End: 2023-02-16 | Stop reason: HOSPADM

## 2023-02-09 RX ORDER — AMOXICILLIN 250 MG
2 CAPSULE ORAL 2 TIMES DAILY
Status: DISCONTINUED | OUTPATIENT
Start: 2023-02-10 | End: 2023-02-16 | Stop reason: HOSPADM

## 2023-02-09 RX ORDER — POTASSIUM CHLORIDE 20 MEQ/1
20 TABLET, EXTENDED RELEASE ORAL
Status: DISCONTINUED | OUTPATIENT
Start: 2023-02-13 | End: 2023-02-16 | Stop reason: HOSPADM

## 2023-02-09 RX ORDER — TORSEMIDE 20 MG/1
40 TABLET ORAL 2 TIMES DAILY
Status: DISCONTINUED | OUTPATIENT
Start: 2023-02-09 | End: 2023-02-15

## 2023-02-09 RX ORDER — OXYCODONE HYDROCHLORIDE 10 MG/1
10 TABLET ORAL EVERY 8 HOURS PRN
Status: DISCONTINUED | OUTPATIENT
Start: 2023-02-09 | End: 2023-02-10

## 2023-02-09 RX ORDER — ESCITALOPRAM OXALATE 10 MG/1
10 TABLET ORAL DAILY
Status: DISCONTINUED | OUTPATIENT
Start: 2023-02-10 | End: 2023-02-16 | Stop reason: HOSPADM

## 2023-02-09 RX ORDER — MEMANTINE HYDROCHLORIDE 10 MG/1
10 TABLET ORAL 2 TIMES DAILY
Status: DISCONTINUED | OUTPATIENT
Start: 2023-02-10 | End: 2023-02-16 | Stop reason: HOSPADM

## 2023-02-09 RX ORDER — PANTOPRAZOLE SODIUM 40 MG/1
40 TABLET, DELAYED RELEASE ORAL
Status: DISCONTINUED | OUTPATIENT
Start: 2023-02-10 | End: 2023-02-16 | Stop reason: HOSPADM

## 2023-02-09 RX ORDER — POTASSIUM CHLORIDE 20 MEQ/1
60 TABLET, EXTENDED RELEASE ORAL DAILY
Status: DISCONTINUED | OUTPATIENT
Start: 2023-02-10 | End: 2023-02-16 | Stop reason: HOSPADM

## 2023-02-09 RX ORDER — OXYCODONE HYDROCHLORIDE 5 MG/1
5 TABLET ORAL ONCE
Status: COMPLETED | OUTPATIENT
Start: 2023-02-09 | End: 2023-02-09

## 2023-02-09 RX ORDER — TIZANIDINE 2 MG/1
4 TABLET ORAL EVERY 8 HOURS PRN
Status: DISCONTINUED | OUTPATIENT
Start: 2023-02-09 | End: 2023-02-13

## 2023-02-09 RX ORDER — METOLAZONE 5 MG/1
2.5 TABLET ORAL
Status: DISCONTINUED | OUTPATIENT
Start: 2023-02-13 | End: 2023-02-15

## 2023-02-09 RX ORDER — GABAPENTIN 100 MG/1
100 CAPSULE ORAL 2 TIMES DAILY
Status: DISCONTINUED | OUTPATIENT
Start: 2023-02-10 | End: 2023-02-16 | Stop reason: HOSPADM

## 2023-02-09 RX ORDER — ACETAMINOPHEN 325 MG/1
650 TABLET ORAL EVERY 6 HOURS SCHEDULED
Status: DISCONTINUED | OUTPATIENT
Start: 2023-02-10 | End: 2023-02-15

## 2023-02-09 RX ADMIN — POTASSIUM CHLORIDE 40 MEQ: 1500 TABLET, EXTENDED RELEASE ORAL at 19:49

## 2023-02-09 RX ADMIN — OXYCODONE HYDROCHLORIDE 5 MG: 5 TABLET ORAL at 19:48

## 2023-02-09 NOTE — ASSESSMENT & PLAN NOTE
- Patient has visual hallucination and audio hallucination last night at home  -Most likely due to flareup of her infection from recent cellulitis, electrolyte abnormalities, side effect of medications, chronic opioid use, Alzheimer's disease   -Differential includes toxic encephalopathy, metabolic encephalopathy  Plans:  -Recommended to go to the ER for further evaluation and management of sundowning and altered mental status

## 2023-02-09 NOTE — PROGRESS NOTES
Assessment & Plan     1  Altered mental status, unspecified altered mental status type  Assessment & Plan:  - Patient has visual hallucination and audio hallucination last night at home  -Most likely due to flareup of her infection from recent cellulitis, electrolyte abnormalities, side effect of medications, chronic opioid use, Alzheimer's disease   -Differential includes toxic encephalopathy, metabolic encephalopathy  Plans:  -Recommended to go to the ER for further evaluation and management of sundowning and altered mental status  Orders:  -     Transfer to other facility    2  Bilateral lower leg cellulitis  Assessment & Plan:  - Patient reports that her leg swelling has improved significantly during her last hospitalization   -She has completed 7 days of oral Keflex   -Complaining of left leg pain  -Denied fever, chills, nausea, vomiting  Plans:  Transfer to hospital for further evaluation and management for AMS  Orders:  -     Transfer to other facility    3  Other Alzheimer's disease (CODE) ([facility] 75 )  Assessment & Plan:  - Continue home med Aricept 10 mg daily  4  Uncomplicated opioid dependence ([facility] 75 )  Assessment & Plan:  - Patient has been taking oxycodone 10 mg oral every 8 hours as needed for severe leg pain  5  Watery diarrhea  Assessment & Plan:  - Patient completed a week course of antibiotics for bilateral lower extremity cellulitis   -She has watery diarrhea today with 7-8 episodes of bowel movement   -Denied fever, chills, nausea, vomiting  Plans:  Recommended to drink water  Referred to the ED for AMS  Monitor symptoms  Subjective     Transitional Care Management Review:   Jaz Su is a 69 y o  female here for TCM follow up       During the TCM phone call patient stated:  TCM Call     Date and time call was made  2/7/2023  4:21 PM    Hospital care reviewed  Records reviewed    Patient was hospitialized at  [address] [place] [address]    Date of Admission  12/30/22 Date of discharge  01/04/23    Diagnosis  cellulitis    Disposition  Home    Were the patients medications reviewed and updated  No  Spouse states he does not know    Current Symptoms  Weakness    Weakness severity  Mild    Fatigue severity  Mild      TCM Call     Post hospital issues  None    Scheduled for follow up? Yes    Did you obtain your prescribed medications  --  Spouse states he does not know if meds were updated    Do you need help managing your prescriptions or medications  No    Is transportation to your appointment needed  No    I have advised the patient to call PCP with any new or worsening symptoms  Alvaro Garcia MA    Living Arrangements  Family members    Support System  Friends; Family    The type of support provided  Physical; Emotional; Financial    Do you have social support  Yes, as much as I need    Are you recieving any outpatient services  Yes    What type of services  [facility]    Are you recieving home care services  No    Are you using any community resources  No    Current waiver services  No    Interperter language line needed  No        HPI   Pastora Hdz presents to the office today with her daughter Rima Lambert for transition of care after being discharged from [facility] 3 days ago  She was admitted on 02/04/23 for bilateral lower extremity cellulitis and she was given cefepime and vancomycin in the ED  Care consulted and she was discharged on Keflex  She reports that her leg swelling has improved significantly  However, she has severe left leg pain  She has visual hallucination and auditory hallucination yesterday evening  She saw people in her house and she heard jazz like music last night  She got upset seeing these people and hearing weird music  She denied fever, nausea, vomiting, abdominal pain  She reports that she has watery diarrhea today with 7-8 episodes of liquid bowel movement    She ate mashed potatoes yesterday  Review of Systems   Constitutional: Positive for activity change and appetite change  Negative for chills and fever  HENT: Negative for drooling, ear discharge, ear pain and sore throat  Eyes: Negative for photophobia, pain, redness and visual disturbance  Respiratory: Negative for apnea, cough, chest tightness and shortness of breath  Cardiovascular: Positive for leg swelling  Negative for chest pain and palpitations  Gastrointestinal: Negative for abdominal pain, diarrhea, nausea and vomiting  Endocrine: Negative  Negative for polydipsia, polyphagia and polyuria  Genitourinary: Negative  Negative for decreased urine volume, difficulty urinating, dyspareunia, dysuria, frequency, genital sores, hematuria, vaginal bleeding, vaginal discharge and vaginal pain  Musculoskeletal: Negative for arthralgias, back pain and joint swelling  Skin: Positive for rash and wound  Negative for color change  Allergic/Immunologic: Negative  Neurological: Negative for seizures, syncope, light-headedness, numbness and headaches  Psychiatric/Behavioral: Positive for confusion and hallucinations  All other systems reviewed and are negative  Objective     /80 (BP Location: Left arm, Patient Position: Sitting, Cuff Size: Large)   Pulse 103   Temp 98 9 °F (37 2 °C) (Tympanic)   Ht 5' 2" (1 575 m)   Wt 84 2 kg (185 lb 9 6 oz)   SpO2 98%   BMI 33 95 kg/m²      Physical Exam  Vitals and nursing note reviewed  Constitutional:       General: She is not in acute distress  Appearance: She is well-developed  She is obese  HENT:      Head: Normocephalic and atraumatic  Eyes:      Extraocular Movements: Extraocular movements intact  Conjunctiva/sclera: Conjunctivae normal    Cardiovascular:      Rate and Rhythm: Normal rate and regular rhythm  Heart sounds: Normal heart sounds  No murmur heard  Pulmonary:      Effort: Pulmonary effort is normal  No respiratory distress  Breath sounds: Normal breath sounds  Abdominal:      General: Bowel sounds are normal  There is no distension  Palpations: Abdomen is soft  Tenderness: There is no abdominal tenderness  Musculoskeletal:         General: Swelling present  Cervical back: Neck supple  Right lower leg: Edema present  Left lower leg: Edema present  Skin:     General: Skin is warm and dry  Capillary Refill: Capillary refill takes less than 2 seconds  Coloration: Skin is not jaundiced  Findings: Erythema and rash present  No bruising or lesion  Neurological:      Mental Status: She is alert and oriented to person, place, and time  Mental status is at baseline     Psychiatric:         Mood and Affect: Mood normal        Medications have been reviewed by provider in current encounter    Tina Paredes MD

## 2023-02-09 NOTE — ASSESSMENT & PLAN NOTE
- Patient reports that her leg swelling has improved significantly during her last hospitalization   -She has completed 7 days of oral Keflex   -Complaining of left leg pain  -Denied fever, chills, nausea, vomiting  Plans:  Transfer to hospital for further evaluation and management for AMS

## 2023-02-09 NOTE — ASSESSMENT & PLAN NOTE
- Patient completed a week course of antibiotics for bilateral lower extremity cellulitis   -She has watery diarrhea today with 7-8 episodes of bowel movement   -Denied fever, chills, nausea, vomiting  Plans:  Recommended to drink water  Referred to the ED for AMS  Monitor symptoms

## 2023-02-10 LAB
ALBUMIN SERPL BCP-MCNC: 4 G/DL (ref 3.5–5)
ALP SERPL-CCNC: 135 U/L (ref 34–104)
ALT SERPL W P-5'-P-CCNC: 5 U/L (ref 7–52)
ANION GAP SERPL CALCULATED.3IONS-SCNC: 10 MMOL/L (ref 4–13)
AST SERPL W P-5'-P-CCNC: 11 U/L (ref 13–39)
ATRIAL RATE: 300 BPM
BASOPHILS # BLD AUTO: 0.07 THOUSANDS/ÂΜL (ref 0–0.1)
BASOPHILS NFR BLD AUTO: 1 % (ref 0–1)
BILIRUB SERPL-MCNC: 0.7 MG/DL (ref 0.2–1)
BNP SERPL-MCNC: 65 PG/ML (ref 0–100)
BUN SERPL-MCNC: 18 MG/DL (ref 5–25)
CALCIUM SERPL-MCNC: 9.9 MG/DL (ref 8.4–10.2)
CHLORIDE SERPL-SCNC: 99 MMOL/L (ref 96–108)
CO2 SERPL-SCNC: 31 MMOL/L (ref 21–32)
CREAT SERPL-MCNC: 0.95 MG/DL (ref 0.6–1.3)
EOSINOPHIL # BLD AUTO: 0.29 THOUSAND/ÂΜL (ref 0–0.61)
EOSINOPHIL NFR BLD AUTO: 3 % (ref 0–6)
ERYTHROCYTE [DISTWIDTH] IN BLOOD BY AUTOMATED COUNT: 21.7 % (ref 11.6–15.1)
GFR SERPL CREATININE-BSD FRML MDRD: 58 ML/MIN/1.73SQ M
GLUCOSE SERPL-MCNC: 120 MG/DL (ref 65–140)
HCT VFR BLD AUTO: 34.5 % (ref 34.8–46.1)
HGB BLD-MCNC: 10.6 G/DL (ref 11.5–15.4)
IMM GRANULOCYTES # BLD AUTO: 0.05 THOUSAND/UL (ref 0–0.2)
IMM GRANULOCYTES NFR BLD AUTO: 1 % (ref 0–2)
LYMPHOCYTES # BLD AUTO: 2.25 THOUSANDS/ÂΜL (ref 0.6–4.47)
LYMPHOCYTES NFR BLD AUTO: 24 % (ref 14–44)
MAGNESIUM SERPL-MCNC: 2 MG/DL (ref 1.9–2.7)
MCH RBC QN AUTO: 24.3 PG (ref 26.8–34.3)
MCHC RBC AUTO-ENTMCNC: 30.7 G/DL (ref 31.4–37.4)
MCV RBC AUTO: 79 FL (ref 82–98)
MONOCYTES # BLD AUTO: 1.09 THOUSAND/ÂΜL (ref 0.17–1.22)
MONOCYTES NFR BLD AUTO: 12 % (ref 4–12)
NEUTROPHILS # BLD AUTO: 5.76 THOUSANDS/ÂΜL (ref 1.85–7.62)
NEUTS SEG NFR BLD AUTO: 59 % (ref 43–75)
NRBC BLD AUTO-RTO: 0 /100 WBCS
PHOSPHATE SERPL-MCNC: 3.5 MG/DL (ref 2.3–4.1)
PLATELET # BLD AUTO: 428 THOUSANDS/UL (ref 149–390)
PMV BLD AUTO: 10.9 FL (ref 8.9–12.7)
POTASSIUM SERPL-SCNC: 3.2 MMOL/L (ref 3.5–5.3)
PROT SERPL-MCNC: 7.4 G/DL (ref 6.4–8.4)
QRS AXIS: 90 DEGREES
QRSD INTERVAL: 86 MS
QT INTERVAL: 344 MS
QTC INTERVAL: 425 MS
RBC # BLD AUTO: 4.36 MILLION/UL (ref 3.81–5.12)
SODIUM SERPL-SCNC: 140 MMOL/L (ref 135–147)
T WAVE AXIS: 49 DEGREES
VENTRICULAR RATE: 92 BPM
WBC # BLD AUTO: 9.51 THOUSAND/UL (ref 4.31–10.16)

## 2023-02-10 PROCEDURE — 84100 ASSAY OF PHOSPHORUS: CPT

## 2023-02-10 PROCEDURE — 83735 ASSAY OF MAGNESIUM: CPT

## 2023-02-10 PROCEDURE — 36415 COLL VENOUS BLD VENIPUNCTURE: CPT

## 2023-02-10 PROCEDURE — 93010 ELECTROCARDIOGRAM REPORT: CPT | Performed by: INTERNAL MEDICINE

## 2023-02-10 PROCEDURE — 80053 COMPREHEN METABOLIC PANEL: CPT

## 2023-02-10 PROCEDURE — 85025 COMPLETE CBC W/AUTO DIFF WBC: CPT

## 2023-02-10 PROCEDURE — 99223 1ST HOSP IP/OBS HIGH 75: CPT | Performed by: HOSPITALIST

## 2023-02-10 RX ORDER — POTASSIUM CHLORIDE 20 MEQ/1
40 TABLET, EXTENDED RELEASE ORAL ONCE
Status: COMPLETED | OUTPATIENT
Start: 2023-02-10 | End: 2023-02-10

## 2023-02-10 RX ORDER — OXYCODONE HYDROCHLORIDE 5 MG/1
2.5 TABLET ORAL EVERY 6 HOURS PRN
Status: DISCONTINUED | OUTPATIENT
Start: 2023-02-10 | End: 2023-02-16 | Stop reason: HOSPADM

## 2023-02-10 RX ADMIN — OXYCODONE HYDROCHLORIDE 10 MG: 10 TABLET ORAL at 05:49

## 2023-02-10 RX ADMIN — ACETAMINOPHEN 650 MG: 325 TABLET ORAL at 17:42

## 2023-02-10 RX ADMIN — OXYCODONE HYDROCHLORIDE 2.5 MG: 5 TABLET ORAL at 19:12

## 2023-02-10 RX ADMIN — TORSEMIDE 40 MG: 20 TABLET ORAL at 21:07

## 2023-02-10 RX ADMIN — ESCITALOPRAM OXALATE 10 MG: 10 TABLET, FILM COATED ORAL at 10:00

## 2023-02-10 RX ADMIN — TORSEMIDE 40 MG: 20 TABLET ORAL at 01:03

## 2023-02-10 RX ADMIN — ACETAMINOPHEN 650 MG: 325 TABLET ORAL at 01:03

## 2023-02-10 RX ADMIN — APIXABAN 5 MG: 5 TABLET, FILM COATED ORAL at 17:42

## 2023-02-10 RX ADMIN — PANTOPRAZOLE SODIUM 40 MG: 40 TABLET, DELAYED RELEASE ORAL at 05:49

## 2023-02-10 RX ADMIN — TORSEMIDE 40 MG: 20 TABLET ORAL at 10:15

## 2023-02-10 RX ADMIN — POTASSIUM CHLORIDE 40 MEQ: 1500 TABLET, EXTENDED RELEASE ORAL at 17:41

## 2023-02-10 RX ADMIN — TIZANIDINE 4 MG: 2 TABLET ORAL at 22:10

## 2023-02-10 RX ADMIN — APIXABAN 5 MG: 5 TABLET, FILM COATED ORAL at 10:00

## 2023-02-10 RX ADMIN — ACETAMINOPHEN 650 MG: 325 TABLET ORAL at 11:36

## 2023-02-10 RX ADMIN — MEMANTINE 10 MG: 10 TABLET ORAL at 17:42

## 2023-02-10 RX ADMIN — ACETAMINOPHEN 650 MG: 325 TABLET ORAL at 05:49

## 2023-02-10 RX ADMIN — OXYCODONE HYDROCHLORIDE 2.5 MG: 5 TABLET ORAL at 11:49

## 2023-02-10 RX ADMIN — DONEPEZIL HYDROCHLORIDE 10 MG: 5 TABLET ORAL at 01:03

## 2023-02-10 RX ADMIN — POTASSIUM CHLORIDE 60 MEQ: 1500 TABLET, EXTENDED RELEASE ORAL at 10:15

## 2023-02-10 RX ADMIN — GABAPENTIN 100 MG: 100 CAPSULE ORAL at 17:42

## 2023-02-10 RX ADMIN — FERROUS SULFATE TAB 325 MG (65 MG ELEMENTAL FE) 325 MG: 325 (65 FE) TAB at 10:00

## 2023-02-10 RX ADMIN — MEMANTINE 10 MG: 10 TABLET ORAL at 10:15

## 2023-02-10 RX ADMIN — ACETAMINOPHEN 650 MG: 325 TABLET ORAL at 23:38

## 2023-02-10 RX ADMIN — DONEPEZIL HYDROCHLORIDE 10 MG: 5 TABLET ORAL at 21:07

## 2023-02-10 RX ADMIN — GABAPENTIN 100 MG: 100 CAPSULE ORAL at 10:00

## 2023-02-10 RX ADMIN — CARVEDILOL 3.12 MG: 3.12 TABLET, FILM COATED ORAL at 08:07

## 2023-02-10 RX ADMIN — TIZANIDINE 4 MG: 2 TABLET ORAL at 10:46

## 2023-02-10 RX ADMIN — CARVEDILOL 3.12 MG: 3.12 TABLET, FILM COATED ORAL at 17:42

## 2023-02-10 NOTE — ASSESSMENT & PLAN NOTE
Patient has not been officially diagnosed with any neurodegenerative diseases  She was started on medications by per PCP because she keeps missing her appointments for a geriatric/neurology evaluation      Plan:  · Continue home memantine and donepezil   · Frequent reorientation   · Fall precautions

## 2023-02-10 NOTE — UTILIZATION REVIEW
Initial Clinical Review    Admission: Date/Time/Statement:   Admission Orders (From admission, onward)     Ordered        02/09/23 2116  INPATIENT ADMISSION  Once                      Orders Placed This Encounter   Procedures   • INPATIENT ADMISSION     Standing Status:   Standing     Number of Occurrences:   1     Order Specific Question:   Level of Care     Answer:   Med Surg [16]     Order Specific Question:   Estimated length of stay     Answer:   More than 2 Midnights     Order Specific Question:   Certification     Answer:   I certify that inpatient services are medically necessary for this patient for a duration of greater than two midnights  See H&P and MD Progress Notes for additional information about the patient's course of treatment  ED Arrival Information     Expected   2/9/2023     Arrival   2/9/2023 16:46    Acuity   Emergent            Means of arrival   Wheelchair    Escorted by   Family Member    Service   Hospitalist    Admission type   Emergency            Arrival complaint   Altered mental status, unspecified altered mental status type           Chief Complaint   Patient presents with   • Altered Mental Status     Sent from PCP due to AMS, last night, had visual hallucination of people in her bedroom that wouldn't talk "at first I thought I was loosing my mind " This morning with auditory hallucination of jazz music  Per daughter, has been off all day  Aox4 in triage, able to describe hallucinations  Per daughter, hallucinations have been going on for 3 years  Initial Presentation: 69 y o  female from home to ED, per PCP, admitted inpatient due to altered mental status  Recent admit bilateral lower leg cellulitis 2/4 on Keflex  Presented due to intermittent visual and auditory hallucinations starting 2 days prior to arrival   Per daughter not right all day  Has severe pain left leg  Watery diarrhea about 6 to 7 episodes  On exam: tachycardia  Rhythm irregular     Bilateral LE tenderness and edema  alert and oriented  While in ED active hallucination  Wbc 11 68   K 3 2 ct head no acute findings  UDS + oxycodone  In the ED given KCL, oxycodone and tylenol  Plan is redirection when needed,  Blood cultures pending  Hold antibiotics  Replete K and trend BMP   Continue home memantine and donepezil     Date: 2/10/23    Day 2: Suspect undiagnosed dementia with sundowning:  overnight some visual and auditory hallucinations  Daughter concerned about safety at home  On exam: alert and oriented, no focal deficits  Request OT cognitive eval   May need neuro psyche consult for capacity  ED Triage Vitals   Temperature Pulse Respirations Blood Pressure SpO2   02/09/23 1706 02/09/23 1706 02/09/23 1706 02/09/23 1706 02/09/23 1706   98 7 °F (37 1 °C) 80 18 155/71 96 %      Temp Source Heart Rate Source Patient Position - Orthostatic VS BP Location FiO2 (%)   02/09/23 1706 02/09/23 1706 02/09/23 1706 02/09/23 1706 --   Oral Monitor Sitting Right arm       Pain Score       02/09/23 1948       8          Wt Readings from Last 1 Encounters:   02/09/23 84 2 kg (185 lb 9 6 oz)     Additional Vital Signs:   02/10/23 0807 -- 77 -- 166/72 -- -- -- --   02/10/23 0726 98 2 °F (36 8 °C) 77 16 131/63 91 100 % None (Room air) Lying   02/10/23 0359 -- 71 -- 133/63 91 100 % -- --   02/10/23 0230 -- 70 16 145/65 94 100 % None (Room air) Sitting   02/09/23 2339 -- 85 -- 155/67 97 100 % -- --   02/09/23 2030 -- 97 -- 182/81 Abnormal  116 95 % None (Room air) --   02/09/23 2009 -- 99 -- 123/64 88 95 %       Pertinent Labs/Diagnostic Test Results:   XR chest 1 view portable   Final Result by Johan Lai MD (02/10 0906)   Near complete resolution of previous vascular congestion                  Workstation performed: [id number]         CT head without contrast   Final Result by Lloyd Garcia MD (02/09 1948)      No acute intracranial abnormality                    Workstation performed: [id number] 2/9/23 ecg Interpretation: non-specific    Quality:     Tracing quality:  Limited by artifact  Rate:     ECG rate:  92  Rhythm:     Rhythm: atrial fibrillation    Ectopy:     Ectopy: none    QRS:     QRS axis:  Right  Conduction:     Conduction: normal    ST segments:     ST segments:  Normal  T waves:     T waves: normal  Results from last 7 days   Lab Units 02/09/23 1822   SARS-COV-2  Negative     Results from last 7 days   Lab Units 02/10/23  0557 02/09/23 1822 02/06/23 0611 02/05/23  0553 02/04/23  1837   WBC Thousand/uL 9 51 11 68* 7 19 7 62 11 08*   HEMOGLOBIN g/dL 10 6* 11 3* 8 6* 9 0* 9 6*   HEMATOCRIT % 34 5* 37 9 28 8* 29 4* 31 6*   PLATELETS Thousands/uL 428* 460* 298 361 410*   NEUTROS ABS Thousands/µL 5 76 7 48 4 40 3 95 7 11     Results from last 7 days   Lab Units 02/10/23  0557 02/09/23  1822 02/06/23  0611 02/05/23  0553 02/04/23  1837   SODIUM mmol/L 140 140 141 141 136   POTASSIUM mmol/L 3 2* 3 2* 3 0* 3 2* 2 7*   CHLORIDE mmol/L 99 96 101 98 90*   CO2 mmol/L 31 29 33* 37* 35*   ANION GAP mmol/L 10 15* 7 6 11   BUN mg/dL 18 18 14 16 19   CREATININE mg/dL 0 95 1 05 1 05 1 08 1 19   EGFR ml/min/1 73sq m 58 51 51 49 44   CALCIUM mg/dL 9 9 10 7* 8 0* 8 9 9 8   MAGNESIUM mg/dL 2 0  --   --  2 2 1 8*   PHOSPHORUS mg/dL 3 5  --   --   --   --      Results from last 7 days   Lab Units 02/10/23  0557 02/09/23  1822 02/04/23  1837   AST U/L 11* 14 13   ALT U/L 5* 6* 9   ALK PHOS U/L 135* 170* 163*   TOTAL PROTEIN g/dL 7 4 8 7* 8 1   ALBUMIN g/dL 4 0 4 8 4 3   TOTAL BILIRUBIN mg/dL 0 70 0 75 0 46     Results from last 7 days   Lab Units 02/10/23  0557 02/09/23  1822 02/06/23  0611 02/05/23  0553 02/04/23  1837   GLUCOSE RANDOM mg/dL 120 100 137 103 98     Results from last 7 days   Lab Units 02/04/23  1929   HS TNI 0HR ng/L 9     Results from last 7 days   Lab Units 02/04/23  1837   PROTIME seconds 15 0*   INR  1 15   PTT seconds 30     Results from last 7 days   Lab Units 02/09/23  1822   TSH 3RD GENERATON uIU/mL 1 585     Results from last 7 days   Lab Units 02/09/23  1822 02/04/23  1837   PROCALCITONIN ng/ml <0 05 <0 05     Results from last 7 days   Lab Units 02/09/23  1822 02/04/23  1837   LACTIC ACID mmol/L 1 2 1 3     Results from last 7 days   Lab Units 02/09/23  1822 02/04/23  2017   BNP pg/mL 65 238*     Results from last 7 days   Lab Units 02/05/23  0553 02/04/23  1837   CRP mg/L  --  4 2*   SED RATE mm/hour 32*  --      Results from last 7 days   Lab Units 02/09/23  2151   CLARITY UA  Clear   COLOR UA  Colorless   SPEC GRAV UA  1 007   PH UA  7 5   GLUCOSE UA mg/dl Negative   KETONES UA mg/dl Negative   BLOOD UA  Negative   PROTEIN UA mg/dl Negative   NITRITE UA  Negative   BILIRUBIN UA  Negative   UROBILINOGEN UA (BE) mg/dl <2 0   LEUKOCYTES UA  Negative     Results from last 7 days   Lab Units 02/09/23  1822   INFLUENZA A PCR  Negative   INFLUENZA B PCR  Negative   RSV PCR  Negative     Results from last 7 days   Lab Units 02/09/23  2151   AMPH/METH  Negative   BARBITURATE UR  Negative   BENZODIAZEPINE UR  Negative   COCAINE UR  Negative   METHADONE URINE  Negative   OPIATE UR  Negative   PCP UR  Negative   THC UR  Negative     Results from last 7 days   Lab Units 02/09/23  1822   ETHANOL LVL mg/dL <10   ACETAMINOPHEN LVL ug/mL <73*   SALICYLATE LVL mg/dL <5     Results from last 7 days   Lab Units 02/09/23  1822 02/04/23  1837   BLOOD CULTURE  Received in Microbiology Lab  Culture in Progress  Received in Microbiology Lab  Culture in Progress  No Growth After 5 Days  No Growth After 5 Days         ED Treatment:   Medication Administration from 02/09/2023 1611 to 02/10/2023 0946       Date/Time Order Dose Route Action Comments     02/09/2023 1949 EST potassium chloride (K-DUR,KLOR-CON) CR tablet 40 mEq 40 mEq Oral Given --     02/09/2023 1948 EST oxyCODONE (ROXICODONE) IR tablet 5 mg 5 mg Oral Given --     02/10/2023 0549 EST acetaminophen (TYLENOL) tablet 650 mg 650 mg Oral Given -- 02/10/2023 0103 EST acetaminophen (TYLENOL) tablet 650 mg 650 mg Oral Given --     02/10/2023 0807 EST carvedilol (COREG) tablet 3 125 mg 3 125 mg Oral Given --     02/10/2023 0103 EST donepezil (ARICEPT) tablet 10 mg 10 mg Oral Given --     02/10/2023 0549 EST oxyCODONE (ROXICODONE) immediate release tablet 10 mg 10 mg Oral Given --     02/10/2023 0549 EST pantoprazole (PROTONIX) EC tablet 40 mg 40 mg Oral Given --     02/10/2023 0103 EST torsemide (DEMADEX) tablet 40 mg 40 mg Oral Given --        Past Medical History:   Diagnosis Date   • A-fib ([facility] ) 12/29/2021   • Acute respiratory failure with hypoxia (HCC) 11/12/2021   • Anxiety    • Arthritis    • Back pain    • Cellulitis    • Cellulitis, unspecified 12/30/2021   • CHF (congestive heart failure) ([place] [address] )    • Colon cancer screening 8/3/2022   • Edema of both lower extremities due to peripheral venous insufficiency    • Edema of both lower extremities due to peripheral venous insufficiency    • Encounter for screening mammogram for malignant neoplasm of breast 3/21/2022   • Encounter for support and coordination of transition of care 9/26/2022   • Erythema of lower extremity 11/12/2021   • Fibromyalgia    • Great toe pain, right 10/6/2022   • Hypertension    • Hypotension 9/17/2022   • Leukocytosis 10/6/2022   • Melena 8/20/2022   • Osteoporosis screening 8/3/2022   • Sjogren syndrome, unspecified ([place] [address] )      Present on Admission:  • Altered mental status, unspecified  • Bilateral lower leg cellulitis  • Venous insufficiency (chronic) (peripheral)  • Unspecified atrial fibrillation (HCC)  • Anxiety  • Hypokalemia  • Watery diarrhea  • Chronic heart failure with preserved ejection fraction (HFpEF) ([facility])  • Chronic pain syndrome  • Memory impairment      Admitting Diagnosis: AMS (altered mental status) [R41 82]  Age/Sex: 69 y o  female  Admission Orders: 2/9/23 2116 inpatient   Scheduled Medications:  acetaminophen, 650 mg, Oral, Q6H [address]  apixaban, 5 mg, Oral, BID  carvedilol, 3 125 mg, Oral, BID With Meals  donepezil, 10 mg, Oral, HS  escitalopram, 10 mg, Oral, Daily  ferrous sulfate, 325 mg, Oral, Daily  gabapentin, 100 mg, Oral, BID  memantine, 10 mg, Oral, BID  [START ON 2/13/2023] metolazone, 2 5 mg, Oral, Once per day on Mon Thu  pantoprazole, 40 mg, Oral, Early Morning  [START ON 2/13/2023] potassium chloride, 20 mEq, Oral, Once per day on Mon Thu  potassium chloride, 40 mEq, Oral, Once 1800  2/9/23 and 2/10/23   potassium chloride, 60 mEq, Oral, Daily  senna-docusate sodium, 2 tablet, Oral, BID  torsemide, 40 mg, Oral, BID    Continuous IV Infusions: none     PRN Meds:  oxyCODONE, 2 5 mg, Oral, Q6H PRN  tiZANidine, 4 mg, Oral, Q8H PRN    Bilateral thigh high compression stockings   PT/OT      Network Utilization Review Department  ATTENTION: Please call with any questions or concerns to [phone] and carefully listen to the prompts so that you are directed to the right person  All voicemails are confidential   Farrel Bodily all requests for admission clinical reviews, approved or denied determinations and any other requests to dedicated fax number below belonging to the campus where the patient is receiving treatment   List of dedicated fax numbers for the Facilities:  [address] DENIALS (Administrative/Medical Necessity) [phone]   [address] (Maternity/NICU/Pediatrics) [phone]   1 Kati Ceballos [phone]   [facility] [phone]   [facility] [phone]   [facility] [place] [address] Medical [facility] [address] [place] [address] [id number] [place] [address] [address] [place]   [id number] [place] [address] [phone]     [facility] [address] [place] [address] [phone]

## 2023-02-10 NOTE — OCCUPATIONAL THERAPY NOTE
Occupational Therapy Cancellation Note         Patient Name: Haley Hart  [id number]'[id number] Date: 2/10/2023       02/10/23 8399   Note Type   Note type Cancelled Session   Cancel Reasons Other   Additional Comments OT orders previously recieved  Chart review completed  Pt requires TLSO brace for OOB activity, however TLSO brace currently not in hospital  Spoke to [facility] resident ROHAN Ramosgracie @ 10:30am who reports that daughter was contacted to bring in brace   Brace is still not in the hospital at this time, will hold OT eval and see when brace arrives as appropriate/able     Rodney Mackey, MS, OTR/L

## 2023-02-10 NOTE — H&P
[facility] [place] [facility]\"" 1945, 69 y o  female MRN: [id number]  Unit/Bed#: ED-24 Encounter: [id number]  Primary Care Provider: Diego Thompson MD   Date and time admitted to hospital: 2/9/2023  5:20 PM    * Altered mental status, unspecified  Assessment & Plan  Patient has visual and auditory hallucinations last night at home  Most likely due to flareup of her infection from  Patient's  is also admitted to the hospital at this time secondary to a fall with sustained trauma  Of note, patient took too much of her oxycodone and ran out of her prescription  Labs:  TSH: Normal  Potassium: 3 2  Rapid drug screen: Positive for oxycodone   UA: Negative     Differential: toxic encephalopathy, metabolic encephalopathy, recent cellulitis, electrolyte abnormalities, side effect of medications, chronic opioid use with possible withdrawal, memory impairment     Plans:  · Monitor labs daily  · Redirection with sundowning  · Blood cultures x 2 pending  · Monitor vital signs    Bilateral lower leg cellulitis  Assessment & Plan  Patient was put on Keflex at discharge  Daughter and patient are not aware of how many doses are pending  Plan:  · Hold antibiotics at this time  · Blood cultures x 2 pending  · Monitor vital signs  · Wound care consulted - Recommendations appreciated    Watery diarrhea  Assessment & Plan  Patient reports having 6-7 episodes of liquid bowel movements  Patient had been recently on antibiotics for possible bilaterally b/l extremity cellulitis versus chronic venous insufficiency  Patient states that she has not had any more diarrheal bowel movements since being in the ED  Plan:  · Monitor electrolytes  · Monitor vital signs  · Monitor bowel movements  · I/Os    Hypokalemia  Assessment & Plan  Patient presented with a K of 3 2   Standing dose of KDUR 60 mEq daily with an extra 20 mEq on Mondays and Thursdays with Metolazone dosing    Plan:  · Monitor daily labs  · Replete as needed    Chronic heart failure with preserved ejection fraction (HFpEF) ([facility])  Assessment & Plan  Wt Readings from Last 3 Encounters:   02/09/23 84 2 kg (185 lb 9 6 oz)   02/06/23 88 5 kg (195 lb 1 7 oz)   02/01/23 94 7 kg (208 lb 12 8 oz)       Lab Results   Component Value Date    LVEF 55 10/07/2022    NTBNP 1,615 (H) 04/30/2022    NTBNP 2,903 (H) 02/10/2022       Plan:  • GDMT: Diuretic: Torsemide PO 40 mg BID and metolazone 2 5 mg twice weekly (Mondays and Thursdays), B-Blocker: Carvedilol 3 125 mg BID, No ACE/ARB/ARNi, Aldos  Blocker: or SGLT2-I on file  · Diet: Sodium restriction 2g  · Continue home potassium repletion with KDUR 60 mEq daily plus another 20 mEq on Mondays and Thursdays with Metolazone   · Labs: BMP, magnesium tomorrow a m  · Maintain Mg > 2 and K > 4; Replete prn  · Elevate HOB > 30°, Daily standing weights, Measure I/Os, Monitor on Telemetry  · Consult nutrition services for dietary education        Memory impairment  Assessment & Plan  Patient has not been officially diagnosed with any neurodegenerative diseases  She was started on medications by per PCP because she keeps missing her appointments for a geriatric/neurology evaluation  Plan:  · Continue home memantine and donepezil   · Frequent reorientation   · Fall precautions    Anxiety  Assessment & Plan  Patient has a history of anxiety  Plan:  · Continue home Lexapro  Unspecified atrial fibrillation (HCC)  Assessment & Plan  History of atrial fibrillation controlled on Carvedilol 3 125 mg BID and Eliquis 5 mg BID    Plan:  • Continue home meds  • Monitor rates/BP      Chronic pain syndrome  Assessment & Plan  Patient has a history of chronic pain syndrome  Plan:  · Continue home pain regimen with gabapentin, tizanidine and oxycdone    Venous insufficiency (chronic) (peripheral)  Assessment & Plan  Patient has a history of venous insufficiency  She has a wound on her right lower extremity    She usually has a wound nurse coming and dressing it every week  Plan:  · Wound care consult - Recommendations appreciated  · Compression stockings      VTE Pharmacologic Prophylaxis: VTE Score: 5 High Risk (Score >/= 5) - Pharmacological DVT Prophylaxis Ordered: apixaban (Eliquis)  Sequential Compression Devices Ordered  Code Status: Level 1 - Full Code   Discussion with family: Updated  (daughter) via phone  Anticipated Length of Stay: Patient will be admitted on an inpatient basis with an anticipated length of stay of greater than 2 midnights secondary to AMS and readmission  Chief Complaint: AMS    History of Present Illness:  Briana Gerber is a 69 y o  female with a PMH of CHF, afib, anxiety, arthritis, HTN, chronic pain syndrome, venous insufficiency, and memory impairment who presents with altered mental status  Patient presented to the ED today after visiting her PCP at [facility] office  Patient was at the PCP's office for transition of care after she was admitted to [address] [place] [address] on 2/4/2023 for bilateral lower extremity cellulitis  She received IV antibiotics in the hospital and then was discharged on Keflex  As per the patient her leg swelling had improved significantly but there is severe left leg pain  Also, patient has been having visual hallucinations and auditory hallucinations since yesterday evening  She also saw people in the house and heard jazz like music last night  Of note, patient also reports having watery diarrhea 6-7 episodes of liquid bowel movement  In the ED, the labs were unremarkable except for WBCs 11 68, potassium 3 2, and calcium of 10 7 with a corrected calcium of 10 1  While in the ED, patient also had another couple episodes of hallucinations  As per the ED physician, it seems as if the patient is in a sleep state when she is having these hallucinations   Once the patient had completely awoken, the patient seems to be responding perfectly okay  Toxicology was also contacted and suggested altered mental status can be due to a multifactorial etiology  Patient to be admitted for further management of altered mental status  Review of Systems:  Review of Systems   Constitutional: Negative for chills and fever  HENT: Negative for ear pain and trouble swallowing  Eyes: Negative for visual disturbance  Respiratory: Negative for cough, choking and shortness of breath  Cardiovascular: Positive for leg swelling  Negative for chest pain  Gastrointestinal: Positive for diarrhea  Negative for blood in stool, constipation, nausea and vomiting  Genitourinary: Negative for hematuria  Neurological: Positive for headaches  Negative for dizziness and light-headedness  Psychiatric/Behavioral: Positive for hallucinations  Negative for confusion         Past Medical and Surgical History:   Past Medical History:   Diagnosis Date   • A-fib ([facility] ) 12/29/2021   • Acute respiratory failure with hypoxia (HCC) 11/12/2021   • Anxiety    • Arthritis    • Back pain    • Cellulitis    • Cellulitis, unspecified 12/30/2021   • CHF (congestive heart failure) ([facility])    • Colon cancer screening 8/3/2022   • Edema of both lower extremities due to peripheral venous insufficiency    • Edema of both lower extremities due to peripheral venous insufficiency    • Encounter for screening mammogram for malignant neoplasm of breast 3/21/2022   • Encounter for support and coordination of transition of care 9/26/2022   • Erythema of lower extremity 11/12/2021   • Fibromyalgia    • Great toe pain, right 10/6/2022   • Hypertension    • Hypotension 9/17/2022   • Leukocytosis 10/6/2022   • Melena 8/20/2022   • Osteoporosis screening 8/3/2022   • Sjogren syndrome, unspecified ([facility] )        Past Surgical History:   Procedure Laterality Date   • KNEE CARTILAGE SURGERY     • CT OPTX FEM SHFT FX W/INSJ IMED IMPLT W/WO SCREW Left 8/22/2022    Procedure: LEFT RETROGRADE IM SHAN;  Surgeon: Eros Winchester MD;  Location: [facility];  Service: Orthopedics   • REPLACEMENT TOTAL KNEE BILATERAL         Meds/Allergies:  Prior to Admission medications    Medication Sig Start Date End Date Taking? Authorizing Provider   acetaminophen (TYLENOL) 325 mg tablet Take 2 tablets (650 mg total) by mouth every 6 (six) hours 1/4/23   Hanh Chavez PA-C   aluminum hydroxide [facility]) Apply topically every other day Open small Dermagran square & apply to wounds in a single layer cut to size of wounds  2/6/23   Ca Fitzgerald PA-C   ammonium lactate (LAC-HYDRIN) 12 % cream Apply topically 2 (two) times a day 9/7/22   SHAHIDA Albrecht   apixaban (Eliquis) 5 mg Take 1 tablet (5 mg total) by mouth 2 (two) times a day Lot [id number] exp 10/2024 10/6/22 2/9/23  Floridalma Hensley MD   carvedilol (COREG) 3 125 mg tablet Take 1 tablet (3 125 mg total) by mouth 2 (two) times a day with meals 12/23/22   Juan C العراقي MD   cephalexin [facility]) 500 mg capsule Take 1 capsule (500 mg total) by mouth every 6 (six) hours for 7 days 2/1/23 2/8/23  Swapna Mckeon MD   donepezil (ARICEPT) 10 mg tablet Take 1 tablet (10 mg total) by mouth daily at bedtime 1/17/23   Juan C العراقي MD   escitalopram (LEXAPRO) 10 mg tablet Take 1 tab at bedtime x1 week, then take 1/2 tab at bedtime x1 week, then stop  1/17/23   Juan C العراقي MD   ferrous sulfate 324 (65 Fe) mg Take 1 tablet (324 mg total) by mouth daily before breakfast 2/1/23   Swapna Mckeon MD   gabapentin (NEURONTIN) 100 mg capsule Take 1 capsule (100 mg total) by mouth 2 (two) times a day 1/25/23   Juan C العراقي MD   lidocaine (LIDODERM) 5 % Apply 2 patches topically daily Remove & Discard patch within 12 hours or as directed by MD 1/4/23   Nikolay Chavez PA-C   memantine (Namenda) 10 mg tablet Take 1/2 tab daily x1 week, then take 1 tab daily x1 week, then take 1 tab twice daily therafter   1/25/23   Juan C العراقي MD   metolazone (ZAROXOLYN) 2 5 mg tablet Take 1 tablet ( 2 5 mg) by mouth twice a week on Monday and Thursdays, before the morning dose of torsemide 2/1/23   Nichole Sandoval MD   naloxone [facility]) 4 mg/0 1 mL nasal spray Administer 1 spray into a nostril  If no response after 2-3 minutes, give another dose in the other nostril using a new spray  1/15/23   Dony Segovia MD   oxyCODONE (ROXICODONE) 10 MG TABS Take 1 tablet (10 mg total) by mouth every 8 (eight) hours as needed (breakthrough pain) Max Daily Amount: 30 mg 1/18/23 2/17/23  Dony Segovia MD   pantoprazole (PROTONIX) 40 mg tablet Take 1 tablet (40 mg total) by mouth daily 10/19/22 2/1/23  Mohan Hinson MD   potassium chloride (K-DUR,KLOR-CON) 20 mEq tablet Take 3 tablets daily ( 60 meq) plus an extra 20 meq on mondays and thursdays with metolazone 2/1/23   Nichole Sandoval MD   saccharomyces boulardii (FLORASTOR) 250 mg capsule Take 1 capsule (250 mg total) by mouth 2 (two) times a day 2/6/23   Laura Glass PA-C   senna-docusate sodium (SENOKOT S) 8 6-50 mg per tablet Take 2 tablets by mouth 2 (two) times a day 1/4/23   Zaynab Chavez PA-C   tiZANidine (ZANAFLEX) 4 mg tablet Take 1 tablet (4 mg total) by mouth every 8 (eight) hours as needed for muscle spasms 1/18/23 7/17/23  Dony Segovia MD   torsemide [facility]) 20 mg tablet Take 2 tablets (40 mg total) by mouth 2 (two) times a day 2/1/23 3/3/23  Nichole Sandoval MD   bisacodyl (DULCOLAX) 10 mg suppository Insert 1 suppository (10 mg total) into the rectum daily as needed for constipation  Patient not taking: Reported on 9/19/2022 8/30/22 10/8/22  Agustina Henson PA-C     I have reviewed home medications with patient personally      Allergies: No Known Allergies    Social History:  Marital Status: /Civil Union   Patient Pre-hospital Living Situation: Home, With spouse  Patient Pre-hospital Level of Mobility: walks with walker  Patient Pre-hospital Diet Restrictions: None  Substance Use History:   Social History Substance and Sexual Activity   Alcohol Use Not Currently     Social History     Tobacco Use   Smoking Status Former   • Types: Cigarettes   Smokeless Tobacco Never     Social History     Substance and Sexual Activity   Drug Use Never       Family History:  Family History   Problem Relation Age of Onset   • Heart disease Mother    • Cancer Father        Physical Exam:     Vitals:   Blood Pressure: (!) 182/81 (02/09/23 2030)  Pulse: 97 (02/09/23 2030)  Temperature: 98 7 °F (37 1 °C) (02/09/23 1706)  Temp Source: Oral (02/09/23 1706)  Respirations: 18 (02/09/23 1812)  SpO2: 95 % (02/09/23 2030)    Physical Exam  Vitals reviewed  Constitutional:       Appearance: Normal appearance  HENT:      Head: Normocephalic and atraumatic  Mouth/Throat:      Mouth: Mucous membranes are moist    Eyes:      Extraocular Movements: Extraocular movements intact  Conjunctiva/sclera: Conjunctivae normal       Pupils: Pupils are equal, round, and reactive to light  Cardiovascular:      Rate and Rhythm: Tachycardia present  Rhythm irregular  Heart sounds: Normal heart sounds  No murmur heard  No gallop  Pulmonary:      Effort: Pulmonary effort is normal  No respiratory distress  Breath sounds: Normal breath sounds  No wheezing or rales  Abdominal:      General: Abdomen is flat  Bowel sounds are normal  There is no distension  Palpations: Abdomen is soft  Tenderness: There is no abdominal tenderness  Musculoskeletal:         General: Tenderness (In the b/l lower extremities) present  Normal range of motion  Right lower leg: Edema present  Left lower leg: Edema present  Skin:     General: Skin is warm  Neurological:      General: No focal deficit present  Mental Status: She is alert and oriented to person, place, and time  Mental status is at baseline  Psychiatric:         Mood and Affect: Mood normal          Behavior: Behavior normal          Thought Content:  Thought content normal          Judgment: Judgment normal        Additional Data:     Lab Results:  Results from last 7 days   Lab Units 02/09/23  1822   WBC Thousand/uL 11 68*   HEMOGLOBIN g/dL 11 3*   HEMATOCRIT % 37 9   PLATELETS Thousands/uL 460*   NEUTROS PCT % 64   LYMPHS PCT % 23   MONOS PCT % 9   EOS PCT % 3     Results from last 7 days   Lab Units 02/09/23  1822   SODIUM mmol/L 140   POTASSIUM mmol/L 3 2*   CHLORIDE mmol/L 96   CO2 mmol/L 29   BUN mg/dL 18   CREATININE mg/dL 1 05   ANION GAP mmol/L 15*   CALCIUM mg/dL 10 7*   ALBUMIN g/dL 4 8   TOTAL BILIRUBIN mg/dL 0 75   ALK PHOS U/L 170*   ALT U/L 6*   AST U/L 14   GLUCOSE RANDOM mg/dL 100     Results from last 7 days   Lab Units 02/04/23  1837   INR  1 15             Results from last 7 days   Lab Units 02/09/23  1822 02/04/23  1837   LACTIC ACID mmol/L 1 2 1 3   PROCALCITONIN ng/ml <0 05 <0 05       Lines/Drains:  Invasive Devices     Peripheral Intravenous Line  Duration           Peripheral IV 02/09/23 Left Antecubital <1 day    Peripheral IV 02/09/23 Right Antecubital <1 day                  Imaging: Reviewed radiology reports from this admission including: chest xray and CT head  CT head without contrast   Final Result by Melany Hawley MD (02/09 1948)      No acute intracranial abnormality  Workstation performed: [id number]         XR chest 1 view portable    (Results Pending)       EKG and Other Studies Reviewed on Admission:   · EKG: Atrial fibrillation  HR 92     ** Please Note: This note has been constructed using a voice recognition system   **

## 2023-02-10 NOTE — ASSESSMENT & PLAN NOTE
Patient presented with a K of 3 2   Standing dose of KDUR 60 mEq daily with an extra 20 mEq on Mondays and Thursdays with Metolazone dosing    Plan:  · Monitor daily labs  · Replete as needed

## 2023-02-10 NOTE — ASSESSMENT & PLAN NOTE
Wt Readings from Last 3 Encounters:   02/09/23 84 2 kg (185 lb 9 6 oz)   02/06/23 88 5 kg (195 lb 1 7 oz)   02/01/23 94 7 kg (208 lb 12 8 oz)       Lab Results   Component Value Date    LVEF 55 10/07/2022    NTBNP 1,615 (H) 04/30/2022    NTBNP 2,903 (H) 02/10/2022       Plan:  • GDMT: Diuretic: Torsemide PO 40 mg BID and metolazone 2 5 mg twice weekly (Mondays and Thursdays), B-Blocker: Carvedilol 3 125 mg BID, No ACE/ARB/ARNi, Aldos  Blocker: or SGLT2-I on file  · Diet: Sodium restriction 2g  · Continue home potassium repletion with KDUR 60 mEq daily plus another 20 mEq on Mondays and Thursdays with Metolazone   · Labs: BMP, magnesium tomorrow a m    · Maintain Mg > 2 and K > 4; Replete prn  · Elevate HOB > 30°, Daily standing weights, Measure I/Os, Monitor on Telemetry  · Consult nutrition services for dietary education

## 2023-02-10 NOTE — ASSESSMENT & PLAN NOTE
Patient has a history of venous insufficiency  She has a wound on her right lower extremity  She usually has a wound nurse coming and dressing it every week      Plan:  · Wound care consult - Recommendations appreciated  · Compression stockings

## 2023-02-10 NOTE — ASSESSMENT & PLAN NOTE
Patient was put on Keflex at discharge  Daughter and patient are not aware of how many doses are pending      Plan:  · Hold antibiotics at this time  · Blood cultures x 2 pending  · Monitor vital signs  · Wound care consulted - Recommendations appreciated

## 2023-02-10 NOTE — ASSESSMENT & PLAN NOTE
Patient has a history of chronic pain syndrome      Plan:  · Continue home pain regimen with gabapentin, tizanidine and oxycdone

## 2023-02-10 NOTE — ASSESSMENT & PLAN NOTE
Patient reports having 6-7 episodes of liquid bowel movements  Patient had been recently on antibiotics for possible bilaterally b/l extremity cellulitis versus chronic venous insufficiency  Patient states that she has not had any more diarrheal bowel movements since being in the ED      Plan:  · Monitor electrolytes  · Monitor vital signs  · Monitor bowel movements  · I/Os

## 2023-02-10 NOTE — ASSESSMENT & PLAN NOTE
Patient has visual and auditory hallucinations last night at home  Most likely due to flareup of her infection from  Patient's  is also admitted to the hospital at this time secondary to a fall with sustained trauma  Of note, patient took too much of her oxycodone and ran out of her prescription       Labs:  TSH: Normal  Potassium: 3 2  Rapid drug screen: Positive for oxycodone   UA: Negative     Differential: toxic encephalopathy, metabolic encephalopathy, recent cellulitis, electrolyte abnormalities, side effect of medications, chronic opioid use with possible withdrawal, memory impairment     Plans:  · Monitor labs daily  · Redirection with sundowning  · Blood cultures x 2 pending  · Monitor vital signs

## 2023-02-10 NOTE — ED PROVIDER NOTES
History  Chief Complaint   Patient presents with   • Altered Mental Status     Sent from PCP due to AMS, last night, had visual hallucination of people in her bedroom that wouldn't talk "at first I thought I was loosing my mind " This morning with auditory hallucination of jazz music  Per daughter, has been off all day  Aox4 in triage, able to describe hallucinations  Per daughter, hallucinations have been going on for 3 years  80-year-old female presents with her daughter for evaluation of a constellation of symptoms  Patient's daughter is currently visiting from [place]  Her father has been recently hospitalized and she has been at home taking care of her mother the patient  The patient has been having intermittent hallucinations both visual and auditory  She reports chronically hearing jazz music  She has been seeing people who are not present  Patient's daughter feels that the stress of her 's hospitalization may be a trigger for the hallucinations  Patient notes that she first developed hallucinations after the death of her son 3 years ago  They have been intermittent since that time  Patient's daughter is concerned that the patient may have dementia though this has not been formally diagnosed  Patient's daughter does note that the patient ran out of her pain prescription earlier than she should have based on the recommended dosing schedule  She suspects that her mother is withdrawing from opiates  The patient has been having chronic left leg pain, no acute changes        History provided by:  Patient, medical records and relative  History limited by:  Mental status change (patient intermittently hallucinating)  Altered Mental Status  Presenting symptoms: behavior changes and confusion    Severity:  Severe  Most recent episode:  More than 2 days ago  Episode history:  Multiple  Timing:  Intermittent  Progression:  Worsening  Chronicity:  Chronic  Context: dementia (possible - not formally diagnosed but suspected by PCP), not taking medications as prescribed, recent change in medication and recent infection    Associated symptoms: agitation, bladder incontinence (worsening over 3 months) and hallucinations    Associated symptoms: no abdominal pain, no decreased appetite, no difficulty breathing, no eye deviation, no fever, no headaches, no seizures and no visual change        Prior to Admission Medications   Prescriptions Last Dose Informant Patient Reported? Taking?   acetaminophen (TYLENOL) 325 mg tablet Unknown  No No   Sig: Take 2 tablets (650 mg total) by mouth every 6 (six) hours   aluminum hydroxide [facility]) Not Taking  No No   Sig: Apply topically every other day Open small Dermagran square & apply to wounds in a single layer cut to size of wounds  Patient not taking: Reported on 2/9/2023   ammonium lactate (LAC-HYDRIN) 12 % cream Unknown  No No   Sig: Apply topically 2 (two) times a day   apixaban (Eliquis) 5 mg 2/9/2023  No Yes   Sig: Take 1 tablet (5 mg total) by mouth 2 (two) times a day Lot [id number] exp 10/2024   carvedilol (COREG) 3 125 mg tablet 2/9/2023  No Yes   Sig: Take 1 tablet (3 125 mg total) by mouth 2 (two) times a day with meals   cephalexin (KEFLEX) 500 mg capsule   No No   Sig: Take 1 capsule (500 mg total) by mouth every 6 (six) hours for 7 days   donepezil (ARICEPT) 10 mg tablet 2/8/2023  No Yes   Sig: Take 1 tablet (10 mg total) by mouth daily at bedtime   escitalopram (LEXAPRO) 10 mg tablet 2/8/2023  No Yes   Sig: Take 1 tab at bedtime x1 week, then take 1/2 tab at bedtime x1 week, then stop     ferrous sulfate 324 (65 Fe) mg 2/8/2023  No Yes   Sig: Take 1 tablet (324 mg total) by mouth daily before breakfast   gabapentin (NEURONTIN) 100 mg capsule 2/9/2023  No Yes   Sig: Take 1 capsule (100 mg total) by mouth 2 (two) times a day   lidocaine (LIDODERM) 5 % Unknown  No No   Sig: Apply 2 patches topically daily Remove & Discard patch within 12 hours or as directed by MD memantine (Namenda) 10 mg tablet Unknown  No No   Sig: Take 1/2 tab daily x1 week, then take 1 tab daily x1 week, then take 1 tab twice daily therafter  metolazone (ZAROXOLYN) 2 5 mg tablet 2/9/2023  No Yes   Sig: Take 1 tablet ( 2 5 mg) by mouth twice a week on Monday and Thursdays, before the morning dose of torsemide   naloxone (NARCAN) 4 mg/0 1 mL nasal spray Unknown  No No   Sig: Administer 1 spray into a nostril  If no response after 2-3 minutes, give another dose in the other nostril using a new spray     oxyCODONE (ROXICODONE) 10 MG TABS Unknown  No No   Sig: Take 1 tablet (10 mg total) by mouth every 8 (eight) hours as needed (breakthrough pain) Max Daily Amount: 30 mg   pantoprazole (PROTONIX) 40 mg tablet 2/9/2023  No Yes   Sig: Take 1 tablet (40 mg total) by mouth daily   potassium chloride (K-DUR,KLOR-CON) 20 mEq tablet Unknown  No No   Sig: Take 3 tablets daily ( 60 meq) plus an extra 20 meq on mondays and thursdays with metolazone   saccharomyces boulardii (FLORASTOR) 250 mg capsule Not Taking  No No   Sig: Take 1 capsule (250 mg total) by mouth 2 (two) times a day   Patient not taking: Reported on 2/9/2023   senna-docusate sodium (SENOKOT S) 8 6-50 mg per tablet Unknown  No No   Sig: Take 2 tablets by mouth 2 (two) times a day   tiZANidine (ZANAFLEX) 4 mg tablet Unknown  No No   Sig: Take 1 tablet (4 mg total) by mouth every 8 (eight) hours as needed for muscle spasms   torsemide (DEMADEX) 20 mg tablet Unknown  No No   Sig: Take 2 tablets (40 mg total) by mouth 2 (two) times a day      Facility-Administered Medications: None       Past Medical History:   Diagnosis Date   • [facility] ) 12/29/2021   • Acute respiratory failure with hypoxia (HCC) 11/12/2021   • Anxiety    • Arthritis    • Back pain    • Cellulitis    • Cellulitis, unspecified 12/30/2021   • CHF (congestive heart failure) (HCC)    • Colon cancer screening 8/3/2022   • Edema of both lower extremities due to peripheral venous insufficiency    • Edema of both lower extremities due to peripheral venous insufficiency    • Encounter for screening mammogram for malignant neoplasm of breast 3/21/2022   • Encounter for support and coordination of transition of care 9/26/2022   • Erythema of lower extremity 11/12/2021   • Fibromyalgia    • Great toe pain, right 10/6/2022   • Hypertension    • Hypotension 9/17/2022   • Leukocytosis 10/6/2022   • Melena 8/20/2022   • Osteoporosis screening 8/3/2022   • Sjogren syndrome, unspecified ([facility] )        Past Surgical History:   Procedure Laterality Date   • KNEE CARTILAGE SURGERY     • SD OPTX FEM SHFT FX W/INSJ IMED IMPLT W/WO SCREW Left 8/22/2022    Procedure: LEFT RETROGRADE IM SHAN;  Surgeon: Kb White MD;  Location: [facility];  Service: Orthopedics   • REPLACEMENT TOTAL KNEE BILATERAL         Family History   Problem Relation Age of Onset   • Heart disease Mother    • Cancer Father      I have reviewed and agree with the history as documented  E-Cigarette/Vaping   • E-Cigarette Use Never User      E-Cigarette/Vaping Substances   • Nicotine No    • THC No    • CBD No    • Flavoring No    • Other No    • Unknown No      Social History     Tobacco Use   • Smoking status: Former     Types: Cigarettes   • Smokeless tobacco: Never   Vaping Use   • Vaping Use: Never used   Substance Use Topics   • Alcohol use: Not Currently   • Drug use: Never       Review of Systems   Constitutional: Negative for decreased appetite and fever  Respiratory: Negative for cough and chest tightness  Gastrointestinal: Negative for abdominal pain  Genitourinary: Positive for bladder incontinence (worsening over 3 months)  Negative for dysuria  Musculoskeletal: Positive for back pain  Neurological: Negative for tremors, seizures and headaches  Psychiatric/Behavioral: Positive for agitation, confusion and hallucinations  All other systems reviewed and are negative        Physical Exam  Physical Exam  Vitals reviewed  Constitutional:       General: She is not in acute distress  Appearance: She is well-developed  She is not toxic-appearing or diaphoretic  HENT:      Head: Normocephalic and atraumatic  Nose: Nose normal       Mouth/Throat:      Pharynx: No oropharyngeal exudate  Eyes:      General: No visual field deficit  Extraocular Movements:      Right eye: No nystagmus  Left eye: No nystagmus  Conjunctiva/sclera: Conjunctivae normal       Pupils: Pupils are equal, round, and reactive to light  Cardiovascular:      Rate and Rhythm: Normal rate and regular rhythm  Heart sounds: Normal heart sounds  Pulmonary:      Effort: Pulmonary effort is normal       Breath sounds: Normal breath sounds  Abdominal:      General: Bowel sounds are normal  There is no distension  Palpations: Abdomen is soft  Tenderness: There is no abdominal tenderness  There is no guarding or rebound  Musculoskeletal:         General: No tenderness or deformity  Normal range of motion  Cervical back: Normal range of motion and neck supple  Lymphadenopathy:      Cervical: No cervical adenopathy  Skin:     General: Skin is warm and dry  Capillary Refill: Capillary refill takes less than 2 seconds  Findings: No rash  Neurological:      Mental Status: She is alert and oriented to person, place, and time  Mental status is at baseline  GCS: GCS eye subscore is 4  GCS verbal subscore is 5  GCS motor subscore is 6  Cranial Nerves: No cranial nerve deficit, dysarthria or facial asymmetry  Sensory: No sensory deficit  Motor: No weakness or abnormal muscle tone  Coordination: Coordination normal       Gait: Gait normal    Psychiatric:         Mood and Affect: Mood normal          Behavior: Behavior normal          Thought Content: Thought content normal          Cognition and Memory: Cognition is impaired  Memory is impaired           Judgment: Judgment normal          Vital Signs  ED Triage Vitals   Temperature Pulse Respirations Blood Pressure SpO2   02/09/23 1706 02/09/23 1706 02/09/23 1706 02/09/23 1706 02/09/23 1706   98 7 °F (37 1 °C) 80 18 155/71 96 %      Temp Source Heart Rate Source Patient Position - Orthostatic VS BP Location FiO2 (%)   02/09/23 1706 02/09/23 1706 02/09/23 1706 02/09/23 1706 --   Oral Monitor Sitting Right arm       Pain Score       02/09/23 1948       8           Vitals:    02/13/23 0202 02/13/23 0725 02/13/23 0844 02/13/23 1522   BP: 122/86 120/63 113/65 124/70   Pulse: 90 64 75 78   Patient Position - Orthostatic VS: Lying            Visual Acuity  Visual Acuity    Flowsheet Row Most Recent Value   L Pupil Size (mm) 3   R Pupil Size (mm) 3          ED Medications  Medications   acetaminophen (TYLENOL) tablet 650 mg (650 mg Oral Given 2/13/23 1135)   carvedilol (COREG) tablet 3 125 mg (3 125 mg Oral Given 2/13/23 1553)   donepezil (ARICEPT) tablet 10 mg (10 mg Oral Given 2/12/23 2100)   apixaban (ELIQUIS) tablet 5 mg (5 mg Oral Given 2/13/23 0848)   ferrous sulfate tablet 325 mg (325 mg Oral Given 2/13/23 0842)   escitalopram (LEXAPRO) tablet 10 mg (10 mg Oral Given 2/13/23 0843)   gabapentin (NEURONTIN) capsule 100 mg (100 mg Oral Given 2/13/23 0842)   memantine (NAMENDA) tablet 10 mg (10 mg Oral Given 2/13/23 0842)   metolazone (ZAROXOLYN) tablet 2 5 mg (2 5 mg Oral Given 2/13/23 0845)   pantoprazole (PROTONIX) EC tablet 40 mg (40 mg Oral Given 2/13/23 0522)   potassium chloride (K-DUR,KLOR-CON) CR tablet 60 mEq (60 mEq Oral Given 2/13/23 0920)   potassium chloride (K-DUR,KLOR-CON) CR tablet 20 mEq (20 mEq Oral Given 2/13/23 0920)   senna-docusate sodium (SENOKOT S) 8 6-50 mg per tablet 2 tablet (2 tablets Oral Refused 2/13/23 0845)   torsemide (DEMADEX) tablet 40 mg (40 mg Oral Given 2/13/23 0843)   oxyCODONE (ROXICODONE) IR tablet 2 5 mg (2 5 mg Oral Given 2/13/23 1405)   loratadine (CLARITIN) tablet 10 mg (10 mg Oral Given 2/13/23 0845)   tiZANidine (ZANAFLEX) tablet 2 mg (has no administration in time range)   potassium chloride (K-DUR,KLOR-CON) CR tablet 40 mEq (40 mEq Oral Given 2/9/23 1949)   oxyCODONE (ROXICODONE) IR tablet 5 mg (5 mg Oral Given 2/9/23 1948)   potassium chloride (K-DUR,KLOR-CON) CR tablet 40 mEq (40 mEq Oral Given 2/10/23 1741)   magnesium sulfate 2 g/50 mL IVPB (premix) 2 g (0 g Intravenous Stopped 2/11/23 1728)   hydrOXYzine HCL (ATARAX) tablet 25 mg (25 mg Oral Given 2/12/23 2059)       Diagnostic Studies  Results Reviewed     Procedure Component Value Units Date/Time    Blood culture #1 [[id number]] Collected: 02/09/23 1822    Lab Status: Preliminary result Specimen: Blood from Arm, Right Updated: 02/13/23 0001     Blood Culture No Growth at 72 hrs  Blood culture #2 [[id number]] Collected: 02/09/23 1822    Lab Status: Preliminary result Specimen: Blood from Arm, Left Updated: 02/13/23 0001     Blood Culture No Growth at 72 hrs      Basic metabolic panel [[id number]]  (Abnormal) Collected: 02/11/23 0506    Lab Status: Final result Specimen: Blood from Arm, Right Updated: 02/11/23 0611     Sodium 137 mmol/L      Potassium 3 2 mmol/L      Chloride 95 mmol/L      CO2 32 mmol/L      ANION GAP 10 mmol/L      BUN 24 mg/dL      Creatinine 1 07 mg/dL      Glucose 125 mg/dL      Calcium 9 7 mg/dL      eGFR 50 ml/min/1 73sq m     Narrative:      [place] guidelines for Chronic Kidney Disease (CKD):   •  Stage 1 with normal or high GFR (GFR > 90 mL/min/1 73 square meters)  •  Stage 2 Mild CKD (GFR = 60-89 mL/min/1 73 square meters)  •  Stage 3A Moderate CKD (GFR = 45-59 mL/min/1 73 square meters)  •  Stage 3B Moderate CKD (GFR = 30-44 mL/min/1 73 square meters)  •  Stage 4 Severe CKD (GFR = 15-29 mL/min/1 73 square meters)  •  Stage 5 End Stage CKD (GFR <15 mL/min/1 73 square meters)  Note: GFR calculation is accurate only with a steady state creatinine    CBC [[id number]]  (Abnormal) Collected: 02/11/23 0506    Lab Status: Final result Specimen: Blood from Arm, Right Updated: 02/11/23 0541     WBC 9 56 Thousand/uL      RBC 4 73 Million/uL      Hemoglobin 11 4 g/dL      Hematocrit 37 7 %      MCV 80 fL      MCH 24 1 pg      MCHC 30 2 g/dL      RDW 21 6 %      Platelets 342 Thousands/uL      MPV 10 4 fL     Comprehensive metabolic panel [[id number]]  (Abnormal) Collected: 02/10/23 0557    Lab Status: Final result Specimen: Blood Updated: 02/10/23 0641     Sodium 140 mmol/L      Potassium 3 2 mmol/L      Chloride 99 mmol/L      CO2 31 mmol/L      ANION GAP 10 mmol/L      BUN 18 mg/dL      Creatinine 0 95 mg/dL      Glucose 120 mg/dL      Calcium 9 9 mg/dL      AST 11 U/L      ALT 5 U/L      Alkaline Phosphatase 135 U/L      Total Protein 7 4 g/dL      Albumin 4 0 g/dL      Total Bilirubin 0 70 mg/dL      eGFR 58 ml/min/1 73sq m     Narrative:      [place] guidelines for Chronic Kidney Disease (CKD):   •  Stage 1 with normal or high GFR (GFR > 90 mL/min/1 73 square meters)  •  Stage 2 Mild CKD (GFR = 60-89 mL/min/1 73 square meters)  •  Stage 3A Moderate CKD (GFR = 45-59 mL/min/1 73 square meters)  •  Stage 3B Moderate CKD (GFR = 30-44 mL/min/1 73 square meters)  •  Stage 4 Severe CKD (GFR = 15-29 mL/min/1 73 square meters)  •  Stage 5 End Stage CKD (GFR <15 mL/min/1 73 square meters)  Note: GFR calculation is accurate only with a steady state creatinine    Magnesium [[id number]]  (Normal) Collected: 02/10/23 0557    Lab Status: Final result Specimen: Blood Updated: 02/10/23 0641     Magnesium 2 0 mg/dL     Phosphorus [[id number]]  (Normal) Collected: 02/10/23 0557    Lab Status: Final result Specimen: Blood Updated: 02/10/23 0641     Phosphorus 3 5 mg/dL     CBC and differential [[id number]]  (Abnormal) Collected: 02/10/23 0557    Lab Status: Final result Specimen: Blood Updated: 02/10/23 0635     WBC 9 51 Thousand/uL      RBC 4 36 Million/uL      Hemoglobin 10 6 g/dL      Hematocrit 34 5 %      MCV 79 fL      MCH 24 3 pg      MCHC 30 7 g/dL      RDW 21 7 %      MPV 10 9 fL      Platelets 121 Thousands/uL      nRBC 0 /100 WBCs      Neutrophils Relative 59 %      Immat GRANS % 1 %      Lymphocytes Relative 24 %      Monocytes Relative 12 %      Eosinophils Relative 3 %      Basophils Relative 1 %      Neutrophils Absolute 5 76 Thousands/µL      Immature Grans Absolute 0 05 Thousand/uL      Lymphocytes Absolute 2 25 Thousands/µL      Monocytes Absolute 1 09 Thousand/µL      Eosinophils Absolute 0 29 Thousand/µL      Basophils Absolute 0 07 Thousands/µL     B-Type Natriuretic Peptide(BNP) [[id number]]  (Normal) Collected: 02/09/23 1822    Lab Status: Final result Specimen: Blood from Arm, Left Updated: 02/10/23 0017     BNP 65 pg/mL     UA w Reflex to Microscopic w Reflex to Culture [[id number]] Collected: 02/09/23 2151    Lab Status: Final result Specimen: Urine, Straight Cath Updated: 02/09/23 2215     Color, UA Colorless     Clarity, UA Clear     Specific Gravity, UA 1 007     pH, UA 7 5     Leukocytes, UA Negative     Nitrite, UA Negative     Protein, UA Negative mg/dl      Glucose, UA Negative mg/dl      Ketones, UA Negative mg/dl      Urobilinogen, UA <2 0 mg/dl      Bilirubin, UA Negative     Occult Blood, UA Negative    Rapid drug screen, urine [[id number]]  (Abnormal) Collected: 02/09/23 2151    Lab Status: Final result Specimen: Urine, Other Updated: 02/09/23 2214     Amph/Meth UR Negative     Barbiturate Ur Negative     Benzodiazepine Urine Negative     Cocaine Urine Negative     Methadone Urine Negative     Opiate Urine Negative     PCP Ur Negative     THC Urine Negative     Oxycodone Urine Positive    Narrative:      Presumptive report  If requested, specimen will be sent to reference lab for confirmation  FOR MEDICAL PURPOSES ONLY  IF CONFIRMATION NEEDED PLEASE CONTACT THE LAB WITHIN 5 DAYS      Drug Screen Cutoff Levels:  AMPHETAMINE/METHAMPHETAMINES  1000 ng/mL  BARBITURATES     200 ng/mL  BENZODIAZEPINES     200 ng/mL  COCAINE      300 ng/mL  METHADONE      300 ng/mL  OPIATES      300 ng/mL  PHENCYCLIDINE     25 ng/mL  THC       50 ng/mL  OXYCODONE      100 ng/mL    FLU/RSV/COVID - if FLU/RSV clinically relevant [[id number]]  (Normal) Collected: 02/09/23 1822    Lab Status: Final result Specimen: Nares from Nose Updated: 02/09/23 1924     SARS-CoV-2 Negative     INFLUENZA A PCR Negative     INFLUENZA B PCR Negative     RSV PCR Negative    Narrative:      FOR PEDIATRIC PATIENTS - copy/paste COVID Guidelines URL to browser: [email or web address]  [employer]    SARS-CoV-2 assay is a Nucleic Acid Amplification assay intended for the  qualitative detection of nucleic acid from SARS-CoV-2 in nasopharyngeal  swabs  Results are for the presumptive identification of SARS-CoV-2 RNA  Positive results are indicative of infection with SARS-CoV-2, the virus  causing COVID-19, but do not rule out bacterial infection or co-infection  with other viruses  Laboratories within the [place] and its  territories are required to report all positive results to the appropriate  public health authorities  Negative results do not preclude SARS-CoV-2  infection and should not be used as the sole basis for treatment or other  patient management decisions  Negative results must be combined with  clinical observations, patient history, and epidemiological information  This test has not been FDA cleared or approved  This test has been authorized by FDA under an Emergency Use Authorization  (EUA)  This test is only authorized for the duration of time the  declaration that circumstances exist justifying the authorization of the  emergency use of an in vitro diagnostic tests for detection of SARS-CoV-2  virus and/or diagnosis of COVID-19 infection under section 564(b)(1) of  the Act, 21 U  S C  333FYC-2(R)(0), unless the authorization is terminated  or revoked sooner   The test has been validated but independent review by FDA  and CLIA is pending  Test performed using [employer] GeneXpert: This RT-PCR assay targets N2,  a region unique to SARS-CoV-2  A conserved region in the E-gene was chosen  for pan-Sarbecovirus detection which includes SARS-CoV-2  According to CMS-2020-01-R, this platform meets the definition of high-throughput technology  TSH [[id number]]  (Normal) Collected: 02/09/23 1822    Lab Status: Final result Specimen: Blood from Arm, Left Updated: 02/09/23 1912     TSH 3RD GENERATON 1 585 uIU/mL     Narrative:      Patients undergoing fluorescein dye angiography may retain small amounts of fluorescein in the body for 48-72 hours post procedure  Samples containing fluorescein can produce falsely depressed TSH values  If the patient had this procedure,a specimen should be resubmitted post fluorescein clearance        Procalcitonin [[id number]]  (Normal) Collected: 02/09/23 1822    Lab Status: Final result Specimen: Blood from Arm, Left Updated: 02/09/23 1905     Procalcitonin <4 93 ng/ml     Salicylate level [[id number]]  (Normal) Collected: 02/09/23 1822    Lab Status: Final result Specimen: Blood from Arm, Left Updated: 30/28/69 8058     Salicylate Lvl <5 mg/dL     Comprehensive metabolic panel [[id number]]  (Abnormal) Collected: 02/09/23 1822    Lab Status: Final result Specimen: Blood from Arm, Left Updated: 02/09/23 1856     Sodium 140 mmol/L      Potassium 3 2 mmol/L      Chloride 96 mmol/L      CO2 29 mmol/L      ANION GAP 15 mmol/L      BUN 18 mg/dL      Creatinine 1 05 mg/dL      Glucose 100 mg/dL      Calcium 10 7 mg/dL      AST 14 U/L      ALT 6 U/L      Alkaline Phosphatase 170 U/L      Total Protein 8 7 g/dL      Albumin 4 8 g/dL      Total Bilirubin 0 75 mg/dL      eGFR 51 ml/min/1 73sq m     Narrative:      [place] guidelines for Chronic Kidney Disease (CKD):   •  Stage 1 with normal or high GFR (GFR > 90 mL/min/1 73 square meters)  • Stage 2 Mild CKD (GFR = 60-89 mL/min/1 73 square meters)  •  Stage 3A Moderate CKD (GFR = 45-59 mL/min/1 73 square meters)  •  Stage 3B Moderate CKD (GFR = 30-44 mL/min/1 73 square meters)  •  Stage 4 Severe CKD (GFR = 15-29 mL/min/1 73 square meters)  •  Stage 5 End Stage CKD (GFR <15 mL/min/1 73 square meters)  Note: GFR calculation is accurate only with a steady state creatinine    Acetaminophen level-If concentration is detectable, please discuss with medical  on call  [[id number]]  (Abnormal) Collected: 02/09/23 1822    Lab Status: Final result Specimen: Blood from Arm, Left Updated: 02/09/23 1856     Acetaminophen Level <10 ug/mL     Ethanol [[id number]]  (Normal) Collected: 02/09/23 1822    Lab Status: Final result Specimen: Blood from Arm, Left Updated: 02/09/23 1853     Ethanol Lvl <10 mg/dL     Lactic acid [[id number]]  (Normal) Collected: 02/09/23 1822    Lab Status: Final result Specimen: Blood from Arm, Left Updated: 02/09/23 1851     LACTIC ACID 1 2 mmol/L     Narrative:      Result may be elevated if tourniquet was used during collection      CBC and differential [[id number]]  (Abnormal) Collected: 02/09/23 1822    Lab Status: Final result Specimen: Blood from Arm, Left Updated: 02/09/23 1837     WBC 11 68 Thousand/uL      RBC 4 79 Million/uL      Hemoglobin 11 3 g/dL      Hematocrit 37 9 %      MCV 79 fL      MCH 23 6 pg      MCHC 29 8 g/dL      RDW 21 9 %      MPV 10 2 fL      Platelets 603 Thousands/uL      nRBC 0 /100 WBCs      Neutrophils Relative 64 %      Immat GRANS % 0 %      Lymphocytes Relative 23 %      Monocytes Relative 9 %      Eosinophils Relative 3 %      Basophils Relative 1 %      Neutrophils Absolute 7 48 Thousands/µL      Immature Grans Absolute 0 03 Thousand/uL      Lymphocytes Absolute 2 68 Thousands/µL      Monocytes Absolute 1 10 Thousand/µL      Eosinophils Absolute 0 32 Thousand/µL      Basophils Absolute 0 07 Thousands/µL                  XR chest 1 view portable Final Result by Forrest Baker MD (02/10 8703)   Near complete resolution of previous vascular congestion                  Workstation performed: [id number]         CT head without contrast   Final Result by Reid Eng MD (02/09 1948)      No acute intracranial abnormality  Workstation performed: [id number]                    Procedures  ECG 12 Lead Documentation Only    Date/Time: 2/9/2023 7:15 PM  Performed by: Yvette Summers DO  Authorized by: Yvette Summers DO     Indications / Diagnosis:  Altered mental status  ECG reviewed by me, the ED Provider: yes    Patient location:  ED  Previous ECG:     Previous ECG:  Compared to current    Comparison ECG info:  2/4/2023    Similarity:  No change  Interpretation:     Interpretation: non-specific    Quality:     Tracing quality:  Limited by artifact  Rate:     ECG rate:  92  Rhythm:     Rhythm: atrial fibrillation    Ectopy:     Ectopy: none    QRS:     QRS axis:  Right  Conduction:     Conduction: normal    ST segments:     ST segments:  Normal  T waves:     T waves: normal               ED Course                                             Medical Decision Making  Acute encephalopathy: acute illness or injury  Chronic pain: acute illness or injury  Hallucinations, visual: acute illness or injury  Hypokalemia: self-limited or minor problem  Opiate withdrawal ([facility] [address] ): acute illness or injury  Stasis dermatitis of both legs: acute illness or injury  Amount and/or Complexity of Data Reviewed  Independent Historian: caregiver     Details: Patient's daughter at bedside to help provide history  External Data Reviewed: notes  Details: Previous hospitalization for bilateral lower extremity cellulitis documentation reviewed by me  Labs: ordered  Details: Ordered and interpreted by me, patient has mild leukocytosis, nonspecific  Mild hypokalemia replaced potassium p o  Radiology: ordered and independent interpretation performed       Details: Chest x-ray ordered interpreted by me, no acute disease  CT scan interpreted by radiology  No acute abnormalities  ECG/medicine tests: ordered and independent interpretation performed  Details: EKG ordered and interpreted by me, atrial fibrillation with a rate of 92 without ischemic changes  This EKG is limited by artifact  Risk  Prescription drug management  Decision regarding hospitalization  Risk Details: Case discussed with internal medicine team who agrees for inpatient admission for further work-up of encephalopathy  Disposition  Final diagnoses:   Acute encephalopathy   Chronic pain   Opiate withdrawal (HCC)   Hallucinations, visual   Hypokalemia   Stasis dermatitis of both legs     Time reflects when diagnosis was documented in both MDM as applicable and the Disposition within this note     Time User Action Codes Description Comment    2/9/2023  9:12 PM Tierra Zeng Add [G93 40] Acute encephalopathy     2/9/2023  9:13 PM Tierra Zeng Add [G89 29] Chronic pain     2/9/2023  9:13 PM Azucena Tierra Add [F11 93] Opiate withdrawal ([address] )     2/9/2023  9:13 PM Tierra Zeng Add [R44 1] Hallucinations, visual     2/9/2023  9:13 PM Azucena Tierra Add [E87 6] Hypokalemia     2/9/2023  9:15 PM Azucena Tierra Add [I87 2] Stasis dermatitis of both legs     2/13/2023 10:53 AM Apu Ronde Add [R41 3] Memory impairment     2/13/2023 10:53 AM Pau Ronde Add [R41 82] Altered mental status, unspecified     2/13/2023  3:55 PM Pau Ronde Add [S22 069A] T8 vertebral fracture [facility])       ED Disposition     ED Disposition   Admit    Condition   Stable    Date/Time   u Feb 9, 2023  9:12 PM    Comment   Case was discussed with Dr Junior Jarrett and the patient's admission status was agreed to be Admission Status: inpatient status to the service of Dr Julia Price              Follow-up Information    None         Current Discharge Medication List      CONTINUE these medications which have NOT CHANGED    Details   apixaban (Eliquis) 5 mg Take 1 tablet (5 mg total) by mouth 2 (two) times a day Lot [id number] exp 10/2024  Qty: 56 tablet, Refills: 0    Associated Diagnoses: Longstanding persistent atrial fibrillation (HCC)      carvedilol (COREG) 3 125 mg tablet Take 1 tablet (3 125 mg total) by mouth 2 (two) times a day with meals  Qty: 60 tablet, Refills: 2    Associated Diagnoses: Chronic heart failure with preserved ejection fraction (HFpEF) ([facility] [address] ); Essential hypertension      donepezil (ARICEPT) 10 mg tablet Take 1 tablet (10 mg total) by mouth daily at bedtime  Qty: 90 tablet, Refills: 1    Associated Diagnoses: Memory impairment; Moderate Alzheimer's dementia with mood disturbance, unspecified timing of dementia onset ([facility])      escitalopram (LEXAPRO) 10 mg tablet Take 1 tab at bedtime x1 week, then take 1/2 tab at bedtime x1 week, then stop    Qty: 11 tablet, Refills: 0    Associated Diagnoses: Anxiety      ferrous sulfate 324 (65 Fe) mg Take 1 tablet (324 mg total) by mouth daily before breakfast  Qty: 90 tablet, Refills: 0    Associated Diagnoses: Other iron deficiency anemia      gabapentin (NEURONTIN) 100 mg capsule Take 1 capsule (100 mg total) by mouth 2 (two) times a day  Qty: 180 capsule, Refills: 1    Associated Diagnoses: T8 vertebral fracture (HCC)      metolazone (ZAROXOLYN) 2 5 mg tablet Take 1 tablet ( 2 5 mg) by mouth twice a week on Monday and Thursdays, before the morning dose of torsemide  Qty: 8 tablet, Refills: 2    Associated Diagnoses: Chronic heart failure with preserved ejection fraction (HFpEF) (HCC)      pantoprazole (PROTONIX) 40 mg tablet Take 1 tablet (40 mg total) by mouth daily  Qty: 30 tablet, Refills: 6    Associated Diagnoses: Melena      acetaminophen (TYLENOL) 325 mg tablet Take 2 tablets (650 mg total) by mouth every 6 (six) hours  Refills: 0    Associated Diagnoses: T8 vertebral fracture (HCC)      aluminum hydroxide (DERMAGRAN) Apply topically every other day Open small Dermagran square & apply to wounds in a single layer cut to size of wounds  Qty: 5 g, Refills: 0    Associated Diagnoses: Venous stasis dermatitis of both lower extremities      ammonium lactate (LAC-HYDRIN) 12 % cream Apply topically 2 (two) times a day  Qty: 385 g, Refills: 0    Associated Diagnoses: Venous stasis dermatitis of both lower extremities      lidocaine (LIDODERM) 5 % Apply 2 patches topically daily Remove & Discard patch within 12 hours or as directed by MD  Refills: 0    Associated Diagnoses: T8 vertebral fracture (HCC)      memantine (Namenda) 10 mg tablet Take 1/2 tab daily x1 week, then take 1 tab daily x1 week, then take 1 tab twice daily therafter  Qty: 43 tablet, Refills: 2    Associated Diagnoses: Memory impairment; Moderate Alzheimer's dementia with mood disturbance, unspecified timing of dementia onset (HCC)      naloxone (NARCAN) 4 mg/0 1 mL nasal spray Administer 1 spray into a nostril  If no response after 2-3 minutes, give another dose in the other nostril using a new spray    Qty: 1 each, Refills: 0    Associated Diagnoses: Uncomplicated opioid dependence (HCC)      oxyCODONE (ROXICODONE) 10 MG TABS Take 1 tablet (10 mg total) by mouth every 8 (eight) hours as needed (breakthrough pain) Max Daily Amount: 30 mg  Qty: 90 tablet, Refills: 0    Associated Diagnoses: Acute pain due to trauma      potassium chloride (K-DUR,KLOR-CON) 20 mEq tablet Take 3 tablets daily ( 60 meq) plus an extra 20 meq on mondays and thursdays with metolazone  Qty: 180 tablet, Refills: 1    Associated Diagnoses: CHF (congestive heart failure) (HCC)      saccharomyces boulardii (FLORASTOR) 250 mg capsule Take 1 capsule (250 mg total) by mouth 2 (two) times a day  Qty: 10 capsule, Refills: 0    Associated Diagnoses: Bilateral lower leg cellulitis      senna-docusate sodium (SENOKOT S) 8 6-50 mg per tablet Take 2 tablets by mouth 2 (two) times a day  Refills: 0    Associated Diagnoses: T8 vertebral fracture (HCC)      tiZANidine (Neel Carrel) 4 mg tablet Take 1 tablet (4 mg total) by mouth every 8 (eight) hours as needed for muscle spasms  Qty: 90 tablet, Refills: 1    Associated Diagnoses: Closed fracture of transverse process of lumbar vertebra with routine healing, subsequent encounter; Compression fracture of T8 vertebra with routine healing      torsemide (DEMADEX) 20 mg tablet Take 2 tablets (40 mg total) by mouth 2 (two) times a day  Qty: 120 tablet, Refills: 0    Associated Diagnoses: CHF (congestive heart failure) (HCC)         STOP taking these medications       cephalexin (KEFLEX) 500 mg capsule Comments:   Reason for Stopping:               No discharge procedures on file      PDMP Review       Value Time User    PDMP Reviewed  Yes 2/9/2023 10:28 AM Opal Quintero MD          ED Provider  Electronically Signed by           Davide Farris DO  02/13/23 [id number]

## 2023-02-10 NOTE — ASSESSMENT & PLAN NOTE
Patient was put on Keflex at discharge  Daughter and patient are not aware of how many doses are pending  Hx: Distal femoral fracture in August 2022  It was treated with ORIF  Cellulitis does not correlate in the same area as the fracture      Plan:  · Hold antibiotics at this time  · Blood cultures x 2 pending  · Monitor vital signs  · Wound care consulted - Recommendations appreciated

## 2023-02-10 NOTE — ASSESSMENT & PLAN NOTE
History of atrial fibrillation controlled on Carvedilol 3 125 mg BID and Eliquis 5 mg BID    Plan:  • Continue home meds  • Monitor rates/BP

## 2023-02-11 LAB
ANION GAP SERPL CALCULATED.3IONS-SCNC: 10 MMOL/L (ref 4–13)
BUN SERPL-MCNC: 24 MG/DL (ref 5–25)
CALCIUM SERPL-MCNC: 9.7 MG/DL (ref 8.4–10.2)
CHLORIDE SERPL-SCNC: 95 MMOL/L (ref 96–108)
CO2 SERPL-SCNC: 32 MMOL/L (ref 21–32)
CREAT SERPL-MCNC: 1.07 MG/DL (ref 0.6–1.3)
ERYTHROCYTE [DISTWIDTH] IN BLOOD BY AUTOMATED COUNT: 21.6 % (ref 11.6–15.1)
GFR SERPL CREATININE-BSD FRML MDRD: 50 ML/MIN/1.73SQ M
GLUCOSE SERPL-MCNC: 125 MG/DL (ref 65–140)
HCT VFR BLD AUTO: 37.7 % (ref 34.8–46.1)
HGB BLD-MCNC: 11.4 G/DL (ref 11.5–15.4)
MAGNESIUM SERPL-MCNC: 1.9 MG/DL (ref 1.9–2.7)
MCH RBC QN AUTO: 24.1 PG (ref 26.8–34.3)
MCHC RBC AUTO-ENTMCNC: 30.2 G/DL (ref 31.4–37.4)
MCV RBC AUTO: 80 FL (ref 82–98)
PLATELET # BLD AUTO: 435 THOUSANDS/UL (ref 149–390)
PMV BLD AUTO: 10.4 FL (ref 8.9–12.7)
POTASSIUM SERPL-SCNC: 3.2 MMOL/L (ref 3.5–5.3)
RBC # BLD AUTO: 4.73 MILLION/UL (ref 3.81–5.12)
SODIUM SERPL-SCNC: 137 MMOL/L (ref 135–147)
WBC # BLD AUTO: 9.56 THOUSAND/UL (ref 4.31–10.16)

## 2023-02-11 PROCEDURE — 85027 COMPLETE CBC AUTOMATED: CPT

## 2023-02-11 PROCEDURE — 83735 ASSAY OF MAGNESIUM: CPT | Performed by: PHYSICIAN ASSISTANT

## 2023-02-11 PROCEDURE — 80048 BASIC METABOLIC PNL TOTAL CA: CPT

## 2023-02-11 PROCEDURE — 99232 SBSQ HOSP IP/OBS MODERATE 35: CPT | Performed by: HOSPITALIST

## 2023-02-11 RX ORDER — HYDROXYZINE HYDROCHLORIDE 10 MG/1
10 TABLET, FILM COATED ORAL EVERY 6 HOURS PRN
Status: DISCONTINUED | OUTPATIENT
Start: 2023-02-11 | End: 2023-02-11

## 2023-02-11 RX ORDER — MAGNESIUM SULFATE HEPTAHYDRATE 40 MG/ML
2 INJECTION, SOLUTION INTRAVENOUS ONCE
Status: COMPLETED | OUTPATIENT
Start: 2023-02-11 | End: 2023-02-11

## 2023-02-11 RX ORDER — LORATADINE 10 MG/1
10 TABLET ORAL DAILY
Status: DISCONTINUED | OUTPATIENT
Start: 2023-02-11 | End: 2023-02-16 | Stop reason: HOSPADM

## 2023-02-11 RX ADMIN — APIXABAN 5 MG: 5 TABLET, FILM COATED ORAL at 17:15

## 2023-02-11 RX ADMIN — TORSEMIDE 40 MG: 20 TABLET ORAL at 21:05

## 2023-02-11 RX ADMIN — APIXABAN 5 MG: 5 TABLET, FILM COATED ORAL at 08:16

## 2023-02-11 RX ADMIN — MAGNESIUM SULFATE HEPTAHYDRATE 2 G: 40 INJECTION, SOLUTION INTRAVENOUS at 15:19

## 2023-02-11 RX ADMIN — CARVEDILOL 3.12 MG: 3.12 TABLET, FILM COATED ORAL at 08:15

## 2023-02-11 RX ADMIN — LORATADINE 10 MG: 10 TABLET ORAL at 04:32

## 2023-02-11 RX ADMIN — ACETAMINOPHEN 650 MG: 325 TABLET ORAL at 11:57

## 2023-02-11 RX ADMIN — OXYCODONE HYDROCHLORIDE 2.5 MG: 5 TABLET ORAL at 21:05

## 2023-02-11 RX ADMIN — ACETAMINOPHEN 650 MG: 325 TABLET ORAL at 05:15

## 2023-02-11 RX ADMIN — FERROUS SULFATE TAB 325 MG (65 MG ELEMENTAL FE) 325 MG: 325 (65 FE) TAB at 08:16

## 2023-02-11 RX ADMIN — MEMANTINE 10 MG: 10 TABLET ORAL at 08:16

## 2023-02-11 RX ADMIN — POTASSIUM CHLORIDE 60 MEQ: 1500 TABLET, EXTENDED RELEASE ORAL at 08:15

## 2023-02-11 RX ADMIN — ESCITALOPRAM OXALATE 10 MG: 10 TABLET, FILM COATED ORAL at 08:16

## 2023-02-11 RX ADMIN — OXYCODONE HYDROCHLORIDE 2.5 MG: 5 TABLET ORAL at 02:10

## 2023-02-11 RX ADMIN — ACETAMINOPHEN 650 MG: 325 TABLET ORAL at 17:15

## 2023-02-11 RX ADMIN — GABAPENTIN 100 MG: 100 CAPSULE ORAL at 17:15

## 2023-02-11 RX ADMIN — DONEPEZIL HYDROCHLORIDE 10 MG: 5 TABLET ORAL at 21:05

## 2023-02-11 RX ADMIN — GABAPENTIN 100 MG: 100 CAPSULE ORAL at 08:16

## 2023-02-11 RX ADMIN — TORSEMIDE 40 MG: 20 TABLET ORAL at 08:16

## 2023-02-11 RX ADMIN — CARVEDILOL 3.12 MG: 3.12 TABLET, FILM COATED ORAL at 17:15

## 2023-02-11 RX ADMIN — OXYCODONE HYDROCHLORIDE 2.5 MG: 5 TABLET ORAL at 08:15

## 2023-02-11 RX ADMIN — PANTOPRAZOLE SODIUM 40 MG: 40 TABLET, DELAYED RELEASE ORAL at 05:15

## 2023-02-11 RX ADMIN — MEMANTINE 10 MG: 10 TABLET ORAL at 17:15

## 2023-02-11 RX ADMIN — OXYCODONE HYDROCHLORIDE 2.5 MG: 5 TABLET ORAL at 14:27

## 2023-02-11 NOTE — ASSESSMENT & PLAN NOTE
Patient has a history of chronic pain syndrome  Plan:  · Continue home pain regimen with gabapentin, tizanidine and oxycodone

## 2023-02-11 NOTE — PROGRESS NOTES
[facility]  Progress Note [facility] 1945[facility] 69 [facility]  female MRN: [id number]  Unit/Bed#: S -01 Encounter: [id number]  Primary Care Provider: Elvira Ryan MD   Date and time admitted to hospital: 2/9/2023  5:20 PM    * Altered mental status, unspecified  Assessment & Plan  Patient has visual and auditory hallucinations last night at home  Most likely due to flareup of her infection from  Patient's  is also admitted to the hospital at this time secondary to a fall with sustained trauma  Of note, patient took too much of her oxycodone and ran out of her prescription  Labs:  TSH: Normal  Potassium: 3 2  Rapid drug screen: Positive for oxycodone   UA: Negative     Differential: toxic encephalopathy, metabolic encephalopathy, recent cellulitis, electrolyte abnormalities, side effect of medications, chronic opioid use with possible withdrawal, memory impairment     Plans:  · Monitor labs daily  · Redirection with sundowning  · Blood cultures x 2 pending  · Monitor vital signs  · Inpatient consult to geriatrics  Memory impairment  Assessment & Plan  Patient has not been officially diagnosed with any neurodegenerative diseases  She was started on medications by per PCP because she keeps missing her appointments for a geriatric/neurology evaluation  Plan:  · Continue home memantine and donepezil   · Frequent reorientation   · Fall precautions  · Geriatric consult  Chronic pain syndrome  Assessment & Plan  Patient has a history of chronic pain syndrome  Plan:  · Continue home pain regimen with gabapentin, tizanidine and oxycodone      Unspecified atrial fibrillation [facility])  Assessment & Plan  History of atrial fibrillation controlled on Carvedilol 3 125 mg BID and Eliquis 5 mg BID    Plan:  • Continue home meds  • Monitor rates/BP    Hypokalemia  Assessment & Plan  Recent Labs     02/09/23  1822 02/10/23  0557 02/11/23  0506   K 3 2* 3 2* 3 2*   MG  --  2 0 --      Patient presented with a K of 3 2  Standing dose of KDUR 60 mEq daily with an extra 20 mEq on Mondays and Thursdays with Metolazone dosing    Plan:  · Monitor daily labs  · Replete as needed  · CMP a m  · Mg a m  Bilateral lower leg cellulitis  Assessment & Plan  Patient was put on Keflex at discharge  Daughter and patient are not aware of how many doses are pending  Hx: Distal femoral fracture in August 2022  It was treated with ORIF  Cellulitis does not correlate in the same area as the fracture  Plan:  · Hold antibiotics at this time  · Blood cultures x 2 pending  · Monitor vital signs  · Wound care consulted - Recommendations appreciated    Chronic heart failure with preserved ejection fraction (HFpEF) ([facility])  Assessment & Plan  Wt Readings from Last 3 Encounters:   02/11/23 78 9 kg (173 lb 15 1 oz)   02/09/23 84 2 kg (185 lb 9 6 oz)   02/06/23 88 5 kg (195 lb 1 7 oz)       Lab Results   Component Value Date    LVEF 55 10/07/2022    NTBNP 1,615 (H) 04/30/2022    NTBNP 2,903 (H) 02/10/2022       Plan:  • GDMT: Diuretic: Torsemide PO 40 mg BID and metolazone 2 5 mg twice weekly (Mondays and Thursdays), B-Blocker: Carvedilol 3 125 mg BID, No ACE/ARB/ARNi, Aldos  Blocker: or SGLT2-I on file  · Diet: Sodium restriction 2g  · Continue home potassium repletion with KDUR 60 mEq daily plus another 20 mEq on Mondays and Thursdays with Metolazone   · Labs: BMP, magnesium tomorrow a m  · Maintain Mg > 2 and K > 4; Replete prn  · Elevate HOB > 30°, Daily standing weights, Measure I/Os, Monitor on Telemetry  · Consult nutrition services for dietary education  Anxiety  Assessment & Plan  Patient has a history of anxiety  Plan:  · Continue home Lexapro  Venous insufficiency (chronic) (peripheral)  Assessment & Plan  Patient has a history of venous insufficiency  She has a wound on her right lower extremity  She usually has a wound nurse coming and dressing it every week      Plan:  · Compression stockings  · Wound Care per wound management recs  VTE Pharmacologic Prophylaxis:   VTE Score: 5 High Risk (Score >/= 5) - Pharmacological DVT Prophylaxis Ordered: Apixaban (Eliquis)  Sequential Compression Devices Ordered  Mechanical VTE Prophylaxis in Place: Yes    Patient Centered Rounds: I have performed bedside rounds with nursing staff today  Discussions with Specialists or Other Care Team Provider: None    Education and Discussions with Family / Patient: Updated  (daughter) via phone  Current Length of Stay: 2 day(s)    Current Patient Status: Inpatient     Discharge Plan / Estimated Discharge Date: Anticipate discharge in 48-72 hrs to discharge location to be determined pending rehab evaluations  Code Status: Level 1 - Full Code      Subjective:   Patient was awake and sitting up in bed  She was about to have a breakfast   She is alert and oriented x3 and seems to be at her baseline  She is anxious to be discharged so she could be with her  who was also recently discharged on 2/10/2023  She denies fever, chills, HA, CP, SOB,  palpitations, Abd pain, n/v/d  Spoke to daughter on the phone for quite a lengthy time  Daughter expressed how her mother is sundowning and also that she brought in the TLSO brace so patient can be evaluated by PT and OT  Objective:     Vitals:   Temp (24hrs), Av 3 °F (36 8 °C), Min:98 3 °F (36 8 °C), Max:98 3 °F (36 8 °C)    Temp:  [98 3 °F (36 8 °C)] 98 3 °F (36 8 °C)  HR:  [] 83  Resp:  [17-22] 18  BP: (135-153)/(81-93) 153/93  SpO2:  [93 %-96 %] 93 %  Body mass index is 31 81 kg/m²  Input and Output Summary (last 24 hours): Intake/Output Summary (Last 24 hours) at 2023 1510  Last data filed at 2023 0958  Gross per 24 hour   Intake 120 ml   Output 1600 ml   Net -1480 ml       Physical Exam:     Physical Exam  Vitals and nursing note reviewed  Constitutional:       General: She is not in acute distress  Appearance: Normal appearance  She is obese  She is not ill-appearing, toxic-appearing or diaphoretic  HENT:      Head: Normocephalic and atraumatic  Nose: Nose normal    Eyes:      General: No scleral icterus  Right eye: No discharge  Left eye: No discharge  Extraocular Movements: Extraocular movements intact  Pupils: Pupils are equal, round, and reactive to light  Neck:      Vascular: No carotid bruit  Cardiovascular:      Rate and Rhythm: Normal rate and regular rhythm  Pulses: Normal pulses  Heart sounds: Normal heart sounds  No murmur heard  No friction rub  No gallop  Pulmonary:      Effort: Pulmonary effort is normal       Breath sounds: Normal breath sounds  No wheezing, rhonchi or rales  Abdominal:      General: Bowel sounds are normal       Palpations: Abdomen is soft  Tenderness: There is no abdominal tenderness  There is no guarding or rebound  Musculoskeletal:         General: No swelling or tenderness  Cervical back: Normal range of motion and neck supple  No rigidity  No muscular tenderness  Right lower leg: No edema  Left lower leg: No edema  Skin:     Capillary Refill: Capillary refill takes less than 2 seconds  Coloration: Skin is not jaundiced  Findings: No bruising, erythema, lesion or rash  Neurological:      General: No focal deficit present  Mental Status: She is alert and oriented to person, place, and time  Motor: No weakness  Gait: Gait normal    Psychiatric:         Mood and Affect: Mood normal          Behavior: Behavior normal          Thought Content:  Thought content normal          Judgment: Judgment normal           Additional Data:     Labs:  Results from last 7 days   Lab Units 02/11/23  0506 02/10/23  0557   WBC Thousand/uL 9 56 9 51   HEMOGLOBIN g/dL 11 4* 10 6*   HEMATOCRIT % 37 7 34 5*   PLATELETS Thousands/uL 435* 428*   NEUTROS PCT %  --  59   LYMPHS PCT %  --  24   MONOS PCT %  --  12   EOS PCT %  --  3     Results from last 7 days   Lab Units 02/11/23  0506 02/10/23  0557   SODIUM mmol/L 137 140   POTASSIUM mmol/L 3 2* 3 2*   CHLORIDE mmol/L 95* 99   CO2 mmol/L 32 31   BUN mg/dL 24 18   CREATININE mg/dL 1 07 0 95   ANION GAP mmol/L 10 10   CALCIUM mg/dL 9 7 9 9   ALBUMIN g/dL  --  4 0   TOTAL BILIRUBIN mg/dL  --  0 70   ALK PHOS U/L  --  135*   ALT U/L  --  5*   AST U/L  --  11*   GLUCOSE RANDOM mg/dL 125 120     Results from last 7 days   Lab Units 02/04/23  1837   INR  1 15             Results from last 7 days   Lab Units 02/09/23  1822 02/04/23  1837   LACTIC ACID mmol/L 1 2 1 3   PROCALCITONIN ng/ml <0 05 <0 05       Imaging: Reviewed radiology reports from this admission including: chest xray and CT head    Recent Cultures (last 7 days):     Results from last 7 days   Lab Units 02/09/23  1822 02/04/23  1837   BLOOD CULTURE  No Growth at 24 hrs  No Growth at 24 hrs  No Growth After 5 Days  No Growth After 5 Days         Lines/Drains:  Invasive Devices     Peripheral Intravenous Line  Duration           Peripheral IV 02/09/23 Left Antecubital 1 day                Telemetry:        Last 24 Hours Medication List:   Current Facility-Administered Medications   Medication Dose Route Frequency Provider Last Rate   • acetaminophen  650 mg Oral Q6H [address] Krima A Ten, DO     • apixaban  5 mg Oral BID Wai Cowan DO     • carvedilol  3 125 mg Oral BID With Meals Wai Cowan DO     • donepezil  10 mg Oral HS [facility] ROHAN Caal, DO     • escitalopram  10 mg Oral Daily Wai Cowan DO     • ferrous sulfate  325 mg Oral Daily Wai Cowan DO     • gabapentin  100 mg Oral BID Wai Cowan DO     • loratadine  10 mg Oral Daily Yakov Valderrama MD     • magnesium sulfate  2 g Intravenous Once Jesus Mena MD     • memantine  10 mg Oral BID Wai Cowan DO     • [START ON 2/13/2023] metolazone  2 5 mg Oral Once per day on Mon Thu Krima A Caal, DO     • oxyCODONE  2 5 mg Oral Q6H PRN Lionel Springer [place], DO     • pantoprazole  40 mg Oral Early Morning Merlealina Hatch, DO     • [START ON 2/13/2023] potassium chloride  20 mEq Oral Once per day on Mon Thu Neema Caal, DO     • potassium chloride  60 mEq Oral Daily Cassialeen Mamie, DO     • senna-docusate sodium  2 tablet Oral BID Merwendy Hatch, DO     • tiZANidine  4 mg Oral Q8H PRN Sean Hatch, DO     • torsemide  40 mg Oral BID Merleen Mamie, DO        Nutrition Assessment and Intervention:     Reviewed food recall journal      Physical Activity Assessment and Intervention:    Activity journal reviewed      Emotional and Mental Well-being, Sleep, Connectedness Assessment and Intervention:    Sleep/stress assessment performed    Depression and anxiety screening performed and reviewed      Tobacco and Toxic Substance Assessment and Intervention:     Tobacco use screening performed    Alcohol and drug use screening performed      Today, Patient Was Seen By: Cintia Wilkes MD    ** Please Note: This note has been constructed using a voice recognition system   **

## 2023-02-11 NOTE — DISCHARGE INSTR - OTHER ORDERS
- likely secondary to HIV vs AML vs sepsis   - s/p induction chemotherapy 7+3  - Type and screen q3 days  - continue to monitor    Wound/Skin Care Discharge Plan:   1  For right lower leg wounds, cleanse with foam cleanser or normal saline  Apply alcohol free barrier film to periwound  Apply Triad paste to wound bed  Cover with a sacral bordered foam dressing  Change every 3 days and as needed  Apply Hydraguard or similar emollient once a day to left lower leg and to intact skin on right lower leg  2  Pressure redistribution to chair  3  Hydraguard or similar emollient to heels once a day  4  Moisturize skin daily  5  Offload heels from mattress surface daily  6  Frequently turn and reposition patient as patient able to tolerate

## 2023-02-11 NOTE — ASSESSMENT & PLAN NOTE
Wt Readings from Last 3 Encounters:   02/11/23 78 9 kg (173 lb 15 1 oz)   02/09/23 84 2 kg (185 lb 9 6 oz)   02/06/23 88 5 kg (195 lb 1 7 oz)       Lab Results   Component Value Date    LVEF 55 10/07/2022    NTBNP 1,615 (H) 04/30/2022    NTBNP 2,903 (H) 02/10/2022       Plan:  • GDMT: Diuretic: Torsemide PO 40 mg BID and metolazone 2 5 mg twice weekly (Mondays and Thursdays), B-Blocker: Carvedilol 3 125 mg BID, No ACE/ARB/ARNi, Aldos  Blocker: or SGLT2-I on file  · Diet: Sodium restriction 2g  · Continue home potassium repletion with KDUR 60 mEq daily plus another 20 mEq on Mondays and Thursdays with Metolazone   · Labs: BMP, magnesium tomorrow a m  · Maintain Mg > 2 and K > 4; Replete prn  · Elevate HOB > 30°, Daily standing weights, Measure I/Os, Monitor on Telemetry  · Consult nutrition services for dietary education

## 2023-02-11 NOTE — ASSESSMENT & PLAN NOTE
Patient has a history of venous insufficiency  She has a wound on her right lower extremity  She usually has a wound nurse coming and dressing it every week  Plan:  · Compression stockings  · Wound Care per wound management recs

## 2023-02-11 NOTE — ASSESSMENT & PLAN NOTE
Recent Labs     02/09/23  1822 02/10/23  0557 02/11/23  0506   K 3 2* 3 2* 3 2*   MG  --  2 0  --      Patient presented with a K of 3 2  Standing dose of KDUR 60 mEq daily with an extra 20 mEq on Mondays and Thursdays with Metolazone dosing    Plan:  · Monitor daily labs  · Replete as needed  · CMP a m  · Mg a m

## 2023-02-11 NOTE — ASSESSMENT & PLAN NOTE
Patient has not been officially diagnosed with any neurodegenerative diseases  She was started on medications by per PCP because she keeps missing her appointments for a geriatric/neurology evaluation  Plan:  · Continue home memantine and donepezil   · Frequent reorientation   · Fall precautions  · Geriatric consult

## 2023-02-11 NOTE — ASSESSMENT & PLAN NOTE
Patient has visual and auditory hallucinations last night at home  Most likely due to flareup of her infection from  Patient's  is also admitted to the hospital at this time secondary to a fall with sustained trauma  Of note, patient took too much of her oxycodone and ran out of her prescription  Labs:  TSH: Normal  Potassium: 3 2  Rapid drug screen: Positive for oxycodone   UA: Negative     Differential: toxic encephalopathy, metabolic encephalopathy, recent cellulitis, electrolyte abnormalities, side effect of medications, chronic opioid use with possible withdrawal, memory impairment     Plans:  · Monitor labs daily  · Redirection with sundowning  · Blood cultures x 2 pending  · Monitor vital signs  · Inpatient consult to geriatrics

## 2023-02-11 NOTE — CONSULTS
Consult Note - Wound   Mariangel Junk 69 y o  female MRN: [id number]  Unit/Bed#: S -01 Encounter: [id number]      Assessment:   60-year-old female with a history of CHF, A-fib, chronic pain syndrome with opioid dependency and likely undiagnosed dementia presents with altered mental status  Patient admitted to some visual hallucinations as well as auditory hallucinations that occurred overnight  According to daughter this has happened before but concerned about safety at home  Patient is supposed to be on oral antibiotics for cellulitis but may not be fully compliant  Patient was just discharged and was seen by wound care nurse on 2/6 during that admission  Last admission was from 2/4 to 2/6/2023  Patient admitted for bilateral lower leg cellulitis for this admission  Wound care recommendations written by image and wound care nurse recommendations on 2/6 since that was only 4 days ago  Wound/Skin Care Plan:   1  For right lower leg wounds, cleanse with foam cleanser or normal saline  Apply Cavilon barrier film to periwound  Apply Triad paste to wound bed  Cover with a sacral bordered foam dressing  Change every 3 days and as needed  Start today-2/10  Apply Hydraguard emollient once a day to left lower leg and to intact skin on right lower leg  2  Pressure redistribution to chair  3  Hydraguard to heels once a day  4  Moisturize skin daily  5  Offload heels from mattress surface daily  6  Frequently turn and reposition patient as patient able to tolerate  7  Wound care nurse follow up  Will see at beginning of next week

## 2023-02-12 LAB
ALBUMIN SERPL BCP-MCNC: 4.2 G/DL (ref 3.5–5)
ALP SERPL-CCNC: 139 U/L (ref 34–104)
ALT SERPL W P-5'-P-CCNC: 4 U/L (ref 7–52)
ANION GAP SERPL CALCULATED.3IONS-SCNC: 12 MMOL/L (ref 4–13)
AST SERPL W P-5'-P-CCNC: 15 U/L (ref 13–39)
BILIRUB SERPL-MCNC: 0.58 MG/DL (ref 0.2–1)
BUN SERPL-MCNC: 26 MG/DL (ref 5–25)
CALCIUM SERPL-MCNC: 9.8 MG/DL (ref 8.4–10.2)
CHLORIDE SERPL-SCNC: 95 MMOL/L (ref 96–108)
CO2 SERPL-SCNC: 30 MMOL/L (ref 21–32)
CREAT SERPL-MCNC: 0.96 MG/DL (ref 0.6–1.3)
GFR SERPL CREATININE-BSD FRML MDRD: 57 ML/MIN/1.73SQ M
GLUCOSE SERPL-MCNC: 135 MG/DL (ref 65–140)
MAGNESIUM SERPL-MCNC: 2.2 MG/DL (ref 1.9–2.7)
POTASSIUM SERPL-SCNC: 3.2 MMOL/L (ref 3.5–5.3)
PROT SERPL-MCNC: 7.9 G/DL (ref 6.4–8.4)
SODIUM SERPL-SCNC: 137 MMOL/L (ref 135–147)

## 2023-02-12 PROCEDURE — 83735 ASSAY OF MAGNESIUM: CPT | Performed by: PHYSICIAN ASSISTANT

## 2023-02-12 PROCEDURE — 97163 PT EVAL HIGH COMPLEX 45 MIN: CPT

## 2023-02-12 PROCEDURE — 97535 SELF CARE MNGMENT TRAINING: CPT

## 2023-02-12 PROCEDURE — 99232 SBSQ HOSP IP/OBS MODERATE 35: CPT | Performed by: HOSPITALIST

## 2023-02-12 PROCEDURE — 80053 COMPREHEN METABOLIC PANEL: CPT | Performed by: PHYSICIAN ASSISTANT

## 2023-02-12 PROCEDURE — 97167 OT EVAL HIGH COMPLEX 60 MIN: CPT

## 2023-02-12 PROCEDURE — 97129 THER IVNTJ 1ST 15 MIN: CPT

## 2023-02-12 RX ORDER — HYDROXYZINE HYDROCHLORIDE 25 MG/1
25 TABLET, FILM COATED ORAL ONCE
Status: COMPLETED | OUTPATIENT
Start: 2023-02-12 | End: 2023-02-12

## 2023-02-12 RX ADMIN — ACETAMINOPHEN 650 MG: 325 TABLET ORAL at 00:14

## 2023-02-12 RX ADMIN — POTASSIUM CHLORIDE 60 MEQ: 1500 TABLET, EXTENDED RELEASE ORAL at 08:53

## 2023-02-12 RX ADMIN — MEMANTINE 10 MG: 10 TABLET ORAL at 08:54

## 2023-02-12 RX ADMIN — PANTOPRAZOLE SODIUM 40 MG: 40 TABLET, DELAYED RELEASE ORAL at 05:59

## 2023-02-12 RX ADMIN — APIXABAN 5 MG: 5 TABLET, FILM COATED ORAL at 17:52

## 2023-02-12 RX ADMIN — OXYCODONE HYDROCHLORIDE 2.5 MG: 5 TABLET ORAL at 16:02

## 2023-02-12 RX ADMIN — OXYCODONE HYDROCHLORIDE 2.5 MG: 5 TABLET ORAL at 04:36

## 2023-02-12 RX ADMIN — MEMANTINE 10 MG: 10 TABLET ORAL at 17:52

## 2023-02-12 RX ADMIN — FERROUS SULFATE TAB 325 MG (65 MG ELEMENTAL FE) 325 MG: 325 (65 FE) TAB at 08:54

## 2023-02-12 RX ADMIN — APIXABAN 5 MG: 5 TABLET, FILM COATED ORAL at 08:53

## 2023-02-12 RX ADMIN — TIZANIDINE 4 MG: 2 TABLET ORAL at 20:59

## 2023-02-12 RX ADMIN — ESCITALOPRAM OXALATE 10 MG: 10 TABLET, FILM COATED ORAL at 08:53

## 2023-02-12 RX ADMIN — CARVEDILOL 3.12 MG: 3.12 TABLET, FILM COATED ORAL at 16:01

## 2023-02-12 RX ADMIN — HYDROXYZINE HYDROCHLORIDE 25 MG: 25 TABLET, FILM COATED ORAL at 20:59

## 2023-02-12 RX ADMIN — DONEPEZIL HYDROCHLORIDE 10 MG: 5 TABLET ORAL at 21:00

## 2023-02-12 RX ADMIN — GABAPENTIN 100 MG: 100 CAPSULE ORAL at 08:54

## 2023-02-12 RX ADMIN — ACETAMINOPHEN 650 MG: 325 TABLET ORAL at 05:59

## 2023-02-12 RX ADMIN — ACETAMINOPHEN 650 MG: 325 TABLET ORAL at 17:52

## 2023-02-12 RX ADMIN — ACETAMINOPHEN 650 MG: 325 TABLET ORAL at 12:08

## 2023-02-12 RX ADMIN — TORSEMIDE 40 MG: 20 TABLET ORAL at 20:00

## 2023-02-12 RX ADMIN — CARVEDILOL 3.12 MG: 3.12 TABLET, FILM COATED ORAL at 08:55

## 2023-02-12 RX ADMIN — LORATADINE 10 MG: 10 TABLET ORAL at 08:53

## 2023-02-12 RX ADMIN — TIZANIDINE 4 MG: 2 TABLET ORAL at 05:59

## 2023-02-12 RX ADMIN — TORSEMIDE 40 MG: 20 TABLET ORAL at 08:53

## 2023-02-12 RX ADMIN — OXYCODONE HYDROCHLORIDE 2.5 MG: 5 TABLET ORAL at 12:08

## 2023-02-12 RX ADMIN — GABAPENTIN 100 MG: 100 CAPSULE ORAL at 17:52

## 2023-02-12 NOTE — PLAN OF CARE
Problem: OCCUPATIONAL THERAPY ADULT  Goal: Performs self-care activities at highest level of function for planned discharge setting  See evaluation for individualized goals  Description: Treatment Interventions: ADL retraining, Functional transfer training, Endurance training, Cognitive reorientation, Patient/family training, Equipment evaluation/education, Compensatory technique education, Continued evaluation, Energy conservation, Activityengagement          See flowsheet documentation for full assessment, interventions and recommendations  Note: Limitation: Decreased ADL status, Decreased Safe judgement during ADL, Decreased cognition, Decreased endurance, Decreased self-care trans, Decreased high-level ADLs  Prognosis: Good  Assessment: Pt is a 69 y o  female seen for OT evaluation s/p admit to [address] [place] [address] on 2/9/2023 w/Altered mental status, unspecified  Orders received for OT evaluation and treatment  Pt identified during session via name and wristband  Comorbidities affecting patient at time of evaluation include: BL cellulitis, chronic heart failure, memory impairment, Afib, anxiety and chronic pain  Personal factors affecting patient at time of evaluation include: limited caregiver support, steps to enter, limited insight into deficits, flat affect, decreased initiation and engagement, difficulty performing ADLs and difficulty performing IADLs  PTA, patient was independent with functional mobility with RW, independent with ADLS, requiring assist for IADLS, living with spouse and daughter in a 2 level home with 3 steps to enter and retired   The evaluation identifies the following performance deficits: weakness, impaired balance, decreased endurance, increased fall risk, new onset of impairment of functional mobility, decreased ADLS, decreased IADLS, decreased activity tolerance, decreased safety awareness, impaired judgement, decreased cognition and decreased strength, that result in activity limitations (as is outlined above in flowsheet)  The patient's raw score on the AM-PAC Daily Activity inpatient short form is 19, standardized score is 40 22, greater than 39 4  From an OT standpoint, patients at this level may benefit from d/c to Home with home health rehabilitation and 24/7 supervision  Pt will benefit from continued IPOT treatment to address above deficits and maximize level of independence with ADLs and functional mobility in the areas of: grooming , bathing/ shower, dressing, toilet hygiene, transfer to all surfaces and functional ambulation       OT Discharge Recommendation: (S) Home with home health rehabilitation (and 24/7 supervison vs PAR pending progress towards goals and level of social support available upon DC)     Mishel Raphael, OTR/L, DOT

## 2023-02-12 NOTE — PHYSICAL THERAPY NOTE
PHYSICAL THERAPY EVALUATION  NAME:  Maria Rodríguez  DATE: 02/12/23    AGE:   69 y o  Mrn:   [id number]  Principal problem: Principal Problem:    Altered mental status, unspecified  Active Problems:    Venous insufficiency (chronic) (peripheral)    Chronic pain syndrome    Unspecified atrial fibrillation (HCC)    Anxiety    Memory impairment    Chronic heart failure with preserved ejection fraction (HFpEF) (HCC)    Hypokalemia    Bilateral lower leg cellulitis    Watery diarrhea    Vitals:    02/12/23 1513   BP: 134/88   BP Location: Right arm   Pulse: 88   Resp: 18   Temp: 98 3 °F (36 8 °C)   TempSrc: Oral   SpO2: 96%   Weight:      Length Of Stay: 3  Performed at least 2 patient identifiers during session: Name and Birthday  PHYSICAL THERAPY EVALUATION :    02/12/23 1410   PT Last Visit   PT Visit Date 02/12/23   Note Type   Note type Evaluation   Pain Assessment   Pain Assessment Tool 0-10   Pain Score No Pain  (at rest, 3 with activity in LLE)   Pain Location/Orientation Orientation: Left; Location: Leg   Effect of Pain on Daily Activities limits activity tolerance with MMT/ROM and ambulation distances   Patient's Stated Pain Goal No pain   Hospital Pain Intervention(s) Repositioned; Ambulation/increased activity; Emotional support   Restrictions/Precautions   Weight Bearing Precautions Per Order No   Braces or Orthoses (S)  TLSO  (prior admission 12/30/2022 pt fitted for TLSO for compression fx, but did not have f/u with neurosurgery as recommended, pt required Jaycob for brace management)   Other Precautions Bed Alarm; Chair Alarm; Fall Risk;Pain;Cognitive   Home Living   Type of Home House   Home Layout Two level  (3 GLORIA, stair glide to second floor)   Home Equipment Walker;Stair glide  (RW)   Additional Comments Went to rehab from hospital on 1/4/2023, reportedly was to start HHPT per 1/17/2023 note  Prior Function   Level of [place] Independent with ADLs; Independent with functional mobility  (however inconsistent historian)   Lives With Spouse;Daughter  (daughter currently staying, however she is from [place]; spouse recently DC from hospital and questionable ability to provide physical A)   Receives Help From [facility] in the last 6 months 1 to 4  (per chart review, however pt denies falls)   Comments Pt is inconsistent historian  Pt reports RW at mobility baseline  General   Additional Pertinent History 12/30/2022 admission for Compression fx of T8 vertebra with routine healing, transverse process fx of L1 and L2, left 11th rib fx with orders for TLSO, no f/u noted in EMR with neurosurgery   Family/Caregiver Present No   Cognition   Overall Cognitive Status Impaired   Arousal/Participation Alert   Orientation Level Oriented to place;Oriented to person   Memory Decreased recall of precautions;Decreased recall of recent events;Decreased short term memory   Following Commands Follows one step commands with increased time or repetition   Subjective   Subjective Pt reports that she is not having a good day  Repititively stated that "I'm good at home, I get around just fine" with her RW  Pt claims she did not wear her brace and does not need it, "I don't even know why I have it"  RUE Assessment   RUE Assessment WFL   LUE Assessment   LUE Assessment WFL   Strength RLE   R Hip Flexion 3+/5   R Knee Extension 4/5   R Ankle Dorsiflexion 4/5   Strength LLE   L Hip Flexion 3-/5  (limited by pain)   L Knee Extension 3/5  (limited by pain)   L Ankle Dorsiflexion 4/5   Vision-Basic Assessment   Current Vision Wears glasses all the time   Light Touch   RLE Light Touch Grossly intact   LLE Light Touch Grossly intact   Bed Mobility   Supine to Sit 6  Modified independent   Additional items HOB elevated; Increased time required; Bedrails   Sit to Supine Unable to assess  (Pt seated in bedside chair at end of session )   Transfers   Sit to Stand 5  Supervision   Additional items Increased time required;Armrests  (verbal instruction for hand placement)   Stand to Sit 5  Supervision   Additional items Armrests; Increased time required   Additional Comments Pt reported increasing SOB following first STS  Symptoms subsided after sitting EOB  Ambulation/Elevation   Gait pattern Forward Flexion; Excessively slow; Step through pattern   Gait Assistance 5  Supervision   Additional items Verbal cues  (verbal instruction for walker management with turning, pt stated "I don't know how to turn")   Assistive Device Rolling walker   Distance 40ft   Stair Management Assistance Not tested   Ambulation/Elevation Additional Comments Pt ambulated 40ft w/ RW and S, however reported difficulty with turning and required repeated verbal instruction for walker management with turning  Balance   Static Sitting Fair +   Static Standing Fair   Ambulatory Fair   Endurance Deficit   Endurance Deficit Yes   Endurance Deficit Description self-reported increasing SOB   Activity Tolerance   Activity Tolerance Patient limited by fatigue;Patient limited by pain   Medical Staff Made Aware care coordination with OT   Nurse Made Aware KITTY Jiménez pre/post eval   Assessment   Prognosis Fair   Problem List Decreased strength;Decreased range of motion;Decreased endurance; Impaired balance;Decreased mobility; Decreased cognition; Impaired judgement;Obesity;Pain  (gait deviations)   Assessment Pt is a 69 y o  female seen for PT evaluation s/p admit to [facility] on 2/9/2023 w/ Altered mental status, unspecified  Pertinent PMH includes: afib, BLE cellulitis, chronic opioid use, and osteoarthritis  Additionally, pt had recent admission on 12/30/2022 and was recommended to have a TLSO brace when upright  However Pt has not f/u with neurosurgery since previous admission and reports not wearing TLSO  Prior to THIS admission, pt was living with spouse (recently DC from hospital) in [address] [place] [address] but she is not consistent  w/ her PLOF        Upon THIS evaluation, Pt required A to don TLSO, but no physical A for transfers and ambulation w/ RW  Pt presented with the following deficits: decreased strength, decreased cognition, and pain in LLE  From a PT standpoint, pt is adequate for DC home with family support w/ continued HHPT to address above listed deficits, improve gait quality and address stair management  Recommend pt continue to use rolling walker for household ambulation  Recommend pt has A to perform GLORIA and S for amb w/walker  Ideally pt would benefit from f/u as to whether or not TLSO is still indicated  If pt is not DC, recommend continued mobility trials using RW, LE strengthening and stair training to increase level of functional independence and address above deficits  Recommend nursing mobilize pt w/ RW at least 2x/day with quantity of gait trials > quantity of distance  Barriers to Discharge Inaccessible home environment;Decreased caregiver support   Barriers to Discharge Comments Questionable amount of physical A spouse can provide   Goals   Patient Goals to go home to her    STG Expiration Date 02/22/23   Short Term Goal #1 Pt will: Perform ambulation with RW for at least 50ft to modified I To increase Indep in home environment and promote household distances  Perform 3 steps w/ rail and no more than Jaycob to return home with GLORIA  If indicated,  pt will be able to don TLSO brace with S and verbal instruction to improve compliance with brace management  Plan   Treatment/Interventions Functional transfer training;LE strengthening/ROM; Elevations; Therapeutic exercise; Endurance training;Patient/family training;Equipment eval/education; Bed mobility;Gait training;OT;Spoke to nursing   PT Frequency 2-3x/wk  (if pt not DC)   Recommendation   PT Discharge Recommendation (S)  Home with home health rehabilitation  (more likely HHPT, less likely rehab depending on mobility progress, stairs, and amount of A at home)   Equipment Recommended Gillian Nicole Package Recommended Wheeled walker   AM-PAC Basic Mobility Inpatient   Turning in Flat Bed Without Bedrails 3   Lying on Back to Sitting on Edge of Flat Bed Without Bedrails 3   Moving Bed to Chair 3   Standing Up From Chair Using Arms 3   Walk in Room 3   Climb 3-5 Stairs With Railing 2   Basic Mobility Inpatient Raw Score 17   Basic Mobility Standardized Score 39 67   Highest Level Of Mobility   JH-HLM Goal 5: Stand one or more mins   JH-HLM Achieved 7: Walk 25 feet or more   End of Consult   Patient Position at End of Consult Bedside chair;Bed/Chair alarm activated; All needs within reach   High Complexity Evaluation   (Please find full objective findings from PT assessment regarding body systems outlined above)  The patient's AM-PAC Basic Mobility Inpatient Short Form Raw Score is 17  A Raw score of greater than 16 suggests the patient may benefit from discharge to home, which DOES coincide with CURRENT above PT recommendations  However please refer to therapist recommendation for discharge planning given other factors that may influence destination  Adapted from [employer] [facility] “6-Clicks” Basic Mobility and Daily Activity Scores With Discharge Destination  Physical Therapy, 2021;101:1-9  DOI: 10 [id number]    Portions of the record may have been created with voice recognition software  Occasional wrong word or "sound a like" substitutions may have occurred due to the inherent limitations of voice recognition software    Read the chart carefully and recognize, using context, where substitutions have occurred    JUNIOR Brown

## 2023-02-12 NOTE — PROGRESS NOTES
[facility]  [facility] 1945[facility] 69 [facility]  [facility] MRN: [id number]  Unit/Bed#: S -01 Encounter: [id number]  Primary Care Provider: Mouna Kenyon MD   Date and time admitted to hospital: 2/9/2023  5:20 PM    * Altered mental status, unspecified  Assessment & Plan  Patient has visual and auditory hallucinations last night at home  Most likely due to flareup of her infection from  Patient's  is also admitted to the hospital at this time secondary to a fall with sustained trauma  Of note, patient took too much of her oxycodone and ran out of her prescription  Labs:  TSH: Normal  Potassium: 3 2  Rapid drug screen: Positive for oxycodone   UA: Negative     Differential: toxic encephalopathy, metabolic encephalopathy, recent cellulitis, electrolyte abnormalities, side effect of medications, chronic opioid use with possible withdrawal, memory impairment     Plans:  · Monitor labs daily  · Redirection with sundowning  · Blood cultures x 2 pending  · Monitor vital signs  · Inpatient consult to geriatrics  Watery diarrhea  Assessment & Plan  Patient reports having 6-7 episodes of liquid bowel movements  Patient had been recently on antibiotics for possible bilaterally b/l extremity cellulitis versus chronic venous insufficiency  Patient states that she has not had any more diarrheal bowel movements since being in the ED  Plan:  · Monitor electrolytes  · Monitor vital signs  · Monitor bowel movements  · I/Os    Bilateral lower leg cellulitis  Assessment & Plan  Patient was put on Keflex at discharge  Daughter and patient are not aware of how many doses are pending  Hx: Distal femoral fracture in August 2022  It was treated with ORIF  Cellulitis does not correlate in the same area as the fracture      Plan:  · Hold antibiotics at this time  · Blood cultures x 2 pending  · Monitor vital signs  · Wound care consulted - Recommendations appreciated    Chronic heart failure with preserved ejection fraction (HFpEF) ([facility])  Assessment & Plan  Wt Readings from Last 3 Encounters:   02/12/23 77 8 kg (171 lb 8 3 oz)   02/09/23 84 2 kg (185 lb 9 6 oz)   02/06/23 88 5 kg (195 lb 1 7 oz)       Lab Results   Component Value Date    LVEF 55 10/07/2022    NTBNP 1,615 (H) 04/30/2022    NTBNP 2,903 (H) 02/10/2022       Plan:  • GDMT: Diuretic: Torsemide PO 40 mg BID and metolazone 2 5 mg twice weekly (Mondays and Thursdays), B-Blocker: Carvedilol 3 125 mg BID, No ACE/ARB/ARNi, Aldos  Blocker: or SGLT2-I on file  · Diet: Sodium restriction 2g  · Continue home potassium repletion with KDUR 60 mEq daily plus another 20 mEq on Mondays and Thursdays with Metolazone   · Labs: BMP, magnesium tomorrow a m  · Maintain Mg > 2 and K > 4; Replete prn  · Elevate HOB > 30°, Daily standing weights, Measure I/Os, Monitor on Telemetry  · Consult nutrition services for dietary education  Memory impairment  Assessment & Plan  Patient has not been officially diagnosed with any neurodegenerative diseases  She was started on medications by per PCP because she keeps missing her appointments for a geriatric/neurology evaluation  Plan:  · Continue home memantine and donepezil   · Frequent reorientation   · Fall precautions  · Geriatric consult  Anxiety  Assessment & Plan  Patient has a history of anxiety  Plan:  · Continue home Lexapro  Unspecified atrial fibrillation (HCC)  Assessment & Plan  History of atrial fibrillation controlled on Carvedilol 3 125 mg BID and Eliquis 5 mg BID    Plan:  • Continue home meds  • Monitor rates/BP    Chronic pain syndrome  Assessment & Plan  Patient has a history of chronic pain syndrome  Plan:  · Continue home pain regimen with gabapentin, tizanidine and oxycodone  Venous insufficiency (chronic) (peripheral)  Assessment & Plan  Patient has a history of venous insufficiency  She has a wound on her right lower extremity    She usually has a wound nurse coming and dressing it every week  Plan:  · Compression stockings  · Wound Care per wound management recs  VTE Pharmacologic Prophylaxis: VTE Score: 5 High Risk (Score >/= 5) - Pharmacological DVT Prophylaxis Ordered: apixaban (Eliquis)  Sequential Compression Devices Ordered  Patient Centered Rounds: I performed bedside rounds with nursing staff today  Discussions with Specialists or Other Care Team Provider: PT/OT    Education and Discussions with Family / Patient: will call patient's daughter to try to give update  Current Length of Stay: 3 day(s)  Current Patient Status: Inpatient   Discharge Plan: Anticipate discharge in 24-48 hrs to rehab facility  Code Status: Level 1 - Full Code    Subjective:   Patient see at bedside this morning  No complaints offered by the patient except that she was tired    Objective:     Vitals:   Temp (24hrs), Av 3 °F (36 8 °C), Min:98 3 °F (36 8 °C), Max:98 4 °F (36 9 °C)    Temp:  [98 3 °F (36 8 °C)-98 4 °F (36 9 °C)] 98 4 °F (36 9 °C)  HR:  [] 90  Resp:  [18-22] 20  BP: (131-153)/() 140/82  SpO2:  [93 %-96 %] 96 %  Body mass index is 31 37 kg/m²  Input and Output Summary (last 24 hours): Intake/Output Summary (Last 24 hours) at 2023 0653  Last data filed at 2023 0048  Gross per 24 hour   Intake 120 ml   Output 1200 ml   Net -1080 ml       Physical Exam:   Physical Exam  Vitals and nursing note reviewed  Constitutional:       General: She is not in acute distress  Appearance: She is well-developed  HENT:      Head: Normocephalic and atraumatic  Right Ear: External ear normal       Left Ear: External ear normal       Nose: No congestion or rhinorrhea  Mouth/Throat:      Mouth: Mucous membranes are moist       Pharynx: Oropharynx is clear  Eyes:      Conjunctiva/sclera: Conjunctivae normal    Cardiovascular:      Rate and Rhythm: Normal rate  Rhythm irregular  Heart sounds:  No murmur heard  Pulmonary:      Effort: Pulmonary effort is normal  No respiratory distress  Breath sounds: Normal breath sounds  Abdominal:      Palpations: Abdomen is soft  Tenderness: There is no abdominal tenderness  Musculoskeletal:         General: Swelling (bilateral lower extremities) and tenderness (bilateral lower extremities) present  Cervical back: Neck supple  Skin:     General: Skin is warm and dry  Capillary Refill: Capillary refill takes less than 2 seconds  Neurological:      General: No focal deficit present  Mental Status: She is alert  Psychiatric:         Mood and Affect: Mood normal           Additional Data:     Labs:  Results from last 7 days   Lab Units 02/11/23  0506 02/10/23  0557   WBC Thousand/uL 9 56 9 51   HEMOGLOBIN g/dL 11 4* 10 6*   HEMATOCRIT % 37 7 34 5*   PLATELETS Thousands/uL 435* 428*   NEUTROS PCT %  --  59   LYMPHS PCT %  --  24   MONOS PCT %  --  12   EOS PCT %  --  3     Results from last 7 days   Lab Units 02/11/23  0506 02/10/23  0557   SODIUM mmol/L 137 140   POTASSIUM mmol/L 3 2* 3 2*   CHLORIDE mmol/L 95* 99   CO2 mmol/L 32 31   BUN mg/dL 24 18   CREATININE mg/dL 1 07 0 95   ANION GAP mmol/L 10 10   CALCIUM mg/dL 9 7 9 9   ALBUMIN g/dL  --  4 0   TOTAL BILIRUBIN mg/dL  --  0 70   ALK PHOS U/L  --  135*   ALT U/L  --  5*   AST U/L  --  11*   GLUCOSE RANDOM mg/dL 125 120                 Results from last 7 days   Lab Units 02/09/23  1822   LACTIC ACID mmol/L 1 2   PROCALCITONIN ng/ml <0 05       Lines/Drains:  Invasive Devices     Peripheral Intravenous Line  Duration           Peripheral IV 02/11/23 Right;Ventral (anterior) Forearm <1 day                      Imaging: Reviewed radiology reports from this admission including: chest xray and CT head    Recent Cultures (last 7 days):   Results from last 7 days   Lab Units 02/09/23  1822   BLOOD CULTURE  No Growth at 48 hrs  No Growth at 48 hrs         Last 24 Hours Medication List: Current Facility-Administered Medications   Medication Dose Route Frequency Provider Last Rate   • acetaminophen  650 mg Oral Q6H DE [facility], DO     • apixaban  5 mg Oral BID Serrano Lavender, DO     • carvedilol  3 125 mg Oral BID With Meals Serrano Lavender, DO     • donepezil  10 mg Oral HS Krima A [facility], DO     • escitalopram  10 mg Oral Daily Serrano Lavender, DO     • ferrous sulfate  325 mg Oral Daily Serrano Lavender, DO     • gabapentin  100 mg Oral BID Serrano Lavender, DO     • loratadine  10 mg Oral Daily Fatuma Benavides MD     • memantine  10 mg Oral BID Serrano Lavender, DO     • [START ON 2/13/2023] metolazone  2 5 mg Oral Once per day on Mon Thu [facility], DO     • oxyCODONE  2 5 mg Oral Q6H PRN Jesusdojas Gil, DO     • pantoprazole  40 mg Oral Early Morning Serrano Lavender, DO     • [START ON 2/13/2023] potassium chloride  20 mEq Oral Once per day on Mon Thu [facility], DO     • potassium chloride  60 mEq Oral Daily Serrano Lavender, DO     • senna-docusate sodium  2 tablet Oral BID Serrano Lavender, DO     • tiZANidine  4 mg Oral Q8H PRN Serrano Lavender, DO     • torsemide  40 mg Oral BID Serrano Lavender, DO          Today, Patient Was Seen By: Seema Mccurdy DO    **Please Note: This note may have been constructed using a voice recognition system  **

## 2023-02-12 NOTE — PLAN OF CARE
Problem: PHYSICAL THERAPY ADULT  Goal: Performs mobility at highest level of function for planned discharge setting  See evaluation for individualized goals  Description: Treatment/Interventions: Functional transfer training, LE strengthening/ROM, Elevations, Therapeutic exercise, Endurance training, Patient/family training, Equipment eval/education, Bed mobility, Gait training, OT, Spoke to nursing  Equipment Recommended: Daisy Virk       See flowsheet documentation for full assessment, interventions and recommendations  Note: Prognosis: Fair  Problem List: Decreased strength, Decreased range of motion, Decreased endurance, Impaired balance, Decreased mobility, Decreased cognition, Impaired judgement, Obesity, Pain (gait deviations)  Assessment: Pt is a 69 y o  female seen for PT evaluation s/p admit to [facility] on 2/9/2023 w/ Altered mental status, unspecified  Pertinent PMH includes: afib, BLE cellulitis, chronic opioid use, and osteoarthritis  Additionally, pt had recent admission on 12/30/2022 and was recommended to have a TLSO brace when upright  However Pt has not f/u with neurosurgery since previous admission and reports not wearing TLSO  Prior to THIS admission, pt was living with spouse (recently DC from hospital) in [address] [place] [address] but she is not consistent  w/ her PLOF  Upon THIS evaluation, Pt required A to don TLSO, but no physical A for transfers and ambulation w/ RW  Pt presented with the following deficits: decreased strength, decreased cognition, and pain in LLE  From a PT standpoint, pt is adequate for DC home with family support w/ continued HHPT to address above listed deficits, improve gait quality and address stair management  Recommend pt continue to use rolling walker for household ambulation  Recommend pt has A to perform GLORIA and S for amb w/walker  Ideally pt would benefit from f/u as to whether or not TLSO is still indicated   If pt is not DC, recommend continued mobility trials using RW, LE strengthening and stair training to increase level of functional independence and address above deficits  Recommend nursing mobilize pt w/ RW at least 2x/day with quantity of gait trials > quantity of distance  Barriers to Discharge: Inaccessible home environment, Decreased caregiver support  Barriers to Discharge Comments: Questionable amount of physical A spouse can provide  PT Discharge Recommendation: (S) Home with home health rehabilitation (more likely HHPT, less likely rehab depending on mobility progress, stairs, and amount of A at home)    See flowsheet documentation for full assessment

## 2023-02-12 NOTE — ASSESSMENT & PLAN NOTE
Wt Readings from Last 3 Encounters:   02/12/23 77 8 kg (171 lb 8 3 oz)   02/09/23 84 2 kg (185 lb 9 6 oz)   02/06/23 88 5 kg (195 lb 1 7 oz)       Lab Results   Component Value Date    LVEF 55 10/07/2022    NTBNP 1,615 (H) 04/30/2022    NTBNP 2,903 (H) 02/10/2022       Plan:  • GDMT: Diuretic: Torsemide PO 40 mg BID and metolazone 2 5 mg twice weekly (Mondays and Thursdays), B-Blocker: Carvedilol 3 125 mg BID, No ACE/ARB/ARNi, Aldos  Blocker: or SGLT2-I on file  · Diet: Sodium restriction 2g  · Continue home potassium repletion with KDUR 60 mEq daily plus another 20 mEq on Mondays and Thursdays with Metolazone   · Labs: BMP, magnesium tomorrow a m  · Maintain Mg > 2 and K > 4; Replete prn  · Elevate HOB > 30°, Daily standing weights, Measure I/Os, Monitor on Telemetry  · Consult nutrition services for dietary education

## 2023-02-12 NOTE — ASSESSMENT & PLAN NOTE
Patient has visual and auditory hallucinations last night at home  Most likely due to flareup of her infection from  Patient's  is also admitted to the hospital at this time secondary to a fall with sustained trauma  Of note, patient took too much of her oxycodone and ran out of her prescription  Labs:  TSH: Normal  Potassium: 3 2  Rapid drug screen: Positive for oxycodone   UA: Negative     Differential: toxic encephalopathy, metabolic encephalopathy, recent cellulitis, electrolyte abnormalities, side effect of medications, chronic opioid use with possible withdrawal, memory impairment     Plans:  · Monitor labs daily  · OT cognitive assessment  · Redirection with sundowning  · Blood cultures x 2 pending  · Monitor vital signs  · Inpatient consult to geriatrics

## 2023-02-13 LAB
ANION GAP SERPL CALCULATED.3IONS-SCNC: 12 MMOL/L (ref 4–13)
BUN SERPL-MCNC: 30 MG/DL (ref 5–25)
CALCIUM SERPL-MCNC: 9.8 MG/DL (ref 8.4–10.2)
CHLORIDE SERPL-SCNC: 96 MMOL/L (ref 96–108)
CO2 SERPL-SCNC: 27 MMOL/L (ref 21–32)
CREAT SERPL-MCNC: 1.09 MG/DL (ref 0.6–1.3)
GFR SERPL CREATININE-BSD FRML MDRD: 49 ML/MIN/1.73SQ M
GLUCOSE SERPL-MCNC: 149 MG/DL (ref 65–140)
POTASSIUM SERPL-SCNC: 3.7 MMOL/L (ref 3.5–5.3)
SODIUM SERPL-SCNC: 135 MMOL/L (ref 135–147)

## 2023-02-13 PROCEDURE — 99233 SBSQ HOSP IP/OBS HIGH 50: CPT | Performed by: INTERNAL MEDICINE

## 2023-02-13 PROCEDURE — 80048 BASIC METABOLIC PNL TOTAL CA: CPT

## 2023-02-13 RX ORDER — TIZANIDINE 2 MG/1
2 TABLET ORAL EVERY 8 HOURS PRN
Status: DISCONTINUED | OUTPATIENT
Start: 2023-02-13 | End: 2023-02-16 | Stop reason: HOSPADM

## 2023-02-13 RX ORDER — HYDROXYZINE HYDROCHLORIDE 25 MG/1
25 TABLET, FILM COATED ORAL ONCE
Status: COMPLETED | OUTPATIENT
Start: 2023-02-13 | End: 2023-02-13

## 2023-02-13 RX ADMIN — POTASSIUM CHLORIDE 20 MEQ: 1500 TABLET, EXTENDED RELEASE ORAL at 09:20

## 2023-02-13 RX ADMIN — GABAPENTIN 100 MG: 100 CAPSULE ORAL at 08:42

## 2023-02-13 RX ADMIN — APIXABAN 5 MG: 5 TABLET, FILM COATED ORAL at 08:48

## 2023-02-13 RX ADMIN — TIZANIDINE 4 MG: 2 TABLET ORAL at 05:22

## 2023-02-13 RX ADMIN — TORSEMIDE 40 MG: 20 TABLET ORAL at 21:13

## 2023-02-13 RX ADMIN — TORSEMIDE 40 MG: 20 TABLET ORAL at 08:43

## 2023-02-13 RX ADMIN — GABAPENTIN 100 MG: 100 CAPSULE ORAL at 17:04

## 2023-02-13 RX ADMIN — FERROUS SULFATE TAB 325 MG (65 MG ELEMENTAL FE) 325 MG: 325 (65 FE) TAB at 08:42

## 2023-02-13 RX ADMIN — ACETAMINOPHEN 650 MG: 325 TABLET ORAL at 05:22

## 2023-02-13 RX ADMIN — OXYCODONE HYDROCHLORIDE 2.5 MG: 5 TABLET ORAL at 03:33

## 2023-02-13 RX ADMIN — TIZANIDINE 2 MG: 2 TABLET ORAL at 22:32

## 2023-02-13 RX ADMIN — DONEPEZIL HYDROCHLORIDE 10 MG: 5 TABLET ORAL at 21:12

## 2023-02-13 RX ADMIN — CARVEDILOL 3.12 MG: 3.12 TABLET, FILM COATED ORAL at 15:53

## 2023-02-13 RX ADMIN — APIXABAN 5 MG: 5 TABLET, FILM COATED ORAL at 17:04

## 2023-02-13 RX ADMIN — CARVEDILOL 3.12 MG: 3.12 TABLET, FILM COATED ORAL at 08:42

## 2023-02-13 RX ADMIN — ACETAMINOPHEN 650 MG: 325 TABLET ORAL at 00:11

## 2023-02-13 RX ADMIN — POTASSIUM CHLORIDE 60 MEQ: 1500 TABLET, EXTENDED RELEASE ORAL at 09:20

## 2023-02-13 RX ADMIN — OXYCODONE HYDROCHLORIDE 2.5 MG: 5 TABLET ORAL at 14:05

## 2023-02-13 RX ADMIN — PANTOPRAZOLE SODIUM 40 MG: 40 TABLET, DELAYED RELEASE ORAL at 05:22

## 2023-02-13 RX ADMIN — ESCITALOPRAM OXALATE 10 MG: 10 TABLET, FILM COATED ORAL at 08:43

## 2023-02-13 RX ADMIN — LORATADINE 10 MG: 10 TABLET ORAL at 08:45

## 2023-02-13 RX ADMIN — ACETAMINOPHEN 650 MG: 325 TABLET ORAL at 11:35

## 2023-02-13 RX ADMIN — ACETAMINOPHEN 650 MG: 325 TABLET ORAL at 23:04

## 2023-02-13 RX ADMIN — MEMANTINE 10 MG: 10 TABLET ORAL at 08:42

## 2023-02-13 RX ADMIN — HYDROXYZINE HYDROCHLORIDE 25 MG: 25 TABLET ORAL at 18:34

## 2023-02-13 RX ADMIN — ACETAMINOPHEN 650 MG: 325 TABLET ORAL at 17:04

## 2023-02-13 RX ADMIN — MEMANTINE 10 MG: 10 TABLET ORAL at 17:04

## 2023-02-13 RX ADMIN — METOLAZONE 2.5 MG: 5 TABLET ORAL at 08:45

## 2023-02-13 NOTE — ASSESSMENT & PLAN NOTE
Patient has a history of chronic pain syndrome  Plan:  · Continue home pain regimen with gabapentin, tizanidine and oxycodone  · decreased tizanidine prn 2 mg  Per daughter, confirms patient does take this med at home  Attending also confirmed with pharmacy  · PDMP reviewed  Patient received 90 10 mg tablets (30 day supply)   Written and filled 01/18/23

## 2023-02-13 NOTE — ASSESSMENT & PLAN NOTE
Wt Readings from Last 3 Encounters:   02/13/23 77 2 kg (170 lb 3 1 oz)   02/09/23 84 2 kg (185 lb 9 6 oz)   02/06/23 88 5 kg (195 lb 1 7 oz)       Lab Results   Component Value Date    LVEF 55 10/07/2022    NTBNP 1,615 (H) 04/30/2022    NTBNP 2,903 (H) 02/10/2022       Plan:  • GDMT: Diuretic: Torsemide PO 40 mg BID and metolazone 2 5 mg twice weekly (Mondays and Thursdays), B-Blocker: Carvedilol 3 125 mg BID, No ACE/ARB/ARNi, Aldos  Blocker: or SGLT2-I on file  · Diet: Sodium restriction 2g  · Continue home potassium repletion with KDUR 60 mEq daily plus another 20 mEq on Mondays and Thursdays with Metolazone   · Labs: BMP, magnesium tomorrow a m  · Maintain Mg > 2 and K > 4; Replete prn   · Elevate HOB > 30°, Daily standing weights, Measure I/Os  · Consult nutrition services for dietary education? Consider when patient has capacity

## 2023-02-13 NOTE — ASSESSMENT & PLAN NOTE
Recent Labs     02/11/23  0506 02/12/23  0549 02/13/23  0627   K 3 2* 3 2* 3 7   MG 1 9 2 2  --      Patient presented with a K of 3 2  Standing dose of KDUR 60 mEq daily with an extra 20 mEq on Mondays and Thursdays with Metolazone dosing    Plan:  · Monitor daily labs  · Replete as needed  · CMP a m  · Mg a m

## 2023-02-13 NOTE — ASSESSMENT & PLAN NOTE
Patient reports having 6-7 episodes of liquid bowel movements  Patient had been recently on antibiotics for possible bilaterally b/l extremity cellulitis versus chronic venous insufficiency  Patient states that she has not had any more diarrheal bowel movements since being in the ED      Plan:  · Monitor electrolytes  · Monitor vital signs  · Monitor bowel movements  · I/Os  · See venous insufficiency/stasis dermatitis

## 2023-02-13 NOTE — ASSESSMENT & PLAN NOTE
Patient has a history of chronic pain syndrome  Plan:  · Continue home pain regimen with gabapentin, tizanidine and oxycodone  · Attending to double check if patient takes tizanidine at home  Decreased to prn 2 mg  · PDMP reviewed  Patient received 90 10 mg tablets (30 day supply)   Written and filled 01/18/23

## 2023-02-13 NOTE — ASSESSMENT & PLAN NOTE
Patient was put on Keflex at discharge  Daughter and patient are not aware of how many doses are pending  Hx: Distal femoral fracture in August 2022  It was treated with ORIF  Cellulitis does not correlate in the same area as the fracture  No evidence of cellulitis       Plan:  · Hold antibiotics at this time  · Blood cultures x 2 - NGTD  · Monitor vital signs  · Wound care consulted - Recommendations appreciated

## 2023-02-13 NOTE — DISCHARGE SUMMARY
[facility]  Discharge- Merwyn Rising 1945, 69 y o  female MRN: [id number]  Unit/Bed#: S -01 Encounter: [id number]  Primary Care Provider: Davon Aldana MD   Date and time admitted to hospital: 2/9/2023  5:20 PM    * Hypokalemia  Assessment & Plan  Recent Labs     02/15/23  0524 02/15/23  1055 02/15/23  1719 02/16/23  0750   K 2 7* 2 8* 3 2* 3 3*   MG 2 8* 2 5  --  2 5     Patient presented with a K of 3 2  Standing dose of KDUR 60 mEq daily with an extra 20 mEq on Mondays and Thursdays with Metolazone dosing    Most recent K today 3 7, Mg 2 5 - wnl    Plan:  · Monitor daily labs   · Replete as needed  · BMPs in a m  · Mg checks - replete as needed/indicated due to hypokalemia   · Monitor on tele  · DC metolazaone now and on dc; decrease torsemide to 40 mg QD for now  Continue home torsemide on DC  DC today  Altered mental status, unspecified-resolved as of 2/16/2023  Assessment & Plan  POA Patient has visual and auditory hallucinations last night at home  Most likely due to flareup of her infection from  Patient's  is also admitted to the hospital at this time secondary to a fall with sustained trauma  Of note, patient took too much of her oxycodone and ran out of her prescription  Labs: POA   TSH: Normal  Potassium: 3 2  Rapid drug screen: Positive for oxycodone   UA: Negative     Differential: toxic encephalopathy, metabolic encephalopathy, recent cellulitis, electrolyte abnormalities, side effect of medications, chronic opioid use with possible withdrawal, memory impairment     Patient alert, at baseline   Reports increased anxiety this morning due to thinking about family and not being able to be with     Plans:  · Monitor labs daily  · OT cognitive assessment - impaired  · Neuropsych evaluation - patient lacking capacity to make informed medical decisions  · Redirection with sundowning  · Delirium precautions  · Blood cultures x 2 - NGTD  · Monitor vital signs  · PRN oxycodone  · Discharge disposition - F/u , Home Health    Memory impairment  Assessment & Plan  Patient has not been officially diagnosed with any neurodegenerative diseases  She was started on medications by per PCP because she keeps missing her appointments for a geriatric/neurology evaluation  Plan:  · Continue home memantine and donepezil  · reduced Tizanidine prn dose to 2 mg  · Frequent reorientation   · Fall precautions  · Neuropsych evaluation - patient lacking capacity to make medically informed decisions  · Discharge disposition - [facility]  · Gerontology consult pending DC memantine due to limited benefit  Recommend nursing home placement, which they discussed with daughter  OP follow up with Truong Arteaga, referral provided  Chronic heart failure with preserved ejection fraction (HFpEF) ([facility])  Assessment & Plan  Wt Readings from Last 3 Encounters:   02/16/23 77 1 kg (169 lb 15 6 oz)   02/09/23 84 2 kg (185 lb 9 6 oz)   02/06/23 88 5 kg (195 lb 1 7 oz)       Lab Results   Component Value Date    LVEF 55 10/07/2022    NTBNP 1,615 (H) 04/30/2022    NTBNP 2,903 (H) 02/10/2022       Plan:  • GDMT: Diuretic: Torsemide PO 40 mg BID and metolazone 2 5 mg twice weekly (Mondays and Thursdays), B-Blocker: Carvedilol 3 125 mg BID, No ACE/ARB/ARNi, Aldos  Blocker: or SGLT2-I on file  · Diet: Sodium restriction 2g  · Continue home potassium repletion with KDUR 60 mEq daily plus another 20 mEq on Mondays and Thursdays with Metolazone   · Labs: BMP, magnesium tomorrow a m  · Maintain Mg > 2 and K > 4; Replete prn - see hypokalemia   · Elevate HOB > 30°, Daily standing weights, Measure I/Os  · Consult nutrition services for dietary education? Consider when patient has capacity  Venous stasis dermatitis of both lower extremities  Assessment & Plan  Patient was put on Keflex at discharge  Daughter and patient are not aware of how many doses are pending    Hx: Distal femoral fracture in August 2022  It was treated with ORIF  Cellulitis does not correlate in the same area as the fracture  No evidence of cellulitis  Plan:  · Hold antibiotics at this time  · Blood cultures x 2 - NGTD  · Monitor vital signs  · Wound care consulted - Recommendations appreciated      Anxiety  Assessment & Plan  Patient has a history of anxiety  Plan:  · Continue home Lexapro  · Received one time dose of Atarax for past few days     Unspecified atrial fibrillation [facility])  Assessment & Plan  History of atrial fibrillation controlled on Carvedilol 3 125 mg BID and Eliquis 5 mg BID    Plan:  • Continue home meds  • Monitor rates/BP    Chronic pain syndrome  Assessment & Plan  Patient has a history of chronic pain syndrome  Plan:  · Home pain regimen with gabapentin, tizanidine and oxycodone  · C/w decreased tizanidine prn 2 mg  Per daughter, confirms patient does take this med at home  Attending also confirmed with pharmacy  · PDMP reviewed  Patient received 90 10 mg tablets (30 day supply)  Written and filled 01/18/23  · Tizanidine 2 mg every 12 hours as needed for muscle spasms and oxycodone 5 mg every 12 hours as needed for pain  Patient should follow up with PCP for additional pain management  Attending discussed patient's course and case with PCP  Venous insufficiency (chronic) (peripheral)  Assessment & Plan  Patient has a history of venous insufficiency  She has a wound on her right lower extremity  She usually has a wound nurse coming and dressing it every week  Plan:  · Compression stockings  · Wound Care per wound management recs      Medical Problems     Resolved Problems  Date Reviewed: 2/15/2023          Resolved    Altered mental status, unspecified 2/16/2023     Resolved by  Pennie Rivera DO    Overview Addendum 2/9/2023  9:18 PM by Leigh Ann Muller DO                Watery diarrhea 2/14/2023     Resolved by  Pennie Rivera DO        Discharging Resident: Pennie Rivera DO  Discharging Attending: Timur Long MD  PCP: Joel Moss MD  Admission Date:   Admission Orders (From admission, onward)     Ordered        02/09/23 2116  INPATIENT ADMISSION  Once                      Discharge Date: 02/16/23    Consultations During Hospital Stay:  · Wound Care  · Neuropsychiatry evaluation  · Gerontology    Procedures Performed:   · None    Significant Findings / Test Results:   · CT Head - no acute intracranial abnormality  · CXR: near complete resolution of previous vascular congestion   · EKG 02/14/23 - Atrial fibrillation, HR 88  T wave flattening, possible U waves    Incidental Findings:   · None    Test Results Pending at Discharge (will require follow up): · None     Outpatient Tests Requested:  · BMP approximately 3 days after discharge and follow up with PCP within 1 week of discharge with results  Complications:  None    Reason for Admission: Altered Mental Status     Hospital Course:   Mariangel Alfaro is a 69 y o  female patient with hx of CHF, A-fib, chronic pain syndrome and opioid dependency and likely undiagnosed dementia who originally presented to the hospital on 2/9/2023 due to altered mental status  Patient admitted due to visual hallucinations as well as auditory hallucinations that occurred overnight  Per daughter, this has happened previously but she is concerned of safety at home  Daughter also questioning pt's ability to make decisions at home  Neuropsychiatry evaluation: patient lacking capacity to make informed medical decisions  Occupational therapy cognitive evaluation: impaired  Of note, patient recently was admitted to [facility] from 02/04-02/06 due to bilateral lower extremity cellulitis and was discharged on Keflex  On examination, bilateral lower extremities did not appear to be infected/cellulitis  Wound care was consulted and provided routine care  On admission patient also reported 6-7 episodes of diarrhea, which resolved afterwards      While in the ED, labs were unremarkable except for mild leukocytosis, hypokalemia and corrected calcium of 10 1  Patient also had some hallucinations in the ED  Per ED physician, patient appeared to have hallucinations when in sleeping state but would respond normally once awake  Toxicology was contacted and deemed possible multi-factorial etiology for altered mental status including electrolyte abnormalities, medications, opioid withdrawal, memory impairment, etc  Patient's potassium levels remained low despite receiving home K-Dur regimen, and was repleted as indicated  Patient was also given IV magnesium sulfate as indicated  Patient was monitored on telemetry, with initial EKG obtained and findings reported above  Patient's home metolazone was discontinued on 02/15/23  Patient's home torsemide was also decreased from 40 mg BID to 40 mg daily on 02/15/23 during remainder of her hospital course  Patient started on 400 mg daily tablet of mag oxide  Patient reported increased anxiety in the hospital due to not being able to be home with her  and received one-time doses of 25 mg Atarax for a few days  For pain, patient was on 2 mg PRN tizanidine Q8hrs and 2 5 mg oxycodone Q6hrs  Gerontology was consulted and saw patient day of discharge: Recommends continuing home Aricept 10 mg HS; discontinue memantine due to minimal benefit; Outpatient follow up with Geriatrics and nursing home placement which they discussed with pt's daughter  Patient should repeat BMP approximately 3 days after discharge and follow up with PCP with results within 1 week of discharge as well as for further pain management  On discharge patient was recommended a reduced pain regimen: Tizanidine 2 5 mg every 12 hours as needed for muscle spasms and oxycodone 5 mg every 12 hours as needed for pain  Further management with PCP -  Attending discussed patient course and case with PCP on day of discharge  Ambulatory referral with gerontology provided    Patient should also follow-up with nephrology and cardiology as soon as possible  Please see above list of diagnoses and related plan for additional information  Condition at Discharge: fair    Discharge Day Visit / Exam:   Subjective:  Patient seen and evaluated at bedside, no acute distress  Patient reports she had bowel movement today  Denies f/c, cp, sob, n/v/d/c, lightheadedness/dizziness  She reports having trouble sleeping and found out her  is in the hospital today  Patient received atarax overnight  Vitals: Blood Pressure: 139/76 (02/16/23 0732)  Pulse: 84 (02/16/23 0732)  Temperature: 97 9 °F (36 6 °C) (02/16/23 0732)  Temp Source: Oral (02/13/23 0202)  Respirations: 16 (02/16/23 0732)  Weight - Scale: 77 1 kg (169 lb 15 6 oz) (02/16/23 0600)  SpO2: 97 % (02/16/23 0732)  Exam:   Physical Exam  Vitals and nursing note reviewed  Constitutional:       General: She is not in acute distress  Appearance: She is well-developed  HENT:      Head: Normocephalic and atraumatic  Right Ear: External ear normal       Left Ear: External ear normal       Nose: No congestion or rhinorrhea  Mouth/Throat:      Mouth: Mucous membranes are moist       Pharynx: Oropharynx is clear  Eyes:      Conjunctiva/sclera: Conjunctivae normal    Cardiovascular:      Rate and Rhythm: Normal rate  Rhythm irregular  Heart sounds: No murmur heard  Pulmonary:      Effort: Pulmonary effort is normal  No respiratory distress  Breath sounds: Normal breath sounds  Abdominal:      Palpations: Abdomen is soft  Tenderness: There is no abdominal tenderness  Musculoskeletal:         General: Swelling (bilateral lower extremities) and tenderness (bilateral lower extremities) present  Cervical back: Neck supple  Comments: Pink discoloration - chronic venous stasis changes    Skin:     General: Skin is warm and dry  Capillary Refill: Capillary refill takes less than 2 seconds     Neurological: General: No focal deficit present  Mental Status: She is alert  Psychiatric:         Mood and Affect: Mood normal         Discussion with Family: Updated  (daughter) via phone  Discharge instructions/Information to patient and family:   See after visit summary for information provided to patient and family  Provisions for Follow-Up Care:  See after visit summary for information related to follow-up care and any pertinent home health orders  Disposition:   Home with [facility]NA Services (Reminder: Complete face to face encounter)    Planned Readmission: No    Discharge Medications:  See after visit summary for reconciled discharge medications provided to patient and/or family        **Please Note: This note may have been constructed using a voice recognition system**

## 2023-02-13 NOTE — ASSESSMENT & PLAN NOTE
POA Patient has visual and auditory hallucinations last night at home  Most likely due to flareup of her infection from  Patient's  is also admitted to the hospital at this time secondary to a fall with sustained trauma  Of note, patient took too much of her oxycodone and ran out of her prescription       Labs: POA   TSH: Normal  Potassium: 3 2  Rapid drug screen: Positive for oxycodone   UA: Negative     Differential: toxic encephalopathy, metabolic encephalopathy, recent cellulitis, electrolyte abnormalities, side effect of medications, chronic opioid use with possible withdrawal, memory impairment     Plans:  · Monitor labs daily  · OT cognitive assessment - impaired  · Neuropsych evaluation - patient lacking capacity to make informed medical decisions  · Redirection with sundowning  · Blood cultures x 2 - NGTD  · Monitor vital signs  · Discharge disposition - F/u , Home Health

## 2023-02-13 NOTE — ASSESSMENT & PLAN NOTE
Patient was put on Keflex at discharge  Daughter and patient are not aware of how many doses are pending  Hx: Distal femoral fracture in August 2022  It was treated with ORIF  Cellulitis does not correlate in the same area as the fracture          Plan:  · Hold antibiotics at this time  · Blood cultures x 2 - NGTD  · Monitor vital signs  · Wound care consulted - Recommendations appreciated

## 2023-02-13 NOTE — PLAN OF CARE
Problem: MOBILITY - ADULT  Goal: Maintain or return to baseline ADL function  Description: INTERVENTIONS:  -  Assess patient's ability to carry out ADLs; assess patient's baseline for ADL function and identify physical deficits which impact ability to perform ADLs (bathing, care of mouth/teeth, toileting, grooming, dressing, etc )  - Assess/evaluate cause of self-care deficits   - Assess range of motion  - Assess patient's mobility; develop plan if impaired  - Assess patient's need for assistive devices and provide as appropriate  - Encourage maximum independence but intervene and supervise when necessary  - Involve family in performance of ADLs  - Assess for home care needs following discharge   - Consider OT consult to assist with ADL evaluation and planning for discharge  - Provide patient education as appropriate  Outcome: Progressing  Goal: Maintains/Returns to pre admission functional level  Description: INTERVENTIONS:  - Perform BMAT or MOVE assessment daily    - Set and communicate daily mobility goal to care team and patient/family/caregiver     - Collaborate with rehabilitation services on mobility goals if consulted  - Out of bed for toileting  - Record patient progress and toleration of activity level   Outcome: Progressing     Problem: Prexisting or High Potential for Compromised Skin Integrity  Goal: Skin integrity is maintained or improved  Description: INTERVENTIONS:  - Identify patients at risk for skin breakdown  - Assess and monitor skin integrity  - Assess and monitor nutrition and hydration status  - Monitor labs   - Assess for incontinence   - Turn and reposition patient  - Assist with mobility/ambulation  - Relieve presfsure over bony prominences  - Avoid friction and shearing  - Provide appropriate hygiene as needed including keeping skin clean and dry  - Evaluate need for skin moisturizer/barrier cream  - Collaborate with interdisciplinary team   - Patient/family teaching  - Consider wound care consult   Outcome: Progressing

## 2023-02-13 NOTE — CONSULTS
Consultation - Neuropsychology/Psychology Department  Manfred Mccollum 69 y o  female MRN: [id number]  Unit/Bed#: S -01 Encounter: [id number]        Reason for Consultation:  Manfred Mccollum is a 69y o  year old female who was referred for a Neuropsychological Exam to assess cognitive functioning and comment on capacity to make informed medical decisions        History of Present Illness  Altered mental status    Physician Requesting Consult: Patricia Amado MD    PROBLEM LIST:  Patient Active Problem List   Diagnosis   • Venous insufficiency (chronic) (peripheral)   • Essential (primary) hypertension   • Chronic pain syndrome   • Chronic kidney disease, stage 2 (mild)   • Mixed hyperlipidemia   • Obesity, unspecified   • Unilateral primary osteoarthritis, unspecified knee   • Sjogren's syndrome ([facility] )   • Unspecified atrial fibrillation (HCC)   • Uncomplicated opioid dependence ([facility] )   • Anxiety   • Memory impairment   • Acute blood loss anemia   • Prediabetes   • Seasonal allergies   • Xerosis of skin   • Venous stasis dermatitis of both lower extremities   • At risk for obstructive sleep apnea   • Lymphedema   • Fall   • Fracture of proximal end of left femur [facility] )   • Acute pain due to trauma   • Constipation   • Ambulatory dysfunction   • Anemia   • Age-related osteoporosis with current pathological fracture   • Acute gastric ulcer with hemorrhage   • Chronic heart failure with preserved ejection fraction (HFpEF) ([facility])   • Parenchymal renal hypertension   • Hypokalemia   • Fracture of rib, single, closed   • Closed fracture of transverse process of lumbar vertebra ([facility])   • Compression fracture of T8 vertebra with routine healing   • Bilateral lower leg cellulitis   • Altered mental status, unspecified   • Watery diarrhea         Historical Information   Past Medical History:   Diagnosis Date   • A-fib ([place] [address] ) 12/29/2021   • Acute respiratory failure with hypoxia ([facility] ) 11/12/2021   • Anxiety • Arthritis    • Back pain    • Cellulitis    • Cellulitis, unspecified 12/30/2021   • CHF (congestive heart failure) ([facility])    • Colon cancer screening 8/3/2022   • Edema of both lower extremities due to peripheral venous insufficiency    • Edema of both lower extremities due to peripheral venous insufficiency    • Encounter for screening mammogram for malignant neoplasm of breast 3/21/2022   • Encounter for support and coordination of transition of care 9/26/2022   • Erythema of lower extremity 11/12/2021   • Fibromyalgia    • Great toe pain, right 10/6/2022   • Hypertension    • Hypotension 9/17/2022   • Leukocytosis 10/6/2022   • Melena 8/20/2022   • Osteoporosis screening 8/3/2022   • Sjogren syndrome, unspecified ([facility] )      Past Surgical History:   Procedure Laterality Date   • KNEE CARTILAGE SURGERY     • PA OPTX FEM SHFT FX W/INSJ IMED IMPLT W/WO SCREW Left 8/22/2022    Procedure: LEFT RETROGRADE IM SHAN;  Surgeon: Ludy Myers MD;  Location: [facility];  Service: Orthopedics   • REPLACEMENT TOTAL KNEE BILATERAL       Social History   Social History     Substance and Sexual Activity   Alcohol Use Not Currently     Social History     Substance and Sexual Activity   Drug Use Never     Social History     Tobacco Use   Smoking Status Former   • Types: Cigarettes   Smokeless Tobacco Never     Family History:   Family History   Problem Relation Age of Onset   • Heart disease Mother    • Cancer Father        Meds/Allergies   current meds:   Current Facility-Administered Medications   Medication Dose Route Frequency   • acetaminophen (TYLENOL) tablet 650 mg  650 mg Oral Q6H [address]   • apixaban (ELIQUIS) tablet 5 mg  5 mg Oral BID   • carvedilol (COREG) tablet 3 125 mg  3 125 mg Oral BID With Meals   • donepezil (ARICEPT) tablet 10 mg  10 mg Oral HS   • escitalopram (LEXAPRO) tablet 10 mg  10 mg Oral Daily   • ferrous sulfate tablet 325 mg  325 mg Oral Daily   • gabapentin (NEURONTIN) capsule 100 mg  100 mg Oral BID   • loratadine (CLARITIN) tablet 10 mg  10 mg Oral Daily   • memantine (NAMENDA) tablet 10 mg  10 mg Oral BID   • metolazone (ZAROXOLYN) tablet 2 5 mg  2 5 mg Oral Once per day on Mon Thu   • oxyCODONE (ROXICODONE) IR tablet 2 5 mg  2 5 mg Oral Q6H PRN   • pantoprazole (PROTONIX) EC tablet 40 mg  40 mg Oral Early Morning   • potassium chloride (K-DUR,KLOR-CON) CR tablet 20 mEq  20 mEq Oral Once per day on Mon Thu   • potassium chloride (K-DUR,KLOR-CON) CR tablet 60 mEq  60 mEq Oral Daily   • senna-docusate sodium (SENOKOT S) 8 6-50 mg per tablet 2 tablet  2 tablet Oral BID   • tiZANidine (ZANAFLEX) tablet 2 mg  2 mg Oral Q8H PRN   • torsemide (DEMADEX) tablet 40 mg  40 mg Oral BID       No Known Allergies      Family and Social Support:   No data recorded    Behavioral Observations: Alert, UNABLE to accurately state the season, day/month, day/week, name of hospital; affect appeared pleasant and patient admitted to anxiety; no overt evidence of psychotic process; patient states she does not know reason for hospitalization and was uable to provide medical history; states she is able to return home today and care for herself;    Cognitive Examination    General Cognitive Functioning MMSE = Impaired 19/28; Attention/Concentration Auditory Selective Attention = Average; Auditory Vigilance = Within Normal Limits; Information Processing Speed = Within Normal Limits    Frontal Systems/Executive Functioning Mental Flexibility/Cognitive Control = Impaired; Working Memory = Impaired Abstract Reasoning = Impaired; Generative Ability = Impaired, Commonsense Reasoning and Judgement = Impaired    Language Functioning Confrontation naming = Within Normal Limits, Phonemic Fluency = Impaired; Semantic Retrieval = Impaired; Comprehension of Complex Ideational Material = Impaired; Praxis = Within Normal Limits; Repetition = Within Normal Limits; Basic Reading = Within Normal Limits;   Following Commands = Within Normal Limits    Memory Functioning Narrative Recall - Short Delay = Average; Long Delay Narrative Recall = Impaired; Three word recall = Impaired    Visuo-Spatial Abilities Not Assessed    Functional Knowledge  Health & Safety Knowledge = Impaired;     Summary/Impression:  Results of Neuropsychological Exam revealed diffuse cognitive dysfunction and on a measure assessing awareness of personal health status and ability to evaluate health problems, handle medical emergencies and take safety precautions, patient performed in the IMPAIRED range of functioning  During this encounter, patient does not appear to have capacity to make fully informed medical decisions

## 2023-02-13 NOTE — PROGRESS NOTES
[facility]  Progress Note - Tyrell Claire 1945, 69 y o  female MRN: [id number]  Unit/Bed#: S -01 Encounter: [id number]  Primary Care Provider: Diego Thompson MD   Date and time admitted to hospital: 2/9/2023  5:20 PM    * Altered mental status, unspecified  Assessment & Plan  POA Patient has visual and auditory hallucinations last night at home  Most likely due to flareup of her infection from  Patient's  is also admitted to the hospital at this time secondary to a fall with sustained trauma  Of note, patient took too much of her oxycodone and ran out of her prescription  Labs:  TSH: Normal  Potassium: 3 2  Rapid drug screen: Positive for oxycodone   UA: Negative     Differential: toxic encephalopathy, metabolic encephalopathy, recent cellulitis, electrolyte abnormalities, side effect of medications, chronic opioid use with possible withdrawal, memory impairment     Plans:  · Monitor labs daily  · OT cognitive assessment - impaired  · Neuropsych evaluation - patient lacking capacity to make informed medical decisions  · Redirection with sundowning  · Blood cultures x 2 - NGTD  · Monitor vital signs  · Inpatient consult to geriatrics  Memory impairment  Assessment & Plan  Patient has not been officially diagnosed with any neurodegenerative diseases  She was started on medications by per PCP because she keeps missing her appointments for a geriatric/neurology evaluation  Plan:  · Continue home memantine and donepezil  · Attending to double check if tizanidine is a home medication, reduced prn dose to 2 mg  · Frequent reorientation   · Fall precautions  · Neuropsych evaluation - patient lacking capacity to make medically informed decisions    Watery diarrhea  Assessment & Plan  Patient reports having 6-7 episodes of liquid bowel movements   Patient had been recently on antibiotics for possible bilaterally b/l extremity cellulitis versus chronic venous insufficiency  Patient states that she has not had any more diarrheal bowel movements since being in the ED  Plan:  · Monitor electrolytes  · Monitor vital signs  · Monitor bowel movements  · I/Os  · See venous insufficiency/stasis dermatitis     Hypokalemia  Assessment & Plan  Recent Labs     02/11/23  0506 02/12/23  0549 02/13/23  0627   K 3 2* 3 2* 3 7   MG 1 9 2 2  --      Patient presented with a K of 3 2  Standing dose of KDUR 60 mEq daily with an extra 20 mEq on Mondays and Thursdays with Metolazone dosing    Plan:  · Monitor daily labs  · Replete as needed  · CMP a m  · Mg a m  Chronic heart failure with preserved ejection fraction (HFpEF) ([facility])  Assessment & Plan  Wt Readings from Last 3 Encounters:   02/13/23 77 2 kg (170 lb 3 1 oz)   02/09/23 84 2 kg (185 lb 9 6 oz)   02/06/23 88 5 kg (195 lb 1 7 oz)       Lab Results   Component Value Date    LVEF 55 10/07/2022    NTBNP 1,615 (H) 04/30/2022    NTBNP 2,903 (H) 02/10/2022       Plan:  • GDMT: Diuretic: Torsemide PO 40 mg BID and metolazone 2 5 mg twice weekly (Mondays and Thursdays), B-Blocker: Carvedilol 3 125 mg BID, No ACE/ARB/ARNi, Aldos  Blocker: or SGLT2-I on file  · Diet: Sodium restriction 2g  · Continue home potassium repletion with KDUR 60 mEq daily plus another 20 mEq on Mondays and Thursdays with Metolazone   · Labs: BMP, magnesium tomorrow a m  · Maintain Mg > 2 and K > 4; Replete prn   · Elevate HOB > 30°, Daily standing weights, Measure I/Os  · Consult nutrition services for dietary education? Consider when patient has capacity  Venous stasis dermatitis of both lower extremities  Assessment & Plan  Patient was put on Keflex at discharge  Daughter and patient are not aware of how many doses are pending  Hx: Distal femoral fracture in August 2022  It was treated with ORIF  Cellulitis does not correlate in the same area as the fracture  No evidence of cellulitis       Plan:  · Hold antibiotics at this time  · Blood cultures x 2 - NGTD  · Monitor vital signs  · Wound care consulted - Recommendations appreciated      Anxiety  Assessment & Plan  Patient has a history of anxiety  Plan:  · Continue home Lexapro  Unspecified atrial fibrillation (HCC)  Assessment & Plan  History of atrial fibrillation controlled on Carvedilol 3 125 mg BID and Eliquis 5 mg BID    Plan:  • Continue home meds  • Monitor rates/BP    Chronic pain syndrome  Assessment & Plan  Patient has a history of chronic pain syndrome  Plan:  · Continue home pain regimen with gabapentin, tizanidine and oxycodone  · Attending to double check if patient takes tizanidine at home  Decreased to prn 2 mg  · PDMP reviewed  Patient received 90 10 mg tablets (30 day supply)  Written and filled 23    Venous insufficiency (chronic) (peripheral)  Assessment & Plan  Patient has a history of venous insufficiency  She has a wound on her right lower extremity  She usually has a wound nurse coming and dressing it every week  Plan:  · Compression stockings  · Wound Care per wound management recs  VTE Pharmacologic Prophylaxis: VTE Score: 5 High Risk (Score >/= 5) - Pharmacological DVT Prophylaxis Ordered: apixaban (Eliquis)  Sequential Compression Devices Ordered  Patient Centered Rounds: I performed bedside rounds with nursing staff today  Discussions with Specialists or Other Care Team Provider: Neuropsych    Education and Discussions with Family / Patient: will update  Current Length of Stay: 4 day(s)  Current Patient Status: Inpatient   Discharge Plan: Anticipate discharge in 24-48 hrs to [facility] rehab vs [facility]    Code Status: Level 1 - Full Code    Subjective:   Patient seen and evaluated at bedside, in no acute distress  No acute events overnight  Patient has no complaints, denies f/c, cp, sob, n/v/d/c at this time      Objective:     Vitals:   Temp (24hrs), Av 2 °F (36 8 °C), Min:97 8 °F (36 6 °C), Max:98 4 °F (36 9 °C)    Temp:  [97 8 °F (36 6 °C)-98 4 °F (36 9 °C)] 97 8 °F (36 6 °C)  HR:  [64-95] 75  Resp:  [18] 18  BP: (113-134)/(63-88) 113/65  SpO2:  [91 %-97 %] 91 %  Body mass index is 31 13 kg/m²  Input and Output Summary (last 24 hours): Intake/Output Summary (Last 24 hours) at 2/13/2023 1427  Last data filed at 2/13/2023 0900  Gross per 24 hour   Intake 480 ml   Output --   Net 480 ml       Physical Exam:   Physical Exam  Vitals and nursing note reviewed  Constitutional:       General: She is not in acute distress  Appearance: She is well-developed  HENT:      Head: Normocephalic and atraumatic  Right Ear: External ear normal       Left Ear: External ear normal       Nose: No congestion or rhinorrhea  Mouth/Throat:      Mouth: Mucous membranes are moist       Pharynx: Oropharynx is clear  Eyes:      Conjunctiva/sclera: Conjunctivae normal    Cardiovascular:      Rate and Rhythm: Normal rate and regular rhythm  Heart sounds: No murmur heard  Pulmonary:      Effort: Pulmonary effort is normal  No respiratory distress  Breath sounds: Normal breath sounds  Abdominal:      Palpations: Abdomen is soft  Tenderness: There is no abdominal tenderness  Musculoskeletal:         General: Swelling (bilateral lower extremities) and tenderness (bilateral lower extremities) present  Cervical back: Neck supple  Comments: Pink discoloration, likely due to chronic venous stasis   Skin:     General: Skin is warm and dry  Capillary Refill: Capillary refill takes less than 2 seconds  Neurological:      General: No focal deficit present  Mental Status: She is alert     Psychiatric:         Mood and Affect: Mood normal          Additional Data:     Labs:  Results from last 7 days   Lab Units 02/11/23  0506 02/10/23  0557   WBC Thousand/uL 9 56 9 51   HEMOGLOBIN g/dL 11 4* 10 6*   HEMATOCRIT % 37 7 34 5*   PLATELETS Thousands/uL 435* 428*   NEUTROS PCT %  --  59   LYMPHS PCT %  --  24   MONOS PCT %  --  12   EOS PCT % --  3     Results from last 7 days   Lab Units 02/13/23  0627 02/12/23  0549   SODIUM mmol/L 135 137   POTASSIUM mmol/L 3 7 3 2*   CHLORIDE mmol/L 96 95*   CO2 mmol/L 27 30   BUN mg/dL 30* 26*   CREATININE mg/dL 1 09 0 96   ANION GAP mmol/L 12 12   CALCIUM mg/dL 9 8 9 8   ALBUMIN g/dL  --  4 2   TOTAL BILIRUBIN mg/dL  --  0 58   ALK PHOS U/L  --  139*   ALT U/L  --  4*   AST U/L  --  15   GLUCOSE RANDOM mg/dL 149* 135                 Results from last 7 days   Lab Units 02/09/23  1822   LACTIC ACID mmol/L 1 2   PROCALCITONIN ng/ml <0 05       Lines/Drains:  Invasive Devices     Peripheral Intravenous Line  Duration           Peripheral IV 02/12/23 Left;Ventral (anterior) Wrist <1 day                      Imaging: Reviewed radiology reports from this admission including: chest xray and CT head    Recent Cultures (last 7 days):   Results from last 7 days   Lab Units 02/09/23  1822   BLOOD CULTURE  No Growth at 72 hrs  No Growth at 72 hrs         Last 24 Hours Medication List:   Current Facility-Administered Medications   Medication Dose Route Frequency Provider Last Rate   • acetaminophen  650 mg Oral Q6H [facility], DO     • apixaban  5 mg Oral BID [facility], DO     • carvedilol  3 125 mg Oral BID With Meals [facility]     • donepezil  10 mg Oral HS [facility], DO     • escitalopram  10 mg Oral Daily [facility], DO     • ferrous sulfate  325 mg Oral Daily [facility]     • gabapentin  100 mg Oral BID [facility], DO     • loratadine  10 mg Oral Daily Josh Ramirez MD     • memantine  10 mg Oral BID [facility], DO     • metolazone  2 5 mg Oral Once per day on Mon Thu [facility], DO     • oxyCODONE  2 5 mg Oral Q6H PRN Missy Gil, DO     • pantoprazole  40 mg Oral Early Morning [facility], DO     • potassium chloride  20 mEq Oral Once per day on Mon Thu [facility], DO     • potassium chloride  60 mEq Oral Daily [facility], DO     • senna-docusate sodium  2 tablet Oral BID Apolinar Sanabria DO     • tiZANidine  2 mg Oral Q8H PRN Cristina Danielle MD     • torsemide  40 mg Oral BID Apolinar Sanabria DO          Today, Patient Was Seen By: Jeremiah Doyle DO    **Please Note: This note may have been constructed using a voice recognition system  **

## 2023-02-13 NOTE — ASSESSMENT & PLAN NOTE
POA Patient has visual and auditory hallucinations last night at home  Most likely due to flareup of her infection from  Patient's  is also admitted to the hospital at this time secondary to a fall with sustained trauma  Of note, patient took too much of her oxycodone and ran out of her prescription  Labs:  TSH: Normal  Potassium: 3 2  Rapid drug screen: Positive for oxycodone   UA: Negative     Differential: toxic encephalopathy, metabolic encephalopathy, recent cellulitis, electrolyte abnormalities, side effect of medications, chronic opioid use with possible withdrawal, memory impairment     Plans:  · Monitor labs daily  · OT cognitive assessment - impaired  · Neuropsych evaluation - patient lacking capacity to make informed medical decisions  · Redirection with sundowning  · Blood cultures x 2 - NGTD  · Monitor vital signs  · Inpatient consult to geriatrics

## 2023-02-13 NOTE — ASSESSMENT & PLAN NOTE
Patient has not been officially diagnosed with any neurodegenerative diseases  She was started on medications by per PCP because she keeps missing her appointments for a geriatric/neurology evaluation      Plan:  · Continue home memantine and donepezil  · Attending to double check if tizanidine is a home medication, reduced prn dose to 2 mg  · Frequent reorientation   · Fall precautions  · Neuropsych evaluation - patient lacking capacity to make medically informed decisions

## 2023-02-13 NOTE — QUICK NOTE
Discussed plan of care and medical updates with Patient's daughter, Daphnie Temple, and , Luca Castro; answered family's questions       Trent Opitz, DO

## 2023-02-14 PROBLEM — R19.7 WATERY DIARRHEA: Status: RESOLVED | Noted: 2023-02-09 | Resolved: 2023-02-14

## 2023-02-14 LAB
ANION GAP SERPL CALCULATED.3IONS-SCNC: 15 MMOL/L (ref 4–13)
ATRIAL RATE: 163 BPM
BASOPHILS # BLD AUTO: 0.07 THOUSANDS/ÂΜL (ref 0–0.1)
BASOPHILS NFR BLD AUTO: 1 % (ref 0–1)
BUN SERPL-MCNC: 38 MG/DL (ref 5–25)
CALCIUM SERPL-MCNC: 10.2 MG/DL (ref 8.4–10.2)
CHLORIDE SERPL-SCNC: 92 MMOL/L (ref 96–108)
CO2 SERPL-SCNC: 30 MMOL/L (ref 21–32)
CREAT SERPL-MCNC: 1.16 MG/DL (ref 0.6–1.3)
EOSINOPHIL # BLD AUTO: 0.24 THOUSAND/ÂΜL (ref 0–0.61)
EOSINOPHIL NFR BLD AUTO: 2 % (ref 0–6)
ERYTHROCYTE [DISTWIDTH] IN BLOOD BY AUTOMATED COUNT: 21.6 % (ref 11.6–15.1)
GFR SERPL CREATININE-BSD FRML MDRD: 45 ML/MIN/1.73SQ M
GLUCOSE SERPL-MCNC: 134 MG/DL (ref 65–140)
HCT VFR BLD AUTO: 41.1 % (ref 34.8–46.1)
HGB BLD-MCNC: 12.7 G/DL (ref 11.5–15.4)
IMM GRANULOCYTES # BLD AUTO: 0.05 THOUSAND/UL (ref 0–0.2)
IMM GRANULOCYTES NFR BLD AUTO: 1 % (ref 0–2)
LYMPHOCYTES # BLD AUTO: 2.39 THOUSANDS/ÂΜL (ref 0.6–4.47)
LYMPHOCYTES NFR BLD AUTO: 24 % (ref 14–44)
MAGNESIUM SERPL-MCNC: 2.2 MG/DL (ref 1.9–2.7)
MCH RBC QN AUTO: 24.4 PG (ref 26.8–34.3)
MCHC RBC AUTO-ENTMCNC: 30.9 G/DL (ref 31.4–37.4)
MCV RBC AUTO: 79 FL (ref 82–98)
MONOCYTES # BLD AUTO: 1.15 THOUSAND/ÂΜL (ref 0.17–1.22)
MONOCYTES NFR BLD AUTO: 12 % (ref 4–12)
NEUTROPHILS # BLD AUTO: 5.99 THOUSANDS/ÂΜL (ref 1.85–7.62)
NEUTS SEG NFR BLD AUTO: 60 % (ref 43–75)
NRBC BLD AUTO-RTO: 0 /100 WBCS
PLATELET # BLD AUTO: 577 THOUSANDS/UL (ref 149–390)
PMV BLD AUTO: 11.2 FL (ref 8.9–12.7)
POTASSIUM SERPL-SCNC: 2.6 MMOL/L (ref 3.5–5.3)
POTASSIUM SERPL-SCNC: 2.9 MMOL/L (ref 3.5–5.3)
QRS AXIS: 71 DEGREES
QRSD INTERVAL: 100 MS
QT INTERVAL: 384 MS
QTC INTERVAL: 464 MS
RBC # BLD AUTO: 5.21 MILLION/UL (ref 3.81–5.12)
SODIUM SERPL-SCNC: 137 MMOL/L (ref 135–147)
T WAVE AXIS: 83 DEGREES
VENTRICULAR RATE: 88 BPM
WBC # BLD AUTO: 9.89 THOUSAND/UL (ref 4.31–10.16)

## 2023-02-14 PROCEDURE — 84132 ASSAY OF SERUM POTASSIUM: CPT

## 2023-02-14 PROCEDURE — 85025 COMPLETE CBC W/AUTO DIFF WBC: CPT

## 2023-02-14 PROCEDURE — 93010 ELECTROCARDIOGRAM REPORT: CPT | Performed by: INTERNAL MEDICINE

## 2023-02-14 PROCEDURE — 97116 GAIT TRAINING THERAPY: CPT

## 2023-02-14 PROCEDURE — 93005 ELECTROCARDIOGRAM TRACING: CPT

## 2023-02-14 PROCEDURE — 83735 ASSAY OF MAGNESIUM: CPT

## 2023-02-14 PROCEDURE — 80048 BASIC METABOLIC PNL TOTAL CA: CPT

## 2023-02-14 PROCEDURE — 97110 THERAPEUTIC EXERCISES: CPT

## 2023-02-14 PROCEDURE — 97530 THERAPEUTIC ACTIVITIES: CPT

## 2023-02-14 PROCEDURE — 99232 SBSQ HOSP IP/OBS MODERATE 35: CPT | Performed by: INTERNAL MEDICINE

## 2023-02-14 RX ORDER — HYDROXYZINE HYDROCHLORIDE 25 MG/1
25 TABLET, FILM COATED ORAL ONCE
Status: COMPLETED | OUTPATIENT
Start: 2023-02-14 | End: 2023-02-14

## 2023-02-14 RX ORDER — MAGNESIUM SULFATE HEPTAHYDRATE 40 MG/ML
2 INJECTION, SOLUTION INTRAVENOUS ONCE
Status: COMPLETED | OUTPATIENT
Start: 2023-02-14 | End: 2023-02-14

## 2023-02-14 RX ORDER — POTASSIUM CHLORIDE 20 MEQ/1
40 TABLET, EXTENDED RELEASE ORAL ONCE
Status: COMPLETED | OUTPATIENT
Start: 2023-02-14 | End: 2023-02-14

## 2023-02-14 RX ORDER — OXYCODONE HYDROCHLORIDE 10 MG/1
5 TABLET ORAL EVERY 8 HOURS PRN
Qty: 90 TABLET | Refills: 0 | Status: CANCELLED
Start: 2023-02-14 | End: 2023-02-24

## 2023-02-14 RX ADMIN — POTASSIUM CHLORIDE 40 MEQ: 1500 TABLET, EXTENDED RELEASE ORAL at 16:04

## 2023-02-14 RX ADMIN — MAGNESIUM SULFATE HEPTAHYDRATE 2 G: 40 INJECTION, SOLUTION INTRAVENOUS at 09:45

## 2023-02-14 RX ADMIN — ESCITALOPRAM OXALATE 10 MG: 10 TABLET, FILM COATED ORAL at 09:39

## 2023-02-14 RX ADMIN — TORSEMIDE 40 MG: 20 TABLET ORAL at 09:38

## 2023-02-14 RX ADMIN — DONEPEZIL HYDROCHLORIDE 10 MG: 5 TABLET ORAL at 21:37

## 2023-02-14 RX ADMIN — TIZANIDINE 2 MG: 2 TABLET ORAL at 19:24

## 2023-02-14 RX ADMIN — SENNOSIDES AND DOCUSATE SODIUM 2 TABLET: 8.6; 5 TABLET ORAL at 19:24

## 2023-02-14 RX ADMIN — MEMANTINE 10 MG: 10 TABLET ORAL at 19:24

## 2023-02-14 RX ADMIN — HYDROXYZINE HYDROCHLORIDE 25 MG: 25 TABLET ORAL at 05:48

## 2023-02-14 RX ADMIN — GABAPENTIN 100 MG: 100 CAPSULE ORAL at 09:38

## 2023-02-14 RX ADMIN — ACETAMINOPHEN 650 MG: 325 TABLET ORAL at 05:06

## 2023-02-14 RX ADMIN — ACETAMINOPHEN 650 MG: 325 TABLET ORAL at 19:24

## 2023-02-14 RX ADMIN — CARVEDILOL 3.12 MG: 3.12 TABLET, FILM COATED ORAL at 19:24

## 2023-02-14 RX ADMIN — TORSEMIDE 40 MG: 20 TABLET ORAL at 21:37

## 2023-02-14 RX ADMIN — APIXABAN 5 MG: 5 TABLET, FILM COATED ORAL at 09:38

## 2023-02-14 RX ADMIN — FERROUS SULFATE TAB 325 MG (65 MG ELEMENTAL FE) 325 MG: 325 (65 FE) TAB at 09:38

## 2023-02-14 RX ADMIN — PANTOPRAZOLE SODIUM 40 MG: 40 TABLET, DELAYED RELEASE ORAL at 05:06

## 2023-02-14 RX ADMIN — SENNOSIDES AND DOCUSATE SODIUM 2 TABLET: 8.6; 5 TABLET ORAL at 09:39

## 2023-02-14 RX ADMIN — ACETAMINOPHEN 650 MG: 325 TABLET ORAL at 13:22

## 2023-02-14 RX ADMIN — TIZANIDINE 2 MG: 2 TABLET ORAL at 09:45

## 2023-02-14 RX ADMIN — LORATADINE 10 MG: 10 TABLET ORAL at 09:38

## 2023-02-14 RX ADMIN — CARVEDILOL 3.12 MG: 3.12 TABLET, FILM COATED ORAL at 09:38

## 2023-02-14 RX ADMIN — APIXABAN 5 MG: 5 TABLET, FILM COATED ORAL at 19:24

## 2023-02-14 RX ADMIN — POTASSIUM CHLORIDE 60 MEQ: 1500 TABLET, EXTENDED RELEASE ORAL at 09:39

## 2023-02-14 RX ADMIN — MEMANTINE 10 MG: 10 TABLET ORAL at 09:38

## 2023-02-14 RX ADMIN — GABAPENTIN 100 MG: 100 CAPSULE ORAL at 19:24

## 2023-02-14 NOTE — CASE MANAGEMENT
Case Management Assessment & Discharge Planning Note    Patient name Linda Julien  Location S /S -01 MRN [id number]  : 1945 Date 2023       Current Admission Date: 2023  Current Admission Diagnosis:Altered mental status, unspecified   Patient Active Problem List    Diagnosis Date Noted   • Altered mental status, unspecified 2023   • Watery diarrhea 2023   • Fracture of rib, single, closed 2022   • Closed fracture of transverse process of lumbar vertebra ([address] ) 2022   • Compression fracture of T8 vertebra with routine healing 2022   • Parenchymal renal hypertension 2022   • Chronic heart failure with preserved ejection fraction (HFpEF) ([address] ) 10/18/2022   • Acute gastric ulcer with hemorrhage 10/05/2022   • Age-related osteoporosis with current pathological fracture 2022   • Anemia 2022   • Ambulatory dysfunction 2022   • Constipation 2022   • Fall 2022   • Fracture of proximal end of left femur ([address] ) 2022   • Acute pain due to trauma 2022   • At risk for obstructive sleep apnea 2022   • Lymphedema 2022   • Venous stasis dermatitis of both lower extremities 2022   • Seasonal allergies 2022   • Xerosis of skin 2022   • Prediabetes 2022   • Acute blood loss anemia 2022   • Memory impairment 2022   • Anxiety    • Hypokalemia    • Uncomplicated opioid dependence ([address] ) 2022   • Unspecified atrial fibrillation ([address] ) 2021   • Mixed hyperlipidemia 2021   • Obesity, unspecified 2021   • Unilateral primary osteoarthritis, unspecified knee 2021   • Chronic kidney disease, stage 2 (mild) 2021   • Chronic pain syndrome 2021   • Venous insufficiency (chronic) (peripheral) 2021   • Essential (primary) hypertension 2017   • Sjogren's syndrome ([address] ) 2014      LOS (days): 5  Geometric Mean LOS (GMLOS) (days): 2 90  Days to GMLOS:-1 7     OBJECTIVE:  PATIENT READMITTED TO HOSPITAL  Risk of Unplanned Readmission Score: 54 94         Current admission status: Inpatient       Preferred Pharmacy:   [address], [address] [facility]  [address]  Phone: [phone] Fax: [phone]    Primary Care Provider: Katerina Perez MD    Primary Insurance: [facility]  Secondary Insurance:     ASSESSMENT:  [address], [address] - Daughter   Primary Phone: [phone] (Mobile)  Home Phone: [phone]               Advance Directives  Does patient have a [address]?: Yes  Does patient have Advance Directives?: Yes  Advance Directives: Power of  for health care  Primary Contact: daughter         Readmission Root Cause  30 Day Readmission: Yes  Who directed you to return to the hospital?: PCP  Did you understand whom to contact if you had questions or problems?: Yes  Did you get your prescriptions before you left the hospital?: No  Reason[de-identified] Not preferred pharmacy  Were you able to get your prescriptions filled when you left the hospital?: Yes  Did you take your medications as prescribed?: No  Were you able to get to your follow-up appointments?: Yes  During previous admission, was a post-acute recommendation made?: Yes  What post-acute resources were offered?: [facility]  Patient was readmitted due to: AMS and hallucinations - sent from PCP office  Action Plan: Pt does not qualify for rehab as daughter was hoping this would happen  Pt will qualify for [facility] and daughter would like DEVI with Paula Miranda as this was arranged during last admission      Patient Information  Admitted from[de-identified] Home  Mental Status: Confused  During Assessment patient was accompanied by: Not accompanied during assessment  Assessment information provided by[de-identified] Daughter  Primary Caregiver: Self  Support Systems: Spouse/significant other, Daughter  [place] of Residence: [address] [facility] do you live in?: [facility] entry access options   Select all that apply : Stairs  Number of steps to enter home : 3  Do the steps have railings?: Yes  Type of Current Residence: 2 story home  Upon entering residence, is there a bedroom on the main floor (no further steps)?: No  A bedroom is located on the following floor levels of residence (select all that apply):: 2nd Floor  Upon entering residence, is there a bathroom on the main floor (no further steps)?: No  Indicate which floors of current residence have a bathroom (select all the apply):: 2nd Floor  Number of steps to 2nd floor from main floor: One Flight  In the last 12 months, was there a time when you were not able to pay the mortgage or rent on time?: No  In the last 12 months, was there a time when you did not have a steady place to sleep or slept in a shelter (including now)?: No  Living Arrangements: Lives w/ Spouse/significant other, Lives w/ Daughter  Is patient a ?: No    Activities of Daily Living Prior to Admission  Functional Status: Independent  Completes ADLs independently?: Yes  Ambulates independently?: Yes  Does patient use assisted devices?: Yes  Assisted Devices (DME) used: [place] [employer]  Does patient currently own DME?: Yes  What DME does the patient currently own?: Stair Chair/[employer], Walker  Does patient have a history of Outpatient Therapy (PT/OT)?: No  Does the patient have a history of Short-Term Rehab?: Yes  Does patient have a history of HH?: Yes  Does patient currently have [place] [address]?: Yes    Current Home Health Care  Type of Current Home Care Services: Nurse visit, Home OT, [address][de-identified] [address] [place] [address] Provider[de-identified] PCP    Patient Information Continued  Income Source: SSI/SSD  Does patient have prescription coverage?: Yes  Within the past 12 months, you worried that your food would run out before you got the money to buy more : Never true  Within the past 12 months, the food you bought just didn't last and you didn't have money to get more : Never true  Does patient receive dialysis treatments?: No  Does patient have a history of substance abuse?: No  Does patient have a history of Mental Health Diagnosis?: Yes  Is patient receiving treatment for mental health?: Yes  Has patient received inpatient treatment related to mental health in the last 2 years?: No         Means of Transportation  Means of Transport to Appts[de-identified] Family transport  In the past 12 months, has lack of transportation kept you from medical appointments or from getting medications?: No  In the past 12 months, has lack of transportation kept you from meetings, work, or from getting things needed for daily living?: No    CM reviewed the availability of treatment team to discuss questions or concerns patient and/or family may have regarding  understanding medications and recognizing signs and symptoms at discharge  CM also encouraged patient to follow up with all recommended appointments after discharge  CM reviewed the information that will be provided to pt/family on the discharge instructions  Patient advised of importance for patient and family to participate in managing patient's medical well being  DISCHARGE DETAILS:    Discharge planning discussed with[de-identified] daughter  Freedom of Choice: Yes  Comments - Freedom of Choice: DAughter is currently here from [place] which is where she lives in order to care for her pt and father  Daughter reports at this time she is having trouble caring for both pt and pt's  as he is undergoing cancer treatment and has become very frail  Ultimately daughter will be moving both to [place] in a few weeks to be with her and she will be placing them in assisted living if possible  CM reviewed with daughter the recommendations of the care team for Nel Coello at this time as pt is functioning quite well  Daughter understands and is in agreement with Nel Coello at [place]    She had been arranged with Michelle Chaidez who had been coming to see pt and daughter would like for them to continue to come  CM contacted family/caregiver?: Yes  Were Treatment Team discharge recommendations reviewed with patient/caregiver?: Yes  Did patient/caregiver verbalize understanding of patient care needs?: Yes  Were patient/caregiver advised of the risks associated with not following Treatment Team discharge recommendations?: Yes    Contacts  Patient Contacts: Hector Kaplan (Daughter)  Relationship to Patient[de-identified] Family  Contact Method: Phone  Reason/Outcome: Discharge Planning, Continuity of Care, Referral, Emergency [address]         Is the patient interested in [facility] at discharge?: Yes  [address] requested[de-identified] [address], Nursing, Occupational Therapy, Physical [address] Name[de-identified] [address] [place] Provider[de-identified] PCP  Home Health Services Needed[de-identified] Other (comment), Wound/Ostomy Care, Strengthening/Theraputic Exercises to Improve Function, Evaluate Functional Status and Safety, Gait/ADL Training, Heart Failure Management  Homebound Criteria Met[de-identified] Immunosuppressed  Supporting Clincal Findings[de-identified] Limited Endurance    DME Referral Provided  Referral made for DME?: No    Other Referral/Resources/Interventions Provided:  Interventions: [facility]  Referral Comments: Daughter is requesting Michelle Chaidez continue to follow pt in the home as they had been providing services to pt  Referral has been made to [facility] [address]  They have accepted pt back for DEVI    Contact information has been placed on pt's AVS          [facility]  Discharge Destination Plan[de-identified] Home with [place] at Discharge : Family         Transfer Mode: Wheelchair  Accompanied by: Family member     IMM Given (Date):: 02/14/23  IMM Given to[de-identified] Family  Family notified[de-identified] daughter   IMM reviewed with patient's caregiver, patient's caregiver agrees with discharge determination  Additional Comments: Daughter requested a diagnosis of pt's mental capacity  CM reviewed with her that Neuropsych has stated she is not able to make informed decisions regarding medical care  CM suggested a Geriatrician see pt and will suggest this to the provider for a consult  HENRIETTA suggested getting pt's medical records in order to take to [place] and CM provided contact information for that  SHe also stated she would like for pt to have a shower  CM to follow up with provider

## 2023-02-14 NOTE — PHYSICAL THERAPY NOTE
PHYSICAL THERAPY NOTE          Patient Name: Chrystal Joseph  WQEPJ'O Date: 2/14/2023 02/14/23 1318   PT Last Visit   PT Visit Date 02/14/23   Pain Assessment   Pain Assessment Tool 0-10   Pain Score 4   Pain Location/Orientation Orientation: Bilateral;Location: Leg   Pain Onset/Description Onset: Ongoing   Effect of Pain on Daily Activities limited ambulation distance   Patient's Stated Pain Goal No pain   Hospital Pain Intervention(s) Repositioned; Ambulation/increased activity; Rest   Multiple Pain Sites No   Pain Rating: FLACC (Rest) - Face 0   Pain Rating: FLACC (Rest) - Legs 0   Pain Rating: FLACC (Rest) - Activity 0   Pain Rating: FLACC (Rest) - Cry 0   Pain Rating: FLACC (Rest) - Consolability 0   Score: FLACC (Rest) 0   Restrictions/Precautions   Weight Bearing Precautions Per Order No   Braces or Orthoses TLSO   Other Precautions Cognitive; Chair Alarm; Bed Alarm; Fall Risk   General   Chart Reviewed Yes   Response to Previous Treatment Patient reporting fatigue but able to participate  Family/Caregiver Present No   Cognition   Overall Cognitive Status Impaired   Arousal/Participation Alert; Responsive; Cooperative   Attention Attends with cues to redirect   Orientation Level Oriented X4   Memory Decreased short term memory;Decreased recall of recent events;Decreased recall of precautions   Following Commands Follows one step commands with increased time or repetition   Comments pt was pleasant in todays tx session but required encouragement and motivation in order to participate in PT intervention   Subjective   Subjective pt was agreeable to participate in PT intervention   Bed Mobility   Supine to Sit 4  Minimal assistance   Additional items Assist x 1;HOB elevated; Bedrails; Increased time required;Verbal cues;LE management; Other  (trunk management)   Sit to Supine 4  Minimal assistance   Additional items Assist x 1;HOB elevated; Bedrails; Increased time required;Verbal cues;LE management   Additional Comments pt was able to sit EOB w/o LOB while performing TE activities   Transfers   Sit to Stand 5  Supervision   Additional items Assist x 1; Increased time required;Verbal cues  (w/ RW)   Stand to Sit 5  Supervision   Additional items Assist x 1; Increased time required;Verbal cues  (w/ RW)   Stand pivot 5  Supervision   Additional items Assist x 1; Armrests; Increased time required;Verbal cues  (w/ RW)   Toilet transfer 5  Supervision   Additional items Assist x 1; Increased time required;Verbal cues;Standard toilet   Additional Comments pt was able to complete 4 STS in todays tx session to and from rW with VC's for hand pl;acement for increased safety and balance   Ambulation/Elevation   Gait pattern Decreased foot clearance; Short stride; Foward flexed; Excessively slow   Gait Assistance 5  Supervision   Additional items Assist x 1;Verbal cues   Assistive Device Rolling walker   Distance 30'x2RW 15'x2 RW   Stair Management Assistance Not tested   Ambulation/Elevation Additional Comments additional activity after ambulation not possible due to fatigue   Balance   Static Sitting Fair +   Dynamic Sitting Fair   Static Standing Fair   Dynamic Standing Fair -   Ambulatory Fair -   Endurance Deficit   Endurance Deficit Yes   Endurance Deficit Description limited ambulation distance and activity tolerance   Activity Tolerance   Activity Tolerance Patient limited by fatigue   Nurse Made Aware Spoke to RN   Exercises   Hip Flexion Supine;5 reps;AROM; Bilateral   Hip Abduction Sitting;10 reps;AROM; Bilateral   Knee AROM Long Arc Quad Sitting;10 reps;AROM; Bilateral   Ankle Pumps Supine;20 reps;AROM; Bilateral   Marching Sitting;10 reps;AROM; Bilateral   Assessment   Prognosis Fair   Problem List Decreased strength;Decreased range of motion;Decreased endurance;Decreased mobility; Impaired balance;Decreased cognition; Impaired judgement;Decreased safety awareness; Obesity  (gait deviations)   Assessment pt began tx session lying supine in the bed and was agreeable to participate in PT intervention after some encouragement and motivation  pt required an increase in level of assistance in order to complete a supine<>sit EOB transfer compared to previosu tx sessions min Ax1 with LE and trunk management  pt was able to sit EOB w/o LOB while performing TE activities in order to strengthen Le's and increase static/dynamic sitting balance  pt was able to complete 4 STS in todays tx session from EOB to RW all with /s and VC's for hand placement while ascending to RW and descending back to seated in recliner  pt continues to demonstrate the inability to ambulate house hold distances w/o requiring a therapeutic seatyed rest break as pt required several rest throughout tx session  Prior to initiating funcitonal transfesr from EOB pt required mod Ax1 for donning /doffing TLSO  pt was able to amulate w/o LOB w/o LOB and /s  pt would benefit from continued skilled PT intervention in order to address deficits listed above  Post tx session pt in bed with call bell and all pt needs met   Goals   Patient Goals to go home   STG Expiration Date 02/22/23   PT Treatment Day 1   Plan   Treatment/Interventions Functional transfer training;LE strengthening/ROM; Elevations; Therapeutic exercise; Endurance training;Patient/family training;Equipment eval/education; Bed mobility;Gait training   Progress Progressing toward goals   Recommendation   PT Discharge Recommendation Home with home health rehabilitation   Equipment Recommended [place] walker   AM-PAC Basic Mobility Inpatient   Turning in Flat Bed Without Bedrails 3   Lying on Back to Sitting on Edge of Flat Bed Without Bedrails 3   Moving Bed to Chair 3   Standing Up From Chair Using Arms 3   Walk in Room 3   Climb 3-5 Stairs With Railing 2   Basic Mobility Inpatient Raw Score 17   Basic Mobility Standardized Score 39 67   Highest Level Of Mobility   -[facility] Goal 5: Stand one or more mins   -HLM Achieved 7: Walk 25 feet or more   Education   Education Provided Mobility training;Assistive device   Patient Demonstrates acceptance/verbal understanding   End of Consult   Patient Position at End of Consult Supine;Bed/Chair alarm activated; All needs within reach   The patient's AM-PAC Basic Mobility Inpatient Short Form Raw Score is 17  A Raw score of greater than 16 suggests the patient may benefit from discharge to home  Please also refer to the recommendation of the Physical Therapist for safe discharge planning       [facility] Ashish

## 2023-02-14 NOTE — PROGRESS NOTES
[facility]  Progress Note - Trellis Brood 1945, 69 y o  female MRN: [id number]  Unit/Bed#: S -01 Encounter: [id number]  Primary Care Provider: Mohan Wakefield MD   Date and time admitted to hospital: 2/9/2023  5:20 PM    Altered mental status, unspecified  Assessment & Plan  POA Patient has visual and auditory hallucinations last night at home  Most likely due to flareup of her infection from  Patient's  is also admitted to the hospital at this time secondary to a fall with sustained trauma  Of note, patient took too much of her oxycodone and ran out of her prescription  Labs: POA   TSH: Normal  Potassium: 3 2  Rapid drug screen: Positive for oxycodone   UA: Negative     Differential: toxic encephalopathy, metabolic encephalopathy, recent cellulitis, electrolyte abnormalities, side effect of medications, chronic opioid use with possible withdrawal, memory impairment     Plans:  · Monitor labs daily  · OT cognitive assessment - impaired  · Neuropsych evaluation - patient lacking capacity to make informed medical decisions  · Redirection with sundowning  · Blood cultures x 2 - NGTD  · Monitor vital signs  · Discharge disposition - F/u , Home Health    * Hypokalemia  Assessment & Plan  Recent Labs     02/12/23  0549 02/13/23  0627 02/14/23  0514 02/14/23  1352   K 3 2* 3 7 2 6* 2 9*   MG 2 2  --  2 2  --      Patient presented with a K of 3 2  Standing dose of KDUR 60 mEq daily with an extra 20 mEq on Mondays and Thursdays with Metolazone dosing    Plan:  · Monitor daily labs  · Replete as needed  · BMPs in a m  · Mg a m  - replete as needed/indicated due to hypokalemia   · Monitor on tele      Memory impairment  Assessment & Plan  Patient has not been officially diagnosed with any neurodegenerative diseases  She was started on medications by per PCP because she keeps missing her appointments for a geriatric/neurology evaluation      Plan:  · Continue home memantine and donepezil  · reduced Tizanidine prn dose to 2 mg  · Frequent reorientation   · Fall precautions  · Neuropsych evaluation - patient lacking capacity to make medically informed decisions    Chronic heart failure with preserved ejection fraction (HFpEF) (Formerly [facility])  Assessment & Plan  Wt Readings from Last 3 Encounters:   02/14/23 78 1 kg (172 lb 2 9 oz)   02/09/23 84 2 kg (185 lb 9 6 oz)   02/06/23 88 5 kg (195 lb 1 7 oz)       Lab Results   Component Value Date    LVEF 55 10/07/2022    NTBNP 1,615 (H) 04/30/2022    NTBNP 2,903 (H) 02/10/2022       Plan:  • GDMT: Diuretic: Torsemide PO 40 mg BID and metolazone 2 5 mg twice weekly (Mondays and Thursdays), B-Blocker: Carvedilol 3 125 mg BID, No ACE/ARB/ARNi, Aldos  Blocker: or SGLT2-I on file  · Diet: Sodium restriction 2g  · Continue home potassium repletion with KDUR 60 mEq daily plus another 20 mEq on Mondays and Thursdays with Metolazone   · Labs: BMP, magnesium tomorrow a m  · Maintain Mg > 2 and K > 4; Replete prn - see hypokalemia   · Elevate HOB > 30°, Daily standing weights, Measure I/Os  · Consult nutrition services for dietary education? Consider when patient has capacity  Venous stasis dermatitis of both lower extremities  Assessment & Plan  Patient was put on Keflex at discharge  Daughter and patient are not aware of how many doses are pending  Hx: Distal femoral fracture in August 2022  It was treated with ORIF  Cellulitis does not correlate in the same area as the fracture  No evidence of cellulitis  Plan:  · Hold antibiotics at this time  · Blood cultures x 2 - NGTD  · Monitor vital signs  · Wound care consulted - Recommendations appreciated      Anxiety  Assessment & Plan  Patient has a history of anxiety  Plan:  · Continue home Lexapro    · Received one time dose of Atarax overnight    Unspecified atrial fibrillation [facility])  Assessment & Plan  History of atrial fibrillation controlled on Carvedilol 3 125 mg BID and Eliquis 5 mg BID    Plan:  • Continue home meds  • Monitor rates/BP    Chronic pain syndrome  Assessment & Plan  Patient has a history of chronic pain syndrome  Plan:  · Continue home pain regimen with gabapentin, tizanidine and oxycodone  · decreased tizanidine prn 2 mg  Per daughter, confirms patient does take this med at home  Attending also confirmed with pharmacy  · PDMP reviewed  Patient received 90 10 mg tablets (30 day supply)  Written and filled 01/18/23    Venous insufficiency (chronic) (peripheral)  Assessment & Plan  Patient has a history of venous insufficiency  She has a wound on her right lower extremity  She usually has a wound nurse coming and dressing it every week  Plan:  · Compression stockings  · Wound Care per wound management recs  Watery diarrhea-resolved as of 2/14/2023  Assessment & Plan  POA Patient reports having 6-7 episodes of liquid bowel movements  Patient had been recently on antibiotics for possible bilaterally b/l extremity cellulitis versus chronic venous insufficiency  Patient states that she has not had any more diarrheal bowel movements since being in the ED  Plan:  · Monitor electrolytes  · Monitor vital signs  · Monitor bowel movements  · I/Os  · See venous insufficiency/stasis dermatitis       VTE Pharmacologic Prophylaxis: VTE Score: 5 High Risk (Score >/= 5) - Pharmacological DVT Prophylaxis Ordered: apixaban (Eliquis)  Sequential Compression Devices Ordered  Patient Centered Rounds: I performed bedside rounds with nursing staff today  Discussions with Specialists or Other Care Team Provider: None    Education and Discussions with Family / Patient: Updated  (daughter) via phone  Current Length of Stay: 5 day(s)  Current Patient Status: Inpatient   Discharge Plan: Anticipate discharge in 24-48 hrs to home with home services  Code Status: Level 1 - Full Code    Subjective:   Patient seen and evaluated at bedside, no acute distress   No acute events overnight  Patient denies f/c, cp, sob, n/v/d/c  Patient did report feeling anxious this morning due to thoughts  Objective:     Vitals:   Temp (24hrs), Av 4 °F (36 9 °C), Min:98 2 °F (36 8 °C), Max:98 5 °F (36 9 °C)    Temp:  [98 2 °F (36 8 °C)-98 5 °F (36 9 °C)] 98 2 °F (36 8 °C)  HR:  [63-94] 79  Resp:  [16-20] 20  BP: (145-164)/(75-88) 145/75  SpO2:  [93 %-94 %] 94 %  Body mass index is 31 49 kg/m²  Input and Output Summary (last 24 hours):   No intake or output data in the 24 hours ending 23 1616    Physical Exam:   Physical Exam  Vitals and nursing note reviewed  Constitutional:       General: She is not in acute distress  Appearance: She is well-developed  HENT:      Head: Normocephalic and atraumatic  Right Ear: External ear normal       Left Ear: External ear normal       Nose: No congestion or rhinorrhea  Mouth/Throat:      Mouth: Mucous membranes are moist       Pharynx: Oropharynx is clear  Eyes:      Conjunctiva/sclera: Conjunctivae normal    Cardiovascular:      Rate and Rhythm: Normal rate and regular rhythm  Heart sounds: No murmur heard  Pulmonary:      Effort: Pulmonary effort is normal  No respiratory distress  Breath sounds: Normal breath sounds  Abdominal:      Palpations: Abdomen is soft  Tenderness: There is no abdominal tenderness  Musculoskeletal:         General: Swelling (bilateral lower extremities) and tenderness (bilateral lower extremities) present  Cervical back: Neck supple  Comments: Pink discoloration - chronic venous stasis changes    Skin:     General: Skin is warm and dry  Capillary Refill: Capillary refill takes less than 2 seconds  Neurological:      General: No focal deficit present  Mental Status: She is alert     Psychiatric:         Mood and Affect: Mood normal           Additional Data:     Labs:  Results from last 7 days   Lab Units 23  0514   WBC Thousand/uL 9 89   HEMOGLOBIN g/dL 12 7 HEMATOCRIT % 41 1   PLATELETS Thousands/uL 577*   NEUTROS PCT % 60   LYMPHS PCT % 24   MONOS PCT % 12   EOS PCT % 2     Results from last 7 days   Lab Units 02/14/23  1352 02/14/23  0514 02/13/23  0627 02/12/23  0549   SODIUM mmol/L  --  137   < > 137   POTASSIUM mmol/L 2 9* 2 6*   < > 3 2*   CHLORIDE mmol/L  --  92*   < > 95*   CO2 mmol/L  --  30   < > 30   BUN mg/dL  --  38*   < > 26*   CREATININE mg/dL  --  1 16   < > 0 96   ANION GAP mmol/L  --  15*   < > 12   CALCIUM mg/dL  --  10 2   < > 9 8   ALBUMIN g/dL  --   --   --  4 2   TOTAL BILIRUBIN mg/dL  --   --   --  0 58   ALK PHOS U/L  --   --   --  139*   ALT U/L  --   --   --  4*   AST U/L  --   --   --  15   GLUCOSE RANDOM mg/dL  --  134   < > 135    < > = values in this interval not displayed  Results from last 7 days   Lab Units 02/09/23  1822   LACTIC ACID mmol/L 1 2   PROCALCITONIN ng/ml <0 05       Lines/Drains:  Invasive Devices     Peripheral Intravenous Line  Duration           Peripheral IV 02/14/23 Distal;Dorsal (posterior); Left Forearm <1 day                  Telemetry:  Telemetry Orders (From admission, onward)             24 Hour Telemetry Monitoring  Continuous x 24 Hours (Telem)        References:    Telemetry Guidelines   Question:  Reason for 24 Hour Telemetry  Answer:  Metabolic/Electrolyte Disturbance with High Probability of Dysrhythmia (K level <3 or >6, or KCL infusion >10mEq/hr)                 Telemetry Reviewed: Patient not appearing on Tele, messaged nurse  Indication for Continued Telemetry Use: Arrthymias requiring medical therapy             Imaging: No pertinent imaging reviewed  Recent Cultures (last 7 days):   Results from last 7 days   Lab Units 02/09/23  1822   BLOOD CULTURE  No Growth After 4 Days  No Growth After 4 Days         Last 24 Hours Medication List:   Current Facility-Administered Medications   Medication Dose Route Frequency Provider Last Rate   • acetaminophen  650 mg Oral Q6H [address] Krima A Junior Jarrett, DO     • apixaban  5 mg Oral BID Camilo Bucio, DO     • carvedilol  3 125 mg Oral BID With Meals Camilo Bucio, DO     • donepezil  10 mg Oral HS Camilo Bucio, DO     • escitalopram  10 mg Oral Daily Camilo Bucio, DO     • ferrous sulfate  325 mg Oral Daily Camilo Bucio, DO     • gabapentin  100 mg Oral BID Camilo Bucio, DO     • loratadine  10 mg Oral Daily Bee Montgomery MD     • memantine  10 mg Oral BID Camilo Bucio, DO     • metolazone  2 5 mg Oral Once per day on Mon Thu [facility] A Caal, DO     • oxyCODONE  2 5 mg Oral Q6H PRN Patrizia Gil, DO     • pantoprazole  40 mg Oral Early Morning [facility], DO     • potassium chloride  20 mEq Oral Once per day on Mon Thu [facility] ROHAN Caal, DO     • potassium chloride  60 mEq Oral Daily Camilo Bucio, DO     • senna-docusate sodium  2 tablet Oral BID Camilo Bucio, DO     • tiZANidine  2 mg Oral Q8H PRN Chidi Ortega MD     • torsemide  40 mg Oral BID Camilo Bucio, DO          Today, Patient Was Seen By: Jaylen Rangel DO    **Please Note: This note may have been constructed using a voice recognition system  **

## 2023-02-14 NOTE — ASSESSMENT & PLAN NOTE
Patient has a history of anxiety  Plan:  · Continue home Lexapro    · Received one time dose of Atarax overnight

## 2023-02-14 NOTE — PLAN OF CARE
Problem: PHYSICAL THERAPY ADULT  Goal: Performs mobility at highest level of function for planned discharge setting  See evaluation for individualized goals  Description: Treatment/Interventions: Functional transfer training, LE strengthening/ROM, Elevations, Therapeutic exercise, Endurance training, Patient/family training, Equipment eval/education, Bed mobility, Gait training, OT, Spoke to nursing  Equipment Recommended: Dawood Avila       See flowsheet documentation for full assessment, interventions and recommendations  Outcome: Progressing  Note: Prognosis: Fair  Problem List: Decreased strength, Decreased range of motion, Decreased endurance, Decreased mobility, Impaired balance, Decreased cognition, Impaired judgement, Decreased safety awareness, Obesity (gait deviations)  Assessment: pt began tx session lying supine in the bed and was agreeable to participate in PT intervention after some encouragement and motivation  pt required an increase in level of assistance in order to complete a supine<>sit EOB transfer compared to previosu tx sessions min Ax1 with LE and trunk management  pt was able to sit EOB w/o LOB while performing TE activities in order to strengthen Le's and increase static/dynamic sitting balance  pt was able to complete 4 STS in todays tx session from EOB to RW all with /s and VC's for hand placement while ascending to RW and descending back to seated in recliner  pt continues to demonstrate the inability to ambulate house hold distances w/o requiring a therapeutic seatyed rest break as pt required several rest throughout tx session  Prior to initiating funcitonal transfesr from EOB pt required mod Ax1 for donning /doffing TLSO  pt was able to amulate w/o LOB w/o LOB and /s  pt would benefit from continued skilled PT intervention in order to address deficits listed above   Post tx session pt in bed with call bell and all pt needs met  Barriers to Discharge: Inaccessible home environment, Decreased caregiver support  Barriers to Discharge Comments: Questionable amount of physical A spouse can provide  PT Discharge Recommendation: Home with home health rehabilitation    See flowsheet documentation for full assessment

## 2023-02-14 NOTE — ASSESSMENT & PLAN NOTE
POA Patient reports having 6-7 episodes of liquid bowel movements  Patient had been recently on antibiotics for possible bilaterally b/l extremity cellulitis versus chronic venous insufficiency  Patient states that she has not had any more diarrheal bowel movements since being in the ED      Plan:  · Monitor electrolytes  · Monitor vital signs  · Monitor bowel movements  · I/Os  · See venous insufficiency/stasis dermatitis

## 2023-02-14 NOTE — QUICK NOTE
Called patient's family,  and daughter to update regarding plan of care and discharge planning       Gemma Avalos DO

## 2023-02-14 NOTE — PLAN OF CARE
Problem: MOBILITY - ADULT  Goal: Maintain or return to baseline ADL function  Description: INTERVENTIONS:  -  Assess patient's ability to carry out ADLs; assess patient's baseline for ADL function and identify physical deficits which impact ability to perform ADLs (bathing, care of mouth/teeth, toileting, grooming, dressing, etc )  - Assess/evaluate cause of self-care deficits   - Assess range of motion  - Assess patient's mobility; develop plan if impaired  - Assess patient's need for assistive devices and provide as appropriate  - Encourage maximum independence but intervene and supervise when necessary  - Involve family in performance of ADLs  - Assess for home care needs following discharge   - Consider OT consult to assist with ADL evaluation and planning for discharge  - Provide patient education as appropriate  Outcome: Progressing  Goal: Maintains/Returns to pre admission functional level  Description: INTERVENTIONS:  - Perform BMAT or MOVE assessment daily    - Set and communicate daily mobility goal to care team and patient/family/caregiver     - Collaborate with rehabilitation services on mobility goals if consulted    - Out of bed to chair 3times a day   - Out of bed for meals 3 times a day  - Out of bed for toileting  - Record patient progress and toleration of activity level   Outcome: Progressing     Problem: Prexisting or High Potential for Compromised Skin Integrity  Goal: Skin integrity is maintained or improved  Description: INTERVENTIONS:  - Identify patients at risk for skin breakdown  - Assess and monitor skin integrity  - Assess and monitor nutrition and hydration status  - Monitor labs   - Assess for incontinence   - Turn and reposition patient  - Assist with mobility/ambulation  - Relieve pressure over bony prominences  - Avoid friction and shearing  - Provide appropriate hygiene as needed including keeping skin clean and dry  - Evaluate need for skin moisturizer/barrier cream  - Collaborate with interdisciplinary team   - Patient/family teaching  - Consider wound care consult   Outcome: Progressing

## 2023-02-14 NOTE — ASSESSMENT & PLAN NOTE
Wt Readings from Last 3 Encounters:   02/14/23 78 1 kg (172 lb 2 9 oz)   02/09/23 84 2 kg (185 lb 9 6 oz)   02/06/23 88 5 kg (195 lb 1 7 oz)       Lab Results   Component Value Date    LVEF 55 10/07/2022    NTBNP 1,615 (H) 04/30/2022    NTBNP 2,903 (H) 02/10/2022       Plan:  • GDMT: Diuretic: Torsemide PO 40 mg BID and metolazone 2 5 mg twice weekly (Mondays and Thursdays), B-Blocker: Carvedilol 3 125 mg BID, No ACE/ARB/ARNi, Aldos  Blocker: or SGLT2-I on file  · Diet: Sodium restriction 2g  · Continue home potassium repletion with KDUR 60 mEq daily plus another 20 mEq on Mondays and Thursdays with Metolazone   · Labs: BMP, magnesium tomorrow a m  · Maintain Mg > 2 and K > 4; Replete prn - see hypokalemia   · Elevate HOB > 30°, Daily standing weights, Measure I/Os  · Consult nutrition services for dietary education? Consider when patient has capacity

## 2023-02-14 NOTE — DISCHARGE INSTR - AVS FIRST PAGE
Dear Briana Gerber,     It was our pleasure to care for you here at [facility]  It is our hope that we were always able to exceed the expected standards for your care during your stay  You were hospitalized due to Altered mental status  You were cared for on the S 4th floor by Bala Forrester DO under the service of Vanessa Arias MD with the [facility] who covers for your primary care physician (PCP), Destinee Tanner MD, while you were hospitalized  If you have any questions or concerns related to this hospitalization, you may contact us at [phone]  For follow up as well as any medication refills, we recommend that you follow up with your primary care physician  A registered nurse will reach out to you by phone within a few days after your discharge to answer any additional questions that you may have after going home  However, at this time we provide for you here, the most important instructions / recommendations at discharge:     Notable Medication Adjustments -   Take Mag-ox 400 mg tablet daily  Please follow up with PCP within 1 week of discharge for re-evaluation  Stop taking Metolazone and follow up with PCP within 1 week of discharge  Stop taking memantine  Take tizanidine 2 mg every 12 hours as needed for muscle spasms  Take oxycodone 5 mg every 12 hours as needed for pain  Testing Required after Discharge -   BMP (blood work) approximately days after discharge (02/19 or 02/20) and follow up with PCP within 1 week of discharge with results  Important follow up information -   Please follow up with PCP within 1 week of discharge  Recommend outpatient follow up with geriatrics, referral provided  Please follow up with nephrology as soon as possible  Please follow up with cardiology as scheduled     Other Instructions -   none  Please review this entire after visit summary as additional general instructions including medication list, appointments, activity, diet, any pertinent wound care, and other additional recommendations from your care team that may be provided for you        Sincerely,     Amanda Bingham, DO

## 2023-02-14 NOTE — ASSESSMENT & PLAN NOTE
Patient has not been officially diagnosed with any neurodegenerative diseases  She was started on medications by per PCP because she keeps missing her appointments for a geriatric/neurology evaluation      Plan:  · Continue home memantine and donepezil  · reduced Tizanidine prn dose to 2 mg  · Frequent reorientation   · Fall precautions  · Neuropsych evaluation - patient lacking capacity to make medically informed decisions

## 2023-02-14 NOTE — ASSESSMENT & PLAN NOTE
Recent Labs     02/12/23  0549 02/13/23  0627 02/14/23  0514 02/14/23  1352   K 3 2* 3 7 2 6* 2 9*   MG 2 2  --  2 2  --      Patient presented with a K of 3 2  Standing dose of KDUR 60 mEq daily with an extra 20 mEq on Mondays and Thursdays with Metolazone dosing    Plan:  · Monitor daily labs  · Replete as needed  · BMPs in a m    · Mg a m  - replete as needed/indicated due to hypokalemia   · Monitor on tele

## 2023-02-15 LAB
ANION GAP SERPL CALCULATED.3IONS-SCNC: 14 MMOL/L (ref 4–13)
BACTERIA BLD CULT: NORMAL
BACTERIA BLD CULT: NORMAL
BASOPHILS # BLD AUTO: 0.08 THOUSANDS/ÂΜL (ref 0–0.1)
BASOPHILS NFR BLD AUTO: 1 % (ref 0–1)
BUN SERPL-MCNC: 45 MG/DL (ref 5–25)
CALCIUM SERPL-MCNC: 10.4 MG/DL (ref 8.4–10.2)
CHLORIDE SERPL-SCNC: 93 MMOL/L (ref 96–108)
CO2 SERPL-SCNC: 30 MMOL/L (ref 21–32)
CREAT SERPL-MCNC: 1.25 MG/DL (ref 0.6–1.3)
EOSINOPHIL # BLD AUTO: 0.21 THOUSAND/ÂΜL (ref 0–0.61)
EOSINOPHIL NFR BLD AUTO: 2 % (ref 0–6)
ERYTHROCYTE [DISTWIDTH] IN BLOOD BY AUTOMATED COUNT: 21.8 % (ref 11.6–15.1)
GFR SERPL CREATININE-BSD FRML MDRD: 41 ML/MIN/1.73SQ M
GLUCOSE SERPL-MCNC: 137 MG/DL (ref 65–140)
HCT VFR BLD AUTO: 42.3 % (ref 34.8–46.1)
HGB BLD-MCNC: 12.9 G/DL (ref 11.5–15.4)
IMM GRANULOCYTES # BLD AUTO: 0.03 THOUSAND/UL (ref 0–0.2)
IMM GRANULOCYTES NFR BLD AUTO: 0 % (ref 0–2)
LYMPHOCYTES # BLD AUTO: 2.6 THOUSANDS/ÂΜL (ref 0.6–4.47)
LYMPHOCYTES NFR BLD AUTO: 25 % (ref 14–44)
MAGNESIUM SERPL-MCNC: 2.5 MG/DL (ref 1.9–2.7)
MAGNESIUM SERPL-MCNC: 2.8 MG/DL (ref 1.9–2.7)
MCH RBC QN AUTO: 24 PG (ref 26.8–34.3)
MCHC RBC AUTO-ENTMCNC: 30.5 G/DL (ref 31.4–37.4)
MCV RBC AUTO: 79 FL (ref 82–98)
MONOCYTES # BLD AUTO: 1.19 THOUSAND/ÂΜL (ref 0.17–1.22)
MONOCYTES NFR BLD AUTO: 11 % (ref 4–12)
NEUTROPHILS # BLD AUTO: 6.38 THOUSANDS/ÂΜL (ref 1.85–7.62)
NEUTS SEG NFR BLD AUTO: 61 % (ref 43–75)
NRBC BLD AUTO-RTO: 0 /100 WBCS
PLATELET # BLD AUTO: 556 THOUSANDS/UL (ref 149–390)
PMV BLD AUTO: 11.1 FL (ref 8.9–12.7)
POTASSIUM SERPL-SCNC: 2.7 MMOL/L (ref 3.5–5.3)
POTASSIUM SERPL-SCNC: 2.8 MMOL/L (ref 3.5–5.3)
POTASSIUM SERPL-SCNC: 3.2 MMOL/L (ref 3.5–5.3)
RBC # BLD AUTO: 5.37 MILLION/UL (ref 3.81–5.12)
SODIUM SERPL-SCNC: 137 MMOL/L (ref 135–147)
WBC # BLD AUTO: 10.49 THOUSAND/UL (ref 4.31–10.16)

## 2023-02-15 PROCEDURE — 84132 ASSAY OF SERUM POTASSIUM: CPT

## 2023-02-15 PROCEDURE — 83735 ASSAY OF MAGNESIUM: CPT

## 2023-02-15 PROCEDURE — 80048 BASIC METABOLIC PNL TOTAL CA: CPT

## 2023-02-15 PROCEDURE — 99232 SBSQ HOSP IP/OBS MODERATE 35: CPT | Performed by: INTERNAL MEDICINE

## 2023-02-15 PROCEDURE — 85025 COMPLETE CBC W/AUTO DIFF WBC: CPT

## 2023-02-15 RX ORDER — ACETAMINOPHEN 325 MG/1
975 TABLET ORAL EVERY 8 HOURS SCHEDULED
Status: DISCONTINUED | OUTPATIENT
Start: 2023-02-15 | End: 2023-02-16 | Stop reason: HOSPADM

## 2023-02-15 RX ORDER — HYDROXYZINE HYDROCHLORIDE 25 MG/1
25 TABLET, FILM COATED ORAL ONCE
Status: COMPLETED | OUTPATIENT
Start: 2023-02-15 | End: 2023-02-15

## 2023-02-15 RX ORDER — POTASSIUM CHLORIDE 20 MEQ/1
40 TABLET, EXTENDED RELEASE ORAL ONCE
Status: COMPLETED | OUTPATIENT
Start: 2023-02-15 | End: 2023-02-15

## 2023-02-15 RX ORDER — POTASSIUM CHLORIDE 14.9 MG/ML
20 INJECTION INTRAVENOUS
Status: DISPENSED | OUTPATIENT
Start: 2023-02-15 | End: 2023-02-15

## 2023-02-15 RX ORDER — POTASSIUM CHLORIDE 14.9 MG/ML
20 INJECTION INTRAVENOUS
Status: COMPLETED | OUTPATIENT
Start: 2023-02-15 | End: 2023-02-15

## 2023-02-15 RX ORDER — POTASSIUM CHLORIDE 29.8 MG/ML
40 INJECTION INTRAVENOUS ONCE
Status: DISCONTINUED | OUTPATIENT
Start: 2023-02-15 | End: 2023-02-15

## 2023-02-15 RX ORDER — MAGNESIUM SULFATE 1 G/100ML
1 INJECTION INTRAVENOUS ONCE
Status: COMPLETED | OUTPATIENT
Start: 2023-02-15 | End: 2023-02-15

## 2023-02-15 RX ORDER — TORSEMIDE 20 MG/1
40 TABLET ORAL DAILY
Status: DISCONTINUED | OUTPATIENT
Start: 2023-02-16 | End: 2023-02-16 | Stop reason: HOSPADM

## 2023-02-15 RX ORDER — POTASSIUM CHLORIDE 20 MEQ/1
40 TABLET, EXTENDED RELEASE ORAL 2 TIMES DAILY
Status: DISCONTINUED | OUTPATIENT
Start: 2023-02-15 | End: 2023-02-15

## 2023-02-15 RX ADMIN — TIZANIDINE 2 MG: 2 TABLET ORAL at 14:22

## 2023-02-15 RX ADMIN — APIXABAN 5 MG: 5 TABLET, FILM COATED ORAL at 08:20

## 2023-02-15 RX ADMIN — MAGNESIUM SULFATE HEPTAHYDRATE 1 G: 1 INJECTION, SOLUTION INTRAVENOUS at 15:08

## 2023-02-15 RX ADMIN — MEMANTINE 10 MG: 10 TABLET ORAL at 18:30

## 2023-02-15 RX ADMIN — APIXABAN 5 MG: 5 TABLET, FILM COATED ORAL at 18:30

## 2023-02-15 RX ADMIN — HYDROXYZINE HYDROCHLORIDE 25 MG: 25 TABLET ORAL at 06:57

## 2023-02-15 RX ADMIN — CARVEDILOL 3.12 MG: 3.12 TABLET, FILM COATED ORAL at 08:20

## 2023-02-15 RX ADMIN — MEMANTINE 10 MG: 10 TABLET ORAL at 08:20

## 2023-02-15 RX ADMIN — TIZANIDINE 2 MG: 2 TABLET ORAL at 23:27

## 2023-02-15 RX ADMIN — ESCITALOPRAM OXALATE 10 MG: 10 TABLET, FILM COATED ORAL at 08:20

## 2023-02-15 RX ADMIN — CARVEDILOL 3.12 MG: 3.12 TABLET, FILM COATED ORAL at 18:35

## 2023-02-15 RX ADMIN — DONEPEZIL HYDROCHLORIDE 10 MG: 5 TABLET ORAL at 21:00

## 2023-02-15 RX ADMIN — SENNOSIDES AND DOCUSATE SODIUM 2 TABLET: 8.6; 5 TABLET ORAL at 08:20

## 2023-02-15 RX ADMIN — POTASSIUM CHLORIDE 60 MEQ: 1500 TABLET, EXTENDED RELEASE ORAL at 08:20

## 2023-02-15 RX ADMIN — POTASSIUM CHLORIDE 20 MEQ: 14.9 INJECTION, SOLUTION INTRAVENOUS at 13:37

## 2023-02-15 RX ADMIN — ACETAMINOPHEN 975 MG: 325 TABLET ORAL at 18:29

## 2023-02-15 RX ADMIN — POTASSIUM CHLORIDE 20 MEQ: 14.9 INJECTION, SOLUTION INTRAVENOUS at 16:44

## 2023-02-15 RX ADMIN — POTASSIUM CHLORIDE 20 MEQ: 14.9 INJECTION, SOLUTION INTRAVENOUS at 08:30

## 2023-02-15 RX ADMIN — LORATADINE 10 MG: 10 TABLET ORAL at 08:20

## 2023-02-15 RX ADMIN — GABAPENTIN 100 MG: 100 CAPSULE ORAL at 08:20

## 2023-02-15 RX ADMIN — ACETAMINOPHEN 650 MG: 325 TABLET ORAL at 14:22

## 2023-02-15 RX ADMIN — ACETAMINOPHEN 650 MG: 325 TABLET ORAL at 00:03

## 2023-02-15 RX ADMIN — TORSEMIDE 40 MG: 20 TABLET ORAL at 08:20

## 2023-02-15 RX ADMIN — POTASSIUM CHLORIDE 40 MEQ: 1500 TABLET, EXTENDED RELEASE ORAL at 18:29

## 2023-02-15 RX ADMIN — GABAPENTIN 100 MG: 100 CAPSULE ORAL at 18:35

## 2023-02-15 RX ADMIN — PANTOPRAZOLE SODIUM 40 MG: 40 TABLET, DELAYED RELEASE ORAL at 05:30

## 2023-02-15 RX ADMIN — HYDROXYZINE HYDROCHLORIDE 25 MG: 25 TABLET ORAL at 20:39

## 2023-02-15 RX ADMIN — OXYCODONE HYDROCHLORIDE 2.5 MG: 5 TABLET ORAL at 14:22

## 2023-02-15 RX ADMIN — OXYCODONE HYDROCHLORIDE 2.5 MG: 5 TABLET ORAL at 23:27

## 2023-02-15 RX ADMIN — FERROUS SULFATE TAB 325 MG (65 MG ELEMENTAL FE) 325 MG: 325 (65 FE) TAB at 08:20

## 2023-02-15 RX ADMIN — ACETAMINOPHEN 650 MG: 325 TABLET ORAL at 05:30

## 2023-02-15 RX ADMIN — SENNOSIDES AND DOCUSATE SODIUM 2 TABLET: 8.6; 5 TABLET ORAL at 18:29

## 2023-02-15 NOTE — ASSESSMENT & PLAN NOTE
Wt Readings from Last 3 Encounters:   02/15/23 76 4 kg (168 lb 6 9 oz)   02/09/23 84 2 kg (185 lb 9 6 oz)   02/06/23 88 5 kg (195 lb 1 7 oz)       Lab Results   Component Value Date    LVEF 55 10/07/2022    NTBNP 1,615 (H) 04/30/2022    NTBNP 2,903 (H) 02/10/2022       Plan:  • GDMT: Diuretic: Torsemide PO 40 mg BID and metolazone 2 5 mg twice weekly (Mondays and Thursdays), B-Blocker: Carvedilol 3 125 mg BID, No ACE/ARB/ARNi, Aldos  Blocker: or SGLT2-I on file  · Diet: Sodium restriction 2g  · Continue home potassium repletion with KDUR 60 mEq daily plus another 20 mEq on Mondays and Thursdays with Metolazone   · Labs: BMP, magnesium tomorrow a m  · Maintain Mg > 2 and K > 4; Replete prn - see hypokalemia   · Elevate HOB > 30°, Daily standing weights, Measure I/Os  · Consult nutrition services for dietary education? Consider when patient has capacity

## 2023-02-15 NOTE — PHYSICAL THERAPY NOTE
Physical Therapy Cancellation Note                 02/15/23 1430   PT Last Visit   PT Visit Date 02/15/23   Note Type   Note Type Cancelled Session   Cancel Reasons Medical status  (pt potassium level low )   Assessment   Assessment PT intervention is cx for this afternoon as pt potassium levels have decreased  Spoke to RN who stated pt not appropriate at this time   PT will follow and see pt as PT schedule will allow     Yuli Brunson

## 2023-02-15 NOTE — PLAN OF CARE
Problem: MOBILITY - ADULT  Goal: Maintain or return to baseline ADL function  Description: INTERVENTIONS:  -  Assess patient's ability to carry out ADLs; assess patient's baseline for ADL function and identify physical deficits which impact ability to perform ADLs (bathing, care of mouth/teeth, toileting, grooming, dressing, etc )  - Assess/evaluate cause of self-care deficits   - Assess range of motion  - Assess patient's mobility; develop plan if impaired  - Assess patient's need for assistive devices and provide as appropriate  - Encourage maximum independence but intervene and supervise when necessary  - Involve family in performance of ADLs  - Assess for home care needs following discharge   - Consider OT consult to assist with ADL evaluation and planning for discharge  - Provide patient education as appropriate  Outcome: Progressing  Goal: Maintains/Returns to pre admission functional level  Description: INTERVENTIONS:  - Perform BMAT or MOVE assessment daily    - Set and communicate daily mobility goal to care team and patient/family/caregiver     - Collaborate with rehabilitation services on mobility goals if consulted  - Out of bed to chair 3 times a day   - Out of bed for meals 3 times a day  - Out of bed for toileting  - Record patient progress and toleration of activity level   Outcome: Progressing     Problem: Prexisting or High Potential for Compromised Skin Integrity  Goal: Skin integrity is maintained or improved  Description: INTERVENTIONS:  - Identify patients at risk for skin breakdown  - Assess and monitor skin integrity  - Assess and monitor nutrition and hydration status  - Monitor labs   - Assess for incontinence   - Turn and reposition patient  - Assist with mobility/ambulation  - Relieve pressure over bony prominences  - Avoid friction and shearing  - Provide appropriate hygiene as needed including keeping skin clean and dry  - Evaluate need for skin moisturizer/barrier cream  - Collaborate with interdisciplinary team   - Patient/family teaching  - Consider wound care consult   Outcome: Progressing

## 2023-02-15 NOTE — ASSESSMENT & PLAN NOTE
POA Patient has visual and auditory hallucinations last night at home  Most likely due to flareup of her infection from  Patient's  is also admitted to the hospital at this time secondary to a fall with sustained trauma  Of note, patient took too much of her oxycodone and ran out of her prescription  Labs: POA   TSH: Normal  Potassium: 3 2  Rapid drug screen: Positive for oxycodone   UA: Negative     Differential: toxic encephalopathy, metabolic encephalopathy, recent cellulitis, electrolyte abnormalities, side effect of medications, chronic opioid use with possible withdrawal, memory impairment     Patient alert, at baseline   Reports increased anxiety this morning due to thinking about family and not being able to be with     Plans:  · Monitor labs daily  · OT cognitive assessment - impaired  · Neuropsych evaluation - patient lacking capacity to make informed medical decisions  · Redirection with sundowning  · Delirium precautions  · Blood cultures x 2 - NGTD  · Monitor vital signs  · PRN oxycodone  · Discharge disposition - F/u , Home Health

## 2023-02-15 NOTE — ASSESSMENT & PLAN NOTE
Patient has a history of anxiety  Plan:  · Continue home Lexapro    · Received one time dose of Atarax today

## 2023-02-15 NOTE — PROGRESS NOTES
[facility]  Progress Note - Linda Julien 1945, 69 y o  female MRN: [id number]  Unit/Bed#: S -01 Encounter: [id number]  Primary Care Provider: Jud Johnson MD   Date and time admitted to hospital: 2/9/2023  5:20 PM    Altered mental status, unspecified  Assessment & Plan  POA Patient has visual and auditory hallucinations last night at home  Most likely due to flareup of her infection from  Patient's  is also admitted to the hospital at this time secondary to a fall with sustained trauma  Of note, patient took too much of her oxycodone and ran out of her prescription  Labs: POA   TSH: Normal  Potassium: 3 2  Rapid drug screen: Positive for oxycodone   UA: Negative     Differential: toxic encephalopathy, metabolic encephalopathy, recent cellulitis, electrolyte abnormalities, side effect of medications, chronic opioid use with possible withdrawal, memory impairment     Patient alert, at baseline  Reports increased anxiety this morning due to thinking about family and not being able to be with     Plans:  · Monitor labs daily  · OT cognitive assessment - impaired  · Neuropsych evaluation - patient lacking capacity to make informed medical decisions  · Redirection with sundowning  · Delirium precautions  · Blood cultures x 2 - NGTD  · Monitor vital signs  · PRN oxycodone  · Discharge disposition - F/u , Home Health    * Hypokalemia  Assessment & Plan  Recent Labs     02/14/23  0514 02/14/23  1352 02/15/23  0524   K 2 6* 2 9* 2 7*   MG 2 2  --   --      Patient presented with a K of 3 2  Standing dose of KDUR 60 mEq daily with an extra 20 mEq on Mondays and Thursdays with Metolazone dosing    Plan:  · Monitor daily labs  · Replete as needed  Received additional 20 mEQ IVPB in addition to daily home 60 mEQ PO  · F/U repeat K+, mag today  Possible DC later today  · BMPs in a m    · Mg a m  - replete as needed/indicated due to hypokalemia   · Monitor on tele  · DC metolazaone now and on dc; decrease torsemide to 40 mg QD for now       Memory impairment  Assessment & Plan  Patient has not been officially diagnosed with any neurodegenerative diseases  She was started on medications by per PCP because she keeps missing her appointments for a geriatric/neurology evaluation  Plan:  · Continue home memantine and donepezil  · reduced Tizanidine prn dose to 2 mg  · Frequent reorientation   · Fall precautions  · Neuropsych evaluation - patient lacking capacity to make medically informed decisions    Chronic heart failure with preserved ejection fraction (HFpEF) ([facility])  Assessment & Plan  Wt Readings from Last 3 Encounters:   02/15/23 76 4 kg (168 lb 6 9 oz)   02/09/23 84 2 kg (185 lb 9 6 oz)   02/06/23 88 5 kg (195 lb 1 7 oz)       Lab Results   Component Value Date    LVEF 55 10/07/2022    NTBNP 1,615 (H) 04/30/2022    NTBNP 2,903 (H) 02/10/2022       Plan:  • GDMT: Diuretic: Torsemide PO 40 mg BID and metolazone 2 5 mg twice weekly (Mondays and Thursdays), B-Blocker: Carvedilol 3 125 mg BID, No ACE/ARB/ARNi, Aldos  Blocker: or SGLT2-I on file  · Diet: Sodium restriction 2g  · Continue home potassium repletion with KDUR 60 mEq daily plus another 20 mEq on Mondays and Thursdays with Metolazone   · Labs: BMP, magnesium tomorrow a m  · Maintain Mg > 2 and K > 4; Replete prn - see hypokalemia   · Elevate HOB > 30°, Daily standing weights, Measure I/Os  · Consult nutrition services for dietary education? Consider when patient has capacity  Venous stasis dermatitis of both lower extremities  Assessment & Plan  Patient was put on Keflex at discharge  Daughter and patient are not aware of how many doses are pending  Hx: Distal femoral fracture in August 2022  It was treated with ORIF  Cellulitis does not correlate in the same area as the fracture  No evidence of cellulitis       Plan:  · Hold antibiotics at this time  · Blood cultures x 2 - NGTD  · Monitor vital signs  · Wound care consulted - Recommendations appreciated      Anxiety  Assessment & Plan  Patient has a history of anxiety  Plan:  · Continue home Lexapro  · Received one time dose of Atarax today    Unspecified atrial fibrillation ([address] )  Assessment & Plan  History of atrial fibrillation controlled on Carvedilol 3 125 mg BID and Eliquis 5 mg BID    Plan:  • Continue home meds  • Monitor rates/BP    Chronic pain syndrome  Assessment & Plan  Patient has a history of chronic pain syndrome  Plan:  · Continue home pain regimen with gabapentin, tizanidine and oxycodone  · decreased tizanidine prn 2 mg  Per daughter, confirms patient does take this med at home  Attending also confirmed with pharmacy  · PDMP reviewed  Patient received 90 10 mg tablets (30 day supply)  Written and filled 01/18/23    Venous insufficiency (chronic) (peripheral)  Assessment & Plan  Patient has a history of venous insufficiency  She has a wound on her right lower extremity  She usually has a wound nurse coming and dressing it every week  Plan:  · Compression stockings  · Wound Care per wound management recs  VTE Pharmacologic Prophylaxis: VTE Score: 5 High Risk (Score >/= 5) - Pharmacological DVT Prophylaxis Ordered: apixaban (Eliquis)  Sequential Compression Devices Ordered  Patient Centered Rounds: I performed bedside rounds with nursing staff today  Discussions with Specialists or Other Care Team Provider: None    Education and Discussions with Family / Patient: Will update  Current Length of Stay: 6 day(s)  Current Patient Status: Inpatient   Discharge Plan: Anticipate discharge later today or tomorrow to home with home services  Code Status: Level 1 - Full Code    Subjective:   Patient seen and evaluated at bedside, no acute distress  No acute events overnight  Patient denies f/c, cp, sob, n/v/d/c, lightheadedness/dizziness   Patient does endorse increased anxiety due to thoughts and not being with her   Objective:     Vitals:   Temp (24hrs), Av °F (36 7 °C), Min:97 9 °F (36 6 °C), Max:98 2 °F (36 8 °C)    Temp:  [97 9 °F (36 6 °C)-98 2 °F (36 8 °C)] 97 9 °F (36 6 °C)  HR:  [79-81] 81  Resp:  [16-20] 16  BP: (144-154)/(75-93) 154/93  SpO2:  [94 %-95 %] 94 %  Body mass index is 30 81 kg/m²  Input and Output Summary (last 24 hours):   No intake or output data in the 24 hours ending 02/15/23 1134    Physical Exam:   Physical Exam  Vitals and nursing note reviewed  Constitutional:       General: She is not in acute distress  Appearance: She is well-developed  HENT:      Head: Normocephalic and atraumatic  Right Ear: External ear normal       Left Ear: External ear normal       Nose: No congestion or rhinorrhea  Mouth/Throat:      Mouth: Mucous membranes are moist       Pharynx: Oropharynx is clear  Eyes:      Conjunctiva/sclera: Conjunctivae normal    Cardiovascular:      Rate and Rhythm: Normal rate and regular rhythm  Heart sounds: No murmur heard  Pulmonary:      Effort: Pulmonary effort is normal  No respiratory distress  Breath sounds: Normal breath sounds  Abdominal:      Palpations: Abdomen is soft  Tenderness: There is no abdominal tenderness  Musculoskeletal:         General: Swelling (bilateral lower extremities) and tenderness (bilateral lower extremities) present  Cervical back: Neck supple  Comments: Pink discoloration - chronic venous stasis changes    Feet:      Right foot:      Toenail Condition: Right toenails are abnormally thick and long  Left foot:      Toenail Condition: Left toenails are abnormally thick and long  Skin:     General: Skin is warm and dry  Capillary Refill: Capillary refill takes less than 2 seconds  Neurological:      General: No focal deficit present  Mental Status: She is alert     Psychiatric:         Mood and Affect: Mood normal           Additional Data:     Labs:  Results from last 7 days Lab Units 02/15/23  0524   WBC Thousand/uL 10 49*   HEMOGLOBIN g/dL 12 9   HEMATOCRIT % 42 3   PLATELETS Thousands/uL 556*   NEUTROS PCT % 61   LYMPHS PCT % 25   MONOS PCT % 11   EOS PCT % 2     Results from last 7 days   Lab Units 02/15/23  1055 02/15/23  0524 02/13/23  0627 02/12/23  0549   SODIUM mmol/L  --  137   < > 137   POTASSIUM mmol/L 2 8* 2 7*   < > 3 2*   CHLORIDE mmol/L  --  93*   < > 95*   CO2 mmol/L  --  30   < > 30   BUN mg/dL  --  45*   < > 26*   CREATININE mg/dL  --  1 25   < > 0 96   ANION GAP mmol/L  --  14*   < > 12   CALCIUM mg/dL  --  10 4*   < > 9 8   ALBUMIN g/dL  --   --   --  4 2   TOTAL BILIRUBIN mg/dL  --   --   --  0 58   ALK PHOS U/L  --   --   --  139*   ALT U/L  --   --   --  4*   AST U/L  --   --   --  15   GLUCOSE RANDOM mg/dL  --  137   < > 135    < > = values in this interval not displayed  Results from last 7 days   Lab Units 02/09/23  1822   LACTIC ACID mmol/L 1 2   PROCALCITONIN ng/ml <0 05       Lines/Drains:  Invasive Devices     Peripheral Intravenous Line  Duration           Peripheral IV 02/14/23 Distal;Dorsal (posterior); Left Forearm <1 day                  Telemetry:  Telemetry Orders (From admission, onward)             24 Hour Telemetry Monitoring  Continuous x 24 Hours (Telem)        References:    Telemetry Guidelines   Question:  Reason for 24 Hour Telemetry  Answer:  Metabolic/Electrolyte Disturbance with High Probability of Dysrhythmia (K level <3 or >6, or KCL infusion >10mEq/hr)                 Telemetry Reviewed: Atrial fibrillation  HR averaging 60-80s  Indication for Continued Telemetry Use: Arrthymias requiring medical therapy             Imaging: No pertinent imaging reviewed  Recent Cultures (last 7 days):   Results from last 7 days   Lab Units 02/09/23  1822   BLOOD CULTURE  No Growth After 5 Days  No Growth After 5 Days         Last 24 Hours Medication List:   Current Facility-Administered Medications   Medication Dose Route Frequency Provider Last Rate   • acetaminophen  650 mg Oral Q6H [address] Krima A Caal, DO     • apixaban  5 mg Oral BID Edwena Shaper, DO     • carvedilol  3 125 mg Oral BID With Meals Edwena Shaper, DO     • donepezil  10 mg Oral HS Krima A Caal, DO     • escitalopram  10 mg Oral Daily Edwena Shaper, DO     • ferrous sulfate  325 mg Oral Daily Edwena Shaper, DO     • gabapentin  100 mg Oral BID Edwena Shaper, DO     • loratadine  10 mg Oral Daily Jeane Mcgovern MD     • memantine  10 mg Oral BID Edwena Shaper, DO     • oxyCODONE  2 5 mg Oral Q6H PRN Lionel Gil, DO     • pantoprazole  40 mg Oral Early Morning Krima A Caal, DO     • potassium chloride  20 mEq Oral Once per day on Mon Thu Krima A Caal, DO     • potassium chloride  60 mEq Oral Daily [facility], [facility]     • potassium chloride  20 mEq Intravenous Q2H Willa Dove MD 20 mEq (02/15/23 0830)   • senna-docusate sodium  2 tablet Oral BID Edwena Shaper, DO     • tiZANidine  2 mg Oral Q8H PRN Samir Florentino MD     • [START ON 2/16/2023] torsemide  40 mg Oral Daily Samir Florentino MD          Today, Patient Was Seen By: Esther Bahena DO    **Please Note: This note may have been constructed using a voice recognition system  **

## 2023-02-15 NOTE — ASSESSMENT & PLAN NOTE
Recent Labs     02/14/23  0514 02/14/23  1352 02/15/23  0524   K 2 6* 2 9* 2 7*   MG 2 2  --   --      Patient presented with a K of 3 2  Standing dose of KDUR 60 mEq daily with an extra 20 mEq on Mondays and Thursdays with Metolazone dosing    Plan:  · Monitor daily labs  · Replete as needed  Received additional 20 mEQ IVPB in addition to daily home 60 mEQ PO  · F/U repeat K+, mag today  Possible DC later today  · BMPs in a m    · Mg a m  - replete as needed/indicated due to hypokalemia   · Monitor on tele  · DC metolazaone now and on dc; decrease torsemide to 40 mg QD for now

## 2023-02-16 VITALS
SYSTOLIC BLOOD PRESSURE: 139 MMHG | HEART RATE: 84 BPM | RESPIRATION RATE: 16 BRPM | WEIGHT: 169.97 LBS | DIASTOLIC BLOOD PRESSURE: 76 MMHG | BODY MASS INDEX: 31.09 KG/M2 | OXYGEN SATURATION: 97 % | TEMPERATURE: 97.9 F

## 2023-02-16 PROBLEM — R41.82 ALTERED MENTAL STATUS, UNSPECIFIED: Status: RESOLVED | Noted: 2023-02-09 | Resolved: 2023-02-16

## 2023-02-16 LAB
ANION GAP SERPL CALCULATED.3IONS-SCNC: 11 MMOL/L (ref 4–13)
BASOPHILS # BLD AUTO: 0.04 THOUSANDS/ÂΜL (ref 0–0.1)
BASOPHILS NFR BLD AUTO: 0 % (ref 0–1)
BUN SERPL-MCNC: 44 MG/DL (ref 5–25)
CALCIUM SERPL-MCNC: 10.1 MG/DL (ref 8.4–10.2)
CHLORIDE SERPL-SCNC: 95 MMOL/L (ref 96–108)
CO2 SERPL-SCNC: 31 MMOL/L (ref 21–32)
CREAT SERPL-MCNC: 1.27 MG/DL (ref 0.6–1.3)
EOSINOPHIL # BLD AUTO: 0.15 THOUSAND/ÂΜL (ref 0–0.61)
EOSINOPHIL NFR BLD AUTO: 1 % (ref 0–6)
ERYTHROCYTE [DISTWIDTH] IN BLOOD BY AUTOMATED COUNT: 22 % (ref 11.6–15.1)
GFR SERPL CREATININE-BSD FRML MDRD: 41 ML/MIN/1.73SQ M
GLUCOSE SERPL-MCNC: 144 MG/DL (ref 65–140)
HCT VFR BLD AUTO: 42.5 % (ref 34.8–46.1)
HGB BLD-MCNC: 12.9 G/DL (ref 11.5–15.4)
IMM GRANULOCYTES # BLD AUTO: 0.07 THOUSAND/UL (ref 0–0.2)
IMM GRANULOCYTES NFR BLD AUTO: 1 % (ref 0–2)
LYMPHOCYTES # BLD AUTO: 2.33 THOUSANDS/ÂΜL (ref 0.6–4.47)
LYMPHOCYTES NFR BLD AUTO: 20 % (ref 14–44)
MAGNESIUM SERPL-MCNC: 2.5 MG/DL (ref 1.9–2.7)
MCH RBC QN AUTO: 24.4 PG (ref 26.8–34.3)
MCHC RBC AUTO-ENTMCNC: 30.4 G/DL (ref 31.4–37.4)
MCV RBC AUTO: 81 FL (ref 82–98)
MONOCYTES # BLD AUTO: 1.16 THOUSAND/ÂΜL (ref 0.17–1.22)
MONOCYTES NFR BLD AUTO: 10 % (ref 4–12)
NEUTROPHILS # BLD AUTO: 8.17 THOUSANDS/ÂΜL (ref 1.85–7.62)
NEUTS SEG NFR BLD AUTO: 68 % (ref 43–75)
NRBC BLD AUTO-RTO: 0 /100 WBCS
PLATELET # BLD AUTO: 521 THOUSANDS/UL (ref 149–390)
PMV BLD AUTO: 10.4 FL (ref 8.9–12.7)
POTASSIUM SERPL-SCNC: 3.3 MMOL/L (ref 3.5–5.3)
POTASSIUM SERPL-SCNC: 3.7 MMOL/L (ref 3.5–5.3)
RBC # BLD AUTO: 5.28 MILLION/UL (ref 3.81–5.12)
SODIUM SERPL-SCNC: 137 MMOL/L (ref 135–147)
WBC # BLD AUTO: 11.92 THOUSAND/UL (ref 4.31–10.16)

## 2023-02-16 PROCEDURE — 99222 1ST HOSP IP/OBS MODERATE 55: CPT | Performed by: INTERNAL MEDICINE

## 2023-02-16 PROCEDURE — 85025 COMPLETE CBC W/AUTO DIFF WBC: CPT

## 2023-02-16 PROCEDURE — 80048 BASIC METABOLIC PNL TOTAL CA: CPT

## 2023-02-16 PROCEDURE — 99239 HOSP IP/OBS DSCHRG MGMT >30: CPT | Performed by: INTERNAL MEDICINE

## 2023-02-16 PROCEDURE — 84132 ASSAY OF SERUM POTASSIUM: CPT

## 2023-02-16 PROCEDURE — 83735 ASSAY OF MAGNESIUM: CPT

## 2023-02-16 RX ORDER — TIZANIDINE 4 MG/1
2 TABLET ORAL EVERY 12 HOURS PRN
Qty: 90 TABLET | Refills: 0
Start: 2023-02-16 | End: 2023-02-17

## 2023-02-16 RX ORDER — LANOLIN ALCOHOL/MO/W.PET/CERES
400 CREAM (GRAM) TOPICAL DAILY
Qty: 30 TABLET | Refills: 0 | Status: SHIPPED | OUTPATIENT
Start: 2023-02-16 | End: 2023-03-18

## 2023-02-16 RX ORDER — TIZANIDINE 4 MG/1
2 TABLET ORAL EVERY 8 HOURS PRN
Qty: 90 TABLET | Refills: 0 | Status: CANCELLED
Start: 2023-02-16 | End: 2023-08-15

## 2023-02-16 RX ORDER — OXYCODONE HYDROCHLORIDE 10 MG/1
5 TABLET ORAL EVERY 12 HOURS PRN
Qty: 90 TABLET | Refills: 0
Start: 2023-02-16 | End: 2023-02-17

## 2023-02-16 RX ORDER — ACETAMINOPHEN 325 MG/1
975 TABLET ORAL EVERY 8 HOURS SCHEDULED
Refills: 0 | Status: CANCELLED
Start: 2023-02-16

## 2023-02-16 RX ADMIN — ESCITALOPRAM OXALATE 10 MG: 10 TABLET, FILM COATED ORAL at 09:17

## 2023-02-16 RX ADMIN — POTASSIUM CHLORIDE 60 MEQ: 1500 TABLET, EXTENDED RELEASE ORAL at 09:18

## 2023-02-16 RX ADMIN — APIXABAN 5 MG: 5 TABLET, FILM COATED ORAL at 09:17

## 2023-02-16 RX ADMIN — OXYCODONE HYDROCHLORIDE 2.5 MG: 5 TABLET ORAL at 09:16

## 2023-02-16 RX ADMIN — POTASSIUM CHLORIDE 20 MEQ: 1500 TABLET, EXTENDED RELEASE ORAL at 09:25

## 2023-02-16 RX ADMIN — FERROUS SULFATE TAB 325 MG (65 MG ELEMENTAL FE) 325 MG: 325 (65 FE) TAB at 09:17

## 2023-02-16 RX ADMIN — PANTOPRAZOLE SODIUM 40 MG: 40 TABLET, DELAYED RELEASE ORAL at 05:53

## 2023-02-16 RX ADMIN — TORSEMIDE 40 MG: 20 TABLET ORAL at 09:17

## 2023-02-16 RX ADMIN — MEMANTINE 10 MG: 10 TABLET ORAL at 09:17

## 2023-02-16 RX ADMIN — LORATADINE 10 MG: 10 TABLET ORAL at 09:17

## 2023-02-16 RX ADMIN — TIZANIDINE 2 MG: 2 TABLET ORAL at 09:16

## 2023-02-16 RX ADMIN — ACETAMINOPHEN 975 MG: 325 TABLET ORAL at 05:53

## 2023-02-16 RX ADMIN — OXYCODONE HYDROCHLORIDE 2.5 MG: 5 TABLET ORAL at 15:08

## 2023-02-16 RX ADMIN — GABAPENTIN 100 MG: 100 CAPSULE ORAL at 09:17

## 2023-02-16 RX ADMIN — CARVEDILOL 3.12 MG: 3.12 TABLET, FILM COATED ORAL at 09:17

## 2023-02-16 NOTE — CASE MANAGEMENT
Case Management Discharge Planning Note    Patient name London Cord  Location S /S -01 MRN [id number]  : 1945 Date 2023       Current Admission Date: 2023  Current Admission Diagnosis:Hypokalemia   Patient Active Problem List    Diagnosis Date Noted   • Fracture of rib, single, closed 2022   • Closed fracture of transverse process of lumbar vertebra ([address] ) 2022   • Compression fracture of T8 vertebra with routine healing 2022   • Parenchymal renal hypertension 2022   • Chronic heart failure with preserved ejection fraction (HFpEF) ([facility] ) 10/18/2022   • Acute gastric ulcer with hemorrhage 10/05/2022   • Age-related osteoporosis with current pathological fracture 2022   • Anemia 2022   • Ambulatory dysfunction 2022   • Constipation 2022   • Fall 2022   • Fracture of proximal end of left femur ([facility] ) 2022   • Acute pain due to trauma 2022   • At risk for obstructive sleep apnea 2022   • Lymphedema 2022   • Venous stasis dermatitis of both lower extremities 2022   • Seasonal allergies 2022   • Xerosis of skin 2022   • Prediabetes 2022   • Acute blood loss anemia 2022   • Memory impairment 2022   • Anxiety    • Hypokalemia    • Uncomplicated opioid dependence ([facility] ) 2022   • Unspecified atrial fibrillation ([facility] ) 2021   • Mixed hyperlipidemia 2021   • Obesity, unspecified 2021   • Unilateral primary osteoarthritis, unspecified knee 2021   • Chronic kidney disease, stage 2 (mild) 2021   • Chronic pain syndrome 2021   • Venous insufficiency (chronic) (peripheral) 2021   • Essential (primary) hypertension 2017   • Sjogren's syndrome ([facility] ) 2014      LOS (days): 7  Geometric Mean LOS (GMLOS) (days): 2 90  Days to GMLOS:-3 8     OBJECTIVE:  Risk of Unplanned Readmission Score: 57 11         Current admission status: Inpatient   Preferred Pharmacy:   [address] [place] [address], [address] [place] [address]  Phone: [phone] Fax: [phone]    Primary Care Provider: Mouna Kenyon MD    Primary Insurance: [facility]  Secondary Insurance:     DISCHARGE DETAILS:                                                                                     IMM reviewed with patient's caregiver, patient's caregiver agrees with discharge determination    IMM Given (Date):: 02/16/23  IMM Given to[de-identified] Family  Family notified[de-identified] daughter, Alma Finney

## 2023-02-16 NOTE — ASSESSMENT & PLAN NOTE
Patient has not been officially diagnosed with any neurodegenerative diseases  She was started on medications by per PCP because she keeps missing her appointments for a geriatric/neurology evaluation  Plan:  · Continue home memantine and donepezil  · reduced Tizanidine prn dose to 2 mg  · Frequent reorientation   · Fall precautions  · Neuropsych evaluation - patient lacking capacity to make medically informed decisions  · Discharge disposition - [facility]  · Gerontology consult pending DC memantine due to limited benefit  Recommend nursing home placement, which they discussed with daughter  OP follow up with Liliana Katz, referral provided

## 2023-02-16 NOTE — CONSULTS
Consultation - Jimmie Rangel [address] 69 y o  female MRN: [id number]  Unit/Bed#: S -01 Encounter: [id number]        Inpatient consult to Gerontology  Consult performed by: Luz Marina Herrmann DO  Consult ordered by: Davie Purvis DO          Assessment/Plan     1  Cognitive Impairment: Over the last couple of years, patient's family has noted a significant decline in patient's cognitive abilities  Patient's dependent on  who asks as patient's primary caregiver  Over the course of the last year, the patient has developed worsening confusion and auditory/visual hallucination  Notable cognitive decline following each hospitalization  Patient has been evaluated by PCP outpatient - never been seen by geriatrics  MoCA score of 14/30 on 10/27/2022 and patient started on Aricept 10 mg daily qHS and Memantine 10 mg BID 12/2022  Evaluated by neuropsych this admission and deemed incompetent  MoCA score completed today and patient scored 13/30 revealing significant cognitive impairment  Discussed with daughter and expressed concern for anticipated continued decline in setting of worsening dementia  Patient would strongly benefit from consideration of long term placement in light of caregivers recent illness with inability to provide adequate assistance and patient unable to provide for herself     · Okay to continue on home Aricept 10 mg qHS  · Consider discontinuation of Memantine 10 mg BID due to limited benefit   · Delirium precautions; frequent reorientation and redirection   · Maintenance of normal sleep/wake cycle; minimize overnight interruptions   · Ensure pain is well controlled; see pain regimen as below   · Monitor for fecal/urinary incontinence which will precipitate delirium   · Avoid medications which will precipitate or worsen delirium: Tramadol, benzos, anticholinergics, and benadryl   · Encourage hydration/nutrition; early mobilization and ambulation  · Redirect unwanted behaviors and avoid physical and chemical restraints - if all attempts have been unsuccessful, would consider Zyprexa 2 5 mg - month for orthostatic hypotension and QTc prolongation with repeat doses  Most recent Qtc 464  · Recommend outpatient follow up with Geriatrics  · Would strongly benefit from nursing home placement; discussed with patient's daughter      2  Chronic Pain Syndrome: Patient with a history of chronic pain disorder on home regimen of: Gabapentin 100 mg BID, Tizanidine 4 mg q8 hours PRN, and Oxycodone 10 mg PRN  Pain regimen currently prescribed while inpatient - patient comfortable and pain well controlled at this time  · Ensure pain is well controled to minimize risk of delirium   · Would recommend taper off Tizanidine and consideration of gabapentin discontinuation   · Current regimen while inpatient:   · Tylenol 975 mg q8 hours scheduled  · Gabapentin 100 mg Bid scheduled   · Oxycodone IR 2 5 mg q6 hours PRN for moderate/severe pain  · Tizanidine 2 mg q8 hours PRN for muscle spasms     3  Anxiety: History of anxiety  Worsened over the last couple of months after patient's /primary caregiver was diagnosed with Multiple Myeloma required recurrent hospitalization  However, at this time patient admits that anxiety is controlled on home regimen of Lexapro 10 mg daily  4  Atrial Fibrillation: History of Afib - well controlled on current regimen of Carvedilol 3 125 mg BID and anticoagulated with Eliquis 5 mg BID  Management per the primary team      5  Heart Failure: History of HFpEF  Most recent echo completed on 10/7/2023 with LVEF 55%  Current home regimen of: Coreg 3 125 mg BID, Torsemide 40 mg PO BID, and Metolazone 2 5 mg on Monday and Thursday  Diuretic regimen currently being adjusted 2/2 hypokalemia   Management per primary team     History of Present Illness   Physician Requesting Consult: Swathi Rich MD  Reason for Consult / Principal Problem: Memory Impairment, AMS  Hx and PE limited by: Cognitive Impairment  Additional history obtained from: Chart Review    HPI: Anastasia Jhaveri is a 69y o  year old female with a PMHx of HFpEF, Afib on Eliquis, anxiety, HTN, chronic pain syndrome with opioid dependency, recurrent falls, arthritis, venous insufficiency, and cognitive impairment who initially presented to [facility] on 2/9/2023 from PCP office with AMS accompanied by hallucinations  Patient had been at PCP office for a TCM visit - patient had been hospitalized for bilateral lower extremity cellulitis from 2/4 - 2/6  Patient was treated with IV antibiotics and discharged on a course of Keflex to complete  Following discharge, patient had been experiencing auditory and visual hallucinations accompanied by nighttime confusion  Patient admitted to overuse of Benadryl and Oxycodone to PCP  Patient then referred to the ED by PCP due to worsening confusion and cognitive decline  Patient  Ultimately admitted for encephalopathy  No concern for active infection but concern for underlying undiagnosed dementia  During her hospitalization, patient evaluated by neuropsychology and was deemed to lack capacity for make medical decisions on 2/13/2023  Geriatrics currently consulted for further assessment of memory impairment  Of note: Patient with history of cognitive decline and has been previously evaluated  [address] [place] [address] score 14/30 on 10/27/2022  Patient had been previously referred to Geriatrics outpatient but has not underwent assessment as patient missed appointment in November 2022  Therefore, the patient placed on Memantine and Aricept in 12/2022 by PCP  CT head completed 2/9 with no acute intracranial abnormality  No MRI brain imaging available to review  Patient's daughter, Agustin Morocho, contacted to obtain further information  Daughter lives in [place] but currently in [place] due to parent's recent hospitalization   Patient's  is the patient's primary caregiver but was recently diagnosed with Multiple Myeloma and has been in/out of the hospital himself  Daughter states that over the course of the last few years, the patient has had a significant decline in her cognitive abilities  With each hospitalization, patient continues to have very decline  At home, patient has frequent visual and auditory hallucination  She is often found mumbling to herself and does not recognize when others are in the room  Further, she repeats the cj information over and over again, doesn't remember conversations, and is very confused at baseline  Her safe phrases are, "Im fine," "That never happened," or "You never told me " The patient's bedroom is her safe place - she often never leaves it and will refuse to attend appointments  She is independent of her ADLs but her husbands helps with finances, medication administration, driving, cooking, and cleaning  Review of Systems   Constitutional: Negative for activity change, appetite change, chills, fatigue, fever and unexpected weight change  Eyes: Negative for visual disturbance  Respiratory: Negative for cough, shortness of breath and wheezing  Cardiovascular: Negative for chest pain, palpitations and leg swelling  Gastrointestinal: Negative for abdominal distention, abdominal pain, constipation, diarrhea, nausea and vomiting  Endocrine: Negative for polydipsia, polyphagia and polyuria  Skin: Negative for color change and pallor  Neurological: Negative for dizziness, weakness, light-headedness, numbness and headaches  Psychiatric/Behavioral: Positive for behavioral problems, confusion, decreased concentration and sleep disturbance  Negative for agitation  The patient is not nervous/anxious  Memory/Cognitive screening:  Patient with evidence of dementia  MoCA score 13/30 on assessment today  Previous MoCA score of 14/30 10/27/2023  Patient started on Memantine 10 mg BID and Aricept 10 mg qHS by PCP     Mobility:  Ambulates with walker; chair lift at home to get into bed  Falls: History of approx 4 falls within the last 12 months - recently hospitalized 12/2022 2/2 fall with lumbar and rib fractures  Assistive Devices:  Ambulates with walker with front wheels  Fraility: No evidence of frailty  Nutrition/weight loss/grocery shopping/meal preparation: Admits to good appetite  Daughter and  help with food preparation and shopping  Vision impairment: Wears glasses; no worsening changes in vision  Hearing impairment: Hard of hearing; does not utilize hearing aids  Incontinence: No history of urinary incontinence  Delirium: History of delirium this hospitalization  Polypharmacy: See medication list as below:    Current Outpatient Medications   Medication Instructions   • acetaminophen (TYLENOL) 650 mg, Oral, Every 6 hours scheduled   • aluminum hydroxide [facility]) Topical, Every other day, Open small Dermagran square & apply to wounds in a single layer cut to size of wounds  • ammonium lactate (LAC-HYDRIN) 12 % cream Topical, 2 times daily   • apixaban (ELIQUIS) 5 mg, Oral, 2 times daily, Lot [id number] exp 10/2024   • carvedilol (COREG) 3 125 mg, Oral, 2 times daily with meals   • donepezil (ARICEPT) 10 mg, Oral, Daily at bedtime   • escitalopram (LEXAPRO) 10 mg tablet Take 1 tab at bedtime x1 week, then take 1/2 tab at bedtime x1 week, then stop  • ferrous sulfate 324 mg, Oral, Daily before breakfast   • gabapentin (NEURONTIN) 100 mg, Oral, 2 times daily   • lidocaine (LIDODERM) 5 % 2 patches, Topical, Daily, Remove & Discard patch within 12 hours or as directed by MD   • memantine (Namenda) 10 mg tablet Take 1/2 tab daily x1 week, then take 1 tab daily x1 week, then take 1 tab twice daily therafter  • metolazone (ZAROXOLYN) 2 5 mg tablet Take 1 tablet ( 2 5 mg) by mouth twice a week on Monday and Thursdays, before the morning dose of torsemide   • naloxone (NARCAN) 4 mg/0 1 mL nasal spray Administer 1 spray into a nostril   If no response after 2-3 minutes, give another dose in the other nostril using a new spray     • oxyCODONE (ROXICODONE) 10 mg, Oral, Every 8 hours PRN   • pantoprazole (PROTONIX) 40 mg, Oral, Daily   • potassium chloride (K-DUR,KLOR-CON) 20 mEq tablet Take 3 tablets daily ( 60 meq) plus an extra 20 meq on mondays and thursdays with metolazone   • saccharomyces boulardii (FLORASTOR) 250 mg, Oral, 2 times daily   • senna-docusate sodium (SENOKOT S) 8 6-50 mg per tablet 2 tablets, Oral, 2 times daily   • tiZANidine (ZANAFLEX) 4 mg, Oral, Every 8 hours PRN   • torsemide (DEMADEX) 40 mg, Oral, 2 times daily     Patients primary residence: Lives in two story home with ; chairlift present to assist with stairs Lives with:   iADL's:  Patient does not enjoy her instrumental activites of daily living;  is patient's primary caregiver  ADL's:  Patient is able to enjoy all her basic activities of daily living;  does not need to supervise her bathing    Historical Information   Past medical history:     Past Medical History:   Diagnosis Date   • A-fib ([facility] ) 12/29/2021   • Acute respiratory failure with hypoxia ([email or web address] ) 11/12/2021   • Anxiety    • Arthritis    • Back pain    • Cellulitis    • Cellulitis, unspecified 12/30/2021   • CHF (congestive heart failure) ([facility] )    • Colon cancer screening 8/3/2022   • Edema of both lower extremities due to peripheral venous insufficiency    • Edema of both lower extremities due to peripheral venous insufficiency    • Encounter for screening mammogram for malignant neoplasm of breast 3/21/2022   • Encounter for support and coordination of transition of care 9/26/2022   • Erythema of lower extremity 11/12/2021   • Fibromyalgia    • Great toe pain, right 10/6/2022   • Hypertension    • Hypotension 9/17/2022   • Leukocytosis 10/6/2022   • Melena 8/20/2022   • Osteoporosis screening 8/3/2022   • Sjogren syndrome, unspecified ([facility] )    • Watery diarrhea 2/9/2023     Past surgical history:   Past Surgical History:   Procedure Laterality Date   • KNEE CARTILAGE SURGERY     • MO OPTX FEM SHFT FX W/INSJ IMED IMPLT W/WO SCREW Left 8/22/2022    Procedure: LEFT RETROGRADE IM SHAN;  Surgeon: Roseann Olivarez MD;  Location: [facility];  Service: Orthopedics   • REPLACEMENT TOTAL KNEE BILATERAL       Social history:     Social Determinants of Health     Tobacco Use: Medium Risk   • Smoking Tobacco Use: Former   • Smokeless Tobacco Use: Never   • Passive Exposure: Not on file   Alcohol Use: Not on file   Financial Resource Strain: Not on file   Food Insecurity: No Food Insecurity   • Worried About [employer] in the Last Year: Never true   • Ran Out of Food in the Last Year: Never true   Transportation Needs: No Transportation Needs   • Lack of Transportation (Medical): No   • Lack of Transportation (Non-Medical): No   Physical Activity: Not on file   Stress: Not on file   Social Connections: Not on file   Intimate Partner Violence: Not on file   Depression: Not at risk   • PHQ-2 Score: 1   Housing Stability: Low Risk    • Unable to Pay for Housing in the Last Year: No   • Number of Places Lived in the Last Year: 1   • Unstable Housing in the Last Year: No     Family history:   Family History   Problem Relation Age of Onset   • Heart disease Mother    • Cancer Father        Meds/Allergies   All current active meds have been reviewed  No Known Allergies    Objective   Vitals:    02/16/23 0732   BP: 139/76   Pulse: 84   Resp: 16   Temp: 97 9 °F (36 6 °C)   SpO2: 97%       Intake/Output Summary (Last 24 hours) at 2/16/2023 0920  Last data filed at 2/15/2023 2000  Gross per 24 hour   Intake --   Output 700 ml   Net -700 ml     Invasive Devices     Peripheral Intravenous Line  Duration           Peripheral IV 02/14/23 Distal;Dorsal (posterior); Left Forearm 1 day    Peripheral IV 02/15/23 Right Forearm <1 day                Physical Exam  Constitutional:       General: She is not in acute distress  Appearance: She is normal weight  She is not ill-appearing or toxic-appearing  HENT:      Head: Normocephalic and atraumatic  Nose: Nose normal  No congestion  Mouth/Throat:      Mouth: Mucous membranes are moist       Pharynx: Oropharynx is clear  No oropharyngeal exudate  Eyes:      General: No scleral icterus  Cardiovascular:      Rate and Rhythm: Normal rate and regular rhythm  Pulses: Normal pulses  Heart sounds: Normal heart sounds  No murmur heard  Pulmonary:      Effort: Pulmonary effort is normal  No respiratory distress  Breath sounds: Normal breath sounds  No wheezing, rhonchi or rales  Abdominal:      General: Abdomen is flat  Bowel sounds are normal  There is no distension  Palpations: Abdomen is soft  Tenderness: There is no abdominal tenderness  There is no guarding  Musculoskeletal:      Cervical back: Normal range of motion  No rigidity  Right lower leg: Edema present  Left lower leg: Edema present  Skin:     General: Skin is warm  Capillary Refill: Capillary refill takes less than 2 seconds  Coloration: Skin is not pale  Neurological:      Mental Status: She is alert and oriented to person, place, and time  Mental status is at baseline  Psychiatric:         Mood and Affect: Mood normal          Behavior: Behavior normal          Lab Results:   I have personally reviewed pertinent lab and imaging results       VTE Prophylaxis: Eliquis    Code Status: Level 1 - Full Code  Advance Directive and Living Will: Yes    Power of :    POLST:      Family and Social Support: Daughter and   No data recorded

## 2023-02-16 NOTE — ASSESSMENT & PLAN NOTE
Wt Readings from Last 3 Encounters:   02/16/23 77 1 kg (169 lb 15 6 oz)   02/09/23 84 2 kg (185 lb 9 6 oz)   02/06/23 88 5 kg (195 lb 1 7 oz)       Lab Results   Component Value Date    LVEF 55 10/07/2022    NTBNP 1,615 (H) 04/30/2022    NTBNP 2,903 (H) 02/10/2022       Plan:  • GDMT: Diuretic: Torsemide PO 40 mg BID and metolazone 2 5 mg twice weekly (Mondays and Thursdays), B-Blocker: Carvedilol 3 125 mg BID, No ACE/ARB/ARNi, Aldos  Blocker: or SGLT2-I on file  · Diet: Sodium restriction 2g  · Continue home potassium repletion with KDUR 60 mEq daily plus another 20 mEq on Mondays and Thursdays with Metolazone   · Labs: BMP, magnesium tomorrow a m  · Maintain Mg > 2 and K > 4; Replete prn - see hypokalemia   · Elevate HOB > 30°, Daily standing weights, Measure I/Os  · Consult nutrition services for dietary education? Consider when patient has capacity

## 2023-02-16 NOTE — ASSESSMENT & PLAN NOTE
Recent Labs     02/15/23  0524 02/15/23  1055 02/15/23  1719 02/16/23  0750   K 2 7* 2 8* 3 2* 3 3*   MG 2 8* 2 5  --  2 5     Patient presented with a K of 3 2  Standing dose of KDUR 60 mEq daily with an extra 20 mEq on Mondays and Thursdays with Metolazone dosing    Most recent K today 3 7, Mg 2 5 - wnl    Plan:  · Monitor daily labs   · Replete as needed  · BMPs in a m  · Mg checks - replete as needed/indicated due to hypokalemia   · Monitor on tele  · DC metolazaone now and on dc; decrease torsemide to 40 mg QD for now  Continue home torsemide on DC  DC today

## 2023-02-16 NOTE — PLAN OF CARE
Problem: MOBILITY - ADULT  Goal: Maintain or return to baseline ADL function  Description: INTERVENTIONS:  -  Assess patient's ability to carry out ADLs; assess patient's baseline for ADL function and identify physical deficits which impact ability to perform ADLs (bathing, care of mouth/teeth, toileting, grooming, dressing, etc )  - Assess/evaluate cause of self-care deficits   - Assess range of motion  - Assess patient's mobility; develop plan if impaired  - Assess patient's need for assistive devices and provide as appropriate  - Encourage maximum independence but intervene and supervise when necessary  - Involve family in performance of ADLs  - Assess for home care needs following discharge   - Consider OT consult to assist with ADL evaluation and planning for discharge  - Provide patient education as appropriate  Outcome: Progressing  Goal: Maintains/Returns to pre admission functional level  Description: INTERVENTIONS:  - Perform BMAT or MOVE assessment daily    - Set and communicate daily mobility goal to care team and patient/family/caregiver  - Collaborate with rehabilitation services on mobility goals if consulted  - Perform Range of Motion 3 times a day  - Reposition patient every 2 hours    - Dangle patient 3 times a day  - Stand patient 3 times a day  - Ambulate patient 3 times a day  - Out of bed to chair 3 times a day   - Out of bed for meals 3 times a day  - Out of bed for toileting  - Record patient progress and toleration of activity level   Outcome: Progressing     Problem: Prexisting or High Potential for Compromised Skin Integrity  Goal: Skin integrity is maintained or improved  Description: INTERVENTIONS:  - Identify patients at risk for skin breakdown  - Assess and monitor skin integrity  - Assess and monitor nutrition and hydration status  - Monitor labs   - Assess for incontinence   - Turn and reposition patient  - Assist with mobility/ambulation  - Relieve pressure over bony prominences  - Avoid friction and shearing  - Provide appropriate hygiene as needed including keeping skin clean and dry  - Evaluate need for skin moisturizer/barrier cream  - Collaborate with interdisciplinary team   - Patient/family teaching  - Consider wound care consult   Outcome: Progressing     Problem: PAIN - ADULT  Goal: Verbalizes/displays adequate comfort level or baseline comfort level  Description: Interventions:  - Encourage patient to monitor pain and request assistance  - Assess pain using appropriate pain scale  - Administer analgesics based on type and severity of pain and evaluate response  - Implement non-pharmacological measures as appropriate and evaluate response  - Consider cultural and social influences on pain and pain management  - Notify physician/advanced practitioner if interventions unsuccessful or patient reports new pain  Outcome: Progressing     Problem: INFECTION - ADULT  Goal: Absence or prevention of progression during hospitalization  Description: INTERVENTIONS:  - Assess and monitor for signs and symptoms of infection  - Monitor lab/diagnostic results  - Monitor all insertion sites, i e  indwelling lines, tubes, and drains  - Monitor endotracheal if appropriate and nasal secretions for changes in amount and color  - [place] appropriate cooling/warming therapies per order  - Administer medications as ordered  - Instruct and encourage patient and family to use good hand hygiene technique  - Identify and instruct in appropriate isolation precautions for identified infection/condition  Outcome: Progressing     Problem: SAFETY ADULT  Goal: Patient will remain free of falls  Description: INTERVENTIONS:  - Educate patient/family on patient safety including physical limitations  - Instruct patient to call for assistance with activity   - Consult OT/PT to assist with strengthening/mobility   - Keep Call bell within reach  - Keep bed low and locked with side rails adjusted as appropriate  - Keep care items and personal belongings within reach  - Initiate and maintain comfort rounds  - Make Fall Risk Sign visible to staff  - Offer Toileting every 2 Hours, in advance of need  - Initiate/Maintain bed alarm  - Obtain necessary fall risk management equipment  - Apply yellow socks and bracelet for high fall risk patients  - Consider moving patient to room near nurses station  Outcome: Progressing     Problem: DISCHARGE PLANNING  Goal: Discharge to home or other facility with appropriate resources  Description: INTERVENTIONS:  - Identify barriers to discharge w/patient and caregiver  - Arrange for needed discharge resources and transportation as appropriate  - Identify discharge learning needs (meds, wound care, etc )  - Arrange for interpretive services to assist at discharge as needed  - Refer to Case Management Department for coordinating discharge planning if the patient needs post-hospital services based on physician/advanced practitioner order or complex needs related to functional status, cognitive ability, or social support system  Outcome: Progressing     Problem: Knowledge Deficit  Goal: Patient/family/caregiver demonstrates understanding of disease process, treatment plan, medications, and discharge instructions  Description: Complete learning assessment and assess knowledge base    Interventions:  - Provide teaching at level of understanding  - Provide teaching via preferred learning methods  Outcome: Progressing

## 2023-02-16 NOTE — ASSESSMENT & PLAN NOTE
Patient has a history of anxiety  Plan:  · Continue home Lexapro    · Received one time dose of Atarax for past few days

## 2023-02-16 NOTE — ASSESSMENT & PLAN NOTE
Patient has a history of chronic pain syndrome  Plan:  · Home pain regimen with gabapentin, tizanidine and oxycodone  · C/w decreased tizanidine prn 2 mg  Per daughter, confirms patient does take this med at home  Attending also confirmed with pharmacy  · PDMP reviewed  Patient received 90 10 mg tablets (30 day supply)  Written and filled 01/18/23  · Tizanidine 2 mg every 12 hours as needed for muscle spasms and oxycodone 5 mg every 12 hours as needed for pain  Patient should follow up with PCP for additional pain management  Attending discussed patient's course and case with PCP

## 2023-02-17 ENCOUNTER — TRANSITIONAL CARE MANAGEMENT (OUTPATIENT)
Dept: INTERNAL MEDICINE CLINIC | Facility: CLINIC | Age: 78
End: 2023-02-17

## 2023-02-17 DIAGNOSIS — G89.11 ACUTE PAIN DUE TO TRAUMA: Primary | ICD-10-CM

## 2023-02-17 DIAGNOSIS — F41.9 ANXIETY: ICD-10-CM

## 2023-02-17 DIAGNOSIS — S22.060D COMPRESSION FRACTURE OF T8 VERTEBRA WITH ROUTINE HEALING: ICD-10-CM

## 2023-02-17 DIAGNOSIS — S32.009D CLOSED FRACTURE OF TRANSVERSE PROCESS OF LUMBAR VERTEBRA WITH ROUTINE HEALING, SUBSEQUENT ENCOUNTER: ICD-10-CM

## 2023-02-17 DIAGNOSIS — I50.9 CHF (CONGESTIVE HEART FAILURE) (HCC): ICD-10-CM

## 2023-02-17 DIAGNOSIS — G89.11 ACUTE PAIN DUE TO TRAUMA: ICD-10-CM

## 2023-02-17 DIAGNOSIS — G89.4 CHRONIC PAIN SYNDROME: ICD-10-CM

## 2023-02-17 RX ORDER — TORSEMIDE 20 MG/1
40 TABLET ORAL DAILY
Qty: 14 TABLET | Refills: 0 | Status: SHIPPED | OUTPATIENT
Start: 2023-02-17

## 2023-02-17 RX ORDER — POTASSIUM CHLORIDE 20 MEQ/1
20 TABLET, EXTENDED RELEASE ORAL DAILY
Qty: 30 TABLET | Refills: 0 | Status: SHIPPED | OUTPATIENT
Start: 2023-02-17

## 2023-02-17 RX ORDER — OXYCODONE HYDROCHLORIDE 5 MG/1
2.5 TABLET ORAL 3 TIMES DAILY PRN
Qty: 9 TABLET | Refills: 0 | Status: SHIPPED | OUTPATIENT
Start: 2023-02-17 | End: 2023-02-22 | Stop reason: SDUPTHER

## 2023-02-17 RX ORDER — TIZANIDINE HYDROCHLORIDE 2 MG/1
2 CAPSULE, GELATIN COATED ORAL 2 TIMES DAILY PRN
Qty: 14 CAPSULE | Refills: 0 | Status: SHIPPED | OUTPATIENT
Start: 2023-02-17 | End: 2023-02-22

## 2023-02-17 RX ORDER — TIZANIDINE 4 MG/1
2 TABLET ORAL EVERY 12 HOURS PRN
Qty: 90 TABLET | Refills: 0 | OUTPATIENT
Start: 2023-02-17 | End: 2023-08-16

## 2023-02-17 RX ORDER — OXYCODONE HYDROCHLORIDE 10 MG/1
5 TABLET ORAL EVERY 12 HOURS PRN
Qty: 90 TABLET | Refills: 0 | OUTPATIENT
Start: 2023-02-17 | End: 2023-02-27

## 2023-02-17 RX ORDER — ESCITALOPRAM OXALATE 10 MG/1
TABLET ORAL
Qty: 11 TABLET | Refills: 0 | OUTPATIENT
Start: 2023-02-17

## 2023-02-17 NOTE — UTILIZATION REVIEW
NOTIFICATION OF ADMISSION DISCHARGE   This is a Notification of Discharge from [address]  Please be advised that this patient has been discharge from our facility  Below you will find the admission and discharge date and time including the patient’s disposition  UTILIZATION REVIEW CONTACT:  Bing Johnson MA  Utilization   Network Utilization Review Department  Phone: [phone] x carefully listen to the prompts  All voicemails are confidential   Email: [email or web address]     ADMISSION INFORMATION  PRESENTATION DATE: 2/9/2023  5:20 PM  OBERVATION ADMISSION DATE:   INPATIENT ADMISSION DATE: 2/9/23  9:16 PM   DISCHARGE DATE: 2/16/2023  3:52 PM   DISPOSITION:Home with Home Health Care    IMPORTANT INFORMATION:  Send all requests for admission clinical reviews, approved or denied determinations and any other requests to dedicated fax number below belonging to the campus where the patient is receiving treatment   List of dedicated fax numbers:  [address] [place] [address] DENIALS (Administrative/Medical Necessity) [phone]   [address] (Maternity/NICU/Pediatrics) [phone]   [facility] [phone]   [facility] [phone]   [address] [phone]   220 [facility] [phone]   90 [facility] [phone]   [address] [place] [address] 119 [phone]   [facility]  [phone]   4057 [facility] [phone] 412 [facility] [address] [facility] [phone]

## 2023-02-17 NOTE — TELEPHONE ENCOUNTER
Daughter called and states she needs lexapro  , do you want her to still continue this, also she will need refills on oxycodone and tizanidine

## 2023-02-21 ENCOUNTER — APPOINTMENT (OUTPATIENT)
Dept: LAB | Facility: CLINIC | Age: 78
End: 2023-02-21

## 2023-02-21 DIAGNOSIS — E87.6 HYPOKALEMIA: ICD-10-CM

## 2023-02-21 LAB
ANION GAP SERPL CALCULATED.3IONS-SCNC: 7 MMOL/L (ref 4–13)
BUN SERPL-MCNC: 30 MG/DL (ref 5–25)
CALCIUM SERPL-MCNC: 10.1 MG/DL (ref 8.4–10.2)
CHLORIDE SERPL-SCNC: 98 MMOL/L (ref 96–108)
CO2 SERPL-SCNC: 32 MMOL/L (ref 21–32)
CREAT SERPL-MCNC: 1.12 MG/DL (ref 0.6–1.3)
GFR SERPL CREATININE-BSD FRML MDRD: 47 ML/MIN/1.73SQ M
GLUCOSE SERPL-MCNC: 108 MG/DL (ref 65–140)
POTASSIUM SERPL-SCNC: 4.1 MMOL/L (ref 3.5–5.3)
SODIUM SERPL-SCNC: 137 MMOL/L (ref 135–147)

## 2023-02-22 ENCOUNTER — TELEPHONE (OUTPATIENT)
Dept: OTHER | Facility: OTHER | Age: 78
End: 2023-02-22

## 2023-02-22 ENCOUNTER — TELEPHONE (OUTPATIENT)
Dept: INTERNAL MEDICINE CLINIC | Facility: CLINIC | Age: 78
End: 2023-02-22

## 2023-02-22 ENCOUNTER — OFFICE VISIT (OUTPATIENT)
Dept: INTERNAL MEDICINE CLINIC | Facility: CLINIC | Age: 78
End: 2023-02-22

## 2023-02-22 VITALS
TEMPERATURE: 98.3 F | SYSTOLIC BLOOD PRESSURE: 130 MMHG | RESPIRATION RATE: 20 BRPM | DIASTOLIC BLOOD PRESSURE: 80 MMHG | OXYGEN SATURATION: 99 % | HEART RATE: 96 BPM

## 2023-02-22 DIAGNOSIS — R26.2 AMBULATORY DYSFUNCTION: ICD-10-CM

## 2023-02-22 DIAGNOSIS — G30.1 SEVERE LATE ONSET ALZHEIMER'S DEMENTIA WITH AGITATION (HCC): Primary | ICD-10-CM

## 2023-02-22 DIAGNOSIS — F02.C11 SEVERE LATE ONSET ALZHEIMER'S DEMENTIA WITH AGITATION (HCC): Primary | ICD-10-CM

## 2023-02-22 DIAGNOSIS — I89.0 LYMPHEDEMA, NOT ELSEWHERE CLASSIFIED: ICD-10-CM

## 2023-02-22 DIAGNOSIS — G89.4 CHRONIC PAIN SYNDROME: ICD-10-CM

## 2023-02-22 DIAGNOSIS — I50.32 CHRONIC DIASTOLIC (CONGESTIVE) HEART FAILURE (HCC): ICD-10-CM

## 2023-02-22 DIAGNOSIS — F11.20 OPIOID DEPENDENCE, UNCOMPLICATED (HCC): ICD-10-CM

## 2023-02-22 DIAGNOSIS — G89.29 OTHER CHRONIC PAIN: ICD-10-CM
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
## 2023-02-22 PROBLEM — R41.89 OTHER SYMPTOMS AND SIGNS INVOLVING COGNITIVE FUNCTIONS AND AWARENESS: Status: ACTIVE | Noted: 2022-01-27

## 2023-02-22 PROBLEM — F41.9 ANXIETY DISORDER, UNSPECIFIED: Status: ACTIVE | Noted: 2022-08-30

## 2023-02-22 PROBLEM — R29.898 OTHER SYMPTOMS AND SIGNS INVOLVING THE MUSCULOSKELETAL SYSTEM: Status: ACTIVE | Noted: 2022-02-10

## 2023-02-22 RX ORDER — OXYCODONE HYDROCHLORIDE 5 MG/1
2.5 TABLET ORAL 3 TIMES DAILY PRN
Qty: 3 TABLET | Refills: 0 | Status: SHIPPED | OUTPATIENT
Start: 2023-02-22 | End: 2023-02-25

## 2023-02-22 NOTE — PROGRESS NOTES
Assessment & Plan     1  Severe late onset Alzheimer's dementia with agitation ([facility] )    2  Chronic diastolic (congestive) heart failure (HCC)    3  Other chronic pain    4  Opioid dependence, uncomplicated ([facility] )    5  Lymphedema, not elsewhere classified    6  Chronic pain syndrome  -     oxyCODONE (Roxicodone) 5 immediate release tablet; Take 0 5 tablets (2 5 mg total) by mouth 3 (three) times a day as needed for moderate pain for up to 3 days Max Daily Amount: 7 5 mg    7  Ambulatory dysfunction    Patient accompanied by her daughter Len Bullock for TCM visit following hospitalization for fall  Patient deemed not to have decision-making capacity based on formal neuropsychiatric evaluation obtained during last inpatient admission; as such, daughter Len Bullock is medical POA   Sp, currently in subacute rehab at [facility]  We discussed plan would be for Tirstin Harris to go to SNF as well at this time given concern for her safety at home  Patient's dementia as well as other chronic medical conditions ie  Lymphedema, chronic opiate dependence in the setting of chronic pain, diastolic chf, put patient at continued risk for subsequent ambulatory dysfunction with falls and she would benefit from placement at SNF for subacute rehab  Subjective     Transitional Care Management Review:   London Mari is a 69 y o  female here for TCM follow up       During the TCM phone call patient stated:  TCM Call     Date and time call was made  2/16/2023  8:44 AM    Hospital care reviewed  Records reviewed    Patient was hospitialized at  [address] [place] [address]    Date of Admission  02/09/23    Date of discharge  02/16/23    Diagnosis  cellulitis    Disposition  Home    Were the patients medications reviewed and updated  No  Spouse states he does not know    Current Symptoms  Weakness    Weakness severity  Mild    Fatigue severity  Mild      TCM Call     Post hospital issues  Poor medication adherence    Should patient be enrolled in anticoag monitoring? No    Scheduled for follow up? Yes    Did you obtain your prescribed medications  Yes    Do you need help managing your prescriptions or medications  No    Is transportation to your appointment needed  No    I have advised the patient to call PCP with any new or worsening symptoms  Marv Multani MA    Living Arrangements  Family members    Support System  Family    The type of support provided  Physical    Do you have social support  Yes, as much as I need    Are you recieving any outpatient services  No    What type of services  [address] [place] [address]    Are you recieving home care services  No    Are you using any community resources  No    Current waiver services  No    Have you fallen in the last 12 months  No    Interperter language line needed  No    Counseling  Family        Patient presents for TCM following recent hospitalizaiton  Review of Systems   Unable to perform ROS: Other       Objective     /80 (BP Location: Left arm, Patient Position: Sitting, Cuff Size: Large)   Pulse 96   Temp 98 3 °F (36 8 °C)   Resp 20   SpO2 99%      Physical Exam  Constitutional:       General: She is not in acute distress  Appearance: She is obese  She is not ill-appearing or toxic-appearing  Musculoskeletal:         General: Swelling present  Skin:     General: Skin is warm and dry  Coloration: Skin is pale  Neurological:      Mental Status: She is alert         Medications have been reviewed by provider in current encounter    Rosemary Hanna MD

## 2023-02-22 NOTE — TELEPHONE ENCOUNTER
I faxed to [facility] post acute a letter and pain med script to [phone] and I spoke to the LPN Royce Winn and gave her a short report about Gualberto Almodovar

## 2023-02-22 NOTE — LETTER
February 22, 2023     Patient: Ang Fiore  YOB: 1945  Date of Visit: 2/22/2023      To Whom it May Concern:    Caesar Strange is under my professional care  Adrian Espinoza was seen in my office on 2/22/2023 and was accompanied by her daughter Aylin Samuel for TCM visit following hospitalization for fall  Adrian Espinoza was deemed not to have decision-making capacity based on formal neuropsychiatric evaluation obtained during last inpatient admission; as such, daughter Aylin Samuel is medical POA   Sp, currently in subacute rehab at [facility]  We discussed plan would be for Adrian Espinoza to go to SNF as well at this time given concern for her safety at home  Patient's dementia as well as other chronic medical conditions ie  Lymphedema, chronic opiate dependence in the setting of chronic pain, diastolic chf, put patient at continued risk for subsequent ambulatory dysfunction with falls and she would benefit from placement at SNF for subacute rehab  If you have any questions or concerns, please don't hesitate to call           Sincerely,          Iliana Mcdonald MD        CC:   No Recipients

## 2023-02-23 ENCOUNTER — TELEPHONE (OUTPATIENT)
Dept: INTERNAL MEDICINE CLINIC | Facility: CLINIC | Age: 78
End: 2023-02-23

## 2023-02-23 NOTE — TELEPHONE ENCOUNTER
[employer] [facility] called needing to review new admission orders for pt  On call notified via TC

## 2023-02-23 NOTE — TELEPHONE ENCOUNTER
Called [facility]l and spoke to bryon, she was trying to get extension, but they said no, she will do an appeal, she said if she needs us she will call

## 2023-02-23 NOTE — TELEPHONE ENCOUNTER
Hi, my name is Meredith Barnard  I'm a clinical liaison for [facility] acute [facility]  Nayelibolivar Honeycutt was in to see the doctor yesterday and we are trying to get her while she is into the skilled nursing facility for some rehab  And the insurance company is requesting to speak to the doctor regarding her abilities and her need to go into a rehab in order for them to pay for her stay there  They need to have a pksy-gh-kcfy review with the doctor by 12:00 o'clock today  If you could please call me at [phone] I can give you the particulars of calling the insurance company again  This needs to be done before 12:00 o'clock today in order for the insurance to pay for her stay at the rehab  Thank you for your time and I will talk to you soon

## 2023-02-24 ENCOUNTER — NURSING HOME VISIT (OUTPATIENT)
Dept: GERIATRICS | Facility: OTHER | Age: 78
End: 2023-02-24

## 2023-02-24 DIAGNOSIS — I89.0 LYMPHEDEMA, NOT ELSEWHERE CLASSIFIED: ICD-10-CM

## 2023-02-24 DIAGNOSIS — I48.91 ATRIAL FIBRILLATION, UNSPECIFIED TYPE (HCC): ICD-10-CM

## 2023-02-24 DIAGNOSIS — G30.1 SEVERE LATE ONSET ALZHEIMER'S DEMENTIA WITH AGITATION (HCC): Primary | ICD-10-CM

## 2023-02-24 DIAGNOSIS — F02.C11 SEVERE LATE ONSET ALZHEIMER'S DEMENTIA WITH AGITATION (HCC): Primary | ICD-10-CM

## 2023-02-24 DIAGNOSIS — G89.29 OTHER CHRONIC PAIN: ICD-10-CM

## 2023-02-24 DIAGNOSIS — R26.2 AMBULATORY DYSFUNCTION: ICD-10-CM

## 2023-02-24 DIAGNOSIS — I50.32 CHRONIC DIASTOLIC (CONGESTIVE) HEART FAILURE (HCC): ICD-10-CM

## 2023-02-24 NOTE — PROGRESS NOTES
[address] [place] [address] post acute SNF 31  History and Physical    NAME: Hector Rodríguez  AGE: 69 y o  SEX: female [id number]    DATE OF ENCOUNTER: 2/26/2023    Code status:  CPR    Assessment and Plan     1  Severe late onset Alzheimer's dementia with agitation ([facility] )  - redirection, reorientation  - assistance with ADLs  - cont escitalopram 10 mg po qd  - cont Aricept 10 mg po qhs    2  Ambulatory dysfunction  - PT/OT ordered  - fall precautions in place  - wheelchair mobility    3  Chronic diastolic (congestive) heart failure (HCC)  - cont coreg 3 125 mg po bid  - cont torsemide 20 mg 2 tabs po bid    4  Lymphedema, not elsewhere classified  - cont Torsemide 20 mg 2 tabs po bid    5  Other chronic pain  - cont Lidocaine patch to back  - cont Oxycodone, tizanidine as needed  - cont gabapentin 100 mg po bid    6  Atrial fibrillation, unspecified type (HCC)  - cont Apixaban 5 mg po bid      All medications and routine orders were reviewed and updated as needed  Plan discussed with: patient, daughter and family    Chief Complaint     Seen for admission at [address] [place] [address]    History of Present Illness     Victor M Cho, a 70 y/o female with PMH of Dementia, CHF, HTN, Lymphedema, amb dysfunction got admitted to NH post acute from home for long term stay  She was living at home with , who is her primary caregiver   was recently hospitalized and is at SNF now  She was seen and examined at bedside, stable  She is unable to give any history due to dementia  She is verbal, not goal oriented  She needs assistance with ADLs, uses wheelchair  Staff have no concerns at this time       HISTORY:  Past Medical History:   Diagnosis Date   • A-fib ([facility] ) 12/29/2021   • Acute respiratory failure with hypoxia ([facility] ) 11/12/2021   • Altered mental status, unspecified 2/9/2023        • Anxiety    • Arthritis    • Back pain    • Cellulitis    • Cellulitis, unspecified 12/30/2021   • CHF (congestive heart failure) ([facility] )    • Colon cancer screening 8/3/2022   • Edema of both lower extremities due to peripheral venous insufficiency    • Edema of both lower extremities due to peripheral venous insufficiency    • Encounter for screening mammogram for malignant neoplasm of breast 3/21/2022   • Encounter for support and coordination of transition of care 9/26/2022   • Erythema of lower extremity 11/12/2021   • Fibromyalgia    • Great toe pain, right 10/6/2022   • Hypertension    • Hypotension 9/17/2022   • Leukocytosis 10/6/2022   • Melena 8/20/2022   • Osteoporosis screening 8/3/2022   • Sjogren syndrome, unspecified ([facility] )    • Watery diarrhea 2/9/2023     Family History   Problem Relation Age of Onset   • Heart disease Mother    • Cancer Father      Social History     Socioeconomic History   • Marital status: /Civil Union     Spouse name: None   • Number of children: None   • Years of education: None   • Highest education level: None   Occupational History   • None   Tobacco Use   • Smoking status: Former     Types: Cigarettes   • Smokeless tobacco: Never   Vaping Use   • Vaping Use: Never used   Substance and Sexual Activity   • Alcohol use: Not Currently   • Drug use: Never   • Sexual activity: None   Other Topics Concern   • None   Social History Narrative   • None     Social Determinants of Health     Financial Resource Strain: Not on file   Food Insecurity: No Food Insecurity   • Worried About Running Out of Food in the Last Year: Never true   • Ran Out of Food in the Last Year: Never true   Transportation Needs: No Transportation Needs   • Lack of Transportation (Medical): No   • Lack of Transportation (Non-Medical):  No   Physical Activity: Not on file   Stress: Not on file   Social Connections: Not on file   Intimate Partner Violence: Not on file   Housing Stability: Low Risk    • Unable to Pay for Housing in the Last Year: No   • Number of Places Lived in the Last Year: 1   • Unstable Housing in the Last Year: No       Allergies:  No Known Allergies    Review of Systems     Review of Systems   Unable to perform ROS: Dementia   Cardiovascular: Positive for leg swelling  Musculoskeletal: Positive for gait problem  As in HPI  Medications and orders     All medications reviewed and updated in Nursing Home EMR  Objective     Vitals: T: 97 4, P: 78, R: 16, BP: 154/87, 95% on RA, Wt: 181 lbs    Physical Exam  Vitals and nursing note reviewed  Constitutional:       General: She is not in acute distress  Appearance: She is well-developed  She is obese  She is not diaphoretic  HENT:      Head: Normocephalic and atraumatic  Nose: Nose normal       Mouth/Throat:      Mouth: Mucous membranes are moist       Pharynx: Oropharynx is clear  No oropharyngeal exudate  Eyes:      General: No scleral icterus  Right eye: No discharge  Left eye: No discharge  Extraocular Movements: Extraocular movements intact  Conjunctiva/sclera: Conjunctivae normal    Cardiovascular:      Rate and Rhythm: Normal rate and regular rhythm  Heart sounds: Normal heart sounds  No murmur heard  Pulmonary:      Effort: Pulmonary effort is normal  No respiratory distress  Breath sounds: Normal breath sounds  No wheezing  Chest:      Chest wall: No tenderness  Abdominal:      General: Bowel sounds are normal       Palpations: Abdomen is soft  Tenderness: There is no abdominal tenderness  There is no guarding or rebound  Musculoskeletal:         General: No tenderness or deformity  Normal range of motion  Cervical back: Normal range of motion and neck supple  Right lower leg: Edema present  Left lower leg: Edema present  Skin:     General: Skin is warm and dry  Neurological:      Mental Status: She is alert  Cranial Nerves: No cranial nerve deficit        Comments: Oriented to self  Unable to give the name of the place  Verbal, interacts well  Able to follow commands  Psychiatric:         Mood and Affect: Mood normal          Behavior: Behavior normal       Comments: Pleasantly confused  Pertinent Laboratory/Diagnostic Studies: The following labs/studies were reviewed please see chart or hospital paperwork for details    Ref Range & Units 2/21/23 1513 2/16/23 1147 2/16/23 0750 2/15/23 1719 2/15/23 1055 2/15/23 0524 2/14/23 1352    Sodium 135 - 147 mmol/L 137   137    137     Potassium 3 5 - 5 3 mmol/L 4 1  3 7  3 3 Low   3 2 Low   2 8 Low   2 7 Low Panic   2 9 Low     Chloride 96 - 108 mmol/L 98   95 Low     93 Low      CO2 21 - 32 mmol/L 32   31    30     ANION GAP 4 - 13 mmol/L 7   11    14 High      BUN 5 - 25 mg/dL 30 High    44 High     45 High      Creatinine 0 60 - 1 30 mg/dL 1 12   1 27 CM    1 25 CM     Comment: Standardized to IDMS reference method   Glucose 65 - 140 mg/dL 108   144 High  CM    137 CM        Calcium 8 4 - 10 2 mg/dL 10 1   10 1    10 4 High      eGFR ml/min/1 73sq m 47   41          Ref Range & Units 2/16/23 1147 2/16/23 0750 2/15/23 1719 2/15/23 1055 2/15/23 0524 2/14/23 1352 2/14/23 0514   Potassium 3 5 - 5 3 mmol/L 3 7  3 3 Low   3 2 Low   2 8 Low        Ref Range & Units 2/16/23 0750 2/15/23 0524 2/14/23 0514 2/11/23 0506 2/10/23 0557 2/9/23 1822 2/6/23 0611    WBC 4 31 - 10 16 Thousand/uL 11 92 High   10 49 High   9 89  9 56  9 51  11 68 High   7 19    RBC 3 81 - 5 12 Million/uL 5 28 High   5 37 High   5 21 High   4 73  4 36  4 79  3 57 Low     Hemoglobin 11 5 - 15 4 g/dL 12 9  12 9  12 7  11 4 Low   10 6 Low   11 3 Low   8 6 Low     Hematocrit 34 8 - 46 1 % 42 5  42 3  41 1  37 7  34 5 Low   37 9  28 8 Low     MCV 82 - 98 fL 81 Low   79 Low   79 Low   80 Low   79 Low   79 Low   81 Low     MCH 26 8 - 34 3 pg 24 4 Low   24 0 Low   24 4 Low   24 1 Low   24 3 Low   23 6 Low   24 1 Low     MCHC 31 4 - 37 4 g/dL 30 4 Low   30 5 Low   30 9 Low   30 2 Low   30 7 Low   29 8 Low  29 9 Low     RDW 11 6 - 15 1 % 22  0 High   21 8 High   21 6 High   21 6 High   21 7 High   21 9 High   21 2 High     MPV 8 9 - 12 7 fL 10 4  11 1  11 2  10 4  10 9  10 2  10 2    Platelets 542 - 360 Thousands/uL 521 High   556 High   577 High   435 High   428 High   460 High   298    nRBC /100 WBCs 0  0  0   0  0  0    Neutrophils Relative 43 - 75 % 68  61  60   59  64  61    Immat GRANS % 0 - 2 % 1  0  1   1  0  0    Lymphocytes Relative 14 - 44 % 20  25  24   24  23  21    Monocytes Relative 4 - 12 % 10  11  12   12  9  11    Eosinophils Relative 0 - 6 % 1  2  2   3  3  6    Basophils Relative 0 - 1 % 0  1  1   1  1  1    Neutrophils Absolute 1 85 - 7 62 Thousands/µL 8 17 High   6 38  5 99   5 76  7 48  4 40    Immature Grans Absolute 0 00 - 0 20 Thousand/uL 0 07  0 03  0 05   0 05  0 03  0 03    Lymphocytes Absolute 0 60 - 4 47 Thousands/µL 2 33  2 60  2 39   2 25  2 68  1 48    Monocytes Absolute 0 17 - 1 22 Thousand/µL 1 16  1 19  1 15   1 09  1 10  0 80    Eosinophils Absolute 0 00 - 0 61 Thousand/µL 0 15  0 21  0 24   0 29  0 32  0 43    Basophils Absolute 0 00 - 0 10 Thousands/µL 0 04  0 08  0 07            - Counseling Documentation: patient was counseled regarding: prognosis

## 2023-02-27 ENCOUNTER — NURSING HOME VISIT (OUTPATIENT)
Dept: GERIATRICS | Facility: OTHER | Age: 78
End: 2023-02-27

## 2023-02-27 VITALS
HEART RATE: 66 BPM | SYSTOLIC BLOOD PRESSURE: 126 MMHG | RESPIRATION RATE: 18 BRPM | TEMPERATURE: 97.3 F | BODY MASS INDEX: 33.11 KG/M2 | OXYGEN SATURATION: 98 % | WEIGHT: 181 LBS | DIASTOLIC BLOOD PRESSURE: 54 MMHG

## 2023-02-27 DIAGNOSIS — R26.2 AMBULATORY DYSFUNCTION: ICD-10-CM

## 2023-02-27 DIAGNOSIS — I50.32 CHRONIC DIASTOLIC (CONGESTIVE) HEART FAILURE (HCC): ICD-10-CM

## 2023-02-27 DIAGNOSIS — I48.91 ATRIAL FIBRILLATION, UNSPECIFIED TYPE (HCC): ICD-10-CM

## 2023-02-27 DIAGNOSIS — F02.C11 SEVERE LATE ONSET ALZHEIMER'S DEMENTIA WITH AGITATION (HCC): Primary | ICD-10-CM

## 2023-02-27 DIAGNOSIS — G30.1 SEVERE LATE ONSET ALZHEIMER'S DEMENTIA WITH AGITATION (HCC): Primary | ICD-10-CM

## 2023-02-27 DIAGNOSIS — G89.29 OTHER CHRONIC PAIN: ICD-10-CM

## 2023-02-27 DIAGNOSIS — A08.4 VIRAL GASTROENTERITIS: ICD-10-CM

## 2023-02-27 DIAGNOSIS — I89.0 LYMPHEDEMA, NOT ELSEWHERE CLASSIFIED: ICD-10-CM

## 2023-02-27 RX ORDER — TORSEMIDE 20 MG/1
40 TABLET ORAL 2 TIMES DAILY
Refills: 0
Start: 2023-02-27

## 2023-02-27 RX ORDER — OMEPRAZOLE 20 MG/1
20 CAPSULE, DELAYED RELEASE ORAL DAILY
COMMUNITY

## 2023-02-27 NOTE — ASSESSMENT & PLAN NOTE
· Patient c/o loose stools and feeling "unwell" starting yesterday  · She states loose stools resolving today  · Denies abdominal pain/N/V  · Start Imodium PRN x 5 days  · Start Zofran prn x 5 days  · Encourage fluids

## 2023-02-27 NOTE — ASSESSMENT & PLAN NOTE
· Multifactorial in setting of deconditioning, obesitiy, lymphedema, chf  · PT/OT  · W/C bound at baseline   · Fall Precautions  · Check CBC and BMP tomorrow (2/28/23)

## 2023-02-27 NOTE — ASSESSMENT & PLAN NOTE
· AAOx 1  · Pleasantly confused   · Evaluated by Neuropsych last hospital admission- deemed to lack capacity to make medically informed decision  · POA is daughter, Lorrane Rued-  in SNF currently  · Continue Lexapro 10 mg daily  · Continue Aricept 10 mg Q HS   · Redirect/Reorient  · Fall Precautions  · Delirium Precautions  · Encourage nutition/hydration

## 2023-02-27 NOTE — ASSESSMENT & PLAN NOTE
Wt Readings from Last 3 Encounters:   02/27/23 82 1 kg (181 lb)   02/16/23 77 1 kg (169 lb 15 6 oz)   02/09/23 84 2 kg (185 lb 9 6 oz)       · Continue Weekly weights per SNF policy  · B/L leg edema noted R>L   · Recently had Torsemide decreased to 40 mg daily during last hospital stay- back on 40 mg BID at SNF  · Continue Coreg 3 125 mg BID   · Continue Potassium and Magnesium supplement   · Check BMP tomorrow (2/28/23)  · Cardiac diet

## 2023-02-27 NOTE — PROGRESS NOTES
[facility]  [address]  [phone]  FACILITY: [facility]  Code 31 (STR)        NAME: Hector Rodríguez  AGE: 69 y o  SEX: female CODE STATUS: CPR    DATE OF ENCOUNTER: 2/27/2023    Assessment and Plan     1  Severe late onset Alzheimer's dementia with agitation ([address] )  Assessment & Plan:  · AAOx 1  · Pleasantly confused   · Evaluated by Neuropsych last hospital admission- deemed to lack capacity to make medically informed decision  · LARRY is daughter, Susan Hager-  in SNF currently  · Continue Lexapro 10 mg daily  · Continue Aricept 10 mg Q HS   · Redirect/Reorient  · Fall Precautions  · Delirium Precautions  · Encourage nutition/hydration       2  Chronic diastolic (congestive) heart failure (HCC)  Assessment & Plan:  Wt Readings from Last 3 Encounters:   02/27/23 82 1 kg (181 lb)   02/16/23 77 1 kg (169 lb 15 6 oz)   02/09/23 84 2 kg (185 lb 9 6 oz)       · Continue Weekly weights per SNF policy  · B/L leg edema noted R>L   · Recently had Torsemide decreased to 40 mg daily during last hospital stay- back on 40 mg BID at SNF  · Continue Coreg 3 125 mg BID   · Continue Potassium and Magnesium supplement   · Check BMP tomorrow (2/28/23)  · Cardiac diet       Orders:  -     torsemide (DEMADEX) 20 mg tablet; Take 2 tablets (40 mg total) by mouth 2 (two) times a day    3  Atrial fibrillation, unspecified type [facility])  Assessment & Plan:  Controlled with Coreg 3 125 BID  AC with Eliquis 5 mg BID       4  Other chronic pain  Assessment & Plan:  · Denies pain on exam   · Tylenol 650 mg Q 6 hrs RTC   · Lidocaine patch to back   · Gabapentin 100 mg Q 12 hrs   · Oxycodone 2 5 mg Q 4 hrs PRN   · Tizanidine 2 mg Q 12 hrs PRN for muscle spasms       5  Lymphedema, not elsewhere classified  Assessment & Plan:  · Chronic   · Compression/Elevation  · Torsemide for edema   · Am-Lac lotion daily to b/l lower legs       6   Ambulatory dysfunction  Assessment & Plan:  · Multifactorial in setting of deconditioning, obesitiy, lymphedema, chf  · PT/OT  · W/C bound at baseline   · Fall Precautions  · Check CBC and BMP tomorrow (2/28/23)       7  Viral gastroenteritis  Assessment & Plan:  · Patient c/o loose stools and feeling "unwell" starting yesterday  · She states loose stools resolving today  · Denies abdominal pain/N/V  · Start Imodium PRN x 5 days  · Start Zofran prn x 5 days  · Encourage fluids          All medications and routine orders were reviewed and updated as needed      Chief Complaint     STR follow up visit    Past Medical and Surgica History      Past Medical History:   Diagnosis Date   • A-fib ([facility] ) 12/29/2021   • Acute respiratory failure with hypoxia ([facility] ) 11/12/2021   • Altered mental status, unspecified 2/9/2023        • Anxiety    • Arthritis    • Back pain    • Cellulitis    • Cellulitis, unspecified 12/30/2021   • CHF (congestive heart failure) (HCC)    • Colon cancer screening 8/3/2022   • Edema of both lower extremities due to peripheral venous insufficiency    • Edema of both lower extremities due to peripheral venous insufficiency    • Encounter for screening mammogram for malignant neoplasm of breast 3/21/2022   • Encounter for support and coordination of transition of care 9/26/2022   • Erythema of lower extremity 11/12/2021   • Fibromyalgia    • Great toe pain, right 10/6/2022   • Hypertension    • Hypotension 9/17/2022   • Leukocytosis 10/6/2022   • Melena 8/20/2022   • Osteoporosis screening 8/3/2022   • Sjogren syndrome, unspecified ([facility] )    • Watery diarrhea 2/9/2023     Past Surgical History:   Procedure Laterality Date   • KNEE CARTILAGE SURGERY     • AR OPTX FEM SHFT FX W/INSJ IMED IMPLT W/WO SCREW Left 8/22/2022    Procedure: LEFT RETROGRADE IM SHAN;  Surgeon: Bib Suarez MD;  [facility]: [facility];  Service: Orthopedics   • REPLACEMENT TOTAL KNEE BILATERAL       No Known Allergies       History of Present Illness     Shalonda Vasquez is a 68 y/o female admitted to [address] for STR  She has a  PMH of Dementia, CHF, AFIB, HTN, Lymphedema, Ambulatory Dysfunction, Chronic pain syndrome with opioid dependency  She is seen today for routine STR f/u visit  She [facility] to [facility] for long term stay  She was living at home with , who is her primary caregiver   was recently hospitalized and is at SNF now  Reviewed recent hospital stay from 2/9/23-2/17/23 where patient was admitted with Alt  MS with auditory and visual hallucinations  She was seen by Neuropsychiatry and noted to lack capacity to make informed medical decisions  Etiology of alt  Ms thought to be multifactorial in setting of electrolyte abnormalities (low potassium) opioid withdrawal and cognitive impairment  Her Torsemide was decreased to 40 mg daily and her metolazone was discontinued  Pain management regimen recommended at time of discharge was Tizanidine 2 5 Q 12 prn for muscle spasms and oxycodone 5 mg Q 12 hrs prn for pain  She needs outpatient f/u with nephrology and cardiology  Seen and examined at bedside in stable condition  She is a poor historian due to Alzheimer's/Dementia  At baseline she needs assistance with ADLs and uses a wheelchair for mobility  She is oob sitting in bedside chair  She is verbal but confused speech, able to make her needs known  She is c/o loose stools started last night, states they are resolving now, tolerating her diet, denies abdominal pain,fever or chills  She states her legs are always swollen and do not look any different to her then they do at home  She offers no other complains at this time  She is actively participating in therapy, staff/nursing have no other concerns at this time  The patient's allergies, past medical, surgical, social and family history were reviewed and unchanged      Review of Systems     Review of Systems   Unable to perform ROS: Dementia         Objective     Vitals: Vitals:    02/27/23 1125   BP: 126/54   Pulse: 66   Resp: 18   Temp: (!) 97 3 °F (36 3 °C)   SpO2: 98%         Physical Exam  Vitals and nursing note reviewed  Constitutional:       General: She is not in acute distress  Appearance: Normal appearance  She is obese  She is not ill-appearing  HENT:      Head: Normocephalic and atraumatic  Nose: No congestion or rhinorrhea  Mouth/Throat:      Mouth: Mucous membranes are moist    Eyes:      General: No scleral icterus  Conjunctiva/sclera: Conjunctivae normal       Pupils: Pupils are equal, round, and reactive to light  Cardiovascular:      Rate and Rhythm: Normal rate  Rhythm irregular  Pulses: Normal pulses  Heart sounds: Normal heart sounds  No murmur heard  Pulmonary:      Effort: Pulmonary effort is normal  No respiratory distress  Breath sounds: Normal breath sounds  No wheezing, rhonchi or rales  Abdominal:      General: Bowel sounds are normal  There is no distension  Palpations: Abdomen is soft  There is no mass  Tenderness: There is no abdominal tenderness  Hernia: No hernia is present  Musculoskeletal:         General: No swelling or tenderness  Right lower leg: Edema (+3) present  Left lower leg: Edema (+2) present  Lymphadenopathy:      Cervical: No cervical adenopathy  Skin:     General: Skin is warm and dry  Coloration: Skin is not pale  Findings: Erythema (mild to RLE ) present  No rash  Neurological:      General: No focal deficit present  Mental Status: She is alert  Mental status is at baseline  She is disoriented  Motor: Weakness present        Gait: Gait abnormal       Comments: AAOx2-3  Forgetful  Clear speech , pleasant    Psychiatric:         Mood and Affect: Mood normal          Behavior: Behavior normal          Pertinent Laboratory/Diagnostic Studies:   Reviewed in facility chart-stable      Current Medications   Medications reviewed and updated see facility [facility] for details  Current Outpatient Medications:   •  omeprazole (PriLOSEC) 20 mg delayed release capsule, Take 20 mg by mouth daily, Disp: , Rfl:   •  torsemide (DEMADEX) 20 mg tablet, Take 2 tablets (40 mg total) by mouth 2 (two) times a day, Disp: , Rfl: 0  •  acetaminophen (TYLENOL) 325 mg tablet, Take 2 tablets (650 mg total) by mouth every 6 (six) hours, Disp: , Rfl: 0  •  ammonium lactate (LAC-HYDRIN) 12 % cream, Apply topically 2 (two) times a day, Disp: 385 g, Rfl: 0  •  apixaban (Eliquis) 5 mg, Take 1 tablet (5 mg total) by mouth 2 (two) times a day Lot [id number] exp 10/2024, Disp: 56 tablet, Rfl: 0  •  carvedilol (COREG) 3 125 mg tablet, Take 1 tablet (3 125 mg total) by mouth 2 (two) times a day with meals, Disp: 60 tablet, Rfl: 2  •  donepezil (ARICEPT) 10 mg tablet, Take 1 tablet (10 mg total) by mouth daily at bedtime, Disp: 90 tablet, Rfl: 1  •  escitalopram (LEXAPRO) 10 mg tablet, Take 1 tab at bedtime x1 week, then take 1/2 tab at bedtime x1 week, then stop , Disp: 11 tablet, Rfl: 0  •  ferrous sulfate 324 (65 Fe) mg, Take 1 tablet (324 mg total) by mouth daily before breakfast, Disp: 90 tablet, Rfl: 0  •  gabapentin (NEURONTIN) 100 mg capsule, Take 1 capsule (100 mg total) by mouth 2 (two) times a day, Disp: 180 capsule, Rfl: 1  •  lidocaine (LIDODERM) 5 %, Apply 2 patches topically daily Remove & Discard patch within 12 hours or as directed by MD, Disp: , Rfl: 0  •  magnesium Oxide (MAG-OX) 400 mg TABS, Take 1 tablet (400 mg total) by mouth daily, Disp: 30 tablet, Rfl: 0  •  naloxone (NARCAN) 4 mg/0 1 mL nasal spray, Administer 1 spray into a nostril   If no response after 2-3 minutes, give another dose in the other nostril using a new spray , Disp: 1 each, Rfl: 0  •  potassium chloride (K-DUR,KLOR-CON) 20 mEq tablet, Take 1 tablet (20 mEq total) by mouth daily, Disp: 30 tablet, Rfl: 0  •  senna-docusate sodium (SENOKOT S) 8 6-50 mg per tablet, Take 2 tablets by mouth 2 (two) times a day, Disp: , Rfl: 0     Plan discussed with Dr Fabiola Miles noted agreement with assessment and plan  Please note:  Voice-recognition software may have been used in the preparation of this document  Occasional wrong word or "sound-alike" substitutions may have occurred due to the inherent limitations of voice recognition software  Interpretation should be guided by context           SHAHIDA Arvizu  2/27/2023  11:30 AM

## 2023-02-27 NOTE — ASSESSMENT & PLAN NOTE
· Chronic   · Compression/Elevation  · Torsemide for edema   · Am-Lac lotion daily to b/l lower legs

## 2023-02-27 NOTE — ASSESSMENT & PLAN NOTE
· Denies pain on exam   · Tylenol 650 mg Q 6 hrs RTC   · Lidocaine patch to back   · Gabapentin 100 mg Q 12 hrs   · Oxycodone 2 5 mg Q 4 hrs PRN   · Tizanidine 2 mg Q 12 hrs PRN for muscle spasms

## 2023-03-01 ENCOUNTER — NURSING HOME VISIT (OUTPATIENT)
Dept: GERIATRICS | Facility: OTHER | Age: 78
End: 2023-03-01

## 2023-03-01 VITALS
OXYGEN SATURATION: 96 % | WEIGHT: 181 LBS | HEART RATE: 82 BPM | TEMPERATURE: 97.1 F | DIASTOLIC BLOOD PRESSURE: 69 MMHG | BODY MASS INDEX: 33.11 KG/M2 | RESPIRATION RATE: 18 BRPM | SYSTOLIC BLOOD PRESSURE: 137 MMHG

## 2023-03-01 DIAGNOSIS — G89.29 OTHER CHRONIC PAIN: ICD-10-CM

## 2023-03-01 DIAGNOSIS — I87.2 VENOUS INSUFFICIENCY (CHRONIC) (PERIPHERAL): ICD-10-CM

## 2023-03-01 DIAGNOSIS — I89.0 LYMPHEDEMA, NOT ELSEWHERE CLASSIFIED: ICD-10-CM

## 2023-03-01 DIAGNOSIS — R26.2 AMBULATORY DYSFUNCTION: ICD-10-CM

## 2023-03-01 DIAGNOSIS — I50.32 CHRONIC DIASTOLIC (CONGESTIVE) HEART FAILURE (HCC): Primary | ICD-10-CM

## 2023-03-01 DIAGNOSIS — G30.1 SEVERE LATE ONSET ALZHEIMER'S DEMENTIA WITH AGITATION (HCC): ICD-10-CM

## 2023-03-01 DIAGNOSIS — F02.C11 SEVERE LATE ONSET ALZHEIMER'S DEMENTIA WITH AGITATION (HCC): ICD-10-CM

## 2023-03-02 NOTE — PROGRESS NOTES
[facility]  [address]  [phone]  FACILITY: [facility]  Code 31 (STR)        NAME: Delmer Rodríguez  AGE: 69 y o  SEX: female CODE STATUS: CPR    DATE OF ENCOUNTER: 3/1/2023    Assessment and Plan     1  Chronic diastolic (congestive) heart failure (HCC)  Assessment & Plan:  Wt Readings from Last 3 Encounters:   03/01/23 82 1 kg (181 lb)   02/27/23 82 1 kg (181 lb)   02/16/23 77 1 kg (169 lb 15 6 oz)     Weights stable   Continue with B/L lower leg edema- unchanged from previous exam   Plan:   · Continue Weekly weights per SNF policy  · Torsemide 40 mg BID with potassium and magnesium supplements  · Daughter notes patient often "hides" her potassium and is requesting nursing to watch her swallow her pills   · Continue Coreg 3 125 mg BID   · BMP stable   · Cardiac diet         2  Venous insufficiency (chronic) (peripheral)  Assessment & Plan:  · Chronic history of venous insufficiency   · RLE with mild erythema on exam   · Recommend Compression and elevation  · Patient very resistant to compression on exam- she did finally agree to wear Ace wraps   · Nursing to monitor skin daily       3  Severe late onset Alzheimer's dementia with agitation ([facility] )  Assessment & Plan:  · AAOx 1  · Pleasantly confused   · Evaluated by Neuropsych last hospital admission- deemed to lack capacity to make medically informed decision  · POA is daughter, Jena Mcgowan-  in SNF currently  · Continue Lexapro 10 mg daily  · Continue Aricept 10 mg Q HS   · Redirect/Reorient  · Fall Precautions  · Delirium Precautions  · Encourage nutition/hydration       4   Lymphedema, not elsewhere classified  Assessment & Plan:  · Chronic   · Daughter notes noncompliance at home with compression- does usually have legs elevated at home in recliner  · Patient agreeable to Ace wraps today  · Would benefit from recliner at SNF  · Continue Torsemide for edema  · Am-Lac lotion bid to b/l lower legs for dry skin 5  Other chronic pain  Assessment & Plan:  · Denies pain on exam   · Tylenol 650 mg Q 6 hrs RTC   · Lidocaine patch to back   · Gabapentin 100 mg Q 12 hrs   · Oxycodone 2 5 mg Q 4 hrs PRN   · Tizanidine 2 mg Q 12 hrs PRN for muscle spasms       6  Ambulatory dysfunction  Assessment & Plan:  · Multifactorial in setting of deconditioning, obesitiy, lymphedema, chf  · PT/OT  · W/C bound at baseline   · Fall Precautions  · Per , once STR is completed patient will transition to LTC at facility          All medications and routine orders were reviewed and updated as needed      Chief Complaint     STR follow up visit    Past Medical and Surgica History      Past Medical History:   Diagnosis Date   • A-fib ([place] [address] ) 12/29/2021   • Acute respiratory failure with hypoxia ([place] [address] ) 11/12/2021   • Altered mental status, unspecified 2/9/2023        • Anxiety    • Arthritis    • Back pain    • Cellulitis    • Cellulitis, unspecified 12/30/2021   • CHF (congestive heart failure) ([facility] )    • Colon cancer screening 8/3/2022   • Edema of both lower extremities due to peripheral venous insufficiency    • Edema of both lower extremities due to peripheral venous insufficiency    • Encounter for screening mammogram for malignant neoplasm of breast 3/21/2022   • Encounter for support and coordination of transition of care 9/26/2022   • Erythema of lower extremity 11/12/2021   • Fibromyalgia    • Great toe pain, right 10/6/2022   • Hypertension    • Hypotension 9/17/2022   • Leukocytosis 10/6/2022   • Melena 8/20/2022   • Osteoporosis screening 8/3/2022   • Sjogren syndrome, unspecified ([facility] )    • Watery diarrhea 2/9/2023     Past Surgical History:   Procedure Laterality Date   • KNEE CARTILAGE SURGERY     • OK OPTX FEM SHFT FX W/INSJ IMED IMPLT W/WO SCREW Left 8/22/2022    Procedure: LEFT RETROGRADE IM SHAN;  Surgeon: Yinka Aleman MD;  [facility]: [facility];  Service: Orthopedics   • REPLACEMENT TOTAL KNEE BILATERAL No Known Allergies       History of Present Illness     Elsy Grimm is a 68 y/o female [facility]  She has a  PMH of Dementia, CHF, AFIB, HTN, Lymphedema, Ambulatory Dysfunction, Chronic pain syndrome with opioid dependency  She is seen today for routine STR f/u visit  She was admitted to [facility] from home for long term stay  She was living at home with , who is her primary caregiver   was recently hospitalized and is at SNF now  Reviewed recent hospital stay from 2/9/23-2/17/23 where patient was admitted with Alt  MS with auditory and visual hallucinations  She was seen by Neuropsychiatry and noted to lack capacity to make informed medical decisions  Etiology of alt  Ms thought to be multifactorial in setting of electrolyte abnormalities (low potassium) opioid withdrawal and cognitive impairment  Her Torsemide was decreased to 40 mg daily and her metolazone was discontinued  Pain management regimen recommended at time of discharge was Tizanidine 2 5 Q 12 prn for muscle spasms and oxycodone 5 mg Q 12 hrs prn for pain  She needs outpatient f/u with nephrology and cardiology  Seen and examined at bedside in stable condition  She is a poor historian due to Alzheimer's/Dementia  Patient's daughter Lorin Islas is at bedside, all questions answered  Patient offers no complaints on exam, she states, "I feel good, actually better " Patient states she does need assistance with ADLs and uses a wheelchair for mobility  She is oob sitting in bedside chair  She is verbal but confused speech, able to make her needs known  She had loose stools earlier in week and states this has stopped  Patient states she is eating well, daughter confirms this  Patient denies abdominal pain,fever or chills  She states her legs are always swollen and do not look any different to her then they do at home  She offers no other complains at this time   She is actively participating in therapy, staff/nursing have no other concerns at this time  The patient's allergies, past medical, surgical, social and family history were reviewed and unchanged  Review of Systems     Review of Systems   Unable to perform ROS: Dementia         Objective     Vitals:   Vitals:    03/01/23 2003   BP: 137/69   Pulse: 82   Resp: 18   Temp: (!) 97 1 °F (36 2 °C)   SpO2: 96%         Physical Exam  Vitals and nursing note reviewed  Constitutional:       General: She is not in acute distress  Appearance: Normal appearance  She is obese  She is not ill-appearing  HENT:      Head: Normocephalic and atraumatic  Nose: No congestion or rhinorrhea  Mouth/Throat:      Mouth: Mucous membranes are moist    Eyes:      General: No scleral icterus  Conjunctiva/sclera: Conjunctivae normal       Pupils: Pupils are equal, round, and reactive to light  Cardiovascular:      Rate and Rhythm: Normal rate  Rhythm irregular  Pulses: Normal pulses  Heart sounds: Normal heart sounds  No murmur heard  Pulmonary:      Effort: Pulmonary effort is normal  No respiratory distress  Breath sounds: Normal breath sounds  No wheezing, rhonchi or rales  Abdominal:      General: Bowel sounds are normal  There is no distension  Palpations: Abdomen is soft  There is no mass  Tenderness: There is no abdominal tenderness  Hernia: No hernia is present  Musculoskeletal:         General: No swelling or tenderness  Right lower leg: Edema (+3) present  Left lower leg: Edema (+2) present  Lymphadenopathy:      Cervical: No cervical adenopathy  Skin:     General: Skin is warm and dry  Coloration: Skin is not pale  Findings: Erythema (mild to RLE ) present  No rash  Neurological:      General: No focal deficit present  Mental Status: She is alert  Mental status is at baseline  She is disoriented  Motor: Weakness present        Gait: Gait abnormal  Comments: AAOx2-3  Forgetful  Clear speech , pleasant    Psychiatric:         Mood and Affect: Mood normal          Behavior: Behavior normal          Pertinent Laboratory/Diagnostic Studies:   Reviewed in facility chart-stable      Current Medications   Medications reviewed and updated see facility [facility] for details  Current Outpatient Medications:   •  acetaminophen (TYLENOL) 325 mg tablet, Take 2 tablets (650 mg total) by mouth every 6 (six) hours, Disp: , Rfl: 0  •  ammonium lactate (LAC-HYDRIN) 12 % cream, Apply topically 2 (two) times a day, Disp: 385 g, Rfl: 0  •  apixaban (Eliquis) 5 mg, Take 1 tablet (5 mg total) by mouth 2 (two) times a day Lot [id number] exp 10/2024, Disp: 56 tablet, Rfl: 0  •  carvedilol (COREG) 3 125 mg tablet, Take 1 tablet (3 125 mg total) by mouth 2 (two) times a day with meals, Disp: 60 tablet, Rfl: 2  •  donepezil (ARICEPT) 10 mg tablet, Take 1 tablet (10 mg total) by mouth daily at bedtime, Disp: 90 tablet, Rfl: 1  •  escitalopram (LEXAPRO) 10 mg tablet, Take 1 tab at bedtime x1 week, then take 1/2 tab at bedtime x1 week, then stop , Disp: 11 tablet, Rfl: 0  •  ferrous sulfate 324 (65 Fe) mg, Take 1 tablet (324 mg total) by mouth daily before breakfast, Disp: 90 tablet, Rfl: 0  •  gabapentin (NEURONTIN) 100 mg capsule, Take 1 capsule (100 mg total) by mouth 2 (two) times a day, Disp: 180 capsule, Rfl: 1  •  lidocaine (LIDODERM) 5 %, Apply 2 patches topically daily Remove & Discard patch within 12 hours or as directed by MD, Disp: , Rfl: 0  •  magnesium Oxide (MAG-OX) 400 mg TABS, Take 1 tablet (400 mg total) by mouth daily, Disp: 30 tablet, Rfl: 0  •  naloxone (NARCAN) 4 mg/0 1 mL nasal spray, Administer 1 spray into a nostril   If no response after 2-3 minutes, give another dose in the other nostril using a new spray , Disp: 1 each, Rfl: 0  •  omeprazole (PriLOSEC) 20 mg delayed release capsule, Take 20 mg by mouth daily, Disp: , Rfl:   •  potassium chloride (K-DUR,KLOR-CON) 20 mEq tablet, Take 1 tablet (20 mEq total) by mouth daily, Disp: 30 tablet, Rfl: 0  •  senna-docusate sodium (SENOKOT S) 8 6-50 mg per tablet, Take 2 tablets by mouth 2 (two) times a day, Disp: , Rfl: 0  •  torsemide (DEMADEX) 20 mg tablet, Take 2 tablets (40 mg total) by mouth 2 (two) times a day, Disp: , Rfl: 0     Plan discussed with Dr Maryanne Keller noted agreement with assessment and plan  Please note:  Voice-recognition software may have been used in the preparation of this document  Occasional wrong word or "sound-alike" substitutions may have occurred due to the inherent limitations of voice recognition software  Interpretation should be guided by context           SHAHIDA Rose  3/1/2023  8:04 PM

## 2023-03-05 ENCOUNTER — TELEPHONE (OUTPATIENT)
Dept: OTHER | Facility: OTHER | Age: 78
End: 2023-03-05

## 2023-03-06 ENCOUNTER — TELEPHONE (OUTPATIENT)
Dept: OTHER | Facility: OTHER | Age: 78
End: 2023-03-06

## 2023-03-07 ENCOUNTER — NURSING HOME VISIT (OUTPATIENT)
Dept: GERIATRICS | Facility: OTHER | Age: 78
End: 2023-03-07

## 2023-03-07 ENCOUNTER — TELEPHONE (OUTPATIENT)
Dept: INTERNAL MEDICINE CLINIC | Facility: CLINIC | Age: 78
End: 2023-03-07

## 2023-03-07 VITALS
SYSTOLIC BLOOD PRESSURE: 124 MMHG | OXYGEN SATURATION: 98 % | TEMPERATURE: 97.8 F | BODY MASS INDEX: 33.11 KG/M2 | WEIGHT: 181 LBS | HEART RATE: 66 BPM | RESPIRATION RATE: 18 BRPM | DIASTOLIC BLOOD PRESSURE: 68 MMHG

## 2023-03-07 DIAGNOSIS — I87.2 VENOUS INSUFFICIENCY (CHRONIC) (PERIPHERAL): ICD-10-CM

## 2023-03-07 DIAGNOSIS — F41.3 OTHER MIXED ANXIETY DISORDERS: ICD-10-CM

## 2023-03-07 DIAGNOSIS — I48.91 ATRIAL FIBRILLATION, UNSPECIFIED TYPE (HCC): ICD-10-CM

## 2023-03-07 DIAGNOSIS — F02.C11 SEVERE LATE ONSET ALZHEIMER'S DEMENTIA WITH AGITATION (HCC): Primary | ICD-10-CM

## 2023-03-07 DIAGNOSIS — I50.32 CHRONIC DIASTOLIC (CONGESTIVE) HEART FAILURE (HCC): ICD-10-CM

## 2023-03-07 DIAGNOSIS — Z63.4 RECENT BEREAVEMENT: ICD-10-CM

## 2023-03-07 DIAGNOSIS — G30.1 SEVERE LATE ONSET ALZHEIMER'S DEMENTIA WITH AGITATION (HCC): Primary | ICD-10-CM

## 2023-03-07 RX ORDER — ALPRAZOLAM 0.25 MG/1
0.25 TABLET ORAL DAILY
Qty: 2 TABLET | Refills: 0 | Status: SHIPPED | OUTPATIENT
Start: 2023-03-07

## 2023-03-07 SDOH — SOCIAL STABILITY - SOCIAL INSECURITY: DISSAPEARANCE AND DEATH OF FAMILY MEMBER: Z63.4

## 2023-03-07 NOTE — ASSESSMENT & PLAN NOTE
· Multifactorial in setting of deconditioning, obesitiy, lymphedema, chf  · PT/OT  · W/C bound at baseline   · Fall Precautions  · Per , once STR is completed patient will transition to LTC at facility

## 2023-03-07 NOTE — ASSESSMENT & PLAN NOTE
· AAOx 1  · Pleasantly confused   · Evaluated by Neuropsych last hospital admission- deemed to lack capacity to make medically informed decision  · POA is daughter, Brooke Glass    · Patient's  passes at facilty and pateint upset today about "still being here" Daughter states her Mom can not care for herself at home alone and will need LTC placement   · Patient states, "I'm going home" Attempted distraction/redirection - I anticipate patient to have a difficulty with transition to LTC   · Nursing to monitor behaviors and notify MD/NP of changes     · Continue Lexapro 10 mg daily  · Continue Aricept 10 mg Q HS   · Continue Seroquel 25 mg Q HS (started 3/3/23 at SNF)  · Redirect/Reorient  · Fall Precautions  · Delirium Precautions  · Encourage nutition/hydration

## 2023-03-07 NOTE — ASSESSMENT & PLAN NOTE
Wt Readings from Last 3 Encounters:   03/01/23 82 1 kg (181 lb)   02/27/23 82 1 kg (181 lb)   02/16/23 77 1 kg (169 lb 15 6 oz)     Weights stable   Continue with B/L lower leg edema- unchanged from previous exam   Plan:   · Continue Weekly weights per SNF policy  · Torsemide 40 mg BID with potassium and magnesium supplements  · Daughter notes patient often "hides" her potassium and is requesting nursing to watch her swallow her pills   · Continue Coreg 3 125 mg BID   · BMP stable   · Cardiac diet

## 2023-03-07 NOTE — PROGRESS NOTES
[facility]  [address]  [phone]  FACILITY: [facility]  Code 31 (STR)        NAME: Hector Rodríguez  AGE: 69 y o  SEX: female CODE STATUS: CPR    DATE OF ENCOUNTER: 3/7/2023    Assessment and Plan     1  Severe late onset Alzheimer's dementia with agitation ([address] )  Assessment & Plan:  · AAOx 1  · Pleasantly confused   · Evaluated by Neuropsych last hospital admission- deemed to lack capacity to make medically informed decision  · POA is daughter, Kristian Cardoza    · Patient's  passes at [facility] and pateint upset today about "still being here" Daughter states her Mom can not care for herself at home alone and will need LTC placement   · Patient states, "I'm going home" Attempted distraction/redirection - I anticipate patient to have a difficulty with transition to LTC   · Nursing to monitor behaviors and notify MD/NP of changes     · Continue Lexapro 10 mg daily  · Continue Aricept 10 mg Q HS   · Continue Seroquel 25 mg Q HS (started 3/3/23 at [facility])  · Redirect/Reorient  · Fall Precautions  · Delirium Precautions  · Encourage nutition/hydration     Orders:  -     ALPRAZolam (XANAX) 0 25 mg tablet; Take 1 tablet (0 25 mg total) by mouth daily X 2 days on Thursday 3/9 at 1600 and Fri 3/10 at 0900 prior to Viewing and     2  Other mixed anxiety disorders  Assessment & Plan:  · Long standing hx of anxiety   · Continue home dose Lexapro   · Recently her  passed at facility who was her caregiver  · Daughter taking pateitn to viewing and  this week and is requesting if she can have Xanax prior  Daughter states she has taken Xanax in past for funerals in past and did well   · Ordered Xanax 0 25 mg PO x 1 on Thursday and 0 25 mg PO x 1 on Friday before viewing and  of her late   · E-script sent to [facility] pharmacy    Orders:  -     ALPRAZolam (XANAX) 0 25 mg tablet;  Take 1 tablet (0 25 mg total) by mouth daily X 2 days on Thursday 3/9 at 1600 and Fri 3/10 at 0900 prior to Viewing and     3  Recent bereavement  Assessment & Plan:  · Patient's  passed on 3/5/23 at [facility]  · Patient is able to discuss at bedside today  · She is not tearful at this time   · She is more focused on wanting to leave the facility - daughter is requesting Xanax prior to the Viewing and Zeina Garcia   · Escript sent to [employer] [facility]     Orders:  -     ALPRAZolam Loman Av[place]) 0 25 mg tablet; Take 1 tablet (0 25 mg total) by mouth daily X 2 days on Thursday 3/9 at 1600 and Fri 3/10 at 0900 prior to Viewing and     4  Chronic diastolic (congestive) heart failure (HCC)  Assessment & Plan:  Wt Readings from Last 3 Encounters:   23 82 1 kg (181 lb)   23 82 1 kg (181 lb)   23 82 1 kg (181 lb)     Weights stable   Continue with B/L lower leg edema- unchanged from previous exam - better when legs are elevated- she is resistent to Compression stockings - offered ACE wraps- still resistant   Plan:   · Continue Weekly weights per [facility] policy  · Torsemide 40 mg BID with potassium and magnesium supplements  · Daughter notes patient often "hides" her potassium and is requesting nursing to watch her swallow her pills   · Continue Coreg 3 125 mg BID   · BMP stable   · Cardiac diet         5  Atrial fibrillation, unspecified type [facility])  Assessment & Plan:  Controlled with Coreg 3 125 BID  AC with Eliquis 5 mg BID       6  Venous insufficiency (chronic) (peripheral)  Assessment & Plan:  · Chronic history of venous insufficiency   · RLE with mild erythema on exam   · Recommend Compression and elevation  · Patient very resistant to compression on exam- she did finally agree to wear Ace wraps   · Nursing to monitor skin daily          All medications and routine orders were reviewed and updated as needed      Chief Complaint     STR follow up visit    Past Medical and Surgica History      Past Medical History:   Diagnosis Date   • A-fib ([facility] ) 2021   • Acute respiratory failure with hypoxia ([facility] ) 11/12/2021   • Altered mental status, unspecified 2/9/2023        • Anxiety    • Arthritis    • Back pain    • Cellulitis    • Cellulitis, unspecified 12/30/2021   • CHF (congestive heart failure) ([facility] )    • Colon cancer screening 8/3/2022   • Edema of both lower extremities due to peripheral venous insufficiency    • Edema of both lower extremities due to peripheral venous insufficiency    • Encounter for screening mammogram for malignant neoplasm of breast 3/21/2022   • Encounter for support and coordination of transition of care 9/26/2022   • Erythema of lower extremity 11/12/2021   • Fibromyalgia    • Great toe pain, right 10/6/2022   • Hypertension    • Hypotension 9/17/2022   • Leukocytosis 10/6/2022   • Melena 8/20/2022   • Osteoporosis screening 8/3/2022   • Sjogren syndrome, unspecified ([facility] )    • Watery diarrhea 2/9/2023     Past Surgical History:   Procedure Laterality Date   • KNEE CARTILAGE SURGERY     • IA OPTX FEM SHFT FX W/INSJ IMED IMPLT W/WO SCREW Left 8/22/2022    Procedure: LEFT RETROGRADE IM SHAN;  Surgeon: Adalgisa Qiu MD;  Location: [facility];  Service: Orthopedics   • REPLACEMENT TOTAL KNEE BILATERAL       No Known Allergies       History of Present Illness     Kimberly Vivas is a 70 y/o female admitted to [address] [place] [address] for STR  She has a  PMH of Dementia, CHF, AFIB, HTN, Lymphedema, Ambulatory Dysfunction, Chronic pain syndrome with opioid dependency  She is seen today for routine STR f/u visit  [facility] for long term stay  She was living at home with , who is her primary caregiver   was recently hospitalized and is at SNF now  Reviewed recent hospital stay from 2/9/23-2/17/23 where patient was admitted with Alt  MS with auditory and visual hallucinations  She was seen by Neuropsychiatry and noted to lack capacity to make informed medical decisions  Etiology of alt   Ms thought to be multifactorial in setting of electrolyte abnormalities (low potassium) opioid withdrawal and cognitive impairment  Her Torsemide was decreased to 40 mg daily and her metolazone was discontinued  Pain management regimen recommended at time of discharge was Tizanidine 2 5 Q 12 prn for muscle spasms and oxycodone 5 mg Q 12 hrs prn for pain  She needs outpatient f/u with nephrology and cardiology  Seen and examined at bedside in stable condition  She is a poor historian due to Alzheimer's/Dementia  Patient's daughter Sadie Davis is at bedside, all questions answered  Patient is stating "I feel fine"  She is requesting to go home  She states she has her husbands  to go to on   Per daughter, viewing is Thursday and  is Friday, daughter will be taking patient to both occations and is asking if a one time dose of xanax can be given prior to each event  Otherwise pateint offers no other complaints at this time  Patient needs assistance with ADLs and uses a wheelchair for mobility  She is verbal but confused speech, able to make her needs known  Patient states she is eating well, daughter confirms this  Patient denies abdominal pain,fever or chills  She states her legs are always swollen and do not look any different to her then they do at home  She is actively participating in therapy, staff/nursing have no other concerns at this time  The patient's allergies, past medical, surgical, social and family history were reviewed and unchanged  Review of Systems     Review of Systems   Unable to perform ROS: Dementia         Objective     Vitals:   Vitals:    23 0748   BP: 124/68   Pulse: 66   Resp: 18   Temp: 97 8 °F (36 6 °C)   SpO2: 98%         Physical Exam  Vitals and nursing note reviewed  Constitutional:       General: She is not in acute distress  Appearance: Normal appearance  She is obese  She is not ill-appearing  HENT:      Head: Normocephalic and atraumatic  Nose: No congestion or rhinorrhea  Mouth/Throat:      Mouth: Mucous membranes are moist    Eyes:      General: No scleral icterus  Conjunctiva/sclera: Conjunctivae normal       Pupils: Pupils are equal, round, and reactive to light  Cardiovascular:      Rate and Rhythm: Normal rate  Rhythm irregular  Pulses: Normal pulses  Heart sounds: Normal heart sounds  No murmur heard  Pulmonary:      Effort: Pulmonary effort is normal  No respiratory distress  Breath sounds: Normal breath sounds  No wheezing, rhonchi or rales  Abdominal:      General: Bowel sounds are normal  There is no distension  Palpations: Abdomen is soft  There is no mass  Tenderness: There is no abdominal tenderness  Hernia: No hernia is present  Musculoskeletal:         General: No swelling or tenderness  Right lower leg: Edema (+3) present  Left lower leg: Edema (+2) present  Lymphadenopathy:      Cervical: No cervical adenopathy  Skin:     General: Skin is warm and dry  Coloration: Skin is not pale  Findings: Erythema (mild to RLE -improved since last visit) present  No rash  Neurological:      General: No focal deficit present  Mental Status: She is alert  Mental status is at baseline  She is disoriented  Motor: Weakness present  Gait: Gait abnormal       Comments: AAOx2-3  Forgetful  Clear speech , pleasant    Psychiatric:         Mood and Affect: Mood normal          Behavior: Behavior normal          Pertinent Laboratory/Diagnostic Studies:   Reviewed in facility chart-stable      Current Medications   Medications reviewed and updated see facility [facility] for details        Current Outpatient Medications:   •  ALPRAZolam (XANAX) 0 25 mg tablet, Take 1 tablet (0 25 mg total) by mouth daily X 2 days on Thursday 3/9 at 1600 and Fri 3/10 at 0900 prior to Viewing and , Disp: 2 tablet, Rfl: 0  •  acetaminophen (TYLENOL) 325 mg tablet, Take 2 tablets (650 mg total) by mouth every 6 (six) hours, Disp: , Rfl: 0  •  ammonium lactate (LAC-HYDRIN) 12 % cream, Apply topically 2 (two) times a day, Disp: 385 g, Rfl: 0  •  apixaban (Eliquis) 5 mg, Take 1 tablet (5 mg total) by mouth 2 (two) times a day Lot [id number] exp 10/2024, Disp: 56 tablet, Rfl: 0  •  carvedilol (COREG) 3 125 mg tablet, Take 1 tablet (3 125 mg total) by mouth 2 (two) times a day with meals, Disp: 60 tablet, Rfl: 2  •  donepezil (ARICEPT) 10 mg tablet, Take 1 tablet (10 mg total) by mouth daily at bedtime, Disp: 90 tablet, Rfl: 1  •  escitalopram (LEXAPRO) 10 mg tablet, Take 1 tab at bedtime x1 week, then take 1/2 tab at bedtime x1 week, then stop , Disp: 11 tablet, Rfl: 0  •  ferrous sulfate 324 (65 Fe) mg, Take 1 tablet (324 mg total) by mouth daily before breakfast, Disp: 90 tablet, Rfl: 0  •  gabapentin (NEURONTIN) 100 mg capsule, Take 1 capsule (100 mg total) by mouth 2 (two) times a day, Disp: 180 capsule, Rfl: 1  •  lidocaine (LIDODERM) 5 %, Apply 2 patches topically daily Remove & Discard patch within 12 hours or as directed by MD, Disp: , Rfl: 0  •  magnesium Oxide (MAG-OX) 400 mg TABS, Take 1 tablet (400 mg total) by mouth daily, Disp: 30 tablet, Rfl: 0  •  naloxone (NARCAN) 4 mg/0 1 mL nasal spray, Administer 1 spray into a nostril  If no response after 2-3 minutes, give another dose in the other nostril using a new spray , Disp: 1 each, Rfl: 0  •  omeprazole (PriLOSEC) 20 mg delayed release capsule, Take 20 mg by mouth daily, Disp: , Rfl:   •  potassium chloride (K-DUR,KLOR-CON) 20 mEq tablet, Take 1 tablet (20 mEq total) by mouth daily, Disp: 30 tablet, Rfl: 0  •  senna-docusate sodium (SENOKOT S) 8 6-50 mg per tablet, Take 2 tablets by mouth 2 (two) times a day, Disp: , Rfl: 0  •  torsemide (DEMADEX) 20 mg tablet, Take 2 tablets (40 mg total) by mouth 2 (two) times a day, Disp: , Rfl: 0     Plan discussed with Dr Arlene Costello noted agreement with assessment and plan        Please note: Voice-recognition software may have been used in the preparation of this document  Occasional wrong word or "sound-alike" substitutions may have occurred due to the inherent limitations of voice recognition software  Interpretation should be guided by context           SHAHIDA Sanchez  3/7/2023  7:49 AM

## 2023-03-07 NOTE — ASSESSMENT & PLAN NOTE
· Patient's  passed on 3/5/23 at SNF  · Patient is able to discuss at bedside today  · She is not tearful at this time   · She is more focused on wanting to leave the facility - daughter is requesting Xanax prior to the Viewing and Kierra Snowden   · Escript sent to [employer] [facility]

## 2023-03-07 NOTE — ASSESSMENT & PLAN NOTE
Wt Readings from Last 3 Encounters:   03/07/23 82 1 kg (181 lb)   03/01/23 82 1 kg (181 lb)   02/27/23 82 1 kg (181 lb)     Weights stable   Continue with B/L lower leg edema- unchanged from previous exam - better when legs are elevated- she is resistent to Compression stockings - offered ACE wraps- still resistant   Plan:   · Continue Weekly weights per SNF policy  · Torsemide 40 mg BID with potassium and magnesium supplements  · Daughter notes patient often "hides" her potassium and is requesting nursing to watch her swallow her pills   · Continue Coreg 3 125 mg BID   · BMP stable   · Cardiac diet

## 2023-03-07 NOTE — ASSESSMENT & PLAN NOTE
· AAOx 1  · Pleasantly confused   · Evaluated by Neuropsych last hospital admission- deemed to lack capacity to make medically informed decision  · POA is daughter, Florina Cortez-  in SNF currently  · Continue Lexapro 10 mg daily  · Continue Aricept 10 mg Q HS   · Redirect/Reorient  · Fall Precautions  · Delirium Precautions  · Encourage nutition/hydration

## 2023-03-07 NOTE — ASSESSMENT & PLAN NOTE
· Long standing hx of anxiety   · Continue home dose Lexapro   · Recently her  passed at facility who was her caregiver  · Daughter taking pateitn to viewing and  this week and is requesting if she can have Xanax prior  Daughter states she has taken Xanax in past for funerals in past and did well   · Ordered Xanax 0 25 mg PO x 1 on Thursday and 0 25 mg PO x 1 on Friday before viewing and  of her late      · E-script sent to [facility] pharmacy

## 2023-03-07 NOTE — ASSESSMENT & PLAN NOTE
· Chronic history of venous insufficiency   · RLE with mild erythema on exam   · Recommend Compression and elevation  · Patient very resistant to compression on exam- she did finally agree to wear Ace wraps   · Nursing to monitor skin daily

## 2023-03-07 NOTE — ASSESSMENT & PLAN NOTE
· Chronic   · Daughter notes noncompliance at home with compression- does usually have legs elevated at home in recliner  · Patient agreeable to Ace wraps today  · Would benefit from recliner at [facility]  · Continue Torsemide for edema  · Am-Lac lotion bid to b/l lower legs for dry skin

## 2023-03-07 NOTE — TELEPHONE ENCOUNTER
Patient called me and states her  has passed away and she went into nursing home to be near [facility], but now that he is not there , she doesn't see the need for being in nursing home, she wants to speak to dr mullen,

## 2023-03-09 ENCOUNTER — NURSING HOME VISIT (OUTPATIENT)
Dept: GERIATRICS | Facility: OTHER | Age: 78
End: 2023-03-09

## 2023-03-09 ENCOUNTER — PATIENT OUTREACH (OUTPATIENT)
Dept: FAMILY MEDICINE CLINIC | Facility: CLINIC | Age: 78
End: 2023-03-09

## 2023-03-09 VITALS
RESPIRATION RATE: 18 BRPM | TEMPERATURE: 97.8 F | WEIGHT: 182 LBS | BODY MASS INDEX: 33.29 KG/M2 | SYSTOLIC BLOOD PRESSURE: 132 MMHG | DIASTOLIC BLOOD PRESSURE: 78 MMHG | OXYGEN SATURATION: 98 % | HEART RATE: 72 BPM

## 2023-03-09 DIAGNOSIS — I87.2 VENOUS INSUFFICIENCY (CHRONIC) (PERIPHERAL): ICD-10-CM

## 2023-03-09 DIAGNOSIS — G89.29 OTHER CHRONIC PAIN: ICD-10-CM

## 2023-03-09 DIAGNOSIS — G47.09 OTHER INSOMNIA: ICD-10-CM

## 2023-03-09 DIAGNOSIS — G30.1 SEVERE LATE ONSET ALZHEIMER'S DEMENTIA WITH AGITATION (HCC): Primary | ICD-10-CM

## 2023-03-09 DIAGNOSIS — I50.32 CHRONIC DIASTOLIC (CONGESTIVE) HEART FAILURE (HCC): ICD-10-CM

## 2023-03-09 DIAGNOSIS — I48.91 ATRIAL FIBRILLATION, UNSPECIFIED TYPE (HCC): ICD-10-CM

## 2023-03-09 DIAGNOSIS — F02.C11 SEVERE LATE ONSET ALZHEIMER'S DEMENTIA WITH AGITATION (HCC): Primary | ICD-10-CM

## 2023-03-09 RX ORDER — CHOLECALCIFEROL (VITAMIN D3) 125 MCG
1 CAPSULE ORAL
Refills: 0
Start: 2023-03-09

## 2023-03-09 NOTE — PROGRESS NOTES
[facility]  Lance [address]is 80  [phone]  FACILITY: [facility]  Code 32        NAME: Ezio Rodríguez  AGE: 69 y o  SEX: female CODE STATUS: CPR    DATE OF ENCOUNTER: 3/9/2023    Assessment and Plan     1  Severe late onset Alzheimer's dementia with agitation ([facility] [address] )  Assessment & Plan:  · AAOx 1  · Pleasantly confused   · Evaluated by Neuropsych last hospital admission- deemed to lack capacity to make medically informed decision  · POA is daughter, Nicholette Life    · Patient's  recently passed away at [facility] - was patient's primary caregiver  · Daughter states her Mom can not care for herself at home alone and will need LTC placement   · Patient has transitioned from [facility] [address] [place] to LTC at facility   · [facility]   Medication Regimen:   · Continue Lexapro 10 mg daily  · Continue Aricept 10 mg Q HS   · Continue Seroquel 25 mg Q HS (started 3/3/23 at SNF)  · Redirect/Reorient  · Fall Precautions  · Delirium Precautions  · Encourage nutition/hydration       2  Atrial fibrillation, unspecified type [facility])  Assessment & Plan:  Controlled with Coreg 3 125 BID- with hold parameters  AC with Eliquis 5 mg BID       3  Chronic diastolic (congestive) heart failure (HCC)  Assessment & Plan:  Wt Readings from Last 3 Encounters:   03/09/23 82 6 kg (182 lb)   03/07/23 82 1 kg (181 lb)   03/01/23 82 1 kg (181 lb)     Weights stable   Continue with B/L lower leg edema- unchanged from previous exam - better when legs are elevated- she is resistent to Compression stockings - offered ACE wraps- still resistant   Plan:   · Continue Weekly weights per SNF policy  · Torsemide 40 mg BID with potassium and magnesium supplements  · Daughter notes patient often "hides" her potassium and is requesting nursing to watch her swallow her pills   · Continue Coreg 3 125 mg BID with hold parameters  · BMP stable   · Cardiac diet         4   Venous insufficiency (chronic) (peripheral)  Assessment & Plan:  · Chronic history of venous insufficiency   · RLE with mild erythema on exam - improved from last week   · Recommend Compression and elevation  · Patient very resistant to compression - she did finally agree to wear Ace wraps   · Nursing to monitor skin daily       5  Other chronic pain  Assessment & Plan:  · Denies pain on exam   · Tylenol 650 mg Q 6 hrs RTC   · Lidocaine patch to back   · Gabapentin 100 mg Q 12 hrs   · Oxycodone 2 5 mg Q 4 hrs PRN   · Tizanidine 2 mg Q 12 hrs PRN for muscle spasms       6  Other insomnia  Assessment & Plan:  C/O having hard time sleeping  Agreeable to try Melatonin   Continue Seroquel     Orders:  -     Melatonin 5 MG TABS; Take 1 tablet (5 mg total) by mouth daily at bedtime       All medications and routine orders were reviewed and updated as needed      Chief Complaint     LTC follow up visit    Past Medical and Surgica History      Past Medical History:   Diagnosis Date   • A-fib ([facility] [address] ) 12/29/2021   • Acute respiratory failure with hypoxia ([facility] [address] ) 11/12/2021   • Altered mental status, unspecified 2/9/2023        • Anxiety    • Arthritis    • Back pain    • Cellulitis    • Cellulitis, unspecified 12/30/2021   • CHF (congestive heart failure) ([facility] [address] )    • Colon cancer screening 8/3/2022   • Edema of both lower extremities due to peripheral venous insufficiency    • Edema of both lower extremities due to peripheral venous insufficiency    • Encounter for screening mammogram for malignant neoplasm of breast 3/21/2022   • Encounter for support and coordination of transition of care 9/26/2022   • Erythema of lower extremity 11/12/2021   • Fibromyalgia    • Great toe pain, right 10/6/2022   • Hypertension    • Hypotension 9/17/2022   • Leukocytosis 10/6/2022   • Melena 8/20/2022   • Osteoporosis screening 8/3/2022   • Sjogren syndrome, unspecified ([facility] [address] )    • Watery diarrhea 2/9/2023     Past Surgical History:   Procedure Laterality Date   • KNEE CARTILAGE SURGERY     • RI OPTX FEM SHFT FX W/INSJ IMED IMPLT W/WO SCREW Left 2022    Procedure: LEFT RETROGRADE IM SHAN;  Surgeon: Jones Cruz MD;  Location: [facility];  Service: Orthopedics   • REPLACEMENT TOTAL KNEE BILATERAL       No Known Allergies       History of Present Illness     Tristin Rueda is a 70 y/o female admitted to [facility] for STR  She has a  PMH of Dementia, CHF, AFIB, HTN, Lymphedema, Ambulatory Dysfunction, Chronic pain syndrome with opioid dependency  She is seen today for transition to LTC at facility  [facility]  She was living at home with , who is her primary caregiver   was recently hospitalized and is at SNF now  Reviewed recent hospital stay from 23-23 where patient was admitted with Alt  MS with auditory and visual hallucinations  She was seen by Neuropsychiatry and noted to lack capacity to make informed medical decisions  Etiology of alt  Ms thought to be multifactorial in setting of electrolyte abnormalities (low potassium) opioid withdrawal and cognitive impairment  Her Torsemide was decreased to 40 mg daily and her metolazone was discontinued  Pain management regimen recommended at time of discharge was Tizanidine 2 5 Q 12 prn for muscle spasms and oxycodone 5 mg Q 12 hrs prn for pain  She needs outpatient f/u with nephrology and cardiology  Seen and examined at bedside in stable condition  She is a poor historian due to Alzheimer's/Dementia  Patient is out of bed in chair getting ready for her husbands viewing, her daughter will be taking her tonight  Patient is somewhat tearful  Patient states she is also going to the  Friday  Patient states she is feeling "good" she offers no complaints, she states, "nothings wrong, I feel good " Patient denies CP/SOB/Palpitations  She denies N/V/D   States she is eating and drinking well, denies any bowel or bladder issues  She states she has a hard time sleeping at night, has been this way, "my whole life " She requires assistance with ADLs/IADLs, uses a RW or W/C for mobility  Nursing have no concerns at time  The patient's allergies, past medical, surgical, social and family history were reviewed and unchanged  Review of Systems     Review of Systems   Unable to perform ROS: Dementia         Objective     Vitals:   Vitals:    03/09/23 1513   BP: 132/78   Pulse: 72   Resp: 18   Temp: 97 8 °F (36 6 °C)   SpO2: 98%         Physical Exam  Vitals and nursing note reviewed  Constitutional:       General: She is not in acute distress  Appearance: Normal appearance  She is obese  She is not ill-appearing  HENT:      Head: Normocephalic and atraumatic  Nose: No congestion or rhinorrhea  Mouth/Throat:      Mouth: Mucous membranes are moist    Eyes:      General: No scleral icterus  Conjunctiva/sclera: Conjunctivae normal       Pupils: Pupils are equal, round, and reactive to light  Cardiovascular:      Rate and Rhythm: Normal rate  Rhythm irregular  Pulses: Normal pulses  Heart sounds: Normal heart sounds  No murmur heard  Pulmonary:      Effort: Pulmonary effort is normal  No respiratory distress  Breath sounds: Normal breath sounds  No wheezing, rhonchi or rales  Abdominal:      General: Bowel sounds are normal  There is no distension  Palpations: Abdomen is soft  There is no mass  Tenderness: There is no abdominal tenderness  Hernia: No hernia is present  Musculoskeletal:         General: No swelling or tenderness  Right lower leg: Edema (+1) present  Left lower leg: Edema (+1) present  Lymphadenopathy:      Cervical: No cervical adenopathy  Skin:     General: Skin is warm and dry  Coloration: Skin is not pale  Findings: Erythema (mild to RLE -improved since last visit- no s/s of cellulitis at this time) present  No rash  Neurological:      General: No focal deficit present  Mental Status: She is alert  Mental status is at baseline  She is disoriented  Motor: Weakness present  Gait: Gait abnormal       Comments: AAOx2-3  Forgetful  Clear speech , pleasant    Psychiatric:         Mood and Affect: Mood normal          Behavior: Behavior normal          Pertinent Laboratory/Diagnostic Studies:   Reviewed in facility chart-stable      Current Medications   Medications reviewed and updated see facility [facility] for details        Current Outpatient Medications:   •  Melatonin 5 MG TABS, Take 1 tablet (5 mg total) by mouth daily at bedtime, Disp: , Rfl: 0  •  acetaminophen (TYLENOL) 325 mg tablet, Take 2 tablets (650 mg total) by mouth every 6 (six) hours, Disp: , Rfl: 0  •  ALPRAZolam (XANAX) 0 25 mg tablet, Take 1 tablet (0 25 mg total) by mouth daily X 2 days on Thursday 3/9 at 1600 and Fri 3/10 at 0900 prior to Viewing and , Disp: 2 tablet, Rfl: 0  •  ammonium lactate (LAC-HYDRIN) 12 % cream, Apply topically 2 (two) times a day, Disp: 385 g, Rfl: 0  •  apixaban (Eliquis) 5 mg, Take 1 tablet (5 mg total) by mouth 2 (two) times a day Lot [id number] exp 10/2024, Disp: 56 tablet, Rfl: 0  •  carvedilol (COREG) 3 125 mg tablet, Take 1 tablet (3 125 mg total) by mouth 2 (two) times a day with meals, Disp: 60 tablet, Rfl: 2  •  donepezil (ARICEPT) 10 mg tablet, Take 1 tablet (10 mg total) by mouth daily at bedtime, Disp: 90 tablet, Rfl: 1  •  escitalopram (LEXAPRO) 10 mg tablet, Take 1 tab at bedtime x1 week, then take 1/2 tab at bedtime x1 week, then stop , Disp: 11 tablet, Rfl: 0  •  ferrous sulfate 324 (65 Fe) mg, Take 1 tablet (324 mg total) by mouth daily before breakfast, Disp: 90 tablet, Rfl: 0  •  gabapentin (NEURONTIN) 100 mg capsule, Take 1 capsule (100 mg total) by mouth 2 (two) times a day, Disp: 180 capsule, Rfl: 1  •  lidocaine (LIDODERM) 5 %, Apply 2 patches topically daily Remove & Discard patch within 12 hours or as directed by MD, Disp: , Rfl: 0  •  magnesium Oxide (MAG-OX) 400 mg TABS, Take 1 tablet (400 mg total) by mouth daily, Disp: 30 tablet, Rfl: 0  •  naloxone (NARCAN) 4 mg/0 1 mL nasal spray, Administer 1 spray into a nostril  If no response after 2-3 minutes, give another dose in the other nostril using a new spray , Disp: 1 each, Rfl: 0  •  omeprazole (PriLOSEC) 20 mg delayed release capsule, Take 20 mg by mouth daily, Disp: , Rfl:   •  potassium chloride (K-DUR,KLOR-CON) 20 mEq tablet, Take 1 tablet (20 mEq total) by mouth daily, Disp: 30 tablet, Rfl: 0  •  senna-docusate sodium (SENOKOT S) 8 6-50 mg per tablet, Take 2 tablets by mouth 2 (two) times a day, Disp: , Rfl: 0  •  torsemide (DEMADEX) 20 mg tablet, Take 2 tablets (40 mg total) by mouth 2 (two) times a day, Disp: , Rfl: 0     Plan discussed with Dr Grazyna Aguilar noted agreement with assessment and plan  Please note:  Voice-recognition software may have been used in the preparation of this document  Occasional wrong word or "sound-alike" substitutions may have occurred due to the inherent limitations of voice recognition software  Interpretation should be guided by SHAHIDA Velasco  3/9/2023  8:36 AM

## 2023-03-09 NOTE — ASSESSMENT & PLAN NOTE
· Chronic history of venous insufficiency   · RLE with mild erythema on exam - improved from last week   · Recommend Compression and elevation  · Patient very resistant to compression - she did finally agree to wear Ace wraps   · Nursing to monitor skin daily

## 2023-03-09 NOTE — ASSESSMENT & PLAN NOTE
· AAOx 1  · Pleasantly confused   · Evaluated by Neuropsych last hospital admission- deemed to lack capacity to make medically informed decision  · POA is daughter, Chandler Cords    · Patient's  recently passed away at [facility] - was patient's primary caregiver  · Daughter states her Mom can not care for herself at home alone and will need LTC placement   · Patient has transitioned from [employer] to LTC at facility   · [facility] will continue to follow for medical management   Medication Regimen:   · Continue Lexapro 10 mg daily  · Continue Aricept 10 mg Q HS   · Continue Seroquel 25 mg Q HS (started 3/3/23 at SNF)  · Redirect/Reorient  · Fall Precautions  · Delirium Precautions  · Encourage nutition/hydration

## 2023-03-09 NOTE — ASSESSMENT & PLAN NOTE
Wt Readings from Last 3 Encounters:   03/09/23 82 6 kg (182 lb)   03/07/23 82 1 kg (181 lb)   03/01/23 82 1 kg (181 lb)     Weights stable   Continue with B/L lower leg edema- unchanged from previous exam - better when legs are elevated- she is resistent to Compression stockings - offered ACE wraps- still resistant   Plan:   · Continue Weekly weights per SNF policy  · Torsemide 40 mg BID with potassium and magnesium supplements  · Daughter notes patient often "hides" her potassium and is requesting nursing to watch her swallow her pills   · Continue Coreg 3 125 mg BID with hold parameters  · BMP stable   · Cardiac diet

## 2023-03-09 NOTE — PROGRESS NOTES
HRR referral  Chart review completed  Chart review completed for the following sections:  • Recent Vital Signs  • Allergies/Contradictions  • Medication Review   • History   • SDOH   • Problem List  • Immunizations  • Past hospitalizations and major procedures, including surgery  • Significant past illnesses and treatment history including: History and Physical, Other provider notes and Medications/Infusions/Transfusions  • Relevant past medications related to the patient's condition       Pt has transitioned from STR to LTC  Complex episode not opened at this time

## 2023-03-23 ENCOUNTER — TELEPHONE (OUTPATIENT)
Dept: INTERNAL MEDICINE CLINIC | Facility: CLINIC | Age: 78
End: 2023-03-23

## 2023-03-23 NOTE — TELEPHONE ENCOUNTER
I called to [facility] post acute unit and Evelin Higgins has now been moved to a Long term area and all her medical needs will be managed by the facility doctor   VLADISLAV

## 2023-04-11 PROBLEM — F02.B0 MODERATE LATE ONSET ALZHEIMER'S DEMENTIA WITHOUT BEHAVIORAL DISTURBANCE, PSYCHOTIC DISTURBANCE, MOOD DISTURBANCE, OR ANXIETY (HCC): Status: ACTIVE | Noted: 2023-02-22

## 2023-04-11 PROBLEM — D62 ACUTE POSTHEMORRHAGIC ANEMIA: Status: RESOLVED | Noted: 2022-02-14 | Resolved: 2023-04-11

## 2023-04-11 PROBLEM — K25.0 ACUTE GASTRIC ULCER WITH HEMORRHAGE: Status: RESOLVED | Noted: 2022-10-05 | Resolved: 2023-04-11

## 2023-04-11 PROBLEM — Z91.89 AT RISK FOR OBSTRUCTIVE SLEEP APNEA: Status: RESOLVED | Noted: 2022-08-03 | Resolved: 2023-04-11

## 2023-04-24 ENCOUNTER — NURSING HOME VISIT (OUTPATIENT)
Dept: GERIATRICS | Facility: OTHER | Age: 78
End: 2023-04-24

## 2023-04-24 VITALS
RESPIRATION RATE: 18 BRPM | DIASTOLIC BLOOD PRESSURE: 71 MMHG | SYSTOLIC BLOOD PRESSURE: 137 MMHG | OXYGEN SATURATION: 98 % | WEIGHT: 180 LBS | BODY MASS INDEX: 32.92 KG/M2 | HEART RATE: 65 BPM | TEMPERATURE: 97.3 F

## 2023-04-24 DIAGNOSIS — I87.2 VENOUS INSUFFICIENCY (CHRONIC) (PERIPHERAL): Primary | ICD-10-CM

## 2023-04-24 DIAGNOSIS — I50.32 CHRONIC DIASTOLIC (CONGESTIVE) HEART FAILURE (HCC): ICD-10-CM

## 2023-04-24 DIAGNOSIS — M79.605 PAIN IN BOTH LOWER EXTREMITIES: ICD-10-CM

## 2023-04-24 DIAGNOSIS — M79.604 PAIN IN BOTH LOWER EXTREMITIES: ICD-10-CM

## 2023-04-24 NOTE — PROGRESS NOTES
[facility]  Lance Gillis 80  [phone]  FACILITY: [facility]  Code 32  ACUTE VISIT    Assessment/Plan:    1  Venous insufficiency (chronic) (peripheral)  Assessment & Plan:  · Chronic history of venous insufficiency   · RLE with erythema on exam -similar to prior presentations    · Recommend Compression and elevation  · Patient very resistant to compression - she did finally agree to wear Ace wraps   · Nursing to monitor skin daily       Re-evaluated patient at bedside today due to nursing stating patient is c/o b/l leg pain, enoc at HS- patient is agreeable to restart gabapentin 100 mg Q Hs        2  Chronic diastolic (congestive) heart failure (HCC)  Assessment & Plan:  Wt Readings from Last 3 Encounters:   04/24/23 81 6 kg (180 lb)   04/13/23 81 2 kg (179 lb)   03/09/23 82 6 kg (182 lb)     Weights stable   Continue with B/L lower leg edema- unchanged from previous exam - better when legs are elevated    Plan:   · Continue Weekly weights per SNF policy  · Torsemide 40 mg BID with potassium and magnesium supplements  · Continue Coreg 3 125 mg BID with hold parameters  · BMP next week   · Cardiac diet         3  Pain in both lower extremities  Assessment & Plan:  Recently taken off gabapentin to trial if this assists in decreasing her leg swelling  Patient now c/o leg pain at night  Will restart Gabapentin 100 mg Q HS and monitor for effectiveness    Does have hx of chronic pain   Continue to offer PRN Tizanidine and Oxycodone to assist with pain control   Continue Tylenol RTC     Orders:  -     gabapentin (Neurontin) 100 mg capsule; Take 1 capsule (100 mg total) by mouth daily at bedtime         Subjective:      Patient ID: Lisa Hernandez is a 69 y o  female  CODE STATUS: No CPR   Patient is a LTC [facility] at [facility]  Seen and evaluated at bedside today for Acute visit per request of nursing for c/o worsening leg pain       PAST MEDICAL HISTORY:  Past Medical History:   Diagnosis Date   • A-fib ([place] [address] ) 12/29/2021   • Acute posthemorrhagic anemia 2/14/2022   • Acute respiratory failure with hypoxia ([place] [address] ) 11/12/2021   • Altered mental status, unspecified 2/9/2023        • Anxiety    • Arthritis    • Back pain    • Cellulitis    • Cellulitis, unspecified 12/30/2021   • CHF (congestive heart failure) ([place] [address] )    • Colon cancer screening 8/3/2022   • Edema of both lower extremities due to peripheral venous insufficiency    • Edema of both lower extremities due to peripheral venous insufficiency    • Encounter for screening mammogram for malignant neoplasm of breast 3/21/2022   • Encounter for support and coordination of transition of care 9/26/2022   • Erythema of lower extremity 11/12/2021   • Fibromyalgia    • Great toe pain, right 10/6/2022   • Hypertension    • Hypotension 9/17/2022   • Leukocytosis 10/6/2022   • Melena 8/20/2022   • Osteoporosis screening 8/3/2022   • Sjogren syndrome, unspecified ([place] [address] )    • Watery diarrhea 2/9/2023     Past Surgical History:   Procedure Laterality Date   • KNEE CARTILAGE SURGERY     • ND OPTX FEM SHFT FX W/INSJ IMED IMPLT W/WO SCREW Left 8/22/2022    Procedure: LEFT RETROGRADE IM SHAN;  Surgeon: India Elizondo MD;  Location: [facility];  Service: Orthopedics   • REPLACEMENT TOTAL KNEE BILATERAL           Gary Hoffmann is a 68 y/o female LTC resident of [facility] since 2/22/2023  She has a  PMH of Dementia, CHF, AFIB, HTN, Lymphedema, Ambulatory Dysfunction, Chronic pain syndrome with opioid dependency  Seen for acute visit due to leg pain  Seen at bedside in stable condition  Patient is out of bed sitting up in chair  She is not wearing her compression stockings her legs are not elevated  Patient states she has had increased pain in her legs at night making it difficult for her to sleep    Recently we have trialed her off of gabapentin to see if this is assisted in easing her leg swelling however now having adverse effect of increased pain  We will restart gabapentin at low-dose 100 mg nightly and monitor for effectiveness, patient in agreement with plan  No other concerns or complaints at this time  Review of Systems   Unable to perform ROS: Dementia   Neurological: Positive for numbness (leg pain)  Psychiatric/Behavioral: Positive for sleep disturbance  Objective:    VITALS:   Vitals:    04/24/23 1603   BP: 137/71   Pulse: 65   Resp: 18   Temp: (!) 97 3 °F (36 3 °C)   SpO2: 98%          Physical Exam  Vitals and nursing note reviewed  Constitutional:       General: She is not in acute distress  Appearance: Normal appearance  She is obese  She is not ill-appearing  HENT:      Head: Normocephalic and atraumatic  Nose: No congestion or rhinorrhea  Mouth/Throat:      Mouth: Mucous membranes are moist    Eyes:      General: No scleral icterus  Conjunctiva/sclera: Conjunctivae normal       Pupils: Pupils are equal, round, and reactive to light  Cardiovascular:      Rate and Rhythm: Normal rate  Rhythm irregular  Pulses: Normal pulses  Heart sounds: Normal heart sounds  No murmur heard  Pulmonary:      Effort: Pulmonary effort is normal  No respiratory distress  Breath sounds: Normal breath sounds  No wheezing, rhonchi or rales  Abdominal:      General: Bowel sounds are normal  There is no distension  Palpations: Abdomen is soft  There is no mass  Tenderness: There is no abdominal tenderness  Hernia: No hernia is present  Musculoskeletal:         General: No swelling or tenderness  Right lower leg: Edema (+1) present  Left lower leg: Edema (+1) present  Lymphadenopathy:      Cervical: No cervical adenopathy  Skin:     General: Skin is warm and dry  Coloration: Skin is not pale  Findings: No erythema or rash  Neurological:      General: No focal deficit present  Mental Status: She is alert  Mental status is at baseline   She is disoriented  GCS: GCS eye subscore is 4  GCS verbal subscore is 4  GCS motor subscore is 6  Motor: Weakness present  Gait: Gait abnormal       Comments: AAOx1-2  Forgetful  Clear speech , pleasant    Psychiatric:         Mood and Affect: Mood normal          Behavior: Behavior normal          Cognition and Memory: Cognition is impaired  Memory is impaired               Current Outpatient Medications:   •  gabapentin (Neurontin) 100 mg capsule, Take 1 capsule (100 mg total) by mouth daily at bedtime, Disp: , Rfl:   •  acetaminophen (TYLENOL) 325 mg tablet, Take 2 tablets (650 mg total) by mouth every 6 (six) hours, Disp: , Rfl: 0  •  ALPRAZolam (XANAX) 0 25 mg tablet, Take 1 tablet (0 25 mg total) by mouth daily X 2 days on Thursday 3/9 at 1600 and Fri 3/10 at 0900 prior to Viewing and , Disp: 2 tablet, Rfl: 0  •  ammonium lactate (LAC-HYDRIN) 12 % cream, Apply topically 2 (two) times a day, Disp: 385 g, Rfl: 0  •  apixaban (Eliquis) 5 mg, Take 1 tablet (5 mg total) by mouth 2 (two) times a day Lot [id number] exp 10/2024, Disp: 56 tablet, Rfl: 0  •  carvedilol (COREG) 3 125 mg tablet, Take 1 tablet (3 125 mg total) by mouth 2 (two) times a day with meals, Disp: 60 tablet, Rfl: 2  •  donepezil (ARICEPT) 10 mg tablet, Take 1 tablet (10 mg total) by mouth daily at bedtime, Disp: 90 tablet, Rfl: 1  •  escitalopram (LEXAPRO) 10 mg tablet, Take 1 tab at bedtime x1 week, then take 1/2 tab at bedtime x1 week, then stop , Disp: 11 tablet, Rfl: 0  •  ferrous sulfate 324 (65 Fe) mg, Take 1 tablet (324 mg total) by mouth daily before breakfast, Disp: 90 tablet, Rfl: 0  •  lidocaine (LIDODERM) 5 %, Apply 2 patches topically daily Remove & Discard patch within 12 hours or as directed by MD, Disp: , Rfl: 0  •  magnesium Oxide (MAG-OX) 400 mg TABS, Take 1 tablet (400 mg total) by mouth daily, Disp: 30 tablet, Rfl: 0  •  Melatonin 5 MG TABS, Take 1 tablet (5 mg total) by mouth daily at bedtime, Disp: , Rfl: 0  • "naloxone (NARCAN) 4 mg/0 1 mL nasal spray, Administer 1 spray into a nostril  If no response after 2-3 minutes, give another dose in the other nostril using a new spray , Disp: 1 each, Rfl: 0  •  omeprazole (PriLOSEC) 20 mg delayed release capsule, Take 20 mg by mouth daily, Disp: , Rfl:   •  potassium chloride (K-DUR,KLOR-CON) 20 mEq tablet, Take 1 tablet (20 mEq total) by mouth daily, Disp: 30 tablet, Rfl: 0  •  senna-docusate sodium (SENOKOT S) 8 6-50 mg per tablet, Take 2 tablets by mouth 2 (two) times a day, Disp: , Rfl: 0  •  torsemide (DEMADEX) 20 mg tablet, Take 2 tablets (40 mg total) by mouth 2 (two) times a day, Disp: , Rfl: 0      Plan discussed with Dr Edita Teresa noted agreement with assessment and plan  Please note:  Voice-recognition software may have been used in the preparation of this document  Occasional wrong word or \"sound-alike\" substitutions may have occurred due to the inherent limitations of voice recognition software  Interpretation should be guided by context         Roderic Fothergill, CRNP  04/24/23  4:05 PM      "

## 2023-04-24 NOTE — ASSESSMENT & PLAN NOTE
Recently taken off gabapentin to trial if this assists in decreasing her leg swelling  Patient now c/o leg pain at night  Will restart Gabapentin 100 mg Q HS and monitor for effectiveness    Does have hx of chronic pain   Continue to offer PRN Tizanidine and Oxycodone to assist with pain control   Continue Tylenol RTC

## 2023-04-28 PROBLEM — A08.4 VIRAL GASTROENTERITIS: Status: RESOLVED | Noted: 2023-02-27 | Resolved: 2023-04-28

## 2023-04-28 RX ORDER — GABAPENTIN 100 MG/1
100 CAPSULE ORAL
Start: 2023-04-28

## 2023-04-28 NOTE — ASSESSMENT & PLAN NOTE
· Chronic history of venous insufficiency   · RLE with erythema on exam -similar to prior presentations    · Recommend Compression and elevation  · Patient very resistant to compression - she did finally agree to wear Ace wraps   · Nursing to monitor skin daily       Re-evaluated patient at bedside today due to nursing stating patient is c/o b/l leg pain, enoc at HS- patient is agreeable to restart gabapentin 100 mg Q Hs

## 2023-04-28 NOTE — ASSESSMENT & PLAN NOTE
Wt Readings from Last 3 Encounters:   04/24/23 81 6 kg (180 lb)   04/13/23 81 2 kg (179 lb)   03/09/23 82 6 kg (182 lb)     Weights stable   Continue with B/L lower leg edema- unchanged from previous exam - better when legs are elevated    Plan:   · Continue Weekly weights per SNF policy  · Torsemide 40 mg BID with potassium and magnesium supplements  · Continue Coreg 3 125 mg BID with hold parameters  · BMP next week   · Cardiac diet

## 2023-06-05 DIAGNOSIS — M79.605 PAIN IN BOTH LOWER EXTREMITIES: ICD-10-CM

## 2023-06-05 DIAGNOSIS — M17.10 UNILATERAL PRIMARY OSTEOARTHRITIS, UNSPECIFIED KNEE: Primary | ICD-10-CM

## 2023-06-05 DIAGNOSIS — F11.20 OPIOID DEPENDENCE, UNCOMPLICATED (HCC): ICD-10-CM

## 2023-06-05 DIAGNOSIS — M79.604 PAIN IN BOTH LOWER EXTREMITIES: ICD-10-CM

## 2023-06-05 RX ORDER — OXYCODONE AND ACETAMINOPHEN 2.5; 325 MG/1; MG/1
1 TABLET ORAL EVERY 8 HOURS PRN
Qty: 21 TABLET | Refills: 0 | Status: SHIPPED | OUTPATIENT
Start: 2023-06-05 | End: 2023-06-12

## 2023-06-05 NOTE — PROGRESS NOTES
Notified by nursing that pharmacy does not have Oxycodone 5 mg tabs available  Patient has order for oxycodone 2 5 mg Q 6 hr PRN and Tylenol 975 mg TID  Will change to Percocet 2 5/325 - 1 tab Q 8 hrs PRN , e script sent to [facility]

## 2023-06-15 DIAGNOSIS — F41.9 ANXIETY AND DEPRESSION: Primary | ICD-10-CM

## 2023-06-15 DIAGNOSIS — F32.A ANXIETY AND DEPRESSION: Primary | ICD-10-CM

## 2023-06-15 RX ORDER — ESCITALOPRAM OXALATE 5 MG/1
5 TABLET ORAL DAILY
Start: 2023-06-15

## 2023-06-15 NOTE — PROGRESS NOTES
Notified by Unit Manager that patient's daughter is concerned about depression for resident  Daughter is concerned because patient is sad, she has to live in nursing home, patient also declined moving in with daughter  Patient's  passed this past year which could also be contributing to depression  Review of records show patient was previously on Lexapro  At this time, I would recommend consulting Senior care therapy for LCSW to eval and provide therapy  Will start low dose Lexapro as well

## 2023-06-16 DIAGNOSIS — G89.29 OTHER CHRONIC PAIN: Primary | ICD-10-CM

## 2023-06-16 RX ORDER — OXYCODONE HYDROCHLORIDE 5 MG/1
2.5 TABLET ORAL EVERY 6 HOURS PRN
Qty: 90 TABLET | Refills: 0 | Status: SHIPPED | OUTPATIENT
Start: 2023-06-16

## 2023-07-11 ENCOUNTER — NURSING HOME VISIT (OUTPATIENT)
Dept: GERIATRICS | Facility: OTHER | Age: 78
End: 2023-07-11
Payer: COMMERCIAL

## 2023-07-11 DIAGNOSIS — M79.605 PAIN IN BOTH LOWER EXTREMITIES: ICD-10-CM

## 2023-07-11 DIAGNOSIS — I89.0 LYMPHEDEMA, NOT ELSEWHERE CLASSIFIED: ICD-10-CM

## 2023-07-11 DIAGNOSIS — I10 ESSENTIAL (PRIMARY) HYPERTENSION: ICD-10-CM

## 2023-07-11 DIAGNOSIS — G30.1 MODERATE LATE ONSET ALZHEIMER'S DEMENTIA WITHOUT BEHAVIORAL DISTURBANCE, PSYCHOTIC DISTURBANCE, MOOD DISTURBANCE, OR ANXIETY (HCC): Primary | ICD-10-CM

## 2023-07-11 DIAGNOSIS — I48.91 ATRIAL FIBRILLATION, UNSPECIFIED TYPE (HCC): ICD-10-CM

## 2023-07-11 DIAGNOSIS — F02.B0 MODERATE LATE ONSET ALZHEIMER'S DEMENTIA WITHOUT BEHAVIORAL DISTURBANCE, PSYCHOTIC DISTURBANCE, MOOD DISTURBANCE, OR ANXIETY (HCC): Primary | ICD-10-CM

## 2023-07-11 DIAGNOSIS — M79.604 PAIN IN BOTH LOWER EXTREMITIES: ICD-10-CM

## 2023-07-11 PROBLEM — J30.2 SEASONAL ALLERGIES: Status: RESOLVED | Noted: 2022-03-21 | Resolved: 2023-07-11

## 2023-07-11 PROBLEM — Z63.4 RECENT BEREAVEMENT: Status: RESOLVED | Noted: 2023-03-07 | Resolved: 2023-07-11

## 2023-07-11 PROBLEM — R41.89 OTHER SYMPTOMS AND SIGNS INVOLVING COGNITIVE FUNCTIONS AND AWARENESS: Status: RESOLVED | Noted: 2022-01-27 | Resolved: 2023-07-11

## 2023-07-11 PROBLEM — R29.898 OTHER SYMPTOMS AND SIGNS INVOLVING THE MUSCULOSKELETAL SYSTEM: Status: RESOLVED | Noted: 2022-02-10 | Resolved: 2023-07-11

## 2023-07-11 PROBLEM — G89.11 ACUTE PAIN DUE TO TRAUMA: Status: RESOLVED | Noted: 2022-08-20 | Resolved: 2023-07-11

## 2023-07-11 PROCEDURE — 99309 SBSQ NF CARE MODERATE MDM 30: CPT | Performed by: FAMILY MEDICINE

## 2023-07-11 NOTE — PROGRESS NOTES
[address]  [phone]  [facility]      NAME: Kasandra Rordíguez  AGE: 69 y.o. SEX: female [phone]    DATE OF ENCOUNTER: 7/11/2023    Assessment and Plan     1. Moderate late onset Alzheimer's dementia without behavioral disturbance, psychotic disturbance, mood disturbance, or anxiety (HCC)  Currently stable. On aricept  Monitor. Redirect/reorient as needed. Continue with supportive treatment. 2. Pain in both lower extremities  Back on gabapentin. Doing well. No complaints currenlty of pain    3. Atrial fibrillation, unspecified type ([address])  Stable. On AC, rate control. 4. Lymphedema, not elsewhere classified  Stable. On diuretics with potassium supplementation. 5. Essential (primary) hypertension  Stable. Continue with the same. Patient to be discharged in 2 weeks to go to [place] where her daughter lives. Dc to a SNF in [place].     - Counseling Documentation: patient was counseled regarding: instructions for management, impressions and risks and benefits of treatment options  - Medication Side Effects: Adverse side effects of medications were reviewed with the patient/guardian today. Chief Complaint     Routine Long term follow-up visit. History of Present Illness     Pt seen for LTC. Patient reports doing well. Denies any pain or discomfort. Staff reports no issues. She is happy to be leaving in a few weeks to [place]. She will be near her daughter. Plan to be dc'd to another snf in [place]. The following portions of the patient's history were reviewed and updated as appropriate: allergies, current medications, past family history, past medical history, past social history, past surgical history and problem list.    Review of Systems     Review of Systems   Constitutional: Negative. Negative for activity change, appetite change, chills, diaphoresis, fatigue and fever.    HENT: Negative for congestion, facial swelling and sore throat. Respiratory: Negative. Negative for apnea, cough, chest tightness and shortness of breath. Cardiovascular: Negative. Negative for chest pain and palpitations. Gastrointestinal: Negative. Negative for abdominal distention, abdominal pain, blood in stool, constipation, diarrhea and nausea. Genitourinary: Negative. Negative for difficulty urinating, dysuria, flank pain and frequency. Active Problem List     Patient Active Problem List   Diagnosis   • Venous insufficiency (chronic) (peripheral)   • Essential (primary) hypertension   • Other chronic pain   • Chronic kidney disease, stage 3 unspecified ([facility])   • Mixed hyperlipidemia   • Obesity, unspecified   • Unilateral primary osteoarthritis, unspecified knee   • Sjogren's syndrome ([address])   • Unspecified atrial fibrillation ([facility])   • Opioid dependence, uncomplicated ([facility])   • Other mixed anxiety disorders   • Prediabetes   • Xerosis of skin   • Venous stasis dermatitis of both lower extremities   • Lymphedema, not elsewhere classified   • Unspecified fall, subsequent encounter   • Fracture of proximal end of left femur ([facility])   • Constipation   • Ambulatory dysfunction   • Anemia   • Age-related osteoporosis with current pathological fracture   • Chronic diastolic (congestive) heart failure ([facility])   • Parenchymal renal hypertension   • Hypokalemia   • Fracture of rib, single, closed   • Closed fracture of transverse process of lumbar vertebra ([facility])   • Wedge compression fracture of T7-t8 vertebra, subsequent encounter for fracture with routine healing   • Moderate late onset Alzheimer's dementia without behavioral disturbance, psychotic disturbance, mood disturbance, or anxiety ([facility])   • Other insomnia   • Pain in both lower extremities       Objective     Vitals:    Physical Exam  Vitals and nursing note reviewed. Constitutional:       Appearance: Normal appearance. She is well-developed. She is obese. HENT:      Head: Normocephalic and atraumatic. Right Ear: External ear normal.      Left Ear: External ear normal.      Nose: Nose normal.   Eyes:      Conjunctiva/sclera: Conjunctivae normal.      Pupils: Pupils are equal, round, and reactive to light. Neck:      Thyroid: No thyromegaly. Trachea: No tracheal deviation. Cardiovascular:      Rate and Rhythm: Normal rate and regular rhythm. Heart sounds: Normal heart sounds. No murmur heard. Pulmonary:      Effort: Pulmonary effort is normal. No respiratory distress. Breath sounds: Normal breath sounds. No wheezing. Abdominal:      General: Bowel sounds are normal.      Palpations: Abdomen is soft. Musculoskeletal:         General: Normal range of motion. Cervical back: Normal range of motion and neck supple. Skin:     General: Skin is warm and dry. Comments: Lymphedema bilaterally. No significant redness. Neurological:      Mental Status: She is alert and oriented to person, place, and time. Pertinent Laboratory/Diagnostic Studies:  Refer to facility chart. Current Medications   Medications reviewed and updated in facility chart.

## 2023-07-20 ENCOUNTER — NURSING HOME VISIT (OUTPATIENT)
Dept: GERIATRICS | Facility: OTHER | Age: 78
End: 2023-07-20
Payer: COMMERCIAL

## 2023-07-20 VITALS
OXYGEN SATURATION: 98 % | HEART RATE: 81 BPM | DIASTOLIC BLOOD PRESSURE: 64 MMHG | WEIGHT: 188.5 LBS | RESPIRATION RATE: 18 BRPM | BODY MASS INDEX: 34.48 KG/M2 | TEMPERATURE: 97.3 F | SYSTOLIC BLOOD PRESSURE: 126 MMHG

## 2023-07-20 DIAGNOSIS — F11.20 UNCOMPLICATED OPIOID DEPENDENCE (HCC): ICD-10-CM

## 2023-07-20 DIAGNOSIS — F41.9 ANXIETY AND DEPRESSION: ICD-10-CM

## 2023-07-20 DIAGNOSIS — F32.A ANXIETY AND DEPRESSION: ICD-10-CM

## 2023-07-20 DIAGNOSIS — G89.29 OTHER CHRONIC PAIN: ICD-10-CM

## 2023-07-20 DIAGNOSIS — F02.B3 MODERATE ALZHEIMER'S DEMENTIA WITH MOOD DISTURBANCE, UNSPECIFIED TIMING OF DEMENTIA ONSET (HCC): ICD-10-CM

## 2023-07-20 DIAGNOSIS — I10 ESSENTIAL HYPERTENSION: ICD-10-CM

## 2023-07-20 DIAGNOSIS — D50.8 OTHER IRON DEFICIENCY ANEMIA: ICD-10-CM

## 2023-07-20 DIAGNOSIS — I48.11 LONGSTANDING PERSISTENT ATRIAL FIBRILLATION (HCC): ICD-10-CM

## 2023-07-20 DIAGNOSIS — I50.32 CHRONIC DIASTOLIC (CONGESTIVE) HEART FAILURE (HCC): ICD-10-CM

## 2023-07-20 DIAGNOSIS — G30.9 MODERATE ALZHEIMER'S DEMENTIA WITH MOOD DISTURBANCE, UNSPECIFIED TIMING OF DEMENTIA ONSET (HCC): ICD-10-CM

## 2023-07-20 DIAGNOSIS — I87.2 VENOUS STASIS DERMATITIS OF BOTH LOWER EXTREMITIES: ICD-10-CM

## 2023-07-20 PROCEDURE — 99316 NF DSCHRG MGMT 30 MIN+: CPT | Performed by: NURSE PRACTITIONER

## 2023-07-20 RX ORDER — AMOXICILLIN 250 MG
2 CAPSULE ORAL DAILY
Qty: 60 TABLET | Refills: 0 | Status: SHIPPED | OUTPATIENT
Start: 2023-07-20

## 2023-07-20 RX ORDER — ESCITALOPRAM OXALATE 5 MG/1
5 TABLET ORAL DAILY
Qty: 30 TABLET | Refills: 0 | Status: SHIPPED | OUTPATIENT
Start: 2023-07-20

## 2023-07-20 RX ORDER — AMMONIUM LACTATE 12 G/100G
CREAM TOPICAL 2 TIMES DAILY
Qty: 385 G | Refills: 0 | Status: SHIPPED | OUTPATIENT
Start: 2023-07-20

## 2023-07-20 RX ORDER — TIZANIDINE 2 MG/1
2 TABLET ORAL EVERY 12 HOURS PRN
Qty: 30 TABLET | Refills: 0 | Status: SHIPPED | OUTPATIENT
Start: 2023-07-20

## 2023-07-20 RX ORDER — CHOLECALCIFEROL (VITAMIN D3) 125 MCG
2 CAPSULE ORAL
Qty: 60 TABLET | Refills: 0 | Status: SHIPPED | OUTPATIENT
Start: 2023-07-20

## 2023-07-20 RX ORDER — DONEPEZIL HYDROCHLORIDE 10 MG/1
10 TABLET, FILM COATED ORAL
Qty: 30 TABLET | Refills: 0 | Status: SHIPPED | OUTPATIENT
Start: 2023-07-20

## 2023-07-20 RX ORDER — OXYCODONE HYDROCHLORIDE 5 MG/1
2.5 TABLET ORAL EVERY 6 HOURS PRN
Qty: 30 TABLET | Refills: 0 | Status: SHIPPED | OUTPATIENT
Start: 2023-07-20

## 2023-07-20 RX ORDER — LANOLIN ALCOHOL/MO/W.PET/CERES
400 CREAM (GRAM) TOPICAL DAILY
Qty: 30 TABLET | Refills: 0 | Status: SHIPPED | OUTPATIENT
Start: 2023-07-20 | End: 2023-08-19

## 2023-07-20 RX ORDER — POTASSIUM CHLORIDE 20 MEQ/1
20 TABLET, EXTENDED RELEASE ORAL 4 TIMES DAILY
Qty: 120 TABLET | Refills: 0 | Status: SHIPPED | OUTPATIENT
Start: 2023-07-20

## 2023-07-20 RX ORDER — FERROUS SULFATE TAB EC 324 MG (65 MG FE EQUIVALENT) 324 (65 FE) MG
324 TABLET DELAYED RESPONSE ORAL
Qty: 30 TABLET | Refills: 0 | Status: SHIPPED | OUTPATIENT
Start: 2023-07-20

## 2023-07-20 RX ORDER — GABAPENTIN 100 MG/1
100 CAPSULE ORAL
Qty: 30 CAPSULE | Refills: 0 | Status: SHIPPED | OUTPATIENT
Start: 2023-07-20

## 2023-07-20 RX ORDER — TORSEMIDE 20 MG/1
40 TABLET ORAL 2 TIMES DAILY
Qty: 120 TABLET | Refills: 0 | Status: SHIPPED | OUTPATIENT
Start: 2023-07-20

## 2023-07-20 RX ORDER — NALOXONE HYDROCHLORIDE 4 MG/.1ML
SPRAY NASAL
Qty: 1 EACH | Refills: 0 | Status: SHIPPED | OUTPATIENT
Start: 2023-07-20

## 2023-07-20 RX ORDER — LIDOCAINE 50 MG/G
1 PATCH TOPICAL DAILY
Qty: 30 PATCH | Refills: 0 | Status: SHIPPED | OUTPATIENT
Start: 2023-07-20

## 2023-07-20 RX ORDER — CARVEDILOL 3.12 MG/1
3.12 TABLET ORAL 2 TIMES DAILY WITH MEALS
Qty: 60 TABLET | Refills: 0 | Status: SHIPPED | OUTPATIENT
Start: 2023-07-20

## 2023-07-20 NOTE — ASSESSMENT & PLAN NOTE
· Chronic back and leg pain   · Tylenol PRN for mild pain   · Lidocaine patch to back   · Gabapentin 100 mg Q HS for neuropathy   · Oxycodone 2.5 mg Q 6 hrs PRN for mod to severe pain   · Tizanidine 2 mg Q 12 hrs PRN for muscle spasms   · Bowel regimen to prevent constipation

## 2023-07-20 NOTE — PROGRESS NOTES
[address]  [phone]  DISCHARGE SUMMARY  FACILITY: [facility]  Code 32    NAME: Kar Rodríguez  AGE: 69 y.o. SEX: female   CODE STATUS: No CPR    DATE OF ADMISSION: 2/22/2023   DATE OF DISCHARGE: 7/22/2023   DISCHARGE DISPOSITION: Stable for discharge to home with family support and home health PT/OT/SN services. Reason for Admission: Patient was admitted to [address] [place] [address] for rehabilitation after hospitalization for Debility.     Past Medical and Surgical History:   Past Medical History:   Diagnosis Date   • A-fib ([address]) 12/29/2021   • Acute pain due to trauma 8/20/2022   • Acute posthemorrhagic anemia 2/14/2022   • Acute respiratory failure with hypoxia ([address]) 11/12/2021   • Altered mental status, unspecified 2/9/2023        • Anxiety    • Arthritis    • Back pain    • Cellulitis    • Cellulitis, unspecified 12/30/2021   • CHF (congestive heart failure) ([address])    • Colon cancer screening 8/3/2022   • Edema of both lower extremities due to peripheral venous insufficiency    • Edema of both lower extremities due to peripheral venous insufficiency    • Encounter for screening mammogram for malignant neoplasm of breast 3/21/2022   • Encounter for support and coordination of transition of care 9/26/2022   • Erythema of lower extremity 11/12/2021   • Fibromyalgia    • Great toe pain, right 10/6/2022   • Hypertension    • Hypotension 9/17/2022   • Leukocytosis 10/6/2022   • Melena 8/20/2022   • Osteoporosis screening 8/3/2022   • Other symptoms and signs involving cognitive functions and awareness 1/27/2022   • Other symptoms and signs involving the musculoskeletal system 2/10/2022   • Recent bereavement 3/7/2023   • Seasonal allergies 3/21/2022   • Sjogren syndrome, unspecified ([address])    • Watery diarrhea 2/9/2023      Past Surgical History:   Procedure Laterality Date   • KNEE CARTILAGE SURGERY     • PA OPTX FEM SHFT FX W/INSJ IMED IMPLT W/WO SCREW Left 8/22/2022    Procedure: LEFT RETROGRADE IM SHAN;  Surgeon: Yessica Oneil MD;  Location: [facility];  Service: Orthopedics   • REPLACEMENT TOTAL KNEE BILATERAL         Course of stay:   Juna Bansal is a 68 y/o female LTC resident of SNF since 2/22/2023. She has a  PMH of Dementia, CHF, AFIB, HTN, Lymphedema, Ambulatory Dysfunction, Chronic pain syndrome with opioid dependency. Patient is seen for discahrge visit. Patient's daughterLina is transitioning her mother to a SNF in [place] to live closer to family. Patient is seen on exam today oob in chair. Patient is a poor historian due to Alzheimer's Dementia, She is AAOx 1-2. She is aware she is moving to [place]. She offers no complaints at this time, she is looking forward to the move. Patient is ambulatory with RW, able to toilet herself. She needs cueing and reminders, she needs min assistance with ADLs/IADLs. She has chronic leg edema and venous statis changes, she states no changes there. Patient denies CP/SOB/N/V/D. Denies lightheadedness, dizziness, headaches, vision changes. States she is eating well and staying hydrated. Denies any bowel or bladder issues. No concerns from nursing at this time. ROS:  Review of Systems   All other systems reviewed and are negative. PHYSICAL EXAM:  VITALS:   Vitals:    07/20/23 1229   BP: 126/64   Pulse: 81   Resp: 18   Temp: (!) 97.3 °F (36.3 °C)   SpO2: 98%        Physical Exam  Vitals and nursing note reviewed. Constitutional:       General: She is not in acute distress. Appearance: Normal appearance. She is obese. She is not ill-appearing. HENT:      Head: Normocephalic and atraumatic. Nose: No congestion or rhinorrhea. Mouth/Throat:      Mouth: Mucous membranes are moist.   Eyes:      General: No scleral icterus. Conjunctiva/sclera: Conjunctivae normal.      Pupils: Pupils are equal, round, and reactive to light.    Cardiovascular:      Rate and Rhythm: Normal rate. Rhythm irregular. Pulses: Normal pulses. Heart sounds: Normal heart sounds. No murmur heard. Pulmonary:      Effort: Pulmonary effort is normal. No respiratory distress. Breath sounds: Normal breath sounds. No wheezing, rhonchi or rales. Abdominal:      General: Bowel sounds are normal. There is no distension. Palpations: Abdomen is soft. There is no mass. Tenderness: There is no abdominal tenderness. Hernia: No hernia is present. Musculoskeletal:         General: No swelling or tenderness. Right lower leg: Edema (+1) present. Left lower leg: Edema (+1) present. Lymphadenopathy:      Cervical: No cervical adenopathy. Skin:     General: Skin is warm and dry. Coloration: Skin is not pale. Findings: No erythema or rash. Neurological:      General: No focal deficit present. Mental Status: She is alert. Mental status is at baseline. She is disoriented. GCS: GCS eye subscore is 4. GCS verbal subscore is 4. GCS motor subscore is 6. Motor: Weakness present. Gait: Gait abnormal.      Comments: AAOx1-2  Forgetful  Clear speech , pleasant    Psychiatric:         Mood and Affect: Mood normal.         Behavior: Behavior normal.         Cognition and Memory: Cognition is impaired. Memory is impaired. Admission Diagnoses:   1.  Moderate Alzheimer's dementia with mood disturbance, unspecified timing of dementia onset ([address])  Assessment & Plan:  · AAOx 1  · Pleasantly confused - short term memory loss   · Evaluated by Neuropsych last hospital admission- deemed to lack capacity to make medically informed decision  · POA is daughter, Lorena Simmons- moving to [place] to another SNF to live near family  Medication Regimen:   · Continue Lexapro 5 mg daily  · Continue Aricept 10 mg Q HS   · Continue Melatonin to assist with insomnia and sleep   · Redirect/Reorient  · Fall Precautions  · Delirium Precautions  · Encourage nutition/hydration Orders:  -     donepezil (ARICEPT) 10 mg tablet; Take 1 tablet (10 mg total) by mouth daily at bedtime  -     Melatonin 5 MG TABS; Take 2 tablets (10 mg total) by mouth daily at bedtime  -     escitalopram (LEXAPRO) 5 mg tablet; Take 1 tablet (5 mg total) by mouth daily    2. Essential hypertension  Assessment & Plan:  · Well controlled 126/64 today   · Continue Carvedilol, Torsemide       3. Longstanding persistent atrial fibrillation (HCC)  Assessment & Plan:  Controlled with Coreg 3.125 BID- with hold parameters  AC with Eliquis 5 mg BID     Orders:  -     apixaban (Eliquis) 5 mg; Take 1 tablet (5 mg total) by mouth 2 (two) times a day Lot [id number] exp 10/2024  -     carvedilol (COREG) 3.125 mg tablet; Take 1 tablet (3.125 mg total) by mouth 2 (two) times a day with meals    4. Chronic diastolic (congestive) heart failure (HCC)  Assessment & Plan:  Wt Readings from Last 3 Encounters:   04/24/23 81.6 kg (180 lb)   04/13/23 81.2 kg (179 lb)   03/09/23 82.6 kg (182 lb)     Weights stable   Continues with chronic B/L lower leg edema- unchanged from previous exam - better when legs are elevated    Plan:   · Continue Weekly weights per SNF policy  · Torsemide 40 mg BID with potassium and magnesium supplements  · Continue Coreg 3.125 mg BID with hold parameters  · Monitor BMP  · Cardiac diet       Orders:  -     magnesium Oxide (MAG-OX) 400 mg TABS; Take 1 tablet (400 mg total) by mouth daily  -     torsemide (DEMADEX) 20 mg tablet; Take 2 tablets (40 mg total) by mouth 2 (two) times a day  -     potassium chloride (K-DUR,KLOR-CON) 20 mEq tablet; Take 1 tablet (20 mEq total) by mouth 4 (four) times a day  -     carvedilol (COREG) 3.125 mg tablet; Take 1 tablet (3.125 mg total) by mouth 2 (two) times a day with meals    5. Anxiety and depression  Assessment & Plan:  · Mood stable   · Continue lexapro     Orders:  -     escitalopram (LEXAPRO) 5 mg tablet; Take 1 tablet (5 mg total) by mouth daily    6.  Other iron deficiency anemia  Assessment & Plan:  · CBC stable  · Continue Iron Supplements     Orders:  -     ferrous sulfate 324 (65 Fe) mg; Take 1 tablet (324 mg total) by mouth daily before breakfast    7. Venous stasis dermatitis of both lower extremities  Assessment & Plan:  · Chronic with chronic lymphedema   · No s/s of infection at this time   · Elevation and Compression  · Patient often non-compliant with this   · Continue diuretics   · Continue ammonia lactate lotion to b/l legs    Orders:  -     ammonium lactate (LAC-HYDRIN) 12 % cream; Apply topically 2 (two) times a day    8. Other chronic pain  Assessment & Plan:  · Chronic back and leg pain   · Tylenol PRN for mild pain   · Lidocaine patch to back   · Gabapentin 100 mg Q HS for neuropathy   · Oxycodone 2.5 mg Q 6 hrs PRN for mod to severe pain   · Tizanidine 2 mg Q 12 hrs PRN for muscle spasms   · Bowel regimen to prevent constipation     Orders:  -     senna-docusate sodium (SENOKOT S) 8.6-50 mg per tablet; Take 2 tablets by mouth daily  -     tiZANidine (ZANAFLEX) 2 mg tablet; Take 1 tablet (2 mg total) by mouth every 12 (twelve) hours as needed for muscle spasms  -     lidocaine (LIDODERM) 5 %; Apply 1 patch topically over 12 hours daily Remove & Discard patch within 12 hours or as directed by MD  -     gabapentin (Neurontin) 100 mg capsule; Take 1 capsule (100 mg total) by mouth daily at bedtime  -     oxyCODONE (Roxicodone) 5 immediate release tablet; Take 0.5 tablets (2.5 mg total) by mouth every 6 (six) hours as needed for moderate pain or severe pain Max Daily Amount: 10 mg  -     naloxone (NARCAN) 4 mg/0.1 mL nasal spray; Administer 1 spray into a nostril. If no response after 2-3 minutes, give another dose in the other nostril using a new spray.     9. Uncomplicated opioid dependence ([address])  Assessment & Plan:  · Long term use of oxycodone   · Continue multimodal pain mgmt   · Doing well on current regimen- see A/P for chronic pain     Orders:  - oxyCODONE (Roxicodone) 5 immediate release tablet; Take 0.5 tablets (2.5 mg total) by mouth every 6 (six) hours as needed for moderate pain or severe pain Max Daily Amount: 10 mg  -     naloxone (NARCAN) 4 mg/0.1 mL nasal spray; Administer 1 spray into a nostril. If no response after 2-3 minutes, give another dose in the other nostril using a new spray. Follow-up Recommendations:    • Outpatient Follow up with PCP in the next 2 weeks  • Home Health PT/OT/SN services     Labs and testing performed during stay:    Reviewed in chart    Discharge Medications: See discharge medication list which was reviewed and signed.       Current Outpatient Medications:   •  ammonium lactate (LAC-HYDRIN) 12 % cream, Apply topically 2 (two) times a day, Disp: 385 g, Rfl: 0  •  apixaban (Eliquis) 5 mg, Take 1 tablet (5 mg total) by mouth 2 (two) times a day Lot [id number] exp 10/2024, Disp: 60 tablet, Rfl: 0  •  carvedilol (COREG) 3.125 mg tablet, Take 1 tablet (3.125 mg total) by mouth 2 (two) times a day with meals, Disp: 60 tablet, Rfl: 0  •  donepezil (ARICEPT) 10 mg tablet, Take 1 tablet (10 mg total) by mouth daily at bedtime, Disp: 30 tablet, Rfl: 0  •  escitalopram (LEXAPRO) 5 mg tablet, Take 1 tablet (5 mg total) by mouth daily, Disp: 30 tablet, Rfl: 0  •  ferrous sulfate 324 (65 Fe) mg, Take 1 tablet (324 mg total) by mouth daily before breakfast, Disp: 30 tablet, Rfl: 0  •  gabapentin (Neurontin) 100 mg capsule, Take 1 capsule (100 mg total) by mouth daily at bedtime, Disp: 30 capsule, Rfl: 0  •  lidocaine (LIDODERM) 5 %, Apply 1 patch topically over 12 hours daily Remove & Discard patch within 12 hours or as directed by MD, Disp: 30 patch, Rfl: 0  •  magnesium Oxide (MAG-OX) 400 mg TABS, Take 1 tablet (400 mg total) by mouth daily, Disp: 30 tablet, Rfl: 0  •  Melatonin 5 MG TABS, Take 2 tablets (10 mg total) by mouth daily at bedtime, Disp: 60 tablet, Rfl: 0  •  naloxone (NARCAN) 4 mg/0.1 mL nasal spray, Administer 1 spray into a nostril. If no response after 2-3 minutes, give another dose in the other nostril using a new spray., Disp: 1 each, Rfl: 0  •  oxyCODONE (Roxicodone) 5 immediate release tablet, Take 0.5 tablets (2.5 mg total) by mouth every 6 (six) hours as needed for moderate pain or severe pain Max Daily Amount: 10 mg, Disp: 30 tablet, Rfl: 0  •  potassium chloride (K-DUR,KLOR-CON) 20 mEq tablet, Take 1 tablet (20 mEq total) by mouth 4 (four) times a day, Disp: 120 tablet, Rfl: 0  •  senna-docusate sodium (SENOKOT S) 8.6-50 mg per tablet, Take 2 tablets by mouth daily, Disp: 60 tablet, Rfl: 0  •  tiZANidine (ZANAFLEX) 2 mg tablet, Take 1 tablet (2 mg total) by mouth every 12 (twelve) hours as needed for muscle spasms, Disp: 30 tablet, Rfl: 0  •  torsemide (DEMADEX) 20 mg tablet, Take 2 tablets (40 mg total) by mouth 2 (two) times a day, Disp: 120 tablet, Rfl: 0     Discussion with patient/family and further instructions:  -Fall precautions  -Aspiration precautions  -Bleeding precautions  -Monitor for signs/symptoms of infection  -Medication list was reviewed and updated    Status at time of discharge: Stable      Billing based on time. Time spent on unit, 40 minutes. Time spent counseling pt on debility/condition, 30 minutes. Please note:  Voice-recognition software may have been used in the preparation of this document. Occasional wrong word or "sound-alike" substitutions may have occurred due to the inherent limitations of voice recognition software. Interpretation should be guided by context.         SHAHIDA Fonseca  7/20/2023

## 2023-07-20 NOTE — ASSESSMENT & PLAN NOTE
· AAOx 1  · Pleasantly confused - short term memory loss   · Evaluated by Neuropsych last hospital admission- deemed to lack capacity to make medically informed decision  · POA is daughter, Lorena Bondspool- moving to [place] to another SNF to live near family  Medication Regimen:   · Continue Lexapro 5 mg daily  · Continue Aricept 10 mg Q HS   · Continue Melatonin to assist with insomnia and sleep   · Redirect/Reorient  · Fall Precautions  · Delirium Precautions  · Encourage nutition/hydration

## 2023-07-20 NOTE — ASSESSMENT & PLAN NOTE
· Chronic with chronic lymphedema   · No s/s of infection at this time   · Elevation and Compression  · Patient often non-compliant with this   · Continue diuretics   · Continue ammonia lactate lotion to b/l legs

## 2023-07-20 NOTE — ASSESSMENT & PLAN NOTE
· Long term use of oxycodone   · Continue multimodal pain mgmt   · Doing well on current regimen- see A/P for chronic pain

## 2023-07-20 NOTE — ASSESSMENT & PLAN NOTE
Wt Readings from Last 3 Encounters:   04/24/23 81.6 kg (180 lb)   04/13/23 81.2 kg (179 lb)   03/09/23 82.6 kg (182 lb)     Weights stable   Continues with chronic B/L lower leg edema- unchanged from previous exam - better when legs are elevated    Plan:   · Continue Weekly weights per SNF policy  · Torsemide 40 mg BID with potassium and magnesium supplements  · Continue Coreg 3.125 mg BID with hold parameters  · Monitor BMP  · Cardiac diet

## 2023-07-23 ENCOUNTER — TELEPHONE (OUTPATIENT)
Dept: OTHER | Facility: OTHER | Age: 78
End: 2023-07-23

## 2023-07-23 NOTE — TELEPHONE ENCOUNTER
Elijah Zaldivar from [id number] called to ask if the patient can take her medication including her narcotic today. She is is being discharged at 9:45.  Paged on call provider "Via TT

## 2023-07-25 ENCOUNTER — TELEPHONE (OUTPATIENT)
Age: 78
End: 2023-07-25

## 2023-07-25 NOTE — TELEPHONE ENCOUNTER
[facility] Pharmacist, Patito, ([phone]) called for clarification of patient's torsemide prescription. When calling back, provider is able to clarify with any pharmacist that answers the call.

## 2023-08-28 NOTE — ASSESSMENT & PLAN NOTE
· Present on admission, hemoglobin 8 9  · Baseline appears to be 9-10  · Monitor CBC  [Fever] : fever [Cough] : cough [Appetite Changes] : appetite changes [Negative] : Genitourinary
